# Patient Record
Sex: FEMALE | Race: WHITE | NOT HISPANIC OR LATINO | Employment: OTHER | URBAN - METROPOLITAN AREA
[De-identification: names, ages, dates, MRNs, and addresses within clinical notes are randomized per-mention and may not be internally consistent; named-entity substitution may affect disease eponyms.]

---

## 2018-03-15 ENCOUNTER — HOSPITAL ENCOUNTER (EMERGENCY)
Facility: HOSPITAL | Age: 73
Discharge: HOME | End: 2018-03-15
Attending: EMERGENCY MEDICINE | Admitting: EMERGENCY MEDICINE
Payer: COMMERCIAL

## 2018-03-15 ENCOUNTER — APPOINTMENT (EMERGENCY)
Dept: RADIOLOGY | Facility: HOSPITAL | Age: 73
End: 2018-03-15
Payer: COMMERCIAL

## 2018-03-15 VITALS
WEIGHT: 176 LBS | OXYGEN SATURATION: 97 % | SYSTOLIC BLOOD PRESSURE: 163 MMHG | TEMPERATURE: 98 F | RESPIRATION RATE: 20 BRPM | HEART RATE: 78 BPM | DIASTOLIC BLOOD PRESSURE: 80 MMHG

## 2018-03-15 DIAGNOSIS — S52.611A FRACTURE OF RIGHT ULNAR STYLOID: Primary | ICD-10-CM

## 2018-03-15 DIAGNOSIS — S52.501A DISTAL RADIUS FRACTURE, RIGHT: ICD-10-CM

## 2018-03-15 PROCEDURE — 73090 X-RAY EXAM OF FOREARM: CPT

## 2018-03-15 PROCEDURE — 96375 TX/PRO/DX INJ NEW DRUG ADDON: CPT

## 2018-03-15 PROCEDURE — 73100 X-RAY EXAM OF WRIST: CPT

## 2018-03-15 PROCEDURE — 96374 THER/PROPH/DIAG INJ IV PUSH: CPT

## 2018-03-15 PROCEDURE — 99284 EMERGENCY DEPT VISIT MOD MDM: CPT

## 2018-03-15 RX ORDER — LIDOCAINE HYDROCHLORIDE 20 MG/ML
10 INJECTION, SOLUTION INFILTRATION; PERINEURAL ONCE
Status: COMPLETED | OUTPATIENT
Start: 2018-03-15 | End: 2018-03-15

## 2018-03-15 RX ORDER — IBUPROFEN 400 MG/1
400 TABLET ORAL EVERY 6 HOURS PRN
Qty: 30 TABLET | Refills: 0 | Status: SHIPPED | OUTPATIENT
Start: 2018-03-15 | End: 2018-11-13

## 2018-03-15 RX ORDER — ONDANSETRON 2 MG/ML
INJECTION INTRAMUSCULAR; INTRAVENOUS
Status: COMPLETED
Start: 2018-03-15 | End: 2018-03-15

## 2018-03-15 RX ORDER — OXYCODONE HYDROCHLORIDE 5 MG/1
5 TABLET ORAL EVERY 4 HOURS PRN
Qty: 8 TABLET | Refills: 0 | Status: SHIPPED | OUTPATIENT
Start: 2018-03-15 | End: 2018-03-22 | Stop reason: HOSPADM

## 2018-03-15 RX ORDER — ALPRAZOLAM 0.5 MG/1
TABLET ORAL AS NEEDED
COMMUNITY
Start: 2017-08-14 | End: 2019-07-22 | Stop reason: SDUPTHER

## 2018-03-15 RX ORDER — ONDANSETRON 2 MG/ML
4 INJECTION INTRAMUSCULAR; INTRAVENOUS ONCE
Status: COMPLETED | OUTPATIENT
Start: 2018-03-15 | End: 2018-03-15

## 2018-03-15 RX ORDER — ROPIVACAINE HYDROCHLORIDE 2 MG/ML
10 INJECTION, SOLUTION EPIDURAL; INFILTRATION; PERINEURAL
Status: DISCONTINUED | OUTPATIENT
Start: 2018-03-15 | End: 2018-03-15

## 2018-03-15 RX ORDER — PROPOFOL 10 MG/ML
1 INJECTION, EMULSION INTRAVENOUS ONCE
Status: DISCONTINUED | OUTPATIENT
Start: 2018-03-15 | End: 2018-03-16 | Stop reason: HOSPADM

## 2018-03-15 RX ORDER — ACETAMINOPHEN 500 MG
500 TABLET ORAL EVERY 6 HOURS PRN
Qty: 30 TABLET | Refills: 0 | Status: ON HOLD | OUTPATIENT
Start: 2018-03-15 | End: 2018-03-22 | Stop reason: ALTCHOICE

## 2018-03-15 RX ADMIN — ONDANSETRON 4 MG: 2 INJECTION INTRAMUSCULAR; INTRAVENOUS at 19:50

## 2018-03-15 RX ADMIN — LIDOCAINE HYDROCHLORIDE 10 ML: 20 INJECTION, SOLUTION INFILTRATION; PERINEURAL at 21:00

## 2018-03-15 RX ADMIN — HYDROMORPHONE HYDROCHLORIDE 1 MG: 1 INJECTION, SOLUTION INTRAMUSCULAR; INTRAVENOUS; SUBCUTANEOUS at 19:42

## 2018-03-15 NOTE — ED PROVIDER NOTES
History  Chief Complaint   Patient presents with    Wrist Injury     patient fell on outstreched right wrist trying to catch herself from falling  patient has obvious deformity of right wrist       Patient is a 53-year-old right-handed female who presents with right wrist pain after a mechanical fall immediately prior to arrival   Patient reports that she was trying to get dressed and balancing on 1 leg when her foot got stuck in her pant leg resulting in falling towards her right side and bracing herself with her right hand resulting in a [address]  Complains of immediate pain in the right wrist, 10/10, nonradiating, constant since it started, worse with attempting to move the wrist associated with the swelling  Denies numbness, tingling, pain in the ipsilateral elbow and shoulder  Denies striking her head, loss of consciousness, neck pain, back pain  Assessment and plan:  Obvious deformity with likely fracture/dislocation of the right wrist   Sensation intact, able to wiggle fingers, but unable to flex or extend at the right wrist, cap refill less than 2 seconds, though there is no palpable radial pulse  On ultrasound, there is a visualized, dopplerable radial pulse  Will perform procedural sedation versus hematoma block and nerve block to achieve pain control and then will attempt reduction after x-rays  Prior to Admission Medications   Prescriptions Last Dose Informant Patient Reported? Taking? ALPRAZolam (XANAX) 0 5 mg tablet   Yes Yes      Facility-Administered Medications: None       Past Medical History:   Diagnosis Date    Pericarditis        Past Surgical History:   Procedure Laterality Date    KNEE SURGERY Left     PERICARDIAL WINDOW         No family history on file  I have reviewed and agree with the history as documented      Social History   Substance Use Topics    Smoking status: Never Smoker    Smokeless tobacco: Never Used    Alcohol use No        Review of Systems Constitutional: Negative for chills and fever  Respiratory: Negative for shortness of breath  Cardiovascular: Negative for chest pain and palpitations  Gastrointestinal: Negative for diarrhea, nausea and vomiting  Musculoskeletal: Negative for back pain, neck pain and neck stiffness  Skin: Positive for wound  Neurological: Negative for weakness and numbness  Physical Exam  ED Triage Vitals   Temperature Pulse Respirations Blood Pressure SpO2   03/15/18 2100 03/15/18 2025 03/15/18 2025 03/15/18 2025 03/15/18 2025   98 °F (36 7 °C) 73 18 162/75 96 %      Temp Source Heart Rate Source Patient Position - Orthostatic VS BP Location FiO2 (%)   03/15/18 2100 03/15/18 2025 03/15/18 2025 03/15/18 2100 --   Tympanic Monitor Lying Left arm       Pain Score       03/15/18 2025       9           Orthostatic Vital Signs  Vitals:    03/15/18 2120 03/15/18 2125 03/15/18 2130 03/15/18 2135   BP: 142/72 132/71 161/78 163/80   Pulse: 77 72 80 78   Patient Position - Orthostatic VS: Sitting Sitting Sitting Sitting       Physical Exam   Constitutional: She is oriented to person, place, and time  She appears well-developed and well-nourished  No distress  HENT:   Head: Normocephalic and atraumatic  Right Ear: External ear normal    Left Ear: External ear normal    Nose: Nose normal    Mouth/Throat: Oropharynx is clear and moist    Eyes: Conjunctivae and EOM are normal  Pupils are equal, round, and reactive to light  Neck: Normal range of motion  Neck supple  Cardiovascular: Normal rate, regular rhythm, normal heart sounds and intact distal pulses  Exam reveals no gallop and no friction rub  No murmur heard  Pulmonary/Chest: Effort normal and breath sounds normal  No respiratory distress  She has no wheezes  She has no rales  She exhibits no tenderness  Abdominal: Soft  Bowel sounds are normal  She exhibits no distension  There is no tenderness  Musculoskeletal: She exhibits no edema          Right wrist: She exhibits decreased range of motion, tenderness, bony tenderness, swelling and deformity  She exhibits no crepitus and no laceration  Cervical back: Normal  She exhibits normal range of motion, no tenderness, no bony tenderness, no swelling, no edema, no deformity, no laceration and no pain  Thoracic back: Normal  She exhibits normal range of motion, no tenderness, no bony tenderness, no swelling, no edema, no deformity, no laceration and no pain  Lumbar back: Normal  She exhibits normal range of motion, no tenderness, no bony tenderness, no swelling, no edema, no deformity, no laceration and no pain  Arms:       Right hand: She exhibits normal range of motion, no tenderness, normal two-point discrimination, normal capillary refill, no deformity, no laceration and no swelling  Normal sensation noted  Decreased strength noted  She exhibits wrist extension trouble  She exhibits no finger abduction and no thumb/finger opposition  Neurological: She is alert and oriented to person, place, and time  Skin: Skin is warm and dry  Capillary refill takes less than 2 seconds  No rash noted  She is not diaphoretic  No erythema  No pallor  Psychiatric: She has a normal mood and affect  Her behavior is normal    Nursing note and vitals reviewed  ED Medications  Medications   HYDROmorphone (DILAUDID) injection 1 mg (1 mg Intravenous Given 3/15/18 1942)   ondansetron (ZOFRAN) injection 4 mg (4 mg Intravenous Given 3/15/18 1950)   lidocaine (XYLOCAINE) 2 % injection 10 mL (10 mL Infiltration Given 3/15/18 2100)       Diagnostic Studies  Results Reviewed     None                 XR wrist 2 vw right   ED Interpretation by Bandar Davalos DO (03/15 2207)   Abnormal   No change as compared to previous film as interpreted by me independently       Final Result by Leighton Parra MD (03/16 9138)      Stable alignment of distal radial and ulnar fractures        As per comments in the PACS workstation, findings are concordant with preliminary interpretation provided by the emergency room physician  Workstation performed: [id number]         XR forearm 2 views RIGHT   ED Interpretation by Toney Johnson DO (03/15 2014)   Abnormal   Acute comminuted and severely impacted distal radius as interpreted by me independently and questionable avulsion of ulna      Final Result by Jocelyn Delgadillo MD (03/16 0815)      Mildly displaced comminuted fracture of the distal right radius  Mildly displaced fracture of the ulnar styloid process  Findings concur with ED results as provided in PACS viewer/EPIC at the time of interpretation  Workstation performed: [id number]               Procedures  Orthopedic Injury  Date/Time: 3/15/2018 8:37 PM  Performed by: Jovanna Edwards by: Francisco Blackmon   Consent: Written consent obtained  Risks and benefits: risks, benefits and alternatives were discussed  Consent given by: patient  Patient understanding: patient states understanding of the procedure being performed  Patient identity confirmed: verbally with patient and arm band  Time out: Immediately prior to procedure a "time out" was called to verify the correct patient, procedure, equipment, support staff and site/side marked as required  Injury location: wrist  Location details: right wrist  Injury type: fracture  Fracture type: distal radius  Pre-procedure distal perfusion: normal  Pre-procedure neurological function: normal  Pre-procedure range of motion: reduced  Anesthesia: hematoma block    Anesthesia:  Local anesthesia used: yes  General Anesthesia used: noLocal Anesthetic: lidocaine 2% without epinephrine  Anesthetic total: 5 mL  Manipulation performed: yes  Skin traction used: no  Reduction successful: impaction slightly improved    X-ray confirmed reduction: yes  Immobilization: splint  Splint type: ulnar gutter  Supplies used: Ortho-Glass  Post-procedure neurovascular assessment: post-procedure neurovascularly intact  Post-procedure distal perfusion: normal  Post-procedure neurological function: normal  Post-procedure range of motion: unchanged  Patient tolerance: Patient tolerated the procedure well with no immediate complications        Conscious Sedation Assessment    Flowsheet Row Classification Score   ASA Scale Assessment  1-Healthy patient, no disease outside surgical process filed at 03/15/2018 2100          Phone Consults  ED Phone Contact    ED Course  ED Course as of Mar 17 0044   u Mar 15, 2018   2016 Calling ortho as XR shows a severely comminuted and impacted distal radius fracture which does not appear to benefit from bedside reduction  Will discuss further management with ortho  2021 Discussed case with Ortho resident, Carmel Lang, who recommends ulnar deviation for reduction followed by a sugar-tong splint and outpatient orthopedic follow-up      2202 Hematoma block successful  Did not require procedural sedation  Identification of Seniors at [address] [place] [address] Most Recent Value   (ISAR) Identification of Seniors at Risk   Before the illness or injury that brought you to the Emergency, did you need someone to help you on a regular basis? 0 Filed at: 03/15/2018 1848   In the last 24 hours, have you needed more help than usual?  0 Filed at: 03/15/2018 6640   Have you been hospitalized for one or more nights during the past 6 months? 0 Filed at: 03/15/2018 1848   In general, do you see well?  0 Filed at: 03/15/2018 1848   In general, do you have serious problems with your memory?   0 Filed at: 03/15/2018 1848   Do you take more than three different medications every day?  0 Filed at: 03/15/2018 1848   ISAR Score  0 Filed at: 03/15/2018 1848                          [facility]  CritCare Time    Disposition  Final diagnoses:   Fracture of right ulnar styloid   Distal radius fracture, right     Time reflects when diagnosis was documented in both MDM as applicable and the Disposition within this note     Time User Action Codes Description Comment    3/15/2018  9:53 PM Junious Gary Add [S75 772H] Fracture of right ulnar styloid     3/15/2018  9:53 PM Junious Gary Add [R96 160I] Closed displaced fracture of head of right radius, initial encounter     3/15/2018  9:54 PM Junious Gary Modify [N01 043G] Closed displaced fracture of head of right radius, initial encounter Comminuted, impacted    3/15/2018 10:12 PM Junious Gary Remove [S52 121A] Closed displaced fracture of head of right radius, initial encounter Comminuted, impacted    3/15/2018 10:12 PM Junious Gary Add [S52 501A] Distal radius fracture, right       ED Disposition     ED Disposition Condition Comment    Discharge  Corinne Gonzáles discharge to home/self care      Condition at discharge: Good        Follow-up Information     Follow up With Specialties Details Why Contact Info Additional Information    Juna Garza MD Orthopedic Surgery Schedule an appointment as soon as possible for a visit in 1 week for re-evaluation [address] [place] [address] Emergency Department Emergency Medicine Go to for re-evaluation, As needed, If symptoms worsen [address] [place] [address], [address] [place] [address], [facility]        Discharge Medication List as of 3/15/2018 10:12 PM      START taking these medications    Details   acetaminophen (TYLENOL) 500 mg tablet Take 1 tablet (500 mg total) by mouth every 6 (six) hours as needed for mild pain, Starting Thu 3/15/2018, Print      ibuprofen (MOTRIN) 400 mg tablet Take 1 tablet (400 mg total) by mouth every 6 (six) hours as needed for mild pain, Starting Thu 3/15/2018, Print      oxyCODONE (ROXICODONE) 5 mg immediate release tablet Take 1 tablet (5 mg total) by mouth every 4 (four) hours as needed for moderate pain for up to 7 days Max Daily Amount: 30 mg, Starting Thu 3/15/2018, Until Thu 3/22/2018, Print         CONTINUE these medications which have NOT CHANGED    Details   ALPRAZolam (XANAX) 0 5 mg tablet Starting Mon 8/14/2017, Historical Med           No discharge procedures on file  ED Provider  Attending physically available and evaluated Ivy Joyner I managed the patient along with the ED Attending      Electronically Signed by         Cristo Baumann, DO  03/17/18 Tiffanie Jiménez, DO  03/17/18 4837

## 2018-03-15 NOTE — ED ATTENDING ATTESTATION
Marcello Kessler MD, saw and evaluated the patient  All available labs and X-rays were ordered by me or the resident and have been reviewed by myself  I discussed the patient with the resident / non-physician and agree with the resident's / non-physician practitioner's findings and plan as documented in the resident's / non-physician practicitioner's note, except where noted  At this point, I agree with the current assessment done in the ED  Chief Complaint   Patient presents with    Wrist Injury     patient fell on outstreched right wrist trying to catch herself from falling  patient has obvious deformity of right wrist       The patient states that she was walking, had a [address] mechanism fall, stopping fall with right hand  Obvious deformity so came in  Denies f/ch/n/v/cp/sob  Mechanical fall  A: intact  B: bilateral sounds  C: 2+ pulse on left  On the right, I can't feel a pulse, but with U/S I can see 2 of them present  D: sensation intact  M/U/R/A nerve sensation intact   present   Finger abduction/adduction/opposition intact  Suppination/Pronation refused  Good capillary refill  BICEPS/TRICEPS 5/5  Shoulder abduction/adduction 5/5  Vitals:    03/15/18 2120 03/15/18 2125 03/15/18 2130 03/15/18 2135   BP: 142/72 132/71 161/78 163/80   BP Location: Left arm Left arm Left arm Left arm   Pulse: 77 72 80 78   Resp: 20 20 20 20   Temp:       TempSrc:       SpO2: 96% 98% 98% 97%   Weight:       A/P:  - lidocaine hematoma block  - attempt right brachial plexus block --> discussed risks/benefits with  + patient  - propofol if the above are inadequate   - reduction  - Stat XR  - 13 point ROS was performed and all are normal unless stated in the history above  - Nursing note reviewed  Vitals reviewed  - Orders placed by myself and/or advanced practitioner / resident     - Previous chart was not reviewed  - No language barrier    - History obtained from patient     - There are no limitations to the history obtained  - Critical care time: Not applicable for this patient  Final Diagnosis:  1  Fracture of right ulnar styloid    2  Distal radius fracture, right      ED Course as of Mar 16 0125   Thu Mar 15, 2018   2016 The patient has an obvious impacted radial fracture as well as ulnar styloid fracture  My original plan was for reduction however given the x-ray appearance I think using finger traps and relaxation would be ideal   I do not think that there is an endpoint for reduction  We will discuss the case with orthopedics given decreased pulse and subjective decreased neuro exam     2150 I did the procedure with resident  NVI         Medications   HYDROmorphone (DILAUDID) injection 1 mg (1 mg Intravenous Given 3/15/18 1942)   ondansetron (ZOFRAN) injection 4 mg (4 mg Intravenous Given 3/15/18 1950)   lidocaine (XYLOCAINE) 2 % injection 10 mL (10 mL Infiltration Given 3/15/18 2100)     XR wrist 2 vw right   ED Interpretation   Abnormal   No change as compared to previous film as interpreted by me independently             XR forearm 2 views RIGHT   ED Interpretation   Abnormal   Acute comminuted and severely impacted distal radius as interpreted by me independently and questionable avulsion of ulna        Orders Placed This Encounter   Procedures    Orthopedic injury treatment    XR forearm 2 views RIGHT    XR wrist 2 vw right     Labs Reviewed - No data to display  Time reflects when diagnosis was documented in both MDM as applicable and the Disposition within this note     Time User Action Codes Description Comment    3/15/2018  9:53 PM Black Strong Add [S52 611A] Fracture of right ulnar styloid     3/15/2018  9:53 PM Black Strong Add [S52 121A] Closed displaced fracture of head of right radius, initial encounter     3/15/2018  9:54 PM Black Strong Modify [V78 756W] Closed displaced fracture of head of right radius, initial encounter Comminuted, impacted    3/15/2018 10:12 PM Halie Junior Remove [G83 105F] Closed displaced fracture of head of right radius, initial encounter Comminuted, impacted    3/15/2018 10:12 PM Halie Junior Add [S52 501A] Distal radius fracture, right       ED Disposition     ED Disposition Condition Comment    Discharge  Kecia Fairchild discharge to home/self care  Condition at discharge: Good        Follow-up Information     Follow up With Specialties Details Why Contact Info Additional Information    Lidia Carter MD Orthopedic Surgery Schedule an appointment as soon as possible for a visit in 1 week for re-evaluation [address] Antoine [address]       [address] Emergency Department Emergency Medicine Go to for re-evaluation, As needed, If symptoms worsen [address] [place] [address], [address] [place] [address], [address]        Discharge Medication List as of 3/15/2018 10:12 PM      START taking these medications    Details   acetaminophen (TYLENOL) 500 mg tablet Take 1 tablet (500 mg total) by mouth every 6 (six) hours as needed for mild pain, Starting u 3/15/2018, Print      ibuprofen (MOTRIN) 400 mg tablet Take 1 tablet (400 mg total) by mouth every 6 (six) hours as needed for mild pain, Starting u 3/15/2018, Print      oxyCODONE (ROXICODONE) 5 mg immediate release tablet Take 1 tablet (5 mg total) by mouth every 4 (four) hours as needed for moderate pain for up to 7 days Max Daily Amount: 30 mg, Starting Thu 3/15/2018, Until Thu 3/22/2018, Print         CONTINUE these medications which have NOT CHANGED    Details   ALPRAZolam (XANAX) 0 5 mg tablet Starting Mon 8/14/2017, Historical Med           No discharge procedures on file  Prior to Admission Medications   Prescriptions Last Dose Informant Patient Reported? Taking?    ALPRAZolam (XANAX) 0 5 mg tablet   Yes Yes      Facility-Administered Medications: None       Portions of the record may have been created with voice recognition software  Occasional wrong word or "sound a like" substitutions may have occurred due to the inherent limitations of voice recognition software  Read the chart carefully and recognize, using context, where substitutions have occurred      Electronically signed by:  Godwin Sarmiento

## 2018-03-16 NOTE — SEDATION DOCUMENTATION
Splint successful with nerve block without sedation  Sedation narrator charting stopped at this time

## 2018-03-16 NOTE — DISCHARGE INSTRUCTIONS
Arm Fracture in Adults   WHAT YOU NEED TO KNOW:   An arm fracture is a crack or break in one or more of the bones in your arm  An arm fracture may be caused by a fall onto an outstretched hand  It may also be caused by trauma from a car accident or a sports injury  Osteoporosis (brittle bones) can increase your risk for a fracture  DISCHARGE INSTRUCTIONS:   Return to the emergency department if:   · The pain in your injured arm does not get better or gets worse, even after you rest and take medicine  · Your injured arm, hand, or fingers feel numb  · Your arm is swollen, red, and feels warm  · Your skin over the arm fracture is swollen, cold, or pale  · You cannot move your arm, hand, or fingers  Contact your healthcare provider if:   · You have a fever  · Your brace or splint becomes wet, damaged, or comes off  · You have questions or concerns about your injury, treatment, or care  Medicines:   · NSAIDs , such as ibuprofen, help decrease swelling and pain  This medicine is available with or without a doctor's order  NSAIDs can cause stomach bleeding or kidney problems in certain people  If you take blood thinner medicine, always ask your healthcare provider if NSAIDs are safe for you  Always read the medicine label and follow directions  · Acetaminophen  decreases pain  It is available without a doctor's order  Ask how much to take and how often to take it  Follow directions  Acetaminophen can cause liver damage if not taken correctly  · Prescription pain medicine  may be given  Ask how to take this medicine safely  · Take your medicine as directed  Contact your healthcare provider if you think your medicine is not helping or if you have side effects  Tell him or her if you are allergic to any medicine  Keep a list of the medicines, vitamins, and herbs you take  Include the amounts, and when and why you take them  Bring the list or the pill bottles to follow-up visits   Carry your medicine list with you in case of an emergency  Follow up with your healthcare provider within 1 week: You may need to see a bone specialist within 3 to 4 days if you need surgery or further treatment for your arm fracture  Write down your questions so you remember to ask them during your visits  Rest:  You should rest your arm as much as possible  Ask your healthcare provider when you can put pressure or weight on your arm  Also ask when you can return to sports or vigorous exercises  Ice:  Apply ice on your arm for 15 to 20 minutes every hour or as directed  Use an ice pack, or put crushed ice in a plastic bag  Cover it with a towel  Ice helps prevent tissue damage and decreases swelling and pain  Elevate:  Elevate your arm above the level of your heart as often as you can  This will help decrease swelling and pain  Prop your arm on pillows or blankets to keep it elevated comfortably  Care for your cast or splint:  Ask your healthcare provider when it is okay to bathe  Do not get your cast or splint wet  Before you take a bath or shower, cover your cast or splint with a plastic bag  Tape the bag to your skin to help keep water out  Hold your arm away from the water in case the bag leaks  · Check the skin around your cast or splint each day for any redness or open skin  · Do not use a sharp or pointed object to scratch your skin under the cast or splint  Physical therapy:  A physical therapist teaches you exercises to help improve movement and strength, and to decrease pain  © 2017 [employer] Information is for End User's use only and may not be sold, redistributed or otherwise used for commercial purposes  All illustrations and images included in CareNotes® are the copyrighted property of [employer]  or [address]  The above information is an  only  It is not intended as medical advice for individual conditions or treatments   Talk to your doctor, nurse or pharmacist before following any medical regimen to see if it is safe and effective for you  Wrist Fracture in Adults   WHAT YOU NEED TO KNOW:   A wrist fracture is a break in one or more of the bones in your wrist    DISCHARGE INSTRUCTIONS:   Return to the emergency department if:   · Your pain gets worse or does not get better after you take pain medicine  · Your cast or splint breaks, gets wet, or is damaged  · Your hand or fingers feel numb or cold  · Your hand or fingers turn white or blue  · Your splint or cast feels too tight  · You have more pain or swelling after the cast or splint is put on  Contact your healthcare provider if:   · You have a fever  · There is a foul smell or blood coming from under the cast     · You have questions or concerns about your condition or care  Medicines:   · Prescription pain medicine  may be given  Ask your healthcare provider how to take this medicine safely  Some prescription pain medicines contain acetaminophen  Do not take other medicines that contain acetaminophen without talking to your healthcare provider  Too much acetaminophen may cause liver damage  Prescription pain medicine may cause constipation  Ask your healthcare provider how to prevent or treat constipation  · NSAIDs , such as ibuprofen, help decrease swelling, pain, and fever  NSAIDs can cause stomach bleeding or kidney problems in certain people  If you take blood thinner medicine, always ask your healthcare provider if NSAIDs are safe for you  Always read the medicine label and follow directions  · Acetaminophen  decreases pain and fever  It is available without a doctor's order  Ask how much to take and how often to take it  Follow directions  Read the labels of all other medicines you are using to see if they also contain acetaminophen, or ask your doctor or pharmacist  Acetaminophen can cause liver damage if not taken correctly   Do not use more than 4 grams (4,000 milligrams) total of acetaminophen in one day  · Take your medicine as directed  Contact your healthcare provider if you think your medicine is not helping or if you have side effects  Tell him or her if you are allergic to any medicine  Keep a list of the medicines, vitamins, and herbs you take  Include the amounts, and when and why you take them  Bring the list or the pill bottles to follow-up visits  Carry your medicine list with you in case of an emergency  Self-care:   · Rest  as much as possible  Do not play contact sports until the healthcare provider says it is okay  · Apply ice  on your wrist for 15 to 20 minutes every hour or as directed  Use an ice pack, or put crushed ice in a plastic bag  Cover it with a towel before you place it on your skin  Ice helps prevent tissue damage and decreases swelling and pain  · Elevate  your wrist above the level of your heart as often as possible  This will help decrease swelling and pain  Prop your wrist on pillows or blankets to keep it elevated comfortably  Cast or splint care:   · You may take a bath or shower as directed  Do not let your cast or splint get wet  Before bathing, cover the cast or splint with 2 plastic trash bags  Tape the bags to your skin above the cast or splint to seal out the water  Keep your arm out of the water in case the bag breaks  If a plaster cast gets wet and soft, call your healthcare provider  · Check the skin around the cast or splint every day  You may put lotion on any red or sore areas  · Do not push down or lean on the cast or brace because it may break  · Do not  scratch the skin under the cast by putting a sharp or pointed object inside the cast   Go to physical therapy as directed: You may need physical therapy after your wrist heals and the cast is removed  A physical therapist can teach you exercises to help improve movement and strength and to decrease pain     Follow up with your healthcare provider or bone specialist as directed: You may need to return to have your cast removed  You may also need an x-ray to check how well the bone has healed  Write down your questions so you remember to ask them during your visits  © 2017 [employer] Information is for End User's use only and may not be sold, redistributed or otherwise used for commercial purposes  All illustrations and images included in CareNotes® are the copyrighted property of [employer]  or [employer]  The above information is an  only  It is not intended as medical advice for individual conditions or treatments  Talk to your doctor, nurse or pharmacist before following any medical regimen to see if it is safe and effective for you  Splint Care   WHAT YOU NEED TO KNOW:   Splint care is important to help protect your splint until it comes off  Some splints are made of fiberglass or plaster that will need to dry and harden  Splint care will help the splint dry and harden correctly  Even after your splint hardens, it can be damaged  DISCHARGE INSTRUCTIONS:   Return to the emergency department if:   · You have increased pain  · Your fingers or toes are numb or tingling  · You feel burning or stinging around your injury  · Your nails, fingers, or toes turn pale, blue, or gray, and feel cold  · You have new or increased trouble moving your fingers or toes  · Your swelling gets worse  · The skin under your splint is bleeding or leaking pus  Contact your healthcare provider if:   · Your hard splint gets wet or is damaged  · You have a fever  · Your splint feels tighter  · You have itchy, dry skin under your splint that is getting worse  · The skin under your splint is red, or you have a new sore  · You notice a bad smell coming from your splint  · You have questions or concerns about your condition or care    How to care for your splint:   · Wait for your hard splint to harden completely  You may have to wait up to 3 days before you can walk on a plaster splint  · Check your splint and the skin around it each day  Check your splint for damage, such as cracks and breaks  Check your skin for redness, increased swelling, and sores  Loosen the elastic bandage around your splint if it feels too tight  · Keep your splint clean and dry  Keep dirt out of your splint  Before you bathe, wrap your hard splint with 2 layers of plastic  Then put a plastic bag over it  Keep the plastic bag tightly sealed  You can also ask your healthcare provider about waterproof shields  Do not put your hard splint in the water , even with a plastic bag over it  A wet splint can make your skin itchy, and may lead to infection  · Do not put powders or deodorants inside your splint  These can dry your skin and increase itching  · Do not try to scratch the skin inside your hard splint with sharp objects  Sharp objects can break off inside your splint or hurt your skin  · Do not pull the padding out of your splint  The padding inside your splint protects your skin  You may develop a sore on your skin if you take out the padding  Follow up with your healthcare provider as directed within 1 to 2 weeks:  Write down your questions so you remember to ask them during your visits  © 2017 [address] [employer] Information is for End User's use only and may not be sold, redistributed or otherwise used for commercial purposes  All illustrations and images included in CareNotes® are the copyrighted property of [employer]  or [employer]  The above information is an  only  It is not intended as medical advice for individual conditions or treatments  Talk to your doctor, nurse or pharmacist before following any medical regimen to see if it is safe and effective for you

## 2018-03-20 ENCOUNTER — OFFICE VISIT (OUTPATIENT)
Dept: OBGYN CLINIC | Facility: HOSPITAL | Age: 73
End: 2018-03-20
Payer: COMMERCIAL

## 2018-03-20 ENCOUNTER — TELEPHONE (OUTPATIENT)
Dept: OBGYN CLINIC | Facility: HOSPITAL | Age: 73
End: 2018-03-20

## 2018-03-20 VITALS
WEIGHT: 181 LBS | HEIGHT: 69 IN | DIASTOLIC BLOOD PRESSURE: 92 MMHG | HEART RATE: 79 BPM | SYSTOLIC BLOOD PRESSURE: 163 MMHG | BODY MASS INDEX: 26.81 KG/M2 | RESPIRATION RATE: 20 BRPM

## 2018-03-20 DIAGNOSIS — S52.501A CLOSED FRACTURE DISTAL RADIUS AND ULNA, RIGHT, INITIAL ENCOUNTER: Primary | ICD-10-CM

## 2018-03-20 DIAGNOSIS — S52.601A CLOSED FRACTURE DISTAL RADIUS AND ULNA, RIGHT, INITIAL ENCOUNTER: Primary | ICD-10-CM

## 2018-03-20 PROCEDURE — 99203 OFFICE O/P NEW LOW 30 MIN: CPT | Performed by: ORTHOPAEDIC SURGERY

## 2018-03-20 RX ORDER — OXYCODONE HYDROCHLORIDE AND ACETAMINOPHEN 5; 325 MG/1; MG/1
1 TABLET ORAL EVERY 4 HOURS PRN
Qty: 30 TABLET | Refills: 0 | Status: SHIPPED | OUTPATIENT
Start: 2018-03-20 | End: 2018-03-22 | Stop reason: HOSPADM

## 2018-03-20 NOTE — TELEPHONE ENCOUNTER
Patient is calling stating that she cannot take acetaminophen, patient is wondering if instead of percocet she can take oxycodone      Shyanne mix

## 2018-03-20 NOTE — PROGRESS NOTES
81 y o female right-hand-dominant female who sustained a trip and fall approximately 5 days ago medial her right wrist  She did emergency department sugars a she was reduced and a splint was placed  She presents for follow-up  She complains of persistent but improving wrist pain this is made worse with motion contact to the wrist and relieved by rest   Denies any numbness or tingling in her fingertips or elbow pain  Review of Systems  Review of systems negative unless otherwise specified in HPI    Past Medical History  Past Medical History:   Diagnosis Date    Pericarditis        Past Surgical History  Past Surgical History:   Procedure Laterality Date    KNEE SURGERY Left     PERICARDIAL WINDOW         Current Medications  Current Outpatient Prescriptions on File Prior to Visit   Medication Sig Dispense Refill    ALPRAZolam (XANAX) 0 5 mg tablet       ibuprofen (MOTRIN) 400 mg tablet Take 1 tablet (400 mg total) by mouth every 6 (six) hours as needed for mild pain 30 tablet 0    oxyCODONE (ROXICODONE) 5 mg immediate release tablet Take 1 tablet (5 mg total) by mouth every 4 (four) hours as needed for moderate pain for up to 7 days Max Daily Amount: 30 mg 8 tablet 0    acetaminophen (TYLENOL) 500 mg tablet Take 1 tablet (500 mg total) by mouth every 6 (six) hours as needed for mild pain 30 tablet 0     No current facility-administered medications on file prior to visit          Recent Labs (HCT,HGB,PT,INR,ESR,CRP,GLU,HgA1C)  No results found for: HCT, HGB, WBC, PT, INR, ESR, CRP, GLUCOSE, HGBA1C      Physical exam  · General: Awake, Alert, Oriented  · Eyes: Pupils equal, round and reactive to light  · Heart: regular rate and rhythm  · Lungs: No audible wheezing, CTAB  · Abdomen: soft  Right upper extremity  · Swelling and ecchymosis over the volar aspect of the wrist  · Tenderness palpation over the wrist   Nontender over the elbow  · Painful wrist range of motion  · Sensation intact to median radial ulnar nerve distributions  · Motor intact ain/pin/m/r/u  · Finger tips warm and well perfused      Imaging  X-rays right wrist shows a comminuted and displaced intra-articular distal radius fracture    Procedure  Patient was placed in a sugar-tong splint    Assessment/Plan:   81 y  o female with a displaced intra-articular distal radius fracture after a fall  · Nonweightbearing right upper extremity in splint  · Sling provided  · Treatments were discussed with the patient including continued conservative care vs surgery  A joint decision was made with patient proceed with ORIF right distal radius fracture  Risks and benefits were explained including mal/non union, scarring, tendon injury, stiffnessbleeding, infection, and blood clots  Informed consent was obtained she is booked for surgery accordingly  No guarantees were given   We will see patient back in follow-up on a postoperative basis  ·

## 2018-03-21 ENCOUNTER — TELEPHONE (OUTPATIENT)
Dept: OBGYN CLINIC | Facility: HOSPITAL | Age: 73
End: 2018-03-21

## 2018-03-21 ENCOUNTER — ANESTHESIA EVENT (OUTPATIENT)
Dept: PERIOP | Facility: HOSPITAL | Age: 73
End: 2018-03-21
Payer: COMMERCIAL

## 2018-03-21 RX ORDER — OMEPRAZOLE 20 MG/1
20 CAPSULE, DELAYED RELEASE ORAL DAILY
COMMUNITY
End: 2018-11-13

## 2018-03-21 NOTE — TELEPHONE ENCOUNTER
Patient called that she was not given any surgical soap or wipes to use prior to surgery  Per Dr Pipe Barreto they will take care of this at time of surgery  Patient made aware

## 2018-03-21 NOTE — ANESTHESIA PREPROCEDURE EVALUATION
Review of Systems/Medical History  Patient summary reviewed  Chart reviewed  No history of anesthetic complications     Cardiovascular  Valvular heart disease (per [facility] document) , aortic insufficiency,   Comment: Hx pericarditis-(viral infection)--s/p pericardial window 10 years ago    EKG 3/2018--SR, NS QRS widening ([facility]),  Pulmonary  Negative pulmonary ROS        GI/Hepatic    GERD well controlled,        Negative  ROS        Endo/Other  Negative endo/other ROS      GYN  Negative gynecology ROS          Hematology  Negative hematology ROS      Musculoskeletal  Osteoarthritis,   Comment: S/p fall-3/15/2018-Closed fx distal radius/ulna right      Neurology  Negative neurology ROS      Psychology   Anxiety,              Physical Exam    Airway    Mallampati score: II  TM Distance: >3 FB       Dental   No notable dental hx     Cardiovascular  Rhythm: regular,     Pulmonary  Breath sounds clear to auscultation,     Other Findings  Very anxious preop      Anesthesia Plan  ASA Score- 2     Anesthesia Type- general with ASA Monitors  Additional Monitors:   Airway Plan: LMA  Plan Factors-    Induction- intravenous  Postoperative Plan- Plan for postoperative opioid use  Planned trial extubation    Informed Consent- Anesthetic plan and risks discussed with patient  I personally reviewed this patient with the CRNA  Discussed and agreed on the Anesthesia Plan with the CRNA  Espinoza Harvey

## 2018-03-22 ENCOUNTER — ANESTHESIA (OUTPATIENT)
Dept: PERIOP | Facility: HOSPITAL | Age: 73
End: 2018-03-22
Payer: COMMERCIAL

## 2018-03-22 ENCOUNTER — APPOINTMENT (OUTPATIENT)
Dept: RADIOLOGY | Facility: HOSPITAL | Age: 73
End: 2018-03-22
Payer: COMMERCIAL

## 2018-03-22 ENCOUNTER — HOSPITAL ENCOUNTER (OUTPATIENT)
Facility: HOSPITAL | Age: 73
Setting detail: OUTPATIENT SURGERY
Discharge: HOME | End: 2018-03-22
Attending: ORTHOPAEDIC SURGERY | Admitting: ORTHOPAEDIC SURGERY
Payer: COMMERCIAL

## 2018-03-22 VITALS
HEART RATE: 80 BPM | TEMPERATURE: 100.4 F | OXYGEN SATURATION: 96 % | DIASTOLIC BLOOD PRESSURE: 76 MMHG | HEIGHT: 69 IN | RESPIRATION RATE: 16 BRPM | WEIGHT: 178 LBS | BODY MASS INDEX: 26.36 KG/M2 | SYSTOLIC BLOOD PRESSURE: 137 MMHG

## 2018-03-22 DIAGNOSIS — S52.601A CLOSED FRACTURE DISTAL RADIUS AND ULNA, RIGHT, INITIAL ENCOUNTER: Primary | ICD-10-CM

## 2018-03-22 DIAGNOSIS — S52.501A CLOSED FRACTURE DISTAL RADIUS AND ULNA, RIGHT, INITIAL ENCOUNTER: Primary | ICD-10-CM

## 2018-03-22 PROCEDURE — C1713 ANCHOR/SCREW BN/BN,TIS/BN: HCPCS | Performed by: ORTHOPAEDIC SURGERY

## 2018-03-22 PROCEDURE — 25609 OPTX DST RD XART FX/EP SEP3+: CPT | Performed by: ORTHOPAEDIC SURGERY

## 2018-03-22 PROCEDURE — 73110 X-RAY EXAM OF WRIST: CPT

## 2018-03-22 DEVICE — 2.4MM CORTEX SCREW SLF-TPNG WITH T8 STARDRIVE RECESS 18MM: Type: IMPLANTABLE DEVICE | Site: WRIST | Status: FUNCTIONAL

## 2018-03-22 DEVICE — 2.4MM CORTEX SCREW SLF-TPNG WITH T8 STARDRIVE RECESS 12MM: Type: IMPLANTABLE DEVICE | Site: WRIST | Status: FUNCTIONAL

## 2018-03-22 DEVICE — 2.4MM VA-LCP 2-CLMN VLR DSTL RADIUS PL 6H HD/3H SHAFT/LEFT
Type: IMPLANTABLE DEVICE | Site: WRIST | Status: FUNCTIONAL
Brand: VA-LCP

## 2018-03-22 DEVICE — 1.8MM VA LOCKING BUTTRESS PIN WITH T8 STARDRIVE RECESS/16MM: Type: IMPLANTABLE DEVICE | Site: WRIST | Status: FUNCTIONAL

## 2018-03-22 DEVICE — 1.8MM VA LOCKING BUTTRESS PIN WITH T8 STARDRIVE RECESS/20MM: Type: IMPLANTABLE DEVICE | Site: WRIST | Status: FUNCTIONAL

## 2018-03-22 DEVICE — 2.4MM CORTEX SCREW SLF-TPNG WITH T8 STARDRIVE RECESS 20MM: Type: IMPLANTABLE DEVICE | Site: WRIST | Status: FUNCTIONAL

## 2018-03-22 DEVICE — 2.4MM CORTEX SCREW SLF-TPNG WITH T8 STARDRIVE RECESS 10MM: Type: IMPLANTABLE DEVICE | Site: WRIST | Status: FUNCTIONAL

## 2018-03-22 RX ORDER — OXYCODONE HYDROCHLORIDE 10 MG/1
10 TABLET ORAL EVERY 4 HOURS PRN
Status: DISCONTINUED | OUTPATIENT
Start: 2018-03-22 | End: 2018-03-22 | Stop reason: HOSPADM

## 2018-03-22 RX ORDER — OXYCODONE HYDROCHLORIDE 5 MG/1
5 TABLET ORAL EVERY 4 HOURS PRN
Status: DISCONTINUED | OUTPATIENT
Start: 2018-03-22 | End: 2018-03-22 | Stop reason: HOSPADM

## 2018-03-22 RX ORDER — PROPOFOL 10 MG/ML
INJECTION, EMULSION INTRAVENOUS AS NEEDED
Status: DISCONTINUED | OUTPATIENT
Start: 2018-03-22 | End: 2018-03-22 | Stop reason: SURG

## 2018-03-22 RX ORDER — ONDANSETRON 2 MG/ML
4 INJECTION INTRAMUSCULAR; INTRAVENOUS EVERY 6 HOURS PRN
Status: DISCONTINUED | OUTPATIENT
Start: 2018-03-22 | End: 2018-03-22 | Stop reason: HOSPADM

## 2018-03-22 RX ORDER — SODIUM CHLORIDE, SODIUM LACTATE, POTASSIUM CHLORIDE, CALCIUM CHLORIDE 600; 310; 30; 20 MG/100ML; MG/100ML; MG/100ML; MG/100ML
125 INJECTION, SOLUTION INTRAVENOUS CONTINUOUS
Status: DISCONTINUED | OUTPATIENT
Start: 2018-03-22 | End: 2018-03-22 | Stop reason: HOSPADM

## 2018-03-22 RX ORDER — TRAMADOL HYDROCHLORIDE 50 MG/1
50 TABLET ORAL EVERY 6 HOURS PRN
Status: DISCONTINUED | OUTPATIENT
Start: 2018-03-22 | End: 2018-03-22 | Stop reason: HOSPADM

## 2018-03-22 RX ORDER — OXYCODONE HYDROCHLORIDE 5 MG/1
TABLET ORAL
Qty: 30 TABLET | Refills: 0 | Status: SHIPPED | OUTPATIENT
Start: 2018-03-22 | End: 2018-11-13

## 2018-03-22 RX ORDER — ONDANSETRON 2 MG/ML
4 INJECTION INTRAMUSCULAR; INTRAVENOUS ONCE AS NEEDED
Status: DISCONTINUED | OUTPATIENT
Start: 2018-03-22 | End: 2018-03-22 | Stop reason: HOSPADM

## 2018-03-22 RX ORDER — FENTANYL CITRATE 50 UG/ML
INJECTION, SOLUTION INTRAMUSCULAR; INTRAVENOUS AS NEEDED
Status: DISCONTINUED | OUTPATIENT
Start: 2018-03-22 | End: 2018-03-22 | Stop reason: SURG

## 2018-03-22 RX ORDER — LIDOCAINE HYDROCHLORIDE 10 MG/ML
INJECTION, SOLUTION INFILTRATION; PERINEURAL AS NEEDED
Status: DISCONTINUED | OUTPATIENT
Start: 2018-03-22 | End: 2018-03-22 | Stop reason: SURG

## 2018-03-22 RX ORDER — LUTEIN 6 MG
1 TABLET ORAL DAILY
COMMUNITY
End: 2018-11-13

## 2018-03-22 RX ORDER — ASCORBIC ACID 500 MG
500 TABLET ORAL DAILY
COMMUNITY

## 2018-03-22 RX ORDER — BUPIVACAINE HYDROCHLORIDE 5 MG/ML
INJECTION, SOLUTION PERINEURAL AS NEEDED
Status: DISCONTINUED | OUTPATIENT
Start: 2018-03-22 | End: 2018-03-22 | Stop reason: HOSPADM

## 2018-03-22 RX ORDER — AMOXICILLIN 500 MG
1 CAPSULE ORAL DAILY
COMMUNITY
End: 2019-04-26

## 2018-03-22 RX ORDER — MELATONIN
1000 DAILY
COMMUNITY
End: 2018-11-13

## 2018-03-22 RX ORDER — METOCLOPRAMIDE HYDROCHLORIDE 5 MG/ML
10 INJECTION INTRAMUSCULAR; INTRAVENOUS ONCE AS NEEDED
Status: DISCONTINUED | OUTPATIENT
Start: 2018-03-22 | End: 2018-03-22 | Stop reason: HOSPADM

## 2018-03-22 RX ORDER — MEPERIDINE HYDROCHLORIDE 25 MG/ML
12.5 INJECTION INTRAMUSCULAR; INTRAVENOUS; SUBCUTANEOUS ONCE AS NEEDED
Status: DISCONTINUED | OUTPATIENT
Start: 2018-03-22 | End: 2018-03-22 | Stop reason: HOSPADM

## 2018-03-22 RX ORDER — SODIUM CHLORIDE, SODIUM LACTATE, POTASSIUM CHLORIDE, CALCIUM CHLORIDE 600; 310; 30; 20 MG/100ML; MG/100ML; MG/100ML; MG/100ML
50 INJECTION, SOLUTION INTRAVENOUS CONTINUOUS
Status: DISCONTINUED | OUTPATIENT
Start: 2018-03-22 | End: 2018-03-22 | Stop reason: HOSPADM

## 2018-03-22 RX ORDER — CEFAZOLIN SODIUM 1 G/3ML
INJECTION, POWDER, FOR SOLUTION INTRAMUSCULAR; INTRAVENOUS AS NEEDED
Status: DISCONTINUED | OUTPATIENT
Start: 2018-03-22 | End: 2018-03-22 | Stop reason: SURG

## 2018-03-22 RX ORDER — LORAZEPAM 2 MG/ML
0.5 INJECTION INTRAMUSCULAR ONCE
Status: COMPLETED | OUTPATIENT
Start: 2018-03-22 | End: 2018-03-22

## 2018-03-22 RX ORDER — MAGNESIUM HYDROXIDE 1200 MG/15ML
LIQUID ORAL AS NEEDED
Status: DISCONTINUED | OUTPATIENT
Start: 2018-03-22 | End: 2018-03-22 | Stop reason: HOSPADM

## 2018-03-22 RX ORDER — FENTANYL CITRATE/PF 50 MCG/ML
50 SYRINGE (ML) INJECTION
Status: DISCONTINUED | OUTPATIENT
Start: 2018-03-22 | End: 2018-03-22 | Stop reason: HOSPADM

## 2018-03-22 RX ADMIN — HYDROMORPHONE HYDROCHLORIDE 0.2 MG: 1 INJECTION, SOLUTION INTRAMUSCULAR; INTRAVENOUS; SUBCUTANEOUS at 09:50

## 2018-03-22 RX ADMIN — ONDANSETRON 4 MG: 2 INJECTION INTRAMUSCULAR; INTRAVENOUS at 10:51

## 2018-03-22 RX ADMIN — HYDROMORPHONE HYDROCHLORIDE 0.2 MG: 1 INJECTION, SOLUTION INTRAMUSCULAR; INTRAVENOUS; SUBCUTANEOUS at 09:55

## 2018-03-22 RX ADMIN — LIDOCAINE HYDROCHLORIDE 50 MG: 10 INJECTION, SOLUTION INFILTRATION; PERINEURAL at 07:45

## 2018-03-22 RX ADMIN — HYDROMORPHONE HYDROCHLORIDE 0.2 MG: 1 INJECTION, SOLUTION INTRAMUSCULAR; INTRAVENOUS; SUBCUTANEOUS at 10:00

## 2018-03-22 RX ADMIN — FENTANYL CITRATE 50 MCG: 50 INJECTION, SOLUTION INTRAMUSCULAR; INTRAVENOUS at 09:27

## 2018-03-22 RX ADMIN — TRAMADOL HYDROCHLORIDE 50 MG: 50 TABLET, FILM COATED ORAL at 12:06

## 2018-03-22 RX ADMIN — HYDROMORPHONE HYDROCHLORIDE 0.2 MG: 1 INJECTION, SOLUTION INTRAMUSCULAR; INTRAVENOUS; SUBCUTANEOUS at 10:11

## 2018-03-22 RX ADMIN — HYDROMORPHONE HYDROCHLORIDE 0.2 MG: 1 INJECTION, SOLUTION INTRAMUSCULAR; INTRAVENOUS; SUBCUTANEOUS at 09:45

## 2018-03-22 RX ADMIN — OXYCODONE HYDROCHLORIDE 10 MG: 10 TABLET ORAL at 11:00

## 2018-03-22 RX ADMIN — FENTANYL CITRATE 50 MCG: 50 INJECTION, SOLUTION INTRAMUSCULAR; INTRAVENOUS at 08:12

## 2018-03-22 RX ADMIN — CEFAZOLIN 2000 MG: 1 INJECTION, POWDER, FOR SOLUTION INTRAVENOUS at 07:52

## 2018-03-22 RX ADMIN — PROPOFOL 200 MG: 10 INJECTION, EMULSION INTRAVENOUS at 07:45

## 2018-03-22 RX ADMIN — FENTANYL CITRATE 50 MCG: 50 INJECTION, SOLUTION INTRAMUSCULAR; INTRAVENOUS at 07:45

## 2018-03-22 RX ADMIN — LORAZEPAM 0.5 MG: 2 INJECTION INTRAMUSCULAR; INTRAVENOUS at 06:29

## 2018-03-22 RX ADMIN — FENTANYL CITRATE 50 MCG: 50 INJECTION, SOLUTION INTRAMUSCULAR; INTRAVENOUS at 09:37

## 2018-03-22 RX ADMIN — SODIUM CHLORIDE, SODIUM LACTATE, POTASSIUM CHLORIDE, AND CALCIUM CHLORIDE: .6; .31; .03; .02 INJECTION, SOLUTION INTRAVENOUS at 07:43

## 2018-03-22 NOTE — OP NOTE
OPERATIVE REPORT  PATIENT NAME: Prabhakar De Dios  :  1945  MRN: [id number]  Pt Location: [facility]    SURGERY DATE: 18    Surgeon(s) and Role:     * Jenelle Monique MD - Primary     * Melecio Webb PA-C - Dwaine Dior MD - Assisting    Pre-Op Diagnosis:  Closed fracture distal radius and ulna, right, initial encounter [S52 501A, S52 601A]    Post-Op Diagnosis Codes:     * Closed fracture distal radius and ulna, right, initial encounter [S52 501A, S52 601A]    Procedure(s):  OPEN REDUCTION W/ INTERNAL FIXATION (ORIF) RADIUS, splint application (Right)    Specimen(s):  * No order type specified *    Estimated Blood Loss:   Minimal      Anesthesia Type:   General     Operative Indications: The patient has a history of a right three-part intra-articular distal radius fracture  The decision was made to bring the patient to the operating room for open reduction and internal fixation  Risks of the procedure were explained which include, but are not limited to bleeding; infection; damage to nerves, arteries,veins, tendons; scar; pain; need for reoperation; failure to give desired result; delayed union, malunion, nonunion; and risks of anaesthesia  All questions were answered to satisfaction and they were willing to proceed  Operative Findings: Three-part intra-articular distal radius fracture    Complications:   None    Procedure and Technique:  After the patient, site, and procedure were confirmed and identified in the preoperative holding area the patient was brought to the operating room  She was transferred to the operating room table and general anesthetic was induced  The right upper extremity was then prepped and draped in the normal sterile orthopedic fashion  Implants in the room a time-out was performed addressing DVT prophylaxis and antibiotics  Sharp dissection was carried down to bone via a volar trans FCR approach    The brachioradialis was released from the insertion on the radial styloid  The fracture was debrided of any soft tissue, reduced, and provisionally held in place with a K-wire  A Synthes 2 4mm distal radius plate was applied and provisionally held in place with K-wires  Appropriate reduction and plate placement was confirmed on fluoroscopic imaging  Plate was then secured to bone with a hybrid construct  K-wires were removed and final fluoroscopic images were taken confirming appropriate reduction of the fracture and plate and screw placement  Hemostasis was obtained  Wound was irrigated with bulb syringe and closed with 2-O Vicryl sutures for the subcutaneous layer and 3 O nylon sutures for the skin  Sterile dressing and a volar splint was applied with Acticoat, 4x4s, Webril, plaster, and ace wrap  Patient was awake from anesthesia transferred off the operating table and taken to the PACU in a stable condition      Dr John Almanza was present for the entire procedure    Patient Disposition:  PACU     SIGNATURE: Corrinne Monte, MD  DATE: 03/22/18  TIME: 9:20 AM

## 2018-03-22 NOTE — DISCHARGE INSTRUCTIONS
Discharge Instructions - Orthopedics  Freddy Whitt 81 y o  female MRN: [id number]  Unit/Bed#: PACU 02    Weight Bearing Status:                                           Non-weight bearing right upper extremity    Pain:  Continue analgesics as directed    Dressing Instructions:   Keep dressing clean, dry and intact until follow up appointment  Remain in splint until follow up visit in 2 weeks      Appt Instructions: If you do not have your appointment, please call the clinic at [phone] to f/u with Dr Anastacio Mon in 2 weeks  Otherwise followup as scheduled below:      Contact the office sooner if you experience any increased numbness/tingling in the extremities

## 2018-03-22 NOTE — TELEPHONE ENCOUNTER
Yes this was written and I will send it home with the pt today when she is discharged from the hospital, she had a distal radius fracture ORIF today

## 2018-03-22 NOTE — PROGRESS NOTES
Assumed care of patient at this time  Report received from [facility] in [address]  Patient in supine position with HOB elevated to 60 degrees  In NAD  Noted tolerated light snack PO  Pt's  Zuleika Carney and their daughter Magy Escobar at bedside

## 2018-03-26 ENCOUNTER — TELEPHONE (OUTPATIENT)
Dept: OBGYN CLINIC | Facility: HOSPITAL | Age: 73
End: 2018-03-26

## 2018-03-26 NOTE — TELEPHONE ENCOUNTER
Dr Sebastien Cui    Patient called today stating that she had sx on 3 22 18 for a radius/ulna fx  She is concerned because she is congested, wheezing, has a lot of phlegm, coughing  She states she is eating well and denies any fever  She would like to know if this is normal     She also states she was to have a RX for Colace and she did not get one  Please call patient at [phone] to discuss these issues      Thank you

## 2018-03-27 DIAGNOSIS — Z98.890 STATUS POST SURGERY: Primary | ICD-10-CM

## 2018-03-27 RX ORDER — DOCUSATE SODIUM 100 MG/1
100 CAPSULE, LIQUID FILLED ORAL 2 TIMES DAILY
Qty: 10 CAPSULE | Refills: 0 | Status: SHIPPED | OUTPATIENT
Start: 2018-03-27 | End: 2018-08-16

## 2018-04-05 ENCOUNTER — OFFICE VISIT (OUTPATIENT)
Dept: OBGYN CLINIC | Facility: HOSPITAL | Age: 73
End: 2018-04-05

## 2018-04-05 ENCOUNTER — HOSPITAL ENCOUNTER (OUTPATIENT)
Dept: RADIOLOGY | Facility: HOSPITAL | Age: 73
Discharge: HOME | End: 2018-04-05
Attending: ORTHOPAEDIC SURGERY
Payer: COMMERCIAL

## 2018-04-05 VITALS
DIASTOLIC BLOOD PRESSURE: 87 MMHG | RESPIRATION RATE: 18 BRPM | WEIGHT: 183.2 LBS | SYSTOLIC BLOOD PRESSURE: 144 MMHG | HEART RATE: 71 BPM | BODY MASS INDEX: 27.05 KG/M2

## 2018-04-05 DIAGNOSIS — M25.531 RIGHT WRIST PAIN: ICD-10-CM

## 2018-04-05 DIAGNOSIS — Z98.890 STATUS POST SURGERY: Primary | ICD-10-CM

## 2018-04-05 PROCEDURE — 73110 X-RAY EXAM OF WRIST: CPT

## 2018-04-05 PROCEDURE — 99024 POSTOP FOLLOW-UP VISIT: CPT | Performed by: ORTHOPAEDIC SURGERY

## 2018-04-05 RX ORDER — NAPROXEN 250 MG/1
250 TABLET ORAL 2 TIMES DAILY WITH MEALS
Qty: 60 TABLET | Refills: 0 | Status: SHIPPED | OUTPATIENT
Start: 2018-04-05 | End: 2018-08-16

## 2018-04-05 RX ORDER — HYDROCODONE BITARTRATE AND IBUPROFEN 7.5; 2 MG/1; MG/1
TABLET, FILM COATED ORAL
COMMUNITY
Start: 2018-01-31 | End: 2018-11-13

## 2018-04-05 RX ORDER — AZITHROMYCIN 250 MG/1
TABLET, FILM COATED ORAL
COMMUNITY
Start: 2018-02-03 | End: 2018-05-14 | Stop reason: ALTCHOICE

## 2018-04-05 RX ORDER — OXYCODONE HYDROCHLORIDE 5 MG/1
TABLET ORAL
Qty: 20 TABLET | Refills: 0 | Status: SHIPPED | OUTPATIENT
Start: 2018-04-05 | End: 2018-08-16

## 2018-04-05 NOTE — PROGRESS NOTES
81 y o female status postoperative fixation right distal radius fracture  Review of Systems  Review of systems negative unless otherwise specified in HPI    Past Medical History  Past Medical History:   Diagnosis Date    Pericarditis        Past Surgical History  Past Surgical History:   Procedure Laterality Date    KNEE SURGERY Left     PERICARDIAL WINDOW      CO OPEN RX DISTAL RADIUS FX, EXTRA-ARTICULAR Right 3/22/2018    Procedure: OPEN REDUCTION W/ INTERNAL FIXATION (ORIF) RADIUS, splint application;  Surgeon: Roman Man MD;  Location: [facility];  Service: Orthopedics       Current Medications  Current Outpatient Prescriptions on File Prior to Visit   Medication Sig Dispense Refill    ALPRAZolam (XANAX) 0 5 mg tablet       ascorbic acid (VITAMIN C) 500 mg tablet Take 500 mg by mouth daily      cholecalciferol (VITAMIN D3) 1,000 units tablet Take 1,000 Units by mouth daily      docusate sodium (COLACE) 100 mg capsule Take 1 capsule (100 mg total) by mouth 2 (two) times a day 10 capsule 0    ibuprofen (MOTRIN) 400 mg tablet Take 1 tablet (400 mg total) by mouth every 6 (six) hours as needed for mild pain 30 tablet 0    Lutein 20 MG TABS Take 1 tablet by mouth daily      Multiple Vitamins-Calcium (ONE-A-DAY WOMENS PO) Take 1 tablet by mouth daily      Omega-3 Fatty Acids (FISH OIL) 1200 MG CAPS Take 1 capsule by mouth daily      omeprazole (PriLOSEC) 20 mg delayed release capsule Take 20 mg by mouth daily      oxyCODONE (ROXICODONE) 5 mg immediate release tablet Take 1-2 tablets every 4-6 hrs as needed for pain control 30 tablet 0    b complex vitamins tablet Take 1 tablet by mouth daily       No current facility-administered medications on file prior to visit          Recent Labs [facility])  No results found for: HCT, HGB, WBC, PT, INR, ESR, CRP, GLUCOSE, HGBA1C      Physical exam    Right upper extremity:  Incision well approximated with sutures intact, patient able the flex and extend digits, brisk capillary refill, minimal range of motion at this time    Imaging  Right wrist:  Implants in place reduction maintained    Procedure      Assessment/Plan:   81 y  o female status postoperative fixation of right distal radius fracture  Plan is as follows:    Weightbearing as tolerated    Occupational therapy for range-of-motion exercises    Wrist splint    Pain medication p r n  Follow-up in 4 weeks at this time we will obtain x-rays of the right wrist three views    Discussed treatment plan with patient and she is in agreement treatment plan    Thank you

## 2018-04-10 ENCOUNTER — EVALUATION (OUTPATIENT)
Dept: OCCUPATIONAL THERAPY | Facility: CLINIC | Age: 73
End: 2018-04-10
Payer: COMMERCIAL

## 2018-04-10 DIAGNOSIS — M25.531 RIGHT WRIST PAIN: Primary | ICD-10-CM

## 2018-04-10 DIAGNOSIS — Z98.890 STATUS POST SURGERY: ICD-10-CM

## 2018-04-10 DIAGNOSIS — S52.501D CLOSED FRACTURE OF DISTAL END OF RIGHT RADIUS WITH ROUTINE HEALING, UNSPECIFIED FRACTURE MORPHOLOGY, SUBSEQUENT ENCOUNTER: ICD-10-CM

## 2018-04-10 PROCEDURE — 97165 OT EVAL LOW COMPLEX 30 MIN: CPT

## 2018-04-10 PROCEDURE — G8991 OTHER PT/OT GOAL STATUS: HCPCS

## 2018-04-10 PROCEDURE — G8990 OTHER PT/OT CURRENT STATUS: HCPCS

## 2018-04-10 NOTE — PROGRESS NOTES
OT Evaluation     Today's date: 4/10/2018  Patient name: Shauna Obando  : 1945  MRN: [id number]  Referring provider: Nellie Vitale PA-C  Dx:   Encounter Diagnosis     ICD-10-CM    1  Right wrist pain M25 531 Ambulatory referral to Occupational Therapy   2  Closed fracture of distal end of right radius with routine healing, unspecified fracture morphology, subsequent encounter S52 501D    3  Status post surgery Z98 890 Ambulatory referral to Occupational Therapy                  Assessment  Impairments: abnormal or restricted ROM and pain with function  Other impairment: increased pain, increased edema, scar adhesions, decreased functional use of dominant RUE, decreased strength  Functional limitations: unable to use RUE for self care ADLs, decreased functional grasp  Patient is irritable  Assessment details: Aldo Jenkins is a 81 y o  right hand dominant female referred to OT with diagnosis right wrist pain, s/p ORIF Right distal radius fracture  She sustained her injury on 3/15/18 when she slipped and fell while dressing  She received emergency care the same day and underwent surgery on 3/22/18  She has been wearing a removable wrist brace since 18, when she was last seen by the MD  She presents with increased pain, edema, decreased AROM, and limited functional use of her dominant RUE  She requires assistance with all ADLs/IADLs  Her FOTO score is 31  She will benefit from continued skilled OT to address these deficits and enable improved right hand function  Barriers to therapy: none  Understanding of Dx/Px/POC: excellent  Goals  STGs (to be achieved in 4 weeks):  1  Improve AROM by 25%  2  Decrease pain and edema  3  Achieve good scar mobility  4  Improve FOTO score by 5 points  LTGs (to be achieved in 8-10 weeks):  1  Improve RUE AROM to [facility]  2  Improve right hand strength to [facility]  3  Decrease pain  4  Improve FOTO score by 15 points      Plan  Patient would benefit from: skilled OT  Planned modality interventions: thermotherapy: hydrocollator packs, thermotherapy: paraffin bath and fluidotherapy  Planned therapy interventions: manual therapy, therapeutic exercise, functional ROM exercises, coordination, home exercise program and stretching  Other planned therapy interventions: scar management, strengthening per post op protocol  Frequency: 2x week  Duration in weeks: 8  Treatment plan discussed with: patient        Subjective Evaluation    History of Present Illness  Date of onset: 3/15/2018  Date of surgery: 3/22/2018  Mechanism of injury: surgery  Mechanism of injury: Pt slipped and fell in her home  She received emergency care the same day  The right wrist fracture was reduced and casted, and she was referred to Ortho for follow up  Not a recurrent problem   Quality of life: excellent    Pain  Current pain ratin  At best pain ratin  At worst pain ratin  Location: right wrist  Quality: throbbing and discomfort  Relieving factors: medications, support and change in position  Progression: no change    Social Support  Lives in: condominium  Lives with: spouse    Employment status: not working  Hand dominance: right      Diagnostic Tests  Abnormal x-ray: x-rays 18 show routine heaing  Treatments  Current treatment: occupational therapy  Patient Goals  Patient goals for therapy: increased motion, decreased pain, decreased edema, increased strength and independence with ADLs/IADLs  Patient goal:  grandchildren        Objective     Observations     Right Wrist/Hand   Positive for edema, incision and ulnar deviation  Additional Observation Details  Steri-strips intact; no drainage noted  Tenderness     Right Wrist/Hand   Tenderness in the distal radioulnar joint       Neurological Testing     Sensation     Wrist/Hand     Right   Intact: light touch    Additional Neurological Details  Pt c/o numbness in right RF and SF, however symptoms improved after splint removal     Active Range of Motion     Left Elbow   Forearm supination: 70 degrees   Forearm pronation: 85 degrees     Right Elbow   Flexion: WFL  Extension: WFL  Forearm supination: 8 degrees with pain  Forearm pronation: 75 degrees     Left Wrist   Wrist flexion: 72 degrees   Wrist extension: 80 degrees   Radial deviation: 12 degrees   Ulnar deviation: 30 degrees     Right Wrist   Wrist flexion: 15 degrees   Wrist extension: 20 degrees   Radial deviation: 0 degrees   Ulnar deviation: 10 degrees     Left Thumb   Flexion     MP: 80 degrees    DIP: 60 degrees  Palmar Abduction     CMC: 60 degrees  Radial abduction    CMC: 60 degrees    Right Thumb   Flexion     MP: 40    DIP: 30  Extension     MP: -30    DIP: -10  Palmar Abduction    CMC: 35  Radial Abduction    CMC: 30  Opposition: Right thumb opposition to volar DIP crease of IF    Left Digits    Flexion   Index     MCP: 95    PIP: 105    DIP: 65  Middle     MCP: 92    PIP: 95    DIP: 70  Ring     MCP: 90    PIP: 100    DIP: 65  Little     MCP: 90    PIP: 95    DIP: 80    Right Digits   Flexion   Index     MCP: 80    PIP: 35    DIP: 15  Middle     MCP: 75    PIP: 40    DIP: 10  Ring     MCP: 70    PIP: 45    DIP: 15  Little     MCP: 65    PIP: 45    DIP: 25  Extension   Index     MCP: -35    PIP: 0    DIP: 0  Middle     MCP: -20    PIP: 0    DIP: 0  Ring     MCP: -10    PIP: -25    DIP: -10  Little     MCP: 0    PIP: 0    DIP: 0    Strength/Myotome Testing     Additional Strength Details  Steri-strips intact; no drainage noted      Swelling     Left Wrist/Hand   Circumference MCP: 18 2 cm  Circumference wrist: 15 5 cm    Right Wrist/Hand   Circumference MCP: 19 3 cm  Circumference wrist: 17 4 cm      Flowsheet Rows    Flowsheet Row Most Recent Value   PT/OT G-Codes   Current Score  31   Projected Score  59   FOTO information reviewed  Yes   Assessment Type  Evaluation   G code set  Other PT/OT Primary   Other PT Primary Current Status ()  CL   Other PT Primary Goal Status () CK          Precautions: s/p ORIF    Daily Treatment Diary     Manual  4/10            STM/retrograde massage 5min                                                                    Exercise Diary  4/10            AROM - thumb x            AROM- digits x            AROM - wrist x                                                                                                                                                                                                                            HEP- AROM, TGE x                Modalities

## 2018-04-12 ENCOUNTER — OFFICE VISIT (OUTPATIENT)
Dept: OCCUPATIONAL THERAPY | Facility: CLINIC | Age: 73
End: 2018-04-12
Payer: COMMERCIAL

## 2018-04-12 DIAGNOSIS — S52.501D CLOSED FRACTURE OF DISTAL END OF RIGHT RADIUS WITH ROUTINE HEALING, UNSPECIFIED FRACTURE MORPHOLOGY, SUBSEQUENT ENCOUNTER: ICD-10-CM

## 2018-04-12 DIAGNOSIS — M25.531 RIGHT WRIST PAIN: Primary | ICD-10-CM

## 2018-04-12 PROCEDURE — 97140 MANUAL THERAPY 1/> REGIONS: CPT | Performed by: OCCUPATIONAL THERAPIST

## 2018-04-12 PROCEDURE — 97110 THERAPEUTIC EXERCISES: CPT | Performed by: OCCUPATIONAL THERAPIST

## 2018-04-12 NOTE — PROGRESS NOTES
Daily Note     Today's date: 2018  Patient name: Yanna Quesada  : 1945  MRN: [id number]  Referring provider: Yael Rojas PA-C  Dx:   Encounter Diagnosis     ICD-10-CM    1  Right wrist pain M25 531    2  Closed fracture of distal end of right radius with routine healing, unspecified fracture morphology, subsequent encounter S52 919B                   Subjective: I am stiff        Objective: See treatment diary below      Assessment: Tolerated treatment well  Patient has decreased ROM  and mild edema   Plan: Continue per plan of care        Daily Treatment Diary     Manual  4/10 4/12           STM/retrograde massage 5min 2           graston digits  4           Grade 2 MOB  wrist  4                                         Exercise Diary  4/10 4/12           AROM - thumb x x           AROM- digits x x           AROM - wrist x x           Large pegs hook  fist  x           ROM with cone  x           Gentle PROM  wrist             PROM digits                                                                                                                                                                          HEP- AROM, TGE x                Modalities              MH pre  10           CP post  6

## 2018-04-16 ENCOUNTER — OFFICE VISIT (OUTPATIENT)
Dept: OCCUPATIONAL THERAPY | Facility: CLINIC | Age: 73
End: 2018-04-16
Payer: COMMERCIAL

## 2018-04-16 DIAGNOSIS — Z98.890 STATUS POST SURGERY: ICD-10-CM

## 2018-04-16 DIAGNOSIS — S52.501D CLOSED FRACTURE OF DISTAL END OF RIGHT RADIUS WITH ROUTINE HEALING, UNSPECIFIED FRACTURE MORPHOLOGY, SUBSEQUENT ENCOUNTER: Primary | ICD-10-CM

## 2018-04-16 DIAGNOSIS — M25.531 RIGHT WRIST PAIN: ICD-10-CM

## 2018-04-16 PROCEDURE — 97110 THERAPEUTIC EXERCISES: CPT | Performed by: OCCUPATIONAL THERAPIST

## 2018-04-16 PROCEDURE — 97140 MANUAL THERAPY 1/> REGIONS: CPT | Performed by: OCCUPATIONAL THERAPIST

## 2018-04-16 NOTE — PROGRESS NOTES
Daily Note     Today's date: 2018  Patient name: Ladan Coronado  : 1945  MRN: [id number]  Referring provider: Patricio Quinones PA-C  Dx:   Encounter Diagnosis     ICD-10-CM    1  Closed fracture of distal end of right radius with routine healing, unspecified fracture morphology, subsequent encounter S52 501D    2  Right wrist pain M25 531    3  Status post surgery Z98 890                   Subjective: I am still stiff       Objective: See treatment diary below      Assessment: Tolerated treatment well  Patient has limited tendon glide   She has capsular limitation at the wrist  She was able toppose ring finger  Plan: Continue per plan of care          Daily Treatment Diary     Manual  4/10 4/12 4/16          STM/retrograde massage 5min 2 2          graston digits  4 4          Grade 2 MOB  wrist  4 4                                        Exercise Diary  4/10 4/12 4/16          AROM - thumb x x x          AROM- digits x x x          AROM - wrist x x x          Large pegs hook  fist  x x          ROM with cone  x x          Gentle PROM  wrist   x          PROM digits    x          Large pegs -hook   x          Small pegs    x                                                                                                                                            HEP- AROM, TGE x                Modalities             MH pre  10 10          CP post  6 6

## 2018-04-19 ENCOUNTER — APPOINTMENT (OUTPATIENT)
Dept: OCCUPATIONAL THERAPY | Facility: CLINIC | Age: 73
End: 2018-04-19
Payer: COMMERCIAL

## 2018-04-20 ENCOUNTER — OFFICE VISIT (OUTPATIENT)
Dept: OCCUPATIONAL THERAPY | Facility: CLINIC | Age: 73
End: 2018-04-20
Payer: COMMERCIAL

## 2018-04-20 DIAGNOSIS — S52.501D CLOSED FRACTURE OF DISTAL END OF RIGHT RADIUS WITH ROUTINE HEALING, UNSPECIFIED FRACTURE MORPHOLOGY, SUBSEQUENT ENCOUNTER: Primary | ICD-10-CM

## 2018-04-20 PROCEDURE — 97010 HOT OR COLD PACKS THERAPY: CPT | Performed by: OCCUPATIONAL THERAPIST

## 2018-04-20 PROCEDURE — 97110 THERAPEUTIC EXERCISES: CPT | Performed by: OCCUPATIONAL THERAPIST

## 2018-04-20 PROCEDURE — 97140 MANUAL THERAPY 1/> REGIONS: CPT | Performed by: OCCUPATIONAL THERAPIST

## 2018-04-20 NOTE — PROGRESS NOTES
Daily Note     Today's date: 2018  Patient name: Renata Jhaveri  : 1945  MRN: [id number]  Referring provider: Medina Balbuena PA-C  Dx:   Encounter Diagnosis     ICD-10-CM    1  Closed fracture of distal end of right radius with routine healing, unspecified fracture morphology, subsequent encounter S52 501D                   Subjective: I am trying to use my hand more  Objective: See treatment diary below      Assessment: Tolerated treatment well  steristrips were removed today, began scar massage  Patient has limited tendon glide   She has capsular limitation at the wrist  She was able toppose ring finger  Plan: Continue per plan of care          Daily Treatment Diary     Manual  4/10 4/12 4/16 4/20         STM/retrograde massage 5min 2 2 2 min         graston digits  4 4 4 min         Grade 2 MOB  wrist  4 4 4 min                                       Exercise Diary  4/10 4/12 4/16 4/20         AROM - thumb x x x x         AROM- digits x x x x         AROM - wrist x x x x         Large pegs hook  fist  x x x3         ROM with cone  x x 3x10         Gentle PROM  wrist   x          PROM digits    x          Large pegs -hook   x          Small pegs    x opposition         therabar p/s    yellow x20                                                                                                                              HEP- AROM, TGE x                Modalities            MH pre  10 10 10 min         CP post  6 6

## 2018-04-23 ENCOUNTER — OFFICE VISIT (OUTPATIENT)
Dept: OCCUPATIONAL THERAPY | Facility: CLINIC | Age: 73
End: 2018-04-23
Payer: COMMERCIAL

## 2018-04-23 DIAGNOSIS — Z98.890 STATUS POST SURGERY: ICD-10-CM

## 2018-04-23 DIAGNOSIS — S52.501D CLOSED FRACTURE OF DISTAL END OF RIGHT RADIUS WITH ROUTINE HEALING, UNSPECIFIED FRACTURE MORPHOLOGY, SUBSEQUENT ENCOUNTER: Primary | ICD-10-CM

## 2018-04-23 DIAGNOSIS — M25.531 RIGHT WRIST PAIN: ICD-10-CM

## 2018-04-23 PROCEDURE — 97110 THERAPEUTIC EXERCISES: CPT | Performed by: OCCUPATIONAL THERAPIST

## 2018-04-23 PROCEDURE — 97140 MANUAL THERAPY 1/> REGIONS: CPT | Performed by: OCCUPATIONAL THERAPIST

## 2018-04-23 NOTE — PROGRESS NOTES
Daily Note     Today's date: 2018  Patient name: Geoff Talbot  : 1945  MRN: [id number]  Referring provider: Brit Shaw PA-C  Dx:   Encounter Diagnosis     ICD-10-CM    1  Closed fracture of distal end of right radius with routine healing, unspecified fracture morphology, subsequent encounter S52 501D    2  Right wrist pain M25 531    3  Status post surgery Z98 890                   Subjective: I am moving more       Objective: See treatment diary below      Assessment: Tolerated treatment well  Patient has imporved ROM but is still below functional ROM for her wrist       Plan: Continue per plan of care          Daily Treatment Diary     Manual  4/10 4/12 4/16 4/20 4/23        STM/retrograde massage 5min 2 2 2 min 2        graston digits  4 4 4 min 4        Grade 2 MOB  wrist  4 4 4 min 4                                      Exercise Diary  4/10 4/12 4/16 4/20 4/23        AROM - thumb x x x x x        AROM- digits x x x x x        AROM - wrist x x x x x        Large pegs hook  fist  x x x3 x        ROM with cone  x x 3x10 x        Gentle PROM  wrist   x  X         PROM digits    x  x        Large pegs -hook   x  x        Small pegs    x opposition x        therabar p/s    yellow x20         Intrinsic stretches     x        flexor stretches     x        Pinch pins with sup     Yellow  3x6        powerweb      yellow 3x10                                                                         HEP- AROM, TGE x                Modalities           MH pre  10 10 10 min 10        CP post  6 6

## 2018-04-25 ENCOUNTER — APPOINTMENT (OUTPATIENT)
Dept: OCCUPATIONAL THERAPY | Facility: CLINIC | Age: 73
End: 2018-04-25
Payer: COMMERCIAL

## 2018-04-26 ENCOUNTER — APPOINTMENT (OUTPATIENT)
Dept: OCCUPATIONAL THERAPY | Facility: CLINIC | Age: 73
End: 2018-04-26
Payer: COMMERCIAL

## 2018-04-27 ENCOUNTER — OFFICE VISIT (OUTPATIENT)
Dept: OCCUPATIONAL THERAPY | Facility: CLINIC | Age: 73
End: 2018-04-27
Payer: COMMERCIAL

## 2018-04-27 DIAGNOSIS — S52.501D CLOSED FRACTURE OF DISTAL END OF RIGHT RADIUS WITH ROUTINE HEALING, UNSPECIFIED FRACTURE MORPHOLOGY, SUBSEQUENT ENCOUNTER: Primary | ICD-10-CM

## 2018-04-27 PROCEDURE — 97010 HOT OR COLD PACKS THERAPY: CPT | Performed by: OCCUPATIONAL THERAPIST

## 2018-04-27 PROCEDURE — 97110 THERAPEUTIC EXERCISES: CPT | Performed by: OCCUPATIONAL THERAPIST

## 2018-04-27 NOTE — PROGRESS NOTES
Daily Note     Today's date: 2018  Patient name: Shauna Obando  : 1945  MRN: [id number]  Referring provider: Nellie Vitale PA-C  Dx:   Encounter Diagnosis     ICD-10-CM    1  Closed fracture of distal end of right radius with routine healing, unspecified fracture morphology, subsequent encounter S52 501D                   Subjective: I am moving more       Objective: See treatment diary below      Assessment: Tolerated treatment well  Patient has imporved ROM but is still below functional ROM for her wrist       Plan: Continue per plan of care          Daily Treatment Diary     Manual  4/10 4/12 4/16 4/20 4/23 4/27       STM/retrograde massage 5min 2 2 2 min 2 2 min       graston digits  4 4 4 min 4 4 min       Grade 2 MOB  wrist  4 4 4 min 4 4 min                                     Exercise Diary  4/10 4/12 4/16 4/20 4/23 4/27       AROM - thumb x x x x x x       AROM- digits x x x x x x       AROM - wrist x x x x x x       Large pegs hook  fist  x x x3 x x3       ROM with cone  x x 3x10 x 3x10       Gentle PROM  wrist   x  X         PROM digits    x  x x       Large pegs -hook   x  x x       Small pegs    x opposition x        therabar p/s    yellow x20         Intrinsic stretches     x        flexor stretches     x x       Pinch pins with sup     Yellow  3x6 yellow 3x10       powerweb      yellow 3x10 yellow 3x10                                                                        HEP- AROM, TGE x                Modalities          MH pre  10 10 10 min 10 10 min       CP post  6 6   5 min

## 2018-04-30 ENCOUNTER — OFFICE VISIT (OUTPATIENT)
Dept: OCCUPATIONAL THERAPY | Facility: CLINIC | Age: 73
End: 2018-04-30
Payer: COMMERCIAL

## 2018-04-30 DIAGNOSIS — S52.501D CLOSED FRACTURE OF DISTAL END OF RIGHT RADIUS WITH ROUTINE HEALING, UNSPECIFIED FRACTURE MORPHOLOGY, SUBSEQUENT ENCOUNTER: Primary | ICD-10-CM

## 2018-04-30 PROCEDURE — 97140 MANUAL THERAPY 1/> REGIONS: CPT | Performed by: OCCUPATIONAL THERAPIST

## 2018-04-30 PROCEDURE — 97110 THERAPEUTIC EXERCISES: CPT | Performed by: OCCUPATIONAL THERAPIST

## 2018-04-30 NOTE — PROGRESS NOTES
Daily Note     Today's date: 2018  Patient name: Jules Biggs  : 1945  MRN: [id number]  Referring provider: Sonia Amador PA-C  Dx:   Encounter Diagnosis     ICD-10-CM    1  Closed fracture of distal end of right radius with routine healing, unspecified fracture morphology, subsequent encounter S52 501D                   Subjective: I am doing  More, no lifting      Objective: See treatment diary below      Assessment: Tolerated treatment well  Patient good carryover  Plan: Continue per plan of care          Daily Treatment Diary     Manual  4/10 4/12 4/16 4/20 4/23 4/27 4/30      STM/retrograde massage 5min 2 2 2 min 2 2 min 2min      graston digits  4 4 4 min 4 4 min 4min      Grade 2 MOB  wrist  4 4 4 min 4 4 min 4min                                    Exercise Diary  4/10 4/12 4/16 4/20 4/23 4/27 4/30      AROM - thumb x x x x x x x      AROM- digits x x x x x x x      AROM - wrist x x x x x x x      Large pegs hook  fist  x x x3 x x3 x3      ROM with cone  x x 3x10 x 3x10       Gentle PROM  wrist   x  X   x      PROM digits    x  x x       Large pegs -hook   x  x x       Small pegs    x opposition x  x      therabar p/s    yellow x20         Intrinsic stretches     x        flexor stretches     x x x      Pinch pins with sup     Yellow  3x6 yellow 3x10 Red  3x10      powerweb      yellow 3x10 yellow 3x10 Red  3x10      Wrist maze       x5                                                          HEP- AROM, TGE x                Modalities         MH pre  10 10 10 min 10 10 min 10  min      CP post  6 6   5 min 5 min

## 2018-05-02 ENCOUNTER — OFFICE VISIT (OUTPATIENT)
Dept: OCCUPATIONAL THERAPY | Facility: CLINIC | Age: 73
End: 2018-05-02
Payer: COMMERCIAL

## 2018-05-02 DIAGNOSIS — M25.531 RIGHT WRIST PAIN: Primary | ICD-10-CM

## 2018-05-02 DIAGNOSIS — S52.601A CLOSED FRACTURE DISTAL RADIUS AND ULNA, RIGHT, INITIAL ENCOUNTER: ICD-10-CM

## 2018-05-02 DIAGNOSIS — S52.501A CLOSED FRACTURE DISTAL RADIUS AND ULNA, RIGHT, INITIAL ENCOUNTER: ICD-10-CM

## 2018-05-02 PROCEDURE — 97140 MANUAL THERAPY 1/> REGIONS: CPT | Performed by: OCCUPATIONAL THERAPIST

## 2018-05-02 PROCEDURE — 97110 THERAPEUTIC EXERCISES: CPT | Performed by: OCCUPATIONAL THERAPIST

## 2018-05-02 PROCEDURE — G8991 OTHER PT/OT GOAL STATUS: HCPCS | Performed by: OCCUPATIONAL THERAPIST

## 2018-05-02 PROCEDURE — G8990 OTHER PT/OT CURRENT STATUS: HCPCS | Performed by: OCCUPATIONAL THERAPIST

## 2018-05-02 NOTE — PROGRESS NOTES
OT Re-Evaluation     Today's date: 2018  Patient name: Miguel Islas  : 1945  MRN: [id number]  Referring provider: Lakisha Lopez PA-C  Dx:   Encounter Diagnosis     ICD-10-CM    1  Right wrist pain M25 531                         Precautions: s/p ORIF    Assessment/Plan           Subjective- I am doing more   History of Present Illness  Date of onset: 3/15/2018  Date of surgery: 3/22/2018  Mechanism of injury: surgery  Mechanism of injury: Pt slipped and fell in her home  She received emergency care the same day  The right wrist fracture was reduced and casted, and she was referred to Ortho for follow up  She has been seen for  7 visits    Not a recurrent problem   Quality of life: excellent    Pain  Current pain ratin  At best pain ratin  At worst pain ratin  Location: right wrist  Quality: throbbibg   Relieving factors: medications, support and change in position  Progression: no change    Objective     Active Range of Motion     +intrinsic/ extensor tightness     Right Elbow   Flexion: WFL  Extension: WFL  Forearm supination: 50 degrees    Forearm pronation: 75 degrees     Left Wrist   Wrist flexion: 72 degrees   Wrist extension: 80 degrees   Radial deviation: 12 degrees   Ulnar deviation: 30 degrees     Right Wrist   Wrist flexion: 32degrees   Wrist extension: 30 degrees   Radial deviation: 12degrees   Ulnar deviation: 20 degrees     Left Thumb   Flexion     MP: 80 degrees    DIP: 60 degrees  Palmar Abduction     CMC: 60 degrees  Radial abduction    CMC: 60 degrees    Right Thumb   Flexion     MP: 68    DIP:35  Extension     MP: -20    DIP: -10  Palmar Abduction    CMC: 40  Radial Abduction    CMC: 32  Opposition: Right thumb opposition to volar DIP crease of IF    Left Digits    Flexion   Index     MCP: 95    PIP: 105    DIP: 65  Middle     MCP: 92    PIP: 95    DIP: 70  Ring     MCP: 90    PIP: 100    DIP: 65  Little     MCP: 90    PIP: 95    DIP: 80    Right Digits   Flexion Index     MCP: 85    PIP: 80    DIP: 45  Middle     MCP: 85    PIP: 80    DIP: 30  Ring     MCP: 85    PIP:80    DIP: 40  Little     MCP: 85    PIP:70    DIP: 60  Extension   Index     MCP:-5    PIP: 0    DIP: 0  Middle     MCP: -5    PIP: 0    DIP: 0  Ring     MCP: 0    PIP: -30    DIP: -10  Little     MCP: 0    PIP: 0    DIP: 0    Strength/Myotome Testing     Additional Strength Details  Steri-strips intact; no drainage noted  Swelling     Left Wrist/Hand   Circumference MCP: 18 2 cm  Circumference wrist: 15 5 cm    Right Wrist/Hand   Circumference MCP: 18 5cm  Circumference wrist: 16 8 cm     2 R/L=11/41#  3jaw R/L=2/9    Goals  STGs (to be achieved in 4 weeks):  1  Improve AROM by 25%  - partially met  2  Decrease pain and edema  -partially met  3  Achieve good scar mobility  -partially met  4  Improve FOTO score by 5 points  -met    LTGs (to be achieved in 8-10 weeks):  1  Improve RUE AROM to [facility]  - not met  2  Improve right hand strength to [facility]  - not met  3  Decrease pain  - partially met  4  Improve FOTO score by 15 points  - met  Plan  Patient would benefit from: skilled OT  Planned modality interventions: thermotherapy: hydrocollator packs, thermotherapy: paraffin bath and fluidotherapy  Planned therapy interventions: manual therapy, therapeutic exercise, functional ROM exercises, coordination, home exercise program and stretching  Other planned therapy interventions: scar management, strengthening per post op protocol  Frequency: 2x week  Duration in weeks: 6  Treatment plan discussed with: patient     Daily Treatment Diary     Manual  4/10 4/12 4/16 4/20 4/23 4/27 4/30 5/2     STM/retrograde massage 5min 2 2 2 min 2 2 min 2min 2m     graston digits  4 4 4 min 4 4 min 4min 4m     Grade 2 MOB  wrist  4 4 4 min 4 4 min 4min 4m                                   Exercise Diary  4/10 4/12 4/16 4/20 4/23 4/27 4/30 5/2     AROM - thumb x x x x x x x 10x     AROM- digits x x x x x x x 10x     AROM - wrist x x x x x x x 10x     Large pegs hook  fist  x x x3 x x3 x3 3x     ROM with cone  x x 3x10 x 3x10  2x10     Gentle PROM  wrist   x  X   x 10x     PROM digits    x  x x  10x     Large pegs -hook   x  x x       Small pegs    x opposition x  x x     therabar p/s    yellow x20         Intrinsic stretches     x   10x     flexor stretches     x x x 10x     Pinch pins with sup     Yellow  3x6 yellow 3x10 Red  3x10      powerweb      yellow 3x10 yellow 3x10 Red  3x10 Red   3x10     Wrist maze       x5 x5                                                         HEP- AROM, TGE x                Modalities   4/12 4/16 4/20 4/23 4/27 4/30 5/2     MH pre  10 10 10 min 10 10 min 10  min 10m     CP post  6 6   5 min 5 min 5m

## 2018-05-03 ENCOUNTER — HOSPITAL ENCOUNTER (OUTPATIENT)
Dept: RADIOLOGY | Facility: HOSPITAL | Age: 73
Discharge: HOME | End: 2018-05-03
Attending: ORTHOPAEDIC SURGERY
Payer: COMMERCIAL

## 2018-05-03 ENCOUNTER — OFFICE VISIT (OUTPATIENT)
Dept: OBGYN CLINIC | Facility: HOSPITAL | Age: 73
End: 2018-05-03

## 2018-05-03 VITALS
BODY MASS INDEX: 27.05 KG/M2 | HEART RATE: 73 BPM | RESPIRATION RATE: 18 BRPM | DIASTOLIC BLOOD PRESSURE: 80 MMHG | WEIGHT: 183.2 LBS | SYSTOLIC BLOOD PRESSURE: 139 MMHG

## 2018-05-03 DIAGNOSIS — M25.531 PAIN IN RIGHT WRIST: ICD-10-CM

## 2018-05-03 DIAGNOSIS — M25.531 PAIN IN RIGHT WRIST: Primary | ICD-10-CM

## 2018-05-03 PROCEDURE — 99024 POSTOP FOLLOW-UP VISIT: CPT | Performed by: ORTHOPAEDIC SURGERY

## 2018-05-03 PROCEDURE — 73110 X-RAY EXAM OF WRIST: CPT

## 2018-05-03 NOTE — PROGRESS NOTES
81 y o female presents for 6 weeks postoperative visit status post right Distal radius fracture fixation  Patient states she has been doing well she has had minimal pain  She does state she does have a history of a right ring trigger finger  She denies any numbness and tingling in her right hand    She has been doing very well with therapy increasing range of motion    Review of Systems  Review of systems negative unless otherwise specified in HPI    Past Medical History  Past Medical History:   Diagnosis Date    Pericarditis        Past Surgical History  Past Surgical History:   Procedure Laterality Date    KNEE SURGERY Left     PERICARDIAL WINDOW      CA OPEN RX DISTAL RADIUS FX, EXTRA-ARTICULAR Right 3/22/2018    Procedure: OPEN REDUCTION W/ INTERNAL FIXATION (ORIF) RADIUS, splint application;  Surgeon: Gerhardt Fries, MD;  Location: [facility];  Service: Orthopedics       Current Medications  Current Outpatient Prescriptions on File Prior to Visit   Medication Sig Dispense Refill    ALPRAZolam (XANAX) 0 5 mg tablet       ascorbic acid (VITAMIN C) 500 mg tablet Take 500 mg by mouth daily      azithromycin (ZITHROMAX) 250 mg tablet       b complex vitamins tablet Take 1 tablet by mouth daily      cholecalciferol (VITAMIN D3) 1,000 units tablet Take 1,000 Units by mouth daily      docusate sodium (COLACE) 100 mg capsule Take 1 capsule (100 mg total) by mouth 2 (two) times a day 10 capsule 0    HYDROcodone-ibuprofen (VICOPROFEN) 7 5-200 mg per tablet       ibuprofen (MOTRIN) 400 mg tablet Take 1 tablet (400 mg total) by mouth every 6 (six) hours as needed for mild pain 30 tablet 0    Lutein 20 MG TABS Take 1 tablet by mouth daily      Multiple Vitamins-Calcium (ONE-A-DAY WOMENS PO) Take 1 tablet by mouth daily      naproxen (NAPROSYN) 250 mg tablet Take 1 tablet (250 mg total) by mouth 2 (two) times a day with meals 60 tablet 0    Omega-3 Fatty Acids (FISH OIL) 1200 MG CAPS Take 1 capsule by mouth daily      omeprazole (PriLOSEC) 20 mg delayed release capsule Take 20 mg by mouth daily      oxyCODONE (ROXICODONE) 5 mg immediate release tablet Take 1-2 tablets every 4-6 hrs as needed for pain control 30 tablet 0    oxyCODONE (ROXICODONE) 5 mg immediate release tablet Take 1-2 tablets every 4-6 hrs as needed for pain control 20 tablet 0     No current facility-administered medications on file prior to visit          Recent Labs [facility]  No results found for: HCT, HGB, WBC, PT, INR, ESR, CRP, GLUCOSE, HGBA1C      Physical exam  · General: Awake, Alert, Oriented  · Eyes: Pupils equal, round and reactive to light  · Heart: regular rate and rhythm  · Lungs: No audible wheezing  · Abdomen: soft  right Upper extremity  · Skin/incision/alignment/Palpation:   Incisio well healed no erythema no tenderness to palpation   · Range of motion:  40° volar flexion 45° dorsiflexion full supination full pronation  · Sensation:  Intact  · Motor:  Medial radial nerves intact  · Right ring finger is unable to fully extend there is a palpable mass no clicking or popping appreciated on exam today    Imaging  X-rays of the right distal radius reviewed x-rays reveal well reduced right distal radius no evidence of hardware failure    Assessment:  Status post right distal radius fracture ORIF    Plan:  No lifting greater than 5 lb right upper extremity  Therapy for range of motion strengthening  Patient may wean out of wrist brace over the next 6 weeks time  Case discussed with Dr Deniz Zuluaga in 6 weeks time repeat x-rays right wrist

## 2018-05-04 ENCOUNTER — OFFICE VISIT (OUTPATIENT)
Dept: OCCUPATIONAL THERAPY | Facility: CLINIC | Age: 73
End: 2018-05-04
Payer: COMMERCIAL

## 2018-05-04 DIAGNOSIS — S52.501D CLOSED FRACTURE OF DISTAL END OF RIGHT RADIUS WITH ROUTINE HEALING, UNSPECIFIED FRACTURE MORPHOLOGY, SUBSEQUENT ENCOUNTER: Primary | ICD-10-CM

## 2018-05-04 PROCEDURE — 97140 MANUAL THERAPY 1/> REGIONS: CPT | Performed by: OCCUPATIONAL THERAPIST

## 2018-05-04 PROCEDURE — 97110 THERAPEUTIC EXERCISES: CPT | Performed by: OCCUPATIONAL THERAPIST

## 2018-05-04 NOTE — PROGRESS NOTES
OT Re-Evaluation     Today's date: 2018  Patient name: Brendon Dumont  : 1945  MRN: [id number]  Referring provider: Fritz Huynh PA-C  Dx:   Encounter Diagnosis     ICD-10-CM    1  Closed fracture of distal end of right radius with routine healing, unspecified fracture morphology, subsequent encounter S52 501D                         Precautions: s/p ORIF    Assessment/Plan           Subjective- I am doing more   History of Present Illness  Date of onset: 3/15/2018  Date of surgery: 3/22/2018  Mechanism of injury: surgery  Mechanism of injury: Pt slipped and fell in her home  She received emergency care the same day  The right wrist fracture was reduced and casted, and she was referred to Ortho for follow up  She has been seen for  7 visits    Not a recurrent problem   Quality of life: excellent    Pain  Current pain ratin  At best pain ratin  At worst pain ratin  Location: right wrist  Quality: throbbibg   Relieving factors: medications, support and change in position  Progression: no change    Objective     Active Range of Motion     +intrinsic/ extensor tightness     Right Elbow   Flexion: WFL  Extension: WFL  Forearm supination: 50 degrees    Forearm pronation: 75 degrees     Left Wrist   Wrist flexion: 72 degrees   Wrist extension: 80 degrees   Radial deviation: 12 degrees   Ulnar deviation: 30 degrees     Right Wrist   Wrist flexion: 32degrees   Wrist extension: 30 degrees   Radial deviation: 12degrees   Ulnar deviation: 20 degrees     Left Thumb   Flexion     MP: 80 degrees    DIP: 60 degrees  Palmar Abduction     CMC: 60 degrees  Radial abduction    CMC: 60 degrees    Right Thumb   Flexion     MP: 68    DIP:35  Extension     MP: -20    DIP: -10  Palmar Abduction    CMC: 40  Radial Abduction    CMC: 32  Opposition: Right thumb opposition to volar DIP crease of IF    Left Digits    Flexion   Index     MCP: 95    PIP: 105    DIP: 65  Middle     MCP: 92    PIP: 95    DIP: 70  Ring MCP: 90    PIP: 100    DIP: 65  Little     MCP: 90    PIP: 95    DIP: 80    Right Digits   Flexion   Index     MCP: 85    PIP: 80    DIP: 45  Middle     MCP: 85    PIP: 80    DIP: 30  Ring     MCP: 85    PIP:80    DIP: 40  Little     MCP: 85    PIP:70    DIP: 60  Extension   Index     MCP:-5    PIP: 0    DIP: 0  Middle     MCP: -5    PIP: 0    DIP: 0  Ring     MCP: 0    PIP: -30    DIP: -10  Little     MCP: 0    PIP: 0    DIP: 0    Strength/Myotome Testing     Additional Strength Details  Steri-strips intact; no drainage noted  Swelling     Left Wrist/Hand   Circumference MCP: 18 2 cm  Circumference wrist: 15 5 cm    Right Wrist/Hand   Circumference MCP: 18 5cm  Circumference wrist: 16 8 cm     2 R/L=11/41#  3jaw R/L=2/9    Goals  STGs (to be achieved in 4 weeks):  1  Improve AROM by 25%  - partially met  2  Decrease pain and edema  -partially met  3  Achieve good scar mobility  -partially met  4  Improve FOTO score by 5 points  -met    LTGs (to be achieved in 8-10 weeks):  1  Improve RUE AROM to [facility]  - not met  2  Improve right hand strength to [facility]  - not met  3  Decrease pain  - partially met  4  Improve FOTO score by 15 points  - met  Plan  Patient would benefit from: skilled OT  Planned modality interventions: thermotherapy: hydrocollator packs, thermotherapy: paraffin bath and fluidotherapy  Planned therapy interventions: manual therapy, therapeutic exercise, functional ROM exercises, coordination, home exercise program and stretching  Other planned therapy interventions: scar management, strengthening per post op protocol  Frequency: 2x week  Duration in weeks: 6  Treatment plan discussed with: patient     Daily Treatment Diary     Manual  4/10 4/12 4/16 4/20 4/23 4/27 4/30 5/2     STM/retrograde massage 5min 2 2 2 min 2 2 min 2min 2m     graston digits  4 4 4 min 4 4 min 4min 4m     Grade 2 MOB  wrist  4 4 4 min 4 4 min 4min 4m                                   Exercise Diary  4/10 4/12 4/16 4/20 4/23 4/27 4/30 5/2     AROM - thumb x x x x x x x 10x     AROM- digits x x x x x x x 10x     AROM - wrist x x x x x x x 10x     Large pegs hook  fist  x x x3 x x3 x3 3x     ROM with cone  x x 3x10 x 3x10  2x10     Gentle PROM  wrist   x  X   x 10x     PROM digits    x  x x  10x     Large pegs -hook   x  x x       Small pegs    x opposition x  x x     therabar p/s    yellow x20         Intrinsic stretches     x   10x     flexor stretches     x x x 10x     Pinch pins with sup     Yellow  3x6 yellow 3x10 Red  3x10      powerweb      yellow 3x10 yellow 3x10 Red  3x10 Red   3x10     Wrist maze       x5 x5                                                         HEP- AROM, TGE x                Modalities   4/12 4/16 4/20 4/23 4/27 4/30 5/2     MH pre  10 10 10 min 10 10 min 10  min 10m     CP post  6 6   5 min 5 min 5m

## 2018-05-04 NOTE — PROGRESS NOTES
Daily Note     Today's date: 2018  Patient name: Javon Plummer  : 1945  MRN: [id number]  Referring provider: Lilia Del Toro PA-C  Dx:   Encounter Diagnosis     ICD-10-CM    1  Closed fracture of distal end of right radius with routine healing, unspecified fracture morphology, subsequent encounter S52 501D                   Subjective: My hand is a little sore      Objective: See treatment diary below       Daily Treatment Diary     Manual  4/10 4/12 4/16 4/20 4/23 4/27 4/30 5/2 5/4    STM/retrograde massage 5min 2 2 2 min 2 2 min 2min 2m 2 min    graston digits  4 4 4 min 4 4 min 4min 4m 4 min    Grade 2 MOB  wrist  4 4 4 min 4 4 min 4min 4m 4 min                                  Exercise Diary  4/10 4/12 4/16 4/20 4/23 4/27 4/30 5/2 5/4    AROM - thumb x x x x x x x 10x 10x    AROM- digits x x x x x x x 10x 10x    AROM - wrist x x x x x x x 10x 10x    Large pegs hook  fist  x x x3 x x3 x3 3x     ROM with cone  x x 3x10 x 3x10  2x10 #1 3x10    Gentle PROM  wrist   x  X   x 10x x10    PROM digits    x  x x  10x x10    Large pegs -hook   x  x x       Small pegs    x opposition x  x x     therabar p/s    yellow x20         Intrinsic stretches     x   10x 10x    flexor stretches     x x x 10x 10x    Pinch pins with sup     Yellow  3x6 yellow 3x10 Red  3x10  Red x3    powerweb      yellow 3x10 yellow 3x10 Red  3x10 Red   3x10 Red 3x10    Wrist maze       x5 x5 x5                                                        HEP- AROM, TGE x                Modalities    5 5/4    MH pre  10 10 10 min 10 10 min 10  min 10m 10 m in    CP post  6 6   5 min 5 min 5m 5 min                     Assessment: Tolerated treatment well  Patient demonstrated fatigue post treatment  Pt  Cleared to begin strengthening 5lb lift restriction, per MD notes      Plan: Progress treatment as tolerated

## 2018-05-07 ENCOUNTER — OFFICE VISIT (OUTPATIENT)
Dept: OCCUPATIONAL THERAPY | Facility: CLINIC | Age: 73
End: 2018-05-07
Payer: COMMERCIAL

## 2018-05-07 DIAGNOSIS — S52.501D CLOSED FRACTURE OF DISTAL END OF RIGHT RADIUS WITH ROUTINE HEALING, UNSPECIFIED FRACTURE MORPHOLOGY, SUBSEQUENT ENCOUNTER: Primary | ICD-10-CM

## 2018-05-07 PROCEDURE — 97110 THERAPEUTIC EXERCISES: CPT | Performed by: OCCUPATIONAL THERAPIST

## 2018-05-07 PROCEDURE — 97140 MANUAL THERAPY 1/> REGIONS: CPT | Performed by: OCCUPATIONAL THERAPIST

## 2018-05-07 NOTE — PROGRESS NOTES
Daily Note     Today's date: 2018  Patient name: Javon Plummer  : 1945  MRN: [id number]  Referring provider: Lilia Del Toro PA-C  Dx:   Encounter Diagnosis     ICD-10-CM    1  Closed fracture of distal end of right radius with routine healing, unspecified fracture morphology, subsequent encounter S52 877D                   Subjective: I am doing better  I want to drive   I see the Dr in 3 wks  Objective: See treatment diary below      Assessment: Tolerated treatment well   2 R/L= 16  Digits to DPC= 2/2/3/2cm  Wrist e/f=/30    Plan: Continue per plan of care         Daily Treatment Diary     Manual  4/10 4/12 4/16 4/20 4/23 4/27 4/30 5/2 5/4 5/7   STM/retrograde massage 5min 2 2 2 min 2 2 min 2min 2m 2 min 2m   graston digits  4 4 4 min 4 4 min 4min 4m 4 min 4m   Grade 2 MOB  wrist  4 4 4 min 4 4 min 4min 4m 4 min 4m                                 Exercise Diary  4/10 4/12 4/16 4/20 4/23 4/27 4/30 5/2 5/4 57   AROM - thumb x x x x x x x 10x 10x 10x   AROM- digits x x x x x x x 10x 10x 10x   AROM - wrist x x x x x x x 10x 10x 10x   Large pegs hook  fist  x x x3 x x3 x3 3x  3X   ROM with cone  x x 3x10 x 3x10  2x10 #1 3x10 #1  3x10   Gentle PROM  wrist   x  X   x 10x x10 x10   PROM digits    x  x x  10x x10 x10   Large pegs -hook   x  x x    5x10   Small pegs    x opposition x  x x      therabar p/s    yellow x20         Intrinsic stretches     x   10x 10x 10x   flexor stretches     x x x 10x 10x 10x   Pinch pins with sup     Yellow  3x6 yellow 3x10 Red  3x10  Red x3 Red x3   powerweb      yellow 3x10 yellow 3x10 Red  3x10 Red   3x10 Red 3x10 Red  3x10   Wrist maze       x5 x5 x5 x5   flexbar p/s          Yellow  3x10                                          HEP- AROM, TGE x         putty       Modalities    5/7   MH pre  10 10 10 min 10 10 min 10  min 10m 10 m in 10m   CP post  6 6   5 min 5 min 5m 5 min

## 2018-05-09 ENCOUNTER — OFFICE VISIT (OUTPATIENT)
Dept: OCCUPATIONAL THERAPY | Facility: CLINIC | Age: 73
End: 2018-05-09
Payer: COMMERCIAL

## 2018-05-09 DIAGNOSIS — S52.501D CLOSED FRACTURE OF DISTAL END OF RIGHT RADIUS WITH ROUTINE HEALING, UNSPECIFIED FRACTURE MORPHOLOGY, SUBSEQUENT ENCOUNTER: Primary | ICD-10-CM

## 2018-05-09 PROCEDURE — 97140 MANUAL THERAPY 1/> REGIONS: CPT | Performed by: OCCUPATIONAL THERAPIST

## 2018-05-09 PROCEDURE — 97110 THERAPEUTIC EXERCISES: CPT | Performed by: OCCUPATIONAL THERAPIST

## 2018-05-09 NOTE — PROGRESS NOTES
Daily Note /REEVAL     Today's date: 2018  Patient name: Helen Frye  : 1945  MRN: [id number]  Referring provider: Russell Moreno PA-C  Dx:   Encounter Diagnosis     ICD-10-CM    1  Closed fracture of distal end of right radius with routine healing, unspecified fracture morphology, subsequent encounter S52 937D                   Subjective: I am doing better       Objective: See treatment diary below      Assessment: Tolerated treatment well  Patient has good carryover   She has improved ROM    Plan: Continue per plan of care  Precautions: s/p ORIF    Daily Treatment Diary     Manual              STM/retrograde massage 5min            graston hand 2m                                                       Exercise Diary              AROM - thumb 10x            AROM- digits 10x            AROM - wrist 10x            PROM digits 10x            PROM  wrist 10x            powerweb   blue 3x10            Pinch pins Blue   3x10            Wrist e/f 2#  3x10                                                                                                                                                           HEP- AROM, TGE x                Modalities              MH pre 10m            CP post 5m                               Assessment  Impairments: abnormal or restricted ROM and pain with function  Other impairment: increased pain, increased edema, scar adhesions, decreased functional use of dominant RUE, decreased strength  Functional limitations: unable to use  R for lifting > 5 #   Assessment details: Israel Alvarado is a 81 y o  right hand dominant female referred to OT with diagnosis right wrist pain, s/p ORIF Right distal radius fracture   She has improved ROM , decreased pain , and increased strength   She has limitations with heavier lifting   [place] Evans Barriers to therapy: noneUnderstanding of Dx/Px/POC: excellent  Goals  STGs (to be achieved in 4 weeks):  1  Improve AROM by 25%  - met  2  Decrease pain and edema  - met   3  Achieve good scar mobility  - partially met  4  Improve FOTO score by 5 points  - met     LTGs (to be achieved in 8-10 weeks):  1  Improve RUE AROM to [facility]  - partially ,met  2  Improve right hand strength to [facility]  - partialy met   3  Decrease pain -met  4  Improve FOTO score by 15 points  -met    Plan  Patient would benefit from: skilled OT  Planned modality interventions: thermotherapy: hydrocollator packs, thermotherapy: paraffin bath and fluidotherapy  Planned therapy interventions: manual therapy, therapeutic exercise, functional ROM exercises, coordination, home exercise program and stretching  Other planned therapy interventions: scar management, strengthening per post op protocol  Frequency: 2x week  Duration in weeks: 8  Treatment plan discussed with: patient        Subjective Evaluation    History of Present Illness  Date of onset: 3/15/2018  Date of surgery: 3/22/2018  Mechanism of injury: surgery  Mechanism of injury: Pt slipped and fell in her home  She received emergency care the same day  The right wrist fracture was reduced and casted, and she was referred to Ortho for follow up  She has attended OT for 11 visits   Not a recurrent problem     Quality of life: excellent    Pain  Current pain ratin  At best pain ratin  At worst pain ratin  Location: right wrist  Quality: throbbing and discomfort  Relieving factors: medications, support and change in position  Progression: no change    Social Support  Lives in: condominium  Lives with: spouse    Employment status: not working  Hand dominance: right      Diagnostic Tests  Abnormal x-ray: x-rays 18 show routine heaing    Treatments  Current treatment: occupational therapy  Patient Goals  Patient goals for therapy: increased motion, decreased pain, decreased edema, increased strength and independence with ADLs/IADLs  Patient goal:  grandchildren        Objective     Observations      PIP contracture R ring    Tenderness     Right Wrist/Hand   Tenderness in the distal radioulnar joint       Neurological Testing     Sensation     Wrist/Hand     Right   Intact: light touch         Active Range of Motion     Left Elbow   Forearm supination: 70 degrees   Forearm pronation: 85 degrees     Right Elbow   Flexion: WFL  Extension: WFL  Forearm supination: 58 degrees with pain  Forearm pronation: 75 degrees     Left Wrist   Wrist flexion: 72 degrees   Wrist extension: 80 degrees   Radial deviation: 12 degrees   Ulnar deviation: 30 degrees     Right Wrist   Wrist flexion: 35 degrees   Wrist extension: 45degrees   Radial deviation: 20 degrees   Ulnar deviation: 30 degrees     Left Thumb   Flexion     MP: 80 degrees    DIP: 60 degrees  Palmar Abduction     CMC: 60 degrees  Radial abduction    CMC: 60 degrees    Right Thumb   Flexion     MP: 40    DIP: 30  Extension     MP: -30    DIP: -10  Palmar Abduction    CMC: 35  Radial Abduction    CMC: 30  Opposition: Right thumb opposition to volar DIP crease of IF    Left Digits    Flexion   Index     MCP: 95    PIP: 105    DIP: 65  Middle     MCP: 92    PIP: 95    DIP: 70  Ring     MCP: 90    PIP: 100    DIP: 65  Little     MCP: 90    PIP: 95    DIP: 80    Right Digits   Flexion   Index     MCP:90    PIP: 100    DIP: 60  Middle     MCP: 90    PIP: 100    DIP: 50  Ring     MCP: 70    PIP: 45    DIP: 15  Little     MCP:100    PIP: 90    DIP: 65  Extension   Index     MCP:0    PIP: 0    DIP: 0  Middle     MCP: 0    PIP: 0    DIP: 0  Ring     MCP:0  PIP: -20    DIP: -10  Little     MCP: 0    PIP: 0    DIP: 0    Strength/Myotome Testing      2 R/L=13/38  3 Jaw R/L=3/6

## 2018-05-11 ENCOUNTER — APPOINTMENT (OUTPATIENT)
Dept: OCCUPATIONAL THERAPY | Facility: CLINIC | Age: 73
End: 2018-05-11
Payer: COMMERCIAL

## 2018-05-14 ENCOUNTER — APPOINTMENT (OUTPATIENT)
Dept: RADIOLOGY | Facility: CLINIC | Age: 73
End: 2018-05-14
Payer: COMMERCIAL

## 2018-05-14 ENCOUNTER — OFFICE VISIT (OUTPATIENT)
Dept: OBGYN CLINIC | Facility: CLINIC | Age: 73
End: 2018-05-14
Payer: COMMERCIAL

## 2018-05-14 ENCOUNTER — OFFICE VISIT (OUTPATIENT)
Dept: OCCUPATIONAL THERAPY | Facility: CLINIC | Age: 73
End: 2018-05-14
Payer: COMMERCIAL

## 2018-05-14 VITALS
HEIGHT: 69 IN | WEIGHT: 183.8 LBS | HEART RATE: 71 BPM | BODY MASS INDEX: 27.22 KG/M2 | SYSTOLIC BLOOD PRESSURE: 129 MMHG | DIASTOLIC BLOOD PRESSURE: 83 MMHG

## 2018-05-14 DIAGNOSIS — M79.644 FINGER PAIN, RIGHT: ICD-10-CM

## 2018-05-14 DIAGNOSIS — S52.501A CLOSED FRACTURE DISTAL RADIUS AND ULNA, RIGHT, INITIAL ENCOUNTER: Primary | ICD-10-CM

## 2018-05-14 DIAGNOSIS — M25.531 PAIN IN RIGHT WRIST: ICD-10-CM

## 2018-05-14 DIAGNOSIS — M62.40 INTRINSIC MUSCLE TIGHTNESS: ICD-10-CM

## 2018-05-14 DIAGNOSIS — S52.601A CLOSED FRACTURE DISTAL RADIUS AND ULNA, RIGHT, INITIAL ENCOUNTER: Primary | ICD-10-CM

## 2018-05-14 DIAGNOSIS — M19.041 ARTHRITIS OF FINGER OF RIGHT HAND: Primary | ICD-10-CM

## 2018-05-14 PROCEDURE — 73140 X-RAY EXAM OF FINGER(S): CPT

## 2018-05-14 PROCEDURE — 97022 WHIRLPOOL THERAPY: CPT | Performed by: OCCUPATIONAL THERAPIST

## 2018-05-14 PROCEDURE — 97110 THERAPEUTIC EXERCISES: CPT | Performed by: OCCUPATIONAL THERAPIST

## 2018-05-14 PROCEDURE — 99213 OFFICE O/P EST LOW 20 MIN: CPT | Performed by: ORTHOPAEDIC SURGERY

## 2018-05-14 PROCEDURE — 97140 MANUAL THERAPY 1/> REGIONS: CPT | Performed by: OCCUPATIONAL THERAPIST

## 2018-05-14 NOTE — PROGRESS NOTES
ASSESSMENT/PLAN:    Diagnoses and all orders for this visit:    Arthritis of finger of right hand  Comments:  R ring finger PIP jt  Orders:  -     Ambulatory referral to PT/OT hand therapy; Future    Pain in right wrist  -     Ambulatory referral to Orthopedic Surgery    Finger pain, right  -     XR finger right fourth digit-ring; Future    Intrinsic muscle tightness  -     Ambulatory referral to PT/OT hand therapy; Future        Assessment:   Arthritis of the PIP joint  Right ring finger with intrinsic tightness     Plan:   We gave the patient the option of a steroid injection into the PIP jt today and therapy  The patient would like to try therapy for intrinsic tightness and will follow up in 6 weeks  At that point she will decide if she would like to proceed with an injection  Follow Up:  6  week(s)    To Do Next Visit:       General Discussions:  Osteoarthritis:  The anatomy and physiology of osteoarthritis was discussed with the patient today in the office  Deterioration of the articular cartilage eventually leads to altered mobility at the joint, resulting in joint subluxation, osteophyte formation, cystic changes, as well as subchondral sclerosis  Eventually, pain, limited mobility, and compensatory hypermobility at surrounding joints may develop  While normal activity and usage of the joint may provide a painful experience to the patient, this typically does not result in damage to the limb  Treatment options include splints to decreased joint edema, pain, and inflammation  Therapy exercises to strengthen the surrounding musculature may relieve pain, but do not alter the overall continued development of osteoarthritis  Oral medications, topical medications, corticosteroid injections may decrease pain and increase overall function  Eventually, some patients may require surgical intervention       Operative Discussions:         _____________________________________________________  CHIEF COMPLAINT:  Chief Complaint   Patient presents with    Right Ring Finger - Locking, Pain         SUBJECTIVE:  Crescencio Kitchen is a 81y o  year old female who presents with Pain  Moderate  Intermittant  Sharp and Aching and Stiffness/LROM to the right ring finger  This started  3 month(s) ago as Sudden  Pt has a fall  history on 3/15/18 which required sx of a intra-articular distal radius fx  Pt also describes a hx of triggering of this finger    Radiation: None  Previous Treatments: None without relief  Associated symptoms: No Complaints    PAST MEDICAL HISTORY:  Past Medical History:   Diagnosis Date    Pericarditis        PAST SURGICAL HISTORY:  Past Surgical History:   Procedure Laterality Date    KNEE SURGERY Left     PERICARDIAL WINDOW      WI OPEN RX DISTAL RADIUS FX, EXTRA-ARTICULAR Right 3/22/2018    Procedure: OPEN REDUCTION W/ INTERNAL FIXATION (ORIF) RADIUS, splint application;  Surgeon: Duncan Encinas MD;  Location: [facility];  Service: Orthopedics       FAMILY HISTORY:  Family History   Problem Relation Age of Onset    No Known Problems Mother     No Known Problems Father        SOCIAL HISTORY:  Social History   Substance Use Topics    Smoking status: Never Smoker    Smokeless tobacco: Never Used    Alcohol use No       MEDICATIONS:    Current Outpatient Prescriptions:     ALPRAZolam (XANAX) 0 5 mg tablet, , Disp: , Rfl:     ascorbic acid (VITAMIN C) 500 mg tablet, Take 500 mg by mouth daily, Disp: , Rfl:     b complex vitamins tablet, Take 1 tablet by mouth daily, Disp: , Rfl:     cholecalciferol (VITAMIN D3) 1,000 units tablet, Take 1,000 Units by mouth daily, Disp: , Rfl:     docusate sodium (COLACE) 100 mg capsule, Take 1 capsule (100 mg total) by mouth 2 (two) times a day, Disp: 10 capsule, Rfl: 0    HYDROcodone-ibuprofen (VICOPROFEN) 7 5-200 mg per tablet, , Disp: , Rfl:     ibuprofen (MOTRIN) 400 mg tablet, Take 1 tablet (400 mg total) by mouth every 6 (six) hours as needed for mild pain, Disp: 30 tablet, Rfl: 0    Lutein 20 MG TABS, Take 1 tablet by mouth daily, Disp: , Rfl:     Multiple Vitamins-Calcium (ONE-A-DAY WOMENS PO), Take 1 tablet by mouth daily, Disp: , Rfl:     Omega-3 Fatty Acids (FISH OIL) 1200 MG CAPS, Take 1 capsule by mouth daily, Disp: , Rfl:     omeprazole (PriLOSEC) 20 mg delayed release capsule, Take 20 mg by mouth daily, Disp: , Rfl:     oxyCODONE (ROXICODONE) 5 mg immediate release tablet, Take 1-2 tablets every 4-6 hrs as needed for pain control, Disp: 30 tablet, Rfl: 0    oxyCODONE (ROXICODONE) 5 mg immediate release tablet, Take 1-2 tablets every 4-6 hrs as needed for pain control, Disp: 20 tablet, Rfl: 0    naproxen (NAPROSYN) 250 mg tablet, Take 1 tablet (250 mg total) by mouth 2 (two) times a day with meals, Disp: 60 tablet, Rfl: 0    ALLERGIES:  No Known Allergies    REVIEW OF SYSTEMS:  Pertinent items are noted in HPI  LABS:  HgA1c: No results found for: HGBA1C  BMP: No results found for: GLUCOSE, CALCIUM, NA, K, CO2, CL, BUN, CREATININE      _____________________________________________________  PHYSICAL EXAMINATION:  General: well developed and well nourished, alert, oriented times 3 and appears comfortable  Psychiatric: Normal  HEENT: Trachea Midline, No torticollis  Cardiovascular: No discernable arrhythmia  Pulmonary: No wheezing or stridor  Skin: No masses, erthema, lacerations, fluctation, ulcerations  Neurovascular: Sensation Intact to the Median, Ulnar, Radial Nerve, Motor Intact to the Median, Ulnar, Radial Nerve and Pulses Intact    MUSCULOSKELETAL EXAMINATION:  RIGHT SIDE:  Finger:  Swelling to pip jt R hand ring finger  no nodule, no crepitatipn  ROM is 10-90 degrees at pip jt  No tenderness to dip jt and no instability noted  Radial stress testing and ulnar stress testing causes pain however there is no instability  There is tenderness to her pip jt   Intrinsic tightness noted on exam  Positive jeremiah test  DIP jt  flexes to 30 degrees however she Flexes to 90 with MP jt flexed  There is an osteophyte on the radial side of pip jt      _____________________________________________________  STUDIES REVIEWED:  I have personally reviewed pertinent films in PACS and my interpretation is arthritic changes to pip jt of R ring finger        PROCEDURES PERFORMED:  Procedures  No Procedures performed today   Scribe Attestation    I,:   Aly Cline am acting as a scribe while in the presence of the attending physician :        I,:   Edie Bhat MD personally performed the services described in this documentation    as scribed in my presence :

## 2018-05-14 NOTE — PROGRESS NOTES
Daily Note     Today's date: 2018  Patient name: Jules Biggs  : 1945  MRN: [id number]  Referring provider: Sonia Amador PA-C  Dx:   Encounter Diagnosis     ICD-10-CM    1  Closed fracture distal radius and ulna, right, initial encounter S52 501A     S52 601A                   Subjective: Dr Dottie Brumfield said my finger had arthritis      Objective: See treatment diary below      Assessment: Tolerated treatment well  Patient has improve ROM wrist and digits  Ring finger remains most limited  Plan: Continue per plan of care          Daily Treatment Diary     Manual              STM/retrograde massage 4m            graston hand 4m            MOB wrist / PIP  grade2 2m                                          Exercise Diary  4/10            AROM - thumb x            AROM- digits x10            AROM - wrist x10             blocking PIP/DIP x10            flexbar p/s Red  3x10            Wrist e/f 2#  3x10            Pinch pins w/sup Blue   3x10            Biceps  4#  3x10            [place]                                                                                                                                                HEP- AROM, TGE x                Modalities              fluido 10

## 2018-05-15 ENCOUNTER — APPOINTMENT (OUTPATIENT)
Dept: OCCUPATIONAL THERAPY | Facility: CLINIC | Age: 73
End: 2018-05-15
Payer: COMMERCIAL

## 2018-05-16 ENCOUNTER — APPOINTMENT (OUTPATIENT)
Dept: OCCUPATIONAL THERAPY | Facility: CLINIC | Age: 73
End: 2018-05-16
Payer: COMMERCIAL

## 2018-05-18 ENCOUNTER — APPOINTMENT (OUTPATIENT)
Dept: OCCUPATIONAL THERAPY | Facility: CLINIC | Age: 73
End: 2018-05-18
Payer: COMMERCIAL

## 2018-05-21 ENCOUNTER — APPOINTMENT (OUTPATIENT)
Dept: OCCUPATIONAL THERAPY | Facility: CLINIC | Age: 73
End: 2018-05-21
Payer: COMMERCIAL

## 2018-05-22 ENCOUNTER — OFFICE VISIT (OUTPATIENT)
Dept: OCCUPATIONAL THERAPY | Facility: CLINIC | Age: 73
End: 2018-05-22
Payer: COMMERCIAL

## 2018-05-22 DIAGNOSIS — S52.501A CLOSED FRACTURE DISTAL RADIUS AND ULNA, RIGHT, INITIAL ENCOUNTER: Primary | ICD-10-CM

## 2018-05-22 DIAGNOSIS — S52.601A CLOSED FRACTURE DISTAL RADIUS AND ULNA, RIGHT, INITIAL ENCOUNTER: Primary | ICD-10-CM

## 2018-05-22 PROCEDURE — 97110 THERAPEUTIC EXERCISES: CPT | Performed by: OCCUPATIONAL THERAPIST

## 2018-05-22 PROCEDURE — 97140 MANUAL THERAPY 1/> REGIONS: CPT | Performed by: OCCUPATIONAL THERAPIST

## 2018-05-22 NOTE — PROGRESS NOTES
Daily Note     Today's date: 2018  Patient name: Star Meeks  : 1945  MRN: [id number]  Referring provider: Patel George PA-C  Dx:   Encounter Diagnosis     ICD-10-CM    1  Closed fracture distal radius and ulna, right, initial encounter S52 230A     S57 542I                   Subjective: I am doing better      Objective: See treatment diary below      Assessment: Tolerated treatment well  Patient is making steady progress   Plan: Continue per plan of care          Daily Treatment Diary     Manual             STM/retrograde massage 4m 4m           graston hand 4m 4m           MOB wrist / PIP  grade2 2m 2m                                         Exercise Diary             AROM - thumb x x           AROM- digits x10 x10           AROM - wrist x10 x10            blocking PIP/DIP x10 x10           flexbar p/s Red  3x10 Red   3x10           Wrist e/f 2#  3x10 3#  3x10           Pinch pins w/sup Blue   3x10 Blue   3x10           Biceps  4#  3x10 5#  3x10           [place]                                                                                                                                                HEP- AROM, TGE x                Modalities             fluido 10 10

## 2018-05-23 ENCOUNTER — APPOINTMENT (OUTPATIENT)
Dept: OCCUPATIONAL THERAPY | Facility: CLINIC | Age: 73
End: 2018-05-23
Payer: COMMERCIAL

## 2018-05-24 ENCOUNTER — OFFICE VISIT (OUTPATIENT)
Dept: OCCUPATIONAL THERAPY | Facility: CLINIC | Age: 73
End: 2018-05-24
Payer: COMMERCIAL

## 2018-05-24 DIAGNOSIS — S52.501D CLOSED FRACTURE OF DISTAL END OF RIGHT RADIUS WITH ROUTINE HEALING, UNSPECIFIED FRACTURE MORPHOLOGY, SUBSEQUENT ENCOUNTER: Primary | ICD-10-CM

## 2018-05-24 PROCEDURE — 97140 MANUAL THERAPY 1/> REGIONS: CPT | Performed by: OCCUPATIONAL THERAPIST

## 2018-05-24 PROCEDURE — 97110 THERAPEUTIC EXERCISES: CPT | Performed by: OCCUPATIONAL THERAPIST

## 2018-05-24 NOTE — PROGRESS NOTES
Daily Note     Today's date: 2018  Patient name: Obinna Menjivar  : 1945  MRN: [id number]  Referring provider: Jazmyne Page PA-C  Dx:   Encounter Diagnosis     ICD-10-CM    1  Closed fracture of distal end of right radius with routine healing, unspecified fracture morphology, subsequent encounter S52 155D                   Subjective: My fingers are stiff but doing better      Objective: See treatment diary below      Assessment: Tolerated treatment well  Patient has improved ROM with good carryover  Plan: Continue per plan of care              Daily Treatment Diary     Manual            STM/retrograde massage 4m 4m 4m          graston hand 4m 4m 4m          MOB wrist / PIP  grade2 2m 2m 2m                                        Exercise Diary  x          AROM - thumb x x           AROM- digits x10 x10 x10          AROM - wrist x10 x10 x10           blocking PIP/DIP x10 x10 x10          flexbar p/s Red  3x10 Red   3x10 Red   3x10          Wrist e/f 2#  3x10 3#  3x10 3#  3x10          Pinch pins w/sup Blue   3x10 Blue   3x10 Blue  3x10          Biceps  4#  3x10 5#  3x10 5#  310          Pick    30          Flex  ext   yellow 3x10                                                                                                                               HEP- AROM, TGE x                Modalities            fluido 10 10           MH with flex   10

## 2018-05-25 ENCOUNTER — APPOINTMENT (OUTPATIENT)
Dept: OCCUPATIONAL THERAPY | Facility: CLINIC | Age: 73
End: 2018-05-25
Payer: COMMERCIAL

## 2018-05-29 ENCOUNTER — APPOINTMENT (OUTPATIENT)
Dept: OCCUPATIONAL THERAPY | Facility: CLINIC | Age: 73
End: 2018-05-29
Payer: COMMERCIAL

## 2018-05-30 ENCOUNTER — OFFICE VISIT (OUTPATIENT)
Dept: OCCUPATIONAL THERAPY | Facility: CLINIC | Age: 73
End: 2018-05-30
Payer: COMMERCIAL

## 2018-05-30 ENCOUNTER — APPOINTMENT (OUTPATIENT)
Dept: OCCUPATIONAL THERAPY | Facility: CLINIC | Age: 73
End: 2018-05-30
Payer: COMMERCIAL

## 2018-05-30 DIAGNOSIS — S52.601A CLOSED FRACTURE DISTAL RADIUS AND ULNA, RIGHT, INITIAL ENCOUNTER: Primary | ICD-10-CM

## 2018-05-30 DIAGNOSIS — S52.501A CLOSED FRACTURE DISTAL RADIUS AND ULNA, RIGHT, INITIAL ENCOUNTER: Primary | ICD-10-CM

## 2018-05-30 PROCEDURE — 97140 MANUAL THERAPY 1/> REGIONS: CPT | Performed by: OCCUPATIONAL THERAPIST

## 2018-05-30 PROCEDURE — 97110 THERAPEUTIC EXERCISES: CPT | Performed by: OCCUPATIONAL THERAPIST

## 2018-05-30 NOTE — PROGRESS NOTES
Daily Note     Today's date: 2018  Patient name: Jules Biggs  : 1945  MRN: [id number]  Referring provider: Sonia Amador PA-C  Dx:   Encounter Diagnosis     ICD-10-CM    1  Closed fracture distal radius and ulna, right, initial encounter S52 449A     S56 392F                   Subjective: I am doing better      Objective: See treatment diary below      Assessment: Tolerated treatment well  Patient has improved use of R       Plan: Continue per plan of care               Daily Treatment Diary     Manual           STM/retrograde massage 4m 4m 4m 4m         graston hand 4m 4m 4m 4m         MOB wrist / PIP  grade2 2m 2m 2m 2m                                       Exercise Diary  5/14 5/22 5/24x 5/30         AROM - thumb x x           AROM- digits x10 x10 x10 x10         AROM - wrist x10 x10 x10 x10          blocking PIP/DIP x10 x10 x10 x10         flexbar p/s Red  3x10 Red   3x10 Red   3x10 Red   3x10         Wrist e/f 2#  3x10 3#  3x10 3#  3x10 3#  3x10         Pinch pins w/sup Blue   3x10 Blue   3x10 Blue  3x10 Blue  3x10         Biceps  4#  3x10 5#  3x10 5#  310 5#  3x10         Pick    30          Flex  ext   yellow 3x10 Yellow  3x10         flexbar twist    Red 3x10                                                                                                                 HEP- AROM, TGE x                Modalities           fluido 10 10           MH with flex   10 10

## 2018-06-01 ENCOUNTER — APPOINTMENT (OUTPATIENT)
Dept: OCCUPATIONAL THERAPY | Facility: CLINIC | Age: 73
End: 2018-06-01
Payer: COMMERCIAL

## 2018-06-04 ENCOUNTER — APPOINTMENT (OUTPATIENT)
Dept: OCCUPATIONAL THERAPY | Facility: CLINIC | Age: 73
End: 2018-06-04
Payer: COMMERCIAL

## 2018-06-05 ENCOUNTER — OFFICE VISIT (OUTPATIENT)
Dept: OCCUPATIONAL THERAPY | Facility: CLINIC | Age: 73
End: 2018-06-05
Payer: COMMERCIAL

## 2018-06-05 DIAGNOSIS — S52.501D CLOSED FRACTURE OF DISTAL END OF RIGHT RADIUS WITH ROUTINE HEALING, UNSPECIFIED FRACTURE MORPHOLOGY, SUBSEQUENT ENCOUNTER: Primary | ICD-10-CM

## 2018-06-05 PROCEDURE — 97140 MANUAL THERAPY 1/> REGIONS: CPT | Performed by: OCCUPATIONAL THERAPIST

## 2018-06-05 PROCEDURE — G8990 OTHER PT/OT CURRENT STATUS: HCPCS | Performed by: OCCUPATIONAL THERAPIST

## 2018-06-05 PROCEDURE — 97022 WHIRLPOOL THERAPY: CPT | Performed by: OCCUPATIONAL THERAPIST

## 2018-06-05 PROCEDURE — 97110 THERAPEUTIC EXERCISES: CPT | Performed by: OCCUPATIONAL THERAPIST

## 2018-06-05 PROCEDURE — G8991 OTHER PT/OT GOAL STATUS: HCPCS | Performed by: OCCUPATIONAL THERAPIST

## 2018-06-05 NOTE — PROGRESS NOTES
Daily Note     Today's date: 2018  Patient name: Jovani Vanegas  : 1945  MRN: [id number]  Referring provider: Charlie Moran PA-C  Dx:   Encounter Diagnosis     ICD-10-CM    1  Closed fracture of distal end of right radius with routine healing, unspecified fracture morphology, subsequent encounter S52 062D                   Subjective: I get some soreness when I am without the brace       Objective: See treatment diary below      Assessment: Tolerated treatment well  Patient is making steady gains   She should continue to ween out the splint   Plan: Continue per plan of care            Daily Treatment Diary     Manual           STM/retrograde massage 4m 4m 4m 4m         graston hand 4m 4m 4m 4m         MOB wrist / PIP  grade2 2m 2m 2m 2m                                       Exercise Diary           AROM - thumb x x           AROM- digits x10 x10 x10 x10         AROM - wrist x10 x10 x10 x10          blocking PIP/DIP x10 x10 x10 x10         flexbar p/s Red  3x10 Red   3x10 Red   3x10 Red  3x10         Wrist e/f 2#  3x10 3#  3x10 3#  3x10 3#  3x10         Pinch pins w/sup Blue   3x10 Blue   3x10 Blue  3x10 Blue   3x10         Biceps  4#  3x10 5#  3x10 5#  310 5#  3x10         Pick    30          Flex  ext   yellow 3x10 Red  3x10                                                                                                                              HEP- AROM, TGE x                Modalities            fluido 10 10           MH with flex   10

## 2018-06-07 ENCOUNTER — OFFICE VISIT (OUTPATIENT)
Dept: OCCUPATIONAL THERAPY | Facility: CLINIC | Age: 73
End: 2018-06-07
Payer: COMMERCIAL

## 2018-06-07 DIAGNOSIS — S52.501D CLOSED FRACTURE OF DISTAL END OF RIGHT RADIUS WITH ROUTINE HEALING, UNSPECIFIED FRACTURE MORPHOLOGY, SUBSEQUENT ENCOUNTER: Primary | ICD-10-CM

## 2018-06-07 PROCEDURE — 97022 WHIRLPOOL THERAPY: CPT | Performed by: OCCUPATIONAL THERAPIST

## 2018-06-07 PROCEDURE — 97110 THERAPEUTIC EXERCISES: CPT | Performed by: OCCUPATIONAL THERAPIST

## 2018-06-07 NOTE — PROGRESS NOTES
Daily Note     Today's date: 2018  Patient name: Jose Wilder  : 1945  MRN: [id number]  Referring provider: Leopold Place, PA-C  Dx:   Encounter Diagnosis     ICD-10-CM    1  Closed fracture of distal end of right radius with routine healing, unspecified fracture morphology, subsequent encounter S51 014D                   Subjective: I am doing better      Objective: See treatment diary below      Assessment: Tolerated treatment well  Patient making steady gains with good carryover for ADL  Plan: Continue per plan of care              Daily Treatment Diary     Manual          STM/retrograde massage 4m 4m 4m 4m 4m        graston hand 4m 4m 4m 4m 4m        MOB wrist / PIP  grade2 2m 2m 2m 2m 2m                                      Exercise Diary          AROM - thumb x x           AROM- digits x10 x10 x10 x10 x10        AROM - wrist x10 x10 x10 x10 x10         blocking PIP/DIP x10 x10 x10 x10 x10        flexbar p/s Red  3x10 Red   3x10 Red   3x10 Red  3x10 Red   3x10        Wrist e/f 2#  3x10 3#  3x10 3#  3x10 3#  3x10 3#  3x10        Pinch pins w/sup Blue   3x10 Blue   3x10 Blue  3x10 Blue   3x10 Blue   3x10        Biceps  4#  3x10 5#  3x10 5#  310 5#  3x10 5#  3x10        Pick    30          Flex  ext   yellow 3x10 Red  3x10 Red   3x10                                                                                                                             HEP- AROM, TGE x                Modalities          fluido 10 10           MH with flex   10 10 10

## 2018-06-11 ENCOUNTER — APPOINTMENT (OUTPATIENT)
Dept: OCCUPATIONAL THERAPY | Facility: CLINIC | Age: 73
End: 2018-06-11
Payer: COMMERCIAL

## 2018-06-12 ENCOUNTER — OFFICE VISIT (OUTPATIENT)
Dept: OBGYN CLINIC | Facility: HOSPITAL | Age: 73
End: 2018-06-12

## 2018-06-12 ENCOUNTER — HOSPITAL ENCOUNTER (OUTPATIENT)
Dept: RADIOLOGY | Facility: HOSPITAL | Age: 73
Discharge: HOME | End: 2018-06-12
Attending: ORTHOPAEDIC SURGERY
Payer: COMMERCIAL

## 2018-06-12 VITALS
SYSTOLIC BLOOD PRESSURE: 130 MMHG | DIASTOLIC BLOOD PRESSURE: 80 MMHG | HEART RATE: 81 BPM | BODY MASS INDEX: 26.76 KG/M2 | WEIGHT: 181.2 LBS

## 2018-06-12 DIAGNOSIS — M25.531 PAIN IN RIGHT WRIST: ICD-10-CM

## 2018-06-12 DIAGNOSIS — S52.501D CLOSED FRACTURE OF DISTAL END OF RIGHT RADIUS WITH ROUTINE HEALING, UNSPECIFIED FRACTURE MORPHOLOGY, SUBSEQUENT ENCOUNTER: ICD-10-CM

## 2018-06-12 DIAGNOSIS — M25.531 PAIN IN RIGHT WRIST: Primary | ICD-10-CM

## 2018-06-12 PROCEDURE — 73110 X-RAY EXAM OF WRIST: CPT

## 2018-06-12 PROCEDURE — 99024 POSTOP FOLLOW-UP VISIT: CPT | Performed by: ORTHOPAEDIC SURGERY

## 2018-06-12 NOTE — PROGRESS NOTES
Assessment/Plan:     81y o  year old female status post ORIF right wrist on 3/22/18 following distal radius fracture  Plan is as follows:    -   Weightbearing as tolerated in right wrist and hand    -  Continue with outpatient hand therapy as long as she feels it is beneficial to her, if not she may also perform exercises at the home    -   Since she does still have some tenderness over the volar right wrist, she will follow up in 2 months here to recheck her make sure she has improved, if she does feel really well at that time she was told that she may reschedule that follow-up visit for 1 year      The assessment and plan was formulated by the attending physician and I assisted in carrying it out  Follow Up:  2  month(s)    To Do Next Visit:  X-rays of the  right  wrist    Subjective:     CHIEF COMPLAINT:  Chief Complaint   Patient presents with    Right Wrist - Follow-up       HPI:  Candi Szymanski is a 81y o  year old female   Status post ORIF right  Distal radius on 03/22/2018 following distal radius fracture  She reports that overall she is doing well, has been tending hand therapy which is improving her strength and range of motion  She does report that she has been feeling a little tenderness and swelling medial and lateral to the incision line, however this is very mild and she denies any known injury preceding this tenderness  She denies any numbness or tingling in the right hand, denies any fevers or chills      PAST MEDICAL HISTORY:  Past Medical History:   Diagnosis Date    Pericarditis        PAST SURGICAL HISTORY:  Past Surgical History:   Procedure Laterality Date    KNEE SURGERY Left     PERICARDIAL WINDOW      MO OPEN RX DISTAL RADIUS FX, EXTRA-ARTICULAR Right 3/22/2018    Procedure: OPEN REDUCTION W/ INTERNAL FIXATION (ORIF) RADIUS, splint application;  Surgeon: Edgar Keating MD;  Location: [facility];  Service: Orthopedics       FAMILY HISTORY:  Family History   Problem Relation Age of Onset    No Known Problems Mother     No Known Problems Father        SOCIAL HISTORY:  Social History   Substance Use Topics    Smoking status: Never Smoker    Smokeless tobacco: Never Used    Alcohol use No       MEDICATIONS:    Current Outpatient Prescriptions:     ALPRAZolam (XANAX) 0 5 mg tablet, , Disp: , Rfl:     ascorbic acid (VITAMIN C) 500 mg tablet, Take 500 mg by mouth daily, Disp: , Rfl:     b complex vitamins tablet, Take 1 tablet by mouth daily, Disp: , Rfl:     cholecalciferol (VITAMIN D3) 1,000 units tablet, Take 1,000 Units by mouth daily, Disp: , Rfl:     docusate sodium (COLACE) 100 mg capsule, Take 1 capsule (100 mg total) by mouth 2 (two) times a day, Disp: 10 capsule, Rfl: 0    HYDROcodone-ibuprofen (VICOPROFEN) 7 5-200 mg per tablet, , Disp: , Rfl:     ibuprofen (MOTRIN) 400 mg tablet, Take 1 tablet (400 mg total) by mouth every 6 (six) hours as needed for mild pain, Disp: 30 tablet, Rfl: 0    Lutein 20 MG TABS, Take 1 tablet by mouth daily, Disp: , Rfl:     Multiple Vitamins-Calcium (ONE-A-DAY WOMENS PO), Take 1 tablet by mouth daily, Disp: , Rfl:     naproxen (NAPROSYN) 250 mg tablet, Take 1 tablet (250 mg total) by mouth 2 (two) times a day with meals, Disp: 60 tablet, Rfl: 0    Omega-3 Fatty Acids (FISH OIL) 1200 MG CAPS, Take 1 capsule by mouth daily, Disp: , Rfl:     omeprazole (PriLOSEC) 20 mg delayed release capsule, Take 20 mg by mouth daily, Disp: , Rfl:     oxyCODONE (ROXICODONE) 5 mg immediate release tablet, Take 1-2 tablets every 4-6 hrs as needed for pain control, Disp: 30 tablet, Rfl: 0    oxyCODONE (ROXICODONE) 5 mg immediate release tablet, Take 1-2 tablets every 4-6 hrs as needed for pain control, Disp: 20 tablet, Rfl: 0    ALLERGIES:  No Known Allergies    REVIEW OF SYSTEMS:  Pertinent items are noted in HPI  A comprehensive review of systems was negative      LABS:  HgA1c: No results found for: HGBA1C  BMP: No results found for: GLUCOSE, CALCIUM, NA, K, CO2, CL, BUN, CREATININE      Objective:     _____________________________________________________  PHYSICAL EXAMINATION:  General: well developed and well nourished, alert, oriented times 3 and appears comfortable  Psychiatric: Normal  HEENT: Trachea Midline, No torticollis  Cardiovascular: No discernable arrhythmia  Pulmonary: No wheezing or stridor  Skin: No masses, erthema, lacerations, fluctation, ulcerations  Neurovascular: Sensation Intact to the Median, Ulnar, Radial Nerve, Motor Intact to the Median, Ulnar, Radial Nerve and Pulses Intact    MUSCULOSKELETAL EXAMINATION:   right upper extremity:  Incision over the volar right forearm well healed, no erythema, ecchymosis, or fluctuance  Still unable to fully extend right ring finger, no triggering locking or clicking today  Full active pronation and supination, near full wrist flexion, wrist extension to about 60 degrees, full ulnar deviation, radial deviation to 20 degrees      _____________________________________________________  STUDIES REVIEWED:  Images were reviewd in PACS:   X-ray of right wrist today demonstrates intact distal radial plate and screws, well-aligned no evidence of hardware failure  Or loss of reduction        PROCEDURES PERFORMED:  Procedures  No Procedures performed today

## 2018-06-14 ENCOUNTER — OFFICE VISIT (OUTPATIENT)
Dept: OCCUPATIONAL THERAPY | Facility: CLINIC | Age: 73
End: 2018-06-14
Payer: COMMERCIAL

## 2018-06-14 DIAGNOSIS — S52.601A CLOSED FRACTURE DISTAL RADIUS AND ULNA, RIGHT, INITIAL ENCOUNTER: Primary | ICD-10-CM

## 2018-06-14 DIAGNOSIS — S52.501A CLOSED FRACTURE DISTAL RADIUS AND ULNA, RIGHT, INITIAL ENCOUNTER: Primary | ICD-10-CM

## 2018-06-14 PROCEDURE — 97140 MANUAL THERAPY 1/> REGIONS: CPT | Performed by: OCCUPATIONAL THERAPIST

## 2018-06-14 PROCEDURE — 97110 THERAPEUTIC EXERCISES: CPT | Performed by: OCCUPATIONAL THERAPIST

## 2018-06-14 PROCEDURE — 97022 WHIRLPOOL THERAPY: CPT | Performed by: OCCUPATIONAL THERAPIST

## 2018-06-14 NOTE — PROGRESS NOTES
Daily Note     Today's date: 2018  Patient name: Tisha Frankel  : 1945  MRN: [id number]  Referring provider: Oumou Chicas PA-C  Dx:   Encounter Diagnosis     ICD-10-CM    1  Closed fracture distal radius and ulna, right, initial encounter S52 318A     S51 783K                   Subjective: I was sore (flexor tendons)      Objective: See treatment diary below      Assessment: Tolerated treatment well  Patient has improve digit flexion   She has improved tolerance to ther ex  Plan: Continue per plan of care              Daily Treatment Diary     Manual         STM/retrograde massage 4m 4m 4m 4m 4m 4m       graston hand 4m 4m 4m 4m 4m 4m       MOB wrist / PIP  grade2 2m 2m 2m 2m 2m 2m                                     Exercise Diary         AROM - thumb x x           AROM- digits x10 x10 x10 x10 x10 x10       AROM - wrist x10 x10 x10 x10 x10 x10        blocking PIP/DIP x10 x10 x10 x10 x10 x10       flexbar p/s Red  3x10 Red   3x10 Red   3x10 Red  3x10 Red   3x10 Red  3x10       Wrist e/f 2#  3x10 3#  3x10 3#  3x10 3#  3x10 3#  3x10 3#  3x10       Pinch pins w/sup Blue   3x10 Blue   3x10 Blue  3x10 Blue   3x10 Blue   3x10 Blue  3x10       Biceps  4#  3x10 5#  3x10 5#  310 5#  3x10 5#  3x10 5#  3x10       Pick    30          Flex  ext   yellow 3x10 Red  3x10 Red   3x10 Red   3x10                                                                                                                            HEP- AROM, TGE x                Modalities         fluido 10 10           MH with flex   10 10 10 10

## 2018-06-18 ENCOUNTER — OFFICE VISIT (OUTPATIENT)
Dept: OCCUPATIONAL THERAPY | Facility: CLINIC | Age: 73
End: 2018-06-18
Payer: COMMERCIAL

## 2018-06-18 DIAGNOSIS — S52.501A CLOSED FRACTURE DISTAL RADIUS AND ULNA, RIGHT, INITIAL ENCOUNTER: Primary | ICD-10-CM

## 2018-06-18 DIAGNOSIS — S52.601A CLOSED FRACTURE DISTAL RADIUS AND ULNA, RIGHT, INITIAL ENCOUNTER: Primary | ICD-10-CM

## 2018-06-18 PROCEDURE — 97140 MANUAL THERAPY 1/> REGIONS: CPT | Performed by: OCCUPATIONAL THERAPIST

## 2018-06-18 PROCEDURE — 97110 THERAPEUTIC EXERCISES: CPT | Performed by: OCCUPATIONAL THERAPIST

## 2018-06-18 NOTE — PROGRESS NOTES
Daily Note     Today's date: 2018  Patient name: Ladan Coronado  : 1945  MRN: [id number]  Referring provider: Patricio Quinones PA-C  Dx:   Encounter Diagnosis     ICD-10-CM    1  Closed fracture distal radius and ulna, right, initial encounter S52 973A     S56 143Z                   Subjective: I am doing  better      Objective: See treatment diary below      Assessment: Tolerated treatment well  Patient has improved  ROM with good carryover for ADL  Plan: Continue per plan of care          Daily Treatment Diary     Manual        STM/retrograde massage 4m 4m 4m 4m 4m 4m 4m      graston hand 4m 4m 4m 4m 4m 4m 4m      MOB wrist / PIP  grade2 2m 2m 2m 2m 2m 2m 2m                                    Exercise Diary        AROM - thumb x x           AROM- digits x10 x10 x10 x10 x10 x10 x10      AROM - wrist x10 x10 x10 x10 x10 x10 x10       blocking PIP/DIP x10 x10 x10 x10 x10 x10 x10      flexbar p/s Red  3x10 Red   3x10 Red   3x10 Red  3x10 Red   3x10 Red  3x10 Red  3x10      Wrist e/f 2#  3x10 3#  3x10 3#  3x10 3#  3x10 3#  3x10 3#  3x10 3#  3x10      Pinch pins w/sup Blue   3x10 Blue   3x10 Blue  3x10 Blue   3x10 Blue   3x10 Blue  3x10 Blue  3x10      Biceps  4#  3x10 5#  3x10 5#  310 5#  3x10 5#  3x10 5#  3x10 5#  3x10      Pick    30          Flex  ext   yellow 3x10 Red  3x10 Red   3x10 Red   3x10 Red  3x10                                                                                                                           HEP- AROM, TGE x                Modalities        fluido 10 10           MH with flex   10 10 10 10 10

## 2018-06-19 ENCOUNTER — OFFICE VISIT (OUTPATIENT)
Dept: OCCUPATIONAL THERAPY | Facility: CLINIC | Age: 73
End: 2018-06-19
Payer: COMMERCIAL

## 2018-06-19 DIAGNOSIS — S52.501A CLOSED FRACTURE DISTAL RADIUS AND ULNA, RIGHT, INITIAL ENCOUNTER: Primary | ICD-10-CM

## 2018-06-19 DIAGNOSIS — S52.601A CLOSED FRACTURE DISTAL RADIUS AND ULNA, RIGHT, INITIAL ENCOUNTER: Primary | ICD-10-CM

## 2018-06-19 PROCEDURE — 97110 THERAPEUTIC EXERCISES: CPT | Performed by: OCCUPATIONAL THERAPIST

## 2018-06-19 PROCEDURE — 97140 MANUAL THERAPY 1/> REGIONS: CPT | Performed by: OCCUPATIONAL THERAPIST

## 2018-06-19 NOTE — PROGRESS NOTES
Daily Note     Today's date: 2018  Patient name: Charlotte More  : 1945  MRN: [id number]  Referring provider: Lis Reynolds PA-C  Dx:   Encounter Diagnosis     ICD-10-CM    1  Closed fracture distal radius and ulna, right, initial encounter S52 530A     S56 086L                   Subjective: I am doing better, I stopped wearing the splint       Objective: See treatment diary below      Assessment: Tolerated treatment well  Patient has good carryover  R ring digit flex improved to [facility]  Plan: Continue per plan of care          Daily Treatment Diary     Manual       STM/retrograde massage 4m 4m 4m 4m 4m 4m 4m 4m     graston hand 4m 4m 4m 4m 4m 4m 4m 4m     MOB wrist / PIP  grade2 2m 2m 2m 2m 2m 2m 2m 2m                                   Exercise Diary       AROM - thumb x x           AROM- digits x10 x10 x10 x10 x10 x10 x10 x10     AROM - wrist x10 x10 x10 x10 x10 x10 x10 x10      blocking PIP/DIP x10 x10 x10 x10 x10 x10 x10 x10     flexbar p/s Red  3x10 Red   3x10 Red   3x10 Red  3x10 Red   3x10 Red  3x10 Red  3x10 Red   3x10     Wrist e/f 2#  3x10 3#  3x10 3#  3x10 3#  3x10 3#  3x10 3#  3x10 3#  3x10 3#  3x10     Pinch pins w/sup Blue   3x10 Blue   3x10 Blue  3x10 Blue   3x10 Blue   3x10 Blue  3x10 Blue  3x10 Blue  3x10     Biceps  4#  3x10 5#  3x10 5#  310 5#  3x10 5#  3x10 5#  3x10 5#  3x10 5#  3x10     Pick    30          Flex  ext   yellow 3x10 Red  3x10 Red   3x10 Red   3x10 Red  3x10 Red   3x10                                                                                                                          HEP- AROM, TGE x                Modalities        fluido 10 10           MH with flex   10 10 10 10 10

## 2018-06-21 ENCOUNTER — APPOINTMENT (OUTPATIENT)
Dept: OCCUPATIONAL THERAPY | Facility: CLINIC | Age: 73
End: 2018-06-21
Payer: COMMERCIAL

## 2018-06-25 ENCOUNTER — APPOINTMENT (OUTPATIENT)
Dept: OCCUPATIONAL THERAPY | Facility: CLINIC | Age: 73
End: 2018-06-25
Payer: COMMERCIAL

## 2018-06-26 ENCOUNTER — OFFICE VISIT (OUTPATIENT)
Dept: OCCUPATIONAL THERAPY | Facility: CLINIC | Age: 73
End: 2018-06-26
Payer: COMMERCIAL

## 2018-06-26 DIAGNOSIS — S52.501A CLOSED FRACTURE DISTAL RADIUS AND ULNA, RIGHT, INITIAL ENCOUNTER: Primary | ICD-10-CM

## 2018-06-26 DIAGNOSIS — S52.601A CLOSED FRACTURE DISTAL RADIUS AND ULNA, RIGHT, INITIAL ENCOUNTER: Primary | ICD-10-CM

## 2018-06-26 PROCEDURE — 97110 THERAPEUTIC EXERCISES: CPT | Performed by: OCCUPATIONAL THERAPIST

## 2018-06-26 PROCEDURE — 97140 MANUAL THERAPY 1/> REGIONS: CPT | Performed by: OCCUPATIONAL THERAPIST

## 2018-06-26 NOTE — PROGRESS NOTES
Daily Note     Today's date: 2018  Patient name: Jonas Vasquez  : 1945  MRN: [id number]  Referring provider: Yumiko Bo PA-C  Dx:   Encounter Diagnosis     ICD-10-CM    1  Closed fracture distal radius and ulna, right, initial encounter S52 501A     S52 869D                   Subjective: I am doing better      Objective: See treatment diary below      Assessment: Tolerated treatment well  Patient has improved use R for ADL   Wrist e/f =60/55   2 R/L=18      Plan: Continue per plan of care            Daily Treatment Diary     Manual   6    STM/retrograde massage 4m 4m 4m 4m 4m 4m 4m 4m 4m    graston hand 4m 4m 4m 4m 4m 4m 4m 4m 4m    MOB wrist / PIP  grade2 2m 2m 2m 2m 2m 2m 2m 2m 2m                                  Exercise Diary      AROM - thumb x x           AROM- digits x10 x10 x10 x10 x10 x10 x10 x10 x10    AROM - wrist x10 x10 x10 x10 x10 x10 x10 x10 x10     blocking PIP/DIP x10 x10 x10 x10 x10 x10 x10 x10 x10    flexbar p/s Red  3x10 Red   3x10 Red   3x10 Red  3x10 Red   3x10 Red  3x10 Red  3x10 Red   3x10 Red  3x10    Wrist e/f 2#  3x10 3#  3x10 3#  3x10 3#  3x10 3#  3x10 3#  3x10 3#  3x10 3#  3x10 3#  3x10    Pinch pins w/sup Blue   3x10 Blue   3x10 Blue  3x10 Blue   3x10 Blue   3x10 Blue  3x10 Blue  3x10 Blue  3x10 Blue  3x10    Biceps  4#  3x10 5#  3x10 5#  310 5#  3x10 5#  3x10 5#  3x10 5#  3x10 5#  3x10 5#  3x10    Pick    30          Flex  ext   yellow 3x10 Red  3x10 Red   3x10 Red   3x10 Red  3x10 Red   3x10 Red  3x10    powerweb         Black  3x10    Jar sim w/flexbar         yellow 3x10                                                                                               HEP- AROM, TGE x                Modalities      fluido 10 10       10m    MH with flex   10 10 10 10 10

## 2018-06-28 ENCOUNTER — APPOINTMENT (OUTPATIENT)
Dept: OCCUPATIONAL THERAPY | Facility: CLINIC | Age: 73
End: 2018-06-28
Payer: COMMERCIAL

## 2018-07-02 ENCOUNTER — OFFICE VISIT (OUTPATIENT)
Dept: OCCUPATIONAL THERAPY | Facility: CLINIC | Age: 73
End: 2018-07-02
Payer: COMMERCIAL

## 2018-07-02 DIAGNOSIS — S52.501A CLOSED FRACTURE DISTAL RADIUS AND ULNA, RIGHT, INITIAL ENCOUNTER: Primary | ICD-10-CM

## 2018-07-02 DIAGNOSIS — S52.601A CLOSED FRACTURE DISTAL RADIUS AND ULNA, RIGHT, INITIAL ENCOUNTER: Primary | ICD-10-CM

## 2018-07-02 DIAGNOSIS — S52.501D CLOSED FRACTURE OF DISTAL END OF RIGHT RADIUS WITH ROUTINE HEALING, UNSPECIFIED FRACTURE MORPHOLOGY, SUBSEQUENT ENCOUNTER: ICD-10-CM

## 2018-07-02 PROCEDURE — G8991 OTHER PT/OT GOAL STATUS: HCPCS | Performed by: OCCUPATIONAL THERAPIST

## 2018-07-02 PROCEDURE — G8990 OTHER PT/OT CURRENT STATUS: HCPCS | Performed by: OCCUPATIONAL THERAPIST

## 2018-07-02 PROCEDURE — 97110 THERAPEUTIC EXERCISES: CPT | Performed by: OCCUPATIONAL THERAPIST

## 2018-07-02 PROCEDURE — 97140 MANUAL THERAPY 1/> REGIONS: CPT | Performed by: OCCUPATIONAL THERAPIST

## 2018-07-02 NOTE — PROGRESS NOTES
Daily Note     Today's date: 2018  Patient name: Brendon Dumont  : 1945  MRN: [id number]  Referring provider: Fritz Huynh PA-C  Dx:   Encounter Diagnosis     ICD-10-CM    1  Closed fracture distal radius and ulna, right, initial encounter S52 501A     S52 601A    2  Closed fracture of distal end of right radius with routine healing, unspecified fracture morphology, subsequent encounter S52 501D                   Subjective: I am doing better       Objective:       History of Present Illness  Date of onset: 3/15/2018  Date of surgery: 3/22/2018  Mechanism of injury: surgery  Mechanism of injury: Pt slipped and fell in her home  She received emergency care the same day  The right wrist fracture was reduced and casted, and she was referred to Ortho for follow up  She has attended OT for  22  visits   Not a recurrent problem     Quality of life: excellent    Pain  Current pain ratin  At best pain ratin  At worst pain rating: 3  Location: right wrist  Quality: throbbing and discomfort  Relieving factors: medications, support and change in position  Progression: no change    Social Support  Lives in: condominium  Lives with: spouse    Employment status: not working  Hand dominance: right      Diagnostic Tests  Abnormal x-ray: x-rays 18 show routine heaing  Treatments  Current treatment: occupational therapy  Patient Goals  Patient goals for therapy: increased motion, decreased pain, decreased edema, increased strength and independence with ADLs/IADLs  Patient goal:  grandchildren- fear limits ability        Objective     Observations      PIP contracture R ring    Tenderness     Right Wrist/Hand   Tenderness in the distal radioulnar joint       Neurological Testing     Sensation     Wrist/Hand     Right   Intact: light touch         Active Range of Motion     Left Elbow   Forearm supination: 70 degrees   Forearm pronation: 85 degrees     Right Elbow   Flexion: WFL  Extension: WFL  Forearm supination: 70 degrees  Forearm pronation: 75 degrees     Left Wrist   Wrist flexion: 72 degrees   Wrist extension: 80 degrees   Radial deviation: 12 degrees   Ulnar deviation: 30 degrees     Right Wrist   Wrist flexion: 50 degrees   Wrist extension: 50 degrees   Radial deviation: 20 degrees   Ulnar deviation: 30 degrees     Left Thumb   Flexion     MP: 80 degrees    DIP: 60 degrees  Palmar Abduction     CMC: 60 degrees  Radial abduction    CMC: 60 degrees    Right Thumb   Flexion     MP: 75    DIP: 50  Extension     MP: -30    DIP: -10  Palmar Abduction    CMC: 40  Radial Abduction    CMC: 45  Opposition: Right thumb opposition to volar DIP crease of IF    Left Digits    Flexion   Index     MCP: 100    PIP: 105    DIP: 65  Middle     MCP: 98    PIP: 102    DIP: 60  Ring     MCP: 98    PIP: 100    DIP: 65  Little     MCP: 10    PIP: 95    DIP: 80    Right Digits   Flexion   Index     MCP:90    PIP: 100    DIP: 60  Middle     MCP: 90    PIP: 100    DIP: 50  Ring     MCP: 70    PIP: 45    DIP: 15  Little     MCP:100    PIP: 90    DIP: 65  Extension   Index     MCP:0    PIP: 0    DIP: 0  Middle     MCP: 0    PIP: 0    DIP: 0  Ring     MCP:0    PIP: -20    DIP: -10  Little     MCP: 0    PIP: 0    DIP: 0    Strength/Myotome Testing      2 R/L=26/38  3 Jaw R/L=5/6           Assessment  Impairments: abnormal or restricted ROM and pain with function  Other impairment: increased pain, increased edema, scar adhesions, decreased functional use of dominant RUE, decreased strength  Functional limitations: unable to use  R for lifting > 5 #   Assessment details: Fernanda Tobar is a 81 y o  right hand dominant female referred to OT with diagnosis right wrist pain, s/p ORIF Right distal radius fracture   She has improved ROM , decreased pain , and increased strength   She has limitations with heavier lifting   Dorathy Infield Barriers to therapy: noneUnderstanding of Dx/Px/POC: excellent  Goals  STGs (to be achieved in 4 weeks):  1  Improve AROM by 25%  - met  2  Decrease pain and edema  - met   3  Achieve good scar mobility  - partially met  4  Improve FOTO score by 5 points  - met     LTGs (to be achieved in 8-10 weeks):  1  Improve RUE AROM to [facility]  - partially ,met  2  Improve right hand strength to [facility]  - partialy met   3  Decrease pain -met  4  Improve FOTO score by 15 points  -met    Plan  Pt will ween HEP  Planned modality interventions: thermotherapy: hydrocollator packs, thermotherapy: paraffin bath and fluidotherapy  Planned therapy interventions: manual therapy, therapeutic exercise, functional ROM exercises, coordination, home exercise program and stretching  Other planned therapy interventions: scar management, strengthening per post op protocol  Frequency: 1x week  Duration in weeks: 2    Assessment: Tolerated treatment well  Patient is making steady gains         Plan: 2 more visits      Daily Treatment Diary     Manual  5/14 5/22 5/24 6/5 6/7 6/14 6/18 6/19 6/26    STM/retrograde massage 4m 4m 4m 4m 4m 4m 4m 4m 4m    graston hand 4m 4m 4m 4m 4m 4m 4m 4m 4m    MOB wrist / PIP  grade2 2m 2m 2m 2m 2m 2m 2m 2m 2m                                  Exercise Diary  5/14 5/22 5/24 6/5 6/7 6/14 6/18 6/18 6/26 7/2   AROM - thumb x x           AROM- digits x10 x10 x10 x10 x10 x10 x10 x10 x10 x10   AROM - wrist x10 x10 x10 x10 x10 x10 x10 x10 x10 x10    blocking PIP/DIP x10 x10 x10 x10 x10 x10 x10 x10 x10 x10   flexbar p/s Red  3x10 Red   3x10 Red   3x10 Red  3x10 Red   3x10 Red  3x10 Red  3x10 Red   3x10 Red  3x10 Red  3x10   Wrist e/f 2#  3x10 3#  3x10 3#  3x10 3#  3x10 3#  3x10 3#  3x10 3#  3x10 3#  3x10 3#  3x10 3#  3x10   Pinch pins w/sup Blue   3x10 Blue   3x10 Blue  3x10 Blue   3x10 Blue   3x10 Blue  3x10 Blue  3x10 Blue  3x10 Blue  3x10 Blue   3x10   Biceps  4#  3x10 5#  3x10 5#  310 5#  3x10 5#  3x10 5#  3x10 5#  3x10 5#  3x10 5#  3x10 5#  3x10   Pick    30          Flex  ext   yellow 3x10 Red  3x10 Red   3x10 Red   3x10 Red  3x10 Red   3x10 Red  3x10 Red  3x10   powerweb         Black  3x10 Black   3x10   Jar sim w/flexbar         yellow 3x10 Yellow  3x10                                                                                              HEP- AROM, TGE x                Modalities  5/14 5/22 5/24 6/5 6/7 6/14 6/18 6/26 7/2   fluido 10 10       10m    MH with flex   10 10 10 10 10   10m

## 2018-07-05 ENCOUNTER — APPOINTMENT (OUTPATIENT)
Dept: OCCUPATIONAL THERAPY | Facility: CLINIC | Age: 73
End: 2018-07-05
Payer: COMMERCIAL

## 2018-07-09 ENCOUNTER — OFFICE VISIT (OUTPATIENT)
Dept: OCCUPATIONAL THERAPY | Facility: CLINIC | Age: 73
End: 2018-07-09
Payer: COMMERCIAL

## 2018-07-09 DIAGNOSIS — S52.601A CLOSED FRACTURE DISTAL RADIUS AND ULNA, RIGHT, INITIAL ENCOUNTER: Primary | ICD-10-CM

## 2018-07-09 DIAGNOSIS — S52.501D CLOSED FRACTURE OF DISTAL END OF RIGHT RADIUS WITH ROUTINE HEALING, UNSPECIFIED FRACTURE MORPHOLOGY, SUBSEQUENT ENCOUNTER: ICD-10-CM

## 2018-07-09 DIAGNOSIS — S52.501A CLOSED FRACTURE DISTAL RADIUS AND ULNA, RIGHT, INITIAL ENCOUNTER: Primary | ICD-10-CM

## 2018-07-09 PROCEDURE — 97010 HOT OR COLD PACKS THERAPY: CPT | Performed by: OCCUPATIONAL THERAPIST

## 2018-07-09 PROCEDURE — 97140 MANUAL THERAPY 1/> REGIONS: CPT | Performed by: OCCUPATIONAL THERAPIST

## 2018-07-09 PROCEDURE — 97110 THERAPEUTIC EXERCISES: CPT | Performed by: OCCUPATIONAL THERAPIST

## 2018-07-09 NOTE — PROGRESS NOTES
Daily Note     Today's date: 2018  Patient name: Crescencio Kitchen  : 1945  MRN: [id number]  Referring provider: Axel Bailon PA-C  Dx:   Encounter Diagnosis     ICD-10-CM    1  Closed fracture distal radius and ulna, right, initial encounter S52 501A     S52 601A    2  Closed fracture of distal end of right radius with routine healing, unspecified fracture morphology, subsequent encounter S52 501D                   Subjective: I am doing well      Objective: See treatment diary below      Daily Treatment Diary     Manual              STM/retrograde massage 3min            graston hand 4m            MOB MCP's [place] 3m                                          Exercise Diary              AROM - thumb 10x            AROM- digits 10x            AROM - wrist 10x            PROM digits 10x            PROM  wrist 10x            powerweb   black 3x10            Pinch pins black   3x10            Wrist e/f 3#  3x10            Biceps     5#  3x10                                                                                                                                              HEP- AROM, TGE x                Modalities              MH pre 10m            CP post 5m                                 Assessment: Tolerated treatment well  Patient has improved use R for ADL         Plan: Marybeth Conrad

## 2018-07-12 ENCOUNTER — OFFICE VISIT (OUTPATIENT)
Dept: OCCUPATIONAL THERAPY | Facility: CLINIC | Age: 73
End: 2018-07-12
Payer: COMMERCIAL

## 2018-07-12 DIAGNOSIS — S52.501A CLOSED FRACTURE DISTAL RADIUS AND ULNA, RIGHT, INITIAL ENCOUNTER: Primary | ICD-10-CM

## 2018-07-12 DIAGNOSIS — S52.601A CLOSED FRACTURE DISTAL RADIUS AND ULNA, RIGHT, INITIAL ENCOUNTER: Primary | ICD-10-CM

## 2018-07-12 DIAGNOSIS — S52.501D CLOSED FRACTURE OF DISTAL END OF RIGHT RADIUS WITH ROUTINE HEALING, UNSPECIFIED FRACTURE MORPHOLOGY, SUBSEQUENT ENCOUNTER: ICD-10-CM

## 2018-07-12 PROCEDURE — G8991 OTHER PT/OT GOAL STATUS: HCPCS | Performed by: OCCUPATIONAL THERAPIST

## 2018-07-12 PROCEDURE — 97110 THERAPEUTIC EXERCISES: CPT | Performed by: OCCUPATIONAL THERAPIST

## 2018-07-12 PROCEDURE — G8990 OTHER PT/OT CURRENT STATUS: HCPCS | Performed by: OCCUPATIONAL THERAPIST

## 2018-07-12 PROCEDURE — 97140 MANUAL THERAPY 1/> REGIONS: CPT | Performed by: OCCUPATIONAL THERAPIST

## 2018-07-12 NOTE — PROGRESS NOTES
Daily Note /discharge    Today's date: 2018  Patient name: Charlotte More  : 1945  MRN: [id number]  Referring provider: Lis Reynolds PA-C  Dx:   Encounter Diagnosis     ICD-10-CM    1  Closed fracture distal radius and ulna, right, initial encounter S52 501A     S52 601A    2  Closed fracture of distal end of right radius with routine healing, unspecified fracture morphology, subsequent encounter S52 501D                   Subjective: I am good       Objective: See treatment diary below        Assessment: Tolerated treatment well  Patient has improved function of R UE     2 R/L=25/46  Wrist e/f = 60/50  Digit WNL except ring finger 90%      Assessment  Impairments: abnormal or restricted ROM and pain with function  Other impairment: increased pain, increased edema, scar adhesions, decreased functional use of dominant RUE, decreased strength  Functional limitations: unable to use  R for lifting > 5 #   Assessment details: Immanuel Howard is a 81 y o  right hand dominant female treated by OT for  right wrist pain, s/p ORIF Right distal radius fracture   She has improved ROM , decreased pain , and increased strength   She has limitations with heavier lifting   Severino Spruce She denies pain   Barriers to therapy: noneUnderstanding of Dx/Px/POC: excellent  Goals  STGs (to be achieved in 4 weeks):  1  Improve AROM by 25%  - met  2  Decrease pain and edema  - met   3  Achieve good scar mobility  -   met  4  Improve FOTO score by 5 points  - met     LTGs (to be achieved in 8-10 weeks):  1  [facility]  - met  2  Improve right hand strength to [facility]  - partialy met   3  Decrease pain -met  4  Improve FOTO score by 15 points  -met      Plan: d/c        Daily Treatment Diary     Manual             STM/retrograde massage 3min 3m           graston hand 4m 4m           MOB MCP's Bhargavi Rotunda 3m 4m                                         Exercise Diary             AROM - thumb 10x 10x           AROM- digits 10x 10x AROM - wrist 10x 10x           PROM digits 10x 10x           PROM  wrist 10x 10x           powerweb   black 3x10 Black   3x10           Pinch pins black   3x10 Black   3x10           Wrist e/f 3#  3x10 3#  3x10           Biceps     5#  3x10 5#  3x10                                                                                                                                             HEP- AROM, TGE x                Modalities  7/9 7/12           MH pre 10m 10m           CP post 5m

## 2018-08-16 ENCOUNTER — HOSPITAL ENCOUNTER (OUTPATIENT)
Dept: RADIOLOGY | Facility: HOSPITAL | Age: 73
Discharge: HOME | End: 2018-08-16
Attending: ORTHOPAEDIC SURGERY
Payer: COMMERCIAL

## 2018-08-16 ENCOUNTER — OFFICE VISIT (OUTPATIENT)
Dept: OBGYN CLINIC | Facility: HOSPITAL | Age: 73
End: 2018-08-16
Payer: COMMERCIAL

## 2018-08-16 VITALS — SYSTOLIC BLOOD PRESSURE: 130 MMHG | DIASTOLIC BLOOD PRESSURE: 81 MMHG | HEART RATE: 79 BPM

## 2018-08-16 DIAGNOSIS — M25.531 PAIN IN RIGHT WRIST: Primary | ICD-10-CM

## 2018-08-16 DIAGNOSIS — M25.531 PAIN IN RIGHT WRIST: ICD-10-CM

## 2018-08-16 PROCEDURE — 99213 OFFICE O/P EST LOW 20 MIN: CPT | Performed by: ORTHOPAEDIC SURGERY

## 2018-08-16 PROCEDURE — 73110 X-RAY EXAM OF WRIST: CPT

## 2018-08-16 RX ORDER — TRIAMCINOLONE ACETONIDE 1 MG/G
CREAM TOPICAL
COMMUNITY
End: 2018-08-16

## 2018-08-16 RX ORDER — CHLORDIAZEPOXIDE HYDROCHLORIDE AND CLIDINIUM BROMIDE 5; 2.5 MG/1; MG/1
CAPSULE ORAL
COMMUNITY
End: 2018-11-13

## 2018-08-16 RX ORDER — RANITIDINE HYDROCHLORIDE 25 MG/ML
INJECTION, SOLUTION INTRAMUSCULAR; INTRAVENOUS
COMMUNITY
End: 2019-07-22 | Stop reason: ALTCHOICE

## 2018-08-16 RX ORDER — CIPROFLOXACIN 250 MG/1
TABLET, FILM COATED ORAL
COMMUNITY
End: 2018-08-16

## 2018-08-16 RX ORDER — AMOXICILLIN 500 MG/1
CAPSULE ORAL
COMMUNITY
End: 2018-08-16

## 2018-08-16 RX ORDER — CYCLOBENZAPRINE HCL 5 MG
TABLET ORAL
COMMUNITY
End: 2018-08-16

## 2018-08-16 RX ORDER — RANITIDINE 150 MG/1
TABLET ORAL
COMMUNITY
End: 2018-08-16

## 2018-08-16 RX ORDER — NORTRIPTYLINE HYDROCHLORIDE 25 MG/1
CAPSULE ORAL
COMMUNITY
End: 2018-08-16

## 2018-08-16 RX ORDER — BIOTIN 1 MG
TABLET ORAL DAILY
COMMUNITY
End: 2019-04-26

## 2018-08-16 NOTE — PROGRESS NOTES
73 y o female status post 6 months right distal radius fracture ORIF  Patient states she is doing very well  She states she has completed occupational therapy  She denies any pain numbness tingling right wrist  She states he has full motion of her right wrist   She has no complaints regarding her right upper extremity  She continues to do home range of motion strengthening exercise without any issues      Review of Systems  Review of systems negative unless otherwise specified in HPI    Past Medical History  Past Medical History:   Diagnosis Date    Pericarditis        Past Surgical History  Past Surgical History:   Procedure Laterality Date    KNEE SURGERY Left     PERICARDIAL WINDOW      OH OPEN RX DISTAL RADIUS FX, EXTRA-ARTICULAR Right 3/22/2018    Procedure: OPEN REDUCTION W/ INTERNAL FIXATION (ORIF) RADIUS, splint application;  Surgeon: Natalie Little MD;  Location: [facility];  Service: Orthopedics       Current Medications  Current Outpatient Prescriptions on File Prior to Visit   Medication Sig Dispense Refill    ALPRAZolam (XANAX) 0 5 mg tablet       ascorbic acid (VITAMIN C) 500 mg tablet Take 500 mg by mouth daily      b complex vitamins tablet Take 1 tablet by mouth daily      cholecalciferol (VITAMIN D3) 1,000 units tablet Take 1,000 Units by mouth daily      HYDROcodone-ibuprofen (VICOPROFEN) 7 5-200 mg per tablet       ibuprofen (MOTRIN) 400 mg tablet Take 1 tablet (400 mg total) by mouth every 6 (six) hours as needed for mild pain 30 tablet 0    Lutein 20 MG TABS Take 1 tablet by mouth daily      Multiple Vitamins-Calcium (ONE-A-DAY WOMENS PO) Take 1 tablet by mouth daily      Omega-3 Fatty Acids (FISH OIL) 1200 MG CAPS Take 1 capsule by mouth daily      omeprazole (PriLOSEC) 20 mg delayed release capsule Take 20 mg by mouth daily      oxyCODONE (ROXICODONE) 5 mg immediate release tablet Take 1-2 tablets every 4-6 hrs as needed for pain control 30 tablet 0    [DISCONTINUED] docusate sodium (COLACE) 100 mg capsule Take 1 capsule (100 mg total) by mouth 2 (two) times a day 10 capsule 0    [DISCONTINUED] naproxen (NAPROSYN) 250 mg tablet Take 1 tablet (250 mg total) by mouth 2 (two) times a day with meals 60 tablet 0    [DISCONTINUED] oxyCODONE (ROXICODONE) 5 mg immediate release tablet Take 1-2 tablets every 4-6 hrs as needed for pain control 20 tablet 0     No current facility-administered medications on file prior to visit  Recent Labs (HCT,HGB,PT,INR,ESR,CRP,GLU,HgA1C)  No results found for: HCT, HGB, WBC, PT, INR, ESR, CRP, GLUCOSE, HGBA1C      Physical exam  · General: Awake, Alert, Oriented  · Eyes: Pupils equal, round and reactive to light  · Heart: regular rate and rhythm  · Lungs: No audible wheezing  · Examination of patient's right wrist well-healed volar incision dorsi and volar flexion 80° full supination pronation apb strength intact medial radial nerves intact active thumb retropulsion intrinsic strength 5/5 sensation intact distal pulses present no pain palpation of the distal radius    Imaging  Right wrist x-rays reviewed x-rays reveal well-healed distal radius fracture no evidence of hardware failure        Assessment/Plan:   72 y  o female status post 6 months right distal radius fracture ORIF doing well  Continue home range of motion strengthening exercise  Weightbearing as tolerated right upper extremity  Case discussed with Dr Vazquez Lobato an as-needed basis

## 2018-10-09 ENCOUNTER — TRANSCRIBE ORDERS (OUTPATIENT)
Dept: ADMINISTRATIVE | Facility: HOSPITAL | Age: 73
End: 2018-10-09

## 2018-10-09 DIAGNOSIS — K31.9 DISEASE OF STOMACH: Primary | ICD-10-CM

## 2018-10-09 DIAGNOSIS — R10.9 ABDOMINAL PAIN, UNSPECIFIED ABDOMINAL LOCATION: ICD-10-CM

## 2018-10-18 ENCOUNTER — HOSPITAL ENCOUNTER (OUTPATIENT)
Dept: RADIOLOGY | Facility: HOSPITAL | Age: 73
Discharge: HOME | End: 2018-10-18
Payer: COMMERCIAL

## 2018-10-18 DIAGNOSIS — K31.9 DISEASE OF STOMACH: ICD-10-CM

## 2018-10-18 PROCEDURE — 76700 US EXAM ABDOM COMPLETE: CPT

## 2018-10-31 ENCOUNTER — TELEPHONE (OUTPATIENT)
Dept: NEUROLOGY | Facility: CLINIC | Age: 73
End: 2018-10-31

## 2018-10-31 ENCOUNTER — TELEPHONE (OUTPATIENT)
Dept: OBGYN CLINIC | Facility: HOSPITAL | Age: 73
End: 2018-10-31

## 2018-10-31 DIAGNOSIS — Z98.890 STATUS POST SURGERY: Primary | ICD-10-CM

## 2018-10-31 NOTE — TELEPHONE ENCOUNTER
I agree, it is unlikely related to her wrist, if pt has issues with her wrist, we would happily have an appt scheduled to see Dr Manjit Dickerson for her wrist pain

## 2018-10-31 NOTE — TELEPHONE ENCOUNTER
Patient had a fall in March  Patient had surgery on her wrist by Dr David Hathaway  Patient doesn't have a PCP    And she has been experiencing headaches, which she believes is from the fall  She is calling because the neurologist is asking Dr David Hathaway to put in the referral in the system  Patient is scheduled to see Dr Lia Lima (@ [facility]) as a new patient on 11/13/18  Patient call back [phone]    Thank you 
Referral written
complains of pain/discomfort

## 2018-11-13 ENCOUNTER — OFFICE VISIT (OUTPATIENT)
Dept: NEUROLOGY | Facility: CLINIC | Age: 73
End: 2018-11-13
Payer: COMMERCIAL

## 2018-11-13 VITALS
DIASTOLIC BLOOD PRESSURE: 88 MMHG | WEIGHT: 180 LBS | SYSTOLIC BLOOD PRESSURE: 128 MMHG | BODY MASS INDEX: 26.58 KG/M2 | HEART RATE: 82 BPM

## 2018-11-13 DIAGNOSIS — G43.709 CHRONIC MIGRAINE WITHOUT AURA WITHOUT STATUS MIGRAINOSUS, NOT INTRACTABLE: Primary | ICD-10-CM

## 2018-11-13 DIAGNOSIS — M54.2 CERVICALGIA: ICD-10-CM

## 2018-11-13 DIAGNOSIS — R26.89 BALANCE PROBLEM: ICD-10-CM

## 2018-11-13 PROBLEM — E04.1 THYROID NODULE: Status: ACTIVE | Noted: 2018-11-13

## 2018-11-13 PROBLEM — E21.0 PRIMARY HYPERPARATHYROIDISM (HCC): Status: ACTIVE | Noted: 2018-11-13

## 2018-11-13 PROBLEM — K21.00 GASTROESOPHAGEAL REFLUX DISEASE WITH ESOPHAGITIS: Status: ACTIVE | Noted: 2017-10-18

## 2018-11-13 PROBLEM — E55.9 VITAMIN D DEFICIENCY: Status: ACTIVE | Noted: 2018-11-13

## 2018-11-13 PROBLEM — Z86.79 HISTORY OF PERICARDITIS: Status: ACTIVE | Noted: 2018-03-12

## 2018-11-13 PROBLEM — F41.9 ANXIETY: Status: ACTIVE | Noted: 2018-11-13

## 2018-11-13 PROCEDURE — 99204 OFFICE O/P NEW MOD 45 MIN: CPT | Performed by: PSYCHIATRY & NEUROLOGY

## 2018-11-13 PROCEDURE — 4040F PNEUMOC VAC/ADMIN/RCVD: CPT | Performed by: PSYCHIATRY & NEUROLOGY

## 2018-11-13 RX ORDER — RIZATRIPTAN BENZOATE 10 MG/1
10 TABLET, ORALLY DISINTEGRATING ORAL ONCE AS NEEDED
Qty: 9 TABLET | Refills: 0 | Status: SHIPPED | OUTPATIENT
Start: 2018-11-13 | End: 2019-01-07 | Stop reason: HOSPADM

## 2018-11-13 NOTE — PROGRESS NOTES
[address] [facility] Consult  PATIENT:  Randi Hampton  MRN:  [id number]  :  1945  DATE OF SERVICE:  2018  REFERRED BY: Cheryle Ek, MD  PMD: Mar Lund    Assessment/Plan:     Randi Hampton is a 72 y o  female referred here for evaluation of headache  She has had migraines since childhood, she has followed with neurologist in the past back in [place]  She does moved here to be closer to her daughter and granddaughter in the area  She has multiple types of headaches, bilateral occipital pain 1 to 2 times a week and mild throbbing pain right scalp 3 to 4 times a week that lasts hours however often goes away with ibuprofen  The migraine is without aura and has associated nausea sometimes vomiting, stiff and sore neck, problems with concentration, , photophobia, phonophobia, osmophobia, sometimes nasal congestion, lightheadedness  Migraines worse with movement  She has never been on headache preventive medicine  Abortive is 200 mg ibuprofen 3 to 4 times a week which typically helps, however there are more severe headaches for which she has had to come in and get an injection at clinic  Neurologic assessment reveals normal neurological exam   She reports normal head imaging in the past     We discussed headache hygiene and lifestyle interventions that may improve her headaches including increased hydration, higher sleep quantity, continuing to increase her physical activity  For the occipital headache which has more of a neck tension component, she has never had physical therapy for this therefore this was ordered  We discussed trial of rizatriptan to abort her most severe headaches rather than having to coming to clinic or ED    We discussed other alternatives that we could try as well including other triptans, steroids, indomethacin, Toradol etc   Discussed supplements that may improve her headaches including melatonin which could help with her sleep and headaches  Chronic migraine without aura without status migrainosus, not intractable  Cervicalgia  -     Ambulatory referral to Physical Therapy; Future     Headache/migraine treatment:   Abortive medications (for immediate treatment of a headache): It is ok to take ibuprofen, acetaminophen or naproxen (Advil, Tylenol,  Aleve, Excedrin) if they help your headaches you should limit these to No more than 3 times a week to avoid medication overuse/rebound headaches  For more severe headaches take 600 mg ibuprofen    -     rizatriptan (MAXALT-MLT) 10 MG disintegrating tablet; Take 1 tablet (10 mg total) by mouth once as needed for migraine for up to 1 dose May repeat in 2 hours if needed    Over the counter preventive supplements for headaches/migraines   (to take every day to help prevent headaches - not to take at the time of headache):  [x] Magnesium 500mg daily (If any diarrhea or upset stomach, decrease dose  as tolerated)  [x] Riboflavin (Vitamin B2) 200 mg (kids) to 400mg daily (adults)   (FYI B2 may make your urine bright/neon yellow)     Sleep/headache prevention:   [x] Melatonin -  take 3 mg every night for sleep  You should take this 1 hour prior to bedtime consistently every night for it to work  It works by gradually helping to adjust your sleep time over days to weeks, rather than immediately making you feel sleepy  Lifestyle Recommendations:  [x] SLEEP - Maintain a regular sleep schedule: Adults need at least 7-8 hours of uninterrupted a night  Maintain good sleep hygiene:  Going to bed and waking up at consistent times, avoiding excessive daytime naps, avoiding caffeinated beverages in the evening, avoid excessive stimulation in the evening and generally using bed primarily for sleeping  One hour before bedtime would recommend turning lights down lower, decreasing your activity (may read quietly, listen to music at a low volume)   When you get into bed, should eliminate all technology (no texting, emailing, playing with your phone, iPad or tablet in bed)  [x] HYDRATION - Maintain good hydration  Drink  2L of fluid a day (4 typical small water bottles)  [x] DIET - Maintain good nutrition  In particular don't skip meals and try and eat healthy balanced meals regularly  [x] TRIGGERS - Look for other triggers and avoid them: Limit caffeine to 1-2 cups a day or less  Avoid dietary triggers that you have noticed bring on your headaches (this could include aged cheese, peanuts, MSG, aspartame and nitrates)  Education and Follow-up  [x] Please call with any questions or concerns  [x] Return in about 2 months (around 1/13/2019)  CC:   We had the pleasure of evaluating Chas Pappas in neurological consultation today  she is a 72 y o    right handed female who presents today for evaluation of headaches  History of Present Illness:       What is your current pain level - 4    Mild throbbing pain right scalp 3-4 times a week  - lasts hours, with advil goes away most of the time  Occasionally for sharp pain throughout head for a second or two - once a week  Also bilateral occipital pain 1-2 times a week     Headaches started at what age? Migraines Since childhood, didn't have them when pregnant x 2  How often do the headaches occur? 3-4 times a week   What time of the day do the headaches start? no particular time of day  How long do the headaches last? 2-3 days in teenage years - now 1 full day and into the morning  Are you ever headache free? Yes     Prodrome of feeling like she is getting a headache  Aura? without aura    Describe your usual headache pain quality? throbbing  Where is your headache located? bilateral frontal area and bilateral occipital area, also right temporal   Does the pain Radiate? no radiation of pain  What is the intensity of pain?  Up to a 10/10, at its best a 4  Associated symptoms:   [x] Nausea       [x] Vomiting - once in a while        [] Diarrhea         [] Insomnia           [x] Stiff or sore neck         [x] Problems with concentration  [x] Photophobia     [x]Phonophobia      [x] Osmophobia  [] Blurred vision   [x] Prefer quiet, dark room        [x] Light-headed or dizzy - sometimes   [] Tinnitus     [] Red ear      [] Ptosis      [] Facial droop  [] Lacrimation  [x] Nasal congestion/rhinorrhea - a couple times a month   [] Flushing of face         [] Change in pupil size  [] Hands or feet tingle or feel numb/paresthesias      Things that make the headache worse? +movement    Headache triggers:  Stress, mint,strong perfume, tobacco or fireplaces, If she goes to bed with a headache will wake up with worse one     What time of the year do headaches occur more frequently?   do not seem to be related to any time of the year    Have you seen someone else for headaches or pain? Yes, neurologist back in [place]  Have you had trigger point injection performed and how often? No  Have you had Botox injection performed and how often? No   Have you had epidural injections or transforaminal injections performed? No  Have you used CBD or THC for your headaches and how often? No  Are you current pregnant or planning on getting pregnant? No  Have you ever had any Brain imaging? yes years ago and normal    What medications do you take or have you taken for your headaches? ABORTIVE:    - advil 200 mg 3-4 times a week  - my pillow seems to be helping the bioccipital     PREVENTIVE: no    Alternative therapies used in the past for headaches? Physical therapy -  For wrist, but not headache     Coffee, has one in the morning and one at night - both regular, gets a severe headache with decaf    LIFESTYLE  Sleep - 5 hours    Is your sleep restful? No, tired  What time do you go to bed at night? 10   What time do you wake up in am? Has coffee at 4:30 am with  and goes back to sleep, otherwise wakes up at 7 am  How often do you get up at night? once  Do you wake up with headaches? Sometimes   Do you snore while asleep? No  Have you been told that you stop breathing during sleeping? No - but makes noises  Do you wake up tired in the morning? Yes  Do you take frequent naps during the day? sometimes  Do you have jaw pain? No, but has TMJ  Do you grind/clench your teeth at night? No  Do you have restless leg syndrome? No    Physical activity:   Joined the local [employer]  Goes once a week  Walks with her daughter  - considering line dancing or something slower at the [facility] [employer]: not enough - 2-3 glasses     Diet:  Do you ever skip meals? Eats well    Mood: not really, good mood   History of anxiety, sometimes takes xanax at night 0 25 mg     The following portions of the patient's history were reviewed and updated as appropriate: allergies, current medications, past family history, past medical history, past social history, past surgical history and problem list     Past Medical History:     Past Medical History:   Diagnosis Date    Pericarditis        Patient Active Problem List   Diagnosis    Closed fracture distal radius and ulna, right, initial encounter    Anxiety    Gastroesophageal reflux disease with esophagitis    History of pericarditis    Heart murmur    Primary hyperparathyroidism ([address] )    Thyroid nodule    Vitamin D deficiency       Medications:      Current Outpatient Prescriptions   Medication Sig Dispense Refill    ALPRAZolam (XANAX) 0 5 mg tablet       ascorbic acid (VITAMIN C) 500 mg tablet Take 500 mg by mouth daily      b complex vitamins tablet Take 1 tablet by mouth daily      Cholecalciferol (VITAMIN D3) 1000 units CAPS Daily      Multiple Vitamins-Calcium (ONE-A-DAY WOMENS PO) Take 1 tablet by mouth daily      Omega-3 Fatty Acids (FISH OIL) 1200 MG CAPS Take 1 capsule by mouth daily      ranitidine (ZANTAC) 1000 MG/40ML SOLN 75 mg daily       No current facility-administered medications for this visit           Allergies:    No Known Allergies    Family History: Family history of headaches:  migraine headaches in mother, aunt and cousins  Any family history of aneurysms - No    Social History:   Work: retired, 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   Daughter, granddaughter live near by  Other two grandkids in [place]     Illicit Drugs: denies  Alcohol/tobacco: Denies tobacco use, alcohol intake: social drinker      Objective:   Physical Exam:                                                                 Vitals:            Constitutional:    /88 (BP Location: Right arm, Patient Position: Sitting, Cuff Size: Standard)   Pulse 82   Wt 81 6 kg (180 lb)   BMI 26 58 kg/m²   BP Readings from Last 3 Encounters:   11/13/18 128/88   08/16/18 130/81   06/12/18 130/80     Pulse Readings from Last 3 Encounters:   11/13/18 82   08/16/18 79   06/12/18 81         Well developed, well nourished, well groomed  No dysmorphic features  HEENT:  Normocephalic atraumatic  No meningismus  Oropharynx is clear and moist  No oral mucosal lesions  Chest:  Respirations regular and unlabored  Cardiovascular:  Regular rate, intact distal pulses  Distal extremities warm without palpable edema or tenderness, no observed significant swelling  Musculoskeletal:  Full range of motion  (see below under neurologic exam for evaluation of motor function and gait)   Skin:  warm and dry, not diaphoretic  No apparent birthmarks or stigmata of neurocutaneous disease  Psychiatric:  Normal behavior and appropriate affect        Neurological Examination:     Mental status/cognitive function:   Orientated to time, place and person  Recent and remote memory intact  Attention span and concentration as well as fund of knowledge are appropriate for age  Normal language and spontaneous speech  Cranial Nerves:  II-visual fields full  Fundi appear normal bilaterally  III, IV, VI-Pupils were equal, round, and reactive to light and accomodation   Extraocular movements were full and conjugate without nystagmus  conjugate gaze, normal smooth pursuits, normal saccades   V-facial sensation symmetric  VII-facial expression symmetric, intact forehead wrinkle, strong eye closure, symmetric smile    VIII-hearing grossly intact bilaterally   IX, X-palate elevation symmetric, no dysarthria  XI-shoulder shrug strength intact    XII-tongue protrusion midline  Motor Exam: symmetric bulk and tone throughout, no pronator drift  Power/strength 5/5 bilateral upper and lower extremities, no atrophy, fasciculations or abnormal movements noted  Sensory: grossly intact light touch in all extremities  Reflexes: brachioradialis 2+, biceps 2+, knee 2+, ankle 2+ bilaterally  No ankle clonus  Coordination: Finger nose finger intact bilaterally, no apparent dysmetria, ataxia or tremor noted  Gait: steady casual and tandem gait  Romberg Negative  Pertinent lab results: none available     Imaging: none pertinent         Review of Systems:   JO Personally reviewed  Review of Systems   Constitutional: Negative  HENT: Negative  Eyes: Negative  Respiratory: Negative  Cardiovascular: Negative  Gastrointestinal: Negative  Endocrine: Negative  Genitourinary: Negative  Musculoskeletal: Positive for neck pain and neck stiffness  Skin: Negative  Allergic/Immunologic: Negative  Neurological: Positive for dizziness and headaches (wakes up with headaches due to neck pain)  Hematological: Negative  Psychiatric/Behavioral: Positive for sleep disturbance (sleeping not well do to neck pain)  Negative for self-injury  I have spent 60 minutes with Patient  today in which greater than 50% of this time was spent in counseling/coordination of care regarding Prognosis, Risks and benefits of tx options, Intructions for management, Importance of tx compliance, Risk factor reductions and Impressions        Author:  Leeanna Malave MD 11/13/2018 1:50 PM

## 2018-11-13 NOTE — PATIENT INSTRUCTIONS
Chronic migraine without aura without status migrainosus, not intractable  Cervicalgia  -     Ambulatory referral to Physical Therapy; Future     Headache/migraine treatment:   Abortive medications (for immediate treatment of a headache): It is ok to take ibuprofen, acetaminophen or naproxen (Advil, Tylenol,  Aleve, Excedrin) if they help your headaches you should limit these to No more than 3 times a week to avoid medication overuse/rebound headaches  For more severe headaches take 600 mg ibuprofen    -     rizatriptan (MAXALT-MLT) 10 MG disintegrating tablet; Take 1 tablet (10 mg total) by mouth once as needed for migraine for up to 1 dose May repeat in 2 hours if needed    Over the counter preventive supplements for headaches/migraines   (to take every day to help prevent headaches - not to take at the time of headache):  [x] Magnesium 500mg daily (If any diarrhea or upset stomach, decrease dose  as tolerated)  [x] Riboflavin (Vitamin B2) 400mg daily (adults)   (FYI B2 may make your urine bright/neon yellow)     Sleep/headache prevention:   [x] Melatonin -  take 3 mg every night for sleep  You should take this 1 hour prior to bedtime consistently every night for it to work  It works by gradually helping to adjust your sleep time over days to weeks, rather than immediately making you feel sleepy  Lifestyle Recommendations:  [x] SLEEP - Maintain a regular sleep schedule: Adults need at least 7-8 hours of uninterrupted a night  Maintain good sleep hygiene:  Going to bed and waking up at consistent times, avoiding excessive daytime naps, avoiding caffeinated beverages in the evening, avoid excessive stimulation in the evening and generally using bed primarily for sleeping  One hour before bedtime would recommend turning lights down lower, decreasing your activity (may read quietly, listen to music at a low volume)   When you get into bed, should eliminate all technology (no texting, emailing, playing with your phone, iPad or tablet in bed)  [x] HYDRATION - Maintain good hydration  Drink  2L of fluid a day (4 typical small water bottles)  [x] DIET - Maintain good nutrition  In particular don't skip meals and try and eat healthy balanced meals regularly  [x] TRIGGERS - Look for other triggers and avoid them: Limit caffeine to 1-2 cups a day or less  Avoid dietary triggers that you have noticed bring on your headaches (this could include aged cheese, peanuts, MSG, aspartame and nitrates)  Education and Follow-up  [x] Please call with any questions or concerns  [x] Return in about 2 months (around 1/13/2019)

## 2018-11-13 NOTE — PROGRESS NOTES
Patient ID: Kenisha Salcido is a 72 y o  female  Assessment/Plan:    No problem-specific Assessment & Plan notes found for this encounter  {Assess/PlanSmartLinks:61878}       Subjective:    HPI    {[facility] HPI texts:74876}    {Common ambulatory SmartLinks:16716}         Objective:    Blood pressure 128/88, pulse 82, weight 81 6 kg (180 lb)  Physical Exam    Neurological Exam      ROS:    Review of Systems   Constitutional: Negative  HENT: Negative  Eyes: Negative  Respiratory: Negative  Cardiovascular: Negative  Gastrointestinal: Negative  Endocrine: Negative  Genitourinary: Negative  Musculoskeletal: Positive for neck pain and neck stiffness  Skin: Negative  Allergic/Immunologic: Negative  Neurological: Positive for dizziness and headaches (wakes up with headaches due to neck pain)  Hematological: Negative  Psychiatric/Behavioral: Positive for sleep disturbance (sleeping not well do to neck pain)  Negative for self-injury

## 2018-12-12 ENCOUNTER — HOSPITAL ENCOUNTER (OUTPATIENT)
Dept: RADIOLOGY | Facility: HOSPITAL | Age: 73
Discharge: HOME | End: 2018-12-12
Payer: COMMERCIAL

## 2018-12-12 DIAGNOSIS — R10.9 ABDOMINAL PAIN, UNSPECIFIED ABDOMINAL LOCATION: ICD-10-CM

## 2018-12-12 PROCEDURE — 74177 CT ABD & PELVIS W/CONTRAST: CPT

## 2018-12-12 RX ADMIN — IOHEXOL 100 ML: 350 INJECTION, SOLUTION INTRAVENOUS at 14:23

## 2019-01-06 ENCOUNTER — HOSPITAL ENCOUNTER (OUTPATIENT)
Facility: HOSPITAL | Age: 74
Setting detail: OBSERVATION
Discharge: HOME | End: 2019-01-07
Attending: EMERGENCY MEDICINE | Admitting: INTERNAL MEDICINE
Payer: COMMERCIAL

## 2019-01-06 ENCOUNTER — APPOINTMENT (EMERGENCY)
Dept: RADIOLOGY | Facility: HOSPITAL | Age: 74
End: 2019-01-06
Payer: COMMERCIAL

## 2019-01-06 DIAGNOSIS — R00.2 PALPITATIONS: ICD-10-CM

## 2019-01-06 DIAGNOSIS — E78.5 HYPERLIPIDEMIA: ICD-10-CM

## 2019-01-06 DIAGNOSIS — R07.9 CHEST PAIN: Primary | ICD-10-CM

## 2019-01-06 LAB
ALBUMIN SERPL BCP-MCNC: 3.6 G/DL (ref 3.5–5)
ALP SERPL-CCNC: 112 U/L (ref 46–116)
ALT SERPL W P-5'-P-CCNC: 37 U/L (ref 12–78)
ANION GAP SERPL CALCULATED.3IONS-SCNC: 6 MMOL/L (ref 4–13)
AST SERPL W P-5'-P-CCNC: 21 U/L (ref 5–45)
BASOPHILS # BLD AUTO: 0.02 THOUSANDS/ΜL (ref 0–0.1)
BASOPHILS NFR BLD AUTO: 0 % (ref 0–1)
BILIRUB SERPL-MCNC: 0.54 MG/DL (ref 0.2–1)
BUN SERPL-MCNC: 37 MG/DL (ref 5–25)
CALCIUM SERPL-MCNC: 9.8 MG/DL (ref 8.3–10.1)
CHLORIDE SERPL-SCNC: 106 MMOL/L (ref 100–108)
CO2 SERPL-SCNC: 27 MMOL/L (ref 21–32)
CREAT SERPL-MCNC: 1.04 MG/DL (ref 0.6–1.3)
EOSINOPHIL # BLD AUTO: 0.17 THOUSAND/ΜL (ref 0–0.61)
EOSINOPHIL NFR BLD AUTO: 2 % (ref 0–6)
ERYTHROCYTE [DISTWIDTH] IN BLOOD BY AUTOMATED COUNT: 12.8 % (ref 11.6–15.1)
GFR SERPL CREATININE-BSD FRML MDRD: 54 ML/MIN/1.73SQ M
GLUCOSE SERPL-MCNC: 116 MG/DL (ref 65–140)
HCT VFR BLD AUTO: 43.4 % (ref 34.8–46.1)
HGB BLD-MCNC: 14.7 G/DL (ref 11.5–15.4)
IMM GRANULOCYTES # BLD AUTO: 0.02 THOUSAND/UL (ref 0–0.2)
IMM GRANULOCYTES NFR BLD AUTO: 0 % (ref 0–2)
LYMPHOCYTES # BLD AUTO: 1.84 THOUSANDS/ΜL (ref 0.6–4.47)
LYMPHOCYTES NFR BLD AUTO: 24 % (ref 14–44)
MCH RBC QN AUTO: 30.9 PG (ref 26.8–34.3)
MCHC RBC AUTO-ENTMCNC: 33.9 G/DL (ref 31.4–37.4)
MCV RBC AUTO: 91 FL (ref 82–98)
MONOCYTES # BLD AUTO: 0.68 THOUSAND/ΜL (ref 0.17–1.22)
MONOCYTES NFR BLD AUTO: 9 % (ref 4–12)
NEUTROPHILS # BLD AUTO: 4.81 THOUSANDS/ΜL (ref 1.85–7.62)
NEUTS SEG NFR BLD AUTO: 65 % (ref 43–75)
NRBC BLD AUTO-RTO: 0 /100 WBCS
PLATELET # BLD AUTO: 209 THOUSANDS/UL (ref 149–390)
PMV BLD AUTO: 10.2 FL (ref 8.9–12.7)
POTASSIUM SERPL-SCNC: 4 MMOL/L (ref 3.5–5.3)
PROT SERPL-MCNC: 7.4 G/DL (ref 6.4–8.2)
RBC # BLD AUTO: 4.76 MILLION/UL (ref 3.81–5.12)
SODIUM SERPL-SCNC: 139 MMOL/L (ref 136–145)
TROPONIN I SERPL-MCNC: <0.02 NG/ML
WBC # BLD AUTO: 7.54 THOUSAND/UL (ref 4.31–10.16)

## 2019-01-06 PROCEDURE — 93005 ELECTROCARDIOGRAM TRACING: CPT

## 2019-01-06 PROCEDURE — 99285 EMERGENCY DEPT VISIT HI MDM: CPT

## 2019-01-06 PROCEDURE — 71046 X-RAY EXAM CHEST 2 VIEWS: CPT

## 2019-01-06 PROCEDURE — 84484 ASSAY OF TROPONIN QUANT: CPT | Performed by: INTERNAL MEDICINE

## 2019-01-06 PROCEDURE — 84484 ASSAY OF TROPONIN QUANT: CPT | Performed by: EMERGENCY MEDICINE

## 2019-01-06 PROCEDURE — 36415 COLL VENOUS BLD VENIPUNCTURE: CPT | Performed by: INTERNAL MEDICINE

## 2019-01-06 PROCEDURE — 85025 COMPLETE CBC W/AUTO DIFF WBC: CPT | Performed by: EMERGENCY MEDICINE

## 2019-01-06 PROCEDURE — 36415 COLL VENOUS BLD VENIPUNCTURE: CPT

## 2019-01-06 PROCEDURE — 80053 COMPREHEN METABOLIC PANEL: CPT | Performed by: EMERGENCY MEDICINE

## 2019-01-06 RX ORDER — ACETAMINOPHEN 325 MG/1
650 TABLET ORAL EVERY 4 HOURS PRN
Status: DISCONTINUED | OUTPATIENT
Start: 2019-01-06 | End: 2019-01-07 | Stop reason: HOSPADM

## 2019-01-06 RX ORDER — ONDANSETRON 2 MG/ML
4 INJECTION INTRAMUSCULAR; INTRAVENOUS EVERY 6 HOURS PRN
Status: DISCONTINUED | OUTPATIENT
Start: 2019-01-06 | End: 2019-01-07 | Stop reason: HOSPADM

## 2019-01-06 RX ORDER — LORAZEPAM 0.5 MG/1
0.5 TABLET ORAL ONCE
Status: COMPLETED | OUTPATIENT
Start: 2019-01-06 | End: 2019-01-06

## 2019-01-06 RX ORDER — FAMOTIDINE 20 MG/1
20 TABLET, FILM COATED ORAL DAILY
Status: DISCONTINUED | OUTPATIENT
Start: 2019-01-06 | End: 2019-01-07 | Stop reason: HOSPADM

## 2019-01-06 RX ORDER — ASCORBIC ACID 500 MG
500 TABLET ORAL DAILY
Status: DISCONTINUED | OUTPATIENT
Start: 2019-01-07 | End: 2019-01-07 | Stop reason: HOSPADM

## 2019-01-06 RX ORDER — ASPIRIN 81 MG/1
324 TABLET, CHEWABLE ORAL ONCE
Status: COMPLETED | OUTPATIENT
Start: 2019-01-06 | End: 2019-01-06

## 2019-01-06 RX ORDER — NITROGLYCERIN 0.4 MG/1
0.4 TABLET SUBLINGUAL ONCE
Status: DISCONTINUED | OUTPATIENT
Start: 2019-01-06 | End: 2019-01-06

## 2019-01-06 RX ORDER — NITROGLYCERIN 0.4 MG/1
0.4 TABLET SUBLINGUAL
Status: DISCONTINUED | OUTPATIENT
Start: 2019-01-06 | End: 2019-01-07 | Stop reason: HOSPADM

## 2019-01-06 RX ORDER — OMEGA-3S/DHA/EPA/FISH OIL/D3 300MG-1000
400 CAPSULE ORAL DAILY
Status: DISCONTINUED | OUTPATIENT
Start: 2019-01-07 | End: 2019-01-07 | Stop reason: HOSPADM

## 2019-01-06 RX ORDER — ALPRAZOLAM 0.25 MG/1
0.25 TABLET ORAL
Status: DISCONTINUED | OUTPATIENT
Start: 2019-01-06 | End: 2019-01-07 | Stop reason: HOSPADM

## 2019-01-06 RX ADMIN — ASPIRIN 81 MG 324 MG: 81 TABLET ORAL at 17:42

## 2019-01-06 RX ADMIN — LORAZEPAM 0.5 MG: 0.5 TABLET ORAL at 18:37

## 2019-01-06 NOTE — ED ATTENDING ATTESTATION
Meron Boles MD, saw and evaluated the patient  I have discussed the patient with the resident/non-physician practitioner and agree with the resident's/non-physician practitioner's findings, Plan of Care, and MDM as documented in the resident's/non-physician practitioner's note, except where noted  All available labs and Radiology studies were reviewed  At this point I agree with the current assessment done in the Emergency Department  I have conducted an independent evaluation of this patient a history and physical is as follows: The patient presents for evaluation of chest discomfort for approximately week she has had intermittent chest tightness nonradiating today's chest discomfort has been relatively steady since approximately 11:00 a m  She has had no nausea or vomiting    She has had no fever or chills, no cough no leg pain or leg swelling no history DVT or pulmonary embolism no history of coronary artery disease, past medical history includes pericarditis 10 years ago that necessitated a pericardial window according the patient she thinks she was told was a viral infection  EXAM:   Const:   well appearing   NAD     HEENT:  NCAT    sclera anicteric conjunctiva pink   throat clear, MMM    Neck:   supple  no meningismus  no jvd   no bruits  no  midline tenderness   Lungs:   clear  CW non-tender   No creiptation  Heart:   RRR no m/g/r  Normal pulses  Abd:   soft nt nd pos bs   Ext:    normal nontender  No edema  Neruo:   CN 2 -12 intact  motor intact 5/5 sensory intact cerebellar intact       Gait normal    IMPRESSION:  Chest pain  PLAN:  EKG shows normal sinus rhythm with ST depression V5 V6 and T-wave inversions in 3 and AVF no old for comparison        Critical Care Time  CritCare Time    Procedures

## 2019-01-06 NOTE — H&P
INTERNAL MEDICINE HISTORY AND PHYSICAL  ED 10 SOD Team B     NAME: Lakshmi Severino  AGE: 72 y o  SEX: female  : 1945   MRN: [id number]  ENCOUNTER: [id number]    DATE: 2019  TIME: 6:53 PM    Primary Care Physician: Abdullahi Jara  Admitting Provider: Lizy Adler MD    Chief complaint:  Chest discomfort    History of Present Illness     Lakshmi Severino is a 72 y o  female with past medical history significant for GERD, vitamin-D deficiency, pericarditis 10 years ago s/p window, and anxiety, presenting with chest pain  The patient developed mid-sternal chest pain for the past week  They are not assocaited with eating or exercise, although she has stopped any significant exercising this week since the pain started  Prior to this, the patient was able to walk through the mall and on a track at the [facility] without any chest discomfort  She describes it as a heaviness in the chest, associated with shortness of breath  She has had associated palpitations  She had multiple episodes of this discomfort over the past week, which would resolve in 1-2 hours after taking four baby aspirin  She had another episode of this chest heaviness while taking down Claudia bulbs this morning after eating breakfast  Today, she also had associated nausea and vomiting  She took aspirin again today which did not help, and the patient decided to come to the emergency department  The patient recently saw a cardiology, Dr Salvador Sicard, in [place] several months ago who she follows after her pericarditis with no known additional diagnoses at the time  She has not had a heart attack in the past   The patient previously followed with a primary care doctor, Dr Albert Hendrickson, in [place], but moved to the [facility] last year  She does not have a follow-up appointment with them and plans on following up with a local doctor in the future  Patient does not drink alcohol, smoke cigarettes, or use any recreational drugs  The patient states that she is under significant stress as her daughter was recently diagnosed with Yosef-Parkinson-White, and the patient gets anxiety episodes for which she intermittently takes Xanax but no panic attacks  Emergency department EKG was low quality but showed normal sinus rhythm  Has not been read by Cardiology but possible ST depression in V5 and V6 with T-wave inversions in leads III and AVF  She came into the ED with continued pain  It resolved after aspirin in the ED  She did not want nitroglycerin as she was concerned about headaches  Initial troponin was negative  Review of Systems   Review of Systems   Constitutional: Negative  HENT: Negative  Eyes: Negative  Respiratory: Positive for chest tightness and shortness of breath  Cardiovascular: Positive for chest pain (heaviness more than pain)  Gastrointestinal: Negative  Endocrine: Negative  Genitourinary: Negative  Musculoskeletal: Negative  Skin: Negative  Allergic/Immunologic: Negative  Neurological: Negative  Hematological: Negative  Psychiatric/Behavioral: Negative  Past Medical History     Past Medical History:   Diagnosis Date    Pericarditis        Past Surgical History     Past Surgical History:   Procedure Laterality Date    KNEE SURGERY Left     PERICARDIAL WINDOW      CO OPEN RX DISTAL RADIUS FX, EXTRA-ARTICULAR Right 3/22/2018    Procedure: OPEN REDUCTION W/ INTERNAL FIXATION (ORIF) RADIUS, splint application;  Surgeon: Donna Cross MD;  Location: [facility];  Service: Orthopedics       Social History     History   Alcohol Use No     History   Drug Use No     History   Smoking Status    Never Smoker   Smokeless Tobacco    Never Used       Family History     Family History   Problem Relation Age of Onset    No Known Problems Mother     No Known Problems Father        Medications Prior to Admission     Prior to Admission medications    Medication Sig Start Date End Date Taking? Authorizing Provider   Jose Armando Vargas) 0 5 mg tablet  8/14/17  Yes Historical Provider, MD   ascorbic acid (VITAMIN C) 500 mg tablet Take 500 mg by mouth daily   Yes Historical Provider, MD   b complex vitamins tablet Take 1 tablet by mouth daily   Yes Historical Provider, MD   Cholecalciferol (VITAMIN D3) 1000 units CAPS Daily   Yes Historical Provider, MD   Multiple Vitamins-Calcium (ONE-A-DAY WOMENS PO) Take 1 tablet by mouth daily   Yes Historical Provider, MD   Omega-3 Fatty Acids (FISH OIL) 1200 MG CAPS Take 1 capsule by mouth daily   Yes Historical Provider, MD   ranitidine (ZANTAC) 1000 MG/40ML SOLN 75 mg daily   Yes Historical Provider, MD   rizatriptan (MAXALT-MLT) 10 MG disintegrating tablet Take 1 tablet (10 mg total) by mouth once as needed for migraine for up to 1 dose May repeat in 2 hours if needed  Patient not taking: Reported on 1/6/2019 11/13/18   Misa Selby MD       Allergies   No Known Allergies    Objective     Vitals:    01/06/19 1652 01/06/19 1730 01/06/19 1800 01/06/19 1830   BP: 158/74 155/80 165/84 161/79   BP Location: Left arm Right arm Right arm Right arm   Pulse: 87 80 68 66   Resp: 18 20 18 17   Temp: 97 8 °F (36 6 °C)      TempSrc: Tympanic      SpO2: 97% 95% 97% 95%   Weight: 81 6 kg (179 lb 14 3 oz)      Height: 5' 9" (1 753 m)        Body mass index is 26 57 kg/m²  No intake or output data in the 24 hours ending 01/06/19 1853  Invasive Devices     Peripheral Intravenous Line            Peripheral IV 01/06/19 Right Antecubital less than 1 day                Physical Exam  Physical Exam   Constitutional: She is oriented to person, place, and time  She appears well-developed  HENT:   Head: Normocephalic  Eyes: Conjunctivae are normal    Neck: Neck supple  Cardiovascular: Normal rate and regular rhythm  Systolic murmur   Pulmonary/Chest: Effort normal and breath sounds normal    Abdominal: Soft  Bowel sounds are normal    Musculoskeletal: She exhibits no edema  Mid sternal tenderness   Neurological: She is alert and oriented to person, place, and time  Skin: Skin is warm and dry  Psychiatric: She has a normal mood and affect  Lab Results: I have personally reviewed pertinent reports  CBC:   Results from last 7 days  Lab Units 01/06/19  1700   WBC Thousand/uL 7 54   RBC Million/uL 4 76   HEMOGLOBIN g/dL 14 7   HEMATOCRIT % 43 4   MCV fL 91   MCH pg 30 9   MCHC g/dL 33 9   RDW % 12 8   MPV fL 10 2   PLATELETS Thousands/uL 209   NRBC AUTO /100 WBCs 0   NEUTROS PCT % 65   LYMPHS PCT % 24   MONOS PCT % 9   EOS PCT % 2   BASOS PCT % 0   NEUTROS ABS Thousands/µL 4 81   LYMPHS ABS Thousands/µL 1 84   MONOS ABS Thousand/µL 0 68   EOS ABS Thousand/µL 0 17       Imaging: I have personally reviewed pertinent films in PACS  Ct Abdomen Pelvis W Contrast    Result Date: 12/18/2018  Narrative: CT ABDOMEN AND PELVIS WITH IV CONTRAST INDICATION:   R10 9: Unspecified abdominal pain  53-year-old woman presenting with right upper quadrant abdominal pain  COMPARISON:  Abdominal ultrasound 10/18/2018  No prior abdominal CT is available TECHNIQUE:  CT examination of the abdomen arterial phase followed by abdomen and pelvis in portal venous phase was performed  Axial, sagittal, and coronal 2D reformatted images were created from the source data and submitted for interpretation  Radiation dose length product (DLP) for this visit:  757 98 mGy-cm   This examination, like all CT scans performed in the [facility], was performed utilizing techniques to minimize radiation dose exposure, including the use of iterative  reconstruction and automated exposure control  IV Contrast:  100 mL of iohexol (OMNIPAQUE) Enteric Contrast:  Enteric contrast was administered  FINDINGS: ABDOMEN LOWER CHEST:  Atelectatic changes are noted at the lung bases  LIVER/BILIARY TREE:  No focal liver lesion    Mild enlargement of the extra hepatic common bile duct and intrahepatic common bile ducts are as within the range of normal status post cholecystectomy  Mild hepatomegaly measuring 19 4 cm craniocaudal  GALLBLADDER:  No calcified gallstones  No pericholecystic inflammatory change  SPLEEN:  Unremarkable  PANCREAS:  Unremarkable  ADRENAL GLANDS:  Unremarkable  KIDNEYS/URETERS:  One or more sharply circumscribed subcentimeter renal hypodensities are noted  These lesions are too small to accurately characterize, but are statistically most likely to represent benign cortical renal cyst(s)  According to the guidelines published in the [facility] Paper of the ACR Incidental Findings Committee (Radiology 2010), no further workup of these lesions is recommended  STOMACH AND BOWEL:  There is colonic diverticulosis without evidence of acute diverticulitis  APPENDIX:  No findings to suggest appendicitis  ABDOMINOPELVIC CAVITY:  No ascites or free intraperitoneal air  No lymphadenopathy  VESSELS:  Unremarkable for patient's age  PELVIS REPRODUCTIVE ORGANS:  Unremarkable for patient's age  URINARY BLADDER:  Unremarkable  ABDOMINAL WALL/INGUINAL REGIONS:  Unremarkable  OSSEOUS STRUCTURES:  There is a sclerotic lesion within the right aspect of the L2 vertebral body and right pedicle of uncertain etiology and significance  However, it is noted that prior lumbar spine MRI 5/26/2015 performed at [address] [place] [address] described  a sclerotic lesion in L2 and patient had also undergone previous nuclear medicine bone scan 8/27/2014 for the same finding without radiotracer uptake  Impression: 1  No acute findings in the abdomen or pelvis  No findings to explain abdominal pain  2   Sclerotic lesion within the right aspect of the L2 vertebral body as described above is of uncertain etiology and significance  However given that this lesion has been worked up previously with MRI and bone scan, it is likely benign  Because outside images are not available for review, the stability of this finding cannot be determined  Workstation performed: [id number]       Microbiology: cultures obtained in emergency department include none    Urinalysis:       Invalid input(s): URIBILINOGEN     Urine Micro:        EKG, Pathology, and Other Studies: I have personally reviewed pertinent reports  Medications given in Emergency Department     Medication Administration - last 24 hours from 01/05/2019 1853 to 01/06/2019 1853       Date/Time Order Dose Route Action Action by     01/06/2019 1742 aspirin chewable tablet 324 mg 324 mg Oral Given Brayden Ireland RN     01/06/2019 1807 nitroglycerin (NITROSTAT) SL tablet 0 4 mg 0 4 mg Sublingual Not Given Brayden Ireland RN     01/06/2019 1837 LORazepam (ATIVAN) tablet 0 5 mg 0 5 mg Oral Given Brayden Ireland RN          Assessment and Plan     Problem List     Closed fracture distal radius and ulna, right, initial encounter    Overview Signed 3/20/2018 11:48 AM by Juana Clancy MD     Added automatically from request for surgery [id number]         Anxiety    Gastroesophageal reflux disease with esophagitis    History of pericarditis    Heart murmur    Primary hyperparathyroidism ([facility] [address] )    Thyroid nodule    Vitamin D deficiency        1  Chest discomfort  -intermittent chest pressure over the past week, relieved by aspirin but not worse with exertion  Differential includes ACS, GERD, anxiety, musculoskeletal    Unlikely related to her previous pericarditis  Will perform ACS rule out  -EKG not yet officially read, with artifact, showing possible ST depression V5 and V6 and T-wave inversions in inferior leads   -initial troponin negative  Trend 2 more troponins with EKGs after each  -continue telemetry at least overnight  -patient is concerned about taking nitroglycerin as she fears that will give her headaches but is ordered p r n  If she wants it  -follow-up lipid panel, A1c    2   Thyroid nodule  -Last US less than 1 year ago  -[facility]s [facility]  -follow-up TSH    3  Vitamin D deficiency   -Continue home vitamin D    4  GERD  -patient takes ranitidine at home  -replaced with famotidine while inpatient    5  Anxiety  -p r n  Xanax 0 25 mg at bedtime      6  High blood pressure  -patient presents with systolic blood pressure in the 160s  She has no known history of hypertension and states that she has been told by her primary care doctor in the past that she has white coat hypertension  -continue monitor, consider treatment    7  Systolic murmur  -patient has a mild systolic ejection murmur on exam  -echo in 2016 showing an EF of 60-65% with some aortic leaflet thickening but no hemodynamically significant valvular abnormalities  -unlikely related to her current presentation  -follow-up outpatient    Code Status: Level 1 - Full Code  VTE Pharmacologic Prophylaxis: Enoxaparin (Lovenox)   VTE Mechanical Prophylaxis: sequential compression device  Admission Status: OBSERVATION    Admission Time  I spent 30 minutes admitting the patient  This involved direct patient contact where I performed a full history and physical, reviewing previous records, and reviewing laboratory and other diagnostic studies      Nova Helton MD  Internal Medicine  PGY-1

## 2019-01-07 VITALS
DIASTOLIC BLOOD PRESSURE: 81 MMHG | BODY MASS INDEX: 26.22 KG/M2 | SYSTOLIC BLOOD PRESSURE: 145 MMHG | WEIGHT: 177.03 LBS | TEMPERATURE: 98 F | HEART RATE: 76 BPM | OXYGEN SATURATION: 98 % | HEIGHT: 69 IN | RESPIRATION RATE: 18 BRPM

## 2019-01-07 LAB
ALBUMIN SERPL BCP-MCNC: 3.2 G/DL (ref 3.5–5)
ALP SERPL-CCNC: 97 U/L (ref 46–116)
ALT SERPL W P-5'-P-CCNC: 30 U/L (ref 12–78)
ANION GAP SERPL CALCULATED.3IONS-SCNC: 8 MMOL/L (ref 4–13)
AST SERPL W P-5'-P-CCNC: 18 U/L (ref 5–45)
ATRIAL RATE: 64 BPM
ATRIAL RATE: 80 BPM
BASOPHILS # BLD AUTO: 0.02 THOUSANDS/ΜL (ref 0–0.1)
BASOPHILS NFR BLD AUTO: 0 % (ref 0–1)
BILIRUB SERPL-MCNC: 0.93 MG/DL (ref 0.2–1)
BUN SERPL-MCNC: 30 MG/DL (ref 5–25)
CALCIUM SERPL-MCNC: 9.7 MG/DL (ref 8.3–10.1)
CHLORIDE SERPL-SCNC: 108 MMOL/L (ref 100–108)
CHOLEST SERPL-MCNC: 204 MG/DL (ref 50–200)
CO2 SERPL-SCNC: 25 MMOL/L (ref 21–32)
CREAT SERPL-MCNC: 0.76 MG/DL (ref 0.6–1.3)
EOSINOPHIL # BLD AUTO: 0.18 THOUSAND/ΜL (ref 0–0.61)
EOSINOPHIL NFR BLD AUTO: 4 % (ref 0–6)
ERYTHROCYTE [DISTWIDTH] IN BLOOD BY AUTOMATED COUNT: 12.8 % (ref 11.6–15.1)
EST. AVERAGE GLUCOSE BLD GHB EST-MCNC: 114 MG/DL
GFR SERPL CREATININE-BSD FRML MDRD: 79 ML/MIN/1.73SQ M
GLUCOSE P FAST SERPL-MCNC: 87 MG/DL (ref 65–99)
GLUCOSE SERPL-MCNC: 87 MG/DL (ref 65–140)
HBA1C MFR BLD: 5.6 % (ref 4.2–6.3)
HCT VFR BLD AUTO: 42.8 % (ref 34.8–46.1)
HDLC SERPL-MCNC: 62 MG/DL (ref 40–60)
HGB BLD-MCNC: 14.1 G/DL (ref 11.5–15.4)
IMM GRANULOCYTES # BLD AUTO: 0.01 THOUSAND/UL (ref 0–0.2)
IMM GRANULOCYTES NFR BLD AUTO: 0 % (ref 0–2)
LDLC SERPL CALC-MCNC: 124 MG/DL (ref 0–100)
LYMPHOCYTES # BLD AUTO: 1.64 THOUSANDS/ΜL (ref 0.6–4.47)
LYMPHOCYTES NFR BLD AUTO: 33 % (ref 14–44)
MCH RBC QN AUTO: 30.1 PG (ref 26.8–34.3)
MCHC RBC AUTO-ENTMCNC: 32.9 G/DL (ref 31.4–37.4)
MCV RBC AUTO: 92 FL (ref 82–98)
MONOCYTES # BLD AUTO: 0.7 THOUSAND/ΜL (ref 0.17–1.22)
MONOCYTES NFR BLD AUTO: 14 % (ref 4–12)
NEUTROPHILS # BLD AUTO: 2.43 THOUSANDS/ΜL (ref 1.85–7.62)
NEUTS SEG NFR BLD AUTO: 49 % (ref 43–75)
NONHDLC SERPL-MCNC: 142 MG/DL
NRBC BLD AUTO-RTO: 0 /100 WBCS
P AXIS: 53 DEGREES
P AXIS: 66 DEGREES
PLATELET # BLD AUTO: 183 THOUSANDS/UL (ref 149–390)
PMV BLD AUTO: 10.1 FL (ref 8.9–12.7)
POTASSIUM SERPL-SCNC: 3.8 MMOL/L (ref 3.5–5.3)
PR INTERVAL: 170 MS
PR INTERVAL: 176 MS
PROT SERPL-MCNC: 6.9 G/DL (ref 6.4–8.2)
QRS AXIS: -30 DEGREES
QRS AXIS: 85 DEGREES
QRSD INTERVAL: 104 MS
QRSD INTERVAL: 94 MS
QT INTERVAL: 394 MS
QT INTERVAL: 398 MS
QTC INTERVAL: 406 MS
QTC INTERVAL: 459 MS
RBC # BLD AUTO: 4.68 MILLION/UL (ref 3.81–5.12)
SODIUM SERPL-SCNC: 141 MMOL/L (ref 136–145)
T WAVE AXIS: 16 DEGREES
T WAVE AXIS: 34 DEGREES
TRIGL SERPL-MCNC: 90 MG/DL
TSH SERPL DL<=0.05 MIU/L-ACNC: 0.78 UIU/ML (ref 0.36–3.74)
VENTRICULAR RATE: 64 BPM
VENTRICULAR RATE: 80 BPM
WBC # BLD AUTO: 4.98 THOUSAND/UL (ref 4.31–10.16)

## 2019-01-07 PROCEDURE — 80053 COMPREHEN METABOLIC PANEL: CPT | Performed by: INTERNAL MEDICINE

## 2019-01-07 PROCEDURE — 83036 HEMOGLOBIN GLYCOSYLATED A1C: CPT | Performed by: INTERNAL MEDICINE

## 2019-01-07 PROCEDURE — 85025 COMPLETE CBC W/AUTO DIFF WBC: CPT | Performed by: INTERNAL MEDICINE

## 2019-01-07 PROCEDURE — 80061 LIPID PANEL: CPT | Performed by: INTERNAL MEDICINE

## 2019-01-07 PROCEDURE — 93010 ELECTROCARDIOGRAM REPORT: CPT | Performed by: INTERNAL MEDICINE

## 2019-01-07 PROCEDURE — 84443 ASSAY THYROID STIM HORMONE: CPT | Performed by: INTERNAL MEDICINE

## 2019-01-07 RX ORDER — ATORVASTATIN CALCIUM 40 MG/1
40 TABLET, FILM COATED ORAL DAILY
Qty: 30 TABLET | Refills: 0 | Status: SHIPPED | OUTPATIENT
Start: 2019-01-07 | End: 2019-07-22 | Stop reason: ALTCHOICE

## 2019-01-07 RX ORDER — ASPIRIN 81 MG/1
81 TABLET, CHEWABLE ORAL DAILY
Qty: 30 TABLET | Refills: 0 | Status: SHIPPED | OUTPATIENT
Start: 2019-01-07 | End: 2022-07-26

## 2019-01-07 RX ADMIN — FAMOTIDINE 20 MG: 20 TABLET ORAL at 08:40

## 2019-01-07 RX ADMIN — ENOXAPARIN SODIUM 40 MG: 40 INJECTION SUBCUTANEOUS at 08:40

## 2019-01-07 RX ADMIN — B-COMPLEX W/ C & FOLIC ACID TAB 1 TABLET: TAB at 08:40

## 2019-01-07 RX ADMIN — OXYCODONE HYDROCHLORIDE AND ACETAMINOPHEN 500 MG: 500 TABLET ORAL at 08:39

## 2019-01-07 RX ADMIN — CHOLECALCIFEROL TAB 10 MCG (400 UNIT) 400 UNITS: 10 TAB at 08:39

## 2019-01-07 NOTE — ED NOTES
Repeat ECG completed at 2002  Viewed and signed by Dr Loreta Ramon, copy on chart       Chris Root  01/06/19 2004

## 2019-01-07 NOTE — NURSING NOTE
Pt is saying she get a headache almost every morning that she takes oxy or percocet from a prior surgery and it is the only thing that takes away the pain  If it does not go away it gets to the point where she gets nauseated and vomits  I offered her tylenol and she is refusing it at this time because she states it will make her more nauseated  Lillian Lee with SOD and made him aware of patients request of oxy or percocet  At this time he will not be ordering anything further and said day team will be rounding shortly to see her

## 2019-01-07 NOTE — PLAN OF CARE
CARDIOVASCULAR - ADULT     Maintains optimal cardiac output and hemodynamic stability Progressing     Absence of cardiac dysrhythmias or at baseline rhythm Progressing        DISCHARGE PLANNING     Discharge to home or other facility with appropriate resources Progressing        Knowledge Deficit     Patient/family/caregiver demonstrates understanding of disease process, treatment plan, medications, and discharge instructions Progressing        Potential for Falls     Patient will remain free of falls Progressing        SAFETY ADULT     Maintain or return to baseline ADL function Progressing     Maintain or return mobility status to optimal level Progressing

## 2019-01-07 NOTE — DISCHARGE INSTRUCTIONS
9 mm nodular density overlies the left lower lung zone identified on chest xray  Please follow up with primary care for monitoring and for general follow up  Please also see your cardiologist as soon as possible for post-hospital follow-up  Please discuss getting a repeat echocardiogram as well as exercise stress test with your doctors  Chest Pain   WHAT YOU NEED TO KNOW:   Chest pain can be caused by a range of conditions, from not serious to life-threatening  Chest pain can be a symptom of a digestive problem, such as acid reflux or a stomach ulcer  An anxiety attack or a strong emotion, such as anger, can also cause chest pain  Infection, inflammation, or a fracture in the bones or cartilage in your chest can cause pain or discomfort  Sometimes chest pain or pressure is caused by poor blood flow to your heart (angina)  Chest pain may also be caused by life-threatening conditions such as a heart attack or blood clot in your lungs  DISCHARGE INSTRUCTIONS:   Call 911 if:   · You have any of the following signs of a heart attack:      ¨ Squeezing, pressure, or pain in your chest that lasts longer than 5 minutes or returns    ¨ Discomfort or pain in your back, neck, jaw, stomach, or arm     ¨ Trouble breathing    ¨ Nausea or vomiting    ¨ Lightheadedness or a sudden cold sweat, especially with chest pain or trouble breathing    Seek care immediately if:   · You have chest discomfort that gets worse, even with medicine  · You cough or vomit blood  · Your bowel movements are black or bloody  · You cannot stop vomiting, or it hurts to swallow  Contact your healthcare provider if:   · You have questions or concerns about your condition or care  Medicines:   · Medicines  may be given to treat the cause of your chest pain  Examples include pain medicine, anxiety medicine, or medicines to increase blood flow to your heart       · Do not take certain medicines without asking your healthcare provider first   These include NSAIDs, herbal or vitamin supplements, or hormones (estrogen or progestin)  · Take your medicine as directed  Contact your healthcare provider if you think your medicine is not helping or if you have side effects  Tell him or her if you are allergic to any medicine  Keep a list of the medicines, vitamins, and herbs you take  Include the amounts, and when and why you take them  Bring the list or the pill bottles to follow-up visits  Carry your medicine list with you in case of an emergency  Follow up with your healthcare provider within 72 hours, or as directed: You may need to return for more tests to find the cause of your chest pain  You may be referred to a specialist, such as a cardiologist or gastroenterologist  Write down your questions so you remember to ask them during your visits  Healthy living tips: The following are general healthy guidelines  If your chest pain is caused by a heart problem, your healthcare provider will give you specific guidelines to follow  · Do not smoke  Nicotine and other chemicals in cigarettes and cigars can cause lung and heart damage  Ask your healthcare provider for information if you currently smoke and need help to quit  E-cigarettes or smokeless tobacco still contain nicotine  Talk to your healthcare provider before you use these products  · Eat a variety of healthy, low-fat foods  Healthy foods include fruits, vegetables, whole-grain breads, low-fat dairy products, beans, lean meats, and fish  Ask for more information about a heart healthy diet  · Ask about activity  Your healthcare provider will tell you which activities to limit or avoid  Ask when you can drive, return to work, and have sex  Ask about the best exercise plan for you  · Maintain a healthy weight  Ask your healthcare provider how much you should weigh  Ask him or her to help you create a weight loss plan if you are overweight    © 2017 [employer] Information is for End User's use only and may not be sold, redistributed or otherwise used for commercial purposes  All illustrations and images included in CareNotes® are the copyrighted property of A D A M [employer] Inc  or [employer]  The above information is an  only  It is not intended as medical advice for individual conditions or treatments  Talk to your doctor, nurse or pharmacist before following any medical regimen to see if it is safe and effective for you

## 2019-01-07 NOTE — UTILIZATION REVIEW
Initial Clinical Review    Admission: Date/Time/Statement:  01/06/19 1912    Orders Placed This Encounter   Procedures    Place in Observation     Standing Status:   Standing     Number of Occurrences:   1     Order Specific Question:   Admitting Physician     Answer:   Teodora Waterman     Order Specific Question:   Level of Care     Answer:   Med Surg [16]         ED: Date/Time/Mode of Arrival:   ED Arrival Information     Expected Arrival Acuity Means of Arrival Escorted By Service Admission Type    - 1/6/2019 16:47 Emergent Walk-In Spouse General Medicine Emergency    Arrival Complaint    Dizziness; Chest Pain            Chief Complaint   Patient presents with    Chest Pain     Pt with chest pain and sob this am, took 324mg ASA prior to arrival, pain is sternal and radiates to right chest         History of Illness    72year old female presents to ed for evaluation of persistent chest heaviness with activity for  1 week  Shortness of breath and palpitations are associated symptoms  Symptoms resolve in 1-2 hours usually after taking  4 baby aspirin  Today she experienced the same symptoms when taking down niranjan decorations with new nausea and vomiting     She took aspirin prior to arrival                                  ED Triage Vitals [01/06/19 1652]   97 8 °F (36 6 °C) 87 18 158/74 97 %         Pain Score       4        Wt Readings from Last 1 Encounters:   01/07/19 80 3 kg (177 lb 0 5 oz)         Abnormal Labs/Diagnostic Test Results:              ED Treatment:   Medication Administration from 01/06/2019 1647 to 01/06/2019 2121       Date/Time Order Dose Route     01/06/2019 1742 aspirin chewable tablet 324 mg 324 mg Oral     01/06/2019 1837 LORazepam (ATIVAN) tablet 0 5 mg 0 5 mg Oral       Past Medical/Surgical History:     Diagnosis    Closed fracture distal radius and ulna, right, initial encounter    Anxiety    Gastroesophageal reflux disease with esophagitis    History of pericarditis    Heart murmur    Primary hyperparathyroidism (HCC)    Thyroid nodule    Vitamin D deficiency      Pericarditis       Admitting Diagnosis:     Palpitations [R00 2]  Dizziness [R42]  Chest pain [R07 9]    Age/Sex: 72 y o  female    Assessment/Plan:  Emergency department EKG was low quality but showed normal sinus rhythm  Has not been read by Cardiology but possible ST depression in V5 and V6 with T-wave inversions in leads III and AVF  She came into the ED with continued pain  It resolved after aspirin in the ED  She did not want nitroglycerin as she was concerned about headaches  Unlikely related to her previous pericarditis  Will perform ACS rule out     Trend troponin x2 and EKG with each   Nitroglycerine prn ordered ( patient has been refusing for fear of headache)    Cardiology evaluation pending   Monitor on telemetry      Admission Orders:    Telemetry  Sequential compression device  Ekg with troponin #2 and #3      acetaminophen 650 mg Oral Q4H PRN   ALPRAZolam 0 25 mg Oral HS PRN   ascorbic acid 500 mg Oral Daily   cholecalciferol 400 Units Oral Daily   enoxaparin 40 mg Subcutaneous Daily   famotidine 20 mg Oral Daily   multivitamin stress formula 1 tablet Oral Daily   nitroglycerin 0 4 mg Sublingual Q5 Min PRN   ondansetron 4 mg Intravenous Q6H PRN

## 2019-01-07 NOTE — PLAN OF CARE
Problem: DISCHARGE PLANNING - CARE MANAGEMENT  Goal: Discharge to post-acute care or home with appropriate resources  INTERVENTIONS:  - Conduct assessment to determine patient/family and health care team treatment goals, and need for post-acute services based on payer coverage, community resources, and patient preferences, and barriers to discharge  - Address psychosocial, clinical, and financial barriers to discharge as identified in assessment in conjunction with the patient/family and health care team  - Arrange appropriate level of post-acute services according to patient's   needs and preference and payer coverage in collaboration with the physician and health care team  - Communicate with and update the patient/family, physician, and health care team regarding progress on the discharge plan  - Arrange appropriate transportation to post-acute venues  - Plan for discharge to home    Outcome: Adequate for Discharge

## 2019-01-07 NOTE — PROGRESS NOTES
[facility] Senior Admission Note   Unit/Bed # @DBLINK (JACOBO,08576)@ Encounter: [id number]  SOD Team B           Murtazadimitri [place] 72 y o  female [id number]       Patient seen and examined   Reviewed H&P per Dr Ravi Peers with the assessment and plan    Assessment/Plan: Principal Problem:    Chest pain  Active Problems:    Anxiety    Gastroesophageal reflux disease with esophagitis    History of pericarditis    Vitamin D deficiency         Disposition:  OBSERVATION    Expected LOS: <2 Eliane Vegas MD

## 2019-01-07 NOTE — RESTORATIVE TECHNICIAN NOTE
Restorative Specialist Mobility Note       Activity: Ambulate in caceres, Ambulate in room, Bathroom privileges, Chair, Dangle, Stand at bedside (Educated/encouraged pt to ambulate with assistance 3-4 x's/day   Pt callbell, phone/tray within reach )     Assistive Device: None       Joni MELISSA, Restorative Technician, [employer] [facility]

## 2019-01-07 NOTE — DISCHARGE SUMMARY
[facility] Discharge Summary - Medical Marii Situ 72 y o  female MRN: [id number]    [address] [facility] 7 Room / Bed: [facility] 726/[facility] 726-01 Encounter: [id number]    BRIEF OVERVIEW    Admitting Provider: Ethan Mallory MD  Discharge Provider: Ethan Mallory MD  Primary Care Physician at Discharge: Previously saw Dr Jacinta Espinoza in [place], although patient is interested in following up with primary care in the [facility]  Information for [address] [place] [address]  Admission Date: 1/6/2019     Discharge Date: 01/07/19    Hospital Course  This is a 74-year-old female with a past medical history significant for GERD, vitamin-D deficiency, pericarditis 10 years ago status post window, and anxiety, who presented with chest discomfort  The patient had midsternal chest discomfort for the past week on and off, lasting about 1-2 hours and resolving with aspirin  It was not associated with exercise or diet  The patient was admitted for an ACS rule out  EKG showed less than 1 mm ST-depression in V5 and V6, and troponins were negative  The patient had no pain after coming to the emergency department  Acute coronary syndrome was considered unlikely, and the patient was discharged the morning after presenting plans to follow up with primary care in the area as well as her cardiologist   We are recommending outpatient stress test and echocardiogram as her last echocardiogram on file was in 2016  The patient's 10 year cardiovascular risk was calculated to be 13% and she was started on atorvastatin 40 mg on discharge  Chest x-ray showed a 9 mm nodular density in the left lower lung which will require outpatient monitoring  Date of discharge status:  On day of discharge, the patient was seen was in no acute distress  She had no chest pain or shortness of breath  No nausea vomiting or headache      Physical Exam   Constitutional: She is oriented to person, place, and time  She appears well-developed  HENT:   Head: Normocephalic  Eyes: No scleral icterus  Neck: Neck supple  Cardiovascular: Normal rate and regular rhythm  Pulmonary/Chest: Effort normal and breath sounds normal    Abdominal: Soft  Bowel sounds are normal    Musculoskeletal: She exhibits no edema  Neurological: She is alert and oriented to person, place, and time  Skin: Skin is warm and dry  Psychiatric: She has a normal mood and affect  Presenting Problem/History of Present Illness  Principal Problem:    Chest pain  Active Problems:    Anxiety    Gastroesophageal reflux disease with esophagitis    History of pericarditis    Vitamin D deficiency  Resolved Problems:    * No resolved hospital problems  *       1  Chest discomfort  -intermittent chest pressure over the past week, relieved by aspirin but not worse with exertion  Differential includes ACS, GERD, anxiety, musculoskeletal    Unlikely related to her previous pericarditis    -troponins x3 were negative  -EKG showed sub 1 mm ST depression in V5 and V6  -telemetry with no significant events  -ACS considered unlikely  More likely musculoskeletal given it was reproducible on palpation  -recommend repeat echocardiogram and stress test outpatient  Patient can likely tolerate an exercise stress test   -patient can continue take aspirin outpatient for pain    2  Thyroid nodule  -Last US less than 1 year ago  -Follows up outpatient with endocrinologist, Dr Kit Martinez  -TSH within normal limits while in the hospital     3  Vitamin D deficiency   -Continue home vitamin D     4  GERD  -patient takes ranitidine at home  -continue     5  Anxiety  -resume home p r n  Xanax  -follow-up outpatient       6  High blood pressure  -patient presents with systolic blood pressure in the 160s    She has no known history of hypertension and states that she has been told by her primary care doctor in the past that she has white coat hypertension  -follow-up outpatient     7  Systolic murmur  -patient has a mild systolic ejection murmur on exam  -echo in 2016 showing an EF of 60-65% with some aortic leaflet thickening but no hemodynamically significant valvular abnormalities  -unlikely related to her current presentation  -follow-up outpatient with recommended repeat echocardiogram    8  Pulmonary nodule  -CXR showed 9mm nodular lesion in left lower lung  -Outpatient follow-up for surveillance     Diagnostic Procedures Performed  Imaging Studies:  Chest x-ray  X-ray Chest 2 Views    Result Date: 1/7/2019  Impression: 1  Overall mild interstitial prominence  This may be related to interstitial edema versus interstitial lung disease  2   Approximately 9 mm nodular density overlies the left lower lung zone  Follow-up nonemergent chest CT is advised  The study was marked in EPIC for significant notification   Workstation performed: [id number]       Pertinent Labs:    Lipid panel [[id number]] (Abnormal) Collected: 01/07/19 0443   Lab Status: Final result Specimen: Blood from Arm, Left Updated: 01/07/19 0626    Cholesterol 204 (H) mg/dL     Triglycerides 90 mg/dL     HDL, Direct 62 (H) mg/dL     LDL Calculated 124 (H) mg/dL     Non-HDL-Chol (CHOL-HDL) 142 mg/dl            Hemoglobin A1C [[id number]] Collected: 01/07/19 0443   Lab Status: Final result Specimen: Blood from Arm, Left Updated: 01/07/19 0544    Hemoglobin A1C 5 6 %        TSH, 3rd generation with Free T4 reflex [[id number]] (Normal) Collected: 01/07/19 0443   Lab Status: Final result Specimen: Blood from Arm, Left Updated: 01/07/19 0626    TSH 3RD GENERATON 0 781 uIU/mL    Narrative:         Troponin I [[id number]] (Normal) Collected: 01/06/19 2233   Lab Status: Final result Specimen: Blood from Arm, Left Updated: 01/06/19 2306    Troponin I <0 02 ng/mL    Troponin I [[id number]] (Normal) Collected: 01/06/19 2010   Lab Status: Final result Specimen: Blood from Arm, Right Updated: 01/06/19 2035    Troponin I <0 02 ng/mL      Troponin I [[id number]] (Normal) Collected: 01/06/19 1700   Lab Status: Final result Specimen: Blood from Arm, Left Updated: 01/06/19 1727    Troponin I <0 02 ng/mL            Therapeutic Procedures Performed  None    Test Results Pending at Discharge:  None    Medications     Medication List to be Continued at Discharge  Current Discharge Medication List      CONTINUE these medications which have NOT CHANGED    Details   ALPRAZolam (XANAX) 0 5 mg tablet       ascorbic acid (VITAMIN C) 500 mg tablet Take 500 mg by mouth daily      b complex vitamins tablet Take 1 tablet by mouth daily      Cholecalciferol (VITAMIN D3) 1000 units CAPS Daily      Multiple Vitamins-Calcium (ONE-A-DAY WOMENS PO) Take 1 tablet by mouth daily      Omega-3 Fatty Acids (FISH OIL) 1200 MG CAPS Take 1 capsule by mouth daily      ranitidine (ZANTAC) 1000 MG/40ML SOLN 75 mg daily      rizatriptan (MAXALT-MLT) 10 MG disintegrating tablet Take 1 tablet (10 mg total) by mouth once as needed for migraine for up to 1 dose May repeat in 2 hours if needed  Qty: 9 tablet, Refills: 0    Associated Diagnoses: Chronic migraine without aura without status migrainosus, not intractable           Current Discharge Medication List        Current Discharge Medication List          Allergies  No Known Allergies  Discharge Diet: regular diet  Activity restrictions: none  Discharge Condition: stable  Discharged With Lines: no    Discharge Disposition: [address] / Family Member Name:  [address] [place] [address]  Internal Medicine [phone] [address] [place] [address]  16  [phone]     Next Steps: Call in 1 day(s)      Instructions: Please call to make follow up with primary care      Riky Mccallum MD    [phone]     Next Steps: Call in 1 day(s)      Instructions: Please call to make post hospital follow up            Code Status: Level 1 - Full Code    Discharge  Statement   I spent 30 minutes minutes discharging the patient  This time was spent on the day of discharge  I had direct contact with the patient on the day of discharge  Additional documentation is required if more than 30 minutes were spent on discharge

## 2019-01-07 NOTE — SOCIAL WORK
Met with pt to discuss the role of CM and to discuss any help pt may need prior to dc  Pt lives with her  Yajaira Prince in a 3 story home with bed/bathroom on the 3rd floor; no CIRA  Pt performed ADL's indptly pta, no use of DME  No hx of VNA  Pt's pharmacy preference is [facility]  No hx of mental health or D&A treatment  Pt's PCP is Dr Sharri Hutchins but she is looking for a new physician  CM provided [employer] card  Pt drives  Yajaira Prince () [phone]  Yajaira Prince will transport home at dc  CM reviewed d/c planning process including the following: identifying help at home, patient preference for d/c planning needs, Discharge Lounge, [facility] Meds to Bed program, availability of treatment team to discuss questions or concerns patient and/or family may have regarding understanding medications and recognizing signs and symptoms once discharged  CM also encouraged patient to follow up with all recommended appointments after discharge  Patient advised of importance for patient and family to participate in managing patients medical well being  Patient/caregiver received discharge checklist  Content reviewed  Patient/caregiver encouraged to participate in discharge plan of care prior to discharge home

## 2019-01-07 NOTE — ED PROVIDER NOTES
History  Chief Complaint   Patient presents with    Chest Pain     Pt with chest pain and sob this am, took 324mg ASA prior to arrival, pain is sternal and radiates to right chest       HPI  30-year-old woman presents for evaluation of chest pain  Patient states she has had is central chest pain started today after eating breakfast, was associated with palpitations, and no nausea vomiting  Patient states she has continued to have chest pain and pressure but has improved  Patient states that she has had intermittent symptoms for the last week  Denies any history MI, ACS  On the cardiac problem is that patient has had pericarditis 10 years ago  Patient takes ranitidine for GERD daily no other daily medicines  Patient has a primary care doctor back in [place], moved to [facility] 1 year ago and needs to get established primary care here  Patient otherwise denies fevers chills cough abdominal pain  Prior to Admission Medications   Prescriptions Last Dose Informant Patient Reported? Taking?    ALPRAZolam (XANAX) 0 5 mg tablet  Self Yes Yes   Cholecalciferol (VITAMIN D3) 1000 units CAPS   Yes Yes   Sig: Daily   Multiple Vitamins-Calcium (ONE-A-DAY WOMENS PO)  Self Yes Yes   Sig: Take 1 tablet by mouth daily   Omega-3 Fatty Acids (FISH OIL) 1200 MG CAPS  Self Yes Yes   Sig: Take 1 capsule by mouth daily   ascorbic acid (VITAMIN C) 500 mg tablet  Self Yes Yes   Sig: Take 500 mg by mouth daily   b complex vitamins tablet  Self Yes Yes   Sig: Take 1 tablet by mouth daily   ranitidine (ZANTAC) 1000 MG/40ML SOLN   Yes Yes   Si mg daily   rizatriptan (MAXALT-MLT) 10 MG disintegrating tablet Not Taking at Unknown time  No No   Sig: Take 1 tablet (10 mg total) by mouth once as needed for migraine for up to 1 dose May repeat in 2 hours if needed   Patient not taking: Reported on 2019       Facility-Administered Medications: None       Past Medical History:   Diagnosis Date    Pericarditis        Past Surgical History:   Procedure Laterality Date    KNEE SURGERY Left     PERICARDIAL WINDOW      AK OPEN RX DISTAL RADIUS FX, EXTRA-ARTICULAR Right 3/22/2018    Procedure: OPEN REDUCTION W/ INTERNAL FIXATION (ORIF) RADIUS, splint application;  Surgeon: Natalia Romo MD;  Location: [facility];  Service: Orthopedics       Family History   Problem Relation Age of Onset    No Known Problems Mother     No Known Problems Father    Yvonna Sol Parkinson White syndrome Daughter      I have reviewed and agree with the history as documented  Social History   Substance Use Topics    Smoking status: Never Smoker    Smokeless tobacco: Never Used    Alcohol use No        Review of Systems   Constitutional: Negative  HENT: Negative  Eyes: Negative  Respiratory: Positive for chest tightness  Cardiovascular: Positive for chest pain  Gastrointestinal: Negative  Endocrine: Negative  Genitourinary: Negative  Musculoskeletal: Negative  Skin: Negative  Allergic/Immunologic: Negative  Neurological: Negative  Hematological: Negative  Psychiatric/Behavioral: Negative  Physical Exam  ED Triage Vitals [01/06/19 1652]   Temperature Pulse Respirations Blood Pressure SpO2   97 8 °F (36 6 °C) 87 18 158/74 97 %      Temp Source Heart Rate Source Patient Position - Orthostatic VS BP Location FiO2 (%)   Tympanic Monitor Lying Left arm --      Pain Score       4           Orthostatic Vital Signs  Vitals:    01/06/19 1652 01/06/19 1730 01/06/19 1800 01/06/19 1830   BP: 158/74 155/80 165/84 161/79   Pulse: 87 80 68 66   Patient Position - Orthostatic VS: Lying Lying Lying Lying       Physical Exam   Constitutional: She is oriented to person, place, and time  She appears well-developed and well-nourished  No distress  HENT:   Head: Normocephalic and atraumatic     Right Ear: External ear normal    Left Ear: External ear normal    Mouth/Throat: Oropharynx is clear and moist    Eyes: Pupils are equal, round, and reactive to light  Conjunctivae and EOM are normal  Right eye exhibits no discharge  Left eye exhibits no discharge  No scleral icterus  Neck: Normal range of motion  Neck supple  No tracheal deviation present  No thyromegaly present  Cardiovascular: Normal rate, regular rhythm and intact distal pulses  Exam reveals no gallop and no friction rub  No murmur heard  Pulmonary/Chest: Effort normal and breath sounds normal  No stridor  No respiratory distress  She has no wheezes  She has no rales  Abdominal: Soft  Bowel sounds are normal  She exhibits no distension  There is no tenderness  There is no rebound and no guarding  Musculoskeletal: Normal range of motion  She exhibits no edema or deformity  Neurological: She is alert and oriented to person, place, and time  No cranial nerve deficit  Skin: Skin is warm and dry  Capillary refill takes less than 2 seconds  No rash noted  She is not diaphoretic  No erythema  Psychiatric: She has a normal mood and affect  Her behavior is normal  Thought content normal    Nursing note and vitals reviewed        ED Medications  Medications   ALPRAZolam (XANAX) tablet 0 25 mg (not administered)   ascorbic acid (VITAMIN C) tablet 500 mg (not administered)   b complex vitamins tablet 1 tablet (not administered)   cholecalciferol (VITAMIN D3) tablet 400 Units (not administered)   ondansetron (ZOFRAN) injection 4 mg (not administered)   enoxaparin (LOVENOX) subcutaneous injection 40 mg (not administered)   acetaminophen (TYLENOL) tablet 650 mg (not administered)   nitroglycerin (NITROSTAT) SL tablet 0 4 mg (not administered)   aspirin chewable tablet 324 mg (324 mg Oral Given 1/6/19 1742)   LORazepam (ATIVAN) tablet 0 5 mg (0 5 mg Oral Given 1/6/19 1837)       Diagnostic Studies  Results Reviewed     Procedure Component Value Units Date/Time    Troponin I [[id number]]     Lab Status:  No result Specimen:  Blood     Troponin I [[id number]]  (Normal) Collected:  01/06/19 1700    Lab Status:  Final result Specimen:  Blood from Arm, Left Updated:  01/06/19 1727     Troponin I <0 02 ng/mL     Comprehensive metabolic panel [[id number]]  (Abnormal) Collected:  01/06/19 1700    Lab Status:  Final result Specimen:  Blood from Arm, Left Updated:  01/06/19 1726     Sodium 139 mmol/L      Potassium 4 0 mmol/L      Chloride 106 mmol/L      CO2 27 mmol/L      ANION GAP 6 mmol/L      BUN 37 (H) mg/dL      Creatinine 1 04 mg/dL      Glucose 116 mg/dL      Calcium 9 8 mg/dL      AST 21 U/L      ALT 37 U/L      Alkaline Phosphatase 112 U/L      Total Protein 7 4 g/dL      Albumin 3 6 g/dL      Total Bilirubin 0 54 mg/dL      eGFR 54 ml/min/1 73sq m     Narrative:         National Kidney Disease Education Program recommendations are as follows:  GFR calculation is accurate only with a steady state creatinine  Chronic Kidney disease less than 60 ml/min/1 73 sq  meters  Kidney failure less than 15 ml/min/1 73 sq  meters      CBC and differential [[id number]] Collected:  01/06/19 1700    Lab Status:  Final result Specimen:  Blood from Arm, Left Updated:  01/06/19 1711     WBC 7 54 Thousand/uL      RBC 4 76 Million/uL      Hemoglobin 14 7 g/dL      Hematocrit 43 4 %      MCV 91 fL      MCH 30 9 pg      MCHC 33 9 g/dL      RDW 12 8 %      MPV 10 2 fL      Platelets 818 Thousands/uL      nRBC 0 /100 WBCs      Neutrophils Relative 65 %      Immat GRANS % 0 %      Lymphocytes Relative 24 %      Monocytes Relative 9 %      Eosinophils Relative 2 %      Basophils Relative 0 %      Neutrophils Absolute 4 81 Thousands/µL      Immature Grans Absolute 0 02 Thousand/uL      Lymphocytes Absolute 1 84 Thousands/µL      Monocytes Absolute 0 68 Thousand/µL      Eosinophils Absolute 0 17 Thousand/µL      Basophils Absolute 0 02 Thousands/µL                  X-ray chest 2 views   ED Interpretation by Stas Mills MD (01/06 1801)   No cardiopulmonary disease appreciated            Procedures  ECG 12 Lead Documentation  Date/Time: 1/6/2019 4:58 PM  Performed by: Bianka Penaloza  Authorized by: Bianka Penaloza     Indications / Diagnosis:  Cp  ECG reviewed by me, the ED Provider: yes    Patient location:  ED  Previous ECG:     Previous ECG:  Unavailable  Interpretation:     Interpretation: non-specific    Rate:     ECG rate:  80    ECG rate assessment: normal    Rhythm:     Rhythm: sinus rhythm    Ectopy:     Ectopy: none    QRS:     QRS axis:  Normal  Conduction:     Conduction: normal    ST segments:     ST segments:  Abnormal    Depression:  II, III, aVF, V4, V5 and V6  T waves:     T waves: normal            Phone Consults  ED Phone Contact    ED Course                               MDM  Number of Diagnoses or Management Options  Chest pain:   Palpitations:   Diagnosis management comments: 79-year-old woman presents with chest pain palpitations  1st nurse EKG shows some ST depressions  Will do CBC CMP troponin  Without a previous EKG, and a story of chest pain, will admit the patient to the hospital     [facility] Time    Disposition  Final diagnoses:   Chest pain   Palpitations     Time reflects when diagnosis was documented in both MDM as applicable and the Disposition within this note     Time User Action Codes Description Comment    1/6/2019  6:57 PM Myra Colby Add [R07 9] Chest pain     1/6/2019  6:58 PM Myra Colby Add [R00 2] Palpitations       ED Disposition     ED Disposition Condition Comment    Admit  Case was discussed with HUNTER NOVAK and the patient's admission status was agreed to be Admission Status: observation status to the service of Dr Tereso Mccarthy     Follow-up Information    None         Patient's Medications   Discharge Prescriptions    No medications on file     No discharge procedures on file  ED Provider  Attending physically available and evaluated Yanna Burden I managed the patient along with the ED Attending      Electronically Signed by         Zuri Lamar MD  01/06/19 1943

## 2019-01-28 ENCOUNTER — TRANSCRIBE ORDERS (OUTPATIENT)
Dept: ADMINISTRATIVE | Facility: HOSPITAL | Age: 74
End: 2019-01-28

## 2019-01-28 DIAGNOSIS — R91.1 LUNG NODULE: Primary | ICD-10-CM

## 2019-01-31 RX ORDER — PNEUMOCOCCAL VACCINE POLYVALENT 25; 25; 25; 25; 25; 25; 25; 25; 25; 25; 25; 25; 25; 25; 25; 25; 25; 25; 25; 25; 25; 25; 25 UG/.5ML; UG/.5ML; UG/.5ML; UG/.5ML; UG/.5ML; UG/.5ML; UG/.5ML; UG/.5ML; UG/.5ML; UG/.5ML; UG/.5ML; UG/.5ML; UG/.5ML; UG/.5ML; UG/.5ML; UG/.5ML; UG/.5ML; UG/.5ML; UG/.5ML; UG/.5ML; UG/.5ML; UG/.5ML; UG/.5ML
INJECTION, SOLUTION INTRAMUSCULAR; SUBCUTANEOUS
Refills: 0 | COMMUNITY
Start: 2018-12-23 | End: 2019-07-03 | Stop reason: ALTCHOICE

## 2019-01-31 RX ORDER — IBUPROFEN 200 MG
200 TABLET ORAL EVERY 6 HOURS PRN
COMMUNITY
End: 2019-07-03 | Stop reason: ALTCHOICE

## 2019-01-31 RX ORDER — RANITIDINE HCL 75 MG
75 TABLET ORAL 2 TIMES DAILY
COMMUNITY
End: 2019-04-26

## 2019-01-31 RX ORDER — OMEPRAZOLE 20 MG/1
20 CAPSULE, DELAYED RELEASE ORAL
COMMUNITY
Start: 2017-10-18 | End: 2019-07-03 | Stop reason: ALTCHOICE

## 2019-01-31 RX ORDER — AMOXICILLIN 500 MG/1
CAPSULE ORAL
Refills: 2 | COMMUNITY
Start: 2018-12-06 | End: 2019-07-03 | Stop reason: ALTCHOICE

## 2019-01-31 RX ORDER — A/SINGAPORE/GP1908/2015 IVR-180 (H1N1) (AN A/MICHIGAN/45/2015 (H1N1)PDM09-LIKE VIRUS), A/HONG KONG/4801/2014, NYMC X-263B (H3N2) (AN A/HONG KONG/4801/2014-LIKE VIRUS), AND B/BRISBANE/60/2008, WILD TYPE (A B/BRISBANE/60/2008-LIKE VIRUS) 15; 15; 15 UG/.5ML; UG/.5ML; UG/.5ML
INJECTION, SUSPENSION INTRAMUSCULAR
Refills: 0 | COMMUNITY
Start: 2018-10-31 | End: 2019-07-03 | Stop reason: ALTCHOICE

## 2019-02-25 ENCOUNTER — HOSPITAL ENCOUNTER (OUTPATIENT)
Dept: RADIOLOGY | Facility: HOSPITAL | Age: 74
Discharge: HOME | End: 2019-02-25
Payer: COMMERCIAL

## 2019-02-25 DIAGNOSIS — R91.1 LUNG NODULE: ICD-10-CM

## 2019-02-25 PROCEDURE — 71250 CT THORAX DX C-: CPT

## 2019-03-15 RX ORDER — CHOLECALCIFEROL (VITAMIN D3) 25 MCG
CAPSULE ORAL DAILY
COMMUNITY
End: 2021-08-10

## 2019-03-18 ENCOUNTER — OFFICE VISIT (OUTPATIENT)
Dept: NEUROLOGY | Facility: CLINIC | Age: 74
End: 2019-03-18
Payer: COMMERCIAL

## 2019-03-18 VITALS
WEIGHT: 179 LBS | BODY MASS INDEX: 26.51 KG/M2 | DIASTOLIC BLOOD PRESSURE: 82 MMHG | HEIGHT: 69 IN | HEART RATE: 78 BPM | SYSTOLIC BLOOD PRESSURE: 142 MMHG

## 2019-03-18 DIAGNOSIS — R42 DIZZINESS AND GIDDINESS: ICD-10-CM

## 2019-03-18 DIAGNOSIS — G47.00 INSOMNIA, UNSPECIFIED TYPE: ICD-10-CM

## 2019-03-18 DIAGNOSIS — M54.2 CERVICALGIA: ICD-10-CM

## 2019-03-18 DIAGNOSIS — G43.009 MIGRAINE WITHOUT AURA AND WITHOUT STATUS MIGRAINOSUS, NOT INTRACTABLE: Primary | ICD-10-CM

## 2019-03-18 PROCEDURE — 99214 OFFICE O/P EST MOD 30 MIN: CPT | Performed by: PSYCHIATRY & NEUROLOGY

## 2019-03-18 NOTE — PROGRESS NOTES
[facility] Consult - Follow up   PATIENT:  Luis Reagan  MRN:  [id number]  :  1945  DATE OF SERVICE:  3/18/2019  REFERRED BY: No ref  provider found  PMD: Ambrocio Chaudhary    Assessment/Plan:     Luis Reagan is a 72 y o  female referred here for evaluation of headache  Initial evaluation by me 2018  Follow-up 2019    Chronic migraine without aura without status migrainosus, not intractable  Cervicalgia   She has had migraines since childhood, she has followed with neurologist in the past back in [place]  She moved here to be closer to her daughter and granddaughter in the area  She has multiple types of headaches,  bioccipital pain, mid throbbing pain  Migraine is without aura and has associated nausea sometimes vomiting, stiff and sore neck, problems with concentration, photophobia, phonophobia, osmophobia, sometimes nasal congestion, lightheadedness  Migraines worse with any movement  * as of 2019 she reports headaches are significantly improved with lifestyle interventions     Workup:  - She reports normal head imaging in the past  (MRI Brain about 5 years ago normal per patient, asked to get records if she can)     Preventive:  -  She has never been on a prescription  headache preventive medicine   - discussed headache preventative supplements including magnesium, riboflavin   - We discussed headache hygiene and lifestyle interventions that may improve her headaches including increased hydration, higher sleep quantity, continuing to increase her physical activity     - again recommended she follow up with physical therapy    Abortive:  - 2018 prescribed rizatriptan for trial, however patient never filled as she has not had any severe migraine  - Toradol IM has worked for in the past  - future options:  other triptans, steroids, indomethacin, Toradol etc     Insomnia  She reports chronic insomnia and that she currently gets less than 4 hr a night of sleep  Had initial visit 11/2018 we discussed sleep hygiene and she has stopped drinking coffee late at night which has helped some  She is wondering if a referral to sleep medicine would be indicated and I am happy to refer her  Likely multifactorial including possible psychogenic component versus other sleep disorder as well  Dizziness/Vertigo  She reports a history of BPPV 2 years ago that got better with maneuvers including Epley per her description  She reports she is not dizzy with walking at the gym, but sometimes dizzy with positional maneuvers  Could be peripheral vertigo vs PPPD  Workup  - At this time, dizziness sounds peripheral and positional without concerning features, but if not improving with physical therapy would consider further evaluation with imaging such as MRI brain or CTA head and neck (she reports MRI brain was normal approximately 5 years ago and recent calcium screen for heart was 0%)     Plan:  - referral to physical therapy already in place for cervicalgia, recommended she also see them for dizziness      Patient instructions       -     Ambulatory referral to Physical Therapy and sleep medicine      [facility]     Headache/migraine treatment:   Abortive medications (for immediate treatment of a headache): It is ok to take ibuprofen, acetaminophen or naproxen (Advil, Tylenol,  Aleve, Excedrin) if they help your headaches you should limit these to No more than 3 times a week to avoid medication overuse/rebound headaches  For more severe headaches ok to take 600 mg ibuprofen     Over the counter preventive supplements for headaches/migraines   (to take every day to help prevent headaches - not to take at the time of headache):    Magnesium 400mg daily (If any diarrhea or upset stomach, decrease dose  as tolerated)  [x] Riboflavin (Vitamin B2)  400mg daily   (FYI B2 may make your urine bright/neon yellow)      Lifestyle Recommendations:  [x] SLEEP - Maintain a regular sleep schedule: Adults need at least 7-8 hours of uninterrupted a night  Maintain good sleep hygiene:  Going to bed and waking up at consistent times, avoiding excessive daytime naps, avoiding caffeinated beverages in the evening, avoid excessive stimulation in the evening and generally using bed primarily for sleeping  One hour before bedtime would recommend turning lights down lower, decreasing your activity (may read quietly, listen to music at a low volume)  When you get into bed, should eliminate all technology (no texting, emailing, playing with your phone, iPad or tablet in bed)  [x] HYDRATION - Maintain good hydration  Drink  2L of fluid a day (4 typical small water bottles)  [x] DIET - Maintain good nutrition  In particular don't skip meals and try and eat healthy balanced meals regularly  [x] TRIGGERS - Look for other triggers and avoid them: Limit caffeine to 1-2 cups a day or less  Avoid dietary triggers that you have noticed bring on your headaches (this could include aged cheese, peanuts, MSG, aspartame and nitrates)      Education and Follow-up  [x] Please call with any questions or concerns  [x] Follow up 3 months         CC: We had the pleasure of evaluating Katie Orellana in neurological consultation today  she is a 72 y o    right handed female who presents today for evaluation of headaches       History of Present Illness:    Interval history as of  03/18/2019  - presented to the ED 01/06/2019 with chest pain - has a stress test next week     - her biggest complaint today is trouble sleeping, chronic dizziness   - Dizziness, history of BPPV 2 years ago, got better, scares her  Not dizzy with walking, more positional     Headaches  - never filled rizatriptan  - headaches are not really a complaint today: in a month 8 times but go away with ibuprofen   Stopped drinking caffeine at night  - was referred to physical therapy has not made an appointment  - recommended supplements including - taking magnesium, riboflavin may be in her B complex   - has joined the gym, loves walking     Plans to see eye doctor, they are tearing, no blurred or double vision       What is your current pain level - 0  Headaches started at what age? Migraines Since childhood, didn't have them when pregnant x 2  How often do the headaches occur? As of 11/2018:   - migraines 3-4 times a week,   - Mild throbbing pain right scalp 3-4 times a week  - lasts hours, with advil goes away most of the time  - Occasionally for sharp pain throughout head for a second or two - once a week  - Also bilateral occipital pain 1-2 times a week     As of 3/18/19: - headaches are not really a complaint today: in a month 8 times but go away with ibuprofen    What time of the day do the headaches start? no particular time of day  How long do the headaches last? 2-3 days in teenage years - now 1 full day and into the morning  Are you ever headache free? Yes  Prodrome of feeling like she is getting a headache  Aura? without aura  Describe your usual headache pain quality? throbbing  Where is your headache located? bilateral frontal area and bilateral occipital area, also right temporal   Does the pain Radiate? no radiation of pain  What is the intensity of pain? Up to a 10/10, at its best a 4  Associated symptoms:   [x] Nausea       [x] Vomiting - once in a while        [] Diarrhea         [] Insomnia           [x] Stiff or sore neck         [x] Problems with concentration  [x] Photophobia     [x]Phonophobia      [x] Osmophobia  [] Blurred vision   [x] Prefer quiet, dark room        [x] Light-headed or dizzy - sometimes   [] Tinnitus      Things that make the headache worse?  +movement  Headache triggers:  Stress, mint,strong perfume, tobacco or fireplaces, If she goes to bed with a headache will wake up with worse one   What time of the year do headaches occur more frequently?   do not seem to be related to any time of the year  Have you seen someone else for headaches or pain? Yes, neurologist back in [place]  Have you had trigger point injection performed and how often? No  Have you had Botox injection performed and how often? No   Have you had epidural injections or transforaminal injections performed? No  Have you used CBD or THC for your headaches and how often? No  Are you current pregnant or planning on getting pregnant? No  Have you ever had any Brain imaging? yes years ago and normal     What medications do you take or have you taken for your headaches? ABORTIVE:      - was prescribed rizatriptan but did not need it   - advil 200 mg  - my pillow seems to be helping the bioccipital      PREVENTIVE: no     Alternative therapies used in the past for headaches? Physical therapy -  For wrist, but not headache   Coffee, has one in the morning  thank out the p m  Coffee     LIFESTYLE  Sleep - "if I get 4 hours I am johnnie"      Is your sleep restful? No, tired  What time do you go to bed at night? 10   What time do you wake up in am? Has coffee at 4:30 am with  and goes back to sleep, otherwise wakes up at 7 am  How often do you get up at night? once  Do you wake up with headaches? Sometimes   Do you snore while asleep? No  Have you been told that you stop breathing during sleeping? No - but makes noises  Do you wake up tired in the morning? Yes  Do you take frequent naps during the day? sometimes  Do you have jaw pain? No, but has TMJ  Do you grind/clench your teeth at night? No  Do you have restless leg syndrome? No     Physical activity:   Joined the local [employer]  Goes once a week  Walks with her daughter  - considering line dancing or something slower at the [employer]   - has joined the gym, loves walking      Water: not enough - 4 glasses      Diet:  Do you ever skip meals?   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   Daughter, granddaughter live near by  Other two grandkids in [place]      Illicit Drugs: denies  Alcohol/tobacco: Denies tobacco use, alcohol intake: social drinker      Past Medical History:     Past Medical History:   Diagnosis Date    Pericarditis        Patient Active Problem List   Diagnosis    Closed fracture distal radius and ulna, right, initial encounter    Anxiety    Gastroesophageal reflux disease with esophagitis    History of pericarditis    Heart murmur    Primary hyperparathyroidism ([facility] [address] )    Thyroid nodule    Vitamin D deficiency    Chest pain       Medications:      Current Outpatient Medications   Medication Sig Dispense Refill    ALPRAZolam (XANAX) 0 5 mg tablet       amoxicillin (AMOXIL) 500 mg capsule TAKE 4 CAPSULES 1 HOUR PRIOR TO DETNAL APPOINTMENT  2    ascorbic acid (VITAMIN C) 500 mg tablet Take 500 mg by mouth daily      aspirin 81 mg chewable tablet Chew 1 tablet (81 mg total) daily 30 tablet 0    atorvastatin (LIPITOR) 40 mg tablet Take 1 tablet (40 mg total) by mouth daily 30 tablet 0    b complex vitamins tablet Take 1 tablet by mouth daily      Cholecalciferol (VITAMIN D-3) 1000 units CAPS Daily      Cholecalciferol (VITAMIN D3) 1000 units CAPS Daily      FLUAD 0 5 ML IZZY inject 0 5 milliliter intramuscularly  0    ibuprofen (MOTRIN) 200 mg tablet Take 200 mg by mouth every 6 (six) hours as needed      Multiple Vitamins-Calcium (ONE-A-DAY WOMENS PO) Take 1 tablet by mouth daily      Omega-3 Fatty Acids (FISH OIL PO) Take 2 g by mouth      Omega-3 Fatty Acids (FISH OIL) 1200 MG CAPS Take 1 capsule by mouth daily      omeprazole (PRILOSEC) 20 mg delayed release capsule Take 20 mg by mouth      PNEUMOVAX 23 25 MCG/0 5ML inject 0 5 milliliter intramuscularly  0    ranitidine (ZANTAC) 1000 MG/40ML SOLN 75 mg daily      ranitidine (ZANTAC) 75 MG tablet Take 75 mg by mouth 2 (two) times a day      RaNITidine HCl (ZANTAC PO) Zantac   75 mg daily       No current facility-administered medications for this visit  Allergies:       Allergies   Allergen Reactions    No Known Allergies        Family History:     Family History   Problem Relation Age of Onset    No Known Problems Mother     No Known Problems Father    Eugenio Matthews Parkinson White syndrome Daughter        Social History:     Social History     Socioeconomic History    Marital status: /Civil Union     Spouse name: Not on file    Number of children: Not on file    Years of education: Not on file    Highest education level: Not on file   Occupational History    Not on file   Social Needs    Financial resource strain: Not on file    Food insecurity:     Worry: Not on file     Inability: Not on file    Transportation needs:     Medical: Not on file     Non-medical: Not on file   Tobacco Use    Smoking status: Never Smoker    Smokeless tobacco: Never Used   Substance and Sexual Activity    Alcohol use: No    Drug use: No    Sexual activity: Not on file   Lifestyle    Physical activity:     Days per week: Not on file     Minutes per session: Not on file    Stress: Not on file   Relationships    Social connections:     Talks on phone: Not on file     Gets together: Not on file     Attends [employer] service: Not on file     Active member of club or organization: Not on file     Attends meetings of clubs or organizations: Not on file     Relationship status: Not on file    Intimate partner violence:     Fear of current or ex partner: Not on file     Emotionally abused: Not on file     Physically abused: Not on file     Forced sexual activity: Not on file Other Topics Concern    Not on file   Social History Narrative    Not on file         Objective:     Physical Exam:                                                                 Vitals:            Constitutional:    /82   Pulse 78   Ht 5' 9" (1 753 m)   Wt 81 2 kg (179 lb)   BMI 26 43 kg/m²   BP Readings from Last 3 Encounters:   03/18/19 142/82   01/07/19 145/81   11/13/18 128/88     Pulse Readings from Last 3 Encounters:   03/18/19 78   01/07/19 76   11/13/18 82         Well developed, well nourished, well groomed  No dysmorphic features  HEENT:  Normocephalic atraumatic  No meningismus  Oropharynx is clear and moist  No oral mucosal lesions  Chest:  Respirations regular and unlabored  Cardiovascular:  Regular rate, intact distal pulses  Distal extremities warm without palpable edema or tenderness, no observed significant swelling  Musculoskeletal:  Full range of motion  (see below under neurologic exam for evaluation of motor function and gait)   Skin:  warm and dry, not diaphoretic  No apparent birthmarks or stigmata of neurocutaneous disease  Psychiatric:  Normal behavior and appropriate affect, mildly anxious       Neurological Examination:     Mental status/cognitive function:   Orientated to time, place and person  Recent and remote memory intact  Attention span and concentration as well as fund of knowledge are appropriate for age  Normal language and spontaneous speech  Cranial Nerves:  III, IV, VI-Pupils were equal, round, and reactive to light  Extraocular movements were full and conjugate without nystagmus  conjugate gaze, normal smooth pursuits, normal saccades   VII-facial expression symmetric, intact forehead wrinkle, strong eye closure, symmetric smile    VIII-hearing grossly intact bilaterally   Motor Exam: symmetric bulk throughout  no atrophy, fasciculations or abnormal movements noted     Coordination:  no apparent dysmetria, ataxia or tremor noted  Gait: steady casual gait        Pertinent lab results:   01/07/2019:  CMP and CBC unremarkable  TSH 0 78     Imaging: none pertinent      Review of Systems:   ROS Personally reviewed  Review of Systems   Constitutional: Positive for activity change (join gym) and fatigue  Neurological: Positive for dizziness and light-headedness  Psychiatric/Behavioral: Positive for sleep disturbance (not sleeping at all)  Review of Systems   HENT: Negative  Eyes: Negative  Respiratory: Negative  Cardiovascular: Negative  Gastrointestinal: Negative  Endocrine: Negative  Genitourinary: Negative  Musculoskeletal: Negative  Skin: Negative  Allergic/Immunologic: Negative  Hematological: Negative  I have spent 25 minutes with Patient  today in which greater than 50% of this time was spent in counseling/coordination of care regarding Prognosis, Risks and benefits of tx options, Intructions for management, Importance of tx compliance, Risk factor reductions and Impressions        Author:  Snehal Osborn MD 3/18/2019 9:40 AM

## 2019-03-18 NOTE — PATIENT INSTRUCTIONS
-     Ambulatory referral to Physical Therapy and sleep medicine      [facility]     Headache/migraine treatment:   Abortive medications (for immediate treatment of a headache): It is ok to take ibuprofen, acetaminophen or naproxen (Advil, Tylenol,  Aleve, Excedrin) if they help your headaches you should limit these to No more than 3 times a week to avoid medication overuse/rebound headaches  For more severe headaches ok to take 600 mg ibuprofen     Over the counter preventive supplements for headaches/migraines   (to take every day to help prevent headaches - not to take at the time of headache): Magnesium 400mg daily (If any diarrhea or upset stomach, decrease dose  as tolerated)  [x] Riboflavin (Vitamin B2)  400mg daily   (FYI B2 may make your urine bright/neon yellow)      Lifestyle Recommendations:  [x] SLEEP - Maintain a regular sleep schedule: Adults need at least 7-8 hours of uninterrupted a night  Maintain good sleep hygiene:  Going to bed and waking up at consistent times, avoiding excessive daytime naps, avoiding caffeinated beverages in the evening, avoid excessive stimulation in the evening and generally using bed primarily for sleeping  One hour before bedtime would recommend turning lights down lower, decreasing your activity (may read quietly, listen to music at a low volume)  When you get into bed, should eliminate all technology (no texting, emailing, playing with your phone, iPad or tablet in bed)  [x] HYDRATION - Maintain good hydration  Drink  2L of fluid a day (4 typical small water bottles)  [x] DIET - Maintain good nutrition  In particular don't skip meals and try and eat healthy balanced meals regularly  [x] TRIGGERS - Look for other triggers and avoid them: Limit caffeine to 1-2 cups a day or less   Avoid dietary triggers that you have noticed bring on your headaches (this could include aged cheese, peanuts, MSG, aspartame and nitrates)      Education and Follow-up  [x] Please call with any questions or concerns     [x] Follow up 3-4 months

## 2019-04-01 ENCOUNTER — EVALUATION (OUTPATIENT)
Dept: PHYSICAL THERAPY | Facility: CLINIC | Age: 74
End: 2019-04-01
Payer: COMMERCIAL

## 2019-04-01 DIAGNOSIS — R42 DIZZINESS AND GIDDINESS: Primary | ICD-10-CM

## 2019-04-01 DIAGNOSIS — H81.13 BPPV (BENIGN PAROXYSMAL POSITIONAL VERTIGO), BILATERAL: ICD-10-CM

## 2019-04-01 DIAGNOSIS — M54.2 CERVICALGIA: ICD-10-CM

## 2019-04-01 PROCEDURE — 95992 CANALITH REPOSITIONING PROC: CPT | Performed by: PHYSICAL THERAPIST

## 2019-04-01 PROCEDURE — 97162 PT EVAL MOD COMPLEX 30 MIN: CPT | Performed by: PHYSICAL THERAPIST

## 2019-04-01 PROCEDURE — 97112 NEUROMUSCULAR REEDUCATION: CPT | Performed by: PHYSICAL THERAPIST

## 2019-04-08 ENCOUNTER — OFFICE VISIT (OUTPATIENT)
Dept: PHYSICAL THERAPY | Facility: CLINIC | Age: 74
End: 2019-04-08
Payer: COMMERCIAL

## 2019-04-08 DIAGNOSIS — M54.2 CERVICALGIA: Primary | ICD-10-CM

## 2019-04-08 PROCEDURE — 97112 NEUROMUSCULAR REEDUCATION: CPT | Performed by: PHYSICAL THERAPIST

## 2019-04-08 PROCEDURE — 97140 MANUAL THERAPY 1/> REGIONS: CPT | Performed by: PHYSICAL THERAPIST

## 2019-04-08 PROCEDURE — 97110 THERAPEUTIC EXERCISES: CPT | Performed by: PHYSICAL THERAPIST

## 2019-04-11 ENCOUNTER — OFFICE VISIT (OUTPATIENT)
Dept: PHYSICAL THERAPY | Facility: CLINIC | Age: 74
End: 2019-04-11
Payer: COMMERCIAL

## 2019-04-11 DIAGNOSIS — M54.2 CERVICALGIA: Primary | ICD-10-CM

## 2019-04-11 PROCEDURE — 97140 MANUAL THERAPY 1/> REGIONS: CPT | Performed by: PHYSICAL THERAPIST

## 2019-04-11 PROCEDURE — 97110 THERAPEUTIC EXERCISES: CPT | Performed by: PHYSICAL THERAPIST

## 2019-04-11 PROCEDURE — 97112 NEUROMUSCULAR REEDUCATION: CPT | Performed by: PHYSICAL THERAPIST

## 2019-04-15 ENCOUNTER — APPOINTMENT (OUTPATIENT)
Dept: PHYSICAL THERAPY | Facility: CLINIC | Age: 74
End: 2019-04-15
Payer: COMMERCIAL

## 2019-04-18 ENCOUNTER — OFFICE VISIT (OUTPATIENT)
Dept: PHYSICAL THERAPY | Facility: CLINIC | Age: 74
End: 2019-04-18
Payer: COMMERCIAL

## 2019-04-18 DIAGNOSIS — M54.2 CERVICALGIA: Primary | ICD-10-CM

## 2019-04-18 PROCEDURE — 97140 MANUAL THERAPY 1/> REGIONS: CPT | Performed by: PHYSICAL THERAPIST

## 2019-04-18 PROCEDURE — 97112 NEUROMUSCULAR REEDUCATION: CPT | Performed by: PHYSICAL THERAPIST

## 2019-04-18 PROCEDURE — 97110 THERAPEUTIC EXERCISES: CPT | Performed by: PHYSICAL THERAPIST

## 2019-04-22 ENCOUNTER — OFFICE VISIT (OUTPATIENT)
Dept: PHYSICAL THERAPY | Facility: CLINIC | Age: 74
End: 2019-04-22
Payer: COMMERCIAL

## 2019-04-22 DIAGNOSIS — M54.2 CERVICALGIA: Primary | ICD-10-CM

## 2019-04-22 PROCEDURE — 97140 MANUAL THERAPY 1/> REGIONS: CPT | Performed by: PHYSICAL THERAPIST

## 2019-04-22 PROCEDURE — 97112 NEUROMUSCULAR REEDUCATION: CPT | Performed by: PHYSICAL THERAPIST

## 2019-04-22 PROCEDURE — 97110 THERAPEUTIC EXERCISES: CPT | Performed by: PHYSICAL THERAPIST

## 2019-04-25 ENCOUNTER — OFFICE VISIT (OUTPATIENT)
Dept: PHYSICAL THERAPY | Facility: CLINIC | Age: 74
End: 2019-04-25
Payer: COMMERCIAL

## 2019-04-25 DIAGNOSIS — M54.2 CERVICALGIA: Primary | ICD-10-CM

## 2019-04-25 PROCEDURE — 97110 THERAPEUTIC EXERCISES: CPT | Performed by: PHYSICAL THERAPIST

## 2019-04-25 PROCEDURE — 97140 MANUAL THERAPY 1/> REGIONS: CPT | Performed by: PHYSICAL THERAPIST

## 2019-04-25 PROCEDURE — 97112 NEUROMUSCULAR REEDUCATION: CPT | Performed by: PHYSICAL THERAPIST

## 2019-04-26 ENCOUNTER — OFFICE VISIT (OUTPATIENT)
Dept: URGENT CARE | Facility: CLINIC | Age: 74
End: 2019-04-26
Payer: COMMERCIAL

## 2019-04-26 VITALS
HEART RATE: 70 BPM | SYSTOLIC BLOOD PRESSURE: 165 MMHG | BODY MASS INDEX: 26.13 KG/M2 | RESPIRATION RATE: 20 BRPM | WEIGHT: 176.4 LBS | HEIGHT: 69 IN | DIASTOLIC BLOOD PRESSURE: 87 MMHG | OXYGEN SATURATION: 95 % | TEMPERATURE: 99 F

## 2019-04-26 DIAGNOSIS — H66.91 RIGHT OTITIS MEDIA, UNSPECIFIED OTITIS MEDIA TYPE: ICD-10-CM

## 2019-04-26 DIAGNOSIS — R35.0 URINARY FREQUENCY: Primary | ICD-10-CM

## 2019-04-26 LAB
SL AMB  POCT GLUCOSE, UA: ABNORMAL
SL AMB LEUKOCYTE ESTERASE,UA: ABNORMAL
SL AMB POCT BILIRUBIN,UA: ABNORMAL
SL AMB POCT BLOOD,UA: ABNORMAL
SL AMB POCT CLARITY,UA: CLEAR
SL AMB POCT COLOR,UA: YELLOW
SL AMB POCT GLUCOSE BLD: 100
SL AMB POCT KETONES,UA: ABNORMAL
SL AMB POCT NITRITE,UA: ABNORMAL
SL AMB POCT PH,UA: 6
SL AMB POCT SPECIFIC GRAVITY,UA: 1.02
SL AMB POCT URINE PROTEIN: ABNORMAL
SL AMB POCT UROBILINOGEN: 0.2

## 2019-04-26 PROCEDURE — 87086 URINE CULTURE/COLONY COUNT: CPT | Performed by: NURSE PRACTITIONER

## 2019-04-26 PROCEDURE — S9083 URGENT CARE CENTER GLOBAL: HCPCS | Performed by: NURSE PRACTITIONER

## 2019-04-26 PROCEDURE — 99203 OFFICE O/P NEW LOW 30 MIN: CPT | Performed by: NURSE PRACTITIONER

## 2019-04-26 PROCEDURE — 82948 REAGENT STRIP/BLOOD GLUCOSE: CPT | Performed by: NURSE PRACTITIONER

## 2019-04-26 PROCEDURE — 81002 URINALYSIS NONAUTO W/O SCOPE: CPT | Performed by: NURSE PRACTITIONER

## 2019-04-26 RX ORDER — AMOXICILLIN AND CLAVULANATE POTASSIUM 875; 125 MG/1; MG/1
1 TABLET, FILM COATED ORAL EVERY 12 HOURS SCHEDULED
Qty: 14 TABLET | Refills: 0 | Status: SHIPPED | OUTPATIENT
Start: 2019-04-26 | End: 2019-05-03

## 2019-04-27 LAB — BACTERIA UR CULT: NORMAL

## 2019-04-28 ENCOUNTER — TELEPHONE (OUTPATIENT)
Dept: URGENT CARE | Facility: CLINIC | Age: 74
End: 2019-04-28

## 2019-04-29 ENCOUNTER — OFFICE VISIT (OUTPATIENT)
Dept: PHYSICAL THERAPY | Facility: CLINIC | Age: 74
End: 2019-04-29
Payer: COMMERCIAL

## 2019-04-29 DIAGNOSIS — M54.2 CERVICALGIA: Primary | ICD-10-CM

## 2019-04-29 PROCEDURE — 97110 THERAPEUTIC EXERCISES: CPT | Performed by: PHYSICAL THERAPIST

## 2019-04-29 PROCEDURE — 97112 NEUROMUSCULAR REEDUCATION: CPT | Performed by: PHYSICAL THERAPIST

## 2019-05-02 ENCOUNTER — TELEPHONE (OUTPATIENT)
Dept: URGENT CARE | Facility: CLINIC | Age: 74
End: 2019-05-02

## 2019-05-02 LAB — GLUCOSE SERPL-MCNC: 100 MG/DL (ref 65–140)

## 2019-06-03 ENCOUNTER — OFFICE VISIT (OUTPATIENT)
Dept: NEUROLOGY | Facility: CLINIC | Age: 74
End: 2019-06-03
Payer: COMMERCIAL

## 2019-06-03 VITALS
HEIGHT: 69 IN | BODY MASS INDEX: 26.11 KG/M2 | SYSTOLIC BLOOD PRESSURE: 134 MMHG | WEIGHT: 176.3 LBS | DIASTOLIC BLOOD PRESSURE: 84 MMHG | RESPIRATION RATE: 14 BRPM | HEART RATE: 84 BPM

## 2019-06-03 DIAGNOSIS — R26.81 GAIT INSTABILITY: ICD-10-CM

## 2019-06-03 DIAGNOSIS — R20.0 LOWER EXTREMITY NUMBNESS: ICD-10-CM

## 2019-06-03 DIAGNOSIS — R42 VERTIGO: ICD-10-CM

## 2019-06-03 DIAGNOSIS — G43.009 MIGRAINE WITHOUT AURA AND WITHOUT STATUS MIGRAINOSUS, NOT INTRACTABLE: Primary | ICD-10-CM

## 2019-06-03 PROCEDURE — 99215 OFFICE O/P EST HI 40 MIN: CPT | Performed by: PSYCHIATRY & NEUROLOGY

## 2019-06-03 RX ORDER — CHLORDIAZEPOXIDE HYDROCHLORIDE AND CLIDINIUM BROMIDE 5; 2.5 MG/1; MG/1
CAPSULE ORAL
COMMUNITY
End: 2019-07-22 | Stop reason: ALTCHOICE

## 2019-06-03 RX ORDER — NAPROXEN 375 MG/1
TABLET ORAL
COMMUNITY
End: 2019-07-03 | Stop reason: ALTCHOICE

## 2019-06-03 RX ORDER — METOPROLOL SUCCINATE 25 MG/1
25 TABLET, EXTENDED RELEASE ORAL DAILY
COMMUNITY
Start: 2019-05-06 | End: 2019-07-22 | Stop reason: ALTCHOICE

## 2019-06-03 RX ORDER — TRIAMCINOLONE ACETONIDE 1 MG/G
CREAM TOPICAL
COMMUNITY
End: 2019-07-03 | Stop reason: ALTCHOICE

## 2019-06-03 RX ORDER — CYCLOBENZAPRINE HCL 5 MG
TABLET ORAL
COMMUNITY
End: 2019-07-03 | Stop reason: ALTCHOICE

## 2019-06-03 RX ORDER — HYDROCODONE BITARTRATE AND IBUPROFEN 7.5; 2 MG/1; MG/1
TABLET, FILM COATED ORAL
COMMUNITY
End: 2019-07-03 | Stop reason: ALTCHOICE

## 2019-06-03 RX ORDER — NORTRIPTYLINE HYDROCHLORIDE 25 MG/1
CAPSULE ORAL
COMMUNITY
End: 2019-07-22 | Stop reason: ALTCHOICE

## 2019-06-10 ENCOUNTER — APPOINTMENT (OUTPATIENT)
Dept: LAB | Facility: CLINIC | Age: 74
End: 2019-06-10
Payer: COMMERCIAL

## 2019-06-10 ENCOUNTER — TRANSCRIBE ORDERS (OUTPATIENT)
Dept: ADMINISTRATIVE | Facility: HOSPITAL | Age: 74
End: 2019-06-10

## 2019-06-10 DIAGNOSIS — E04.1 NONTOXIC UNINODULAR GOITER: ICD-10-CM

## 2019-06-10 DIAGNOSIS — I10 ESSENTIAL HYPERTENSION, MALIGNANT: ICD-10-CM

## 2019-06-10 DIAGNOSIS — I10 ESSENTIAL HYPERTENSION, MALIGNANT: Primary | ICD-10-CM

## 2019-06-10 LAB
25(OH)D3 SERPL-MCNC: 28.1 NG/ML (ref 30–100)
ALBUMIN SERPL BCP-MCNC: 3.7 G/DL (ref 3.5–5)
ALP SERPL-CCNC: 98 U/L (ref 46–116)
ALT SERPL W P-5'-P-CCNC: 35 U/L (ref 12–78)
ANION GAP SERPL CALCULATED.3IONS-SCNC: 6 MMOL/L (ref 4–13)
AST SERPL W P-5'-P-CCNC: 20 U/L (ref 5–45)
BILIRUB SERPL-MCNC: 0.62 MG/DL (ref 0.2–1)
BUN SERPL-MCNC: 22 MG/DL (ref 5–25)
CALCIUM SERPL-MCNC: 10 MG/DL (ref 8.3–10.1)
CHLORIDE SERPL-SCNC: 108 MMOL/L (ref 100–108)
CO2 SERPL-SCNC: 29 MMOL/L (ref 21–32)
CREAT SERPL-MCNC: 0.78 MG/DL (ref 0.6–1.3)
GFR SERPL CREATININE-BSD FRML MDRD: 77 ML/MIN/1.73SQ M
GLUCOSE SERPL-MCNC: 71 MG/DL (ref 65–140)
POTASSIUM SERPL-SCNC: 4.4 MMOL/L (ref 3.5–5.3)
PROT SERPL-MCNC: 7.1 G/DL (ref 6.4–8.2)
SODIUM SERPL-SCNC: 143 MMOL/L (ref 136–145)
T4 FREE SERPL-MCNC: 1 NG/DL (ref 0.76–1.46)
T4 SERPL-MCNC: 10 UG/DL (ref 4.7–13.3)
TSH SERPL DL<=0.05 MIU/L-ACNC: 1.06 UIU/ML (ref 0.36–3.74)

## 2019-06-10 PROCEDURE — 84439 ASSAY OF FREE THYROXINE: CPT

## 2019-06-10 PROCEDURE — 80053 COMPREHEN METABOLIC PANEL: CPT

## 2019-06-10 PROCEDURE — 82306 VITAMIN D 25 HYDROXY: CPT

## 2019-06-10 PROCEDURE — 84443 ASSAY THYROID STIM HORMONE: CPT

## 2019-06-10 PROCEDURE — 36415 COLL VENOUS BLD VENIPUNCTURE: CPT

## 2019-07-03 ENCOUNTER — OFFICE VISIT (OUTPATIENT)
Dept: PULMONOLOGY | Facility: CLINIC | Age: 74
End: 2019-07-03
Payer: COMMERCIAL

## 2019-07-03 VITALS
BODY MASS INDEX: 25.86 KG/M2 | SYSTOLIC BLOOD PRESSURE: 130 MMHG | OXYGEN SATURATION: 98 % | HEIGHT: 69 IN | TEMPERATURE: 98.1 F | HEART RATE: 74 BPM | WEIGHT: 174.6 LBS | RESPIRATION RATE: 14 BRPM | DIASTOLIC BLOOD PRESSURE: 70 MMHG

## 2019-07-03 DIAGNOSIS — R91.8 ABNORMAL CT SCAN OF LUNG: Primary | ICD-10-CM

## 2019-07-03 PROCEDURE — 99204 OFFICE O/P NEW MOD 45 MIN: CPT | Performed by: INTERNAL MEDICINE

## 2019-07-03 RX ORDER — LUTEIN 10 MG
TABLET ORAL
COMMUNITY
End: 2020-05-13 | Stop reason: ALTCHOICE

## 2019-07-03 NOTE — PROGRESS NOTES
Pulmonary Initial Visit  Rachel Garner 72 y o  female MRN: [id number]  @ Encounter: [id number]      Impressions/Recommendations:   Patient is a 44-year-old female with past medical history significant for pericarditis, mold exposure, secondhand smoke exposure who presents for evaluation of an abnormal chest CT scan  The patient Madi Iraheta has mild subpleural interstitial changes along with chronic scarring which is likely related to her previous pneumonias  The patient has no functional limitations associated with these changes  It is unclear how long these changes have been present as there is no significant previous CT scans available  The patient has a coronary calcium CT scan from DR CHHAYA SHUKLA [facility] for which we will request the records  Although the patient is not symptomatic at this time, recommend checking complete pulmonary function testing to establish a baseline into monitor for possible progression  The patient would benefit from a high-resolution CT scan which may be completed on February as this will be 1 year from the previous CT scan  Patient may follow up in February 2020 or sooner as necessary  History of Present Illness   HPI:  Rachel Garner is a 72 y o  female with pmhx sig for anxiety, pericarditis who presents for evaluation of abnormal CT scan  The patient denies respiratory difficulties as a child, teens, 25s  She did have significant second hand smoke exposure from father who passed away from lung cancer  She denies any breathing difficulties  She is able to walk without difficulty  She is not avoiding any activity because of breathing  The patient had an episode of likely viral pericarditis requiring a pericardial window  She had no subsequent difficulties  The patient worked as a  where she had dust/mold exposures and had 2 bouts of pneumonia but has no episodes since that work exposure  She has no use of inhalers or wheezing   The patient denies frequent antitbiotic usage or cancer  She has had no cardiac  She denies a personal or family history of autoimmune disease  The patient denies rash, lesions or unusual aches/pains  The patient had an episode of chest pain in January 2019 for which she received an xray  On the CXR, there was concern for a nodule  A follow up CT had no evidence of nodule but concern for mild ILD  The chest pain was consistent with acid reflux  Review of systems:  12 point review of systems was completed and was otherwise negative except as listed in HPI      Historical Information   Past Medical History:   Diagnosis Date    Anxiety     GERD (gastroesophageal reflux disease)     Pericarditis      Past Surgical History:   Procedure Laterality Date    KNEE SURGERY Left     PERICARDIAL WINDOW      DC OPEN RX DISTAL RADIUS FX, EXTRA-ARTICULAR Right 3/22/2018    Procedure: OPEN REDUCTION W/ INTERNAL FIXATION (ORIF) RADIUS, splint application;  Surgeon: Glenna Dickson MD;  Location: [facility];  Service: Orthopedics     Family History   Problem Relation Age of Onset    Lung cancer Father    Saad Livingston Parkinson White syndrome Daughter     No Known Problems Sister        Social History     Socioeconomic History    Marital status: /Civil Union     Spouse name: None    Number of children: None    Years of education: None    Highest education level: None   Occupational History    None   Social Needs    Financial resource strain: None    Food insecurity:     Worry: None     Inability: None    Transportation needs:     Medical: None     Non-medical: None   Tobacco Use    Smoking status: Never Smoker    Smokeless tobacco: Never Used   Substance and Sexual Activity    Alcohol use: No    Drug use: No    Sexual activity: None   Lifestyle    Physical activity:     Days per week: None     Minutes per session: None    Stress: None   Relationships    Social connections:     Talks on phone: None     Gets together: None     Attends [employer]ism service: None     Active member of club or organization: None     Attends meetings of clubs or organizations: None     Relationship status: None    Intimate partner violence:     Fear of current or ex partner: None     Emotionally abused: None     Physically abused: None     Forced sexual activity: None   Other Topics Concern    None   Social History Narrative    WORK:    1   (Kishor [employer]; [employer]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    2  [employer]        HOBBIES:    1  Singing    2  Dancing    3  Hx of ceramics (+ dust)        PETS:    1  Dogs    -  Denies birds        TRAVEL:    -  [place]         EXPOSURES:    Denies mold; down pillows/comforters; hot tubs       Meds/Allergies   No current facility-administered medications for this visit  (Not in a hospital admission)  Allergies   Allergen Reactions    No Known Allergies        Vitals: Blood pressure 130/70, pulse 74, temperature 98 1 °F (36 7 °C), resp  rate 14, height 5' 9" (1 753 m), weight 79 2 kg (174 lb 9 6 oz), SpO2 98 % , RA, Body mass index is 25 78 kg/m²  Physical Exam  General: Pleasant, Awake alert and oriented x 3, conversant without conversational dyspnea, NAD, normal affect  HEENT:  PERRL, Sclera noninjected, nonicteric OU, Nares patent, no nasal flaring, no nasal drainage, Mucous membranes, moist, no oral lesions, normal dentition  NECK: Trachea midline, no accessory muscle use, no stridor, no cervical or supraclavicular adenopathy, JVP not elevated  CARDIAC: Reg, single s1/S2, no m/r/g  PULM: CTA B/L  CHEST: No gross deformities, equal chest expansion on inspiration bilaterally  ABD: Normoactive bowel sounds, soft nontender, nondistended, no rebound, no rigidity, no guarding  EXT: No cyanosis, no clubbing, no edema, normal capillary refill  SKIN:  No rashes, no lesions  NEURO: no focal neurologic deficits, AAOx3, moving all extremities appropriately    Labs:  I have personally reviewed pertinent lab results  Lab Results   Component Value Date    WBC 4 98 01/07/2019    HGB 14 1 01/07/2019    HCT 42 8 01/07/2019    MCV 92 01/07/2019     01/07/2019     Lab Results   Component Value Date    CALCIUM 10 0 06/10/2019    SODIUM 143 06/10/2019    K 4 4 06/10/2019    CO2 29 06/10/2019     06/10/2019    BUN 22 06/10/2019    CREATININE 0 78 06/10/2019     Imaging and other studies: I have personally reviewed pertinent reports  and I have personally reviewed pertinent films in PACS with a Radiologist   CT Chest 2/2019:  Mild subpleural disease; scarring at the bases  Pulmonary function testing:    No pulmonary function testing available for review  Echocardiogram/Stress Test: I have personally reviewed pertinent reports  4/4/2019:  Conclusions  Summary  Echo:  Normal study  EF 65%  Normal valvular structures  Stress Echo:  Negative ECG stress test   Negative stress echo for ishcemia    Ronnie Garcia

## 2019-07-09 ENCOUNTER — HOSPITAL ENCOUNTER (OUTPATIENT)
Dept: RADIOLOGY | Facility: HOSPITAL | Age: 74
Discharge: HOME | End: 2019-07-09
Attending: PSYCHIATRY & NEUROLOGY
Payer: COMMERCIAL

## 2019-07-09 DIAGNOSIS — R20.0 LOWER EXTREMITY NUMBNESS: ICD-10-CM

## 2019-07-09 DIAGNOSIS — R42 VERTIGO: ICD-10-CM

## 2019-07-09 DIAGNOSIS — R26.81 GAIT INSTABILITY: ICD-10-CM

## 2019-07-09 PROCEDURE — 70551 MRI BRAIN STEM W/O DYE: CPT

## 2019-07-22 ENCOUNTER — OFFICE VISIT (OUTPATIENT)
Dept: FAMILY MEDICINE CLINIC | Facility: CLINIC | Age: 74
End: 2019-07-22
Payer: COMMERCIAL

## 2019-07-22 VITALS
BODY MASS INDEX: 26.97 KG/M2 | RESPIRATION RATE: 16 BRPM | HEIGHT: 67 IN | WEIGHT: 171.8 LBS | DIASTOLIC BLOOD PRESSURE: 80 MMHG | SYSTOLIC BLOOD PRESSURE: 130 MMHG | HEART RATE: 80 BPM

## 2019-07-22 DIAGNOSIS — M79.671 PAIN OF RIGHT HEEL: ICD-10-CM

## 2019-07-22 DIAGNOSIS — Z13.820 SCREENING FOR OSTEOPOROSIS: ICD-10-CM

## 2019-07-22 DIAGNOSIS — M72.2 PLANTAR FASCIITIS: ICD-10-CM

## 2019-07-22 DIAGNOSIS — Z78.0 POSTMENOPAUSAL: ICD-10-CM

## 2019-07-22 DIAGNOSIS — M25.512 ACUTE PAIN OF LEFT SHOULDER: Primary | ICD-10-CM

## 2019-07-22 DIAGNOSIS — R45.0 NERVOUSNESS: ICD-10-CM

## 2019-07-22 DIAGNOSIS — K21.00 GASTROESOPHAGEAL REFLUX DISEASE WITH ESOPHAGITIS: ICD-10-CM

## 2019-07-22 PROCEDURE — 99203 OFFICE O/P NEW LOW 30 MIN: CPT | Performed by: FAMILY MEDICINE

## 2019-07-22 RX ORDER — RANITIDINE 150 MG/1
150 CAPSULE ORAL 2 TIMES DAILY
Qty: 30 CAPSULE | Refills: 0
Start: 2019-07-22 | End: 2019-10-14 | Stop reason: ALTCHOICE

## 2019-07-22 RX ORDER — ALPRAZOLAM 0.5 MG/1
0.5 TABLET ORAL
Qty: 20 TABLET | Refills: 0
Start: 2019-07-22 | End: 2019-10-25 | Stop reason: SDUPTHER

## 2019-07-22 NOTE — PROGRESS NOTES
Assessment/Plan:  1  Acute pain of left shoulder  - we will obtain XR left shoulder and call with results, take Tylenol as needed and start Physical therapy, discussed Ortho referral if not better  - XR shoulder 2+ vw left; Future  - Ambulatory referral to Physical Therapy; Future    2  Pain of right heel/ Plantar fasciitis  - discussed stretching exercise, advised to use  heel cups in shoes and use ice after activity, start Physical therapy  - Ambulatory referral to Physical Therapy; Future    4  Gastroesophageal reflux disease with esophagitis  - doing well on Zantac, follow up with GI  - ranitidine (ZANTAC) 150 MG capsule; Take 1 capsule (150 mg total) by mouth 2 (two) times a day  Dispense: 30 capsule; Refill: 0    5  Nervousness  - continue Xanax as needed, doing well  - ALPRAZolam (XANAX) 0 5 mg tablet; Take 1 tablet (0 5 mg total) by mouth daily at bedtime as needed for anxiety or sleep  Dispense: 20 tablet; Refill: 0    6  Screening for osteoporosis  - DXA bone density spine hip and pelvis; Future       Follow up in 3 months or sooner as needed  The treatment plan was reviewed with the patient  The patient understands and agrees with the treatment plan      Subjective:   Chief Complaint   Patient presents with    Establish Care    Fatigue    Shoulder Pain     L    Foot Pain     heel       Patient ID: Ladan Coronado is a 72 y o  female with history of GERD, anxiety, pericarditis who presents today for her initial visit as a new patient to be established  She is complaining of right heel pain for the past few weeks, worse when she first gets up from bed in am after when gets up after prolonged sitting  Patient denies right foot or ankle injury, no swelling or redness  Patient is also complaining of left shoulder pain with overhead activities, no radiation of pain, no history of shoulder injury, no neck pain, no left arm weakness or paresthesias     Patient has no other complaints, she is following with GI in [place] for GERD and doing well on Zantac         The following portions of the patient's history were reviewed and updated as appropriate: allergies, current medications, past family history, past medical history, past social history, past surgical history and problem list     Past Medical History:   Diagnosis Date    Anxiety     GERD (gastroesophageal reflux disease)     Pericarditis      Past Surgical History:   Procedure Laterality Date    APPENDECTOMY      CHOLECYSTECTOMY      laparoscopic    KNEE SURGERY Left     PERICARDIAL WINDOW      [place] OPEN RX DISTAL RADIUS FX, EXTRA-ARTICULAR Right 3/22/2018    Procedure: OPEN REDUCTION W/ INTERNAL FIXATION (ORIF) RADIUS, splint application;  Surgeon: Maribell Beckett MD;  Location:  MAIN OR;  Service: Orthopedics     Family History   Problem Relation Age of Onset    Kidney failure Mother     Lung cancer Father    Patricia Palacios Parkinson White syndrome Daughter     No Known Problems Sister     Substance Abuse Neg Hx     Alcohol abuse Neg Hx     Mental illness Neg Hx      Social History     Socioeconomic History    Marital status: /Civil Union     Spouse name: Not on file    Number of children: Not on file    Years of education: Not on file    Highest education level: Not on file   Occupational History    Not on file   Social Needs    Financial resource strain: Not on file    Food insecurity:     Worry: Not on file     Inability: Not on file    Transportation needs:     Medical: Not on file     Non-medical: Not on file   Tobacco Use    Smoking status: Never Smoker    Smokeless tobacco: Never Used   Substance and Sexual Activity    Alcohol use: No    Drug use: No    Sexual activity: Not on file   Lifestyle    Physical activity:     Days per week: Not on file     Minutes per session: Not on file    Stress: Not on file   Relationships    Social connections:     Talks on phone: Not on file     Gets together: Not on file     Attends [employer]ze service: Not on file     Active member of club or organization: Not on file     Attends meetings of clubs or organizations: Not on file     Relationship status: Not on file    Intimate partner violence:     Fear of current or ex partner: Not on file     Emotionally abused: Not on file     Physically abused: Not on file     Forced sexual activity: Not on file   Other Topics Concern    Not on file   Social History Narrative    WORK:    1   ([employer]; [employer]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    2  [employer]        HOBBIES:    1  Singing    2  Dancing    3  Hx of ceramics (+ dust)        PETS:    1  Dogs    -  Denies birds        TRAVEL:    -  [place]         EXPOSURES:    Denies mold; down pillows/comforters; hot tubs       Current Outpatient Medications:     ALPRAZolam (XANAX) 0 5 mg tablet, Take 1 tablet (0 5 mg total) by mouth daily at bedtime as needed for anxiety or sleep, Disp: 20 tablet, Rfl: 0    ascorbic acid (VITAMIN C) 500 mg tablet, Take 500 mg by mouth daily, Disp: , Rfl:     aspirin 81 mg chewable tablet, Chew 1 tablet (81 mg total) daily (Patient taking differently: Chew 81 mg daily as needed ), Disp: 30 tablet, Rfl: 0    b complex vitamins tablet, Take 1 tablet by mouth daily, Disp: , Rfl:     Cholecalciferol (VITAMIN D-3) 1000 units CAPS, Daily, Disp: , Rfl:     Lutein 10 MG TABS, lutein  20 mg qd, Disp: , Rfl:     Magnesium 100 MG CAPS, magnesium  300 mg qd, Disp: , Rfl:     Multiple Vitamins-Calcium (ONE-A-DAY WOMENS PO), Take 1 tablet by mouth daily, Disp: , Rfl:     Omega-3 Fatty Acids (FISH OIL PO), Take 2 g by mouth, Disp: , Rfl:     ranitidine (ZANTAC) 150 MG capsule, Take 1 capsule (150 mg total) by mouth 2 (two) times a day, Disp: 30 capsule, Rfl: 0    Review of Systems   Constitutional: Negative for appetite change, chills, fatigue, fever and unexpected weight change  Respiratory: Negative for cough, shortness of breath and wheezing      Cardiovascular: Negative for chest pain, palpitations and leg swelling  Gastrointestinal: Negative for abdominal pain, blood in stool, constipation, diarrhea, nausea and vomiting  Genitourinary: Negative for difficulty urinating, dysuria, flank pain, frequency, hematuria, pelvic pain and urgency  Musculoskeletal:        Right heel pain and left shoulder pain   Neurological: Negative for dizziness, syncope, weakness, numbness and headaches  Hematological: Negative for adenopathy  Psychiatric/Behavioral: Negative for dysphoric mood and sleep disturbance  The patient is not nervous/anxious  Objective:    Vitals:    07/22/19 1548   BP: 130/80   BP Location: Left arm   Patient Position: Sitting   Cuff Size: Standard   Pulse: 80   Resp: 16   Weight: 77 9 kg (171 lb 12 8 oz)   Height: 5' 7" (1 702 m)        Physical Exam   Constitutional: She is oriented to person, place, and time  She appears well-developed and well-nourished  No distress  Neck: Neck supple  No thyromegaly present  Cardiovascular: Normal rate, regular rhythm and normal heart sounds  No murmur heard  Pulmonary/Chest: Effort normal  No respiratory distress  She has no wheezes  She has no rhonchi  She has no rales  Abdominal: Soft  She exhibits no distension and no mass  There is no tenderness  Musculoskeletal: She exhibits no edema  Right ankle/ foot with good ROM, no tenderness or edema, + plantar heel tenderness  Left shoulder ROM with pain on AB/ IR, no AC joint tenderness, muscle strength 5/5 in upper extremities   Lymphadenopathy:     She has no cervical adenopathy  Neurological: She is alert and oriented to person, place, and time  Psychiatric: She has a normal mood and affect   Her behavior is normal  Thought content normal        Lab Results   Component Value Date    [id number] 1 060 06/10/2019     Lab Results   Component Value Date    WBC 4 98 01/07/2019    HGB 14 1 01/07/2019    HCT 42 8 01/07/2019    MCV 92 01/07/2019    PLT 183 01/07/2019     Lab Results   Component Value Date    SODIUM 143 06/10/2019    K 4 4 06/10/2019     06/10/2019    CO2 29 06/10/2019    AGAP 6 06/10/2019    BUN 22 06/10/2019    CREATININE 0 78 06/10/2019    GLUC 71 06/10/2019    GLUF 87 01/07/2019    CALCIUM 10 0 06/10/2019    AST 20 06/10/2019    ALT 35 06/10/2019    ALKPHOS 98 06/10/2019    TP 7 1 06/10/2019    TBILI 0 62 06/10/2019    EGFR 77 06/10/2019       Stress Echocardiography 04/04/2019  Conclusions  Summary  Echo:  Normal study  EF 65%  Normal valvular structures      Stress Echo:  Negative ECG stress test   Negative stress echo for ischemia

## 2019-07-22 NOTE — PATIENT INSTRUCTIONS
Plantar Fasciitis Exercises   AMBULATORY CARE:   What you need to know about plantar fasciitis exercises:  Plantar fasciitis exercises help stretch your plantar fascia, calf muscles, and Achilles tendon  They also help strengthen the muscles that support your heel and foot  Exercises and stretching can help prevent plantar fasciitis from getting worse or coming back  Contact your healthcare provider if:   · Your pain and swelling increase  · You develop new knee, hip, or back pain  · You have questions or concerns about your condition or care  How to do plantar fasciitis exercises:  Ask your healthcare provider when to start these exercises and how often to do them  · Heel stretch:  Stand up straight with your hands on a wall  Place your injured leg slightly behind your other leg  Keep your heels flat on the floor, lean forward, and bend both knees  Hold for 30 seconds  · Calf stretch:  Stand up straight with your hands on a wall  Step forward so that your uninjured foot is in front of your injured foot  Keep your front leg bent and your back leg straight  Gently lean forward until you feel your calf stretch  Hold for 30 seconds and then relax  · Seated plantar fascia stretch:  Sit on a firm surface, such as the floor or a mat  Extend your legs out in front of you  Raise your injured foot a few inches off the ground  Keep your leg straight  Grab the toes of your injured foot and pull them toward you  With your other hand, feel your plantar fascia  You should feel it press outward  Hold for 30 seconds  If you cannot reach your toes, loop a towel or tie around your foot  Gently pull on the towel or tie and flex your toes toward you  · Heel raises:  Stand on the injured leg  Raise your other leg off the ground  Hold onto a railing or wall for balance  Slowly rise up on the toes of your injured leg  Hold for 5 seconds  Slowly lower your heel to the ground             · Toe curls: Place a towel on the floor  Put your foot flat on the towel  Grab the towel with your toes by curling them around the towel  Lift the towel up with your toes  Follow up with your healthcare provider as directed:  Write down your questions so you remember to ask them during your visits

## 2019-07-26 ENCOUNTER — TELEPHONE (OUTPATIENT)
Dept: FAMILY MEDICINE CLINIC | Facility: CLINIC | Age: 74
End: 2019-07-26

## 2019-07-26 DIAGNOSIS — M79.671 PAIN OF RIGHT HEEL: Primary | ICD-10-CM

## 2019-07-26 NOTE — TELEPHONE ENCOUNTER
Patient was in to see you recently and complained of foot pain  She said the pain is getting worse  She would like to see a foot doctor before going for PT  Can you recommend a foot doctor for her to see?     Best number for Cristela Nails:  [phone]

## 2019-07-27 NOTE — PROGRESS NOTES
[facility] Consult - Follow up   PATIENT:  Jane Fernandez  MRN:  [id number]  :  1945  DATE OF SERVICE:  2019  REFERRED BY: No ref  provider found  PMD: Anastacia Carlin MD    Assessment/Plan:     Geno Caicedo a 72 y  o  female with a past medical history of hyperparathyroidism, GERD, pericarditis, heart murmur, vitamin-D deficiency, insomnia, depression, anxiety referred here for evaluation of headache    Initial evaluation by me 2018  Follow-up 2019, 2019, 19     Dizziness/Vertigo  Gait instability  She reports a history of BPPV 2 years ago that got better with maneuvers including Epley per her description     - At visit 2019 she reported positional vertigo that improved with physical therapy maneuver 19     - She returns 2019 reporting positional vertigo again for the past week  Provoked by bending over, positions in bed  She also reports she feels like she cannot walk right and has to hold onto her  and daughter - this is new compared to the last episode  - as of 19: vertigo improved after denice hallpike at our last visit 6/3/19, lightheadedness continues occasionally  No recent falls, still occasional off balance, plans to see PT soon      Workup  - MRI Brain without contrast  19:   1  No acute intracranial abnormality  2   Mild nonspecific cerebral white matter signal abnormality, which can be seen in patients with migraines as well as microangiopathic disease  3   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  This may represent an anatomic variation    If concerned of vertebrobasilar insufficiency, consider follow-up CTA or contrast enhanced MRA imaging    - Due to MRI Brain showing signs of possible vertebrobasilar insufficiency, will order MRI head and neck with and without contrast to further evaluate in the setting of her current symptoms to rule out  - lab prior to contrast    Plan:  - return to physical therapy for gait instability     Chronic migraine without aura without status migrainosus, not intractable  Cervicalgia   She has had migraines since childhood, she has followed with neurologist in the past back in [place]   She moved here to be closer to her daughter and granddaughter in the area  She has multiple types of headaches,  bioccipital pain, mid throbbing pain  Migraine is without aura and has associated nausea sometimes vomiting, stiff and sore neck, problems with concentration, photophobia, phonophobia, osmophobia, sometimes nasal congestion, lightheadedness   Migraines worse with any movement    - as of 03/18/2019 she reports headaches are significantly improved with lifestyle interventions  - as of 06/03/2019:  headaches 6 times a month and go away with ibuprofen  She has not followed up with eye doctor but has an appointment in July  - as of 07/31/2019: as of 7/31/19: headaches 6-8 a month - often in the am or with anxiety/stress - these she can breathe through it  Morning headaches the last 4-5 days, goes away with coffee usually, if not goes away with ibuprofen  Last big migraine 9 months or so ago     Workup  - MRI Brain without contrast  7/9/19:   1  No acute intracranial abnormality    2   Mild nonspecific cerebral white matter signal abnormality, which can be seen in patients with migraines as well as microangiopathic disease vs migraine - discussed ensuring risk factor control     Preventive:  -  She has never been on a prescription  headache preventive medicine   - discussed headache preventative supplements including magnesium, and adding riboflavin   - We discussed headache hygiene and lifestyle interventions that may improve her headaches including increased hydration, higher sleep quantity, continuing to increase her physical activity    - physical therapy in the past on her neck made it worse     Abortive:  - 11/2018 prescribed rizatriptan for trial, however patient never filled as she has not had any severe migraine - never took   - Toradol IM has worked for in the past  - future options:  other triptans, steroids, indomethacin, Toradol etc      Insomnia  She reports chronic insomnia and that she currently gets less than 4 hr a night of sleep   Had initial visit 11/2018 we discussed sleep hygiene and she has stopped drinking coffee late at night which has helped some  [place] is wondering if a referral to sleep medicine would be indicated and I am happy to refer her   Likely multifactorial including possible psychogenic component versus other sleep disorder as well  - as of 06/03/2019:  Patient reports she did not go to sleep medicine appointment and tried cutting coffee and tea at dinner which helps somewhat  - as of 7/31/19: sleep sometimes good - was good for a while after starting mg, sometimes wakes up at night again now, possible symptoms of RLS and still only averaging 4-5 - will have her see  Sleep med        Patient instructions      Get the number for sleep medicine    Follow up with primary care and cardiology regarding lower extremity peripheral circulation     Lightheadedness  Rule out Vertebral basilar insufficiency  -     MRI angiogram neck with and without contrast; Future  -     MRI angiogram head with and without contrast; Future  -     Comprehensive metabolic panel; Future       Headache/migraine treatment:   Abortive medications (for immediate treatment of a headache):    It is ok to take ibuprofen, acetaminophen or naproxen (Advil, Tylenol,  Aleve, Excedrin) if they help your headaches you should limit these to No more than 3 times a week to avoid medication overuse/rebound headaches    For more severe headaches ok to take 600 mg ibuprofen     Over the counter preventive supplements for headaches/migraines   (to take every day to help prevent headaches - not to take at the time of headache):  [x] Magnesium 400mg daily (If any diarrhea or upset stomach, decrease dose  as tolerated)  [x] Riboflavin (Vitamin B2)  400mg daily   (FYI B2 may make your urine bright/neon yellow)      Lifestyle Recommendations:  [x] SLEEP - Maintain a regular sleep schedule: Adults need at least 7-8 hours of uninterrupted a night  Maintain good sleep hygiene:  Going to bed and waking up at consistent times, avoiding excessive daytime naps, avoiding caffeinated beverages in the evening, avoid excessive stimulation in the evening and generally using bed primarily for sleeping   One hour before bedtime would recommend turning lights down lower, decreasing your activity (may read quietly, listen to music at a low volume)  When you get into bed, should eliminate all technology (no texting, emailing, playing with your phone, iPad or tablet in bed)  [x] HYDRATION - Maintain good hydration   Drink  2L of fluid a day (4 typical small water bottles)  [x] DIET - Maintain good nutrition  In particular don't skip meals and try and eat healthy balanced meals regularly  [x] TRIGGERS - Look for other triggers and avoid them: Limit caffeine to 1-2 cups a day or less  Avoid dietary triggers that you have noticed bring on your headaches (this could include aged cheese, peanuts, MSG, aspartame and nitrates)      Education and Follow-up  [x] Please call with any questions or concerns  Go to the ED with any new or concerning symptoms  [x] Follow up 2 months           CC: We had the pleasure of evaluating Nella Yancey in neurological consultation today  she is L 19 y  o    right handed female who presents today for evaluation of headaches       History of Present Illness:   Interval history as of 7/31/19  - MRI Brain without contrast  7/9/19:   1  No acute intracranial abnormality  2   Mild nonspecific cerebral white matter signal abnormality, which can be seen in patients with migraines as well as microangiopathic disease  3   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    This may represent an anatomic variation    If concerned of vertebrobasilar insufficiency, consider follow-up CTA or contrast enhanced MRA imaging      - she saw pulmonary 7/3/19  - followed up with cardiology and normal stress test per patient since last visit - they wanted to start metoprolol   -  has been in and out of the hospital for diverticulitis and then surgery for removing infected intestine so that has been taking some of her time   - daughter was pregnant and last the baby after 3 months    Lightheadedness/dizziness, h/o vertigo  Mild gait instability  Denies recent falls  *2-3 weeks of pain in the bottom of her right foot that she plans to see a foot doctor   - also has decreased sensation bilateral top of the toes and into the top of the shins for about 6 months   - she has not followed up with PT as recommended for gait and lightheadedness  - lightheadedness occurs the most with getting up   - the denice hallpike last visit improved her vertigo 6/3/19    Migraines/headaches  Morning headaches the last 4-5 days, goes away with coffee usually, if not goes away with ibuprofen  Taking deep breaths and has not been having migraines   - taking magnesium and not riboflavin     Sleep   Sleep medicine  - sometimes feels sensations in bilateral shins when in bed  - falls asleep easily with TV on, stress though has affected her some  - usually have been sleeping straight through, past week wakes up to check on  once a night    --------------------    Interval history as of 06/03/2019:  -  has been following with physical therapy and has been noticing improvement in neck pain, - - first PT visit 04/01/2019 also significantly improved dizziness following 1st treatment - the found signs of BPPV - was vertigo free up for almost 2 months   - around 5/27/19: woke up and was lying in bed when suddenly had room spinning - sat up and it went away in a 3 mins - since then every day has happened when triggered - from bending over sometimes soon   - feels like she can not walk right, hold on to  and daughter   - reports LE shin numbness      - 05/02/2019 - phone conversation with Cardiology - metoprolol 25 mg long-acting  - referred to eye doctor - jonnathan in July  - referred to Sleep Medicine - did not go, tried cutting coffee and tea at dinner time and sleeps fine now   - magnesium, riboflavin - reports she is taking        Interval history as of  03/18/2019  - presented to the ED 01/06/2019 with chest pain - has a stress test next week      - her biggest complaint today is trouble sleeping, chronic dizziness   - Dizziness, history of BPPV 2 years ago, got better, scares her  Not dizzy with walking, more positional      Headaches  - never filled rizatriptan  - headaches are not really a complaint today: in a month 8 times but go away with ibuprofen   Stopped drinking caffeine at night  - was referred to physical therapy has not made an appointment  - recommended supplements including - taking magnesium, riboflavin may be in her B complex   - has joined the gym, loves walking      Plans to see eye doctor, they are tearing, no blurred or double vision         What is your current pain level - 0  Headaches started at what age? Migraines Since childhood, didn't have them when pregnant x 2  How often do the headaches occur?   *As of 11/2018:   - migraines 3-4 times a week,   - Mild throbbing pain right scalp 3-4 times a week  - lasts hours, with advil goes away most of the time  - Occasionally for sharp pain throughout head for a second or two - once a week  - Also bilateral occipital pain 1-2 times a week    *As of 3/18/19: - headaches are not really a complaint today: in a month 8 times but go away with ibuprofen   *As of 06/03/2019: headaches 6 times a month and go away with ibuprofen  *as of 7/31/19: headaches 6-8 a month - often in the am or with anxiety/stress - these she can breathe through it   Morning headaches the last 4-5 days, goes away with coffee usually, if not goes away with ibuprofen     What time of the day do the headaches start? no particular time of day  How long do the headaches last? 2-3 days in teenage years - now 1 full day and into the morning  Are you ever headache free? Yes  Prodrome of feeling like she is getting a headache  Aura? without aura  Describe your usual headache pain quality? throbbing  Where is your headache located? bilateral frontal area and bilateral occipital area, also right temporal   Does the pain Radiate? no radiation of pain  What is the intensity of pain? Up to a 10/10, at its best a 4  Associated symptoms:   [x] Nausea       [x] Vomiting - once in a while        [] Diarrhea         [] Insomnia           [x] Stiff or sore neck         [x] Problems with concentration  [x] Photophobia     [x]Phonophobia      [x] Osmophobia  [] Blurred vision   [x] Prefer quiet, dark room        [x] Light-headed or dizzy - sometimes   [] Tinnitus      Things that make the headache worse? +movement  Headache triggers:  Stress, mint,strong perfume, tobacco or fireplaces, If she goes to bed with a headache will wake up with worse one   What time of the year do headaches occur more frequently?   do not seem to be related to any time of the year  Have you seen someone else for headaches or pain? Yes, neurologist back in [place]  Have you had trigger point injection performed and how often? No  Have you had Botox injection performed and how often? No   Have you had epidural injections or transforaminal injections performed? No  Have you used CBD or THC for your headaches and how often? No  Are you current pregnant or planning on getting pregnant? No  Have you ever had any Brain imaging? yes years ago and normal     What medications do you take or have you taken for your headaches?    ABORTIVE:       - was prescribed rizatriptan but did not need it   - advil 200 mg  - my pillow seems to be helping the bioccipital      PREVENTIVE: no     Alternative therapies used in the past for headaches? Physical therapy -  For wrist, but not headache   Coffee, has one in the morning  thank out the p m  Coffee     LIFESTYLE  Sleep - "if I get 4 hours I am johnnie"   - as of 06/03/2019:  Reports improved  As of 7/31/19 - avg 4-5   - sometimes feels sensations in bilateral shins when in bed  - falls asleep easily with TV on, stress though has affected her some  - usually have been sleeping straight through, past week wakes up to check on  once a night     as of 11/2018: Is your sleep restful? No, tired  What time do you go to bed at night? 10   What time do you wake up in am? Has coffee at 4:30 am with  and goes back to sleep, otherwise wakes up at 7 am  How often do you get up at night? once  Do you wake up with headaches? Sometimes   Do you snore while asleep? No  Have you been told that you stop breathing during sleeping? No - but makes noises  Do you wake up tired in the morning? Yes  Do you take frequent naps during the day? sometimes  Do you have jaw pain? No, but has TMJ  Do you grind/clench your teeth at night? No  Do you have restless leg syndrome? No     Physical activity:   Joined [employer]  Goes once a week  Walks with her daughter  - considering line dancing or something slower at the [facility]  - has joined the gym, loves walking       Water: not enough - 4 glasses      Diet:  Do you ever skip meals?  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   Daughter, granddaughter live near by  Other [place]     Illicit Drugs: denies  Alcohol/tobacco: Denies tobacco use, alcohol intake: social drinker      Past Medical History:     Past Medical History:   Diagnosis Date    Anxiety     GERD (gastroesophageal reflux disease)     Pericarditis        Patient Active Problem List   Diagnosis    Closed fracture distal radius and ulna, right, initial encounter    Anxiety    Gastroesophageal reflux disease with esophagitis    History of pericarditis    Heart murmur    Primary hyperparathyroidism ([address] )    Thyroid nodule    Vitamin D deficiency    Chest pain    Migraine without aura and without status migrainosus, not intractable    Dizziness and giddiness    Insomnia    Cervicalgia    Abnormal CT scan of lung       Medications:      Current Outpatient Medications   Medication Sig Dispense Refill    ALPRAZolam (XANAX) 0 5 mg tablet Take 1 tablet (0 5 mg total) by mouth daily at bedtime as needed for anxiety or sleep 20 tablet 0    ascorbic acid (VITAMIN C) 500 mg tablet Take 500 mg by mouth daily      aspirin 81 mg chewable tablet Chew 1 tablet (81 mg total) daily (Patient taking differently: Chew 81 mg daily as needed ) 30 tablet 0    b complex vitamins tablet Take 1 tablet by mouth daily      Cholecalciferol (VITAMIN D-3) 1000 units CAPS Daily      Lutein 10 MG TABS lutein   20 mg qd      Magnesium 100 MG CAPS magnesium   300 mg qd      Multiple Vitamins-Calcium (ONE-A-DAY WOMENS PO) Take 1 tablet by mouth daily      Omega-3 Fatty Acids (FISH OIL PO) Take 2 g by mouth      ranitidine (ZANTAC) 150 MG capsule Take 1 capsule (150 mg total) by mouth 2 (two) times a day 30 capsule 0     No current facility-administered medications for this visit           Allergies:    No Known Allergies    Family History:     Family History   Problem Relation Age of Onset    Kidney failure Mother     Lung cancer Father    Yuliet Medrano Parkinson White syndrome Daughter     No Known Problems Sister     Substance Abuse Neg Hx     Alcohol abuse Neg Hx     Mental illness Neg Hx        Social History:     Social History     Socioeconomic History    Marital status: /Civil Union     Spouse name: Not on file    Number of children: Not on file    Years of education: Not on file    Highest education level: Not on file   Occupational History    Not on file   Social Needs    Financial resource strain: Not on file    Food insecurity:     Worry: Not on file     Inability: Not on file    Transportation needs:     Medical: Not on file     Non-medical: Not on file   Tobacco Use    Smoking status: Never Smoker    Smokeless tobacco: Never Used   Substance and Sexual Activity    Alcohol use: No    Drug use: No    Sexual activity: Not on file   Lifestyle    Physical activity:     Days per week: Not on file     Minutes per session: Not on file    Stress: Not on file   Relationships    Social connections:     Talks on phone: Not on file     Gets together: Not on file     Attends [employer] service: Not on file     Active member of club or organization: Not on file     Attends meetings of clubs or organizations: Not on file     Relationship status: Not on file    Intimate partner violence:     Fear of current or ex partner: Not on file     Emotionally abused: Not on file     Physically abused: Not on file     Forced sexual activity: Not on file   Other Topics Concern    Not on file   Social History Narrative    WORK:    1   ([employer]; [employer]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    2  [employer]        HOBBIES:    1  Singing    2  Dancing    3  Hx of ceramics (+ dust)        PETS:    1    Dogs    -  Denies birds        TRAVEL:    -  [place]         EXPOSURES:    Denies mold; down pillows/comforters; hot tubs         Objective:     Physical Exam: Vitals:            Constitutional:    /82 (BP Location: Right arm, Patient Position: Sitting, Cuff Size: Adult)   Pulse 82   Resp 14   Ht 5' 7" (1 702 m)   Wt 78 8 kg (173 lb 12 8 oz)   BMI 27 22 kg/m²   BP Readings from Last 3 Encounters:   07/31/19 120/82   07/22/19 130/80   07/03/19 130/70     Pulse Readings from Last 3 Encounters:   07/31/19 82   07/22/19 80   07/03/19 74         Well developed, well nourished, well groomed  No dysmorphic features  HEENT:  Normocephalic atraumatic  No meningismus  Oropharynx is clear and moist  No oral mucosal lesions  Chest:  Respirations regular and unlabored  Cardiovascular:  Regular rate, intact distal pulses  Distal extremities warm without palpable edema or tenderness, no observed significant swelling  Musculoskeletal:  Full range of motion  (see below under neurologic exam for evaluation of motor function and gait)   Skin:  warm and dry, not diaphoretic  No apparent birthmarks or stigmata of neurocutaneous disease  Psychiatric:  Normal behavior and appropriate affect        Neurological Examination:     Mental status/cognitive function:   Orientated to time, place and person  Recent and remote memory intact  Attention span and concentration as well as fund of knowledge are appropriate for age  Normal language and spontaneous speech  Cranial Nerves:  III, IV, VI-Pupils were equal, round, and reactive to light  Extraocular movements were full and conjugate without nystagmus  VII-facial expression symmetric, intact forehead wrinkle, strong eye closure, symmetric smile    VIII-hearing grossly intact bilaterally   Motor Exam: symmetric bulk throughout  no atrophy, fasciculations or abnormal movements noted     Coordination:  no apparent dysmetria, ataxia or tremor noted  Gait: steady casual gait         Pertinent lab results:   6/10/19 CMP unremarkable  Thyroid studies normal Vit D 28    1/07/2019:  CMP and CBC unremarkable  TSH 0 78     Imaging: I have personally reviewed imaging and radiology read   MRI Brain without contrast  7/9/19:   1  No acute intracranial abnormality  2   Mild nonspecific cerebral white matter signal abnormality, which can be seen in patients with migraines as well as microangiopathic disease  3   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  This may represent an anatomic variation  If concerned of vertebrobasilar insufficiency, consider follow-up CTA or contrast enhanced MRA imaging         Review of Systems:   ROS Obtained by MA and Personally reviewed and corrected if needed  Review of Systems   Constitutional: Positive for fatigue  Negative for appetite change and fever  HENT: Negative  Negative for hearing loss, tinnitus, trouble swallowing and voice change  Eyes: Negative  Negative for photophobia and pain  Respiratory: Negative  Negative for shortness of breath  Cardiovascular: Negative  Negative for palpitations  Gastrointestinal: Negative  Negative for nausea and vomiting  Endocrine: Negative  Negative for cold intolerance and heat intolerance  Genitourinary: Negative  Negative for dysuria, frequency and urgency  Musculoskeletal: Positive for gait problem (Balance off)  Negative for myalgias and neck pain  Skin: Negative  Negative for rash  Allergic/Immunologic: Negative  Neurological: Positive for numbness (From ankle to toes) and headaches  Negative for dizziness, tremors, seizures, syncope, facial asymmetry, speech difficulty, weakness and light-headedness  Hematological: Negative  Does not bruise/bleed easily  Psychiatric/Behavioral: Positive for sleep disturbance  Negative for confusion and hallucinations           I have spent 40 minutes with Patient  today in which greater than 50% of this time was spent in counseling/coordination of care regarding Diagnostic results, Prognosis, Risks and benefits of tx options, Intructions for management, Importance of tx compliance, Risk factor reductions and Impressions        Author:  Mesfin Sanchez MD 7/27/2019 9:57 AM

## 2019-07-31 ENCOUNTER — OFFICE VISIT (OUTPATIENT)
Dept: NEUROLOGY | Facility: CLINIC | Age: 74
End: 2019-07-31
Payer: COMMERCIAL

## 2019-07-31 VITALS
RESPIRATION RATE: 14 BRPM | DIASTOLIC BLOOD PRESSURE: 82 MMHG | HEART RATE: 82 BPM | BODY MASS INDEX: 27.28 KG/M2 | WEIGHT: 173.8 LBS | SYSTOLIC BLOOD PRESSURE: 120 MMHG | HEIGHT: 67 IN

## 2019-07-31 DIAGNOSIS — G45.0 VERTEBRAL BASILAR INSUFFICIENCY: ICD-10-CM

## 2019-07-31 DIAGNOSIS — R42 LIGHTHEADEDNESS: Primary | ICD-10-CM

## 2019-07-31 PROCEDURE — 99215 OFFICE O/P EST HI 40 MIN: CPT | Performed by: PSYCHIATRY & NEUROLOGY

## 2019-07-31 NOTE — PROGRESS NOTES
Review of Systems   Constitutional: Positive for fatigue  Negative for appetite change and fever  HENT: Negative  Negative for hearing loss, tinnitus, trouble swallowing and voice change  Eyes: Negative  Negative for photophobia and pain  Respiratory: Negative  Negative for shortness of breath  Cardiovascular: Negative  Negative for palpitations  Gastrointestinal: Negative  Negative for nausea and vomiting  Endocrine: Negative  Negative for cold intolerance and heat intolerance  Genitourinary: Negative  Negative for dysuria, frequency and urgency  Musculoskeletal: Positive for gait problem (Balance off)  Negative for myalgias and neck pain  Skin: Negative  Negative for rash  Allergic/Immunologic: Negative  Neurological: Positive for numbness (From ankle to toes) and headaches  Negative for dizziness, tremors, seizures, syncope, facial asymmetry, speech difficulty, weakness and light-headedness  Hematological: Negative  Does not bruise/bleed easily  Psychiatric/Behavioral: Positive for sleep disturbance  Negative for confusion and hallucinations

## 2019-08-05 ENCOUNTER — EVALUATION (OUTPATIENT)
Dept: PHYSICAL THERAPY | Facility: CLINIC | Age: 74
End: 2019-08-05
Payer: COMMERCIAL

## 2019-08-05 DIAGNOSIS — M72.2 PLANTAR FASCIITIS: Primary | ICD-10-CM

## 2019-08-05 DIAGNOSIS — M25.512 ACUTE PAIN OF LEFT SHOULDER: ICD-10-CM

## 2019-08-05 PROCEDURE — 97161 PT EVAL LOW COMPLEX 20 MIN: CPT | Performed by: PHYSICAL THERAPIST

## 2019-08-05 PROCEDURE — 97110 THERAPEUTIC EXERCISES: CPT | Performed by: PHYSICAL THERAPIST

## 2019-08-05 NOTE — PROGRESS NOTES
PT Evaluation     Today's date: 2019  Patient name: Alma Falcon  : 1945  MRN: [id number]  Referring provider: Crystal Gavin MD  Dx:   Encounter Diagnosis     ICD-10-CM    1  Acute pain of left shoulder M25 512 Ambulatory referral to Physical Therapy   2  Plantar fasciitis M72 2 Ambulatory referral to Physical Therapy                  Assessment  Assessment details: Alma Falcon is a 69 y o  Female who presents with L shoulder pain and R foot pain  Pt has decreased L UE strength and ROM as well as decreased R ankle ROM  Pt has pain during L UE movements and weightbearing activities  Pt currently has difficulty walking/standing >10 minutes, difficulty negotiating stairs, pain with reaching above head and behind back, and difficulty with ADL's  Pt would benefit from skilled PT to address the above impairments and to return to pain free function  Impairments: abnormal or restricted ROM, impaired physical strength, lacks appropriate home exercise program and pain with function  Functional limitations: difficulty walking/standing >10 minutes, difficulty negotiating stairs, pain with reaching above head and behind back, difficulty with ADL's  Symptom irritability: moderateUnderstanding of Dx/Px/POC: good   Prognosis: good    Goals  1  Pt will be independent with HEP upon DC  2  Pt's L UE ROM will increase bya t least 25% to allow for completing ADL's   3  Pt's R ankle flexibility will increase by 5-10 degrees to allow for stair negotiation without difficulty  4  Pt's pain will be no more than 3/10 to allow for return to walking      Plan  Patient would benefit from: skilled physical therapy  Planned modality interventions: low level laser therapy  Planned therapy interventions: joint mobilization, manual therapy, neuromuscular re-education, therapeutic activities and therapeutic exercise  Frequency: 2x week  Duration in visits: 12  Duration in weeks: 6  Treatment plan discussed with: patient        Subjective Evaluation    History of Present Illness  Date of onset: 7/15/2019  Mechanism of injury: Pt reports an insidious onset of L shoulder and R foot pain  Pt reports that pain begins in her midfoot and goes to her heel  Pt reports that shoulder pain comes and goes  Pt denies imaging  Not a recurrent problem   Quality of life: good    Pain  Current pain ratin  At best pain ratin  At worst pain rating: 10  Location: R foot  Quality: throbbing and sharp  Relieving factors: rest  Aggravating factors: standing, walking and overhead activity    Hand dominance: right      Diagnostic Tests  No diagnostic tests performed  Treatments  No previous or current treatments  Patient Goals  Patient goals for therapy: decreased pain          Objective     Static Posture     Ankle/Foot   Ankle/Foot (Left): Pes planus and pronated  Ankle/Foot (Right): Pes planus and pronated  Palpation   Left   Tenderness of the upper trapezius  Tenderness     Left Shoulder   Tenderness in the infraspinatus tendon, subscapularis tendon and supraspinatus tendon       Active Range of Motion   Left Shoulder   Flexion: 130 degrees   Abduction: 95 degrees   External rotation 90°: 80 degrees   Internal rotation 90°: 60 degrees     Right Ankle/Foot   Dorsiflexion (ke): 5 degrees     Passive Range of Motion   Left Shoulder   Flexion: 130 degrees   Abduction: 95 degrees   External rotation 90°: 80 degrees   Internal rotation 90°: 60 degrees     Right Ankle/Foot    Dorsiflexion (ke): 10 degrees     Strength/Myotome Testing     Left Shoulder     Planes of Motion   Flexion: 3+   Abduction: 4   External rotation at 90°: 4-   Internal rotation at 90°: 4     Right Ankle/Foot   Dorsiflexion: 4  Plantar flexion: 4  Inversion: 4  Eversion: 4             Precautions: none      Manual              Laser to R PF             Graston to R PF                                                        Exercise Diary   UBE             Wall stretches (gastroc/soleus) 3x30" ea            PF stretch with strap 3x30"            Pulleys (flex/ab)             Cane AAROM (IR/ext)             Cane supine AAROM (flex/ab/ER)             Supine serratus punch

## 2019-08-08 ENCOUNTER — OFFICE VISIT (OUTPATIENT)
Dept: PHYSICAL THERAPY | Facility: CLINIC | Age: 74
End: 2019-08-08
Payer: COMMERCIAL

## 2019-08-08 DIAGNOSIS — M25.512 ACUTE PAIN OF LEFT SHOULDER: Primary | ICD-10-CM

## 2019-08-08 DIAGNOSIS — M72.2 PLANTAR FASCIITIS: ICD-10-CM

## 2019-08-08 PROCEDURE — 97140 MANUAL THERAPY 1/> REGIONS: CPT

## 2019-08-08 PROCEDURE — 97110 THERAPEUTIC EXERCISES: CPT

## 2019-08-08 NOTE — PROGRESS NOTES
Daily Note     Today's date: 2019  Patient name: Alma Falcon  : 1945  MRN: [id number]  Referring provider: Crystal Gavin MD  Dx:   Encounter Diagnosis     ICD-10-CM    1  Acute pain of left shoulder M25 512    2  Plantar fasciitis M72 2                   Subjective:  Pt reports pain R PF/achilles 8/10 upon arrival to therapy  No new complaints regarding her L shoulder  Objective: See treatment diary below      Assessment: Reviewed/performed HEP  Performed new exercises w/o increasing symptoms  Added laser to R PF, f/b GT to same  Restrictions/discomfort noted during GT  Relief after tx, decreased R PF pain, 6/10  Will monitor  Patient would benefit from continued PT      Plan: Continue per plan of care        Precautions: none      Manual              Laser to R PF  4'           Graston to R PF  10'                                                      Exercise Diary             UBE  2'/2'           Wall stretches (gastroc/soleus) 3x30" ea 3x30"  ea           PF stretch with strap 3x30" 3x30"           Pulleys (flex/ab)  Flex  10x10"           Cane AAROM (IR/ext)             Cane supine AAROM (flex/ab/ER)             Supine serratus punch

## 2019-08-12 ENCOUNTER — OFFICE VISIT (OUTPATIENT)
Dept: PHYSICAL THERAPY | Facility: CLINIC | Age: 74
End: 2019-08-12
Payer: COMMERCIAL

## 2019-08-12 DIAGNOSIS — M25.512 ACUTE PAIN OF LEFT SHOULDER: Primary | ICD-10-CM

## 2019-08-12 DIAGNOSIS — M72.2 PLANTAR FASCIITIS: ICD-10-CM

## 2019-08-12 PROCEDURE — 97140 MANUAL THERAPY 1/> REGIONS: CPT

## 2019-08-12 PROCEDURE — 97110 THERAPEUTIC EXERCISES: CPT

## 2019-08-12 NOTE — PROGRESS NOTES
Daily Note     Today's date: 2019  Patient name: Shauna Obando  : 1945  MRN: [id number]  Referring provider: Mikie Canavan, MD  Dx:   Encounter Diagnosis     ICD-10-CM    1  Acute pain of left shoulder M25 512    2  Plantar fasciitis M72 2                   Subjective:  Pt reports her R heel is quite painful 8/10, which began yesterday and cont today  Notes her L shoulder is feeling better         Objective: See treatment diary below      Assessment: Performed new exercises w/o increasing symptoms  Rec laser to R PF, f/b GT to same  Restrictions/discomfort cont during GT  Will monitor  Patient would benefit from continued PT      Plan: Continue per plan of care        Precautions: none      Manual             Laser to R PF  4' 4'          Graston to R PF  10' 10'                                                     Exercise Diary            UBE  2'/2' 3'/3'          Wall stretches (gastroc/soleus) 3x30" ea 3x30"  ea 3x30"          PF stretch with strap 3x30" 3x30" 3x30"          Pulleys (flex/ab)  Flex  10x10" 10x10"  ea          Cane AAROM (IR/ext)   10x10"  ea          Cane supine AAROM (flex/ab/ER)             Supine serratus punch

## 2019-08-14 ENCOUNTER — APPOINTMENT (OUTPATIENT)
Dept: PHYSICAL THERAPY | Facility: CLINIC | Age: 74
End: 2019-08-14
Payer: COMMERCIAL

## 2019-08-16 ENCOUNTER — OFFICE VISIT (OUTPATIENT)
Dept: PODIATRY | Facility: CLINIC | Age: 74
End: 2019-08-16
Payer: COMMERCIAL

## 2019-08-16 ENCOUNTER — APPOINTMENT (OUTPATIENT)
Dept: PHYSICAL THERAPY | Facility: CLINIC | Age: 74
End: 2019-08-16
Payer: COMMERCIAL

## 2019-08-16 VITALS
DIASTOLIC BLOOD PRESSURE: 78 MMHG | SYSTOLIC BLOOD PRESSURE: 153 MMHG | WEIGHT: 173 LBS | HEART RATE: 69 BPM | HEIGHT: 67 IN | BODY MASS INDEX: 27.15 KG/M2

## 2019-08-16 DIAGNOSIS — M79.671 PAIN OF RIGHT HEEL: ICD-10-CM

## 2019-08-16 DIAGNOSIS — M72.2 PLANTAR FASCIAL FIBROMATOSIS: Primary | ICD-10-CM

## 2019-08-16 PROCEDURE — 99203 OFFICE O/P NEW LOW 30 MIN: CPT | Performed by: PODIATRIST

## 2019-08-16 NOTE — PATIENT INSTRUCTIONS
Plantar Fasciitis Exercises   WHAT YOU NEED TO KNOW:   Plantar fasciitis exercises help stretch your plantar fascia, calf muscles, and Achilles tendon  They also help strengthen the muscles that support your heel and foot  Exercises and stretching can help prevent plantar fasciitis from getting worse or coming back  DISCHARGE INSTRUCTIONS:   Contact your healthcare provider if:   · Your pain and swelling increase  · You develop new knee, hip, or back pain  · You have questions or concerns about your condition or care  How to do plantar fasciitis exercises:  Ask your healthcare provider when to start these exercises and how often to do them  · Heel stretch:  Stand up straight with your hands on a wall  Place your injured leg slightly behind your other leg  Keep your heels flat on the floor, lean forward, and bend both knees  Hold for 30 seconds  · Calf stretch:  Stand up straight with your hands on a wall  Step forward so that your uninjured foot is in front of your injured foot  Keep your front leg bent and your back leg straight  Gently lean forward until you feel your calf stretch  Hold for 30 seconds and then relax  · Seated plantar fascia stretch:  Sit on a firm surface, such as the floor or a mat  Extend your legs out in front of you  Raise your injured foot a few inches off the ground  Keep your leg straight  Grab the toes of your injured foot and pull them toward you  With your other hand, feel your plantar fascia  You should feel it press outward  Hold for 30 seconds  If you cannot reach your toes, loop a towel or tie around your foot  Gently pull on the towel or tie and flex your toes toward you  · Heel raises:  Stand on the injured leg  Raise your other leg off the ground  Hold onto a railing or wall for balance  Slowly rise up on the toes of your injured leg  Hold for 5 seconds  Slowly lower your heel to the ground             · Toe curls:  Place a towel on the floor  Put your foot flat on the towel  Grab the towel with your toes by curling them around the towel  Lift the towel up with your toes  Follow up with your healthcare provider as directed:  Write down your questions so you remember to ask them during your visits  © 2017 [employer] Information is for End User's use only and may not be sold, redistributed or otherwise used for commercial purposes  All illustrations and images included in CareNotes® are the copyrighted property of [employer] , [employer]  or [employer]  The above information is an  only  It is not intended as medical advice for individual conditions or treatments  Talk to your doctor, nurse or pharmacist before following any medical regimen to see if it is safe and effective for you

## 2019-08-16 NOTE — PROGRESS NOTES
Assessment/Plan:     Diagnoses and all orders for this visit:    Plantar fascial fibromatosis  Comments:  Right heel home and formal therapy discussed  Educated on shoe gear and support devices  Orders:  -     X-ray calcaneus right 2+ views; Future    Pain of right heel  -     Ambulatory referral to Podiatry  -     X-ray calcaneus right 2+ views; Future        Patient is only done physical therapy for 3 visits  I stressed that this takes at least a month of formal therapy a few times a week as well as compliance with home therapy  Spent 25 minutes educating the patient on formal therapy program provided exercises and demonstrated proper technique  X-ray prescription given if pain is not getting any better within 3-4 weeks she should get the x-ray before the next visit  Subjective:      Patient ID: Mary Salazar is a 69 y o  female  PAtient recently moved the the area  She has been getting heel pain on the right for a few months  Her PCP sent her for PT  Pain is at the base of the heel and posterior heel  Worse when walking  Pain is severe after rest  At PT, she is doing stretching, graston deep massage, and laser therapy  She does home exercises "most days " There was no initial trauma  The following portions of the patient's history were reviewed and updated as appropriate: allergies, current medications, past family history, past medical history, past social history, past surgical history and problem list     Review of Systems   Constitutional: Negative  Negative for fatigue and fever  Respiratory: Negative for shortness of breath  Cardiovascular: Negative for leg swelling  Musculoskeletal: Positive for arthralgias and gait problem  Neurological: Negative for numbness  Objective:      /78   Pulse 69   Ht 5' 7" (1 702 m)   Wt 78 5 kg (173 lb)   BMI 27 10 kg/m²          Physical Exam    Vitals reviewed    Constitutional: Patient is not distressed   Patient is well developed    Vascular: Dorsalis pedis and posterior tibial pulses 2/4  Capillary refill time within normal limits to all digits  No erythema  No edema  Dermatology: No rash, no open lesions  Present pedal hair  Musculoskeletal: Right heel pain and posterior heel pain  Neurological exam: Monofilament sensation intact  Vibratory sensation intact   Achilles reflex is normal

## 2019-08-19 ENCOUNTER — OFFICE VISIT (OUTPATIENT)
Dept: PHYSICAL THERAPY | Facility: CLINIC | Age: 74
End: 2019-08-19
Payer: COMMERCIAL

## 2019-08-19 DIAGNOSIS — M25.512 ACUTE PAIN OF LEFT SHOULDER: Primary | ICD-10-CM

## 2019-08-19 DIAGNOSIS — M72.2 PLANTAR FASCIITIS: ICD-10-CM

## 2019-08-19 PROCEDURE — 97110 THERAPEUTIC EXERCISES: CPT

## 2019-08-19 PROCEDURE — 97140 MANUAL THERAPY 1/> REGIONS: CPT

## 2019-08-19 NOTE — PROGRESS NOTES
Daily Note     Today's date: 2019  Patient name: Vanessa Abraham  : 1945  MRN: [id number]  Referring provider: Angie Damon MD  Dx:   Encounter Diagnosis     ICD-10-CM    1  Acute pain of left shoulder M25 512    2  Plantar fasciitis M72 2                 Subjective: Patient reports the left shoulder has been feeling better overall - recalls experiencing a sharp pain at rest approximately 2 to 3 hours ago however this quickly resolved  Patient reports the right foot was painful the other day possibly due to wearing uncomfortable shoes  Objective: See treatment diary below  Precautions: none    Manual            Laser to R PF  4' 4' 4'         Graston to R PF  10' 10' 8'                                                    Exercise Diary           UBE  2'/2' 3'/3' 3'/3'         Wall stretches (gastroc/  soleus) 3x30" ea 3x30"  ea 3x30" 30"x3         PF stretch with strap 3x30" 3x30" 3x30" 30"x3         Pulleys (flex/ab)  Flex  10x10" 10x10"  ea 10"x10         Cane AAROM (IR/ext)   10x10"  ea 10"x10         Cane supine AAROM (flex/ab/ER)             Supine serratus punch                                                                                                                                                                                        Assessment: Therapeutic exercise program is tolerated well  Pain relief reported with IASTM to the right heel and plantar fascia  Plan: Continue treatment as per PT plan of care

## 2019-08-23 ENCOUNTER — OFFICE VISIT (OUTPATIENT)
Dept: PHYSICAL THERAPY | Facility: CLINIC | Age: 74
End: 2019-08-23
Payer: COMMERCIAL

## 2019-08-23 DIAGNOSIS — M72.2 PLANTAR FASCIITIS: ICD-10-CM

## 2019-08-23 DIAGNOSIS — M25.512 ACUTE PAIN OF LEFT SHOULDER: Primary | ICD-10-CM

## 2019-08-23 PROCEDURE — 97110 THERAPEUTIC EXERCISES: CPT

## 2019-08-23 PROCEDURE — 97140 MANUAL THERAPY 1/> REGIONS: CPT

## 2019-08-23 NOTE — PROGRESS NOTES
Daily Note     Today's date: 2019  Patient name: Javon Plummer  : 1945  MRN: [id number]  Referring provider: Ceci Gautam MD  Dx:   Encounter Diagnosis     ICD-10-CM    1  Acute pain of left shoulder M25 512    2  Plantar fasciitis M72 2                 Subjective: Patient reports the right foot is "getting better "  Patient reports she has been able to walk in the store without limping  Patient reports the left shoulder is feeling "pretty good "  Patient reports compliance with the HEP  Objective: See treatment diary below  Precautions: none    Manual           Laser to R PF  4' 4' 4' 4'        Graston to R PF  10' 10' 8' 8'                                                   Exercise Diary          UBE  2'/2' 3'/3' 3'/3' 3'/3'        Wall stretches (gastroc/  soleus) 3x30" ea 3x30"  ea 3x30" 30"x3 30"x3        PF stretch with strap 3x30" 3x30" 3x30" 30"x3 30"x3        Pulleys (flex/ab)  Flex  10x10" 10x10"  ea 10"x10 10"x10        Cane AAROM (IR/ext)   10x10"  ea 10"x10 10"x10        Cane supine AAROM (flex/ab/ER)             Supine serratus punch                                                                                                                                                                                        Assessment: Therapeutic exercise program is tolerated well  Pain relief reported with IASTM to the right heel and plantar fascia  Plan: Continue treatment as per PT plan of care

## 2019-08-26 ENCOUNTER — OFFICE VISIT (OUTPATIENT)
Dept: PHYSICAL THERAPY | Facility: CLINIC | Age: 74
End: 2019-08-26
Payer: COMMERCIAL

## 2019-08-26 DIAGNOSIS — M25.512 ACUTE PAIN OF LEFT SHOULDER: Primary | ICD-10-CM

## 2019-08-26 DIAGNOSIS — M72.2 PLANTAR FASCIITIS: ICD-10-CM

## 2019-08-26 PROCEDURE — 97140 MANUAL THERAPY 1/> REGIONS: CPT | Performed by: PHYSICAL THERAPIST

## 2019-08-26 PROCEDURE — 97110 THERAPEUTIC EXERCISES: CPT | Performed by: PHYSICAL THERAPIST

## 2019-08-26 NOTE — PROGRESS NOTES
Daily Note     Today's date: 2019  Patient name: Mary Salazar  : 1945  MRN: [id number]  Referring provider: Facundo Lanier MD  Dx:   Encounter Diagnosis     ICD-10-CM    1  Acute pain of left shoulder M25 512    2  Plantar fasciitis M72 2                 Subjective: States that her foot is getting better, was able to put weight through it while walking over the weekend  Objective: See treatment diary below  Precautions: none    Manual          Laser to R PF  4' 4' 4' 4' 4'       Graston to R PF  10' 10' 8' 8' 8'                                                  Exercise Diary         UBE  2'/2' 3'/3' 3'/3' 3'/3' 3'/3'       Wall stretches (gastroc/  soleus) 3x30" ea 3x30"  ea 3x30" 30"x3 30"x3 30"x3       PF stretch with strap 3x30" 3x30" 3x30" 30"x3 30"x3 30"x3       Pulleys (flex/ab)  Flex  10x10" 10x10"  ea 10"x10 10"x10 -       Cane AAROM (IR/ext)   10x10"  ea 10"x10 10"x10 10"x10       Cane supine AAROM (flex/ab/ER)      10"x10       Supine serratus punch             I, Y, T      x12 each       90/90 horiz abd @ wall      3x8x5"                                                                                                                                                        Assessment: Progressed to RTC strengthening exercises this session w/o reports of pain  Demonstrated moderate fatigue after exercise  Pt would benefit from continued PT services to reduce pain and increase level of function  Plan: Continue treatment as per PT plan of care

## 2019-08-30 ENCOUNTER — OFFICE VISIT (OUTPATIENT)
Dept: PHYSICAL THERAPY | Facility: CLINIC | Age: 74
End: 2019-08-30
Payer: COMMERCIAL

## 2019-08-30 DIAGNOSIS — M72.2 PLANTAR FASCIITIS: ICD-10-CM

## 2019-08-30 DIAGNOSIS — M25.512 ACUTE PAIN OF LEFT SHOULDER: Primary | ICD-10-CM

## 2019-08-30 PROCEDURE — 97140 MANUAL THERAPY 1/> REGIONS: CPT

## 2019-08-30 PROCEDURE — 97110 THERAPEUTIC EXERCISES: CPT

## 2019-08-30 NOTE — PROGRESS NOTES
Daily Note     Today's date: 2019  Patient name: Stacy Darling  : 1945  MRN: [id number]  Referring provider: David Bassett MD  Dx:   Encounter Diagnosis     ICD-10-CM    1  Acute pain of left shoulder M25 512    2  Plantar fasciitis M72 2                 Subjective: Pt reports discomfort R achilles  Notes her L shoulder has been feeling "pretty good "  Objective: See treatment diary below  Precautions: none    Manual         Laser to R PF  4' 4' 4' 4' 4' 4'      Graston to R PF  10' 10' 8' 8' 8' 8'                                                 Exercise Diary        UBE  2'/2' 3'/3' 3'/3' 3'/3' 3'/3' 3'/3'      Wall stretches (gastroc/  soleus) 3x30" ea 3x30"  ea 3x30" 30"x3 30"x3 30"x3 30"x3      PF stretch with strap 3x30" 3x30" 3x30" 30"x3 30"x3 30"x3 30"x3      Pulleys (flex/ab)  Flex  10x10" 10x10"  ea 10"x10 10"x10 -       Cane AAROM (IR/ext)   10x10"  ea 10"x10 10"x10 10"x10 10"x10      Cane supine AAROM (flex/ab/ER)      10"x10 10x10"      Supine serratus punch             I, Y, T      x12 each x12  each      90/90 horiz abd @ wall      3x8x5" 3x8x5"                                                                                                                                                       Assessment: Supine wand flex and ABD exercises limited by pain  Performed remaining exercises w/o complaint  Fatigue after exercise cont  Will monitor  Pt would benefit from continued PT services to reduce pain and increase level of function  Plan: Continue treatment as per PT plan of care

## 2019-09-06 ENCOUNTER — OFFICE VISIT (OUTPATIENT)
Dept: PHYSICAL THERAPY | Facility: CLINIC | Age: 74
End: 2019-09-06
Payer: COMMERCIAL

## 2019-09-06 ENCOUNTER — TELEPHONE (OUTPATIENT)
Dept: CARDIOLOGY CLINIC | Facility: CLINIC | Age: 74
End: 2019-09-06

## 2019-09-06 DIAGNOSIS — M72.2 PLANTAR FASCIITIS: ICD-10-CM

## 2019-09-06 DIAGNOSIS — M25.512 ACUTE PAIN OF LEFT SHOULDER: Primary | ICD-10-CM

## 2019-09-06 PROCEDURE — 97110 THERAPEUTIC EXERCISES: CPT

## 2019-09-06 PROCEDURE — 97140 MANUAL THERAPY 1/> REGIONS: CPT

## 2019-09-06 NOTE — TELEPHONE ENCOUNTER
Attempted to contact patient to see if she has ever been to a cardiologist  Provided name and call back number for patient to return call

## 2019-09-06 NOTE — PROGRESS NOTES
Daily Note     Today's date: 2019  Patient name: Stacy Darling  : 1945  MRN: [id number]  Referring provider: David Bassett MD  Dx:   Encounter Diagnosis     ICD-10-CM    1  Acute pain of left shoulder M25 512    2  Plantar fasciitis M72 2                 Subjective: Pt reports discomfort around the border of her R heel and the arch of her foot  Cont to note her L shoulder is feeling good  Objective: See treatment diary below  Precautions: none    Manual        Laser to R PF  4' 4' 4' 4' 4' 4' 4'     Graston to R PF  10' 10' 8' 8' 8' 8' 8'                                                Exercise Diary  6     UBE  2'/2' 3'/3' 3'/3' 3'/3' 3'/3' 3'/3' 3'/3'     Wall stretches (gastroc/  soleus) 3x30" ea 3x30"  ea 3x30" 30"x3 30"x3 30"x3 30"x3 30"x3     PF stretch with strap 3x30" 3x30" 3x30" 30"x3 30"x3 30"x3 30"x3 30"x3     Pulleys (flex/ab)  Flex  10x10" 10x10"  ea 10"x10 10"x10 -       Cane AAROM (IR/ext)   10x10"  ea 10"x10 10"x10 10"x10 10"x10 10x10"     Cane supine AAROM (flex/ab/ER)      10"x10 10x10" 10x10"     Supine serratus punch             I, Y, T      x12 each x12  each x12  each     90/90 horiz abd @ wall      3x8x5" 3x8x5" 10x5"                                                                                                                                                      Assessment: Supine wand flex and ABD exercises remain limited by pain  Performed remaining exercises w/o difficulty or discomfort  Appeared less fatigued after exercise  Will monitor  Pt would benefit from continued PT services to reduce pain and increase level of function  Plan: Continue treatment as per PT plan of care

## 2019-09-09 ENCOUNTER — APPOINTMENT (OUTPATIENT)
Dept: PHYSICAL THERAPY | Facility: CLINIC | Age: 74
End: 2019-09-09
Payer: COMMERCIAL

## 2019-09-12 ENCOUNTER — OFFICE VISIT (OUTPATIENT)
Dept: PODIATRY | Facility: CLINIC | Age: 74
End: 2019-09-12
Payer: COMMERCIAL

## 2019-09-12 VITALS — BODY MASS INDEX: 27.15 KG/M2 | WEIGHT: 173 LBS | HEIGHT: 67 IN

## 2019-09-12 DIAGNOSIS — M72.2 PLANTAR FASCIITIS: Primary | ICD-10-CM

## 2019-09-12 DIAGNOSIS — M79.671 RIGHT FOOT PAIN: ICD-10-CM

## 2019-09-12 PROCEDURE — 99213 OFFICE O/P EST LOW 20 MIN: CPT | Performed by: PODIATRIST

## 2019-09-12 NOTE — PROGRESS NOTES
Assessment/Plan:      Diagnoses and all orders for this visit:    Plantar fasciitis    Right foot pain      Patients heel pain is resolving with conservative care, formal PT, and shoe gear modification  She is pleased with her results so far  Conitinue with PT another month  If she regresses or stalls in improvement she may call  Stressed compliance and reinforced good PT exercises  Subjective:     Patient ID: Nela Cee is a 69 y o  female  PAtient returns for care of right heel pain  She has been going to formal PT  She feels 60% better after a month  She is very compliant with home exercises  She doesn't ice it  She really feels the deep laser therapy helps with the graston technique  Review of Systems   Constitutional: Negative  Cardiovascular: Negative for leg swelling  Musculoskeletal: Positive for arthralgias  Neurological: Negative for numbness  Objective:     Physical Exam    Vitals reviewed    Constitutional: Patient is not distressed  Patient is well developed    Vascular: Dorsalis pedis and posterior tibial pulses 2/4  Capillary refill time within normal limits to all digits  No erythema  No edema  Dermatology: No rash, no open lesions  Present pedal hair  Musculoskeletal: Normal range of motion to ankle, subtalar joint, and midtarsal joint  Normal range of motion first MTPJ  Manual muscle testing 5 out of 5 for inversion/eversion/dorsiflexion/plantarflexion  Tenderness to plantar right heel  Fascia is palpable and not painful in the arch, only at insertion  Neurological exam: Monofilament sensation intact  Vibratory sensation intact   Achilles reflex is normal

## 2019-09-13 ENCOUNTER — EVALUATION (OUTPATIENT)
Dept: PHYSICAL THERAPY | Facility: CLINIC | Age: 74
End: 2019-09-13
Payer: COMMERCIAL

## 2019-09-13 ENCOUNTER — OFFICE VISIT (OUTPATIENT)
Dept: CARDIOLOGY CLINIC | Facility: CLINIC | Age: 74
End: 2019-09-13
Payer: COMMERCIAL

## 2019-09-13 VITALS
SYSTOLIC BLOOD PRESSURE: 126 MMHG | WEIGHT: 176 LBS | BODY MASS INDEX: 27.62 KG/M2 | HEART RATE: 65 BPM | DIASTOLIC BLOOD PRESSURE: 78 MMHG | HEIGHT: 67 IN

## 2019-09-13 DIAGNOSIS — M72.2 PLANTAR FASCIITIS: ICD-10-CM

## 2019-09-13 DIAGNOSIS — Z76.89 ESTABLISHING CARE WITH NEW DOCTOR, ENCOUNTER FOR: Primary | ICD-10-CM

## 2019-09-13 DIAGNOSIS — I83.813 VARICOSE VEINS OF BOTH LOWER EXTREMITIES WITH PAIN: ICD-10-CM

## 2019-09-13 DIAGNOSIS — M25.512 ACUTE PAIN OF LEFT SHOULDER: Primary | ICD-10-CM

## 2019-09-13 DIAGNOSIS — R07.9 CHEST PAIN, UNSPECIFIED TYPE: ICD-10-CM

## 2019-09-13 DIAGNOSIS — Z86.79 HISTORY OF PERICARDITIS: ICD-10-CM

## 2019-09-13 PROCEDURE — 93000 ELECTROCARDIOGRAM COMPLETE: CPT | Performed by: INTERNAL MEDICINE

## 2019-09-13 PROCEDURE — 97110 THERAPEUTIC EXERCISES: CPT | Performed by: PHYSICAL THERAPIST

## 2019-09-13 PROCEDURE — 99204 OFFICE O/P NEW MOD 45 MIN: CPT | Performed by: INTERNAL MEDICINE

## 2019-09-13 PROCEDURE — 97140 MANUAL THERAPY 1/> REGIONS: CPT | Performed by: PHYSICAL THERAPIST

## 2019-09-13 RX ORDER — THIAMINE HCL 50 MG
50 TABLET ORAL DAILY
COMMUNITY
End: 2020-06-05 | Stop reason: ALTCHOICE

## 2019-09-13 RX ORDER — IBUPROFEN 200 MG
TABLET ORAL EVERY 6 HOURS PRN
COMMUNITY
End: 2020-04-03 | Stop reason: ALTCHOICE

## 2019-09-13 NOTE — PROGRESS NOTES
Daily Note/Re-evaluation    Today's date: 2019  Patient name: Nela Cee  : 1945  MRN: [id number]  Referring provider: Angelia Morillo MD  Dx:   Encounter Diagnosis     ICD-10-CM    1  Acute pain of left shoulder M25 512    2  Plantar fasciitis M72 2                              Assessment  Pt has made gains in L UE strength and ROM as well as R ankle flexibility  Pt reports a decrease in overall pain levels  Pt reports improvements in stair negotiation and can now ambulate up to 20 minutes at a time  Pt no longer has difficulty with reaching above head but continues to have pain with reaching behind back  Pt would benefit from continued skilled PT to further address impairments and to return to PLOF  Goals  1  Pt will be independent with HEP upon DC -ongoing  2  Pt's L UE ROM will increase by at least 25% to allow for completing ADL's - ongoing  3  Pt's R ankle flexibility will increase by 5-10 degrees to allow for stair negotiation without difficulty  - met  4  Pt's pain will be no more than 3/10 to allow for return to walking - met    Plan  Patient would benefit from: skilled physical therapy  Planned modality interventions: low level laser therapy  Planned therapy interventions: joint mobilization, manual therapy, neuromuscular re-education, therapeutic activities and therapeutic exercise  Frequency: 2x week  Duration in visits: 8  Duration in weeks: 4  Treatment plan discussed with: patient        Subjective Evaluation        Pain  Current pain ratin  At best pain ratin  At worst pain ratin  Location: R foot    Patient Goals  Patient goals for therapy: decreased pain          Objective     Static Posture     Ankle/Foot   Ankle/Foot (Left): Pes planus and pronated  Ankle/Foot (Right): Pes planus and pronated           Active Range of Motion   Left Shoulder   Flexion: 140 degrees   Abduction: 120 degrees   External rotation 90°: 80 degrees   Internal rotation 90°: 60 degrees     Right Ankle/Foot   Dorsiflexion (ke): 10 degrees     Passive Range of Motion   Left Shoulder   Flexion: 145 degrees   Abduction: 125 degrees   External rotation 90°: 85 degrees   Internal rotation 90°: 60 degrees     Right Ankle/Foot    Dorsiflexion (ke): 15 degrees     Strength/Myotome Testing     Left Shoulder     Planes of Motion   Flexion: 4-  Abduction: 4-  External rotation at 90°: 4  Internal rotation at 90°: 4     Right Ankle/Foot   Dorsiflexion: 4  Plantar flexion: 4  Inversion: 4  Eversion: 4        Objective: See treatment diary below       Precautions: none    Manual   8/8 8/12 8/19 8/23 8/26 8/30 9/6 9/13    Laser to R PF  4' 4' 4' 4' 4' 4' 4' 4'    Graston to R PF  10' 10' 8' 8' 8' 8' 8' 8'                                               Exercise Diary  8/5 8/8 8/12 8/19 8/23 8/26 8/30 9/6 9/13    UBE  2'/2' 3'/3' 3'/3' 3'/3' 3'/3' 3'/3' 3'/3' 3'/3'    Wall stretches (gastroc/  soleus) 3x30" ea 3x30"  ea 3x30" 30"x3 30"x3 30"x3 30"x3 30"x3 HEP    PF stretch with strap 3x30" 3x30" 3x30" 30"x3 30"x3 30"x3 30"x3 30"x3 HEP    Pulleys (flex/ab)  Flex  10x10" 10x10"  ea 10"x10 10"x10 -   10x10" DC   Cane AAROM (IR/ext)   10x10"  ea 10"x10 10"x10 10"x10 10"x10 10x10" 10x10"     Cane supine AAROM (flex/ab/ER)      10"x10 10x10" 10x10" DC    IR stretch with strap         3x30"    Posture tband             Supine flexion             SL abduction             Supine serratus punch             I, Y, T      x12 each x12  each x12  each     Standing 3 way arm lift         [id number]

## 2019-09-13 NOTE — PROGRESS NOTES
Cardiology   Braydon Edward 69 y o  female MRN: [id number]        Impression:  1  Chest pain - due to GERD  2  Pericarditis - asymptomatic since pericardial window 2009  3  Varicose veins    Recommendations:  1  Continue current medications  2  Refer to vascular surgery for evaluation of varicose veins  3  Follow up in one year  HPI: Braydon Edward is a 69y o  year old female with a history of pericarditis, chest pain (thought to be due to gastritis) who presents for establishment of a cardiologist   Had stress echo 4/19 which demonstrated no ischemia, and echo demonstrated a structurally normal heart  CT calcium score 0  Currently on ranitidine which has resolved the symptoms  Of note in 2009, had viral pericarditis and underwent pericardial window  No chest pain, shortness of breath, or palpitations  Does not exercise on a regular basis  Patient does have painful varicose veins  Review of Systems   Constitutional: Negative  HENT: Negative  Eyes: Negative  Respiratory: Negative for chest tightness and shortness of breath  Cardiovascular: Negative for chest pain, palpitations and leg swelling  Gastrointestinal: Negative  Endocrine: Negative  Genitourinary: Negative  Musculoskeletal: Negative  Skin: Negative  Allergic/Immunologic: Negative  Neurological: Negative  Hematological: Negative  Psychiatric/Behavioral: Negative  All other systems reviewed and are negative          Past Medical History:   Diagnosis Date    Anxiety     GERD (gastroesophageal reflux disease)     Pericarditis      Past Surgical History:   Procedure Laterality Date    APPENDECTOMY      CHOLECYSTECTOMY      laparoscopic    KNEE SURGERY Left     PERICARDIAL WINDOW      OK OPEN RX DISTAL RADIUS FX, EXTRA-ARTICULAR Right 3/22/2018    Procedure: OPEN REDUCTION W/ INTERNAL FIXATION (ORIF) RADIUS, splint application;  Surgeon: Lin Avendano MD;  Location: [facility];  Service: Orthopedics     Social History     Substance and Sexual Activity   Alcohol Use No     Social History     Substance and Sexual Activity   Drug Use No     Social History     Tobacco Use   Smoking Status Never Smoker   Smokeless Tobacco Never Used     Family History   Problem Relation Age of Onset    Kidney failure Mother     Lung cancer Father    Pratibha Scherer Parkinson White syndrome Daughter     No Known Problems Sister     Substance Abuse Neg Hx     Alcohol abuse Neg Hx     Mental illness Neg Hx        Allergies:  No Known Allergies    Medications:     Current Outpatient Medications:     ALPRAZolam (XANAX) 0 5 mg tablet, Take 1 tablet (0 5 mg total) by mouth daily at bedtime as needed for anxiety or sleep, Disp: 20 tablet, Rfl: 0    ascorbic acid (VITAMIN C) 500 mg tablet, Take 500 mg by mouth daily, Disp: , Rfl:     aspirin 81 mg chewable tablet, Chew 1 tablet (81 mg total) daily (Patient taking differently: Chew 81 mg daily as needed ), Disp: 30 tablet, Rfl: 0    b complex vitamins tablet, Take 1 tablet by mouth daily, Disp: , Rfl:     Cholecalciferol (VITAMIN D-3) 1000 units CAPS, Daily, Disp: , Rfl:     ibuprofen (MOTRIN) 200 mg tablet, Take by mouth every 6 (six) hours as needed for mild pain, Disp: , Rfl:     Lutein 10 MG TABS, lutein  20 mg qd, Disp: , Rfl:     Magnesium 100 MG CAPS, magnesium  300 mg qd, Disp: , Rfl:     Multiple Vitamins-Calcium (ONE-A-DAY WOMENS PO), Take 1 tablet by mouth daily, Disp: , Rfl:     ranitidine (ZANTAC) 150 MG capsule, Take 1 capsule (150 mg total) by mouth 2 (two) times a day, Disp: 30 capsule, Rfl: 0    thiamine (VITAMIN B1) 50 mg tablet, Take 50 mg by mouth daily, Disp: , Rfl:     Omega-3 Fatty Acids (FISH OIL PO), Take 2 g by mouth, Disp: , Rfl:       Wt Readings from Last 3 Encounters:   09/13/19 79 8 kg (176 lb)   09/12/19 78 5 kg (173 lb)   08/16/19 78 5 kg (173 lb)     Temp Readings from Last 3 Encounters:   07/03/19 98 1 °F (36 7 °C)   04/26/19 99 °F (37 2 °C)   01/07/19 98 °F (36 7 °C) (Oral)     BP Readings from Last 3 Encounters:   09/13/19 126/78   08/16/19 153/78   07/31/19 120/82     Pulse Readings from Last 3 Encounters:   09/13/19 65   08/16/19 69   07/31/19 82         Physical Exam   Constitutional: She is oriented to person, place, and time  She appears well-developed  HENT:   Head: Atraumatic  Eyes: EOM are normal    Neck: Normal range of motion  Cardiovascular: Normal rate, regular rhythm and normal heart sounds  Exam reveals no gallop and no friction rub  No murmur heard  Pulmonary/Chest: Effort normal and breath sounds normal  No respiratory distress  Abdominal: Soft  Musculoskeletal: Normal range of motion  Neurological: She is alert and oriented to person, place, and time  Skin: Skin is warm and dry  Psychiatric: She has a normal mood and affect           Laboratory Studies:  CMP:  Lab Results   Component Value Date    K 4 4 06/10/2019     06/10/2019    CO2 29 06/10/2019    BUN 22 06/10/2019    CREATININE 0 78 06/10/2019    AST 20 06/10/2019    ALT 35 06/10/2019    EGFR 77 06/10/2019       Lipid Profile:   No results found for: CHOL  Lab Results   Component Value Date    HDL 62 (H) 01/07/2019     Lab Results   Component Value Date    LDLCALC 124 (H) 01/07/2019     Lab Results   Component Value Date    TRIG 90 01/07/2019       Cardiac testing:   EKG reviewed personally: SUREKHA Bello

## 2019-09-13 NOTE — PATIENT INSTRUCTIONS
Recommendations:  1  Continue current medications  2  Refer to vascular surgery for evaluation of varicose veins  3  Follow up in one year

## 2019-09-16 ENCOUNTER — TELEPHONE (OUTPATIENT)
Dept: FAMILY MEDICINE CLINIC | Facility: CLINIC | Age: 74
End: 2019-09-16

## 2019-09-16 ENCOUNTER — APPOINTMENT (OUTPATIENT)
Dept: PHYSICAL THERAPY | Facility: CLINIC | Age: 74
End: 2019-09-16
Payer: COMMERCIAL

## 2019-09-16 NOTE — TELEPHONE ENCOUNTER
Patient saw on the news that Zantac causes cancer  She no longer wants to take it  Can you prescribe something else that will address her Acid Reflex?     She uses the [facility] in [place] number for [place]:  [phone]

## 2019-09-16 NOTE — TELEPHONE ENCOUNTER
I believe she is seeing GI in [place] and should check with them what medication they would recommend  If she is no longer following with GI, I recommend to try Pepcid

## 2019-09-20 ENCOUNTER — OFFICE VISIT (OUTPATIENT)
Dept: PHYSICAL THERAPY | Facility: CLINIC | Age: 74
End: 2019-09-20
Payer: COMMERCIAL

## 2019-09-20 DIAGNOSIS — M72.2 PLANTAR FASCIITIS: ICD-10-CM

## 2019-09-20 DIAGNOSIS — M25.512 ACUTE PAIN OF LEFT SHOULDER: Primary | ICD-10-CM

## 2019-09-20 PROCEDURE — 97140 MANUAL THERAPY 1/> REGIONS: CPT

## 2019-09-20 PROCEDURE — 97110 THERAPEUTIC EXERCISES: CPT

## 2019-09-20 NOTE — PROGRESS NOTES
Daily Note     Today's date: 2019  Patient name: Jovani Vanegas  : 1945  MRN: [id number]  Referring provider: Tomas Castro MD  Dx:   Encounter Diagnosis     ICD-10-CM    1  Acute pain of left shoulder M25 512    2  Plantar fasciitis M72 2                   Subjective: Reports foot is getting better  Notes muscular pain along UT  Objective: See treatment diary below      Assessment: Initiated TB exercises for postural strengthening  Patient needing some VC to avoid UT substitution  Initially noted some pain in shoulder with serratus punches, but improved after performing some self stretches  Plan: Progress treatment as tolerated         Precautions: none    Manual      Laser to R PF  4' 4' 4' 4' 4' 4' 4' 4' 4'   Graston to R PF  10' 10' 8' 8' 8' 8' 8' 8' 8'                                                Exercise Diary     UBE  2'/2' 3'/3' 3'/3' 3'/3' 3'/3' 3'/3' 3'/3' 3'/3' 3/3'   Wall stretches (gastroc/  soleus) 3x30" ea 3x30"  ea 3x30" 30"x3 30"x3 30"x3 30"x3 30"x3 HEP    PF stretch with strap 3x30" 3x30" 3x30" 30"x3 30"x3 30"x3 30"x3 30"x3 HEP    Pulleys (flex/ab)  Flex  10x10" 10x10"  ea 10"x10 10"x10 -   10x10" DC   Cane AAROM (IR/ext)   10x10"  ea 10"x10 10"x10 10"x10 10"x10 10x10" 10x10"     Cane supine AAROM (flex/ab/ER)      10"x10 10x10" 10x10" DC Flex/ER 10''x10   IR stretch with strap         3x30" 3x30''   Posture tband          Rows & Ext, Red TB, 2x10   Supine flexion             SL abduction             Supine serratus punch          5'', 10x   I, Y, T      x12 each x12  each x12  each  deferred   Standing 3 way arm lift         x15ea 10x   scap retractions w/ TB          Red TB, 2x10

## 2019-09-23 ENCOUNTER — OFFICE VISIT (OUTPATIENT)
Dept: PHYSICAL THERAPY | Facility: CLINIC | Age: 74
End: 2019-09-23
Payer: COMMERCIAL

## 2019-09-23 DIAGNOSIS — M25.512 ACUTE PAIN OF LEFT SHOULDER: Primary | ICD-10-CM

## 2019-09-23 DIAGNOSIS — M72.2 PLANTAR FASCIITIS: ICD-10-CM

## 2019-09-23 PROCEDURE — 97140 MANUAL THERAPY 1/> REGIONS: CPT | Performed by: PHYSICAL THERAPIST

## 2019-09-23 PROCEDURE — 97110 THERAPEUTIC EXERCISES: CPT | Performed by: PHYSICAL THERAPIST

## 2019-09-23 NOTE — PROGRESS NOTES
Daily Note     Today's date: 2019  Patient name: Jules Biggs  : 1945  MRN: [id number]  Referring provider: Carmen Mcintyre MD  Dx:   Encounter Diagnosis     ICD-10-CM    1  Acute pain of left shoulder M25 512    2  Plantar fasciitis M72 2                   Subjective: "My foot is pretty much good  My shoulder gives me pain here and there "      Objective: See treatment diary below      Assessment: Pt tolerates all tx without difficulty  Reports that she does PF stretches daily and it is helping her  Plan: Continue per plan of care        Precautions: none    Manual     Laser to R PF 4'        4' 4'   Graston to R PF 8'        8' 8'                                                Exercise Diary     UBE 3'/3'        3'/3' 3/3'   Wall stretches (gastroc/  soleus) HEP        HEP    PF stretch with strap HEP        HEP    Pulleys (flex/ab) DC        10x10" DC   Cane AAROM (IR/ext) 10x10"        10x10"     Cane supine AAROM (flex/ab/ER) Flex/ER 10"x10        DC Flex/ER 10''x10   IR stretch with strap 3x30"        3x30" 3x30''   Posture tband RTB x15ea         Rows & Ext, Red TB, 2x10   Supine flexion x15            SL abduction x15            Supine serratus punch x15         5'', 10x   Standing 3 way arm lift x10        x15ea 10x

## 2019-09-25 ENCOUNTER — APPOINTMENT (OUTPATIENT)
Dept: LAB | Facility: HOSPITAL | Age: 74
End: 2019-09-25
Attending: PSYCHIATRY & NEUROLOGY
Payer: COMMERCIAL

## 2019-09-25 DIAGNOSIS — G45.0 VERTEBRAL BASILAR INSUFFICIENCY: ICD-10-CM

## 2019-09-25 DIAGNOSIS — R42 LIGHTHEADEDNESS: ICD-10-CM

## 2019-09-25 LAB
ALBUMIN SERPL BCP-MCNC: 3.5 G/DL (ref 3.5–5)
ALP SERPL-CCNC: 103 U/L (ref 46–116)
ALT SERPL W P-5'-P-CCNC: 30 U/L (ref 12–78)
ANION GAP SERPL CALCULATED.3IONS-SCNC: 2 MMOL/L (ref 4–13)
AST SERPL W P-5'-P-CCNC: 19 U/L (ref 5–45)
BILIRUB SERPL-MCNC: 0.84 MG/DL (ref 0.2–1)
BUN SERPL-MCNC: 20 MG/DL (ref 5–25)
CALCIUM SERPL-MCNC: 9.6 MG/DL (ref 8.3–10.1)
CHLORIDE SERPL-SCNC: 109 MMOL/L (ref 100–108)
CO2 SERPL-SCNC: 29 MMOL/L (ref 21–32)
CREAT SERPL-MCNC: 0.85 MG/DL (ref 0.6–1.3)
GFR SERPL CREATININE-BSD FRML MDRD: 69 ML/MIN/1.73SQ M
GLUCOSE P FAST SERPL-MCNC: 86 MG/DL (ref 65–99)
POTASSIUM SERPL-SCNC: 4.1 MMOL/L (ref 3.5–5.3)
PROT SERPL-MCNC: 7.5 G/DL (ref 6.4–8.2)
SODIUM SERPL-SCNC: 140 MMOL/L (ref 136–145)

## 2019-09-25 PROCEDURE — 36415 COLL VENOUS BLD VENIPUNCTURE: CPT

## 2019-09-25 PROCEDURE — 80053 COMPREHEN METABOLIC PANEL: CPT

## 2019-09-27 ENCOUNTER — APPOINTMENT (OUTPATIENT)
Dept: PHYSICAL THERAPY | Facility: CLINIC | Age: 74
End: 2019-09-27
Payer: COMMERCIAL

## 2019-09-30 ENCOUNTER — HOSPITAL ENCOUNTER (OUTPATIENT)
Dept: RADIOLOGY | Facility: HOSPITAL | Age: 74
Discharge: HOME | End: 2019-09-30
Attending: PSYCHIATRY & NEUROLOGY
Payer: COMMERCIAL

## 2019-09-30 ENCOUNTER — APPOINTMENT (OUTPATIENT)
Dept: PHYSICAL THERAPY | Facility: CLINIC | Age: 74
End: 2019-09-30
Payer: COMMERCIAL

## 2019-09-30 DIAGNOSIS — R42 LIGHTHEADEDNESS: ICD-10-CM

## 2019-09-30 DIAGNOSIS — G45.0 VERTEBRAL BASILAR INSUFFICIENCY: ICD-10-CM

## 2019-09-30 PROCEDURE — 70544 MR ANGIOGRAPHY HEAD W/O DYE: CPT

## 2019-09-30 PROCEDURE — A9585 GADOBUTROL INJECTION: HCPCS | Performed by: PSYCHIATRY & NEUROLOGY

## 2019-09-30 PROCEDURE — 70549 MR ANGIOGRAPH NECK W/O&W/DYE: CPT

## 2019-09-30 RX ADMIN — GADOBUTROL 8 ML: 604.72 INJECTION INTRAVENOUS at 09:57

## 2019-10-04 ENCOUNTER — OFFICE VISIT (OUTPATIENT)
Dept: PHYSICAL THERAPY | Facility: CLINIC | Age: 74
End: 2019-10-04
Payer: COMMERCIAL

## 2019-10-04 DIAGNOSIS — M25.512 ACUTE PAIN OF LEFT SHOULDER: Primary | ICD-10-CM

## 2019-10-04 DIAGNOSIS — M72.2 PLANTAR FASCIITIS: ICD-10-CM

## 2019-10-04 PROCEDURE — 97140 MANUAL THERAPY 1/> REGIONS: CPT

## 2019-10-04 PROCEDURE — 97110 THERAPEUTIC EXERCISES: CPT

## 2019-10-04 NOTE — PROGRESS NOTES
Daily Note     Today's date: 10/4/2019  Patient name: Kamille Torres  : 1945  MRN: [id number]  Referring provider: Scott Morris MD  Dx:   Encounter Diagnosis     ICD-10-CM    1  Acute pain of left shoulder M25 512    2  Plantar fasciitis M72 2                   Subjective: Patient arrives to today's treatment complaining of increased pain in the left shoulder - reports she has been doing more at home since her  had surgery  Patient reports the right foot seems to be getting better - "it only hurts sometimes when I walk "  Patient reports she had an MRI of her neck on Monday and is awaiting the results of this  Objective: See treatment diary below  Assessment: T rows not performed due to increased left shoulder pain  Right foot pain is relieved with manual therapy  Plan: Continue per plan of care        Precautions: none    Manual  9/23 10/4       9/13 9/20   Laser to R PF 4' 4'       4' 4'   Graston to R PF 8' 6'       8' 8'                                                Exercise Diary  9/23 10/4       9/13 9/20   UBE 3'/3' 3'/3'       3'/3' 3/3'   Wall stretches (gastroc/  soleus) HEP        HEP    PF stretch with strap HEP        HEP    Pulleys (flex/ab) DC        10x10" DC   Cane AAROM (IR/ext) 10x10" 10"x10       10x10"     Cane supine AAROM (flex/ab/ER) Flex/  ER 10"x10 flex, ER  10"x10       DC Flex/ER 10''x10   IR stretch with strap 3x30" 30"x3       3x30" 3x30''   Posture tband RTB x15ea row and ext only  red 20 ea  no T row due to pain        Rows & Ext, Red TB, 2x10   Supine flexion x15 1x15           SL abduction x15 1x15           Supine serratus punch x15 1x15        5'', 10x   Standing 3 way arm lift x10 10 ea       x15ea 10x

## 2019-10-07 ENCOUNTER — APPOINTMENT (OUTPATIENT)
Dept: PHYSICAL THERAPY | Facility: CLINIC | Age: 74
End: 2019-10-07
Payer: COMMERCIAL

## 2019-10-07 ENCOUNTER — HOSPITAL ENCOUNTER (OUTPATIENT)
Dept: PULMONOLOGY | Facility: HOSPITAL | Age: 74
Discharge: HOME | End: 2019-10-07
Attending: INTERNAL MEDICINE
Payer: COMMERCIAL

## 2019-10-07 DIAGNOSIS — R91.8 ABNORMAL CT SCAN OF LUNG: ICD-10-CM

## 2019-10-07 PROCEDURE — 94060 EVALUATION OF WHEEZING: CPT | Performed by: INTERNAL MEDICINE

## 2019-10-07 PROCEDURE — 94729 DIFFUSING CAPACITY: CPT

## 2019-10-07 PROCEDURE — 94060 EVALUATION OF WHEEZING: CPT

## 2019-10-07 PROCEDURE — 94760 N-INVAS EAR/PLS OXIMETRY 1: CPT

## 2019-10-07 PROCEDURE — 94729 DIFFUSING CAPACITY: CPT | Performed by: INTERNAL MEDICINE

## 2019-10-07 PROCEDURE — 94726 PLETHYSMOGRAPHY LUNG VOLUMES: CPT

## 2019-10-07 PROCEDURE — 94726 PLETHYSMOGRAPHY LUNG VOLUMES: CPT | Performed by: INTERNAL MEDICINE

## 2019-10-07 RX ORDER — ALBUTEROL SULFATE 2.5 MG/3ML
2.5 SOLUTION RESPIRATORY (INHALATION) ONCE
Status: DISCONTINUED | OUTPATIENT
Start: 2019-10-07 | End: 2019-10-11 | Stop reason: HOSPADM

## 2019-10-08 ENCOUNTER — TELEPHONE (OUTPATIENT)
Dept: NEUROLOGY | Facility: CLINIC | Age: 74
End: 2019-10-08

## 2019-10-08 NOTE — TELEPHONE ENCOUNTER
Pt called and advised pt of the below  She verbalized clear understanding  Pt is requesting an appt w/ Dr David Puente tmrw to discuss results in person  [facility] don't see open slot tmrw  Only NP appt is avail tmrw  Can you accommodate this pt to Dr Alexandru Beck schedule? States that she still wants to talk to [facility] if possible today           [phone] ok to leave detailed message

## 2019-10-08 NOTE — TELEPHONE ENCOUNTER
Yeah I have reviewed findings back when they were completed, was hoping to discuss with her in person due to the nature of the results  I will call her if I have time today, otherwise will plan to do so tomorrow

## 2019-10-08 NOTE — TELEPHONE ENCOUNTER
Pt calling regarding MRA carotids and head  She is asking if you can give her a call to review the results  Please review  MRA head marked as significant       [phone]

## 2019-10-09 ENCOUNTER — TELEPHONE (OUTPATIENT)
Dept: NEUROLOGY | Facility: CLINIC | Age: 74
End: 2019-10-09

## 2019-10-09 ENCOUNTER — OFFICE VISIT (OUTPATIENT)
Dept: NEUROLOGY | Facility: CLINIC | Age: 74
End: 2019-10-09
Payer: COMMERCIAL

## 2019-10-09 VITALS
WEIGHT: 174.7 LBS | HEART RATE: 74 BPM | BODY MASS INDEX: 27.42 KG/M2 | HEIGHT: 67 IN | SYSTOLIC BLOOD PRESSURE: 126 MMHG | RESPIRATION RATE: 14 BRPM | DIASTOLIC BLOOD PRESSURE: 74 MMHG

## 2019-10-09 DIAGNOSIS — I72.9 ANEURYSM (HCC): Primary | ICD-10-CM

## 2019-10-09 PROCEDURE — 99214 OFFICE O/P EST MOD 30 MIN: CPT | Performed by: PSYCHIATRY & NEUROLOGY

## 2019-10-09 RX ORDER — RANITIDINE 300 MG/1
TABLET ORAL
COMMUNITY
Start: 2019-09-16 | End: 2019-10-14 | Stop reason: ALTCHOICE

## 2019-10-09 RX ORDER — OMEPRAZOLE 20 MG/1
20 CAPSULE, DELAYED RELEASE ORAL DAILY
Refills: 6 | COMMUNITY
Start: 2019-09-23 | End: 2019-11-22

## 2019-10-09 NOTE — PROGRESS NOTES
[address] [facility] Consult - Follow up   PATIENT:  Rahul Gold  MRN:  [id number]  :  1945  DATE OF SERVICE:  10/9/2019  REFERRED BY: No ref  provider found  PMD: Sandra Luciano MD    Assessment/Plan:     Luh Meredith a 69 y  o  female with a past medical history of hyperparathyroidism, GERD, pericarditis, heart murmur, vitamin-D deficiency, insomnia, depression, anxiety referred here for evaluation of headache    Initial evaluation by me 2018  Follow-up 2019, 2019, 19, 10/09/2019    Possible aneurysm/left posterior supraclinoid ICA  as well as other findings on vessel imaging   MRA head and neck 2019: No large vessel flow restrictive disease      - 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  This could be reevaluated with CTA   - Minor short segment stenosis of the dominant right V1 origin  Mild long segment narrowing of the origin and nondominant left v1  Anterior circulation normal   *Had Neurosurgery Dr Walsh Come review image and there may be nothing really there, even if there is would just be surveillance indicated  Will order CTA and have he is ok with having her follow up with him to review the images/discuss results just in case      Dizziness/Vertigo  Gait instability  She reports a history of BPPV 2 years ago that got better with maneuvers including Epley per her description     - At visit 2019 she reported positional vertigo that improved with physical therapy maneuver 19     - She returns 2019 reporting positional vertigo again for the past week   Provoked by bending over, positions in bed   She also reports she feels like she cannot walk right and has to hold onto her  and daughter -Amos Pena is new compared to the last episode  - as of 19: vertigo improved after denice hallpike at our last visit 6/3/19, lightheadedness continues occasionally   No recent falls, still occasional off balance, plans to see PT soon  - as of 10/09/2019: no falls, working with PT, getting orthotics, just one episode of vertigo for 5 mins since last visit      Workup  - MRI Brain without contrast  7/9/19:   1   No acute intracranial abnormality  2   Mild nonspecific cerebral white matter signal abnormality, which can be seen in patients with migraines as well as microangiopathic disease  3   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   This may represent an anatomic variation   If concerned of vertebrobasilar insufficiency, consider follow-up CTA or contrast enhanced MRA imaging    - MRA head and neck 09/30/2019:  No large vessel flow restrictive disease      - 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  This could be reevaluated with CTA   - Minor short segment stenosis of the dominant right V1 origin  Mild long segment narrowing of the origin and nondominant left v1  Anterior circulation normal      Plan:  - has been following with physical therapy for gait instability  - add B12 to next lab draw      Chronic migraine without aura without status migrainosus, not intractable  Cervicalgia   She has had migraines since childhood, she has followed with neurologist in the past back in [place]   She moved here to be closer to her daughter and granddaughter in the area  She has multiple types of headaches,  bioccipital pain, mid throbbing pain  Migraine is without aura and has associated nausea sometimes vomiting, stiff and sore neck, problems with concentration, photophobia, phonophobia, osmophobia, sometimes nasal congestion, lightheadedness   Migraines worse with any movement    - as of 03/18/2019 she reports headaches are significantly improved with lifestyle interventions  - as of 06/03/2019:  headaches 6 times a month and go away with ibuprofen   She has not followed up with eye doctor but has an appointment in July    - as of 07/31/2019: as of 7/31/19: headaches 6-8 a month - often in the am or with anxiety/stress - these she can breathe through it  Morning headaches the last 4-5 days, goes away with coffee usually, if not goes away with ibuprofen  Last big migraine 9 months or so ago   - As of 10/9/2019:  headaches - has not had in quite some time, around 8 a month and go away with ibuprofen, occasional sharp pains randomly for 5 minutes on various locations on head 1-2 times a week  No migraines in a year  - she is taking magnesium and riboflavin - she thinks this may be helping    Workup  - MRI Brain without contrast  7/9/19:   1   No acute intracranial abnormality  2   Mild nonspecific cerebral white matter signal abnormality, which can be seen in patients with migraines as well as microangiopathic disease vs migraine - discussed ensuring risk factor control     Preventive:  -  She has never been on a prescription  headache preventive medicine   - discussed headache preventative supplements including magnesium, and adding riboflavin   - We discussed headache hygiene and lifestyle interventions that may improve her headaches including increased hydration, higher sleep quantity, continuing to increase her physical activity    - physical therapy in the past on her neck made it worse     Abortive:  - 11/2018 prescribed rizatriptan for trial, however patient never filled as she has not had any severe migraine - never took   - Toradol IM has worked for in the past  - future options:  other triptans, steroids, indomethacin, Toradol etc      Insomnia  She reports chronic insomnia and that she currently gets less than 4 hr a night of sleep   Had initial visit 11/2018 we discussed sleep hygiene and she has stopped drinking coffee late at night which has helped some  Jesús Valdes is wondering if a referral to sleep medicine would be indicated and I am happy to refer her   Likely multifactorial including possible psychogenic component versus other sleep disorder as well    - as of 06/03/2019:  Patient reports she did not go to sleep medicine appointment and tried cutting coffee and tea at dinner which helps somewhat  - as of 7/31/19: sleep sometimes good - was good for a while after starting mg, sometimes wakes up at night again now, possible symptoms of RLS and still only averaging 4-5 - will have her see  Sleep med   - as of 10/09/2019:  Did not follow up with sleep medicine as recommended     Patient instructions      CTA head to further evaluate possible tiny aneurysm of likely no clinical significance  Out of an abundance of caution will have you follow-up with Neurosurgery after the CTA results    Headache/migraine treatment:   Abortive medications (for immediate treatment of a headache): It is ok to take ibuprofen, acetaminophen or naproxen (Advil, Tylenol,  Aleve, Excedrin) if they help your headaches you should limit these to No more than 3 times a week to avoid medication overuse/rebound headaches    For more severe headaches ok to take 600 mg ibuprofen     Over the counter preventive supplements for headaches/migraines   (to take every day to help prevent headaches - not to take at the time of headache):  [x] Magnesium 400mg daily (If any diarrhea or upset stomach, decrease dose  as tolerated)  [x] Riboflavin (Vitamin B2)  400mg daily   (FYI B2 may make your urine bright/neon yellow)      Lifestyle Recommendations:  [x] SLEEP - Maintain a regular sleep schedule: Adults need at least 7-8 hours of uninterrupted a night  Maintain good sleep hygiene:  Going to bed and waking up at consistent times, avoiding excessive daytime naps, avoiding caffeinated beverages in the evening, avoid excessive stimulation in the evening and generally using bed primarily for sleeping   One hour before bedtime would recommend turning lights down lower, decreasing your activity (may read quietly, listen to music at a low volume)   When you get into bed, should eliminate all technology (no texting, emailing, playing with your phone, iPad or tablet in bed)  [x] HYDRATION - Maintain good hydration   Drink  2L of fluid a day (4 typical small water bottles)  [x] DIET - Maintain good nutrition  In particular don't skip meals and try and eat healthy balanced meals regularly  [x] TRIGGERS - Look for other triggers and avoid them: Limit caffeine to 1-2 cups a day or less  Avoid dietary triggers that you have noticed bring on your headaches (this could include aged cheese, peanuts, MSG, aspartame and nitrates)      Education and Follow-up  [x] Please call with any questions or concerns  Go to the ED with any new or concerning symptoms  [x] Follow up 3-4 months         Add B12 to your next lab draw with your primary doctor      CC: We had the pleasure of evaluating Nella Yancey in neurological consultation today  she is C 41 y  o    right handed female who presents today for evaluation of headaches       History of Present Illness:   Interval history as of 10/09/2019  -  Did not follow up with sleep medicine as recommended  - she has been following with physical therapy for gait instability/history of BPPV - also seeing them for shoulder  - getting orthotics for feet  - denies any falls    MRA head and neck 09/30/2019:  No large vessel flow restrictive disease      - 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  This could be reevaluated with CTA   - Minor short segment stenosis of the dominant right V1 origin  Mild long segment narrowing of the origin and nondominant left v1  Anterior circulation normal   - had Neurosurgery Dr Leidy Toscano review image and there may be nothing really there, even if there is would just be surveillance indicated   Will order CTA and have he is ok with having her follow up with him to review the images/discuss results just in case       Otherwise she is doing good  - chronic lightheadedness and vertigo history - 2 weeks ago with some vertigo and went away on its own in about 5 minutes  - headaches - has not had in quite some time, occasional sharp pains randomly for 5 minutes on various locations on head 1-2 times a week   - no big migraines in a year  - she is taking magnesium and riboflavin - she thinks this may be helping        Interval history as of 7/31/19  - MRI Brain without contrast  7/9/19:   1   No acute intracranial abnormality  2   Mild nonspecific cerebral white matter signal abnormality, which can be seen in patients with migraines as well as microangiopathic disease    3   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   This may represent an anatomic variation   If concerned of vertebrobasilar insufficiency, consider follow-up CTA or contrast enhanced MRA imaging       - she saw pulmonary 7/3/19  - followed up with cardiology and normal stress test per patient since last visit - they wanted to start metoprolol   -  has been in and out of the hospital for diverticulitis and then surgery for removing infected intestine so that has been taking some of her time   - daughter was pregnant and last the baby after 3 months     Lightheadedness/dizziness, h/o vertigo  Mild gait instability  Denies recent falls  *2-3 weeks of pain in the bottom of her right foot that she plans to see a foot doctor   - also has decreased sensation bilateral top of the toes and into the top of the shins for about 6 months   - she has not followed up with PT as recommended for gait and lightheadedness  - lightheadedness occurs the most with getting up   - the denice hallpike last visit improved her vertigo 6/3/19     Migraines/headaches  Morning headaches the last 4-5 days, goes away with coffee usually, if not goes away with ibuprofen  Taking deep breaths and has not been having migraines   - taking magnesium and not riboflavin      Sleep   Sleep medicine  - sometimes feels sensations in bilateral shins when in bed  - falls asleep easily with TV on, stress though has affected her some  - usually have been sleeping straight through, past week wakes up to check on  once a night     --------------------     Interval history as of 06/03/2019:  Karin Cardozo been following with physical therapy and has been noticing improvement in neck pain, - - first PT visit 04/01/2019 also significantly improved dizziness following 1st treatment - the found signs of BPPV - was vertigo free up for almost 2 months   - around 5/27/19: woke up and was lying in bed when suddenly had room spinning - sat up and it went away in a 3 mins - since then every day has happened when triggered - from bending over sometimes soon   - feels like she can not walk right, hold on to  and daughter   - reports LE shin numbness      - 05/02/2019 - phone conversation with Cardiology - metoprolol 25 mg long-acting  - referred to eye doctor - apmt in July  - referred to Sleep Medicine - did not go, tried cutting coffee and tea at dinner time and sleeps fine now   - magnesium, riboflavin - reports she is taking        Interval history as of  03/18/2019  - presented to the ED 01/06/2019 with chest pain - has a stress test next week      - her biggest complaint today is trouble sleeping, chronic dizziness   - Dizziness, history of BPPV 2 years ago, got better, scares her  Not dizzy with walking, more positional      Headaches  - never filled rizatriptan  - headaches are not really a complaint today: in a month 8 times but go away with ibuprofen   Stopped drinking caffeine at night  - was referred to physical therapy has not made an appointment  - recommended supplements including - taking magnesium, riboflavin may be in her B complex   - has joined the gym, loves walking      Plans to see eye doctor, they are tearing, no blurred or double vision           Headaches started at what age? Migraines Since childhood, didn't have them when pregnant x 2  How often do the headaches occur?   *As of 11/2018:   - migraines 3-4 times a week,   - Mild throbbing pain right scalp 3-4 times a week  - lasts hours, with advil goes away most of the time  - Occasionally for sharp pain throughout head for a second or two - once a week  - Also bilateral occipital pain 1-2 times a week    *As of 3/18/19: - headaches are not really a complaint today: in a month 8 times but go away with ibuprofen   *As of 06/03/2019: headaches 6 times a month and go away with ibuprofen  *as of 7/31/19: headaches 6-8 a month - often in the am or with anxiety/stress - these she can breathe through it   Morning headaches the last 4-5 days, goes away with coffee usually, if not goes away with ibuprofen  - As of 10/9/2019 headaches - has not had in quite some time, around 8 a month and go away with ibuprofen occasional sharp pains randomly for 5 minutes on various locations on head 1-2 times a week   No migraines in a year      What time of the day do the headaches start? no particular time of day  How long do the headaches last? 2-3 days in teenage years - now 1 full day and into the morning  Are you ever headache free? Yes  Prodrome of feeling like she is getting a headache  Aura? without aura  Describe your usual headache pain quality? throbbing  Where is your headache located? bilateral frontal area and bilateral occipital area, also right temporal   Does the pain Radiate? no radiation of pain  What is the intensity of pain? Up to a 10/10, at its best a 4  Associated symptoms:   [x] Nausea       [x] Vomiting - once in a while        [] Diarrhea         [] Insomnia           [x] Stiff or sore neck         [x] Problems with concentration  [x] Photophobia     [x]Phonophobia      [x] Osmophobia  [] Blurred vision   [x] Prefer quiet, dark room        [x] Light-headed or dizzy - sometimes   [] Tinnitus      Things that make the headache worse? +movement  Headache triggers:  Stress, mint,strong perfume, tobacco or fireplaces, If she goes to bed with a headache will wake up with worse one   What time of the year do headaches occur more frequently?   do not seem to be related to any time of the year  Have you seen someone else for headaches or pain? Yes, neurologist back in [place]  Have you had trigger point injection performed and how often? No  Have you had Botox injection performed and how often? No   Have you had epidural injections or transforaminal injections performed? No  Have you used CBD or THC for your headaches and how often? No  Are you current pregnant or planning on getting pregnant? No  Have you ever had any Brain imaging? yes years ago and normal     What medications do you take or have you taken for your headaches? ABORTIVE:       - was prescribed rizatriptan but did not need it   - advil 200 mg  - my pillow seems to be helping the bioccipital      PREVENTIVE: no     Alternative therapies used in the past for headaches? Physical therapy -  For wrist, but not headache   Coffee, has one in the morning  thank out the p m  Coffee     LIFESTYLE  Sleep - "if I get 4 hours I am johnnie"   - as of 06/03/2019:  Reports improved  As of 7/31/19 - avg 4-5   - sometimes feels sensations in bilateral shins when in bed  - falls asleep easily with TV on, stress though has affected her some  - usually have been sleeping straight through, past week wakes up to check on  once a night      as of 11/2018: Is your sleep restful? No, tired  What time do you go to bed at night? 10   What time do you wake up in am? Has coffee at 4:30 am with  and goes back to sleep, otherwise wakes up at 7 am  How often do you get up at night? once  Do you wake up with headaches? Sometimes   Do you snore while asleep? No  Have you been told that you stop breathing during sleeping? No - but makes noises  Do you wake up tired in the morning? Yes  Do you take frequent naps during the day? sometimes  Do you have jaw pain? No, but has TMJ  Do you grind/clench your teeth at night? No  Do you have restless leg syndrome? No     Physical activity:   Joined the [employer]  Goes once a week  Walks with her daughter  - considering line dancing or something slower at the [facility]  - has joined the gym, loves walking       Water: not enough - 4 glasses      Diet:  Do you ever skip meals?  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 Drugs: denies  Alcohol/tobacco: Denies tobacco use, alcohol intake: social drinker      Past Medical History:     Past Medical History:   Diagnosis Date    Anxiety     Arthritis     Back pain     Balance problems     Chest pain     heaviness    Dizziness     Gait disorder     GERD (gastroesophageal reflux disease)     Hypertension     Increased frequency of headaches     Numbness and tingling     Palpitations     Pericarditis     Thyroid disease     Trouble in sleeping        Patient Active Problem List   Diagnosis    Closed fracture distal radius and ulna, right, initial encounter    Anxiety    Gastroesophageal reflux disease with esophagitis    History of pericarditis    Heart murmur    Primary hyperparathyroidism ([address] )    Thyroid nodule    Vitamin D deficiency    Chest pain    Migraine without aura and without status migrainosus, not intractable    Dizziness and giddiness    Insomnia    Cervicalgia    Abnormal CT scan of lung       Medications:      Current Outpatient Medications   Medication Sig Dispense Refill    ALPRAZolam (XANAX) 0 5 mg tablet Take 1 tablet (0 5 mg total) by mouth daily at bedtime as needed for anxiety or sleep 20 tablet 0    ascorbic acid (VITAMIN C) 500 mg tablet Take 500 mg by mouth daily      aspirin 81 mg chewable tablet Chew 1 tablet (81 mg total) daily (Patient taking differently: Chew 81 mg daily as needed ) 30 tablet 0    b complex vitamins tablet Take 1 tablet by mouth daily      Cholecalciferol (VITAMIN D-3) 1000 units CAPS Daily      ibuprofen (MOTRIN) 200 mg tablet Take by mouth every 6 (six) hours as needed for mild pain      Lutein 10 MG TABS lutein   20 mg qd      Magnesium 100 MG CAPS magnesium   300 mg qd      Multiple Vitamins-Calcium (ONE-A-DAY WOMENS PO) Take 1 tablet by mouth daily      Omega-3 Fatty Acids (FISH OIL PO) Take 2 g by mouth      omeprazole (PriLOSEC) 20 mg delayed release capsule Take 20 mg by mouth daily  6    thiamine (VITAMIN B1) 50 mg tablet Take 50 mg by mouth daily      ranitidine (ZANTAC) 150 MG capsule Take 1 capsule (150 mg total) by mouth 2 (two) times a day (Patient not taking: Reported on 10/9/2019) 30 capsule 0    ranitidine (ZANTAC) 300 MG tablet        No current facility-administered medications for this visit        Facility-Administered Medications Ordered in Other Visits   Medication Dose Route Frequency Provider Last Rate Last Dose    albuterol inhalation solution 2 5 mg  2 5 mg Nebulization Once Maren Bonilla MD            Allergies:    No Known Allergies    Family History:     Family History   Problem Relation Age of Onset    Kidney failure Mother     Hypertension Mother     Lung cancer Father    Meera Brandon Parkinson White syndrome Daughter     No Known Problems Sister     Substance Abuse Neg Hx     Alcohol abuse Neg Hx     Mental illness Neg Hx        Social History:     Social History     Socioeconomic History    Marital status: /Civil Union     Spouse name: Not on file    Number of children: Not on file    Years of education: Not on file    Highest education level: Not on file   Occupational History    Not on file   Social Needs    Financial resource strain: Not on file    Food insecurity:     Worry: Not on file     Inability: Not on file    Transportation needs:     Medical: Not on file     Non-medical: Not on file   Tobacco Use    Smoking status: Never Smoker    Smokeless tobacco: Never Used   Substance and Sexual Activity    Alcohol use: No    Drug use: No    Sexual activity: Not on file   Lifestyle    Physical activity:     Days per week: Not on file     Minutes per session: Not on file    Stress: Not on file   Relationships    Social connections:     Talks on phone: Not on file     Gets together: Not on file     Attends Jew service: Not on file     Active member of club or organization: Not on file     Attends meetings of clubs or organizations: Not on file     Relationship status: Not on file    Intimate partner violence:     Fear of current or ex partner: Not on file     Emotionally abused: Not on file     Physically abused: Not on file     Forced sexual activity: Not on file   Other Topics Concern    Not on file   Social History Narrative    WORK:    1   ([employer]; [employer]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    2  [employer]        HOBBIES:    1  Singing    2  Dancing    3  Hx of ceramics (+ dust)        PETS:    1    Dogs    -  Denies birds        TRAVEL:    -  [place]         EXPOSURES:    Denies mold; down pillows/comforters; hot tubs         Objective:     Physical Exam:                                                                 Vitals:            Constitutional:    /74 (BP Location: Right arm, Patient Position: Sitting, Cuff Size: Adult)   Pulse 74   Resp 14   Ht 5' 7" (1 702 m)   Wt 79 2 kg (174 lb 11 2 oz)   BMI 27 36 kg/m²   BP Readings from Last 3 Encounters:   10/09/19 126/74   09/13/19 126/78   08/16/19 153/78     Pulse Readings from Last 3 Encounters:   10/09/19 74   09/13/19 65   08/16/19 69         Well developed, well nourished, well groomed  No dysmorphic features  HEENT:  Normocephalic atraumatic  No meningismus  Oropharynx is clear and moist  No oral mucosal lesions  Chest:  Respirations regular and unlabored  Cardiovascular:  Regular rate, intact distal pulses  Distal extremities warm without palpable edema or tenderness, no observed significant swelling  Musculoskeletal:  Full range of motion  (see below under neurologic exam for evaluation of motor function and gait)   Skin:  warm and dry, not diaphoretic  No apparent birthmarks or stigmata of neurocutaneous disease  Psychiatric:  Normal behavior and appropriate affect        Neurological Examination:     Mental status/cognitive function:   Orientated to time, place and person  Recent and remote memory intact  Attention span and concentration as well as fund of knowledge are appropriate for age  Normal language and spontaneous speech  Cranial Nerves:  III, IV, VI-Pupils were equal, round, and reactive to light  Extraocular movements were full and conjugate without nystagmus  VII-facial expression symmetric, intact forehead wrinkle, strong eye closure, symmetric smile    VIII-hearing grossly intact bilaterally   Motor Exam: symmetric bulk throughout  no atrophy, fasciculations or abnormal movements noted  Coordination:  no apparent dysmetria, ataxia or tremor noted  Gait: steady casual gait            Pertinent lab results:   9/25/19 - CMP unremarkable    6/10/19 CMP unremarkable  Thyroid studies normal Vit D 28     1/07/2019:  CMP and CBC unremarkable  TSH 0 78     Imaging: I have personally reviewed imaging and radiology read     MRA head and neck 09/30/2019:  No large vessel flow restrictive disease      - 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  This could be reevaluated with CTA   - Minor short segment stenosis of the dominant right V1 origin  Mild long segment narrowing of the origin and nondominant left v1  Anterior circulation normal       MRI Brain without contrast  7/9/19:   1   No acute intracranial abnormality  2   Mild nonspecific cerebral white matter signal abnormality, which can be seen in patients with migraines as well as microangiopathic disease  3   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   This may represent an anatomic variation   If concerned of vertebrobasilar insufficiency, consider follow-up CTA or contrast enhanced MRA imaging            Review of Systems:   ROS Obtained by MA and Personally reviewed and corrected if needed  Review of Systems   Constitutional: Negative  Negative for appetite change and fever  HENT: Negative  Negative for hearing loss, tinnitus, trouble swallowing and voice change  Eyes: Negative  Negative for photophobia and pain  Respiratory: Negative  Negative for shortness of breath  Cardiovascular: Negative  Negative for palpitations  Gastrointestinal: Negative  Negative for nausea and vomiting  Endocrine: Negative  Negative for cold intolerance and heat intolerance  Genitourinary: Negative  Negative for dysuria, frequency and urgency  Musculoskeletal: Positive for neck pain and neck stiffness  Negative for myalgias  Skin: Negative  Negative for rash  Allergic/Immunologic: Negative  Neurological: Positive for dizziness and headaches  Negative for tremors, seizures, syncope, facial asymmetry, speech difficulty, weakness, light-headedness and numbness  Hematological: Negative  Does not bruise/bleed easily  Psychiatric/Behavioral: Negative  Negative for confusion, hallucinations and sleep disturbance       I have spent 26 minutes with Patient and family today in which greater than 50% of this time was spent in counseling/coordination of care regarding Diagnostic results, Prognosis, Risks and benefits of tx options, Intructions for management, Patient and family education, Importance of tx compliance, Risk factor reductions and Impressions        Author:  Bernadine Cruz MD 10/9/2019 11:26 AM

## 2019-10-09 NOTE — PATIENT INSTRUCTIONS
CTA head to further evaluate possible tiny aneurysm of likely no clinical significance  Out of an abundance of caution will have you follow-up with Neurosurgery after the CTA results    Headache/migraine treatment:   Abortive medications (for immediate treatment of a headache): It is ok to take ibuprofen, acetaminophen or naproxen (Advil, Tylenol,  Aleve, Excedrin) if they help your headaches you should limit these to No more than 3 times a week to avoid medication overuse/rebound headaches    For more severe headaches ok to take 600 mg ibuprofen     Over the counter preventive supplements for headaches/migraines   (to take every day to help prevent headaches - not to take at the time of headache):  [x] Magnesium 400mg daily (If any diarrhea or upset stomach, decrease dose  as tolerated)  [x] Riboflavin (Vitamin B2)  400mg daily   (FYI B2 may make your urine bright/neon yellow)      Lifestyle Recommendations:  [x] SLEEP - Maintain a regular sleep schedule: Adults need at least 7-8 hours of uninterrupted a night  Maintain good sleep hygiene:  Going to bed and waking up at consistent times, avoiding excessive daytime naps, avoiding caffeinated beverages in the evening, avoid excessive stimulation in the evening and generally using bed primarily for sleeping   One hour before bedtime would recommend turning lights down lower, decreasing your activity (may read quietly, listen to music at a low volume)  When you get into bed, should eliminate all technology (no texting, emailing, playing with your phone, iPad or tablet in bed)  [x] HYDRATION - Maintain good hydration   Drink  2L of fluid a day (4 typical small water bottles)  [x] DIET - Maintain good nutrition  In particular don't skip meals and try and eat healthy balanced meals regularly  [x] TRIGGERS - Look for other triggers and avoid them: Limit caffeine to 1-2 cups a day or less   Avoid dietary triggers that you have noticed bring on your headaches (this could include aged cheese, peanuts, MSG, aspartame and nitrates)      Education and Follow-up  [x] Please call with any questions or concerns   Go to the ED with any new or concerning symptoms  [x] Follow up 3-4 months         Add B12 to your next lab draw with your primary doctor

## 2019-10-09 NOTE — PROGRESS NOTES
Review of Systems   Constitutional: Negative  Negative for appetite change and fever  HENT: Negative  Negative for hearing loss, tinnitus, trouble swallowing and voice change  Eyes: Negative  Negative for photophobia and pain  Respiratory: Negative  Negative for shortness of breath  Cardiovascular: Negative  Negative for palpitations  Gastrointestinal: Negative  Negative for nausea and vomiting  Endocrine: Negative  Negative for cold intolerance and heat intolerance  Genitourinary: Negative  Negative for dysuria, frequency and urgency  Musculoskeletal: Positive for neck pain and neck stiffness  Negative for myalgias  Skin: Negative  Negative for rash  Allergic/Immunologic: Negative  Neurological: Positive for dizziness and headaches  Negative for tremors, seizures, syncope, facial asymmetry, speech difficulty, weakness, light-headedness and numbness  Hematological: Negative  Does not bruise/bleed easily  Psychiatric/Behavioral: Negative  Negative for confusion, hallucinations and sleep disturbance

## 2019-10-11 ENCOUNTER — APPOINTMENT (OUTPATIENT)
Dept: PHYSICAL THERAPY | Facility: CLINIC | Age: 74
End: 2019-10-11
Payer: COMMERCIAL

## 2019-10-14 ENCOUNTER — OFFICE VISIT (OUTPATIENT)
Dept: FAMILY MEDICINE CLINIC | Facility: CLINIC | Age: 74
End: 2019-10-14
Payer: COMMERCIAL

## 2019-10-14 VITALS
HEART RATE: 74 BPM | BODY MASS INDEX: 27.4 KG/M2 | DIASTOLIC BLOOD PRESSURE: 80 MMHG | SYSTOLIC BLOOD PRESSURE: 120 MMHG | TEMPERATURE: 97.2 F | HEIGHT: 67 IN | WEIGHT: 174.6 LBS | RESPIRATION RATE: 16 BRPM

## 2019-10-14 DIAGNOSIS — K21.9 GASTROESOPHAGEAL REFLUX DISEASE, ESOPHAGITIS PRESENCE NOT SPECIFIED: ICD-10-CM

## 2019-10-14 DIAGNOSIS — R05.9 COUGH: ICD-10-CM

## 2019-10-14 DIAGNOSIS — M25.512 LEFT SHOULDER PAIN, UNSPECIFIED CHRONICITY: ICD-10-CM

## 2019-10-14 DIAGNOSIS — R09.82 POSTNASAL DRIP: ICD-10-CM

## 2019-10-14 DIAGNOSIS — Z23 NEED FOR PROPHYLACTIC VACCINATION AND INOCULATION AGAINST INFLUENZA: ICD-10-CM

## 2019-10-14 DIAGNOSIS — E78.5 HYPERLIPIDEMIA, UNSPECIFIED HYPERLIPIDEMIA TYPE: ICD-10-CM

## 2019-10-14 DIAGNOSIS — Z00.00 MEDICARE ANNUAL WELLNESS VISIT, INITIAL: Primary | ICD-10-CM

## 2019-10-14 DIAGNOSIS — R45.0 NERVOUSNESS: ICD-10-CM

## 2019-10-14 DIAGNOSIS — R53.83 FATIGUE, UNSPECIFIED TYPE: ICD-10-CM

## 2019-10-14 PROCEDURE — 1170F FXNL STATUS ASSESSED: CPT

## 2019-10-14 PROCEDURE — G0438 PPPS, INITIAL VISIT: HCPCS | Performed by: FAMILY MEDICINE

## 2019-10-14 PROCEDURE — G0008 ADMIN INFLUENZA VIRUS VAC: HCPCS

## 2019-10-14 PROCEDURE — 1125F AMNT PAIN NOTED PAIN PRSNT: CPT

## 2019-10-14 PROCEDURE — 90662 IIV NO PRSV INCREASED AG IM: CPT

## 2019-10-14 PROCEDURE — 99214 OFFICE O/P EST MOD 30 MIN: CPT | Performed by: FAMILY MEDICINE

## 2019-10-14 NOTE — PROGRESS NOTES
Assessment and Plan:     1  Medicare annual wellness visit, initial  - discussed Shingrix    2  Need for prophylactic vaccination and inoculation against influenza  - influenza vaccine, [id number], high-dose, PF 0 5 mL (FLUZONE HIGH-DOSE)     Preventive health issues were discussed with patient, and age appropriate screening tests were ordered as noted in patient's After Visit Summary  Personalized health advice and appropriate referrals for health education or preventive services given if needed, as noted in patient's After Visit Summary       History of Present Illness:     Patient presents for Medicare Annual Wellness visit    Patient Care Team:  Corey Aguilar MD as PCP - General (Family Medicine)     Problem List:     Patient Active Problem List   Diagnosis    Closed fracture distal radius and ulna, right, initial encounter    Anxiety    Gastroesophageal reflux disease with esophagitis    History of pericarditis    Heart murmur    Primary hyperparathyroidism ([facility] [address] )    Thyroid nodule    Vitamin D deficiency    Chest pain    Migraine without aura and without status migrainosus, not intractable    Dizziness and giddiness    Insomnia    Cervicalgia    Abnormal CT scan of lung      Past Medical and Surgical History:     Past Medical History:   Diagnosis Date    Anxiety     Arthritis     Back pain     Balance problems     Chest pain     heaviness    Dizziness     Gait disorder     GERD (gastroesophageal reflux disease)     Hypertension     Increased frequency of headaches     Numbness and tingling     Palpitations     Pericarditis     Thyroid disease     Trouble in sleeping      Past Surgical History:   Procedure Laterality Date    APPENDECTOMY      CHOLECYSTECTOMY      laparoscopic    KNEE SURGERY Left     PERICARDIAL WINDOW      TX OPEN RX DISTAL RADIUS FX, EXTRA-ARTICULAR Right 3/22/2018    Procedure: OPEN REDUCTION W/ INTERNAL FIXATION (ORIF) RADIUS, splint application; Surgeon: Radha Monahan MD;  Location: [facility];  Service: Orthopedics      Family History:     Family History   Problem Relation Age of Onset    Kidney failure Mother     Hypertension Mother     Lung cancer Father    Mehreen Rick Parkinson White syndrome Daughter     No Known Problems Sister     Substance Abuse Neg Hx     Alcohol abuse Neg Hx     Mental illness Neg Hx       Social History:     Social History     Socioeconomic History    Marital status: /Civil Union     Spouse name: None    Number of children: None    Years of education: None    Highest education level: None   Occupational History    None   Social Needs    Financial resource strain: None    Food insecurity:     Worry: None     Inability: None    Transportation needs:     Medical: None     Non-medical: None   Tobacco Use    Smoking status: Never Smoker    Smokeless tobacco: Never Used   Substance and Sexual Activity    Alcohol use: No    Drug use: No    Sexual activity: None   Lifestyle    Physical activity:     Days per week: None     Minutes per session: None    Stress: None   Relationships    Social connections:     Talks on phone: None     Gets together: None     Attends [employer] service: None     Active member of club or organization: None     Attends meetings of clubs or organizations: None     Relationship status: None    Intimate partner violence:     Fear of current or ex partner: None     Emotionally abused: None     Physically abused: None     Forced sexual activity: None   Other Topics Concern    None   Social History Narrative    WORK:    1  [employer] ([employer]; Emeli Silva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    2  [email or web address]        HOBBIES:    1  Singing    2  Dancing    3  Hx of ceramics (+ dust)        PETS:    1    Dogs    -  Denies birds        TRAVEL:    -  [place]         EXPOSURES:    Denies mold; down pillows/comforters; hot tubs       Medications and Allergies:     Current Outpatient Medications   Medication Sig Dispense Refill    ALPRAZolam (XANAX) 0 5 mg tablet Take 1 tablet (0 5 mg total) by mouth daily at bedtime as needed for anxiety or sleep 20 tablet 0    ascorbic acid (VITAMIN C) 500 mg tablet Take 500 mg by mouth daily      aspirin 81 mg chewable tablet Chew 1 tablet (81 mg total) daily (Patient taking differently: Chew 81 mg daily as needed ) 30 tablet 0    b complex vitamins tablet Take 1 tablet by mouth daily      Cholecalciferol (VITAMIN D-3) 1000 units CAPS Daily      ibuprofen (MOTRIN) 200 mg tablet Take by mouth every 6 (six) hours as needed for mild pain      Lutein 10 MG TABS lutein   20 mg qd      Magnesium 100 MG CAPS magnesium   300 mg qd      Multiple Vitamins-Calcium (ONE-A-DAY WOMENS PO) Take 1 tablet by mouth daily      Omega-3 Fatty Acids (FISH OIL PO) Take 2 g by mouth      omeprazole (PriLOSEC) 20 mg delayed release capsule Take 20 mg by mouth daily  6    thiamine (VITAMIN B1) 50 mg tablet Take 50 mg by mouth daily       No current facility-administered medications for this visit  No Known Allergies   Immunizations:     Immunization History   Administered Date(s) Administered    INFLUENZA 10/31/2018    Pneumococcal 01/01/2017    Pneumococcal Conjugate 13-Valent 11/19/2017    Pneumococcal Polysaccharide PPV23 12/23/2018    influenza, injectable, quadrivalent 10/01/2018      Health Maintenance:         Topic Date Due    Hepatitis C Screening  1945    MAMMOGRAM  1945    DXA SCAN  1945    CRC Screening: Colonoscopy  1945         Topic Date Due    DTaP,Tdap,and Td Vaccines (1 - Tdap) 07/24/1968    INFLUENZA VACCINE  07/01/2019      Medicare Health Risk Assessment:     /80 (BP Location: Left arm, Patient Position: Sitting, Cuff Size: Adult)   Pulse 74   Resp 16   Ht 5' 7" (1 702 m)   Wt 79 2 kg (174 lb 9 6 oz)   BMI 27 35 kg/m²      Marlene Kim is here for her Initial Wellness visit       Health Risk Assessment: Patient rates overall health as good  Patient feels that their physical health rating is same  Eyesight was rated as same  Hearing was rated as same  Patient feels that their emotional and mental health rating is same  Pain experienced in the last 7 days has been some  Patient's pain rating has been 3/10  Patient states that she has experienced no weight loss or gain in last 6 months  Depression Screening:   PHQ-2 Score: 0      Fall Risk Screening: In the past year, patient has experienced: no history of falling in past year      Urinary Incontinence Screening:   Patient has leaked urine accidently in the last six months  Home Safety:  Patient does not have trouble with stairs inside or outside of their home  Patient has working smoke alarms and has working carbon monoxide detector  Home safety hazards include: none  Nutrition:   Current diet is Regular and Limited junk food  Medications:   Patient is currently taking over-the-counter supplements  OTC medications include: see medication list  Patient is able to manage medications  Activities of Daily Living (ADLs)/Instrumental Activities of Daily Living (IADLs):   Walk and transfer into and out of bed and chair?: Yes  Dress and groom yourself?: Yes    Bathe or shower yourself?: Yes    Feed yourself?  Yes  Do your laundry/housekeeping?: Yes  Manage your money, pay your bills and track your expenses?: Yes  Make your own meals?: Yes    Do your own shopping?: Yes    Previous Hospitalizations:   Any hospitalizations or ED visits within the last 12 months?: No      Advance Care Planning:   Living will: No    Advanced directive: Yes      Cognitive Screening:   Provider or family/friend/caregiver concerned regarding cognition?: No    PREVENTIVE SCREENINGS      Cardiovascular Screening:    General: Screening Current      Diabetes Screening:     General: Screening Current      Colorectal Cancer Screening:     General: Risks and Benefits Discussed    Due for: Colonoscopy - Low Risk      Breast Cancer Screening:     General: Risks and Benefits Discussed    Due for: Mammogram        Cervical Cancer Screening:    General: Screening Not Indicated      Osteoporosis Screening:    General: Risks and Benefits Discussed    Due for: DXA Axial      Abdominal Aortic Aneurysm (AAA) Screening:        General: Screening Not Indicated      Lung Cancer Screening:     General: Screening Current      Hepatitis C Screening:    General: Risks and Benefits Discussed    Hep C Screening Accepted: No       Cecily Bello MD

## 2019-10-14 NOTE — PATIENT INSTRUCTIONS
Medicare Preventive Visit Patient Instructions  Thank you for completing your Welcome to Medicare Visit or Medicare Annual Wellness Visit today  Your next wellness visit will be due in one year (10/14/2020)  The screening/preventive services that you may require over the next 5-10 years are detailed below  Some tests may not apply to you based off risk factors and/or age  Screening tests ordered at today's visit but not completed yet may show as past due  Also, please note that scanned in results may not display below  Preventive Screenings:  Service Recommendations Previous Testing/Comments   Colorectal Cancer Screening  * Colonoscopy    * Fecal Occult Blood Test (FOBT)/Fecal Immunochemical Test (FIT)  * Fecal DNA/Cologuard Test  * Flexible Sigmoidoscopy Age: 54-65 years old   Colonoscopy: every 10 years (may be performed more frequently if at higher risk)  OR  FOBT/FIT: every 1 year  OR  Cologuard: every 3 years  OR  Sigmoidoscopy: every 5 years  Screening may be recommended earlier than age 48 if at higher risk for colorectal cancer  Also, an individualized decision between you and your healthcare provider will decide whether screening between the ages of 74-80 would be appropriate  Colonoscopy: Not on file  FOBT/FIT: Not on file  Cologuard: Not on file  Sigmoidoscopy: Not on file         Breast Cancer Screening Age: 36 years old  Frequency: every 1-2 years  Not required if history of left and right mastectomy Mammogram: Not on file       Cervical Cancer Screening Between the ages of 21-29, pap smear recommended once every 3 years  Between the ages of 33-67, can perform pap smear with HPV co-testing every 5 years     Recommendations may differ for women with a history of total hysterectomy, cervical cancer, or abnormal pap smears in past  Pap Smear: Not on file    Screening Not Indicated   Hepatitis C Screening Once for adults born between [facility]  More frequently in patients at high risk for Hepatitis C Hep C Antibody: Not on file       Diabetes Screening 1-2 times per year if you're at risk for diabetes or have pre-diabetes Fasting glucose: 86 mg/dL   A1C: 5 6 %    Screening Current   Cholesterol Screening Once every 5 years if you don't have a lipid disorder  May order more often based on risk factors  Lipid panel: 01/07/2019    Screening Not Indicated  History Lipid Disorder     Other Preventive Screenings Covered by Medicare:  1  Abdominal Aortic Aneurysm (AAA) Screening: covered once if your at risk  You're considered to be at risk if you have a family history of AAA  2  Lung Cancer Screening: covers low dose CT scan once per year if you meet all of the following conditions: (1) Age 50-69; (2) No signs or symptoms of lung cancer; (3) Current smoker or have quit smoking within the last 15 years; (4) You have a tobacco smoking history of at least 30 pack years (packs per day multiplied by number of years you smoked); (5) You get a written order from a healthcare provider  3  Glaucoma Screening: covered annually if you're considered high risk: (1) You have diabetes OR (2) Family history of glaucoma OR (3)  aged 48 and older OR (3)  American aged 72 and older  3  Osteoporosis Screening: covered every 2 years if you meet one of the following conditions: (1) You're estrogen deficient and at risk for osteoporosis based off medical history and other findings; (2) Have a vertebral abnormality; (3) On glucocorticoid therapy for more than 3 months; (4) Have primary hyperparathyroidism; (5) On osteoporosis medications and need to assess response to drug therapy  · Last bone density test (DXA Scan): Not on file  5  HIV Screening: covered annually if you're between the age of 12-76  Also covered annually if you are younger than 13 and older than 72 with risk factors for HIV infection  For pregnant patients, it is covered up to 3 times per pregnancy      Immunizations:  Immunization Recommendations   Influenza Vaccine Annual influenza vaccination during flu season is recommended for all persons aged >= 6 months who do not have contraindications   Pneumococcal Vaccine (Prevnar and Pneumovax)  * Prevnar = PCV13  * Pneumovax = PPSV23   Adults 25-60 years old: 1-3 doses may be recommended based on certain risk factors  Adults 72 years old: Prevnar (PCV13) vaccine recommended followed by Pneumovax (PPSV23) vaccine  If already received PPSV23 since turning 65, then PCV13 recommended at least one year after PPSV23 dose  Hepatitis B Vaccine 3 dose series if at intermediate or high risk (ex: diabetes, end stage renal disease, liver disease)   Tetanus (Td) Vaccine - COST NOT COVERED BY MEDICARE PART B Following completion of primary series, a booster dose should be given every 10 years to maintain immunity against tetanus  Td may also be given as tetanus wound prophylaxis  Tdap Vaccine - COST NOT COVERED BY MEDICARE PART B Recommended at least once for all adults  For pregnant patients, recommended with each pregnancy  Shingles Vaccine (Shingrix) - COST NOT COVERED BY MEDICARE PART B  2 shot series recommended in those aged 48 and above     Health Maintenance Due:      Topic Date Due    Hepatitis C Screening  1945    MAMMOGRAM  1945    DXA SCAN  1945    CRC Screening: Colonoscopy  1945     Immunizations Due:      Topic Date Due    DTaP,Tdap,and Td Vaccines (1 - Tdap) 07/24/1968    INFLUENZA VACCINE  07/01/2019     Advance Directives   What are advance directives? Advance directives are legal documents that state your wishes and plans for medical care  These plans are made ahead of time in case you lose your ability to make decisions for yourself  Advance directives can apply to any medical decision, such as the treatments you want, and if you want to donate organs  What are the types of advance directives?   There are many types of advance directives, and each state has rules about how to use them  You may choose a combination of any of the following:  · Living will: This is a written record of the treatment you want  You can also choose which treatments you do not want, which to limit, and which to stop at a certain time  This includes surgery, medicine, IV fluid, and tube feedings  · Durable power of  for healthcare [facility]): This is a written record that states who you want to make healthcare choices for you when you are unable to make them for yourself  This person, called a proxy, is usually a family member or a friend  You may choose more than 1 proxy  · Do not resuscitate (DNR) order:  A DNR order is used in case your heart stops beating or you stop breathing  It is a request not to have certain forms of treatment, such as CPR  A DNR order may be included in other types of advance directives  · Medical directive: This covers the care that you want if you are in a coma, near death, or unable to make decisions for yourself  You can list the treatments you want for each condition  Treatment may include pain medicine, surgery, blood transfusions, dialysis, IV or tube feedings, and a ventilator (breathing machine)  · Values history: This document has questions about your views, beliefs, and how you feel and think about life  This information can help others choose the care that you would choose  Why are advance directives important? An advance directive helps you control your care  Although spoken wishes may be used, it is better to have your wishes written down  Spoken wishes can be misunderstood, or not followed  Treatments may be given even if you do not want them  An advance directive may make it easier for your family to make difficult choices about your care  Urinary Incontinence   Urinary incontinence (UI)  is when you lose control of your bladder  UI develops because your bladder cannot store or empty urine properly   The 3 most common types of UI are stress incontinence, urge incontinence, or both  Medicines:   · May be given to help strengthen your bladder control  Report any side effects of medication to your healthcare provider  Do pelvic muscle exercises often:  Your pelvic muscles help you stop urinating  Squeeze these muscles tight for 5 seconds, then relax for 5 seconds  Gradually work up to squeezing for 10 seconds  Do 3 sets of 15 repetitions a day, or as directed  This will help strengthen your pelvic muscles and improve bladder control  Train your bladder:  Go to the bathroom at set times, such as every 2 hours, even if you do not feel the urge to go  You can also try to hold your urine when you feel the urge to go  For example, hold your urine for 5 minutes when you feel the urge to go  As that becomes easier, hold your urine for 10 minutes  Self-care:   · Keep a UI record  Write down how often you leak urine and how much you leak  Make a note of what you were doing when you leaked urine  · Drink liquids as directed  You may need to limit the amount of liquid you drink to help control your urine leakage  Do not drink any liquid right before you go to bed  Limit or do not have drinks that contain caffeine or alcohol  · Prevent constipation  Eat a variety of high-fiber foods  Good examples are high-fiber cereals, beans, vegetables, and whole-grain breads  Walking is the best way to trigger your intestines to have a bowel movement  · Exercise regularly and maintain a healthy weight  Weight loss and exercise will decrease pressure on your bladder and help you control your leakage  · Use a catheter as directed  to help empty your bladder  A catheter is a tiny, plastic tube that is put into your bladder to drain your urine  · Go to behavior therapy as directed  Behavior therapy may be used to help you learn to control your urge to urinate      Weight Management   Why it is important to manage your weight:  Being overweight increases your risk of health conditions such as heart disease, high blood pressure, type 2 diabetes, and certain types of cancer  It can also increase your risk for osteoarthritis, sleep apnea, and other respiratory problems  Aim for a slow, steady weight loss  Even a small amount of weight loss can lower your risk of health problems  How to lose weight safely:  A safe and healthy way to lose weight is to eat fewer calories and get regular exercise  You can lose up about 1 pound a week by decreasing the number of calories you eat by 500 calories each day  Healthy meal plan for weight management:  A healthy meal plan includes a variety of foods, contains fewer calories, and helps you stay healthy  A healthy meal plan includes the following:  · Eat whole-grain foods more often  A healthy meal plan should contain fiber  Fiber is the part of grains, fruits, and vegetables that is not broken down by your body  Whole-grain foods are healthy and provide extra fiber in your diet  Some examples of whole-grain foods are whole-wheat breads and pastas, oatmeal, brown rice, and bulgur  · Eat a variety of vegetables every day  Include dark, leafy greens such as spinach, kale, kacey greens, and mustard greens  Eat yellow and orange vegetables such as carrots, sweet potatoes, and winter squash  · Eat a variety of fruits every day  Choose fresh or canned fruit (canned in its own juice or light syrup) instead of juice  Fruit juice has very little or no fiber  · Eat low-fat dairy foods  Drink fat-free (skim) milk or 1% milk  Eat fat-free yogurt and low-fat cottage cheese  Try low-fat cheeses such as mozzarella and other reduced-fat cheeses  · Choose meat and other protein foods that are low in fat  Choose beans or other legumes such as split peas or lentils  Choose fish, skinless poultry (chicken or turkey), or lean cuts of red meat (beef or pork)  Before you cook meat or poultry, cut off any visible fat     · Use less fat and oil   Try baking foods instead of frying them  Add less fat, such as margarine, sour cream, regular salad dressing and mayonnaise to foods  Eat fewer high-fat foods  Some examples of high-fat foods include french fries, doughnuts, ice cream, and cakes  · Eat fewer sweets  Limit foods and drinks that are high in sugar  This includes candy, cookies, regular soda, and sweetened drinks  Exercise:  Exercise at least 30 minutes per day on most days of the week  Some examples of exercise include walking, biking, dancing, and swimming  You can also fit in more physical activity by taking the stairs instead of the elevator or parking farther away from stores  Ask your healthcare provider about the best exercise plan for you  © Copyright [employer] 2018 Information is for End User's use only and may not be sold, redistributed or otherwise used for commercial purposes   All illustrations and images included in CareNotes® are the copyrighted property of A D A M , Inc  or [address] [place] [address]

## 2019-10-15 ENCOUNTER — APPOINTMENT (OUTPATIENT)
Dept: PHYSICAL THERAPY | Facility: CLINIC | Age: 74
End: 2019-10-15
Payer: COMMERCIAL

## 2019-10-17 NOTE — PROGRESS NOTES
Assessment/Plan:  1  Medicare annual wellness visit, initial    2  Gastroesophageal reflux disease  - continue Omeprazole 20 mg daily, doing well  - follow up with GI    3  Hyperlipidemia  - Lipid Panel with Direct LDL reflex; Future  - Comprehensive metabolic panel; Future  - CBC and differential; Future  - Urinalysis with reflex to microscopic    4  Postnasal drip/ cough  - start saline nasal spray twice a day and Flonase  - follow up if not better  - sodium chloride (OCEAN) 0 65 % nasal spray; 2 sprays into each nostril 3 (three) times a day  Dispense: 15 mL    5  Nervousness  - continue Xanax as needed, doing well    6  Left shoulder pain, unspecified chronicity  - continue Physical therapy  - advised to get x-ray done and see Ortho if not better    7  BMI 27 0-27 9,adult  - discussed life style changes    8  Need for prophylactic vaccination and inoculation against influenza  - influenza vaccine, 6538-2043, high-dose, PF 0 5 mL (FLUZONE HIGH-DOSE)    Follow up in 4 months with labs done prior  The treatment plan was reviewed with the patient  The patient understands and agrees with the treatment plan        Subjective:   Chief Complaint   Patient presents with    GERD    Anxiety    Cough      Patient ID: Lane Second is a 69 y o  female here today for follow up visit for GERD, anxiety, left shoulder pain  Patient is c/o postnasal drip and frequent throat clearing on and off for the past few weeks, no purulent mucus production, no fever or chills, no chest pain or SOB  Patient has been undergoing Physical therapy for her left shoulder pain and now doing much better  Patient has no other complaints         The following portions of the patient's history were reviewed and updated as appropriate: allergies, current medications, past family history, past medical history, past social history, past surgical history and problem list     Past Medical History:   Diagnosis Date    Anxiety     Arthritis     Back pain     Balance problems     Chest pain     heaviness    Dizziness     Gait disorder     GERD (gastroesophageal reflux disease)     Hypertension     Increased frequency of headaches     Numbness and tingling     Palpitations     Pericarditis     Thyroid disease     Trouble in sleeping      Past Surgical History:   Procedure Laterality Date    APPENDECTOMY      CHOLECYSTECTOMY      laparoscopic    KNEE SURGERY Left     PERICARDIAL WINDOW      AL OPEN RX DISTAL RADIUS FX, EXTRA-ARTICULAR Right 3/22/2018    Procedure: OPEN REDUCTION W/ INTERNAL FIXATION (ORIF) RADIUS, splint application;  Surgeon: Mitzi Morin MD;  Location: [facility];  Service: Orthopedics     Family History   Problem Relation Age of Onset    Kidney failure Mother     Hypertension Mother     Lung cancer Father    Sally Orchard Parkinson White syndrome Daughter     No Known Problems Sister     Substance Abuse Neg Hx     Alcohol abuse Neg Hx     Mental illness Neg Hx      Social History     Socioeconomic History    Marital status: /Civil Union     Spouse name: Not on file    Number of children: Not on file    Years of education: Not on file    Highest education level: Not on file   Occupational History    Not on file   Social Needs    Financial resource strain: Not on file    Food insecurity:     Worry: Not on file     Inability: Not on file    Transportation needs:     Medical: Not on file     Non-medical: Not on file   Tobacco Use    Smoking status: Never Smoker    Smokeless tobacco: Never Used   Substance and Sexual Activity    Alcohol use: No    Drug use: No    Sexual activity: Not on file   Lifestyle    Physical activity:     Days per week: Not on file     Minutes per session: Not on file    Stress: Not on file   Relationships    Social connections:     Talks on phone: Not on file     Gets together: Not on file     Attends Hinduism service: Not on file     Active member of club or organization: Not on file     Attends meetings of clubs or organizations: Not on file     Relationship status: Not on file    Intimate partner violence:     Fear of current or ex partner: Not on file     Emotionally abused: Not on file     Physically abused: Not on file     Forced sexual activity: Not on file   Other Topics Concern    Not on file   Social History Narrative    WORK:    1  [employer] (Ha[employer]buena; Jamar [employer]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    2  [employer]        HOBBIES:    1  Singing    2  Dancing    3  Hx of ceramics (+ dust)        PETS:    1    Dogs    -  Denies birds        TRAVEL:    -  [place]         EXPOSURES:    Denies mold; down pillows/comforters; hot tubs       Current Outpatient Medications:     ALPRAZolam (XANAX) 0 5 mg tablet, Take 1 tablet (0 5 mg total) by mouth daily at bedtime as needed for anxiety or sleep, Disp: 20 tablet, Rfl: 0    ascorbic acid (VITAMIN C) 500 mg tablet, Take 500 mg by mouth daily, Disp: , Rfl:     aspirin 81 mg chewable tablet, Chew 1 tablet (81 mg total) daily (Patient taking differently: Chew 81 mg daily as needed ), Disp: 30 tablet, Rfl: 0    b complex vitamins tablet, Take 1 tablet by mouth daily, Disp: , Rfl:     Cholecalciferol (VITAMIN D-3) 1000 units CAPS, Daily, Disp: , Rfl:     ibuprofen (MOTRIN) 200 mg tablet, Take by mouth every 6 (six) hours as needed for mild pain, Disp: , Rfl:     Lutein 10 MG TABS, lutein  20 mg qd, Disp: , Rfl:     Magnesium 100 MG CAPS, magnesium  300 mg qd, Disp: , Rfl:     Multiple Vitamins-Calcium (ONE-A-DAY WOMENS PO), Take 1 tablet by mouth daily, Disp: , Rfl:     Omega-3 Fatty Acids (FISH OIL PO), Take 2 g by mouth, Disp: , Rfl:     omeprazole (PriLOSEC) 20 mg delayed release capsule, Take 20 mg by mouth daily, Disp: , Rfl: 6    thiamine (VITAMIN B1) 50 mg tablet, Take 50 mg by mouth daily, Disp: , Rfl:     sodium chloride (OCEAN) 0 65 % nasal spray, 2 sprays into each nostril 3 (three) times a day, Disp: 15 mL, Rfl:     Review of Systems   Constitutional: Negative for appetite change, chills, fatigue, fever and unexpected weight change  HENT: Positive for congestion and postnasal drip  Negative for ear pain, sore throat and trouble swallowing  Respiratory: Positive for cough  Negative for shortness of breath and wheezing  Cardiovascular: Negative for chest pain, palpitations and leg swelling  Gastrointestinal: Negative for abdominal pain, blood in stool, constipation, diarrhea, nausea and vomiting  Genitourinary: Negative for difficulty urinating, dysuria, flank pain, frequency, hematuria, pelvic pain and urgency  Musculoskeletal:        Left shoulder pain improved   Neurological: Negative for dizziness, syncope, weakness, numbness and headaches  Hematological: Negative for adenopathy  Psychiatric/Behavioral: Negative for dysphoric mood and sleep disturbance  The patient is not nervous/anxious  Objective:    Vitals:    10/14/19 1525   BP: 120/80   BP Location: Left arm   Patient Position: Sitting   Cuff Size: Adult   Pulse: 74   Resp: 16   Temp: (!) 97 2 °F (36 2 °C)   Weight: 79 2 kg (174 lb 9 6 oz)   Height: 5' 7" (1 702 m)     BP Readings from Last 3 Encounters:   10/14/19 120/80   10/09/19 126/74   09/13/19 126/78     Wt Readings from Last 3 Encounters:   10/14/19 79 2 kg (174 lb 9 6 oz)   10/09/19 79 2 kg (174 lb 11 2 oz)   09/13/19 79 8 kg (176 lb)      Physical Exam   Constitutional: She is oriented to person, place, and time  She appears well-developed and well-nourished  No distress  HENT:   Head: Normocephalic and atraumatic  Right Ear: Tympanic membrane and ear canal normal    Left Ear: Tympanic membrane and ear canal normal    Nose: Mucosal edema present  Mouth/Throat: No oropharyngeal exudate or posterior oropharyngeal erythema  Neck: Neck supple  No thyromegaly present  Cardiovascular: Normal rate, regular rhythm and normal heart sounds     No murmur heard   Pulmonary/Chest: Effort normal and breath sounds normal  No respiratory distress  She has no wheezes  She has no rhonchi  She has no rales  Abdominal: Soft  She exhibits no distension and no mass  There is no tenderness  Lymphadenopathy:     She has no cervical adenopathy  Neurological: She is alert and oriented to person, place, and time  Psychiatric: She has a normal mood and affect  Lab Results   Component Value Date    SODIUM 140 09/25/2019    K 4 1 09/25/2019     (H) 09/25/2019    CO2 29 09/25/2019    AGAP 2 (L) 09/25/2019    BUN 20 09/25/2019    CREATININE 0 85 09/25/2019    GLUC 71 06/10/2019    GLUF 86 09/25/2019    CALCIUM 9 6 09/25/2019    AST 19 09/25/2019    ALT 30 09/25/2019    ALKPHOS 103 09/25/2019    TP 7 5 09/25/2019    TBILI 0 84 09/25/2019    EGFR 69 09/25/2019     Lab Results   Component Value Date    WBC 4 98 01/07/2019    HGB 14 1 01/07/2019    HCT 42 8 01/07/2019    MCV 92 01/07/2019     01/07/2019     Lab Results   Component Value Date    MHK6HJLVZENK 1 060 06/10/2019       BMI Counseling: Body mass index is 27 35 kg/m²  The BMI is above normal  Nutrition recommendations include consuming healthier snacks, moderation in carbohydrate intake and reducing intake of saturated fat and trans fat  Exercise recommendations include exercising 3-5 times per week

## 2019-10-18 ENCOUNTER — APPOINTMENT (OUTPATIENT)
Dept: PHYSICAL THERAPY | Facility: CLINIC | Age: 74
End: 2019-10-18
Payer: COMMERCIAL

## 2019-10-21 ENCOUNTER — APPOINTMENT (OUTPATIENT)
Dept: LAB | Facility: CLINIC | Age: 74
End: 2019-10-21
Payer: COMMERCIAL

## 2019-10-21 DIAGNOSIS — E78.5 HYPERLIPIDEMIA, UNSPECIFIED HYPERLIPIDEMIA TYPE: ICD-10-CM

## 2019-10-21 DIAGNOSIS — Z12.39 SCREENING FOR MALIGNANT NEOPLASM OF BREAST: Primary | ICD-10-CM

## 2019-10-21 DIAGNOSIS — R53.83 FATIGUE, UNSPECIFIED TYPE: ICD-10-CM

## 2019-10-21 LAB
ALBUMIN SERPL BCP-MCNC: 3.8 G/DL (ref 3.5–5)
ALP SERPL-CCNC: 98 U/L (ref 46–116)
ALT SERPL W P-5'-P-CCNC: 26 U/L (ref 12–78)
ANION GAP SERPL CALCULATED.3IONS-SCNC: 6 MMOL/L (ref 4–13)
AST SERPL W P-5'-P-CCNC: 15 U/L (ref 5–45)
BASOPHILS # BLD AUTO: 0.03 THOUSANDS/ΜL (ref 0–0.1)
BASOPHILS NFR BLD AUTO: 1 % (ref 0–1)
BILIRUB SERPL-MCNC: 0.67 MG/DL (ref 0.2–1)
BILIRUB UR QL STRIP: NEGATIVE
BUN SERPL-MCNC: 22 MG/DL (ref 5–25)
CALCIUM ALBUM COR SERPL-MCNC: 10.5 MG/DL (ref 8.3–10.1)
CALCIUM SERPL-MCNC: 10.3 MG/DL (ref 8.3–10.1)
CHLORIDE SERPL-SCNC: 107 MMOL/L (ref 100–108)
CHOLEST SERPL-MCNC: 216 MG/DL (ref 50–200)
CLARITY UR: CLEAR
CO2 SERPL-SCNC: 28 MMOL/L (ref 21–32)
COLOR UR: YELLOW
CREAT SERPL-MCNC: 0.84 MG/DL (ref 0.6–1.3)
EOSINOPHIL # BLD AUTO: 0.16 THOUSAND/ΜL (ref 0–0.61)
EOSINOPHIL NFR BLD AUTO: 3 % (ref 0–6)
ERYTHROCYTE [DISTWIDTH] IN BLOOD BY AUTOMATED COUNT: 12.9 % (ref 11.6–15.1)
GFR SERPL CREATININE-BSD FRML MDRD: 70 ML/MIN/1.73SQ M
GLUCOSE P FAST SERPL-MCNC: 85 MG/DL (ref 65–99)
GLUCOSE UR STRIP-MCNC: NEGATIVE MG/DL
HCT VFR BLD AUTO: 45.9 % (ref 34.8–46.1)
HDLC SERPL-MCNC: 55 MG/DL (ref 40–60)
HGB BLD-MCNC: 14.7 G/DL (ref 11.5–15.4)
HGB UR QL STRIP.AUTO: NEGATIVE
IMM GRANULOCYTES # BLD AUTO: 0.02 THOUSAND/UL (ref 0–0.2)
IMM GRANULOCYTES NFR BLD AUTO: 0 % (ref 0–2)
KETONES UR STRIP-MCNC: NEGATIVE MG/DL
LDLC SERPL CALC-MCNC: 141 MG/DL (ref 0–100)
LEUKOCYTE ESTERASE UR QL STRIP: NEGATIVE
LYMPHOCYTES # BLD AUTO: 1.84 THOUSANDS/ΜL (ref 0.6–4.47)
LYMPHOCYTES NFR BLD AUTO: 34 % (ref 14–44)
MCH RBC QN AUTO: 29.8 PG (ref 26.8–34.3)
MCHC RBC AUTO-ENTMCNC: 32 G/DL (ref 31.4–37.4)
MCV RBC AUTO: 93 FL (ref 82–98)
MONOCYTES # BLD AUTO: 0.59 THOUSAND/ΜL (ref 0.17–1.22)
MONOCYTES NFR BLD AUTO: 11 % (ref 4–12)
NEUTROPHILS # BLD AUTO: 2.85 THOUSANDS/ΜL (ref 1.85–7.62)
NEUTS SEG NFR BLD AUTO: 51 % (ref 43–75)
NITRITE UR QL STRIP: NEGATIVE
NRBC BLD AUTO-RTO: 0 /100 WBCS
PH UR STRIP.AUTO: 7.5 [PH]
PLATELET # BLD AUTO: 217 THOUSANDS/UL (ref 149–390)
PMV BLD AUTO: 10.5 FL (ref 8.9–12.7)
POTASSIUM SERPL-SCNC: 4.4 MMOL/L (ref 3.5–5.3)
PROT SERPL-MCNC: 7.3 G/DL (ref 6.4–8.2)
PROT UR STRIP-MCNC: NEGATIVE MG/DL
RBC # BLD AUTO: 4.94 MILLION/UL (ref 3.81–5.12)
SODIUM SERPL-SCNC: 141 MMOL/L (ref 136–145)
SP GR UR STRIP.AUTO: 1.01 (ref 1–1.03)
TRIGL SERPL-MCNC: 100 MG/DL
UROBILINOGEN UR QL STRIP.AUTO: 0.2 E.U./DL
WBC # BLD AUTO: 5.49 THOUSAND/UL (ref 4.31–10.16)

## 2019-10-21 PROCEDURE — 81003 URINALYSIS AUTO W/O SCOPE: CPT | Performed by: FAMILY MEDICINE

## 2019-10-21 PROCEDURE — 80053 COMPREHEN METABOLIC PANEL: CPT

## 2019-10-21 PROCEDURE — 36415 COLL VENOUS BLD VENIPUNCTURE: CPT

## 2019-10-21 PROCEDURE — 80061 LIPID PANEL: CPT

## 2019-10-21 PROCEDURE — 85025 COMPLETE CBC W/AUTO DIFF WBC: CPT

## 2019-10-22 ENCOUNTER — OFFICE VISIT (OUTPATIENT)
Dept: PHYSICAL THERAPY | Facility: CLINIC | Age: 74
End: 2019-10-22
Payer: COMMERCIAL

## 2019-10-22 DIAGNOSIS — M25.512 ACUTE PAIN OF LEFT SHOULDER: Primary | ICD-10-CM

## 2019-10-22 DIAGNOSIS — M72.2 PLANTAR FASCIITIS: ICD-10-CM

## 2019-10-22 PROCEDURE — 97110 THERAPEUTIC EXERCISES: CPT | Performed by: PHYSICAL THERAPIST

## 2019-10-22 PROCEDURE — 97140 MANUAL THERAPY 1/> REGIONS: CPT | Performed by: PHYSICAL THERAPIST

## 2019-10-25 ENCOUNTER — TELEPHONE (OUTPATIENT)
Dept: FAMILY MEDICINE CLINIC | Facility: CLINIC | Age: 74
End: 2019-10-25

## 2019-10-25 ENCOUNTER — OFFICE VISIT (OUTPATIENT)
Dept: PHYSICAL THERAPY | Facility: CLINIC | Age: 74
End: 2019-10-25
Payer: COMMERCIAL

## 2019-10-25 DIAGNOSIS — M25.512 ACUTE PAIN OF LEFT SHOULDER: ICD-10-CM

## 2019-10-25 DIAGNOSIS — M72.2 PLANTAR FASCIITIS: Primary | ICD-10-CM

## 2019-10-25 DIAGNOSIS — R45.0 NERVOUSNESS: ICD-10-CM

## 2019-10-25 PROCEDURE — 97110 THERAPEUTIC EXERCISES: CPT | Performed by: PHYSICAL THERAPIST

## 2019-10-25 PROCEDURE — 97140 MANUAL THERAPY 1/> REGIONS: CPT | Performed by: PHYSICAL THERAPIST

## 2019-10-25 RX ORDER — ALPRAZOLAM 0.5 MG/1
0.5 TABLET ORAL
Qty: 20 TABLET | Refills: 1 | Status: SHIPPED | OUTPATIENT
Start: 2019-10-25 | End: 2020-06-03 | Stop reason: SDUPTHER

## 2019-10-25 NOTE — PROGRESS NOTES
Daily Note     Today's date: 10/25/2019  Patient name: Misty Wong  : 1945  MRN: [id number]  Referring provider: Anne Michelle MD  Dx:   Encounter Diagnosis     ICD-10-CM    1  Plantar fasciitis M72 2    2  Acute pain of left shoulder M25 512                   Subjective: Patient reports mod mm  Soreness after her LV but denies any increase in her symptoms  Objective: See treatment diary below  Assessment: Increased reps and resistance as listed  Pt reports fatigue but denies pain with the progression  Monitor response and progress as charlene Nv  Plan: Continue per plan of care        Precautions: none    Manual  9/23 10/4 10/22      9/13 9/20   Laser to R PF 4' 4' np      4' 4'   Graston to R PF 8' 6' np      8' 8'                                                Exercise Diary  9/23 10/4 10/22 10/25     9/13 9/20   UBE 3'/3' 3'/3' 5'/5' 5'/5'     3'/3' 3/3'   Wall stretches (gastroc/  soleus) HEP        HEP    PF stretch with strap HEP        HEP    Pulleys (flex/ab) DC        10x10" DC   Cane AAROM (IR/ext) 10x10" 10"x10 10"x10 10"x10     10x10"     Cane supine AAROM (flex/ab/ER) Flex/  ER 10"x10 flex, ER  10"x10 10"x10 10"x10     DC Flex/ER 10''x10   IR stretch with strap 3x30" 30"x3 30"x3 30"x4     3x30" 3x30''   Posture tband RTB x15ea row and ext only  red 20 ea  no T row due to pain green tb x20 green tb x30      Rows & Ext, Red TB, 2x10   Supine flexion x15 1x15 20 20         SL abduction x15 1x15 20 20         Supine serratus punch x15 1x15 5"x20 5"x20      5'', 10x   Standing 3 way arm lift x10 10 ea 10ea 10     x15ea 10x

## 2019-10-28 ENCOUNTER — HOSPITAL ENCOUNTER (OUTPATIENT)
Dept: RADIOLOGY | Facility: HOSPITAL | Age: 74
Discharge: HOME | End: 2019-10-28
Attending: PSYCHIATRY & NEUROLOGY
Payer: COMMERCIAL

## 2019-10-28 DIAGNOSIS — I72.9 ANEURYSM (HCC): ICD-10-CM

## 2019-10-28 PROCEDURE — 70496 CT ANGIOGRAPHY HEAD: CPT

## 2019-10-28 RX ADMIN — IOHEXOL 100 ML: 350 INJECTION, SOLUTION INTRAVENOUS at 13:38

## 2019-10-29 ENCOUNTER — TELEPHONE (OUTPATIENT)
Dept: NEUROLOGY | Facility: CLINIC | Age: 74
End: 2019-10-29

## 2019-10-29 ENCOUNTER — OFFICE VISIT (OUTPATIENT)
Dept: PHYSICAL THERAPY | Facility: CLINIC | Age: 74
End: 2019-10-29
Payer: COMMERCIAL

## 2019-10-29 DIAGNOSIS — M72.2 PLANTAR FASCIITIS: Primary | ICD-10-CM

## 2019-10-29 DIAGNOSIS — M25.512 ACUTE PAIN OF LEFT SHOULDER: ICD-10-CM

## 2019-10-29 PROCEDURE — 97110 THERAPEUTIC EXERCISES: CPT

## 2019-10-29 NOTE — TELEPHONE ENCOUNTER
Pt made aware of the below  She verbalized understanding and very happy        ----- Message from Charly Aguilar MD sent at 10/29/2019  4:38 PM EDT -----  Please let patient know no aneurysm found, which is great news! She was very anxious about this so will be happy to hear sooner than the follow up visit

## 2019-10-29 NOTE — PROGRESS NOTES
Daily Note     Today's date: 10/29/2019  Patient name: Ilsa Grmim  : 1945  MRN: [id number]  Referring provider: Tim Johnson MD  Dx:   Encounter Diagnosis     ICD-10-CM    1  Plantar fasciitis M72 2    2  Acute pain of left shoulder M25 512                 Subjective: Patient states, "I can't do a lot laying down because I'm dizzy today "  Patient notes dizziness upon waking up this morning when turning her head to the right side to look out the window  Patient reports she has a history of dizziness  Patient reports she had a head CT scan yesterday  Patient reports foot pain has resolved  Patient reports the left shoulder has also been feeling better with the exception of yesterday - "I'm trying to be more active at home "    Objective: See treatment diary below  Assessment: Addition of 1 pound dumbbell when performing shoulder flexion, scaption, and abduction exercises is tolerated well  Plan: Continue treatment as per PT plan of care       Precautions: none    Manual  9/23 10/4 10/22 10/25 10/29    9/13 9/20   Laser to R PF 4' 4' np  NP    4' 4'   Graston to R PF 8' 6' np  NP    8' 8'                                                Exercise Diary  9/23 10/4 10/22 10/25 10/29    9/13 9/20   UBE 3'/3' 3'/3' 5'/5' 5'/5' 4'/4'    3'/3' 3/3'   Wall stretches (gastroc/  soleus) HEP        HEP    PF stretch with strap HEP        HEP    Pulleys (flex/ab) DC        10x10" DC   Cane AAROM (IR/ext) 10x10" 10"x10 10"x10 10"x10 10"x10    10x10"     Cane supine AAROM (flex/ab/ER) Flex/  ER 10"x10 flex, ER  10"x10 10"x10 10"x10 NV    DC Flex/ER 10''x10   IR stretch with strap 3x30" 30"x3 30"x3 30"x4 30"x4    3x30" 3x30''   Posture tband RTB x15ea row and ext only  red 20 ea  no T row due to pain green tb x20 green tb x30 green  3x10     Rows & Ext, Red TB, 2x10   Supine flexion x15 1x15 20 20 NV        SL abduction x15 1x15 20 20 NV        Supine serratus punch x15 1x15 5"x20 5"x20 NV     5'', 10x Standing 3 way arm lift x10 10 ea 10ea 10 1#  1x15    x15ea 10x

## 2019-11-01 ENCOUNTER — OFFICE VISIT (OUTPATIENT)
Dept: PHYSICAL THERAPY | Facility: CLINIC | Age: 74
End: 2019-11-01
Payer: COMMERCIAL

## 2019-11-01 DIAGNOSIS — M25.512 ACUTE PAIN OF LEFT SHOULDER: ICD-10-CM

## 2019-11-01 DIAGNOSIS — M72.2 PLANTAR FASCIITIS: Primary | ICD-10-CM

## 2019-11-01 PROCEDURE — 97110 THERAPEUTIC EXERCISES: CPT | Performed by: PHYSICAL THERAPIST

## 2019-11-01 PROCEDURE — 97530 THERAPEUTIC ACTIVITIES: CPT | Performed by: PHYSICAL THERAPIST

## 2019-11-01 NOTE — PROGRESS NOTES
Daily Note     Today's date: 2019  Patient name: Julia Cho  : 1945  MRN: [id number]  Referring provider: Sree Beck MD  Dx:   No diagnosis found  Subjective: Pt reports that her CT was neg for anyurism and that she has a f/u with neurology next week  Reports that she continues to have dizziness, Denies foot pain and that her shoulder is improving  Objective: See treatment diary below  Assessment:  Continued to avoid therex on plinth secondary to dizziness  Progress therex as listed with no complaints  Pt requires verbal cues for appropriate SA activation during wall ball rolling  Plan: Continue treatment as per PT plan of care       Precautions: none    Manual  9/23 10/4 10/22 10/25 10/29 11/1   9/13 9/20   Laser to R PF 4' 4' np  NP    4' 4'   Graston to R PF 8' 6' np  NP    8' 8'                                                Exercise Diary  9/23 10/4 10/22 10/25 10/29 11/1   9/13 9/20   UBE 3'/3' 3'/3' 5'/5' 5'/5' 4'/4' 4'4'   3'/3' 3/3'   Wall stretches (gastroc/  soleus) HEP        HEP    PF stretch with strap HEP        HEP    Pulleys (flex/ab) DC        10x10" DC   Cane AAROM (IR/ext) 10x10" 10"x10 10"x10 10"x10 10"x10 10'x10   10x10"     Cane supine AAROM (flex/ab/ER) Flex/  ER 10"x10 flex, ER  10"x10 10"x10 10"x10 NV nv   DC Flex/ER 10''x10   IR stretch with strap 3x30" 30"x3 30"x3 30"x4 30"x4 30"x4   3x30" 3x30''   Posture tband RTB x15ea row and ext only  red 20 ea  no T row due to pain green tb x20 green tb x30 green  3x10 Blue tb x30    Rows & Ext, Red TB, 2x10   Supine flexion x15 1x15 20 20 NV        SL abduction x15 1x15 20 20 NV        Supine serratus punch x15 1x15 5"x20 5"x20 NV     5'', 10x   Standing 3 way arm lift x10 10 ea 10ea 10 1#  1x15 1#x20   x15ea 10x   Standing cane scaption      5"x20       Wall ball rolling cw/ccw      2#x20ea

## 2019-11-04 ENCOUNTER — OFFICE VISIT (OUTPATIENT)
Dept: PHYSICAL THERAPY | Facility: CLINIC | Age: 74
End: 2019-11-04
Payer: COMMERCIAL

## 2019-11-04 DIAGNOSIS — M72.2 PLANTAR FASCIITIS: Primary | ICD-10-CM

## 2019-11-04 PROCEDURE — 97164 PT RE-EVAL EST PLAN CARE: CPT | Performed by: PHYSICAL THERAPIST

## 2019-11-04 NOTE — PROGRESS NOTES
Orthotic evaluation     Today's date: 2019  Patient name: Imelda Sen  : 1945  MRN: [id number]  Referring provider: Miley Cruz MD  Dx:   Encounter Diagnosis     ICD-10-CM    1  Plantar fasciitis M72 2                   Subjective: pt reports signif improvement in foot pain  Only occasional pain in R heel and arch  Pain is 5/10 at worst   No pain presently      Objective: See treatment diary below  bilat bunions  Severe pes planus  Sever Genu valgum  Tibial valgum 9 deg R, 5 deg L  LMI R 10 deg ev, L 10 deg ev  Gait: excessive STJ pronation with genu valgum, somewhat ataxic  PROM wfl    Casted for custom orthotics        Assessment: Pt should benefit from custom orthotics to improve LE alignement  Plan: Continue per plan of care        Precautions: none    Manual  9/23 10/4 10/22 10/25 10/29 11/1 11/4  9/13 9/20   Laser to R PF 4' 4' np  NP    4' 4'   Graston to R PF 8' 6' np  NP    8' 8'     Orthotic casting       mb                                    Exercise Diary  9/23 10/4 10/22 10/25 10/29 11/1   9/13 9/20   UBE 3'/3' 3'/3' 5'/5' 5'/5' 4'/4' 4'4'   3'/3' 3/3'   Wall stretches (gastroc/  soleus) HEP        HEP    PF stretch with strap HEP        HEP    Pulleys (flex/ab) DC        10x10" DC   Cane AAROM (IR/ext) 10x10" 10"x10 10"x10 10"x10 10"x10 10'x10   10x10"     Cane supine AAROM (flex/ab/ER) Flex/  ER 10"x10 flex, ER  10"x10 10"x10 10"x10 NV nv   DC Flex/ER 10''x10   IR stretch with strap 3x30" 30"x3 30"x3 30"x4 30"x4 30"x4   3x30" 3x30''   Posture tband RTB x15ea row and ext only  red 20 ea  no T row due to pain green tb x20 green tb x30 green  3x10 Blue tb x30    Rows & Ext, Red TB, 2x10   Supine flexion x15 1x15 20 20 NV        SL abduction x15 1x15 20 20 NV        Supine serratus punch x15 1x15 5"x20 5"x20 NV     5'', 10x   Standing 3 way arm lift x10 10 ea 10ea 10 1#  1x15 1#x20   x15ea 10x   Standing cane scaption      5"x20       Wall ball rolling cw/ccw      2#x20ea

## 2019-11-05 ENCOUNTER — APPOINTMENT (OUTPATIENT)
Dept: PHYSICAL THERAPY | Facility: CLINIC | Age: 74
End: 2019-11-05
Payer: COMMERCIAL

## 2019-11-08 ENCOUNTER — OFFICE VISIT (OUTPATIENT)
Dept: PHYSICAL THERAPY | Facility: CLINIC | Age: 74
End: 2019-11-08
Payer: COMMERCIAL

## 2019-11-08 DIAGNOSIS — E34.9 ELEVATED CALCITONIN LEVEL: Primary | ICD-10-CM

## 2019-11-08 DIAGNOSIS — M25.512 ACUTE PAIN OF LEFT SHOULDER: ICD-10-CM

## 2019-11-08 DIAGNOSIS — M72.2 PLANTAR FASCIITIS: Primary | ICD-10-CM

## 2019-11-08 DIAGNOSIS — R10.9 ABDOMINAL PAIN, UNSPECIFIED ABDOMINAL LOCATION: ICD-10-CM

## 2019-11-08 PROCEDURE — 97110 THERAPEUTIC EXERCISES: CPT

## 2019-11-08 NOTE — TELEPHONE ENCOUNTER
Lab results reviewed with patient, calcium was elevated 10 3, patient states she has been taking Tums daily at time for heartburn prevention  Advised to take Omeprazole before dinner and avoid taking Tumx daily, will recheck calcium in 2 weeks

## 2019-11-11 ENCOUNTER — APPOINTMENT (OUTPATIENT)
Dept: PHYSICAL THERAPY | Facility: CLINIC | Age: 74
End: 2019-11-11
Payer: COMMERCIAL

## 2019-11-15 ENCOUNTER — OFFICE VISIT (OUTPATIENT)
Dept: PHYSICAL THERAPY | Facility: CLINIC | Age: 74
End: 2019-11-15
Payer: COMMERCIAL

## 2019-11-15 DIAGNOSIS — M72.2 PLANTAR FASCIITIS: ICD-10-CM

## 2019-11-15 DIAGNOSIS — M25.512 ACUTE PAIN OF LEFT SHOULDER: Primary | ICD-10-CM

## 2019-11-15 PROCEDURE — 97110 THERAPEUTIC EXERCISES: CPT

## 2019-11-15 PROCEDURE — 97112 NEUROMUSCULAR REEDUCATION: CPT

## 2019-11-15 NOTE — PROGRESS NOTES
Daily Note     Today's date: 11/15/2019  Patient name: Calvin Bush  : 1945  MRN: [id number]  Referring provider: Kimberly Prasad MD  Dx:   Encounter Diagnosis     ICD-10-CM    1  Acute pain of left shoulder M25 512    2  Plantar fasciitis M72 2                 Subjective: Pt reports her shoulder felt good after last session  She states " I did just the right amount of exercises"  Objective: See treatment diary below  Assessment: Pt tolerated treatment session well  Verbal cueing required with overhead exercise to avoid shoulder shrugging compensation  Pt with fatigue during and post session requiring rest breaks between sets  Pt would benefit from continued PT, progress as able  Plan: Continue treatment as per PT plan of care    Pt 1:1 with PTA JW 1:45-2:30     Precautions: none    Manual  9/23 10/4 10/22 10/25 10/29 11/1 11/4 11/8 11/15    Laser to R PF 4' 4' np  NP        Graston to R PF 8' 6' np  NP        Orthotic casting       mb                                    Exercise Diary  9/23 10/4 10/22 10/25 10/29 11/1 11/4 11/8 11/15    UBE 3'/3' 3'/3' 5'/5' 5'/5' 4'/4' 4'/4'  4'/4' 4'/4'    Wall stretches (gastroc/  soleus) HEP            PF stretch with strap HEP            Pulleys (flex/ab) DC            Cane AAROM (IR/ext) 10x10" 10"x10 10"x10 10"x10 10"x10 10'x10  10"x10 10"x10    Cane supine AAROM (flex/ab/ER) Flex/  ER 10"x10 flex, ER  10"x10 10"x10 10"x10 NV nv       IR stretch with strap 3x30" 30"x3 30"x3 30"x4 30"x4 30"x4  30"x4 30"x4    Posture tband RTB x15ea row and ext only  red 20 ea  no T row due to pain green tb x20 green tb x30 green  3x10 Blue tb x30  blue  3x10 Blue  3x10    Supine flexion x15 1x15 20 20 NV        SL abduction x15 1x15 20 20 NV        Supine serratus punch x15 1x15 5"x20 5"x20 NV        Standing 3 way arm lift x10 10 ea 10ea 10 1#  1x15 1#x20  1#  2x10 1# 2x10     Standing cane scaption      5"x20  5"x20 5"x20    Wall ball rolling cw/ccw      2#x20ea bicep curl        4#  2x10 4#  2x10    tricep ext        blue  2x10 Blue  2x10 d/w pt, daughter, c/w monitor, d/c planning to [place]

## 2019-11-18 ENCOUNTER — APPOINTMENT (OUTPATIENT)
Dept: PHYSICAL THERAPY | Facility: CLINIC | Age: 74
End: 2019-11-18
Payer: COMMERCIAL

## 2019-11-18 ENCOUNTER — TELEPHONE (OUTPATIENT)
Dept: NEUROSURGERY | Facility: CLINIC | Age: 74
End: 2019-11-18

## 2019-11-18 NOTE — TELEPHONE ENCOUNTER
Per Dr Mitzi Crane he advised patient we would reschedule her appointment a week and half from now  We had a cancellation today at 12:30 PM  [facility] offering appt at 12:30PM or RS

## 2019-11-22 ENCOUNTER — CONSULT (OUTPATIENT)
Dept: NEUROSURGERY | Facility: CLINIC | Age: 74
End: 2019-11-22
Payer: COMMERCIAL

## 2019-11-22 ENCOUNTER — APPOINTMENT (OUTPATIENT)
Dept: PHYSICAL THERAPY | Facility: CLINIC | Age: 74
End: 2019-11-22
Payer: COMMERCIAL

## 2019-11-22 VITALS
TEMPERATURE: 97.5 F | WEIGHT: 174 LBS | HEART RATE: 74 BPM | HEIGHT: 67 IN | RESPIRATION RATE: 18 BRPM | DIASTOLIC BLOOD PRESSURE: 80 MMHG | BODY MASS INDEX: 27.31 KG/M2 | SYSTOLIC BLOOD PRESSURE: 132 MMHG

## 2019-11-22 DIAGNOSIS — I72.9 ANEURYSM (HCC): ICD-10-CM

## 2019-11-22 PROCEDURE — 99203 OFFICE O/P NEW LOW 30 MIN: CPT | Performed by: RADIOLOGY

## 2019-11-22 NOTE — PROGRESS NOTES
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  This requires no treatment and no further imaging is required either  I have explained to her that there are no imaging findings that explain her headaches as well and overall tried to reassure her that the results of the CTA demonstrates normal cerebral vasculature  Chief Complaint: Consult (Aneurysm)      Patient ID: Nick Butler is a 69 y o  female    HPI  Ms Vaishnavi Escobar presents for evaluation of a suspected cerebral aneurysm  She has a history of headaches and underwent cerebral imaging  Initially this was performed with and MRA which described a possible infundibulum or small aneurysm of the left ICA  CTA was subsequently performed  There is no family history of cerebral aneurysms  She is nonsmoker  Review of Systems   Constitutional: Negative  HENT: Positive for ear pain (right ear bothers her once in a while)  Negative for nosebleeds, sore throat and tinnitus  Eyes: Negative  Respiratory: Negative  Cardiovascular: Negative  Gastrointestinal: Negative  Endocrine: Negative  Genitourinary: Negative  Musculoskeletal: Positive for gait problem (unsteady)  Negative for back pain, myalgias and neck pain  Neurological: Positive for dizziness (postional ), numbness (in her legs sometimes) and headaches (frequently, seems to have gotten better since mag/vit b)  Negative for tremors, seizures, speech difficulty, weakness and light-headedness  Hematological: Negative  Psychiatric/Behavioral: Negative  I have personally reviewed the MA's review of systems and made changes as necessary      The following portions of the patient's history were reviewed and updated as appropriate: allergies, current medications, past family history, past medical history, past social history, past surgical history and problem list       Active Ambulatory Problems     Diagnosis Date Noted    Closed fracture distal radius and ulna, right, initial encounter 03/20/2018    Anxiety 11/13/2018    Gastroesophageal reflux disease with esophagitis 10/18/2017    History of pericarditis 03/12/2018    Heart murmur 04/17/2015    Primary hyperparathyroidism ([address] ) 11/13/2018    Thyroid nodule 11/13/2018    Vitamin D deficiency 11/13/2018    Chest pain 01/06/2019    Migraine without aura and without status migrainosus, not intractable 03/18/2019    Dizziness and giddiness 03/18/2019    Insomnia 03/18/2019    Cervicalgia 03/18/2019    Abnormal CT scan of lung 07/03/2019     Resolved Ambulatory Problems     Diagnosis Date Noted    No Resolved Ambulatory Problems     Past Medical History:   Diagnosis Date    Arthritis     Back pain     Balance problems     Dizziness     Gait disorder     GERD (gastroesophageal reflux disease)     Hypertension     Increased frequency of headaches     Numbness and tingling     Palpitations     Pericarditis     Thyroid disease     Trouble in sleeping        Past Surgical History:   Procedure Laterality Date    APPENDECTOMY      CHOLECYSTECTOMY      laparoscopic    KNEE SURGERY Left     PERICARDIAL WINDOW      FL OPEN RX DISTAL RADIUS FX, EXTRA-ARTICULAR Right 3/22/2018    Procedure: OPEN REDUCTION W/ INTERNAL FIXATION (ORIF) RADIUS, splint application;  Surgeon: Destin Abrams MD;  [facility];  Service: Orthopedics         Vitals:    11/22/19 1532   BP: 132/80   Pulse: 74   Resp: 18   Temp: 97 5 °F (36 4 °C)         Objective:    Physical Exam  Neurologic Exam    Results/Data:  I have reviewed the results and images from the CTA in detail with the patient

## 2019-11-25 ENCOUNTER — OFFICE VISIT (OUTPATIENT)
Dept: URGENT CARE | Facility: CLINIC | Age: 74
End: 2019-11-25
Payer: COMMERCIAL

## 2019-11-25 ENCOUNTER — APPOINTMENT (OUTPATIENT)
Dept: PHYSICAL THERAPY | Facility: CLINIC | Age: 74
End: 2019-11-25
Payer: COMMERCIAL

## 2019-11-25 VITALS
RESPIRATION RATE: 18 BRPM | WEIGHT: 173 LBS | TEMPERATURE: 100.1 F | SYSTOLIC BLOOD PRESSURE: 128 MMHG | HEART RATE: 90 BPM | BODY MASS INDEX: 26.22 KG/M2 | DIASTOLIC BLOOD PRESSURE: 88 MMHG | OXYGEN SATURATION: 97 % | HEIGHT: 68 IN

## 2019-11-25 DIAGNOSIS — J06.9 VIRAL URI: Primary | ICD-10-CM

## 2019-11-25 LAB — S PYO AG THROAT QL: NEGATIVE

## 2019-11-25 PROCEDURE — S9083 URGENT CARE CENTER GLOBAL: HCPCS | Performed by: PHYSICIAN ASSISTANT

## 2019-11-25 PROCEDURE — 99213 OFFICE O/P EST LOW 20 MIN: CPT | Performed by: PHYSICIAN ASSISTANT

## 2019-11-25 PROCEDURE — 87880 STREP A ASSAY W/OPTIC: CPT | Performed by: PHYSICIAN ASSISTANT

## 2019-11-25 NOTE — PROGRESS NOTES
[facility]        NAME: Facundo Medel is a 69 y o  female  : 1945    MRN: [id number]  DATE: 2019  TIME: 2:49 PM    Assessment and Plan   Viral URI [J06 9]  1  Viral URI  POCT rapid strepA         Patient Instructions     Continue w/ supportive treatment  Monitor for worsening/new symptoms  Follow up with PCP in 3-5 days  Proceed to  ER if symptoms worsen  Chief Complaint     Chief Complaint   Patient presents with    Cold Like Symptoms     Sx began yesterday; some right-sided facial neuralgia (6 out 10)    Sore Throat     began last pm  Gargled with Na++H20 and took 1 advil last PM          History of Present Illness       Patient presents with complaint of cold symptoms x 1 day  She reports cough, congestion, and sore throat  She describes her cough as intermittent and dry, non-productive  She denies fever, chills, night sweats, chest pain, dyspnea, SOB, abdominal pain, nausea, and vomiting  She reports taking one advil yesterday but denies other palliative measures  Pt reports that her  has a "touch of pneumonia"  Review of Systems   Review of Systems   Constitutional: Negative for chills, fatigue and fever  HENT: Positive for congestion and sore throat  Respiratory: Positive for cough  Negative for chest tightness, shortness of breath and wheezing  Cardiovascular: Negative for chest pain and palpitations  Musculoskeletal: Negative for myalgias  Skin: Negative for color change, rash and wound  Neurological: Negative for headaches  All other systems reviewed and are negative          Current Medications       Current Outpatient Medications:     ALPRAZolam (XANAX) 0 5 mg tablet, Take 1 tablet (0 5 mg total) by mouth daily at bedtime as needed for anxiety or sleep, Disp: 20 tablet, Rfl: 1    ascorbic acid (VITAMIN C) 500 mg tablet, Take 500 mg by mouth daily, Disp: , Rfl:     aspirin 81 mg chewable tablet, Chew 1 tablet (81 mg total) daily (Patient taking differently: Chew 81 mg daily as needed ), Disp: 30 tablet, Rfl: 0    b complex vitamins tablet, Take 1 tablet by mouth daily, Disp: , Rfl:     Cholecalciferol (VITAMIN D-3) 1000 units CAPS, Daily, Disp: , Rfl:     ibuprofen (MOTRIN) 200 mg tablet, Take by mouth every 6 (six) hours as needed for mild pain, Disp: , Rfl:     Lutein 10 MG TABS, lutein  20 mg qd, Disp: , Rfl:     Magnesium 100 MG CAPS, magnesium  300 mg qd, Disp: , Rfl:     Multiple Vitamins-Calcium (ONE-A-DAY WOMENS PO), Take 1 tablet by mouth daily, Disp: , Rfl:     Omega-3 Fatty Acids (FISH OIL PO), Take 2 g by mouth, Disp: , Rfl:     thiamine (VITAMIN B1) 50 mg tablet, Take 50 mg by mouth daily States she is taking vitamin B2, Disp: , Rfl:     Current Allergies     Allergies as of 11/25/2019    (No Known Allergies)            The following portions of the patient's history were reviewed and updated as appropriate: allergies, current medications, past family history, past medical history, past social history, past surgical history and problem list      Past Medical History:   Diagnosis Date    Anxiety     Arthritis     Back pain     Balance problems     Chest pain     heaviness    Dizziness     Gait disorder     GERD (gastroesophageal reflux disease)     Hypertension     Increased frequency of headaches     Numbness and tingling     Palpitations     Pericarditis     Thyroid disease     Trouble in sleeping        Past Surgical History:   Procedure Laterality Date    APPENDECTOMY      CHOLECYSTECTOMY      laparoscopic    KNEE SURGERY Left     PERICARDIAL WINDOW      AR OPEN RX DISTAL RADIUS FX, EXTRA-ARTICULAR Right 3/22/2018    Procedure: OPEN REDUCTION W/ INTERNAL FIXATION (ORIF) RADIUS, splint application;  Surgeon: Vito Garcia MD;  [facility];  Service: Orthopedics       Family History   Problem Relation Age of Onset    Kidney failure Mother     Hypertension Mother     Lung cancer Father     Esmer De La Cruz Parkinson White syndrome Daughter     No Known Problems Sister     Substance Abuse Neg Hx     Alcohol abuse Neg Hx     Mental illness Neg Hx          Medications have been verified  Objective   /88   Pulse 90   Temp 100 1 °F (37 8 °C)   Resp 18   Ht 5' 8" (1 727 m)   Wt 78 5 kg (173 lb)   SpO2 97%   BMI 26 30 kg/m²        Physical Exam     Physical Exam   Constitutional: She is oriented to person, place, and time  She appears well-developed and well-nourished  Non-toxic appearance  She does not appear ill  No distress  HENT:   Head: Normocephalic and atraumatic  Right Ear: Tympanic membrane and ear canal normal  No drainage  Left Ear: Tympanic membrane and ear canal normal  No drainage  Mouth/Throat: Mucous membranes are normal  No oropharyngeal exudate, posterior oropharyngeal edema or posterior oropharyngeal erythema  No tonsillar exudate  Eyes: Pupils are equal, round, and reactive to light  Conjunctivae and EOM are normal    Neck: Normal range of motion  Neck supple  Cardiovascular: Normal rate, regular rhythm and normal heart sounds  Pulmonary/Chest: Effort normal and breath sounds normal  No stridor  No respiratory distress  She has no wheezes  She has no rhonchi  She has no rales  Lymphadenopathy:     She has no cervical adenopathy  Neurological: She is alert and oriented to person, place, and time  No cranial nerve deficit or sensory deficit  Skin: Skin is warm and dry  Capillary refill takes less than 2 seconds  No rash noted  She is not diaphoretic  Psychiatric: She has a normal mood and affect  Her behavior is normal  Thought content normal    Nursing note and vitals reviewed

## 2019-12-03 ENCOUNTER — OFFICE VISIT (OUTPATIENT)
Dept: FAMILY MEDICINE CLINIC | Facility: CLINIC | Age: 74
End: 2019-12-03
Payer: COMMERCIAL

## 2019-12-03 VITALS
DIASTOLIC BLOOD PRESSURE: 80 MMHG | SYSTOLIC BLOOD PRESSURE: 138 MMHG | WEIGHT: 176 LBS | BODY MASS INDEX: 26.67 KG/M2 | RESPIRATION RATE: 14 BRPM | HEIGHT: 68 IN | HEART RATE: 71 BPM | TEMPERATURE: 98.2 F

## 2019-12-03 DIAGNOSIS — J01.90 ACUTE NON-RECURRENT SINUSITIS, UNSPECIFIED LOCATION: Primary | ICD-10-CM

## 2019-12-03 DIAGNOSIS — R05.9 COUGH: ICD-10-CM

## 2019-12-03 PROCEDURE — 1160F RVW MEDS BY RX/DR IN RCRD: CPT | Performed by: NURSE PRACTITIONER

## 2019-12-03 PROCEDURE — 99213 OFFICE O/P EST LOW 20 MIN: CPT | Performed by: NURSE PRACTITIONER

## 2019-12-03 PROCEDURE — 1036F TOBACCO NON-USER: CPT | Performed by: NURSE PRACTITIONER

## 2019-12-03 PROCEDURE — 3725F SCREEN DEPRESSION PERFORMED: CPT | Performed by: NURSE PRACTITIONER

## 2019-12-03 PROCEDURE — 3008F BODY MASS INDEX DOCD: CPT | Performed by: NURSE PRACTITIONER

## 2019-12-03 RX ORDER — CEFUROXIME AXETIL 500 MG/1
500 TABLET ORAL EVERY 12 HOURS SCHEDULED
Qty: 20 TABLET | Refills: 0 | Status: SHIPPED | OUTPATIENT
Start: 2019-12-03 | End: 2019-12-05

## 2019-12-03 NOTE — PROGRESS NOTES
Assessment/Plan:        1  Acute non-recurrent sinusitis, unspecified location  Please take plain mucinex along with the antibiotic  Please drink a lot of fluids  Call if you are not getting better  - cefuroxime (CEFTIN) 500 mg tablet; Take 1 tablet (500 mg total) by mouth every 12 (twelve) hours for 10 days  Dispense: 20 tablet; Refill: 0     rto prn     Subjective:      Patient ID: Lane Velasquez is a 69 y o  female  Here today with complaint of a cough and congestion  Is wheezing and fatigued  Started about one week ago  Went to [employer] last week   Was  Not given any meds at that ov  Thinks that she is getting better but then worse  No fevers  Has not been taking anything for this         OBJECTIVE  Past Medical History:   Diagnosis Date    Anxiety     Arthritis     Back pain     Balance problems     Chest pain     heaviness    Dizziness     Gait disorder     GERD (gastroesophageal reflux disease)     Hypertension     Increased frequency of headaches     Numbness and tingling     Palpitations     Pericarditis     Thyroid disease     Trouble in sleeping      temporarily  Wt Readings from Last 3 Encounters:   12/03/19 79 8 kg (176 lb)   11/25/19 78 5 kg (173 lb)   11/22/19 78 9 kg (174 lb)     BP Readings from Last 3 Encounters:   12/03/19 140/82   11/25/19 128/88   11/22/19 132/80     Pulse Readings from Last 3 Encounters:   12/03/19 71   11/25/19 90   11/22/19 74     BMI Readings from Last 3 Encounters:   12/03/19 26 76 kg/m²   11/25/19 26 30 kg/m²   11/22/19 27 25 kg/m²        Physical Exam   Constitutional: She appears well-developed and well-nourished  HENT:   Right Ear: Tympanic membrane normal  Tympanic membrane is not injected and not erythematous  Left Ear: Tympanic membrane normal  Tympanic membrane is not injected and not erythematous  Nose: Mucosal edema and rhinorrhea present  Right sinus exhibits frontal sinus tenderness  Left sinus exhibits frontal sinus tenderness  Mouth/Throat: Oropharynx is clear and moist and mucous membranes are normal    Neck: Normal range of motion  Neck supple  Cardiovascular: Normal rate and regular rhythm     Pulmonary/Chest: Effort normal and breath sounds normal

## 2019-12-05 ENCOUNTER — TELEPHONE (OUTPATIENT)
Dept: FAMILY MEDICINE CLINIC | Facility: CLINIC | Age: 74
End: 2019-12-05

## 2019-12-05 DIAGNOSIS — J01.90 ACUTE NON-RECURRENT SINUSITIS, UNSPECIFIED LOCATION: Primary | ICD-10-CM

## 2019-12-05 RX ORDER — CEFUROXIME AXETIL 250 MG/1
250 TABLET ORAL EVERY 12 HOURS SCHEDULED
Qty: 20 TABLET | Refills: 0 | Status: SHIPPED | OUTPATIENT
Start: 2019-12-05 | End: 2019-12-15

## 2019-12-05 NOTE — TELEPHONE ENCOUNTER
Patient is on a regimen of Ceftin 500 mg tablet every 12 hours  She said she discussed the fact that this would upset her stomach  You advised that she take one full dose, then if her stomach bothered her to cut the pill in half  She googled whether or not it was advisable to cut this pill in half since there is no score line on the pill  She read that it sometimes lessens the effectiveness of the dose  She doubled checked with the pharmacist   He said that it was okay to cut it in half  But she is still concerned about the effectiveness of the medication once it is cut in half  Can you send a script for the 250 mg dosage so she doesn't have to worry about whether the medication is working      Best number for Danis Ashton:  [phone]

## 2019-12-30 ENCOUNTER — APPOINTMENT (OUTPATIENT)
Dept: LAB | Facility: CLINIC | Age: 74
End: 2019-12-30
Payer: COMMERCIAL

## 2019-12-30 DIAGNOSIS — E34.9 ELEVATED CALCITONIN LEVEL: ICD-10-CM

## 2019-12-30 DIAGNOSIS — R10.9 ABDOMINAL PAIN, UNSPECIFIED ABDOMINAL LOCATION: ICD-10-CM

## 2019-12-30 LAB
AMYLASE SERPL-CCNC: 136 IU/L (ref 25–115)
ANION GAP SERPL CALCULATED.3IONS-SCNC: 5 MMOL/L (ref 4–13)
BUN SERPL-MCNC: 25 MG/DL (ref 5–25)
CALCIUM SERPL-MCNC: 10 MG/DL (ref 8.3–10.1)
CHLORIDE SERPL-SCNC: 109 MMOL/L (ref 100–108)
CO2 SERPL-SCNC: 28 MMOL/L (ref 21–32)
CREAT SERPL-MCNC: 0.9 MG/DL (ref 0.6–1.3)
GFR SERPL CREATININE-BSD FRML MDRD: 64 ML/MIN/1.73SQ M
GLUCOSE P FAST SERPL-MCNC: 95 MG/DL (ref 65–99)
LIPASE SERPL-CCNC: 648 U/L (ref 73–393)
POTASSIUM SERPL-SCNC: 4.4 MMOL/L (ref 3.5–5.3)
SODIUM SERPL-SCNC: 142 MMOL/L (ref 136–145)

## 2019-12-30 PROCEDURE — 36415 COLL VENOUS BLD VENIPUNCTURE: CPT

## 2019-12-30 PROCEDURE — 83690 ASSAY OF LIPASE: CPT

## 2019-12-30 PROCEDURE — 80048 BASIC METABOLIC PNL TOTAL CA: CPT

## 2019-12-30 PROCEDURE — 82150 ASSAY OF AMYLASE: CPT

## 2020-01-03 ENCOUNTER — OFFICE VISIT (OUTPATIENT)
Dept: URGENT CARE | Facility: CLINIC | Age: 75
End: 2020-01-03
Payer: COMMERCIAL

## 2020-01-03 ENCOUNTER — OFFICE VISIT (OUTPATIENT)
Dept: PHYSICAL THERAPY | Facility: CLINIC | Age: 75
End: 2020-01-03
Payer: COMMERCIAL

## 2020-01-03 VITALS
TEMPERATURE: 97.7 F | BODY MASS INDEX: 26.67 KG/M2 | OXYGEN SATURATION: 97 % | RESPIRATION RATE: 16 BRPM | HEART RATE: 70 BPM | DIASTOLIC BLOOD PRESSURE: 80 MMHG | WEIGHT: 176 LBS | SYSTOLIC BLOOD PRESSURE: 155 MMHG | HEIGHT: 68 IN

## 2020-01-03 DIAGNOSIS — H66.92 LEFT OTITIS MEDIA, UNSPECIFIED OTITIS MEDIA TYPE: Primary | ICD-10-CM

## 2020-01-03 DIAGNOSIS — Z12.39 SCREENING FOR MALIGNANT NEOPLASM OF BREAST: ICD-10-CM

## 2020-01-03 DIAGNOSIS — M72.2 PLANTAR FASCIITIS, BILATERAL: Primary | ICD-10-CM

## 2020-01-03 PROCEDURE — 99213 OFFICE O/P EST LOW 20 MIN: CPT | Performed by: NURSE PRACTITIONER

## 2020-01-03 PROCEDURE — L3010 FOOT LONGITUDINAL ARCH SUPPO: HCPCS | Performed by: PHYSICAL THERAPIST

## 2020-01-03 PROCEDURE — S9083 URGENT CARE CENTER GLOBAL: HCPCS | Performed by: NURSE PRACTITIONER

## 2020-01-03 RX ORDER — AZITHROMYCIN 250 MG/1
TABLET, FILM COATED ORAL
Qty: 6 TABLET | Refills: 0 | Status: SHIPPED | OUTPATIENT
Start: 2020-01-03 | End: 2020-01-07

## 2020-01-03 NOTE — PROGRESS NOTES
[facility]        NAME: Rahul Gold is a 69 y o  female  : 1945    MRN: [id number]  DATE: January 3, 2020  TIME: 2:08 PM    Assessment and Plan   Left otitis media, unspecified otitis media type [H66 92]  1  Left otitis media, unspecified otitis media type  azithromycin (ZITHROMAX) 250 mg tablet         Patient Instructions     Take medications as directed  Tylenol OTC p r n  Fluids and rest  Also TheraFlu OTC if viral component  Mucinex DM b i d  OTC p r n  Follow up with PCP in 3-5 days  Proceed to  ER if symptoms worsen  Chief Complaint     Chief Complaint   Patient presents with    Cold Like Symptoms     Pt reports for 1 1/2 weeks to 2 weeks now she has c/o a dry cough, chest congestion, and increase in mucous  Pt reports she recently completed tx for a sinus infection  Pt has not been taking any OTC medicine for her sxs  History of Present Illness       HPI   Patient reports cold symptoms for about 2 weeks she was treated 1 month ago with Ceftin at another care now location  She took it for 2 days and stopped because it irritated her stomach but no true allergy  Was supposed to take Mucinex OTC but did not take it because she did not know which type of Mucinex to take  Review of Systems   Review of Systems   Constitutional: Positive for chills  Negative for fever  HENT: Positive for congestion and rhinorrhea  Negative for sore throat and trouble swallowing  Respiratory: Positive for cough  Negative for chest tightness and wheezing  Cardiovascular: Negative for chest pain  Gastrointestinal: Negative for diarrhea, nausea and vomiting  Neurological: Positive for headaches (When coughing a lot)  Negative for dizziness           Current Medications       Current Outpatient Medications:     ALPRAZolam (XANAX) 0 5 mg tablet, Take 1 tablet (0 5 mg total) by mouth daily at bedtime as needed for anxiety or sleep, Disp: 20 tablet, Rfl: 1    ascorbic acid (VITAMIN C) 500 mg tablet, Take 500 mg by mouth daily, Disp: , Rfl:     b complex vitamins tablet, Take 1 tablet by mouth daily, Disp: , Rfl:     Cholecalciferol (VITAMIN D-3) 1000 units CAPS, Daily, Disp: , Rfl:     ibuprofen (MOTRIN) 200 mg tablet, Take by mouth every 6 (six) hours as needed for mild pain, Disp: , Rfl:     Lutein 10 MG TABS, lutein  20 mg qd, Disp: , Rfl:     Magnesium 100 MG CAPS, magnesium  300 mg qd, Disp: , Rfl:     Multiple Vitamins-Calcium (ONE-A-DAY WOMENS PO), Take 1 tablet by mouth daily, Disp: , Rfl:     Omega-3 Fatty Acids (FISH OIL PO), Take 2 g by mouth, Disp: , Rfl:     thiamine (VITAMIN B1) 50 mg tablet, Take 50 mg by mouth daily States she is taking vitamin B2, Disp: , Rfl:     aspirin 81 mg chewable tablet, Chew 1 tablet (81 mg total) daily (Patient not taking: Reported on 1/3/2020), Disp: 30 tablet, Rfl: 0    azithromycin (ZITHROMAX) 250 mg tablet, Take 2 tablets today then 1 tablet daily x 4 days, Disp: 6 tablet, Rfl: 0    Current Allergies     Allergies as of 01/03/2020    (No Known Allergies)            The following portions of the patient's history were reviewed and updated as appropriate: allergies, current medications, past family history, past medical history, past social history, past surgical history and problem list      Past Medical History:   Diagnosis Date    Anxiety     Arthritis     Back pain     Balance problems     Chest pain     heaviness    Dizziness     Gait disorder     GERD (gastroesophageal reflux disease)     Hypertension     Increased frequency of headaches     Numbness and tingling     Palpitations     Pericarditis     Thyroid disease     Trouble in sleeping        Past Surgical History:   Procedure Laterality Date    APPENDECTOMY      CHOLECYSTECTOMY      laparoscopic    KNEE SURGERY Left     PERICARDIAL WINDOW      [place] OPEN RX DISTAL RADIUS FX, EXTRA-ARTICULAR Right 3/22/2018    Procedure: OPEN REDUCTION W/ INTERNAL FIXATION (ORIF) RADIUS, splint application;  Surgeon: Autumn Stark MD;  [facility];  Service: Orthopedics       Family History   Problem Relation Age of Onset    Kidney failure Mother     Hypertension Mother     Lung cancer Father    Brooksie Alevism Parkinson White syndrome Daughter     No Known Problems Sister     Substance Abuse Neg Hx     Alcohol abuse Neg Hx     Mental illness Neg Hx          Medications have been verified  Objective   /80 (BP Location: Right arm, Patient Position: Sitting)   Pulse 70   Temp 97 7 °F (36 5 °C) (Tympanic)   Resp 16   Ht 5' 8" (1 727 m)   Wt 79 8 kg (176 lb)   SpO2 97%   BMI 26 76 kg/m²        Physical Exam     Physical Exam   Constitutional: She appears well-developed  HENT:   Right Ear: External ear normal    Left Ear: External ear normal    Cardiovascular: Normal rate and regular rhythm  Pulmonary/Chest: Effort normal  She has no wheezes  She exhibits no tenderness  Mild congestion to bilateral lung fields  Abdominal: Soft  Bowel sounds are normal  There is no tenderness  There is no rebound  Lymphadenopathy:     She has no cervical adenopathy

## 2020-01-03 NOTE — PROGRESS NOTES
Dispensed orthotics, ground R med flange due to report of discomfort  Pt reported improvement with this    F/u prn

## 2020-01-03 NOTE — PATIENT INSTRUCTIONS
Otitis Media   WHAT YOU NEED TO KNOW:   Otitis media is an ear infection  DISCHARGE INSTRUCTIONS:   Medicines:  · Ibuprofen or acetaminophen  helps decrease your pain and fever  They are available without a doctor's order  Ask your healthcare provider which medicine is right for you  Ask how much to take and how often to take it  These medicines can cause stomach bleeding if not taken correctly  Ibuprofen can cause kidney damage  Do not take ibuprofen if you have kidney disease, an ulcer, or allergies to aspirin  Acetaminophen can cause liver damage  Do not drink alcohol if you take acetaminophen  · Ear drops  help treat your ear pain  · Antibiotics  help treat a bacterial infection that caused your ear infection  · Take your medicine as directed  Contact your healthcare provider if you think your medicine is not helping or if you have side effects  Tell him or her if you are allergic to any medicine  Keep a list of the medicines, vitamins, and herbs you take  Include the amounts, and when and why you take them  Bring the list or the pill bottles to follow-up visits  Carry your medicine list with you in case of an emergency  Heat or ice:   · Heat  may be used to decrease your pain  Place a warm, moist washcloth on your ear  Apply for 15 to 20 minutes, 3 to 4 times a day    · Ice  helps decrease swelling and pain  Use an ice pack or put crushed ice in a plastic bag  Cover the ice pack with a towel and place it on your ear for 15 to 20 minutes, 3 to 4 times a day for 2 days  Prevent otitis media:   · Wash your hands often  Use soap and water  Wash your hands after you use the bathroom, change a child's diapers, or sneeze  Wash your hands before you prepare or eat food  · Stay away from people who are ill  Some germs are easily and quickly spread through contact  Return to work or school: You may return to work or school when your fever is gone     Follow up with your healthcare provider as directed:  Write down your questions so you remember to ask them during your visits  Contact your healthcare provider if:   · Your ear pain gets worse or does not go away, even after treatment  · The outside of your ear is red or swollen  · You have vomiting or diarrhea  · You have fluid coming from your ear  · You have questions or concerns about your condition or care  Return to the emergency department if:   · You have a seizure  · You have a fever and a stiff neck  © 2017 [facility] Information is for End User's use only and may not be sold, redistributed or otherwise used for commercial purposes  All illustrations and images included in CareNotes® are the copyrighted property of A D A M , Inc  or [employer]  The above information is an  only  It is not intended as medical advice for individual conditions or treatments  Talk to your doctor, nurse or pharmacist before following any medical regimen to see if it is safe and effective for you

## 2020-01-06 ENCOUNTER — HOSPITAL ENCOUNTER (EMERGENCY)
Facility: HOSPITAL | Age: 75
Discharge: HOME | End: 2020-01-06
Attending: EMERGENCY MEDICINE | Admitting: EMERGENCY MEDICINE
Payer: COMMERCIAL

## 2020-01-06 ENCOUNTER — OFFICE VISIT (OUTPATIENT)
Dept: FAMILY MEDICINE CLINIC | Facility: CLINIC | Age: 75
End: 2020-01-06
Payer: COMMERCIAL

## 2020-01-06 ENCOUNTER — APPOINTMENT (EMERGENCY)
Dept: RADIOLOGY | Facility: HOSPITAL | Age: 75
End: 2020-01-06
Payer: COMMERCIAL

## 2020-01-06 VITALS
BODY MASS INDEX: 26.52 KG/M2 | DIASTOLIC BLOOD PRESSURE: 79 MMHG | RESPIRATION RATE: 18 BRPM | WEIGHT: 175 LBS | HEIGHT: 68 IN | TEMPERATURE: 98 F | HEART RATE: 68 BPM | OXYGEN SATURATION: 99 % | SYSTOLIC BLOOD PRESSURE: 184 MMHG

## 2020-01-06 VITALS
RESPIRATION RATE: 16 BRPM | DIASTOLIC BLOOD PRESSURE: 70 MMHG | BODY MASS INDEX: 26.61 KG/M2 | HEART RATE: 80 BPM | HEIGHT: 68 IN | SYSTOLIC BLOOD PRESSURE: 130 MMHG | TEMPERATURE: 98.5 F | WEIGHT: 175.6 LBS

## 2020-01-06 DIAGNOSIS — R74.8 ELEVATED PANCREATIC ENZYME: ICD-10-CM

## 2020-01-06 DIAGNOSIS — R10.11 RUQ ABDOMINAL PAIN: ICD-10-CM

## 2020-01-06 DIAGNOSIS — R19.5 ACHOLIC STOOL: ICD-10-CM

## 2020-01-06 DIAGNOSIS — R10.13 EPIGASTRIC ABDOMINAL PAIN: Primary | ICD-10-CM

## 2020-01-06 DIAGNOSIS — K21.9 GASTROESOPHAGEAL REFLUX DISEASE, ESOPHAGITIS PRESENCE NOT SPECIFIED: ICD-10-CM

## 2020-01-06 DIAGNOSIS — R10.9 ABDOMINAL PAIN: Primary | ICD-10-CM

## 2020-01-06 LAB
ALBUMIN SERPL BCP-MCNC: 3.8 G/DL (ref 3.5–5)
ALP SERPL-CCNC: 113 U/L (ref 46–116)
ALT SERPL W P-5'-P-CCNC: 36 U/L (ref 12–78)
ANION GAP SERPL CALCULATED.3IONS-SCNC: 4 MMOL/L (ref 4–13)
AST SERPL W P-5'-P-CCNC: 20 U/L (ref 5–45)
BACTERIA UR QL AUTO: NORMAL /HPF
BASOPHILS # BLD AUTO: 0.03 THOUSANDS/ΜL (ref 0–0.1)
BASOPHILS NFR BLD AUTO: 1 % (ref 0–1)
BILIRUB SERPL-MCNC: 0.69 MG/DL (ref 0.2–1)
BILIRUB UR QL STRIP: NEGATIVE
BUN SERPL-MCNC: 19 MG/DL (ref 5–25)
CALCIUM SERPL-MCNC: 10.6 MG/DL (ref 8.3–10.1)
CHLORIDE SERPL-SCNC: 108 MMOL/L (ref 100–108)
CLARITY UR: CLEAR
CO2 SERPL-SCNC: 29 MMOL/L (ref 21–32)
COLOR UR: YELLOW
COLOR, POC: YELLOW
CREAT SERPL-MCNC: 0.85 MG/DL (ref 0.6–1.3)
EOSINOPHIL # BLD AUTO: 0.13 THOUSAND/ΜL (ref 0–0.61)
EOSINOPHIL NFR BLD AUTO: 2 % (ref 0–6)
ERYTHROCYTE [DISTWIDTH] IN BLOOD BY AUTOMATED COUNT: 13.2 % (ref 11.6–15.1)
GFR SERPL CREATININE-BSD FRML MDRD: 69 ML/MIN/1.73SQ M
GLUCOSE SERPL-MCNC: 90 MG/DL (ref 65–140)
GLUCOSE UR STRIP-MCNC: NEGATIVE MG/DL
HCT VFR BLD AUTO: 44.9 % (ref 34.8–46.1)
HGB BLD-MCNC: 14.8 G/DL (ref 11.5–15.4)
HGB UR QL STRIP.AUTO: NEGATIVE
HYALINE CASTS #/AREA URNS LPF: NORMAL /LPF
IMM GRANULOCYTES # BLD AUTO: 0.02 THOUSAND/UL (ref 0–0.2)
IMM GRANULOCYTES NFR BLD AUTO: 0 % (ref 0–2)
KETONES UR STRIP-MCNC: NEGATIVE MG/DL
LEUKOCYTE ESTERASE UR QL STRIP: ABNORMAL
LIPASE SERPL-CCNC: 256 U/L (ref 73–393)
LYMPHOCYTES # BLD AUTO: 1.49 THOUSANDS/ΜL (ref 0.6–4.47)
LYMPHOCYTES NFR BLD AUTO: 23 % (ref 14–44)
MCH RBC QN AUTO: 30.3 PG (ref 26.8–34.3)
MCHC RBC AUTO-ENTMCNC: 33 G/DL (ref 31.4–37.4)
MCV RBC AUTO: 92 FL (ref 82–98)
MONOCYTES # BLD AUTO: 0.66 THOUSAND/ΜL (ref 0.17–1.22)
MONOCYTES NFR BLD AUTO: 10 % (ref 4–12)
NEUTROPHILS # BLD AUTO: 4.09 THOUSANDS/ΜL (ref 1.85–7.62)
NEUTS SEG NFR BLD AUTO: 64 % (ref 43–75)
NITRITE UR QL STRIP: NEGATIVE
NON-SQ EPI CELLS URNS QL MICRO: NORMAL /HPF
NRBC BLD AUTO-RTO: 0 /100 WBCS
PH UR STRIP.AUTO: 7 [PH] (ref 4.5–8)
PLATELET # BLD AUTO: 214 THOUSANDS/UL (ref 149–390)
PMV BLD AUTO: 10.3 FL (ref 8.9–12.7)
POTASSIUM SERPL-SCNC: 3.8 MMOL/L (ref 3.5–5.3)
PROT SERPL-MCNC: 7.7 G/DL (ref 6.4–8.2)
PROT UR STRIP-MCNC: NEGATIVE MG/DL
RBC # BLD AUTO: 4.89 MILLION/UL (ref 3.81–5.12)
RBC #/AREA URNS AUTO: NORMAL /HPF
SL AMB  POCT GLUCOSE, UA: ABNORMAL
SL AMB LEUKOCYTE ESTERASE,UA: ABNORMAL
SL AMB POCT BILIRUBIN,UA: ABNORMAL
SL AMB POCT BLOOD,UA: ABNORMAL
SL AMB POCT CLARITY,UA: CLEAR
SL AMB POCT COLOR,UA: YELLOW
SL AMB POCT KETONES,UA: ABNORMAL
SL AMB POCT NITRITE,UA: ABNORMAL
SL AMB POCT PH,UA: 5
SL AMB POCT SPECIFIC GRAVITY,UA: 1
SL AMB POCT URINE PROTEIN: ABNORMAL
SL AMB POCT UROBILINOGEN: 0.2
SODIUM SERPL-SCNC: 141 MMOL/L (ref 136–145)
SP GR UR STRIP.AUTO: 1.01 (ref 1–1.03)
UROBILINOGEN UR QL STRIP.AUTO: 0.2 E.U./DL
WBC # BLD AUTO: 6.42 THOUSAND/UL (ref 4.31–10.16)
WBC #/AREA URNS AUTO: NORMAL /HPF

## 2020-01-06 PROCEDURE — 81002 URINALYSIS NONAUTO W/O SCOPE: CPT | Performed by: EMERGENCY MEDICINE

## 2020-01-06 PROCEDURE — 80053 COMPREHEN METABOLIC PANEL: CPT | Performed by: EMERGENCY MEDICINE

## 2020-01-06 PROCEDURE — 83690 ASSAY OF LIPASE: CPT | Performed by: EMERGENCY MEDICINE

## 2020-01-06 PROCEDURE — 81001 URINALYSIS AUTO W/SCOPE: CPT

## 2020-01-06 PROCEDURE — 85025 COMPLETE CBC W/AUTO DIFF WBC: CPT | Performed by: EMERGENCY MEDICINE

## 2020-01-06 PROCEDURE — 36415 COLL VENOUS BLD VENIPUNCTURE: CPT | Performed by: EMERGENCY MEDICINE

## 2020-01-06 PROCEDURE — 99285 EMERGENCY DEPT VISIT HI MDM: CPT | Performed by: EMERGENCY MEDICINE

## 2020-01-06 PROCEDURE — 81002 URINALYSIS NONAUTO W/O SCOPE: CPT | Performed by: FAMILY MEDICINE

## 2020-01-06 PROCEDURE — 99213 OFFICE O/P EST LOW 20 MIN: CPT | Performed by: FAMILY MEDICINE

## 2020-01-06 PROCEDURE — 74177 CT ABD & PELVIS W/CONTRAST: CPT

## 2020-01-06 PROCEDURE — 93005 ELECTROCARDIOGRAM TRACING: CPT

## 2020-01-06 PROCEDURE — 99284 EMERGENCY DEPT VISIT MOD MDM: CPT

## 2020-01-06 RX ORDER — OMEPRAZOLE 20 MG/1
20 CAPSULE, DELAYED RELEASE ORAL DAILY
Qty: 30 CAPSULE
Start: 2020-01-06 | End: 2020-01-28 | Stop reason: ALTCHOICE

## 2020-01-06 RX ORDER — FAMOTIDINE 20 MG/1
20 TABLET, FILM COATED ORAL 2 TIMES DAILY
COMMUNITY
End: 2020-04-03 | Stop reason: ALTCHOICE

## 2020-01-06 RX ADMIN — IOHEXOL 100 ML: 350 INJECTION, SOLUTION INTRAVENOUS at 16:43

## 2020-01-06 NOTE — ED PROVIDER NOTES
History  Chief Complaint   Patient presents with    Abdominal Pain     coming in with c/o upper abd pain for the last 1-2 months (pain wraps around to her back (mostly R sided))  also notes increased urinary frequency and burning with urination      37-year-old female referred to the emergency department by her primary care physician for evaluation of an elevated lipase and amylase on outpatient testing  Patient reports she has been getting evaluated for intermittent episodes of abdominal pain that she describes as a cramping periumbilical pain radiating towards her back present for the last several months to years  Says that she has followed with Gastroenterology and has had her gallbladder taken out related to this pain in the past but has had persistent episodes of the pain and her lipase was elevated most recently  Denies any nausea vomiting diarrhea constipation  No fevers or chills  No urinary complaints  No chest pain shortness of breath or lightheadedness  Notes a decrease in her appetite but denies any weight loss  No melena or hematochezia  No other complaints on review of systems  Prior to Admission Medications   Prescriptions Last Dose Informant Patient Reported? Taking?    ALPRAZolam (XANAX) 0 5 mg tablet   No Yes   Sig: Take 1 tablet (0 5 mg total) by mouth daily at bedtime as needed for anxiety or sleep   Cholecalciferol (VITAMIN D-3) 1000 units CAPS 1/6/2020 at Unknown time Self Yes Yes   Sig: Daily   Lutein 10 MG TABS 1/6/2020 at Unknown time Self Yes Yes   Sig: lutein   20 mg qd   Magnesium 100 MG CAPS 1/6/2020 at Unknown time Self Yes Yes   Sig: magnesium   300 mg qd   Multiple Vitamins-Calcium (ONE-A-DAY WOMENS PO) 1/6/2020 at Unknown time Self Yes Yes   Sig: Take 1 tablet by mouth daily   Omega-3 Fatty Acids (FISH OIL PO) More than a month at Unknown time Self Yes No   Sig: Take 2 g by mouth   ascorbic acid (VITAMIN C) 500 mg tablet 1/6/2020 at Unknown time Self Yes Yes   Sig: Take 500 mg by mouth daily   aspirin 81 mg chewable tablet  Self No Yes   Sig: Chew 1 tablet (81 mg total) daily   azithromycin (ZITHROMAX) 250 mg tablet   No No   Sig: Take 2 tablets today then 1 tablet daily x 4 days   Patient not taking: Reported on 1/6/2020   b complex vitamins tablet 1/6/2020 at Unknown time Self Yes Yes   Sig: Take 1 tablet by mouth daily   famotidine (PEPCID) 20 mg tablet 1/6/2020 at Unknown time  Yes Yes   Sig: Take 20 mg by mouth 2 (two) times a day   ibuprofen (MOTRIN) 200 mg tablet  Self Yes Yes   Sig: Take by mouth every 6 (six) hours as needed for mild pain   omeprazole (PriLOSEC) 20 mg delayed release capsule Not Taking at Unknown time  No No   Sig: Take 1 capsule (20 mg total) by mouth daily   Patient not taking: Reported on 1/6/2020   thiamine (VITAMIN B1) 50 mg tablet 1/6/2020 at Unknown time Self Yes Yes   Sig: Take 50 mg by mouth daily States she is taking vitamin B2      Facility-Administered Medications: None       Past Medical History:   Diagnosis Date    Anxiety     Arthritis     Back pain     Balance problems     Chest pain     heaviness    Dizziness     Gait disorder     GERD (gastroesophageal reflux disease)     Hypertension     Increased frequency of headaches     Numbness and tingling     Palpitations     Pericarditis     Thyroid disease     Trouble in sleeping        Past Surgical History:   Procedure Laterality Date    APPENDECTOMY      CHOLECYSTECTOMY      laparoscopic    KNEE SURGERY Left     PERICARDIAL WINDOW      [place] OPEN RX DISTAL RADIUS FX, EXTRA-ARTICULAR Right 3/22/2018    Procedure: OPEN REDUCTION W/ INTERNAL FIXATION (ORIF) RADIUS, splint application;  Surgeon: Esha De La Paz MD;  [facility];  Service: Orthopedics       Family History   Problem Relation Age of Onset    Kidney failure Mother     Hypertension Mother     Lung cancer Father    Bobby Cardona Parkinson White syndrome Daughter     No Known Problems Sister    Linnea Sang Substance Abuse Neg Hx     Alcohol abuse Neg Hx     Mental illness Neg Hx      I have reviewed and agree with the history as documented  Social History     Tobacco Use    Smoking status: Never Smoker    Smokeless tobacco: Never Used   Substance Use Topics    Alcohol use: No    Drug use: No        Review of Systems   Constitutional: Negative for chills and fever  HENT: Negative for congestion and sore throat  Eyes: Negative for visual disturbance  Respiratory: Negative for cough and shortness of breath  Cardiovascular: Negative for chest pain and palpitations  Gastrointestinal: Positive for abdominal pain  Negative for blood in stool, diarrhea, nausea and vomiting  Genitourinary: Negative for difficulty urinating and dysuria  Musculoskeletal: Positive for back pain  Negative for myalgias  Skin: Negative for rash  Neurological: Negative for weakness, light-headedness, numbness and headaches  Physical Exam  ED Triage Vitals   Temperature Pulse Respirations Blood Pressure SpO2   01/06/20 1415 01/06/20 1415 01/06/20 1415 01/06/20 1415 01/06/20 1415   98 °F (36 7 °C) 85 16 (!) 180/85 95 %      Temp Source Heart Rate Source Patient Position - Orthostatic VS BP Location FiO2 (%)   01/06/20 1415 01/06/20 1415 01/06/20 1707 01/06/20 1707 --   Oral Monitor Lying Right arm       Pain Score       01/06/20 1415       5             Orthostatic Vital Signs  Vitals:    01/06/20 1415 01/06/20 1707 01/06/20 1743 01/06/20 1815   BP: (!) 180/85 166/84 (!) 178/90 (!) 184/79   Pulse: 85 66 71 68   Patient Position - Orthostatic VS:  Lying Lying Lying       Physical Exam   Constitutional: She is oriented to person, place, and time  She appears well-developed and well-nourished  No distress  HENT:   Head: Normocephalic and atraumatic  Eyes: Pupils are equal, round, and reactive to light  EOM are normal    Neck: Normal range of motion  Neck supple     Cardiovascular: Normal rate, regular rhythm and normal heart sounds  Pulmonary/Chest: Effort normal and breath sounds normal  No respiratory distress  Abdominal: Soft  Bowel sounds are normal  There is no tenderness  Musculoskeletal: Normal range of motion  Neurological: She is alert and oriented to person, place, and time  Skin: Skin is warm and dry  Psychiatric: She has a normal mood and affect  Nursing note and vitals reviewed        ED Medications  Medications   iohexol (OMNIPAQUE) 350 MG/ML injection (MULTI-DOSE) 100 mL (100 mL Intravenous Given 1/6/20 1643)       Diagnostic Studies  Results Reviewed     Procedure Component Value Units Date/Time    Urine Microscopic [[id number]]  (Normal) Collected:  01/06/20 1454    Lab Status:  Final result Specimen:  Urine, Clean Catch Updated:  01/06/20 1523     RBC, UA None Seen /hpf      WBC, UA None Seen /hpf      Epithelial Cells None Seen /hpf      Bacteria, UA None Seen /hpf      Hyaline Casts, UA None Seen /lpf     Comprehensive metabolic panel [[id number]]  (Abnormal) Collected:  01/06/20 1449    Lab Status:  Final result Specimen:  Blood from Arm, Left Updated:  01/06/20 1520     Sodium 141 mmol/L      Potassium 3 8 mmol/L      Chloride 108 mmol/L      CO2 29 mmol/L      ANION GAP 4 mmol/L      BUN 19 mg/dL      Creatinine 0 85 mg/dL      Glucose 90 mg/dL      Calcium 10 6 mg/dL      AST 20 U/L      ALT 36 U/L      Alkaline Phosphatase 113 U/L      Total Protein 7 7 g/dL      Albumin 3 8 g/dL      Total Bilirubin 0 69 mg/dL      eGFR 69 ml/min/1 73sq m     Narrative:       [place] guidelines for Chronic Kidney Disease (CKD):     Stage 1 with normal or high GFR (GFR > 90 mL/min/1 73 square meters)    Stage 2 Mild CKD (GFR = 60-89 mL/min/1 73 square meters)    Stage 3A Moderate CKD (GFR = 45-59 mL/min/1 73 square meters)    Stage 3B Moderate CKD (GFR = 30-44 mL/min/1 73 square meters)    Stage 4 Severe CKD (GFR = 15-29 mL/min/1 73 square meters)    Stage 5 End Stage CKD (GFR <15 mL/min/1 73 square meters)  Note: GFR calculation is accurate only with a steady state creatinine    Lipase [[id number]]  (Normal) Collected:  01/06/20 1449    Lab Status:  Final result Specimen:  Blood from Arm, Left Updated:  01/06/20 1517     Lipase 256 u/L     CBC and differential [[id number]] Collected:  01/06/20 1449    Lab Status:  Final result Specimen:  Blood from Arm, Left Updated:  01/06/20 1500     WBC 6 42 Thousand/uL      RBC 4 89 Million/uL      Hemoglobin 14 8 g/dL      Hematocrit 44 9 %      MCV 92 fL      MCH 30 3 pg      MCHC 33 0 g/dL      RDW 13 2 %      MPV 10 3 fL      Platelets 434 Thousands/uL      nRBC 0 /100 WBCs      Neutrophils Relative 64 %      Immat GRANS % 0 %      Lymphocytes Relative 23 %      Monocytes Relative 10 %      Eosinophils Relative 2 %      Basophils Relative 1 %      Neutrophils Absolute 4 09 Thousands/µL      Immature Grans Absolute 0 02 Thousand/uL      Lymphocytes Absolute 1 49 Thousands/µL      Monocytes Absolute 0 66 Thousand/µL      Eosinophils Absolute 0 13 Thousand/µL      Basophils Absolute 0 03 Thousands/µL     POCT urinalysis dipstick [[id number]]  (Normal) Resulted:  01/06/20 1453    Lab Status:  Final result Specimen:  Urine Updated:  01/06/20 1453     Color, UA yellow    Urine Macroscopic, POC [[id number]]  (Abnormal) Collected:  01/06/20 1454    Lab Status:  Final result Specimen:  Urine Updated:  01/06/20 1452     Color, UA Yellow     Clarity, UA Clear     pH, UA 7 0     Leukocytes, UA Trace     Nitrite, UA Negative     Protein, UA Negative mg/dl      Glucose, UA Negative mg/dl      Ketones, UA Negative mg/dl      Urobilinogen, UA 0 2 E U /dl      Bilirubin, UA Negative     Blood, UA Negative     Specific Gravity, UA 1 010    Narrative:       CLIN[facility]K RESULT                 CT abdomen pelvis with contrast   Final Result by Merline Breslow, MD (01/06 1720)      No acute inflammatory changes in the abdomen or the pelvis              Workstation performed: [id number]               Procedures  Procedures      ED Course           Identification of Seniors at Risk      Most Recent Value   (ISAR) Identification of Seniors at Risk   Before the illness or injury that brought you to the Emergency, did you need someone to help you on a regular basis? 0 Filed at: 01/06/2020 1415   In the last 24 hours, have you needed more help than usual?  0 Filed at: 01/06/2020 1415   Have you been hospitalized for one or more nights during the past 6 months? 0 Filed at: 01/06/2020 1415   In general, do you see well?  0 Filed at: 01/06/2020 1415   In general, do you have serious problems with your memory? 0 Filed at: 01/06/2020 1415   Do you take more than three different medications every day? 1 Filed at: 01/06/2020 1415   ISAR Score  1 Filed at: 01/06/2020 1415                          [facility]  Number of Diagnoses or Management Options  Diagnosis management comments: Well-appearing 17-year-old female presenting for evaluation of an elevated lipase on outpatient testing as well as intermittent episodes of abdominal pain  Benign exam in the emergency department out any appreciable tenderness on exam   Appears well  Will do abdominal labs with a CT scan of the abdomen pelvis  Disposition  Final diagnoses:   Abdominal pain   Acholic stool     Time reflects when diagnosis was documented in both MDM as applicable and the Disposition within this note     Time User Action Codes Description Comment    1/6/2020  5:36 PM Lazara Bo Add [R10 9] Abdominal pain     1/6/2020  5:36 PM Lazara Bo Add [E65 6] Acholic stool       ED Disposition     ED Disposition Condition Date/Time Comment    Discharge Stable Mon Jan 6, 2020  5:36 PM Rita Mccarthy discharge to home/self care              Follow-up Information     Follow up With Specialties Details Why Contact Info Additional Information    Dianna Garcia MD Family Medicine Schedule an appointment as soon as possible for a visit in 2 days  Terence Lubin [address] [place] [address] [facility] Schedule an appointment as soon as possible for a visit   [address] [facility] [address] [facility] [id number]  Quita Saeed Meeks [address] [facility], [address] [place] [address], [address], [facility], [place], [address]          Discharge Medication List as of 1/6/2020  6:06 PM      CONTINUE these medications which have NOT CHANGED    Details   ALPRAZolam (XANAX) 0 5 mg tablet Take 1 tablet (0 5 mg total) by mouth daily at bedtime as needed for anxiety or sleep, Starting Fri 10/25/2019, Normal      ascorbic acid (VITAMIN C) 500 mg tablet Take 500 mg by mouth daily, Historical Med      aspirin 81 mg chewable tablet Chew 1 tablet (81 mg total) daily, Starting Mon 1/7/2019, Print      azithromycin (ZITHROMAX) 250 mg tablet Take 2 tablets today then 1 tablet daily x 4 days, Normal      b complex vitamins tablet Take 1 tablet by mouth daily, Historical Med      Cholecalciferol (VITAMIN D-3) 1000 units CAPS Daily, Historical Med      famotidine (PEPCID) 20 mg tablet Take 20 mg by mouth 2 (two) times a day, Historical Med      ibuprofen (MOTRIN) 200 mg tablet Take by mouth every 6 (six) hours as needed for mild pain, Historical Med      Lutein 10 MG TABS lutein   20 mg qd, Historical Med      Magnesium 100 MG CAPS magnesium   300 mg qd, Historical Med      Multiple Vitamins-Calcium (ONE-A-DAY WOMENS PO) Take 1 tablet by mouth daily, Historical Med      Omega-3 Fatty Acids (FISH OIL PO) Take 2 g by mouth, Historical Med      omeprazole (PriLOSEC) 20 mg delayed release capsule Take 1 capsule (20 mg total) by mouth daily, Starting Mon 1/6/2020, No Print      thiamine (VITAMIN B1) 50 mg tablet Take 50 mg by mouth daily States she is taking vitamin B2, Historical Med               ED Provider  Attending physically available and evaluated Eliseo Simpson I managed the patient along with the ED Attending      Electronically Signed by         Tarun Pope MD  01/06/20 1217

## 2020-01-06 NOTE — ED ATTENDING ATTESTATION
1/6/2020  IMakenzie MD, saw and evaluated the patient  I have discussed the patient with the resident/non-physician practitioner and agree with the resident's/non-physician practitioner's findings, Plan of Care, and MDM as documented in the resident's/non-physician practitioner's note, except where noted  All available labs and Radiology studies were reviewed  I was present for key portions of any procedure(s) performed by the resident/non-physician practitioner and I was immediately available to provide assistance  At this point I agree with the current assessment done in the Emergency Department  I have conducted an independent evaluation of this patient a history and physical is as follows: This is a 82-year-old woman who presents to the emergency department with upper abdominal pain that radiates into her back  Patient states that the pain has been present for a few weeks  She had seen her doctor who sent her in for further evaluation  Patient has had chronically mildly elevated lipases in the past   She states her symptoms have been present for a few weeks, and she has decreased appetite, though no weight loss  She states that her stools have intermittently been pale colored, and that her urine has been dark for about a week and a half  She has not had vomiting  She has not had fevers or chills  Her review of systems otherwise negative in 12 systems reviewed  On exam Vital signs were reviewed  Patient is awake, alert, interactive  The patient's pupils are equally round reactive to light  Oropharynx is clear with moist mucous membranes  Neck is supple and nontender with no adenopathy or JVD  Heart is regular with no murmurs, rubs, or gallops  Lungs are clear and equal with no wheezes, rales, or rhonchi  Abdomen is soft and nontender with no masses, rebound, or guarding  There is no CVA tenderness  The patient was completely exposed  There is no skin breakdown    There are no rashes or skin changes  Extremities are warm and well perfused with good pulses  The patient has normal strength, sensation, and cranial nerves  Impression:  Abdominal and back pain, concern for chronic pancreatitis, malignancy, stones    Will plan to CT abdomen, labs    ED Course         Critical Care Time  Procedures

## 2020-01-06 NOTE — PROGRESS NOTES
Assessment/Plan:   Diagnosis ICD-10-CM Associated Orders   1  Epigastric abdominal pain R10 13 Transfer to other facility   2  RUQ abdominal pain R10 11 POCT urine dip     Transfer to other facility   3  Elevated pancreatic enzyme R74 8 Transfer to other facility   4  Gastroesophageal reflux disease, esophagitis presence not specified K21 9 omeprazole (PriLOSEC) 20 mg delayed release capsule     Plan:   - discussed ER evaluation, patient understands and in agreement and will be going to [facility] ER  Advised patient to follow up after ER visit  The treatment plan was reviewed with the patient  The patient understands and agrees with the treatment plan        Subjective:   Chief Complaint   Patient presents with    Abdominal Pain     R side     Hypertension      Patient ID: Jayjay Schaefer is a 69 y o  female here today with c/o intermittent episodes of epigastric/ RUQ abdominal pain for several weeks,  now for the past few days the pain has been more constant and radiating to her back  She also reports decreased appetite and has been feeling nauseous after eating  Patient denies vomiting, diarrhea, trouble swallowing, melena or hematochezia, fever or chills, urinary frequency, urgency, pain with urination or blood in her urine           The following portions of the patient's history were reviewed and updated as appropriate: allergies, current medications, past family history, past medical history, past social history, past surgical history and problem list     Past Medical History:   Diagnosis Date    Anxiety     Arthritis     Back pain     Balance problems     Chest pain     heaviness    Dizziness     Gait disorder     GERD (gastroesophageal reflux disease)     Hypertension     Increased frequency of headaches     Numbness and tingling     Palpitations     Pericarditis     Thyroid disease     Trouble in sleeping      Past Surgical History:   Procedure Laterality Date    APPENDECTOMY      CHOLECYSTECTOMY      laparoscopic    KNEE SURGERY Left     PERICARDIAL WINDOW      RI OPEN RX DISTAL RADIUS FX, EXTRA-ARTICULAR Right 3/22/2018    Procedure: OPEN REDUCTION W/ INTERNAL FIXATION (ORIF) RADIUS, splint application;  Surgeon: Kayleigh Mendez MD;  [facility];  Service: Orthopedics     Family History   Problem Relation Age of Onset    Kidney failure Mother     Hypertension Mother     Lung cancer Father    Nay Correa Parkinson White syndrome Daughter     No Known Problems Sister     Substance Abuse Neg Hx     Alcohol abuse Neg Hx     Mental illness Neg Hx      Social History     Socioeconomic History    Marital status: /Civil Union     Spouse name: Not on file    Number of children: Not on file    Years of education: Not on file    Highest education level: Not on file   Occupational History    Not on file   Social Needs    Financial resource strain: Not on file    Food insecurity:     Worry: Not on file     Inability: Not on file    Transportation needs:     Medical: Not on file     Non-medical: Not on file   Tobacco Use    Smoking status: Never Smoker    Smokeless tobacco: Never Used   Substance and Sexual Activity    Alcohol use: No    Drug use: No    Sexual activity: Not on file   Lifestyle    Physical activity:     Days per week: Not on file     Minutes per session: Not on file    Stress: Not on file   Relationships    Social connections:     Talks on phone: Not on file     Gets together: Not on file     Attends Rastafarian service: Not on file     Active member of club or organization: Not on file     Attends meetings of clubs or organizations: Not on file     Relationship status: Not on file    Intimate partner violence:     Fear of current or ex partner: Not on file     Emotionally abused: Not on file     Physically abused: Not on file     Forced sexual activity: Not on file   Other Topics Concern    Not on file   Social History Narrative    WORK:    1   ([employer]; [employer]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    2  [employer]        HOBBIES:    1  Singing    2  Dancing    3  Hx of ceramics (+ dust)        PETS:    1  Dogs    -  Denies birds        TRAVEL:    -  [place]         EXPOSURES:    Denies mold; down pillows/comforters; hot tubs       Current Outpatient Medications:     ALPRAZolam (XANAX) 0 5 mg tablet, Take 1 tablet (0 5 mg total) by mouth daily at bedtime as needed for anxiety or sleep, Disp: 20 tablet, Rfl: 1    ascorbic acid (VITAMIN C) 500 mg tablet, Take 500 mg by mouth daily, Disp: , Rfl:     aspirin 81 mg chewable tablet, Chew 1 tablet (81 mg total) daily (Patient taking differently: Chew 81 mg as needed ), Disp: 30 tablet, Rfl: 0    b complex vitamins tablet, Take 1 tablet by mouth daily, Disp: , Rfl:     Cholecalciferol (VITAMIN D-3) 1000 units CAPS, Daily, Disp: , Rfl:     ibuprofen (MOTRIN) 200 mg tablet, Take by mouth every 6 (six) hours as needed for mild pain, Disp: , Rfl:     Lutein 10 MG TABS, lutein  20 mg qd, Disp: , Rfl:     Magnesium 100 MG CAPS, magnesium  300 mg qd, Disp: , Rfl:     Multiple Vitamins-Calcium (ONE-A-DAY WOMENS PO), Take 1 tablet by mouth daily, Disp: , Rfl:     thiamine (VITAMIN B1) 50 mg tablet, Take 50 mg by mouth daily States she is taking vitamin B2, Disp: , Rfl:     azithromycin (ZITHROMAX) 250 mg tablet, Take 2 tablets today then 1 tablet daily x 4 days (Patient not taking: Reported on 1/6/2020), Disp: 6 tablet, Rfl: 0    Omega-3 Fatty Acids (FISH OIL PO), Take 2 g by mouth, Disp: , Rfl:     omeprazole (PriLOSEC) 20 mg delayed release capsule, Take 1 capsule (20 mg total) by mouth daily, Disp: 30 capsule, Rfl:     Review of Systems   Constitutional: Positive for appetite change  Negative for chills and fever  Respiratory: Positive for cough  Negative for shortness of breath and wheezing  Cardiovascular: Negative for chest pain, palpitations and leg swelling     Gastrointestinal: Positive for abdominal pain and nausea  Negative for abdominal distention, blood in stool, constipation, diarrhea and vomiting  Genitourinary: Negative for dysuria, frequency and urgency  Neurological: Negative for dizziness and headaches  Objective:    Vitals:    01/06/20 1203   BP: 130/70   BP Location: Left arm   Patient Position: Sitting   Cuff Size: Adult   Pulse: 80   Resp: 16   Temp: 98 5 °F (36 9 °C)   Weight: 79 7 kg (175 lb 9 6 oz)   Height: 5' 8" (1 727 m)     Wt Readings from Last 3 Encounters:   01/06/20 79 7 kg (175 lb 9 6 oz)   01/03/20 79 8 kg (176 lb)   12/03/19 79 8 kg (176 lb)      Physical Exam   Constitutional: She is oriented to person, place, and time  She appears well-developed and well-nourished  No distress  Neck: Neck supple  No thyromegaly present  Cardiovascular: Normal rate, regular rhythm and normal heart sounds  No murmur heard  Pulmonary/Chest: Effort normal and breath sounds normal  No respiratory distress  She has no rhonchi  Abdominal: Soft  She exhibits no distension and no mass  There is tenderness in the epigastric area  There is no rigidity, no rebound, no guarding and no CVA tenderness  Musculoskeletal: She exhibits no edema  Lymphadenopathy:     She has no cervical adenopathy  Neurological: She is alert and oriented to person, place, and time  Psychiatric: She has a normal mood and affect   Her behavior is normal  Thought content normal        Office Visit on 01/06/2020   Component Date Value Ref Range Status    LEUKOCYTE ESTERASE,UA 01/06/2020 neg   Final    NITRITE,UA 01/06/2020 neg   Final    SL AMB POCT UROBILINOGEN 01/06/2020 0 2   Final    POCT URINE PROTEIN 01/06/2020 neg   Final     PH,UA 01/06/2020 5 0   Final    BLOOD,UA 01/06/2020 trace   Final    SPECIFIC GRAVITY,UA 01/06/2020 1 005   Final    KETONES,UA 01/06/2020 neg   Final    BILIRUBIN,UA 01/06/2020 neg   Final    GLUCOSE, UA 01/06/2020 neg   Final     Carlynn Lime 01/06/2020 Yellow   Final    CLARITY,UA 01/06/2020 Clear   Final   Appointment on 12/30/2019   Component Date Value Ref Range Status    Sodium 12/30/2019 142  136 - 145 mmol/L Final    Potassium 12/30/2019 4 4  3 5 - 5 3 mmol/L Final    Chloride 12/30/2019 109* 100 - 108 mmol/L Final    CO2 12/30/2019 28  21 - 32 mmol/L Final    ANION GAP 12/30/2019 5  4 - 13 mmol/L Final    BUN 12/30/2019 25  5 - 25 mg/dL Final    Creatinine 12/30/2019 0 90  0 60 - 1 30 mg/dL Final    Standardized to IDMS reference method    Glucose, Fasting 12/30/2019 95  65 - 99 mg/dL Final      Specimen collection should occur prior to Sulfasalazine administration due to the potential for falsely depressed results  Specimen collection should occur prior to Sulfapyridine administration due to the potential for falsely elevated results      Calcium 12/30/2019 10 0  8 3 - 10 1 mg/dL Final    eGFR 12/30/2019 64  ml/min/1 73sq m Final    Amylase 12/30/2019 136* 25 - 115 IU/L Final    Lipase 12/30/2019 648* 73 - 393 u/L Final       Office Visit on 01/06/2020   Component Date Value Ref Range Status    LEUKOCYTE ESTERASE,UA 01/06/2020 neg   Final    NITRITE,UA 01/06/2020 neg   Final    SL AMB POCT UROBILINOGEN 01/06/2020 0 2   Final    POCT URINE PROTEIN 01/06/2020 neg   Final     PH,UA 01/06/2020 5 0   Final    BLOOD,UA 01/06/2020 trace   Final    SPECIFIC GRAVITY,UA 01/06/2020 1 005   Final    KETONES,UA 01/06/2020 neg   Final    BILIRUBIN,UA 01/06/2020 neg   Final    GLUCOSE, UA 01/06/2020 neg   Final     COLOR,UA 01/06/2020 Yellow   Final    CLARITY,UA 01/06/2020 Clear   Final

## 2020-01-07 ENCOUNTER — TELEPHONE (OUTPATIENT)
Dept: GASTROENTEROLOGY | Facility: CLINIC | Age: 75
End: 2020-01-07

## 2020-01-07 LAB
ATRIAL RATE: 68 BPM
P AXIS: 48 DEGREES
PR INTERVAL: 194 MS
QRS AXIS: 59 DEGREES
QRSD INTERVAL: 98 MS
QT INTERVAL: 388 MS
QTC INTERVAL: 412 MS
T WAVE AXIS: 43 DEGREES
VENTRICULAR RATE: 68 BPM

## 2020-01-07 PROCEDURE — 93010 ELECTROCARDIOGRAM REPORT: CPT | Performed by: INTERNAL MEDICINE

## 2020-01-08 ENCOUNTER — OFFICE VISIT (OUTPATIENT)
Dept: GASTROENTEROLOGY | Facility: CLINIC | Age: 75
End: 2020-01-08
Payer: COMMERCIAL

## 2020-01-08 VITALS
HEIGHT: 68 IN | HEART RATE: 77 BPM | BODY MASS INDEX: 26.37 KG/M2 | DIASTOLIC BLOOD PRESSURE: 78 MMHG | SYSTOLIC BLOOD PRESSURE: 121 MMHG | WEIGHT: 174 LBS | TEMPERATURE: 99.1 F

## 2020-01-08 DIAGNOSIS — K58.0 IRRITABLE BOWEL SYNDROME WITH DIARRHEA: Primary | ICD-10-CM

## 2020-01-08 DIAGNOSIS — K21.00 GASTROESOPHAGEAL REFLUX DISEASE WITH ESOPHAGITIS: ICD-10-CM

## 2020-01-08 DIAGNOSIS — R19.5 ACHOLIC STOOL: ICD-10-CM

## 2020-01-08 DIAGNOSIS — R10.9 ABDOMINAL PAIN: ICD-10-CM

## 2020-01-08 PROBLEM — R74.8 ELEVATED AMYLASE AND LIPASE: Status: ACTIVE | Noted: 2020-01-08

## 2020-01-08 LAB
BACTERIA UR CULT: NORMAL
Lab: NO GROWTH

## 2020-01-08 PROCEDURE — 99204 OFFICE O/P NEW MOD 45 MIN: CPT | Performed by: INTERNAL MEDICINE

## 2020-01-08 RX ORDER — DICYCLOMINE HCL 20 MG
20 TABLET ORAL EVERY 6 HOURS
Qty: 30 TABLET | Refills: 1 | Status: SHIPPED | OUTPATIENT
Start: 2020-01-08 | End: 2020-04-03 | Stop reason: ALTCHOICE

## 2020-01-08 NOTE — PATIENT INSTRUCTIONS
Continue low fat diet  Try Gas X for excess gas  Take one dose metamucil per day to help form bulk stool  Try Align probiotic, 1 capsule per day, for 3-6 months

## 2020-01-08 NOTE — PROGRESS NOTES
[facility] - Outpatient Consultation  Eliseo Simpson 69 y o  female MRN: [id number]  Encounter: [id number]          ASSESSMENT AND PLAN:  Ms Victor M Li was seen today for abdominal pain  1  Abdominal pain: She complains of upper abdominal cramping pain that radiates to her back  There was no clinical evidence of pancreatitis or evidence of pancreatitis on CT scan  1/6/20  Her pain may be due to post-cholecystectomy scar tissue, excess gas or irritable bowel syndrome  I will prescribe dicyclomine 20 mg q 8 hours prn for crampy, abdominal pain  Take metamucil    2  Irritable bowel syndrome, diarrhea predominant: She reports formed stool with occasional loose, acholic stool  I recommended one dose metamucil per day to help form bulk stool  3  Gastroesophageal reflux disease: EGD 7/26/18 showed esophagitis and was otherwise normal  She had been treating her epigastric burning pain with omeprazole 20 mg but was switched to Pepcid OTC 1/6/20  She will continue Pepcid OTC      4: Mildly elevated amylase and lipase: Her lipase and amylase were mildly elevated 12/30/19 at 136 and 648, respectively  Lipase normalized to 256 upon presentation at the ED 1/6/20  There was no clinical evidence of pancreatitis or evidence of pancreatitis on CT scan 1/6/20       5  Hyperparathyroidism: This is managed by Dr Reuben Cook  Follow-up as needed  ______________________________________________________________________    HPI:  Eliseo Simpson is a 69 y o  female with primary hyperparathyroidism, thyroid nodule, migraine, heart murmur and a hx of pericarditis and abnormal CT lung referred by Dr Reuben Cook for evaluation and management of abdominal pain  She complains of upper abdominal cramping pain that radiates to her back  She presented at the ED 1/6/20  Her lipase and amylase were mildly elevated  There was no clinical evidence of pancreatitis or evidence of pancreatitis on CT scan       She had colonoscopy and EGD at [facility] in 7/26/18  One polyp was removed from transverse colon; biopsy showed benign mucosa  EGD showed esophagitis, otherwise normal  She had been treating her epigastric burning pain with omeprazole 20 mg but was switched to Pepcid OTC 1/6/20  She reports formed stool but occasional loose, yellowish stool  Her surgical history includes cholecystectomy approximately 8 years ago  REVIEW OF SYSTEMS:    CONSTITUTIONAL: Denies any fever, chills, rigors, and weight loss  HEENT: No earache or tinnitus  Denies hearing loss or visual disturbances  CARDIOVASCULAR: No chest pain or palpitations  RESPIRATORY: Denies any cough, hemoptysis, shortness of breath or dyspnea on exertion  GASTROINTESTINAL: As noted in the History of Present Illness  GENITOURINARY: No problems with urination  Denies any hematuria or dysuria  NEUROLOGIC: No dizziness or vertigo, denies headaches  MUSCULOSKELETAL: Denies any muscle or joint pain  SKIN: Denies skin rashes or itching  ENDOCRINE: Denies excessive thirst  Denies intolerance to heat or cold  PSYCHOSOCIAL: Denies depression or anxiety  Denies any recent memory loss         Historical Information   Past Medical History:   Diagnosis Date    Anxiety     Arthritis     Back pain     Balance problems     Chest pain     heaviness    Dizziness     Gait disorder     GERD (gastroesophageal reflux disease)     Hypertension     Increased frequency of headaches     Numbness and tingling     Palpitations     Pericarditis     Thyroid disease     Trouble in sleeping      Past Surgical History:   Procedure Laterality Date    APPENDECTOMY      CHOLECYSTECTOMY      laparoscopic    KNEE SURGERY Left     PERICARDIAL WINDOW      OR OPEN RX DISTAL RADIUS FX, EXTRA-ARTICULAR Right 3/22/2018    Procedure: OPEN REDUCTION W/ INTERNAL FIXATION (ORIF) RADIUS, splint application;  Surgeon: Daphne Oreilly MD;  [facility];  Service: Orthopedics     Social History   Social History     Substance and Sexual Activity   Alcohol Use No     Social History     Substance and Sexual Activity   Drug Use No     Social History     Tobacco Use   Smoking Status Never Smoker   Smokeless Tobacco Never Used     Family History   Problem Relation Age of Onset    Kidney failure Mother     Hypertension Mother     Lung cancer Father    Celestina Varma Parkinson White syndrome Daughter     No Known Problems Sister     Substance Abuse Neg Hx     Alcohol abuse Neg Hx     Mental illness Neg Hx        Meds/Allergies       Current Outpatient Medications:     ALPRAZolam (XANAX) 0 5 mg tablet    ascorbic acid (VITAMIN C) 500 mg tablet    aspirin 81 mg chewable tablet    b complex vitamins tablet    Cholecalciferol (VITAMIN D-3) 1000 units CAPS    famotidine (PEPCID) 20 mg tablet    Lutein 10 MG TABS    Magnesium 100 MG CAPS    Multiple Vitamins-Calcium (ONE-A-DAY WOMENS PO)    thiamine (VITAMIN B1) 50 mg tablet    ibuprofen (MOTRIN) 200 mg tablet    Omega-3 Fatty Acids (FISH OIL PO)    omeprazole (PriLOSEC) 20 mg delayed release capsule    No Known Allergies        Objective     Blood pressure 121/78, pulse 77, temperature 99 1 °F (37 3 °C), temperature source Tympanic, height 5' 8" (1 727 m), weight 78 9 kg (174 lb)  Body mass index is 26 46 kg/m²  PHYSICAL EXAM:      General Appearance:   Alert, cooperative, no distress   HEENT:   Normocephalic, atraumatic, anicteric      Neck:  Supple, symmetrical, trachea midline   Lungs:   Clear to auscultation bilaterally; no rales, rhonchi or wheezing; respirations unlabored    Heart[de-identified]   Regular rate and rhythm; no murmur, rub, or gallop     Abdomen:   Soft, non-tender, non-distended; normal bowel sounds; no masses, no organomegaly    Genitalia:   Deferred    Rectal:   Deferred    Extremities:  No cyanosis, clubbing or edema    Pulses:  2+ and symmetric    Skin:  No jaundice, rashes, or lesions  Lymph nodes:  No palpable cervical lymphadenopathy        Lab Results:     Her lipase and amylase were mildly elevated 12/30/19 at 136 and 648, respectively  Lipase normalized to 256 as of 1/6/20  No visits with results within 1 Day(s) from this visit     Latest known visit with results is:   Admission on 01/06/2020, Discharged on 01/06/2020   Component Date Value    WBC 01/06/2020 6 42     RBC 01/06/2020 4 89     Hemoglobin 01/06/2020 14 8     Hematocrit 01/06/2020 44 9     MCV 01/06/2020 92     MCH 01/06/2020 30 3     MCHC 01/06/2020 33 0     RDW 01/06/2020 13 2     MPV 01/06/2020 10 3     Platelets 31/95/8247 214     nRBC 01/06/2020 0     Neutrophils Relative 01/06/2020 64     Immat GRANS % 01/06/2020 0     Lymphocytes Relative 01/06/2020 23     Monocytes Relative 01/06/2020 10     Eosinophils Relative 01/06/2020 2     Basophils Relative 01/06/2020 1     Neutrophils Absolute 01/06/2020 4 09     Immature Grans Absolute 01/06/2020 0 02     Lymphocytes Absolute 01/06/2020 1 49     Monocytes Absolute 01/06/2020 0 66     Eosinophils Absolute 01/06/2020 0 13     Basophils Absolute 01/06/2020 0 03     Sodium 01/06/2020 141     Potassium 01/06/2020 3 8     Chloride 01/06/2020 108     CO2 01/06/2020 29     ANION GAP 01/06/2020 4     BUN 01/06/2020 19     Creatinine 01/06/2020 0 85     Glucose 01/06/2020 90     Calcium 01/06/2020 10 6*    AST 01/06/2020 20     ALT 01/06/2020 36     Alkaline Phosphatase 01/06/2020 113     Total Protein 01/06/2020 7 7     Albumin 01/06/2020 3 8     Total Bilirubin 01/06/2020 0 69     eGFR 01/06/2020 69     Lipase 01/06/2020 256     Color, UA 01/06/2020 yellow     Color, UA 01/06/2020 Yellow     Clarity, UA 01/06/2020 Clear     pH, UA 01/06/2020 7 0     Leukocytes, UA 01/06/2020 Trace*    Nitrite, UA 01/06/2020 Negative     Protein, UA 01/06/2020 Negative     Glucose, UA 01/06/2020 Negative     Ketones, UA 01/06/2020 Negative     Urobilinogen, UA 01/06/2020 0 2     Bilirubin, UA 01/06/2020 Negative     Blood, UA 01/06/2020 Negative     Specific Gravity, UA 01/06/2020 1 010     RBC, UA 01/06/2020 None Seen     WBC, UA 01/06/2020 None Seen     Epithelial Cells 01/06/2020 None Seen     Bacteria, UA 01/06/2020 None Seen     Hyaline Casts, UA 01/06/2020 None Seen     Ventricular Rate 01/06/2020 68     Atrial Rate 01/06/2020 68     TX Interval 01/06/2020 194     QRSD Interval 01/06/2020 98     QT Interval 01/06/2020 388     QTC Interval 01/06/2020 412     P Axis 01/06/2020 48     QRS Axis 01/06/2020 59     T Wave Axis 01/06/2020 43          Radiology Results:   Ct Abdomen Pelvis With Contrast    Result Date: 1/6/2020  Narrative: CT ABDOMEN AND PELVIS WITH IV CONTRAST INDICATION:   periumbilical pain, elevated lipase  COMPARISON:  CT abdomen pelvis 12/12/2018 TECHNIQUE:  CT examination of the abdomen and pelvis was performed  Axial, sagittal, and coronal 2D reformatted images were created from the source data and submitted for interpretation  Radiation dose length product (DLP) for this visit:  346 97 mGy-cm   This examination, like all CT scans performed in the [facility], was performed utilizing techniques to minimize radiation dose exposure, including the use of iterative  reconstruction and automated exposure control  IV Contrast:  100 mL of iohexol (OMNIPAQUE) Enteric Contrast:  Enteric contrast was not administered  FINDINGS: ABDOMEN LOWER CHEST:  Partially imaged cardiomegaly  LIVER/BILIARY TREE:  Unremarkable  GALLBLADDER:  No calcified gallstones  No pericholecystic inflammatory change  SPLEEN:  No prominent hilar PANCREAS:  Unremarkable  ADRENAL GLANDS:  Unremarkable  KIDNEYS/URETERS:  Unremarkable  No hydronephrosis  STOMACH AND BOWEL:  Unremarkable  APPENDIX:  No findings to suggest appendicitis  ABDOMINOPELVIC CAVITY:  No ascites or free intraperitoneal air  No lymphadenopathy   VESSELS:  Unremarkable for patient's age  PELVIS REPRODUCTIVE ORGANS:  Unremarkable for patient's age  URINARY BLADDER:  Unremarkable  ABDOMINAL WALL/INGUINAL REGIONS:  Unremarkable  OSSEOUS STRUCTURES:  No acute fracture or destructive osseous lesion  Stable Known sclerotic lesion in the L2 vertebral body  Impression: No acute inflammatory changes in the abdomen or the pelvis  Workstation performed: [id number]     Attestation:   By signing my name below, Lauren Daisy, attest that this documentation has been prepared under the direction and in the presence of JASMINE Reyna  Electronically Signed: Santiago Holt  1/8/2020         I, Mercedes Burk, personally performed the services described in this documentation  All medical record entries made by the delilahibliang were at my direction and in my presence  I have reviewed the chart and discharge instructions and agree that the record reflects my personal performance and is accurate and complete  JASMINE Reyna  1/8/2020

## 2020-01-10 ENCOUNTER — TELEPHONE (OUTPATIENT)
Dept: FAMILY MEDICINE CLINIC | Facility: CLINIC | Age: 75
End: 2020-01-10

## 2020-01-10 NOTE — TELEPHONE ENCOUNTER
Patient does not need ER follow up here since she is now following with GI  She should keep her regular scheduled appt in February

## 2020-01-10 NOTE — TELEPHONE ENCOUNTER
Patient cancelled her er follow up today because she had to watch her grandchildren  She had a follow up with Dr Ben Maldonado the GI doctor on Wednesday  She is asking if she even needs to see Dr Jadyn Kaur at all

## 2020-01-23 ENCOUNTER — TELEPHONE (OUTPATIENT)
Dept: NEUROLOGY | Facility: CLINIC | Age: 75
End: 2020-01-23

## 2020-01-23 DIAGNOSIS — G47.00 INSOMNIA, UNSPECIFIED TYPE: Primary | ICD-10-CM

## 2020-01-23 NOTE — TELEPHONE ENCOUNTER
Pt made aware  States that she will call to schedule appt w/ sleep medicine either tmrw or next week

## 2020-01-23 NOTE — TELEPHONE ENCOUNTER
Pt called c/o ongoing/worsening insomnia-unable to sleep at night  States that she normally goes to bed around 9:30 pm and usually wake up around 0130 and then it is hard for her to go back to sleep  Denies napping during the day or evening  Pt tried otc melatonin "3-5mg 1 tablet at bedtime"-not effective  Reviewed chart: It looks like pt was referred to sleep medicine, but pt did not go to her appt  Pt states at that time her insomnia was not that bad  Requesting sleep medicine referral  Preferred [place] location                     [phone] ok to leave detailed message

## 2020-01-28 ENCOUNTER — OFFICE VISIT (OUTPATIENT)
Dept: FAMILY MEDICINE CLINIC | Facility: CLINIC | Age: 75
End: 2020-01-28
Payer: COMMERCIAL

## 2020-01-28 ENCOUNTER — TELEPHONE (OUTPATIENT)
Dept: PULMONOLOGY | Facility: CLINIC | Age: 75
End: 2020-01-28

## 2020-01-28 VITALS
DIASTOLIC BLOOD PRESSURE: 80 MMHG | SYSTOLIC BLOOD PRESSURE: 122 MMHG | RESPIRATION RATE: 16 BRPM | WEIGHT: 175.2 LBS | BODY MASS INDEX: 26.55 KG/M2 | HEIGHT: 68 IN | HEART RATE: 80 BPM

## 2020-01-28 DIAGNOSIS — R26.89 BALANCE PROBLEM: ICD-10-CM

## 2020-01-28 DIAGNOSIS — G47.00 INSOMNIA, UNSPECIFIED TYPE: ICD-10-CM

## 2020-01-28 DIAGNOSIS — E83.52 SERUM CALCIUM ELEVATED: ICD-10-CM

## 2020-01-28 DIAGNOSIS — M25.562 LEFT KNEE PAIN, UNSPECIFIED CHRONICITY: ICD-10-CM

## 2020-01-28 DIAGNOSIS — M25.50 ARTHRALGIA, UNSPECIFIED JOINT: ICD-10-CM

## 2020-01-28 DIAGNOSIS — M25.561 RIGHT KNEE PAIN, UNSPECIFIED CHRONICITY: Primary | ICD-10-CM

## 2020-01-28 PROCEDURE — 3008F BODY MASS INDEX DOCD: CPT | Performed by: FAMILY MEDICINE

## 2020-01-28 PROCEDURE — 1160F RVW MEDS BY RX/DR IN RCRD: CPT | Performed by: FAMILY MEDICINE

## 2020-01-28 PROCEDURE — 99214 OFFICE O/P EST MOD 30 MIN: CPT | Performed by: FAMILY MEDICINE

## 2020-01-28 PROCEDURE — 1036F TOBACCO NON-USER: CPT | Performed by: FAMILY MEDICINE

## 2020-01-28 RX ORDER — TRAZODONE HYDROCHLORIDE 50 MG/1
50 TABLET ORAL
Qty: 30 TABLET | Refills: 3 | Status: SHIPPED | OUTPATIENT
Start: 2020-01-28 | End: 2020-04-03 | Stop reason: ALTCHOICE

## 2020-01-28 NOTE — TELEPHONE ENCOUNTER
Pt called stating that she has a CT Chest High Resolution scheduled, but she would like to know if she could hold off on it due to her having to do so many other imaging test for other reasons  She is concerned about having too much scanning done on her body  If ok, she would like to know how long she should hold off for  Pt is currently not symptomatic

## 2020-01-29 NOTE — TELEPHONE ENCOUNTER
Please let the patient know the scans are note emergent and can be delayed 2-3 months if she wishes    Thanks

## 2020-01-29 NOTE — TELEPHONE ENCOUNTER
Spoke with pt, informed her of holding off the CT Chest High Resolution  Pt was happy with  Pt will call the office when she reschedules imaging

## 2020-01-30 NOTE — PROGRESS NOTES
Assessment/Plan:    1  Bilateral knee pain  - will obtain XR and call with results  - advised to take Tylenol as needed and see Ortho  - XR knee 3 vw right non injury; Future  - XR knee 3 vw left non injury; Future  - Ambulatory referral to Orthopedic Surgery; Future  - Lyme Antibody Profile with reflex to WB; Future    2  Insomnia/ anxiety  - start Trazodone 50 mg at bedtime and take Xanax 0 5 mg at bedtime as needed for anxiety  - traZODone (DESYREL) 50 mg tablet; Take 1 tablet (50 mg total) by mouth daily at bedtime  Dispense: 30 tablet; Refill: 3    3  Serum calcium elevated  - will recheck BMP and check intact PTH  - Basic metabolic panel; Future  - PTH, intact; Future    4  Balance problem  - discussed Physical therapy, but patient declined    Follow up as previously scheduled or sooner as needed  The treatment plan was reviewed with the patient  The patient understands and agrees with the treatment plan        Subjective:   Chief Complaint   Patient presents with    Joint pain    Insomnia    Balance Problem      Patient ID: Jennifer Andrew is a 69 y o  female here today with c/o b/l knee pain that is getting progressively worse in the past several weeks, she reports increased pain when getting up, climbing stairs or with prolonged walking that causing difficulty with her balance    She denies knee injury, no swelling or redness, no tick bites or unexplained rash  Patient would like to get tested for Lyme disease  Patient also reports trouble sleeping in the past few weeks, some nights she is having diffuculty falling asleep and other nights staying asleep  Patient admits having increased anxiety and worry at night, but denies depressed mood  She has been taking Xanax at bedtime which only helps to stay asleep for 2-3 hours           The following portions of the patient's history were reviewed and updated as appropriate: allergies, current medications, past family history, past medical history, past social history, past surgical history and problem list     Past Medical History:   Diagnosis Date    Anxiety     Arthritis     Back pain     Balance problems     Chest pain     heaviness    Dizziness     Gait disorder     GERD (gastroesophageal reflux disease)     Hypertension     Increased frequency of headaches     Numbness and tingling     Palpitations     Pericarditis     Thyroid disease     Trouble in sleeping      Past Surgical History:   Procedure Laterality Date    APPENDECTOMY      CHOLECYSTECTOMY      laparoscopic    KNEE SURGERY Left     PERICARDIAL WINDOW      [place] OPEN RX DISTAL RADIUS FX, EXTRA-ARTICULAR Right 3/22/2018    Procedure: OPEN REDUCTION W/ INTERNAL FIXATION (ORIF) RADIUS, splint application;  Surgeon: Lakisha Faye MD;  Location: [facility];  Service: Orthopedics     Family History   Problem Relation Age of Onset    Kidney failure Mother     Hypertension Mother     Lung cancer Father    Lethaniel Hazard Parkinson White syndrome Daughter     No Known Problems Sister     Substance Abuse Neg Hx     Alcohol abuse Neg Hx     Mental illness Neg Hx      Social History     Socioeconomic History    Marital status: /Civil Union     Spouse name: Not on file    Number of children: Not on file    Years of education: Not on file    Highest education level: Not on file   Occupational History    Not on file   Social Needs    Financial resource strain: Not on file    Food insecurity:     Worry: Not on file     Inability: Not on file    Transportation needs:     Medical: Not on file     Non-medical: Not on file   Tobacco Use    Smoking status: Never Smoker    Smokeless tobacco: Never Used   Substance and Sexual Activity    Alcohol use: No    Drug use: No    Sexual activity: Not on file   Lifestyle    Physical activity:     Days per week: Not on file     Minutes per session: Not on file    Stress: Not on file   Relationships    Social connections:     Talks on phone: Not on file     Gets together: Not on file     Attends Quaker service: Not on file     Active member of club or organization: Not on file     Attends meetings of clubs or organizations: Not on file     Relationship status: Not on file    Intimate partner violence:     Fear of current or ex partner: Not on file     Emotionally abused: Not on file     Physically abused: Not on file     Forced sexual activity: Not on file   Other Topics Concern    Not on file   Social History Narrative    WORK:    1   (Thania Yanez; [employer]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    2  [employer]        HOBBIES:    1  Singing    2  Dancing    3  Hx of ceramics (+ dust)        PETS:    1    Dogs    -  Denies birds        TRAVEL:    -  [place]         EXPOSURES:    Denies mold; down pillows/comforters; hot tubs       Current Outpatient Medications:     ALPRAZolam (XANAX) 0 5 mg tablet, Take 1 tablet (0 5 mg total) by mouth daily at bedtime as needed for anxiety or sleep, Disp: 20 tablet, Rfl: 1    ascorbic acid (VITAMIN C) 500 mg tablet, Take 500 mg by mouth daily, Disp: , Rfl:     aspirin 81 mg chewable tablet, Chew 1 tablet (81 mg total) daily, Disp: 30 tablet, Rfl: 0    b complex vitamins tablet, Take 1 tablet by mouth daily, Disp: , Rfl:     Cholecalciferol (VITAMIN D-3) 1000 units CAPS, Daily, Disp: , Rfl:     dicyclomine (BENTYL) 20 mg tablet, Take 1 tablet (20 mg total) by mouth every 6 (six) hours, Disp: 30 tablet, Rfl: 1    famotidine (PEPCID) 20 mg tablet, Take 20 mg by mouth 2 (two) times a day, Disp: , Rfl:     ibuprofen (MOTRIN) 200 mg tablet, Take by mouth every 6 (six) hours as needed for mild pain, Disp: , Rfl:     Lutein 10 MG TABS, lutein  20 mg qd, Disp: , Rfl:     Magnesium 100 MG CAPS, magnesium  300 mg qd, Disp: , Rfl:     Multiple Vitamins-Calcium (ONE-A-DAY WOMENS PO), Take 1 tablet by mouth daily, Disp: , Rfl:     Omega-3 Fatty Acids (FISH OIL PO), Take 2 g by mouth, Disp: , Rfl:   thiamine (VITAMIN B1) 50 mg tablet, Take 50 mg by mouth daily States she is taking vitamin B2, Disp: , Rfl:     traZODone (DESYREL) 50 mg tablet, Take 1 tablet (50 mg total) by mouth daily at bedtime, Disp: 30 tablet, Rfl: 3    Review of Systems   Constitutional: Negative for appetite change, chills, fatigue, fever and unexpected weight change  Respiratory: Negative for cough, shortness of breath and wheezing  Cardiovascular: Negative for chest pain, palpitations and leg swelling  Gastrointestinal: Negative for abdominal pain, blood in stool, constipation, diarrhea, nausea and vomiting  Genitourinary: Negative for difficulty urinating, dysuria, flank pain, frequency, hematuria, pelvic pain and urgency  Musculoskeletal:        B/l knee pain   Neurological: Negative for dizziness, syncope, weakness, numbness and headaches  Hematological: Negative for adenopathy  Psychiatric/Behavioral: Positive for sleep disturbance  Negative for dysphoric mood and suicidal ideas  The patient is nervous/anxious  Objective:    Vitals:    01/28/20 1050   BP: 122/80   BP Location: Left arm   Patient Position: Sitting   Cuff Size: Adult   Pulse: 80   Resp: 16   Weight: 79 5 kg (175 lb 3 2 oz)   Height: 5' 8" (1 727 m)        Physical Exam   Constitutional: She is oriented to person, place, and time  She appears well-developed and well-nourished  No distress  Neck: Neck supple  No thyromegaly present  Cardiovascular: Normal rate, regular rhythm and normal heart sounds  No murmur heard  Pulmonary/Chest: Effort normal  No respiratory distress  She has no wheezes  She has no rhonchi  She has no rales  Abdominal: Soft  She exhibits no distension and no mass  There is no tenderness  Musculoskeletal: She exhibits no edema  Knees without erythema or edema, no joint line tenderness or ligamentuos laxity   Lymphadenopathy:     She has no cervical adenopathy     Neurological: She is alert and oriented to person, place, and time  Psychiatric: She has a normal mood and affect   Her behavior is normal  Thought content normal        Lab Results   Component Value Date    SODIUM 141 01/06/2020    K 3 8 01/06/2020     01/06/2020    CO2 29 01/06/2020    BUN 19 01/06/2020    CREATININE 0 85 01/06/2020    GLUC 90 01/06/2020    CALCIUM 10 6 (H) 01/06/2020

## 2020-02-19 ENCOUNTER — OFFICE VISIT (OUTPATIENT)
Dept: FAMILY MEDICINE CLINIC | Facility: CLINIC | Age: 75
End: 2020-02-19
Payer: COMMERCIAL

## 2020-02-19 VITALS
DIASTOLIC BLOOD PRESSURE: 84 MMHG | BODY MASS INDEX: 26.66 KG/M2 | SYSTOLIC BLOOD PRESSURE: 132 MMHG | HEART RATE: 80 BPM | RESPIRATION RATE: 16 BRPM | HEIGHT: 68 IN | WEIGHT: 175.9 LBS

## 2020-02-19 DIAGNOSIS — M25.561 PAIN IN BOTH KNEES, UNSPECIFIED CHRONICITY: ICD-10-CM

## 2020-02-19 DIAGNOSIS — R45.0 NERVOUSNESS: ICD-10-CM

## 2020-02-19 DIAGNOSIS — H66.91 RIGHT OTITIS MEDIA, UNSPECIFIED OTITIS MEDIA TYPE: Primary | ICD-10-CM

## 2020-02-19 DIAGNOSIS — E83.52 SERUM CALCIUM ELEVATED: ICD-10-CM

## 2020-02-19 DIAGNOSIS — K21.9 GASTROESOPHAGEAL REFLUX DISEASE, ESOPHAGITIS PRESENCE NOT SPECIFIED: ICD-10-CM

## 2020-02-19 DIAGNOSIS — J02.9 SORE THROAT: ICD-10-CM

## 2020-02-19 DIAGNOSIS — M25.562 PAIN IN BOTH KNEES, UNSPECIFIED CHRONICITY: ICD-10-CM

## 2020-02-19 LAB — S PYO AG THROAT QL: NEGATIVE

## 2020-02-19 PROCEDURE — 87070 CULTURE OTHR SPECIMN AEROBIC: CPT | Performed by: FAMILY MEDICINE

## 2020-02-19 PROCEDURE — 3075F SYST BP GE 130 - 139MM HG: CPT | Performed by: FAMILY MEDICINE

## 2020-02-19 PROCEDURE — 1036F TOBACCO NON-USER: CPT | Performed by: FAMILY MEDICINE

## 2020-02-19 PROCEDURE — 99214 OFFICE O/P EST MOD 30 MIN: CPT | Performed by: FAMILY MEDICINE

## 2020-02-19 PROCEDURE — 1160F RVW MEDS BY RX/DR IN RCRD: CPT | Performed by: FAMILY MEDICINE

## 2020-02-19 PROCEDURE — 4040F PNEUMOC VAC/ADMIN/RCVD: CPT | Performed by: FAMILY MEDICINE

## 2020-02-19 PROCEDURE — 87880 STREP A ASSAY W/OPTIC: CPT | Performed by: FAMILY MEDICINE

## 2020-02-19 PROCEDURE — 3079F DIAST BP 80-89 MM HG: CPT | Performed by: FAMILY MEDICINE

## 2020-02-19 NOTE — PROGRESS NOTES
Assessment/Plan:    1  Right otitis media, unspecified otitis media type  - advised to start Zithromax Z-walter and use Flonase 2 spray each nostril daily, follow up if not better    2  Sore throat  - rapid strep A is negative  - Throat culture obtained and we will call with results    3  Gastroesophageal reflux disease  - continue Pepcid 20 mg daily    4  Nervousness/ insomnia  - continue Trazodone 50 mg at bedtime and take Xanax 0 5 mg at bedtime as needed for anxiety    5  Serum calcium elevated  - will recheck BMP and check intact PTH  - Basic metabolic panel; Future  - PTH, intact; Future    6  Pain in both knees  - continue Tylenol as needed, advised to go for x-ray and see Ortho        Follow up in 4 months or sooner as needed  The treatment plan was reviewed with the patient today  The patient understands and agrees with the treatment plan  Subjective:   Chief Complaint   Patient presents with    GERD    Hyperlipidemia    Nervousness    Sore Throat    Earache     B/L      Patient ID: Ilsa Grimm is a 69 y o  female here for follow up visit for her chronic medical conditions  Patient had a cold 2 weeks ago and now her sinus congestion and postnasal trip getting better, but she is still having sore throat, b/l ear fullness and also developed right ear pain a few days ago  No fever or chills, no era drainage, no dizziness, no tinnitus or decreased hearing  Patient was seen on 01/28/20 for b/l knee pain and advised to get x-rays done and see Ortho  Patient states she has been taking Tylenol as needed and overall doing better, she will go for x-ray if her symptoms worsen  Since her last visit she has been taking Trazodone 50 mg at bedtime as needed with good results  Patient has no other complaints         The following portions of the patient's history were reviewed and updated as appropriate: allergies, current medications, past family history, past medical history, past social history, past surgical history and problem list     Past Medical History:   Diagnosis Date    Anxiety     Arthritis     Back pain     Balance problems     Chest pain     heaviness    Dizziness     Gait disorder     GERD (gastroesophageal reflux disease)     Hypertension     Increased frequency of headaches     Numbness and tingling     Palpitations     Pericarditis     Thyroid disease     Trouble in sleeping      Past Surgical History:   Procedure Laterality Date    APPENDECTOMY      CHOLECYSTECTOMY      laparoscopic    KNEE SURGERY Left     PERICARDIAL WINDOW      [place] OPEN RX DISTAL RADIUS FX, EXTRA-ARTICULAR Right 3/22/2018    Procedure: OPEN REDUCTION W/ INTERNAL FIXATION (ORIF) RADIUS, splint application;  Surgeon: Jaimee Whipple MD;  Location: [facility];  Service: Orthopedics     Family History   Problem Relation Age of Onset    Kidney failure Mother     Hypertension Mother     Lung cancer Father    Oscar Gaucher Parkinson White syndrome Daughter     No Known Problems Sister     Substance Abuse Neg Hx     Alcohol abuse Neg Hx     Mental illness Neg Hx      Social History     Socioeconomic History    Marital status: /Civil Union     Spouse name: Not on file    Number of children: Not on file    Years of education: Not on file    Highest education level: Not on file   Occupational History    Not on file   Social Needs    Financial resource strain: Not on file    Food insecurity:     Worry: Not on file     Inability: Not on file    Transportation needs:     Medical: Not on file     Non-medical: Not on file   Tobacco Use    Smoking status: Never Smoker    Smokeless tobacco: Never Used   Substance and Sexual Activity    Alcohol use: No    Drug use: No    Sexual activity: Not on file   Lifestyle    Physical activity:     Days per week: Not on file     Minutes per session: Not on file    Stress: Not on file   Relationships    Social connections:     Talks on phone: Not on file Gets together: Not on file     Attends Amish service: Not on file     Active member of club or organization: Not on file     Attends meetings of clubs or organizations: Not on file     Relationship status: Not on file    Intimate partner violence:     Fear of current or ex partner: Not on file     Emotionally abused: Not on file     Physically abused: Not on file     Forced sexual activity: Not on file   Other Topics Concern    Not on file   Social History Narrative    WORK:    1   (Alycia Anahi; [employer]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    2  [employer]        HOBBIES:    1  Singing    2  Dancing    3  Hx of ceramics (+ dust)        PETS:    1    Dogs    -  Denies birds        TRAVEL:    -  [place]         EXPOSURES:    Denies mold; down pillows/comforters; hot tubs       Current Outpatient Medications:     ALPRAZolam (XANAX) 0 5 mg tablet, Take 1 tablet (0 5 mg total) by mouth daily at bedtime as needed for anxiety or sleep, Disp: 20 tablet, Rfl: 1    ascorbic acid (VITAMIN C) 500 mg tablet, Take 500 mg by mouth daily, Disp: , Rfl:     aspirin 81 mg chewable tablet, Chew 1 tablet (81 mg total) daily (Patient taking differently: Chew 81 mg as needed ), Disp: 30 tablet, Rfl: 0    b complex vitamins tablet, Take 1 tablet by mouth daily, Disp: , Rfl:     Cholecalciferol (VITAMIN D-3) 1000 units CAPS, Daily, Disp: , Rfl:     famotidine (PEPCID) 20 mg tablet, Take 20 mg by mouth 2 (two) times a day, Disp: , Rfl:     ibuprofen (MOTRIN) 200 mg tablet, Take by mouth every 6 (six) hours as needed for mild pain, Disp: , Rfl:     Lutein 10 MG TABS, lutein  20 mg qd, Disp: , Rfl:     Magnesium 100 MG CAPS, magnesium  300 mg qd, Disp: , Rfl:     Multiple Vitamins-Calcium (ONE-A-DAY WOMENS PO), Take 1 tablet by mouth daily, Disp: , Rfl:     Omega-3 Fatty Acids (FISH OIL PO), Take 2 g by mouth, Disp: , Rfl:     thiamine (VITAMIN B1) 50 mg tablet, Take 50 mg by mouth daily States she is taking vitamin B2, Disp: , Rfl:     traZODone (DESYREL) 50 mg tablet, Take 1 tablet (50 mg total) by mouth daily at bedtime (Patient taking differently: Take 50 mg by mouth daily at bedtime as needed ), Disp: 30 tablet, Rfl: 3    dicyclomine (BENTYL) 20 mg tablet, Take 1 tablet (20 mg total) by mouth every 6 (six) hours (Patient not taking: Reported on 2/19/2020), Disp: 30 tablet, Rfl: 1    Review of Systems   Constitutional: Negative for appetite change, chills, fatigue, fever and unexpected weight change  HENT: Positive for congestion, ear pain, postnasal drip and sore throat  Negative for ear discharge and trouble swallowing  Respiratory: Negative for cough, shortness of breath and wheezing  Cardiovascular: Negative for chest pain, palpitations and leg swelling  Gastrointestinal: Negative for abdominal pain, blood in stool, constipation, diarrhea, nausea and vomiting  Genitourinary: Negative for difficulty urinating, dysuria, flank pain, frequency, hematuria, pelvic pain and urgency  Neurological: Negative for dizziness, syncope, weakness, numbness and headaches  Hematological: Negative for adenopathy  Psychiatric/Behavioral: Negative for dysphoric mood and sleep disturbance  The patient is not nervous/anxious  Objective:    Vitals:    02/19/20 1050 02/19/20 1115   BP: 140/96 132/84   BP Location: Left arm Left arm   Patient Position: Sitting Sitting   Cuff Size: Standard Standard   Pulse: 80    Resp: 16    Weight: 79 8 kg (175 lb 14 4 oz)    Height: 5' 7 5" (1 715 m)      BP Readings from Last 3 Encounters:   02/19/20 132/84   01/28/20 122/80   01/08/20 121/78     Wt Readings from Last 3 Encounters:   02/19/20 79 8 kg (175 lb 14 4 oz)   01/28/20 79 5 kg (175 lb 3 2 oz)   01/08/20 78 9 kg (174 lb)        Physical Exam   Constitutional: She is oriented to person, place, and time  She appears well-developed and well-nourished  No distress     HENT:   Right Ear: Ear canal normal  Tympanic membrane is injected  A middle ear effusion is present  Left Ear: Tympanic membrane and ear canal normal    Nose: Mucosal edema present  Mouth/Throat: No oropharyngeal exudate or posterior oropharyngeal erythema  Neck: Neck supple  No thyromegaly present  Cardiovascular: Normal rate, regular rhythm and normal heart sounds  No murmur heard  Pulmonary/Chest: Effort normal and breath sounds normal  No respiratory distress  She has no wheezes  She has no rhonchi  She has no rales  Abdominal: Soft  She exhibits no distension and no mass  There is no tenderness  Musculoskeletal: She exhibits no edema  Lymphadenopathy:     She has no cervical adenopathy  Neurological: She is alert and oriented to person, place, and time  Psychiatric: She has a normal mood and affect   Her behavior is normal        Lab Results   Component Value Date    SODIUM 141 01/06/2020    K 3 8 01/06/2020     01/06/2020    CO2 29 01/06/2020    AGAP 4 01/06/2020    BUN 19 01/06/2020    CREATININE 0 85 01/06/2020    GLUC 90 01/06/2020    GLUF 95 12/30/2019    CALCIUM 10 6 (H) 01/06/2020    AST 20 01/06/2020    ALT 36 01/06/2020    ALKPHOS 113 01/06/2020    TP 7 7 01/06/2020    TBILI 0 69 01/06/2020    EGFR 69 01/06/2020     Lab Results   Component Value Date    CDA5UTRJQFPD 1 060 06/10/2019     Lab Results   Component Value Date    WBC 6 42 01/06/2020    HGB 14 8 01/06/2020    HCT 44 9 01/06/2020    MCV 92 01/06/2020     01/06/2020      Ref Range & Units 2/19/20 11:01 AM     RAPID STREP A Negative Negative

## 2020-02-21 LAB — BACTERIA THROAT CULT: NORMAL

## 2020-02-26 ENCOUNTER — TELEPHONE (OUTPATIENT)
Dept: NEUROLOGY | Facility: CLINIC | Age: 75
End: 2020-02-26

## 2020-02-26 NOTE — TELEPHONE ENCOUNTER
Pt called c/o occasional off balance and lightheadedness  Symptoms not worsening but not getting any better  Last office visit 10/9/19  Requesting to schedule f/u appt w/ Dr Anahi Leyva     Appt scheduled 3/2/20 at 3pm

## 2020-03-02 ENCOUNTER — OFFICE VISIT (OUTPATIENT)
Dept: NEUROLOGY | Facility: CLINIC | Age: 75
End: 2020-03-02
Payer: COMMERCIAL

## 2020-03-02 VITALS
RESPIRATION RATE: 16 BRPM | BODY MASS INDEX: 26.8 KG/M2 | SYSTOLIC BLOOD PRESSURE: 128 MMHG | HEART RATE: 76 BPM | WEIGHT: 176.8 LBS | DIASTOLIC BLOOD PRESSURE: 76 MMHG | HEIGHT: 68 IN

## 2020-03-02 DIAGNOSIS — R20.2 PARESTHESIAS: ICD-10-CM

## 2020-03-02 DIAGNOSIS — M21.069: Primary | ICD-10-CM

## 2020-03-02 DIAGNOSIS — G47.00 INSOMNIA, UNSPECIFIED TYPE: ICD-10-CM

## 2020-03-02 DIAGNOSIS — R26.81 GAIT INSTABILITY: ICD-10-CM

## 2020-03-02 DIAGNOSIS — M26.629 ARTHRALGIA OF TEMPOROMANDIBULAR JOINT, UNSPECIFIED LATERALITY: ICD-10-CM

## 2020-03-02 DIAGNOSIS — R42 DIZZINESS AND GIDDINESS: ICD-10-CM

## 2020-03-02 DIAGNOSIS — G43.009 MIGRAINE WITHOUT AURA AND WITHOUT STATUS MIGRAINOSUS, NOT INTRACTABLE: ICD-10-CM

## 2020-03-02 PROCEDURE — 4040F PNEUMOC VAC/ADMIN/RCVD: CPT | Performed by: PSYCHIATRY & NEUROLOGY

## 2020-03-02 PROCEDURE — 3008F BODY MASS INDEX DOCD: CPT | Performed by: PSYCHIATRY & NEUROLOGY

## 2020-03-02 PROCEDURE — 3078F DIAST BP <80 MM HG: CPT | Performed by: PSYCHIATRY & NEUROLOGY

## 2020-03-02 PROCEDURE — 1160F RVW MEDS BY RX/DR IN RCRD: CPT | Performed by: PSYCHIATRY & NEUROLOGY

## 2020-03-02 PROCEDURE — 3074F SYST BP LT 130 MM HG: CPT | Performed by: PSYCHIATRY & NEUROLOGY

## 2020-03-02 PROCEDURE — 1036F TOBACCO NON-USER: CPT | Performed by: PSYCHIATRY & NEUROLOGY

## 2020-03-02 PROCEDURE — 99214 OFFICE O/P EST MOD 30 MIN: CPT | Performed by: PSYCHIATRY & NEUROLOGY

## 2020-03-02 RX ORDER — TRAZODONE HYDROCHLORIDE 50 MG/1
TABLET ORAL
COMMUNITY
Start: 2020-02-27 | End: 2020-03-02 | Stop reason: SDUPTHER

## 2020-03-02 NOTE — PROGRESS NOTES
[address] [facility] Consult - Follow up   PATIENT:  Nick Butler  MRN:  [id number]  :  1945  DATE OF SERVICE:  3/2/2020  REFERRED BY: Bridgette Carrera MD  PMD: Nayeli Ball MD    Assessment/Plan:     Alin Griffith a 69 y  o  female with a past medical history of hyperparathyroidism, GERD, pericarditis, heart murmur, vitamin-D deficiency, insomnia, depression, anxiety referred here for evaluation of headache    Initial evaluation by me 2018  Follow-up 2019, 2019, 19, 10/09/2019, 2020      Dizziness/Vertigo  Gait instability  Valgus deformity   Arthralgia of TMJ  She reports a history of BPPV 2 years ago that got better with maneuvers including Epley per her description     - At visit 2019 she reported positional vertigo that improved with physical therapy maneuver 19     - She returns 2019 reporting positional vertigo again for the past week   Provoked by bending over, positions in bed   She also reports she feels like she cannot walk right and has to hold onto her  and daughter -Cosmo Nava is new compared to the last episode  - as of 19: vertigo improved after denice hallpike at our last visit 6/3/19, lightheadedness continues occasionally  No recent falls, still occasional off balance, plans to see PT soon  - as of 10/09/2019: no falls, working with PT, getting orthotics, just one episode of vertigo for 5 mins since last visit   - as of 2020: no falls, no BPPV recently  Had 1 week of TMJ pain bilaterally, got better with antibiotics  Discussed following up with physiatry as well as orthopedic surgery which is already scheduled  Ordering EMG/NCS to further assess for neuropathy        Workup  - MRI Brain without contrast  19:   1   No acute intracranial abnormality    2   Mild nonspecific cerebral white matter signal abnormality, which can be seen in patients with migraines as well as microangiopathic disease  3   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   This may represent an anatomic variation   If concerned of vertebrobasilar insufficiency, consider follow-up CTA or contrast enhanced MRA imaging    - MRA head and neck 09/30/2019:  No large vessel flow restrictive disease      - 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   This could be reevaluated with CTA   - Minor short segment stenosis of the dominant right V1 origin   Mild long segment narrowing of the origin and nondominant left v1   Anterior circulation normal      Plan:  - has recently seen physical therapy for gait instability  - add B12 to next lab draw - ordered    - future medication options:  could consider verapamil      Migraine without aura without status migrainosus, not intractable    Cervicalgia   She has had migraines since childhood, she has followed with neurologist in the past back in [place]   She moved here to be closer to her daughter and granddaughter in the area  She has multiple types of headaches,  bioccipital pain, mid throbbing pain  Migraine is without aura and has associated nausea sometimes vomiting, stiff and sore neck, problems with concentration, photophobia, phonophobia, osmophobia, sometimes nasal congestion, lightheadedness   Migraines worse with any movement    - as of 03/18/2019 she reports headaches are significantly improved with lifestyle interventions  - as of 06/03/2019:  headaches 6 times a month and go away with ibuprofen   She has not followed up with eye doctor but has an appointment in July  - as of 07/31/2019: as of 7/31/19: headaches 6-8 a month - often in the am or with anxiety/stress - these she can breathe through it  Morning headaches the last 4-5 days, goes away with coffee usually, if not goes away with ibuprofen   Last big migraine 9 months or so ago   - As of 10/9/2019:  headaches - has not had in quite some time, around 8 a month and go away with ibuprofen, occasional sharp pains randomly for 5 minutes on various locations on head 1-2 times a week  No migraines in a year  - she is taking magnesium and riboflavin - she thinks this may be helping  - as of 03/2/2020: no longer having significant headaches, occasional am headaches that improve with coffee, taking mg and B2 for prevention*  Workup  - MRI Brain without contrast  7/9/19:   1   No acute intracranial abnormality  2   Mild nonspecific cerebral white matter signal abnormality, which can be seen in patients with migraines as well as microangiopathic disease vs migraine - discussed risk factor control  - CTA head with without contrast 10/28/2019:  No aneurysm identified at the site of the previously noted focal dilatation in the left supraclinoid ICA at the expected origin of the left posterior communicating artery  A left posterior communicating artery is not identified  The focal outpouching   may therefore represent anatomic variation versus residual junctional dilatation at the site of an atretic posterior communicating artery      Preventive:  -  She has never been on a prescription  headache preventive medicine   - discussed headache preventative supplements including magnesium,  riboflavin   - We discussed headache hygiene and lifestyle interventions that may improve her headaches including increased hydration, higher sleep quantity, continuing to increase her physical activity     - physical therapy in the past on her neck made it worse     Abortive:  - 11/2018 prescribed rizatriptan for trial, however patient never filled as she has not had any severe migraine - never took   - Toradol IM has worked for in the past  - future options:  other triptans, steroids, indomethacin, Toradol etc      Insomnia  Paresthesias of legs   She reports chronic insomnia and that she currently gets less than 4 hr a night of sleep   Had initial visit 11/2018 we discussed sleep hygiene and she has stopped drinking coffee late at night which has helped some  Noel Argueta is wondering if a referral to sleep medicine would be indicated and I am happy to refer her   Likely multifactorial including possible psychogenic component versus other sleep disorder as well  - as of 06/03/2019: Katrin Erazo reports she did not go to sleep medicine appointment and tried cutting coffee and tea at dinner which helps somewhat  - as of 7/31/19: sleep sometimes good - was good for a while after starting mg, sometimes wakes up at night again now, possible symptoms of RLS and still only averaging 4-5 - will have her see  Sleep med   - as of 10/09/2019:  Did not follow up with sleep medicine as recommended  - as of 3/2/2020 reports she plans to follow up with sleep med soon, may have RLS? 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   This could be reevaluated with CTA  - Minor short segment stenosis of the dominant right V1 origin   Mild long segment narrowing of the origin and nondominant left v1   Anterior circulation normal   - CTA head with without contrast 10/28/2019:  No aneurysm identified at the site of the previously noted focal dilatation in the left supraclinoid ICA at the expected origin of the left posterior communicating artery  A left posterior communicating artery is not identified  The focal outpouching   may therefore represent anatomic variation versus residual junctional dilatation at the site of an atretic posterior communicating artery   - NSGY note 11/22/19 left PCOM origin infundibulum    This requires no treatment and no further imaging is required either      Patient instructions     EMG/nerve study ordered   Consider mouth guard   Referral to physiatry/physical medicine and rehab   Follow up as scheduled with orthopedic surgery     Follow up with sleep Medicine - I recommend Dr Perico June      Headache/migraine treatment:   Abortive medications (for immediate treatment of a headache): It is ok to take ibuprofen, acetaminophen or naproxen (Advil, Tylenol,  Aleve, Excedrin) if they help your headaches you should limit these to No more than 3 times a week to avoid medication overuse/rebound headaches    For more severe headaches ok to take 600 mg ibuprofen     Over the counter preventive supplements for headaches/migraines   (to take every day to help prevent headaches - not to take at the time of headache):  [x] Magnesium 400mg daily (If any diarrhea or upset stomach, decrease dose  as tolerated)  [x] Riboflavin (Vitamin B2)  400mg daily   (FYI B2 may make your urine bright/neon yellow)      Lifestyle Recommendations:  [x] SLEEP - Maintain a regular sleep schedule: Adults need at least 7-8 hours of uninterrupted a night  Maintain good sleep hygiene:  Going to bed and waking up at consistent times, avoiding excessive daytime naps, avoiding caffeinated beverages in the evening, avoid excessive stimulation in the evening and generally using bed primarily for sleeping   One hour before bedtime would recommend turning lights down lower, decreasing your activity (may read quietly, listen to music at a low volume)  When you get into bed, should eliminate all technology (no texting, emailing, playing with your phone, iPad or tablet in bed)  [x] HYDRATION - Maintain good hydration   Drink  2L of fluid a day (4 typical small water bottles)  [x] DIET - Maintain good nutrition  In particular don't skip meals and try and eat healthy balanced meals regularly  [x] TRIGGERS - Look for other triggers and avoid them: Limit caffeine to 1-2 cups a day or less  Avoid dietary triggers that you have noticed bring on your headaches (this could include aged cheese, peanuts, MSG, aspartame and nitrates)      Education and Follow-up  [x] Please call with any questions or concerns   Go to the ED with any new or concerning symptoms  [x] Follow up with me as needed       Add B12 to your next lab draw with your primary doctor      CC: We had the pleasure of evaluating Nella Yancey in neurological consultation today  she is O 13 y  o    right handed female who presents today for evaluation of headaches       History of Present Illness:   Interval history as of 03/2/2020:  - has an apmt upcoming with sleep medicine   - for about a week she was having TMJ pain and had old azithromycin and took it and it feels a little better   - then at night while laying in bed was having pain, strange feeling in her legs all the way up the legs   - wants to walk but afraid of falling   - has not had any BPPV since   - has appointment with sleep medicine   - taking mg and B2    Interval history as of 10/09/2019  -  Did not follow up with sleep medicine as recommended  - she has been following with physical therapy for gait instability/history of BPPV - also seeing them for shoulder  - getting orthotics for feet  - denies any falls     MRA head and neck 09/30/2019:  No large vessel flow restrictive disease      - 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   This could be reevaluated with CTA   - Minor short segment stenosis of the dominant right V1 origin   Mild long segment narrowing of the origin and nondominant left v1   Anterior circulation normal   - had Neurosurgery Dr Vahe Riddle review image and there may be nothing really there, even if there is would just be surveillance indicated   Will order CTA and have he is ok with having her follow up with him to review the images/discuss results just in case         Otherwise she is doing good  - chronic lightheadedness and vertigo history - 2 weeks ago with some vertigo and went away on its own in about 5 minutes  - headaches - has not had in quite some time, occasional sharp pains randomly for 5 minutes on various locations on head 1-2 times a week   - no big migraines in a year  - she is taking magnesium and riboflavin - she thinks this may be helping           Interval history as of 7/31/19  - MRI Brain without contrast  7/9/19:   1   No acute intracranial abnormality  2   Mild nonspecific cerebral white matter signal abnormality, which can be seen in patients with migraines as well as microangiopathic disease    3   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   This may represent an anatomic variation   If concerned of vertebrobasilar insufficiency, consider follow-up CTA or contrast enhanced MRA imaging       - she saw pulmonary 7/3/19  - followed up with cardiology and normal stress test per patient since last visit - they wanted to start metoprolol   -  has been in and out of the hospital for diverticulitis and then surgery for removing infected intestine so that has been taking some of her time   - daughter was pregnant and last the baby after 3 months     Lightheadedness/dizziness, h/o vertigo  Mild gait instability  Denies recent falls  *2-3 weeks of pain in the bottom of her right foot that she plans to see a foot doctor   - also has decreased sensation bilateral top of the toes and into the top of the shins for about 6 months   - she has not followed up with PT as recommended for gait and lightheadedness  - lightheadedness occurs the most with getting up   - the denice hallpike last visit improved her vertigo 6/3/19     Migraines/headaches  Morning headaches the last 4-5 days, goes away with coffee usually, if not goes away with ibuprofen  Taking deep breaths and has not been having migraines   - taking magnesium and not riboflavin      Sleep   Sleep medicine  - sometimes feels sensations in bilateral shins when in bed  - falls asleep easily with TV on, stress though has affected her some  - usually have been sleeping straight through, past week wakes up to check on  once a night     --------------------     Interval history as of 06/03/2019:  Larissa Lopez been following with physical therapy and has been noticing improvement in neck pain, - - first PT visit 04/01/2019 also significantly improved dizziness following 1st treatment - the found signs of BPPV - was vertigo free up for almost 2 months   - around 5/27/19: woke up and was lying in bed when suddenly had room spinning - sat up and it went away in a 3 mins - since then every day has happened when triggered - from bending over sometimes soon   - feels like she can not walk right, hold on to  and daughter   - reports LE shin numbness      - 05/02/2019 - phone conversation with Cardiology - metoprolol 25 mg long-acting  - referred to eye doctor - apmt in July  - referred to Sleep Medicine - did not go, tried cutting coffee and tea at dinner time and sleeps fine now   - magnesium, riboflavin - reports she is taking        Interval history as of  03/18/2019  - presented to the ED 01/06/2019 with chest pain - has a stress test next week      - her biggest complaint today is trouble sleeping, chronic dizziness   - Dizziness, history of BPPV 2 years ago, got better, scares her  Not dizzy with walking, more positional      Headaches  - never filled rizatriptan  - headaches are not really a complaint today: in a month 8 times but go away with ibuprofen   Stopped drinking caffeine at night  - was referred to physical therapy has not made an appointment  - recommended supplements including - taking magnesium, riboflavin may be in her B complex   - has joined the gym, loves walking      Plans to see eye doctor, they are tearing, no blurred or double vision            Headaches started at what age? Migraines Since childhood, didn't have them when pregnant x 2  How often do the headaches occur?   *As of 11/2018:   - migraines 3-4 times a week,   - Mild throbbing pain right scalp 3-4 times a week  - lasts hours, with advil goes away most of the time  - Occasionally for sharp pain throughout head for a second or two - once a week  - Also bilateral occipital pain 1-2 times a week    *As of 3/18/19: - headaches are not really a complaint today: in a month 8 times but go away with ibuprofen   *As of 06/03/2019: headaches 6 times a month and go away with ibuprofen  *as of 7/31/19: headaches 6-8 a month - often in the am or with anxiety/stress - these she can breathe through it   Morning headaches the last 4-5 days, goes away with coffee usually, if not goes away with ibuprofen  - As of 10/9/2019 headaches - has not had in quite some time, around 8 a month and go away with ibuprofen occasional sharp pains randomly for 5 minutes on various locations on head 1-2 times a week   No migraines in a year      What time of the day do the headaches start? no particular time of day  How long do the headaches last? 2-3 days in teenage years - now 1 full day and into the morning  Are you ever headache free? Yes  Prodrome of feeling like she is getting a headache  Aura? without aura  Describe your usual headache pain quality? throbbing  Where is your headache located? bilateral frontal area and bilateral occipital area, also right temporal   Does the pain Radiate? no radiation of pain  What is the intensity of pain? Up to a 10/10, at its best a 4  Associated symptoms:   [x] Nausea       [x] Vomiting - once in a while        [] Diarrhea         [] Insomnia           [x] Stiff or sore neck         [x] Problems with concentration  [x] Photophobia     [x]Phonophobia      [x] Osmophobia  [] Blurred vision   [x] Prefer quiet, dark room        [x] Light-headed or dizzy - sometimes   [] Tinnitus      Things that make the headache worse? +movement  Headache triggers:  Stress, mint,strong perfume, tobacco or fireplaces, If she goes to bed with a headache will wake up with worse one   What time of the year do headaches occur more frequently?   do not seem to be related to any time of the year  Have you seen someone else for headaches or pain? Yes, neurologist back in [place]  Have you had trigger point injection performed and how often? No  Have you had Botox injection performed and how often? No   Have you had epidural injections or transforaminal injections performed? No  Have you used CBD or THC for your headaches and how often? No  Are you current pregnant or planning on getting pregnant? No  Have you ever had any Brain imaging? yes years ago and normal     What medications do you take or have you taken for your headaches? ABORTIVE:       - was prescribed rizatriptan but did not need it   - advil 200 mg  - my pillow seems to be helping the bioccipital      PREVENTIVE: no     Alternative therapies used in the past for headaches? Physical therapy -  For wrist, but not headache   Coffee, has one in the morning  thank out the p m  Coffee     LIFESTYLE  Sleep - "if I get 4 hours I am johnnie"   - as of 06/03/2019:  Reports improved  As of 7/31/19 - avg 4-5   - sometimes feels sensations in bilateral shins when in bed  - falls asleep easily with TV on, stress though has affected her some  - usually have been sleeping straight through, past week wakes up to check on  once a night      as of 11/2018: Is your sleep restful? No, tired  What time do you go to bed at night? 10   What time do you wake up in am? Has coffee at 4:30 am with  and goes back to sleep, otherwise wakes up at 7 am  How often do you get up at night? once  Do you wake up with headaches? Sometimes   Do you snore while asleep? No  Have you been told that you stop breathing during sleeping? No - but makes noises  Do you wake up tired in the morning? Yes  Do you take frequent naps during the day? sometimes  Do you have jaw pain? No, but has TMJ  Do you grind/clench your teeth at night? No  Do you have restless leg syndrome? No     Physical activity:   Joined the local [employer]  Goes once a week  Walks with her daughter  - considering line dancing or something slower at the [facility]  - has joined the gym, loves walking       Water: not enough - 4 glasses      Diet:  Do you ever skip meals?  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   Daughter, granddaughter live near by  Other two grandkids in [facility] [place]     Illicit Drugs: denies  Alcohol/tobacco: Denies tobacco use, alcohol intake: social drinker      Past Medical History:     Past Medical History:   Diagnosis Date    Anxiety     Arthritis     Back pain     Balance problems     Chest pain     heaviness    Dizziness     Gait disorder     GERD (gastroesophageal reflux disease)     Hypertension     Increased frequency of headaches     Numbness and tingling     Palpitations     Pericarditis     Thyroid disease     Trouble in sleeping        Patient Active Problem List   Diagnosis    Closed fracture distal radius and ulna, right, initial encounter    Anxiety    Gastroesophageal reflux disease with esophagitis    History of pericarditis    Heart murmur    Primary hyperparathyroidism ([facility] [address] )    Thyroid nodule    Vitamin D deficiency    Chest pain    Migraine without aura and without status migrainosus, not intractable    Dizziness and giddiness    Insomnia    Cervicalgia    Abnormal CT scan of lung    Irritable bowel syndrome with diarrhea    Elevated amylase and lipase       Medications:      Current Outpatient Medications   Medication Sig Dispense Refill    ALPRAZolam (XANAX) 0 5 mg tablet Take 1 tablet (0 5 mg total) by mouth daily at bedtime as needed for anxiety or sleep 20 tablet 1    ascorbic acid (VITAMIN C) 500 mg tablet Take 500 mg by mouth daily      aspirin 81 mg chewable tablet Chew 1 tablet (81 mg total) daily (Patient taking differently: Chew 81 mg as needed ) 30 tablet 0    b complex vitamins tablet Take 1 tablet by mouth daily      Cholecalciferol (VITAMIN D-3) 1000 units CAPS Daily      famotidine (PEPCID) 20 mg tablet Take 20 mg by mouth 2 (two) times a day      ibuprofen (MOTRIN) 200 mg tablet Take by mouth every 6 (six) hours as needed for mild pain      Lutein 10 MG TABS lutein   20 mg qd      Magnesium 100 MG CAPS magnesium   300 mg qd      Multiple Vitamins-Calcium (ONE-A-DAY WOMENS PO) Take 1 tablet by mouth daily      Omega-3 Fatty Acids (FISH OIL PO) Take 2 g by mouth      thiamine (VITAMIN B1) 50 mg tablet Take 50 mg by mouth daily States she is taking vitamin B2      traZODone (DESYREL) 50 mg tablet Take 1 tablet (50 mg total) by mouth daily at bedtime (Patient taking differently: Take 50 mg by mouth daily at bedtime as needed ) 30 tablet 3    dicyclomine (BENTYL) 20 mg tablet Take 1 tablet (20 mg total) by mouth every 6 (six) hours (Patient not taking: Reported on 2/19/2020) 30 tablet 1     No current facility-administered medications for this visit           Allergies:    No Known Allergies    Family History:     Family History   Problem Relation Age of Onset    Kidney failure Mother     Hypertension Mother     Lung cancer Father    Charlean [place] Parkinson White syndrome Daughter     No Known Problems Sister     Substance Abuse Neg Hx     Alcohol abuse Neg Hx     Mental illness Neg Hx        Social History:     Social History     Socioeconomic History    Marital status: /Civil Union     Spouse name: Not on file    Number of children: Not on file    Years of education: Not on file    Highest education level: Not on file   Occupational History    Not on file   Social Needs    Financial resource strain: Not on file    Food insecurity:     Worry: Not on file     Inability: Not on file    Transportation needs:     Medical: Not on file     Non-medical: Not on file   Tobacco Use    Smoking status: Never Smoker    Smokeless tobacco: Never Used   Substance and Sexual Activity    Alcohol use: No    Drug use: No    Sexual activity: Not on file   Lifestyle    Physical activity:     Days per week: Not on file     Minutes per session: Not on file    Stress: Not on file   Relationships    Social connections:     Talks on phone: Not on file     Gets together: Not on file     Attends Jain service: Not on file     Active member of club or organization: Not on file     Attends meetings of clubs or organizations: Not on file     Relationship status: Not on file    Intimate partner violence:     Fear of current or ex partner: Not on file     Emotionally abused: Not on file     Physically abused: Not on file     Forced sexual activity: Not on file   Other Topics Concern    Not on file   Social History Narrative    WORK:    1   ([employer]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[employer]; [employer]ul Lama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    2  [employer]        HOBBIES:    1  Singing    2  Dancing    3  Hx of ceramics (+ dust)        PETS:    1  Dogs    -  Denies birds        TRAVEL:    -  [place]         EXPOSURES:    Denies mold; down pillows/comforters; hot tubs         Objective:     Physical Exam:                                                                 Vitals:            Constitutional:    /76 (BP Location: Left arm, Patient Position: Sitting, Cuff Size: Adult)   Pulse 76   Resp 16   Ht 5' 7 5" (1 715 m)   Wt 80 2 kg (176 lb 12 8 oz)   BMI 27 28 kg/m²   BP Readings from Last 3 Encounters:   03/02/20 128/76   02/19/20 132/84   01/28/20 122/80     Pulse Readings from Last 3 Encounters:   03/02/20 76   02/19/20 80   01/28/20 80         Well developed, well nourished, well groomed  No dysmorphic features         HEENT:  Normocephalic atraumatic  No meningismus  Oropharynx is clear and moist  No oral mucosal lesions  Chest:  Respirations regular and unlabored  Cardiovascular:  Regular rate, intact distal pulses  Distal extremities warm without palpable edema or tenderness, no observed significant swelling  Musculoskeletal:  Full range of motion  (see below under neurologic exam for evaluation of motor function and gait)   Skin:  warm and dry, not diaphoretic  No apparent birthmarks or stigmata of neurocutaneous disease  Psychiatric:  Normal behavior and appropriate affect        Neurological Examination:     Mental status/cognitive function:   Orientated to time, place and person  Recent and remote memory intact  Attention span and concentration as well as fund of knowledge are appropriate for age  Normal language and spontaneous speech  Cranial Nerves:  III, IV, VI-Pupils were equal, round, and reactive to light  Extraocular movements were full and conjugate without nystagmus  VII-facial expression symmetric, intact forehead wrinkle, strong eye closure, symmetric smile    VIII-hearing grossly intact bilaterally   Motor Exam: symmetric bulk throughout  no atrophy, fasciculations or abnormal movements noted  Coordination:  no apparent dysmetria, ataxia or tremor noted  Gait: steady casual gait - a little unstable with valgus deformity bilaterally        Pertinent lab results:   9/25/19 - CMP unremarkable     6/10/19 CMP unremarkable  Thyroid studies normal Vit D 28     1/07/2019:  CMP and CBC unremarkable  TSH 0 78     Imaging: I have personally reviewed imaging and radiology read      MRA head and neck 09/30/2019:  No large vessel flow restrictive disease      - 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   This could be reevaluated with CTA     - Minor short segment stenosis of the dominant right V1 origin   Mild long segment narrowing of the origin and nondominant left v1   Anterior circulation normal         MRI Brain without contrast  7/9/19:   1   No acute intracranial abnormality  2   Mild nonspecific cerebral white matter signal abnormality, which can be seen in patients with migraines as well as microangiopathic disease  3   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   This may represent an anatomic variation   If concerned of vertebrobasilar insufficiency, consider follow-up CTA or contrast enhanced MRA imaging       Review of Systems:   ROS obtained by medical assistant Personally reviewed and updated if indicated  Review of Systems   Constitutional: Negative  Negative for appetite change and fever  HENT: Negative  Negative for hearing loss, tinnitus, trouble swallowing and voice change  Eyes: Negative  Negative for photophobia and pain  Respiratory: Negative  Negative for shortness of breath  Cardiovascular: Negative  Negative for palpitations  Gastrointestinal: Negative  Negative for nausea and vomiting  Endocrine: Negative  Negative for cold intolerance and heat intolerance  Genitourinary: Negative  Negative for dysuria, frequency and urgency  Musculoskeletal: Negative  Negative for myalgias and neck pain  Skin: Negative  Negative for rash  Allergic/Immunologic: Negative  Neurological: Negative  Negative for dizziness, tremors, seizures, syncope, facial asymmetry, speech difficulty, weakness, light-headedness, numbness and headaches  Hematological: Negative  Does not bruise/bleed easily  Psychiatric/Behavioral: Negative  Negative for confusion, hallucinations and sleep disturbance             Author:  Wanda Meyer MD 3/2/2020 3:25 PM

## 2020-03-02 NOTE — PATIENT INSTRUCTIONS
EMG/nerve study ordered   Consider mouth guard   Referral to physiatry/physical medicine and rehab   Follow up as scheduled with orthopedic surgery     Follow up with sleep Medicine - I recommend Dr Ivonne Moore      Headache/migraine treatment:   Abortive medications (for immediate treatment of a headache): It is ok to take ibuprofen, acetaminophen or naproxen (Advil, Tylenol,  Aleve, Excedrin) if they help your headaches you should limit these to No more than 3 times a week to avoid medication overuse/rebound headaches    For more severe headaches ok to take 600 mg ibuprofen     Over the counter preventive supplements for headaches/migraines   (to take every day to help prevent headaches - not to take at the time of headache):  [x] Magnesium 400mg daily (If any diarrhea or upset stomach, decrease dose  as tolerated)  [x] Riboflavin (Vitamin B2)  400mg daily   (FYI B2 may make your urine bright/neon yellow)      Lifestyle Recommendations:  [x] SLEEP - Maintain a regular sleep schedule: Adults need at least 7-8 hours of uninterrupted a night  Maintain good sleep hygiene:  Going to bed and waking up at consistent times, avoiding excessive daytime naps, avoiding caffeinated beverages in the evening, avoid excessive stimulation in the evening and generally using bed primarily for sleeping   One hour before bedtime would recommend turning lights down lower, decreasing your activity (may read quietly, listen to music at a low volume)  When you get into bed, should eliminate all technology (no texting, emailing, playing with your phone, iPad or tablet in bed)  [x] HYDRATION - Maintain good hydration   Drink  2L of fluid a day (4 typical small water bottles)  [x] DIET - Maintain good nutrition  In particular don't skip meals and try and eat healthy balanced meals regularly  [x] TRIGGERS - Look for other triggers and avoid them: Limit caffeine to 1-2 cups a day or less   Avoid dietary triggers that you have noticed bring on your headaches (this could include aged cheese, peanuts, MSG, aspartame and nitrates)      Education and Follow-up  [x] Please call with any questions or concerns   Go to the ED with any new or concerning symptoms  [x] Follow up with me as needed       Add B12 to your next lab draw with your primary doctor

## 2020-03-10 ENCOUNTER — APPOINTMENT (OUTPATIENT)
Dept: LAB | Facility: CLINIC | Age: 75
End: 2020-03-10
Payer: COMMERCIAL

## 2020-03-10 DIAGNOSIS — M25.50 ARTHRALGIA, UNSPECIFIED JOINT: ICD-10-CM

## 2020-03-10 DIAGNOSIS — R20.2 PARESTHESIAS: ICD-10-CM

## 2020-03-10 DIAGNOSIS — E83.52 SERUM CALCIUM ELEVATED: ICD-10-CM

## 2020-03-10 DIAGNOSIS — R26.81 GAIT INSTABILITY: ICD-10-CM

## 2020-03-10 LAB
ANION GAP SERPL CALCULATED.3IONS-SCNC: 2 MMOL/L (ref 4–13)
BUN SERPL-MCNC: 36 MG/DL (ref 5–25)
CALCIUM SERPL-MCNC: 9.9 MG/DL (ref 8.3–10.1)
CHLORIDE SERPL-SCNC: 110 MMOL/L (ref 100–108)
CO2 SERPL-SCNC: 28 MMOL/L (ref 21–32)
CREAT SERPL-MCNC: 0.86 MG/DL (ref 0.6–1.3)
GFR SERPL CREATININE-BSD FRML MDRD: 68 ML/MIN/1.73SQ M
GLUCOSE SERPL-MCNC: 94 MG/DL (ref 65–140)
POTASSIUM SERPL-SCNC: 4.5 MMOL/L (ref 3.5–5.3)
PTH-INTACT SERPL-MCNC: 111.7 PG/ML (ref 18.4–80.1)
SODIUM SERPL-SCNC: 140 MMOL/L (ref 136–145)
VIT B12 SERPL-MCNC: 721 PG/ML (ref 100–900)

## 2020-03-10 PROCEDURE — 36415 COLL VENOUS BLD VENIPUNCTURE: CPT

## 2020-03-10 PROCEDURE — 83970 ASSAY OF PARATHORMONE: CPT

## 2020-03-10 PROCEDURE — 82607 VITAMIN B-12: CPT

## 2020-03-10 PROCEDURE — 80048 BASIC METABOLIC PNL TOTAL CA: CPT

## 2020-03-10 PROCEDURE — 86618 LYME DISEASE ANTIBODY: CPT

## 2020-03-11 LAB — B BURGDOR IGG+IGM SER-ACNC: <0.91 ISR (ref 0–0.9)

## 2020-03-12 ENCOUNTER — TELEPHONE (OUTPATIENT)
Dept: NEUROLOGY | Facility: CLINIC | Age: 75
End: 2020-03-12

## 2020-03-12 NOTE — TELEPHONE ENCOUNTER
----- Message from General Lyudmila RN sent at 3/12/2020  7:34 AM EDT -----      ----- Message -----  From: Dee Dee Fowler MD  Sent: 3/11/2020   4:42 PM EDT  To: [facility]    Please let patient know that the lab I ordered B12 was normal   I had ordered that labs since if it is low it can affect balance       The other labs were ordered by another provider, I am unsure who, but recommend follow-up regarding those with whomever order them

## 2020-03-13 ENCOUNTER — OFFICE VISIT (OUTPATIENT)
Dept: URGENT CARE | Facility: CLINIC | Age: 75
End: 2020-03-13
Payer: COMMERCIAL

## 2020-03-13 VITALS
BODY MASS INDEX: 26.83 KG/M2 | OXYGEN SATURATION: 98 % | WEIGHT: 177 LBS | HEIGHT: 68 IN | TEMPERATURE: 98.8 F | SYSTOLIC BLOOD PRESSURE: 146 MMHG | DIASTOLIC BLOOD PRESSURE: 81 MMHG | HEART RATE: 76 BPM | RESPIRATION RATE: 18 BRPM

## 2020-03-13 DIAGNOSIS — J06.9 ACUTE URI: Primary | ICD-10-CM

## 2020-03-13 DIAGNOSIS — R09.82 POST-NASAL DRIP: ICD-10-CM

## 2020-03-13 PROCEDURE — S9083 URGENT CARE CENTER GLOBAL: HCPCS | Performed by: PHYSICIAN ASSISTANT

## 2020-03-13 PROCEDURE — 99213 OFFICE O/P EST LOW 20 MIN: CPT | Performed by: PHYSICIAN ASSISTANT

## 2020-03-13 NOTE — PROGRESS NOTES
Assessment/Plan    Acute URI [J06 9]  1  Acute URI     2  Post-nasal drip           Subjective:     Patient ID: Karrie Carlton is a 69 y o  female  Reason For Visit / Chief Complaint  Chief Complaint   Patient presents with    Cold Like Symptoms     Pt reports for one week she has c/o fatigue, chills, dry cough and back pain  Pt denies fever  Pt has not been taking any OTC medicine  45-year-old female presents the clinic with chills, fatigue, dry cough, back pain nasal congestion, postnasal drip, tickle in throat, cough, chest tightness, wheezing, upper back pain due to cough, headaches that started approximately a week ago  Patient denies fevers  Patient states that she has mucinex at home but has not taken anything for symptom relief at this time  Past Medical History:   Diagnosis Date    Anxiety     Arthritis     Back pain     Balance problems     Chest pain     heaviness    Dizziness     Gait disorder     GERD (gastroesophageal reflux disease)     Hypertension     Increased frequency of headaches     Numbness and tingling     Palpitations     Pericarditis     Thyroid disease     Trouble in sleeping        Past Surgical History:   Procedure Laterality Date    APPENDECTOMY      CHOLECYSTECTOMY      laparoscopic    KNEE SURGERY Left     PERICARDIAL WINDOW      CA OPEN RX DISTAL RADIUS FX, EXTRA-ARTICULAR Right 3/22/2018    Procedure: OPEN REDUCTION W/ INTERNAL FIXATION (ORIF) RADIUS, splint application;  Surgeon: Pili Daly MD;  Location: [facility];  Service: Orthopedics       Family History   Problem Relation Age of Onset    Kidney failure Mother     Hypertension Mother     Lung cancer Father    Cristal Brennen Parkinson White syndrome Daughter     No Known Problems Sister     Substance Abuse Neg Hx     Alcohol abuse Neg Hx     Mental illness Neg Hx        Review of Systems   Constitutional: Positive for chills and fatigue  Negative for fever     HENT: Positive for congestion, postnasal drip (improving) and sore throat (tickle in throat)  Negative for ear pain and rhinorrhea  Respiratory: Positive for cough, chest tightness (with exertion up stairs, not with walking) and wheezing  Musculoskeletal: Positive for back pain (upper back pain, worse with cough)  Neurological: Positive for headaches  Negative for dizziness  Objective:    /81 (BP Location: Right arm, Patient Position: Sitting)   Pulse 76   Temp 98 8 °F (37 1 °C) (Tympanic)   Resp 18   Ht 5' 8" (1 727 m)   Wt 80 3 kg (177 lb)   SpO2 98%   BMI 26 91 kg/m²     Physical Exam   Constitutional: She is oriented to person, place, and time  She appears well-developed and well-nourished  She is active  No distress  HENT:   Head: Normocephalic and atraumatic  Mouth/Throat: Uvula is midline and mucous membranes are normal  Posterior oropharyngeal erythema (PND) present  Cardiovascular: Normal rate, regular rhythm and normal heart sounds  Pulmonary/Chest: Effort normal and breath sounds normal    Musculoskeletal: Normal range of motion  Neurological: She is alert and oriented to person, place, and time  Skin: She is not diaphoretic  Nursing note and vitals reviewed

## 2020-03-13 NOTE — PATIENT INSTRUCTIONS
Postnasal Drip   AMBULATORY CARE:   Postnasal drip  is a condition that causes a large amount of mucus to collect in your throat or nose  It may also be called upper airway cough syndrome because the mucus causes repeated coughing  You may have a sore throat, or throat tissues may swell  This may feel like a lump in your throat  You may also feel like you need to clear your throat often  Contact your healthcare provider if:   · You have trouble breathing because of the mucus  · You have new or worsening symptoms, even with treatment  · You have signs of an infection, such as yellow or green mucus, or a fever  · You have questions or concerns about your condition or care  Treatment  may include any of the following:  · Medicines  may be given to thin the mucus  You may need to swallow the medicine or use a device to flush your sinuses with liquid squirted into your nose  Nasal sprays may also be needed to keep the tissues in your nose moist  Medicines can also relieve congestion  Allergy medicine may help if your symptoms are caused by seasonal allergies, such as hay fever  You may need medicine to help control GERD  · Antibiotics  may be needed to treat a bacterial infection  Manage postnasal drip:   · Use a humidifier or vaporizer  Use a cool mist humidifier or a vaporizer to increase air moisture in your home  This may make it easier for you to breathe  · Drink more liquids as directed  Liquids help keep your air passages moist and help you cough up mucus  Ask how much liquid to drink each day and which liquids are best for you  · Avoid cold air and dry, heated air  Cold or dry air can trigger postnasal drip  Try to stay inside on cold days, or keep your mouth covered  Do not stay long in areas that have dry, heated air  · Do not smoke, and avoid secondhand smoke  Nicotine and other chemicals in cigarettes and cigars can irritate your throat and make coughing worse   Ask your healthcare provider for information if you currently smoke and need help to quit  E-cigarettes or smokeless tobacco still contain nicotine  Talk to your healthcare provider before you use these products  Follow up with your healthcare provider as directed:  Write down your questions so you remember to ask them during your visits  © 2017 [employer] Information is for End User's use only and may not be sold, redistributed or otherwise used for commercial purposes  All illustrations and images included in CareNotes® are the copyrighted property of [employer] , [employer]  or [employer]  The above information is an  only  It is not intended as medical advice for individual conditions or treatments  Talk to your doctor, nurse or pharmacist before following any medical regimen to see if it is safe and effective for you

## 2020-04-02 ENCOUNTER — TELEPHONE (OUTPATIENT)
Dept: NEUROLOGY | Facility: CLINIC | Age: 75
End: 2020-04-02

## 2020-04-03 ENCOUNTER — TELEMEDICINE (OUTPATIENT)
Dept: FAMILY MEDICINE CLINIC | Facility: CLINIC | Age: 75
End: 2020-04-03
Payer: COMMERCIAL

## 2020-04-03 VITALS
TEMPERATURE: 98.2 F | HEART RATE: 72 BPM | BODY MASS INDEX: 26.94 KG/M2 | WEIGHT: 177.2 LBS | DIASTOLIC BLOOD PRESSURE: 80 MMHG | SYSTOLIC BLOOD PRESSURE: 141 MMHG

## 2020-04-03 DIAGNOSIS — B34.9 VIRAL ILLNESS: Primary | ICD-10-CM

## 2020-04-03 DIAGNOSIS — Z20.828 EXPOSURE TO SARS-ASSOCIATED CORONAVIRUS: ICD-10-CM

## 2020-04-03 PROCEDURE — 99214 OFFICE O/P EST MOD 30 MIN: CPT | Performed by: FAMILY MEDICINE

## 2020-04-03 PROCEDURE — 87635 SARS-COV-2 COVID-19 AMP PRB: CPT

## 2020-04-03 PROCEDURE — 1160F RVW MEDS BY RX/DR IN RCRD: CPT | Performed by: FAMILY MEDICINE

## 2020-04-03 RX ORDER — OMEPRAZOLE 20 MG/1
20 CAPSULE, DELAYED RELEASE ORAL DAILY
COMMUNITY
End: 2020-05-19 | Stop reason: SDUPTHER

## 2020-04-05 LAB — SARS-COV-2 RNA SPEC QL NAA+PROBE: NOT DETECTED

## 2020-04-06 ENCOUNTER — TELEPHONE (OUTPATIENT)
Dept: FAMILY MEDICINE CLINIC | Facility: CLINIC | Age: 75
End: 2020-04-06

## 2020-04-29 ENCOUNTER — TELEPHONE (OUTPATIENT)
Dept: FAMILY MEDICINE CLINIC | Facility: CLINIC | Age: 75
End: 2020-04-29

## 2020-05-11 ENCOUNTER — TELEPHONE (OUTPATIENT)
Dept: FAMILY MEDICINE CLINIC | Facility: CLINIC | Age: 75
End: 2020-05-11

## 2020-05-13 ENCOUNTER — OFFICE VISIT (OUTPATIENT)
Dept: FAMILY MEDICINE CLINIC | Facility: CLINIC | Age: 75
End: 2020-05-13
Payer: COMMERCIAL

## 2020-05-13 VITALS — HEART RATE: 72 BPM | TEMPERATURE: 98.2 F

## 2020-05-13 DIAGNOSIS — E04.1 THYROID NODULE: ICD-10-CM

## 2020-05-13 DIAGNOSIS — J02.9 SORE THROAT: ICD-10-CM

## 2020-05-13 DIAGNOSIS — K21.9 GASTROESOPHAGEAL REFLUX DISEASE, ESOPHAGITIS PRESENCE NOT SPECIFIED: ICD-10-CM

## 2020-05-13 DIAGNOSIS — E21.3 HYPERPARATHYROIDISM (HCC): ICD-10-CM

## 2020-05-13 DIAGNOSIS — Z20.828 EXPOSURE TO SARS-ASSOCIATED CORONAVIRUS: ICD-10-CM

## 2020-05-13 DIAGNOSIS — R13.12 OROPHARYNGEAL DYSPHAGIA: Primary | ICD-10-CM

## 2020-05-13 PROCEDURE — 4040F PNEUMOC VAC/ADMIN/RCVD: CPT | Performed by: FAMILY MEDICINE

## 2020-05-13 PROCEDURE — 99214 OFFICE O/P EST MOD 30 MIN: CPT | Performed by: FAMILY MEDICINE

## 2020-05-13 PROCEDURE — 3077F SYST BP >= 140 MM HG: CPT | Performed by: FAMILY MEDICINE

## 2020-05-13 PROCEDURE — 1036F TOBACCO NON-USER: CPT | Performed by: FAMILY MEDICINE

## 2020-05-13 PROCEDURE — 1160F RVW MEDS BY RX/DR IN RCRD: CPT | Performed by: FAMILY MEDICINE

## 2020-05-13 PROCEDURE — 3079F DIAST BP 80-89 MM HG: CPT | Performed by: FAMILY MEDICINE

## 2020-05-14 DIAGNOSIS — Z20.828 EXPOSURE TO SARS-ASSOCIATED CORONAVIRUS: ICD-10-CM

## 2020-05-14 PROCEDURE — U0003 INFECTIOUS AGENT DETECTION BY NUCLEIC ACID (DNA OR RNA); SEVERE ACUTE RESPIRATORY SYNDROME CORONAVIRUS 2 (SARS-COV-2) (CORONAVIRUS DISEASE [COVID-19]), AMPLIFIED PROBE TECHNIQUE, MAKING USE OF HIGH THROUGHPUT TECHNOLOGIES AS DESCRIBED BY CMS-2020-01-R: HCPCS

## 2020-05-15 ENCOUNTER — TELEPHONE (OUTPATIENT)
Dept: FAMILY MEDICINE CLINIC | Facility: CLINIC | Age: 75
End: 2020-05-15

## 2020-05-15 ENCOUNTER — HOSPITAL ENCOUNTER (OUTPATIENT)
Dept: RADIOLOGY | Facility: HOSPITAL | Age: 75
Discharge: HOME | End: 2020-05-15
Payer: COMMERCIAL

## 2020-05-15 DIAGNOSIS — R13.12 OROPHARYNGEAL DYSPHAGIA: ICD-10-CM

## 2020-05-15 DIAGNOSIS — E04.1 THYROID NODULE: ICD-10-CM

## 2020-05-15 LAB
BACTERIA SPEC RESP CULT: NORMAL
Lab: NORMAL
SARS-COV-2 RNA SPEC QL NAA+PROBE: NOT DETECTED

## 2020-05-15 PROCEDURE — 76536 US EXAM OF HEAD AND NECK: CPT

## 2020-05-18 ENCOUNTER — APPOINTMENT (OUTPATIENT)
Dept: LAB | Facility: HOSPITAL | Age: 75
End: 2020-05-18
Payer: COMMERCIAL

## 2020-05-18 ENCOUNTER — TRANSCRIBE ORDERS (OUTPATIENT)
Dept: RADIOLOGY | Facility: HOSPITAL | Age: 75
End: 2020-05-18

## 2020-05-18 ENCOUNTER — HOSPITAL ENCOUNTER (OUTPATIENT)
Dept: RADIOLOGY | Facility: HOSPITAL | Age: 75
Discharge: HOME | End: 2020-05-18
Attending: INTERNAL MEDICINE
Payer: COMMERCIAL

## 2020-05-18 DIAGNOSIS — E55.9 VITAMIN D DEFICIENCY, UNSPECIFIED: Primary | ICD-10-CM

## 2020-05-18 DIAGNOSIS — E04.1 NONTOXIC SINGLE THYROID NODULE: ICD-10-CM

## 2020-05-18 DIAGNOSIS — E55.9 VITAMIN D DEFICIENCY, UNSPECIFIED: ICD-10-CM

## 2020-05-18 DIAGNOSIS — R91.8 ABNORMAL CT SCAN OF LUNG: ICD-10-CM

## 2020-05-18 LAB
25(OH)D3 SERPL-MCNC: 42.8 NG/ML (ref 30–100)
ALBUMIN SERPL BCP-MCNC: 3.6 G/DL (ref 3.5–5)
ALP SERPL-CCNC: 111 U/L (ref 46–116)
ALT SERPL W P-5'-P-CCNC: 31 U/L (ref 12–78)
ANION GAP SERPL CALCULATED.3IONS-SCNC: 4 MMOL/L (ref 4–13)
AST SERPL W P-5'-P-CCNC: 21 U/L (ref 5–45)
BILIRUB SERPL-MCNC: 0.54 MG/DL (ref 0.2–1)
BUN SERPL-MCNC: 24 MG/DL (ref 5–25)
CALCIUM SERPL-MCNC: 9.7 MG/DL (ref 8.3–10.1)
CHLORIDE SERPL-SCNC: 109 MMOL/L (ref 100–108)
CO2 SERPL-SCNC: 28 MMOL/L (ref 21–32)
CREAT SERPL-MCNC: 0.72 MG/DL (ref 0.6–1.3)
GFR SERPL CREATININE-BSD FRML MDRD: 84 ML/MIN/1.73SQ M
GLUCOSE P FAST SERPL-MCNC: 76 MG/DL (ref 65–99)
POTASSIUM SERPL-SCNC: 4.3 MMOL/L (ref 3.5–5.3)
PROT SERPL-MCNC: 7.5 G/DL (ref 6.4–8.2)
SODIUM SERPL-SCNC: 141 MMOL/L (ref 136–145)
T4 FREE SERPL-MCNC: 0.96 NG/DL (ref 0.76–1.46)
TSH SERPL DL<=0.05 MIU/L-ACNC: 1.29 UIU/ML (ref 0.36–3.74)

## 2020-05-18 PROCEDURE — 80053 COMPREHEN METABOLIC PANEL: CPT

## 2020-05-18 PROCEDURE — 84439 ASSAY OF FREE THYROXINE: CPT

## 2020-05-18 PROCEDURE — 71250 CT THORAX DX C-: CPT

## 2020-05-18 PROCEDURE — 36415 COLL VENOUS BLD VENIPUNCTURE: CPT

## 2020-05-18 PROCEDURE — 82306 VITAMIN D 25 HYDROXY: CPT

## 2020-05-18 PROCEDURE — 84443 ASSAY THYROID STIM HORMONE: CPT

## 2020-05-19 ENCOUNTER — TELEPHONE (OUTPATIENT)
Dept: FAMILY MEDICINE CLINIC | Facility: CLINIC | Age: 75
End: 2020-05-19

## 2020-05-19 DIAGNOSIS — K21.9 GASTROESOPHAGEAL REFLUX DISEASE, ESOPHAGITIS PRESENCE NOT SPECIFIED: Primary | ICD-10-CM

## 2020-05-19 RX ORDER — OMEPRAZOLE 20 MG/1
20 CAPSULE, DELAYED RELEASE ORAL
Qty: 90 CAPSULE | Refills: 2 | Status: SHIPPED | OUTPATIENT
Start: 2020-05-19 | End: 2021-02-10 | Stop reason: SDUPTHER

## 2020-05-21 ENCOUNTER — TELEPHONE (OUTPATIENT)
Dept: OTHER | Facility: HOSPITAL | Age: 75
End: 2020-05-21

## 2020-05-22 ENCOUNTER — APPOINTMENT (OUTPATIENT)
Dept: RADIOLOGY | Facility: CLINIC | Age: 75
End: 2020-05-22
Payer: COMMERCIAL

## 2020-05-22 ENCOUNTER — OFFICE VISIT (OUTPATIENT)
Dept: OBGYN CLINIC | Facility: CLINIC | Age: 75
End: 2020-05-22
Payer: COMMERCIAL

## 2020-05-22 VITALS
BODY MASS INDEX: 26.83 KG/M2 | WEIGHT: 177 LBS | TEMPERATURE: 97.3 F | SYSTOLIC BLOOD PRESSURE: 124 MMHG | HEART RATE: 74 BPM | DIASTOLIC BLOOD PRESSURE: 78 MMHG | HEIGHT: 68 IN

## 2020-05-22 DIAGNOSIS — M25.561 PAIN IN BOTH KNEES, UNSPECIFIED CHRONICITY: ICD-10-CM

## 2020-05-22 DIAGNOSIS — M25.562 PAIN IN BOTH KNEES, UNSPECIFIED CHRONICITY: ICD-10-CM

## 2020-05-22 DIAGNOSIS — M25.562 LEFT KNEE PAIN, UNSPECIFIED CHRONICITY: ICD-10-CM

## 2020-05-22 DIAGNOSIS — M25.561 RIGHT KNEE PAIN, UNSPECIFIED CHRONICITY: ICD-10-CM

## 2020-05-22 DIAGNOSIS — M17.0 PRIMARY OSTEOARTHRITIS OF BOTH KNEES: Primary | ICD-10-CM

## 2020-05-22 PROCEDURE — 1036F TOBACCO NON-USER: CPT | Performed by: ORTHOPAEDIC SURGERY

## 2020-05-22 PROCEDURE — 4040F PNEUMOC VAC/ADMIN/RCVD: CPT | Performed by: ORTHOPAEDIC SURGERY

## 2020-05-22 PROCEDURE — 1160F RVW MEDS BY RX/DR IN RCRD: CPT | Performed by: ORTHOPAEDIC SURGERY

## 2020-05-22 PROCEDURE — 3008F BODY MASS INDEX DOCD: CPT | Performed by: ORTHOPAEDIC SURGERY

## 2020-05-22 PROCEDURE — 20610 DRAIN/INJ JOINT/BURSA W/O US: CPT | Performed by: ORTHOPAEDIC SURGERY

## 2020-05-22 PROCEDURE — 73564 X-RAY EXAM KNEE 4 OR MORE: CPT

## 2020-05-22 PROCEDURE — 3074F SYST BP LT 130 MM HG: CPT | Performed by: ORTHOPAEDIC SURGERY

## 2020-05-22 PROCEDURE — 3078F DIAST BP <80 MM HG: CPT | Performed by: ORTHOPAEDIC SURGERY

## 2020-05-22 PROCEDURE — 99214 OFFICE O/P EST MOD 30 MIN: CPT | Performed by: ORTHOPAEDIC SURGERY

## 2020-05-22 RX ORDER — METHYLPREDNISOLONE ACETATE 40 MG/ML
1 INJECTION, SUSPENSION INTRA-ARTICULAR; INTRALESIONAL; INTRAMUSCULAR; SOFT TISSUE
Status: COMPLETED | OUTPATIENT
Start: 2020-05-22 | End: 2020-05-22

## 2020-05-22 RX ORDER — LIDOCAINE HYDROCHLORIDE 10 MG/ML
3 INJECTION, SOLUTION INFILTRATION; PERINEURAL
Status: COMPLETED | OUTPATIENT
Start: 2020-05-22 | End: 2020-05-22

## 2020-05-22 RX ADMIN — LIDOCAINE HYDROCHLORIDE 3 ML: 10 INJECTION, SOLUTION INFILTRATION; PERINEURAL at 13:51

## 2020-05-22 RX ADMIN — METHYLPREDNISOLONE ACETATE 1 ML: 40 INJECTION, SUSPENSION INTRA-ARTICULAR; INTRALESIONAL; INTRAMUSCULAR; SOFT TISSUE at 13:51

## 2020-05-29 ENCOUNTER — TELEPHONE (OUTPATIENT)
Dept: FAMILY MEDICINE CLINIC | Facility: CLINIC | Age: 75
End: 2020-05-29

## 2020-06-03 ENCOUNTER — OFFICE VISIT (OUTPATIENT)
Dept: FAMILY MEDICINE CLINIC | Facility: CLINIC | Age: 75
End: 2020-06-03
Payer: COMMERCIAL

## 2020-06-03 VITALS
BODY MASS INDEX: 26.54 KG/M2 | HEART RATE: 78 BPM | DIASTOLIC BLOOD PRESSURE: 80 MMHG | WEIGHT: 175.1 LBS | TEMPERATURE: 98.6 F | RESPIRATION RATE: 16 BRPM | HEIGHT: 68 IN | SYSTOLIC BLOOD PRESSURE: 132 MMHG

## 2020-06-03 DIAGNOSIS — R39.9 URINARY SYMPTOM OR SIGN: Primary | ICD-10-CM

## 2020-06-03 DIAGNOSIS — R10.32 BILATERAL LOWER ABDOMINAL DISCOMFORT: ICD-10-CM

## 2020-06-03 DIAGNOSIS — R10.31 BILATERAL LOWER ABDOMINAL DISCOMFORT: ICD-10-CM

## 2020-06-03 DIAGNOSIS — R45.0 NERVOUSNESS: ICD-10-CM

## 2020-06-03 LAB
SL AMB  POCT GLUCOSE, UA: ABNORMAL
SL AMB LEUKOCYTE ESTERASE,UA: ABNORMAL
SL AMB POCT BILIRUBIN,UA: ABNORMAL
SL AMB POCT BLOOD,UA: ABNORMAL
SL AMB POCT CLARITY,UA: CLEAR
SL AMB POCT COLOR,UA: YELLOW
SL AMB POCT KETONES,UA: ABNORMAL
SL AMB POCT NITRITE,UA: ABNORMAL
SL AMB POCT PH,UA: 5
SL AMB POCT SPECIFIC GRAVITY,UA: 1.02
SL AMB POCT URINE PROTEIN: ABNORMAL
SL AMB POCT UROBILINOGEN: 0.2

## 2020-06-03 PROCEDURE — 81002 URINALYSIS NONAUTO W/O SCOPE: CPT | Performed by: FAMILY MEDICINE

## 2020-06-03 PROCEDURE — 1160F RVW MEDS BY RX/DR IN RCRD: CPT | Performed by: FAMILY MEDICINE

## 2020-06-03 PROCEDURE — 1036F TOBACCO NON-USER: CPT | Performed by: FAMILY MEDICINE

## 2020-06-03 PROCEDURE — 99213 OFFICE O/P EST LOW 20 MIN: CPT | Performed by: FAMILY MEDICINE

## 2020-06-03 PROCEDURE — 87086 URINE CULTURE/COLONY COUNT: CPT | Performed by: FAMILY MEDICINE

## 2020-06-03 PROCEDURE — 3008F BODY MASS INDEX DOCD: CPT | Performed by: FAMILY MEDICINE

## 2020-06-03 PROCEDURE — 3075F SYST BP GE 130 - 139MM HG: CPT | Performed by: FAMILY MEDICINE

## 2020-06-03 PROCEDURE — 4040F PNEUMOC VAC/ADMIN/RCVD: CPT | Performed by: FAMILY MEDICINE

## 2020-06-03 PROCEDURE — 3079F DIAST BP 80-89 MM HG: CPT | Performed by: FAMILY MEDICINE

## 2020-06-03 RX ORDER — ALPRAZOLAM 0.5 MG/1
0.5 TABLET ORAL
Qty: 20 TABLET | Refills: 1 | Status: SHIPPED | OUTPATIENT
Start: 2020-06-03 | End: 2021-05-21 | Stop reason: SDUPTHER

## 2020-06-03 RX ORDER — CIPROFLOXACIN 250 MG/1
250 TABLET, FILM COATED ORAL 2 TIMES DAILY WITH MEALS
Qty: 14 TABLET | Refills: 0 | Status: SHIPPED | OUTPATIENT
Start: 2020-06-03 | End: 2020-06-05 | Stop reason: ALTCHOICE

## 2020-06-04 ENCOUNTER — TELEPHONE (OUTPATIENT)
Dept: FAMILY MEDICINE CLINIC | Facility: CLINIC | Age: 75
End: 2020-06-04

## 2020-06-04 DIAGNOSIS — R39.9 URINARY SYMPTOM OR SIGN: Primary | ICD-10-CM

## 2020-06-04 LAB — BACTERIA UR CULT: NORMAL

## 2020-06-04 RX ORDER — CEPHALEXIN 250 MG/1
250 CAPSULE ORAL
Qty: 30 CAPSULE | Refills: 0 | Status: SHIPPED | OUTPATIENT
Start: 2020-06-04 | End: 2020-06-05 | Stop reason: ALTCHOICE

## 2020-06-05 ENCOUNTER — OFFICE VISIT (OUTPATIENT)
Dept: FAMILY MEDICINE CLINIC | Facility: CLINIC | Age: 75
End: 2020-06-05
Payer: COMMERCIAL

## 2020-06-05 VITALS
BODY MASS INDEX: 26.75 KG/M2 | HEIGHT: 68 IN | TEMPERATURE: 98 F | DIASTOLIC BLOOD PRESSURE: 70 MMHG | SYSTOLIC BLOOD PRESSURE: 138 MMHG | HEART RATE: 76 BPM | RESPIRATION RATE: 16 BRPM | WEIGHT: 176.5 LBS

## 2020-06-05 DIAGNOSIS — R31.29 HEMATURIA, MICROSCOPIC: ICD-10-CM

## 2020-06-05 DIAGNOSIS — R10.2 PELVIC PAIN IN FEMALE: Primary | ICD-10-CM

## 2020-06-05 DIAGNOSIS — R21 RASH: ICD-10-CM

## 2020-06-05 LAB
BILIRUB UR QL STRIP: NEGATIVE
CLARITY UR: CLEAR
COLOR UR: YELLOW
GLUCOSE UR STRIP-MCNC: NEGATIVE MG/DL
HGB UR QL STRIP.AUTO: NEGATIVE
KETONES UR STRIP-MCNC: NEGATIVE MG/DL
LEUKOCYTE ESTERASE UR QL STRIP: NEGATIVE
NITRITE UR QL STRIP: NEGATIVE
PH UR STRIP.AUTO: 6 [PH]
PROT UR STRIP-MCNC: NEGATIVE MG/DL
SP GR UR STRIP.AUTO: 1.01 (ref 1–1.03)
UROBILINOGEN UR QL STRIP.AUTO: 0.2 E.U./DL

## 2020-06-05 PROCEDURE — 3075F SYST BP GE 130 - 139MM HG: CPT | Performed by: FAMILY MEDICINE

## 2020-06-05 PROCEDURE — 3078F DIAST BP <80 MM HG: CPT | Performed by: FAMILY MEDICINE

## 2020-06-05 PROCEDURE — 1160F RVW MEDS BY RX/DR IN RCRD: CPT | Performed by: FAMILY MEDICINE

## 2020-06-05 PROCEDURE — 4040F PNEUMOC VAC/ADMIN/RCVD: CPT | Performed by: FAMILY MEDICINE

## 2020-06-05 PROCEDURE — 1036F TOBACCO NON-USER: CPT | Performed by: FAMILY MEDICINE

## 2020-06-05 PROCEDURE — 99213 OFFICE O/P EST LOW 20 MIN: CPT | Performed by: FAMILY MEDICINE

## 2020-06-05 PROCEDURE — 81003 URINALYSIS AUTO W/O SCOPE: CPT | Performed by: FAMILY MEDICINE

## 2020-06-05 RX ORDER — MOMETASONE FUROATE 1 MG/G
CREAM TOPICAL
Qty: 15 G | Refills: 0 | Status: SHIPPED | OUTPATIENT
Start: 2020-06-05 | End: 2020-06-24 | Stop reason: ALTCHOICE

## 2020-06-08 ENCOUNTER — TELEPHONE (OUTPATIENT)
Dept: FAMILY MEDICINE CLINIC | Facility: CLINIC | Age: 75
End: 2020-06-08

## 2020-06-24 ENCOUNTER — OFFICE VISIT (OUTPATIENT)
Dept: FAMILY MEDICINE CLINIC | Facility: CLINIC | Age: 75
End: 2020-06-24
Payer: COMMERCIAL

## 2020-06-24 ENCOUNTER — HOSPITAL ENCOUNTER (OUTPATIENT)
Dept: NEUROLOGY | Facility: CLINIC | Age: 75
Discharge: HOME | End: 2020-06-24
Payer: COMMERCIAL

## 2020-06-24 VITALS
HEART RATE: 76 BPM | WEIGHT: 178.5 LBS | RESPIRATION RATE: 16 BRPM | TEMPERATURE: 97.7 F | BODY MASS INDEX: 27.05 KG/M2 | SYSTOLIC BLOOD PRESSURE: 112 MMHG | HEIGHT: 68 IN | DIASTOLIC BLOOD PRESSURE: 70 MMHG

## 2020-06-24 DIAGNOSIS — K21.9 GASTROESOPHAGEAL REFLUX DISEASE, ESOPHAGITIS PRESENCE NOT SPECIFIED: Primary | ICD-10-CM

## 2020-06-24 DIAGNOSIS — R13.12 OROPHARYNGEAL DYSPHAGIA: ICD-10-CM

## 2020-06-24 DIAGNOSIS — E04.1 THYROID NODULE: ICD-10-CM

## 2020-06-24 DIAGNOSIS — R20.2 PARESTHESIAS: ICD-10-CM

## 2020-06-24 DIAGNOSIS — M25.561 PAIN IN BOTH KNEES, UNSPECIFIED CHRONICITY: ICD-10-CM

## 2020-06-24 DIAGNOSIS — E21.0 PRIMARY HYPERPARATHYROIDISM (HCC): ICD-10-CM

## 2020-06-24 DIAGNOSIS — E55.9 VITAMIN D DEFICIENCY: ICD-10-CM

## 2020-06-24 DIAGNOSIS — M25.562 PAIN IN BOTH KNEES, UNSPECIFIED CHRONICITY: ICD-10-CM

## 2020-06-24 DIAGNOSIS — R45.0 NERVOUSNESS: ICD-10-CM

## 2020-06-24 PROCEDURE — 99214 OFFICE O/P EST MOD 30 MIN: CPT | Performed by: FAMILY MEDICINE

## 2020-06-24 PROCEDURE — 3078F DIAST BP <80 MM HG: CPT | Performed by: FAMILY MEDICINE

## 2020-06-24 PROCEDURE — 4040F PNEUMOC VAC/ADMIN/RCVD: CPT | Performed by: FAMILY MEDICINE

## 2020-06-24 PROCEDURE — 1036F TOBACCO NON-USER: CPT | Performed by: FAMILY MEDICINE

## 2020-06-24 PROCEDURE — 95886 MUSC TEST DONE W/N TEST COMP: CPT | Performed by: PSYCHIATRY & NEUROLOGY

## 2020-06-24 PROCEDURE — 95913 NRV CNDJ TEST 13/> STUDIES: CPT | Performed by: PSYCHIATRY & NEUROLOGY

## 2020-06-24 PROCEDURE — 1160F RVW MEDS BY RX/DR IN RCRD: CPT | Performed by: FAMILY MEDICINE

## 2020-06-24 PROCEDURE — 3074F SYST BP LT 130 MM HG: CPT | Performed by: FAMILY MEDICINE

## 2020-06-29 ENCOUNTER — TELEPHONE (OUTPATIENT)
Dept: NEUROLOGY | Facility: CLINIC | Age: 75
End: 2020-06-29

## 2020-07-13 ENCOUNTER — OFFICE VISIT (OUTPATIENT)
Dept: FAMILY MEDICINE CLINIC | Facility: CLINIC | Age: 75
End: 2020-07-13
Payer: COMMERCIAL

## 2020-07-13 VITALS
TEMPERATURE: 98.1 F | DIASTOLIC BLOOD PRESSURE: 82 MMHG | BODY MASS INDEX: 26.96 KG/M2 | SYSTOLIC BLOOD PRESSURE: 140 MMHG | WEIGHT: 177.9 LBS | RESPIRATION RATE: 16 BRPM | HEART RATE: 76 BPM | HEIGHT: 68 IN

## 2020-07-13 DIAGNOSIS — R10.11 RUQ ABDOMINAL PAIN: Primary | ICD-10-CM

## 2020-07-13 DIAGNOSIS — R10.9 RIGHT FLANK PAIN: ICD-10-CM

## 2020-07-13 LAB
SL AMB  POCT GLUCOSE, UA: ABNORMAL
SL AMB LEUKOCYTE ESTERASE,UA: ABNORMAL
SL AMB POCT BILIRUBIN,UA: ABNORMAL
SL AMB POCT BLOOD,UA: ABNORMAL
SL AMB POCT CLARITY,UA: CLEAR
SL AMB POCT COLOR,UA: YELLOW
SL AMB POCT KETONES,UA: ABNORMAL
SL AMB POCT NITRITE,UA: ABNORMAL
SL AMB POCT PH,UA: 5
SL AMB POCT SPECIFIC GRAVITY,UA: 1
SL AMB POCT URINE PROTEIN: ABNORMAL
SL AMB POCT UROBILINOGEN: 0.2

## 2020-07-13 PROCEDURE — 3079F DIAST BP 80-89 MM HG: CPT | Performed by: FAMILY MEDICINE

## 2020-07-13 PROCEDURE — 81002 URINALYSIS NONAUTO W/O SCOPE: CPT | Performed by: FAMILY MEDICINE

## 2020-07-13 PROCEDURE — 87086 URINE CULTURE/COLONY COUNT: CPT | Performed by: FAMILY MEDICINE

## 2020-07-13 PROCEDURE — 3008F BODY MASS INDEX DOCD: CPT | Performed by: FAMILY MEDICINE

## 2020-07-13 PROCEDURE — 99213 OFFICE O/P EST LOW 20 MIN: CPT | Performed by: FAMILY MEDICINE

## 2020-07-13 PROCEDURE — 3077F SYST BP >= 140 MM HG: CPT | Performed by: FAMILY MEDICINE

## 2020-07-13 PROCEDURE — 1160F RVW MEDS BY RX/DR IN RCRD: CPT | Performed by: FAMILY MEDICINE

## 2020-07-13 PROCEDURE — 1036F TOBACCO NON-USER: CPT | Performed by: FAMILY MEDICINE

## 2020-07-13 PROCEDURE — 4040F PNEUMOC VAC/ADMIN/RCVD: CPT | Performed by: FAMILY MEDICINE

## 2020-07-14 LAB — BACTERIA UR CULT: NORMAL

## 2020-07-14 NOTE — PROGRESS NOTES
Assessment/Plan:     Diagnosis ICD-10-CM Associated Orders   1  RUQ abdominal pain R10 11 US abdomen limited   2  Right flank pain R10 9 US abdomen limited     POCT urine dip     Urine culture     Plan:   - will obtain US abdomen and urine culture and call patient with results, advised to follow up in the office if her symptoms persist or worsen or go to ER        The patient understands and agrees with the treatment plan  Subjective:   Chief Complaint   Patient presents with   Janifer Felty pain     RUQ      Patient ID: Kiersten Briscoe is a 69 y o  female who presents today with complaining of intermittent right upper quadrant abdominal pain radiating to her right flank area for the past 2-3 weeks, does not seem to be related to meals or activity  No associated nausea, vomiting, diarrhea, constipation, melena/ hematochezia, fever/chills, urinary frequency, urgency, pain with urination or blood in her urine         The following portions of the patient's history were reviewed and updated as appropriate: allergies, current medications, past family history, past medical history, past social history, past surgical history and problem list     Past Medical History:   Diagnosis Date    Anxiety     Arthritis     Back pain     Balance problems     Chest pain     heaviness    Dizziness     Gait disorder     GERD (gastroesophageal reflux disease)     Hypertension     Increased frequency of headaches     Numbness and tingling     Palpitations     Pericarditis     Thyroid disease     Trouble in sleeping      Past Surgical History:   Procedure Laterality Date    APPENDECTOMY      CHOLECYSTECTOMY      laparoscopic    KNEE SURGERY Left     PERICARDIAL WINDOW      PA OPEN RX DISTAL RADIUS FX, EXTRA-ARTICULAR Right 3/22/2018    Procedure: OPEN REDUCTION W/ INTERNAL FIXATION (ORIF) RADIUS, splint application;  Surgeon: Ev Bhatia MD;  Location: [facility];  Service: Orthopedics     Family History   Problem Relation Age of Onset    Kidney failure Mother     Hypertension Mother     Lung cancer Father    Anita Kruse Parkinson White syndrome Daughter     No Known Problems Sister     Substance Abuse Neg Hx     Alcohol abuse Neg Hx     Mental illness Neg Hx      Social History     Socioeconomic History    Marital status: /Civil Union     Spouse name: Not on file    Number of children: Not on file    Years of education: Not on file    Highest education level: Not on file   Occupational History    Not on file   Social Needs    Financial resource strain: Not on file    Food insecurity:     Worry: Not on file     Inability: Not on file    Transportation needs:     Medical: Not on file     Non-medical: Not on file   Tobacco Use    Smoking status: Never Smoker    Smokeless tobacco: Never Used   Substance and Sexual Activity    Alcohol use: No    Drug use: No    Sexual activity: Not on file   Lifestyle    Physical activity:     Days per week: Not on file     Minutes per session: Not on file    Stress: Not on file   Relationships    Social connections:     Talks on phone: Not on file     Gets together: Not on file     Attends Buddhist service: Not on file     Active member of club or organization: Not on file     Attends meetings of clubs or organizations: Not on file     Relationship status: Not on file    Intimate partner violence:     Fear of current or ex partner: Not on file     Emotionally abused: Not on file     Physically abused: Not on file     Forced sexual activity: Not on file   Other Topics Concern    Not on file   Social History Narrative    WORK:    1  [employer] (Marianela Hanson; Raul Guidry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    2  [employer]        HOBBIES:    1  Singing    2  Dancing    3  Hx of ceramics (+ dust)        PETS:    1    Dogs    -  Denies birds        TRAVEL:    -  [place]         EXPOSURES:    Denies mold; down pillows/comforters; hot tubs       Current Outpatient Medications:   ALPRAZolam (XANAX) 0 5 mg tablet, Take 1 tablet (0 5 mg total) by mouth daily at bedtime as needed for anxiety or sleep, Disp: 20 tablet, Rfl: 1    ascorbic acid (VITAMIN C) 500 mg tablet, Take 500 mg by mouth daily, Disp: , Rfl:     aspirin 81 mg chewable tablet, Chew 1 tablet (81 mg total) daily (Patient taking differently: Chew 81 mg as needed ), Disp: 30 tablet, Rfl: 0    b complex vitamins tablet, Take 1 tablet by mouth daily, Disp: , Rfl:     Cholecalciferol (VITAMIN D-3) 1000 units CAPS, Daily, Disp: , Rfl:     Magnesium 100 MG CAPS, magnesium  300 mg qd, Disp: , Rfl:     Multiple Vitamins-Calcium (ONE-A-DAY WOMENS PO), Take 1 tablet by mouth daily, Disp: , Rfl:     Omega-3 Fatty Acids (FISH OIL PO), Take 2 g by mouth, Disp: , Rfl:     omeprazole (PriLOSEC) 20 mg delayed release capsule, Take 1 capsule (20 mg total) by mouth daily before breakfast, Disp: 90 capsule, Rfl: 2    Riboflavin (VITAMIN B-2 PO), Take by mouth, Disp: , Rfl:     Review of Systems   Constitutional: Negative for appetite change, chills, fatigue and fever  Respiratory: Negative for cough, shortness of breath and wheezing  Cardiovascular: Negative for chest pain, palpitations and leg swelling  Gastrointestinal: Negative for abdominal distention, blood in stool, constipation, diarrhea, nausea and vomiting  As noted in HPI   Genitourinary: Negative for difficulty urinating, dysuria, flank pain, frequency, hematuria, urgency, vaginal bleeding and vaginal discharge  Skin: Negative for rash  Neurological: Negative for dizziness and headaches  Hematological: Negative for adenopathy  Objective:    Vitals:    07/13/20 0959   BP: 140/82   BP Location: Left arm   Patient Position: Sitting   Cuff Size: Standard   Pulse: 76   Resp: 16   Temp: 98 1 °F (36 7 °C)   Weight: 80 7 kg (177 lb 14 4 oz)   Height: 5' 7 5" (1 715 m)        Physical Exam   Constitutional: She is oriented to person, place, and time   She appears well-developed and well-nourished  No distress  HENT:   Mouth/Throat: Oropharynx is clear and moist    Cardiovascular: Normal rate, regular rhythm and normal heart sounds  No murmur heard  Pulmonary/Chest: Effort normal and breath sounds normal  No respiratory distress  She has no rhonchi  Abdominal: Soft  She exhibits no distension and no mass  There is no tenderness  There is no rigidity, no rebound, no guarding and no CVA tenderness  Musculoskeletal: She exhibits no edema  Neurological: She is alert and oriented to person, place, and time  Psychiatric: She has a normal mood and affect        Office Visit on 07/13/2020   Component Date Value Ref Range Status    LEUKOCYTE ESTERASE,UA 07/13/2020 neg   Final    NITRITE,UA 07/13/2020 neg   Final    SL AMB POCT UROBILINOGEN 07/13/2020 0 2   Final    POCT URINE PROTEIN 07/13/2020 neg   Final     PH,UA 07/13/2020 5 0   Final    BLOOD,UA 07/13/2020 trace   Final    SPECIFIC GRAVITY,UA 07/13/2020 1 005   Final    KETONES,UA 07/13/2020 neg   Final    BILIRUBIN,UA 07/13/2020 neg   Final    GLUCOSE, UA 07/13/2020 neg   Final     COLOR,UA 07/13/2020 Yellow   Final    CLARITY,UA 07/13/2020 Clear   Final

## 2020-07-17 ENCOUNTER — TRANSCRIBE ORDERS (OUTPATIENT)
Dept: ADMINISTRATIVE | Facility: HOSPITAL | Age: 75
End: 2020-07-17

## 2020-07-17 ENCOUNTER — APPOINTMENT (OUTPATIENT)
Dept: LAB | Facility: HOSPITAL | Age: 75
End: 2020-07-17
Attending: OTOLARYNGOLOGY
Payer: COMMERCIAL

## 2020-07-17 DIAGNOSIS — Z00.00 ROUTINE GENERAL MEDICAL EXAMINATION AT A HEALTH CARE FACILITY: Primary | ICD-10-CM

## 2020-07-17 DIAGNOSIS — Z00.00 ROUTINE GENERAL MEDICAL EXAMINATION AT A HEALTH CARE FACILITY: ICD-10-CM

## 2020-07-17 PROCEDURE — 86003 ALLG SPEC IGE CRUDE XTRC EA: CPT

## 2020-07-17 PROCEDURE — 83519 RIA NONANTIBODY: CPT

## 2020-07-17 PROCEDURE — 83516 IMMUNOASSAY NONANTIBODY: CPT

## 2020-07-17 PROCEDURE — 86235 NUCLEAR ANTIGEN ANTIBODY: CPT

## 2020-07-17 PROCEDURE — 86038 ANTINUCLEAR ANTIBODIES: CPT

## 2020-07-17 PROCEDURE — 86618 LYME DISEASE ANTIBODY: CPT

## 2020-07-17 PROCEDURE — 36415 COLL VENOUS BLD VENIPUNCTURE: CPT

## 2020-07-17 PROCEDURE — 86430 RHEUMATOID FACTOR TEST QUAL: CPT

## 2020-07-18 LAB
B BURGDOR IGG+IGM SER-ACNC: <0.91 ISR (ref 0–0.9)
ENA SS-A AB SER-ACNC: <0.2 AI (ref 0–0.9)
ENA SS-B AB SER-ACNC: <0.2 AI (ref 0–0.9)

## 2020-07-20 PROBLEM — M26.609 TMJ DYSFUNCTION: Status: ACTIVE | Noted: 2020-07-20

## 2020-07-20 PROBLEM — K21.9 REFLUX LARYNGITIS: Status: ACTIVE | Noted: 2020-07-20

## 2020-07-20 PROBLEM — R13.14 PHARYNGOESOPHAGEAL DYSPHAGIA: Status: ACTIVE | Noted: 2020-07-20

## 2020-07-20 PROBLEM — J38.01 PARESIS OF LEFT VOCAL FOLD: Status: ACTIVE | Noted: 2020-07-20

## 2020-07-20 PROBLEM — R49.0 MUSCLE TENSION DYSPHONIA: Status: ACTIVE | Noted: 2020-07-20

## 2020-07-20 PROBLEM — R49.0 DYSPHONIA: Status: ACTIVE | Noted: 2020-07-20

## 2020-07-20 PROBLEM — J04.0 REFLUX LARYNGITIS: Status: ACTIVE | Noted: 2020-07-20

## 2020-07-20 PROBLEM — J38.3 GLOTTIC INSUFFICIENCY: Status: ACTIVE | Noted: 2020-07-20

## 2020-07-20 LAB
ALLERGEN COMMENT: NORMAL
GLUTEN IGE QN: <0.1 KUA/I
RHEUMATOID FACT SER QL LA: NEGATIVE
RYE IGE QN: NEGATIVE

## 2020-07-24 LAB — ACHR MOD AB/ACHR TOTAL SFR SER: <12 % (ref 0–20)

## 2020-08-03 ENCOUNTER — HOSPITAL ENCOUNTER (OUTPATIENT)
Dept: ULTRASOUND IMAGING | Facility: HOSPITAL | Age: 75
Discharge: HOME | End: 2020-08-03
Payer: COMMERCIAL

## 2020-08-03 DIAGNOSIS — R10.9 RIGHT FLANK PAIN: ICD-10-CM

## 2020-08-03 DIAGNOSIS — R10.11 RUQ ABDOMINAL PAIN: ICD-10-CM

## 2020-08-03 LAB — MISCELLANEOUS LAB TEST RESULT: NORMAL

## 2020-08-03 PROCEDURE — 76705 ECHO EXAM OF ABDOMEN: CPT

## 2020-08-07 ENCOUNTER — TELEPHONE (OUTPATIENT)
Dept: FAMILY MEDICINE CLINIC | Facility: CLINIC | Age: 75
End: 2020-08-07

## 2020-08-07 NOTE — TELEPHONE ENCOUNTER
----- Message from Tory Orozco MD sent at 8/7/2020 11:19 AM EDT -----  Please let patient know that her US abdomen was normal

## 2020-08-28 ENCOUNTER — OFFICE VISIT (OUTPATIENT)
Dept: OBGYN CLINIC | Facility: CLINIC | Age: 75
End: 2020-08-28
Payer: COMMERCIAL

## 2020-08-28 VITALS
WEIGHT: 177 LBS | DIASTOLIC BLOOD PRESSURE: 74 MMHG | TEMPERATURE: 98.3 F | HEART RATE: 71 BPM | HEIGHT: 68 IN | BODY MASS INDEX: 26.83 KG/M2 | SYSTOLIC BLOOD PRESSURE: 146 MMHG

## 2020-08-28 DIAGNOSIS — M17.0 PRIMARY OSTEOARTHRITIS OF BOTH KNEES: Primary | ICD-10-CM

## 2020-08-28 PROCEDURE — 3078F DIAST BP <80 MM HG: CPT | Performed by: ORTHOPAEDIC SURGERY

## 2020-08-28 PROCEDURE — 4040F PNEUMOC VAC/ADMIN/RCVD: CPT | Performed by: ORTHOPAEDIC SURGERY

## 2020-08-28 PROCEDURE — 1160F RVW MEDS BY RX/DR IN RCRD: CPT | Performed by: ORTHOPAEDIC SURGERY

## 2020-08-28 PROCEDURE — 3008F BODY MASS INDEX DOCD: CPT | Performed by: ORTHOPAEDIC SURGERY

## 2020-08-28 PROCEDURE — 3077F SYST BP >= 140 MM HG: CPT | Performed by: ORTHOPAEDIC SURGERY

## 2020-08-28 PROCEDURE — 1036F TOBACCO NON-USER: CPT | Performed by: ORTHOPAEDIC SURGERY

## 2020-08-28 PROCEDURE — 99213 OFFICE O/P EST LOW 20 MIN: CPT | Performed by: ORTHOPAEDIC SURGERY

## 2020-08-28 NOTE — PROGRESS NOTES
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, with some pain at this time    Plan: For    Conservative treatment:    Ice to knee for 20 minutes at least 1-2 times daily  Imaging:    No imaging was available for review today  Injection:    A viscosupplementation injection was ordered and will be given at a future visit for bilateral for knees    Surgery:     No surgery is recommended for the patient at this time however was discussed that if she ever decides she would like to have a conversation about joint replacement she could knee with Dr Brodie Moore    Follow up:    No follow-ups on file  Chief Complaint   Patient presents with    Left Knee - Follow-up    Right Knee - Follow-up     History of Present Illness: The patient is returns for follow up of bilateral knee severe lateral joint osteoarthritis with mild patellofemoral osteoarthritis  Since the prior visit, She reports minimal improvement  She states that the cortisone injections she had at her last visit gave her relief for about 1 month  Pain is located anterior, lateral, medial      Pain is improved by rest, ice, NSAIDS and injection  Pain is aggravated by weight bearing and walking  Symptoms include clicking, catch heart ing and swelling  The patient has tried rest, ice, NSAIDS and injection          Knee Surgical History:  None    Past Medical, Social and Family History:  Past Medical History:   Diagnosis Date    Anxiety     Arthritis     Back pain     Balance problems     Chest pain     heaviness    Dizziness     Gait disorder     GERD (gastroesophageal reflux disease)     Hypertension     Increased frequency of headaches     Numbness and tingling     Palpitations     Pericarditis     Thyroid disease     Trouble in sleeping      Past Surgical History:   Procedure Laterality Date    APPENDECTOMY      CHOLECYSTECTOMY      laparoscopic    KNEE SURGERY Left     PERICARDIAL WINDOW      [place] OPEN RX DISTAL RADIUS FX, EXTRA-ARTICULAR Right 3/22/2018    Procedure: OPEN REDUCTION W/ INTERNAL FIXATION (ORIF) RADIUS, splint application;  Surgeon: Ev Bhatia MD;  Location: [facility];  Service: Orthopedics     Allergies   Allergen Reactions    Ciprofloxacin Myalgia     Current Outpatient Medications on File Prior to Visit   Medication Sig Dispense Refill    ALPRAZolam (XANAX) 0 5 mg tablet Take 1 tablet (0 5 mg total) by mouth daily at bedtime as needed for anxiety or sleep 20 tablet 1    ascorbic acid (VITAMIN C) 500 mg tablet Take 500 mg by mouth daily      aspirin 81 mg chewable tablet Chew 1 tablet (81 mg total) daily (Patient taking differently: Chew 81 mg as needed ) 30 tablet 0    b complex vitamins tablet Take 1 tablet by mouth daily      Cholecalciferol (VITAMIN D-3) 1000 units CAPS Daily      Magnesium 100 MG CAPS magnesium   300 mg qd      Multiple Vitamins-Calcium (ONE-A-DAY WOMENS PO) Take 1 tablet by mouth daily      Omega-3 Fatty Acids (FISH OIL PO) Take 2 g by mouth      omeprazole (PriLOSEC) 20 mg delayed release capsule Take 1 capsule (20 mg total) by mouth daily before breakfast 90 capsule 2    Riboflavin (VITAMIN B-2 PO) Take by mouth       No current facility-administered medications on file prior to visit        Social History     Socioeconomic History    Marital status: /Civil Union     Spouse name: Not on file    Number of children: Not on file    Years of education: Not on file    Highest education level: Not on file   Occupational History    Not on file   Social Needs    Financial resource strain: Not on file    Food insecurity     Worry: Not on file     Inability: Not on file   [employer] needs     Medical: Not on file     Non-medical: Not on file   Tobacco Use    Smoking status: Never Smoker    Smokeless tobacco: Never Used   Substance and Sexual Activity  Alcohol use: No    Drug use: No    Sexual activity: Not on file   Lifestyle    Physical activity     Days per week: Not on file     Minutes per session: Not on file    Stress: Not on file   Relationships    Social connections     Talks on phone: Not on file     Gets together: Not on file     Attends Sabianist service: Not on file     Active member of club or organization: Not on file     Attends meetings of clubs or organizations: Not on file     Relationship status: Not on file    Intimate partner violence     Fear of current or ex partner: Not on file     Emotionally abused: Not on file     Physically abused: Not on file     Forced sexual activity: Not on file   Other Topics Concern    Not on file   Social History Narrative    WORK:    1   ([employer]; [employer]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    2  [employer]        HOBBIES:    1  Singing    2  Dancing    3  Hx of ceramics (+ dust)        PETS:    1  Dogs    -  Denies birds        TRAVEL:    -  [place]         EXPOSURES:    Denies mold; down pillows/comforters; hot tubs     I have reviewed the past medical, surgical, social and family history, medications and allergies as documented in the EMR  Review of systems: ROS is negative other than that noted in the HPI  Constitutional: Negative for fatigue and fever  Physical Exam:    Blood pressure 146/74, pulse 71, temperature 98 3 °F (36 8 °C), height 5' 8" (1 727 m), weight 80 3 kg (177 lb)  General/Constitutional: NAD, well developed, well nourished  HENT: Normocephalic, atraumatic  CV: Intact distal pulses, regular rate  Resp: No respiratory distress or labored breathing  Lymphatic: No lymphadenopathy palpated  Neuro: Alert and Oriented x 3, no focal deficits  Psych: Normal mood, normal affect, normal judgement, normal behavior  Skin: Warm, dry, no rashes, no erythema    Knee Exam (focused):              RIGHT LEFT   ROM:   0-130 0-130   Palpation: Effusion negative negative MJL tenderness Positive Positive     LJL tenderness Positive Positive   Meniscus:  Kong Negative Negative    Apley's Compression Negative Negative   Instability: Varus stable stable     Valgus stable stable   Special Tests: Lachman Negative Negative     Posterior drawer Negative Negative     Anterior drawer Negative Negative     Pivot shift not tested not tested     Dial not tested not tested   Patella: Palpation no tenderness no tenderness     Mobility 1/4 1/4     Apprehension Negative Negative   Other: Single leg 1/4 squat not tested not tested      LE NV Exam: +2 DP/PT pulses bilaterally  Sensation intact to light touch L2-S1 bilaterally    No calf tenderness to palpation bilaterally    Knee Imaging    No imaging was performed today    Scribe Attestation    I,:   Dimitri Perez am acting as a scribe while in the presence of the attending physician :        I,:   Noah Wang DO personally performed the services described in this documentation    as scribed in my presence :

## 2020-09-02 NOTE — PROGRESS NOTES
Daily Note     Today's date: 10/22/2019  Patient name: Lane Velasquez  : 1945  MRN: [id number]  Referring provider: Leigh Ann Wooten MD  Dx:   No diagnosis found  Subjective: Patient denies foot pain and reports that her shoulder continues to have pain especially when carrying her granddaughter  Objective: See treatment diary below  Assessment: Increased reps and resistance as listed  Pt reports fatigue but denies pain with the progression  Monitor response and progress as charlene Nv  Plan: Continue per plan of care        Precautions: none    Manual  9/23 10/4 10/22      9/13 9/20   Laser to R PF 4' 4' np      4' 4'   Graston to R PF 8' 6' np      8' 8'                                                Exercise Diary  9/23 10/4 10/22      9/13 9/20   UBE 3'/3' 3'/3' 5'/5'      3'/3' 3/3'   Wall stretches (gastroc/  soleus) HEP        HEP    PF stretch with strap HEP        HEP    Pulleys (flex/ab) DC        10x10" DC   Cane AAROM (IR/ext) 10x10" 10"x10 10"x10      10x10"     Cane supine AAROM (flex/ab/ER) Flex/  ER 10"x10 flex, ER  10"x10 10"x10      DC Flex/ER 10''x10   IR stretch with strap 3x30" 30"x3 30"x3      3x30" 3x30''   Posture tband RTB x15ea row and ext only  red 20 ea  no T row due to pain green tb x20       Rows & Ext, Red TB, 2x10   Supine flexion x15 1x15 20          SL abduction x15 1x15 20          Supine serratus punch x15 1x15 5"x20       5'', 10x   Standing 3 way arm lift x10 10 ea 10ea      x15ea 10x
H/O hand surgery    S/P lobectomy of lung

## 2020-09-16 ENCOUNTER — OFFICE VISIT (OUTPATIENT)
Dept: FAMILY MEDICINE CLINIC | Facility: CLINIC | Age: 75
End: 2020-09-16
Payer: COMMERCIAL

## 2020-09-16 VITALS
BODY MASS INDEX: 26.92 KG/M2 | WEIGHT: 177.6 LBS | HEART RATE: 75 BPM | RESPIRATION RATE: 16 BRPM | HEIGHT: 68 IN | SYSTOLIC BLOOD PRESSURE: 142 MMHG | TEMPERATURE: 97.2 F | DIASTOLIC BLOOD PRESSURE: 92 MMHG

## 2020-09-16 DIAGNOSIS — G47.00 INSOMNIA, UNSPECIFIED TYPE: Primary | ICD-10-CM

## 2020-09-16 DIAGNOSIS — M17.0 PRIMARY OSTEOARTHRITIS OF BOTH KNEES: ICD-10-CM

## 2020-09-16 DIAGNOSIS — M79.10 MYALGIA: ICD-10-CM

## 2020-09-16 DIAGNOSIS — Z13.820 SCREENING FOR OSTEOPOROSIS: ICD-10-CM

## 2020-09-16 DIAGNOSIS — Z11.59 NEED FOR HEPATITIS C SCREENING TEST: ICD-10-CM

## 2020-09-16 DIAGNOSIS — Z78.0 POSTMENOPAUSAL: ICD-10-CM

## 2020-09-16 DIAGNOSIS — E78.5 HYPERLIPIDEMIA, UNSPECIFIED HYPERLIPIDEMIA TYPE: ICD-10-CM

## 2020-09-16 DIAGNOSIS — R53.83 FATIGUE, UNSPECIFIED TYPE: ICD-10-CM

## 2020-09-16 PROCEDURE — 99214 OFFICE O/P EST MOD 30 MIN: CPT | Performed by: FAMILY MEDICINE

## 2020-09-16 RX ORDER — TRAZODONE HYDROCHLORIDE 50 MG/1
50 TABLET ORAL
Qty: 30 TABLET | Refills: 1 | Status: SHIPPED | OUTPATIENT
Start: 2020-09-16 | End: 2020-10-19 | Stop reason: ALTCHOICE

## 2020-09-17 NOTE — PROGRESS NOTES
Assessment/Plan:    1  Insomnia, unspecified type  - will start patient on Trazodone 50 mg at bedtime and continue Xanax as needed for anxiety, discussed sleep hygiene and advised to follow up if not better  - traZODone (DESYREL) 50 mg tablet; Take 1 tablet (50 mg total) by mouth daily at bedtime  Dispense: 30 tablet; Refill: 1    2  Myalgia  - recent labs work for QUINTIN, RF, SSA/B, Lyme titer was negative, will refer to Rheumatology for further evaluation  - Ambulatory referral to Rheumatology; Future    3  Fatigue, unspecified type  - will obtain lab work and call patient with the results  - Comprehensive metabolic panel; Future  - CBC and differential; Future  - TSH, 3rd generation with Free T4 reflex; Future  - UA (URINE) with reflex to Scope    4  Hyperlipidemia  - Lipid Panel with Direct LDL reflex; Future    5  Primary osteoarthritis of both knees  - take Tylenol as needed and follow up with Ortho    6  Screening for osteoporosis  - DXA bone density spine hip and pelvis; Future    Follow up as scheduled on 10/20/20 or sooner if needed  The treatment plan and possible side effects of new medication were reviewed with the patient today  The patient understands and agrees with the treatment plan  Subjective:   Chief Complaint   Patient presents with    Muscle Pain     in legs    Generalized Body Aches    Fatigue    Insomnia      Patient ID: Masoud López is a 70 y o  female who presents today with complaining of fatigue, muscle aches, trouble staying asleep and only getting 3-4 hours of sleep per night for the past few weeks  She takes Xanax before bedtime 1-2 times a week and able to sleep for 5-6 hours, but does not want to take Xanax every night  Patient reports having excessive worry, but denies depressed mood     She is following with Ortho for bilateral knee pain and recently had steroid injections done on 07/06/20 which provided her a good pain relief for about a month, Ortho planning for viscosupplementation injections in near future  Patient denies fever/chills, cough, runny nose, chest pain, shortness of breath, abdominal pain, n/v/d, appetite change, melena/ hematochezia or unexplained weight changes           The following portions of the patient's history were reviewed and updated as appropriate: allergies, current medications, past family history, past medical history, past social history, past surgical history and problem list     Past Medical History:   Diagnosis Date    Anxiety     Arthritis     Back pain     Balance problems     Chest pain     heaviness    Dizziness     Gait disorder     GERD (gastroesophageal reflux disease)     Hypertension     Increased frequency of headaches     Numbness and tingling     Palpitations     Pericarditis     Thyroid disease     Trouble in sleeping      Past Surgical History:   Procedure Laterality Date    APPENDECTOMY      CHOLECYSTECTOMY      laparoscopic    KNEE SURGERY Left     PERICARDIAL WINDOW      [place] OPEN RX DISTAL RADIUS FX, EXTRA-ARTICULAR Right 3/22/2018    Procedure: OPEN REDUCTION W/ INTERNAL FIXATION (ORIF) RADIUS, splint application;  Surgeon: Henrique Ambriz MD;  Location: [facility];  Service: Orthopedics     Family History   Problem Relation Age of Onset    Kidney failure Mother     Hypertension Mother     Lung cancer Father    Chung Helling Parkinson White syndrome Daughter     No Known Problems Sister     Substance Abuse Neg Hx     Alcohol abuse Neg Hx     Mental illness Neg Hx      Social History     Socioeconomic History    Marital status: /Civil Union     Spouse name: Not on file    Number of children: Not on file    Years of education: Not on file    Highest education level: Not on file   Occupational History    Not on file   Social Needs    Financial resource strain: Not on file    Food insecurity     Worry: Not on file     Inability: Not on file    Transportation needs     Medical: Not on file Non-medical: Not on file   Tobacco Use    Smoking status: Never Smoker    Smokeless tobacco: Never Used   Substance and Sexual Activity    Alcohol use: No    Drug use: No    Sexual activity: Not on file   Lifestyle    Physical activity     Days per week: Not on file     Minutes per session: Not on file    Stress: Not on file   Relationships    Social connections     Talks on phone: Not on file     Gets together: Not on file     Attends Mandaen service: Not on file     Active member of club or organization: Not on file     Attends meetings of clubs or organizations: Not on file     Relationship status: Not on file    Intimate partner violence     Fear of current or ex partner: Not on file     Emotionally abused: Not on file     Physically abused: Not on file     Forced sexual activity: Not on file   Other Topics Concern    Not on file   Social History Narrative    WORK:    1   ([employer]; [employer]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    2  [employer]        HOBBIES:    1  Singing    2  Dancing    3  Hx of ceramics (+ dust)        PETS:    1    Dogs    -  Denies birds        TRAVEL:    -  [place]         EXPOSURES:    Denies mold; down pillows/comforters; hot tubs       Current Outpatient Medications:     ALPRAZolam (XANAX) 0 5 mg tablet, Take 1 tablet (0 5 mg total) by mouth daily at bedtime as needed for anxiety or sleep, Disp: 20 tablet, Rfl: 1    ascorbic acid (VITAMIN C) 500 mg tablet, Take 500 mg by mouth daily, Disp: , Rfl:     aspirin 81 mg chewable tablet, Chew 1 tablet (81 mg total) daily (Patient taking differently: Chew 81 mg as needed ), Disp: 30 tablet, Rfl: 0    b complex vitamins tablet, Take 1 tablet by mouth daily, Disp: , Rfl:     Cholecalciferol (VITAMIN D-3) 1000 units CAPS, Daily, Disp: , Rfl:     Magnesium 100 MG CAPS, magnesium  300 mg qd, Disp: , Rfl:     Multiple Vitamins-Calcium (ONE-A-DAY WOMENS PO), Take 1 tablet by mouth daily, Disp: , Rfl:     Omega-3 Fatty Acids (FISH OIL PO), Take 2 g by mouth, Disp: , Rfl:     omeprazole (PriLOSEC) 20 mg delayed release capsule, Take 1 capsule (20 mg total) by mouth daily before breakfast, Disp: 90 capsule, Rfl: 2    Riboflavin (VITAMIN B-2 PO), Take by mouth, Disp: , Rfl:     traZODone (DESYREL) 50 mg tablet, Take 1 tablet (50 mg total) by mouth daily at bedtime, Disp: 30 tablet, Rfl: 1    Review of Systems   Constitutional: Positive for fatigue  Negative for appetite change, chills, fever and unexpected weight change  HENT: Negative for congestion, ear pain, sore throat and trouble swallowing  Respiratory: Negative for cough, shortness of breath and wheezing  Cardiovascular: Negative for chest pain, palpitations and leg swelling  Gastrointestinal: Negative for abdominal pain, blood in stool, constipation, diarrhea, nausea and vomiting  Genitourinary: Negative for difficulty urinating, dysuria, flank pain, frequency, hematuria, pelvic pain and urgency  Musculoskeletal: Positive for myalgias  B/l knee pain   Neurological: Negative for dizziness, syncope, weakness, numbness and headaches  Hematological: Negative for adenopathy  Psychiatric/Behavioral: Positive for sleep disturbance  Negative for dysphoric mood  The patient is nervous/anxious  Objective:    Vitals:    09/16/20 0842   BP: 142/92   BP Location: Left arm   Patient Position: Sitting   Cuff Size: Adult   Pulse: 75   Resp: 16   Temp: (!) 97 2 °F (36 2 °C)   Weight: 80 6 kg (177 lb 9 6 oz)   Height: 5' 7 5" (1 715 m)        Physical Exam  Constitutional:       General: She is not in acute distress  Appearance: She is well-developed  HENT:      Right Ear: Tympanic membrane, ear canal and external ear normal       Left Ear: Tympanic membrane, ear canal and external ear normal       Mouth/Throat:      Mouth: Mucous membranes are moist       Pharynx: Oropharynx is clear     Neck:      Thyroid: No thyroid mass, thyromegaly or thyroid tenderness  Cardiovascular:      Rate and Rhythm: Normal rate and regular rhythm  Heart sounds: Normal heart sounds  No murmur  Pulmonary:      Effort: Pulmonary effort is normal  No respiratory distress  Breath sounds: Normal breath sounds  Abdominal:      General: There is no distension  Palpations: Abdomen is soft  Abdomen is not rigid  There is no mass  Tenderness: There is no abdominal tenderness  There is no guarding or rebound  Musculoskeletal:      Right lower leg: No edema  Left lower leg: No edema  Lymphadenopathy:      Cervical: No cervical adenopathy  Neurological:      Mental Status: She is alert and oriented to person, place, and time  Psychiatric:         Mood and Affect: Mood normal          Behavior: Behavior normal          Thought Content: Thought content normal             Appointment on 07/17/2020   Component Date Value Ref Range Status    QUINTIN 07/17/2020 Negative  Negative Final    Rheumatoid Factor 07/17/2020 Negative  Negative Final    SS-A (RO) Ab 07/17/2020 <0 2  0 0 - 0 9 AI Final    SS-B (LA) Ab 07/17/2020 <0 2  0 0 - 0 9 AI Final    Gluten 07/17/2020 <0 10  0 00 - 0 09 kUA/I Final    Allergen Comment 07/17/2020 See Below   Final    Lyme IgG/IgM Ab 07/17/2020 <0 91  0 00 - 0 90 ISR Final                                    Negative         <0 91                                  Equivocal  0 91 - 1 09                                  Positive         >1 09    AChR Modulating Abs 07/17/2020 <12  0 - 20 % Final                                   Negative:         <21                                 Equivocal:    21 - 25                                 Positive:         >25   The assay is linear between values of 12 and 64  Those <12 and >64 are reported as such  No single   value for ACR-modulating antibody should be used as   a sole basis for diagnosis or response to therapy      Miscellaneous Lab Test Result 07/17/2020 SEE WRITTEN REPORT   Final     Lab Results   Component Value Date    WBC 6 42 01/06/2020    HGB 14 8 01/06/2020    HCT 44 9 01/06/2020    MCV 92 01/06/2020     01/06/2020     Lab Results   Component Value Date    SVO5EBEEUSJD 1 290 05/18/2020

## 2020-09-25 ENCOUNTER — APPOINTMENT (OUTPATIENT)
Dept: LAB | Facility: HOSPITAL | Age: 75
End: 2020-09-25
Payer: COMMERCIAL

## 2020-09-25 DIAGNOSIS — E78.5 HYPERLIPIDEMIA, UNSPECIFIED HYPERLIPIDEMIA TYPE: ICD-10-CM

## 2020-09-25 DIAGNOSIS — R53.83 FATIGUE, UNSPECIFIED TYPE: ICD-10-CM

## 2020-09-25 DIAGNOSIS — Z11.59 NEED FOR HEPATITIS C SCREENING TEST: ICD-10-CM

## 2020-09-25 LAB
ALBUMIN SERPL BCP-MCNC: 3.9 G/DL (ref 3.5–5)
ALP SERPL-CCNC: 104 U/L (ref 46–116)
ALT SERPL W P-5'-P-CCNC: 32 U/L (ref 12–78)
ANION GAP SERPL CALCULATED.3IONS-SCNC: 6 MMOL/L (ref 4–13)
AST SERPL W P-5'-P-CCNC: 20 U/L (ref 5–45)
BASOPHILS # BLD AUTO: 0.03 THOUSANDS/ΜL (ref 0–0.1)
BASOPHILS NFR BLD AUTO: 1 % (ref 0–1)
BILIRUB SERPL-MCNC: 0.74 MG/DL (ref 0.2–1)
BILIRUB UR QL STRIP: NEGATIVE
BUN SERPL-MCNC: 24 MG/DL (ref 5–25)
CALCIUM SERPL-MCNC: 9.6 MG/DL (ref 8.3–10.1)
CHLORIDE SERPL-SCNC: 109 MMOL/L (ref 100–108)
CHOLEST SERPL-MCNC: 212 MG/DL (ref 50–200)
CLARITY UR: CLEAR
CO2 SERPL-SCNC: 28 MMOL/L (ref 21–32)
COLOR UR: YELLOW
CREAT SERPL-MCNC: 0.83 MG/DL (ref 0.6–1.3)
EOSINOPHIL # BLD AUTO: 0.15 THOUSAND/ΜL (ref 0–0.61)
EOSINOPHIL NFR BLD AUTO: 2 % (ref 0–6)
ERYTHROCYTE [DISTWIDTH] IN BLOOD BY AUTOMATED COUNT: 12.7 % (ref 11.6–15.1)
GFR SERPL CREATININE-BSD FRML MDRD: 70 ML/MIN/1.73SQ M
GLUCOSE P FAST SERPL-MCNC: 77 MG/DL (ref 65–99)
GLUCOSE UR STRIP-MCNC: NEGATIVE MG/DL
HCT VFR BLD AUTO: 43.5 % (ref 34.8–46.1)
HCV AB SER QL: NORMAL
HDLC SERPL-MCNC: 61 MG/DL
HGB BLD-MCNC: 14.5 G/DL (ref 11.5–15.4)
HGB UR QL STRIP.AUTO: NEGATIVE
IMM GRANULOCYTES # BLD AUTO: 0.01 THOUSAND/UL (ref 0–0.2)
IMM GRANULOCYTES NFR BLD AUTO: 0 % (ref 0–2)
KETONES UR STRIP-MCNC: NEGATIVE MG/DL
LDLC SERPL CALC-MCNC: 134 MG/DL (ref 0–100)
LEUKOCYTE ESTERASE UR QL STRIP: NEGATIVE
LYMPHOCYTES # BLD AUTO: 1.34 THOUSANDS/ΜL (ref 0.6–4.47)
LYMPHOCYTES NFR BLD AUTO: 22 % (ref 14–44)
MCH RBC QN AUTO: 31 PG (ref 26.8–34.3)
MCHC RBC AUTO-ENTMCNC: 33.3 G/DL (ref 31.4–37.4)
MCV RBC AUTO: 93 FL (ref 82–98)
MONOCYTES # BLD AUTO: 0.66 THOUSAND/ΜL (ref 0.17–1.22)
MONOCYTES NFR BLD AUTO: 11 % (ref 4–12)
NEUTROPHILS # BLD AUTO: 4.02 THOUSANDS/ΜL (ref 1.85–7.62)
NEUTS SEG NFR BLD AUTO: 64 % (ref 43–75)
NITRITE UR QL STRIP: NEGATIVE
NRBC BLD AUTO-RTO: 0 /100 WBCS
PH UR STRIP.AUTO: 6.5 [PH]
PLATELET # BLD AUTO: 194 THOUSANDS/UL (ref 149–390)
PMV BLD AUTO: 10.5 FL (ref 8.9–12.7)
POTASSIUM SERPL-SCNC: 3.9 MMOL/L (ref 3.5–5.3)
PROT SERPL-MCNC: 7.3 G/DL (ref 6.4–8.2)
PROT UR STRIP-MCNC: NEGATIVE MG/DL
RBC # BLD AUTO: 4.67 MILLION/UL (ref 3.81–5.12)
SODIUM SERPL-SCNC: 143 MMOL/L (ref 136–145)
SP GR UR STRIP.AUTO: 1.02 (ref 1–1.03)
TRIGL SERPL-MCNC: 83 MG/DL
TSH SERPL DL<=0.05 MIU/L-ACNC: 0.75 UIU/ML (ref 0.36–3.74)
UROBILINOGEN UR QL STRIP.AUTO: 0.2 E.U./DL
WBC # BLD AUTO: 6.21 THOUSAND/UL (ref 4.31–10.16)

## 2020-09-25 PROCEDURE — 81003 URINALYSIS AUTO W/O SCOPE: CPT | Performed by: FAMILY MEDICINE

## 2020-09-25 PROCEDURE — 86803 HEPATITIS C AB TEST: CPT

## 2020-09-25 PROCEDURE — 84443 ASSAY THYROID STIM HORMONE: CPT

## 2020-09-25 PROCEDURE — 80053 COMPREHEN METABOLIC PANEL: CPT

## 2020-09-25 PROCEDURE — 85025 COMPLETE CBC W/AUTO DIFF WBC: CPT

## 2020-09-25 PROCEDURE — 36415 COLL VENOUS BLD VENIPUNCTURE: CPT

## 2020-09-25 PROCEDURE — 80061 LIPID PANEL: CPT

## 2020-09-30 ENCOUNTER — TELEPHONE (OUTPATIENT)
Dept: FAMILY MEDICINE CLINIC | Facility: CLINIC | Age: 75
End: 2020-09-30

## 2020-10-02 ENCOUNTER — TELEPHONE (OUTPATIENT)
Dept: OBGYN CLINIC | Facility: HOSPITAL | Age: 75
End: 2020-10-02

## 2020-10-19 ENCOUNTER — OFFICE VISIT (OUTPATIENT)
Dept: FAMILY MEDICINE CLINIC | Facility: CLINIC | Age: 75
End: 2020-10-19
Payer: COMMERCIAL

## 2020-10-19 VITALS
SYSTOLIC BLOOD PRESSURE: 140 MMHG | RESPIRATION RATE: 16 BRPM | TEMPERATURE: 97.2 F | BODY MASS INDEX: 27.1 KG/M2 | HEART RATE: 82 BPM | WEIGHT: 178.8 LBS | HEIGHT: 68 IN | DIASTOLIC BLOOD PRESSURE: 82 MMHG

## 2020-10-19 DIAGNOSIS — Z00.00 MEDICARE ANNUAL WELLNESS VISIT, SUBSEQUENT: Primary | ICD-10-CM

## 2020-10-19 DIAGNOSIS — K21.9 GASTROESOPHAGEAL REFLUX DISEASE, UNSPECIFIED WHETHER ESOPHAGITIS PRESENT: ICD-10-CM

## 2020-10-19 DIAGNOSIS — M79.10 MYALGIA: ICD-10-CM

## 2020-10-19 DIAGNOSIS — E78.5 HYPERLIPIDEMIA, UNSPECIFIED HYPERLIPIDEMIA TYPE: ICD-10-CM

## 2020-10-19 DIAGNOSIS — R45.0 NERVOUSNESS: ICD-10-CM

## 2020-10-19 DIAGNOSIS — M17.0 PRIMARY OSTEOARTHRITIS OF BOTH KNEES: ICD-10-CM

## 2020-10-19 PROCEDURE — 3079F DIAST BP 80-89 MM HG: CPT | Performed by: FAMILY MEDICINE

## 2020-10-19 PROCEDURE — 99214 OFFICE O/P EST MOD 30 MIN: CPT | Performed by: FAMILY MEDICINE

## 2020-10-19 PROCEDURE — 1125F AMNT PAIN NOTED PAIN PRSNT: CPT | Performed by: FAMILY MEDICINE

## 2020-10-19 PROCEDURE — 1170F FXNL STATUS ASSESSED: CPT | Performed by: FAMILY MEDICINE

## 2020-10-19 PROCEDURE — 1160F RVW MEDS BY RX/DR IN RCRD: CPT | Performed by: FAMILY MEDICINE

## 2020-10-19 PROCEDURE — 1036F TOBACCO NON-USER: CPT | Performed by: FAMILY MEDICINE

## 2020-10-19 PROCEDURE — 3725F SCREEN DEPRESSION PERFORMED: CPT | Performed by: FAMILY MEDICINE

## 2020-10-19 PROCEDURE — G0439 PPPS, SUBSEQ VISIT: HCPCS | Performed by: FAMILY MEDICINE

## 2020-10-21 ENCOUNTER — LAB (OUTPATIENT)
Dept: LAB | Facility: HOSPITAL | Age: 75
End: 2020-10-21
Payer: COMMERCIAL

## 2020-10-21 ENCOUNTER — TRANSCRIBE ORDERS (OUTPATIENT)
Dept: ADMINISTRATIVE | Facility: HOSPITAL | Age: 75
End: 2020-10-21

## 2020-10-21 DIAGNOSIS — M79.10 MYALGIA: Primary | ICD-10-CM

## 2020-10-21 DIAGNOSIS — M79.10 MYALGIA: ICD-10-CM

## 2020-10-21 LAB
CK SERPL-CCNC: 74 U/L (ref 26–192)
ERYTHROCYTE [SEDIMENTATION RATE] IN BLOOD: 12 MM/HOUR (ref 0–29)

## 2020-10-21 PROCEDURE — 85652 RBC SED RATE AUTOMATED: CPT

## 2020-10-21 PROCEDURE — 82550 ASSAY OF CK (CPK): CPT

## 2020-10-21 PROCEDURE — 36415 COLL VENOUS BLD VENIPUNCTURE: CPT

## 2020-10-22 ENCOUNTER — TELEPHONE (OUTPATIENT)
Dept: FAMILY MEDICINE CLINIC | Facility: CLINIC | Age: 75
End: 2020-10-22

## 2020-10-23 ENCOUNTER — OFFICE VISIT (OUTPATIENT)
Dept: GASTROENTEROLOGY | Facility: CLINIC | Age: 75
End: 2020-10-23
Payer: COMMERCIAL

## 2020-10-23 VITALS
DIASTOLIC BLOOD PRESSURE: 80 MMHG | BODY MASS INDEX: 27.31 KG/M2 | HEIGHT: 68 IN | WEIGHT: 180.2 LBS | SYSTOLIC BLOOD PRESSURE: 140 MMHG | HEART RATE: 74 BPM | TEMPERATURE: 97.6 F

## 2020-10-23 DIAGNOSIS — K58.0 IRRITABLE BOWEL SYNDROME WITH DIARRHEA: Primary | ICD-10-CM

## 2020-10-23 DIAGNOSIS — R13.14 PHARYNGOESOPHAGEAL DYSPHAGIA: ICD-10-CM

## 2020-10-23 DIAGNOSIS — K21.00 GASTROESOPHAGEAL REFLUX DISEASE WITH ESOPHAGITIS WITHOUT HEMORRHAGE: ICD-10-CM

## 2020-10-23 PROCEDURE — 99214 OFFICE O/P EST MOD 30 MIN: CPT | Performed by: INTERNAL MEDICINE

## 2020-10-23 RX ORDER — DICYCLOMINE HYDROCHLORIDE 10 MG/1
10 CAPSULE ORAL
Qty: 30 CAPSULE | Refills: 1 | Status: SHIPPED | OUTPATIENT
Start: 2020-10-23

## 2020-11-06 ENCOUNTER — TELEMEDICINE (OUTPATIENT)
Dept: FAMILY MEDICINE CLINIC | Facility: CLINIC | Age: 75
End: 2020-11-06
Payer: COMMERCIAL

## 2020-11-06 VITALS — TEMPERATURE: 99.1 F | OXYGEN SATURATION: 95 %

## 2020-11-06 DIAGNOSIS — B34.9 VIRAL ILLNESS: Primary | ICD-10-CM

## 2020-11-06 DIAGNOSIS — Z20.822 SUSPECTED COVID-19 VIRUS INFECTION: ICD-10-CM

## 2020-11-06 DIAGNOSIS — B34.9 VIRAL ILLNESS: ICD-10-CM

## 2020-11-06 PROCEDURE — 99213 OFFICE O/P EST LOW 20 MIN: CPT | Performed by: FAMILY MEDICINE

## 2020-11-06 PROCEDURE — U0003 INFECTIOUS AGENT DETECTION BY NUCLEIC ACID (DNA OR RNA); SEVERE ACUTE RESPIRATORY SYNDROME CORONAVIRUS 2 (SARS-COV-2) (CORONAVIRUS DISEASE [COVID-19]), AMPLIFIED PROBE TECHNIQUE, MAKING USE OF HIGH THROUGHPUT TECHNOLOGIES AS DESCRIBED BY CMS-2020-01-R: HCPCS | Performed by: FAMILY MEDICINE

## 2020-11-07 LAB — SARS-COV-2 RNA SPEC QL NAA+PROBE: NOT DETECTED

## 2020-11-09 ENCOUNTER — TELEPHONE (OUTPATIENT)
Dept: FAMILY MEDICINE CLINIC | Facility: CLINIC | Age: 75
End: 2020-11-09

## 2020-11-13 ENCOUNTER — APPOINTMENT (OUTPATIENT)
Dept: RADIOLOGY | Facility: CLINIC | Age: 75
End: 2020-11-13
Payer: COMMERCIAL

## 2020-11-13 ENCOUNTER — TELEPHONE (OUTPATIENT)
Dept: OBGYN CLINIC | Facility: MEDICAL CENTER | Age: 75
End: 2020-11-13

## 2020-11-13 ENCOUNTER — OFFICE VISIT (OUTPATIENT)
Dept: FAMILY MEDICINE CLINIC | Facility: CLINIC | Age: 75
End: 2020-11-13
Payer: COMMERCIAL

## 2020-11-13 VITALS
SYSTOLIC BLOOD PRESSURE: 132 MMHG | HEART RATE: 80 BPM | BODY MASS INDEX: 26.92 KG/M2 | HEIGHT: 68 IN | TEMPERATURE: 96.9 F | OXYGEN SATURATION: 95 % | WEIGHT: 177.6 LBS | RESPIRATION RATE: 16 BRPM | DIASTOLIC BLOOD PRESSURE: 80 MMHG

## 2020-11-13 DIAGNOSIS — R93.89 ABNORMAL CHEST CT: ICD-10-CM

## 2020-11-13 DIAGNOSIS — R05.9 COUGH: Primary | ICD-10-CM

## 2020-11-13 DIAGNOSIS — Z86.79 HISTORY OF PERICARDITIS: ICD-10-CM

## 2020-11-13 DIAGNOSIS — R06.00 DYSPNEA ON EXERTION: ICD-10-CM

## 2020-11-13 DIAGNOSIS — R05.9 COUGH: ICD-10-CM

## 2020-11-13 PROCEDURE — 1160F RVW MEDS BY RX/DR IN RCRD: CPT | Performed by: FAMILY MEDICINE

## 2020-11-13 PROCEDURE — 71046 X-RAY EXAM CHEST 2 VIEWS: CPT

## 2020-11-13 PROCEDURE — 99213 OFFICE O/P EST LOW 20 MIN: CPT | Performed by: FAMILY MEDICINE

## 2020-11-13 PROCEDURE — 1036F TOBACCO NON-USER: CPT | Performed by: FAMILY MEDICINE

## 2020-11-16 ENCOUNTER — TELEPHONE (OUTPATIENT)
Dept: FAMILY MEDICINE CLINIC | Facility: CLINIC | Age: 75
End: 2020-11-16

## 2020-11-20 ENCOUNTER — OFFICE VISIT (OUTPATIENT)
Dept: OBGYN CLINIC | Facility: CLINIC | Age: 75
End: 2020-11-20
Payer: COMMERCIAL

## 2020-11-20 VITALS
TEMPERATURE: 98.2 F | SYSTOLIC BLOOD PRESSURE: 144 MMHG | DIASTOLIC BLOOD PRESSURE: 82 MMHG | WEIGHT: 177 LBS | HEART RATE: 80 BPM | HEIGHT: 68 IN | BODY MASS INDEX: 26.83 KG/M2

## 2020-11-20 DIAGNOSIS — M17.0 PRIMARY OSTEOARTHRITIS OF BOTH KNEES: Primary | ICD-10-CM

## 2020-11-20 PROCEDURE — 20610 DRAIN/INJ JOINT/BURSA W/O US: CPT | Performed by: ORTHOPAEDIC SURGERY

## 2020-11-20 PROCEDURE — 3008F BODY MASS INDEX DOCD: CPT | Performed by: FAMILY MEDICINE

## 2020-11-23 ENCOUNTER — APPOINTMENT (OUTPATIENT)
Dept: RADIOLOGY | Facility: CLINIC | Age: 75
End: 2020-11-23
Payer: COMMERCIAL

## 2020-11-23 ENCOUNTER — OFFICE VISIT (OUTPATIENT)
Dept: URGENT CARE | Facility: CLINIC | Age: 75
End: 2020-11-23
Payer: COMMERCIAL

## 2020-11-23 VITALS
OXYGEN SATURATION: 97 % | DIASTOLIC BLOOD PRESSURE: 78 MMHG | BODY MASS INDEX: 26.52 KG/M2 | HEIGHT: 68 IN | RESPIRATION RATE: 20 BRPM | HEART RATE: 77 BPM | SYSTOLIC BLOOD PRESSURE: 156 MMHG | TEMPERATURE: 98.3 F | WEIGHT: 175 LBS

## 2020-11-23 DIAGNOSIS — S69.91XA INJURY OF RIGHT WRIST, INITIAL ENCOUNTER: ICD-10-CM

## 2020-11-23 DIAGNOSIS — S60.211A CONTUSION OF RIGHT WRIST, INITIAL ENCOUNTER: Primary | ICD-10-CM

## 2020-11-23 PROCEDURE — 73110 X-RAY EXAM OF WRIST: CPT

## 2020-11-23 PROCEDURE — 99213 OFFICE O/P EST LOW 20 MIN: CPT | Performed by: PHYSICIAN ASSISTANT

## 2020-11-23 PROCEDURE — S9083 URGENT CARE CENTER GLOBAL: HCPCS | Performed by: PHYSICIAN ASSISTANT

## 2020-11-24 ENCOUNTER — TELEPHONE (OUTPATIENT)
Dept: GASTROENTEROLOGY | Facility: AMBULARY SURGERY CENTER | Age: 75
End: 2020-11-24

## 2020-12-09 ENCOUNTER — HOSPITAL ENCOUNTER (OUTPATIENT)
Dept: NON INVASIVE DIAGNOSTICS | Age: 75
Discharge: HOME | End: 2020-12-09
Payer: COMMERCIAL

## 2020-12-09 DIAGNOSIS — Z86.79 HISTORY OF PERICARDITIS: ICD-10-CM

## 2020-12-09 DIAGNOSIS — R06.00 DYSPNEA ON EXERTION: ICD-10-CM

## 2020-12-09 PROCEDURE — 93306 TTE W/DOPPLER COMPLETE: CPT

## 2020-12-09 PROCEDURE — 93306 TTE W/DOPPLER COMPLETE: CPT | Performed by: INTERNAL MEDICINE

## 2020-12-14 ENCOUNTER — TELEPHONE (OUTPATIENT)
Dept: FAMILY MEDICINE CLINIC | Facility: CLINIC | Age: 75
End: 2020-12-14

## 2021-01-08 ENCOUNTER — APPOINTMENT (EMERGENCY)
Dept: RADIOLOGY | Facility: HOSPITAL | Age: 76
End: 2021-01-08
Payer: COMMERCIAL

## 2021-01-08 ENCOUNTER — HOSPITAL ENCOUNTER (EMERGENCY)
Facility: HOSPITAL | Age: 76
Discharge: HOME | End: 2021-01-08
Attending: EMERGENCY MEDICINE
Payer: COMMERCIAL

## 2021-01-08 VITALS
RESPIRATION RATE: 20 BRPM | WEIGHT: 175 LBS | DIASTOLIC BLOOD PRESSURE: 86 MMHG | HEART RATE: 85 BPM | TEMPERATURE: 97.1 F | BODY MASS INDEX: 26.52 KG/M2 | HEIGHT: 68 IN | SYSTOLIC BLOOD PRESSURE: 178 MMHG | OXYGEN SATURATION: 98 %

## 2021-01-08 DIAGNOSIS — R06.02 SOB (SHORTNESS OF BREATH): Primary | ICD-10-CM

## 2021-01-08 LAB
ANION GAP SERPL CALCULATED.3IONS-SCNC: 5 MMOL/L (ref 4–13)
ATRIAL RATE: 69 BPM
BASOPHILS # BLD AUTO: 0.02 THOUSANDS/ΜL (ref 0–0.1)
BASOPHILS NFR BLD AUTO: 0 % (ref 0–1)
BUN SERPL-MCNC: 20 MG/DL (ref 5–25)
CALCIUM SERPL-MCNC: 9.5 MG/DL (ref 8.3–10.1)
CHLORIDE SERPL-SCNC: 107 MMOL/L (ref 100–108)
CO2 SERPL-SCNC: 29 MMOL/L (ref 21–32)
CREAT SERPL-MCNC: 0.83 MG/DL (ref 0.6–1.3)
EOSINOPHIL # BLD AUTO: 0.19 THOUSAND/ΜL (ref 0–0.61)
EOSINOPHIL NFR BLD AUTO: 3 % (ref 0–6)
ERYTHROCYTE [DISTWIDTH] IN BLOOD BY AUTOMATED COUNT: 13 % (ref 11.6–15.1)
FLUAV RNA RESP QL NAA+PROBE: NEGATIVE
FLUBV RNA RESP QL NAA+PROBE: NEGATIVE
GFR SERPL CREATININE-BSD FRML MDRD: 70 ML/MIN/1.73SQ M
GLUCOSE SERPL-MCNC: 87 MG/DL (ref 65–140)
HCT VFR BLD AUTO: 45.3 % (ref 34.8–46.1)
HGB BLD-MCNC: 14.8 G/DL (ref 11.5–15.4)
IMM GRANULOCYTES # BLD AUTO: 0.03 THOUSAND/UL (ref 0–0.2)
IMM GRANULOCYTES NFR BLD AUTO: 0 % (ref 0–2)
LYMPHOCYTES # BLD AUTO: 1.64 THOUSANDS/ΜL (ref 0.6–4.47)
LYMPHOCYTES NFR BLD AUTO: 23 % (ref 14–44)
MCH RBC QN AUTO: 29.9 PG (ref 26.8–34.3)
MCHC RBC AUTO-ENTMCNC: 32.7 G/DL (ref 31.4–37.4)
MCV RBC AUTO: 92 FL (ref 82–98)
MONOCYTES # BLD AUTO: 0.76 THOUSAND/ΜL (ref 0.17–1.22)
MONOCYTES NFR BLD AUTO: 11 % (ref 4–12)
NEUTROPHILS # BLD AUTO: 4.47 THOUSANDS/ΜL (ref 1.85–7.62)
NEUTS SEG NFR BLD AUTO: 63 % (ref 43–75)
NRBC BLD AUTO-RTO: 0 /100 WBCS
P AXIS: 27 DEGREES
PLATELET # BLD AUTO: 201 THOUSANDS/UL (ref 149–390)
PMV BLD AUTO: 10.3 FL (ref 8.9–12.7)
POTASSIUM SERPL-SCNC: 3.9 MMOL/L (ref 3.5–5.3)
PR INTERVAL: 176 MS
QRS AXIS: 98 DEGREES
QRSD INTERVAL: 100 MS
QT INTERVAL: 406 MS
QTC INTERVAL: 435 MS
RBC # BLD AUTO: 4.95 MILLION/UL (ref 3.81–5.12)
RSV RNA RESP QL NAA+PROBE: NEGATIVE
SARS-COV-2 RNA RESP QL NAA+PROBE: NEGATIVE
SODIUM SERPL-SCNC: 141 MMOL/L (ref 136–145)
T WAVE AXIS: 44 DEGREES
TROPONIN I SERPL-MCNC: <0.02 NG/ML
VENTRICULAR RATE: 69 BPM
WBC # BLD AUTO: 7.11 THOUSAND/UL (ref 4.31–10.16)

## 2021-01-08 PROCEDURE — 71045 X-RAY EXAM CHEST 1 VIEW: CPT

## 2021-01-08 PROCEDURE — 36415 COLL VENOUS BLD VENIPUNCTURE: CPT | Performed by: EMERGENCY MEDICINE

## 2021-01-08 PROCEDURE — 85025 COMPLETE CBC W/AUTO DIFF WBC: CPT | Performed by: EMERGENCY MEDICINE

## 2021-01-08 PROCEDURE — 99285 EMERGENCY DEPT VISIT HI MDM: CPT

## 2021-01-08 PROCEDURE — 80048 BASIC METABOLIC PNL TOTAL CA: CPT | Performed by: EMERGENCY MEDICINE

## 2021-01-08 PROCEDURE — 84484 ASSAY OF TROPONIN QUANT: CPT | Performed by: EMERGENCY MEDICINE

## 2021-01-08 PROCEDURE — 99284 EMERGENCY DEPT VISIT MOD MDM: CPT | Performed by: EMERGENCY MEDICINE

## 2021-01-08 PROCEDURE — 93010 ELECTROCARDIOGRAM REPORT: CPT | Performed by: INTERNAL MEDICINE

## 2021-01-08 PROCEDURE — 93005 ELECTROCARDIOGRAM TRACING: CPT

## 2021-01-08 PROCEDURE — 0241U HB NFCT DS VIR RESP RNA 4 TRGT: CPT | Performed by: EMERGENCY MEDICINE

## 2021-01-08 RX ORDER — PREDNISONE 50 MG/1
50 TABLET ORAL DAILY
Qty: 5 TABLET | Refills: 0 | Status: SHIPPED | OUTPATIENT
Start: 2021-01-08 | End: 2021-01-13 | Stop reason: ALTCHOICE

## 2021-01-08 RX ORDER — ALBUTEROL SULFATE 90 UG/1
2 AEROSOL, METERED RESPIRATORY (INHALATION) ONCE
Status: COMPLETED | OUTPATIENT
Start: 2021-01-08 | End: 2021-01-08

## 2021-01-08 RX ADMIN — ALBUTEROL SULFATE 2 PUFF: 90 AEROSOL, METERED RESPIRATORY (INHALATION) at 12:22

## 2021-01-08 NOTE — ED PROVIDER NOTES
History  Chief Complaint   Patient presents with    Shortness of Breath     patient presents to the ED with SOB for a few days intermittently, states trouble sleeping at night with dry cough      31-year-old female presents for evaluation of intermittent shortness of breath that is been ongoing for the last 4-5 days  Patient reports mild associated dry cough  No chest pain or pressure  Patient states she recently got a new fan at and symptoms started shortly after  Patient denies prior smoking history denies fevers  Currently feels well  Prior to Admission Medications   Prescriptions Last Dose Informant Patient Reported? Taking?    ALPRAZolam (XANAX) 0 5 mg tablet  Self No No   Sig: Take 1 tablet (0 5 mg total) by mouth daily at bedtime as needed for anxiety or sleep   Cholecalciferol (VITAMIN D-3) 1000 units CAPS  Self Yes No   Sig: Daily   Magnesium 100 MG CAPS  Self Yes No   Sig: magnesium   300 mg qd   Multiple Vitamins-Calcium (ONE-A-DAY WOMENS PO)  Self Yes No   Sig: Take 1 tablet by mouth daily   Omega-3 Fatty Acids (Fish Oil) 645 MG CAPS  Self Yes No   Sig: Take by mouth   Riboflavin (VITAMIN B-2 PO)  Self Yes No   Sig: Take by mouth   ascorbic acid (VITAMIN C) 500 mg tablet  Self Yes No   Sig: Take 500 mg by mouth daily   aspirin 81 mg chewable tablet  Self No No   Sig: Chew 1 tablet (81 mg total) daily   Patient taking differently: Chew 81 mg as needed    b complex vitamins tablet  Self Yes No   Sig: Take 1 tablet by mouth daily   dicyclomine (BENTYL) 10 mg capsule   No No   Sig: Take 1 capsule (10 mg total) by mouth 4 (four) times a day (before meals and at bedtime)   omeprazole (PriLOSEC) 20 mg delayed release capsule  Self No No   Sig: Take 1 capsule (20 mg total) by mouth daily before breakfast      Facility-Administered Medications: None       Past Medical History:   Diagnosis Date    Anxiety     Arthritis     Back pain     Balance problems     Chest pain     heaviness    Dizziness  Gait disorder     GERD (gastroesophageal reflux disease)     Hypertension     Increased frequency of headaches     Numbness and tingling     Palpitations     Pericarditis     Thyroid disease     Trouble in sleeping        Past Surgical History:   Procedure Laterality Date    APPENDECTOMY      CHOLECYSTECTOMY      laparoscopic    COLONOSCOPY      KNEE SURGERY Left     PERICARDIAL WINDOW      [place] OPEN RX DISTAL RADIUS FX, EXTRA-ARTICULAR Right 3/22/2018    Procedure: OPEN REDUCTION W/ INTERNAL FIXATION (ORIF) RADIUS, splint application;  Surgeon: Tara Norman MD;  [facility];  Service: Orthopedics    UPPER GASTROINTESTINAL ENDOSCOPY         Family History   Problem Relation Age of Onset    Kidney failure Mother     Hypertension Mother     Lung cancer Father    Melany Bobby Parkinson White syndrome Daughter     No Known Problems Sister     Substance Abuse Neg Hx     Alcohol abuse Neg Hx     Mental illness Neg Hx      I have reviewed and agree with the history as documented  E-Cigarette/Vaping    E-Cigarette Use Never User      E-Cigarette/Vaping Substances    Nicotine No     THC No     CBD No     Flavoring No     Other No     Unknown No      Social History     Tobacco Use    Smoking status: Never Smoker    Smokeless tobacco: Never Used   Substance Use Topics    Alcohol use: No    Drug use: No       Review of Systems   Constitutional: Negative for chills, diaphoresis and fever  HENT: Negative for congestion and rhinorrhea  Eyes: Negative for pain and visual disturbance  Respiratory: Positive for cough and shortness of breath  Negative for wheezing  Cardiovascular: Negative for chest pain and leg swelling  Gastrointestinal: Negative for abdominal pain, diarrhea, nausea and vomiting  Genitourinary: Negative for difficulty urinating, dysuria, frequency and urgency  Musculoskeletal: Negative for back pain and neck pain     Skin: Negative for color change and rash    Neurological: Negative for syncope, numbness and headaches  All other systems reviewed and are negative  Physical Exam  Physical Exam  Vitals signs and nursing note reviewed  Constitutional:       Appearance: She is well-developed  HENT:      Head: Normocephalic and atraumatic  Eyes:      Conjunctiva/sclera: Conjunctivae normal    Neck:      Musculoskeletal: Normal range of motion and neck supple  Cardiovascular:      Rate and Rhythm: Normal rate and regular rhythm  Pulmonary:      Effort: Pulmonary effort is normal  No respiratory distress  Abdominal:      Palpations: Abdomen is soft  Tenderness: There is no abdominal tenderness  Musculoskeletal: Normal range of motion  General: No tenderness  Skin:     General: Skin is warm  Findings: No erythema  Neurological:      Mental Status: She is alert and oriented to person, place, and time     Psychiatric:         Behavior: Behavior normal          Vital Signs  ED Triage Vitals   Temperature Pulse Respirations Blood Pressure SpO2   01/08/21 1134 01/08/21 1132 01/08/21 1132 01/08/21 1132 01/08/21 1132   (!) 97 1 °F (36 2 °C) 85 20 (!) 178/86 98 %      Temp Source Heart Rate Source Patient Position - Orthostatic VS BP Location FiO2 (%)   01/08/21 1134 01/08/21 1132 01/08/21 1132 01/08/21 1132 --   Temporal Monitor Sitting Right arm       Pain Score       --                  Vitals:    01/08/21 1132   BP: (!) 178/86   Pulse: 85   Patient Position - Orthostatic VS: Sitting         Visual Acuity      ED Medications  Medications   albuterol (PROVENTIL HFA,VENTOLIN HFA) inhaler 2 puff (2 puffs Inhalation Given 1/8/21 1222)       Diagnostic Studies  Results Reviewed     Procedure Component Value Units Date/Time    C[facility], [facility]/B[id number]  (Normal) Collected: 01/08/21 1140    Lab Status: Final result Specimen: Nares from Nasopharyngeal Swab Updated: 01/08/21 1257     SARS-CoV-2 Negative     INFLUENZA A PCR Negative     INFLUENZA B PCR Negative     RSV PCR Negative    Narrative: This test has been authorized by FDA under an EUA (Emergency Use Assay) for use by authorized laboratories  Clinical caution and judgement should be used with the interpretation of these results with consideration of the clinical impression and other laboratory testing  Testing reported as "Positive" or "Negative" has been proven to be accurate according to standard laboratory validation requirements  All testing is performed with control materials showing appropriate reactivity at standard intervals      Troponin I [[id number]]  (Normal) Collected: 01/08/21 1140    Lab Status: Final result Specimen: Blood from Arm, Left Updated: 01/08/21 1211     Troponin I <0 02 ng/mL     Basic metabolic panel [[id number]] Collected: 01/08/21 1140    Lab Status: Final result Specimen: Blood from Arm, Left Updated: 01/08/21 1203     Sodium 141 mmol/L      Potassium 3 9 mmol/L      Chloride 107 mmol/L      CO2 29 mmol/L      ANION GAP 5 mmol/L      BUN 20 mg/dL      Creatinine 0 83 mg/dL      Glucose 87 mg/dL      Calcium 9 5 mg/dL      eGFR 70 ml/min/1 73sq m     Narrative:      [facility] guidelines for Chronic Kidney Disease (CKD):     Stage 1 with normal or high GFR (GFR > 90 mL/min/1 73 square meters)    Stage 2 Mild CKD (GFR = 60-89 mL/min/1 73 square meters)    Stage 3A Moderate CKD (GFR = 45-59 mL/min/1 73 square meters)    Stage 3B Moderate CKD (GFR = 30-44 mL/min/1 73 square meters)    Stage 4 Severe CKD (GFR = 15-29 mL/min/1 73 square meters)    Stage 5 End Stage CKD (GFR <15 mL/min/1 73 square meters)  Note: GFR calculation is accurate only with a steady state creatinine    CBC and differential [[id number]] Collected: 01/08/21 1140    Lab Status: Final result Specimen: Blood from Arm, Left Updated: 01/08/21 1148     WBC 7 11 Thousand/uL      RBC 4 95 Million/uL      Hemoglobin 14 8 g/dL      Hematocrit 45 3 % MCV 92 fL      MCH 29 9 pg      MCHC 32 7 g/dL      RDW 13 0 %      MPV 10 3 fL      Platelets 914 Thousands/uL      nRBC 0 /100 WBCs      Neutrophils Relative 63 %      Immat GRANS % 0 %      Lymphocytes Relative 23 %      Monocytes Relative 11 %      Eosinophils Relative 3 %      Basophils Relative 0 %      Neutrophils Absolute 4 47 Thousands/µL      Immature Grans Absolute 0 03 Thousand/uL      Lymphocytes Absolute 1 64 Thousands/µL      Monocytes Absolute 0 76 Thousand/µL      Eosinophils Absolute 0 19 Thousand/µL      Basophils Absolute 0 02 Thousands/µL                  X-ray chest 1 view portable   Final Result by Celsa Cristina MD (01/08 1212)   Bilateral air trapping consistent with COPD      No acute cardiopulmonary disease  Similar to prior study            Workstation performed: [id number]                    Procedures  Procedures         ED Course                             SBIRT 20yo+      Most Recent Value   SBIRT (24 yo +)   In order to provide better care to our patients, we are screening all of our patients for alcohol and drug use  Would it be okay to ask you these screening questions? Yes Filed at: 01/08/2021 1359   Initial Alcohol Screen: US AUDIT-C    1  How often do you have a drink containing alcohol?  0 Filed at: 01/08/2021 1359   2  How many drinks containing alcohol do you have on a typical day you are drinking? 0 Filed at: 01/08/2021 1359   3a  Male UNDER 65: How often do you have five or more drinks on one occasion? 0 Filed at: 01/08/2021 1359   3b  FEMALE Any Age, or MALE 65+: How often do you have 4 or more drinks on one occassion? 0 Filed at: 01/08/2021 1359   Audit-C Score  0 Filed at: 01/08/2021 1359   ЕКАТЕРИНА: How many times in the past year have you    Used an illegal drug or used a prescription medication for non-medical reasons?   Never Filed at: 01/08/2021 1359                    MDM  Number of Diagnoses or Management Options  SOB (shortness of breath): Diagnosis management comments: 72-year-old female with intermittent shortness of breath  Obtain cardiac evaluation with EKG, chest x-ray labs and COVID testing  Will administer albuterol for symptom relief  No evidence of wheezing on physical exam   Chest x-ray read as air trapping consistent with COPD  Provided prescription for steroids use if symptoms do not improve a few days  Patient will follow-up with her pulmonologist for further evaluation  Return precautions discussed  Disposition  Final diagnoses:   SOB (shortness of breath)     Time reflects when diagnosis was documented in both MDM as applicable and the Disposition within this note     Time User Action Codes Description Comment    1/8/2021  1:49 PM Sergio Block Add [R06 02] SOB (shortness of breath)       ED Disposition     ED Disposition Condition Date/Time Comment    Discharge Stable Fri Jan 8, 2021  1:49 PM [place] discharge to home/self care              Follow-up Information     Follow up With Specialties Details Why Contact Info Additional [address], [place] Family Medicine   [id number] [place] [address] [id number] [place] [address] 70 Emergency Department Emergency Medicine  If symptoms worsen [address] [address]  [phone]  ED, 600 [facility], Luige Umer 10          Discharge Medication List as of 1/8/2021  1:50 PM      START taking these medications    Details   predniSONE 50 mg tablet Take 1 tablet (50 mg total) by mouth daily for 5 days, Starting Fri 1/8/2021, Until Wed 1/13/2021, Print         CONTINUE these medications which have NOT CHANGED    Details   ALPRAZolam (XANAX) 0 5 mg tablet Take 1 tablet (0 5 mg total) by mouth daily at bedtime as needed for anxiety or sleep, Starting Wed 6/3/2020, Normal      ascorbic acid (VITAMIN C) 500 mg tablet Take 500 mg by mouth daily, Historical Med      aspirin 81 mg chewable tablet Chew 1 tablet (81 mg total) daily, Starting Mon 1/7/2019, Print      b complex vitamins tablet Take 1 tablet by mouth daily, Historical Med      Cholecalciferol (VITAMIN D-3) 1000 units CAPS Daily, Historical Med      dicyclomine (BENTYL) 10 mg capsule Take 1 capsule (10 mg total) by mouth 4 (four) times a day (before meals and at bedtime), Starting Fri 10/23/2020, Normal      Magnesium 100 MG CAPS magnesium   300 mg qd, Historical Med      Multiple Vitamins-Calcium (ONE-A-DAY WOMENS PO) Take 1 tablet by mouth daily, Historical Med      Omega-3 Fatty Acids (Fish Oil) 645 MG CAPS Take by mouth, Historical Med      omeprazole (PriLOSEC) 20 mg delayed release capsule Take 1 capsule (20 mg total) by mouth daily before breakfast, Starting Tue 5/19/2020, Normal      Riboflavin (VITAMIN B-2 PO) Take by mouth, Historical Med           No discharge procedures on file      PDMP Review       Value Time User    PDMP Reviewed  Yes 6/3/2020  2:36 PM [place] MD          ED Provider  Electronically Signed by           David Branch DO  01/08/21 2648

## 2021-01-12 NOTE — PROGRESS NOTES
Assessment and Plan:   Patient is a 72-year-old female with osteoarthritis who presents for rheumatology consult  She reports that she has multiple sites of joint pain and mainly complains about her chronic bilateral knee pain  She does have osteoarthritis in her knees and follows with Orthopedics, who just recently gave her Visco supplement injections which she thinks are helping  She otherwise has joint pain hands and there are changes suggestive of osteoarthritis present, there are no findings on exam to suggest an underlying inflammatory type arthritis  She also has had autoimmune lab workup which is grossly negative  We discussed that my suspicion is low for an inflammatory type arthritis and her symptoms and findings on exam are related to osteoarthritis  I did give her referral for physical therapy for her knees  She can continue using Tylenol or NSAIDs as needed and I suggested that she continues following with Orthopedics  Ultimately she may consider knee replacement  She does not need any additional rheumatologic workup or follow-up  Plan:  Diagnoses and all orders for this visit:    Polyarthralgia    Myalgia  -     Ambulatory referral to Rheumatology    Bilateral primary osteoarthritis of knee  -     Ambulatory referral to Physical Therapy; Future    Other orders  -     Cancel: Cyclic citrul peptide antibody, IgG        Follow-up plan: no rheumatology follow-up needed      YAN Ramos is a 70 y o   female anxiety, history of pericarditis s/p pericardial window (2009), GERD, knee OA, h/o right wrist fracture s/p ORIF who presents for rheumatology consult by request of Dr Winnie Bush for myalgia and arthralgia  Patient has never seen rheumatology  States her knees bother her the most, it is getting harder for her to walk  Getting pain from her knees down into her distal legs  Also has pain from baker's cyst on the left     Patient follows with Orthopedics for bilateral knee osteoarthritis and recently received Visco supplement injections  So far she thinks these shots are helping, more than the cortisone  Otherwise states she has achiness all her body particularly morning  She has joint pain in hands, and her left lateral hip  States the hip pain is from a fall about 2 months ago and states she went down very hard  No joint swelling  Doesn't take any OTC meds for joint pain  Tylenol never worked in the past for her  Takes 1 ibuprofen which helps mildly  She normally likes to walk for exercise but finds this to be more challenging recently  Wonders if physical therapy would be helpful  Denies photosensitivity, rashes, psoriasis, sicca symptoms, oral or nasal ulcers, alopecia, Raynaud's, h/o pericarditis or pleurisy, h/o blood clots  Review of Systems  Review of Systems   Constitutional: Negative for chills, fatigue, fever and unexpected weight change  HENT: Negative for mouth sores and trouble swallowing  Eyes: Negative for pain and visual disturbance  Respiratory: Negative for cough and shortness of breath  Cardiovascular: Negative for chest pain and leg swelling  Gastrointestinal: Negative for abdominal pain, blood in stool, constipation, diarrhea and nausea  Musculoskeletal: Positive for arthralgias  Negative for back pain, joint swelling and myalgias  Skin: Negative for color change and rash  Neurological: Negative for weakness and numbness  Hematological: Negative for adenopathy  Psychiatric/Behavioral: Negative for sleep disturbance         Allergies  Allergies   Allergen Reactions    Ciprofloxacin Myalgia       Home Medications    Current Outpatient Medications:     ALPRAZolam (XANAX) 0 5 mg tablet, Take 1 tablet (0 5 mg total) by mouth daily at bedtime as needed for anxiety or sleep, Disp: 20 tablet, Rfl: 1    ascorbic acid (VITAMIN C) 500 mg tablet, Take 500 mg by mouth daily, Disp: , Rfl:     aspirin 81 mg chewable tablet, Chew 1 tablet (81 mg total) daily (Patient taking differently: Chew 81 mg as needed ), Disp: 30 tablet, Rfl: 0    b complex vitamins tablet, Take 1 tablet by mouth daily, Disp: , Rfl:     Cholecalciferol (VITAMIN D-3) 1000 units CAPS, Daily, Disp: , Rfl:     dicyclomine (BENTYL) 10 mg capsule, Take 1 capsule (10 mg total) by mouth 4 (four) times a day (before meals and at bedtime), Disp: 30 capsule, Rfl: 1    Magnesium 100 MG CAPS, magnesium  300 mg qd, Disp: , Rfl:     Multiple Vitamins-Calcium (ONE-A-DAY WOMENS PO), Take 1 tablet by mouth daily, Disp: , Rfl:     Omega-3 Fatty Acids (Fish Oil) 645 MG CAPS, Take by mouth, Disp: , Rfl:     omeprazole (PriLOSEC) 20 mg delayed release capsule, Take 1 capsule (20 mg total) by mouth daily before breakfast, Disp: 90 capsule, Rfl: 2    Riboflavin (VITAMIN B-2 PO), Take by mouth, Disp: , Rfl:     Past Medical History  Past Medical History:   Diagnosis Date    Anxiety     Arthritis     Back pain     Balance problems     Chest pain     heaviness    Dizziness     Gait disorder     GERD (gastroesophageal reflux disease)     Hypertension     Increased frequency of headaches     Numbness and tingling     Palpitations     Pericarditis     Thyroid disease     Trouble in sleeping        Past Surgical History   Past Surgical History:   Procedure Laterality Date    APPENDECTOMY      CHOLECYSTECTOMY      laparoscopic    COLONOSCOPY      KNEE SURGERY Left     PERICARDIAL WINDOW      [place] OPEN RX DISTAL RADIUS FX, EXTRA-ARTICULAR Right 3/22/2018    Procedure: OPEN REDUCTION W/ INTERNAL FIXATION (ORIF) RADIUS, splint application;  Surgeon: Ramon Llanos MD;  [facility];  Service: Orthopedics    UPPER GASTROINTESTINAL ENDOSCOPY         Family History  No known family history of autoimmune or inflammatory diseases      Family History   Problem Relation Age of Onset    Kidney failure Mother     Hypertension Mother     Lung cancer Father     Lenda Doctor White syndrome Daughter     No Known Problems Sister     Substance Abuse Neg Hx     Alcohol abuse Neg Hx     Mental illness Neg Hx        Social History  Occupation: retired, was a    Social History     Substance and Sexual Activity   Alcohol Use No     Social History     Substance and Sexual Activity   Drug Use No     Social History     Tobacco Use   Smoking Status Never Smoker   Smokeless Tobacco Never Used       Objective:    Vitals:    01/13/21 0849   BP: 144/80   Pulse: 86   Weight: 79 4 kg (175 lb)   Height: 5' 8" (1 727 m)       Physical Exam  Constitutional:       General: She is not in acute distress  Appearance: She is well-developed  HENT:      Head: Normocephalic and atraumatic  Eyes:      General: Lids are normal  No scleral icterus  Conjunctiva/sclera: Conjunctivae normal    Neck:      Musculoskeletal: Neck supple  Pulmonary:      Effort: Pulmonary effort is normal  No tachypnea, accessory muscle usage or respiratory distress  Musculoskeletal:      Comments: No joint swelling or synovitis anywhere  Scattered Sobia's and Heberden's nodes in the PIP and DIP joints in the hands  Bony hypertrophy throughout the knuckles in the the knees  Tenderness at the B/L medial knees    Skin:     General: Skin is dry  Findings: No rash  Neurological:      Mental Status: She is alert  Psychiatric:         Behavior: Behavior normal  Behavior is cooperative           Imaging:   XR right wrist 11/23/20:  Images personally reviewed in PACS and my impression is:  No erosive changes, triscaphe OA     XR knees 5/2020:  Images personally reviewed in PACS and my impression is:  B/L OA     Labs:   Component      Latest Ref Rng & Units 1/8/2021   WBC      4 31 - 10 16 Thousand/uL 7 11   Red Blood Cell Count      3 81 - 5 12 Million/uL 4 95   Hemoglobin      11 5 - 15 4 g/dL 14 8   HCT      34 8 - 46 1 % 45 3   MCV      82 - 98 fL 92   MCH      26 8 - 34 3 pg 29 9   MCHC      31 4 - 37 4 g/dL 32 7   RDW      11 6 - 15 1 % 13 0   MPV      8 9 - 12 7 fL 10 3   Platelet Count      167 - 390 Thousands/uL 201   nRBC      /100 WBCs 0   Neutrophils %      43 - 75 % 63   Immat GRANS %      0 - 2 % 0   Lymphocytes Relative      14 - 44 % 23   Monocytes Relative      4 - 12 % 11   Eosinophils      0 - 6 % 3   Basophils Relative      0 - 1 % 0   Absolute Neutrophils      1 85 - 7 62 Thousands/µL 4 47   Immature Grans Absolute      0 00 - 0 20 Thousand/uL 0 03   Lymphocytes Absolute      0 60 - 4 47 Thousands/µL 1 64   Absolute Monocytes      0 17 - 1 22 Thousand/µL 0 76   Absolute Eosinophils      0 00 - 0 61 Thousand/µL 0 19   Basophils Absolute      0 00 - 0 10 Thousands/µL 0 02   Sodium      136 - 145 mmol/L 141   Potassium      3 5 - 5 3 mmol/L 3 9   Chloride      100 - 108 mmol/L 107   CO2      21 - 32 mmol/L 29   Anion Gap      4 - 13 mmol/L 5   BUN      5 - 25 mg/dL 20   Creatinine      0 60 - 1 30 mg/dL 0 83   Glucose, Random      65 - 140 mg/dL 87   Calcium      8 3 - 10 1 mg/dL 9 5   eGFR      ml/min/1 73sq m 70     Component      Latest Ref Rng & Units 10/21/2020   Total CK      26 - 192 U/L 74   Sed Rate      0 - 29 mm/hour 12     Component      Latest Ref Rng & Units 9/25/2020   TSH 3RD GENERATON      0 358 - 3 740 uIU/mL 0 748   HEPATITIS C ANTIBODY      Non-reactive Non-reactive     Component      Latest Ref Rng & Units 9/25/2020   AST      5 - 45 U/L 20   ALT      12 - 78 U/L 32   Alkaline Phosphatase      46 - 116 U/L 104   Total Protein      6 4 - 8 2 g/dL 7 3   Albumin      3 5 - 5 0 g/dL 3 9   TOTAL BILIRUBIN      0 20 - 1 00 mg/dL 0 74   eGFR      ml/min/1 73sq m 70     Component      Latest Ref Rng & Units 7/17/2020   SSA (RO) ANTIBODY      0 0 - 0 9 AI <0 2   SSB (LA) ANTIBODY      0 0 - 0 9 AI <0 2   ANTI-NUCLEAR ANTIBODY (QUINTIN)      Negative Negative   RHEUMATOID FACTOR      Negative Negative   Lyme IgG/IgM Ab      0 00 - 0 90 ISR <0 91

## 2021-01-13 ENCOUNTER — CONSULT (OUTPATIENT)
Dept: RHEUMATOLOGY | Facility: CLINIC | Age: 76
End: 2021-01-13
Payer: COMMERCIAL

## 2021-01-13 VITALS
WEIGHT: 175 LBS | SYSTOLIC BLOOD PRESSURE: 144 MMHG | BODY MASS INDEX: 26.52 KG/M2 | HEIGHT: 68 IN | DIASTOLIC BLOOD PRESSURE: 80 MMHG | HEART RATE: 86 BPM

## 2021-01-13 DIAGNOSIS — M17.0 BILATERAL PRIMARY OSTEOARTHRITIS OF KNEE: ICD-10-CM

## 2021-01-13 DIAGNOSIS — M79.10 MYALGIA: ICD-10-CM

## 2021-01-13 DIAGNOSIS — M25.50 POLYARTHRALGIA: Primary | ICD-10-CM

## 2021-01-13 PROCEDURE — 3008F BODY MASS INDEX DOCD: CPT | Performed by: INTERNAL MEDICINE

## 2021-01-13 PROCEDURE — 99204 OFFICE O/P NEW MOD 45 MIN: CPT | Performed by: INTERNAL MEDICINE

## 2021-01-20 ENCOUNTER — OFFICE VISIT (OUTPATIENT)
Dept: PULMONOLOGY | Facility: CLINIC | Age: 76
End: 2021-01-20
Payer: COMMERCIAL

## 2021-01-20 VITALS
BODY MASS INDEX: 26.61 KG/M2 | HEART RATE: 72 BPM | TEMPERATURE: 96.6 F | SYSTOLIC BLOOD PRESSURE: 124 MMHG | WEIGHT: 175 LBS | OXYGEN SATURATION: 95 % | RESPIRATION RATE: 16 BRPM | DIASTOLIC BLOOD PRESSURE: 80 MMHG

## 2021-01-20 DIAGNOSIS — J98.4 RESTRICTIVE LUNG DISEASE: ICD-10-CM

## 2021-01-20 DIAGNOSIS — J84.9 ILD (INTERSTITIAL LUNG DISEASE) (HCC): Primary | ICD-10-CM

## 2021-01-20 DIAGNOSIS — R06.00 DYSPNEA ON EXERTION: ICD-10-CM

## 2021-01-20 PROCEDURE — 1160F RVW MEDS BY RX/DR IN RCRD: CPT | Performed by: INTERNAL MEDICINE

## 2021-01-20 PROCEDURE — 1036F TOBACCO NON-USER: CPT | Performed by: INTERNAL MEDICINE

## 2021-01-20 PROCEDURE — 3074F SYST BP LT 130 MM HG: CPT | Performed by: INTERNAL MEDICINE

## 2021-01-20 PROCEDURE — 3079F DIAST BP 80-89 MM HG: CPT | Performed by: INTERNAL MEDICINE

## 2021-01-20 PROCEDURE — 99214 OFFICE O/P EST MOD 30 MIN: CPT | Performed by: INTERNAL MEDICINE

## 2021-01-20 NOTE — PROGRESS NOTES
Progress Note - Pulmonary   Shasha Simons 70 y o  female MRN: [id number]   Encounter: [id number]      Assessment/Plan:  Patient is a 62-year-old female with past medical history significant for pericarditis come mold exposure, secondhand smoke who presents for routine follow-up  The patient's primary concern at this point is she was told by an emergency room doctor that she may have Chronic Obstructive Pulmonary Disease  The patient does have concern for hyperinflation on her lungs but has no evidence of obstruction on her PFTs  Given the significant amount of time since last check, will repeat PFTs to see if any change has occurred  The patient does report that she will become short of breath with significant exertion  She feels this is not significantly different than what it was previously  On review of previous high-resolution CT scan, there is no significant fibrotic changes appreciated to be concern for hypersensitivity pneumonitis  For her chest heaviness, the patient is followed by her primary care provider  She denies any chest pain at this time  If she were to have chest pain, the patient understands to go to the emergency department  Patient may follow up in 6 months or sooner as necessary  Orders:  Orders Placed This Encounter   Procedures    Complete PFT with post bronchodilator     Standing Status:   Future     Standing Expiration Date:   1/20/2022     Order Specific Question:   Would you like to add MVV, MIP, MEP into this order? Answer:   No     Subjective: The patient was hospitalized about 1 week ago for heaviness in her chest   She had previous similar episodes which were relieved with aspirin  She was told by the ER doctor that she may have COPD  The patient reports that she is always tired  Her  reports that she will get short of breath going up and down the stairs        She is not current a smoker but does have a significant second hand smoke exposure  Inhaler Regimen:  None    Remainder of review of systems negative except as described in HPI  The following portions of the patient's history were reviewed and updated as appropriate: allergies, current medications, past family history, past medical history, past social history, past surgical history and problem list      Objective:   Vitals: Blood pressure 124/80, pulse 72, temperature (!) 96 6 °F (35 9 °C), resp  rate 16, weight 79 4 kg (175 lb), SpO2 95 % , RA, Body mass index is 26 61 kg/m²  Physical Exam  Gen: Pleasant, awake, alert, oriented x 3, no acute distress  HEENT: Mucous membranes moist, no oral lesions, no thrush  NECK: No accessory muscle use, JVP not elevated  Cardiac: RRR, single S1, single S2, no murmurs, no rubs, no gallops  Lungs: CTA b/l  Abdomen: normoactive bowel sounds, soft nontender, nondistended, no rebound or rigidity, no guarding  Extremities: no cyanosis, no clubbing, no LE edema  MSK:  Strength equal in all extremities  Derm:  No rashes/lesions noted  Neuro:  Appropriate mood/affect    Labs: I have personally reviewed pertinent lab results  Lab Results   Component Value Date    WBC 7 11 01/08/2021    HGB 14 8 01/08/2021     01/08/2021     Lab Results   Component Value Date    CREATININE 0 83 01/08/2021        Imaging and other studies: I have personally reviewed pertinent reports  and I have personally reviewed pertinent films in PACS  CXR 1/8/2021  My interpretation:  No acute intrathoracic pathology; possible hyperinflation    Radiology findings:  Bilateral air trapping consistent with COPD, unchanged  Cardiomediastinal silhouette appears unremarkable  The lungs are clear  No pneumothorax or pleural effusion  Osseous structures appear within normal limits for patient age  Pulmonary Function Testing: I have personally reviewed pertinent reports     and I have personally reviewed pertinent films in PACS  10/7/2019:  Mild restriction; mild diffusion defect; no bronchodilator response  FEV1/FVC Ratio: 78 %  Forced Vital Capacity: 2 17 L    68 % predicted  FEV1: 1 70 L     69 % predicted     Lung volumes by body plethysmography:   Total Lung Capacity 73 % predicted   Residual volume 77 % predicted     DLCO corrected for patients hemoglobin level: 58 %    Ronnie Son

## 2021-02-10 ENCOUNTER — OFFICE VISIT (OUTPATIENT)
Dept: FAMILY MEDICINE CLINIC | Facility: CLINIC | Age: 76
End: 2021-02-10
Payer: COMMERCIAL

## 2021-02-10 VITALS
HEART RATE: 76 BPM | WEIGHT: 176 LBS | SYSTOLIC BLOOD PRESSURE: 130 MMHG | RESPIRATION RATE: 16 BRPM | BODY MASS INDEX: 26.67 KG/M2 | DIASTOLIC BLOOD PRESSURE: 80 MMHG | HEIGHT: 68 IN

## 2021-02-10 DIAGNOSIS — E78.5 HYPERLIPIDEMIA, UNSPECIFIED HYPERLIPIDEMIA TYPE: ICD-10-CM

## 2021-02-10 DIAGNOSIS — M25.552 LEFT HIP PAIN: Primary | ICD-10-CM

## 2021-02-10 DIAGNOSIS — R06.00 DYSPNEA ON EXERTION: ICD-10-CM

## 2021-02-10 DIAGNOSIS — K21.9 GASTROESOPHAGEAL REFLUX DISEASE, UNSPECIFIED WHETHER ESOPHAGITIS PRESENT: ICD-10-CM

## 2021-02-10 DIAGNOSIS — R45.0 NERVOUSNESS: ICD-10-CM

## 2021-02-10 PROCEDURE — 99214 OFFICE O/P EST MOD 30 MIN: CPT | Performed by: FAMILY MEDICINE

## 2021-02-10 RX ORDER — OMEPRAZOLE 20 MG/1
20 CAPSULE, DELAYED RELEASE ORAL
Qty: 90 CAPSULE | Refills: 3 | Status: SHIPPED | OUTPATIENT
Start: 2021-02-10 | End: 2021-08-06 | Stop reason: SDUPTHER

## 2021-02-10 NOTE — PROGRESS NOTES
Assessment/Plan:    1  Left hip pain  - will obtain XR and call with the results  - advised to take Tylenol as needed  - XR hip/pelv 2-3 vws left if performed; Future    2  Gastroesophageal reflux disease  - doing well on Omeprazole  - follows with GI   - omeprazole (PriLOSEC) 20 mg delayed release capsule; Take 1 capsule (20 mg total) by mouth daily before breakfast  Dispense: 90 capsule; Refill: 3    3  Hyperlipidemia  - discussed low fat/ low cholesterol diet  - continue Fish oil  - will recheck lipid panel  - Lipid Panel with Direct LDL reflex; Future  - Comprehensive metabolic panel; Future    4  Nervousness  - continue Xanax as needed    5  Dyspnea on exertion  - PFT's scheduled on 02/23/21  - follow up with Pulmonary and Cardiology     Return in 6 months or sooner as needed  The patient understands and agrees with the treatment plan  Subjective:   Chief Complaint   Patient presents with    Hyperlipidemia    Nervousness      Patient ID: Coco Barroso is a 70 y o  female who presents today for follow up visit for her chronic conditions  Patient reports left hip pain onset about 3 weeks ago after she tripped over a marker and fell at home landing onto her left hip  Her pain is worse when getting up form sitting position, getting in or out of her car or when lying on her left side, no low back pain, no leg weakness or paresthesias  Patient was recently seen in ER on 01/08/21 for shortness of breath, she is followed by Pulmonary and is scheduled for PFT's this month, she is also scheduled for cardiology evaluation on 02/17/21         The following portions of the patient's history were reviewed and updated as appropriate: allergies, current medications, past family history, past medical history, past social history, past surgical history and problem list     Past Medical History:   Diagnosis Date    Anxiety     Arthritis     Back pain     Balance problems     Chest pain     heaviness    Dizziness     Gait disorder     GERD (gastroesophageal reflux disease)     Hypertension     Increased frequency of headaches     Numbness and tingling     Palpitations     Pericarditis     Thyroid disease     Trouble in sleeping      Past Surgical History:   Procedure Laterality Date    APPENDECTOMY      CHOLECYSTECTOMY      laparoscopic    COLONOSCOPY      KNEE SURGERY Left     PERICARDIAL WINDOW      SC OPEN RX DISTAL RADIUS FX, EXTRA-ARTICULAR Right 3/22/2018    Procedure: OPEN REDUCTION W/ INTERNAL FIXATION (ORIF) RADIUS, splint application;  Surgeon: Ramon Llanos MD;  [facility];  Service: Orthopedics    UPPER GASTROINTESTINAL ENDOSCOPY       Family History   Problem Relation Age of Onset    Kidney failure Mother     Hypertension Mother     Lung cancer Father    [place] Parkinson White syndrome Daughter     No Known Problems Sister     Substance Abuse Neg Hx     Alcohol abuse Neg Hx     Mental illness Neg Hx      Social History     Socioeconomic History    Marital status: /Civil Union     Spouse name: Not on file    Number of children: Not on file    Years of education: Not on file    Highest education level: Not on file   Occupational History    Not on file   Social Needs    Financial resource strain: Not on file    Food insecurity     Worry: Not on file     Inability: Not on file    Transportation needs     Medical: Not on file     Non-medical: Not on file   Tobacco Use    Smoking status: Never Smoker    Smokeless tobacco: Never Used   Substance and Sexual Activity    Alcohol use: No    Drug use: No    Sexual activity: Not on file   Lifestyle    Physical activity     Days per week: Not on file     Minutes per session: Not on file    Stress: Not on file   Relationships    Social connections     Talks on phone: Not on file     Gets together: Not on file     Attends Scientology service: Not on file     Active member of club or organization: Not on file Attends meetings of clubs or organizations: Not on file     Relationship status: Not on file    Intimate partner violence     Fear of current or ex partner: Not on file     Emotionally abused: Not on file     Physically abused: Not on file     Forced sexual activity: Not on file   Other Topics Concern    Not on file   Social History Narrative    WORK:    1   ([employer]; [employer]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    2  [employer]        HOBBIES:    1  Singing    2  Dancing    3  Hx of ceramics (+ dust)        PETS:    1    Dogs    -  Denies birds        TRAVEL:    -  [place]         EXPOSURES:    Denies mold; down pillows/comforters; hot tubs       Current Outpatient Medications:     ALPRAZolam (XANAX) 0 5 mg tablet, Take 1 tablet (0 5 mg total) by mouth daily at bedtime as needed for anxiety or sleep, Disp: 20 tablet, Rfl: 1    ascorbic acid (VITAMIN C) 500 mg tablet, Take 500 mg by mouth daily, Disp: , Rfl:     aspirin 81 mg chewable tablet, Chew 1 tablet (81 mg total) daily (Patient taking differently: Chew 81 mg as needed ), Disp: 30 tablet, Rfl: 0    b complex vitamins tablet, Take 1 tablet by mouth daily, Disp: , Rfl:     Cholecalciferol (VITAMIN D-3) 1000 units CAPS, Daily, Disp: , Rfl:     dicyclomine (BENTYL) 10 mg capsule, Take 1 capsule (10 mg total) by mouth 4 (four) times a day (before meals and at bedtime) (Patient taking differently: Take 10 mg by mouth as needed ), Disp: 30 capsule, Rfl: 1    Magnesium 100 MG CAPS, magnesium  300 mg qd, Disp: , Rfl:     Melatonin 2 5 MG CHEW, Chew 1 tablet, Disp: , Rfl:     Multiple Vitamins-Calcium (ONE-A-DAY WOMENS PO), Take 1 tablet by mouth daily, Disp: , Rfl:     Omega-3 Fatty Acids (Fish Oil) 645 MG CAPS, Take by mouth, Disp: , Rfl:     omeprazole (PriLOSEC) 20 mg delayed release capsule, Take 1 capsule (20 mg total) by mouth daily before breakfast, Disp: 90 capsule, Rfl: 2    Riboflavin (VITAMIN B-2 PO), Take by mouth, Disp: , Rfl:     Review of Systems   Constitutional: Negative for appetite change, chills, fatigue, fever and unexpected weight change  HENT: Negative for congestion, ear pain, sore throat and trouble swallowing  Respiratory: Positive for shortness of breath  Negative for cough and wheezing  Cardiovascular: Negative for chest pain, palpitations and leg swelling  Gastrointestinal: Negative for abdominal pain, blood in stool, constipation, diarrhea, nausea and vomiting  Genitourinary: Negative for dysuria, flank pain, frequency, hematuria, pelvic pain and urgency  Musculoskeletal:        Left hip pain   Neurological: Negative for dizziness, syncope, numbness and headaches  Psychiatric/Behavioral: Negative for dysphoric mood and sleep disturbance  The patient is nervous/anxious  Objective:    Vitals:    02/10/21 0954   BP: 130/80   Pulse: 76   Resp: 16   Weight: 79 8 kg (176 lb)   Height: 5' 8" (1 727 m)     Wt Readings from Last 3 Encounters:   02/10/21 79 8 kg (176 lb)   01/20/21 79 4 kg (175 lb)   01/13/21 79 4 kg (175 lb)     BP Readings from Last 3 Encounters:   02/10/21 130/80   01/20/21 124/80   01/13/21 144/80        Physical Exam  Constitutional:       General: She is not in acute distress  Appearance: She is well-developed  Cardiovascular:      Rate and Rhythm: Normal rate and regular rhythm  Heart sounds: Normal heart sounds  No murmur  Pulmonary:      Effort: Pulmonary effort is normal  No respiratory distress  Breath sounds: Normal breath sounds  Abdominal:      Palpations: Abdomen is soft  Abdomen is not rigid  There is no mass  Tenderness: There is no abdominal tenderness  Musculoskeletal:      Left hip: She exhibits normal strength and no tenderness  Right lower leg: No edema  Left lower leg: No edema  Neurological:      Mental Status: She is alert and oriented to person, place, and time     Psychiatric:         Mood and Affect: Mood normal  Behavior: Behavior normal          Thought Content: Thought content normal           Lab Results   Component Value Date    [id number] 0 748 09/25/2020     Lab Results   Component Value Date    WBC 7 11 01/08/2021    HGB 14 8 01/08/2021    HCT 45 3 01/08/2021    MCV 92 01/08/2021     01/08/2021     Lab Results   Component Value Date    SODIUM 141 01/08/2021    K 3 9 01/08/2021     01/08/2021    CO2 29 01/08/2021    AGAP 5 01/08/2021    BUN 20 01/08/2021    CREATININE 0 83 01/08/2021    GLUC 87 01/08/2021    GLUF 77 09/25/2020    CALCIUM 9 5 01/08/2021    AST 20 09/25/2020    ALT 32 09/25/2020    ALKPHOS 104 09/25/2020    TP 7 3 09/25/2020    TBILI 0 74 09/25/2020    EGFR 70 01/08/2021     Lab Results   Component Value Date    CHOLESTEROL 212 (H) 09/25/2020    CHOLESTEROL 216 (H) 10/21/2019    CHOLESTEROL 204 (H) 01/07/2019     Lab Results   Component Value Date    HDL 61 09/25/2020    HDL 55 10/21/2019    HDL 62 (H) 01/07/2019     Lab Results   Component Value Date    TRIG 83 09/25/2020    TRIG 100 10/21/2019    TRIG 90 01/07/2019     Lab Results   Component Value Date    LDLCALC 134 (H) 09/25/2020     Transthoracic Echocardiogram 12/09/2020    SUMMARY     LEFT VENTRICLE:  Systolic function was normal by visual assessment  Ejection fraction was estimated in the range of 60 % to 65 %  There were no regional wall motion abnormalities  Doppler parameters were consistent with abnormal left ventricular relaxation (grade 1 diastolic dysfunction)      TRICUSPID VALVE:  There was trace regurgitation

## 2021-02-17 ENCOUNTER — OFFICE VISIT (OUTPATIENT)
Dept: CARDIOLOGY CLINIC | Facility: CLINIC | Age: 76
End: 2021-02-17
Payer: COMMERCIAL

## 2021-02-17 VITALS
HEART RATE: 70 BPM | SYSTOLIC BLOOD PRESSURE: 156 MMHG | HEIGHT: 68 IN | WEIGHT: 176.8 LBS | BODY MASS INDEX: 26.8 KG/M2 | TEMPERATURE: 97.9 F | DIASTOLIC BLOOD PRESSURE: 82 MMHG

## 2021-02-17 DIAGNOSIS — R07.9 CHEST PAIN, UNSPECIFIED TYPE: Primary | ICD-10-CM

## 2021-02-17 DIAGNOSIS — R00.2 PALPITATIONS: ICD-10-CM

## 2021-02-17 PROCEDURE — 93000 ELECTROCARDIOGRAM COMPLETE: CPT | Performed by: INTERNAL MEDICINE

## 2021-02-17 PROCEDURE — 99204 OFFICE O/P NEW MOD 45 MIN: CPT | Performed by: INTERNAL MEDICINE

## 2021-02-17 NOTE — PROGRESS NOTES
Cardiology Follow Up    Adrien Riley [facility], [facility]   1945  [id number]  [address] [facility] [address]  [phone]    1  Chest pain, unspecified type  Holter monitor - 24 hour    Echo stress test w contrast if indicated    POCT ECG   2  Palpitations         Interval History:  72-year-old female with history of pericarditis following a viral infection in a pericardial window 11 years ago who presents with multiple cardiovascular complaints  She states in the evening she is aware of her heartbeat and it feels to race a bit as she does not notice it during the day  She will occasionally have some heaviness on her chest   There is no relationship to exertion  She also notices some dyspnea on exertion that she says is new  She denies orthopnea paroxysmal nocturnal dyspnea or syncope      Patient Active Problem List   Diagnosis    Closed fracture distal radius and ulna, right, initial encounter    Anxiety    Gastroesophageal reflux disease with esophagitis    History of pericarditis    Heart murmur    Primary hyperparathyroidism (HCC)    Thyroid nodule    Vitamin D deficiency    Chest pain    Migraine without aura and without status migrainosus, not intractable    Dizziness and giddiness    Insomnia    Cervicalgia    Abnormal CT scan of lung    Irritable bowel syndrome with diarrhea    Elevated amylase and lipase    Paresthesias    Carpal tunnel syndrome of left wrist    Dysphonia    Reflux laryngitis    Paresis of left vocal fold    Glottic insufficiency    Muscle tension dysphonia    TMJ dysfunction    Pharyngoesophageal dysphagia    Palpitations     Past Medical History:   Diagnosis Date    Anxiety     Arthritis     Back pain     Balance problems     Chest pain     heaviness    Dizziness     Gait disorder     GERD (gastroesophageal reflux disease)     Hypertension     Increased frequency of headaches     Numbness and tingling     Palpitations     Pericarditis     Thyroid disease     Trouble in sleeping      Social History     Socioeconomic History    Marital status: /Civil Union     Spouse name: Not on file    Number of children: Not on file    Years of education: Not on file    Highest education level: Not on file   Occupational History    Not on file   Social Needs    Financial resource strain: Not on file    Food insecurity     Worry: Not on file     Inability: Not on file   [employer] needs     Medical: Not on file     Non-medical: Not on file   Tobacco Use    Smoking status: Never Smoker    Smokeless tobacco: Never Used   Substance and Sexual Activity    Alcohol use: No    Drug use: No    Sexual activity: Not on file   Lifestyle    Physical activity     Days per week: Not on file     Minutes per session: Not on file    Stress: Not on file   Relationships    Social connections     Talks on phone: Not on file     Gets together: Not on file     Attends Restorationism service: Not on file     Active member of club or organization: Not on file     Attends meetings of clubs or organizations: Not on file     Relationship status: Not on file    Intimate partner violence     Fear of current or ex partner: Not on file     Emotionally abused: Not on file     Physically abused: Not on file     Forced sexual activity: Not on file   Other Topics Concern    Not on file   Social History Narrative    WORK:    1   ([employer]itt[employer]; [employer]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    2  [employer]        HOBBIES:    1  Singing    2  Dancing    3  Hx of ceramics (+ dust)        PETS:    1    Dogs    -  Denies birds        TRAVEL:    -  [place]         EXPOSURES:    Denies mold; down pillows/comforters; hot tubs      Family History   Problem Relation Age of Onset    Kidney failure Mother     Hypertension Mother     Lung cancer Father    Celestina Avan[place] Parkinson White syndrome Daughter  No Known Problems Sister     Substance Abuse Neg Hx     Alcohol abuse Neg Hx     Mental illness Neg Hx      Past Surgical History:   Procedure Laterality Date    APPENDECTOMY      CHOLECYSTECTOMY      laparoscopic    COLONOSCOPY      KNEE SURGERY Left     PERICARDIAL WINDOW      ND OPEN RX DISTAL RADIUS FX, EXTRA-ARTICULAR Right 3/22/2018    Procedure: OPEN REDUCTION W/ INTERNAL FIXATION (ORIF) RADIUS, splint application;  Surgeon: Destin Abrams MD;  [facility];  Service: Orthopedics    UPPER GASTROINTESTINAL ENDOSCOPY         Current Outpatient Medications:     ALPRAZolam (XANAX) 0 5 mg tablet, Take 1 tablet (0 5 mg total) by mouth daily at bedtime as needed for anxiety or sleep, Disp: 20 tablet, Rfl: 1    ascorbic acid (VITAMIN C) 500 mg tablet, Take 500 mg by mouth daily, Disp: , Rfl:     aspirin 81 mg chewable tablet, Chew 1 tablet (81 mg total) daily (Patient taking differently: Chew 81 mg as needed ), Disp: 30 tablet, Rfl: 0    b complex vitamins tablet, Take 1 tablet by mouth daily, Disp: , Rfl:     Cholecalciferol (VITAMIN D-3) 1000 units CAPS, Daily, Disp: , Rfl:     dicyclomine (BENTYL) 10 mg capsule, Take 1 capsule (10 mg total) by mouth 4 (four) times a day (before meals and at bedtime) (Patient taking differently: Take 10 mg by mouth as needed ), Disp: 30 capsule, Rfl: 1    Magnesium 100 MG CAPS, magnesium  300 mg qd, Disp: , Rfl:     Melatonin 2 5 MG CHEW, Chew 1 tablet, Disp: , Rfl:     Multiple Vitamins-Calcium (ONE-A-DAY WOMENS PO), Take 1 tablet by mouth daily, Disp: , Rfl:     omeprazole (PriLOSEC) 20 mg delayed release capsule, Take 1 capsule (20 mg total) by mouth daily before breakfast, Disp: 90 capsule, Rfl: 3    Riboflavin (VITAMIN B-2 PO), Take by mouth, Disp: , Rfl:     Omega-3 Fatty Acids (Fish Oil) 645 MG CAPS, Take by mouth, Disp: , Rfl:   Allergies   Allergen Reactions    Ciprofloxacin Myalgia       Labs:  Admission on 01/08/2021, Discharged on 01/08/2021   Component Date Value    WBC 01/08/2021 7 11     RBC 01/08/2021 4 95     Hemoglobin 01/08/2021 14 8     Hematocrit 01/08/2021 45 3     MCV 01/08/2021 92     MCH 01/08/2021 29 9     MCHC 01/08/2021 32 7     RDW 01/08/2021 13 0     MPV 01/08/2021 10 3     Platelets 92/28/6275 201     nRBC 01/08/2021 0     Neutrophils Relative 01/08/2021 63     Immat GRANS % 01/08/2021 0     Lymphocytes Relative 01/08/2021 23     Monocytes Relative 01/08/2021 11     Eosinophils Relative 01/08/2021 3     Basophils Relative 01/08/2021 0     Neutrophils Absolute 01/08/2021 4 47     Immature Grans Absolute 01/08/2021 0 03     Lymphocytes Absolute 01/08/2021 1 64     Monocytes Absolute 01/08/2021 0 76     Eosinophils Absolute 01/08/2021 0 19     Basophils Absolute 01/08/2021 0 02     Sodium 01/08/2021 141     Potassium 01/08/2021 3 9     Chloride 01/08/2021 107     CO2 01/08/2021 29     ANION GAP 01/08/2021 5     BUN 01/08/2021 20     Creatinine 01/08/2021 0 83     Glucose 01/08/2021 87     Calcium 01/08/2021 9 5     eGFR 01/08/2021 70     Troponin I 01/08/2021 <0 02     SARS-CoV-2 01/08/2021 Negative     INFLUENZA A PCR 01/08/2021 Negative     INFLUENZA B PCR 01/08/2021 Negative     RSV PCR 01/08/2021 Negative     Ventricular Rate 01/08/2021 69     Atrial Rate 01/08/2021 69     MS Interval 01/08/2021 176     QRSD Interval 01/08/2021 100     QT Interval 01/08/2021 406     QTC Interval 01/08/2021 435     P Axis 01/08/2021 27     QRS Axis 01/08/2021 98     T Wave Axis 01/08/2021 44      Imaging: No results found  Review of Systems:  Review of Systems   Constitutional: Negative for chills and fever  HENT: Negative for ear pain and sore throat  Eyes: Negative for pain and visual disturbance  Respiratory: Positive for cough and shortness of breath  Cardiovascular: Positive for chest pain and palpitations  Gastrointestinal: Negative for abdominal pain and vomiting  Genitourinary: Negative for dysuria and hematuria  Musculoskeletal: Negative for arthralgias and back pain  Skin: Negative for color change and rash  Neurological: Negative for seizures and syncope  All other systems reviewed and are negative  Physical Exam:  Physical Exam  Vitals signs and nursing note reviewed  Constitutional:       Appearance: She is well-developed  HENT:      Head: Normocephalic and atraumatic  Neck:      Musculoskeletal: Normal range of motion and neck supple  Vascular: No JVD  Cardiovascular:      Rate and Rhythm: Normal rate and regular rhythm  Heart sounds: Normal heart sounds  Pulmonary:      Effort: Pulmonary effort is normal       Breath sounds: Normal breath sounds  Abdominal:      General: Bowel sounds are normal       Palpations: Abdomen is soft  Musculoskeletal: Normal range of motion  Skin:     General: Skin is warm and dry  Neurological:      Mental Status: She is alert and oriented to person, place, and time  Psychiatric:         Behavior: Behavior normal          Thought Content: Thought content normal          Judgment: Judgment normal          Discussion/Summary:  She complains of chest heaviness that is atypical for angina, dyspnea on exertion and palpitations  As noted she has a remote history of a pericardial effusion  Echocardiogram in December showed normal left ventricular size and function  Was no significant valvular pathology  The pericardium was unremarkable and there was no effusion  I will order a stress echocardiogram to rule out obstructive epicardial coronary artery disease as a cause for her symptoms and we will also place a Holter monitor to assess her palpitations  I should note that her blood pressure was 156/82 however she tells me it typically runs normal   I will see her back to follow-up on the tests and the blood pressure and make further recommendations in the near future

## 2021-03-02 DIAGNOSIS — Z23 ENCOUNTER FOR IMMUNIZATION: ICD-10-CM

## 2021-03-03 ENCOUNTER — HOSPITAL ENCOUNTER (OUTPATIENT)
Dept: PULMONOLOGY | Facility: HOSPITAL | Age: 76
Discharge: HOME | End: 2021-03-03
Attending: INTERNAL MEDICINE
Payer: COMMERCIAL

## 2021-03-03 ENCOUNTER — TELEPHONE (OUTPATIENT)
Dept: FAMILY MEDICINE CLINIC | Facility: CLINIC | Age: 76
End: 2021-03-03

## 2021-03-03 DIAGNOSIS — J84.9 ILD (INTERSTITIAL LUNG DISEASE) (HCC): ICD-10-CM

## 2021-03-03 PROCEDURE — 94729 DIFFUSING CAPACITY: CPT

## 2021-03-03 PROCEDURE — 94010 BREATHING CAPACITY TEST: CPT | Performed by: INTERNAL MEDICINE

## 2021-03-03 PROCEDURE — 94726 PLETHYSMOGRAPHY LUNG VOLUMES: CPT

## 2021-03-03 PROCEDURE — 94729 DIFFUSING CAPACITY: CPT | Performed by: INTERNAL MEDICINE

## 2021-03-03 PROCEDURE — 94760 N-INVAS EAR/PLS OXIMETRY 1: CPT

## 2021-03-03 PROCEDURE — 94726 PLETHYSMOGRAPHY LUNG VOLUMES: CPT | Performed by: INTERNAL MEDICINE

## 2021-03-03 PROCEDURE — 94010 BREATHING CAPACITY TEST: CPT

## 2021-03-03 NOTE — TELEPHONE ENCOUNTER
Spoke with patient, advised to keep her mammogram appt on 03/17/21 unless she is able to schedule her Covid vaccine before 03/17/21

## 2021-03-03 NOTE — TELEPHONE ENCOUNTER
Pt would like to speak to you, if possible, about the covid vaccine  First, she is scheduled for a mammo on 3/17 but is anticipating being able to get the covid vaccine soon  She is concerned about having those 2 things happen close together  She also has many other questions and concerns about the vaccine and would like to speak to you about them

## 2021-03-04 NOTE — RESULT ENCOUNTER NOTE
I have reviewed the results of the pulmonary function testing and they are consistent with restriction not COPD  They are essentially unchanged when compared to October 2019

## 2021-03-05 ENCOUNTER — TELEPHONE (OUTPATIENT)
Dept: OTHER | Facility: HOSPITAL | Age: 76
End: 2021-03-05

## 2021-03-05 NOTE — TELEPHONE ENCOUNTER
----- Message from Jamari Dixon MD sent at 3/4/2021  4:27 PM EST -----  I have reviewed the results of the pulmonary function testing and they are consistent with restriction not COPD  They are essentially unchanged when compared to October 2019

## 2021-03-05 NOTE — TELEPHONE ENCOUNTER
Spoke with patient PFTs have remained relatively unchanged since 2019 consistent with restriction patient does not have COPD

## 2021-03-09 ENCOUNTER — IMMUNIZATIONS (OUTPATIENT)
Dept: FAMILY MEDICINE CLINIC | Facility: HOSPITAL | Age: 76
End: 2021-03-09

## 2021-03-09 DIAGNOSIS — Z23 ENCOUNTER FOR IMMUNIZATION: Primary | ICD-10-CM

## 2021-03-09 PROCEDURE — 0001A SARS-COV-2 / COVID-19 MRNA VACCINE (PFIZER-BIONTECH) 30 MCG: CPT

## 2021-03-09 PROCEDURE — 91300 SARS-COV-2 / COVID-19 MRNA VACCINE (PFIZER-BIONTECH) 30 MCG: CPT

## 2021-03-10 ENCOUNTER — TELEPHONE (OUTPATIENT)
Dept: PULMONOLOGY | Facility: CLINIC | Age: 76
End: 2021-03-10

## 2021-03-18 ENCOUNTER — OFFICE VISIT (OUTPATIENT)
Dept: GASTROENTEROLOGY | Facility: CLINIC | Age: 76
End: 2021-03-18
Payer: COMMERCIAL

## 2021-03-18 ENCOUNTER — PREP FOR PROCEDURE (OUTPATIENT)
Dept: GASTROENTEROLOGY | Facility: CLINIC | Age: 76
End: 2021-03-18

## 2021-03-18 VITALS
HEIGHT: 68 IN | TEMPERATURE: 98.2 F | BODY MASS INDEX: 26.4 KG/M2 | SYSTOLIC BLOOD PRESSURE: 150 MMHG | WEIGHT: 174.2 LBS | HEART RATE: 62 BPM | DIASTOLIC BLOOD PRESSURE: 90 MMHG

## 2021-03-18 DIAGNOSIS — R13.14 PHARYNGOESOPHAGEAL DYSPHAGIA: ICD-10-CM

## 2021-03-18 DIAGNOSIS — K21.00 GASTROESOPHAGEAL REFLUX DISEASE WITH ESOPHAGITIS, UNSPECIFIED WHETHER HEMORRHAGE: Primary | ICD-10-CM

## 2021-03-18 DIAGNOSIS — K58.0 IRRITABLE BOWEL SYNDROME WITH DIARRHEA: ICD-10-CM

## 2021-03-18 DIAGNOSIS — K21.9 GASTROESOPHAGEAL REFLUX DISEASE, UNSPECIFIED WHETHER ESOPHAGITIS PRESENT: Primary | ICD-10-CM

## 2021-03-18 PROCEDURE — 99214 OFFICE O/P EST MOD 30 MIN: CPT | Performed by: INTERNAL MEDICINE

## 2021-03-18 PROCEDURE — 3008F BODY MASS INDEX DOCD: CPT | Performed by: FAMILY MEDICINE

## 2021-03-18 NOTE — PROGRESS NOTES
[facility] - Outpatient Follow-up Note  Anu Harry 70 y o  female MRN: [id number]  Encounter: [id number]          ASSESSMENT AND PLAN:      1  Gastroesophageal reflux disease with esophagitis, unspecified whether hemorrhage  2  Pharyngeal dysphagia   she is taking omeprazole 20 mg daily in the morning  She reports intermittent dysphagia and choking while swallowing both solid and liquid  Her last EGD was 3 years ago  I will schedule her for repeat EGD for further evaluation  Will consider dilation if there is any stricture  continue omeprazole 20 mg daily    3  Irritable bowel syndrome with diarrhea    You could continue taking  Bentyl as needed for crampy abdominal pain   fiber supplement    ______________________________________________________________________    SUBJECTIVE:  31-year-old female with irritable bowel syndrome, gastroesophageal reflux disease here for evaluation of intermittent dysphagia  She has chronic intermittent dysphagia  Currently reporting dysphagia to both solids and liquids  She also reports intermittent choking  she denies weight loss, nausea or vomiting      REVIEW OF SYSTEMS IS OTHERWISE NEGATIVE        Historical Information   Past Medical History:   Diagnosis Date    Anxiety     Arthritis     Back pain     Balance problems     Chest pain     heaviness    Dizziness     Gait disorder     GERD (gastroesophageal reflux disease)     Hypertension     Increased frequency of headaches     Numbness and tingling     Palpitations     Pericarditis     Thyroid disease     Trouble in sleeping      Past Surgical History:   Procedure Laterality Date    APPENDECTOMY      CHOLECYSTECTOMY      laparoscopic    COLONOSCOPY      KNEE SURGERY Left     PERICARDIAL WINDOW      ME OPEN RX DISTAL RADIUS FX, EXTRA-ARTICULAR Right 3/22/2018    Procedure: OPEN REDUCTION W/ INTERNAL FIXATION (ORIF) RADIUS, splint application;  Surgeon: Julissa Hong MD Sh[place];  [facility]: [facility];  Service: Orthopedics    UPPER GASTROINTESTINAL ENDOSCOPY       Social History   Social History     Substance and Sexual Activity   Alcohol Use No     Social History     Substance and Sexual Activity   Drug Use No     Social History     Tobacco Use   Smoking Status Never Smoker   Smokeless Tobacco Never Used     Family History   Problem Relation Age of Onset    Kidney failure Mother     Hypertension Mother     Lung cancer Father    Michele De Leon Parkinson White syndrome Daughter     No Known Problems Sister     Substance Abuse Neg Hx     Alcohol abuse Neg Hx     Mental illness Neg Hx        Meds/Allergies       Current Outpatient Medications:     ALPRAZolam (XANAX) 0 5 mg tablet    ascorbic acid (VITAMIN C) 500 mg tablet    aspirin 81 mg chewable tablet    b complex vitamins tablet    Cholecalciferol (VITAMIN D-3) 1000 units CAPS    dicyclomine (BENTYL) 10 mg capsule    Magnesium 100 MG CAPS    Melatonin 2 5 MG CHEW    Multiple Vitamins-Calcium (ONE-A-DAY WOMENS PO)    Omega-3 Fatty Acids (Fish Oil) 645 MG CAPS    omeprazole (PriLOSEC) 20 mg delayed release capsule    Riboflavin (VITAMIN B-2 PO)    Allergies   Allergen Reactions    Ciprofloxacin Myalgia           Objective     Blood pressure 150/90, pulse 62, temperature 98 2 °F (36 8 °C), temperature source Tympanic, height 5' 8" (1 727 m), weight 79 kg (174 lb 3 2 oz)  Body mass index is 26 49 kg/m²  PHYSICAL EXAM:      General Appearance:   Alert, cooperative, no distress   HEENT:   Normocephalic, atraumatic, anicteric      Neck:  Supple, symmetrical, trachea midline   Lungs:   Clear to auscultation bilaterally; no rales, rhonchi or wheezing; respirations unlabored    Heart[de-identified]   Regular rate and rhythm; no murmur, rub, or gallop     Abdomen:   Soft, non-tender, non-distended; normal bowel sounds; no masses, no organomegaly    Genitalia:   Deferred    Rectal:   Deferred    Extremities:  No cyanosis, clubbing or edema    Pulses:  2+ and symmetric    Skin:  No jaundice, rashes, or lesions    Lymph nodes:  No palpable cervical lymphadenopathy        Lab Results:   No visits with results within 1 Day(s) from this visit  Latest known visit with results is:   Admission on 01/08/2021, Discharged on 01/08/2021   Component Date Value    WBC 01/08/2021 7 11     RBC 01/08/2021 4 95     Hemoglobin 01/08/2021 14 8     Hematocrit 01/08/2021 45 3     MCV 01/08/2021 92     MCH 01/08/2021 29 9     MCHC 01/08/2021 32 7     RDW 01/08/2021 13 0     MPV 01/08/2021 10 3     Platelets 71/89/6796 201     nRBC 01/08/2021 0     Neutrophils Relative 01/08/2021 63     Immat GRANS % 01/08/2021 0     Lymphocytes Relative 01/08/2021 23     Monocytes Relative 01/08/2021 11     Eosinophils Relative 01/08/2021 3     Basophils Relative 01/08/2021 0     Neutrophils Absolute 01/08/2021 4 47     Immature Grans Absolute 01/08/2021 0 03     Lymphocytes Absolute 01/08/2021 1 64     Monocytes Absolute 01/08/2021 0 76     Eosinophils Absolute 01/08/2021 0 19     Basophils Absolute 01/08/2021 0 02     Sodium 01/08/2021 141     Potassium 01/08/2021 3 9     Chloride 01/08/2021 107     CO2 01/08/2021 29     ANION GAP 01/08/2021 5     BUN 01/08/2021 20     Creatinine 01/08/2021 0 83     Glucose 01/08/2021 87     Calcium 01/08/2021 9 5     eGFR 01/08/2021 70     Troponin I 01/08/2021 <0 02     SARS-CoV-2 01/08/2021 Negative     INFLUENZA A PCR 01/08/2021 Negative     INFLUENZA B PCR 01/08/2021 Negative     RSV PCR 01/08/2021 Negative     Ventricular Rate 01/08/2021 69     Atrial Rate 01/08/2021 69     ID Interval 01/08/2021 176     QRSD Interval 01/08/2021 100     QT Interval 01/08/2021 406     QTC Interval 01/08/2021 435     P Axis 01/08/2021 27     QRS Axis 01/08/2021 98     T Wave Axis 01/08/2021 44          Radiology Results:   No results found

## 2021-03-22 ENCOUNTER — OFFICE VISIT (OUTPATIENT)
Dept: FAMILY MEDICINE CLINIC | Facility: CLINIC | Age: 76
End: 2021-03-22
Payer: COMMERCIAL

## 2021-03-22 VITALS
TEMPERATURE: 98.8 F | WEIGHT: 175.8 LBS | BODY MASS INDEX: 26.73 KG/M2 | SYSTOLIC BLOOD PRESSURE: 132 MMHG | HEART RATE: 73 BPM | DIASTOLIC BLOOD PRESSURE: 80 MMHG

## 2021-03-22 DIAGNOSIS — G47.00 INSOMNIA, UNSPECIFIED TYPE: Primary | ICD-10-CM

## 2021-03-22 DIAGNOSIS — R26.89 IMPAIRMENT OF BALANCE: ICD-10-CM

## 2021-03-22 PROCEDURE — 1160F RVW MEDS BY RX/DR IN RCRD: CPT | Performed by: FAMILY MEDICINE

## 2021-03-22 PROCEDURE — 99213 OFFICE O/P EST LOW 20 MIN: CPT | Performed by: FAMILY MEDICINE

## 2021-03-22 RX ORDER — TRAZODONE HYDROCHLORIDE 50 MG/1
50 TABLET ORAL
Qty: 30 TABLET | Refills: 1 | Status: SHIPPED | OUTPATIENT
Start: 2021-03-22

## 2021-03-23 ENCOUNTER — HOSPITAL ENCOUNTER (OUTPATIENT)
Dept: NON INVASIVE DIAGNOSTICS | Facility: HOSPITAL | Age: 76
Discharge: HOME | End: 2021-03-23
Attending: INTERNAL MEDICINE
Payer: COMMERCIAL

## 2021-03-23 DIAGNOSIS — R07.9 CHEST PAIN, UNSPECIFIED TYPE: ICD-10-CM

## 2021-03-23 PROCEDURE — 93225 XTRNL ECG REC<48 HRS REC: CPT

## 2021-03-23 PROCEDURE — 93226 XTRNL ECG REC<48 HR SCAN A/R: CPT

## 2021-03-23 NOTE — PROGRESS NOTES
Assessment/Plan:    1  Insomnia  - advised to start Trazodone 50 mg 1/2 tablet at bedtime for 1 week, then increase to 1 tablet at bedtime, continue Xanax as needed for anxiety, discussed counseling  - traZODone (DESYREL) 50 mg tablet; Take 1 tablet (50 mg total) by mouth daily at bedtime  Dispense: 30 tablet; Refill: 1    2  Impairment of balance  - will refer to Physical therapy  - Ambulatory referral to Physical Therapy; Future    Return as previously scheduled or sooner if symptoms not better  Possible side effects of new medication were reviewed with the patient today  The patient understands and agrees with the treatment plan  Subjective:   Chief Complaint   Patient presents with    Insomnia    Fatigue      Patient ID: Clarice Wong is a 70 y o  female who presents with c/o trouble falling and staying asleep for the past few weeks, she reports getting 2-3 hours of sleep per night, and feeling very tired  Patient reports increased anxiety and excessive worry, denies depressed mood  She is taking Xanax as needed which helps to stay asleep for at least 4-5 hours, but does not want to take Xanax every night, she also tried Melatonin without success  Patient recently started walking and states that her balance if not what it used to be, she denies dizziness, leg weakness/ numbness or recent falls           The following portions of the patient's history were reviewed and updated as appropriate: allergies, current medications, past family history, past medical history, past social history, past surgical history and problem list     Past Medical History:   Diagnosis Date    Anxiety     Arthritis     Back pain     Balance problems     Chest pain     heaviness    Dizziness     Gait disorder     GERD (gastroesophageal reflux disease)     Hypertension     Increased frequency of headaches     Numbness and tingling     Palpitations     Pericarditis     Thyroid disease     Trouble in sleeping Past Surgical History:   Procedure Laterality Date    APPENDECTOMY      CHOLECYSTECTOMY      laparoscopic    COLONOSCOPY      KNEE SURGERY Left     PERICARDIAL WINDOW      OR OPEN RX DISTAL RADIUS FX, EXTRA-ARTICULAR Right 3/22/2018    Procedure: OPEN REDUCTION W/ INTERNAL FIXATION (ORIF) RADIUS, splint application;  Surgeon: Rodney Peralta MD;  [facility];  Service: Orthopedics    UPPER GASTROINTESTINAL ENDOSCOPY       Family History   Problem Relation Age of Onset    Kidney failure Mother     Hypertension Mother     Lung cancer Father    Saira Freeman Parkinson White syndrome Daughter     No Known Problems Sister     Substance Abuse Neg Hx     Alcohol abuse Neg Hx     Mental illness Neg Hx      Social History     Socioeconomic History    Marital status: /Civil Union     Spouse name: Not on file    Number of children: Not on file    Years of education: Not on file    Highest education level: Not on file   Occupational History    Not on file   Social Needs    Financial resource strain: Not on file    Food insecurity     Worry: Not on file     Inability: Not on file   [employer] needs     Medical: Not on file     Non-medical: Not on file   Tobacco Use    Smoking status: Never Smoker    Smokeless tobacco: Never Used   Substance and Sexual Activity    Alcohol use: No    Drug use: No    Sexual activity: Not on file   Lifestyle    Physical activity     Days per week: Not on file     Minutes per session: Not on file    Stress: Not on file   Relationships    Social connections     Talks on phone: Not on file     Gets together: Not on file     Attends Yarsanism service: Not on file     Active member of club or organization: Not on file     Attends meetings of clubs or organizations: Not on file     Relationship status: Not on file    Intimate partner violence     Fear of current or ex partner: Not on file     Emotionally abused: Not on file     Physically abused: Not on file     Forced sexual activity: Not on file   Other Topics Concern    Not on file   Social History Narrative    WORK:    1   (Clara Gianni; Dwaine Salazar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    2  [employer]        HOBBIES:    1  Singing    2  Dancing    3  Hx of ceramics (+ dust)        PETS:    1  Dogs    -  Denies birds        TRAVEL:    -  [place]         EXPOSURES:    Denies mold; down pillows/comforters; hot tubs       Current Outpatient Medications:     ALPRAZolam (XANAX) 0 5 mg tablet, Take 1 tablet (0 5 mg total) by mouth daily at bedtime as needed for anxiety or sleep, Disp: 20 tablet, Rfl: 1    ascorbic acid (VITAMIN C) 500 mg tablet, Take 500 mg by mouth daily, Disp: , Rfl:     aspirin 81 mg chewable tablet, Chew 1 tablet (81 mg total) daily (Patient taking differently: Chew 81 mg as needed ), Disp: 30 tablet, Rfl: 0    b complex vitamins tablet, Take 1 tablet by mouth daily, Disp: , Rfl:     Cholecalciferol (VITAMIN D-3) 1000 units CAPS, Daily, Disp: , Rfl:     dicyclomine (BENTYL) 10 mg capsule, Take 1 capsule (10 mg total) by mouth 4 (four) times a day (before meals and at bedtime) (Patient taking differently: Take 10 mg by mouth as needed ), Disp: 30 capsule, Rfl: 1    Magnesium 100 MG CAPS, magnesium  300 mg qd, Disp: , Rfl:     Melatonin 2 5 MG CHEW, Chew 1 tablet, Disp: , Rfl:     Multiple Vitamins-Calcium (ONE-A-DAY WOMENS PO), Take 1 tablet by mouth daily, Disp: , Rfl:     Omega-3 Fatty Acids (Fish Oil) 645 MG CAPS, Take by mouth, Disp: , Rfl:     omeprazole (PriLOSEC) 20 mg delayed release capsule, Take 1 capsule (20 mg total) by mouth daily before breakfast, Disp: 90 capsule, Rfl: 3    Riboflavin (VITAMIN B-2 PO), Take by mouth, Disp: , Rfl:     traZODone (DESYREL) 50 mg tablet, Take 1 tablet (50 mg total) by mouth daily at bedtime, Disp: 30 tablet, Rfl: 1    Review of Systems   Constitutional: Positive for fatigue   Negative for appetite change, chills, fever and unexpected weight change  Respiratory: Negative for cough, shortness of breath and wheezing  Cardiovascular: Negative for chest pain, palpitations and leg swelling  Gastrointestinal: Negative for abdominal pain, blood in stool, constipation, diarrhea, nausea and vomiting  Genitourinary: Negative for difficulty urinating, dysuria, flank pain, frequency, hematuria, pelvic pain and urgency  Musculoskeletal: Positive for gait problem  Negative for back pain  Neurological: Negative for dizziness, syncope, weakness, numbness and headaches  Hematological: Negative for adenopathy  Psychiatric/Behavioral: Positive for sleep disturbance  Negative for dysphoric mood and suicidal ideas  The patient is nervous/anxious  Objective:    Vitals:    03/22/21 1523   BP: 132/80   Pulse: 73   Temp: 98 8 °F (37 1 °C)   Weight: 79 7 kg (175 lb 12 8 oz)        Physical Exam  Constitutional:       General: She is not in acute distress  Appearance: She is well-developed  Neck:      Thyroid: No thyroid mass, thyromegaly or thyroid tenderness  Cardiovascular:      Rate and Rhythm: Normal rate and regular rhythm  Heart sounds: Normal heart sounds  No murmur  Pulmonary:      Effort: Pulmonary effort is normal  No respiratory distress  Breath sounds: Normal breath sounds  Abdominal:      Palpations: Abdomen is soft  Abdomen is not rigid  There is no mass  Tenderness: There is no abdominal tenderness  Musculoskeletal:      Right lower leg: No edema  Left lower leg: No edema  Lymphadenopathy:      Cervical: No cervical adenopathy  Neurological:      General: No focal deficit present  Mental Status: She is alert and oriented to person, place, and time  Cranial Nerves: No cranial nerve deficit  Motor: Motor function is intact  Psychiatric:         Mood and Affect: Mood normal          Behavior: Behavior normal          Thought Content:  Thought content normal

## 2021-03-29 ENCOUNTER — TELEPHONE (OUTPATIENT)
Dept: NEUROLOGY | Facility: CLINIC | Age: 76
End: 2021-03-29

## 2021-03-29 DIAGNOSIS — G47.00 INSOMNIA, UNSPECIFIED TYPE: Primary | ICD-10-CM

## 2021-03-29 NOTE — TELEPHONE ENCOUNTER
pt called and states that she would now like to see sleep  medicine but needs a new referral as the prior one   i gave her sleep medicine phone # to follow up in a few days     please enter new referral  [phone] to leave detailed message  No need to call back unless any issues with referral

## 2021-03-30 PROCEDURE — 93227 XTRNL ECG REC<48 HR R&I: CPT | Performed by: INTERNAL MEDICINE

## 2021-03-31 ENCOUNTER — IMMUNIZATIONS (OUTPATIENT)
Dept: FAMILY MEDICINE CLINIC | Facility: HOSPITAL | Age: 76
End: 2021-03-31

## 2021-03-31 DIAGNOSIS — Z23 ENCOUNTER FOR IMMUNIZATION: Primary | ICD-10-CM

## 2021-03-31 PROCEDURE — 0002A SARS-COV-2 / COVID-19 MRNA VACCINE (PFIZER-BIONTECH) 30 MCG: CPT

## 2021-03-31 PROCEDURE — 91300 SARS-COV-2 / COVID-19 MRNA VACCINE (PFIZER-BIONTECH) 30 MCG: CPT

## 2021-04-08 ENCOUNTER — VBI (OUTPATIENT)
Dept: ADMINISTRATIVE | Facility: OTHER | Age: 76
End: 2021-04-08

## 2021-04-15 ENCOUNTER — HOSPITAL ENCOUNTER (OUTPATIENT)
Dept: NON INVASIVE DIAGNOSTICS | Facility: HOSPITAL | Age: 76
Discharge: HOME | End: 2021-04-15
Attending: INTERNAL MEDICINE
Payer: COMMERCIAL

## 2021-04-15 DIAGNOSIS — R07.9 CHEST PAIN, UNSPECIFIED TYPE: ICD-10-CM

## 2021-04-15 PROCEDURE — 93351 STRESS TTE COMPLETE: CPT | Performed by: INTERNAL MEDICINE

## 2021-04-15 PROCEDURE — 93350 STRESS TTE ONLY: CPT

## 2021-04-16 LAB
CHEST PAIN STATEMENT: NORMAL
MAX DIASTOLIC BP: 84 MMHG
MAX HEART RATE: 153 BPM
MAX PREDICTED HEART RATE: 147 BPM
MAX. SYSTOLIC BP: 194 MMHG
PROTOCOL NAME: NORMAL
REASON FOR TERMINATION: NORMAL
TARGET HR FORMULA: NORMAL
TIME IN EXERCISE PHASE: NORMAL

## 2021-04-20 ENCOUNTER — OFFICE VISIT (OUTPATIENT)
Dept: FAMILY MEDICINE CLINIC | Facility: CLINIC | Age: 76
End: 2021-04-20
Payer: COMMERCIAL

## 2021-04-20 VITALS
RESPIRATION RATE: 16 BRPM | TEMPERATURE: 97.7 F | HEIGHT: 68 IN | WEIGHT: 172 LBS | HEART RATE: 70 BPM | BODY MASS INDEX: 26.07 KG/M2 | SYSTOLIC BLOOD PRESSURE: 140 MMHG | DIASTOLIC BLOOD PRESSURE: 88 MMHG

## 2021-04-20 DIAGNOSIS — M79.675 GREAT TOE PAIN, LEFT: ICD-10-CM

## 2021-04-20 DIAGNOSIS — B35.1 ONYCHOMYCOSIS OF TOENAIL: ICD-10-CM

## 2021-04-20 DIAGNOSIS — L60.0 INGROWN LEFT BIG TOENAIL: Primary | ICD-10-CM

## 2021-04-20 DIAGNOSIS — Z12.31 ENCOUNTER FOR SCREENING MAMMOGRAM FOR MALIGNANT NEOPLASM OF BREAST: ICD-10-CM

## 2021-04-20 DIAGNOSIS — E78.5 HYPERLIPIDEMIA, UNSPECIFIED HYPERLIPIDEMIA TYPE: ICD-10-CM

## 2021-04-20 PROCEDURE — 99213 OFFICE O/P EST LOW 20 MIN: CPT | Performed by: FAMILY MEDICINE

## 2021-04-20 PROCEDURE — 3725F SCREEN DEPRESSION PERFORMED: CPT | Performed by: FAMILY MEDICINE

## 2021-04-20 RX ORDER — CEPHALEXIN 250 MG/1
250 CAPSULE ORAL
Qty: 21 CAPSULE | Refills: 0 | Status: SHIPPED | OUTPATIENT
Start: 2021-04-20 | End: 2021-04-27

## 2021-04-21 ENCOUNTER — OFFICE VISIT (OUTPATIENT)
Dept: CARDIOLOGY CLINIC | Facility: CLINIC | Age: 76
End: 2021-04-21
Payer: COMMERCIAL

## 2021-04-21 VITALS
BODY MASS INDEX: 26.18 KG/M2 | DIASTOLIC BLOOD PRESSURE: 80 MMHG | WEIGHT: 172.2 LBS | SYSTOLIC BLOOD PRESSURE: 128 MMHG | HEART RATE: 67 BPM

## 2021-04-21 DIAGNOSIS — Z86.79 HISTORY OF PERICARDITIS: Primary | ICD-10-CM

## 2021-04-21 DIAGNOSIS — R00.2 PALPITATIONS: ICD-10-CM

## 2021-04-21 PROCEDURE — 99214 OFFICE O/P EST MOD 30 MIN: CPT | Performed by: INTERNAL MEDICINE

## 2021-04-21 NOTE — PROGRESS NOTES
Assessment/Plan:    1  Ingrown left big toenail  - advised to start Keflex and do warm soaks  - advised to see Podiatry if not better  - cephalexin (KEFLEX) 250 mg capsule; Take 1 capsule (250 mg total) by mouth 3 (three) times a day with meals for 7 days  Dispense: 21 capsule; Refill: 0  - Ambulatory referral to Podiatry; Future    2  Great toe pain, left  - Uric acid; Future    3  Hyperlipidemia  - Lipid Panel with Direct LDL reflex; Future  - Comprehensive metabolic panel; Future    4  Onychomycosis of toenail  - Ambulatory referral to Podiatry; Future       Return if symptoms persist or worsen  The patient understands and agrees with the treatment plan  Subjective:   Chief Complaint   Patient presents with    Toe Pain     L big toe      Patient ID: Keyana Jovel is a 70 y o  female who presents today with c/o left toe pain and swelling about lateral and distal nailfold for the past few days shortly after she cut her toenails, no injury, no drainage, no joint pain, redness or swelling  Patient is worried about gout and would like to discuss getting blood test for uric acid         The following portions of the patient's history were reviewed and updated as appropriate: allergies, current medications, past family history, past medical history, past social history, past surgical history and problem list     Past Medical History:   Diagnosis Date    Anxiety     Arthritis     Back pain     Balance problems     Chest pain     heaviness    Dizziness     Gait disorder     GERD (gastroesophageal reflux disease)     Hypertension     Increased frequency of headaches     Numbness and tingling     Palpitations     Pericarditis     Thyroid disease     Trouble in sleeping      Past Surgical History:   Procedure Laterality Date    APPENDECTOMY      CHOLECYSTECTOMY      laparoscopic    COLONOSCOPY      KNEE SURGERY Left     PERICARDIAL WINDOW      [place] OPEN RX DISTAL RADIUS FX, EXTRA-ARTICULAR Right 3/22/2018    Procedure: OPEN REDUCTION W/ INTERNAL FIXATION (ORIF) RADIUS, splint application;  Surgeon: Lesley Andino MD;  [facility];  Service: Orthopedics    UPPER GASTROINTESTINAL ENDOSCOPY       Family History   Problem Relation Age of Onset    Kidney failure Mother     Hypertension Mother     Lung cancer Father    Brynn Mcburney Parkinson White syndrome Daughter     No Known Problems Sister     Substance Abuse Neg Hx     Alcohol abuse Neg Hx     Mental illness Neg Hx      Social History     Socioeconomic History    Marital status: /Civil Union     Spouse name: Not on file    Number of children: Not on file    Years of education: Not on file    Highest education level: Not on file   Occupational History    Not on file   Social Needs    Financial resource strain: Not on file    Food insecurity     Worry: Not on file     Inability: Not on file   [employer] needs     Medical: Not on file     Non-medical: Not on file   Tobacco Use    Smoking status: Never Smoker    Smokeless tobacco: Never Used   Substance and Sexual Activity    Alcohol use: No    Drug use: No    Sexual activity: Not on file   Lifestyle    Physical activity     Days per week: Not on file     Minutes per session: Not on file    Stress: Not on file   Relationships    Social connections     Talks on phone: Not on file     Gets together: Not on file     Attends Church service: Not on file     Active member of club or organization: Not on file     Attends meetings of clubs or organizations: Not on file     Relationship status: Not on file    Intimate partner violence     Fear of current or ex partner: Not on file     Emotionally abused: Not on file     Physically abused: Not on file     Forced sexual activity: Not on file   Other Topics Concern    Not on file   Social History Narrative    WORK:    1   (Emma Contreras; Madie Ruggiero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    2  [employer]        HOBBIES:    1  Singing    2  Dancing    3  Hx of ceramics (+ dust)        PETS:    1  Dogs    -  Denies birds        TRAVEL:    -  [place]         EXPOSURES:    Denies mold; down pillows/comforters; hot tubs       Current Outpatient Medications:     ALPRAZolam (XANAX) 0 5 mg tablet, Take 1 tablet (0 5 mg total) by mouth daily at bedtime as needed for anxiety or sleep, Disp: 20 tablet, Rfl: 1    ascorbic acid (VITAMIN C) 500 mg tablet, Take 500 mg by mouth daily, Disp: , Rfl:     aspirin 81 mg chewable tablet, Chew 1 tablet (81 mg total) daily (Patient taking differently: Chew 81 mg as needed ), Disp: 30 tablet, Rfl: 0    b complex vitamins tablet, Take 1 tablet by mouth daily, Disp: , Rfl:     Cholecalciferol (VITAMIN D-3) 1000 units CAPS, Daily, Disp: , Rfl:     dicyclomine (BENTYL) 10 mg capsule, Take 1 capsule (10 mg total) by mouth 4 (four) times a day (before meals and at bedtime) (Patient taking differently: Take 10 mg by mouth as needed ), Disp: 30 capsule, Rfl: 1    Magnesium 100 MG CAPS, magnesium  300 mg qd, Disp: , Rfl:     Melatonin 2 5 MG CHEW, Chew 1 tablet, Disp: , Rfl:     Multiple Vitamins-Calcium (ONE-A-DAY WOMENS PO), Take 1 tablet by mouth daily, Disp: , Rfl:     Omega-3 Fatty Acids (Fish Oil) 645 MG CAPS, Take by mouth, Disp: , Rfl:     omeprazole (PriLOSEC) 20 mg delayed release capsule, Take 1 capsule (20 mg total) by mouth daily before breakfast, Disp: 90 capsule, Rfl: 3    Riboflavin (VITAMIN B-2 PO), Take by mouth, Disp: , Rfl:     traZODone (DESYREL) 50 mg tablet, Take 1 tablet (50 mg total) by mouth daily at bedtime, Disp: 30 tablet, Rfl: 1    cephalexin (KEFLEX) 250 mg capsule, Take 1 capsule (250 mg total) by mouth 3 (three) times a day with meals for 7 days, Disp: 21 capsule, Rfl: 0    Review of Systems   Constitutional: Negative for appetite change, chills, fatigue, fever and unexpected weight change     HENT: Negative for congestion, ear pain, sore throat and trouble swallowing  Respiratory: Negative for cough, shortness of breath and wheezing  Cardiovascular: Negative for chest pain, palpitations and leg swelling  Gastrointestinal: Negative for abdominal pain, blood in stool, constipation, diarrhea, nausea and vomiting  Genitourinary: Negative for dysuria, flank pain, frequency, hematuria, pelvic pain and urgency  Musculoskeletal:        Left big toe pain   Neurological: Negative for dizziness, weakness, numbness and headaches  Objective:    Vitals:    04/20/21 1555   BP: 140/88   Pulse: 70   Resp: 16   Temp: 97 7 °F (36 5 °C)   Weight: 78 kg (172 lb)   Height: 5' 8" (1 727 m)        Physical Exam  Constitutional:       General: She is not in acute distress  Appearance: She is well-developed  Cardiovascular:      Rate and Rhythm: Normal rate and regular rhythm  Heart sounds: Normal heart sounds  No murmur  Pulmonary:      Effort: Pulmonary effort is normal  No respiratory distress  Breath sounds: Normal breath sounds  Abdominal:      Palpations: Abdomen is not rigid  Musculoskeletal:      Left hip: She exhibits normal strength and no tenderness  Right lower leg: No edema  Left lower leg: No edema  Comments: Left great toe with good ROM, + tenderness and mild swelling over lateral and distal nail fold, no drainage   Neurological:      Mental Status: She is alert and oriented to person, place, and time     Psychiatric:         Mood and Affect: Mood normal          Behavior: Behavior normal

## 2021-04-22 NOTE — PROGRESS NOTES
Cardiology Follow Up    [facility], [facility]   1945  [id number]  [address] [facility] [address] [phone]    1  History of pericarditis     2  Palpitations         Interval History:  Juliane Zeng presents to follow-up on her studies  She had some palpitations and atypical chest pain  Currently she is not having any symptoms    She denies shortness of breath orthopnea paroxysmal nocturnal dyspnea or    Patient Active Problem List   Diagnosis    Closed fracture distal radius and ulna, right, initial encounter    Anxiety    Gastroesophageal reflux disease with esophagitis    History of pericarditis    Heart murmur    Primary hyperparathyroidism (HCC)    Thyroid nodule    Vitamin D deficiency    Chest pain    Migraine without aura and without status migrainosus, not intractable    Dizziness and giddiness    Insomnia    Cervicalgia    Abnormal CT scan of lung    Irritable bowel syndrome with diarrhea    Elevated amylase and lipase    Paresthesias    Carpal tunnel syndrome of left wrist    Dysphonia    Reflux laryngitis    Paresis of left vocal fold    Glottic insufficiency    Muscle tension dysphonia    TMJ dysfunction    Pharyngoesophageal dysphagia    Palpitations     Past Medical History:   Diagnosis Date    Anxiety     Arthritis     Back pain     Balance problems     Chest pain     heaviness    Dizziness     Gait disorder     GERD (gastroesophageal reflux disease)     Hypertension     Increased frequency of headaches     Numbness and tingling     Palpitations     Pericarditis     Thyroid disease     Trouble in sleeping      Social History     Socioeconomic History    Marital status: /Civil Union     Spouse name: Not on file    Number of children: Not on file    Years of education: Not on file    Highest education level: Not on file   Occupational History    Not on file   Social Needs    Financial resource strain: Not on file    Food insecurity     Worry: Not on file     Inability: Not on file    Transportation needs     Medical: Not on file     Non-medical: Not on file   Tobacco Use    Smoking status: Never Smoker    Smokeless tobacco: Never Used   Substance and Sexual Activity    Alcohol use: No    Drug use: No    Sexual activity: Not on file   Lifestyle    Physical activity     Days per week: Not on file     Minutes per session: Not on file    Stress: Not on file   Relationships    Social connections     Talks on phone: Not on file     Gets together: Not on file     Attends Jainism service: Not on file     Active member of club or organization: Not on file     Attends meetings of clubs or organizations: Not on file     Relationship status: Not on file    Intimate partner violence     Fear of current or ex partner: Not on file     Emotionally abused: Not on file     Physically abused: Not on file     Forced sexual activity: Not on file   Other Topics Concern    Not on file   Social History Narrative    WORK:    1  [place] ([employer]; [employer]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    2  Car reviews        HOBBIES:    1  Singing    2  Dancing    3  Hx of ceramics (+ dust)        PETS:    1    Dogs    -  Denies birds        TRAVEL:    -  [place]         EXPOSURES:    Denies mold; down pillows/comforters; hot tubs      Family History   Problem Relation Age of Onset    Kidney failure Mother     Hypertension Mother     Lung cancer Father    Rella Jonathon Parkinson White syndrome Daughter     No Known Problems Sister     Substance Abuse Neg Hx     Alcohol abuse Neg Hx     Mental illness Neg Hx      Past Surgical History:   Procedure Laterality Date    APPENDECTOMY      CHOLECYSTECTOMY      laparoscopic    COLONOSCOPY      KNEE SURGERY Left     PERICARDIAL WINDOW      AK OPEN RX DISTAL RADIUS FX, EXTRA-ARTICULAR Right 3/22/2018    Procedure: OPEN REDUCTION W/ INTERNAL FIXATION (ORIF) RADIUS, splint application;  Surgeon: Luis Alfredo Casillas MD;  Location: [facility];  Service: Orthopedics    UPPER GASTROINTESTINAL ENDOSCOPY         Current Outpatient Medications:     ALPRAZolam (XANAX) 0 5 mg tablet, Take 1 tablet (0 5 mg total) by mouth daily at bedtime as needed for anxiety or sleep, Disp: 20 tablet, Rfl: 1    ascorbic acid (VITAMIN C) 500 mg tablet, Take 500 mg by mouth daily, Disp: , Rfl:     aspirin 81 mg chewable tablet, Chew 1 tablet (81 mg total) daily (Patient taking differently: Chew 81 mg as needed ), Disp: 30 tablet, Rfl: 0    b complex vitamins tablet, Take 1 tablet by mouth daily, Disp: , Rfl:     cephalexin (KEFLEX) 250 mg capsule, Take 1 capsule (250 mg total) by mouth 3 (three) times a day with meals for 7 days, Disp: 21 capsule, Rfl: 0    Cholecalciferol (VITAMIN D-3) 1000 units CAPS, Daily, Disp: , Rfl:     dicyclomine (BENTYL) 10 mg capsule, Take 1 capsule (10 mg total) by mouth 4 (four) times a day (before meals and at bedtime) (Patient taking differently: Take 10 mg by mouth as needed ), Disp: 30 capsule, Rfl: 1    Magnesium 100 MG CAPS, magnesium  300 mg qd, Disp: , Rfl:     Multiple Vitamins-Calcium (ONE-A-DAY WOMENS PO), Take 1 tablet by mouth daily, Disp: , Rfl:     Omega-3 Fatty Acids (Fish Oil) 645 MG CAPS, Take by mouth, Disp: , Rfl:     omeprazole (PriLOSEC) 20 mg delayed release capsule, Take 1 capsule (20 mg total) by mouth daily before breakfast, Disp: 90 capsule, Rfl: 3    Riboflavin (VITAMIN B-2 PO), Take by mouth, Disp: , Rfl:     Melatonin 2 5 MG CHEW, Chew 1 tablet, Disp: , Rfl:     traZODone (DESYREL) 50 mg tablet, Take 1 tablet (50 mg total) by mouth daily at bedtime (Patient not taking: Reported on 4/21/2021), Disp: 30 tablet, Rfl: 1  Allergies   Allergen Reactions    Ciprofloxacin Myalgia       Labs:  Hospital Outpatient Visit on 04/15/2021   Component Date Value    Protocol Name 04/15/2021 [facility] In Exercise Phase 04/15/2021 00:10:00     MAX  SYSTOLIC BP 78/72/5646 864     Max Diastolic Bp 53/09/8343 84     Max Heart Rate 04/15/2021 153     Max Predicted Heart Rate 04/15/2021 147     Reason for Termination 04/15/2021 Target Heart Rate Achieved     Target Hr Formular 04/15/2021 (220 - Age)*100%     Chest Pain Statement 04/15/2021 none      Imaging: No results found  Review of Systems:  Review of Systems   Constitutional: Negative for chills and fever  HENT: Negative for ear pain and sore throat  Eyes: Negative for pain and visual disturbance  Respiratory: Negative for cough and shortness of breath  Cardiovascular: Negative for chest pain and palpitations  Gastrointestinal: Negative for abdominal pain and vomiting  Genitourinary: Negative for dysuria and hematuria  Musculoskeletal: Negative for arthralgias and back pain  Skin: Negative for color change and rash  Neurological: Negative for seizures and syncope  All other systems reviewed and are negative  Physical Exam:  Physical Exam  Vitals signs and nursing note reviewed  Constitutional:       Appearance: She is well-developed  HENT:      Head: Normocephalic and atraumatic  Neck:      Musculoskeletal: Normal range of motion and neck supple  Vascular: No JVD  Cardiovascular:      Rate and Rhythm: Normal rate and regular rhythm  Heart sounds: Normal heart sounds  Pulmonary:      Effort: Pulmonary effort is normal       Breath sounds: Normal breath sounds  Abdominal:      General: Bowel sounds are normal       Palpations: Abdomen is soft  Musculoskeletal: Normal range of motion  Skin:     General: Skin is warm and dry  Neurological:      Mental Status: She is alert and oriented to person, place, and time  Psychiatric:         Behavior: Behavior normal          Thought Content: Thought content normal          Judgment: Judgment normal          Discussion/Summary:  She underwent stress testing  She did 10 minutes on a Pankaj protocol with no evidence of ischemia and normal left ventricular systolic function  Holter monitor over 24 hours revealed 401 PVCs 86 supraventricular ectopic beats  She does have some minor ectopy which she may occur shortly since  At this time beta-blocker treatment is not warranted we will continue to follow her  I will see her again in 1 year  She will call if the palpitations get worse

## 2021-04-24 ENCOUNTER — NURSE TRIAGE (OUTPATIENT)
Dept: OTHER | Facility: OTHER | Age: 76
End: 2021-04-24

## 2021-04-24 NOTE — TELEPHONE ENCOUNTER
Reason for Disposition   [1] Caller has URGENT medication question about med that PCP or specialist prescribed AND [2] triager unable to answer question    Answer Assessment - Initial Assessment Questions  1  NAME of MEDICATION: "What medicine are you calling about?"      Cephalexin 250 mg     2  QUESTION: "What is your question?"      I started last night with nausea, dry heaves, diarrhea, headache    3  PRESCRIBING HCP: "Who prescribed it?" Reason: if prescribed by specialist, call should be referred to that group  Dr Aileen Layne    4  SYMPTOMS: "Do you have any symptoms?"      Yes, see above; denies fever    5   SEVERITY: If symptoms are present, ask "Are they mild, moderate or severe?"      Mild    Protocols used: MEDICATION QUESTION CALL-ADULT-

## 2021-04-24 NOTE — TELEPHONE ENCOUNTER
Regarding: Headache, Loose Bowels, Chills  ----- Message from [facility] sent at 4/24/2021 11:19 AM EDT -----  I am taking Cephalexin 250 mg ( Keflex ) for an Infected Toe, I woke up this morning with a very bad Headache, Loose Bowels and I have Dry Heaves  I also have Chills and my throat is dry   I am not sure if it's from the medication  "

## 2021-04-27 ENCOUNTER — OFFICE VISIT (OUTPATIENT)
Dept: PODIATRY | Facility: CLINIC | Age: 76
End: 2021-04-27
Payer: COMMERCIAL

## 2021-04-27 VITALS
HEART RATE: 79 BPM | BODY MASS INDEX: 26.07 KG/M2 | SYSTOLIC BLOOD PRESSURE: 135 MMHG | HEIGHT: 68 IN | DIASTOLIC BLOOD PRESSURE: 74 MMHG | WEIGHT: 172 LBS

## 2021-04-27 DIAGNOSIS — M20.12 HALLUX ABDUCTO VALGUS, BILATERAL: ICD-10-CM

## 2021-04-27 DIAGNOSIS — L60.8 PINCER NAIL DEFORMITY: Primary | ICD-10-CM

## 2021-04-27 DIAGNOSIS — M20.11 HALLUX ABDUCTO VALGUS, BILATERAL: ICD-10-CM

## 2021-04-27 PROCEDURE — 1160F RVW MEDS BY RX/DR IN RCRD: CPT | Performed by: PODIATRIST

## 2021-04-27 PROCEDURE — 99213 OFFICE O/P EST LOW 20 MIN: CPT | Performed by: PODIATRIST

## 2021-04-27 PROCEDURE — 1036F TOBACCO NON-USER: CPT | Performed by: PODIATRIST

## 2021-04-27 NOTE — PROGRESS NOTES
Assessment/Plan:         Diagnoses and all orders for this visit:    Pincer nail deformity  Comments:  left lateral pincer nail, removed offending nail spicule, If returns, consider nail avulsion  Hallux abducto valgus, bilateral  Comments:  asymptomatic          Subjective:      Patient ID: Eusebio Conte is a 70 y o  female  Patient presents with acute toe pain  About a week ago she started getting "spasms" in her left great toe  The pain would ramp up then go down randomly  Her PCP thought possible gout vs ingrown toenail  She was given a script for keflex thinking she might have an ingrown  Uric acid was ordered but she didn't have it drawn  She ended up only taking 3 doses of the antibiotic but stopped because she did not feel she needed it  PMH significant for IBS, migraine, hyperparathyroidism  She does not smoke  The following portions of the patient's history were reviewed and updated as appropriate:   She  has a past medical history of Anxiety, Arthritis, Back pain, Balance problems, Chest pain, Dizziness, Gait disorder, GERD (gastroesophageal reflux disease), Hypertension, Increased frequency of headaches, Numbness and tingling, Palpitations, Pericarditis, Thyroid disease, and Trouble in sleeping    She   Patient Active Problem List    Diagnosis Date Noted    Hallux abducto valgus, bilateral 04/27/2021    Palpitations 02/17/2021    Dysphonia 07/20/2020    Reflux laryngitis 07/20/2020    Paresis of left vocal fold 07/20/2020    Glottic insufficiency 07/20/2020    Muscle tension dysphonia 07/20/2020    TMJ dysfunction 07/20/2020    Pharyngoesophageal dysphagia 07/20/2020    Paresthesias     Carpal tunnel syndrome of left wrist     Irritable bowel syndrome with diarrhea 01/08/2020    Elevated amylase and lipase 01/08/2020    Abnormal CT scan of lung 07/03/2019    Migraine without aura and without status migrainosus, not intractable 03/18/2019    Dizziness and giddiness 03/18/2019    Insomnia 03/18/2019    Cervicalgia 03/18/2019    Chest pain 01/06/2019    Anxiety 11/13/2018    Primary hyperparathyroidism ([address] ) 11/13/2018    Thyroid nodule 11/13/2018    Vitamin D deficiency 11/13/2018    Closed fracture distal radius and ulna, right, initial encounter 03/20/2018    History of pericarditis 03/12/2018    Gastroesophageal reflux disease with esophagitis 10/18/2017    Heart murmur 04/17/2015     She  has a past surgical history that includes Pericardial window; Knee surgery (Left); pr open rx distal radius fx, extra-articular (Right, 3/22/2018); Cholecystectomy; Appendectomy; Colonoscopy; and Upper gastrointestinal endoscopy  Her family history includes Hypertension in her mother; Kidney failure in her mother; Lung cancer in her father; No Known Problems in her sister; Tatuma Rice Parkinson White syndrome in her daughter  She  reports that she has never smoked  She has never used smokeless tobacco  She reports that she does not drink alcohol or use drugs    Current Outpatient Medications   Medication Sig Dispense Refill    ALPRAZolam (XANAX) 0 5 mg tablet Take 1 tablet (0 5 mg total) by mouth daily at bedtime as needed for anxiety or sleep 20 tablet 1    ascorbic acid (VITAMIN C) 500 mg tablet Take 500 mg by mouth daily      aspirin 81 mg chewable tablet Chew 1 tablet (81 mg total) daily (Patient taking differently: Chew 81 mg as needed ) 30 tablet 0    b complex vitamins tablet Take 1 tablet by mouth daily      cephalexin (KEFLEX) 250 mg capsule Take 1 capsule (250 mg total) by mouth 3 (three) times a day with meals for 7 days 21 capsule 0    Cholecalciferol (VITAMIN D-3) 1000 units CAPS Daily      dicyclomine (BENTYL) 10 mg capsule Take 1 capsule (10 mg total) by mouth 4 (four) times a day (before meals and at bedtime) (Patient taking differently: Take 10 mg by mouth as needed ) 30 capsule 1    Magnesium 100 MG CAPS magnesium   300 mg qd      Melatonin 2 5 MG CHEW Chew 1 tablet      Multiple Vitamins-Calcium (ONE-A-DAY WOMENS PO) Take 1 tablet by mouth daily      Omega-3 Fatty Acids (Fish Oil) 645 MG CAPS Take by mouth      omeprazole (PriLOSEC) 20 mg delayed release capsule Take 1 capsule (20 mg total) by mouth daily before breakfast 90 capsule 3    Riboflavin (VITAMIN B-2 PO) Take by mouth      traZODone (DESYREL) 50 mg tablet Take 1 tablet (50 mg total) by mouth daily at bedtime (Patient not taking: Reported on 4/21/2021) 30 tablet 1     No current facility-administered medications for this visit        Current Outpatient Medications on File Prior to Visit   Medication Sig    ALPRAZolam (XANAX) 0 5 mg tablet Take 1 tablet (0 5 mg total) by mouth daily at bedtime as needed for anxiety or sleep    ascorbic acid (VITAMIN C) 500 mg tablet Take 500 mg by mouth daily    aspirin 81 mg chewable tablet Chew 1 tablet (81 mg total) daily (Patient taking differently: Chew 81 mg as needed )    b complex vitamins tablet Take 1 tablet by mouth daily    cephalexin (KEFLEX) 250 mg capsule Take 1 capsule (250 mg total) by mouth 3 (three) times a day with meals for 7 days    Cholecalciferol (VITAMIN D-3) 1000 units CAPS Daily    dicyclomine (BENTYL) 10 mg capsule Take 1 capsule (10 mg total) by mouth 4 (four) times a day (before meals and at bedtime) (Patient taking differently: Take 10 mg by mouth as needed )    Magnesium 100 MG CAPS magnesium   300 mg qd    Melatonin 2 5 MG CHEW Chew 1 tablet    Multiple Vitamins-Calcium (ONE-A-DAY WOMENS PO) Take 1 tablet by mouth daily    Omega-3 Fatty Acids (Fish Oil) 645 MG CAPS Take by mouth    omeprazole (PriLOSEC) 20 mg delayed release capsule Take 1 capsule (20 mg total) by mouth daily before breakfast    Riboflavin (VITAMIN B-2 PO) Take by mouth    traZODone (DESYREL) 50 mg tablet Take 1 tablet (50 mg total) by mouth daily at bedtime (Patient not taking: Reported on 4/21/2021)     No current facility-administered medications on file prior to visit  She is allergic to ciprofloxacin       Review of Systems   Constitutional: Negative  Gastrointestinal: Negative for diarrhea, nausea and vomiting  Musculoskeletal: Positive for joint swelling  Negative for arthralgias  Skin: Positive for color change  Negative for wound  Neurological: Negative for weakness and numbness  Objective:      /74 (BP Location: Left arm, Patient Position: Sitting, Cuff Size: Standard)   Pulse 79   Ht 5' 8" (1 727 m)   Wt 78 kg (172 lb)   BMI 26 15 kg/m²          Physical Exam    Vitals reviewed    Constitutional: Patient is not distressed  Patient is well developed  Patient is   Normal weight  Vascular: Dorsalis pedis and posterior tibial pulses palpable  Capillary refill time within normal limits to all digits  No erythema  No edema  No significant varicosities  Dermatology: No rash  No open lesions  Present pedal hair  Skin has healthy turgor  Severe pincer nail deformity left great toenail with tenderness to the lateral distal nail fold  No cellulitis  Musculoskeletal: Normal range of motion to ankle, subtalar joint, and midtarsal joint  Normal range of motion first MTPJ   And interphalangeal joint of the great toe on the left      Manual muscle testing 5 out of 5 for inversion/eversion/dorsiflexion/plantarflexion  On stance patients feet are generally rectus  Neurological: Monofilament sensation is intact  Vibratory sensation is intact     Achilles reflex is normal    Proprioception is normal

## 2021-04-28 ENCOUNTER — ANESTHESIA EVENT (OUTPATIENT)
Dept: GASTROENTEROLOGY | Facility: HOSPITAL | Age: 76
End: 2021-04-28

## 2021-04-28 ENCOUNTER — ANESTHESIA (OUTPATIENT)
Dept: GASTROENTEROLOGY | Facility: HOSPITAL | Age: 76
End: 2021-04-28

## 2021-04-28 ENCOUNTER — HOSPITAL ENCOUNTER (OUTPATIENT)
Dept: GASTROENTEROLOGY | Facility: HOSPITAL | Age: 76
Setting detail: OUTPATIENT SURGERY
Discharge: HOME | End: 2021-04-28
Attending: INTERNAL MEDICINE | Admitting: INTERNAL MEDICINE
Payer: COMMERCIAL

## 2021-04-28 VITALS
DIASTOLIC BLOOD PRESSURE: 65 MMHG | BODY MASS INDEX: 26.07 KG/M2 | HEART RATE: 60 BPM | HEIGHT: 68 IN | TEMPERATURE: 97.8 F | RESPIRATION RATE: 18 BRPM | WEIGHT: 172 LBS | OXYGEN SATURATION: 98 % | SYSTOLIC BLOOD PRESSURE: 118 MMHG

## 2021-04-28 DIAGNOSIS — K21.9 GASTROESOPHAGEAL REFLUX DISEASE, UNSPECIFIED WHETHER ESOPHAGITIS PRESENT: ICD-10-CM

## 2021-04-28 PROCEDURE — 88305 TISSUE EXAM BY PATHOLOGIST: CPT | Performed by: PATHOLOGY

## 2021-04-28 PROCEDURE — C1726 CATH, BAL DIL, NON-VASCULAR: HCPCS

## 2021-04-28 PROCEDURE — 43239 EGD BIOPSY SINGLE/MULTIPLE: CPT | Performed by: INTERNAL MEDICINE

## 2021-04-28 PROCEDURE — 43249 ESOPH EGD DILATION <30 MM: CPT | Performed by: INTERNAL MEDICINE

## 2021-04-28 RX ORDER — PROPOFOL 10 MG/ML
INJECTION, EMULSION INTRAVENOUS CONTINUOUS PRN
Status: DISCONTINUED | OUTPATIENT
Start: 2021-04-28 | End: 2021-04-28

## 2021-04-28 RX ORDER — LIDOCAINE HYDROCHLORIDE 10 MG/ML
0.5 INJECTION, SOLUTION EPIDURAL; INFILTRATION; INTRACAUDAL; PERINEURAL ONCE AS NEEDED
Status: CANCELLED | OUTPATIENT
Start: 2021-04-28

## 2021-04-28 RX ORDER — PROPOFOL 10 MG/ML
INJECTION, EMULSION INTRAVENOUS AS NEEDED
Status: DISCONTINUED | OUTPATIENT
Start: 2021-04-28 | End: 2021-04-28

## 2021-04-28 RX ORDER — SODIUM CHLORIDE 9 MG/ML
INJECTION, SOLUTION INTRAVENOUS CONTINUOUS PRN
Status: DISCONTINUED | OUTPATIENT
Start: 2021-04-28 | End: 2021-04-28

## 2021-04-28 RX ORDER — SODIUM CHLORIDE 9 MG/ML
25 INJECTION, SOLUTION INTRAVENOUS CONTINUOUS
Status: CANCELLED | OUTPATIENT
Start: 2021-04-28

## 2021-04-28 RX ADMIN — PROPOFOL 100 MCG/KG/MIN: 10 INJECTION, EMULSION INTRAVENOUS at 08:12

## 2021-04-28 RX ADMIN — SODIUM CHLORIDE: 0.9 INJECTION, SOLUTION INTRAVENOUS at 07:50

## 2021-04-28 RX ADMIN — PROPOFOL 100 MG: 10 INJECTION, EMULSION INTRAVENOUS at 08:12

## 2021-04-28 NOTE — ANESTHESIA POSTPROCEDURE EVALUATION
Post-Op Assessment Note    CV Status:  Stable  Pain Score: 0    Pain management: adequate     Mental Status:  Sleepy   Hydration Status:  Euvolemic   PONV Controlled:  Controlled   Airway Patency:  Patent      Post Op Vitals Reviewed: Yes      Staff: CRNA         No complications documented      /59 (04/28/21 0829)    Temp 97 8 °F (36 6 °C) (04/28/21 0829)    Pulse 69 (04/28/21 0829)   Resp 18 (04/28/21 0829)    SpO2 96 % (04/28/21 0829)

## 2021-04-28 NOTE — ANESTHESIA PREPROCEDURE EVALUATION
Procedure:  EGD    Relevant Problems   CARDIO   (+) Chest pain   (+) Heart murmur      ENDO   (+) Primary hyperparathyroidism (HCC)      GI/HEPATIC   (+) Gastroesophageal reflux disease with esophagitis   (+) Pharyngoesophageal dysphagia      NEURO/PSYCH   (+) Anxiety   (+) History of pericarditis (status post pericardial window 11 years ago)   (+) Paresthesias      Other   (+) Paresis of left vocal fold        Physical Exam    Airway    Mallampati score: II  TM Distance: >3 FB  Neck ROM: full     Dental   Comment: Implant #7, denies loose dentition,     Cardiovascular      Pulmonary      Other Findings        4/15/21 Stress Echo:  BASELINE: There were no regional wall motion abnormalities  Left ventricular size was normal  Overall left ventricular systolic function was normal  Estimated left ventricular ejection fraction was 60 %       PEAK STRESS: There were no regional wall motion abnormalities  There was an appropriate reduction in left ventricular size  There was an appropriate augmentation in LV function      OTHER ECHO FINDINGS: Moderate tricuspid regurgitation      ECHO CONCLUSIONS: There was no echocardiographic evidence for stress-induced ischemia  The image quality was good  12/9/20 TTE:  LEFT VENTRICLE:  Systolic function was normal by visual assessment  Ejection fraction was estimated in the range of 60 % to 65 %  There were no regional wall motion abnormalities  Doppler parameters were consistent with abnormal left ventricular relaxation (grade 1 diastolic dysfunction)      TRICUSPID VALVE:  There was trace regurgitation  3/3/21 PFTs:  FVC (L)  64% of predicted  FEV1 (L)  66% of predicted  FEV1/FVC (%) 0 79      Anesthesia Plan  ASA Score- 2     Anesthesia Type- IV sedation with anesthesia with ASA Monitors           Additional Monitors:   Airway Plan:     Comment: I discussed the risks and benefits of IV sedation anesthesia including the possibility of the need to convert to general anesthesia and the potential risk of awareness  Plan Factors-Exercise tolerance (METS): >4 METS  Exercise comment: Able to climb two flights of stairs without cardiopulmonary limitation  Chart reviewed  Existing labs reviewed  Patient summary reviewed  Induction- intravenous  Postoperative Plan-     Informed Consent- Anesthetic plan and risks discussed with patient

## 2021-04-28 NOTE — H&P
History and Physical - [facility]  Jm Malhotra 70 y o  female MRN: [id number]    HPI: Jm Malhotra is a 70y o  year old female who presents For EGD  She has GERD and dysphagia        Review of Systems    Historical Information   Past Medical History:   Diagnosis Date    Anxiety     Arthritis     Back pain     Balance problems     Chest pain     heaviness    Difficulty swallowing     Dizziness     Gait disorder     GERD (gastroesophageal reflux disease)     Hypertension     Increased frequency of headaches     Numbness and tingling     Palpitations     Pericarditis     Thyroid disease     Trouble in sleeping      Past Surgical History:   Procedure Laterality Date    APPENDECTOMY      CHOLECYSTECTOMY      laparoscopic    COLONOSCOPY      KNEE SURGERY Left     PERICARDIAL WINDOW      OK OPEN RX DISTAL RADIUS FX, EXTRA-ARTICULAR Right 3/22/2018    Procedure: OPEN REDUCTION W/ INTERNAL FIXATION (ORIF) RADIUS, splint application;  Surgeon: Willa Ashford MD;  Location:  MAIN OR;  Service: Orthopedics    UPPER GASTROINTESTINAL ENDOSCOPY       Social History   Social History     Substance and Sexual Activity   Alcohol Use No     Social History     Substance and Sexual Activity   Drug Use No     Social History     Tobacco Use   Smoking Status Never Smoker   Smokeless Tobacco Never Used     Family History   Problem Relation Age of Onset    Kidney failure Mother     Hypertension Mother     Lung cancer Father    [place] Parkinson White syndrome Daughter     No Known Problems Sister     Substance Abuse Neg Hx     Alcohol abuse Neg Hx     Mental illness Neg Hx        Meds/Allergies     (Not in a hospital admission)      Allergies   Allergen Reactions    Ciprofloxacin Myalgia       Objective     BP (!) 180/84   Pulse 72   Temp 97 6 °F (36 4 °C) (Tympanic)   Resp 20   Ht 5' 8" (1 727 m)   Wt 78 kg (172 lb)   SpO2 96%   BMI 26 15 kg/m²       PHYSICAL EXAM    Gen: NAD  CV: RRR  CHEST: Clear  ABD: soft, NT/ND  EXT: no edema  Neuro: AAO      ASSESSMENT/PLAN:  This is a 70y o  year old female here for EGD with possible dilation for dysphagia and GERD      PLAN:   Procedure:  EGD with biopsy and possible dilation

## 2021-05-04 ENCOUNTER — APPOINTMENT (OUTPATIENT)
Dept: LAB | Facility: CLINIC | Age: 76
End: 2021-05-04
Payer: COMMERCIAL

## 2021-05-04 DIAGNOSIS — E78.5 HYPERLIPIDEMIA, UNSPECIFIED HYPERLIPIDEMIA TYPE: ICD-10-CM

## 2021-05-04 DIAGNOSIS — M79.10 MYALGIA: ICD-10-CM

## 2021-05-04 DIAGNOSIS — M79.675 GREAT TOE PAIN, LEFT: ICD-10-CM

## 2021-05-04 LAB
ALBUMIN SERPL BCP-MCNC: 3.5 G/DL (ref 3.5–5)
ALP SERPL-CCNC: 97 U/L (ref 46–116)
ALT SERPL W P-5'-P-CCNC: 30 U/L (ref 12–78)
ANION GAP SERPL CALCULATED.3IONS-SCNC: 1 MMOL/L (ref 4–13)
AST SERPL W P-5'-P-CCNC: 18 U/L (ref 5–45)
BILIRUB SERPL-MCNC: 0.95 MG/DL (ref 0.2–1)
BUN SERPL-MCNC: 26 MG/DL (ref 5–25)
CALCIUM SERPL-MCNC: 10.1 MG/DL (ref 8.3–10.1)
CHLORIDE SERPL-SCNC: 107 MMOL/L (ref 100–108)
CHOLEST SERPL-MCNC: 217 MG/DL (ref 50–200)
CO2 SERPL-SCNC: 30 MMOL/L (ref 21–32)
CREAT SERPL-MCNC: 0.89 MG/DL (ref 0.6–1.3)
ERYTHROCYTE [SEDIMENTATION RATE] IN BLOOD: 12 MM/HOUR (ref 0–29)
GFR SERPL CREATININE-BSD FRML MDRD: 65 ML/MIN/1.73SQ M
GLUCOSE P FAST SERPL-MCNC: 80 MG/DL (ref 65–99)
HDLC SERPL-MCNC: 65 MG/DL
LDLC SERPL CALC-MCNC: 134 MG/DL (ref 0–100)
POTASSIUM SERPL-SCNC: 4 MMOL/L (ref 3.5–5.3)
PROT SERPL-MCNC: 7.1 G/DL (ref 6.4–8.2)
SODIUM SERPL-SCNC: 138 MMOL/L (ref 136–145)
TRIGL SERPL-MCNC: 92 MG/DL
URATE SERPL-MCNC: 5.8 MG/DL (ref 2–6.8)

## 2021-05-04 PROCEDURE — 80053 COMPREHEN METABOLIC PANEL: CPT

## 2021-05-04 PROCEDURE — 80061 LIPID PANEL: CPT

## 2021-05-04 PROCEDURE — 36415 COLL VENOUS BLD VENIPUNCTURE: CPT

## 2021-05-04 PROCEDURE — 84550 ASSAY OF BLOOD/URIC ACID: CPT

## 2021-05-04 PROCEDURE — 85652 RBC SED RATE AUTOMATED: CPT

## 2021-05-07 ENCOUNTER — TELEPHONE (OUTPATIENT)
Dept: FAMILY MEDICINE CLINIC | Facility: CLINIC | Age: 76
End: 2021-05-07

## 2021-05-07 NOTE — TELEPHONE ENCOUNTER
Please let patient know that her cholesterol was mildly elevated, but stable, her other lab results were all normal

## 2021-05-21 DIAGNOSIS — R45.0 NERVOUSNESS: ICD-10-CM

## 2021-05-21 RX ORDER — ALPRAZOLAM 0.5 MG/1
0.5 TABLET ORAL
Qty: 20 TABLET | Refills: 1 | Status: SHIPPED | OUTPATIENT
Start: 2021-05-21 | End: 2022-02-23 | Stop reason: SDUPTHER

## 2021-06-03 ENCOUNTER — TELEPHONE (OUTPATIENT)
Dept: NEUROLOGY | Facility: CLINIC | Age: 76
End: 2021-06-03

## 2021-06-03 NOTE — TELEPHONE ENCOUNTER
Called and left a voicemail for patient - Please call back to confirm upcoming appointment with Dr Nahum Acuna  Provided patient with apt date, time and location  Informed patient that check in is at least 15 minutes prior to apt time

## 2021-06-14 ENCOUNTER — TELEPHONE (OUTPATIENT)
Dept: FAMILY MEDICINE CLINIC | Facility: CLINIC | Age: 76
End: 2021-06-14

## 2021-06-14 NOTE — TELEPHONE ENCOUNTER
Patient got a summons for jury duty  She would like a DR note excusing her due to the problem with her knees and legs  Could you write a note excusing her?     JUROR [id number]  Date: 8/9/21  [facility]  Fax [phone]

## 2021-06-15 ENCOUNTER — OFFICE VISIT (OUTPATIENT)
Dept: FAMILY MEDICINE CLINIC | Facility: CLINIC | Age: 76
End: 2021-06-15
Payer: COMMERCIAL

## 2021-06-15 VITALS
DIASTOLIC BLOOD PRESSURE: 80 MMHG | RESPIRATION RATE: 16 BRPM | TEMPERATURE: 98.2 F | HEIGHT: 68 IN | HEART RATE: 78 BPM | BODY MASS INDEX: 25.98 KG/M2 | SYSTOLIC BLOOD PRESSURE: 120 MMHG | WEIGHT: 171.4 LBS

## 2021-06-15 DIAGNOSIS — R21 FACIAL RASH: Primary | ICD-10-CM

## 2021-06-15 PROCEDURE — 99213 OFFICE O/P EST LOW 20 MIN: CPT | Performed by: FAMILY MEDICINE

## 2021-06-15 RX ORDER — METRONIDAZOLE 7.5 MG/G
GEL TOPICAL 2 TIMES DAILY
Qty: 45 G | Refills: 0 | Status: SHIPPED | OUTPATIENT
Start: 2021-06-15 | End: 2021-08-10

## 2021-06-15 RX ORDER — VALACYCLOVIR HYDROCHLORIDE 500 MG/1
500 TABLET, FILM COATED ORAL 3 TIMES DAILY
Qty: 21 TABLET | Refills: 0 | Status: SHIPPED | OUTPATIENT
Start: 2021-06-15 | End: 2021-10-15 | Stop reason: ALTCHOICE

## 2021-06-15 NOTE — PROGRESS NOTES
Assessment/Plan:    1  Facial rash  - will start Valtrex and topical metronidazole gel, advised to follow up in the office with any change in her symptoms and follow up with her dermatologist as scheduled  - valACYclovir (VALTREX) 500 mg tablet; Take 1 tablet (500 mg total) by mouth 3 (three) times a day for 7 days  Dispense: 21 tablet; Refill: 0  - metroNIDAZOLE (METROGEL) 0 75 % gel; Apply topically 2 (two) times a day  Dispense: 45 g; Refill: 0       Possible side effects of new medications were reviewed with the patient today  The patient understands and agrees with the treatment plan  Subjective:   Chief Complaint   Patient presents with    Rash     R side of face      Patient ID: Rossy Robbins is a 70 y o  female who presents today with c/o right sided facial rash onset several days ago, she reports erythematous spots over her right cheek that are itchy and sensitive to touch, she denies using new soaps, detergents, make up or facial creams  Patient is concerned about shingles  She is scheduled to see her dermatologist in [place] in July for her yearly skin check         The following portions of the patient's history were reviewed and updated as appropriate: allergies, current medications, past family history, past medical history, past social history, past surgical history and problem list     Past Medical History:   Diagnosis Date    Anxiety     Arthritis     Back pain     Balance problems     Chest pain     heaviness    Difficulty swallowing     Dizziness     Gait disorder     GERD (gastroesophageal reflux disease)     Hypertension     Increased frequency of headaches     Numbness and tingling     Palpitations     Pericarditis     Thyroid disease     Trouble in sleeping      Past Surgical History:   Procedure Laterality Date    APPENDECTOMY      CHOLECYSTECTOMY      laparoscopic    COLONOSCOPY      KNEE SURGERY Left     PERICARDIAL WINDOW      MD OPEN RX DISTAL RADIUS FX, EXTRA-ARTICULAR Right 3/22/2018    Procedure: OPEN REDUCTION W/ INTERNAL FIXATION (ORIF) RADIUS, splint application;  Surgeon: Kristel Mullen MD;  Location: [facility];  Service: Orthopedics    UPPER GASTROINTESTINAL ENDOSCOPY       Family History   Problem Relation Age of Onset    Kidney failure Mother     Hypertension Mother     Lung cancer Father    Mellissa Villavicencio Parkinson White syndrome Daughter     No Known Problems Sister     Substance Abuse Neg Hx     Alcohol abuse Neg Hx     Mental illness Neg Hx      Social History     Socioeconomic History    Marital status: /Civil Union     Spouse name: Not on file    Number of children: Not on file    Years of education: Not on file    Highest education level: Not on file   Occupational History    Not on file   Tobacco Use    Smoking status: Never Smoker    Smokeless tobacco: Never Used   Vaping Use    Vaping Use: Never used   Substance and Sexual Activity    Alcohol use: No    Drug use: No    Sexual activity: Not on file   Other Topics Concern    Not on file   Social History Narrative    WORK:    1  [employer] ([employer]artol[employer] Karimi; Jacki Kira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    2  [employer]        HOBBIES:    1  Singing    2  Dancing    3  Hx of ceramics (+ dust)        PETS:    1  Dogs    -  Denies birds        TRAVEL:    -  [place]         EXPOSURES:    Denies mold; down pillows/comforters; hot tubs     Social Determinants of Health     Financial Resource Strain:     Difficulty of Paying Living Expenses:    Food Insecurity:     Worried About Running Out of Food in the Last Year:     [address] in the Last Year:    Transportation Needs:     Lack of Transportation (Medical):      Lack of Transportation (Non-Medical):    Physical Activity:     Days of Exercise per Week:     Minutes of Exercise per Session:    Stress:     Feeling of Stress :    Social Connections:     Frequency of Communication with Friends and Family:     Frequency of Social Gatherings with Friends and Family:     Attends Gnosticist Services:     Active Member of Clubs or Organizations:     Attends Club or Organization Meetings:     Marital Status:    Intimate Partner Violence:     Fear of Current or Ex-Partner:     Emotionally Abused:     Physically Abused:     Sexually Abused:        Current Outpatient Medications:     ALPRAZolam (XANAX) 0 5 mg tablet, Take 1 tablet (0 5 mg total) by mouth daily at bedtime as needed for anxiety or sleep, Disp: 20 tablet, Rfl: 1    ascorbic acid (VITAMIN C) 500 mg tablet, Take 500 mg by mouth daily, Disp: , Rfl:     aspirin 81 mg chewable tablet, Chew 1 tablet (81 mg total) daily (Patient taking differently: Chew 81 mg as needed ), Disp: 30 tablet, Rfl: 0    b complex vitamins tablet, Take 1 tablet by mouth daily, Disp: , Rfl:     Cholecalciferol (VITAMIN D-3) 1000 units CAPS, Daily, Disp: , Rfl:     dicyclomine (BENTYL) 10 mg capsule, Take 1 capsule (10 mg total) by mouth 4 (four) times a day (before meals and at bedtime) (Patient taking differently: Take 10 mg by mouth as needed ), Disp: 30 capsule, Rfl: 1    Magnesium 100 MG CAPS, magnesium  300 mg qd, Disp: , Rfl:     Melatonin 2 5 MG CHEW, Chew 1 tablet, Disp: , Rfl:     Multiple Vitamins-Calcium (ONE-A-DAY WOMENS PO), Take 1 tablet by mouth daily, Disp: , Rfl:     Omega-3 Fatty Acids (Fish Oil) 645 MG CAPS, Take by mouth, Disp: , Rfl:     omeprazole (PriLOSEC) 20 mg delayed release capsule, Take 1 capsule (20 mg total) by mouth daily before breakfast, Disp: 90 capsule, Rfl: 3    Riboflavin (VITAMIN B-2 PO), Take by mouth, Disp: , Rfl:     traZODone (DESYREL) 50 mg tablet, Take 1 tablet (50 mg total) by mouth daily at bedtime, Disp: 30 tablet, Rfl: 1    Review of Systems   Constitutional: Negative for appetite change, chills, fatigue, fever and unexpected weight change  HENT: Negative for congestion, ear pain, sore throat and trouble swallowing      Eyes: Negative for discharge, redness, itching and visual disturbance  Respiratory: Negative for cough, shortness of breath and wheezing  Cardiovascular: Negative for chest pain, palpitations and leg swelling  Gastrointestinal: Negative for abdominal pain, blood in stool, constipation, diarrhea, nausea and vomiting  Genitourinary: Negative for dysuria, flank pain, frequency, hematuria, pelvic pain and urgency  Skin: Positive for rash  Neurological: Negative for dizziness, facial asymmetry, weakness, numbness and headaches  Objective:    Vitals:    06/15/21 1112   BP: 120/80   Pulse: 78   Resp: 16   Temp: 98 2 °F (36 8 °C)   Weight: 77 7 kg (171 lb 6 4 oz)   Height: 5' 8" (1 727 m)        Physical Exam  Constitutional:       General: She is not in acute distress  Appearance: She is well-developed  Eyes:      Extraocular Movements: Extraocular movements intact  Conjunctiva/sclera: Conjunctivae normal       Pupils: Pupils are equal, round, and reactive to light  Cardiovascular:      Rate and Rhythm: Normal rate and regular rhythm  Heart sounds: Normal heart sounds  No murmur heard  Pulmonary:      Effort: Pulmonary effort is normal  No respiratory distress  Breath sounds: Normal breath sounds  Abdominal:      Palpations: Abdomen is not rigid  Musculoskeletal:      Right lower leg: No edema  Left lower leg: No edema  Lymphadenopathy:      Cervical: No cervical adenopathy  Skin:     Comments: Right cheek with several erythematous papules, no vesicles or pustules   Neurological:      Mental Status: She is alert and oriented to person, place, and time  Motor: Motor function is intact  Psychiatric:         Mood and Affect: Mood normal          Behavior: Behavior normal          Thought Content: Thought content normal         BMI Counseling: Body mass index is 26 06 kg/m²   The BMI is above normal  Nutrition recommendations include 3-5 servings of fruits/vegetables daily and consuming healthier snacks  Exercise recommendations include exercising 3-5 times per week

## 2021-06-15 NOTE — TELEPHONE ENCOUNTER
Dr Oneill Overall gave me the information and I wrote the letter and faxed it to [facility]  Patient took a copy with her

## 2021-06-16 ENCOUNTER — OFFICE VISIT (OUTPATIENT)
Dept: NEUROLOGY | Facility: CLINIC | Age: 76
End: 2021-06-16
Payer: COMMERCIAL

## 2021-06-16 VITALS
DIASTOLIC BLOOD PRESSURE: 73 MMHG | HEIGHT: 68 IN | BODY MASS INDEX: 25.91 KG/M2 | WEIGHT: 171 LBS | HEART RATE: 77 BPM | SYSTOLIC BLOOD PRESSURE: 141 MMHG

## 2021-06-16 DIAGNOSIS — G44.85 STABBING HEADACHE: ICD-10-CM

## 2021-06-16 DIAGNOSIS — G44.229 CHRONIC TENSION-TYPE HEADACHE, NOT INTRACTABLE: ICD-10-CM

## 2021-06-16 DIAGNOSIS — G43.009 MIGRAINE WITHOUT AURA AND WITHOUT STATUS MIGRAINOSUS, NOT INTRACTABLE: Primary | ICD-10-CM

## 2021-06-16 DIAGNOSIS — H53.9 VISUAL CHANGES: ICD-10-CM

## 2021-06-16 DIAGNOSIS — M54.2 CERVICALGIA: ICD-10-CM

## 2021-06-16 PROCEDURE — 1160F RVW MEDS BY RX/DR IN RCRD: CPT | Performed by: PSYCHIATRY & NEUROLOGY

## 2021-06-16 PROCEDURE — 3008F BODY MASS INDEX DOCD: CPT | Performed by: PSYCHIATRY & NEUROLOGY

## 2021-06-16 PROCEDURE — 1036F TOBACCO NON-USER: CPT | Performed by: PSYCHIATRY & NEUROLOGY

## 2021-06-16 PROCEDURE — 99215 OFFICE O/P EST HI 40 MIN: CPT | Performed by: PSYCHIATRY & NEUROLOGY

## 2021-06-16 RX ORDER — GABAPENTIN 100 MG/1
CAPSULE ORAL
Qty: 90 CAPSULE | Refills: 4 | Status: SHIPPED | OUTPATIENT
Start: 2021-06-16 | End: 2021-10-15

## 2021-06-16 NOTE — PROGRESS NOTES
[facility] [facility] Consult - Follow up   PATIENT:  Carmelo Betancur  MRN:  [id number]  :  1945  DATE OF SERVICE:  2021  REFERRED BY: No ref  provider found  PMD: Carmen Hutchison MD    Assessment/Plan:     Avel Jeong a 70 y  o  female with a past medical history of hyperparathyroidism, GERD, pericarditis, heart murmur, chronic gait instability, vitamin-D deficiency, insomnia, depression, anxiety referred here for evaluation of headache    Initial evaluation by me 2018  Follow-up 2019, 2019, 19, 10/09/2019, 2020, 2021     Migraine without aura without status migrainosus, not intractable  Chronic tension headaches   Stabbing headache  Visual changes    She has had migraines since childhood, she has followed with neurologist in the past back in [place]   She moved here to be closer to her daughter and granddaughter in the area  She has multiple types of headaches,  bioccipital pain, mid throbbing pain  Migraine is without aura and has associated nausea sometimes vomiting, stiff and sore neck, problems with concentration, photophobia, phonophobia, osmophobia, sometimes nasal congestion, lightheadedness   Migraines worse with any movement    - as of 2019 she reports headaches are significantly improved with lifestyle interventions  - as of 2019:  headaches 6 times a month and go away with ibuprofen   She has not followed up with eye doctor but has an appointment in July  - as of 2019: as of 19: headaches 6-8 a month - often in the am or with anxiety/stress - these she can breathe through it  Morning headaches the last 4-5 days, goes away with coffee usually, if not goes away with ibuprofen   Last big migraine 9 months or so ago   - As of 10/9/2019:  headaches - has not had in quite some time, around 8 a month and go away with ibuprofen, occasional sharp pains randomly for 5 minutes on various locations on head 1-2 times a week  No migraines in a year  - she is taking magnesium and riboflavin - she thinks this may be helping  - as of 03/2/2020: no longer having significant headaches, occasional am headaches that improve with coffee, taking mg and B2 for prevention  - as of 6/16/2021: She reports 1-2 months of near daily headaches that may last throughout the day, new type of stabbing headache and new onset left visual changes that lasted 4 days and resolved  Continue baby asa for now, ordered MRI Brain  Trial of gabapentin if she would like that may help with multiple things  Follow up with endocrine since hyperparathyroidism may be causing a lot of problems       Workup  - due to increased frequency and severity of headaches and migraines as well as new onset visual issues of left eye, I recommend further evaluation with MRI brain with without contrast to rule out structural or treatable causes of symptoms   - MRI Brain without contrast  7/9/19:   1   No acute intracranial abnormality  2   Mild nonspecific cerebral white matter signal abnormality, which can be seen in patients with migraines as well as microangiopathic disease  3   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   This may represent an anatomic variation   If concerned of vertebrobasilar insufficiency, consider follow-up CTA or contrast enhanced MRA imaging    - MRA head and neck/carotids 09/30/2019:  No large vessel flow restrictive disease      - 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   This could be reevaluated with CTA  -> per NSGY This requires no treatment and no further imaging is required either     - Minor short segment stenosis of the dominant right V1 origin   Mild long segment narrowing of the origin and nondominant left v1   Anterior circulation normal    - EMG/NCS 6/24/20: Mildly abnormal study    The reduced amplitudes noted on the eroneal  marker studies from extensor digitorum brevis muscles is likely due to reduced muscle bulk of muscles  The low amplitudes noted on the sensory studies in the lower extremities can be considered normal for this patient's age  These changes can be considered normal for this patient's age consider normal sural sensory response on the right and normal tibial responses, or could suggest a very mild axonal sensorimotor neuropathy  In addition, there is evidence to support a 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   To note, patient has history of remote carpal tunnel surgeries, could be remnant of prior surgery   - B12 3/10/20 721        Preventive:  - We discussed headache hygiene and lifestyle interventions that may improve her headaches   -  She has never been on a prescription  headache preventive medicine   - discussed headache preventative supplements including magnesium,  riboflavin   -  She will consider gabapentin 100 mg nightly for 1 week and then if needed/tolerated increase to 200 mg nightly and then if needed/tolerated increase to 300 mg nightly   Discussed proper use, possible side effects and risks      Abortive:  - past: fiorinal with codeine years ago, - 11/2018 prescribed rizatriptan for trial, however patient never filled as she has not had any severe migraine - never took and will hold off on triptan at this time until repeat MRI Brain   - Toradol IM has worked for in the past  - future options:  steroids, indomethacin, Toradol etc       Chronic neck pain   - we discussed this is not my area of expertise  - physical therapy in the past on her neck made it worse   - have referred her to physiatry in the past 3/2020 and she would like a new referral      Dizziness/Vertigo and gait abnormality  She reports a history of BPPV 2 years ago that got better with maneuvers including Epley per her description     - At visit 03/18/2019 she reported positional vertigo that improved with physical therapy maneuver 4/1/19     - She returns 06/03/2019 reporting positional vertigo again for the past week   Provoked by bending over, positions in bed   She also reports she feels like she cannot walk right and has to hold onto her  and daughter -Adenike Mcleod is new compared to the last episode  - as of 7/31/19: vertigo improved after denice hallpike at our last visit 6/3/19, lightheadedness continues occasionally  No recent falls, still occasional off balance, plans to see PT soon  - as of 10/09/2019: no falls, working with PT, getting orthotics, just one episode of vertigo for 5 mins since last visit   - as of 03/2/2020: no falls, no BPPV recently  Had 1 week of TMJ pain bilaterally, got better with antibiotics  Discussed following up with physiatry as well as orthopedic surgery which is already scheduled  Ordering EMG/NCS to further assess for neuropathy     - as of 6/16/2021: no vertigo, gait Does not appear to be neurologic without significant findings on EMG, normal B12, no findings to suggest neurologic cause on MRI brain  Has had PT and restarting PT soon  Following with ortho for valgus deformity     Insomnia  She reports chronic insomnia and that she currently gets less than 4 hr a night of sleep   Had initial visit 11/2018 we discussed sleep hygiene and she has stopped drinking coffee late at night which has helped some  Kaia Adams is wondering if a referral to sleep medicine would be indicated and I am happy to refer her   Likely multifactorial including possible psychogenic component versus other sleep disorder as well    - as of 06/03/2019: Aakashbritgautam Gutierres reports she did not go to sleep medicine appointment and tried cutting coffee and tea at dinner which helps somewhat  - as of 7/31/19: sleep sometimes good - was good for a while after starting mg, sometimes wakes up at night again now, possible symptoms of RLS and still only averaging 4-5 - will have her see  Sleep med   - as of 10/09/2019:  Did not follow up with sleep medicine as recommended  - as of 3/2/2020 reports she plans to follow up with sleep med soon, may have RLS? Or other sleep disorder   - as of 6/16/2021: sees sleep medicine next week        Patient instructions     For now agree with baby aspirin until MRI Brain done     Follow up with endocrine about what appears to be possible hyperparathyroidism     Referral to physiatry/physical medicine and rehab      Follow up with sleep Medicine      Headache/migraine treatment:   Abortive medications (for immediate treatment of a headache): It is ok to take ibuprofen, acetaminophen or naproxen (Advil, Tylenol,  Aleve, Excedrin) if they help your headaches you should limit these to No more than 3 times a week to avoid medication overuse/rebound headaches    For more severe headaches ok to take 600 mg ibuprofen     Over the counter preventive supplements for headaches/migraines   (to take every day to help prevent headaches - not to take at the time of headache):  [x] Magnesium 400mg daily (If any diarrhea or upset stomach, decrease dose  as tolerated)  [x] Riboflavin (Vitamin B2)  400mg daily   (FYI B2 may make your urine bright/neon yellow)     Prescription preventive medications for headaches/migraines   (to take every day to help prevent headaches - not to take at the time of headache):  [x] gabapentin 100 mg nightly for 1 week and then if needed/tolerated increase to 200 mg nightly and then if needed/tolerated increase to 300 mg nightly     *Typically these types of medications take time untill you see the benefit, although some may see improvement in days, often it may take weeks, especially if the medication is being titrated up to a beneficial level  Please contact us if there are any concerns or questions regarding the medication       Lifestyle Recommendations:  [x] SLEEP - Maintain a regular sleep schedule: Adults need at least 7-8 hours of uninterrupted a night   Maintain good sleep hygiene:  Going to bed and waking up at consistent times, avoiding excessive daytime naps, avoiding caffeinated beverages in the evening, avoid excessive stimulation in the evening and generally using bed primarily for sleeping   One hour before bedtime would recommend turning lights down lower, decreasing your activity (may read quietly, listen to music at a low volume)  When you get into bed, should eliminate all technology (no texting, emailing, playing with your phone, iPad or tablet in bed)  [x] HYDRATION - Maintain good hydration   Drink  2L of fluid a day (4 typical small water bottles)  [x] DIET - Maintain good nutrition  In particular don't skip meals and try and eat healthy balanced meals regularly  [x] TRIGGERS - Look for other triggers and avoid them: Limit caffeine to 1-2 cups a day or less  Avoid dietary triggers that you have noticed bring on your headaches (this could include aged cheese, peanuts, MSG, aspartame and nitrates)      Education and Follow-up  [x] Please call with any questions or concerns  Go to the ED with any new or concerning symptoms  [x] Follow up 2-3 months    - As we discussed if there are no abnormalities on the brain MRI that need urgent intervention (very often there are incidental findings that may not mean anything significant and are better discussed in person), you will not necessarily receive a call from us, but feel free to call in to check on the status of the report (since not knowing can be anxiety provoking) and the nurses will let you know the result or make sure I have nothing to pass on regarding the results first   Ideally, all of this will be discussed in detail in the follow-up visit           CC:    We had the pleasure of evaluating Nella Yancey in neurological consultation today  she is a right handed female who presents today for evaluation of headaches       History of Present Illness:   Interval history as of 6/16/2021  - following with eye doctor  - PTH high 3/10/20, she has had high calcium for years it appears, recently had bone density shows major issues - she sees endocrine this month - we discussed hyperparathyroidism can cause headaches    Dr Campos Babcock outside of [facility]  - 3 weeks ago had eye issue In left eye -  fireworks and line across for 4 days 5/2021, thinks it was just left eye, no headache associated, has not happened again - eye exam ok recently although she says he did not do a full exam     No vertigo episodes     Headaches and migraines   Almost daily headaches for the past 2 months  - bifrontal, bitemporal, apex - always both sides, sometimes lasts all day, if takes advil PM or 2 tylenol ED - helps it go away  No migraine symptoms  Also may get sharp pains at different points in head for 5 mins     Preventative: supplement  Abortive:   if takes advil PM or 2 tylenol ED - helps it go away       Chronic balance issues  Does not appear to be neurologic without significant findings on EMG, normal B12, no findings to suggest cause on MRI brain  -  Has worked with PT   - valgus deformity of gait - following with ortho   -  referred to physiatry - she can also discuss neck pain with them     Sleep - seeing next week     Interval history as of 03/2/2020:  - has an apmt upcoming with sleep medicine   - for about a week she was having TMJ pain and had old azithromycin and took it and it feels a little better   - then at night while laying in bed was having pain, strange feeling in her legs all the way up the legs   - wants to walk but afraid of falling   - has not had any BPPV since   - has appointment with sleep medicine   - taking mg and B2    Interval history as of 10/09/2019  -  Did not follow up with sleep medicine as recommended  - she has been following with physical therapy for gait instability/history of BPPV - also seeing them for shoulder  - getting orthotics for feet  - denies any falls     MRA head and neck 09/30/2019:  No large vessel flow restrictive disease      - 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   This could be reevaluated with CTA   - Minor short segment stenosis of the dominant right V1 origin   Mild long segment narrowing of the origin and nondominant left v1   Anterior circulation normal   - had Neurosurgery Dr Lisa Peterson review image and there may be nothing really there, even if there is would just be surveillance indicated  Will order CTA and have he is ok with having her follow up with him to review the images/discuss results just in case         Otherwise she is doing good  - chronic lightheadedness and vertigo history - 2 weeks ago with some vertigo and went away on its own in about 5 minutes  - headaches - has not had in quite some time, occasional sharp pains randomly for 5 minutes on various locations on head 1-2 times a week   - no big migraines in a year  - she is taking magnesium and riboflavin - she thinks this may be helping           Interval history as of 7/31/19  - MRI Brain without contrast  7/9/19:   1   No acute intracranial abnormality  2   Mild nonspecific cerebral white matter signal abnormality, which can be seen in patients with migraines as well as microangiopathic disease    3   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   This may represent an anatomic variation   If concerned of vertebrobasilar insufficiency, consider follow-up CTA or contrast enhanced MRA imaging       - she saw pulmonary 7/3/19  - followed up with cardiology and normal stress test per patient since last visit - they wanted to start metoprolol   -  has been in and out of the hospital for diverticulitis and then surgery for removing infected intestine so that has been taking some of her time   - daughter was pregnant and last the baby after 3 months     Lightheadedness/dizziness, h/o vertigo  Mild gait instability  Denies recent falls  *2-3 weeks of pain in the bottom of her right foot that she plans to see a foot doctor   - also has decreased sensation bilateral top of the toes and into the top of the shins for about 6 months   - she has not followed up with PT as recommended for gait and lightheadedness  - lightheadedness occurs the most with getting up   - the denice vernon last visit improved her vertigo 6/3/19     Migraines/headaches  Morning headaches the last 4-5 days, goes away with coffee usually, if not goes away with ibuprofen  Taking deep breaths and has not been having migraines   - taking magnesium and not riboflavin      Sleep   Sleep medicine  - sometimes feels sensations in bilateral shins when in bed  - falls asleep easily with TV on, stress though has affected her some  - usually have been sleeping straight through, past week wakes up to check on  once a night     --------------------     Interval history as of 06/03/2019:  Evelia Cardoso been following with physical therapy and has been noticing improvement in neck pain, - - first PT visit 04/01/2019 also significantly improved dizziness following 1st treatment - the found signs of BPPV - was vertigo free up for almost 2 months   - around 5/27/19: woke up and was lying in bed when suddenly had room spinning - sat up and it went away in a 3 mins - since then every day has happened when triggered - from bending over sometimes soon   - feels like she can not walk right, hold on to  and daughter   - reports LE shin numbness      - 05/02/2019 - phone conversation with Cardiology - metoprolol 25 mg long-acting  - referred to eye doctor - apmt in July  - referred to Sleep Medicine - did not go, tried cutting coffee and tea at dinner time and sleeps fine now   - magnesium, riboflavin - reports she is taking        Interval history as of  03/18/2019  - presented to the ED 01/06/2019 with chest pain - has a stress test next week      - her biggest complaint today is trouble sleeping, chronic dizziness   - Dizziness, history of BPPV 2 years ago, got better, scares her   Not dizzy with walking, more positional      Headaches  - never filled rizatriptan  - headaches are not really a complaint today: in a month 8 times but go away with ibuprofen  Stopped drinking caffeine at night  - was referred to physical therapy has not made an appointment  - recommended supplements including - taking magnesium, riboflavin may be in her B complex   - has joined the gym, loves walking      Plans to see eye doctor, they are tearing, no blurred or double vision            Headaches started at what age? Migraines Since childhood, didn't have them when pregnant x 2  How often do the headaches occur?   *As of 11/2018:   - migraines 3-4 times a week,   - Mild throbbing pain right scalp 3-4 times a week  - lasts hours, with advil goes away most of the time  - Occasionally for sharp pain throughout head for a second or two - once a week  - Also bilateral occipital pain 1-2 times a week    *As of 3/18/19: - headaches are not really a complaint today: in a month 8 times but go away with ibuprofen   *As of 06/03/2019: headaches 6 times a month and go away with ibuprofen  *as of 7/31/19: headaches 6-8 a month - often in the am or with anxiety/stress - these she can breathe through it   Morning headaches the last 4-5 days, goes away with coffee usually, if not goes away with ibuprofen  - As of 10/9/2019 headaches - has not had in quite some time, around 8 a month and go away with ibuprofen occasional sharp pains randomly for 5 minutes on various locations on head 1-2 times a week   No migraines in a year      What time of the day do the headaches start? no particular time of day  How long do the headaches last? 2-3 days in teenage years - now 1 full day and into the morning  Are you ever headache free? Yes  Prodrome of feeling like she is getting a headache  Aura? without aura  Describe your usual headache pain quality? throbbing  Where is your headache located? bilateral frontal area and bilateral occipital area, also right temporal   Does the pain Radiate? no radiation of pain  What is the intensity of pain? Up to a 10/10, at its best a 4  Associated symptoms:   [x] Nausea       [x] Vomiting - once in a while        [] Diarrhea         [] Insomnia           [x] Stiff or sore neck         [x] Problems with concentration  [x] Photophobia     [x]Phonophobia      [x] Osmophobia  [] Blurred vision   [x] Prefer quiet, dark room        [x] Light-headed or dizzy - sometimes   [] Tinnitus      Things that make the headache worse? +movement  Headache triggers:  Stress, mint,strong perfume, tobacco or fireplaces, If she goes to bed with a headache will wake up with worse one   What time of the year do headaches occur more frequently?   do not seem to be related to any time of the year  Have you seen someone else for headaches or pain? Yes, neurologist back in [place]  Have you had trigger point injection performed and how often? No  Have you had Botox injection performed and how often? No   Have you had epidural injections or transforaminal injections performed? No  Have you used CBD or THC for your headaches and how often? No  Are you current pregnant or planning on getting pregnant? No  Have you ever had any Brain imaging? yes years ago and normal     What medications do you take or have you taken for your headaches? ABORTIVE:       - was prescribed rizatriptan but did not need it   - advil 200 mg  - my pillow seems to be helping the bioccipital      PREVENTIVE: no     Alternative therapies used in the past for headaches? Physical therapy -  For wrist, but not headache   Coffee, has one in the morning  thank out the p m   Coffee     LIFESTYLE  Sleep - "if I get 4 hours I am johnnie"   - as of 06/03/2019:  Reports improved  As of 7/31/19 - avg 4-5   - sometimes feels sensations in bilateral shins when in bed  - falls asleep easily with TV on, stress though has affected her some  - usually have been sleeping straight through, past week wakes up to check on  once a night      as of 11/2018: Is your sleep restful? No, tired  What time do you go to bed at night? 10   What time do you wake up in am? Has coffee at 4:30 am with  and goes back to sleep, otherwise wakes up at 7 am  How often do you get up at night? once  Do you wake up with headaches? Sometimes   Do you snore while asleep? No  Have you been told that you stop breathing during sleeping? No - but makes noises  Do you wake up tired in the morning? Yes  Do you take frequent naps during the day? sometimes  Do you have jaw pain? No, but has TMJ  Do you grind/clench your teeth at night? No  Do you have restless leg syndrome? No     Physical activity:   Joined the [employer]  Goes once a week  Walks with her daughter  - considering line dancing or something slower at the [facility]  - has joined the gym, loves walking       Water: not enough - 4 glasses      Diet:  Do you ever skip meals?  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   Daughter, granddaughter live near by  Other two grandkids in [facility]     Illicit Drugs: denies  Alcohol/tobacco: Denies tobacco use, alcohol intake: social drinker    Past Medical History:     left PCOM origin infundibulum  - MRA head and neck 09/30/2019: No large vessel flow restrictive disease   - 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   This could be reevaluated with CTA  - Minor short segment stenosis of the dominant right V1 origin   Mild long segment narrowing of the origin and nondominant left v1   Anterior circulation normal   - CTA head with without contrast 10/28/2019:  No aneurysm identified at the site of the previously noted focal dilatation in the left supraclinoid ICA at the expected origin of the left posterior communicating artery  A left posterior communicating artery is not identified  The focal outpouching   may therefore represent anatomic variation versus residual junctional dilatation at the site of an atretic posterior communicating artery   - NSGY note 11/22/19 left PCOM origin infundibulum  This requires no treatment and no further imaging is required either      Past Medical History:   Diagnosis Date    Anxiety     Arthritis     Back pain     Balance problems     Chest pain     heaviness    Difficulty swallowing     Dizziness     Gait disorder     GERD (gastroesophageal reflux disease)     Hypertension     Increased frequency of headaches     Numbness and tingling     Palpitations     Pericarditis     Thyroid disease     Trouble in sleeping        Patient Active Problem List   Diagnosis    Closed fracture distal radius and ulna, right, initial encounter    Anxiety    Gastroesophageal reflux disease with esophagitis    History of pericarditis    Heart murmur    Primary hyperparathyroidism (HCC)    Thyroid nodule    Vitamin D deficiency    Chest pain    Migraine without aura and without status migrainosus, not intractable    Dizziness and giddiness    Insomnia    Cervicalgia    Abnormal CT scan of lung    Irritable bowel syndrome with diarrhea    Elevated amylase and lipase    Paresthesias    Carpal tunnel syndrome of left wrist    Dysphonia    Reflux laryngitis    Paresis of left vocal fold    Glottic insufficiency    Muscle tension dysphonia    TMJ dysfunction    Pharyngoesophageal dysphagia    Palpitations    Hallux abducto valgus, bilateral    Pincer nail deformity       Medications:      Current Outpatient Medications   Medication Sig Dispense Refill    ALPRAZolam (XANAX) 0 5 mg tablet Take 1 tablet (0 5 mg total) by mouth daily at bedtime as needed for anxiety or sleep 20 tablet 1    ascorbic acid (VITAMIN C) 500 mg tablet Take 500 mg by mouth daily      aspirin 81 mg chewable tablet Chew 1 tablet (81 mg total) daily (Patient taking differently: Chew 81 mg as needed ) 30 tablet 0    b complex vitamins tablet Take 1 tablet by mouth daily      Cholecalciferol (VITAMIN D-3) 1000 units CAPS Daily      dicyclomine (BENTYL) 10 mg capsule Take 1 capsule (10 mg total) by mouth 4 (four) times a day (before meals and at bedtime) (Patient taking differently: Take 10 mg by mouth as needed ) 30 capsule 1    Magnesium 100 MG CAPS magnesium   300 mg qd      Melatonin 2 5 MG CHEW Chew 1 tablet      metroNIDAZOLE (METROGEL) 0 75 % gel Apply topically 2 (two) times a day 45 g 0    Multiple Vitamins-Calcium (ONE-A-DAY WOMENS PO) Take 1 tablet by mouth daily      Omega-3 Fatty Acids (Fish Oil) 645 MG CAPS Take by mouth      omeprazole (PriLOSEC) 20 mg delayed release capsule Take 1 capsule (20 mg total) by mouth daily before breakfast 90 capsule 3    Riboflavin (VITAMIN B-2 PO) Take by mouth      traZODone (DESYREL) 50 mg tablet Take 1 tablet (50 mg total) by mouth daily at bedtime 30 tablet 1    valACYclovir (VALTREX) 500 mg tablet Take 1 tablet (500 mg total) by mouth 3 (three) times a day for 7 days 21 tablet 0     No current facility-administered medications for this visit  Allergies:       Allergies   Allergen Reactions    Ciprofloxacin Myalgia       Family History:     Family History   Problem Relation Age of Onset    Kidney failure Mother     Hypertension Mother     Lung cancer Father    René Conn Parkinson White syndrome Daughter     No Known Problems Sister     Substance Abuse Neg Hx     Alcohol abuse Neg Hx     Mental illness Neg Hx        Social History:     Social History     Socioeconomic History    Marital status: /Civil Union Spouse name: Not on file    Number of children: Not on file    Years of education: Not on file    Highest education level: Not on file   Occupational History    Not on file   Tobacco Use    Smoking status: Never Smoker    Smokeless tobacco: Never Used   Vaping Use    Vaping Use: Never used   Substance and Sexual Activity    Alcohol use: No    Drug use: No    Sexual activity: Not on file   Other Topics Concern    Not on file   Social History Narrative    WORK:    1   ([employer]; [employer]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    2  "[employer]'s        HOBBIES:    1  Singing    2  Dancing    3  Hx of ceramics (+ dust)        PETS:    1  Dogs    -  Denies birds        TRAVEL:    -  [place]         EXPOSURES:    Denies mold; down pillows/comforters; hot tubs     Social Determinants of Health     Financial Resource Strain:     Difficulty of Paying Living Expenses:    Food Insecurity:     Worried About Running Out of Food in the Last Year:     [address] in the Last Year:    Transportation Needs:     Lack of Transportation (Medical):      Lack of Transportation (Non-Medical):    Physical Activity:     Days of Exercise per Week:     Minutes of Exercise per Session:    Stress:     Feeling of Stress :    Social Connections:     Frequency of Communication with Friends and Family:     Frequency of Social Gatherings with Friends and Family:     Attends Latter-day Services:     Active Member of Clubs or Organizations:     Attends Club or Organization Meetings:     Marital Status:    Intimate Partner Violence:     Fear of Current or Ex-Partner:     Emotionally Abused:     Physically Abused:     Sexually Abused:          Objective:       Physical Exam:                                                                 Vitals:            Constitutional:    /73 (BP Location: Left arm, Patient Position: Sitting, Cuff Size: Standard)   Pulse 77   Ht 5' 8" (1 727 m)   Wt 77 6 kg (171 lb) BMI 26 00 kg/m²   BP Readings from Last 3 Encounters:   06/16/21 141/73   06/15/21 120/80   04/28/21 118/65     Pulse Readings from Last 3 Encounters:   06/16/21 77   06/15/21 78   04/28/21 60         Well developed, well nourished, well groomed  No dysmorphic features  Psychiatric:  Normal behavior and appropriate affect        Neurological Examination:     Mental status/cognitive function:   Attention span and concentration as well as fund of knowledge are appropriate for age  Normal language and spontaneous speech  Cranial Nerves:  III, IV, VI-Pupils were equal, round  Extraocular movements were full and conjugate   VII-facial expression symmetric  VIII-hearing grossly intact bilaterally   Motor Exam: symmetric bulk throughout  no atrophy, fasciculations or abnormal movements noted  Coordination:  no apparent dysmetria, ataxia or tremor noted  Gait: steady casual gait - a little unstable with valgus deformity bilaterally         Pertinent lab results:     05/04/2021 CMP unremarkable    9/25/19 - CMP unremarkable     6/10/19 CMP unremarkable  Thyroid studies normal Vit D 28     1/07/2019:  CMP and CBC unremarkable  TSH 0 78     Imaging: I have personally reviewed imaging and radiology read      MRA head and neck 09/30/2019:  No large vessel flow restrictive disease      - 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   This could be reevaluated with CTA    - Minor short segment stenosis of the dominant right V1 origin   Mild long segment narrowing of the origin and nondominant left v1   Anterior circulation normal      MRI Brain without contrast  7/9/19:   1   No acute intracranial abnormality  2   Mild nonspecific cerebral white matter signal abnormality, which can be seen in patients with migraines as well as microangiopathic disease    3   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   This may represent an anatomic variation   If concerned of vertebrobasilar insufficiency, consider follow-up CTA or contrast enhanced MRA imaging    Review of Systems:   ROS obtained by medical assistant Personally reviewed and updated if indicated  Review of Systems   Constitutional: Negative  Negative for appetite change and fever  HENT: Negative  Negative for hearing loss, tinnitus, trouble swallowing and voice change  Eyes: Negative  Negative for photophobia and pain  Respiratory: Negative  Negative for shortness of breath  Cardiovascular: Negative  Negative for palpitations  Gastrointestinal: Negative  Negative for nausea and vomiting  Endocrine: Negative  Negative for cold intolerance  Genitourinary: Negative  Negative for dysuria, frequency and urgency  Musculoskeletal: Positive for gait problem  Negative for myalgias  Skin: Negative  Negative for rash  Neurological: Positive for headaches (daily)  Negative for dizziness, tremors, seizures, syncope, facial asymmetry, speech difficulty, weakness, light-headedness and numbness  Hematological: Negative  Does not bruise/bleed easily  Psychiatric/Behavioral: Negative  Negative for confusion, hallucinations and sleep disturbance  I have spent 40 minutes with Patient  today in which greater than 50% of this time was spent in counseling/coordination of care  I also spent 8 minutes non face to face for this patient the same day         Author:  Lorne Bautista MD 6/16/2021 8:57 AM

## 2021-06-16 NOTE — PATIENT INSTRUCTIONS
For now agree with baby aspirin until MRI Brain done     Follow up with endocrine about what appears to be possible hyperparathyroidism     Referral to physiatry/physical medicine and rehab      Follow up with sleep Medicine      Headache/migraine treatment:   Abortive medications (for immediate treatment of a headache): It is ok to take ibuprofen, acetaminophen or naproxen (Advil, Tylenol,  Aleve, Excedrin) if they help your headaches you should limit these to No more than 3 times a week to avoid medication overuse/rebound headaches    For more severe headaches ok to take 600 mg ibuprofen     Over the counter preventive supplements for headaches/migraines   (to take every day to help prevent headaches - not to take at the time of headache):  [x] Magnesium 400mg daily (If any diarrhea or upset stomach, decrease dose  as tolerated)  [x] Riboflavin (Vitamin B2)  400mg daily   (FYI B2 may make your urine bright/neon yellow)     Prescription preventive medications for headaches/migraines   (to take every day to help prevent headaches - not to take at the time of headache):  [x] gabapentin 100 mg nightly for 1 week and then if needed/tolerated increase to 200 mg nightly and then if needed/tolerated increase to 300 mg nightly     *Typically these types of medications take time untill you see the benefit, although some may see improvement in days, often it may take weeks, especially if the medication is being titrated up to a beneficial level  Please contact us if there are any concerns or questions regarding the medication       Lifestyle Recommendations:  [x] SLEEP - Maintain a regular sleep schedule: Adults need at least 7-8 hours of uninterrupted a night   Maintain good sleep hygiene:  Going to bed and waking up at consistent times, avoiding excessive daytime naps, avoiding caffeinated beverages in the evening, avoid excessive stimulation in the evening and generally using bed primarily for sleeping   One hour before bedtime would recommend turning lights down lower, decreasing your activity (may read quietly, listen to music at a low volume)  When you get into bed, should eliminate all technology (no texting, emailing, playing with your phone, iPad or tablet in bed)  [x] HYDRATION - Maintain good hydration   Drink  2L of fluid a day (4 typical small water bottles)  [x] DIET - Maintain good nutrition  In particular don't skip meals and try and eat healthy balanced meals regularly  [x] TRIGGERS - Look for other triggers and avoid them: Limit caffeine to 1-2 cups a day or less  Avoid dietary triggers that you have noticed bring on your headaches (this could include aged cheese, peanuts, MSG, aspartame and nitrates)      Education and Follow-up  [x] Please call with any questions or concerns  Go to the ED with any new or concerning symptoms  [x] Follow up 2-3 months    - As we discussed if there are no abnormalities on the brain MRI that need urgent intervention (very often there are incidental findings that may not mean anything significant and are better discussed in person), you will not necessarily receive a call from us, but feel free to call in to check on the status of the report (since not knowing can be anxiety provoking) and the nurses will let you know the result or make sure I have nothing to pass on regarding the results first   Ideally, all of this will be discussed in detail in the follow-up visit

## 2021-06-16 NOTE — PROGRESS NOTES
Review of Systems   Constitutional: Negative  Negative for appetite change and fever  HENT: Negative  Negative for hearing loss, tinnitus, trouble swallowing and voice change  Eyes: Negative  Negative for photophobia and pain  Respiratory: Negative  Negative for shortness of breath  Cardiovascular: Negative  Negative for palpitations  Gastrointestinal: Negative  Negative for nausea and vomiting  Endocrine: Negative  Negative for cold intolerance  Genitourinary: Negative  Negative for dysuria, frequency and urgency  Musculoskeletal: Positive for gait problem and neck pain  Negative for myalgias  Skin: Negative  Negative for rash  Neurological: Positive for headaches (daily)  Negative for dizziness, tremors, seizures, syncope, facial asymmetry, speech difficulty, weakness, light-headedness and numbness  Hematological: Negative  Does not bruise/bleed easily  Psychiatric/Behavioral: Negative  Negative for confusion, hallucinations and sleep disturbance

## 2021-06-22 ENCOUNTER — TELEPHONE (OUTPATIENT)
Dept: FAMILY MEDICINE CLINIC | Facility: CLINIC | Age: 76
End: 2021-06-22

## 2021-06-22 ENCOUNTER — OFFICE VISIT (OUTPATIENT)
Dept: SLEEP CENTER | Facility: CLINIC | Age: 76
End: 2021-06-22
Payer: COMMERCIAL

## 2021-06-22 VITALS
DIASTOLIC BLOOD PRESSURE: 80 MMHG | WEIGHT: 175.2 LBS | BODY MASS INDEX: 26.55 KG/M2 | SYSTOLIC BLOOD PRESSURE: 130 MMHG | HEIGHT: 68 IN

## 2021-06-22 DIAGNOSIS — G47.00 INSOMNIA, UNSPECIFIED TYPE: ICD-10-CM

## 2021-06-22 PROCEDURE — 99204 OFFICE O/P NEW MOD 45 MIN: CPT | Performed by: INTERNAL MEDICINE

## 2021-06-22 NOTE — TELEPHONE ENCOUNTER
Patient is on her final day/dose of Valtrex  She took for 6 days  She woke up this morning and her feet and ankles are swollen and diarrhea  The rash on her face has cleared up  Can she skip the final day?

## 2021-06-22 NOTE — TELEPHONE ENCOUNTER
Yes, ok to skip the last day  She should also reduce salty foods and keep her legs elevated when possible  If her symptoms persist, she should come for a visit
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
## 2021-06-25 ENCOUNTER — APPOINTMENT (OUTPATIENT)
Dept: LAB | Facility: CLINIC | Age: 76
End: 2021-06-25
Payer: COMMERCIAL

## 2021-06-25 DIAGNOSIS — E55.9 VITAMIN D DEFICIENCY DISEASE: ICD-10-CM

## 2021-06-25 DIAGNOSIS — E04.1 THYROID NODULE: ICD-10-CM

## 2021-06-25 LAB
25(OH)D3 SERPL-MCNC: 33.6 NG/ML (ref 30–100)
ALBUMIN SERPL BCP-MCNC: 3.5 G/DL (ref 3.5–5)
ALP SERPL-CCNC: 109 U/L (ref 46–116)
ALT SERPL W P-5'-P-CCNC: 39 U/L (ref 12–78)
ANION GAP SERPL CALCULATED.3IONS-SCNC: 6 MMOL/L (ref 4–13)
AST SERPL W P-5'-P-CCNC: 25 U/L (ref 5–45)
BILIRUB SERPL-MCNC: 0.85 MG/DL (ref 0.2–1)
BUN SERPL-MCNC: 25 MG/DL (ref 5–25)
CALCIUM SERPL-MCNC: 10.2 MG/DL (ref 8.3–10.1)
CHLORIDE SERPL-SCNC: 105 MMOL/L (ref 100–108)
CO2 SERPL-SCNC: 27 MMOL/L (ref 21–32)
CREAT SERPL-MCNC: 0.82 MG/DL (ref 0.6–1.3)
GFR SERPL CREATININE-BSD FRML MDRD: 71 ML/MIN/1.73SQ M
GLUCOSE P FAST SERPL-MCNC: 87 MG/DL (ref 65–99)
POTASSIUM SERPL-SCNC: 4.5 MMOL/L (ref 3.5–5.3)
PROT SERPL-MCNC: 7.6 G/DL (ref 6.4–8.2)
SODIUM SERPL-SCNC: 138 MMOL/L (ref 136–145)
T4 FREE SERPL-MCNC: 1 NG/DL (ref 0.76–1.46)
TSH SERPL DL<=0.05 MIU/L-ACNC: 0.9 UIU/ML (ref 0.36–3.74)

## 2021-06-25 PROCEDURE — 84439 ASSAY OF FREE THYROXINE: CPT

## 2021-06-25 PROCEDURE — 84443 ASSAY THYROID STIM HORMONE: CPT

## 2021-06-25 PROCEDURE — 82306 VITAMIN D 25 HYDROXY: CPT

## 2021-06-25 PROCEDURE — 80053 COMPREHEN METABOLIC PANEL: CPT

## 2021-06-25 PROCEDURE — 36415 COLL VENOUS BLD VENIPUNCTURE: CPT

## 2021-07-02 ENCOUNTER — OFFICE VISIT (OUTPATIENT)
Dept: OBGYN CLINIC | Facility: CLINIC | Age: 76
End: 2021-07-02
Payer: COMMERCIAL

## 2021-07-02 VITALS
BODY MASS INDEX: 26.52 KG/M2 | HEIGHT: 68 IN | WEIGHT: 175 LBS | SYSTOLIC BLOOD PRESSURE: 141 MMHG | DIASTOLIC BLOOD PRESSURE: 88 MMHG | HEART RATE: 84 BPM

## 2021-07-02 DIAGNOSIS — M71.22 BAKER'S CYST OF KNEE, LEFT: Primary | ICD-10-CM

## 2021-07-02 DIAGNOSIS — M17.0 PRIMARY OSTEOARTHRITIS OF BOTH KNEES: ICD-10-CM

## 2021-07-02 DIAGNOSIS — M71.21 BAKER'S CYST OF KNEE, RIGHT: ICD-10-CM

## 2021-07-02 PROCEDURE — 1160F RVW MEDS BY RX/DR IN RCRD: CPT | Performed by: ORTHOPAEDIC SURGERY

## 2021-07-02 PROCEDURE — 3008F BODY MASS INDEX DOCD: CPT | Performed by: ORTHOPAEDIC SURGERY

## 2021-07-02 PROCEDURE — 99213 OFFICE O/P EST LOW 20 MIN: CPT | Performed by: ORTHOPAEDIC SURGERY

## 2021-07-02 PROCEDURE — 1036F TOBACCO NON-USER: CPT | Performed by: ORTHOPAEDIC SURGERY

## 2021-07-02 NOTE — PROGRESS NOTES
Ortho Sports Medicine Knee Follow Up Visit     Assesment:     70 y o  female bilateral knee severe lateral compartment OA with symptomatic baker's cysts    Plan:    Conservative treatment:    Ice to knee for 20 minutes at least 1-2 times daily  OTC NSAIDS prn for pain  Imaging: All imaging from today was reviewed by myself and explained to the patient  Injection:    Referred for US guided aspiration of bakers cyst per patient request, IA csi or visco denied by patient      Surgery:     No surgery is recommended at this point, continue with conservative treatment plan as noted  Follow up:    Return in about 6 weeks (around 8/13/2021) for Recheck  Chief Complaint   Patient presents with    Left Knee - Follow-up    Right Knee - Follow-up       History of Present Illness: The patient is returns for follow up of bilateral knee pain with baker's cysts  Since the prior visit, She reports no improvement  At her last appointment she received bilateral knee visco supplementation injections and she states that this provided her with improvement for many months  She states only recently has she had exacerbation of pain in the posterior aspect of the knees  She states that the left is worse than right  But she is having pain and swelling  Pain is located posterior  Pain is improved by rest, ice, NSAIDS and injection  Pain is aggravated by stairs, squatting, weight bearing and walking  Symptoms include clicking, catching and swelling       The patient has tried rest, ice, NSAIDS, physical therapy and injection, CSI and visco           Knee Surgical History:  None    Past Medical, Social and Family History:  Past Medical History:   Diagnosis Date    Anxiety     Arthritis     Back pain     Balance problems     Chest pain     heaviness    Difficulty swallowing     Dizziness     Gait disorder     GERD (gastroesophageal reflux disease)     Hypertension     Increased frequency of headaches     Numbness and tingling     Palpitations     Pericarditis     Thyroid disease     Trouble in sleeping      Past Surgical History:   Procedure Laterality Date    APPENDECTOMY      CHOLECYSTECTOMY      laparoscopic    COLONOSCOPY      KNEE SURGERY Left     PERICARDIAL WINDOW      NV OPEN RX DISTAL RADIUS FX, EXTRA-ARTICULAR Right 3/22/2018    Procedure: OPEN REDUCTION W/ INTERNAL FIXATION (ORIF) RADIUS, splint application;  Surgeon: Max Chamberlain MD;  Location: [facility];  Service: Orthopedics    UPPER GASTROINTESTINAL ENDOSCOPY       Allergies   Allergen Reactions    Ciprofloxacin Myalgia     Current Outpatient Medications on File Prior to Visit   Medication Sig Dispense Refill    ALPRAZolam (XANAX) 0 5 mg tablet Take 1 tablet (0 5 mg total) by mouth daily at bedtime as needed for anxiety or sleep 20 tablet 1    ascorbic acid (VITAMIN C) 500 mg tablet Take 500 mg by mouth daily      aspirin 81 mg chewable tablet Chew 1 tablet (81 mg total) daily (Patient taking differently: Chew 81 mg as needed ) 30 tablet 0    b complex vitamins tablet Take 1 tablet by mouth daily      Cholecalciferol (VITAMIN D-3) 1000 units CAPS Daily      dicyclomine (BENTYL) 10 mg capsule Take 1 capsule (10 mg total) by mouth 4 (four) times a day (before meals and at bedtime) (Patient taking differently: Take 10 mg by mouth as needed ) 30 capsule 1    gabapentin (NEURONTIN) 100 mg capsule gabapentin 100 mg nightly for 1 week and then if needed/tolerated increase to 200 mg nightly and then if needed/tolerated increase to 300 mg nightly 90 capsule 4    Magnesium 100 MG CAPS magnesium   300 mg qd      Melatonin 2 5 MG CHEW Chew 1 tablet      metroNIDAZOLE (METROGEL) 0 75 % gel Apply topically 2 (two) times a day 45 g 0    Multiple Vitamins-Calcium (ONE-A-DAY WOMENS PO) Take 1 tablet by mouth daily      Omega-3 Fatty Acids (Fish Oil) 645 MG CAPS Take by mouth      omeprazole (PriLOSEC) 20 mg delayed release capsule Take 1 capsule (20 mg total) by mouth daily before breakfast 90 capsule 3    Riboflavin (VITAMIN B-2 PO) Take by mouth      traZODone (DESYREL) 50 mg tablet Take 1 tablet (50 mg total) by mouth daily at bedtime 30 tablet 1    valACYclovir (VALTREX) 500 mg tablet Take 1 tablet (500 mg total) by mouth 3 (three) times a day for 7 days 21 tablet 0     No current facility-administered medications on file prior to visit  Social History     Socioeconomic History    Marital status: /Civil Union     Spouse name: Not on file    Number of children: Not on file    Years of education: Not on file    Highest education level: Not on file   Occupational History    Not on file   Tobacco Use    Smoking status: Never Smoker    Smokeless tobacco: Never Used   Vaping Use    Vaping Use: Never used   Substance and Sexual Activity    Alcohol use: No    Drug use: No    Sexual activity: Not on file   Other Topics Concern    Not on file   Social History Narrative    WORK:    1   ([employer]; [address]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    2  [employer]        HOBBIES:    1  Singing    2  Dancing    3  Hx of ceramics (+ dust)        PETS:    1  Dogs    -  Denies birds        TRAVEL:    -  [place]         EXPOSURES:    Denies mold; down pillows/comforters; hot tubs     Social Determinants of Health     Financial Resource Strain:     Difficulty of Paying Living Expenses:    Food Insecurity:     Worried About Running Out of Food in the Last Year:     [address] in the Last Year:    Transportation Needs:     Lack of Transportation (Medical):      Lack of Transportation (Non-Medical):    Physical Activity:     Days of Exercise per Week:     Minutes of Exercise per Session:    Stress:     Feeling of Stress :    Social Connections:     Frequency of Communication with Friends and Family:     Frequency of Social Gatherings with Friends and Family:     Attends [employer] Services:  Active Member of Clubs or Organizations:     Attends Club or Organization Meetings:     Marital Status:    Intimate Partner Violence:     Fear of Current or Ex-Partner:     Emotionally Abused:     Physically Abused:     Sexually Abused:          I have reviewed the past medical, surgical, social and family history, medications and allergies as documented in the EMR  Review of systems: ROS is negative other than that noted in the HPI  Constitutional: Negative for fatigue and fever  Physical Exam:    Blood pressure 141/88, pulse 84, height 5' 8" (1 727 m), weight 79 4 kg (175 lb)  General/Constitutional: NAD, well developed, well nourished  HENT: Normocephalic, atraumatic  CV: Intact distal pulses, regular rate  Resp: No respiratory distress or labored breathing  Lymphatic: No lymphadenopathy palpated  Neuro: Alert and Oriented x 3, no focal deficits  Psych: Normal mood, normal affect, normal judgement, normal behavior  Skin: Warm, dry, no rashes, no erythema      Knee Exam (focused): RIGHT LEFT   ROM:   0-130 0-130   Palpation: Effusion negative negative     MJL tenderness Positive Positive     LJL tenderness Positive Positive   Meniscus:  Kong Negative Negative    Apley's Compression Negative Negative   Instability: Varus stable stable     Valgus stable stable   Special Tests: Lachman Negative Negative     Posterior drawer Negative Negative     Anterior drawer Negative Negative     Pivot shift not tested not tested     Dial not tested not tested   Patella: Palpation no tenderness no tenderness     Mobility 1/4 1/4     Apprehension Negative Negative   Other: Single leg 1/4 squat not tested not tested      B/L baker's cysts     LE NV Exam: +2 DP/PT pulses bilaterally  Sensation intact to light touch L2-S1 bilaterally    No calf tenderness to palpation bilaterally      Knee Imaging    No imaging was performed today

## 2021-07-06 ENCOUNTER — TELEPHONE (OUTPATIENT)
Dept: GASTROENTEROLOGY | Facility: CLINIC | Age: 76
End: 2021-07-06

## 2021-07-06 NOTE — TELEPHONE ENCOUNTER
I spoke to the patient  She was concern in regards to medication Omeprazole  She had a bone density test done  Pt stated her hip bones are very fragile  Patient's endocrinologist suggested to her to discontinued the omeprazole  Pt is going to wait until her follow up appointment with Dr Simeon Scruggs and his suggestion

## 2021-07-06 NOTE — TELEPHONE ENCOUNTER
Pt called requesting a CB from Dr Simeon Scruggs reguarding medication problem  Stated she was told to reach out to the office  Please advise

## 2021-07-13 ENCOUNTER — VBI (OUTPATIENT)
Dept: ADMINISTRATIVE | Facility: OTHER | Age: 76
End: 2021-07-13

## 2021-07-19 ENCOUNTER — HOSPITAL ENCOUNTER (OUTPATIENT)
Dept: MRI IMAGING | Facility: HOSPITAL | Age: 76
Discharge: HOME | End: 2021-07-19
Attending: PSYCHIATRY & NEUROLOGY
Payer: COMMERCIAL

## 2021-07-19 DIAGNOSIS — G43.009 MIGRAINE WITHOUT AURA AND WITHOUT STATUS MIGRAINOSUS, NOT INTRACTABLE: ICD-10-CM

## 2021-07-19 DIAGNOSIS — H53.9 VISUAL CHANGES: ICD-10-CM

## 2021-07-19 PROCEDURE — 70551 MRI BRAIN STEM W/O DYE: CPT

## 2021-07-19 PROCEDURE — G1004 CDSM NDSC: HCPCS

## 2021-07-23 ENCOUNTER — OFFICE VISIT (OUTPATIENT)
Dept: FAMILY MEDICINE CLINIC | Facility: CLINIC | Age: 76
End: 2021-07-23
Payer: COMMERCIAL

## 2021-07-23 VITALS
RESPIRATION RATE: 15 BRPM | SYSTOLIC BLOOD PRESSURE: 130 MMHG | WEIGHT: 175.6 LBS | BODY MASS INDEX: 26.7 KG/M2 | TEMPERATURE: 98.4 F | HEART RATE: 70 BPM | DIASTOLIC BLOOD PRESSURE: 84 MMHG

## 2021-07-23 DIAGNOSIS — M81.0 AGE-RELATED OSTEOPOROSIS WITHOUT CURRENT PATHOLOGICAL FRACTURE: ICD-10-CM

## 2021-07-23 DIAGNOSIS — K21.9 GASTROESOPHAGEAL REFLUX DISEASE, UNSPECIFIED WHETHER ESOPHAGITIS PRESENT: Primary | ICD-10-CM

## 2021-07-23 PROCEDURE — 99213 OFFICE O/P EST LOW 20 MIN: CPT | Performed by: FAMILY MEDICINE

## 2021-07-23 PROCEDURE — 1101F PT FALLS ASSESS-DOCD LE1/YR: CPT | Performed by: FAMILY MEDICINE

## 2021-07-23 PROCEDURE — 3288F FALL RISK ASSESSMENT DOCD: CPT | Performed by: FAMILY MEDICINE

## 2021-07-23 PROCEDURE — 3725F SCREEN DEPRESSION PERFORMED: CPT | Performed by: FAMILY MEDICINE

## 2021-07-23 RX ORDER — FAMOTIDINE 20 MG/1
20 TABLET, FILM COATED ORAL
COMMUNITY
Start: 2021-07-23 | End: 2021-10-15

## 2021-07-23 NOTE — PATIENT INSTRUCTIONS
Core Strengthening Exercises   AMBULATORY CARE:   What you need to know about core strengthening exercises: Your core includes the muscles of your lower back, hip, pelvis, and abdomen  Core strengthening exercises help heal and strengthen these muscles  This helps prevent another injury, and keeps your pelvis, spine, and hips in the correct position  Contact your healthcare provider if:   · You have sharp or worsening pain during exercise or at rest     · You have questions or concerns about your shoulder exercises  Safety tips:  Talk to your healthcare provider before you start an exercise program  A physical therapist can teach you how to do core strengthening exercises safely  · Do the exercises on a mat or firm surface  A firm surface will support your spine and prevent low back pain  Do not do these exercises on a bed  · Move slowly and smoothly  Avoid fast or jerky motions  · Stop if you feel pain  Core exercises should not be painful  Stop if you feel pain  · Breathe normally during core exercises  Do not hold your breath  This may cause an increase in blood pressure and prevent muscle strengthening  Your healthcare provider will tell you when to inhale and exhale during the exercise  · Begin all of your exercises with abdominal bracing  Abdominal bracing helps warm up your core muscles  You can also practice abdominal bracing throughout the day  Lie on your back with your knees bent and feet flat on the floor  Place your arms in a relaxed position beside your body  Tighten your abdominal muscles  Pull your belly button in and up toward your spine  Hold for 5 seconds  Relax your muscles  Repeat 10 times  Core strengthening exercises: Your healthcare provider will tell you how often to do these exercises  The provider will also tell you how many repetitions of each exercise you should do  Hold each exercise for 5 seconds or as directed   As you get stronger, increase your hold to 10 to 15 seconds  You can do some of these exercises on a stability ball, or with a weight  Ask your healthcare provider how to use a stability ball or weight for these exercises:  · Bridging:  Lie on your back with your knees bent and feet flat on the floor  Rest your arms at your side  Tighten your buttocks, and then lift your hips 1 inch off the floor  Hold for 5 seconds  When you can do this exercise without pain for 10 seconds, increase the distance you lift your hips  A good goal is to be able to lift your hips so that your shoulders, hips, and knees are in a straight line  · Dead bug:  Lie on your back with your knees bent and feet flat on the floor  Place your arms in a relaxed position beside your body  Begin with abdominal bracing  Next, raise one leg, keeping your knee bent  Hold for 5 seconds  Repeat with the other leg  When you can do this exercise without pain for 10 to 15 seconds, you may raise one straight leg and hold  Repeat with the other leg  · Quadruped:  Place your hands and knees on the floor  Keep your wrists directly below your shoulders and your knees directly below your hips  Pull your belly button in toward your spine  Do not flatten or arch your back  Tighten your abdominal muscles below your belly button  Hold for 5 seconds  When you can do this exercise without pain for 10 to 15 seconds, you may extend one arm and hold  Repeat on the other side  · Side bridge exercises:      ? Standing side bridge:  Stand next to a wall and extend one arm toward the wall  Place your palm flat on the wall with your fingers pointing upward  Begin with abdominal bracing  Next, without moving your feet, slowly bend your arm to 90 degrees  Hold for 5 seconds  Repeat on the other side  When you can do this exercise without pain for 10 to 15 seconds, you may do the bent leg side bridge on the floor  ?  Bent leg side bridge:  Lie on one side with your legs, hips, and shoulders in a straight line  Prop yourself up onto your forearm so your elbow is directly below your shoulder  Bend your knees back to 90 degrees  Begin with abdominal bracing  Next, lift your hips and balance yourself on your forearm and knees  Hold for 5 seconds  Repeat on the other side  When you can do this exercise without pain for 10 to 15 seconds, you may do the straight leg side bridge on the floor  ? Straight leg side bridge:  Lie on one side with your legs, hips, and shoulders in a straight line  Prop yourself up onto your forearm so your elbow is directly below your shoulder  Begin with abdominal bracing  Lift your hips off the floor and balance yourself on your forearm and the outside of your flexed foot  Do not let your ankle bend sideways  Hold for 5 seconds  Repeat on the other side  When you can do this exercise without pain for 10 to 15 seconds, ask your healthcare provider for more advanced exercises  · Superman:  Lie on your stomach  Extend your arms forward on the floor  Tighten your abdominal muscles and lift your right hand and left leg off the floor  Hold this position  Slowly return to the starting position  Tighten your abdominal muscles and lift your left hand and right leg off the floor  Hold this position  Slowly return to the starting position  · Clam:  Lie on your side with your knees bent  Put your bottom arm under your head to keep your neck in line  Put your top hand on your hip to keep your pelvis from moving  Put your heels together, and keep them together during this exercise  Slowly raise your top knee toward the ceiling  Then lower your leg so your knees are together  Repeat this exercise 10 times  Then switch sides and do the exercise 10 times with the other leg  · Curl up:  Lie on your back with your knees bent and feet flat on the floor  Place your hands, palms down, underneath your lower back   Next, with your elbows on the floor, lift your shoulders and chest 2 to 3 inches off the floor  Keep your head in line with your shoulders  Hold this position  Slowly return to the starting position  · Straight leg raises:  Lie on your back with one leg straight  Bend the other knee and place your foot flat on the floor  Tighten your abdominal muscles  Keep your leg straight and slowly lift it straight up 6 to 12 inches off the floor  Hold this position  Lower your leg slowly  Do as many repetitions as directed on this side  Repeat with the other leg  · Plank:  Lie on your stomach  Bend your elbows and place your forearms flat on the floor  Lift your chest, stomach, and knees off the floor  Make sure your elbows are below your shoulders  Your body should be in a straight line  Do not let your hips or lower back sink to the ground  Squeeze your abdominal muscles together and hold for 15 seconds  To make this exercise harder, hold for 30 seconds or lift 1 leg at a time  · Bicycles:  Lie on your back  Bend both knees and bring them toward your chest  Your calves should be parallel to the floor  Place the palms of your hands on the back of your head  Straighten your right leg and keep it lifted 2 inches off the floor  Raise your head and shoulders off the floor and twist towards your left  Keep your head and shoulders lifted  Bend your right knee while you straighten your left leg  Keep your left leg 2 inches off the floor  Twist your head and chest towards the left leg  Continue to straighten 1 leg at a time and twist        Follow up with your healthcare provider as directed:  Write down your questions so you remember to ask them during your visits  © Copyright [employer] 2021 Information is for End User's use only and may not be sold, redistributed or otherwise used for commercial purposes   All illustrations and images included in CareNotes® are the copyrighted property of A D [employer] Inc  or [facility] [employer] [place]   The above information is an  only  It is not intended as medical advice for individual conditions or treatments  Talk to your doctor, nurse or pharmacist before following any medical regimen to see if it is safe and effective for you

## 2021-07-23 NOTE — PROGRESS NOTES
Assessment/Plan:    1  Gastroesophageal reflux disease  - advised trial of Pepcid 20 mg 1-2 times a day, reviewed dietary and life style changes and advised to follow up with GI as scheduled  - famotidine (PEPCID) 20 mg tablet; Take 1 tablet (20 mg total) by mouth 2 (two) times a day before meals    2  Osteoporosis   - will obtain recent DEXA scan results, discussed treatment options, advised to continue daily MVI And vitamin D supplements, discussed regular weight bearing and muscle strengthening exercise       Return for next scheduled follow up or sooner as needed  The patient understands and agrees with the treatment plan  Subjective:   Chief Complaint   Patient presents with    GI issues      Patient ID: Jarrell Archer is a 70 y o  female who presents today to discuss changing her omeprazole  Patient is following with Endocrine Dr Preston Burk in [address] she recently had DEXA scan on 06/30/21 and was advised to start treatment for osteoporosis with either Prolia or Reclast  She was also suggested to discontinue he omeprazole  Patient has tried OTC Pepcid with good response, she is scheduled to see GI next week           The following portions of the patient's history were reviewed and updated as appropriate: allergies, current medications, past family history, past medical history, past social history, past surgical history and problem list     Past Medical History:   Diagnosis Date    Anxiety     Arthritis     Back pain     Balance problems     Chest pain     heaviness    Difficulty swallowing     Dizziness     Gait disorder     GERD (gastroesophageal reflux disease)     Hypertension     Increased frequency of headaches     Numbness and tingling     Palpitations     Pericarditis     Thyroid disease     Trouble in sleeping      Past Surgical History:   Procedure Laterality Date    APPENDECTOMY      CHOLECYSTECTOMY      laparoscopic    COLONOSCOPY      KNEE SURGERY Left     PERICARDIAL WINDOW      [place] OPEN RX DISTAL RADIUS FX, EXTRA-ARTICULAR Right 3/22/2018    Procedure: OPEN REDUCTION W/ INTERNAL FIXATION (ORIF) RADIUS, splint application;  Surgeon: Natalie Little MD;  [facility];  Service: Orthopedics    UPPER GASTROINTESTINAL ENDOSCOPY       Family History   Problem Relation Age of Onset    Kidney failure Mother     Hypertension Mother     Lung cancer Father    Louis Dougherty Parkinson White syndrome Daughter     No Known Problems Sister     Substance Abuse Neg Hx     Alcohol abuse Neg Hx     Mental illness Neg Hx      Social History     Socioeconomic History    Marital status: /Civil Union     Spouse name: Not on file    Number of children: Not on file    Years of education: Not on file    Highest education level: Not on file   Occupational History    Not on file   Tobacco Use    Smoking status: Never Smoker    Smokeless tobacco: Never Used   Vaping Use    Vaping Use: Never used   Substance and Sexual Activity    Alcohol use: No    Drug use: No    Sexual activity: Not on file   Other Topics Concern    Not on file   Social History Narrative    WORK:    1  [employer] ([employer]; Yazmin Samuel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    2  [employer]        HOBBIES:    1  Singing    2  Dancing    3  Hx of ceramics (+ dust)        PETS:    1  Dogs    -  Denies birds        TRAVEL:    -  [place]         EXPOSURES:    Denies mold; down pillows/comforters; hot tubs     Social Determinants of Health     Financial Resource Strain:     Difficulty of Paying Living Expenses:    Food Insecurity:     Worried About Running Out of Food in the Last Year:     [address] in the Last Year:    Transportation Needs:     Lack of Transportation (Medical):      Lack of Transportation (Non-Medical):    Physical Activity:     Days of Exercise per Week:     Minutes of Exercise per Session:    Stress:     Feeling of Stress :    Social Connections:     Frequency of Communication with Friends and Family:     Frequency of Social Gatherings with Friends and Family:     Attends [employer] Services:     Active Member of Clubs or Organizations:     Attends Club or Organization Meetings:     Marital Status:    Intimate Partner Violence:     Fear of Current or Ex-Partner:     Emotionally Abused:     Physically Abused:     Sexually Abused:        Current Outpatient Medications:     ALPRAZolam (XANAX) 0 5 mg tablet, Take 1 tablet (0 5 mg total) by mouth daily at bedtime as needed for anxiety or sleep, Disp: 20 tablet, Rfl: 1    ascorbic acid (VITAMIN C) 500 mg tablet, Take 500 mg by mouth daily, Disp: , Rfl:     aspirin 81 mg chewable tablet, Chew 1 tablet (81 mg total) daily (Patient taking differently: Chew 81 mg as needed ), Disp: 30 tablet, Rfl: 0    b complex vitamins tablet, Take 1 tablet by mouth daily, Disp: , Rfl:     Cholecalciferol (VITAMIN D-3) 1000 units CAPS, Daily, Disp: , Rfl:     dicyclomine (BENTYL) 10 mg capsule, Take 1 capsule (10 mg total) by mouth 4 (four) times a day (before meals and at bedtime) (Patient taking differently: Take 10 mg by mouth as needed ), Disp: 30 capsule, Rfl: 1    gabapentin (NEURONTIN) 100 mg capsule, gabapentin 100 mg nightly for 1 week and then if needed/tolerated increase to 200 mg nightly and then if needed/tolerated increase to 300 mg nightly, Disp: 90 capsule, Rfl: 4    Magnesium 100 MG CAPS, magnesium  300 mg qd, Disp: , Rfl:     Melatonin 2 5 MG CHEW, Chew 1 tablet, Disp: , Rfl:     metroNIDAZOLE (METROGEL) 0 75 % gel, Apply topically 2 (two) times a day, Disp: 45 g, Rfl: 0    Multiple Vitamins-Calcium (ONE-A-DAY WOMENS PO), Take 1 tablet by mouth daily, Disp: , Rfl:     omeprazole (PriLOSEC) 20 mg delayed release capsule, Take 1 capsule (20 mg total) by mouth daily before breakfast, Disp: 90 capsule, Rfl: 3    Riboflavin (VITAMIN B-2 PO), Take by mouth, Disp: , Rfl:     traZODone (DESYREL) 50 mg tablet, Take 1 tablet (50 mg total) by mouth daily at bedtime, Disp: 30 tablet, Rfl: 1    famotidine (PEPCID) 20 mg tablet, Take 1 tablet (20 mg total) by mouth 2 (two) times a day before meals, Disp: , Rfl:     Omega-3 Fatty Acids (Fish Oil) 645 MG CAPS, Take by mouth (Patient not taking: Reported on 7/23/2021), Disp: , Rfl:     valACYclovir (VALTREX) 500 mg tablet, Take 1 tablet (500 mg total) by mouth 3 (three) times a day for 7 days, Disp: 21 tablet, Rfl: 0    Review of Systems   Constitutional: Negative for appetite change, chills, fatigue, fever and unexpected weight change  Respiratory: Negative for cough, shortness of breath and wheezing  Cardiovascular: Negative for chest pain, palpitations and leg swelling  Gastrointestinal: Negative for abdominal pain, blood in stool, constipation, diarrhea, nausea and vomiting  Genitourinary: Negative for dysuria, frequency and hematuria  Neurological: Negative for dizziness and headaches  Objective:    Vitals:    07/23/21 1603   BP: 130/84   Pulse: 70   Resp: 15   Temp: 98 4 °F (36 9 °C)   TempSrc: Oral   Weight: 79 7 kg (175 lb 9 6 oz)        Physical Exam  Constitutional:       General: She is not in acute distress  Appearance: She is well-developed  Neck:      Thyroid: No thyromegaly  Cardiovascular:      Rate and Rhythm: Normal rate and regular rhythm  Heart sounds: Normal heart sounds  No murmur heard  Pulmonary:      Effort: Pulmonary effort is normal       Breath sounds: Normal breath sounds  Abdominal:      General: There is no distension  Palpations: Abdomen is soft  There is no mass  Tenderness: There is no abdominal tenderness  Musculoskeletal:      Cervical back: Neck supple  Right lower leg: No edema  Left lower leg: No edema  Lymphadenopathy:      Cervical: No cervical adenopathy  Neurological:      Mental Status: She is alert and oriented to person, place, and time     Psychiatric:         Mood and Affect: Mood normal          Behavior: Behavior normal          Thought Content:  Thought content normal

## 2021-07-27 ENCOUNTER — OFFICE VISIT (OUTPATIENT)
Dept: GASTROENTEROLOGY | Facility: CLINIC | Age: 76
End: 2021-07-27
Payer: COMMERCIAL

## 2021-07-27 VITALS
HEIGHT: 68 IN | TEMPERATURE: 99.1 F | HEART RATE: 75 BPM | SYSTOLIC BLOOD PRESSURE: 140 MMHG | WEIGHT: 173 LBS | DIASTOLIC BLOOD PRESSURE: 80 MMHG | BODY MASS INDEX: 26.22 KG/M2

## 2021-07-27 DIAGNOSIS — E04.1 THYROID NODULE: Primary | ICD-10-CM

## 2021-07-27 DIAGNOSIS — K58.0 IRRITABLE BOWEL SYNDROME WITH DIARRHEA: ICD-10-CM

## 2021-07-27 DIAGNOSIS — R10.11 RIGHT UPPER QUADRANT ABDOMINAL PAIN: ICD-10-CM

## 2021-07-27 PROCEDURE — 99214 OFFICE O/P EST MOD 30 MIN: CPT | Performed by: INTERNAL MEDICINE

## 2021-07-27 RX ORDER — DICYCLOMINE HYDROCHLORIDE 10 MG/1
10 CAPSULE ORAL
Qty: 30 CAPSULE | Refills: 3 | Status: SHIPPED | OUTPATIENT
Start: 2021-07-27 | End: 2021-10-15 | Stop reason: SDUPTHER

## 2021-07-27 NOTE — PROGRESS NOTES
[facility] - Outpatient Follow-up Note  Hiwot Pedro 78 y o  female MRN: [id number]  Encounter: [id number]          ASSESSMENT AND PLAN:      1  Irritable bowel syndrome with diarrhea    She has more consistent bowel movement now  Denies diarrhea or constipation  No abdominal bloating  Continue Bentyl as needed for crampy abdominal pain  - dicyclomine (BENTYL) 10 mg capsule; Take 1 capsule (10 mg total) by mouth 4 (four) times a day (before meals and at bedtime)  Dispense: 30 capsule; Refill: 3     2  Gastroesophageal reflux disease -   her recent EGD is unremarkable  Her dysphagia has improved after empiric dilation  Biopsies negative for eosinophilic esophagitis  She was told by her endocrinologist that she has osteopenia on recent bone density  Since then she discontinued omeprazole  Start taking Pepcid 20 mg at bedtime  Gaviscon as needed  Reflux precautions  3  Right upper quadrant abdominal pain    She reports intermittent bulge and right-sided abdominal pain  No bulge or hernia wasn't  noted on physical exam Will get abdominal wall ultrasound to rule out hernia  - US abdominal wall; Future     4  Thyroid nodule and osteopenia - her endocrinologist his in [place]  Per patient request I gave her a new referral to establish her care with [facility] endocrinology team  ______________________________________________________________________    SUBJECTIVE:   78 year female with gastroesophageal reflux disease, irritable bowel syndrome here for follow-up visit  We reviewed recent EGD which was negative for stricture  Empiric dilation of proximal esophagus was performed to 20 mm  Biopsies negative for eosinophilic esophagitis  She was recently diagnosed with decreased bone mineral density and told to stop  Omeprazole  Now she is reporting reflux symptoms    She is taking Pepcid intermittently   her dysphagia is improving   she continues to have intermittent right-sided abdominal pain and also reports a bulge  On the right side  She is concerned that this could be hernia  REVIEW OF SYSTEMS IS OTHERWISE NEGATIVE        Historical Information   Past Medical History:   Diagnosis Date    Anxiety     Arthritis     Back pain     Balance problems     Chest pain     heaviness    Difficulty swallowing     Dizziness     Gait disorder     GERD (gastroesophageal reflux disease)     Hypertension     Increased frequency of headaches     Numbness and tingling     Palpitations     Pericarditis     Thyroid disease     Trouble in sleeping      Past Surgical History:   Procedure Laterality Date    APPENDECTOMY      CHOLECYSTECTOMY      laparoscopic    COLONOSCOPY      KNEE SURGERY Left     PERICARDIAL WINDOW      [place] OPEN RX DISTAL RADIUS FX, EXTRA-ARTICULAR Right 3/22/2018    Procedure: OPEN REDUCTION W/ INTERNAL FIXATION (ORIF) RADIUS, splint application;  Surgeon: Lin Avendano MD;  [facility];  Service: Orthopedics    UPPER GASTROINTESTINAL ENDOSCOPY       Social History   Social History     Substance and Sexual Activity   Alcohol Use No     Social History     Substance and Sexual Activity   Drug Use No     Social History     Tobacco Use   Smoking Status Never Smoker   Smokeless Tobacco Never Used     Family History   Problem Relation Age of Onset    Kidney failure Mother     Hypertension Mother     Lung cancer Father    Pratibha Scherer Parkinson White syndrome Daughter     No Known Problems Sister     Substance Abuse Neg Hx     Alcohol abuse Neg Hx     Mental illness Neg Hx        Meds/Allergies       Current Outpatient Medications:     ALPRAZolam (XANAX) 0 5 mg tablet    ascorbic acid (VITAMIN C) 500 mg tablet    aspirin 81 mg chewable tablet    b complex vitamins tablet    Cholecalciferol (VITAMIN D-3) 1000 units CAPS    dicyclomine (BENTYL) 10 mg capsule    famotidine (PEPCID) 20 mg tablet    gabapentin (NEURONTIN) 100 mg capsule   Magnesium 100 MG CAPS    Melatonin 2 5 MG CHEW    metroNIDAZOLE (METROGEL) 0 75 % gel    Multiple Vitamins-Calcium (ONE-A-DAY WOMENS PO)    Omega-3 Fatty Acids (Fish Oil) 645 MG CAPS    omeprazole (PriLOSEC) 20 mg delayed release capsule    Riboflavin (VITAMIN B-2 PO)    traZODone (DESYREL) 50 mg tablet    dicyclomine (BENTYL) 10 mg capsule    valACYclovir (VALTREX) 500 mg tablet    Allergies   Allergen Reactions    Ciprofloxacin Myalgia           Objective     Blood pressure 140/80, pulse 75, temperature 99 1 °F (37 3 °C), temperature source Tympanic, height 5' 8" (1 727 m), weight 78 5 kg (173 lb)  Body mass index is 26 3 kg/m²  PHYSICAL EXAM:      General Appearance:   Alert, cooperative, no distress   HEENT:   Normocephalic, atraumatic, anicteric      Neck:  Supple, symmetrical, trachea midline   Lungs:   Clear to auscultation bilaterally; no rales, rhonchi or wheezing; respirations unlabored    Heart[de-identified]   Regular rate and rhythm; no murmur, rub, or gallop  Abdomen:   Soft, non-tender, non-distended; normal bowel sounds; no masses, no organomegaly    Genitalia:   Deferred    Rectal:   Deferred    Extremities:  No cyanosis, clubbing or edema    Pulses:  2+ and symmetric    Skin:  No jaundice, rashes, or lesions    Lymph nodes:  No palpable cervical lymphadenopathy        Lab Results:   No visits with results within 1 Day(s) from this visit     Latest known visit with results is:   Appointment on 06/25/2021   Component Date Value    Free T4 06/25/2021 1 00     TSH 3RD GENERATON 06/25/2021 0 895     Sodium 06/25/2021 138     Potassium 06/25/2021 4 5     Chloride 06/25/2021 105     CO2 06/25/2021 27     ANION GAP 06/25/2021 6     BUN 06/25/2021 25     Creatinine 06/25/2021 0 82     Glucose, Fasting 06/25/2021 87     Calcium 06/25/2021 10 2*    AST 06/25/2021 25     ALT 06/25/2021 39     Alkaline Phosphatase 06/25/2021 109     Total Protein 06/25/2021 7 6     Albumin 06/25/2021 3 5  Total Bilirubin 06/25/2021 0 85     eGFR 06/25/2021 71     Vit D, 25-Hydroxy 06/25/2021 33 6          Radiology Results:   MRI brain wo contrast    Result Date: 7/23/2021  Narrative: MRI BRAIN WITHOUT CONTRAST INDICATION: G43 009: Migraine without aura, not intractable, without status migrainosus H53 9: Unspecified visual disturbance  COMPARISON:   None  TECHNIQUE:  Sagittal T1, axial T2, axial FLAIR, axial T1, axial [place] and axial diffusion imaging  IMAGE QUALITY:  Somewhat degraded by patient motion artifact  FINDINGS: BRAIN PARENCHYMA:  There is no discrete mass, mass effect or midline shift  There is no intracranial hemorrhage  There is no evidence of acute infarction and diffusion imaging is unremarkable  Small scattered hyperintensities on T2/FLAIR imaging are noted in the periventricular and subcortical white matter demonstrating an appearance that is statistically most likely to represent mild microangiopathic change  VENTRICLES:  Normal for the patient's age  SELLA AND PITUITARY GLAND:  Normal  ORBITS:  Normal  PARANASAL SINUSES:  Normal  VASCULATURE:  Evaluation of the major intracranial vasculature demonstrates appropriate flow voids  CALVARIUM AND SKULL BASE:  Normal  EXTRACRANIAL SOFT TISSUES:  Normal      Impression: White matter changes suggestive of chronic microangiopathy  No acute intracranial pathology   Workstation performed: [id number]

## 2021-07-28 ENCOUNTER — CONSULT (OUTPATIENT)
Dept: OBGYN CLINIC | Facility: CLINIC | Age: 76
End: 2021-07-28
Payer: COMMERCIAL

## 2021-07-28 VITALS — WEIGHT: 173 LBS | HEIGHT: 68 IN | BODY MASS INDEX: 26.22 KG/M2

## 2021-07-28 DIAGNOSIS — I83.813 VARICOSE VEINS OF BILATERAL LOWER EXTREMITIES WITH PAIN: ICD-10-CM

## 2021-07-28 DIAGNOSIS — M71.22 BAKER'S CYST OF KNEE, LEFT: ICD-10-CM

## 2021-07-28 DIAGNOSIS — M17.0 PRIMARY OSTEOARTHRITIS OF BOTH KNEES: ICD-10-CM

## 2021-07-28 DIAGNOSIS — R22.42 MASS OF LEFT KNEE: Primary | ICD-10-CM

## 2021-07-28 PROCEDURE — 1160F RVW MEDS BY RX/DR IN RCRD: CPT | Performed by: FAMILY MEDICINE

## 2021-07-28 PROCEDURE — 99214 OFFICE O/P EST MOD 30 MIN: CPT | Performed by: FAMILY MEDICINE

## 2021-07-28 PROCEDURE — 1036F TOBACCO NON-USER: CPT | Performed by: FAMILY MEDICINE

## 2021-07-28 NOTE — PROGRESS NOTES
1  Mass of left knee  MRI knee left  wo contrast   2  Primary osteoarthritis of both knees     3  Baker's cyst of knee, left  Ambulatory referral to Sports Medicine    MRI knee left  wo contrast   4  Varicose veins of bilateral lower extremities with pain  Ambulatory referral to Vascular Surgery    Ambulatory referral to Dermatology    Elastic Bandages & Supports (Medical Compression Stockings) MISC     Orders Placed This Encounter   Procedures    MRI knee left  wo contrast    Ambulatory referral to Vascular Surgery    Ambulatory referral to Dermatology        Imaging Studies (I personally reviewed images in PACS and report):  Xray right knee 5/22/21:  Lateral OA, small effusion  No acute abnormality  Xray Left knee 5/22/21:  Lateral OA  No abnormality    IMPRESSION:  · Left  knee Lateral Compartment OA  · small Baker cyst posterior medial left knee  1 4 x 0 6 cm  · soft tissue tenderness and chief complaint of pain likely due to varicose veins and arthritis symptoms  · patient agreed to forego aspiration today for small cyst      Repeat X-ray next visit: None    Return for follow-up with Dr Sharon Cuevas  Patient Instructions   · Explained to patient she has small baker's cyst posteromedial aspect of the knee measuring 1 4x0 6cm on ultrasound examination which is likely not the cause of her pain and discomfort  · Explained that Much of the pain on examination today is due to her arthritis as well as varicose veins and tender soft tissue  · Recommended MRI for confirmation of cyst since there was complexity to this cyst on ultrasound evaluation   · Referral placed for vascular surgeon as well as dermatologist for treatment of varicose veins  · Also recommend compression stockings  ·  patient may follow-up with Dr Sharon Cuevas for review of MRI   May consider return for aspiration of baker's cyst if increases in size          CHIEF COMPLAINT:  Consultation from Dr Sharon Cuevas for left knee  Baker's Cyst     HPI:  Mina Romo Loly Eisenberg is a 78 y o  female  who presents for       Visit 7/28/2021 :  Bilateral knee pain with baker's cysts  Seen 7/2/21 by Dr Selby  complained of bilaterl knee pain recently posterior pain  Today, patient points to posterior aspect of the Left  knee as source of discomfort on the medial side with intermittent swelling in this region  States has been swollen more in the past than today  Also ask about surrounding soft tissue pain specifically in the anterior medial aspect of the tibial plateau and proximal tibia region where she has scattered significant varicose veins and has tenderness to palpation in this region  Pain is intermittent mild to moderate intensity exacerbated by touch  Review of Systems   Constitutional: Negative for chills, fever and unexpected weight change  HENT: Negative for hearing loss, nosebleeds and sore throat  Eyes: Negative for pain, redness and visual disturbance  Respiratory: Negative for cough, shortness of breath and wheezing  Cardiovascular: Negative for chest pain, palpitations and leg swelling  Gastrointestinal: Negative for abdominal distention, nausea and vomiting  Endocrine: Negative for polydipsia and polyuria  Genitourinary: Negative for dysuria and hematuria  Skin: Negative for rash and wound  Neurological: Negative for dizziness, numbness and headaches  Psychiatric/Behavioral: Negative for decreased concentration and suicidal ideas           Following history reviewed and update:    Past Medical History:   Diagnosis Date    Anxiety     Arthritis     Back pain     Balance problems     Chest pain     heaviness    Difficulty swallowing     Dizziness     Gait disorder     GERD (gastroesophageal reflux disease)     Hypertension     Increased frequency of headaches     Numbness and tingling     Palpitations     Pericarditis     Thyroid disease     Trouble in sleeping      Past Surgical History:   Procedure Laterality Date  APPENDECTOMY      CHOLECYSTECTOMY      laparoscopic    COLONOSCOPY      KNEE SURGERY Left     PERICARDIAL WINDOW      [place] OPEN RX DISTAL RADIUS FX, EXTRA-ARTICULAR Right 3/22/2018    Procedure: OPEN REDUCTION W/ INTERNAL FIXATION (ORIF) RADIUS, splint application;  Surgeon: Charis Ashton MD;  [facility];  Service: Orthopedics    UPPER GASTROINTESTINAL ENDOSCOPY       Social History   Social History     Substance and Sexual Activity   Alcohol Use No     Social History     Substance and Sexual Activity   Drug Use No     Social History     Tobacco Use   Smoking Status Never Smoker   Smokeless Tobacco Never Used     Family History   Problem Relation Age of Onset    Kidney failure Mother     Hypertension Mother     Lung cancer Father    Cele Fent Parkinson White syndrome Daughter     No Known Problems Sister     Substance Abuse Neg Hx     Alcohol abuse Neg Hx     Mental illness Neg Hx      Allergies   Allergen Reactions    Ciprofloxacin Myalgia          Physical Exam  Ht 5' 8" (1 727 m)   Wt 78 5 kg (173 lb)   BMI 26 30 kg/m²     Constitutional:  see vital signs  Gen: well-developed, normocephalic/atraumatic, well-groomed  Eyes: No inflammation or discharge of conjunctiva or lids; sclera clear   Pharynx: no inflammation, lesion, or mass of lips  Neck: supple, no masses, non-distended  MSK: no inflammation, lesion, mass, or clubbing of nails and digits except for other than mentioned below  SKIN: no visible rashes or skin lesions  Pulmonary/Chest: Effort normal  No respiratory distress     NEURO: cranial nerves grossly intact  PSYCH:  Alert and oriented to person, place, and time; recent and remote memory intact; mood normal, no depression, anxiety, or agitation, judgment and insight good and intact     Ortho Exam  LEFT KNEE:  Erythema: no  Swelling: no   scattered bilateral moderate to severe varicose veins tender to touch  Increased Warmth: no  Tenderness: +  Diffuse nonspecific tenderness about the knee including the anterior as well as posterior aspect; she is most tender in the lateral joint line as well as in the anterior medial aspect over soft tissue of the proximal 1/3 of the tibia  Flexion: intact  Extension: intact  Lachman's: negative  Drawer: negative  Varus laxity: negative  Valgus laxity: negative  Gage: + lateral crepitus without locking    Procedures

## 2021-07-28 NOTE — PATIENT INSTRUCTIONS
· Explained to patient she has small baker's cyst posteromedial aspect of the knee measuring 1 4x0 6cm on ultrasound examination which is likely not the cause of her pain and discomfort  · Explained that Much of the pain on examination today is due to her arthritis as well as varicose veins and tender soft tissue  · Recommended MRI for confirmation of cyst since there was complexity to this cyst on ultrasound evaluation   · Referral placed for vascular surgeon as well as dermatologist for treatment of varicose veins  · Also recommend compression stockings  ·  patient may follow-up with Dr Cheikh Atkins for review of MRI   May consider return for aspiration of baker's cyst if increases in size

## 2021-08-02 DIAGNOSIS — K21.9 GASTROESOPHAGEAL REFLUX DISEASE, UNSPECIFIED WHETHER ESOPHAGITIS PRESENT: ICD-10-CM

## 2021-08-02 NOTE — TELEPHONE ENCOUNTER
Patient of Dr Loren Carrera, last seen 7/27/21    History of IBS-D, GERD, RUQ pain, hiatal hernia    Called and spoke with patient  She states that she is having burning in her throat and pain in her chest and upper abdomen  She recently d/uma omeprazole per her endocrinologist due to osteopenia, so she was instructed to use pepcid 20 mg daily and gaviscon as needed  Patient states the pepcid is not working at all and over the weekend the burning in her throat was so bad she took an omeprazole for relief  She has not used gaviscon  I advised that she should not use omeprazole on a prn basis  She is requesting to have EGD done  Last EGD was 4/28/21 but she was on omeprazole at that time  Please advise  Increase pepcid dosing?

## 2021-08-02 NOTE — TELEPHONE ENCOUNTER
Patients GI provider:  Dr Smith [facility]     Number to return call: [phone]    Reason for call: Pt calling requesting to speak with a nurse regarding pepcid that is not agreeing with her   She still has the burning     Scheduled procedure/appointment date if applicable: Apt/procedure n/a

## 2021-08-02 NOTE — TELEPHONE ENCOUNTER
I do not feel repeat EGD is indicated at this time  She could take Pepcid 20 mg twice daily before meals and continue Gaviscon as needed  I think it is okay for her to use omeprazole on an as needed basis (if this works for her), but would try to limit given her recent diagnosis of osteopenia

## 2021-08-05 ENCOUNTER — HOSPITAL ENCOUNTER (OUTPATIENT)
Dept: ULTRASOUND IMAGING | Facility: HOSPITAL | Age: 76
Discharge: HOME | End: 2021-08-05
Payer: COMMERCIAL

## 2021-08-05 DIAGNOSIS — R10.11 RIGHT UPPER QUADRANT ABDOMINAL PAIN: ICD-10-CM

## 2021-08-05 PROCEDURE — 76705 ECHO EXAM OF ABDOMEN: CPT

## 2021-08-06 ENCOUNTER — TELEPHONE (OUTPATIENT)
Dept: OBGYN CLINIC | Facility: HOSPITAL | Age: 76
End: 2021-08-06

## 2021-08-06 RX ORDER — OMEPRAZOLE 20 MG/1
20 CAPSULE, DELAYED RELEASE ORAL
Qty: 90 CAPSULE | Refills: 1 | Status: SHIPPED | OUTPATIENT
Start: 2021-08-06 | End: 2022-02-23 | Stop reason: SDUPTHER

## 2021-08-06 NOTE — TELEPHONE ENCOUNTER
Received voicemail from patient stating that she cannot take the famotidine  She states she took the increased dose 2 days and it made her "very dizzy"  She does not want to take it anymore and it stated she feels better now that she has stopped it  She wants to restart omeprazole however this was stopped due to osteopenia  She will not take gaviscon because "it contains aluminum"   Please advise

## 2021-08-06 NOTE — TELEPHONE ENCOUNTER
Patient is calling to find out if we have a lost & found because she misplaced her jacket & thinks she may have had it that day  Patient described it as a blue zip up hooded sweater jacket that says "life is good" when zipped  I called the office & they checked & stated that they do not have it

## 2021-08-06 NOTE — TELEPHONE ENCOUNTER
Called and advised patient of recommendations  She states she will need more omeprazole sent into pharmacy   She will follow up with endocrinologist  No further questions

## 2021-08-06 NOTE — TELEPHONE ENCOUNTER
Restart omeprazole & use calcium for osteopenia  F/u with endocrine for other recommendations    Luis Umanzor

## 2021-08-10 ENCOUNTER — OFFICE VISIT (OUTPATIENT)
Dept: FAMILY MEDICINE CLINIC | Facility: CLINIC | Age: 76
End: 2021-08-10
Payer: COMMERCIAL

## 2021-08-10 VITALS
WEIGHT: 173 LBS | OXYGEN SATURATION: 97 % | SYSTOLIC BLOOD PRESSURE: 140 MMHG | BODY MASS INDEX: 26.22 KG/M2 | HEIGHT: 68 IN | DIASTOLIC BLOOD PRESSURE: 78 MMHG | HEART RATE: 96 BPM | TEMPERATURE: 98.5 F | RESPIRATION RATE: 15 BRPM

## 2021-08-10 DIAGNOSIS — M81.0 AGE-RELATED OSTEOPOROSIS WITHOUT CURRENT PATHOLOGICAL FRACTURE: ICD-10-CM

## 2021-08-10 DIAGNOSIS — K21.9 GASTROESOPHAGEAL REFLUX DISEASE, UNSPECIFIED WHETHER ESOPHAGITIS PRESENT: Primary | ICD-10-CM

## 2021-08-10 DIAGNOSIS — E55.9 VITAMIN D DEFICIENCY: ICD-10-CM

## 2021-08-10 DIAGNOSIS — E21.0 PRIMARY HYPERPARATHYROIDISM (HCC): ICD-10-CM

## 2021-08-10 DIAGNOSIS — G47.00 INSOMNIA, UNSPECIFIED TYPE: ICD-10-CM

## 2021-08-10 DIAGNOSIS — E78.5 HYPERLIPIDEMIA, UNSPECIFIED HYPERLIPIDEMIA TYPE: ICD-10-CM

## 2021-08-10 DIAGNOSIS — M17.0 PRIMARY OSTEOARTHRITIS OF BOTH KNEES: ICD-10-CM

## 2021-08-10 DIAGNOSIS — E04.1 THYROID NODULE: ICD-10-CM

## 2021-08-10 PROCEDURE — 3008F BODY MASS INDEX DOCD: CPT | Performed by: FAMILY MEDICINE

## 2021-08-10 PROCEDURE — 99214 OFFICE O/P EST MOD 30 MIN: CPT | Performed by: FAMILY MEDICINE

## 2021-08-10 RX ORDER — MULTIVIT-MIN/IRON/FOLIC ACID/K 18-600-40
1 CAPSULE ORAL DAILY
COMMUNITY
Start: 2021-08-10

## 2021-08-12 NOTE — PROGRESS NOTES
Assessment/Plan:    1  Gastroesophageal reflux disease  - dietary changes reviewed, advised trial alternating omeprazole and Pepcid every other day and follow up with GI    2  Hyperlipidemia  - continue working on healthy diet  - will repeat lab work prior to next visit  - Lipid Panel with Direct LDL reflex; Future  - Comprehensive metabolic panel; Future    3  Primary hyperparathyroidism  - follow up with Endocrine  - Comprehensive metabolic panel; Future    4  Thyroid nodule  - recent thyroid US 06/30/21 reviewed, advised to repeat US in 2 years and follow up with Endocrine    5  Osteoporosis   - will obtain recent DEXA scan results, continue daily MVI and vitamin D supplements and follow up with Endocrine to discuss treatment options    6  Primary osteoarthritis of both knees  - follow up with Ortho    7  Vitamin D deficiency  - vitamin D 33 6, continue 2000 IU daily   - Cholecalciferol (Vitamin D) 50 MCG (2000 UT) CAPS; Take 1 capsule (2,000 Units total) by mouth daily    8  Insomnia/ anxiety  - continue Xanax 0 5 mg 1/2 tablet twice a day daily as needed for anxiety and trazodone 50 mg at bedtime as needed for sleep       Follow up in 4 months or sooner as needed  The patient understands and agrees with the treatment plan  Subjective:   Chief Complaint   Patient presents with    Follow-up    GERD    Hyperlipidemia    Anxiety      Patient ID: Teena Mcmillan is a 78 y o  female who presents today for follow up visit for her chronic conditions  Patient recently d/c'd omeprazole per endocrinology recommendations due to osteopenia and has been taking Pepcid 20 mg at bedtime  Patient states that Pepcid is not working as well as omeprazole and she was having increased burning in her epigastric area and in her throat  She was advised by GI to increase Pepcid to twice a day which made her dizzy, so she went back on omeprazole and now doing better          The following portions of the patient's history were reviewed and updated as appropriate: allergies, current medications, past family history, past medical history, past social history, past surgical history and problem list     Past Medical History:   Diagnosis Date    Anxiety     Arthritis     Back pain     Balance problems     Chest pain     heaviness    Difficulty swallowing     Dizziness     Gait disorder     GERD (gastroesophageal reflux disease)     Hypertension     Increased frequency of headaches     Numbness and tingling     Palpitations     Pericarditis     Thyroid disease     Trouble in sleeping      Past Surgical History:   Procedure Laterality Date    APPENDECTOMY      CHOLECYSTECTOMY      laparoscopic    COLONOSCOPY      KNEE SURGERY Left     PERICARDIAL WINDOW      MA OPEN RX DISTAL RADIUS FX, EXTRA-ARTICULAR Right 3/22/2018    Procedure: OPEN REDUCTION W/ INTERNAL FIXATION (ORIF) RADIUS, splint application;  Surgeon: Duncan Encinas MD;  Location:  MAIN OR;  Service: Orthopedics    UPPER GASTROINTESTINAL ENDOSCOPY       Family History   Problem Relation Age of Onset    Kidney failure Mother     Hypertension Mother     Lung cancer Father    Allen Shaper Parkinson White syndrome Daughter     No Known Problems Sister     Substance Abuse Neg Hx     Alcohol abuse Neg Hx     Mental illness Neg Hx      Social History     Socioeconomic History    Marital status: /Civil Union     Spouse name: Not on file    Number of children: Not on file    Years of education: Not on file    Highest education level: Not on file   Occupational History    Not on file   Tobacco Use    Smoking status: Never Smoker    Smokeless tobacco: Never Used   Vaping Use    Vaping Use: Never used   Substance and Sexual Activity    Alcohol use: No    Drug use: No    Sexual activity: Not on file   Other Topics Concern    Not on file   Social History Narrative    WORK:    1   ([employer]; [employer]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    2  [employer]        HOBBIES:    1  Singing    2  Dancing    3  Hx of ceramics (+ dust)        PETS:    1  Dogs    -  Denies birds        TRAVEL:    -  [place] [address]         EXPOSURES:    Denies mold; down pillows/comforters; hot tubs     Social Determinants of Health     Financial Resource Strain:     Difficulty of Paying Living Expenses:    Food Insecurity:     Worried About Running Out of Food in the Last Year:     [address] in the Last Year:    Transportation Needs:     Lack of Transportation (Medical):      Lack of Transportation (Non-Medical):    Physical Activity:     Days of Exercise per Week:     Minutes of Exercise per Session:    Stress:     Feeling of Stress :    Social Connections:     Frequency of Communication with Friends and Family:     Frequency of Social Gatherings with Friends and Family:     Attends [employer] Services:     Active Member of Clubs or Organizations:     Attends Club or Organization Meetings:     Marital Status:    Intimate Partner Violence:     Fear of Current or Ex-Partner:     Emotionally Abused:     Physically Abused:     Sexually Abused:        Current Outpatient Medications:     ALPRAZolam (XANAX) 0 5 mg tablet, Take 1 tablet (0 5 mg total) by mouth daily at bedtime as needed for anxiety or sleep, Disp: 20 tablet, Rfl: 1    ascorbic acid (VITAMIN C) 500 mg tablet, Take 500 mg by mouth daily, Disp: , Rfl:     aspirin 81 mg chewable tablet, Chew 1 tablet (81 mg total) daily (Patient taking differently: Chew 81 mg as needed ), Disp: 30 tablet, Rfl: 0    b complex vitamins tablet, Take 1 tablet by mouth daily, Disp: , Rfl:     dicyclomine (BENTYL) 10 mg capsule, Take 1 capsule (10 mg total) by mouth 4 (four) times a day (before meals and at bedtime) (Patient taking differently: Take 10 mg by mouth as needed ), Disp: 30 capsule, Rfl: 1    dicyclomine (BENTYL) 10 mg capsule, Take 1 capsule (10 mg total) by mouth 4 (four) times a day (before meals and at bedtime), Disp: 30 capsule, Rfl: 3    Elastic Bandages & Supports (Medical Compression Stockings) MISC, Use daily Dispense 1 pair of compression stockings 30mmHg, Disp: 1 each, Rfl: 0    famotidine (PEPCID) 20 mg tablet, Take 1 tablet (20 mg total) by mouth 2 (two) times a day before meals, Disp: , Rfl:     gabapentin (NEURONTIN) 100 mg capsule, gabapentin 100 mg nightly for 1 week and then if needed/tolerated increase to 200 mg nightly and then if needed/tolerated increase to 300 mg nightly, Disp: 90 capsule, Rfl: 4    Magnesium 100 MG CAPS, magnesium  300 mg qd, Disp: , Rfl:     Melatonin 2 5 MG CHEW, Chew 1 tablet, Disp: , Rfl:     Multiple Vitamins-Calcium (ONE-A-DAY WOMENS PO), Take 1 tablet by mouth daily, Disp: , Rfl:     Omega-3 Fatty Acids (Fish Oil) 645 MG CAPS, Take by mouth , Disp: , Rfl:     omeprazole (PriLOSEC) 20 mg delayed release capsule, Take 1 capsule (20 mg total) by mouth daily before breakfast, Disp: 90 capsule, Rfl: 1    Riboflavin (VITAMIN B-2 PO), Take by mouth, Disp: , Rfl:     traZODone (DESYREL) 50 mg tablet, Take 1 tablet (50 mg total) by mouth daily at bedtime, Disp: 30 tablet, Rfl: 1    Cholecalciferol (Vitamin D) 50 MCG (2000 UT) CAPS, Take 1 capsule (2,000 Units total) by mouth daily, Disp: , Rfl:     valACYclovir (VALTREX) 500 mg tablet, Take 1 tablet (500 mg total) by mouth 3 (three) times a day for 7 days, Disp: 21 tablet, Rfl: 0    Review of Systems   Constitutional: Negative for appetite change, chills, fatigue, fever and unexpected weight change  Respiratory: Negative for cough, shortness of breath and wheezing  Cardiovascular: Negative for chest pain, palpitations and leg swelling  Gastrointestinal: Negative for abdominal pain, blood in stool, constipation, diarrhea, nausea and vomiting  Genitourinary: Negative for dysuria, frequency and hematuria     Musculoskeletal:        Knee pain   Neurological: Negative for dizziness, syncope, weakness, numbness and headaches  Psychiatric/Behavioral: Negative for dysphoric mood and sleep disturbance  The patient is not nervous/anxious  Objective:    Vitals:    08/10/21 1102   BP: 140/78   BP Location: Left arm   Patient Position: Sitting   Cuff Size: Adult   Pulse: 96   Resp: 15   Temp: 98 5 °F (36 9 °C)   TempSrc: Temporal   SpO2: 97%   Weight: 78 5 kg (173 lb)   Height: 5' 8" (1 727 m)     BP Readings from Last 3 Encounters:   08/10/21 140/78   07/27/21 140/80   07/23/21 130/84     Wt Readings from Last 3 Encounters:   08/10/21 78 5 kg (173 lb)   07/28/21 78 5 kg (173 lb)   07/27/21 78 5 kg (173 lb)        Physical Exam  Constitutional:       General: She is not in acute distress  Appearance: She is well-developed  Neck:      Thyroid: No thyromegaly  Cardiovascular:      Rate and Rhythm: Normal rate and regular rhythm  Heart sounds: Normal heart sounds  No murmur heard  Pulmonary:      Effort: Pulmonary effort is normal       Breath sounds: Normal breath sounds  Abdominal:      General: There is no distension  Palpations: Abdomen is soft  There is no mass  Tenderness: There is no abdominal tenderness  Musculoskeletal:      Cervical back: Neck supple  Right lower leg: No edema  Left lower leg: No edema  Lymphadenopathy:      Cervical: No cervical adenopathy  Neurological:      Mental Status: She is alert and oriented to person, place, and time  Psychiatric:         Mood and Affect: Mood normal          Behavior: Behavior normal          Thought Content:  Thought content normal         Lab Results   Component Value Date    WBC 7 11 01/08/2021    HGB 14 8 01/08/2021    HCT 45 3 01/08/2021    MCV 92 01/08/2021     01/08/2021     Lab Results   Component Value Date    SODIUM 138 06/25/2021    K 4 5 06/25/2021     06/25/2021    CO2 27 06/25/2021    AGAP 6 06/25/2021    BUN 25 06/25/2021    CREATININE 0 82 06/25/2021    GLUC 87 01/08/2021    GLUF 87 06/25/2021 CALCIUM 10 2 (H) 06/25/2021    AST 25 06/25/2021    ALT 39 06/25/2021    ALKPHOS 109 06/25/2021    TP 7 6 06/25/2021    TBILI 0 85 06/25/2021    EGFR 71 06/25/2021     Lab Results   Component Value Date    [id number] 0 895 06/25/2021

## 2021-09-06 ENCOUNTER — NURSE TRIAGE (OUTPATIENT)
Dept: OTHER | Facility: OTHER | Age: 76
End: 2021-09-06

## 2021-09-06 DIAGNOSIS — Z20.828 SARS-ASSOCIATED CORONAVIRUS EXPOSURE: Primary | ICD-10-CM

## 2021-09-06 NOTE — TELEPHONE ENCOUNTER
Regarding: COVID exposure / symptoms  ----- Message from [employer] sent at 9/6/2021  3:11 PM EDT -----  "I need my COVID test and I am really sick  "  ----- Message from [employer] sent at 9/6/2021 11:38 AM EDT -----  "I was exposed to my daughter and granddaughter and I have been really sick "

## 2021-09-06 NOTE — TELEPHONE ENCOUNTER
Reason for Disposition   [1] COVID-19 infection suspected by caller or triager AND [2] mild symptoms (cough, fever, or others) AND [5] no complications or SOB    Answer Assessment - Initial Assessment Questions  Were you within 6 feet or less, for up to 15 minutes or more with a person that has a confirmed COVID-19 test?        yes     What was the date of your exposure? Last week     Are you experiencing any symptoms attributed to the virus?  (Assess for SOB, cough, fever, difficulty breathing)        Yes   Congestion, fever, headache     HIGH RISK: Do you have any history heart or lung conditions, weakened immune system, diabetes, Asthma, CHF, HIV, COPD, Chemo, renal failure, sickle cell, etc?        Denies    Protocols used: CORONAVIRUS (COVID-19) DIAGNOSED OR SUSPECTED-ADULT-

## 2021-09-07 ENCOUNTER — CLINICAL SUPPORT (OUTPATIENT)
Dept: FAMILY MEDICINE CLINIC | Facility: CLINIC | Age: 76
End: 2021-09-07

## 2021-09-07 ENCOUNTER — RA CDI HCC (OUTPATIENT)
Dept: OTHER | Facility: HOSPITAL | Age: 76
End: 2021-09-07

## 2021-09-07 DIAGNOSIS — Z20.822 SUSPECTED COVID-19 VIRUS INFECTION: Primary | ICD-10-CM

## 2021-09-07 PROCEDURE — U0005 INFEC AGEN DETEC AMPLI PROBE: HCPCS | Performed by: FAMILY MEDICINE

## 2021-09-07 PROCEDURE — U0003 INFECTIOUS AGENT DETECTION BY NUCLEIC ACID (DNA OR RNA); SEVERE ACUTE RESPIRATORY SYNDROME CORONAVIRUS 2 (SARS-COV-2) (CORONAVIRUS DISEASE [COVID-19]), AMPLIFIED PROBE TECHNIQUE, MAKING USE OF HIGH THROUGHPUT TECHNOLOGIES AS DESCRIBED BY CMS-2020-01-R: HCPCS | Performed by: FAMILY MEDICINE

## 2021-09-07 NOTE — PROGRESS NOTES
[facility]  coding opportunities       Chart reviewed, no opportunity found: CHART REVIEWED, NO OPPORTUNITY FOUND                        Patients insurance company: [facility] [address]  (Medicare Advantage and Commercial)

## 2021-09-08 ENCOUNTER — TELEMEDICINE (OUTPATIENT)
Dept: FAMILY MEDICINE CLINIC | Facility: CLINIC | Age: 76
End: 2021-09-08
Payer: COMMERCIAL

## 2021-09-08 ENCOUNTER — TELEPHONE (OUTPATIENT)
Dept: FAMILY MEDICINE CLINIC | Facility: CLINIC | Age: 76
End: 2021-09-08

## 2021-09-08 VITALS — BODY MASS INDEX: 26.07 KG/M2 | HEIGHT: 68 IN | TEMPERATURE: 99.7 F | WEIGHT: 172 LBS

## 2021-09-08 DIAGNOSIS — J06.9 URI WITH COUGH AND CONGESTION: Primary | ICD-10-CM

## 2021-09-08 DIAGNOSIS — Z78.0 MENOPAUSE: ICD-10-CM

## 2021-09-08 DIAGNOSIS — Z12.31 ENCOUNTER FOR SCREENING MAMMOGRAM FOR MALIGNANT NEOPLASM OF BREAST: ICD-10-CM

## 2021-09-08 PROCEDURE — 99213 OFFICE O/P EST LOW 20 MIN: CPT | Performed by: FAMILY MEDICINE

## 2021-09-08 RX ORDER — PROMETHAZINE HYDROCHLORIDE AND CODEINE PHOSPHATE 6.25; 1 MG/5ML; MG/5ML
5 SYRUP ORAL
Qty: 120 ML | Refills: 0 | Status: SHIPPED | OUTPATIENT
Start: 2021-09-08 | End: 2022-01-26 | Stop reason: ALTCHOICE

## 2021-09-08 NOTE — PROGRESS NOTES
COVID-19 Outpatient Progress Note    Assessment/Plan:     1  URI with cough and congestion  - promethazine-codeine (PHENERGAN WITH CODEINE) 6 25-10 mg/5 mL syrup; Take 5 mL by mouth daily at bedtime as needed for cough  Dispense: 120 mL; Refill: 0      Disposition:     Advised patient to self isolate until Covid swab results come back, discussed rest, plenty of fluids, Tylenol as needed for body aches or fever, Mucinex DM as needed for cough/ congestion during the day and Phenergan/ codeine cough syrup at bedtime  Advised to call the office for worsening symptoms or development of shortness of breath or go to ER  Patient understands and agrees with the treatment plan  I have spent 15 minutes directly with the patient  Verification of patient location:    Patient is located in the following state in which I hold an active license [place]    Encounter provider Anastacia Carlin MD    Provider located at [address] [place] [id number] [phone]    Recent Visits  No visits were found meeting these conditions  Showing recent visits within past 7 days and meeting all other requirements  Today's Visits  Date Type Provider Dept   09/08/21 Telemedicine Anastacia Carlin MD [address]   Showing today's visits and meeting all other requirements  Future Appointments  No visits were found meeting these conditions  Showing future appointments within next 150 days and meeting all other requirements     This virtual check-in was done via [employer] and patient was informed that this is a secure, HIPAA-compliant platform  She agrees to proceed  Patient agrees to participate in a virtual check in via telephone or video visit instead of presenting to the office to address urgent/immediate medical needs  Patient is aware this is a billable service      After connecting through [facility], the patient was identified by name and date of birth  Altagracia Lance was informed that this was a telemedicine visit and that the exam was being conducted confidentially over secure lines  My office door was closed  No one else was in the room  Altagracia Lance acknowledged consent and understanding of privacy and security of the telemedicine visit  I informed the patient that I have reviewed her record in Epic and presented the opportunity for her to ask any questions regarding the visit today  The patient agreed to participate  Subjective:   Altagracia Lance is a 78 y o  female who is concerned about COVID-19  Patient's symptoms include fever, fatigue, nasal congestion, rhinorrhea, sore throat, cough and headache  Patient denies chills, malaise, anosmia, loss of taste, shortness of breath, chest tightness, abdominal pain, nausea, vomiting and diarrhea  Date of symptom onset: 9/3/2021  Date of exposure: 9/6/2021  COVID-19 vaccination status: Fully vaccinated    Exposure:   Contact with a person who is under investigation (PUI) for or who is positive for COVID-19 within the last 14 days?: No    Hospitalized recently for fever and/or lower respiratory symptoms?: No      Patient was around her daughter and 10year old granddaughter who had cold symptoms last week, they both tested negative for Covid  Patient had Covid swab yesterday and results are still pending at this time       Lab Results   Component Value Date    SARSCOV2 Negative 01/08/2021    [facility] 98 Not Detected 11/06/2020     Past Medical History:   Diagnosis Date    Anxiety     Arthritis     Back pain     Balance problems     Chest pain     heaviness    Difficulty swallowing     Dizziness     Gait disorder     GERD (gastroesophageal reflux disease)     Hypertension     Increased frequency of headaches     Numbness and tingling     Palpitations     Pericarditis     Thyroid disease     Trouble in sleeping      Past Surgical History: Procedure Laterality Date    APPENDECTOMY      CHOLECYSTECTOMY      laparoscopic    COLONOSCOPY      KNEE SURGERY Left     PERICARDIAL WINDOW      MT OPEN RX DISTAL RADIUS FX, EXTRA-ARTICULAR Right 3/22/2018    Procedure: OPEN REDUCTION W/ INTERNAL FIXATION (ORIF) RADIUS, splint application;  Surgeon: Korey Alvarez MD;  [facility];  Service: Orthopedics    UPPER GASTROINTESTINAL ENDOSCOPY       Current Outpatient Medications   Medication Sig Dispense Refill    ALPRAZolam (XANAX) 0 5 mg tablet Take 1 tablet (0 5 mg total) by mouth daily at bedtime as needed for anxiety or sleep 20 tablet 1    ascorbic acid (VITAMIN C) 500 mg tablet Take 500 mg by mouth daily      aspirin 81 mg chewable tablet Chew 1 tablet (81 mg total) daily (Patient taking differently: Chew 81 mg as needed ) 30 tablet 0    b complex vitamins tablet Take 1 tablet by mouth daily      Cholecalciferol (Vitamin D) 50 MCG (2000 UT) CAPS Take 1 capsule (2,000 Units total) by mouth daily      dicyclomine (BENTYL) 10 mg capsule Take 1 capsule (10 mg total) by mouth 4 (four) times a day (before meals and at bedtime) (Patient taking differently: Take 10 mg by mouth as needed ) 30 capsule 1    dicyclomine (BENTYL) 10 mg capsule Take 1 capsule (10 mg total) by mouth 4 (four) times a day (before meals and at bedtime) 30 capsule 3    Elastic Bandages & Supports (Medical Compression Stockings) MISC Use daily Dispense 1 pair of compression stockings 30mmHg 1 each 0    famotidine (PEPCID) 20 mg tablet Take 1 tablet (20 mg total) by mouth 2 (two) times a day before meals      gabapentin (NEURONTIN) 100 mg capsule gabapentin 100 mg nightly for 1 week and then if needed/tolerated increase to 200 mg nightly and then if needed/tolerated increase to 300 mg nightly 90 capsule 4    Magnesium 100 MG CAPS magnesium   300 mg qd      Melatonin 2 5 MG CHEW Chew 1 tablet      Multiple Vitamins-Calcium (ONE-A-DAY WOMENS PO) Take 1 tablet by mouth daily      Omega-3 Fatty Acids (Fish Oil) 645 MG CAPS Take by mouth       omeprazole (PriLOSEC) 20 mg delayed release capsule Take 1 capsule (20 mg total) by mouth daily before breakfast 90 capsule 1    Riboflavin (VITAMIN B-2 PO) Take by mouth      traZODone (DESYREL) 50 mg tablet Take 1 tablet (50 mg total) by mouth daily at bedtime 30 tablet 1    promethazine-codeine (PHENERGAN WITH CODEINE) 6 25-10 mg/5 mL syrup Take 5 mL by mouth daily at bedtime as needed for cough 120 mL 0    valACYclovir (VALTREX) 500 mg tablet Take 1 tablet (500 mg total) by mouth 3 (three) times a day for 7 days 21 tablet 0     No current facility-administered medications for this visit  Allergies   Allergen Reactions    Ciprofloxacin Myalgia       Review of Systems   Constitutional: Positive for fatigue and fever  Negative for chills  HENT: Positive for congestion, rhinorrhea and sore throat  Respiratory: Positive for cough  Negative for chest tightness, shortness of breath and wheezing  Cardiovascular: Negative for chest pain  Gastrointestinal: Negative for abdominal pain, diarrhea, nausea and vomiting  Neurological: Positive for headaches  Negative for dizziness  Objective:    Vitals:    09/08/21 0829   Temp: 99 7 °F (37 6 °C)   TempSrc: Tympanic   Weight: 78 kg (172 lb)   Height: 5' 8" (1 727 m)       Physical Exam  Constitutional:       General: She is not in acute distress  Pulmonary:      Effort: Pulmonary effort is normal       Comments: No signs of respiratory distress, tachypnea, conversational dyspnea or audible wheezing noted  Neurological:      Mental Status: She is alert and oriented to person, place, and time  Psychiatric:         Mood and Affect: Mood normal          Behavior: Behavior normal          Thought Content: Thought content normal        VIRTUAL VISIT DISCLAIMER    Jarrell Archer verbally agrees to participate in [employer]   Pt is aware that [employer] could be limited without vital signs or the ability to perform a full hands-on physical Connie Pearson understands she or the provider may request at any time to terminate the video visit and request the patient to seek care or treatment in person

## 2021-09-08 NOTE — TELEPHONE ENCOUNTER
----- Message from Jose Shaw MD sent at 9/8/2021  1:46 PM EDT -----  Please let patient know that her COVID-19 swab was negative

## 2021-09-09 ENCOUNTER — TELEPHONE (OUTPATIENT)
Dept: FAMILY MEDICINE CLINIC | Facility: CLINIC | Age: 76
End: 2021-09-09

## 2021-09-09 ENCOUNTER — APPOINTMENT (OUTPATIENT)
Dept: RADIOLOGY | Facility: CLINIC | Age: 76
End: 2021-09-09
Payer: COMMERCIAL

## 2021-09-09 DIAGNOSIS — R05.9 COUGH: ICD-10-CM

## 2021-09-09 PROCEDURE — 71046 X-RAY EXAM CHEST 2 VIEWS: CPT

## 2021-09-09 NOTE — TELEPHONE ENCOUNTER
Justen Sandovalr called this AM   She made an appointment to see you again tomorrow but in the meantime, she would like to go for a chest XR today

## 2021-09-10 ENCOUNTER — TELEPHONE (OUTPATIENT)
Dept: FAMILY MEDICINE CLINIC | Facility: CLINIC | Age: 76
End: 2021-09-10

## 2021-09-10 ENCOUNTER — CONSULT (OUTPATIENT)
Dept: ENDOCRINOLOGY | Facility: CLINIC | Age: 76
End: 2021-09-10
Payer: COMMERCIAL

## 2021-09-10 VITALS
HEIGHT: 68 IN | WEIGHT: 175.8 LBS | HEART RATE: 84 BPM | DIASTOLIC BLOOD PRESSURE: 80 MMHG | BODY MASS INDEX: 26.64 KG/M2 | SYSTOLIC BLOOD PRESSURE: 140 MMHG

## 2021-09-10 DIAGNOSIS — E04.1 THYROID NODULE: ICD-10-CM

## 2021-09-10 DIAGNOSIS — E21.3 HYPERPARATHYROIDISM (HCC): Primary | ICD-10-CM

## 2021-09-10 DIAGNOSIS — M80.00XA OSTEOPOROSIS WITH CURRENT PATHOLOGICAL FRACTURE, UNSPECIFIED OSTEOPOROSIS TYPE, INITIAL ENCOUNTER: ICD-10-CM

## 2021-09-10 DIAGNOSIS — E55.9 VITAMIN D DEFICIENCY: ICD-10-CM

## 2021-09-10 DIAGNOSIS — E83.52 HYPERCALCEMIA: ICD-10-CM

## 2021-09-10 PROCEDURE — 1036F TOBACCO NON-USER: CPT | Performed by: INTERNAL MEDICINE

## 2021-09-10 PROCEDURE — 99204 OFFICE O/P NEW MOD 45 MIN: CPT | Performed by: INTERNAL MEDICINE

## 2021-09-10 PROCEDURE — 1160F RVW MEDS BY RX/DR IN RCRD: CPT | Performed by: INTERNAL MEDICINE

## 2021-09-10 PROCEDURE — 3008F BODY MASS INDEX DOCD: CPT | Performed by: INTERNAL MEDICINE

## 2021-09-10 NOTE — PROGRESS NOTES
Ender Miller 78 y o  female MRN: [id number]    Encounter: [id number]      Assessment/Plan     Assessment: This is a 78y o -year-old female with hyperparathyroidism, hypercalcemia, osteoporosis and thyroid nodules  Plan:    -Hyperparathyroidism: Labs show hypercalcemia since 2019 and PTH was elevated to 111 7 on 03/10/2020  Recent labs on June 2021 showed elevated corrected calcium to 10 6 mg/dL  Vitamin-D level 33 6 on 08/04/2021  Will check calcium, albumin, phosphorus and PTH level  Based on result, will obtain further testing such as 24 hour urine calcium  Explained to the patient that if she has primary hyperparathyroidism that is contributing to osteoporosis, she will benefit from treatment of hyperparathyroidism before considering antiresorptive therapy  - Osteoporosis: evidenced by fragility fracture; right wrist fracture after minor fall  Per patient DEXA scan showed osteopenia  Patient is instructed to send us her  Prior testings  She will benefit from muscles training exercises and weight-bearing exercises  She is referred for physical therapy given her leg weakness and knee pain  She might also benefit from medical fitness  Continue vitamin-D supplementation  Continue intake of calcium from diet  - Hypercalcemia: as above  - Thyroid nodules: have been stable for the last 10 years  Patient is instructed to bring her FNA results to keep in her records  Recent thyroid ultrasound result to be scanned in the chart  CC:    Hyperparathyroidism/osteoporosis    History of Present Illness     HPI:    Patient is a 74-year-old female with history of thyroid nodules who presents for evaluation and management of osteoporosis  Patient was found to have a thyroid nodule  10-12 years ago and has been following with endocrinologist in [place]  She states she might have had an FNA years ago but does not remember details    She has being monitored with thyroid ultrasound for many years, that showed stable thyroid nodules  She denies any symptoms of hyper or hypothyroidism  She denies history radiation exposure  She denies any change in voice or trouble breathing  She does admit to have swallowing difficulties 4 years  For which she follows with GI  She has no family history of thyroid cancer  Thyroid ultrasound on 05/15/2020 showed multiple small thyroid nodules, the biggest of which is a left-sided nodule in the mid gland measuring 1 6 cm, stable compared to 2016  Patient had repeat ultrasound in June 2021 that showed no significant changes  Patient states she has had three DEXA scans; the most recent 2 showed osteopenia  She fell while trying to put her pyjamas 3 years ago and had right wrist fracture  She has had other falls but no fractures  She attributes the falls to weak legs and knee pain  Had a one time steroid injection in the knees  Takes orange juice, green vegetables, yogurt daily , once in while cheese and milk  She was told to stop taking calcium after labs showed elevated calcium  She takes Vitamin D 2400 daily  No adult height loss  She walks but is afraid of falling due to muscle weakness in the legs  She takes Omeprazole for stomach pains; she tried to switch to Pepcid but did not tolerate it  No smpoking or alcohol  Once in a while carbonated beverages  She has occasional nausea and abdominal pain  She denies constipation, polyuria, excess thirst or confusion  She has no history of kidney stones  Mother had multiple fractures and had likely osteoporosis  Review of Systems   Constitutional: Negative for activity change, appetite change, chills, diaphoresis, fatigue, fever and unexpected weight change  HENT: Positive for trouble swallowing  Negative for ear pain, sore throat and voice change  Eyes: Negative for pain and visual disturbance  Respiratory: Negative for cough, choking and shortness of breath      Cardiovascular: Negative for chest pain, palpitations and leg swelling  Gastrointestinal: Negative for abdominal pain, constipation, diarrhea and vomiting  Endocrine: Negative for cold intolerance, heat intolerance, polydipsia, polyphagia and polyuria  Genitourinary: Negative for dysuria  Musculoskeletal: Positive for arthralgias  Negative for back pain and myalgias  Skin: Negative for color change and rash  Neurological: Negative for dizziness, tremors, seizures, weakness, light-headedness, numbness and headaches  Psychiatric/Behavioral: Negative for agitation, behavioral problems, confusion and sleep disturbance  The patient is not nervous/anxious  All other systems reviewed and are negative        Historical Information   Past Medical History:   Diagnosis Date    Anxiety     Arthritis     Back pain     Balance problems     Chest pain     heaviness    Difficulty swallowing     Dizziness     Gait disorder     GERD (gastroesophageal reflux disease)     Hypertension     Increased frequency of headaches     Numbness and tingling     Palpitations     Pericarditis     Thyroid disease     Trouble in sleeping      Past Surgical History:   Procedure Laterality Date    APPENDECTOMY      CHOLECYSTECTOMY      laparoscopic    COLONOSCOPY      KNEE SURGERY Left     PERICARDIAL WINDOW      KS OPEN RX DISTAL RADIUS FX, EXTRA-ARTICULAR Right 3/22/2018    Procedure: OPEN REDUCTION W/ INTERNAL FIXATION (ORIF) RADIUS, splint application;  Surgeon: Charis Ashton MD;  Location: [facility];  Service: Orthopedics    UPPER GASTROINTESTINAL ENDOSCOPY       Social History   Social History     Substance and Sexual Activity   Alcohol Use No     Social History     Substance and Sexual Activity   Drug Use No     Social History     Tobacco Use   Smoking Status Never Smoker   Smokeless Tobacco Never Used     Family History:   Family History   Problem Relation Age of Onset    Kidney failure Mother     Hypertension Mother    [employer] Lung cancer Father    Cele Mtz Parkinson White syndrome Daughter     No Known Problems Sister     Substance Abuse Neg Hx     Alcohol abuse Neg Hx     Mental illness Neg Hx        Meds/Allergies   Current Outpatient Medications   Medication Sig Dispense Refill    ALPRAZolam (XANAX) 0 5 mg tablet Take 1 tablet (0 5 mg total) by mouth daily at bedtime as needed for anxiety or sleep 20 tablet 1    ascorbic acid (VITAMIN C) 500 mg tablet Take 500 mg by mouth daily      aspirin 81 mg chewable tablet Chew 1 tablet (81 mg total) daily (Patient taking differently: Chew 81 mg as needed ) 30 tablet 0    b complex vitamins tablet Take 1 tablet by mouth daily      Cholecalciferol (Vitamin D) 50 MCG (2000 UT) CAPS Take 1 capsule (2,000 Units total) by mouth daily      dicyclomine (BENTYL) 10 mg capsule Take 1 capsule (10 mg total) by mouth 4 (four) times a day (before meals and at bedtime) (Patient taking differently: Take 10 mg by mouth as needed ) 30 capsule 1    dicyclomine (BENTYL) 10 mg capsule Take 1 capsule (10 mg total) by mouth 4 (four) times a day (before meals and at bedtime) 30 capsule 3    Elastic Bandages & Supports (Medical Compression Stockings) MISC Use daily Dispense 1 pair of compression stockings 30mmHg 1 each 0    Magnesium 100 MG CAPS magnesium   300 mg qd      Multiple Vitamins-Calcium (ONE-A-DAY WOMENS PO) Take 1 tablet by mouth daily      omeprazole (PriLOSEC) 20 mg delayed release capsule Take 1 capsule (20 mg total) by mouth daily before breakfast 90 capsule 1    promethazine-codeine (PHENERGAN WITH CODEINE) 6 25-10 mg/5 mL syrup Take 5 mL by mouth daily at bedtime as needed for cough 120 mL 0    Riboflavin (VITAMIN B-2 PO) Take by mouth      famotidine (PEPCID) 20 mg tablet Take 1 tablet (20 mg total) by mouth 2 (two) times a day before meals (Patient not taking: Reported on 9/10/2021)      gabapentin (NEURONTIN) 100 mg capsule gabapentin 100 mg nightly for 1 week and then if needed/tolerated increase to 200 mg nightly and then if needed/tolerated increase to 300 mg nightly (Patient not taking: Reported on 9/10/2021) 90 capsule 4    Melatonin 2 5 MG CHEW Chew 1 tablet (Patient not taking: Reported on 9/10/2021)      Omega-3 Fatty Acids (Fish Oil) 645 MG CAPS Take by mouth  (Patient not taking: Reported on 9/10/2021)      traZODone (DESYREL) 50 mg tablet Take 1 tablet (50 mg total) by mouth daily at bedtime (Patient not taking: Reported on 9/10/2021) 30 tablet 1    valACYclovir (VALTREX) 500 mg tablet Take 1 tablet (500 mg total) by mouth 3 (three) times a day for 7 days (Patient not taking: Reported on 9/10/2021) 21 tablet 0     No current facility-administered medications for this visit  Allergies   Allergen Reactions    Ciprofloxacin Myalgia       Objective   Vitals: Height 5' 8" (1 727 m), not currently breastfeeding  Physical Exam  Vitals reviewed  Constitutional:       General: She is not in acute distress  Appearance: Normal appearance  She is not ill-appearing, toxic-appearing or diaphoretic  HENT:      Head: Normocephalic  Nose: Nose normal       Mouth/Throat:      Mouth: Mucous membranes are moist    Eyes:      General: No scleral icterus  Extraocular Movements: Extraocular movements intact  Conjunctiva/sclera: Conjunctivae normal       Pupils: Pupils are equal, round, and reactive to light  Neck:      Vascular: No carotid bruit  Comments: No thyromegaly, 1-1 5 cm left thyroid nodule mobile and smooth  Cardiovascular:      Rate and Rhythm: Normal rate and regular rhythm  Pulses: Normal pulses  Heart sounds: Normal heart sounds  Pulmonary:      Effort: Pulmonary effort is normal       Breath sounds: Normal breath sounds  No wheezing or rales  Abdominal:      General: Bowel sounds are normal  There is no distension  Palpations: Abdomen is soft  Musculoskeletal:         General: Normal range of motion        Cervical back: Normal range of motion and neck supple  No rigidity or tenderness  Right lower leg: No edema  Left lower leg: No edema  Lymphadenopathy:      Cervical: No cervical adenopathy  Skin:     General: Skin is warm  Neurological:      Mental Status: She is alert and oriented to person, place, and time  Psychiatric:         Mood and Affect: Mood normal          Behavior: Behavior normal          Thought Content: Thought content normal          Judgment: Judgment normal          The history was obtained from the review of the chart, patient  Lab Results:   Lab Results   Component Value Date/Time    TSH 3RD GENERATON 0 895 06/25/2021 07:42 AM    TSH 3RD GENERATON 0 748 09/25/2020 07:02 AM    Free T4 1 00 06/25/2021 07:42 AM      Ref  Range 3/10/2020 14:17   PARATHYROID HORMONE Latest Ref Range: 18 4 - 80 1 pg/mL 111 7 (H)      Ref  Range 6/25/2021 07:42   Sodium Latest Ref Range: 136 - 145 mmol/L 138   Potassium Latest Ref Range: 3 5 - 5 3 mmol/L 4 5   Chloride Latest Ref Range: 100 - 108 mmol/L 105   CO2 Latest Ref Range: 21 - 32 mmol/L 27   Anion Gap Latest Ref Range: 4 - 13 mmol/L 6   BUN Latest Ref Range: 5 - 25 mg/dL 25   Creatinine Latest Ref Range: 0 60 - 1 30 mg/dL 0 82   GLUCOSE FASTING Latest Ref Range: 65 - 99 mg/dL 87   Calcium Latest Ref Range: 8 3 - 10 1 mg/dL 10 2 (H)   AST Latest Ref Range: 5 - 45 U/L 25   ALT Latest Ref Range: 12 - 78 U/L 39   Alkaline Phosphatase Latest Ref Range: 46 - 116 U/L 109   Total Protein Latest Ref Range: 6 4 - 8 2 g/dL 7 6   Albumin Latest Ref Range: 3 5 - 5 0 g/dL 3 5   TOTAL BILIRUBIN Latest Ref Range: 0 20 - 1 00 mg/dL 0 85   eGFR Latest Units: ml/min/1 73sq m 71     Imaging Studies:   Results for orders placed during the hospital encounter of 05/15/20    US thyroid    Addendum 6/17/2020 10:04 AM  ADDENDUM:    Prior ultrasound from 1/26/2016 is now available for comparison      Left-sided nodule noted in the mid gland measuring up to 1 6 cm in size was visible 2016 and measures 2 x 0 8 x 1 4 cm at that time  Right-sided nodule in the mid gland was also visible on the prior examination and measures 0 7 x 0 6 x 0 5 cm at that time  Other smaller nodules are also not significantly changed  No significant change from the prior ultrasound  1 year follow-up is still suggested at which point 5 years of stability would be obtained  Impression  No nodule meets current ACR criteria for requiring biopsy but followup ultrasound is recommended in 1 year  Prior study has been requested at which time an addendum will be rendered  Reference: ACR Thyroid Imaging, Reporting and Data System (TI-RADS): White Paper of the [place]  J AM Yonny Radiol 1109;12:201-869  (additional recommendations based on American Thyroid Association 2015 guidelines )    The study was marked in EPIC for significant notification  Workstation performed: [id number]      I have personally reviewed pertinent reports  Portions of the record may have been created with voice recognition software  Occasional wrong word or "sound a like" substitutions may have occurred due to the inherent limitations of voice recognition software  Read the chart carefully and recognize, using context, where substitutions have occurred

## 2021-09-10 NOTE — TELEPHONE ENCOUNTER
Patient cancelled her appt with you today because she is feeling better  Results of her Chest Xray are in Epic  She would like someone to call her with the results

## 2021-09-13 ENCOUNTER — APPOINTMENT (OUTPATIENT)
Dept: LAB | Facility: CLINIC | Age: 76
End: 2021-09-13
Payer: COMMERCIAL

## 2021-09-13 DIAGNOSIS — E21.3 HYPERPARATHYROIDISM (HCC): ICD-10-CM

## 2021-09-13 DIAGNOSIS — M80.00XA OSTEOPOROSIS WITH CURRENT PATHOLOGICAL FRACTURE, UNSPECIFIED OSTEOPOROSIS TYPE, INITIAL ENCOUNTER: ICD-10-CM

## 2021-09-13 DIAGNOSIS — E83.52 HYPERCALCEMIA: ICD-10-CM

## 2021-09-13 DIAGNOSIS — E55.9 VITAMIN D DEFICIENCY: ICD-10-CM

## 2021-09-13 DIAGNOSIS — E04.1 THYROID NODULE: ICD-10-CM

## 2021-09-13 LAB
ALBUMIN SERPL BCP-MCNC: 3.4 G/DL (ref 3.5–5)
ALP SERPL-CCNC: 98 U/L (ref 46–116)
ALT SERPL W P-5'-P-CCNC: 32 U/L (ref 12–78)
ANION GAP SERPL CALCULATED.3IONS-SCNC: 6 MMOL/L (ref 4–13)
AST SERPL W P-5'-P-CCNC: 18 U/L (ref 5–45)
BILIRUB SERPL-MCNC: 0.72 MG/DL (ref 0.2–1)
BUN SERPL-MCNC: 21 MG/DL (ref 5–25)
CALCIUM ALBUM COR SERPL-MCNC: 10.4 MG/DL (ref 8.3–10.1)
CALCIUM SERPL-MCNC: 9.9 MG/DL (ref 8.3–10.1)
CHLORIDE SERPL-SCNC: 108 MMOL/L (ref 100–108)
CO2 SERPL-SCNC: 27 MMOL/L (ref 21–32)
CREAT SERPL-MCNC: 0.79 MG/DL (ref 0.6–1.3)
GFR SERPL CREATININE-BSD FRML MDRD: 74 ML/MIN/1.73SQ M
GLUCOSE P FAST SERPL-MCNC: 90 MG/DL (ref 65–99)
PHOSPHATE SERPL-MCNC: 2.7 MG/DL (ref 2.3–4.1)
POTASSIUM SERPL-SCNC: 4.3 MMOL/L (ref 3.5–5.3)
PROT SERPL-MCNC: 7.4 G/DL (ref 6.4–8.2)
PTH-INTACT SERPL-MCNC: 101.8 PG/ML (ref 18.4–80.1)
SODIUM SERPL-SCNC: 141 MMOL/L (ref 136–145)

## 2021-09-13 PROCEDURE — 83970 ASSAY OF PARATHORMONE: CPT

## 2021-09-13 PROCEDURE — 80053 COMPREHEN METABOLIC PANEL: CPT

## 2021-09-13 PROCEDURE — 84100 ASSAY OF PHOSPHORUS: CPT

## 2021-09-13 PROCEDURE — 36415 COLL VENOUS BLD VENIPUNCTURE: CPT

## 2021-09-15 ENCOUNTER — TELEPHONE (OUTPATIENT)
Dept: ENDOCRINOLOGY | Facility: CLINIC | Age: 76
End: 2021-09-15

## 2021-09-15 DIAGNOSIS — E83.52 HYPERCALCEMIA: Primary | ICD-10-CM

## 2021-09-15 DIAGNOSIS — E21.3 HYPERPARATHYROIDISM (HCC): ICD-10-CM

## 2021-09-15 NOTE — TELEPHONE ENCOUNTER
I called the patient and discussed the lab results  She has elevated calcium and PTH; likely primary hyperparathyroidism  I asked her to obtain 24 hour urine for calcium  I will review the result and then likely order a sestamibi scan

## 2021-09-16 ENCOUNTER — TELEPHONE (OUTPATIENT)
Dept: NEUROLOGY | Facility: CLINIC | Age: 76
End: 2021-09-16

## 2021-09-16 DIAGNOSIS — R42 VERTIGO: Primary | ICD-10-CM

## 2021-09-16 NOTE — TELEPHONE ENCOUNTER
Lisa Shields MD  to Me  Gaby Spain MD    NM      9/16/21 9:47 AM  Has she ever seen ENT? She might need to be seen by PT (vestibular therapy) - this sounds like it could be peripheral (inner ear) vertigo      Patient said she saw ENT in the past for swallowing difficulties  She is receptive to PT at [facility] for vestibular [facility]  I placed order and scheduled appointment for her at 7 AM tomorrow, 9/17  She will call back next week  if symptoms not resolved

## 2021-09-16 NOTE — TELEPHONE ENCOUNTER
Patient of Dr Vishal Merino, last office visit 6/16, hx of dizziness/vertigo    Patient called to schedule an appointment today with Dr Ree Blount  She said Dr Ree Blount told her when she gets these symptoms to call and we will fit her in for an appointment  She said Dr Ree Blount  performs therapy on her neck  She is reporting "whole body spins", difficulty ambulating  "everything starts spinning" whenever she changes her head position or changes body position  Also can have symptoms when lying down  Denies n/v, tolerating po fine  Currently not in PT  She said she does not have  meds to treat symptoms;  does not want to go to ED because of the wait and does not trust them working on her neck  Please provide recommendation  Thank you      best cb [phone], okay to leave detailed message

## 2021-09-17 ENCOUNTER — EVALUATION (OUTPATIENT)
Dept: PHYSICAL THERAPY | Facility: CLINIC | Age: 76
End: 2021-09-17
Payer: COMMERCIAL

## 2021-09-17 DIAGNOSIS — H81.11 BPPV (BENIGN PAROXYSMAL POSITIONAL VERTIGO), RIGHT: Primary | ICD-10-CM

## 2021-09-17 DIAGNOSIS — R42 VERTIGO: ICD-10-CM

## 2021-09-17 PROCEDURE — 97161 PT EVAL LOW COMPLEX 20 MIN: CPT | Performed by: PHYSICAL THERAPIST

## 2021-09-17 PROCEDURE — 95992 CANALITH REPOSITIONING PROC: CPT | Performed by: PHYSICAL THERAPIST

## 2021-09-17 NOTE — PROGRESS NOTES
PT Evaluation     Today's date: 2021  Patient name: Arnaud Cardozo  : 1945  MRN: [id number]  Referring provider: Cierra Waldron MD  Dx:   Encounter Diagnosis     ICD-10-CM    1  BPPV (benign paroxysmal positional vertigo), right  H81 11    2  Vertigo  R42 Ambulatory referral to Physical Therapy                  Assessment  Assessment details: Shauna Obando is a 79 yo female presenting with diagnosis of vertigo  She demonstrates signs and symptoms of right posterior canalithiasis  She tolerated Epley maneuver at the time of initial evaluation with vertigo lasting less than 30 seconds  No nystagmus noted, however, patient reports taking xanax last night which may have caused suppression  She is a good candidate for outpatient physical therapy to address the above impairments and optimize safe functional mobility  Functional limitations: slow with all functional mobility  Goals  STG - 1 session  1  Test negative for RDix Hallpike to indicate cleared R BPPV  2  Comply with after session instructions for BPPV  LTG - 3 sessions  1  Tolerate all functional transfers without onset of dizziness to allow resuming PLOF  2  Tolerate C/S AROM all planes without onset of dizziness to allow resuming PLOF  Plan  Plan details: Up to 4 visits as needed  Reviewed physical exam findings and plan of care  All questions answered to patient's satisfaction  Patient would benefit from: skilled physical therapy  Planned therapy interventions: patient education and canalith repositioning  Treatment plan discussed with: patient        Subjective Evaluation    History of Present Illness  Mechanism of injury: Patient reports yesterday morning she woke with spinning as soon as she sat up from bed  She reports she couldn't walk bc the vertigo was so intense  She also had a headache all day yesterday  She called her neurologist who put in an order for PT   She was very nervous to lay flat last night so she slept Ther Activity                                       Gait Training                                       Modalities

## 2021-09-20 ENCOUNTER — APPOINTMENT (OUTPATIENT)
Dept: PHYSICAL THERAPY | Facility: CLINIC | Age: 76
End: 2021-09-20
Payer: COMMERCIAL

## 2021-09-22 ENCOUNTER — APPOINTMENT (OUTPATIENT)
Dept: LAB | Facility: CLINIC | Age: 76
End: 2021-09-22
Payer: COMMERCIAL

## 2021-09-22 ENCOUNTER — NURSE TRIAGE (OUTPATIENT)
Dept: OTHER | Facility: OTHER | Age: 76
End: 2021-09-22

## 2021-09-22 DIAGNOSIS — E83.52 HYPERCALCEMIA: ICD-10-CM

## 2021-09-22 DIAGNOSIS — E21.3 HYPERPARATHYROIDISM (HCC): ICD-10-CM

## 2021-09-22 LAB
ALBUMIN SERPL BCP-MCNC: 3.5 G/DL (ref 3.5–5)
ALP SERPL-CCNC: 104 U/L (ref 46–116)
ALT SERPL W P-5'-P-CCNC: 35 U/L (ref 12–78)
ANION GAP SERPL CALCULATED.3IONS-SCNC: 0 MMOL/L (ref 4–13)
AST SERPL W P-5'-P-CCNC: 18 U/L (ref 5–45)
BILIRUB SERPL-MCNC: 0.69 MG/DL (ref 0.2–1)
BUN SERPL-MCNC: 20 MG/DL (ref 5–25)
CALCIUM 24H UR-MCNC: 193.47 MG/24 HRS (ref 42–353)
CALCIUM SERPL-MCNC: 9.9 MG/DL (ref 8.3–10.1)
CHLORIDE SERPL-SCNC: 109 MMOL/L (ref 100–108)
CO2 SERPL-SCNC: 30 MMOL/L (ref 21–32)
CREAT 24H UR-MRATE: 1.1 G/24HR (ref 0.6–1.8)
CREAT SERPL-MCNC: 0.86 MG/DL (ref 0.6–1.3)
GFR SERPL CREATININE-BSD FRML MDRD: 67 ML/MIN/1.73SQ M
GLUCOSE P FAST SERPL-MCNC: 89 MG/DL (ref 65–99)
POTASSIUM SERPL-SCNC: 4.3 MMOL/L (ref 3.5–5.3)
PROT SERPL-MCNC: 7.5 G/DL (ref 6.4–8.2)
SODIUM SERPL-SCNC: 139 MMOL/L (ref 136–145)
SPECIMEN VOL UR: 1775 ML
SPECIMEN VOL UR: 1775 ML

## 2021-09-22 PROCEDURE — 36415 COLL VENOUS BLD VENIPUNCTURE: CPT

## 2021-09-22 PROCEDURE — 82570 ASSAY OF URINE CREATININE: CPT

## 2021-09-22 PROCEDURE — 82340 ASSAY OF CALCIUM IN URINE: CPT

## 2021-09-22 PROCEDURE — 80053 COMPREHEN METABOLIC PANEL: CPT

## 2021-09-22 NOTE — TELEPHONE ENCOUNTER
Message was review with Black Rangel, Pt can add the 7 am urine in the urine specimen container since is part of the 24 hrs  Pt notified

## 2021-09-22 NOTE — TELEPHONE ENCOUNTER
Regarding: Urine Speciman  ----- Message from Figueroa Berry sent at 9/22/2021  6:55 AM EDT -----  " I have a question about my urine specimen "

## 2021-09-22 NOTE — TELEPHONE ENCOUNTER
Reason for Disposition   [1] Caller requesting NON-URGENT health information AND [2] PCP's office is the best resource    Answer Assessment - Initial Assessment Questions  1  REASON FOR CALL or QUESTION: "What is your reason for calling today?" or "How can I best help you?" or "What question do you have that I can help answer?"      [facility] last morning specimen at 7 am she should also put in 24 hours urine specimen container  Patient stated that she woke up at 0230 last night and already put the urine specimen at 0230 in a specimen container  Patient was told that speaking to the office directly would be the best resource  Patient agreeable, will contact with the office when it is open today      Protocols used: INFORMATION ONLY CALL - NO TRIAGE[facility]

## 2021-09-27 ENCOUNTER — EVALUATION (OUTPATIENT)
Dept: PHYSICAL THERAPY | Facility: CLINIC | Age: 76
End: 2021-09-27
Payer: COMMERCIAL

## 2021-09-27 DIAGNOSIS — R29.898 LEG WEAKNESS, BILATERAL: ICD-10-CM

## 2021-09-27 DIAGNOSIS — M80.00XA OSTEOPOROSIS WITH CURRENT PATHOLOGICAL FRACTURE, UNSPECIFIED OSTEOPOROSIS TYPE, INITIAL ENCOUNTER: Primary | ICD-10-CM

## 2021-09-27 DIAGNOSIS — R26.9 GAIT ABNORMALITY: ICD-10-CM

## 2021-09-27 PROCEDURE — 97161 PT EVAL LOW COMPLEX 20 MIN: CPT | Performed by: PHYSICAL THERAPIST

## 2021-09-27 NOTE — PROGRESS NOTES
PT Evaluation     Today's date: 2021  Patient name: Ender Miller  : 1945  MRN: [id number]  Referring provider: Gabriel Lira MD  Dx:   Encounter Diagnosis     ICD-10-CM    1  Osteoporosis with current pathological fracture, unspecified osteoporosis type, initial encounter  M80 00XA Ambulatory referral to Physical Therapy   2  Leg weakness, bilateral  R29 898    3  Gait abnormality  R26 9                   Assessment  Assessment details: Gloria Giang is a 79 yo female presenting with gait dysfunction, decreased BLE strength, decreased functional balance, and the listed functional impairments  She is an excellent candidate for outpatient physical therapy pending 2-3x/week attendance and compliance with HEP to address the above deficits and decrease risk of falls  Functional limitations: fearful of falling with all functional mobility, decreased walking speed, unable to squat, holds on to furniture/walls when waking in the middle of the night, difficulty balancing on uneven surfaces  Goals  8 weeks  1  Increase score on Ramos Balance Scale and Functional Gait Assessment by 5-10 points each to indicate improved functional balance  2  Negotiate stairs in reciprocal pattern with handrail to allow resuming PLOF  3  Tolerate standing/walking for 15 minutes prior to needing seated rest break to allow resuming PLOF  4  Independent with HEP at discharge to maintain gains  Plan  Plan details: Recommend PT 2-3x/week  Reviewed physical exam findings and plan of care  All questions answered to patient's satisfaction      Patient would benefit from: skilled physical therapy  Planned therapy interventions: neuromuscular re-education, patient education, therapeutic activities, therapeutic exercise and home exercise program  Duration in weeks: 8  Treatment plan discussed with: patient        Subjective Evaluation    History of Present Illness  Mechanism of injury: Patient reports she's had a problem with her balance for "quite a while" with frequent falls or near falls  She is fearful of falling and hitting her head  She arrives today with referral to outpatient PT from her endocrinologist but, per patient, her neurologist and PCP also suggested PT  She was recently evaluated for BPPV and symptoms resolved after initial treatment  PMH significant for bilateral knee OA  Pain  No pain reported    Social Support    Employment status: not working  Exercise history: walking      Diagnostic Tests  No diagnostic tests performed  Treatments  No previous or current treatments  Patient Goals  Patient goals for therapy: improved balance          Objective  Lower Extremity Strength    Left     Right  Hip Flex          4-/5    4-/5  Hip Ext  3+/5    3+/5   Hip Abd 4-/5    4-/5  Hip ER  4-/5    4-/5    Knee Flex 4/5    4/5  Knee Ext 4/5    4/5    Ankle DF 5/5    5/5  Ankle PF 4/5    4/5       Flowsheet Rows      Most Recent Value   Ramos Balance Scale   1  Sitting to Standing  3   2  Standing Unsupported  4   3  Sitting with Back Unsupported but Feet Supported on Floor or on a Stool  4   4  Standing to Sitting  3   5  Transfers  3   6  Standing Unsupported with Eyes Closed  4   7  Standing Unsupported with Feet Together  4   8  Reach Forward with Outstretched Arm While Standing  4   9   Object from Floor from a Standing Position  4   10  Turning to Look Behind Over Left and Right Shoulders While Standing  4   11  Turn 360 Degrees  4   12  Place Alternate Foot on Step or Stool While Standing Unsupported  4   13  Standing Unsupported One Foot in [address]  1   14   Standing on One Leg  1   Ramos Balance Score  47   Functional Gait Assessment   Gait Level Surface   1   Change in GaiT Speed  3   Gait with horizontal head turns  2   Gait with vertical head turns  3   Gait and pivot tuen   3   Step over obstacle  3   Gait with narrow base of supprt  0   Gait with eyes closed  2   Ambulating backwards  3   Steps  1   Total score   21               Precautions: fall risk, osteoporosis      Manuals 9/27                                                                Neuro Re-Ed             Std SLR x3 min UE             HR             Alt Step Tap             Tandem Amb             Sidestepping             Marching                          Ther Ex             Clamshell             SL hip abd             Prone hip ext                                                                              Ther Activity             Bike             Step Up F/L             Sit to stand no UE                          Pt education HEP, functional mobility, gait/shoe wear            Gait Training                                       Modalities

## 2021-09-29 ENCOUNTER — TELEPHONE (OUTPATIENT)
Dept: ENDOCRINOLOGY | Facility: CLINIC | Age: 76
End: 2021-09-29

## 2021-09-29 DIAGNOSIS — E21.3 HYPERPARATHYROIDISM (HCC): Primary | ICD-10-CM

## 2021-09-29 DIAGNOSIS — E21.0 PRIMARY HYPERPARATHYROIDISM (HCC): Primary | ICD-10-CM

## 2021-09-29 NOTE — TELEPHONE ENCOUNTER
----- Message from Bia Cabral MD sent at 9/29/2021 12:37 PM EDT -----  Labs show a corrected calcium of 10 3 and a calculated calcium creatinine clearance of 0 015 suggestive of primary hyperparathyroidism  Sestamibi scan ordered  Spoke to patient.   Patient reported that he is having right hip pain. Patient states that he has been taking OTC Tylenol 500 mg once daily and its not helping. Patient requesting a prescription. Informed patient that Dr Smith is not in the office this week and will return on Monday, 4/8/19. Offered patient to see Dr Gillis today. Patient declined, only wants to see Dr Smith. Patient will try to take Tylenol 500 mg, one tablet every 6 hrs and will call on Monday when Dr Smith is in the office.

## 2021-09-29 NOTE — TELEPHONE ENCOUNTER
Labs show a corrected calcium of 10 3 and a calculated calcium creatinine clearance of 0 015 suggestive of primary hyperparathyroidism  Parathyroid scan ordered

## 2021-09-30 ENCOUNTER — OFFICE VISIT (OUTPATIENT)
Dept: PHYSICAL THERAPY | Facility: CLINIC | Age: 76
End: 2021-09-30
Payer: COMMERCIAL

## 2021-09-30 DIAGNOSIS — R26.9 GAIT ABNORMALITY: ICD-10-CM

## 2021-09-30 DIAGNOSIS — R29.898 LEG WEAKNESS, BILATERAL: Primary | ICD-10-CM

## 2021-09-30 PROCEDURE — 97530 THERAPEUTIC ACTIVITIES: CPT | Performed by: PHYSICAL THERAPIST

## 2021-09-30 PROCEDURE — 97112 NEUROMUSCULAR REEDUCATION: CPT | Performed by: PHYSICAL THERAPIST

## 2021-09-30 NOTE — PROGRESS NOTES
Daily Note     Today's date: 2021  Patient name: Serene Damico  : 1945  MRN: [id number]  Referring provider: Ruslan Dowd MD  Dx:   Encounter Diagnosis     ICD-10-CM    1  Leg weakness, bilateral  R29 898    2  Gait abnormality  R26 9                   Subjective: Pt reports she was not able to get sneakers yet but she will be soon  Objective: See treatment diary below      Assessment: Tolerated treatment well  Patient demonstrated fatigue post treatment, exhibited good technique with therapeutic exercises and would benefit from continued PT      Plan: Continue per plan of care        Precautions: fall risk, osteoporosis      Manuals                                                                Neuro Re-Ed             Std SLR x3 min UE  20x ea           HR  20x           Alt Step Tap  6" 20x           Tandem Amb  2 laps           Sidestepping  2 laps           Marching  2 laps                        Ther Ex             Clamshell             SL hip abd             Prone hip ext                                                                              Ther Activity             Bike  6'           Step Up F/L/C  6" 10x ea           Sit to stand no UE             Resisted 4 way walkouts             Std Bwd UBE  6'           Pt education HEP, functional mobility, gait/shoe wear            Gait Training                                       Modalities

## 2021-10-05 ENCOUNTER — OFFICE VISIT (OUTPATIENT)
Dept: PHYSICAL THERAPY | Facility: CLINIC | Age: 76
End: 2021-10-05
Payer: COMMERCIAL

## 2021-10-05 DIAGNOSIS — R26.9 GAIT ABNORMALITY: ICD-10-CM

## 2021-10-05 DIAGNOSIS — R29.898 LEG WEAKNESS, BILATERAL: Primary | ICD-10-CM

## 2021-10-05 PROCEDURE — 97530 THERAPEUTIC ACTIVITIES: CPT

## 2021-10-05 PROCEDURE — 97112 NEUROMUSCULAR REEDUCATION: CPT

## 2021-10-06 ENCOUNTER — APPOINTMENT (OUTPATIENT)
Dept: PHYSICAL THERAPY | Facility: CLINIC | Age: 76
End: 2021-10-06
Payer: COMMERCIAL

## 2021-10-06 ENCOUNTER — HOSPITAL ENCOUNTER (OUTPATIENT)
Dept: NUCLEAR MEDICINE | Facility: HOSPITAL | Age: 76
Discharge: HOME | End: 2021-10-06
Payer: COMMERCIAL

## 2021-10-06 DIAGNOSIS — E21.0 PRIMARY HYPERPARATHYROIDISM (HCC): ICD-10-CM

## 2021-10-06 PROCEDURE — A9500 TC99M SESTAMIBI: HCPCS

## 2021-10-06 PROCEDURE — 78071 PARATHYRD PLANAR W/WO SUBTRJ: CPT

## 2021-10-06 PROCEDURE — G1004 CDSM NDSC: HCPCS

## 2021-10-08 ENCOUNTER — OFFICE VISIT (OUTPATIENT)
Dept: PHYSICAL THERAPY | Facility: CLINIC | Age: 76
End: 2021-10-08
Payer: COMMERCIAL

## 2021-10-08 DIAGNOSIS — R29.898 LEG WEAKNESS, BILATERAL: Primary | ICD-10-CM

## 2021-10-08 DIAGNOSIS — R26.9 GAIT ABNORMALITY: ICD-10-CM

## 2021-10-08 PROCEDURE — 97112 NEUROMUSCULAR REEDUCATION: CPT | Performed by: PHYSICAL THERAPIST

## 2021-10-08 PROCEDURE — 97530 THERAPEUTIC ACTIVITIES: CPT | Performed by: PHYSICAL THERAPIST

## 2021-10-11 ENCOUNTER — TELEPHONE (OUTPATIENT)
Dept: ENDOCRINOLOGY | Facility: CLINIC | Age: 76
End: 2021-10-11

## 2021-10-11 ENCOUNTER — OFFICE VISIT (OUTPATIENT)
Dept: PHYSICAL THERAPY | Facility: CLINIC | Age: 76
End: 2021-10-11
Payer: COMMERCIAL

## 2021-10-11 DIAGNOSIS — R26.9 GAIT ABNORMALITY: ICD-10-CM

## 2021-10-11 DIAGNOSIS — R29.898 LEG WEAKNESS, BILATERAL: Primary | ICD-10-CM

## 2021-10-11 PROCEDURE — 97530 THERAPEUTIC ACTIVITIES: CPT | Performed by: PHYSICAL THERAPIST

## 2021-10-11 PROCEDURE — 97112 NEUROMUSCULAR REEDUCATION: CPT | Performed by: PHYSICAL THERAPIST

## 2021-10-13 ENCOUNTER — OFFICE VISIT (OUTPATIENT)
Dept: PHYSICAL THERAPY | Facility: CLINIC | Age: 76
End: 2021-10-13
Payer: COMMERCIAL

## 2021-10-13 DIAGNOSIS — R26.9 GAIT ABNORMALITY: ICD-10-CM

## 2021-10-13 DIAGNOSIS — R29.898 LEG WEAKNESS, BILATERAL: Primary | ICD-10-CM

## 2021-10-13 PROCEDURE — 97530 THERAPEUTIC ACTIVITIES: CPT | Performed by: PHYSICAL THERAPIST

## 2021-10-13 PROCEDURE — 97112 NEUROMUSCULAR REEDUCATION: CPT | Performed by: PHYSICAL THERAPIST

## 2021-10-13 PROCEDURE — 97110 THERAPEUTIC EXERCISES: CPT | Performed by: PHYSICAL THERAPIST

## 2021-10-15 ENCOUNTER — OFFICE VISIT (OUTPATIENT)
Dept: CARDIOLOGY CLINIC | Facility: CLINIC | Age: 76
End: 2021-10-15
Payer: COMMERCIAL

## 2021-10-15 VITALS
HEIGHT: 68 IN | WEIGHT: 175.6 LBS | DIASTOLIC BLOOD PRESSURE: 78 MMHG | BODY MASS INDEX: 26.61 KG/M2 | HEART RATE: 75 BPM | SYSTOLIC BLOOD PRESSURE: 120 MMHG

## 2021-10-15 DIAGNOSIS — Z86.79 HISTORY OF PERICARDITIS: Primary | ICD-10-CM

## 2021-10-15 DIAGNOSIS — R00.2 PALPITATIONS: ICD-10-CM

## 2021-10-15 PROCEDURE — 99214 OFFICE O/P EST MOD 30 MIN: CPT | Performed by: INTERNAL MEDICINE

## 2021-10-18 ENCOUNTER — TELEPHONE (OUTPATIENT)
Dept: FAMILY MEDICINE CLINIC | Facility: CLINIC | Age: 76
End: 2021-10-18

## 2021-10-18 ENCOUNTER — APPOINTMENT (OUTPATIENT)
Dept: PHYSICAL THERAPY | Facility: CLINIC | Age: 76
End: 2021-10-18
Payer: COMMERCIAL

## 2021-10-18 ENCOUNTER — TELEPHONE (OUTPATIENT)
Dept: OTHER | Facility: HOSPITAL | Age: 76
End: 2021-10-18

## 2021-10-18 DIAGNOSIS — E21.0 PRIMARY HYPERPARATHYROIDISM (HCC): Primary | ICD-10-CM

## 2021-10-19 ENCOUNTER — RA CDI HCC (OUTPATIENT)
Dept: OTHER | Facility: HOSPITAL | Age: 76
End: 2021-10-19

## 2021-10-20 ENCOUNTER — OFFICE VISIT (OUTPATIENT)
Dept: FAMILY MEDICINE CLINIC | Facility: CLINIC | Age: 76
End: 2021-10-20
Payer: COMMERCIAL

## 2021-10-20 ENCOUNTER — OFFICE VISIT (OUTPATIENT)
Dept: PHYSICAL THERAPY | Facility: CLINIC | Age: 76
End: 2021-10-20
Payer: COMMERCIAL

## 2021-10-20 VITALS
BODY MASS INDEX: 26.46 KG/M2 | DIASTOLIC BLOOD PRESSURE: 80 MMHG | RESPIRATION RATE: 16 BRPM | WEIGHT: 174.6 LBS | HEIGHT: 68 IN | HEART RATE: 83 BPM | SYSTOLIC BLOOD PRESSURE: 122 MMHG

## 2021-10-20 DIAGNOSIS — Z00.00 MEDICARE ANNUAL WELLNESS VISIT, SUBSEQUENT: ICD-10-CM

## 2021-10-20 DIAGNOSIS — R10.9 RIGHT FLANK PAIN: Primary | ICD-10-CM

## 2021-10-20 DIAGNOSIS — R31.29 HEMATURIA, MICROSCOPIC: ICD-10-CM

## 2021-10-20 DIAGNOSIS — R26.9 GAIT ABNORMALITY: ICD-10-CM

## 2021-10-20 DIAGNOSIS — R29.898 LEG WEAKNESS, BILATERAL: Primary | ICD-10-CM

## 2021-10-20 DIAGNOSIS — E21.0 PRIMARY HYPERPARATHYROIDISM (HCC): ICD-10-CM

## 2021-10-20 LAB
SL AMB  POCT GLUCOSE, UA: ABNORMAL
SL AMB LEUKOCYTE ESTERASE,UA: ABNORMAL
SL AMB POCT BILIRUBIN,UA: ABNORMAL
SL AMB POCT BLOOD,UA: ABNORMAL
SL AMB POCT CLARITY,UA: CLEAR
SL AMB POCT COLOR,UA: YELLOW
SL AMB POCT KETONES,UA: ABNORMAL
SL AMB POCT NITRITE,UA: ABNORMAL
SL AMB POCT PH,UA: 5
SL AMB POCT SPECIFIC GRAVITY,UA: 1.01
SL AMB POCT URINE PROTEIN: ABNORMAL
SL AMB POCT UROBILINOGEN: 0.2

## 2021-10-20 PROCEDURE — 97530 THERAPEUTIC ACTIVITIES: CPT | Performed by: PHYSICAL THERAPIST

## 2021-10-20 PROCEDURE — 3288F FALL RISK ASSESSMENT DOCD: CPT | Performed by: FAMILY MEDICINE

## 2021-10-20 PROCEDURE — 99213 OFFICE O/P EST LOW 20 MIN: CPT | Performed by: FAMILY MEDICINE

## 2021-10-20 PROCEDURE — G0439 PPPS, SUBSEQ VISIT: HCPCS | Performed by: FAMILY MEDICINE

## 2021-10-20 PROCEDURE — 3725F SCREEN DEPRESSION PERFORMED: CPT | Performed by: FAMILY MEDICINE

## 2021-10-20 PROCEDURE — 81002 URINALYSIS NONAUTO W/O SCOPE: CPT | Performed by: FAMILY MEDICINE

## 2021-10-20 PROCEDURE — 1125F AMNT PAIN NOTED PAIN PRSNT: CPT | Performed by: FAMILY MEDICINE

## 2021-10-20 PROCEDURE — 97110 THERAPEUTIC EXERCISES: CPT | Performed by: PHYSICAL THERAPIST

## 2021-10-20 PROCEDURE — 1170F FXNL STATUS ASSESSED: CPT | Performed by: FAMILY MEDICINE

## 2021-10-20 PROCEDURE — 87086 URINE CULTURE/COLONY COUNT: CPT | Performed by: FAMILY MEDICINE

## 2021-10-20 PROCEDURE — 97112 NEUROMUSCULAR REEDUCATION: CPT | Performed by: PHYSICAL THERAPIST

## 2021-10-20 PROCEDURE — 81003 URINALYSIS AUTO W/O SCOPE: CPT | Performed by: FAMILY MEDICINE

## 2021-10-21 LAB
BILIRUB UR QL STRIP: NEGATIVE
CLARITY UR: CLEAR
COLOR UR: YELLOW
GLUCOSE UR STRIP-MCNC: NEGATIVE MG/DL
HGB UR QL STRIP.AUTO: NEGATIVE
KETONES UR STRIP-MCNC: NEGATIVE MG/DL
LEUKOCYTE ESTERASE UR QL STRIP: NEGATIVE
NITRITE UR QL STRIP: NEGATIVE
PH UR STRIP.AUTO: 6 [PH]
PROT UR STRIP-MCNC: NEGATIVE MG/DL
SP GR UR STRIP.AUTO: 1.02 (ref 1–1.03)
UROBILINOGEN UR QL STRIP.AUTO: 0.2 E.U./DL

## 2021-10-22 ENCOUNTER — TELEPHONE (OUTPATIENT)
Dept: FAMILY MEDICINE CLINIC | Facility: CLINIC | Age: 76
End: 2021-10-22

## 2021-10-22 LAB — BACTERIA UR CULT: NORMAL

## 2021-10-25 ENCOUNTER — OFFICE VISIT (OUTPATIENT)
Dept: PHYSICAL THERAPY | Facility: CLINIC | Age: 76
End: 2021-10-25
Payer: COMMERCIAL

## 2021-10-25 DIAGNOSIS — R29.898 LEG WEAKNESS, BILATERAL: Primary | ICD-10-CM

## 2021-10-25 DIAGNOSIS — R26.9 GAIT ABNORMALITY: ICD-10-CM

## 2021-10-25 PROCEDURE — 97110 THERAPEUTIC EXERCISES: CPT | Performed by: PHYSICAL THERAPIST

## 2021-10-25 PROCEDURE — 97112 NEUROMUSCULAR REEDUCATION: CPT | Performed by: PHYSICAL THERAPIST

## 2021-10-26 ENCOUNTER — OFFICE VISIT (OUTPATIENT)
Dept: PULMONOLOGY | Facility: CLINIC | Age: 76
End: 2021-10-26
Payer: COMMERCIAL

## 2021-10-26 VITALS
SYSTOLIC BLOOD PRESSURE: 150 MMHG | HEART RATE: 73 BPM | WEIGHT: 175.4 LBS | HEIGHT: 68 IN | DIASTOLIC BLOOD PRESSURE: 90 MMHG | TEMPERATURE: 97.3 F | OXYGEN SATURATION: 95 % | BODY MASS INDEX: 26.58 KG/M2

## 2021-10-26 DIAGNOSIS — J84.9 ILD (INTERSTITIAL LUNG DISEASE) (HCC): Primary | ICD-10-CM

## 2021-10-26 PROCEDURE — 99213 OFFICE O/P EST LOW 20 MIN: CPT | Performed by: INTERNAL MEDICINE

## 2021-10-26 PROCEDURE — 3008F BODY MASS INDEX DOCD: CPT | Performed by: INTERNAL MEDICINE

## 2021-10-26 PROCEDURE — 1160F RVW MEDS BY RX/DR IN RCRD: CPT | Performed by: INTERNAL MEDICINE

## 2021-10-26 PROCEDURE — 1036F TOBACCO NON-USER: CPT | Performed by: INTERNAL MEDICINE

## 2021-10-27 ENCOUNTER — OFFICE VISIT (OUTPATIENT)
Dept: PHYSICAL THERAPY | Facility: CLINIC | Age: 76
End: 2021-10-27
Payer: COMMERCIAL

## 2021-10-27 DIAGNOSIS — R26.9 GAIT ABNORMALITY: ICD-10-CM

## 2021-10-27 DIAGNOSIS — R29.898 LEG WEAKNESS, BILATERAL: Primary | ICD-10-CM

## 2021-10-27 PROCEDURE — 97112 NEUROMUSCULAR REEDUCATION: CPT | Performed by: PHYSICAL THERAPIST

## 2021-10-27 PROCEDURE — 97530 THERAPEUTIC ACTIVITIES: CPT | Performed by: PHYSICAL THERAPIST

## 2021-10-27 PROCEDURE — 97110 THERAPEUTIC EXERCISES: CPT | Performed by: PHYSICAL THERAPIST

## 2021-11-01 ENCOUNTER — TELEPHONE (OUTPATIENT)
Dept: FAMILY MEDICINE CLINIC | Facility: CLINIC | Age: 76
End: 2021-11-01

## 2021-11-02 ENCOUNTER — EVALUATION (OUTPATIENT)
Dept: PHYSICAL THERAPY | Facility: CLINIC | Age: 76
End: 2021-11-02
Payer: COMMERCIAL

## 2021-11-02 DIAGNOSIS — R26.9 GAIT ABNORMALITY: ICD-10-CM

## 2021-11-02 DIAGNOSIS — R29.898 LEG WEAKNESS, BILATERAL: Primary | ICD-10-CM

## 2021-11-02 PROCEDURE — 97530 THERAPEUTIC ACTIVITIES: CPT | Performed by: PHYSICAL THERAPIST

## 2021-11-03 ENCOUNTER — CLINICAL SUPPORT (OUTPATIENT)
Dept: FAMILY MEDICINE CLINIC | Facility: CLINIC | Age: 76
End: 2021-11-03

## 2021-11-03 DIAGNOSIS — Z20.822 SUSPECTED COVID-19 VIRUS INFECTION: Primary | ICD-10-CM

## 2021-11-03 PROCEDURE — U0005 INFEC AGEN DETEC AMPLI PROBE: HCPCS | Performed by: FAMILY MEDICINE

## 2021-11-03 PROCEDURE — U0003 INFECTIOUS AGENT DETECTION BY NUCLEIC ACID (DNA OR RNA); SEVERE ACUTE RESPIRATORY SYNDROME CORONAVIRUS 2 (SARS-COV-2) (CORONAVIRUS DISEASE [COVID-19]), AMPLIFIED PROBE TECHNIQUE, MAKING USE OF HIGH THROUGHPUT TECHNOLOGIES AS DESCRIBED BY CMS-2020-01-R: HCPCS | Performed by: FAMILY MEDICINE

## 2021-11-04 ENCOUNTER — APPOINTMENT (OUTPATIENT)
Dept: PHYSICAL THERAPY | Facility: CLINIC | Age: 76
End: 2021-11-04
Payer: COMMERCIAL

## 2021-11-04 ENCOUNTER — TELEPHONE (OUTPATIENT)
Dept: FAMILY MEDICINE CLINIC | Facility: CLINIC | Age: 76
End: 2021-11-04

## 2021-11-04 ENCOUNTER — HOSPITAL ENCOUNTER (OUTPATIENT)
Dept: ULTRASOUND IMAGING | Facility: HOSPITAL | Age: 76
Discharge: HOME | End: 2021-11-04
Payer: COMMERCIAL

## 2021-11-04 DIAGNOSIS — R10.11 RUQ ABDOMINAL PAIN: ICD-10-CM

## 2021-11-04 DIAGNOSIS — R10.9 RIGHT FLANK PAIN: ICD-10-CM

## 2021-11-04 LAB — SARS-COV-2 RNA RESP QL NAA+PROBE: NEGATIVE

## 2021-11-04 PROCEDURE — 76700 US EXAM ABDOM COMPLETE: CPT

## 2021-11-05 ENCOUNTER — TELEPHONE (OUTPATIENT)
Dept: NEUROLOGY | Facility: CLINIC | Age: 76
End: 2021-11-05

## 2021-11-08 ENCOUNTER — HOSPITAL ENCOUNTER (OUTPATIENT)
Dept: MRI IMAGING | Facility: HOSPITAL | Age: 76
Discharge: HOME | End: 2021-11-08
Attending: FAMILY MEDICINE
Payer: COMMERCIAL

## 2021-11-08 DIAGNOSIS — R22.42 MASS OF LEFT KNEE: ICD-10-CM

## 2021-11-08 DIAGNOSIS — M71.22 BAKER'S CYST OF KNEE, LEFT: ICD-10-CM

## 2021-11-08 PROCEDURE — G1004 CDSM NDSC: HCPCS

## 2021-11-08 PROCEDURE — 73721 MRI JNT OF LWR EXTRE W/O DYE: CPT

## 2021-11-09 ENCOUNTER — OFFICE VISIT (OUTPATIENT)
Dept: PHYSICAL THERAPY | Facility: CLINIC | Age: 76
End: 2021-11-09
Payer: COMMERCIAL

## 2021-11-09 DIAGNOSIS — R26.9 GAIT ABNORMALITY: ICD-10-CM

## 2021-11-09 DIAGNOSIS — R29.898 LEG WEAKNESS, BILATERAL: Primary | ICD-10-CM

## 2021-11-09 PROCEDURE — 97530 THERAPEUTIC ACTIVITIES: CPT

## 2021-11-09 PROCEDURE — 97110 THERAPEUTIC EXERCISES: CPT

## 2021-11-09 PROCEDURE — 97112 NEUROMUSCULAR REEDUCATION: CPT

## 2021-11-10 ENCOUNTER — TELEPHONE (OUTPATIENT)
Dept: FAMILY MEDICINE CLINIC | Facility: CLINIC | Age: 76
End: 2021-11-10

## 2021-11-11 ENCOUNTER — APPOINTMENT (OUTPATIENT)
Dept: PHYSICAL THERAPY | Facility: CLINIC | Age: 76
End: 2021-11-11
Payer: COMMERCIAL

## 2021-11-12 ENCOUNTER — OFFICE VISIT (OUTPATIENT)
Dept: NEUROLOGY | Facility: CLINIC | Age: 76
End: 2021-11-12
Payer: COMMERCIAL

## 2021-11-12 VITALS
WEIGHT: 174 LBS | BODY MASS INDEX: 26.37 KG/M2 | DIASTOLIC BLOOD PRESSURE: 76 MMHG | HEIGHT: 68 IN | SYSTOLIC BLOOD PRESSURE: 138 MMHG | HEART RATE: 91 BPM

## 2021-11-12 DIAGNOSIS — G44.229 CHRONIC TENSION HEADACHES: ICD-10-CM

## 2021-11-12 DIAGNOSIS — G43.009 MIGRAINE WITHOUT AURA AND WITHOUT STATUS MIGRAINOSUS, NOT INTRACTABLE: Primary | ICD-10-CM

## 2021-11-12 PROCEDURE — 99215 OFFICE O/P EST HI 40 MIN: CPT | Performed by: PSYCHIATRY & NEUROLOGY

## 2021-11-16 ENCOUNTER — APPOINTMENT (OUTPATIENT)
Dept: PHYSICAL THERAPY | Facility: CLINIC | Age: 76
End: 2021-11-16
Payer: COMMERCIAL

## 2021-11-17 ENCOUNTER — TELEPHONE (OUTPATIENT)
Dept: NEUROLOGY | Facility: CLINIC | Age: 76
End: 2021-11-17

## 2021-11-17 ENCOUNTER — TELEPHONE (OUTPATIENT)
Dept: ENDOCRINOLOGY | Facility: CLINIC | Age: 76
End: 2021-11-17

## 2021-11-18 ENCOUNTER — APPOINTMENT (OUTPATIENT)
Dept: PHYSICAL THERAPY | Facility: CLINIC | Age: 76
End: 2021-11-18
Payer: COMMERCIAL

## 2021-11-22 ENCOUNTER — APPOINTMENT (OUTPATIENT)
Dept: PHYSICAL THERAPY | Facility: CLINIC | Age: 76
End: 2021-11-22
Payer: COMMERCIAL

## 2021-11-23 ENCOUNTER — CLINICAL SUPPORT (OUTPATIENT)
Dept: FAMILY MEDICINE CLINIC | Facility: CLINIC | Age: 76
End: 2021-11-23

## 2021-11-23 DIAGNOSIS — R05.9 COUGH: Primary | ICD-10-CM

## 2021-11-23 PROCEDURE — U0005 INFEC AGEN DETEC AMPLI PROBE: HCPCS | Performed by: FAMILY MEDICINE

## 2021-11-23 PROCEDURE — U0003 INFECTIOUS AGENT DETECTION BY NUCLEIC ACID (DNA OR RNA); SEVERE ACUTE RESPIRATORY SYNDROME CORONAVIRUS 2 (SARS-COV-2) (CORONAVIRUS DISEASE [COVID-19]), AMPLIFIED PROBE TECHNIQUE, MAKING USE OF HIGH THROUGHPUT TECHNOLOGIES AS DESCRIBED BY CMS-2020-01-R: HCPCS | Performed by: FAMILY MEDICINE

## 2021-11-24 ENCOUNTER — APPOINTMENT (OUTPATIENT)
Dept: PHYSICAL THERAPY | Facility: CLINIC | Age: 76
End: 2021-11-24
Payer: COMMERCIAL

## 2021-11-24 LAB — SARS-COV-2 RNA RESP QL NAA+PROBE: NEGATIVE

## 2021-11-26 ENCOUNTER — TELEPHONE (OUTPATIENT)
Dept: FAMILY MEDICINE CLINIC | Facility: CLINIC | Age: 76
End: 2021-11-26

## 2021-11-26 ENCOUNTER — OFFICE VISIT (OUTPATIENT)
Dept: FAMILY MEDICINE CLINIC | Facility: CLINIC | Age: 76
End: 2021-11-26
Payer: COMMERCIAL

## 2021-11-26 VITALS
BODY MASS INDEX: 27.39 KG/M2 | DIASTOLIC BLOOD PRESSURE: 80 MMHG | SYSTOLIC BLOOD PRESSURE: 130 MMHG | OXYGEN SATURATION: 96 % | WEIGHT: 174.5 LBS | HEART RATE: 80 BPM | HEIGHT: 67 IN | RESPIRATION RATE: 16 BRPM

## 2021-11-26 DIAGNOSIS — R05.9 COUGH: Primary | ICD-10-CM

## 2021-11-26 PROCEDURE — 3008F BODY MASS INDEX DOCD: CPT | Performed by: NURSE PRACTITIONER

## 2021-11-26 PROCEDURE — 1036F TOBACCO NON-USER: CPT | Performed by: NURSE PRACTITIONER

## 2021-11-26 PROCEDURE — 99213 OFFICE O/P EST LOW 20 MIN: CPT | Performed by: NURSE PRACTITIONER

## 2021-11-26 PROCEDURE — 1160F RVW MEDS BY RX/DR IN RCRD: CPT | Performed by: NURSE PRACTITIONER

## 2021-11-29 ENCOUNTER — TELEPHONE (OUTPATIENT)
Dept: GASTROENTEROLOGY | Facility: CLINIC | Age: 76
End: 2021-11-29

## 2021-11-30 ENCOUNTER — APPOINTMENT (OUTPATIENT)
Dept: PHYSICAL THERAPY | Facility: CLINIC | Age: 76
End: 2021-11-30
Payer: COMMERCIAL

## 2021-12-07 ENCOUNTER — OFFICE VISIT (OUTPATIENT)
Dept: FAMILY MEDICINE CLINIC | Facility: CLINIC | Age: 76
End: 2021-12-07
Payer: COMMERCIAL

## 2021-12-07 ENCOUNTER — TELEPHONE (OUTPATIENT)
Dept: ADMINISTRATIVE | Facility: OTHER | Age: 76
End: 2021-12-07

## 2021-12-07 VITALS
BODY MASS INDEX: 27.5 KG/M2 | RESPIRATION RATE: 16 BRPM | HEART RATE: 90 BPM | DIASTOLIC BLOOD PRESSURE: 80 MMHG | WEIGHT: 175.2 LBS | SYSTOLIC BLOOD PRESSURE: 136 MMHG | HEIGHT: 67 IN

## 2021-12-07 DIAGNOSIS — F51.04 PSYCHOPHYSIOLOGICAL INSOMNIA: Primary | ICD-10-CM

## 2021-12-07 DIAGNOSIS — M17.0 PRIMARY OSTEOARTHRITIS OF BOTH KNEES: ICD-10-CM

## 2021-12-07 DIAGNOSIS — E78.5 HYPERLIPIDEMIA, UNSPECIFIED HYPERLIPIDEMIA TYPE: ICD-10-CM

## 2021-12-07 DIAGNOSIS — K21.9 GASTROESOPHAGEAL REFLUX DISEASE, UNSPECIFIED WHETHER ESOPHAGITIS PRESENT: ICD-10-CM

## 2021-12-07 DIAGNOSIS — R03.0 ELEVATED BLOOD PRESSURE READING: ICD-10-CM

## 2021-12-07 DIAGNOSIS — R45.0 NERVOUSNESS: ICD-10-CM

## 2021-12-07 DIAGNOSIS — R53.83 FATIGUE, UNSPECIFIED TYPE: ICD-10-CM

## 2021-12-07 DIAGNOSIS — E21.3 HYPERPARATHYROIDISM (HCC): ICD-10-CM

## 2021-12-07 PROCEDURE — 99214 OFFICE O/P EST MOD 30 MIN: CPT | Performed by: FAMILY MEDICINE

## 2021-12-10 ENCOUNTER — APPOINTMENT (OUTPATIENT)
Dept: LAB | Facility: CLINIC | Age: 76
End: 2021-12-10
Payer: COMMERCIAL

## 2021-12-10 DIAGNOSIS — E21.0 PRIMARY HYPERPARATHYROIDISM (HCC): ICD-10-CM

## 2021-12-10 DIAGNOSIS — E78.5 HYPERLIPIDEMIA, UNSPECIFIED HYPERLIPIDEMIA TYPE: ICD-10-CM

## 2021-12-10 DIAGNOSIS — R53.83 FATIGUE, UNSPECIFIED TYPE: ICD-10-CM

## 2021-12-10 LAB
ALBUMIN SERPL BCP-MCNC: 3.7 G/DL (ref 3.5–5)
ALP SERPL-CCNC: 103 U/L (ref 46–116)
ALT SERPL W P-5'-P-CCNC: 44 U/L (ref 12–78)
ANION GAP SERPL CALCULATED.3IONS-SCNC: 10 MMOL/L (ref 4–13)
AST SERPL W P-5'-P-CCNC: 29 U/L (ref 5–45)
BASOPHILS # BLD AUTO: 0.03 THOUSANDS/ΜL (ref 0–0.1)
BASOPHILS NFR BLD AUTO: 1 % (ref 0–1)
BILIRUB SERPL-MCNC: 0.86 MG/DL (ref 0.2–1)
BUN SERPL-MCNC: 22 MG/DL (ref 5–25)
CALCIUM SERPL-MCNC: 10.8 MG/DL (ref 8.3–10.1)
CHLORIDE SERPL-SCNC: 108 MMOL/L (ref 100–108)
CHOLEST SERPL-MCNC: 233 MG/DL
CO2 SERPL-SCNC: 22 MMOL/L (ref 21–32)
CREAT SERPL-MCNC: 0.88 MG/DL (ref 0.6–1.3)
EOSINOPHIL # BLD AUTO: 0.18 THOUSAND/ΜL (ref 0–0.61)
EOSINOPHIL NFR BLD AUTO: 4 % (ref 0–6)
ERYTHROCYTE [DISTWIDTH] IN BLOOD BY AUTOMATED COUNT: 13.2 % (ref 11.6–15.1)
GFR SERPL CREATININE-BSD FRML MDRD: 65 ML/MIN/1.73SQ M
GLUCOSE P FAST SERPL-MCNC: 70 MG/DL (ref 65–99)
HCT VFR BLD AUTO: 46.5 % (ref 34.8–46.1)
HDLC SERPL-MCNC: 66 MG/DL
HGB BLD-MCNC: 15.1 G/DL (ref 11.5–15.4)
IMM GRANULOCYTES # BLD AUTO: 0.01 THOUSAND/UL (ref 0–0.2)
IMM GRANULOCYTES NFR BLD AUTO: 0 % (ref 0–2)
LDLC SERPL CALC-MCNC: 150 MG/DL (ref 0–100)
LYMPHOCYTES # BLD AUTO: 1.62 THOUSANDS/ΜL (ref 0.6–4.47)
LYMPHOCYTES NFR BLD AUTO: 33 % (ref 14–44)
MCH RBC QN AUTO: 30.2 PG (ref 26.8–34.3)
MCHC RBC AUTO-ENTMCNC: 32.5 G/DL (ref 31.4–37.4)
MCV RBC AUTO: 93 FL (ref 82–98)
MONOCYTES # BLD AUTO: 0.6 THOUSAND/ΜL (ref 0.17–1.22)
MONOCYTES NFR BLD AUTO: 12 % (ref 4–12)
NEUTROPHILS # BLD AUTO: 2.53 THOUSANDS/ΜL (ref 1.85–7.62)
NEUTS SEG NFR BLD AUTO: 50 % (ref 43–75)
NRBC BLD AUTO-RTO: 0 /100 WBCS
PLATELET # BLD AUTO: 226 THOUSANDS/UL (ref 149–390)
PMV BLD AUTO: 10.4 FL (ref 8.9–12.7)
POTASSIUM SERPL-SCNC: 4.3 MMOL/L (ref 3.5–5.3)
PROT SERPL-MCNC: 7.6 G/DL (ref 6.4–8.2)
RBC # BLD AUTO: 5 MILLION/UL (ref 3.81–5.12)
SODIUM SERPL-SCNC: 140 MMOL/L (ref 136–145)
TRIGL SERPL-MCNC: 86 MG/DL
TSH SERPL DL<=0.05 MIU/L-ACNC: 1.26 UIU/ML (ref 0.36–3.74)
WBC # BLD AUTO: 4.97 THOUSAND/UL (ref 4.31–10.16)

## 2021-12-10 PROCEDURE — 80053 COMPREHEN METABOLIC PANEL: CPT

## 2021-12-10 PROCEDURE — 80061 LIPID PANEL: CPT

## 2021-12-10 PROCEDURE — 36415 COLL VENOUS BLD VENIPUNCTURE: CPT

## 2021-12-10 PROCEDURE — 85025 COMPLETE CBC W/AUTO DIFF WBC: CPT

## 2021-12-10 PROCEDURE — 84443 ASSAY THYROID STIM HORMONE: CPT

## 2021-12-13 ENCOUNTER — OFFICE VISIT (OUTPATIENT)
Dept: FAMILY MEDICINE CLINIC | Facility: CLINIC | Age: 76
End: 2021-12-13
Payer: COMMERCIAL

## 2021-12-13 VITALS
HEART RATE: 69 BPM | RESPIRATION RATE: 16 BRPM | SYSTOLIC BLOOD PRESSURE: 140 MMHG | DIASTOLIC BLOOD PRESSURE: 80 MMHG | BODY MASS INDEX: 27.5 KG/M2 | OXYGEN SATURATION: 93 % | HEIGHT: 67 IN | WEIGHT: 175.2 LBS

## 2021-12-13 DIAGNOSIS — F51.04 PSYCHOPHYSIOLOGICAL INSOMNIA: ICD-10-CM

## 2021-12-13 DIAGNOSIS — F41.9 ANXIETY: ICD-10-CM

## 2021-12-13 DIAGNOSIS — K21.9 GASTROESOPHAGEAL REFLUX DISEASE, UNSPECIFIED WHETHER ESOPHAGITIS PRESENT: ICD-10-CM

## 2021-12-13 DIAGNOSIS — E78.5 HYPERLIPIDEMIA, UNSPECIFIED HYPERLIPIDEMIA TYPE: ICD-10-CM

## 2021-12-13 DIAGNOSIS — R07.9 CHEST PAIN, UNSPECIFIED TYPE: Primary | ICD-10-CM

## 2021-12-13 PROCEDURE — 99214 OFFICE O/P EST MOD 30 MIN: CPT | Performed by: FAMILY MEDICINE

## 2021-12-13 PROCEDURE — 93000 ELECTROCARDIOGRAM COMPLETE: CPT | Performed by: FAMILY MEDICINE

## 2021-12-14 ENCOUNTER — TELEPHONE (OUTPATIENT)
Dept: ADMINISTRATIVE | Facility: OTHER | Age: 76
End: 2021-12-14

## 2021-12-14 ENCOUNTER — OFFICE VISIT (OUTPATIENT)
Dept: ENDOCRINOLOGY | Facility: CLINIC | Age: 76
End: 2021-12-14
Payer: COMMERCIAL

## 2021-12-14 VITALS
HEART RATE: 88 BPM | SYSTOLIC BLOOD PRESSURE: 141 MMHG | WEIGHT: 177.13 LBS | BODY MASS INDEX: 27.8 KG/M2 | DIASTOLIC BLOOD PRESSURE: 80 MMHG | HEIGHT: 67 IN

## 2021-12-14 DIAGNOSIS — E83.52 HYPERCALCEMIA: ICD-10-CM

## 2021-12-14 DIAGNOSIS — E55.9 VITAMIN D DEFICIENCY: ICD-10-CM

## 2021-12-14 DIAGNOSIS — E21.0 PRIMARY HYPERPARATHYROIDISM (HCC): ICD-10-CM

## 2021-12-14 DIAGNOSIS — E21.3 HYPERPARATHYROIDISM (HCC): Primary | ICD-10-CM

## 2021-12-14 DIAGNOSIS — M80.00XA OSTEOPOROSIS WITH CURRENT PATHOLOGICAL FRACTURE, UNSPECIFIED OSTEOPOROSIS TYPE, INITIAL ENCOUNTER: ICD-10-CM

## 2021-12-14 PROCEDURE — 99214 OFFICE O/P EST MOD 30 MIN: CPT | Performed by: PHYSICIAN ASSISTANT

## 2021-12-17 ENCOUNTER — TELEPHONE (OUTPATIENT)
Dept: CARDIOLOGY CLINIC | Facility: CLINIC | Age: 76
End: 2021-12-17

## 2021-12-22 ENCOUNTER — OFFICE VISIT (OUTPATIENT)
Dept: CARDIOLOGY CLINIC | Facility: CLINIC | Age: 76
End: 2021-12-22
Payer: COMMERCIAL

## 2021-12-22 VITALS
BODY MASS INDEX: 27.78 KG/M2 | SYSTOLIC BLOOD PRESSURE: 136 MMHG | HEIGHT: 67 IN | HEART RATE: 77 BPM | DIASTOLIC BLOOD PRESSURE: 72 MMHG | WEIGHT: 177 LBS

## 2021-12-22 DIAGNOSIS — R00.2 PALPITATIONS: Primary | ICD-10-CM

## 2021-12-22 DIAGNOSIS — R07.89 ATYPICAL CHEST PAIN: ICD-10-CM

## 2021-12-22 PROCEDURE — 1160F RVW MEDS BY RX/DR IN RCRD: CPT | Performed by: INTERNAL MEDICINE

## 2021-12-22 PROCEDURE — 99214 OFFICE O/P EST MOD 30 MIN: CPT | Performed by: INTERNAL MEDICINE

## 2021-12-22 PROCEDURE — 3008F BODY MASS INDEX DOCD: CPT | Performed by: INTERNAL MEDICINE

## 2021-12-22 PROCEDURE — 1036F TOBACCO NON-USER: CPT | Performed by: INTERNAL MEDICINE

## 2021-12-22 PROCEDURE — 93000 ELECTROCARDIOGRAM COMPLETE: CPT | Performed by: INTERNAL MEDICINE

## 2022-01-10 ENCOUNTER — OFFICE VISIT (OUTPATIENT)
Dept: OBGYN CLINIC | Facility: CLINIC | Age: 77
End: 2022-01-10
Payer: COMMERCIAL

## 2022-01-10 VITALS
DIASTOLIC BLOOD PRESSURE: 80 MMHG | BODY MASS INDEX: 27.47 KG/M2 | SYSTOLIC BLOOD PRESSURE: 122 MMHG | WEIGHT: 175 LBS | HEIGHT: 67 IN

## 2022-01-10 DIAGNOSIS — M17.12 PRIMARY OSTEOARTHRITIS OF LEFT KNEE: Primary | ICD-10-CM

## 2022-01-10 PROCEDURE — 99213 OFFICE O/P EST LOW 20 MIN: CPT | Performed by: FAMILY MEDICINE

## 2022-01-10 NOTE — PROGRESS NOTES
1  Primary osteoarthritis of left knee  Ambulatory referral to Orthopedic Surgery     Orders Placed This Encounter   Procedures    Ambulatory referral to Orthopedic Surgery        IMAGING STUDIES: (I personally reviewed images in PACS and report):  MRI left knee 11/8/21:  1  Complex tearing throughout the lateral meniscus  2  Grade 3 patellofemoral and grade 4 lateral compartment chondrosis  3  Trace Baker's cyst      PAST REPORTS:        ASSESSMENT/PLAN:  Left knee severe lateral compartment osteoarthritis   Left knee complex tear lateral meniscus    Repeat X-ray next visit: None    Return if symptoms worsen or fail to improve  Patient Instructions   Explained the patient that she has severe arthritis in the lateral aspect of her knee also with a complex meniscus tear  Explained that meniscus surgery would not result in significant relief because she has such severe arthritis  Explained that definitive curative treatment is knee replacement  I offered patient ultrasound-guided corticosteroid injection as an interim treatment until she is ready to have knee replacement  I also provided patient referral to knee replacement surgeon to discuss this possibility         __________________________________________________________________________    CHIEF COMPLAINT:  Follow-up left knee pain    HPI:  Obed Santo is a 78 y o  female  who presents for       Visit 01/10/2022:   Last visit patient was found to have small Baker cyst of which I recommended against ultrasound-guided aspiration at that time  It or MRI since she had complex Baker cyst on ultrasound evaluation which resulted as trace Baker cyst on MRI 11/08/2021  In addition, MRI of the knee did confirm the patient has severe arthritis of the knee in the patellofemoral as well as lateral compartment and also concomitant complex lateral meniscal tear             Review of Systems   Constitutional: Negative for chills, fever and unexpected weight change  HENT: Negative for hearing loss, nosebleeds and sore throat  Eyes: Negative for pain, redness and visual disturbance  Respiratory: Negative for cough, shortness of breath and wheezing  Cardiovascular: Negative for chest pain, palpitations and leg swelling  Gastrointestinal: Negative for abdominal distention, nausea and vomiting  Endocrine: Negative for polydipsia and polyuria  Genitourinary: Negative for dysuria and hematuria  Skin: Negative for rash and wound  Neurological: Negative for dizziness, numbness and headaches  Psychiatric/Behavioral: Negative for decreased concentration and suicidal ideas           Following history reviewed and update:    Past Medical History:   Diagnosis Date    Anxiety     Arthritis     Back pain     Balance problems     Chest pain     heaviness    Difficulty swallowing     Dizziness     Gait disorder     GERD (gastroesophageal reflux disease)     Hypertension     Increased frequency of headaches     Numbness and tingling     Palpitations     Pericarditis     Thyroid disease     Trouble in sleeping      Past Surgical History:   Procedure Laterality Date    APPENDECTOMY      CHOLECYSTECTOMY      laparoscopic    COLONOSCOPY      KNEE SURGERY Left     PERICARDIAL WINDOW      TX OPEN RX DISTAL RADIUS FX, EXTRA-ARTICULAR Right 3/22/2018    Procedure: OPEN REDUCTION W/ INTERNAL FIXATION (ORIF) RADIUS, splint application;  Surgeon: Briana Viera MD;  [facility]: [facility];  Service: Orthopedics    UPPER GASTROINTESTINAL ENDOSCOPY       Social History   Social History     Substance and Sexual Activity   Alcohol Use Yes    Comment: Rarely      Social History     Substance and Sexual Activity   Drug Use No     Social History     Tobacco Use   Smoking Status Never Smoker   Smokeless Tobacco Never Used     Family History   Problem Relation Age of Onset    Kidney failure Mother     Hypertension Mother     Lung cancer Father    Stephon Holt Parkinson White syndrome Daughter     No Known Problems Sister     Substance Abuse Neg Hx     Alcohol abuse Neg Hx     Mental illness Neg Hx      Allergies   Allergen Reactions    Ciprofloxacin Myalgia          Physical Exam  /80   Ht 5' 7 25" (1 708 m)   Wt 79 4 kg (175 lb)   BMI 27 21 kg/m²     Constitutional:  see vital signs  Gen: well-developed, normocephalic/atraumatic, well-groomed  Eyes: No inflammation or discharge of conjunctiva or lids; sclera clear   Pharynx: no inflammation, lesion, or mass of lips  Neck: supple, no masses, non-distended  MSK: no inflammation, lesion, mass, or clubbing of nails and digits except for other than mentioned below  SKIN: no visible rashes or skin lesions  Pulmonary/Chest: Effort normal  No respiratory distress     NEURO: cranial nerves grossly intact  PSYCH:  Alert and oriented to person, place, and time; recent and remote memory intact; mood normal, no depression, anxiety, or agitation, judgment and insight good and intact     Ortho Exam  LEFT KNEE:  Erythema: no  Swelling: no  Increased Warmth: no  Tenderness:+ lateral joint line  __________________________________________________________________________  Procedures

## 2022-01-10 NOTE — PATIENT INSTRUCTIONS
Explained the patient that she has severe arthritis in the lateral aspect of her knee also with a complex meniscus tear  Explained that meniscus surgery would not result in significant relief because she has such severe arthritis  Explained that definitive curative treatment is knee replacement  I offered patient ultrasound-guided corticosteroid injection as an interim treatment until she is ready to have knee replacement  I also provided patient referral to knee replacement surgeon to discuss this possibility

## 2022-01-12 ENCOUNTER — OFFICE VISIT (OUTPATIENT)
Dept: GASTROENTEROLOGY | Facility: CLINIC | Age: 77
End: 2022-01-12
Payer: COMMERCIAL

## 2022-01-12 VITALS
WEIGHT: 177.8 LBS | HEART RATE: 93 BPM | DIASTOLIC BLOOD PRESSURE: 80 MMHG | OXYGEN SATURATION: 98 % | SYSTOLIC BLOOD PRESSURE: 142 MMHG | TEMPERATURE: 98.6 F | BODY MASS INDEX: 27.85 KG/M2

## 2022-01-12 DIAGNOSIS — R13.14 PHARYNGOESOPHAGEAL DYSPHAGIA: ICD-10-CM

## 2022-01-12 DIAGNOSIS — E21.0 PRIMARY HYPERPARATHYROIDISM (HCC): ICD-10-CM

## 2022-01-12 DIAGNOSIS — K58.0 IRRITABLE BOWEL SYNDROME WITH DIARRHEA: ICD-10-CM

## 2022-01-12 DIAGNOSIS — K21.00 GASTROESOPHAGEAL REFLUX DISEASE WITH ESOPHAGITIS WITHOUT HEMORRHAGE: Primary | ICD-10-CM

## 2022-01-12 PROCEDURE — 99214 OFFICE O/P EST MOD 30 MIN: CPT | Performed by: INTERNAL MEDICINE

## 2022-01-12 NOTE — PROGRESS NOTES
[facility] - Outpatient Follow-up Note  Jackie Watters 78 y o  female MRN: [id number]  Encounter: [id number]          ASSESSMENT AND PLAN:    1  Gastroesophageal reflux disease with esophagitis without hemorrhage  2  Irritable bowel syndrome with diarrhea  3  Pharyngoesophageal dysphagia  She reports mild intermittent reflux and burning sensation in her esophagus  For the most part her reflux symptoms are well controlled with omeprazole 20 mg in the morning and Pepcid at bedtime  Her dysphagia is stable  He has no recent weight loss  Continue Bentyl as needed for crampy abdominal pain  She is undergoing evaluation for primary hyperparathyroidism with hypercalcemia  She is concerned about effects of calcium in omeprazole and Pepcid  I do not think this will significantly affect calcium level  Given her significant symptoms without these medication I favor continuing them  4  Primary hyperparathyroidism ([address] )  Follow-up with René Mueller         ______________________________________________________________________    SUBJECTIVE:  74-year-old female here for follow-up visit  She has longstanding reflux symptoms  Currently she is on omeprazole 20 mg daily and famotidine at bedtime  We try to wean her off omeprazole but because of recurrent reflux symptoms she has restarted back on omeprazole  Overall her reflux symptoms are well controlled except mild intermittent reflux and heartburn  She has mild oropharyngeal dysphagia  Her EGD in March 2021 was unremarkable  She has hypercalcemia currently undergoing evaluation for primary hyperparathyroidism  She was just seen by René Mueller yesterday  Her IBS symptoms are stable  REVIEW OF SYSTEMS IS OTHERWISE NEGATIVE        Historical Information   Past Medical History:   Diagnosis Date    Anxiety     Arthritis     Back pain     Balance problems     Chest pain     heaviness    Difficulty swallowing     Dizziness     Gait disorder     GERD (gastroesophageal reflux disease)     Hypertension     Increased frequency of headaches     Numbness and tingling     Palpitations     Pericarditis     Thyroid disease     Trouble in sleeping      Past Surgical History:   Procedure Laterality Date    APPENDECTOMY      CHOLECYSTECTOMY      laparoscopic    COLONOSCOPY      KNEE SURGERY Left     PERICARDIAL WINDOW      MA OPEN RX DISTAL RADIUS FX, EXTRA-ARTICULAR Right 3/22/2018    Procedure: OPEN REDUCTION W/ INTERNAL FIXATION (ORIF) RADIUS, splint application;  Surgeon: Tahir Butler MD;  [facility];  Service: Orthopedics    UPPER GASTROINTESTINAL ENDOSCOPY       Social History   Social History     Substance and Sexual Activity   Alcohol Use Yes    Comment: Rarely      Social History     Substance and Sexual Activity   Drug Use No     Social History     Tobacco Use   Smoking Status Never Smoker   Smokeless Tobacco Never Used     Family History   Problem Relation Age of Onset    Kidney failure Mother     Hypertension Mother     Lung cancer Father    Eugenio Matthews Parkinson White syndrome Daughter     No Known Problems Sister     Substance Abuse Neg Hx     Alcohol abuse Neg Hx     Mental illness Neg Hx        Meds/Allergies       Current Outpatient Medications:     ALPRAZolam (XANAX) 0 5 mg tablet    ascorbic acid (VITAMIN C) 500 mg tablet    aspirin 81 mg chewable tablet    b complex vitamins tablet    Cholecalciferol (Vitamin D) 50 MCG (2000 UT) CAPS    dicyclomine (BENTYL) 10 mg capsule    Elastic Bandages & Supports (Medical Compression Stockings) MISC    famotidine (PEPCID) 20 mg tablet    Magnesium 100 MG CAPS    Multiple Vitamins-Calcium (ONE-A-DAY WOMENS PO)    omeprazole (PriLOSEC) 20 mg delayed release capsule    promethazine-codeine (PHENERGAN WITH CODEINE) 6 25-10 mg/5 mL syrup    Riboflavin (VITAMIN B-2 PO)    traZODone (DESYREL) 50 mg tablet    Allergies   Allergen Reactions    Ciprofloxacin Myalgia           Objective     Blood pressure 142/80, pulse 93, temperature 98 6 °F (37 °C), weight 80 6 kg (177 lb 12 8 oz), SpO2 98 %, not currently breastfeeding  Body mass index is 27 85 kg/m²  PHYSICAL EXAM:      General Appearance:   Alert, cooperative, no distress   HEENT:   Normocephalic, atraumatic, anicteric      Neck:  Supple, symmetrical, trachea midline   Lungs:   Clear to auscultation bilaterally; no rales, rhonchi or wheezing; respirations unlabored    Heart[de-identified]   Regular rate and rhythm; no murmur, rub, or gallop  Abdomen:   Soft, non-tender, non-distended; normal bowel sounds; no masses, no organomegaly    Genitalia:   Deferred    Rectal:   Deferred    Extremities:  No cyanosis, clubbing or edema    Pulses:  2+ and symmetric    Skin:  No jaundice, rashes, or lesions    Lymph nodes:  No palpable cervical lymphadenopathy        Lab Results:   No visits with results within 1 Day(s) from this visit  Latest known visit with results is:   Office Visit on 12/13/2021   Component Date Value    OTHER 12/13/2021           Radiology Results:   No results found

## 2022-01-17 ENCOUNTER — TELEPHONE (OUTPATIENT)
Dept: ENDOCRINOLOGY | Facility: CLINIC | Age: 77
End: 2022-01-17

## 2022-01-17 ENCOUNTER — TELEPHONE (OUTPATIENT)
Dept: PULMONOLOGY | Facility: CLINIC | Age: 77
End: 2022-01-17

## 2022-01-17 NOTE — TELEPHONE ENCOUNTER
Called [facility] @ [phone] to initiated a pre-cert for CT parathyroid   Approved from 01/17/2022- 07/16/2022  Case # [id number]

## 2022-01-21 ENCOUNTER — HOSPITAL ENCOUNTER (OUTPATIENT)
Dept: CT IMAGING | Facility: HOSPITAL | Age: 77
Discharge: HOME | End: 2022-01-21
Payer: COMMERCIAL

## 2022-01-21 DIAGNOSIS — E21.3 HYPERPARATHYROIDISM (HCC): ICD-10-CM

## 2022-01-21 PROCEDURE — 70492 CT SFT TSUE NCK W/O & W/DYE: CPT

## 2022-01-21 PROCEDURE — G1004 CDSM NDSC: HCPCS

## 2022-01-21 RX ADMIN — IOHEXOL 100 ML: 350 INJECTION, SOLUTION INTRAVENOUS at 15:59

## 2022-01-26 ENCOUNTER — OFFICE VISIT (OUTPATIENT)
Dept: FAMILY MEDICINE CLINIC | Facility: CLINIC | Age: 77
End: 2022-01-26
Payer: COMMERCIAL

## 2022-01-26 VITALS
HEART RATE: 77 BPM | DIASTOLIC BLOOD PRESSURE: 82 MMHG | TEMPERATURE: 98 F | SYSTOLIC BLOOD PRESSURE: 146 MMHG | BODY MASS INDEX: 27.5 KG/M2 | WEIGHT: 175.2 LBS | RESPIRATION RATE: 16 BRPM | OXYGEN SATURATION: 97 % | HEIGHT: 67 IN

## 2022-01-26 DIAGNOSIS — H00.012 HORDEOLUM EXTERNUM OF RIGHT LOWER EYELID: ICD-10-CM

## 2022-01-26 DIAGNOSIS — Z20.822 SUSPECTED COVID-19 VIRUS INFECTION: ICD-10-CM

## 2022-01-26 DIAGNOSIS — R51.9 NONINTRACTABLE HEADACHE, UNSPECIFIED CHRONICITY PATTERN, UNSPECIFIED HEADACHE TYPE: ICD-10-CM

## 2022-01-26 DIAGNOSIS — R53.83 FATIGUE, UNSPECIFIED TYPE: Primary | ICD-10-CM

## 2022-01-26 DIAGNOSIS — I10 ESSENTIAL HYPERTENSION: ICD-10-CM

## 2022-01-26 PROCEDURE — 1036F TOBACCO NON-USER: CPT | Performed by: FAMILY MEDICINE

## 2022-01-26 PROCEDURE — 99214 OFFICE O/P EST MOD 30 MIN: CPT | Performed by: FAMILY MEDICINE

## 2022-01-26 PROCEDURE — 1160F RVW MEDS BY RX/DR IN RCRD: CPT | Performed by: FAMILY MEDICINE

## 2022-01-26 PROCEDURE — 3008F BODY MASS INDEX DOCD: CPT | Performed by: FAMILY MEDICINE

## 2022-01-26 PROCEDURE — U0005 INFEC AGEN DETEC AMPLI PROBE: HCPCS | Performed by: FAMILY MEDICINE

## 2022-01-26 PROCEDURE — U0003 INFECTIOUS AGENT DETECTION BY NUCLEIC ACID (DNA OR RNA); SEVERE ACUTE RESPIRATORY SYNDROME CORONAVIRUS 2 (SARS-COV-2) (CORONAVIRUS DISEASE [COVID-19]), AMPLIFIED PROBE TECHNIQUE, MAKING USE OF HIGH THROUGHPUT TECHNOLOGIES AS DESCRIBED BY CMS-2020-01-R: HCPCS | Performed by: FAMILY MEDICINE

## 2022-01-26 RX ORDER — ERYTHROMYCIN 5 MG/G
0.5 OINTMENT OPHTHALMIC 2 TIMES DAILY
Qty: 3.5 G | Refills: 0 | Status: SHIPPED | OUTPATIENT
Start: 2022-01-26 | End: 2022-02-08 | Stop reason: ALTCHOICE

## 2022-01-26 RX ORDER — AMLODIPINE BESYLATE 2.5 MG/1
2.5 TABLET ORAL DAILY
Qty: 30 TABLET | Refills: 3 | Status: SHIPPED | OUTPATIENT
Start: 2022-01-26 | End: 2022-02-08 | Stop reason: SINTOL

## 2022-01-27 ENCOUNTER — TELEPHONE (OUTPATIENT)
Dept: FAMILY MEDICINE CLINIC | Facility: CLINIC | Age: 77
End: 2022-01-27

## 2022-01-27 LAB — SARS-COV-2 RNA RESP QL NAA+PROBE: NEGATIVE

## 2022-01-27 NOTE — PROGRESS NOTES
Assessment/Plan:    1  Fatigue/ headache  - will obtain Covid PCR swab and call with the results, advised rest, plenty of fluids, small frequent meals, adequate sleep and to take Tylenol as needed, patient was encouraged to call the office with any change in her symptoms  - COVID Only- Office Collect    2  Hordeolum externum of right lower eyelid  - continue warm compresses 3 times a day and start erythromycin eye oint, patient was encouraged to call the office if her symptoms fail to improve or see Ophthalmology  - erythromycin (ILOTYCIN) ophthalmic ointment; Administer 0 5 inches to the right eye 2 (two) times a day for 5 days  Dispense: 3 5 g; Refill: 0    3  Essential hypertension  - will start amlodipine 2 5 mg daily, discussed low sodium diet, advised to monitor BP at home and bring BP log to the next visit  - amLODIPine (NORVASC) 2 5 mg tablet; Take 1 tablet (2 5 mg total) by mouth daily  Dispense: 30 tablet; Refill: 3       Follow up in 3-4 weeks or sooner if needed  The treatment plan and possible side effects of new medications were reviewed with the patient today  The patient understands and agrees with the treatment plan  Subjective:   Chief Complaint   Patient presents with    Fatigue    Headache    Eye Pain      Patient ID: Carmelina Verma is a 78 y o  female who presents today with c/o fatigue, headache and not feeling well for the past 3-4 days, she also developed painful lump on her right lower eyelid, she has been using warm compresses and reports no eye discharge or crusting, no itching, no fever/chills, no body aches, no sore throat, no cough, no chest pain, no shortness of breath, no decreased appetite, no nausea/ vomiting or diarrhea, no abdominal pain, no dysuria, no urinary frequency or urgency         The following portions of the patient's history were reviewed and updated as appropriate: allergies, current medications, past family history, past medical history, past social history, past surgical history and problem list     Past Medical History:   Diagnosis Date    Anxiety     Arthritis     Back pain     Balance problems     Chest pain     heaviness    Difficulty swallowing     Dizziness     Gait disorder     GERD (gastroesophageal reflux disease)     Hypertension     Increased frequency of headaches     Numbness and tingling     Palpitations     Pericarditis     Thyroid disease     Trouble in sleeping      Past Surgical History:   Procedure Laterality Date    APPENDECTOMY      CHOLECYSTECTOMY      laparoscopic    COLONOSCOPY      KNEE SURGERY Left     PERICARDIAL WINDOW      CO OPEN RX DISTAL RADIUS FX, EXTRA-ARTICULAR Right 3/22/2018    Procedure: OPEN REDUCTION W/ INTERNAL FIXATION (ORIF) RADIUS, splint application;  Surgeon: Gene Norman MD;  [facility];  Service: Orthopedics    UPPER GASTROINTESTINAL ENDOSCOPY       Family History   Problem Relation Age of Onset    Kidney failure Mother     Hypertension Mother     Lung cancer Father    Elvia Govea Parkinson White syndrome Daughter     No Known Problems Sister     Substance Abuse Neg Hx     Alcohol abuse Neg Hx     Mental illness Neg Hx      Social History     Socioeconomic History    Marital status: /Civil Union     Spouse name: Not on file    Number of children: Not on file    Years of education: Not on file    Highest education level: Not on file   Occupational History    Not on file   Tobacco Use    Smoking status: Never Smoker    Smokeless tobacco: Never Used   Vaping Use    Vaping Use: Never used   Substance and Sexual Activity    Alcohol use: Yes     Comment: Rarely     Drug use: No    Sexual activity: Not on file   Other Topics Concern    Not on file   Social History Narrative    WORK:    1  [employer] ([employer]; [employer]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    2  [employer]        HOBBIES:    1  Singing    2  Dancing    3  Hx of ceramics (+ dust)        PETS:    1    Dogs -  Denies birds        TRAVEL:    -  [place]         EXPOSURES:    Denies mold; down pillows/comforters; hot tubs     Social Determinants of Health     Financial Resource Strain: Not on file   Food Insecurity: Not on file   Transportation Needs: Not on file   Physical Activity: Not on file   Stress: Not on file   Social Connections: Not on file   Intimate Partner Violence: Not on file   Housing Stability: Not on file       Current Outpatient Medications:     ALPRAZolam (XANAX) 0 5 mg tablet, Take 1 tablet (0 5 mg total) by mouth daily at bedtime as needed for anxiety or sleep, Disp: 20 tablet, Rfl: 1    ascorbic acid (VITAMIN C) 500 mg tablet, Take 500 mg by mouth daily, Disp: , Rfl:     aspirin 81 mg chewable tablet, Chew 1 tablet (81 mg total) daily (Patient taking differently: Chew 81 mg as needed ), Disp: 30 tablet, Rfl: 0    b complex vitamins tablet, Take 1 tablet by mouth daily, Disp: , Rfl:     Cholecalciferol (Vitamin D) 50 MCG (2000 UT) CAPS, Take 1 capsule (2,000 Units total) by mouth daily, Disp: , Rfl:     dicyclomine (BENTYL) 10 mg capsule, Take 1 capsule (10 mg total) by mouth 4 (four) times a day (before meals and at bedtime) (Patient taking differently: Take 10 mg by mouth as needed ), Disp: 30 capsule, Rfl: 1    famotidine (PEPCID) 20 mg tablet, Take 1 tablet (20 mg total) by mouth daily, Disp: 30 tablet, Rfl: 5    Magnesium 100 MG CAPS, magnesium  300 mg qd, Disp: , Rfl:     Multiple Vitamins-Calcium (ONE-A-DAY WOMENS PO), Take 1 tablet by mouth daily, Disp: , Rfl:     omeprazole (PriLOSEC) 20 mg delayed release capsule, Take 1 capsule (20 mg total) by mouth daily before breakfast, Disp: 90 capsule, Rfl: 1    Riboflavin (VITAMIN B-2 PO), Take by mouth, Disp: , Rfl:     traZODone (DESYREL) 50 mg tablet, Take 1 tablet (50 mg total) by mouth daily at bedtime (Patient taking differently: Take 50 mg by mouth as needed ), Disp: 30 tablet, Rfl: 1    amLODIPine (NORVASC) 2 5 mg tablet, Take 1 tablet (2 5 mg total) by mouth daily, Disp: 30 tablet, Rfl: 3    erythromycin (ILOTYCIN) ophthalmic ointment, Administer 0 5 inches to the right eye 2 (two) times a day for 5 days, Disp: 3 5 g, Rfl: 0    Review of Systems   Constitutional: Positive for fatigue  Negative for chills and fever  HENT: Positive for congestion  Negative for ear pain, sore throat and trouble swallowing  Eyes: Positive for pain  Negative for photophobia, discharge, itching and visual disturbance  Respiratory: Negative for cough, shortness of breath and wheezing  Cardiovascular: Negative for chest pain and palpitations  Gastrointestinal: Negative for abdominal pain, blood in stool, diarrhea, nausea and vomiting  Genitourinary: Negative for dysuria, frequency, hematuria and urgency  Neurological: Negative for dizziness, syncope, facial asymmetry, speech difficulty, weakness, numbness and headaches  Hematological: Negative for adenopathy  Objective:    Vitals:    01/26/22 1453 01/26/22 1519   BP: 150/96 146/82   BP Location:  Left arm   Patient Position:  Sitting   Cuff Size:  Standard   Pulse: 77    Resp: 16    Temp: 98 °F (36 7 °C)    TempSrc: Oral    SpO2: 97%    Weight: 79 5 kg (175 lb 3 2 oz)    Height: 5' 7" (1 702 m)      BP Readings from Last 3 Encounters:   01/26/22 146/82   01/12/22 142/80   01/10/22 122/80      Physical Exam  Constitutional:       General: She is not in acute distress  HENT:      Right Ear: Tympanic membrane and ear canal normal       Left Ear: Tympanic membrane and ear canal normal       Nose: Mucosal edema present  Mouth/Throat:      Mouth: Mucous membranes are moist       Pharynx: Oropharynx is clear  Eyes:      General:         Right eye: Hordeolum present  Extraocular Movements: Extraocular movements intact  Conjunctiva/sclera: Conjunctivae normal       Pupils: Pupils are equal, round, and reactive to light     Cardiovascular:      Rate and Rhythm: Normal rate and regular rhythm  Pulses: Normal pulses  Heart sounds: Normal heart sounds  No murmur heard  Pulmonary:      Effort: Pulmonary effort is normal  No respiratory distress  Breath sounds: Normal breath sounds  No stridor  No wheezing  Abdominal:      Palpations: Abdomen is soft  There is no mass  Tenderness: There is no abdominal tenderness  Musculoskeletal:      Cervical back: Normal range of motion  Right lower leg: No edema  Left lower leg: No edema  Lymphadenopathy:      Cervical: No cervical adenopathy  Neurological:      General: No focal deficit present  Mental Status: She is alert and oriented to person, place, and time  Mental status is at baseline  Cranial Nerves: No cranial nerve deficit     Psychiatric:         Mood and Affect: Mood normal          Behavior: Behavior normal

## 2022-01-27 NOTE — TELEPHONE ENCOUNTER
----- Message from Clifford Bray MD sent at 1/27/2022 12:18 PM EST -----  Please let patient know that her Covid swab was negative

## 2022-02-04 NOTE — PROGRESS NOTES
Daily Note     Today's date: 2019  Patient name: Kamille Torres  : 1945  MRN: [id number]  Referring provider: Scott Morris MD  Dx:   Encounter Diagnosis     ICD-10-CM    1  Plantar fasciitis M72 2    2  Acute pain of left shoulder M25 512                 Subjective: Patient arrives to today's PT treatment with complaints of fatigue  Patient states, "I don't know if I over did it last time I was here but my back was hurting "  Patient denies left shoulder pain today however reports she had pain yesterday  Patient reports symptoms of dizziness have improved  Objective: See treatment diary below  Assessment: Fatigue noted with addition of bicep and tricep strengthening exercises  Plan: Continue treatment as per PT plan of care       Precautions: none    Manual  9/23 10/4 10/22 10/25 10/29 11/1 11/4 11/8     Laser to R PF 4' 4' np  NP        Graston to R PF 8' 6' np  NP        Orthotic casting       mb                                    Exercise Diary  9/23 10/4 10/22 10/25 10/29 11/1 11/4 11/8     UBE 3'/3' 3'/3' 5'/5' 5'/5' 4'/4' 4'/4'  4'/4'     Wall stretches (gastroc/  soleus) HEP            PF stretch with strap HEP            Pulleys (flex/ab) DC            Cane AAROM (IR/ext) 10x10" 10"x10 10"x10 10"x10 10"x10 10'x10  10"x10     Cane supine AAROM (flex/ab/ER) Flex/  ER 10"x10 flex, ER  10"x10 10"x10 10"x10 NV nv       IR stretch with strap 3x30" 30"x3 30"x3 30"x4 30"x4 30"x4  30"x4     Posture tband RTB x15ea row and ext only  red 20 ea  no T row due to pain green tb x20 green tb x30 green  3x10 Blue tb x30  blue  3x10     Supine flexion x15 1x15 20 20 NV        SL abduction x15 1x15 20 20 NV        Supine serratus punch x15 1x15 5"x20 5"x20 NV        Standing 3 way arm lift x10 10 ea 10ea 10 1#  1x15 1#x20  1#  2x10     Standing cane scaption      5"x20  5"x20     Wall ball rolling cw/ccw      2#x20ea       bicep curl        4#  2x10     tricep ext        blue  2x10 Patient is overdue for CT chest as ordered by Dr. Sonny Johnson. Certified letter mailed to patient.

## 2022-02-08 ENCOUNTER — OFFICE VISIT (OUTPATIENT)
Dept: FAMILY MEDICINE CLINIC | Facility: CLINIC | Age: 77
End: 2022-02-08
Payer: COMMERCIAL

## 2022-02-08 VITALS
BODY MASS INDEX: 27.53 KG/M2 | RESPIRATION RATE: 16 BRPM | HEART RATE: 80 BPM | HEIGHT: 67 IN | WEIGHT: 175.4 LBS | SYSTOLIC BLOOD PRESSURE: 136 MMHG | DIASTOLIC BLOOD PRESSURE: 82 MMHG

## 2022-02-08 DIAGNOSIS — I10 ESSENTIAL HYPERTENSION: Primary | ICD-10-CM

## 2022-02-08 DIAGNOSIS — H57.9 EYE PROBLEM: ICD-10-CM

## 2022-02-08 PROCEDURE — 99213 OFFICE O/P EST LOW 20 MIN: CPT | Performed by: FAMILY MEDICINE

## 2022-02-08 RX ORDER — METOPROLOL SUCCINATE 25 MG/1
12.5 TABLET, EXTENDED RELEASE ORAL
Qty: 30 TABLET | Refills: 1 | Status: SHIPPED | OUTPATIENT
Start: 2022-02-08 | End: 2022-03-03

## 2022-02-16 ENCOUNTER — TELEPHONE (OUTPATIENT)
Dept: ENDOCRINOLOGY | Facility: CLINIC | Age: 77
End: 2022-02-16

## 2022-02-16 NOTE — TELEPHONE ENCOUNTER
Called patient again and left message reviewed message below  Last time I called her was 1/26 with results of CT Scan  It looks like she has met with surgeon (Dr Selin Ramos) and decided on surgical intervention  If she has any additional question she can give the office a call  CT Scan did not localize a parathyroid adenoma  Options are   1- Continue to monitor lab testing and treat with medication for osteoporosis  2- Refer to surgeon for opinion and consider exploratory surgery as lab testing is consistent with hyperparathyroidism  Advised her to let us know what she prefers or this can be discussed at visit with Dr Chelsie Issa in April

## 2022-02-17 ENCOUNTER — TELEPHONE (OUTPATIENT)
Dept: ENDOCRINOLOGY | Facility: CLINIC | Age: 77
End: 2022-02-17

## 2022-02-17 NOTE — TELEPHONE ENCOUNTER
Patient called back and was informed of CT results, she is going to think about it and might even go for a second opinion but has not decided yet

## 2022-02-17 NOTE — TELEPHONE ENCOUNTER
Called patient and left message  CT Scan did not localize a parathyroid adenoma  Options are   1- Continue to monitor lab testing and treat with medication for osteoporosis  2- Refer to surgeon for opinion and consider exploratory surgery as lab testing is consistent with hyperparathyroidism    Advised her to let us know what she prefers or this can be discussed at visit with Dr Pablo Metz in April

## 2022-02-17 NOTE — TELEPHONE ENCOUNTER
Sounds good, no rush to decide, if she is interested in second Surgical opinion would suggest Dr Xena Wilson

## 2022-02-23 ENCOUNTER — OFFICE VISIT (OUTPATIENT)
Dept: FAMILY MEDICINE CLINIC | Facility: CLINIC | Age: 77
End: 2022-02-23
Payer: COMMERCIAL

## 2022-02-23 VITALS
WEIGHT: 178.8 LBS | BODY MASS INDEX: 28.06 KG/M2 | SYSTOLIC BLOOD PRESSURE: 142 MMHG | HEIGHT: 67 IN | RESPIRATION RATE: 16 BRPM | HEART RATE: 80 BPM | DIASTOLIC BLOOD PRESSURE: 84 MMHG

## 2022-02-23 DIAGNOSIS — R45.0 NERVOUSNESS: ICD-10-CM

## 2022-02-23 DIAGNOSIS — E21.0 PRIMARY HYPERPARATHYROIDISM (HCC): ICD-10-CM

## 2022-02-23 DIAGNOSIS — I10 ESSENTIAL HYPERTENSION: Primary | ICD-10-CM

## 2022-02-23 DIAGNOSIS — K21.9 GASTROESOPHAGEAL REFLUX DISEASE, UNSPECIFIED WHETHER ESOPHAGITIS PRESENT: ICD-10-CM

## 2022-02-23 PROCEDURE — 99214 OFFICE O/P EST MOD 30 MIN: CPT | Performed by: FAMILY MEDICINE

## 2022-02-23 RX ORDER — ALPRAZOLAM 0.5 MG/1
0.5 TABLET ORAL
Qty: 20 TABLET | Refills: 1 | Status: SHIPPED | OUTPATIENT
Start: 2022-02-23

## 2022-02-23 RX ORDER — OMEPRAZOLE 20 MG/1
20 CAPSULE, DELAYED RELEASE ORAL
Qty: 90 CAPSULE | Refills: 2 | Status: SHIPPED | OUTPATIENT
Start: 2022-02-23 | End: 2022-05-27 | Stop reason: SDUPTHER

## 2022-02-23 NOTE — PROGRESS NOTES
Assessment/Plan:    1  Essential hypertension  - encouraged better compliance with Toprol XL 25 mg 1/2 tablet at bedtime, continue home BP monitoring and bring BP log to the next visit    2  Gastroesophageal reflux disease  - continue omeprazole, doing well  - omeprazole (PriLOSEC) 20 mg delayed release capsule; Take 1 capsule (20 mg total) by mouth daily before breakfast  Dispense: 90 capsule; Refill: 2    3  Primary hyperparathyroidism  - follow up with Endocrine    4  Nervousness  - continue trazodone 50 mg at bedtime and Xanax as needed  - ALPRAZolam (XANAX) 0 5 mg tablet; Take 1 tablet (0 5 mg total) by mouth daily at bedtime as needed for anxiety or sleep  Dispense: 20 tablet; Refill: 1       Follow up as scheduled on 06/15/22 or sooner if needed  The patient understands and agrees with the treatment plan  Subjective:   Chief Complaint   Patient presents with    Hypertension      Patient ID: Carmelo Betancur is a 78 y o  female who presents today for follow up for hypertension  At her last visit patient was prescribed Toprol XL 25 mg 1/2 tablet at bedtime which she has not been consistent with, she reports home BP readings in 140's/ 70's- 80's, this am was 149/88  Patient offers no other complaints          The following portions of the patient's history were reviewed and updated as appropriate: allergies, current medications, past family history, past medical history, past social history, past surgical history and problem list     Past Medical History:   Diagnosis Date    Anxiety     Arthritis     Back pain     Balance problems     Chest pain     heaviness    Difficulty swallowing     Dizziness     Gait disorder     GERD (gastroesophageal reflux disease)     Hypertension     Increased frequency of headaches     Numbness and tingling     Palpitations     Pericarditis     Thyroid disease     Trouble in sleeping      Past Surgical History:   Procedure Laterality Date    APPENDECTOMY      CHOLECYSTECTOMY      laparoscopic    COLONOSCOPY      KNEE SURGERY Left     PERICARDIAL WINDOW      NH OPEN RX DISTAL RADIUS FX, EXTRA-ARTICULAR Right 3/22/2018    Procedure: OPEN REDUCTION W/ INTERNAL FIXATION (ORIF) RADIUS, splint application;  Surgeon: Lesley Andino MD;  [facility]: [facility];  Service: Orthopedics    UPPER GASTROINTESTINAL ENDOSCOPY       Family History   Problem Relation Age of Onset    Kidney failure Mother     Hypertension Mother     Lung cancer Father    Brynn Mcburney Parkinson White syndrome Daughter     No Known Problems Sister     Substance Abuse Neg Hx     Alcohol abuse Neg Hx     Mental illness Neg Hx      Social History     Socioeconomic History    Marital status: /Civil Union     Spouse name: Not on file    Number of children: Not on file    Years of education: Not on file    Highest education level: Not on file   Occupational History    Not on file   Tobacco Use    Smoking status: Never Smoker    Smokeless tobacco: Never Used   Vaping Use    Vaping Use: Never used   Substance and Sexual Activity    Alcohol use: Yes     Comment: Rarely     Drug use: No    Sexual activity: Not on file   Other Topics Concern    Not on file   Social History Narrative    WORK:    1  [employer] (Emma [employer]reras; Madie Ruggiero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    2  [employer]        HOBBIES:    1  Singing    2  Dancing    3  Hx of ceramics (+ dust)        PETS:    1    Dogs    -  Denies birds        TRAVEL:    -  [place]         EXPOSURES:    Denies mold; down pillows/comforters; hot tubs     Social Determinants of Health     Financial Resource Strain: Not on file   Food Insecurity: Not on file   Transportation Needs: Not on file   Physical Activity: Not on file   Stress: Not on file   Social Connections: Not on file   Intimate Partner Violence: Not on file   Housing Stability: Not on file       Current Outpatient Medications:     ALPRAZolam (XANAX) 0 5 mg tablet, Take 1 tablet (0 5 mg total) by mouth daily at bedtime as needed for anxiety or sleep, Disp: 20 tablet, Rfl: 1    ascorbic acid (VITAMIN C) 500 mg tablet, Take 500 mg by mouth daily, Disp: , Rfl:     aspirin 81 mg chewable tablet, Chew 1 tablet (81 mg total) daily (Patient taking differently: Chew 81 mg as needed ), Disp: 30 tablet, Rfl: 0    b complex vitamins tablet, Take 1 tablet by mouth daily, Disp: , Rfl:     Cholecalciferol (Vitamin D) 50 MCG (2000 UT) CAPS, Take 1 capsule (2,000 Units total) by mouth daily, Disp: , Rfl:     dicyclomine (BENTYL) 10 mg capsule, Take 1 capsule (10 mg total) by mouth 4 (four) times a day (before meals and at bedtime) (Patient taking differently: Take 10 mg by mouth as needed ), Disp: 30 capsule, Rfl: 1    famotidine (PEPCID) 20 mg tablet, Take 1 tablet (20 mg total) by mouth daily, Disp: 30 tablet, Rfl: 5    Magnesium 100 MG CAPS, magnesium  300 mg qd, Disp: , Rfl:     metoprolol succinate (Toprol XL) 25 mg 24 hr tablet, Take 0 5 tablets (12 5 mg total) by mouth daily at bedtime, Disp: 30 tablet, Rfl: 1    Multiple Vitamins-Calcium (ONE-A-DAY WOMENS PO), Take 1 tablet by mouth daily, Disp: , Rfl:     omeprazole (PriLOSEC) 20 mg delayed release capsule, Take 1 capsule (20 mg total) by mouth daily before breakfast, Disp: 90 capsule, Rfl: 2    Riboflavin (VITAMIN B-2 PO), Take by mouth, Disp: , Rfl:     traZODone (DESYREL) 50 mg tablet, Take 1 tablet (50 mg total) by mouth daily at bedtime (Patient taking differently: Take 50 mg by mouth as needed ), Disp: 30 tablet, Rfl: 1    Review of Systems   Constitutional: Negative for chills, fatigue and fever  Respiratory: Negative for cough, shortness of breath and wheezing  Cardiovascular: Negative for chest pain and palpitations  Gastrointestinal: Negative for abdominal pain, blood in stool, diarrhea, nausea and vomiting  Neurological: Negative for dizziness, syncope, weakness, numbness and headaches     Hematological: Negative for adenopathy  Psychiatric/Behavioral: Negative for dysphoric mood  The patient is nervous/anxious  Objective:    Vitals:    02/23/22 1525 02/23/22 1544   BP: 140/82 142/84   BP Location:  Left arm   Patient Position:  Sitting   Cuff Size:  Standard   Pulse: 80    Resp: 16    Weight: 81 1 kg (178 lb 12 8 oz)    Height: 5' 7" (1 702 m)      BP Readings from Last 3 Encounters:   02/23/22 142/84   02/08/22 136/82   01/26/22 146/82         Physical Exam  Constitutional:       General: She is not in acute distress  Appearance: She is well-developed  Eyes:      Extraocular Movements: Extraocular movements intact  Pupils: Pupils are equal, round, and reactive to light  Neck:      Thyroid: No thyromegaly  Cardiovascular:      Rate and Rhythm: Normal rate and regular rhythm  Heart sounds: Normal heart sounds  No murmur heard  Pulmonary:      Effort: Pulmonary effort is normal       Breath sounds: Normal breath sounds  No wheezing, rhonchi or rales  Chest:      Chest wall: No tenderness  Abdominal:      General: There is no distension  Palpations: Abdomen is soft  There is no mass  Tenderness: There is no abdominal tenderness  Musculoskeletal:      Cervical back: Neck supple  Right lower leg: No edema  Left lower leg: No edema  Lymphadenopathy:      Cervical: No cervical adenopathy  Neurological:      Mental Status: She is alert and oriented to person, place, and time  Psychiatric:         Mood and Affect: Mood normal          Behavior: Behavior normal          Thought Content:  Thought content normal        Lab Results   Component Value Date    [id number] 1 260 12/10/2021     Lab Results   Component Value Date    WBC 4 97 12/10/2021    HGB 15 1 12/10/2021    HCT 46 5 (H) 12/10/2021    MCV 93 12/10/2021     12/10/2021     Lab Results   Component Value Date    SODIUM 140 12/10/2021    K 4 3 12/10/2021     12/10/2021    CO2 22 12/10/2021    AGAP 10 12/10/2021    BUN 22 12/10/2021    CREATININE 0 88 12/10/2021    GLUC 87 01/08/2021    GLUF 70 12/10/2021    CALCIUM 10 8 (H) 12/10/2021    AST 29 12/10/2021    ALT 44 12/10/2021    ALKPHOS 103 12/10/2021    TP 7 6 12/10/2021    TBILI 0 86 12/10/2021    EGFR 65 12/10/2021     Lab Results   Component Value Date     8 (H) 09/13/2021    CALCIUM 10 8 (H) 12/10/2021    PHOS 2 7 09/13/2021

## 2022-02-25 ENCOUNTER — OFFICE VISIT (OUTPATIENT)
Dept: FAMILY MEDICINE CLINIC | Facility: CLINIC | Age: 77
End: 2022-02-25
Payer: COMMERCIAL

## 2022-02-25 VITALS
HEIGHT: 67 IN | RESPIRATION RATE: 16 BRPM | BODY MASS INDEX: 27.72 KG/M2 | SYSTOLIC BLOOD PRESSURE: 140 MMHG | WEIGHT: 176.6 LBS | HEART RATE: 57 BPM | DIASTOLIC BLOOD PRESSURE: 92 MMHG

## 2022-02-25 DIAGNOSIS — K21.9 GASTROESOPHAGEAL REFLUX DISEASE, UNSPECIFIED WHETHER ESOPHAGITIS PRESENT: Primary | ICD-10-CM

## 2022-02-25 DIAGNOSIS — R10.11 RUQ ABDOMINAL PAIN: ICD-10-CM

## 2022-02-25 DIAGNOSIS — R07.81 RIB PAIN ON RIGHT SIDE: ICD-10-CM

## 2022-02-25 PROCEDURE — 1036F TOBACCO NON-USER: CPT | Performed by: FAMILY MEDICINE

## 2022-02-25 PROCEDURE — 3080F DIAST BP >= 90 MM HG: CPT | Performed by: FAMILY MEDICINE

## 2022-02-25 PROCEDURE — 3077F SYST BP >= 140 MM HG: CPT | Performed by: FAMILY MEDICINE

## 2022-02-25 PROCEDURE — 3008F BODY MASS INDEX DOCD: CPT | Performed by: FAMILY MEDICINE

## 2022-02-25 PROCEDURE — 99213 OFFICE O/P EST LOW 20 MIN: CPT | Performed by: FAMILY MEDICINE

## 2022-02-25 PROCEDURE — 1160F RVW MEDS BY RX/DR IN RCRD: CPT | Performed by: FAMILY MEDICINE

## 2022-02-25 NOTE — PROGRESS NOTES
Assessment/Plan:     Diagnosis ICD-10-CM Associated Orders   1  Gastroesophageal reflux disease, unspecified whether esophagitis present  K21 9    2  RUQ abdominal pain  R10 11 US right upper quadrant   3  Rib pain on right side  R07 81 XR ribs right w pa chest min 3 views     Discussion:   - advised to continue omeprazole 20 mg in am and take Pepcid 20 mg before dinner, avoid caffeine, acidic, spicy or fried foods, scripts given for x-ray right ribs and US RUQ and advised to schedule if her symptoms are not better in the next few days  Encouraged patient to follow up for persistent symptoms or go to ER if her symptoms worsen  The patient understands and agrees with the treatment plan  Subjective:   Chief Complaint   Patient presents with    Abdominal Pain     RUQ since last night  Pt states it feels like a stabbing pain       Patient ID: Apple Lott is a 78 y o  female who presents today with c/o intermittent RUQ abdominal pain onset last evening shortly after having spaghetti with tomato meat sauce for dinner  Patient took Tums with slight relief  Her pain is better today, but still comes and goes, no related to meals or activity, no heavy lifting, no skin rash, no nausea or vomiting, no trouble swallowing, no melena/ hematochezia         The following portions of the patient's history were reviewed and updated as appropriate: allergies, current medications, past family history, past medical history, past social history, past surgical history and problem list     Past Medical History:   Diagnosis Date    Anxiety     Arthritis     Back pain     Balance problems     Chest pain     heaviness    Difficulty swallowing     Dizziness     Gait disorder     GERD (gastroesophageal reflux disease)     Hypertension     Increased frequency of headaches     Numbness and tingling     Palpitations     Pericarditis     Thyroid disease     Trouble in sleeping      Past Surgical History:   Procedure Laterality Date    APPENDECTOMY      CHOLECYSTECTOMY      laparoscopic    COLONOSCOPY      KNEE SURGERY Left     PERICARDIAL WINDOW      [facility] OPEN RX DISTAL RADIUS FX, EXTRA-ARTICULAR Right 3/22/2018    Procedure: OPEN REDUCTION W/ INTERNAL FIXATION (ORIF) RADIUS, splint application;  Surgeon: No Park MD;  [facility];  Service: Orthopedics    UPPER GASTROINTESTINAL ENDOSCOPY       Family History   Problem Relation Age of Onset    Kidney failure Mother     Hypertension Mother     Lung cancer Father    Adonis Addison Parkinson White syndrome Daughter     No Known Problems Sister     Substance Abuse Neg Hx     Alcohol abuse Neg Hx     Mental illness Neg Hx      Social History     Socioeconomic History    Marital status: /Civil Union     Spouse name: Not on file    Number of children: Not on file    Years of education: Not on file    Highest education level: Not on file   Occupational History    Not on file   Tobacco Use    Smoking status: Never Smoker    Smokeless tobacco: Never Used   Vaping Use    Vaping Use: Never used   Substance and Sexual Activity    Alcohol use: Yes     Comment: Rarely     Drug use: No    Sexual activity: Not on file   Other Topics Concern    Not on file   Social History Narrative    WORK:    1   ([employer]; [address]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    2  [employer]        HOBBIES:    1  Singing    2  Dancing    3  Hx of ceramics (+ dust)        PETS:    1    Dogs    -  Denies birds        TRAVEL:    -  [place]         EXPOSURES:    Denies mold; down pillows/comforters; hot tubs     Social Determinants of Health     Financial Resource Strain: Not on file   Food Insecurity: Not on file   Transportation Needs: Not on file   Physical Activity: Not on file   Stress: Not on file   Social Connections: Not on file   Intimate Partner Violence: Not on file   Housing Stability: Not on file       Current Outpatient Medications:     ALPRAZolam Forestine Yvesitar) 0 5 mg tablet, Take 1 tablet (0 5 mg total) by mouth daily at bedtime as needed for anxiety or sleep, Disp: 20 tablet, Rfl: 1    ascorbic acid (VITAMIN C) 500 mg tablet, Take 500 mg by mouth daily, Disp: , Rfl:     aspirin 81 mg chewable tablet, Chew 1 tablet (81 mg total) daily (Patient taking differently: Chew 81 mg as needed ), Disp: 30 tablet, Rfl: 0    b complex vitamins tablet, Take 1 tablet by mouth daily, Disp: , Rfl:     Cholecalciferol (Vitamin D) 50 MCG (2000 UT) CAPS, Take 1 capsule (2,000 Units total) by mouth daily, Disp: , Rfl:     dicyclomine (BENTYL) 10 mg capsule, Take 1 capsule (10 mg total) by mouth 4 (four) times a day (before meals and at bedtime) (Patient taking differently: Take 10 mg by mouth as needed ), Disp: 30 capsule, Rfl: 1    famotidine (PEPCID) 20 mg tablet, Take 1 tablet (20 mg total) by mouth daily, Disp: 30 tablet, Rfl: 5    Magnesium 100 MG CAPS, magnesium  300 mg qd, Disp: , Rfl:     metoprolol succinate (Toprol XL) 25 mg 24 hr tablet, Take 0 5 tablets (12 5 mg total) by mouth daily at bedtime, Disp: 30 tablet, Rfl: 1    Multiple Vitamins-Calcium (ONE-A-DAY WOMENS PO), Take 1 tablet by mouth daily, Disp: , Rfl:     omeprazole (PriLOSEC) 20 mg delayed release capsule, Take 1 capsule (20 mg total) by mouth daily before breakfast, Disp: 90 capsule, Rfl: 2    Riboflavin (VITAMIN B-2 PO), Take by mouth, Disp: , Rfl:     traZODone (DESYREL) 50 mg tablet, Take 1 tablet (50 mg total) by mouth daily at bedtime (Patient taking differently: Take 50 mg by mouth as needed ), Disp: 30 tablet, Rfl: 1    Review of Systems   Constitutional: Negative for appetite change, chills, fatigue, fever and unexpected weight change  Respiratory: Negative for cough, shortness of breath and wheezing  Cardiovascular: Negative for chest pain, palpitations and leg swelling  Gastrointestinal: Negative for blood in stool, constipation, diarrhea, nausea and vomiting          As per HPI Genitourinary: Negative for dysuria, flank pain, frequency, hematuria and urgency  Skin: Negative for rash  Neurological: Negative for dizziness, syncope and headaches  Objective:    Vitals:    02/25/22 1449   BP: 140/92   Pulse: 57   Resp: 16   Weight: 80 1 kg (176 lb 9 6 oz)   Height: 5' 7" (1 702 m)        Physical Exam  Constitutional:       General: She is not in acute distress  Appearance: She is well-developed  Neck:      Thyroid: No thyromegaly  Cardiovascular:      Rate and Rhythm: Normal rate and regular rhythm  Heart sounds: Normal heart sounds  No murmur heard  Pulmonary:      Effort: Pulmonary effort is normal       Breath sounds: Normal breath sounds  Comments: Tenderness along right lower ribs anterior aspect  Abdominal:      General: There is no distension  Palpations: Abdomen is soft  There is no mass  Tenderness: There is no abdominal tenderness  There is no right CVA tenderness, left CVA tenderness or rebound  Musculoskeletal:      Cervical back: Neck supple  Right lower leg: No edema  Left lower leg: No edema  Lymphadenopathy:      Cervical: No cervical adenopathy  Neurological:      Mental Status: She is alert and oriented to person, place, and time     Psychiatric:         Mood and Affect: Mood normal          Behavior: Behavior normal

## 2022-03-03 ENCOUNTER — HOSPITAL ENCOUNTER (OUTPATIENT)
Dept: ULTRASOUND IMAGING | Facility: HOSPITAL | Age: 77
Discharge: HOME | End: 2022-03-03
Payer: COMMERCIAL

## 2022-03-03 DIAGNOSIS — I10 ESSENTIAL HYPERTENSION: ICD-10-CM

## 2022-03-03 DIAGNOSIS — R10.11 RUQ ABDOMINAL PAIN: ICD-10-CM

## 2022-03-03 PROCEDURE — 76705 ECHO EXAM OF ABDOMEN: CPT

## 2022-03-03 RX ORDER — METOPROLOL SUCCINATE 25 MG/1
12.5 TABLET, EXTENDED RELEASE ORAL
Qty: 45 TABLET | Refills: 2 | Status: SHIPPED | OUTPATIENT
Start: 2022-03-03

## 2022-03-07 ENCOUNTER — TELEPHONE (OUTPATIENT)
Dept: FAMILY MEDICINE CLINIC | Facility: CLINIC | Age: 77
End: 2022-03-07

## 2022-03-07 NOTE — TELEPHONE ENCOUNTER
----- Message from Vivi Crowley MD sent at 3/7/2022 12:58 PM EST -----  Please let patient know that her US was normal

## 2022-03-17 ENCOUNTER — HOSPITAL ENCOUNTER (OUTPATIENT)
Dept: RADIOLOGY | Facility: HOSPITAL | Age: 77
Discharge: HOME | End: 2022-03-17
Attending: INTERNAL MEDICINE
Payer: COMMERCIAL

## 2022-03-17 ENCOUNTER — HOSPITAL ENCOUNTER (OUTPATIENT)
Dept: PULMONOLOGY | Facility: HOSPITAL | Age: 77
Discharge: HOME | End: 2022-03-17
Attending: INTERNAL MEDICINE
Payer: COMMERCIAL

## 2022-03-17 DIAGNOSIS — J84.9 ILD (INTERSTITIAL LUNG DISEASE) (HCC): ICD-10-CM

## 2022-03-17 PROCEDURE — 94729 DIFFUSING CAPACITY: CPT | Performed by: INTERNAL MEDICINE

## 2022-03-17 PROCEDURE — 94010 BREATHING CAPACITY TEST: CPT

## 2022-03-17 PROCEDURE — 71250 CT THORAX DX C-: CPT

## 2022-03-17 PROCEDURE — 94729 DIFFUSING CAPACITY: CPT

## 2022-03-17 PROCEDURE — G1004 CDSM NDSC: HCPCS

## 2022-03-17 PROCEDURE — 94760 N-INVAS EAR/PLS OXIMETRY 1: CPT

## 2022-03-17 PROCEDURE — 94010 BREATHING CAPACITY TEST: CPT | Performed by: INTERNAL MEDICINE

## 2022-03-25 ENCOUNTER — TELEPHONE (OUTPATIENT)
Dept: FAMILY MEDICINE CLINIC | Facility: CLINIC | Age: 77
End: 2022-03-25

## 2022-03-28 ENCOUNTER — TELEPHONE (OUTPATIENT)
Dept: PULMONOLOGY | Facility: CLINIC | Age: 77
End: 2022-03-28

## 2022-03-28 NOTE — TELEPHONE ENCOUNTER
----- Message from Mone Newton MD sent at 3/27/2022  2:11 PM EDT -----  I have reviewed the results of high-resolution CT scan  The CT scan is essentially unchanged as compared to 2018  Please follow-up as previously directed

## 2022-03-31 ENCOUNTER — TELEPHONE (OUTPATIENT)
Dept: FAMILY MEDICINE CLINIC | Facility: CLINIC | Age: 77
End: 2022-03-31

## 2022-03-31 NOTE — TELEPHONE ENCOUNTER
Patient has a cough  She has some medication called Promethazine which you prescribed for her on 9/8/21  She is wondering if it's okay if she take this medication for her cough  She said when you prescribed it, she wasn't on any medication for HBP  She's now on metoprolol for HBP  Can she take them together? If she can take them, what should (if any) the spacing apart from each other? She's afraid to take them together

## 2022-04-01 ENCOUNTER — OFFICE VISIT (OUTPATIENT)
Dept: FAMILY MEDICINE CLINIC | Facility: CLINIC | Age: 77
End: 2022-04-01
Payer: COMMERCIAL

## 2022-04-01 VITALS
OXYGEN SATURATION: 98 % | TEMPERATURE: 98.8 F | RESPIRATION RATE: 16 BRPM | SYSTOLIC BLOOD PRESSURE: 124 MMHG | HEART RATE: 78 BPM | WEIGHT: 176.4 LBS | HEIGHT: 67 IN | BODY MASS INDEX: 27.69 KG/M2 | DIASTOLIC BLOOD PRESSURE: 82 MMHG

## 2022-04-01 DIAGNOSIS — J06.9 UPPER RESPIRATORY TRACT INFECTION, UNSPECIFIED TYPE: Primary | ICD-10-CM

## 2022-04-01 DIAGNOSIS — B34.9 VIRAL ILLNESS: ICD-10-CM

## 2022-04-01 LAB
SARS-COV-2 AG UPPER RESP QL IA: NEGATIVE
VALID CONTROL: NORMAL

## 2022-04-01 PROCEDURE — 87811 SARS-COV-2 COVID19 W/OPTIC: CPT | Performed by: FAMILY MEDICINE

## 2022-04-01 PROCEDURE — 99213 OFFICE O/P EST LOW 20 MIN: CPT | Performed by: FAMILY MEDICINE

## 2022-04-01 NOTE — TELEPHONE ENCOUNTER
Ok to take her metoprolol and cough medication together, she may take cough syrup 1 teaspoon ( 5 ml) every 6 hours as needed for cough

## 2022-04-01 NOTE — TELEPHONE ENCOUNTER
Patient called back and would like an appointment   Scheduled for this afternoon and is requesting a covid test

## 2022-04-01 NOTE — PROGRESS NOTES
Assessment/Plan:     Diagnosis ICD-10-CM Associated Orders   1  Upper respiratory tract infection, unspecified type  J06 9    2  Viral illness  B34 9 POCT Rapid Covid Ag     Discussion:   - Covid rapid test was negative, advised rest, plenty of fluids, Robitussin or Mucinex as needed during the day and Promethazine with Codeine cough syrup at bedtime, advised to call the office for worsening symptoms or development of shortness of breath or go to ER  Patient understands and agrees with the treatment plan  Subjective:   Chief Complaint   Patient presents with    Cough     times 4-5 days     Fatigue      Patient ID: Celine Alegria is a 78 y o  female who presents today with c/o fatigue, nasal congestion and non productive cough onset 4 days ago, her  had URI last week  Patient denies fever, chills, night sweats, purulent mucus production, difficulty breathing or chest pain  Patient states that her cough now interferes with her sleep, she still has more than 1/2 bottle of Phenergan with Codeine cough syrup at home from a few months ago        The following portions of the patient's history were reviewed and updated as appropriate: allergies, current medications, past family history, past medical history, past social history, past surgical history and problem list     Past Medical History:   Diagnosis Date    Anxiety     Arthritis     Back pain     Balance problems     Chest pain     heaviness    Difficulty swallowing     Dizziness     Gait disorder     GERD (gastroesophageal reflux disease)     Hypertension     Increased frequency of headaches     Numbness and tingling     Palpitations     Pericarditis     Thyroid disease     Trouble in sleeping      Past Surgical History:   Procedure Laterality Date    APPENDECTOMY      CHOLECYSTECTOMY      laparoscopic    COLONOSCOPY      KNEE SURGERY Left     PERICARDIAL WINDOW      TX OPEN RX DISTAL RADIUS FX, EXTRA-ARTICULAR Right 3/22/2018 Procedure: OPEN REDUCTION W/ INTERNAL FIXATION (ORIF) RADIUS, splint application;  Surgeon: David Castle MD;  Location: [facility];  Service: Orthopedics    UPPER GASTROINTESTINAL ENDOSCOPY       Family History   Problem Relation Age of Onset    Kidney failure Mother     Hypertension Mother     Lung cancer Father    Rony Rdz Parkinson White syndrome Daughter     No Known Problems Sister     Substance Abuse Neg Hx     Alcohol abuse Neg Hx     Mental illness Neg Hx      Social History     Socioeconomic History    Marital status: /Civil Union     Spouse name: Not on file    Number of children: Not on file    Years of education: Not on file    Highest education level: Not on file   Occupational History    Not on file   Tobacco Use    Smoking status: Never Smoker    Smokeless tobacco: Never Used   Vaping Use    Vaping Use: Never used   Substance and Sexual Activity    Alcohol use: Yes     Comment: Rarely     Drug use: No    Sexual activity: Not on file   Other Topics Concern    Not on file   Social History Narrative    WORK:    1   (Darrius Motley; 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    2  [employer]        HOBBIES:    1  Singing    2  Dancing    3  Hx of ceramics (+ dust)        PETS:    1    Dogs    -  Denies birds        TRAVEL:    -  [place]         EXPOSURES:    Denies mold; down pillows/comforters; hot tubs     Social Determinants of Health     Financial Resource Strain: Not on file   Food Insecurity: Not on file   Transportation Needs: Not on file   Physical Activity: Not on file   Stress: Not on file   Social Connections: Not on file   Intimate Partner Violence: Not on file   Housing Stability: Not on file       Current Outpatient Medications:     ALPRAZolam (XANAX) 0 5 mg tablet, Take 1 tablet (0 5 mg total) by mouth daily at bedtime as needed for anxiety or sleep, Disp: 20 tablet, Rfl: 1    ascorbic acid (VITAMIN C) 500 mg tablet, Take 500 mg by mouth daily, Disp: , Rfl:     aspirin 81 mg chewable tablet, Chew 1 tablet (81 mg total) daily (Patient taking differently: Chew 81 mg as needed ), Disp: 30 tablet, Rfl: 0    b complex vitamins tablet, Take 1 tablet by mouth daily, Disp: , Rfl:     Cholecalciferol (Vitamin D) 50 MCG (2000 UT) CAPS, Take 1 capsule (2,000 Units total) by mouth daily, Disp: , Rfl:     dicyclomine (BENTYL) 10 mg capsule, Take 1 capsule (10 mg total) by mouth 4 (four) times a day (before meals and at bedtime) (Patient taking differently: Take 10 mg by mouth as needed ), Disp: 30 capsule, Rfl: 1    famotidine (PEPCID) 20 mg tablet, Take 1 tablet (20 mg total) by mouth daily, Disp: 30 tablet, Rfl: 5    Magnesium 100 MG CAPS, magnesium  300 mg qd, Disp: , Rfl:     metoprolol succinate (TOPROL-XL) 25 mg 24 hr tablet, Take 0 5 tablets (12 5 mg total) by mouth daily at bedtime, Disp: 45 tablet, Rfl: 2    Multiple Vitamins-Calcium (ONE-A-DAY WOMENS PO), Take 1 tablet by mouth daily, Disp: , Rfl:     omeprazole (PriLOSEC) 20 mg delayed release capsule, Take 1 capsule (20 mg total) by mouth daily before breakfast, Disp: 90 capsule, Rfl: 2    Riboflavin (VITAMIN B-2 PO), Take by mouth, Disp: , Rfl:     traZODone (DESYREL) 50 mg tablet, Take 1 tablet (50 mg total) by mouth daily at bedtime (Patient taking differently: Take 50 mg by mouth as needed ), Disp: 30 tablet, Rfl: 1    Review of Systems   Constitutional: Positive for fatigue  Negative for chills and fever  HENT: Positive for congestion and rhinorrhea  Negative for ear pain, sore throat, trouble swallowing and voice change  Respiratory: Positive for cough  Negative for chest tightness, shortness of breath and wheezing  Cardiovascular: Negative for chest pain and palpitations  Gastrointestinal: Negative for abdominal pain, diarrhea, nausea and vomiting  Neurological: Negative for dizziness and headaches  Hematological: Negative for adenopathy           Objective:    Vitals: 04/01/22 1450   BP: 124/82   Pulse: 78   Resp: 16   Temp: 98 8 °F (37 1 °C)   SpO2: 98%   Weight: 80 kg (176 lb 6 4 oz)   Height: 5' 7" (1 702 m)        Physical Exam  Constitutional:       General: She is not in acute distress  Appearance: Normal appearance  She is not ill-appearing  HENT:      Right Ear: Tympanic membrane and ear canal normal       Left Ear: Tympanic membrane and ear canal normal       Nose: Mucosal edema present  Mouth/Throat:      Mouth: Mucous membranes are moist       Pharynx: Oropharynx is clear  Cardiovascular:      Rate and Rhythm: Normal rate and regular rhythm  Pulses: Normal pulses  Heart sounds: Normal heart sounds  No murmur heard  Pulmonary:      Effort: Pulmonary effort is normal  No respiratory distress  Breath sounds: Normal breath sounds  No stridor  No wheezing  Neurological:      Mental Status: She is alert and oriented to person, place, and time  Psychiatric:         Mood and Affect: Mood normal          Behavior: Behavior normal          Thought Content:  Thought content normal           Office Visit on 04/01/2022   Component Date Value Ref Range Status    POCT SARS-CoV-2 Ag 04/01/2022 Negative  Negative Final    VALID CONTROL 04/01/2022 Valid   Final

## 2022-04-13 ENCOUNTER — OFFICE VISIT (OUTPATIENT)
Dept: PULMONOLOGY | Facility: HOSPITAL | Age: 77
End: 2022-04-13
Payer: COMMERCIAL

## 2022-04-13 VITALS
TEMPERATURE: 98.6 F | WEIGHT: 177 LBS | SYSTOLIC BLOOD PRESSURE: 157 MMHG | RESPIRATION RATE: 20 BRPM | BODY MASS INDEX: 26.83 KG/M2 | DIASTOLIC BLOOD PRESSURE: 87 MMHG | OXYGEN SATURATION: 93 % | HEIGHT: 68 IN

## 2022-04-13 DIAGNOSIS — E04.1 THYROID NODULE: ICD-10-CM

## 2022-04-13 DIAGNOSIS — J84.9 ILD (INTERSTITIAL LUNG DISEASE) (HCC): Primary | ICD-10-CM

## 2022-04-13 PROCEDURE — 99213 OFFICE O/P EST LOW 20 MIN: CPT | Performed by: INTERNAL MEDICINE

## 2022-04-13 PROCEDURE — 3077F SYST BP >= 140 MM HG: CPT | Performed by: INTERNAL MEDICINE

## 2022-04-13 PROCEDURE — 3079F DIAST BP 80-89 MM HG: CPT | Performed by: INTERNAL MEDICINE

## 2022-04-13 PROCEDURE — 1160F RVW MEDS BY RX/DR IN RCRD: CPT | Performed by: INTERNAL MEDICINE

## 2022-04-13 PROCEDURE — 3008F BODY MASS INDEX DOCD: CPT | Performed by: INTERNAL MEDICINE

## 2022-04-13 PROCEDURE — 1036F TOBACCO NON-USER: CPT | Performed by: INTERNAL MEDICINE

## 2022-04-13 NOTE — PROGRESS NOTES
Progress Note - Pulmonary   Hiral Child 78 y o  female MRN: [id number]   Encounter: [id number]      Assessment/Plan:  Patient is a 60-year-old female with past medical history significant for secondhand smoke exposure who presents for routine follow-up  The patient has done very well since last visit  She has no complaints with her activities  Her primary concern is a thyroid issue for which she is determining medical management versus invasive surgery  For her history of interstitial lung disease, reviewed her high-resolution CT scan and her PFTs which are stable  The exact cause of her ILD is unclear but possibly related to hypersensitive pneumonitis  Given the fact that her findings are stable and her exercise tolerance is unchanged would do no further testing at this time  May repeat PFTs in approximately 6 months  Patient would prefer to up focus on her thyroid issues currently  1  [facility]) ([facility] [address] )  -     Spirometry with diffusing capacity; Future      Patient may follow up in 6 months or sooner as necessary  Orders:  Orders Placed This Encounter   Procedures    Spirometry with diffusing capacity     Standing Status:   Future     Standing Expiration Date:   4/13/2023     Order Specific Question:   Would you like to add MVV, MIP, MEP into this order? Answer:   No       Subjective: The patient reports she is doing okay  She is dealing with thyroid issues currently  She reports her breathing is okay  She has no problems with breathing when she walks  She is able to go up and down stairs with out difficulty; reports more related to her knee pain  She reports only occasional wheezing and a cough  She feels there may be something irritating within the vents  She denies use of an inhaler  She did try one previously but had no benefit  The patients concern is her thyroid for which she is seeking a third opinion        The patient does report some balance issues  Inhaler Regimen:  None    Remainder of review of systems negative except as described in HPI  The following portions of the patient's history were reviewed and updated as appropriate: allergies, current medications, past family history, past medical history, past social history, past surgical history and problem list      Objective:   Vitals: Blood pressure 157/87, temperature 98 6 °F (37 °C), temperature source Tympanic, resp  rate 20, height 5' 8" (1 727 m), weight 80 3 kg (177 lb), SpO2 93 %, not currently breastfeeding , RA, Body mass index is 26 91 kg/m²  Physical Exam  Gen: Pleasant, awake, alert, oriented x 3, no acute distress  HEENT: Mucous membranes moist, no oral lesions, no thrush  NECK: No accessory muscle use, JVP not elevated  Cardiac: RRR, single S1, single S2, no murmurs, no rubs, no gallops  Lungs: CTA b/l  Abdomen: normoactive bowel sounds, soft nontender, nondistended, no rebound or rigidity, no guarding  Extremities: no cyanosis, no clubbing, no LE edema  MSK:  Strength equal in all extremities  Derm:  No rashes/lesions noted  Neuro:  Appropriate mood/affect    Labs: I have personally reviewed pertinent lab results  Lab Results   Component Value Date    WBC 4 97 12/10/2021    HGB 15 1 12/10/2021     12/10/2021     Lab Results   Component Value Date    CREATININE 0 88 12/10/2021        Imaging and other studies: I have personally reviewed pertinent reports  and I have personally reviewed pertinent films in PACS  HRCT 3/17/22  My interpretation:  Stable fibrotic changes    Radiology findings:  LUNGS:  No significant change in minimal juxtapleural reticulation in the lateral basal segments of the lower lobes since December 2018  No groundglass opacity, bronchiectasis, bronchiolectasis, or honeycombing  Minimal air trapping on expiration    Redemonstration of linear scar in the medial segment right middle lobe and focal scar in the juxtapleural lateral basal right lower lobe (4/60)  AIRWAYS: New tubular opacity in the lateral right upper lobe due to endobronchial mucous plugging (4/52)  PLEURA:  Unremarkable  HEART/GREAT VESSELS:  Normal for age  MEDIASTINUM AND SREEKANTH:  Unremarkable  CHEST WALL AND LOWER NECK: Unremarkable  UPPER ABDOMEN:  Unremarkable  Pulmonary Function Testing: I have personally reviewed pertinent reports  and I have personally reviewed pertinent films in PACS    FEV1  FVC  FEV1/FVC DLCOcor  10/7/2019 1 70 69% 2 17 68% 78%  58%  3/3/2021 1 59 66% 2 02 64% 79%  58%  3/17/2022 1 46 62% 1 89 60% 78%  55%    Rohit LazaroQuincy Valley Medical Centerbear

## 2022-05-10 ENCOUNTER — TELEPHONE (OUTPATIENT)
Dept: NEUROLOGY | Facility: CLINIC | Age: 77
End: 2022-05-10

## 2022-05-10 NOTE — TELEPHONE ENCOUNTER
Called and left message to remind patient of her upcoming appointment with Dr eJevan Lau on 05/17/522

## 2022-05-16 ENCOUNTER — TELEPHONE (OUTPATIENT)
Dept: NEUROLOGY | Facility: CLINIC | Age: 77
End: 2022-05-16

## 2022-05-16 NOTE — PROGRESS NOTES
[facility] Consult - Follow up   PATIENT:  Mario Whittaker  MRN:  [id number]  :  1945  DATE OF SERVICE:  2022  REFERRED BY: No ref  provider found  PMD: Jaun Seymour MD    Assessment/Plan:   Rom mosley 78 y  o  female with a past medical history of hyperparathyroidism, GERD, pericarditis, heart murmur, chronic gait instability, chronic neck pain, vitamin-D deficiency, insomnia, depression, anxiety referred here for evaluation of headache    Initial evaluation by me 2018    Migraine without aura without status migrainosus, not intractable  Chronic tension headaches    She has had migraines since childhood, she has followed with neurologist in the past back in [place]   She moved here to be closer to her daughter and granddaughter in the area  She has multiple types of headaches,  bioccipital pain, mid throbbing pain  Migraine is without aura and has associated nausea sometimes vomiting, stiff and sore neck, problems with concentration, photophobia, phonophobia, osmophobia, sometimes nasal congestion, lightheadedness   Migraines worse with any movement    - as of 2019 she reports headaches are significantly improved with lifestyle interventions  - as of 2019:  headaches 6 times a month and go away with ibuprofen   She has not followed up with eye doctor but has an appointment in July  - as of 2019: as of 19: headaches 6-8 a month - often in the am or with anxiety/stress - these she can breathe through it  Morning headaches the last 4-5 days, goes away with coffee usually, if not goes away with ibuprofen  Last big migraine 9 months or so ago   - As of 10/9/2019:  headaches - has not had in quite some time, around 8 a month and go away with ibuprofen, occasional sharp pains randomly for 5 minutes on various locations on head 1-2 times a week  No migraines in a year   - she is taking magnesium and riboflavin - she thinks this may be helping  - as of 03/2/2020: no longer having significant headaches, occasional am headaches that improve with coffee, taking mg and B2 for prevention  - as of 6/16/2021: She reports 1-2 months of near daily headaches that may last throughout the day, new type of stabbing headache and new onset left visual changes that lasted 4 days and resolved  Continue baby asa for now, ordered MRI Brain  Trial of gabapentin if she would like that may help with multiple things  Follow up with endocrine since hyperparathyroidism may be causing a lot of problems  - as of 11/12/2021: she reports headaches have improved to 4-5 days per week with mostly tension features and certainly could be related to stress or hyperparathyroidism for which she is following with endocrine  Also dealing with other symptoms concerning for hyperparathyroid  Taking supplements for headache prevention  Did not try gabapentin as she does not want to take prescription meds for her headaches  MRI Brain did not show any new or concerning findings  - as of 5/17/2022: She reports she is getting about 4-5 significant migraines a month and she is now willing to try abortive medication for this so will prescribe rizatriptan 10 mg as needed  She is not interested in prescription preventative      Workup  -  MRI brain without contrast 07/19/2021: White matter changes suggestive of chronic microangiopathy  No acute intracranial pathology   - MRI Brain without contrast  7/9/19: No acute intracranial abnormality   Mild nonspecific cerebral white matter signal abnormality, which can be seen in patients with migraines as well as microangiopathic disease   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   This may represent an anatomic variation   If concerned of vertebrobasilar insufficiency, consider follow-up CTA or contrast enhanced MRA imaging    - MRA head and neck/carotids 09/30/2019:  No large vessel flow restrictive disease      - 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   This could be reevaluated with CTA  -> per NSGY This requires no treatment and no further imaging is required either     - Minor short segment stenosis of the dominant right V1 origin   Mild long segment narrowing of the origin and nondominant left v1   Anterior circulation normal    - EMG/NCS 6/24/20: Mildly abnormal study  The reduced amplitudes noted on the eroneal  marker studies from extensor digitorum brevis muscles is likely due to reduced muscle bulk of muscles  The low amplitudes noted on the sensory studies in the lower extremities can be considered normal for this patient's age  These changes can be considered normal for this patient's age consider normal sural sensory response on the right and normal tibial responses, or could suggest a very mild axonal sensorimotor neuropathy  In addition, there is evidence to support a 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   To note, patient has history of remote carpal tunnel surgeries, could be remnant of prior surgery   - B12 3/10/20 721        Preventive:  - We discussed headache hygiene and lifestyle interventions that may improve her headaches   - she is not interested in prescription preventative at this time  - continue headache preventative supplements including magnesium,  riboflavin   - through other providers: trazodone as needed for sleep, xanax for anxiety, metoprolol  - past/failed/contraindicated: metoprolol, propranolol contraindicated due to interaction with current medications   -  future options:  Gabapentin, venlafaxine, CGRP med     Abortive:  - discussed not taking over-the-counter or prescription pain medications more than 3 days per week to prevent medication overuse/rebound headache  - trial of rizatriptan prescribed in the past and she never took it  Discussed proper use, possible side effects and risks     - past/contraindicated: fiorinal with codeine years ago  - past/helped: Toradol IM has worked for in the past  - future options: Alternative Triptan, prochlorperazine, Toradol IM or p o , could consider trial for 5 days of Depakote or dexamethasone for prolonged migraine, ubrelvy, reyvow, nurtec    ------------past ----------  Chronic neck pain   - we discussed this is not my area of expertise  - physical therapy in the past on her neck made it worse   - have referred her to physiatry in the past for this 3/2020 and 6/16/2021     Dizziness/Vertigo and separate issue of gait abnormality  She reports a history of BPPV 2 years ago that got better with maneuvers including Epley per her description     - At visit 03/18/2019 she reported positional vertigo that improved with physical therapy maneuver 4/1/19     - She returns 06/03/2019 reporting positional vertigo again for the past week   Provoked by bending over, positions in bed   She also reports she feels like she cannot walk right and has to hold onto her  and daughter -Catalina King is new compared to the last episode  - as of 7/31/19: vertigo improved after denice hallpike at our last visit 6/3/19, lightheadedness continues occasionally  No recent falls, still occasional off balance, plans to see PT soon  - as of 10/09/2019: no falls, working with PT, getting orthotics, just one episode of vertigo for 5 mins since last visit   - as of 03/2/2020: no falls, no BPPV recently  Had 1 week of TMJ pain bilaterally, got better with antibiotics  Discussed following up with physiatry as well as orthopedic surgery which is already scheduled  Ordering EMG/NCS to further assess for neuropathy     - as of 6/16/2021: no vertigo, gait Does not appear to be neurologic without significant findings on EMG, normal B12, no findings to suggest neurologic cause on MRI brain  Has had PT and restarting PT soon   Following with ortho for valgus deformity   - 09/16/2021 called in with vertigo, saw physical therapy the next day with testing for BPPV and continued PT with improvement    Insomnia  She reports chronic insomnia and that she currently gets less than 4 hr a night of sleep   At initial visit 11/2018 we discussed sleep hygiene and she has stopped drinking coffee late at night which has helped some  Ivonne Dukes is wondering if a referral to sleep medicine would be indicated and I am happy to refer her   Likely multifactorial including possible psychogenic component versus other sleep disorder as well  - as of 06/03/2019: Gosia Rodriguez reports she did not go to sleep medicine appointment and tried cutting coffee and tea at dinner which helps somewhat  - as of 7/31/19: sleep sometimes good - was good for a while after starting mg, sometimes wakes up at night again now, possible symptoms of RLS and still only averaging 4-5 - will have her see  Sleep med   - as of 10/09/2019:  Did not follow up with sleep medicine as recommended  - as of 3/2/2020 reports she plans to follow up with sleep med soon, may have RLS? Or other sleep disorder  -   Saw Sleep Medicine 06/22/2021 who felt no PSG was indicated - Psychophysiologic insomnia  Recommended sleep hygiene, relaxation techniques, consider CBT-I         Patient instructions     Safety/fall precautions      Headache/migraine treatment:   Abortive medications (for immediate treatment of a headache): It is ok to take ibuprofen, acetaminophen or naproxen (Advil, Tylenol,  Aleve, Excedrin) if they help your headaches you should limit these to No more than 3 times a week to avoid medication overuse/rebound headaches      For your more moderate to severe migraines take this medication early   Maxalt (rizatriptan) 10mg tabs - take one at the onset of headache  May repeat one time after 1-2 hours if pain has not resolved  (Max 2 a day and 9 a month)     - some people may have some side effects from this medication, most typically do not    Common side effects include making you feel tired, palpitations, tingling or tightness of the face or chest   Most people report the side effects are nothing compared to their migraines and do not mind these  If you have intolerable side effects we will stop      Over the counter preventive supplements for headaches/migraines   (to take every day to help prevent headaches - not to take at the time of headache):  [x] Magnesium 400mg daily (If any diarrhea or upset stomach, decrease dose  as tolerated)  [x] Riboflavin (Vitamin B2)  400mg daily   (FYI B2 may make your urine bright/neon yellow)     Prescription preventive medications for headaches/migraines   (to take every day to help prevent headaches - not to take at the time of headache):  [x]  we have options if you ever wanted     *Typically these types of medications take time untill you see the benefit, although some may see improvement in days, often it may take weeks, especially if the medication is being titrated up to a beneficial level  Please contact us if there are any concerns or questions regarding the medication       Lifestyle Recommendations:  [x] SLEEP - Maintain a regular sleep schedule: Adults need at least 7-8 hours of uninterrupted a night  Maintain good sleep hygiene:  Going to bed and waking up at consistent times, avoiding excessive daytime naps, avoiding caffeinated beverages in the evening, avoid excessive stimulation in the evening and generally using bed primarily for sleeping   One hour before bedtime would recommend turning lights down lower, decreasing your activity (may read quietly, listen to music at a low volume)  When you get into bed, should eliminate all technology (no texting, emailing, playing with your phone, iPad or tablet in bed)  [x] HYDRATION - Maintain good hydration   Drink  2L of fluid a day (4 typical small water bottles)  [x] DIET - Maintain good nutrition  In particular don't skip meals and try and eat healthy balanced meals regularly    [x] TRIGGERS - Look for other triggers and avoid them: Limit caffeine to 1-2 cups a day or less  Avoid dietary triggers that you have noticed bring on your headaches (this could include aged cheese, peanuts, MSG, aspartame and nitrates)      Education and Follow-up  [x] Please call with any questions or concerns  Go to the ED with any new or concerning symptoms  [x] Follow up 4 months, sooner if needed          CC: We had the pleasure of evaluating Nella Yancey in neurological consultation today  she is a right handed female who presents today for evaluation of headaches       History of Present Illness:   Interval history as of 5/17/2022  - walking more, sleeping better often     Headaches and migraines   - Bad migraines 4-5 days a month where she has to lay in bed all day, they can last 1-2 days, not responsive to OTC meds and now willing to try abortive  - stress can trigger them  - also sometimes pain left parietal scalp bone region, pushing on the region helps    - Saw Dr Danisha English in ENT for hyperparathyroidism   - saw endocrine with us and wanted another opinion Dr Sangeeta Bui endocrine at     Preventative: -  Magnesium and Riboflavin  - through other providers: trazodone as needed for sleep, xanax for anxiety, metoprolol   Abortive: - Tylenol or advil dont often work     Interval history as of 11/12/2021  - denies any new or concerning neurologic symptoms since last visit  -  MRI brain without contrast 07/19/2021: White matter changes suggestive of chronic microangiopathy  No acute intracranial pathology  - Was seen by Sleep Med on 6/22/21  Psychophysiologic insomnia  Recommended sleep hygiene, relaxation techniques, consider CBT-I  Headaches   Having 4-5 headaches per week  Almost always bifrontal or bitemporal  Always on both sides  Headaches can last all day, usually better with Advil or Tylenol  No migraine symptoms  Not sleeping well  Having significant stress      Preventative: Magnesium and Riboflavin   - Did not try gabapentin as she does not like the idea of taking a medication every day to present a headache    Abortive: Tylenol or advil    Reports dizziness that happens when she bend over forward  chronic neck pain  Being worked up for thyroid/parathyroid  - Ca is high, PTH is high   Knee pain, currently in PT for gait      Interval history as of 6/16/2021  - following with eye doctor  - PTH high 3/10/20, she has had high calcium for years it appears, recently had bone density shows major issues - she sees endocrine this month - we discussed hyperparathyroidism can cause headaches    Dr Robe Funes outside of [facility]  - 3 weeks ago had eye issue In left eye -  fireworks and line across for 4 days 5/2021, thinks it was just left eye, no headache associated, has not happened again - eye exam ok recently although she says he did not do a full exam     No vertigo episodes     Headaches and migraines   Almost daily headaches for the past 2 months  - bifrontal, bitemporal, apex - always both sides, sometimes lasts all day, if takes advil PM or 2 tylenol ED - helps it go away  No migraine symptoms  Also may get sharp pains at different points in head for 5 mins     Preventative: supplement  Abortive:   if takes advil PM or 2 tylenol ED - helps it go away       Chronic balance issues  Does not appear to be neurologic without significant findings on EMG, normal B12, no findings to suggest cause on MRI brain  -  Has worked with PT   - valgus deformity of gait - following with ortho   -  referred to physiatry - she can also discuss neck pain with them     Sleep - seeing next week     Interval history as of 03/2/2020:  - has an apmt upcoming with sleep medicine   - for about a week she was having TMJ pain and had old azithromycin and took it and it feels a little better   - then at night while laying in bed was having pain, strange feeling in her legs all the way up the legs   - wants to walk but afraid of falling   - has not had any BPPV since   - has appointment with sleep medicine   - taking mg and B2    Interval history as of 10/09/2019  -  Did not follow up with sleep medicine as recommended  - she has been following with physical therapy for gait instability/history of BPPV - also seeing them for shoulder  - getting orthotics for feet  - denies any falls     MRA head and neck 09/30/2019:  No large vessel flow restrictive disease      - 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   This could be reevaluated with CTA   - Minor short segment stenosis of the dominant right V1 origin   Mild long segment narrowing of the origin and nondominant left v1   Anterior circulation normal   - had Neurosurgery Dr Kip Finnegan review image and there may be nothing really there, even if there is would just be surveillance indicated  Will order CTA and have he is ok with having her follow up with him to review the images/discuss results just in case         Otherwise she is doing good  - chronic lightheadedness and vertigo history - 2 weeks ago with some vertigo and went away on its own in about 5 minutes  - headaches - has not had in quite some time, occasional sharp pains randomly for 5 minutes on various locations on head 1-2 times a week   - no big migraines in a year  - she is taking magnesium and riboflavin - she thinks this may be helping           Interval history as of 7/31/19  - MRI Brain without contrast  7/9/19:   1   No acute intracranial abnormality  2   Mild nonspecific cerebral white matter signal abnormality, which can be seen in patients with migraines as well as microangiopathic disease    3   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   This may represent an anatomic variation   If concerned of vertebrobasilar insufficiency, consider follow-up CTA or contrast enhanced MRA imaging       - she saw pulmonary 7/3/19  - followed up with cardiology and normal stress test per patient since last visit - they wanted to start metoprolol   -  has been in and out of the hospital for diverticulitis and then surgery for removing infected intestine so that has been taking some of her time   - daughter was pregnant and last the baby after 3 months     Lightheadedness/dizziness, h/o vertigo  Mild gait instability  Denies recent falls  *2-3 weeks of pain in the bottom of her right foot that she plans to see a foot doctor   - also has decreased sensation bilateral top of the toes and into the top of the shins for about 6 months   - she has not followed up with PT as recommended for gait and lightheadedness  - lightheadedness occurs the most with getting up   - the denice hallpike last visit improved her vertigo 6/3/19     Migraines/headaches  Morning headaches the last 4-5 days, goes away with coffee usually, if not goes away with ibuprofen  Taking deep breaths and has not been having migraines   - taking magnesium and not riboflavin      Sleep   Sleep medicine  - sometimes feels sensations in bilateral shins when in bed  - falls asleep easily with TV on, stress though has affected her some  - usually have been sleeping straight through, past week wakes up to check on  once a night     --------------------     Interval history as of 06/03/2019:  Bernard Stamp been following with physical therapy and has been noticing improvement in neck pain, - - first PT visit 04/01/2019 also significantly improved dizziness following 1st treatment - the found signs of BPPV - was vertigo free up for almost 2 months   - around 5/27/19: woke up and was lying in bed when suddenly had room spinning - sat up and it went away in a 3 mins - since then every day has happened when triggered - from bending over sometimes soon   - feels like she can not walk right, hold on to  and daughter   - reports LE shin numbness      - 05/02/2019 - phone conversation with Cardiology - metoprolol 25 mg long-acting  - referred to eye doctor - apmt in July  - referred to Sleep Medicine - did not go, tried cutting coffee and tea at dinner time and sleeps fine now   - magnesium, riboflavin - reports she is taking        Interval history as of  03/18/2019  - presented to the ED 01/06/2019 with chest pain - has a stress test next week      - her biggest complaint today is trouble sleeping, chronic dizziness   - Dizziness, history of BPPV 2 years ago, got better, scares her  Not dizzy with walking, more positional      Headaches  - never filled rizatriptan  - headaches are not really a complaint today: in a month 8 times but go away with ibuprofen  Stopped drinking caffeine at night  - was referred to physical therapy has not made an appointment  - recommended supplements including - taking magnesium, riboflavin may be in her B complex   - has joined the gym, loves walking      Plans to see eye doctor, they are tearing, no blurred or double vision            Headaches started at what age? Migraines Since childhood, didn't have them when pregnant x 2  How often do the headaches occur?   *As of 11/2018:   - migraines 3-4 times a week,   - Mild throbbing pain right scalp 3-4 times a week  - lasts hours, with advil goes away most of the time  - Occasionally for sharp pain throughout head for a second or two - once a week  - Also bilateral occipital pain 1-2 times a week    *As of 3/18/19: - headaches are not really a complaint today: in a month 8 times but go away with ibuprofen   *As of 06/03/2019: headaches 6 times a month and go away with ibuprofen  *as of 7/31/19: headaches 6-8 a month - often in the am or with anxiety/stress - these she can breathe through it   Morning headaches the last 4-5 days, goes away with coffee usually, if not goes away with ibuprofen  - As of 10/9/2019 headaches - has not had in quite some time, around 8 a month and go away with ibuprofen occasional sharp pains randomly for 5 minutes on various locations on head 1-2 times a week   No migraines in a year      What time of the day do the headaches start? no particular time of day  How long do the headaches last? 2-3 days in teenage years - now 1 full day and into the morning  Are you ever headache free? Yes  Prodrome of feeling like she is getting a headache  Aura? without aura  Describe your usual headache pain quality? throbbing  Where is your headache located? bilateral frontal area and bilateral occipital area, also right temporal   Does the pain Radiate? no radiation of pain  What is the intensity of pain? Up to a 10/10, at its best a 4  Associated symptoms:   [x] Nausea       [x] Vomiting - once in a while        [] Diarrhea         [] Insomnia           [x] Stiff or sore neck         [x] Problems with concentration  [x] Photophobia     [x]Phonophobia      [x] Osmophobia  [] Blurred vision   [x] Prefer quiet, dark room        [x] Light-headed or dizzy - sometimes   [] Tinnitus      Things that make the headache worse? +movement  Headache triggers:  Stress, mint,strong perfume, tobacco or fireplaces, If she goes to bed with a headache will wake up with worse one   What time of the year do headaches occur more frequently?   do not seem to be related to any time of the year  Have you seen someone else for headaches or pain? Yes, neurologist back in [place]  Have you had trigger point injection performed and how often? No  Have you had Botox injection performed and how often? No   Have you had epidural injections or transforaminal injections performed? No  Have you used CBD or THC for your headaches and how often? No  Are you current pregnant or planning on getting pregnant? No  Have you ever had any Brain imaging? yes years ago and normal     What medications do you take or have you taken for your headaches?    ABORTIVE:       - was prescribed rizatriptan but did not need it   - advil 200 mg  - my pillow seems to be helping the bioccipital      PREVENTIVE: no     Alternative therapies used in the past for headaches? Physical therapy -  For wrist, but not headache   Coffee, has one in the morning  thank out the p m  Coffee     LIFESTYLE  Sleep - "if I get 4 hours I am johnnie"   - as of 06/03/2019:  Reports improved  As of 7/31/19 - avg 4-5   - sometimes feels sensations in bilateral shins when in bed  - falls asleep easily with TV on, stress though has affected her some  - usually have been sleeping straight through, past week wakes up to check on  once a night      as of 11/2018: Is your sleep restful? No, tired  What time do you go to bed at night? 10   What time do you wake up in am? Has coffee at 4:30 am with  and goes back to sleep, otherwise wakes up at 7 am  How often do you get up at night? once  Do you wake up with headaches? Sometimes   Do you snore while asleep? No  Have you been told that you stop breathing during sleeping? No - but makes noises  Do you wake up tired in the morning? Yes  Do you take frequent naps during the day? sometimes  Do you have jaw pain? No, but has TMJ  Do you grind/clench your teeth at night? No  Do you have restless leg syndrome? No     Physical activity:   Joined [employer]  Goes once a week  Walks with her daughter  - considering line dancing or something slower at the [facility]  - has joined the gym, loves walking       Water: not enough - 4 glasses      Diet:  Do you ever skip meals?  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[employer] with   Daughter, granddaughter live near by  Other two grandkids in [facility] Drugs: denies  Alcohol/tobacco: Denies tobacco use, alcohol intake: social drinker    Past Medical History:     left PCOM origin infundibulum  - MRA head and neck 09/30/2019: No large vessel flow restrictive disease   - 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   This could be reevaluated with CTA  - Minor short segment stenosis of the dominant right V1 origin   Mild long segment narrowing of the origin and nondominant left v1   Anterior circulation normal   - CTA head with without contrast 10/28/2019:  No aneurysm identified at the site of the previously noted focal dilatation in the left supraclinoid ICA at the expected origin of the left posterior communicating artery  A left posterior communicating artery is not identified  The focal outpouching   may therefore represent anatomic variation versus residual junctional dilatation at the site of an atretic posterior communicating artery   - NSGY note 11/22/19 left PCOM origin infundibulum  This requires no treatment and no further imaging is required either        Past Medical History:     Past Medical History:   Diagnosis Date    Anxiety     Arthritis     Back pain     Balance problems     Chest pain     heaviness    Difficulty swallowing     Dizziness     Gait disorder     GERD (gastroesophageal reflux disease)     Hypertension     Increased frequency of headaches     Numbness and tingling     Palpitations     Pericarditis     Thyroid disease     Trouble in sleeping        Patient Active Problem List   Diagnosis    Closed fracture distal radius and ulna, right, initial encounter    Anxiety    Gastroesophageal reflux disease with esophagitis    History of pericarditis    Heart murmur    Primary hyperparathyroidism (HCC)    Thyroid nodule    Vitamin D deficiency    Atypical chest pain    Migraine without aura and without status migrainosus, not intractable    Dizziness and giddiness    Insomnia    Cervicalgia    Abnormal CT scan of lung    Irritable bowel syndrome with diarrhea    Elevated amylase and lipase    Paresthesias    Carpal tunnel syndrome of left wrist    Dysphonia    Reflux laryngitis    Paresis of left vocal fold    Glottic insufficiency    Muscle tension dysphonia    TMJ dysfunction    Pharyngoesophageal dysphagia    Palpitations    Hallux abducto valgus, bilateral    Pincer nail deformity    Osteoporosis with current pathological fracture    Hypercalcemia       Medications:      Current Outpatient Medications   Medication Sig Dispense Refill    ALPRAZolam (XANAX) 0 5 mg tablet Take 1 tablet (0 5 mg total) by mouth daily at bedtime as needed for anxiety or sleep 20 tablet 1    ascorbic acid (VITAMIN C) 500 mg tablet Take 500 mg by mouth daily      aspirin 81 mg chewable tablet Chew 1 tablet (81 mg total) daily (Patient taking differently: Chew 81 mg  as needed in the morning ) 30 tablet 0    b complex vitamins tablet Take 1 tablet by mouth daily      Cholecalciferol (Vitamin D) 50 MCG (2000 UT) CAPS Take 1 capsule (2,000 Units total) by mouth daily (Patient taking differently: Take 1 capsule by mouth in the morning  4,000 units daily )      famotidine (PEPCID) 20 mg tablet Take 1 tablet (20 mg total) by mouth daily (Patient taking differently: Take 20 mg by mouth if needed) 30 tablet 5    Magnesium 100 MG CAPS magnesium   300 mg qd      metoprolol succinate (TOPROL-XL) 25 mg 24 hr tablet Take 0 5 tablets (12 5 mg total) by mouth daily at bedtime 45 tablet 2    Multiple Vitamins-Calcium (ONE-A-DAY WOMENS PO) Take 1 tablet by mouth daily      omeprazole (PriLOSEC) 20 mg delayed release capsule Take 1 capsule (20 mg total) by mouth daily before breakfast 90 capsule 2    Riboflavin (VITAMIN B-2 PO) Take by mouth      rizatriptan (MAXALT) 10 MG tablet Take 1 tablet (10 mg total) by mouth once as needed for migraine May repeat in 2 hours if needed  Max 2/24 hours, 9/month   9 tablet 6    traZODone (DESYREL) 50 mg tablet Take 1 tablet (50 mg total) by mouth daily at bedtime (Patient taking differently: Take 50 mg by mouth as needed in the morning ) 30 tablet 1    vitamin k 100 MCG tablet Take 200 mcg by mouth in the morning   dicyclomine (BENTYL) 10 mg capsule Take 1 capsule (10 mg total) by mouth 4 (four) times a day (before meals and at bedtime) (Patient not taking: No sig reported) 30 capsule 1     No current facility-administered medications for this visit  Allergies: Allergies   Allergen Reactions    Ciprofloxacin Myalgia       Family History:     Family History   Problem Relation Age of Onset    Kidney failure Mother     Hypertension Mother     Lung cancer Father    Ether Sicard Parkinson White syndrome Daughter     No Known Problems Sister     Substance Abuse Neg Hx     Alcohol abuse Neg Hx     Mental illness Neg Hx        Social History:     Social History     Socioeconomic History    Marital status: /Civil Union     Spouse name: Not on file    Number of children: Not on file    Years of education: Not on file    Highest education level: Not on file   Occupational History    Not on file   Tobacco Use    Smoking status: Never Smoker    Smokeless tobacco: Never Used   Vaping Use    Vaping Use: Never used   Substance and Sexual Activity    Alcohol use: Yes     Comment: Rarely     Drug use: No    Sexual activity: Not on file   Other Topics Concern    Not on file   Social History Narrative    WORK:    1  [employer] ([employer]; [employer]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    2  [employer]        HOBBIES:    1  Singing    2  Dancing    3  Hx of ceramics (+ dust)        PETS:    1    Dogs    -  Denies birds        TRAVEL:    -  [place]         EXPOSURES:    Denies mold; down pillows/comforters; hot tubs     Social Determinants of Health     Financial Resource Strain: Not on file   Food Insecurity: Not on file   Transportation Needs: Not on file   Physical Activity: Not on file   Stress: Not on file   Social Connections: Not on file   Intimate Partner Violence: Not on file   Housing Stability: Not on file         Objective:     Physical Exam:                                                                 Vitals:            Constitutional:    /76 (BP Location: Right arm, Patient Position: Sitting, Cuff Size: Standard)   Pulse 75   Temp 98 3 °F (36 8 °C) (Tympanic)   Ht 5' 7" (1 702 m)   Wt 79 8 kg (176 lb)   BMI 27 57 kg/m²   BP Readings from Last 3 Encounters:   05/17/22 138/76   04/13/22 157/87   04/01/22 124/82     Pulse Readings from Last 3 Encounters:   05/17/22 75   04/01/22 78   02/25/22 57         Well developed, well nourished, well groomed  No dysmorphic features  Psychiatric:  Normal behavior and appropriate affect        Neurological Examination:     Mental status/cognitive function:   Recent and remote memory intact  Attention span and concentration as well as fund of knowledge are appropriate for age  Normal language and spontaneous speech  Cranial Nerves:  VII-facial expression symmetric  Motor Exam: symmetric bulk throughout  no atrophy, fasciculations or abnormal movements noted     Coordination:  no apparent dysmetria, ataxia or tremor noted          Pertinent lab results:   See EMR for recent labs    05/04/2021 CMP unremarkable     9/25/19 - CMP unremarkable     6/10/19 CMP unremarkable  Thyroid studies normal Vit D 28     1/07/2019:  CMP and CBC unremarkable  TSH 0 78     Imaging: I have personally reviewed imaging and radiology read      MRA head and neck 09/30/2019:  No large vessel flow restrictive disease      - 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   This could be reevaluated with CTA    - Minor short segment stenosis of the dominant right V1 origin   Mild long segment narrowing of the origin and nondominant left v1   Anterior circulation normal      MRI Brain without contrast  7/9/19:   1   No acute intracranial abnormality  2   Mild nonspecific cerebral white matter signal abnormality, which can be seen in patients with migraines as well as microangiopathic disease  3   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   This may represent an anatomic variation   If concerned of vertebrobasilar insufficiency, consider follow-up CTA or contrast enhanced MRA imaging    Review of Systems:   ROS obtained by medical assistant Personally reviewed and updated if indicated  I recommended PCP follow up for non neurologic problems  Review of Systems   Constitutional: Negative for appetite change and fever  HENT: Negative  Negative for hearing loss, tinnitus, trouble swallowing and voice change  Eyes: Negative  Negative for photophobia and pain  Respiratory: Negative  Negative for shortness of breath  Cardiovascular: Negative  Negative for palpitations  Gastrointestinal: Negative  Negative for nausea and vomiting  Endocrine: Negative  Negative for cold intolerance  Genitourinary: Negative  Negative for dysuria, frequency and urgency  Musculoskeletal: Positive for neck pain  Negative for myalgias  Skin: Negative  Negative for rash  Neurological: Positive for headaches  Negative for dizziness, tremors, seizures, syncope, facial asymmetry, speech difficulty, weakness, light-headedness and numbness  Had a headache that was about an 8 yesterday and spent the entire day in bed because of it  Tried taking tylenol for it but that didn't help  Hematological: Negative  Does not bruise/bleed easily  Psychiatric/Behavioral: Negative for confusion and hallucinations  All other systems reviewed and are negative  I have spent 23 minutes with Patient  today in which greater than 50% of this time was spent in counseling/coordination of care  I also spent 8 minutes non face to face for this patient the same day         Author:  Valdo Hill MD 5/17/2022 1:55 PM

## 2022-05-17 ENCOUNTER — OFFICE VISIT (OUTPATIENT)
Dept: NEUROLOGY | Facility: CLINIC | Age: 77
End: 2022-05-17
Payer: COMMERCIAL

## 2022-05-17 VITALS
DIASTOLIC BLOOD PRESSURE: 76 MMHG | HEART RATE: 75 BPM | SYSTOLIC BLOOD PRESSURE: 138 MMHG | BODY MASS INDEX: 27.62 KG/M2 | HEIGHT: 67 IN | WEIGHT: 176 LBS | TEMPERATURE: 98.3 F

## 2022-05-17 DIAGNOSIS — G44.229 CHRONIC TENSION HEADACHES: ICD-10-CM

## 2022-05-17 DIAGNOSIS — G43.009 MIGRAINE WITHOUT AURA AND WITHOUT STATUS MIGRAINOSUS, NOT INTRACTABLE: Primary | ICD-10-CM

## 2022-05-17 PROCEDURE — 99214 OFFICE O/P EST MOD 30 MIN: CPT | Performed by: PSYCHIATRY & NEUROLOGY

## 2022-05-17 RX ORDER — RIZATRIPTAN BENZOATE 10 MG/1
10 TABLET ORAL ONCE AS NEEDED
Qty: 9 TABLET | Refills: 6 | Status: SHIPPED | OUTPATIENT
Start: 2022-05-17

## 2022-05-17 NOTE — PATIENT INSTRUCTIONS
Safety/fall precautions      Headache/migraine treatment:   Abortive medications (for immediate treatment of a headache): It is ok to take ibuprofen, acetaminophen or naproxen (Advil, Tylenol,  Aleve, Excedrin) if they help your headaches you should limit these to No more than 3 times a week to avoid medication overuse/rebound headaches  For your more moderate to severe migraines take this medication early   Maxalt (rizatriptan) 10mg tabs - take one at the onset of headache  May repeat one time after 1-2 hours if pain has not resolved  (Max 2 a day and 9 a month)     - some people may have some side effects from this medication, most typically do not  Common side effects include making you feel tired, palpitations, tingling or tightness of the face or chest   Most people report the side effects are nothing compared to their migraines and do not mind these  If you have intolerable side effects we will stop  Over the counter preventive supplements for headaches/migraines   (to take every day to help prevent headaches - not to take at the time of headache):  [x] Magnesium 400mg daily (If any diarrhea or upset stomach, decrease dose  as tolerated)  [x] Riboflavin (Vitamin B2)  400mg daily   (FYI B2 may make your urine bright/neon yellow)     Prescription preventive medications for headaches/migraines   (to take every day to help prevent headaches - not to take at the time of headache):  [x]  we have options if you ever wanted     *Typically these types of medications take time untill you see the benefit, although some may see improvement in days, often it may take weeks, especially if the medication is being titrated up to a beneficial level  Please contact us if there are any concerns or questions regarding the medication  Lifestyle Recommendations:  [x] SLEEP - Maintain a regular sleep schedule: Adults need at least 7-8 hours of uninterrupted a night   Maintain good sleep hygiene:  Going to bed and waking up at consistent times, avoiding excessive daytime naps, avoiding caffeinated beverages in the evening, avoid excessive stimulation in the evening and generally using bed primarily for sleeping  One hour before bedtime would recommend turning lights down lower, decreasing your activity (may read quietly, listen to music at a low volume)  When you get into bed, should eliminate all technology (no texting, emailing, playing with your phone, iPad or tablet in bed)  [x] HYDRATION - Maintain good hydration  Drink  2L of fluid a day (4 typical small water bottles)  [x] DIET - Maintain good nutrition  In particular don't skip meals and try and eat healthy balanced meals regularly  [x] TRIGGERS - Look for other triggers and avoid them: Limit caffeine to 1-2 cups a day or less  Avoid dietary triggers that you have noticed bring on your headaches (this could include aged cheese, peanuts, MSG, aspartame and nitrates)  Education and Follow-up  [x] Please call with any questions or concerns   Go to the ED with any new or concerning symptoms  [x] Follow up 4 months, sooner if needed

## 2022-05-17 NOTE — PROGRESS NOTES
Review of Systems   Constitutional: Negative for appetite change and fever  HENT: Negative  Negative for hearing loss, tinnitus, trouble swallowing and voice change  Eyes: Negative  Negative for photophobia and pain  Respiratory: Negative  Negative for shortness of breath  Cardiovascular: Negative  Negative for palpitations  Gastrointestinal: Negative  Negative for nausea and vomiting  Endocrine: Negative  Negative for cold intolerance  Genitourinary: Negative  Negative for dysuria, frequency and urgency  Musculoskeletal: Positive for neck pain  Negative for myalgias  Skin: Negative  Negative for rash  Neurological: Positive for headaches  Negative for dizziness, tremors, seizures, syncope, facial asymmetry, speech difficulty, weakness, light-headedness and numbness  Had a headache that was about an 8 yesterday and spent the entire day in bed because of it  Tried taking tylenol for it but that didn't help  Hematological: Negative  Does not bruise/bleed easily  Psychiatric/Behavioral: Negative for confusion and hallucinations  All other systems reviewed and are negative

## 2022-05-24 ENCOUNTER — TELEPHONE (OUTPATIENT)
Dept: NEUROLOGY | Facility: CLINIC | Age: 77
End: 2022-05-24

## 2022-05-24 ENCOUNTER — CONSULT (OUTPATIENT)
Dept: NEUROLOGY | Facility: CLINIC | Age: 77
End: 2022-05-24
Payer: COMMERCIAL

## 2022-05-24 VITALS
TEMPERATURE: 97 F | WEIGHT: 177.9 LBS | HEIGHT: 67 IN | BODY MASS INDEX: 27.92 KG/M2 | DIASTOLIC BLOOD PRESSURE: 72 MMHG | HEART RATE: 68 BPM | SYSTOLIC BLOOD PRESSURE: 127 MMHG

## 2022-05-24 DIAGNOSIS — M54.2 CERVICALGIA: ICD-10-CM

## 2022-05-24 DIAGNOSIS — M79.18 CERVICAL MYOFASCIAL PAIN SYNDROME: Primary | ICD-10-CM

## 2022-05-24 PROCEDURE — 99213 OFFICE O/P EST LOW 20 MIN: CPT | Performed by: PHYSICAL MEDICINE & REHABILITATION

## 2022-05-24 RX ORDER — METHOCARBAMOL 500 MG/1
500 TABLET, FILM COATED ORAL 3 TIMES DAILY PRN
Qty: 30 TABLET | Refills: 0 | Status: SHIPPED | OUTPATIENT
Start: 2022-05-24

## 2022-05-24 NOTE — PROGRESS NOTES
[facility]  New Patient Evaluation  Sierra Kawasaki 78 y o  female      ASSESSMENT/PLAN:     Janis Memos was seen today for cervicalgia  Diagnoses and all orders for this visit:    Cervical myofascial pain syndrome  -     methocarbamol (ROBAXIN) 500 mg tablet; Take 1 tablet (500 mg total) by mouth as needed in the morning and 1 tablet (500 mg total) as needed at noon and 1 tablet (500 mg total) as needed in the evening for muscle spasms   -     Ambulatory Referral to Physical Therapy; Future    Cervicalgia  -     Ambulatory referral to Physical Medicine Rehab        Ms Teodoro Trejo is a very pleasant 79 yo woman with medical history of Anxiety, Arthritis, GERD, HTN, Pericarditis, Hyperparathyroidism, Vitamin D deficiency, Migraines/Chronic tension headaches, BPPV, and insomnia who is present for evaluation of her cervical pain  Her exam is very reassuring, and consistent with trigger points/myofascial pain with replication/exacerbation of her headaches with palpation  She has not tried too much specifically for her neck pain, so we reviewed multiple potential treatment options  Her neurologic exam is reassuring  She has a wide based gait, but romberg is negative and no ataxia, so I suspect this is due to her significant knee valgus/knock knee more than anything  I think at this time, it is reasonable to start with a course of therapy to help develop a HEP to improve her neck range of motion, teach her about non-pharmacologic modalities (TENs unit, heat, topicals, manual release, needling, etc)  I also provided a prescription of Robaxin  She can take 250-500mg TID PRN  I told her to start with the dose she feels more comfortable with at night time, not during the day  She was in agreement  If there is no improvement, can get XR imaging of her neck, and trial trigger point injections in her next visit in 4-6 weeks  She is in agreement with this plan   At this point will hold off on MRI  I reviewed red flag symptoms that would warrant more urgent intervention  She expressed understanding  HPI/SUBJECTIVE: Anu Harry 78 y o  female right handed, with medical history of Anxiety, Arthritis, GERD, HTN, Pericarditis, hyperparathyroidism, chronic neck pain, Vitamin D deficiency, depression, and Migraine without aura and chronic tension headaches, BPPV, Insomnia who is presenting here as a referral for consult from Dr Mitch Bernal for evaluation of her neck pain  Here with  with [facility]  She has had neck pain for multiple years now, without one clear inciting event of accident or trauma that contributed to her neck pain  It has not gotten progressively worse, but it's become more persistent  It's worse when she's doing activities like crossword puzzles, and sometimes if her neck is not support properly when she's sleeping that makes it worse  In general neck flexion makes it worse  She describes the pain as throbbing, and it can precipitate headaches  It is in the back of her neck, bilateral, and will get headaches on the sides of her head and behind her ears  She denies numbness/tingling radiating down to her arms or hands, just mostly around her shoulders and to her head  She denies any new bowel/bladder issues  She has some frequency (bladder)  No incontinence  She does chronically have issues with her balance  She had one fall that was more mechanical (Got tripped up while putting her pajama pants on)  No increased hand clumsiness, able to button and zipper things  She has tried:  Medication: Takes 1-2 extra strength Tylenol, it helps a little bit but makes her feel tired  Does not take NSAIDs due to her blood pressure  She does take xanax at bedtime to help with sleep - it helps with the pain in that it helps her sleep  She has not tried any other muscle relaxers  She likes to limit the amount of medications that she is on    PT: Mostly vestibular for her vertigo  She has massaged herself, and her  will massage her shoulders  Icy hot did seem to help  Never done acupuncture or seen a chiropractor  She has never had injections in her neck or surgery on her neck  Expanded Social History/Living Situation:   Patient lives with spouse in a 3 story home, with 0 CIRA  14 stairs to the living room, then another 14 stairs to the bedroom/bathroom  Patient is retired  She used to be a /  Driving: is fine  Independent with ADLs and mobility, no assistive devices, primarily ambulatory  Enjoys hanging out with friends and family, enjoys reading  Review of Systems  A 10 point review of systems was negative except for what is noted in the HPI  See attached as well  OBJECTIVE:   /72 (BP Location: Left arm, Patient Position: Sitting)   Pulse 68   Temp (!) 97 °F (36 1 °C) (Temporal)   Ht 5' 7" (1 702 m)   Wt 80 7 kg (177 lb 14 4 oz)   BMI 27 86 kg/m²        Gen: No acute distress, Well-nourished, well-appearing  HEENT: Moist mucus membranes, Normocephalic/Atraumatic  Cardiovascular: Distal pulses palpable  Heme/Extr: BL LE lymphedema  Has varicose veins and evidence of venous insufficiency  Pulmonary: Non-labored breathing  : No wilkes  GI: Soft, non-tender, non-distended  MSK:  Hand arthritis  Significant knee valgus  Also tends to overpronate L   Integumentary: Skin is warm, dry  Neuro: AAOx3, CN 2-12 intact  Sensation intact to light touch throughout  Speech is intact  Appropriate to questioning  Tone is normal  Reflexes are symmetric with some overflow in bicep, brachioradialis, tricep  1+ in patella, absent in achilles  No russ's, clonus, and babinski  MMT:   Strength: *has evidence of hand arthritis     Right  Left  Site  Right  Left  Site    5 5  S Ab: Shoulder Abductors  4- 4  HF: Hip Flexors    5 5  EF: Elbow Flexors  5  5 KF: Knee Flexors    5  5  EE: Elbow Extensors  5  5  KE: Knee Extensors    5  5 WE: Wrist Extensors  5  5  DR: Dorsi Flexors    5  5  FF: Finger Flexors  5  5  PF: Plantar Flexors    4* 4*  HI: Hand Intrinsics  5  5  EHL: Extensor Hallucis Longus   Psych: Normal mood and affect  Pain with rotation to the L  On the L paraspinals  Limited range of motion with lateral flexion with pain on the L  Limited range of motion with lateral flexion with pain on the L  Imaging: I personally reviewed pertinent imaging  -  MRI brain without contrast 07/19/2021: White matter changes suggestive of chronic microangiopathy   No acute intracranial pathology   - MRI Brain without contrast  7/9/19: No acute intracranial abnormality  Mild nonspecific cerebral white matter signal abnormality, which can be seen in patients with migraines as well as microangiopathic disease   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   This may represent an anatomic variation   If concerned of vertebrobasilar insufficiency, consider follow-up CTA or contrast enhanced MRA imaging    - MRA head and neck/carotids 09/30/2019:  No large vessel flow restrictive disease      - 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   This could be reevaluated with CTA  -> per NSGY This requires no treatment and no further imaging is required either     - Minor short segment stenosis of the dominant right V1 origin   Mild long segment narrowing of the origin and nondominant left v1   Anterior circulation normal    - EMG/NCS 6/24/20: Mildly abnormal study  The reduced amplitudes noted on the peroneal  marker studies from extensor digitorum brevis muscles is likely due to reduced muscle bulk of muscles  The low amplitudes noted on the sensory studies in the lower extremities can be considered normal for this patient's age    These changes can be considered normal for this patient's age consider normal sural sensory response on the right and normal tibial responses, or could suggest a very mild axonal sensorimotor neuropathy    In addition, there is evidence to support a 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   To note, patient has history of remote carpal tunnel surgeries, could be remnant of prior surgery    - B12 3/10/20 721     - 6/25/2021 Vitamin D: 33 6    Labs: Personally reviewed  Lab Results   Component Value Date    WBC 4 97 12/10/2021    HGB 15 1 12/10/2021    HCT 46 5 (H) 12/10/2021    MCV 93 12/10/2021     12/10/2021     Lab Results   Component Value Date    CALCIUM 10 8 (H) 12/10/2021    K 4 3 12/10/2021    CO2 22 12/10/2021     12/10/2021    BUN 22 12/10/2021    CREATININE 0 88 12/10/2021         The following portions of the patient's history were reviewed and updated as appropriate: allergies, current medications, past family history, past medical history, past social history, past surgical history and problem list     Past Medical History:   Diagnosis Date    Anxiety     Arthritis     Back pain     Balance problems     Chest pain     heaviness    Difficulty swallowing     Dizziness     Gait disorder     GERD (gastroesophageal reflux disease)     Hypertension     Increased frequency of headaches     Numbness and tingling     Palpitations     Pericarditis     Thyroid disease     Trouble in sleeping        Patient Active Problem List    Diagnosis Date Noted    Osteoporosis with current pathological fracture 09/10/2021    Hypercalcemia 09/10/2021    Hallux abducto valgus, bilateral 04/27/2021    Pincer nail deformity 04/27/2021    Palpitations 02/17/2021    Dysphonia 07/20/2020    Reflux laryngitis 07/20/2020    Paresis of left vocal fold 07/20/2020    Glottic insufficiency 07/20/2020    Muscle tension dysphonia 07/20/2020    TMJ dysfunction 07/20/2020    Pharyngoesophageal dysphagia 07/20/2020    Paresthesias     Carpal tunnel syndrome of left wrist     Irritable bowel syndrome with diarrhea 01/08/2020    Elevated amylase and lipase 01/08/2020    Abnormal CT scan of lung 07/03/2019    Migraine without aura and without status migrainosus, not intractable 03/18/2019    Dizziness and giddiness 03/18/2019    Insomnia 03/18/2019    Cervicalgia 03/18/2019    Atypical chest pain 01/06/2019    Anxiety 11/13/2018    Primary hyperparathyroidism ([facility] [address] ) 11/13/2018    Thyroid nodule 11/13/2018    Vitamin D deficiency 11/13/2018    Closed fracture distal radius and ulna, right, initial encounter 03/20/2018    History of pericarditis 03/12/2018    Gastroesophageal reflux disease with esophagitis 10/18/2017    Heart murmur 04/17/2015       Past Surgical History:   Procedure Laterality Date    APPENDECTOMY      CHOLECYSTECTOMY      laparoscopic    COLONOSCOPY      KNEE SURGERY Left     PERICARDIAL WINDOW      RI OPEN RX DISTAL RADIUS FX, EXTRA-ARTICULAR Right 3/22/2018    Procedure: OPEN REDUCTION W/ INTERNAL FIXATION (ORIF) RADIUS, splint application;  Surgeon: Abdon Lemus MD;  [facility];  Service: Orthopedics    UPPER GASTROINTESTINAL ENDOSCOPY         Family History   Problem Relation Age of Onset    Kidney failure Mother     Hypertension Mother     Lung cancer Father    Landry Boer Parkinson White syndrome Daughter     No Known Problems Sister     Substance Abuse Neg Hx     Alcohol abuse Neg Hx     Mental illness Neg Hx        Social History     Allergies   Allergen Reactions    Ciprofloxacin Myalgia         Current Outpatient Medications:     ALPRAZolam (XANAX) 0 5 mg tablet, Take 1 tablet (0 5 mg total) by mouth daily at bedtime as needed for anxiety or sleep, Disp: 20 tablet, Rfl: 1    ascorbic acid (VITAMIN C) 500 mg tablet, Take 500 mg by mouth daily, Disp: , Rfl:     aspirin 81 mg chewable tablet, Chew 1 tablet (81 mg total) daily (Patient taking differently: Chew 81 mg  as needed in the morning ), Disp: 30 tablet, Rfl: 0    b complex vitamins tablet, Take 1 tablet by mouth daily, Disp: , Rfl:     Cholecalciferol (Vitamin D) 50 MCG (2000 UT) CAPS, Take 1 capsule (2,000 Units total) by mouth daily (Patient taking differently: Take 1 capsule by mouth in the morning  4,000 units daily ), Disp: , Rfl:     dicyclomine (BENTYL) 10 mg capsule, Take 1 capsule (10 mg total) by mouth 4 (four) times a day (before meals and at bedtime) (Patient not taking: No sig reported), Disp: 30 capsule, Rfl: 1    famotidine (PEPCID) 20 mg tablet, Take 1 tablet (20 mg total) by mouth daily (Patient taking differently: Take 20 mg by mouth if needed), Disp: 30 tablet, Rfl: 5    Magnesium 100 MG CAPS, magnesium  300 mg qd, Disp: , Rfl:     metoprolol succinate (TOPROL-XL) 25 mg 24 hr tablet, Take 0 5 tablets (12 5 mg total) by mouth daily at bedtime, Disp: 45 tablet, Rfl: 2    Multiple Vitamins-Calcium (ONE-A-DAY WOMENS PO), Take 1 tablet by mouth daily, Disp: , Rfl:     omeprazole (PriLOSEC) 20 mg delayed release capsule, Take 1 capsule (20 mg total) by mouth daily before breakfast, Disp: 90 capsule, Rfl: 2    Riboflavin (VITAMIN B-2 PO), Take by mouth, Disp: , Rfl:     rizatriptan (MAXALT) 10 MG tablet, Take 1 tablet (10 mg total) by mouth once as needed for migraine May repeat in 2 hours if needed   Max 2/24 hours, 9/month , Disp: 9 tablet, Rfl: 6    traZODone (DESYREL) 50 mg tablet, Take 1 tablet (50 mg total) by mouth daily at bedtime (Patient taking differently: Take 50 mg by mouth as needed in the morning ), Disp: 30 tablet, Rfl: 1    vitamin k 100 MCG tablet, Take 200 mcg by mouth in the morning , Disp: , Rfl:

## 2022-05-25 ENCOUNTER — TELEPHONE (OUTPATIENT)
Dept: GASTROENTEROLOGY | Facility: AMBULARY SURGERY CENTER | Age: 77
End: 2022-05-25

## 2022-05-25 NOTE — TELEPHONE ENCOUNTER
Patients GI provider:  Dr Ortega Claire     Number to return call: [phone]    Reason for call: Pt calling requesting to speak with Dr Ortega Claire only regarding an appt she needs sooner than 7/13/22     Appt was not made because patient was upset because Dr Ortega Claire does not have anything sooner and requesting to speak with him specifically       Scheduled procedure/appointment date if applicable: Apt/procedure n/a

## 2022-05-27 ENCOUNTER — OFFICE VISIT (OUTPATIENT)
Dept: GASTROENTEROLOGY | Facility: CLINIC | Age: 77
End: 2022-05-27
Payer: COMMERCIAL

## 2022-05-27 VITALS
TEMPERATURE: 97.8 F | HEIGHT: 67 IN | SYSTOLIC BLOOD PRESSURE: 140 MMHG | WEIGHT: 176 LBS | BODY MASS INDEX: 27.62 KG/M2 | DIASTOLIC BLOOD PRESSURE: 90 MMHG

## 2022-05-27 DIAGNOSIS — K58.0 IRRITABLE BOWEL SYNDROME WITH DIARRHEA: ICD-10-CM

## 2022-05-27 DIAGNOSIS — K21.00 GASTROESOPHAGEAL REFLUX DISEASE WITH ESOPHAGITIS WITHOUT HEMORRHAGE: Primary | ICD-10-CM

## 2022-05-27 DIAGNOSIS — K21.9 GASTROESOPHAGEAL REFLUX DISEASE, UNSPECIFIED WHETHER ESOPHAGITIS PRESENT: ICD-10-CM

## 2022-05-27 DIAGNOSIS — R13.14 PHARYNGOESOPHAGEAL DYSPHAGIA: ICD-10-CM

## 2022-05-27 PROCEDURE — 1160F RVW MEDS BY RX/DR IN RCRD: CPT | Performed by: INTERNAL MEDICINE

## 2022-05-27 PROCEDURE — 3008F BODY MASS INDEX DOCD: CPT | Performed by: INTERNAL MEDICINE

## 2022-05-27 PROCEDURE — 99213 OFFICE O/P EST LOW 20 MIN: CPT | Performed by: INTERNAL MEDICINE

## 2022-05-27 PROCEDURE — 3080F DIAST BP >= 90 MM HG: CPT | Performed by: INTERNAL MEDICINE

## 2022-05-27 PROCEDURE — 3077F SYST BP >= 140 MM HG: CPT | Performed by: INTERNAL MEDICINE

## 2022-05-27 PROCEDURE — 1036F TOBACCO NON-USER: CPT | Performed by: INTERNAL MEDICINE

## 2022-05-27 RX ORDER — ASCORBIC ACID 125 MG
200 TABLET,CHEWABLE ORAL
COMMUNITY

## 2022-05-27 RX ORDER — SUCRALFATE ORAL 1 G/10ML
1 SUSPENSION ORAL 3 TIMES DAILY
Qty: 360 ML | Refills: 2 | Status: SHIPPED | OUTPATIENT
Start: 2022-05-27

## 2022-05-27 RX ORDER — OMEPRAZOLE 20 MG/1
20 CAPSULE, DELAYED RELEASE ORAL
Qty: 180 CAPSULE | Refills: 2 | Status: SHIPPED | OUTPATIENT
Start: 2022-05-27

## 2022-05-27 NOTE — PATIENT INSTRUCTIONS
Take omeprazole 1st thing in the morning  First dose of Carafate after breakfast  Second dose of Carafate before lunch  Second dose of omeprazole before dinnert  Third dose of Carafate before going to bed  Dicyclomine/Bentyl as needed for pain

## 2022-05-27 NOTE — PROGRESS NOTES
[facility] - Outpatient Follow-up Note  Clover Clay 78 y o  female MRN: [id number]  Encounter: [id number]          ASSESSMENT AND PLAN:    1  Gastroesophageal reflux disease with esophagitis without hemorrhage  2  Gastroesophageal reflux disease, unspecified whether esophagitis present  3  Pharyngoesophageal dysphagia    She is seen today for increasing reflux symptoms  She reports burning sensation and excessive mucus  Currently taking omeprazole 20 mg and famotidine  We reviewed reflux precautions again  Increase omeprazole to 20 mg po bid  Add carafate 1 g tid ( if the liquid is not covered you can take sucralfate 1 g 3 times a day  Okay to crush the pill)    4  Irritable bowel syndrome with diarrhea  Her symptoms are stable  Bentyl as needed      ______________________________________________________________________    SUBJECTIVE:  78 year female with gastroesophageal reflux disease, irritable bowel syndrome seen for worsening reflux symptoms  She reports increasing reflux symptoms including burning sensation in her throat and lower esophagus  She also reports excessive mucus and cough  She is on omeprazole 20 mg daily  Also takes famotidine but reports that no change her symptoms after taking famotidine  She had EGD in March 2021    REVIEW OF SYSTEMS IS OTHERWISE NEGATIVE        Historical Information   Past Medical History:   Diagnosis Date    Anxiety     Arthritis     Back pain     Balance problems     Chest pain     heaviness    Difficulty swallowing     Dizziness     Gait disorder     GERD (gastroesophageal reflux disease)     Hypertension     Increased frequency of headaches     Numbness and tingling     Palpitations     Pericarditis     Thyroid disease     Trouble in sleeping      Past Surgical History:   Procedure Laterality Date    APPENDECTOMY      CHOLECYSTECTOMY      laparoscopic    COLONOSCOPY      KNEE SURGERY Left     PERICARDIAL WINDOW  CT OPEN RX DISTAL RADIUS FX, EXTRA-ARTICULAR Right 3/22/2018    Procedure: OPEN REDUCTION W/ INTERNAL FIXATION (ORIF) RADIUS, splint application;  Surgeon: Sonja Galarza MD;  [facility];  Service: Orthopedics    UPPER GASTROINTESTINAL ENDOSCOPY       Social History   Social History     Substance and Sexual Activity   Alcohol Use Yes    Comment: Rarely      Social History     Substance and Sexual Activity   Drug Use No     Social History     Tobacco Use   Smoking Status Never Smoker   Smokeless Tobacco Never Used     Family History   Problem Relation Age of Onset    Kidney failure Mother     Hypertension Mother     Lung cancer Father    Vertie Eng Parkinson White syndrome Daughter     No Known Problems Sister     Substance Abuse Neg Hx     Alcohol abuse Neg Hx     Mental illness Neg Hx        Meds/Allergies       Current Outpatient Medications:     ALPRAZolam (XANAX) 0 5 mg tablet    ascorbic acid (VITAMIN C) 500 mg tablet    aspirin 81 mg chewable tablet    b complex vitamins tablet    Cholecalciferol (Vitamin D) 50 MCG (2000 UT) CAPS    dicyclomine (BENTYL) 10 mg capsule    Magnesium 100 MG CAPS    Menaquinone-7 (Vitamin K2) 100 MCG CAPS    methocarbamol (ROBAXIN) 500 mg tablet    metoprolol succinate (TOPROL-XL) 25 mg 24 hr tablet    Multiple Vitamins-Calcium (ONE-A-DAY WOMENS PO)    omeprazole (PriLOSEC) 20 mg delayed release capsule    Riboflavin (VITAMIN B-2 PO)    rizatriptan (MAXALT) 10 MG tablet    traZODone (DESYREL) 50 mg tablet    famotidine (PEPCID) 20 mg tablet    vitamin k 100 MCG tablet    Allergies   Allergen Reactions    Ciprofloxacin Myalgia           Objective     Blood pressure 140/90, temperature 97 8 °F (36 6 °C), temperature source Tympanic, height 5' 7" (1 702 m), weight 79 8 kg (176 lb), not currently breastfeeding  Body mass index is 27 57 kg/m²        PHYSICAL EXAM:      General Appearance:   Alert, cooperative, no distress   HEENT:   Normocephalic, atraumatic, anicteric      Neck:  Supple, symmetrical, trachea midline   Lungs:   Clear to auscultation bilaterally; no rales, rhonchi or wheezing; respirations unlabored    Heart[de-identified]   Regular rate and rhythm; no murmur, rub, or gallop  Abdomen:   Soft, non-tender, non-distended; normal bowel sounds; no masses, no organomegaly    Genitalia:   Deferred    Rectal:   Deferred    Extremities:  No cyanosis, clubbing or edema    Pulses:  2+ and symmetric    Skin:  No jaundice, rashes, or lesions    Lymph nodes:  No palpable cervical lymphadenopathy        Lab Results:   No visits with results within 1 Day(s) from this visit  Latest known visit with results is:   Office Visit on 04/01/2022   Component Date Value    POCT SARS-CoV-2 Ag 04/01/2022 Negative     VALID CONTROL 04/01/2022 Valid          Radiology Results:   No results found

## 2022-05-31 ENCOUNTER — TELEPHONE (OUTPATIENT)
Dept: GASTROENTEROLOGY | Facility: CLINIC | Age: 77
End: 2022-05-31

## 2022-05-31 NOTE — TELEPHONE ENCOUNTER
Received call from [facility] call [place], patient's , Ruffus Hammans, or daughter, Celso Pemberton will  patient's AVS

## 2022-06-01 ENCOUNTER — TELEPHONE (OUTPATIENT)
Dept: GASTROENTEROLOGY | Facility: CLINIC | Age: 77
End: 2022-06-01

## 2022-06-01 NOTE — TELEPHONE ENCOUNTER
Patients GI provider:  Dr Ulices Mccarty    Number to return call: [phone]    Reason for call: Pt called stating that she is having a bone density test on Mon 6/6 and 24 hours prior she was told not take certain meds  Pt would like to know if the carafate and omeprazole will affect her testing  She wants to know if she will need to stop them as well  Above is her number       Scheduled procedure/appointment date if applicable: Appt  1/98/04

## 2022-06-01 NOTE — TELEPHONE ENCOUNTER
Advised pt to call ordering providers office or [facility] dept who is doing bone density scan to advised regarding medication holds  Pt verbalized understanding and had no further questions

## 2022-06-01 NOTE — TELEPHONE ENCOUNTER
Patient called and asked same question, sated no one got back to her  Advised that it lokoed like someone had spoken to her about asking the ordering provider  I asked who ordered the test and she stated it was her Endocronologist   Advised her to call that office, she understood and will do so

## 2022-06-07 ENCOUNTER — EVALUATION (OUTPATIENT)
Dept: PHYSICAL THERAPY | Facility: CLINIC | Age: 77
End: 2022-06-07
Payer: COMMERCIAL

## 2022-06-07 DIAGNOSIS — M79.18 CERVICAL MYOFASCIAL PAIN SYNDROME: Primary | ICD-10-CM

## 2022-06-07 PROCEDURE — 97161 PT EVAL LOW COMPLEX 20 MIN: CPT | Performed by: PHYSICAL THERAPIST

## 2022-06-07 NOTE — PROGRESS NOTES
PT Evaluation     Today's date: 2022  Patient name: Zach Funes  : 1945  MRN: [id number]  Referring provider: Helen Wiley MD  Dx:   Encounter Diagnosis     ICD-10-CM    1  Cervical myofascial pain syndrome  M79 [id number] Ambulatory Referral to Physical Therapy                  Assessment/Plan    Subjective Evaluation    History of Present Illness  Mechanism of injury: Pt reprots that she has had chronic neck pain for years  Reports that she has been having an exacerbation of pain for the past several weeks, pt is unable to identify a trigger for this  Reports that her pain starts in her neck and then radiates into her upper traps  Denies symptoms below her shoulders  Reports that she also experiences frequennt headaches but is unsure if this is cause her neck or a recent change in GI upset  Denies dizziness  Reports that she has increased pain first thing in the morning but denies pain that wakes her up at night  Reports increased pain when she is watching TV, doing a puzzle among other activities when her spine is held flexion  Reports pain reduction with IcyHot  History of dizziness      Pain  Current pain ratin  At worst pain ratin          Objective       Cervical spine:    ROM:   Flexion: wfl   Extension: min limited   Right Rotation: mod limited, pain   Left Rotation: mod limited, stiff   Right Lateral Flexion: max limited, stiff   Left Lateral Flexion:  Max limited, pain        Poor control of deep cervical flexors noted with chin tuck  Reactive trigger points UT and levator  Rounded shoulder/forward head posture  Hypomobility noted with spring testing  ULTT: neg x3  Traction: decreased pain  Compression: increased pain                 Precautions: chronic neck pain, positional vertigo, pt request no mobilizations      Manuals /            laser 4'            SOR             DTM UT B/L                          Neuro Re-Ed Ther Ex             Chin tucks             Thoracic extension stretch             Cervical rotation rom             rows             Low rows             Doorway pect stretch             Diaphragmatic breathing                          Ther Activity             ube                          Gait Training                                       Modalities             Trial TENS (possible candidate for  home unit)

## 2022-06-08 ENCOUNTER — OFFICE VISIT (OUTPATIENT)
Dept: FAMILY MEDICINE CLINIC | Facility: CLINIC | Age: 77
End: 2022-06-08
Payer: COMMERCIAL

## 2022-06-08 VITALS
HEIGHT: 67 IN | WEIGHT: 175.9 LBS | TEMPERATURE: 98.7 F | HEART RATE: 73 BPM | SYSTOLIC BLOOD PRESSURE: 122 MMHG | RESPIRATION RATE: 16 BRPM | OXYGEN SATURATION: 97 % | DIASTOLIC BLOOD PRESSURE: 80 MMHG | BODY MASS INDEX: 27.61 KG/M2

## 2022-06-08 DIAGNOSIS — Z20.822 SUSPECTED COVID-19 VIRUS INFECTION: ICD-10-CM

## 2022-06-08 DIAGNOSIS — J06.9 UPPER RESPIRATORY TRACT INFECTION, UNSPECIFIED TYPE: Primary | ICD-10-CM

## 2022-06-08 LAB
SARS-COV-2 AG UPPER RESP QL IA: NEGATIVE
VALID CONTROL: NORMAL

## 2022-06-08 PROCEDURE — 99213 OFFICE O/P EST LOW 20 MIN: CPT | Performed by: FAMILY MEDICINE

## 2022-06-08 PROCEDURE — 1160F RVW MEDS BY RX/DR IN RCRD: CPT | Performed by: FAMILY MEDICINE

## 2022-06-08 PROCEDURE — 87811 SARS-COV-2 COVID19 W/OPTIC: CPT | Performed by: FAMILY MEDICINE

## 2022-06-08 PROCEDURE — 3725F SCREEN DEPRESSION PERFORMED: CPT | Performed by: FAMILY MEDICINE

## 2022-06-08 PROCEDURE — 1036F TOBACCO NON-USER: CPT | Performed by: FAMILY MEDICINE

## 2022-06-08 PROCEDURE — 87636 SARSCOV2 & INF A&B AMP PRB: CPT | Performed by: FAMILY MEDICINE

## 2022-06-08 PROCEDURE — 3079F DIAST BP 80-89 MM HG: CPT | Performed by: FAMILY MEDICINE

## 2022-06-08 PROCEDURE — 3008F BODY MASS INDEX DOCD: CPT | Performed by: FAMILY MEDICINE

## 2022-06-08 PROCEDURE — 3074F SYST BP LT 130 MM HG: CPT | Performed by: FAMILY MEDICINE

## 2022-06-08 RX ORDER — ALBUTEROL SULFATE 90 UG/1
2 AEROSOL, METERED RESPIRATORY (INHALATION) EVERY 6 HOURS PRN
Qty: 8 G | Refills: 0 | Status: SHIPPED | OUTPATIENT
Start: 2022-06-08

## 2022-06-08 NOTE — PROGRESS NOTES
Assessment/Plan:    1  Upper respiratory tract infection  - rapid Covid test was negative, will obtain Covid/ flu PCR swab, advised rest, plenty of fluids, take Robitussin or Mucinex as needed for cough/ congestion and use Albuterol inhaler as needed for chest tightness, encouraged patient to follow up for persistent or worsening symptoms  - albuterol (PROVENTIL HFA,VENTOLIN HFA) 90 mcg/act inhaler; Inhale 2 puffs every 6 (six) hours as needed for wheezing or shortness of breath  Dispense: 8 g; Refill: 0  - Covid/Flu- Office Collect       The treatment plan and possible side effects of new medications were reviewed with the patient today  The patient understands and agrees with the treatment plan  Subjective:   Chief Complaint   Patient presents with    Shortness of Breath    Cough      Patient ID: Irene Mclaughlin is a 78 y o  female who presents today with c/o body aches, fatigue, nasal congestion and cough for the past few days, she is also having chest tightness with coughing spells, no fever or chills, no purulent mucus production, no chest pain, no nausea/ vomiting or diarrhea  Her granddaughter had URI last week and tested positive for flu         The following portions of the patient's history were reviewed and updated as appropriate: allergies, current medications, past family history, past medical history, past social history, past surgical history and problem list     Past Medical History:   Diagnosis Date    Anxiety     Arthritis     Back pain     Balance problems     Chest pain     heaviness    Difficulty swallowing     Dizziness     Gait disorder     GERD (gastroesophageal reflux disease)     Hypertension     Increased frequency of headaches     Numbness and tingling     Palpitations     Pericarditis     Thyroid disease     Trouble in sleeping      Past Surgical History:   Procedure Laterality Date    APPENDECTOMY      CHOLECYSTECTOMY      laparoscopic    COLONOSCOPY      KNEE SURGERY Left     PERICARDIAL WINDOW      FL OPEN RX DISTAL RADIUS FX, EXTRA-ARTICULAR Right 3/22/2018    Procedure: OPEN REDUCTION W/ INTERNAL FIXATION (ORIF) RADIUS, splint application;  Surgeon: Jaimee Whipple MD;  [facility]: [facility];  Service: Orthopedics    UPPER GASTROINTESTINAL ENDOSCOPY       Family History   Problem Relation Age of Onset    Kidney failure Mother     Hypertension Mother     Lung cancer Father    Oscar Dumontucher Parkinson White syndrome Daughter     No Known Problems Sister     Substance Abuse Neg Hx     Alcohol abuse Neg Hx     Mental illness Neg Hx      Social History     Socioeconomic History    Marital status: /Civil Union     Spouse name: Not on file    Number of children: Not on file    Years of education: Not on file    Highest education level: Not on file   Occupational History    Not on file   Tobacco Use    Smoking status: Never Smoker    Smokeless tobacco: Never Used   Vaping Use    Vaping Use: Never used   Substance and Sexual Activity    Alcohol use: Yes     Comment: Rarely     Drug use: No    Sexual activity: Not on file   Other Topics Concern    Not on file   Social History Narrative    WORK:    1  [employer] ([employer]; [employer]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    2  [employer]        HOBBIES:    1  Singing    2  Dancing    3  Hx of ceramics (+ dust)        PETS:    1    Dogs    -  Denies birds        TRAVEL:    -  [place]         EXPOSURES:    Denies mold; down pillows/comforters; hot tubs     Social Determinants of Health     Financial Resource Strain: Not on file   Food Insecurity: Not on file   Transportation Needs: Not on file   Physical Activity: Not on file   Stress: Not on file   Social Connections: Not on file   Intimate Partner Violence: Not on file   Housing Stability: Not on file       Current Outpatient Medications:     ALPRAZolam (XANAX) 0 5 mg tablet, Take 1 tablet (0 5 mg total) by mouth daily at bedtime as needed for anxiety or sleep, Disp: 20 tablet, Rfl: 1    ascorbic acid (VITAMIN C) 500 mg tablet, Take 500 mg by mouth daily, Disp: , Rfl:     aspirin 81 mg chewable tablet, Chew 1 tablet (81 mg total) daily (Patient taking differently: Chew 81 mg as needed), Disp: 30 tablet, Rfl: 0    b complex vitamins tablet, Take 1 tablet by mouth daily, Disp: , Rfl:     Cholecalciferol (Vitamin D) 50 MCG (2000 UT) CAPS, Take 1 capsule (2,000 Units total) by mouth daily (Patient taking differently: Take 1 capsule by mouth daily 4,000 units daily), Disp: , Rfl:     dicyclomine (BENTYL) 10 mg capsule, Take 1 capsule (10 mg total) by mouth 4 (four) times a day (before meals and at bedtime), Disp: 30 capsule, Rfl: 1    Magnesium 100 MG CAPS, magnesium  300 mg qd, Disp: , Rfl:     Menaquinone-7 (Vitamin K2) 100 MCG CAPS, Take 200 mcg by mouth, Disp: , Rfl:     methocarbamol (ROBAXIN) 500 mg tablet, Take 1 tablet (500 mg total) by mouth as needed in the morning and 1 tablet (500 mg total) as needed at noon and 1 tablet (500 mg total) as needed in the evening for muscle spasms  , Disp: 30 tablet, Rfl: 0    metoprolol succinate (TOPROL-XL) 25 mg 24 hr tablet, Take 0 5 tablets (12 5 mg total) by mouth daily at bedtime, Disp: 45 tablet, Rfl: 2    Multiple Vitamins-Calcium (ONE-A-DAY WOMENS PO), Take 1 tablet by mouth daily, Disp: , Rfl:     omeprazole (PriLOSEC) 20 mg delayed release capsule, Take 1 capsule (20 mg total) by mouth 2 (two) times a day before meals, Disp: 180 capsule, Rfl: 2    Riboflavin (VITAMIN B-2 PO), Take by mouth, Disp: , Rfl:     rizatriptan (MAXALT) 10 MG tablet, Take 1 tablet (10 mg total) by mouth once as needed for migraine May repeat in 2 hours if needed   Max 2/24 hours, 9/month , Disp: 9 tablet, Rfl: 6    sucralfate (CARAFATE) 1 g/10 mL suspension, Take 10 mL (1 g total) by mouth 3 (three) times a day, Disp: 360 mL, Rfl: 2    traZODone (DESYREL) 50 mg tablet, Take 1 tablet (50 mg total) by mouth daily at bedtime (Patient taking differently: Take 50 mg by mouth as needed), Disp: 30 tablet, Rfl: 1    vitamin k 100 MCG tablet, Take 200 mcg by mouth daily, Disp: , Rfl:     Review of Systems   Constitutional: Positive for fatigue  Negative for chills and fever  HENT: Positive for congestion and rhinorrhea  Negative for ear pain, sore throat, trouble swallowing and voice change  Respiratory: Positive for cough and chest tightness  Negative for shortness of breath and wheezing  Cardiovascular: Negative for chest pain and palpitations  Gastrointestinal: Negative for abdominal pain, diarrhea, nausea and vomiting  Neurological: Negative for dizziness and headaches  Hematological: Negative for adenopathy  Objective:    Vitals:    06/08/22 1450   BP: 122/80   Pulse: 73   Resp: 16   Temp: 98 7 °F (37 1 °C)   SpO2: 97%   Weight: 79 8 kg (175 lb 14 4 oz)   Height: 5' 7" (1 702 m)        Physical Exam  Constitutional:       General: She is not in acute distress  Appearance: Normal appearance  She is not ill-appearing  HENT:      Right Ear: Tympanic membrane and ear canal normal       Left Ear: Tympanic membrane and ear canal normal       Nose: Mucosal edema present  Mouth/Throat:      Mouth: Mucous membranes are moist       Pharynx: Oropharynx is clear  Cardiovascular:      Rate and Rhythm: Normal rate and regular rhythm  Pulses: Normal pulses  Heart sounds: Normal heart sounds  No murmur heard  Pulmonary:      Effort: Pulmonary effort is normal  No respiratory distress  Breath sounds: Normal breath sounds  No stridor  No wheezing  Neurological:      Mental Status: She is alert and oriented to person, place, and time  Psychiatric:         Mood and Affect: Mood normal          Behavior: Behavior normal          Thought Content:  Thought content normal          Office Visit on 06/08/2022   Component Date Value Ref Range Status    POCT SARS-CoV-2 Ag 06/08/2022 Negative  Negative Final  VALID CONTROL 06/08/2022 Valid   Final

## 2022-06-09 LAB
FLUAV RNA RESP QL NAA+PROBE: NEGATIVE
FLUBV RNA RESP QL NAA+PROBE: NEGATIVE
SARS-COV-2 RNA RESP QL NAA+PROBE: NEGATIVE

## 2022-06-10 ENCOUNTER — APPOINTMENT (OUTPATIENT)
Dept: PHYSICAL THERAPY | Facility: CLINIC | Age: 77
End: 2022-06-10
Payer: COMMERCIAL

## 2022-06-10 ENCOUNTER — TELEPHONE (OUTPATIENT)
Dept: FAMILY MEDICINE CLINIC | Facility: CLINIC | Age: 77
End: 2022-06-10

## 2022-06-10 NOTE — TELEPHONE ENCOUNTER
----- Message from Willi Gar MD sent at 6/9/2022  9:42 PM EDT -----  Please let patient know that her Covid/ flu swab was negative

## 2022-06-13 ENCOUNTER — OFFICE VISIT (OUTPATIENT)
Dept: PHYSICAL THERAPY | Facility: CLINIC | Age: 77
End: 2022-06-13
Payer: COMMERCIAL

## 2022-06-13 DIAGNOSIS — M79.18 CERVICAL MYOFASCIAL PAIN SYNDROME: Primary | ICD-10-CM

## 2022-06-13 PROCEDURE — 97110 THERAPEUTIC EXERCISES: CPT

## 2022-06-13 PROCEDURE — 97140 MANUAL THERAPY 1/> REGIONS: CPT

## 2022-06-13 NOTE — PROGRESS NOTES
Daily Note     Today's date: 2022  Patient name: Sierra Kawasaki  : 1945  MRN: [id number]  Referring provider: Sia Bonilla MD  Dx:   Encounter Diagnosis     ICD-10-CM    1  Cervical myofascial pain syndrome  M79 18        Start Time: 1446  Stop Time: 1529  Total time in clinic (min): 43 minutes       Subjective: Patient reports sleeping is sometimes difficult due to neck pain  Objective: See treatment diary below  Assessment: Patient requires verbal cues to perform therapeutic exercises correctly  Relief of neck pain and stiffness reported with DTM  Plan: Continue treatment as per PT plan of care         Precautions: chronic neck pain, positional vertigo, pt request no mobilizations      Manuals            laser 4' 4'           SOR             DTM UT B/L  JLW                        Neuro Re-Ed                                                                                                        Ther Ex             Chin tucks             Thoracic extension stretch  10"x10           Cervical rotation rom  5"x10 ea           rows  red  20           Low rows  red  20           Doorway pec stretch  30"x2           Diaphragmatic breathing             ube  retro 6'                        Ther Activity                                       Gait Training                                       Modalities             Trial TENS (possible candidate for  home unit)

## 2022-06-15 ENCOUNTER — OFFICE VISIT (OUTPATIENT)
Dept: PHYSICAL THERAPY | Facility: CLINIC | Age: 77
End: 2022-06-15
Payer: COMMERCIAL

## 2022-06-15 DIAGNOSIS — M79.18 CERVICAL MYOFASCIAL PAIN SYNDROME: Primary | ICD-10-CM

## 2022-06-15 PROCEDURE — 97140 MANUAL THERAPY 1/> REGIONS: CPT

## 2022-06-15 PROCEDURE — 97110 THERAPEUTIC EXERCISES: CPT

## 2022-06-15 NOTE — PROGRESS NOTES
Daily Note     Today's date: 6/15/2022  Patient name: Ingrid Matthews  : 1945  MRN: [id number]  Referring provider: Carmel Hays MD  Dx:   Encounter Diagnosis     ICD-10-CM    1  Cervical myofascial pain syndrome  M79 18        Start Time: 1403  Stop Time: 7536  Total time in clinic (min): 42 minutes       Subjective: Patient reports she woke up this morning and "my right ear was ringing and I got dizzy "  Patient reports her shoulders feel a little bit tight however reports she got relief of neck pain after Monday's PT treatment  Objective: See treatment diary below  Assessment: Patient requires verbal cues to perform therapeutic exercises correctly  Relief of neck pain and stiffness reported with DTM       Plan: Continue treatment as per PT plan of care         Precautions: chronic neck pain, positional vertigo, pt request no mobilizations      Manuals 6/7 6/13 6/15          laser 4' 4' 4'          SOR             DTM UT B/L  JLW JLW                       Neuro Re-Ed                                                                                                        Ther Ex             Chin tucks             Thoracic extension stretch  10"x10 10"x10          Cervical rotation rom  5"x10 ea 5"x10 ea          rows  red  20 red  20          Low rows  red  20 red  20          Doorway pec stretch  30"x2 30"x3          Diaphragmatic breathing             ube  retro 6' retro 6'                       Ther Activity                                       Gait Training                                       Modalities             Trial TENS (possible candidate for  home unit)

## 2022-06-20 ENCOUNTER — TELEPHONE (OUTPATIENT)
Dept: GASTROENTEROLOGY | Facility: CLINIC | Age: 77
End: 2022-06-20

## 2022-06-20 ENCOUNTER — APPOINTMENT (OUTPATIENT)
Dept: PHYSICAL THERAPY | Facility: CLINIC | Age: 77
End: 2022-06-20
Payer: COMMERCIAL

## 2022-06-20 NOTE — TELEPHONE ENCOUNTER
Patients GI provider:  Dr John Thurston    Number to return call: [phone]    Reason for call: patient ca;;ed and want to speak with someone regarding Sucralfate 10 mg  She has some questions regarding medication        Scheduled procedure/appointment date if applicable: 20/96/9801

## 2022-06-23 ENCOUNTER — APPOINTMENT (OUTPATIENT)
Dept: PHYSICAL THERAPY | Facility: CLINIC | Age: 77
End: 2022-06-23
Payer: COMMERCIAL

## 2022-06-28 ENCOUNTER — APPOINTMENT (OUTPATIENT)
Dept: PHYSICAL THERAPY | Facility: CLINIC | Age: 77
End: 2022-06-28
Payer: COMMERCIAL

## 2022-06-30 ENCOUNTER — TELEPHONE (OUTPATIENT)
Dept: FAMILY MEDICINE CLINIC | Facility: CLINIC | Age: 77
End: 2022-06-30

## 2022-06-30 NOTE — TELEPHONE ENCOUNTER
Patient called again asking whether she needs an appointment  Advised patient we will call her when we receive an answer from Dr Sd Alfred

## 2022-06-30 NOTE — TELEPHONE ENCOUNTER
Patient would like to discuss if she should still be taking Sucralfate 10 mg  Patient is requesting a call back to discuss

## 2022-06-30 NOTE — TELEPHONE ENCOUNTER
Patient called this morning her feet and ankles are swelling started Saturday  How would you like me to handle this?

## 2022-06-30 NOTE — TELEPHONE ENCOUNTER
Patient stated she is experiencing a side effect of the medication Carafate  Her feet and ankles are swelling  Please call back

## 2022-06-30 NOTE — TELEPHONE ENCOUNTER
OV 5/27/22 Dr Haile France  Hx: GERD    FYI  Pt stated she has new onset of feet/ankle swelling this past week and questioned if it could be from carafate  Also is she allowed to take carafate as needed for acid reflux, "most of the time I just have it at night"  Recs  1  unlikely lower extremity swelling is from carafate, advised to call PCP  2   Explained pt can take carafate prn up to 3x daily and if most symptomatic at night can take before bed and in the AM with breakfast

## 2022-07-01 ENCOUNTER — APPOINTMENT (OUTPATIENT)
Dept: LAB | Facility: CLINIC | Age: 77
End: 2022-07-01
Payer: COMMERCIAL

## 2022-07-01 ENCOUNTER — OFFICE VISIT (OUTPATIENT)
Dept: FAMILY MEDICINE CLINIC | Facility: CLINIC | Age: 77
End: 2022-07-01
Payer: COMMERCIAL

## 2022-07-01 ENCOUNTER — TELEPHONE (OUTPATIENT)
Dept: FAMILY MEDICINE CLINIC | Facility: CLINIC | Age: 77
End: 2022-07-01

## 2022-07-01 VITALS
SYSTOLIC BLOOD PRESSURE: 130 MMHG | BODY MASS INDEX: 27.47 KG/M2 | TEMPERATURE: 98.6 F | RESPIRATION RATE: 16 BRPM | WEIGHT: 175 LBS | HEART RATE: 70 BPM | DIASTOLIC BLOOD PRESSURE: 80 MMHG | HEIGHT: 67 IN

## 2022-07-01 DIAGNOSIS — R60.0 BILATERAL LEG EDEMA: Primary | ICD-10-CM

## 2022-07-01 DIAGNOSIS — R60.0 BILATERAL LEG EDEMA: ICD-10-CM

## 2022-07-01 DIAGNOSIS — I10 ESSENTIAL HYPERTENSION: ICD-10-CM

## 2022-07-01 PROBLEM — M25.471 EDEMA OF BOTH ANKLES: Status: ACTIVE | Noted: 2022-07-01

## 2022-07-01 PROBLEM — M25.472 EDEMA OF BOTH ANKLES: Status: ACTIVE | Noted: 2022-07-01

## 2022-07-01 LAB
ALBUMIN SERPL BCP-MCNC: 3.5 G/DL (ref 3.5–5)
ALP SERPL-CCNC: 101 U/L (ref 46–116)
ALT SERPL W P-5'-P-CCNC: 37 U/L (ref 12–78)
ANION GAP SERPL CALCULATED.3IONS-SCNC: 4 MMOL/L (ref 4–13)
AST SERPL W P-5'-P-CCNC: 25 U/L (ref 5–45)
BASOPHILS # BLD AUTO: 0.02 THOUSANDS/ΜL (ref 0–0.1)
BASOPHILS NFR BLD AUTO: 0 % (ref 0–1)
BILIRUB SERPL-MCNC: 0.69 MG/DL (ref 0.2–1)
BUN SERPL-MCNC: 21 MG/DL (ref 5–25)
CALCIUM SERPL-MCNC: 10 MG/DL (ref 8.3–10.1)
CHLORIDE SERPL-SCNC: 108 MMOL/L (ref 100–108)
CO2 SERPL-SCNC: 29 MMOL/L (ref 21–32)
CREAT SERPL-MCNC: 0.82 MG/DL (ref 0.6–1.3)
EOSINOPHIL # BLD AUTO: 0.16 THOUSAND/ΜL (ref 0–0.61)
EOSINOPHIL NFR BLD AUTO: 3 % (ref 0–6)
ERYTHROCYTE [DISTWIDTH] IN BLOOD BY AUTOMATED COUNT: 13.1 % (ref 11.6–15.1)
GFR SERPL CREATININE-BSD FRML MDRD: 70 ML/MIN/1.73SQ M
GLUCOSE P FAST SERPL-MCNC: 79 MG/DL (ref 65–99)
HCT VFR BLD AUTO: 43.9 % (ref 34.8–46.1)
HGB BLD-MCNC: 14.7 G/DL (ref 11.5–15.4)
IMM GRANULOCYTES # BLD AUTO: 0.02 THOUSAND/UL (ref 0–0.2)
IMM GRANULOCYTES NFR BLD AUTO: 0 % (ref 0–2)
LYMPHOCYTES # BLD AUTO: 1.42 THOUSANDS/ΜL (ref 0.6–4.47)
LYMPHOCYTES NFR BLD AUTO: 23 % (ref 14–44)
MCH RBC QN AUTO: 30.2 PG (ref 26.8–34.3)
MCHC RBC AUTO-ENTMCNC: 33.5 G/DL (ref 31.4–37.4)
MCV RBC AUTO: 90 FL (ref 82–98)
MONOCYTES # BLD AUTO: 0.68 THOUSAND/ΜL (ref 0.17–1.22)
MONOCYTES NFR BLD AUTO: 11 % (ref 4–12)
NEUTROPHILS # BLD AUTO: 4.01 THOUSANDS/ΜL (ref 1.85–7.62)
NEUTS SEG NFR BLD AUTO: 63 % (ref 43–75)
NRBC BLD AUTO-RTO: 0 /100 WBCS
PLATELET # BLD AUTO: 214 THOUSANDS/UL (ref 149–390)
PMV BLD AUTO: 10.3 FL (ref 8.9–12.7)
POTASSIUM SERPL-SCNC: 4.5 MMOL/L (ref 3.5–5.3)
PROT SERPL-MCNC: 7.4 G/DL (ref 6.4–8.2)
RBC # BLD AUTO: 4.86 MILLION/UL (ref 3.81–5.12)
SODIUM SERPL-SCNC: 141 MMOL/L (ref 136–145)
TSH SERPL DL<=0.05 MIU/L-ACNC: 0.75 UIU/ML (ref 0.45–4.5)
WBC # BLD AUTO: 6.31 THOUSAND/UL (ref 4.31–10.16)

## 2022-07-01 PROCEDURE — 36415 COLL VENOUS BLD VENIPUNCTURE: CPT

## 2022-07-01 PROCEDURE — 85025 COMPLETE CBC W/AUTO DIFF WBC: CPT

## 2022-07-01 PROCEDURE — 80053 COMPREHEN METABOLIC PANEL: CPT

## 2022-07-01 PROCEDURE — 84443 ASSAY THYROID STIM HORMONE: CPT

## 2022-07-01 PROCEDURE — 1003F LEVEL OF ACTIVITY ASSESS: CPT | Performed by: FAMILY MEDICINE

## 2022-07-01 PROCEDURE — 99214 OFFICE O/P EST MOD 30 MIN: CPT | Performed by: FAMILY MEDICINE

## 2022-07-01 RX ORDER — FUROSEMIDE 20 MG/1
20 TABLET ORAL DAILY PRN
Qty: 10 TABLET | Refills: 0 | Status: SHIPPED | OUTPATIENT
Start: 2022-07-01

## 2022-07-01 NOTE — PROGRESS NOTES
Assessment/Plan:    1  Bilateral leg edema  - recent stress Echo 04/15/21 was negative for ischemia, showed moderate TR, no regional wall motion abnormalities, LVEF was 60%, advised to follow a low salt diet, use compression stocking and elevate leg when possible, take Lasix as needed, will obtain lab work and repeat Echo, follow up with Cardiology as schedule on 07/07/22  - furosemide (LASIX) 20 mg tablet; Take 1 tablet (20 mg total) by mouth daily as needed (leg swelling)  Dispense: 10 tablet; Refill: 0  - Echo complete w/ contrast if indicated; Future  - CBC and differential; Future  - TSH, 3rd generation with Free T4 reflex; Future  - Comprehensive metabolic panel; Future    2  Essential hypertension  - continue Toprol XL 25 mg 1/2 tablet at bedtime       Follow up as scheduled or sooner as needed  The treatment plan and possible side effects of new medications were reviewed with the patient today  The patient understands and agrees with the treatment plan  Subjective:   Chief Complaint   Patient presents with    Edema     Feet and ankles       Patient ID: lAia Chapman is a 78 y o  female who presents today with c/o increased swelling in her feet and ankles for the past few weeks, usually better when she gets up from bed in am and worsens by afternoon and evening time  Their a/c broke last week and she noted worsening of her swelling since  Patient states her breathing is at baseline, no orthopnea, no paroxysmal nocturnal dyspnea, chest pain, palpitations, dizziness or syncope           The following portions of the patient's history were reviewed and updated as appropriate: allergies, current medications, past family history, past medical history, past social history, past surgical history and problem list     Past Medical History:   Diagnosis Date    Anxiety     Arthritis     Back pain     Balance problems     Chest pain     heaviness    Difficulty swallowing     Dizziness     Gait disorder  GERD (gastroesophageal reflux disease)     Hypertension     Increased frequency of headaches     Numbness and tingling     Palpitations     Pericarditis     Thyroid disease     Trouble in sleeping      Past Surgical History:   Procedure Laterality Date    APPENDECTOMY      CHOLECYSTECTOMY      laparoscopic    COLONOSCOPY      KNEE SURGERY Left     PERICARDIAL WINDOW      AL OPEN RX DISTAL RADIUS FX, EXTRA-ARTICULAR Right 3/22/2018    Procedure: OPEN REDUCTION W/ INTERNAL FIXATION (ORIF) RADIUS, splint application;  Surgeon: Juvencio Diamond MD;  [facility];  Service: Orthopedics    UPPER GASTROINTESTINAL ENDOSCOPY       Family History   Problem Relation Age of Onset    Kidney failure Mother     Hypertension Mother     Lung cancer Father    Vy Leeroman Parkinson White syndrome Daughter     No Known Problems Sister     Substance Abuse Neg Hx     Alcohol abuse Neg Hx     Mental illness Neg Hx      Social History     Socioeconomic History    Marital status: /Civil Union     Spouse name: Not on file    Number of children: Not on file    Years of education: Not on file    Highest education level: Not on file   Occupational History    Not on file   Tobacco Use    Smoking status: Never Smoker    Smokeless tobacco: Never Used   Vaping Use    Vaping Use: Never used   Substance and Sexual Activity    Alcohol use: Yes     Comment: Rarely     Drug use: No    Sexual activity: Not on file   Other Topics Concern    Not on file   Social History Narrative    WORK:    1   ([employer]; [employer]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    2  [employer]        HOBBIES:    1  Singing    2  Dancing    3  Hx of ceramics (+ dust)        PETS:    1    Dogs    -  Denies birds        TRAVEL:    -  [place]         EXPOSURES:    Denies mold; down pillows/comforters; hot tubs     Social Determinants of Health     Financial Resource Strain: Not on file   Food Insecurity: Not on file Transportation Needs: Not on file   Physical Activity: Not on file   Stress: Not on file   Social Connections: Not on file   Intimate Partner Violence: Not on file   Housing Stability: Not on file       Current Outpatient Medications:     albuterol (PROVENTIL HFA,VENTOLIN HFA) 90 mcg/act inhaler, Inhale 2 puffs every 6 (six) hours as needed for wheezing or shortness of breath, Disp: 8 g, Rfl: 0    ALPRAZolam (XANAX) 0 5 mg tablet, Take 1 tablet (0 5 mg total) by mouth daily at bedtime as needed for anxiety or sleep, Disp: 20 tablet, Rfl: 1    ascorbic acid (VITAMIN C) 500 mg tablet, Take 500 mg by mouth daily, Disp: , Rfl:     aspirin 81 mg chewable tablet, Chew 1 tablet (81 mg total) daily (Patient taking differently: Chew 81 mg as needed), Disp: 30 tablet, Rfl: 0    b complex vitamins tablet, Take 1 tablet by mouth daily, Disp: , Rfl:     Cholecalciferol (Vitamin D) 50 MCG (2000 UT) CAPS, Take 1 capsule (2,000 Units total) by mouth daily (Patient taking differently: Take 1 capsule by mouth daily 4,000 units daily), Disp: , Rfl:     dicyclomine (BENTYL) 10 mg capsule, Take 1 capsule (10 mg total) by mouth 4 (four) times a day (before meals and at bedtime), Disp: 30 capsule, Rfl: 1    Magnesium 100 MG CAPS, magnesium  300 mg qd, Disp: , Rfl:     Menaquinone-7 (Vitamin K2) 100 MCG CAPS, Take 200 mcg by mouth, Disp: , Rfl:     methocarbamol (ROBAXIN) 500 mg tablet, Take 1 tablet (500 mg total) by mouth as needed in the morning and 1 tablet (500 mg total) as needed at noon and 1 tablet (500 mg total) as needed in the evening for muscle spasms  , Disp: 30 tablet, Rfl: 0    metoprolol succinate (TOPROL-XL) 25 mg 24 hr tablet, Take 0 5 tablets (12 5 mg total) by mouth daily at bedtime, Disp: 45 tablet, Rfl: 2    Multiple Vitamins-Calcium (ONE-A-DAY WOMENS PO), Take 1 tablet by mouth daily, Disp: , Rfl:     omeprazole (PriLOSEC) 20 mg delayed release capsule, Take 1 capsule (20 mg total) by mouth 2 (two) times a day before meals, Disp: 180 capsule, Rfl: 2    Riboflavin (VITAMIN B-2 PO), Take by mouth, Disp: , Rfl:     rizatriptan (MAXALT) 10 MG tablet, Take 1 tablet (10 mg total) by mouth once as needed for migraine May repeat in 2 hours if needed  Max 2/24 hours, 9/month , Disp: 9 tablet, Rfl: 6    sucralfate (CARAFATE) 1 g/10 mL suspension, Take 10 mL (1 g total) by mouth 3 (three) times a day, Disp: 360 mL, Rfl: 2    traZODone (DESYREL) 50 mg tablet, Take 1 tablet (50 mg total) by mouth daily at bedtime (Patient taking differently: Take 50 mg by mouth as needed), Disp: 30 tablet, Rfl: 1    vitamin k 100 MCG tablet, Take 200 mcg by mouth daily, Disp: , Rfl:     Review of Systems   Constitutional: Negative for chills, fatigue, fever and unexpected weight change  Respiratory: Negative for cough, shortness of breath and wheezing  Cardiovascular: Positive for leg swelling  Negative for chest pain and palpitations  Gastrointestinal: Negative for abdominal pain, blood in stool, diarrhea, nausea and vomiting  Neurological: Negative for dizziness, syncope, weakness, numbness and headaches  Hematological: Negative for adenopathy  Psychiatric/Behavioral: Negative for dysphoric mood  The patient is nervous/anxious  Objective:    Vitals:    07/01/22 0846   BP: 130/80   Pulse: 70   Resp: 16   Temp: 98 6 °F (37 °C)   Weight: 79 4 kg (175 lb)   Height: 5' 7" (1 702 m)     Wt Readings from Last 3 Encounters:   07/01/22 79 4 kg (175 lb)   06/08/22 79 8 kg (175 lb 14 4 oz)   05/27/22 79 8 kg (176 lb)        Physical Exam  Constitutional:       General: She is not in acute distress  Appearance: She is well-developed  Neck:      Thyroid: No thyromegaly  Cardiovascular:      Rate and Rhythm: Normal rate and regular rhythm  Heart sounds: Normal heart sounds  No murmur heard  Pulmonary:      Effort: Pulmonary effort is normal       Breath sounds: Normal breath sounds   No wheezing, rhonchi or rales    Chest:      Chest wall: No tenderness  Abdominal:      General: There is no distension  Palpations: Abdomen is soft  There is no mass  Tenderness: There is no abdominal tenderness  Musculoskeletal:      Cervical back: Neck supple  Comments: Mild bilateral pedal and ankle edema, no pretibial edema, no calf tenderness   Lymphadenopathy:      Cervical: No cervical adenopathy  Neurological:      Mental Status: She is alert and oriented to person, place, and time  Psychiatric:         Mood and Affect: Mood normal          Behavior: Behavior normal         BMI Counseling: Body mass index is 27 41 kg/m²  The BMI is above normal  Nutrition recommendations include reducing portion sizes and consuming healthier snacks  Exercise recommendations include exercising 3-5 times per week

## 2022-07-05 ENCOUNTER — TELEPHONE (OUTPATIENT)
Dept: NEUROLOGY | Facility: CLINIC | Age: 77
End: 2022-07-05

## 2022-07-05 ENCOUNTER — HOSPITAL ENCOUNTER (OUTPATIENT)
Dept: NON INVASIVE DIAGNOSTICS | Facility: HOSPITAL | Age: 77
Discharge: HOME | End: 2022-07-05
Payer: COMMERCIAL

## 2022-07-05 VITALS
WEIGHT: 175 LBS | HEIGHT: 67 IN | HEART RATE: 65 BPM | SYSTOLIC BLOOD PRESSURE: 130 MMHG | BODY MASS INDEX: 27.47 KG/M2 | DIASTOLIC BLOOD PRESSURE: 80 MMHG

## 2022-07-05 DIAGNOSIS — R60.0 BILATERAL LEG EDEMA: ICD-10-CM

## 2022-07-05 LAB
AORTIC ROOT: 2.9 CM
AORTIC VALVE MEAN VELOCITY: 8.8 M/S
APICAL FOUR CHAMBER EJECTION FRACTION: 62 %
ASCENDING AORTA: 3.2 CM
AV LVOT MEAN GRADIENT: 2 MMHG
AV LVOT PEAK GRADIENT: 3 MMHG
AV MEAN GRADIENT: 4 MMHG
AV PEAK GRADIENT: 7 MMHG
DOP CALC AO VTI: 27.3 CM
DOP CALC LVOT PEAK VEL VTI: 20.7 CM
DOP CALC LVOT PEAK VEL: 0.93 M/S
DOP CALC MV VTI: 30.6 CM
E WAVE DECELERATION TIME: 211 MS
FRACTIONAL SHORTENING: 35 % (ref 28–44)
INTERVENTRICULAR SEPTUM IN DIASTOLE (PARASTERNAL SHORT AXIS VIEW): 1 CM
INTERVENTRICULAR SEPTUM: 1 CM (ref 0.6–1.1)
LA/AORTA RATIO 2D: 1.14
LAAS-AP2: 13 CM2
LAAS-AP4: 13.1 CM2
LEFT ATRIUM SIZE: 3.3 CM
LEFT INTERNAL DIMENSION IN SYSTOLE: 2.6 CM (ref 2.1–4)
LEFT VENTRICLE DIASTOLIC VOLUME (MOD BIPLANE): 109 ML
LEFT VENTRICLE SYSTOLIC VOLUME (MOD BIPLANE): 40 ML
LEFT VENTRICULAR INTERNAL DIMENSION IN DIASTOLE: 4 CM (ref 3.5–6)
LEFT VENTRICULAR POSTERIOR WALL IN END DIASTOLE: 0.7 CM
LEFT VENTRICULAR STROKE VOLUME: 44 ML
LV EF: 64 %
LVSV (TEICH): 44 ML
MV E'TISSUE VEL-LAT: 6 CM/S
MV E'TISSUE VEL-SEP: 4 CM/S
MV MEAN GRADIENT: 1 MMHG
MV PEAK A VEL: 1.1 M/S
MV PEAK E VEL: 75 CM/S
MV PEAK GRADIENT: 5 MMHG
MV STENOSIS PRESSURE HALF TIME: 62 MS
MV VALVE AREA P 1/2 METHOD: 3.55 CM2
RA PRESSURE ESTIMATED: 3 MMHG
RIGHT VENTRICLE ID DIMENSION: 3.9 CM
RV PSP: 21 MMHG
SL CV LV EF: 60
SL CV PED ECHO LEFT VENTRICLE DIASTOLIC VOLUME (MOD BIPLANE) 2D: 68 ML
SL CV PED ECHO LEFT VENTRICLE SYSTOLIC VOLUME (MOD BIPLANE) 2D: 24 ML
TR MAX PG: 18 MMHG
TR PEAK VELOCITY: 2.1 M/S
TRICUSPID ANNULAR PLANE SYSTOLIC EXCURSION: 1.6 CM
TRICUSPID VALVE PEAK REGURGITATION VELOCITY: 2.12 M/S

## 2022-07-05 PROCEDURE — 93306 TTE W/DOPPLER COMPLETE: CPT | Performed by: INTERNAL MEDICINE

## 2022-07-05 PROCEDURE — 93306 TTE W/DOPPLER COMPLETE: CPT

## 2022-07-05 NOTE — TELEPHONE ENCOUNTER
[facility] regarding letter of medical for a tens unit   this was faxed on 6/29   i do not see that we received this    she will refax to [facility] office   Will await fax

## 2022-07-07 ENCOUNTER — TELEPHONE (OUTPATIENT)
Dept: NEUROLOGY | Facility: CLINIC | Age: 77
End: 2022-07-07

## 2022-07-07 ENCOUNTER — TELEPHONE (OUTPATIENT)
Dept: OTHER | Facility: OTHER | Age: 77
End: 2022-07-07

## 2022-07-07 NOTE — TELEPHONE ENCOUNTER
Patient called to reschedule her appointment for [phone] today  Appointment changed to 7/21/22 at 1120

## 2022-07-11 ENCOUNTER — APPOINTMENT (OUTPATIENT)
Dept: LAB | Facility: CLINIC | Age: 77
End: 2022-07-11
Payer: COMMERCIAL

## 2022-07-11 DIAGNOSIS — E55.9 AVITAMINOSIS D: ICD-10-CM

## 2022-07-11 DIAGNOSIS — E21.0 PRIMARY HYPERPARATHYROIDISM (HCC): ICD-10-CM

## 2022-07-11 LAB
25(OH)D3 SERPL-MCNC: 38.9 NG/ML (ref 30–100)
ANION GAP SERPL CALCULATED.3IONS-SCNC: 4 MMOL/L (ref 4–13)
BUN SERPL-MCNC: 24 MG/DL (ref 5–25)
CALCIUM SERPL-MCNC: 10.1 MG/DL (ref 8.3–10.1)
CHLORIDE SERPL-SCNC: 109 MMOL/L (ref 100–108)
CO2 SERPL-SCNC: 26 MMOL/L (ref 21–32)
CREAT SERPL-MCNC: 0.83 MG/DL (ref 0.6–1.3)
GFR SERPL CREATININE-BSD FRML MDRD: 69 ML/MIN/1.73SQ M
GLUCOSE P FAST SERPL-MCNC: 103 MG/DL (ref 65–99)
POTASSIUM SERPL-SCNC: 4.2 MMOL/L (ref 3.5–5.3)
PTH-INTACT SERPL-MCNC: 94 PG/ML (ref 18.4–80.1)
SODIUM SERPL-SCNC: 139 MMOL/L (ref 136–145)

## 2022-07-11 PROCEDURE — 82306 VITAMIN D 25 HYDROXY: CPT

## 2022-07-11 PROCEDURE — 83970 ASSAY OF PARATHORMONE: CPT

## 2022-07-11 PROCEDURE — 36415 COLL VENOUS BLD VENIPUNCTURE: CPT

## 2022-07-11 PROCEDURE — 80048 BASIC METABOLIC PNL TOTAL CA: CPT

## 2022-07-14 ENCOUNTER — TELEPHONE (OUTPATIENT)
Dept: PULMONOLOGY | Facility: CLINIC | Age: 77
End: 2022-07-14

## 2022-07-14 NOTE — TELEPHONE ENCOUNTER
Established patient needing pre-op clearance  Surgery: Thyroid Surgery  Surgery date: 8/17/2022  Last seen by us: 4/13/2022  On oxygen: NO  Kind of Sedation: General  Length of surgery: 1 5 hours  Surgeon: Alpa Graham  Office contact: [phone] ext [phone] Mindy  Fax(To send office note): [phone]    Would you like to clear patient via a phone call from last visit or would like us to schedule them with you or an AP?

## 2022-07-18 ENCOUNTER — TELEPHONE (OUTPATIENT)
Dept: NEUROLOGY | Facility: CLINIC | Age: 77
End: 2022-07-18

## 2022-07-18 NOTE — TELEPHONE ENCOUNTER
Patient called wants to use spouse appointment on 7/20 with Dr Xena Rm  She is not feeling well with dizziness and states  is not up to come to the appointment  Spoke with Erin Yeboah, Dr Xena Rm MA agree that the patient switch the appointment for herself  Informed patient verbalized understanding

## 2022-07-20 ENCOUNTER — TELEMEDICINE (OUTPATIENT)
Dept: NEUROLOGY | Facility: CLINIC | Age: 77
End: 2022-07-20
Payer: COMMERCIAL

## 2022-07-20 VITALS
HEART RATE: 74 BPM | DIASTOLIC BLOOD PRESSURE: 90 MMHG | WEIGHT: 174 LBS | TEMPERATURE: 98.1 F | SYSTOLIC BLOOD PRESSURE: 172 MMHG | HEIGHT: 67 IN | BODY MASS INDEX: 27.31 KG/M2

## 2022-07-20 DIAGNOSIS — G44.229 CHRONIC TENSION HEADACHES: ICD-10-CM

## 2022-07-20 DIAGNOSIS — G43.009 MIGRAINE WITHOUT AURA AND WITHOUT STATUS MIGRAINOSUS, NOT INTRACTABLE: Primary | ICD-10-CM

## 2022-07-20 PROCEDURE — 99215 OFFICE O/P EST HI 40 MIN: CPT | Performed by: PSYCHIATRY & NEUROLOGY

## 2022-07-20 NOTE — PROGRESS NOTES
Review of Systems   Constitutional: Negative for appetite change and fever  HENT: Positive for tinnitus  Negative for hearing loss, trouble swallowing and voice change  Eyes: Negative  Negative for photophobia and pain  Respiratory: Negative  Negative for shortness of breath  Cardiovascular: Negative  Negative for palpitations  Gastrointestinal: Negative  Negative for nausea and vomiting  Endocrine: Negative  Negative for cold intolerance  Genitourinary: Negative  Negative for dysuria, frequency and urgency  Musculoskeletal: Negative  Negative for myalgias and neck pain  Skin: Negative  Negative for rash  Neurological: Positive for numbness and headaches  Negative for dizziness, tremors, seizures, syncope, facial asymmetry, speech difficulty, weakness and light-headedness  Numbness in left side of face   Hematological: Negative  Does not bruise/bleed easily  Psychiatric/Behavioral: Negative  Negative for confusion, hallucinations and sleep disturbance

## 2022-07-20 NOTE — PATIENT INSTRUCTIONS
Safety/fall precautions     Try to not lay down the rest of the day, if BPP[facility] returns you can see if I have an opening that day or I can place referral for PT       Headache/migraine treatment:   Abortive medications (for immediate treatment of a headache): It is ok to take ibuprofen, acetaminophen or naproxen (Advil, Tylenol,  Aleve, Excedrin) if they help your headaches you should limit these to No more than 3 times a week to avoid medication overuse/rebound headaches  For your more moderate to severe migraines take this medication early   Maxalt (rizatriptan) 10mg tabs - take one at the onset of headache  May repeat one time after 1-2 hours if pain has not resolved  (Max 2 a day and 9 a month)      Over the counter preventive supplements for headaches/migraines   (to take every day to help prevent headaches - not to take at the time of headache):  [x] Magnesium 400mg daily (If any diarrhea or upset stomach, decrease dose  as tolerated)  [x] Riboflavin (Vitamin B2)  400mg daily   (FYI B2 may make your urine bright/neon yellow)     Prescription preventive medications for headaches/migraines   (to take every day to help prevent headaches - not to take at the time of headache):  [x]  we have options if you ever wanted       Lifestyle Recommendations:  [x] SLEEP - Maintain a regular sleep schedule: Adults need at least 7-8 hours of uninterrupted a night  Maintain good sleep hygiene:  Going to bed and waking up at consistent times, avoiding excessive daytime naps, avoiding caffeinated beverages in the evening, avoid excessive stimulation in the evening and generally using bed primarily for sleeping  One hour before bedtime would recommend turning lights down lower, decreasing your activity (may read quietly, listen to music at a low volume)  When you get into bed, should eliminate all technology (no texting, emailing, playing with your phone, iPad or tablet in bed)  [x] HYDRATION - Maintain good hydration  Drink  2L of fluid a day (4 typical small water bottles)  [x] DIET - Maintain good nutrition  In particular don't skip meals and try and eat healthy balanced meals regularly  [x] TRIGGERS - Look for other triggers and avoid them: Limit caffeine to 1-2 cups a day or less  Avoid dietary triggers that you have noticed bring on your headaches (this could include aged cheese, peanuts, MSG, aspartame and nitrates)  Education and Follow-up  [x] Please call with any questions or concerns   Go to the ED with any new or concerning symptoms  [x] Follow up as scheduled 10/26/22, sooner if needed

## 2022-07-21 ENCOUNTER — OFFICE VISIT (OUTPATIENT)
Dept: CARDIOLOGY CLINIC | Facility: CLINIC | Age: 77
End: 2022-07-21
Payer: COMMERCIAL

## 2022-07-21 VITALS
SYSTOLIC BLOOD PRESSURE: 146 MMHG | HEIGHT: 67 IN | DIASTOLIC BLOOD PRESSURE: 72 MMHG | HEART RATE: 59 BPM | BODY MASS INDEX: 27.47 KG/M2 | WEIGHT: 175 LBS

## 2022-07-21 DIAGNOSIS — Z01.810 PRE-OPERATIVE CARDIOVASCULAR EXAMINATION: Primary | ICD-10-CM

## 2022-07-21 DIAGNOSIS — I72.9 ANEURYSM (HCC): ICD-10-CM

## 2022-07-21 DIAGNOSIS — R01.1 HEART MURMUR: ICD-10-CM

## 2022-07-21 DIAGNOSIS — R00.2 PALPITATIONS: ICD-10-CM

## 2022-07-21 PROCEDURE — 99213 OFFICE O/P EST LOW 20 MIN: CPT | Performed by: INTERNAL MEDICINE

## 2022-07-21 PROCEDURE — 93000 ELECTROCARDIOGRAM COMPLETE: CPT | Performed by: INTERNAL MEDICINE

## 2022-07-21 NOTE — LETTER
Cardiology Pre Operative Clearance      PRE OPERATIVE CARDIAC RISK ASSESSMENT    07/21/22    Sumit Posey  1945  [id number]    Date of Surgery: TBD    Type of Surgery: thyroidectomy    Surgeon: Ford Francisco MD    No Cardiac Contraindication for Planned Surgical Procedures    Anticoagulation: not applicable    Physician Comment: Ms Shawn Brower is low risk for cardiac complications of planned thyroid surgery  Her [employer] perioperative risk score, for MI or sudden cardiac death up to 30 days postoperatively, is 0 1%  She has no cardiac symptoms with exertion  She has a normal stress test in April 2021  Her EKG is nonischemic  Her cardiovascular exam is unremarkable  No further cardiac workup recommended prior to proceeding with surgery       Electronically Signed: Jeff López MD

## 2022-07-21 NOTE — PROGRESS NOTES
Cardiology Outpatient Follow-Up Note - Boyd Coffman 78 y o  female MRN: [id number]      Assessment/Plan:  1  Pre-operative cardiovascular examination    Ms  Mario Schuler is low risk for cardiac complications of planned thyroid surgery  Her [employer] perioperative risk score, for MI or sudden cardiac death up to 30 days postoperatively, is 0 1%  She has no cardiac symptoms with exertion  She has a normal stress test in April 2021  Her EKG today is nonischemic  Her cardiovascular exam is unremarkable  No further cardiac workup recommended prior to proceeding with surgery  We will see Boyd Coffman back for her regular scheduled appointment with her primary cardiologist     Subjective:     HPI: Boyd Coffman is a 78y o  year old female with a history of pericarditis, remote, presents today for preoperative risk stratification prior to thyroidectomy  No cardiac symptoms  Echo on 07/05/2022 showed normal LV size and function, EF 68%, grade 1 diastolic dysfunction, no significant valve disease  Stress echo on 04/15/2021, patient exercised for 10 minutes 0 seconds on Pankaj protocol, 153 bpm, 104% MPHR, without EKG or echocardiographic evidence of ischemia  ROS:  Review of Systems:  Review of Systems   Constitutional: Negative for activity change and fatigue  HENT: Negative for facial swelling  Eyes: Negative for visual disturbance  Respiratory: Negative for chest tightness and shortness of breath  Cardiovascular: Negative for chest pain, palpitations and leg swelling  Gastrointestinal: Negative for nausea and vomiting  Musculoskeletal: Negative for myalgias  Skin: Negative for pallor  Allergic/Immunologic: Negative for immunocompromised state  Neurological: Negative for dizziness, syncope and light-headedness  Psychiatric/Behavioral: Negative for agitation and confusion  The patient is not nervous/anxious              Objective:     Vitals:   Vitals:    07/21/22 0953   BP: 146/72   BP Location: Left arm   Cuff Size: Standard   Pulse: 59   Weight: 79 4 kg (175 lb)   Height: 5' 7" (1 702 m)    Body surface area is 1 91 meters squared  Wt Readings from Last 3 Encounters:   07/21/22 79 4 kg (175 lb)   07/20/22 78 9 kg (174 lb)   07/05/22 79 4 kg (175 lb)       Physical Exam:  General: Chad Abrams is a well appearing female, in no acute distress, sitting comfortably  HEENT: moist mucous membranes, EOMI  Neck:  No JVD, supple, trachea midline  Cardiovascular: unremarkable S1/S2, regular rate and rhythm, no murmurs, rubs or gallops  Pulmonary: normal respiratory effort, CTAB  Abdomen: soft and nondistended  Extremities: No lower extremity edema  Warm and well perfused extremities  Neuro: no focal motor deficits, AAOx3 (person, place, time)  Psych: Normal mood and affect, cooperative      Medications (at the START of this encounter):   Outpatient Medications Prior to Visit   Medication Sig Dispense Refill    albuterol (PROVENTIL HFA,VENTOLIN HFA) 90 mcg/act inhaler Inhale 2 puffs every 6 (six) hours as needed for wheezing or shortness of breath 8 g 0    ALPRAZolam (XANAX) 0 5 mg tablet Take 1 tablet (0 5 mg total) by mouth daily at bedtime as needed for anxiety or sleep 20 tablet 1    ascorbic acid (VITAMIN C) 500 mg tablet Take 500 mg by mouth daily      b complex vitamins tablet Take 1 tablet by mouth daily      Cholecalciferol (Vitamin D) 50 MCG (2000 UT) CAPS Take 1 capsule (2,000 Units total) by mouth daily (Patient taking differently: Take 1 capsule by mouth daily 4,000 units daily)      Magnesium 100 MG CAPS magnesium   300 mg qd      Menaquinone-7 (Vitamin K2) 100 MCG CAPS Take 200 mcg by mouth      metoprolol succinate (TOPROL-XL) 25 mg 24 hr tablet Take 0 5 tablets (12 5 mg total) by mouth daily at bedtime 45 tablet 2    Multiple Vitamins-Calcium (ONE-A-DAY WOMENS PO) Take 1 tablet by mouth daily      omeprazole (PriLOSEC) 20 mg delayed release capsule Take 1 capsule (20 mg total) by mouth 2 (two) times a day before meals 180 capsule 2    Riboflavin (VITAMIN B-2 PO) Take by mouth      aspirin 81 mg chewable tablet Chew 1 tablet (81 mg total) daily (Patient not taking: No sig reported) 30 tablet 0    dicyclomine (BENTYL) 10 mg capsule Take 1 capsule (10 mg total) by mouth 4 (four) times a day (before meals and at bedtime) (Patient not taking: No sig reported) 30 capsule 1    furosemide (LASIX) 20 mg tablet Take 1 tablet (20 mg total) by mouth daily as needed (leg swelling) (Patient not taking: No sig reported) 10 tablet 0    methocarbamol (ROBAXIN) 500 mg tablet Take 1 tablet (500 mg total) by mouth as needed in the morning and 1 tablet (500 mg total) as needed at noon and 1 tablet (500 mg total) as needed in the evening for muscle spasms  (Patient not taking: No sig reported) 30 tablet 0    rizatriptan (MAXALT) 10 MG tablet Take 1 tablet (10 mg total) by mouth once as needed for migraine May repeat in 2 hours if needed  Max 2/24 hours, 9/month  (Patient not taking: No sig reported) 9 tablet 6    sucralfate (CARAFATE) 1 g/10 mL suspension Take 10 mL (1 g total) by mouth 3 (three) times a day (Patient not taking: Reported on 7/21/2022) 360 mL 2    traZODone (DESYREL) 50 mg tablet Take 1 tablet (50 mg total) by mouth daily at bedtime (Patient not taking: No sig reported) 30 tablet 1    vitamin k 100 MCG tablet Take 200 mcg by mouth daily       No facility-administered medications prior to visit  Labs & Results:        EKG personally reviewed:  EKG today shows sinus rhythm, 59 bpm, normal axis, normal intervals, no pathologic Q-waves, normal ST/T, no ischemic findings  Normal study  Time Spent:  Total time (face-to-face and non-face-to-face) spent on today's visit was 20 minutes   This includes preparation for the visits (i e  reviewing test results), performance of a medically appropriate history and examination, and orders for medications, tests or other procedures  This time is exclusive of procedures performed and time spent teaching  This note was completed in part utilizing [employer] direct voice recognition software  Grammatical errors, random word insertion, spelling mistakes, occasional wrong word or "sound-alike" substitutions and incomplete sentences may be an occasional consequence of the system secondary to software limitations, ambient noise and hardware issues  At the time of dictation, efforts were made to edit, clarify and /or correct errors  Please read the chart carefully and recognize, using context, where substitutions have occurred  If you have any questions or concerns about the context, text or information contained within the body of this dictation, please contact myself, the provider, for further clarification

## 2022-07-25 ENCOUNTER — TELEPHONE (OUTPATIENT)
Dept: FAMILY MEDICINE CLINIC | Facility: CLINIC | Age: 77
End: 2022-07-25

## 2022-07-25 NOTE — TELEPHONE ENCOUNTER
Dr Allen Said office called  Patient needs a pre-op clearance appt for her parathyroidectomy  Dr Allen Said office is faxing over the forms to be filled out

## 2022-07-26 ENCOUNTER — OFFICE VISIT (OUTPATIENT)
Dept: FAMILY MEDICINE CLINIC | Facility: CLINIC | Age: 77
End: 2022-07-26
Payer: COMMERCIAL

## 2022-07-26 VITALS
SYSTOLIC BLOOD PRESSURE: 138 MMHG | RESPIRATION RATE: 16 BRPM | HEIGHT: 67 IN | DIASTOLIC BLOOD PRESSURE: 80 MMHG | HEART RATE: 73 BPM | BODY MASS INDEX: 27.47 KG/M2 | WEIGHT: 175 LBS

## 2022-07-26 DIAGNOSIS — Z01.818 VISIT FOR PRE-OPERATIVE EXAMINATION: ICD-10-CM

## 2022-07-26 DIAGNOSIS — G43.009 MIGRAINE WITHOUT AURA AND WITHOUT STATUS MIGRAINOSUS, NOT INTRACTABLE: ICD-10-CM

## 2022-07-26 DIAGNOSIS — F41.9 ANXIETY: ICD-10-CM

## 2022-07-26 DIAGNOSIS — E21.3 HYPERPARATHYROIDISM (HCC): Primary | ICD-10-CM

## 2022-07-26 DIAGNOSIS — K21.9 GASTROESOPHAGEAL REFLUX DISEASE, UNSPECIFIED WHETHER ESOPHAGITIS PRESENT: ICD-10-CM

## 2022-07-26 DIAGNOSIS — I10 ESSENTIAL HYPERTENSION: ICD-10-CM

## 2022-07-26 PROCEDURE — 1160F RVW MEDS BY RX/DR IN RCRD: CPT | Performed by: FAMILY MEDICINE

## 2022-07-26 PROCEDURE — 99214 OFFICE O/P EST MOD 30 MIN: CPT | Performed by: FAMILY MEDICINE

## 2022-07-26 PROCEDURE — 3079F DIAST BP 80-89 MM HG: CPT | Performed by: FAMILY MEDICINE

## 2022-07-26 PROCEDURE — 3075F SYST BP GE 130 - 139MM HG: CPT | Performed by: FAMILY MEDICINE

## 2022-07-26 NOTE — PROGRESS NOTES
FAMILY PRACTICE PRE-OPERATIVE EVALUATION  [facility]       NAME: Boyd Coffman  AGE: 78 y o  SEX: female  : 1945     DATE: 2022    Family Practice Pre-Operative Evaluation      Chief Complaint: Pre-operative Evaluation     Surgery: parathyroidectomy  Anticipated Date of Surgery: 22  Referring Provider: Dr Harmeet Liu       History of Present Illness:     Boyd Coffman is a 78 y o  female who presents to the office today for a preoperative consultation at the request of surgeon, Dr Harmeet Liu, who plans on performing parathyroidectomy on 22  Planned anesthesia is general  Patient has a bleeding risk of: no recent abnormal bleeding  Patient does not have objections to receiving blood products if needed  Current anti-platelet/anti-coagulation medications that the patient is prescribed includes: none     Assessment of Chronic Conditions:     1  Hypertension - well controlled, continue Toprol XL 25 mg 1/2 tablet at bedtime    2  GERD - stable on omeprazole, follows with GI    3  Migraine - continue Maxalt as needed, follows with Neurology    4   Anxiety - continue trazodone at bedtime and Xanax as needed     Assessment of Cardiac Risk:  · Denies unstable or severe angina or MI in the last 6 weeks or history of stent placement in the last year   · Denies decompensated heart failure (e g  New onset heart failure, NYHA functional class IV heart failure, or worsening existing heart failure)  · Denies significant arrhythmias such as high grade AV block, symptomatic ventricular arrhythmia, newly recognized ventricular tachycardia, supraventricular tachycardia with resting heart rate >100, or symptomatic bradycardia  · Denies severe heart valve disease including aortic stenosis or symptomatic mitral stenosis     Exercise Capacity:  · Able to walk 4 blocks without symptoms?: Yes  · Able to walk 2 flights without symptoms?: Yes    Prior Anesthesia Reactions: No     Personal history of venous thromboembolic disease? No    History of steroid use for >2 weeks within last year? No       Review of Systems:     Review of Systems   Constitutional: Negative for chills, fatigue, fever and unexpected weight change  HENT: Negative for congestion, ear pain and sore throat  Eyes: Negative for pain, discharge, redness, itching and visual disturbance  Respiratory: Negative for cough, shortness of breath and wheezing  Cardiovascular: Negative for chest pain, palpitations and leg swelling  Gastrointestinal: Negative for abdominal pain, blood in stool, diarrhea, nausea and vomiting  Endocrine: Negative for cold intolerance, heat intolerance, polydipsia and polyuria  Genitourinary: Negative for difficulty urinating, dysuria, frequency, hematuria and urgency  Skin: Negative for rash  Neurological: Negative for dizziness, syncope, weakness, numbness and headaches  Hematological: Negative for adenopathy  Psychiatric/Behavioral: Negative for dysphoric mood  The patient is nervous/anxious          Current Problem List:     Patient Active Problem List   Diagnosis    Closed fracture distal radius and ulna, right, initial encounter    Anxiety    Gastroesophageal reflux disease with esophagitis    History of pericarditis    Heart murmur    Primary hyperpar[facility]roidism ([facility] [address] )    Thyroid nodule    Vitamin D deficiency    Atypical chest pain    Migraine without aura and without status migrainosus, not intractable    Dizziness and giddiness    Insomnia    Cervicalgia    Abnormal CT scan of lung    Irritable bowel syndrome with diarrhea    Elevated amylase and lipase    Paresthesias    Carpal tunnel syndrome of left wrist    Dysphonia    Reflux laryngitis    Paresis of left vocal fold    Glottic insufficiency    Muscle tension dysphonia    TMJ dysfunction    Pharyngoesophageal dysphagia    Palpitations    Hallux abducto valgus, bilateral    Pincer nail deformity    Osteoporosis with current pathological fracture    Hypercalcemia    Cervical myofascial pain syndrome    Edema of both ankles    Aneurysm (HCC)       Allergies: Allergies   Allergen Reactions    Ciprofloxacin Myalgia       Current Medications:       Current Outpatient Medications:     albuterol (PROVENTIL HFA,VENTOLIN HFA) 90 mcg/act inhaler, Inhale 2 puffs every 6 (six) hours as needed for wheezing or shortness of breath, Disp: 8 g, Rfl: 0    ALPRAZolam (XANAX) 0 5 mg tablet, Take 1 tablet (0 5 mg total) by mouth daily at bedtime as needed for anxiety or sleep, Disp: 20 tablet, Rfl: 1    ascorbic acid (VITAMIN C) 500 mg tablet, Take 500 mg by mouth daily, Disp: , Rfl:     b complex vitamins tablet, Take 1 tablet by mouth daily, Disp: , Rfl:     Cholecalciferol (Vitamin D) 50 MCG (2000 UT) CAPS, Take 1 capsule (2,000 Units total) by mouth daily (Patient taking differently: Take 1 capsule by mouth daily 4,000 units daily), Disp: , Rfl:     dicyclomine (BENTYL) 10 mg capsule, Take 1 capsule (10 mg total) by mouth 4 (four) times a day (before meals and at bedtime), Disp: 30 capsule, Rfl: 1    furosemide (LASIX) 20 mg tablet, Take 1 tablet (20 mg total) by mouth daily as needed (leg swelling), Disp: 10 tablet, Rfl: 0    Magnesium 100 MG CAPS, magnesium  300 mg qd, Disp: , Rfl:     Menaquinone-7 (Vitamin K2) 100 MCG CAPS, Take 200 mcg by mouth, Disp: , Rfl:     methocarbamol (ROBAXIN) 500 mg tablet, Take 1 tablet (500 mg total) by mouth as needed in the morning and 1 tablet (500 mg total) as needed at noon and 1 tablet (500 mg total) as needed in the evening for muscle spasms  , Disp: 30 tablet, Rfl: 0    metoprolol succinate (TOPROL-XL) 25 mg 24 hr tablet, Take 0 5 tablets (12 5 mg total) by mouth daily at bedtime, Disp: 45 tablet, Rfl: 2    Multiple Vitamins-Calcium (ONE-A-DAY WOMENS PO), Take 1 tablet by mouth daily, Disp: , Rfl:     omeprazole (PriLOSEC) 20 mg delayed release capsule, Take 1 capsule (20 mg total) by mouth 2 (two) times a day before meals, Disp: 180 capsule, Rfl: 2    Riboflavin (VITAMIN B-2 PO), Take by mouth, Disp: , Rfl:     rizatriptan (MAXALT) 10 MG tablet, Take 1 tablet (10 mg total) by mouth once as needed for migraine May repeat in 2 hours if needed   Max 2/24 hours, 9/month , Disp: 9 tablet, Rfl: 6    sucralfate (CARAFATE) 1 g/10 mL suspension, Take 10 mL (1 g total) by mouth 3 (three) times a day, Disp: 360 mL, Rfl: 2    traZODone (DESYREL) 50 mg tablet, Take 1 tablet (50 mg total) by mouth daily at bedtime, Disp: 30 tablet, Rfl: 1    vitamin k 100 MCG tablet, Take 200 mcg by mouth daily, Disp: , Rfl:     Past Medical History:       Past Medical History:   Diagnosis Date    Anxiety     Arthritis     Back pain     Balance problems     Chest pain     heaviness    Difficulty swallowing     Dizziness     Gait disorder     GERD (gastroesophageal reflux disease)     Hypertension     Increased frequency of headaches     Numbness and tingling     Palpitations     Pericarditis     Thyroid disease     Trouble in sleeping         Past Surgical History:   Procedure Laterality Date    APPENDECTOMY      CHOLECYSTECTOMY      laparoscopic    COLONOSCOPY      KNEE SURGERY Left     PERICARDIAL WINDOW      CO OPEN RX DISTAL RADIUS FX, EXTRA-ARTICULAR Right 3/22/2018    Procedure: OPEN REDUCTION W/ INTERNAL FIXATION (ORIF) RADIUS, splint application;  Surgeon: Konrad Fajardo MD;  Location: [facility];  Service: Orthopedics    UPPER GASTROINTESTINAL ENDOSCOPY          Family History   Problem Relation Age of Onset    Kidney failure Mother     Hypertension Mother     Lung cancer Father    Skeet Marika Parkinson White syndrome Daughter     No Known Problems Sister     Substance Abuse Neg Hx     Alcohol abuse Neg Hx     Mental illness Neg Hx         Social History     Socioeconomic History    Marital status: /Civil Union Spouse name: Not on file    Number of children: Not on file    Years of education: Not on file    Highest education level: Not on file   Occupational History    Not on file   Tobacco Use    Smoking status: Never Smoker    Smokeless tobacco: Never Used   Vaping Use    Vaping Use: Never used   Substance and Sexual Activity    Alcohol use: Not Currently     Comment: Rarely     Drug use: No    Sexual activity: Not on file   Other Topics Concern    Not on file   Social History Narrative    WORK:    1   ([employer]y [employer]; [employer]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    2  [email or web address]        HOBBIES:    1  Singing    2  Dancing    3  Hx of ceramics (+ dust)        PETS:    1  Dogs    -  Denies birds        TRAVEL:    -  [place]         EXPOSURES:    Denies mold; down pillows/comforters; hot tubs     Social Determinants of Health     Financial Resource Strain: Not on file   Food Insecurity: Not on file   Transportation Needs: Not on file   Physical Activity: Not on file   Stress: Not on file   Social Connections: Not on file   Intimate Partner Violence: Not on file   Housing Stability: Not on file        Physical Exam:     /80   Pulse 73   Resp 16   Ht 5' 7" (1 702 m)   Wt 79 4 kg (175 lb)   BMI 27 41 kg/m²     Physical Exam  Constitutional:       General: She is not in acute distress  Appearance: She is well-developed  HENT:      Right Ear: Tympanic membrane and ear canal normal       Left Ear: Tympanic membrane and ear canal normal       Mouth/Throat:      Mouth: Mucous membranes are moist       Pharynx: Oropharynx is clear  Eyes:      General: No scleral icterus  Extraocular Movements: Extraocular movements intact  Conjunctiva/sclera: Conjunctivae normal       Pupils: Pupils are equal, round, and reactive to light  Neck:      Thyroid: No thyromegaly  Cardiovascular:      Rate and Rhythm: Normal rate and regular rhythm  Heart sounds: Normal heart sounds   No murmur heard   Pulmonary:      Effort: Pulmonary effort is normal       Breath sounds: Normal breath sounds  No wheezing, rhonchi or rales  Chest:      Chest wall: No tenderness  Abdominal:      General: There is no distension  Palpations: Abdomen is soft  There is no mass  Tenderness: There is no abdominal tenderness  Musculoskeletal:      Cervical back: Neck supple  Right lower leg: No edema  Left lower leg: No edema  Lymphadenopathy:      Cervical: No cervical adenopathy  Neurological:      Mental Status: She is alert and oriented to person, place, and time  Psychiatric:         Mood and Affect: Mood normal          Behavior: Behavior normal          Thought Content: Thought content normal           Data:     Pre-operative work-up    Laboratory Results: I have personally reviewed the pertinent laboratory results/reports      EKG: I have personally reviewed pertinent reports  Chest x-ray: not indicated    Previous cardiopulmonary studies within the past year:  · Echocardiogram: 07/05/2022 showed normal LV size and function, EF 01-45%, grade 1 diastolic dysfunction, no significant valve disease  · Stress Test: stress echo on 04/15/2021 showed no EKG or echocardiographic evidence of ischemia  Assessment & Recommendations:     1  Visit for pre-operative examination     2  Hyperparathyroidism ([address] )     3  Essential hypertension     4  Gastroesophageal reflux disease, unspecified whether esophagitis present     5  Migraine without aura and without status migrainosus, not intractable     6  Anxiety         Pre-Op Evaluation Assessment  78 y o  female with planned surgery: parathyroidectomy    Known risk factors for perioperative complications: None  Pre-Op Evaluation Plan  1  Further preoperative workup as follows:   - None; no further preoperative work-up is required    2   Medication Management/Recommendations:   - Patient has been instructed to avoid herbs or non-directed vitamins the week prior to surgery to ensure no drug interactions with perioperative surgical and anesthetic medications  - Patient should continue beta-blocker medication up through and including the day of surgery  - Patient has been instructed to avoid aspirin containing medications or non-steroidal anti-inflammatory drugs for the week preceding surgery  3  Prophylaxis for cardiac events with perioperative beta-blockers: on Toprol    4  Patient requires further consultation with: None    Clearance  Patient is CLEARED for surgery without any additional cardiac testing       Marisel Nugent MD  [facility]  [address] [id number]  Phone#  [phone]  Fax#  [phone]

## 2022-08-02 ENCOUNTER — TELEPHONE (OUTPATIENT)
Dept: PULMONOLOGY | Facility: CLINIC | Age: 77
End: 2022-08-02

## 2022-08-02 NOTE — TELEPHONE ENCOUNTER
Called patient to review her current status  The patient only reports slight congestion  She reports her breathing is at baseline  She denies any fevers, chills, nausea or vomiting  She has had no recent infections  She has had no recent antibiotics  Patient may use allergy meds as needed  For her upcoming surgical procedure, the patient is a low risk for an intermediate risk procedure  She may proceed as scheduled

## 2022-08-12 RX ORDER — ACETAMINOPHEN 500 MG
500-1000 TABLET ORAL EVERY 6 HOURS PRN
COMMUNITY

## 2022-08-12 NOTE — PRE-PROCEDURE INSTRUCTIONS
Pre-Surgery Instructions:   Medication Instructions    acetaminophen (TYLENOL) 500 mg tablet Uses PRN- OK to take day of surgery    albuterol (PROVENTIL HFA,VENTOLIN HFA) 90 mcg/act inhaler Uses PRN- OK to take day of surgery    ALPRAZolam (XANAX) 0 5 mg tablet Take night before surgery    ascorbic acid (VITAMIN C) 500 mg tablet Stop taking 7 days prior to surgery   b complex vitamins tablet Stop taking 7 days prior to surgery   Cholecalciferol (Vitamin D) 50 MCG (2000 UT) CAPS Stop taking 7 days prior to surgery   dicyclomine (BENTYL) 10 mg capsule Hold day of surgery   furosemide (LASIX) 20 mg tablet Hold day of surgery   Magnesium 100 MG CAPS Stop taking 7 days prior to surgery   methocarbamol (ROBAXIN) 500 mg tablet Hold day of surgery   metoprolol succinate (TOPROL-XL) 25 mg 24 hr tablet Take night before surgery    Multiple Vitamins-Calcium (ONE-A-DAY WOMENS PO) Stop taking 7 days prior to surgery   omeprazole (PriLOSEC) 20 mg delayed release capsule Hold day of surgery   Riboflavin (VITAMIN B-2 PO) Stop taking 7 days prior to surgery   rizatriptan (MAXALT) 10 MG tablet Hold day of surgery   traZODone (DESYREL) 50 mg tablet Take night before surgery   Verbal pre-op instructions given to pt via phone  Pt verbalizes understanding

## 2022-08-16 ENCOUNTER — ANESTHESIA EVENT (OUTPATIENT)
Dept: PERIOP | Facility: HOSPITAL | Age: 77
End: 2022-08-16
Payer: COMMERCIAL

## 2022-08-17 ENCOUNTER — ANESTHESIA (OUTPATIENT)
Dept: PERIOP | Facility: HOSPITAL | Age: 77
End: 2022-08-17
Payer: COMMERCIAL

## 2022-08-17 ENCOUNTER — HOSPITAL ENCOUNTER (OUTPATIENT)
Facility: HOSPITAL | Age: 77
Setting detail: OUTPATIENT SURGERY
Discharge: HOME | End: 2022-08-17
Attending: SPECIALIST | Admitting: SPECIALIST
Payer: COMMERCIAL

## 2022-08-17 VITALS
SYSTOLIC BLOOD PRESSURE: 147 MMHG | RESPIRATION RATE: 20 BRPM | WEIGHT: 175 LBS | TEMPERATURE: 97.6 F | HEIGHT: 68 IN | HEART RATE: 76 BPM | OXYGEN SATURATION: 95 % | DIASTOLIC BLOOD PRESSURE: 76 MMHG | BODY MASS INDEX: 26.52 KG/M2

## 2022-08-17 DIAGNOSIS — E83.52 PARATHYROID RELATED HYPERCALCEMIA (HCC): Primary | ICD-10-CM

## 2022-08-17 DIAGNOSIS — E21.5 PARATHYROID RELATED HYPERCALCEMIA (HCC): Primary | ICD-10-CM

## 2022-08-17 DIAGNOSIS — E21.3 HYPERPARATHYROIDISM (HCC): ICD-10-CM

## 2022-08-17 DIAGNOSIS — E21.0 PRIMARY HYPERPARATHYROIDISM (HCC): ICD-10-CM

## 2022-08-17 PROBLEM — R03.0 ELEVATED BLOOD-PRESSURE READING WITHOUT DIAGNOSIS OF HYPERTENSION: Status: ACTIVE | Noted: 2020-07-30

## 2022-08-17 PROBLEM — R91.1 LUNG NODULE: Status: ACTIVE | Noted: 2019-01-16

## 2022-08-17 PROBLEM — M79.669 CALF PAIN: Status: ACTIVE | Noted: 2020-10-08

## 2022-08-17 PROBLEM — I31.39 PERICARDIAL EFFUSION: Status: ACTIVE | Noted: 2019-11-20

## 2022-08-17 PROBLEM — M79.10 MYALGIA: Status: ACTIVE | Noted: 2020-10-08

## 2022-08-17 PROBLEM — I31.3 PERICARDIAL EFFUSION: Status: ACTIVE | Noted: 2019-11-20

## 2022-08-17 PROBLEM — R06.02 SOBOE (SHORTNESS OF BREATH ON EXERTION): Status: ACTIVE | Noted: 2022-08-17

## 2022-08-17 LAB — PTH-INTACT SERPL-MCNC: 78.9 PG/ML (ref 12–88)

## 2022-08-17 PROCEDURE — 88305 TISSUE EXAM BY PATHOLOGIST: CPT | Performed by: PATHOLOGY

## 2022-08-17 PROCEDURE — 60500 EXPLORE PARATHYROID GLANDS: CPT | Performed by: SPECIALIST

## 2022-08-17 PROCEDURE — 88331 PATH CONSLTJ SURG 1 BLK 1SPC: CPT | Performed by: PATHOLOGY

## 2022-08-17 PROCEDURE — 83970 ASSAY OF PARATHORMONE: CPT | Performed by: SPECIALIST

## 2022-08-17 RX ORDER — NEOSTIGMINE METHYLSULFATE 1 MG/ML
INJECTION INTRAVENOUS AS NEEDED
Status: DISCONTINUED | OUTPATIENT
Start: 2022-08-17 | End: 2022-08-17

## 2022-08-17 RX ORDER — SODIUM CHLORIDE, SODIUM LACTATE, POTASSIUM CHLORIDE, CALCIUM CHLORIDE 600; 310; 30; 20 MG/100ML; MG/100ML; MG/100ML; MG/100ML
INJECTION, SOLUTION INTRAVENOUS CONTINUOUS PRN
Status: DISCONTINUED | OUTPATIENT
Start: 2022-08-17 | End: 2022-08-17

## 2022-08-17 RX ORDER — MAGNESIUM HYDROXIDE 1200 MG/15ML
LIQUID ORAL AS NEEDED
Status: DISCONTINUED | OUTPATIENT
Start: 2022-08-17 | End: 2022-08-17 | Stop reason: HOSPADM

## 2022-08-17 RX ORDER — FENTANYL CITRATE 50 UG/ML
INJECTION, SOLUTION INTRAMUSCULAR; INTRAVENOUS AS NEEDED
Status: DISCONTINUED | OUTPATIENT
Start: 2022-08-17 | End: 2022-08-17

## 2022-08-17 RX ORDER — SUCCINYLCHOLINE/SOD CL,ISO/PF 100 MG/5ML
SYRINGE (ML) INTRAVENOUS AS NEEDED
Status: DISCONTINUED | OUTPATIENT
Start: 2022-08-17 | End: 2022-08-17

## 2022-08-17 RX ORDER — LIDOCAINE HYDROCHLORIDE 10 MG/ML
INJECTION, SOLUTION EPIDURAL; INFILTRATION; INTRACAUDAL; PERINEURAL AS NEEDED
Status: DISCONTINUED | OUTPATIENT
Start: 2022-08-17 | End: 2022-08-17

## 2022-08-17 RX ORDER — HYDROMORPHONE HCL IN WATER/PF 6 MG/30 ML
0.2 PATIENT CONTROLLED ANALGESIA SYRINGE INTRAVENOUS
Status: DISCONTINUED | OUTPATIENT
Start: 2022-08-17 | End: 2022-08-17 | Stop reason: HOSPADM

## 2022-08-17 RX ORDER — ACETAMINOPHEN 325 MG/1
650 TABLET ORAL EVERY 6 HOURS PRN
Status: DISCONTINUED | OUTPATIENT
Start: 2022-08-17 | End: 2022-08-17 | Stop reason: HOSPADM

## 2022-08-17 RX ORDER — ONDANSETRON 2 MG/ML
INJECTION INTRAMUSCULAR; INTRAVENOUS AS NEEDED
Status: DISCONTINUED | OUTPATIENT
Start: 2022-08-17 | End: 2022-08-17

## 2022-08-17 RX ORDER — SODIUM CHLORIDE, SODIUM LACTATE, POTASSIUM CHLORIDE, CALCIUM CHLORIDE 600; 310; 30; 20 MG/100ML; MG/100ML; MG/100ML; MG/100ML
75 INJECTION, SOLUTION INTRAVENOUS CONTINUOUS
Status: DISCONTINUED | OUTPATIENT
Start: 2022-08-17 | End: 2022-08-17 | Stop reason: HOSPADM

## 2022-08-17 RX ORDER — ROCURONIUM BROMIDE 10 MG/ML
INJECTION, SOLUTION INTRAVENOUS AS NEEDED
Status: DISCONTINUED | OUTPATIENT
Start: 2022-08-17 | End: 2022-08-17

## 2022-08-17 RX ORDER — OXYCODONE HYDROCHLORIDE 5 MG/1
5 TABLET ORAL EVERY 6 HOURS PRN
Qty: 6 TABLET | Refills: 0 | Status: SHIPPED | OUTPATIENT
Start: 2022-08-17 | End: 2022-08-24 | Stop reason: ALTCHOICE

## 2022-08-17 RX ORDER — PROPOFOL 10 MG/ML
INJECTION, EMULSION INTRAVENOUS CONTINUOUS PRN
Status: DISCONTINUED | OUTPATIENT
Start: 2022-08-17 | End: 2022-08-17

## 2022-08-17 RX ORDER — PROPOFOL 10 MG/ML
INJECTION, EMULSION INTRAVENOUS AS NEEDED
Status: DISCONTINUED | OUTPATIENT
Start: 2022-08-17 | End: 2022-08-17

## 2022-08-17 RX ORDER — FENTANYL CITRATE/PF 50 MCG/ML
25 SYRINGE (ML) INJECTION
Status: DISCONTINUED | OUTPATIENT
Start: 2022-08-17 | End: 2022-08-17 | Stop reason: HOSPADM

## 2022-08-17 RX ORDER — ONDANSETRON 2 MG/ML
4 INJECTION INTRAMUSCULAR; INTRAVENOUS ONCE AS NEEDED
Status: DISCONTINUED | OUTPATIENT
Start: 2022-08-17 | End: 2022-08-17 | Stop reason: HOSPADM

## 2022-08-17 RX ORDER — OXYCODONE HYDROCHLORIDE 5 MG/1
5 TABLET ORAL EVERY 4 HOURS PRN
Status: DISCONTINUED | OUTPATIENT
Start: 2022-08-17 | End: 2022-08-17 | Stop reason: HOSPADM

## 2022-08-17 RX ORDER — HYDROMORPHONE HCL/PF 1 MG/ML
SYRINGE (ML) INJECTION AS NEEDED
Status: DISCONTINUED | OUTPATIENT
Start: 2022-08-17 | End: 2022-08-17

## 2022-08-17 RX ORDER — GLYCOPYRROLATE 0.2 MG/ML
INJECTION INTRAMUSCULAR; INTRAVENOUS AS NEEDED
Status: DISCONTINUED | OUTPATIENT
Start: 2022-08-17 | End: 2022-08-17

## 2022-08-17 RX ORDER — DEXAMETHASONE SODIUM PHOSPHATE 10 MG/ML
INJECTION, SOLUTION INTRAMUSCULAR; INTRAVENOUS AS NEEDED
Status: DISCONTINUED | OUTPATIENT
Start: 2022-08-17 | End: 2022-08-17

## 2022-08-17 RX ADMIN — PROPOFOL 200 MG: 10 INJECTION, EMULSION INTRAVENOUS at 09:24

## 2022-08-17 RX ADMIN — DEXAMETHASONE SODIUM PHOSPHATE 10 MG: 10 INJECTION, SOLUTION INTRAMUSCULAR; INTRAVENOUS at 09:33

## 2022-08-17 RX ADMIN — SODIUM CHLORIDE, SODIUM LACTATE, POTASSIUM CHLORIDE, AND CALCIUM CHLORIDE: .6; .31; .03; .02 INJECTION, SOLUTION INTRAVENOUS at 10:17

## 2022-08-17 RX ADMIN — SODIUM CHLORIDE, SODIUM LACTATE, POTASSIUM CHLORIDE, AND CALCIUM CHLORIDE: .6; .31; .03; .02 INJECTION, SOLUTION INTRAVENOUS at 09:13

## 2022-08-17 RX ADMIN — FENTANYL CITRATE 25 MCG: 50 INJECTION INTRAMUSCULAR; INTRAVENOUS at 12:36

## 2022-08-17 RX ADMIN — NEOSTIGMINE METHYLSULFATE 5 MG: 1 INJECTION INTRAVENOUS at 11:40

## 2022-08-17 RX ADMIN — Medication 100 MG: at 09:25

## 2022-08-17 RX ADMIN — GLYCOPYRROLATE 0.4 MG: 0.2 INJECTION, SOLUTION INTRAMUSCULAR; INTRAVENOUS at 11:40

## 2022-08-17 RX ADMIN — ONDANSETRON 4 MG: 2 INJECTION INTRAMUSCULAR; INTRAVENOUS at 09:33

## 2022-08-17 RX ADMIN — HYDROMORPHONE HYDROCHLORIDE 0.5 MG: 1 INJECTION, SOLUTION INTRAMUSCULAR; INTRAVENOUS; SUBCUTANEOUS at 09:53

## 2022-08-17 RX ADMIN — PHENYLEPHRINE HYDROCHLORIDE 20 MCG/MIN: 10 INJECTION INTRAVENOUS at 09:32

## 2022-08-17 RX ADMIN — FENTANYL CITRATE 25 MCG: 50 INJECTION INTRAMUSCULAR; INTRAVENOUS at 12:23

## 2022-08-17 RX ADMIN — FENTANYL CITRATE 100 MCG: 50 INJECTION, SOLUTION INTRAMUSCULAR; INTRAVENOUS at 09:21

## 2022-08-17 RX ADMIN — PROPOFOL 80 MCG/KG/MIN: 10 INJECTION, EMULSION INTRAVENOUS at 09:32

## 2022-08-17 RX ADMIN — ROCURONIUM BROMIDE 30 MG: 10 SOLUTION INTRAVENOUS at 09:32

## 2022-08-17 RX ADMIN — ROCURONIUM BROMIDE 10 MG: 10 SOLUTION INTRAVENOUS at 10:26

## 2022-08-17 RX ADMIN — LIDOCAINE HYDROCHLORIDE 50 MG: 10 INJECTION, SOLUTION EPIDURAL; INFILTRATION; INTRACAUDAL at 09:24

## 2022-08-17 RX ADMIN — HYDROMORPHONE HYDROCHLORIDE 0.5 MG: 1 INJECTION, SOLUTION INTRAMUSCULAR; INTRAVENOUS; SUBCUTANEOUS at 11:00

## 2022-08-17 NOTE — H&P
H&P Exam - ENT   Jackie Watters 78 y o  female MRN: [id number]  Unit/Bed#: OR POOL Encounter: [id number]    Assessment/Plan     Assessment:  Hyperparathyroidism  Hypercalcemia    Plan:  Four gland parathyroid exploration, parathyroidectomy    History of Present Illness   HPI:  Jackie Watters is a 78 y o  female who presents for scheduled surgery  Review of Systems   Constitutional: Negative for fatigue  Respiratory: Negative for cough          Historical Information   Past Medical History:   Diagnosis Date    Anxiety     Arthritis     Back pain     Balance problems     Chest pain     heaviness    Difficulty swallowing     Dizziness     Dry cough     Gait disorder     GERD (gastroesophageal reflux disease)     Hyperparathyroidism (HCC)     Hypertension     Increased frequency of headaches     Migraines     Neck pain     Numbness and tingling     bles    Palpitations     Pericarditis     Thyroid disease     nodule    Trouble in sleeping     Wears glasses      Past Surgical History:   Procedure Laterality Date    APPENDECTOMY      CHOLECYSTECTOMY      laparoscopic    COLONOSCOPY      KNEE SURGERY Left     PERICARDIAL WINDOW      VT OPEN RX DISTAL RADIUS FX, EXTRA-ARTICULAR Right 3/22/2018    Procedure: OPEN REDUCTION W/ INTERNAL FIXATION (ORIF) RADIUS, splint application;  Surgeon: Ashkan Pérez MD;  [facility];  Service: Orthopedics    UPPER GASTROINTESTINAL ENDOSCOPY       Social History   Social History     Substance and Sexual Activity   Alcohol Use Yes    Comment: Rarely      Social History     Substance and Sexual Activity   Drug Use No     Social History     Tobacco Use   Smoking Status Never Smoker   Smokeless Tobacco Never Used     E-Cigarette/Vaping    E-Cigarette Use Never User      E-Cigarette/Vaping Substances    Nicotine No     THC No     CBD No     Flavoring No     Other No     Unknown No      Family History: non-contributory    Meds/Allergies all medications and allergies reviewed  Allergies   Allergen Reactions    Ciprofloxacin Myalgia       Objective   Vitals: Blood pressure (!) 178/78, pulse 71, temperature 98 6 °F (37 °C), temperature source Temporal, resp  rate 20, height 5' 8" (1 727 m), weight 79 4 kg (175 lb), SpO2 93 %, not currently breastfeeding  No intake or output data in the 24 hours ending 08/17/22 0906    Invasive Devices  Report    Peripheral Intravenous Line  Duration           Peripheral IV 08/17/22 Dorsal (posterior); Right Hand <1 day                Physical Exam    Constitutional: Oriented to person, place, and time  Well-developed and well-nourished, no apparent distress, non-toxic appearance  Cooperative, able to hear and answer questions without difficulty  Voice: Normal voice quality  Head: Normocephalic, atraumatic  No scars, masses or lesions  Face: Symmetric, no edema, no sinus tenderness  Eyes: Vision grossly intact, extra-ocular movement intact  Right Ear: External ear normal     Left Ear: External ear normal     Nose: External nose well appearing  Oral cavity:  Mucosa moist, lips normal   Tongue mobile  Neck: Trachea midline  No masses or lesions  No palpable adenopathy  Pulmonary/Chest: Normal effort and rate  No respiratory distress  Clear to auscultation  Cardiac: Regular rate and rhythm  Musculoskeletal: Normal range of motion  Neurological: Cranial nerves 2-12 intact  Skin: Skin is warm and dry  Psychiatric: Normal mood and affect  Lab Results: I have personally reviewed pertinent lab results      Imaging: I have personally reviewed pertinent films in PACS  EKG, Pathology, and Other Studies:         Code Status: Prior  Advance Directive and Living Will:      Power of :    POLST:      None

## 2022-08-17 NOTE — ANESTHESIA POSTPROCEDURE EVALUATION
Post-Op Assessment Note    CV Status:  Stable    Pain management: adequate     Mental Status:  Sleepy   Hydration Status:  Euvolemic   PONV Controlled:  Controlled   Airway Patency:  Patent      Post Op Vitals Reviewed: Yes      Staff: Anesthesiologist         No complications documented      /78 (08/17/22 1200)    Temp (!) 96 8 °F (36 °C) (08/17/22 1200)    Pulse 82 (08/17/22 1200)   Resp 18 (08/17/22 1200)    SpO2 100 % (08/17/22 1200)

## 2022-08-17 NOTE — ANESTHESIA PREPROCEDURE EVALUATION
Procedure:  PARATHYROIDECTOMY, POSSIBLE 4 GLAND EXPLORATION (N/A Neck)    Relevant Problems   CARDIO   (+) Atypical chest pain   (+) Heart murmur   (+) Migraine without aura and without status migrainosus, not intractable   (+) SOBOE (shortness of breath on exertion)      ENDO   (+) Primary hyperparathyroidism (HCC)      GI/HEPATIC   (+) Gastroesophageal reflux disease with esophagitis   (+) Pharyngoesophageal dysphagia      MUSCULOSKELETAL   (+) Cervical myofascial pain syndrome      NEURO/PSYCH   (+) Anxiety   (+) Cervical myofascial pain syndrome   (+) History of pericarditis   (+) Migraine without aura and without status migrainosus, not intractable   (+) Paresthesias      PULMONARY   (+) SOBOE (shortness of breath on exertion)      Cardiovascular and Mediastinum   (+) Aneurysm (HCC)   (+) Pericardial effusion      Respiratory   (+) Glottic insufficiency   (+) Paresis of left vocal fold   (+) Reflux laryngitis      Digestive   (+) Irritable bowel syndrome with diarrhea      Endocrine   (+) Thyroid nodule      Nervous and Auditory   (+) Carpal tunnel syndrome of left wrist      Musculoskeletal and Integument   (+) Closed fracture distal radius and ulna, right, initial encounter   (+) Hallux abducto valgus, bilateral   (+) Osteoporosis with current pathological fracture   (+) Pincer nail deformity   (+) TMJ dysfunction      Other   (+) Abnormal CT scan of lung   (+) Calf pain   (+) Cervicalgia   (+) Dizziness and giddiness   (+) Dysphonia   (+) Edema of both ankles   (+) Elevated amylase and lipase   (+) Hypercalcemia   (+) Lung nodule   (+) Muscle tension dysphonia   (+) Myalgia   (+) Palpitations      Lab Results   Component Value Date    SODIUM 139 07/11/2022    K 4 2 07/11/2022     (H) 07/11/2022    CO2 26 07/11/2022    AGAP 4 07/11/2022    BUN 24 07/11/2022    CREATININE 0 83 07/11/2022    GLUC 87 01/08/2021    GLUF 103 (H) 07/11/2022    CALCIUM 10 1 07/11/2022    AST 25 07/01/2022    ALT 37 07/01/2022 ALKPHOS 101 07/01/2022    TP 7 4 07/01/2022    TBILI 0 69 07/01/2022    EGFR 69 07/11/2022     Lab Results   Component Value Date    WBC 6 31 07/01/2022    HGB 14 7 07/01/2022    HCT 43 9 07/01/2022    MCV 90 07/01/2022     07/01/2022         Physical Exam    Airway    Mallampati score: II  TM Distance: >3 FB  Neck ROM: full     Dental       Cardiovascular      Pulmonary      Other Findings        Anesthesia Plan  ASA Score- 2     Anesthesia Type- general with ASA Monitors  Additional Monitors:   Airway Plan: ETT  Plan Factors-Exercise tolerance (METS): >4 METS  Chart reviewed  EKG reviewed  Imaging results reviewed  Existing labs reviewed  Patient summary reviewed  Induction- intravenous  Postoperative Plan- Plan for postoperative opioid use  Planned trial extubation    Informed Consent- Anesthetic plan and risks discussed with patient  I personally reviewed this patient with the CRNA  Discussed and agreed on the Anesthesia Plan with the CRNA  Billy Grider

## 2022-08-17 NOTE — OP NOTE
OPERATIVE REPORT  PATIENT NAME: Pamela Arellano    :  1945  MRN: [id number]  Pt Location: [facility] OR ROOM 03    SURGERY DATE: 2022    Surgeon(s) and Role:     * Jose Putnam MD - Primary     * Roshni Rice MD - Assisting     * Deejay Guidry DMD - Assisting    Preop Diagnosis:  Hyperparathyroidism [facility]) [E21 3]     * Parathyroid related hypercalcemia ([facility] [address] ) [E83 52, E21 5]    Post-Op Diagnosis Codes:     * Hyperparathyroidism ([facility] [address] ) [E21 3]     * Parathyroid related hypercalcemia ([facility] [address] ) [E83 52, E21 5]    Procedure(s):  4 GLAND PARATHYROID EXPLORATION  PARATHYROIDECTOMY X 2    Specimen(s):  ID Type Source Tests Collected by Time Destination   1 : Pretracheal Lymph Node Tissue Lymph Node TISSUE EXAM Jose Putnam MD  0957    2 : Possible Right Upper Parathyroid Tissue Parathyroid TISSUE EXAM Jose Putnam MD  1014    3 : Possible Right Inferior Parathyroid Tissue Parathyroid TISSUE EXAM Jose Putnam MD  1035    4 : Suspected Left Inferior Parathyroid Tissue Parathyroid TISSUE EXAM Jose Putnam MD  1104        Estimated Blood Loss:   Minimal    Drains:  NONE    Anesthesia Type:   General    Operative Indications:  Hyperparathyroidism ([facility] [address] ) [E21 3] and hypercalcemia, pre-operative studies did not localize a parathyroid adenoma  Given her history of osteoporosis treatment via four gland exploration and parathyroidectomy was indicated  Operative Findings:  Small left upper and right lower parathyroid glands identified and removed, confirmed as hypercellular parathyroid tissue on frozen section evaluation  Remaining glands not definitively identified  Bilateral recurrent laryngeal nerves identified and preserved  Complications:   None    Procedure and Technique:  Pamela Arellano was positively identified and transferred onto the operating table in the supine position   Appropriate monitoring devices were put in place, anesthesia was induced and the patient was intubated without difficulty  A shoulder roll was placed, and the patients neck was examined  An appropriate site for skin incision was demarcated 1 cm below the cricoid cartilage  Before proceeding further, the timeout process was completed  Local anesthesia in the form of 1% lidocaine with 1/100,000 epinephrine was then injected to the marked site  The patients neck was then prepped and draped in the usual sterile fashion  Skin incision was then made at the marked site in a transverse fashion using a 15 blade  Dissection continued through subcutaneous tissues and platysma using the 15 blade  Dissection then continued vertically in midline in between strap musculature using DeBakey forceps and Bovie cautery  Strap muscles were then elevated off the underlying thyroid gland on the right side using Bovie cautery and blunt dissection  The strap muscles were then held in a retracted position using an [facility] retractor  Dissection then continued around the thyroid gland, immediately adjacent to the capsule of the gland using bipolar cautery  Dissection started at the inferior aspect of the isthmus, as a possible parathyroid gland was seen to be in this location on the pre-op CT  A mass consistent with a lymph node was identified, corresponding to the finding on CT  It was carefully dissected free of surrounding tissue and sent to pathology for permanent section evaluation  The gland was retracted medially, and dissection continued immediately adjacent to the capsule of the gland using mosquito forceps and bipolar cautery  With medial retraction of the gland the upper parathyroid gland was identified on the right and noted to have a normal appearance and relatively small size  This gland was dissected free and sent for frozen section evaluation  While awaiting frozen section dissection continued    Tissue possibly representing the inferior parathyroid gland was removed and sent for frozen section evaluation  The upper parathyroid specimen was confirmed as hypercellular parathyroid tissue  Dissection continued, and the recurrent laryngeal nerve was identified  The frozen section evaluation of the possible inferior parathyroid gland revealed a lymph node  With further continued dissection the left inferior parathyroid gland was not identified, and a such attention was directed to the right side  Strap muscles were then elevated off the underlying thyroid gland on the left side using Bovie cautery and blunt dissection  The strap muscles were then held in a retracted position using an [employer] retractor  Dissection then continued around the thyroid gland, immediately adjacent to the capsule of the gland using bipolar cautery  The gland was retracted medially, and dissection continued using mosquito forceps and bipolar cautery  With meticulous dissection the inferior parathyroid gland was identified, and noted to be of grossly normal in size and appearance  This gland was dissected free and sent to pathology for frozen section evaluation  Attention was directed superiorly and dissection and medial reflection of the upper pole was accomplished  The recurrent laryngeal nerve was identified and preserved  Frozen section evaluation confirmed the removed left lower parathyroid gland to be hypercellular  In spite of continued meticulous dissection the left superior parathyroid gland was not definitively identified  The surgical site was then irrigated with saline which was suctioned free, and hemostasis was ensured using bipolar cautery  The surgical site was then closed in multiple layers  Strap muscles were re-approximated across midline using 3-0 Vicryl stitches in a running fashion, and the same stitch was used in an interrupted fashion to reapproximate the plastysma and tissue in midline at the same plane    Skin was closed using a 4-0 Monocryl stitch in a running sub-cuticular fashion, followed by a Steristrip  Anesthesia was then reversed, and the patient was awakened, extubated and taken to the recovery room in stable condition  All counts were correct at the end of the case, and no complications were encountered       I was present for the entire procedure    Patient Disposition:  PACU  and extubated and stable      SIGNATURE: Donny Roman MD  DATE: August 17, 2022  TIME: 11:55 AM

## 2022-08-24 ENCOUNTER — OFFICE VISIT (OUTPATIENT)
Dept: FAMILY MEDICINE CLINIC | Facility: CLINIC | Age: 77
End: 2022-08-24
Payer: COMMERCIAL

## 2022-08-24 ENCOUNTER — RA CDI HCC (OUTPATIENT)
Dept: OTHER | Facility: HOSPITAL | Age: 77
End: 2022-08-24

## 2022-08-24 VITALS
DIASTOLIC BLOOD PRESSURE: 78 MMHG | HEART RATE: 95 BPM | SYSTOLIC BLOOD PRESSURE: 122 MMHG | RESPIRATION RATE: 16 BRPM | HEIGHT: 68 IN | BODY MASS INDEX: 26.49 KG/M2 | WEIGHT: 174.8 LBS

## 2022-08-24 DIAGNOSIS — E21.0 PRIMARY HYPERPARATHYROIDISM (HCC): ICD-10-CM

## 2022-08-24 DIAGNOSIS — K21.9 GASTROESOPHAGEAL REFLUX DISEASE, UNSPECIFIED WHETHER ESOPHAGITIS PRESENT: ICD-10-CM

## 2022-08-24 DIAGNOSIS — R10.11 RUQ ABDOMINAL PAIN: Primary | ICD-10-CM

## 2022-08-24 DIAGNOSIS — K58.9 IRRITABLE BOWEL SYNDROME, UNSPECIFIED TYPE: ICD-10-CM

## 2022-08-24 PROCEDURE — 1160F RVW MEDS BY RX/DR IN RCRD: CPT | Performed by: FAMILY MEDICINE

## 2022-08-24 PROCEDURE — 99214 OFFICE O/P EST MOD 30 MIN: CPT | Performed by: FAMILY MEDICINE

## 2022-08-24 PROCEDURE — 3074F SYST BP LT 130 MM HG: CPT | Performed by: FAMILY MEDICINE

## 2022-08-24 PROCEDURE — 3078F DIAST BP <80 MM HG: CPT | Performed by: FAMILY MEDICINE

## 2022-08-24 NOTE — PROGRESS NOTES
[facility]  coding opportunities       Chart reviewed, no opportunity found:   [address]        Patients Insurance     Medicare Insurance: [employer] Ad[employer]

## 2022-08-25 DIAGNOSIS — K58.0 IRRITABLE BOWEL SYNDROME WITH DIARRHEA: ICD-10-CM

## 2022-08-25 RX ORDER — DICYCLOMINE HYDROCHLORIDE 10 MG/1
10 CAPSULE ORAL
Qty: 30 CAPSULE | Refills: 3 | Status: SHIPPED | OUTPATIENT
Start: 2022-08-25 | End: 2022-08-31 | Stop reason: SDUPTHER

## 2022-08-26 NOTE — PROGRESS NOTES
Assessment/Plan:    1  RUQ abdominal pain  - US RUQ 3/3/22 was unremarkable, advised to restart Bentyl 10 mg with meals and at bedtime and follow up with GI as scheduled on 8/31/22, discussed CT abdomen if symptoms persist or worsen    2  Irritable bowel syndrome  - restart Bentyl and follow up with GI     3  Gastroesophageal reflux disease  - stable on omeprazole 20 mg twice a day    4  Primary hyperparathyroidism   - patient underwent parathyroidectomy on 4/76/35 without complications and is doing well, follow up with ENT and Endocrine       Return as scheduled or sooner as needed  The patient understands and agrees with the treatment plan  Subjective:   Chief Complaint   Patient presents with    Abdominal Pain     RUQ      Patient ID: Dav Apodaca is a 78 y o  female who presents with c/o recurrent right sided upper abdominal pain which seems to get worse after meals, she also reports a bulge on the right side that comes and goes and concerned that this could be hernia  She denies fever, chills, nausea, vomiting, diarrhea, constipation, melena or hematochezia  She recently had [address] on 3/3/22 which was unremarkable, also had US abdominal wall on 8/5/21 which was normal  Patient is following with GI for GERD and IBS and was prescribed Bentyl which she is not using        The following portions of the patient's history were reviewed and updated as appropriate: allergies, current medications, past family history, past medical history, past social history, past surgical history and problem list     Past Medical History:   Diagnosis Date    Anxiety     Arthritis     Back pain     Balance problems     Chest pain     heaviness    Difficulty swallowing     Dizziness     Dry cough     Gait disorder     GERD (gastroesophageal reflux disease)     Hyperparathyroidism ([address] )     Hypertension     Increased frequency of headaches     Migraines     Neck pain     Numbness and tingling     bles    Palpitations     Pericarditis     Thyroid disease     nodule    Trouble in sleeping     Wears glasses      Past Surgical History:   Procedure Laterality Date    APPENDECTOMY      CHOLECYSTECTOMY      laparoscopic    COLONOSCOPY      KNEE SURGERY Left     PERICARDIAL WINDOW      VA EXPLORE PARATHYROID GLANDS N/A 8/17/2022    Procedure: PARATHYROIDECTOMY, 4 GLAND EXPLORATION;  Surgeon: Michael Lu MD;  [facility];  Service: ENT    VA OPEN RX DISTAL RADIUS FX, EXTRA-ARTICULAR Right 3/22/2018    Procedure: OPEN REDUCTION W/ INTERNAL FIXATION (ORIF) RADIUS, splint application;  Surgeon: Ashkan Pérez MD;  [facility];  Service: Orthopedics    UPPER GASTROINTESTINAL ENDOSCOPY       Family History   Problem Relation Age of Onset    Kidney failure Mother     Hypertension Mother     Lung cancer Father    Allyson Flight Parkinson White syndrome Daughter     No Known Problems Sister     Substance Abuse Neg Hx     Alcohol abuse Neg Hx     Mental illness Neg Hx      Social History     Socioeconomic History    Marital status: /Civil Union     Spouse name: Not on file    Number of children: Not on file    Years of education: Not on file    Highest education level: Not on file   Occupational History    Not on file   Tobacco Use    Smoking status: Never Smoker    Smokeless tobacco: Never Used   Vaping Use    Vaping Use: Never used   Substance and Sexual Activity    Alcohol use: Yes     Comment: Rarely     Drug use: No    Sexual activity: Yes   Other Topics Concern    Not on file   Social History Narrative    WORK:    1   ([employer]; [employer]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    2  [employer]        HOBBIES:    1  Singing    2  Dancing    3  Hx of ceramics (+ dust)        PETS:    1    Dogs    -  Denies birds        TRAVEL:    -  [place]         EXPOSURES:    Denies mold; down pillows/comforters; hot tubs     Social Determinants of Health     Financial Resource Strain: Not on file   Food Insecurity: Not on file   Transportation Needs: Not on file   Physical Activity: Not on file   Stress: Not on file   Social Connections: Not on file   Intimate Partner Violence: Not on file   Housing Stability: Not on file       Current Outpatient Medications:     acetaminophen (TYLENOL) 500 mg tablet, Take 500-1,000 mg by mouth every 6 (six) hours as needed for mild pain, Disp: , Rfl:     albuterol (PROVENTIL HFA,VENTOLIN HFA) 90 mcg/act inhaler, Inhale 2 puffs every 6 (six) hours as needed for wheezing or shortness of breath, Disp: 8 g, Rfl: 0    ALPRAZolam (XANAX) 0 5 mg tablet, Take 1 tablet (0 5 mg total) by mouth daily at bedtime as needed for anxiety or sleep, Disp: 20 tablet, Rfl: 1    ascorbic acid (VITAMIN C) 500 mg tablet, Take 500 mg by mouth daily, Disp: , Rfl:     b complex vitamins tablet, Take 1 tablet by mouth daily, Disp: , Rfl:     calcium carbonate-vitamin D (OSCAL-D) 500 mg-200 units per tablet, Take 2 tablets by mouth 3 (three) times a day Dr Dellar Fabry will let you know if you should start this medication, Disp: 180 tablet, Rfl: 0    Cholecalciferol (Vitamin D) 50 MCG (2000 UT) CAPS, Take 1 capsule (2,000 Units total) by mouth daily (Patient taking differently: Take 1 capsule by mouth daily 4,000 units daily), Disp: , Rfl:     furosemide (LASIX) 20 mg tablet, Take 1 tablet (20 mg total) by mouth daily as needed (leg swelling), Disp: 10 tablet, Rfl: 0    Magnesium 100 MG CAPS, magnesium  300 mg qd, Disp: , Rfl:     methocarbamol (ROBAXIN) 500 mg tablet, Take 1 tablet (500 mg total) by mouth as needed in the morning and 1 tablet (500 mg total) as needed at noon and 1 tablet (500 mg total) as needed in the evening for muscle spasms  , Disp: 30 tablet, Rfl: 0    metoprolol succinate (TOPROL-XL) 25 mg 24 hr tablet, Take 0 5 tablets (12 5 mg total) by mouth daily at bedtime, Disp: 45 tablet, Rfl: 2    Multiple Vitamins-Calcium (ONE-A-DAY WOMENS PO), Take 1 tablet by mouth daily, Disp: , Rfl:     omeprazole (PriLOSEC) 20 mg delayed release capsule, Take 1 capsule (20 mg total) by mouth 2 (two) times a day before meals, Disp: 180 capsule, Rfl: 2    Riboflavin (VITAMIN B-2 PO), Take by mouth in the morning, Disp: , Rfl:     rizatriptan (MAXALT) 10 MG tablet, Take 1 tablet (10 mg total) by mouth once as needed for migraine May repeat in 2 hours if needed  Max 2/24 hours, 9/month , Disp: 9 tablet, Rfl: 6    traZODone (DESYREL) 50 mg tablet, Take 1 tablet (50 mg total) by mouth daily at bedtime (Patient taking differently: Take 50 mg by mouth daily at bedtime as needed), Disp: 30 tablet, Rfl: 1    dicyclomine (BENTYL) 10 mg capsule, TAKE 1 CAPSULE (10 MG TOTAL) BY MOUTH 4 (FOUR) TIMES A DAY (BEFORE MEALS AND AT BEDTIME), Disp: 30 capsule, Rfl: 3    Review of Systems   Constitutional: Negative for appetite change, chills, fatigue, fever and unexpected weight change  Respiratory: Negative for cough, shortness of breath and wheezing  Cardiovascular: Negative for chest pain, palpitations and leg swelling  Gastrointestinal: Negative for blood in stool, constipation, diarrhea, nausea and vomiting  As per HPI   Genitourinary: Negative for dysuria, flank pain, frequency, hematuria and urgency  Skin: Negative for rash  Neurological: Negative for dizziness, syncope and headaches  Objective:    Vitals:    08/24/22 1449   BP: 122/78   Pulse: 95   Resp: 16   Weight: 79 3 kg (174 lb 12 8 oz)   Height: 5' 8" (1 727 m)        Physical Exam  Constitutional:       General: She is not in acute distress  Appearance: She is well-developed  Neck:      Thyroid: No thyromegaly  Cardiovascular:      Rate and Rhythm: Normal rate and regular rhythm  Heart sounds: Normal heart sounds  No murmur heard  Pulmonary:      Effort: Pulmonary effort is normal       Breath sounds: Normal breath sounds  No wheezing, rhonchi or rales     Abdominal:      General: There is no distension  Palpations: Abdomen is soft  There is no mass  Tenderness: There is no abdominal tenderness  There is no right CVA tenderness, left CVA tenderness, guarding or rebound  Hernia: No hernia is present  Musculoskeletal:      Cervical back: Neck supple  Right lower leg: No edema  Left lower leg: No edema  Lymphadenopathy:      Cervical: No cervical adenopathy  Neurological:      Mental Status: She is alert and oriented to person, place, and time     Psychiatric:         Mood and Affect: Mood normal          Behavior: Behavior normal

## 2022-08-31 ENCOUNTER — OFFICE VISIT (OUTPATIENT)
Dept: GASTROENTEROLOGY | Facility: CLINIC | Age: 77
End: 2022-08-31
Payer: COMMERCIAL

## 2022-08-31 VITALS
HEART RATE: 70 BPM | DIASTOLIC BLOOD PRESSURE: 72 MMHG | HEIGHT: 68 IN | SYSTOLIC BLOOD PRESSURE: 112 MMHG | TEMPERATURE: 98.4 F | BODY MASS INDEX: 26.49 KG/M2 | WEIGHT: 174.8 LBS | OXYGEN SATURATION: 97 %

## 2022-08-31 DIAGNOSIS — K21.9 REFLUX LARYNGITIS: ICD-10-CM

## 2022-08-31 DIAGNOSIS — K58.0 IRRITABLE BOWEL SYNDROME WITH DIARRHEA: ICD-10-CM

## 2022-08-31 DIAGNOSIS — K21.00 GASTROESOPHAGEAL REFLUX DISEASE WITH ESOPHAGITIS WITHOUT HEMORRHAGE: Primary | ICD-10-CM

## 2022-08-31 DIAGNOSIS — J04.0 REFLUX LARYNGITIS: ICD-10-CM

## 2022-08-31 DIAGNOSIS — K64.4 SKIN TAG OF ANUS: ICD-10-CM

## 2022-08-31 PROCEDURE — 99213 OFFICE O/P EST LOW 20 MIN: CPT | Performed by: INTERNAL MEDICINE

## 2022-08-31 RX ORDER — DICYCLOMINE HYDROCHLORIDE 10 MG/1
10 CAPSULE ORAL
Qty: 60 CAPSULE | Refills: 3 | Status: SHIPPED | OUTPATIENT
Start: 2022-08-31 | End: 2022-09-22

## 2022-09-06 NOTE — PROGRESS NOTES
[facility] - Outpatient Follow-up Note  Samuel Alarcon 78 y o  female MRN: [id number]  Encounter: [id number]          ASSESSMENT AND PLAN:      1  Irritable bowel syndrome with diarrhea  -high-fiber diet  -avoid any food that triggers his symptoms  -continue dicyclomine  - dicyclomine (BENTYL) 10 mg capsule; Take 1 capsule (10 mg total) by mouth 4 (four) times a day (before meals and at bedtime)  Dispense: 60 capsule; Refill: 3    2  Gastroesophageal reflux disease with esophagitis without hemorrhage  3  Reflux laryngitis  Continue reflux precautions  Continue omeprazole 20 mg twice a day  She had normal EGD in the past    4  Skin tag of anus  Small external hemorrhoid and skin tag but no polyps  No further evaluation necessary    ______________________________________________________________________    SUBJECTIVE: 78 year female with gastroesophageal reflux disease, irritable bowel syndrome here for follow-up visit  She reports reflux symptoms are improving with omeprazole twice a day esophagus  She she reports lump around her anus     She had normal EGD in March 2021    REVIEW OF SYSTEMS IS OTHERWISE NEGATIVE        Historical Information   Past Medical History:   Diagnosis Date    Anxiety     Arthritis     Back pain     Balance problems     Chest pain     heaviness    Difficulty swallowing     Dizziness     Dry cough     Gait disorder     GERD (gastroesophageal reflux disease)     Hyperparathyroidism (HCC)     Hypertension     Increased frequency of headaches     Migraines     Neck pain     Numbness and tingling     bles    Palpitations     Pericarditis     Thyroid disease     nodule    Trouble in sleeping     Wears glasses      Past Surgical History:   Procedure Laterality Date    APPENDECTOMY      CHOLECYSTECTOMY      laparoscopic    COLONOSCOPY      KNEE SURGERY Left     PERICARDIAL WINDOW      ME EXPLORE PARATHYROID GLANDS N/A 8/17/2022    Procedure: PARATHYROIDECTOMY, 4 GLAND EXPLORATION;  Surgeon: Fernando Pereyra MD;  [facility];  Service: ENT    RI OPEN RX DISTAL RADIUS FX, EXTRA-ARTICULAR Right 3/22/2018    Procedure: OPEN REDUCTION W/ INTERNAL FIXATION (ORIF) RADIUS, splint application;  Surgeon: Kristel Mullen MD;  [facility];  Service: Orthopedics    UPPER GASTROINTESTINAL ENDOSCOPY       Social History   Social History     Substance and Sexual Activity   Alcohol Use Yes    Comment: Rarely      Social History     Substance and Sexual Activity   Drug Use No     Social History     Tobacco Use   Smoking Status Never Smoker   Smokeless Tobacco Never Used     Family History   Problem Relation Age of Onset    Kidney failure Mother     Hypertension Mother     Lung cancer Father    Mellissa Villavicencio Parkinson White syndrome Daughter     No Known Problems Sister     Substance Abuse Neg Hx     Alcohol abuse Neg Hx     Mental illness Neg Hx        Meds/Allergies       Current Outpatient Medications:     acetaminophen (TYLENOL) 500 mg tablet    ALPRAZolam (XANAX) 0 5 mg tablet    ascorbic acid (VITAMIN C) 500 mg tablet    b complex vitamins tablet    Cholecalciferol (Vitamin D) 50 MCG (2000 UT) CAPS    dicyclomine (BENTYL) 10 mg capsule    Magnesium 100 MG CAPS    metoprolol succinate (TOPROL-XL) 25 mg 24 hr tablet    Multiple Vitamins-Calcium (ONE-A-DAY WOMENS PO)    omeprazole (PriLOSEC) 20 mg delayed release capsule    Riboflavin (VITAMIN B-2 PO)    albuterol (PROVENTIL HFA,VENTOLIN HFA) 90 mcg/act inhaler    Calcium Carb-Cholecalciferol (Calcium/Vitamin D) 500-200 MG-UNIT TABS    furosemide (LASIX) 20 mg tablet    methocarbamol (ROBAXIN) 500 mg tablet    rizatriptan (MAXALT) 10 MG tablet    traZODone (DESYREL) 50 mg tablet    Allergies   Allergen Reactions    Ciprofloxacin Myalgia           Objective     Blood pressure 112/72, pulse 70, temperature 98 4 °F (36 9 °C), temperature source Tympanic, height 5' 8" (1 727 m), weight 79 3 kg (174 lb 12 8 oz), SpO2 97 %, not currently breastfeeding  Body mass index is 26 58 kg/m²  PHYSICAL EXAM:      General Appearance:   Alert, cooperative, no distress   HEENT:   Normocephalic, atraumatic, anicteric      Neck:  Supple, symmetrical, trachea midline   Lungs:   Clear to auscultation bilaterally; no rales, rhonchi or wheezing; respirations unlabored    Heart[de-identified]   Regular rate and rhythm; no murmur, rub, or gallop  Abdomen:   Soft, non-tender, non-distended; normal bowel sounds; no masses, no organomegaly    Genitalia:   Deferred    Rectal:   External skin tags   Extremities:  No cyanosis, clubbing or edema    Pulses:  2+ and symmetric    Skin:  No jaundice, rashes, or lesions    Lymph nodes:  No palpable cervical lymphadenopathy        Lab Results:   No visits with results within 1 Day(s) from this visit     Latest known visit with results is:   Admission on 08/17/2022, Discharged on 08/17/2022   Component Date Value    Case Report 08/17/2022                      Value:Surgical Pathology Report                         Case: [id number]                                   Authorizing Provider:  Cathy Dial MD      Collected:           08/17/2022 0957              Ordering Location:     [facility]        Received:            08/17/2022 [place]  Oper[facility]                                                      Pathologist:           Lebron Cote MD                                                                  Intraop:               Lebron Cote MD                                                                  Specimens:   A) - Lymph Node, Pretracheal Lymph Node                                                             B) - Parathyroid, Possible Right Upper Parathyroid                                                  C) - Parathyroid, Possible Right Inferior Parathyroid                                               D) - Parathyroid, Suspected Left Inferior Parathyroid                                      Final Diagnosis 08/17/2022                      Value: This result contains rich text formatting which cannot be displayed here   Additional Information 08/17/2022                      Value: This result contains rich text formatting which cannot be displayed here   Intraoperative Consultat* 08/17/2022                      Value: This result contains rich text formatting which cannot be displayed here  Nallely Belem Gross Description 08/17/2022                      Value: This result contains rich text formatting which cannot be displayed here   PTH 08/17/2022 78 9          Radiology Results:   No results found

## 2022-09-14 ENCOUNTER — OFFICE VISIT (OUTPATIENT)
Dept: ENDOCRINOLOGY | Facility: CLINIC | Age: 77
End: 2022-09-14
Payer: COMMERCIAL

## 2022-09-14 VITALS
DIASTOLIC BLOOD PRESSURE: 88 MMHG | SYSTOLIC BLOOD PRESSURE: 140 MMHG | BODY MASS INDEX: 26.91 KG/M2 | HEART RATE: 66 BPM | WEIGHT: 177 LBS

## 2022-09-14 DIAGNOSIS — E04.2 MULTINODULAR GOITER: Primary | ICD-10-CM

## 2022-09-14 DIAGNOSIS — E21.0 PRIMARY HYPERPARATHYROIDISM (HCC): ICD-10-CM

## 2022-09-14 DIAGNOSIS — M80.00XA OSTEOPOROSIS WITH CURRENT PATHOLOGICAL FRACTURE, UNSPECIFIED OSTEOPOROSIS TYPE, INITIAL ENCOUNTER: ICD-10-CM

## 2022-09-14 DIAGNOSIS — E04.1 THYROID NODULE: ICD-10-CM

## 2022-09-14 DIAGNOSIS — E89.2 H/O PARATHYROIDECTOMY (HCC): ICD-10-CM

## 2022-09-14 DIAGNOSIS — E83.52 HYPERCALCEMIA: ICD-10-CM

## 2022-09-14 DIAGNOSIS — E55.9 VITAMIN D DEFICIENCY: ICD-10-CM

## 2022-09-14 DIAGNOSIS — E21.3 HYPERPARATHYROIDISM (HCC): ICD-10-CM

## 2022-09-14 PROBLEM — Z98.890 H/O PARATHYROIDECTOMY: Status: ACTIVE | Noted: 2022-09-14

## 2022-09-14 PROBLEM — Z90.89 H/O PARATHYROIDECTOMY: Status: ACTIVE | Noted: 2022-09-14

## 2022-09-14 PROCEDURE — 99214 OFFICE O/P EST MOD 30 MIN: CPT | Performed by: INTERNAL MEDICINE

## 2022-09-14 NOTE — PROGRESS NOTES
Kash Coughlin 78 y o  female MRN: [id number]    Encounter: [id number]      Assessment/Plan     Assessment: This is a 78y o -year-old female with osteoporosis     Plan:    Diagnoses and all orders for this visit:    Multinodular goiter  Will obtain ultrasound of thyroid, last ultrasound was done in 2016  Will follow-up  -     US thyroid; Future    Hyperparathyroidism ([facility] [address] )  Status post parathyroid surgery  Obtain PTH, basic metabolic profile  Previously parathyroid scan and the CT scan of parathyroids was negative  For parathyroid adenoma  Patient underwent exploration parathyroidectomy, 3 parathyroid glands were removed   ) - Parathyroid, Possible Right Upper Parathyroid                                                   C) - Parathyroid, Possible Right Inferior Parathyroid                                                D) - Parathyroid, Suspected Left Inferior Parathyroid                                                     -     PTH, intact- Lab Collect; Future  -     Basic metabolic panel; Future  -     PTH, intact- Lab Collect; Future    Osteoporosis with current pathological fracture, unspecified osteoporosis type, initial encounter  Patient has history of osteopenia at lumbar spine as well as femoral neck,  She has T-score of -3 5 at forearm  As she has undergone surgery for primary hyperparathyroidism, will continue to monitor and see if there is any improvement in bone density  If the bone density is not improving, would consider bisphosphonates  Hypercalcemia  Repeat calcium and PTH now  -     Basic metabolic panel; Future  -     PTH, intact- Lab Collect; Future    Primary hyperparathyroidism [facility])  Patient underwent parathyroid exploration surgery, with removal of 3 parathyroid gland  Obtain calcium and PTH  -     Basic metabolic panel; Future  -     Albumin Lab Collect; Future  -     PTH, intact- Lab Collect;  Future      Vitamin D deficiency  Obtain vitamin-D level  -     Vitamin D 25 hydroxy; Future    Thyroid nodules  Obtain ultrasound of thyroid, to assess thyroid nodules  No history of radiation to the neck or chest  -     US thyroid; Future    CC:   Multiple thyroid nodules, history of parathyroid surgery for primary hyperparathyroidism  History of Present Illness     HPI:    Bianka Mobley is 74-year-old woman with medical history of osteoporosis, hyperparathyroidism, hypercalcemia, vitamin-D deficiency, thyroid nodule is here for   Management of parathyroid problem as well as osteoporosis  Reviewed blood work from medical records patient had calcium in the range of 9 9 to 10 8    Patient gives history of thyroid nodules previous  She had a CT scan for parathyroids, which was done in January 2022, which did not detect any parathyroid lesion  Diffuse osteopenia with degenerative changes of cervical spine  Bilateral thyroid nodules identified which are close to 1 cm  Patient denies any obstructive symptoms  She denies history of kidney stones  She denies history of fracture bones    She underwent parathyroid exploration surgery with removal of 3 parathyroid glands, in August 2022  Recovering well    She had a DEXA scan done in June 2022 for forearm which showed T-score of -3 7 suggestive of osteoporosis    Thyroid ultrasound was done in 2016  COMPARISON:   Thyroid ultrasound dated June 24, 2013  FINDINGS:     The bilateral thyroid lobes are mildly heterogeneous in echotexture  The right thyroid lobe measures 5 6 x 1 9 x 1 1 cm,   5 4 ml and the left thyroid lobe measures 5 8 x 1 9 x 1 5 cm 7 9 ml  There are several subcentimeter right thyroid lobe nodules  The   largest measures 0 8 x 0 6 x 0 7 cm in the inferior   right lower lobe  On the left, there is a dominant 2 x 1 4 x 0 8 cm   solid isoechoic nodule with hypoechoic rim in the   mid left thyroid lobe  Overall, the nodule is smaller with less   pronounced cystic component   Previously this nodule   measured 2 x 1 7 x 1 2 cm Additional subcentimeter left thyroid   nodules are noted  The thyroid isthmus is unremarkable  IMPRESSION:     Interval decrease in size of dominant 1 4 cm left thyroid lobe nodule   with less pronounced cystic component  Additional   subcentimeter bilateral nodules  Latest Reference Range & Units 03/10/20 14:17 09/13/21 08:36 07/11/22 09:34 08/17/22 12:18 09/15/22 07:20   PARATHYROID HORMONE 18 4 - 80 1 pg/mL 111 7 (H) 101 8 (H) 94 0 (H) 78 9 91 6 (H)        Latest Reference Range & Units 09/15/22 07:20   Sodium 135 - 147 mmol/L 141   Potassium 3 5 - 5 3 mmol/L 4 5   Chloride 96 - 108 mmol/L 109 (H)   CO2 21 - 32 mmol/L 27   Anion Gap 4 - 13 mmol/L 5   BUN 5 - 25 mg/dL 20   Creatinine 0 60 - 1 30 mg/dL 0 88   GLUCOSE FASTING 65 - 99 mg/dL 82   Calcium 8 3 - 10 1 mg/dL 9 9   Albumin 3 5 - 5 0 g/dL 3 3 (L)   eGFR ml/min/1 73sq m 64   Cholesterol See Comment mg/dL 220 (H)   Triglycerides See Comment mg/dL 86   HDL >=50 mg/dL 61   LDL Calculated 0 - 100 mg/dL 142 (H)   Vit D, 25-Hydroxy 30 0 - 100 0 ng/mL 38 1   (H): Data is abnormally high  (L): Data is abnormally low    Review of Systems   Constitutional: Positive for fatigue  Negative for activity change, diaphoresis, fever and unexpected weight change  HENT: Negative  Eyes: Negative for visual disturbance  Respiratory: Negative for cough, chest tightness and shortness of breath  Cardiovascular: Negative for chest pain, palpitations and leg swelling  Gastrointestinal: Negative for abdominal pain, constipation, diarrhea, nausea and vomiting  Endocrine: Negative for cold intolerance, heat intolerance, polydipsia, polyphagia and polyuria  Genitourinary: Negative for dysuria, enuresis, frequency and urgency  Musculoskeletal: Positive for arthralgias and myalgias  Skin: Negative for pallor, rash and wound  Allergic/Immunologic: Negative  Neurological: Negative for dizziness, tremors, weakness and numbness  Hematological: Negative  Psychiatric/Behavioral: Negative          Historical Information   Past Medical History:   Diagnosis Date    Anxiety     Arthritis     Back pain     Balance problems     Chest pain     heaviness    Difficulty swallowing     Dizziness     Dry cough     Gait disorder     GERD (gastroesophageal reflux disease)     Hyperparathyroidism (HCC)     Hypertension     Increased frequency of headaches     Migraines     Neck pain     Numbness and tingling     bles    Palpitations     Pericarditis     Thyroid disease     nodule    Trouble in sleeping     Wears glasses      Past Surgical History:   Procedure Laterality Date    APPENDECTOMY      CHOLECYSTECTOMY      laparoscopic    COLONOSCOPY      KNEE SURGERY Left     PERICARDIAL WINDOW      WA EXPLORE PARATHYROID GLANDS N/A 8/17/2022    Procedure: PARATHYROIDECTOMY, 4 GLAND EXPLORATION;  Surgeon: Fernando Pereyra MD;  [facility];  Service: ENT    WA OPEN RX DISTAL RADIUS FX, EXTRA-ARTICULAR Right 3/22/2018    Procedure: OPEN REDUCTION W/ INTERNAL FIXATION (ORIF) RADIUS, splint application;  Surgeon: Kristel Mullen MD;  [facility];  Service: Orthopedics    UPPER GASTROINTESTINAL ENDOSCOPY       Social History   Social History     Substance and Sexual Activity   Alcohol Use Yes    Comment: Rarely      Social History     Substance and Sexual Activity   Drug Use No     Social History     Tobacco Use   Smoking Status Never Smoker   Smokeless Tobacco Never Used     Family History:   Family History   Problem Relation Age of Onset    Kidney failure Mother     Hypertension Mother     Lung cancer Father    Mellissa Fickle Parkinson White syndrome Daughter     No Known Problems Sister     Substance Abuse Neg Hx     Alcohol abuse Neg Hx     Mental illness Neg Hx        Meds/Allergies   Current Outpatient Medications   Medication Sig Dispense Refill    acetaminophen (TYLENOL) 500 mg tablet Take 500-1,000 mg by mouth every 6 (six) hours as needed for mild pain      ALPRAZolam (XANAX) 0 5 mg tablet Take 1 tablet (0 5 mg total) by mouth daily at bedtime as needed for anxiety or sleep 20 tablet 1    ascorbic acid (VITAMIN C) 500 mg tablet Take 500 mg by mouth daily      b complex vitamins tablet Take 1 tablet by mouth daily      Cholecalciferol (Vitamin D) 50 MCG (2000 UT) CAPS Take 1 capsule (2,000 Units total) by mouth daily (Patient taking differently: Take 1 capsule by mouth daily 4,000 units daily)      dicyclomine (BENTYL) 10 mg capsule Take 1 capsule (10 mg total) by mouth 4 (four) times a day (before meals and at bedtime) 60 capsule 3    Magnesium 100 MG CAPS magnesium   300 mg qd      metoprolol succinate (TOPROL-XL) 25 mg 24 hr tablet Take 0 5 tablets (12 5 mg total) by mouth daily at bedtime 45 tablet 2    Multiple Vitamins-Calcium (ONE-A-DAY WOMENS PO) Take 1 tablet by mouth daily      omeprazole (PriLOSEC) 20 mg delayed release capsule Take 1 capsule (20 mg total) by mouth 2 (two) times a day before meals 180 capsule 2    Riboflavin (VITAMIN B-2 PO) Take by mouth in the morning      albuterol (PROVENTIL HFA,VENTOLIN HFA) 90 mcg/act inhaler Inhale 2 puffs every 6 (six) hours as needed for wheezing or shortness of breath (Patient not taking: No sig reported) 8 g 0    Calcium Carb-Cholecalciferol (Calcium/Vitamin D) 500-200 MG-UNIT TABS TAKE 2 TABS BY MOUTH 3 TIMES A DAY  DR Feliberto Sylvester WILL LET YOU KNOW IF YOU SHOULD START MEDICATION (Patient not taking: No sig reported) 540 tablet 1    furosemide (LASIX) 20 mg tablet Take 1 tablet (20 mg total) by mouth daily as needed (leg swelling) (Patient not taking: No sig reported) 10 tablet 0    methocarbamol (ROBAXIN) 500 mg tablet Take 1 tablet (500 mg total) by mouth as needed in the morning and 1 tablet (500 mg total) as needed at noon and 1 tablet (500 mg total) as needed in the evening for muscle spasms   (Patient not taking: No sig reported) 30 tablet 0    rizatriptan (MAXALT) 10 MG tablet Take 1 tablet (10 mg total) by mouth once as needed for migraine May repeat in 2 hours if needed  Max 2/24 hours, 9/month  (Patient not taking: No sig reported) 9 tablet 6    traZODone (DESYREL) 50 mg tablet Take 1 tablet (50 mg total) by mouth daily at bedtime (Patient not taking: No sig reported) 30 tablet 1     No current facility-administered medications for this visit  Allergies   Allergen Reactions    Ciprofloxacin Myalgia       Objective   Vitals: Blood pressure 140/88, pulse 66, weight 80 3 kg (177 lb), not currently breastfeeding  Physical Exam  Vitals reviewed  Constitutional:       General: She is not in acute distress  Appearance: Normal appearance  She is well-developed  She is not diaphoretic  HENT:      Head: Normocephalic and atraumatic  Eyes:      General:         Right eye: No discharge  Left eye: No discharge  Conjunctiva/sclera: Conjunctivae normal    Neck:      Thyroid: No thyromegaly  Cardiovascular:      Rate and Rhythm: Normal rate and regular rhythm  Heart sounds: Normal heart sounds  No murmur heard  Pulmonary:      Effort: Pulmonary effort is normal  No respiratory distress  Breath sounds: Normal breath sounds  No wheezing  Abdominal:      General: Bowel sounds are normal  There is no distension  Palpations: Abdomen is soft  Musculoskeletal:         General: No tenderness or deformity  Normal range of motion  Cervical back: Normal range of motion and neck supple  Skin:     General: Skin is warm and dry  Findings: No erythema or rash  Neurological:      General: No focal deficit present  Mental Status: She is alert and oriented to person, place, and time  Cranial Nerves: No cranial nerve deficit  Motor: No abnormal muscle tone  Deep Tendon Reflexes: Reflexes are normal and symmetric   Reflexes normal    Psychiatric:         Mood and Affect: Mood normal          Behavior: Behavior normal  The history was obtained from the review of the chart, patient  Lab Results:   Lab Results   Component Value Date/Time    Potassium 4 2 07/11/2022 09:34 AM    Potassium 4 5 07/01/2022 10:10 AM    Potassium 4 3 12/10/2021 08:26 AM    Chloride 109 (H) 07/11/2022 09:34 AM    Chloride 108 07/01/2022 10:10 AM    Chloride 108 12/10/2021 08:26 AM    CO2 26 07/11/2022 09:34 AM    CO2 29 07/01/2022 10:10 AM    CO2 22 12/10/2021 08:26 AM    BUN 24 07/11/2022 09:34 AM    BUN 21 07/01/2022 10:10 AM    BUN 22 12/10/2021 08:26 AM    Creatinine 0 83 07/11/2022 09:34 AM    Creatinine 0 82 07/01/2022 10:10 AM    Creatinine 0 88 12/10/2021 08:26 AM    Glucose, Fasting 103 (H) 07/11/2022 09:34 AM    Glucose, Fasting 79 07/01/2022 10:10 AM    Glucose, Fasting 70 12/10/2021 08:26 AM    Calcium 10 1 07/11/2022 09:34 AM    Calcium 10 0 07/01/2022 10:10 AM    Calcium 10 8 (H) 12/10/2021 08:26 AM    eGFR 69 07/11/2022 09:34 AM    eGFR 70 07/01/2022 10:10 AM    eGFR 65 12/10/2021 08:26 AM    TSH 3RD GENERATON 0 747 07/01/2022 10:10 AM    TSH 3RD GENERATON 1 260 12/10/2021 08:26 AM    PTH 78 9 08/17/2022 12:18 PM    PTH 94 0 (H) 07/11/2022 09:34 AM    Vit D, 25-Hydroxy 38 9 07/11/2022 09:34 AM         Imaging Studies:                 I have personally reviewed pertinent reports  Portions of the record may have been created with voice recognition software  Occasional wrong word or "sound a like" substitutions may have occurred due to the inherent limitations of voice recognition software  Read the chart carefully and recognize, using context, where substitutions have occurred

## 2022-09-15 ENCOUNTER — APPOINTMENT (OUTPATIENT)
Dept: LAB | Facility: CLINIC | Age: 77
End: 2022-09-15
Payer: COMMERCIAL

## 2022-09-15 DIAGNOSIS — E21.0 PRIMARY HYPERPARATHYROIDISM (HCC): ICD-10-CM

## 2022-09-15 DIAGNOSIS — E21.3 HYPERPARATHYROIDISM (HCC): ICD-10-CM

## 2022-09-15 DIAGNOSIS — E83.52 HYPERCALCEMIA: ICD-10-CM

## 2022-09-15 DIAGNOSIS — E78.5 HYPERLIPIDEMIA, UNSPECIFIED HYPERLIPIDEMIA TYPE: ICD-10-CM

## 2022-09-15 DIAGNOSIS — E55.9 VITAMIN D DEFICIENCY: ICD-10-CM

## 2022-09-15 LAB
25(OH)D3 SERPL-MCNC: 38.1 NG/ML (ref 30–100)
ALBUMIN SERPL BCP-MCNC: 3.3 G/DL (ref 3.5–5)
ANION GAP SERPL CALCULATED.3IONS-SCNC: 5 MMOL/L (ref 4–13)
BUN SERPL-MCNC: 20 MG/DL (ref 5–25)
CALCIUM SERPL-MCNC: 9.9 MG/DL (ref 8.3–10.1)
CHLORIDE SERPL-SCNC: 109 MMOL/L (ref 96–108)
CHOLEST SERPL-MCNC: 220 MG/DL
CO2 SERPL-SCNC: 27 MMOL/L (ref 21–32)
CREAT SERPL-MCNC: 0.88 MG/DL (ref 0.6–1.3)
GFR SERPL CREATININE-BSD FRML MDRD: 64 ML/MIN/1.73SQ M
GLUCOSE P FAST SERPL-MCNC: 82 MG/DL (ref 65–99)
HDLC SERPL-MCNC: 61 MG/DL
LDLC SERPL CALC-MCNC: 142 MG/DL (ref 0–100)
POTASSIUM SERPL-SCNC: 4.5 MMOL/L (ref 3.5–5.3)
PTH-INTACT SERPL-MCNC: 91.6 PG/ML (ref 18.4–80.1)
SODIUM SERPL-SCNC: 141 MMOL/L (ref 135–147)
TRIGL SERPL-MCNC: 86 MG/DL

## 2022-09-15 PROCEDURE — 82040 ASSAY OF SERUM ALBUMIN: CPT

## 2022-09-15 PROCEDURE — 83970 ASSAY OF PARATHORMONE: CPT

## 2022-09-15 PROCEDURE — 80061 LIPID PANEL: CPT

## 2022-09-15 PROCEDURE — 36415 COLL VENOUS BLD VENIPUNCTURE: CPT

## 2022-09-15 PROCEDURE — 80048 BASIC METABOLIC PNL TOTAL CA: CPT

## 2022-09-15 PROCEDURE — 82306 VITAMIN D 25 HYDROXY: CPT

## 2022-09-16 ENCOUNTER — TELEPHONE (OUTPATIENT)
Dept: ENDOCRINOLOGY | Facility: CLINIC | Age: 77
End: 2022-09-16

## 2022-09-16 DIAGNOSIS — E21.3 HYPERPARATHYROIDISM (HCC): Primary | ICD-10-CM

## 2022-09-16 NOTE — TELEPHONE ENCOUNTER
Spoke with pt, and discussed that her PTH is still elevated, we should repeat the PTH and calcium again in 3 months and follow-up    Also discussed that she should get the ultrasound for thyroid in 3 months  Patient appreciated a call and verbalized understanding    Rama Wang MD

## 2022-09-16 NOTE — TELEPHONE ENCOUNTER
----- Message from Aquiles Varela MD sent at 9/16/2022 12:52 PM EDT -----  Please call the patient regarding her results, PTH is slightly elevated, calcium is 10 2, will have to continue to monitor PTH and calcium closely, she should follow-up in March with blood work prior, also she should keep herself hydrated  Stop calcium supplementation

## 2022-09-21 ENCOUNTER — OFFICE VISIT (OUTPATIENT)
Dept: FAMILY MEDICINE CLINIC | Facility: CLINIC | Age: 77
End: 2022-09-21
Payer: COMMERCIAL

## 2022-09-21 ENCOUNTER — APPOINTMENT (OUTPATIENT)
Dept: LAB | Facility: CLINIC | Age: 77
End: 2022-09-21
Payer: COMMERCIAL

## 2022-09-21 ENCOUNTER — APPOINTMENT (OUTPATIENT)
Dept: RADIOLOGY | Facility: CLINIC | Age: 77
End: 2022-09-21
Payer: COMMERCIAL

## 2022-09-21 VITALS
RESPIRATION RATE: 16 BRPM | DIASTOLIC BLOOD PRESSURE: 80 MMHG | BODY MASS INDEX: 26.66 KG/M2 | SYSTOLIC BLOOD PRESSURE: 140 MMHG | WEIGHT: 175.9 LBS | HEART RATE: 79 BPM | TEMPERATURE: 98.9 F | HEIGHT: 68 IN

## 2022-09-21 DIAGNOSIS — R20.2 PARESTHESIA OF BILATERAL LEGS: ICD-10-CM

## 2022-09-21 DIAGNOSIS — R05.9 COUGH: ICD-10-CM

## 2022-09-21 DIAGNOSIS — K21.9 GASTROESOPHAGEAL REFLUX DISEASE, UNSPECIFIED WHETHER ESOPHAGITIS PRESENT: ICD-10-CM

## 2022-09-21 DIAGNOSIS — R39.9 URINARY SYMPTOM OR SIGN: Primary | ICD-10-CM

## 2022-09-21 LAB
BASOPHILS # BLD AUTO: 0.03 THOUSANDS/ΜL (ref 0–0.1)
BASOPHILS NFR BLD AUTO: 1 % (ref 0–1)
EOSINOPHIL # BLD AUTO: 0.24 THOUSAND/ΜL (ref 0–0.61)
EOSINOPHIL NFR BLD AUTO: 4 % (ref 0–6)
ERYTHROCYTE [DISTWIDTH] IN BLOOD BY AUTOMATED COUNT: 13 % (ref 11.6–15.1)
HCT VFR BLD AUTO: 44.8 % (ref 34.8–46.1)
HGB BLD-MCNC: 14.5 G/DL (ref 11.5–15.4)
IMM GRANULOCYTES # BLD AUTO: 0.02 THOUSAND/UL (ref 0–0.2)
IMM GRANULOCYTES NFR BLD AUTO: 0 % (ref 0–2)
LYMPHOCYTES # BLD AUTO: 1.79 THOUSANDS/ΜL (ref 0.6–4.47)
LYMPHOCYTES NFR BLD AUTO: 27 % (ref 14–44)
MCH RBC QN AUTO: 30.5 PG (ref 26.8–34.3)
MCHC RBC AUTO-ENTMCNC: 32.4 G/DL (ref 31.4–37.4)
MCV RBC AUTO: 94 FL (ref 82–98)
MONOCYTES # BLD AUTO: 0.72 THOUSAND/ΜL (ref 0.17–1.22)
MONOCYTES NFR BLD AUTO: 11 % (ref 4–12)
NEUTROPHILS # BLD AUTO: 3.81 THOUSANDS/ΜL (ref 1.85–7.62)
NEUTS SEG NFR BLD AUTO: 58 % (ref 43–75)
NRBC BLD AUTO-RTO: 0 /100 WBCS
PLATELET # BLD AUTO: 215 THOUSANDS/UL (ref 149–390)
PMV BLD AUTO: 10.4 FL (ref 8.9–12.7)
RBC # BLD AUTO: 4.76 MILLION/UL (ref 3.81–5.12)
SL AMB  POCT GLUCOSE, UA: ABNORMAL
SL AMB LEUKOCYTE ESTERASE,UA: ABNORMAL
SL AMB POCT BILIRUBIN,UA: ABNORMAL
SL AMB POCT BLOOD,UA: ABNORMAL
SL AMB POCT CLARITY,UA: CLEAR
SL AMB POCT COLOR,UA: YELLOW
SL AMB POCT KETONES,UA: ABNORMAL
SL AMB POCT NITRITE,UA: ABNORMAL
SL AMB POCT PH,UA: 5
SL AMB POCT SPECIFIC GRAVITY,UA: 1
SL AMB POCT URINE PROTEIN: ABNORMAL
SL AMB POCT UROBILINOGEN: 0.2
TSH SERPL DL<=0.05 MIU/L-ACNC: 0.94 UIU/ML (ref 0.45–4.5)
VIT B12 SERPL-MCNC: 873 PG/ML (ref 100–900)
WBC # BLD AUTO: 6.61 THOUSAND/UL (ref 4.31–10.16)

## 2022-09-21 PROCEDURE — 81002 URINALYSIS NONAUTO W/O SCOPE: CPT | Performed by: FAMILY MEDICINE

## 2022-09-21 PROCEDURE — 36415 COLL VENOUS BLD VENIPUNCTURE: CPT

## 2022-09-21 PROCEDURE — 99214 OFFICE O/P EST MOD 30 MIN: CPT | Performed by: FAMILY MEDICINE

## 2022-09-21 PROCEDURE — 3077F SYST BP >= 140 MM HG: CPT | Performed by: FAMILY MEDICINE

## 2022-09-21 PROCEDURE — 1160F RVW MEDS BY RX/DR IN RCRD: CPT | Performed by: FAMILY MEDICINE

## 2022-09-21 PROCEDURE — 85025 COMPLETE CBC W/AUTO DIFF WBC: CPT

## 2022-09-21 PROCEDURE — 71046 X-RAY EXAM CHEST 2 VIEWS: CPT

## 2022-09-21 PROCEDURE — 3079F DIAST BP 80-89 MM HG: CPT | Performed by: FAMILY MEDICINE

## 2022-09-21 PROCEDURE — 86618 LYME DISEASE ANTIBODY: CPT

## 2022-09-21 PROCEDURE — 84443 ASSAY THYROID STIM HORMONE: CPT

## 2022-09-21 PROCEDURE — 87086 URINE CULTURE/COLONY COUNT: CPT | Performed by: FAMILY MEDICINE

## 2022-09-21 PROCEDURE — 82607 VITAMIN B-12: CPT

## 2022-09-21 RX ORDER — OMEPRAZOLE 20 MG/1
20 CAPSULE, DELAYED RELEASE ORAL
Qty: 180 CAPSULE | Refills: 2 | Status: SHIPPED | OUTPATIENT
Start: 2022-09-21

## 2022-09-21 NOTE — PROGRESS NOTES
Assessment/Plan:    1  Urinary frequency  - urine dip showed trace blood and was otherwise negative, will obtain urine culture and call with the results    2  Paresthesia of bilateral legs  - will obtain lab work, discussed EMG for persistent symptoms  - CBC and differential; Future  - Lyme Total Antibody Profile with reflex to WB; Future  - Vitamin B12; Future  - TSH, 3rd generation with Free T4 reflex; Future    3  Cough  - will obtain chest x-ray  - XR chest pa & lateral; Future    4  Gastroesophageal reflux disease  - doing well, continue omeprazole  - omeprazole (PriLOSEC) 20 mg delayed release capsule; Take 1 capsule (20 mg total) by mouth 2 (two) times a day before meals  Dispense: 180 capsule; Refill: 2       Return as scheduled or sooner for persistent ro worsening symptoms  The patient understands and agrees with the treatment plan  Subjective:   Chief Complaint   Patient presents with    Numbness     Since having surgery       Patient ID: Liliana Pacheco is a 78 y o  female who presents today with c/o intermittent tingling in her lower legs for the past few weeks, no leg weakness, no lower back pain  Patient also reports suprapubic pressure, increased frequency of urination and having chills on and off for the past several days  She is also having intermittent non productive cough and tickle in the back of her throat since undergoing parathyroidectomy on 8/17/22, no fevers, no purulent mucus production, no chest pain or shortness of breath         The following portions of the patient's history were reviewed and updated as appropriate: allergies, current medications, past family history, past medical history, past social history, past surgical history and problem list     Past Medical History:   Diagnosis Date    Anxiety     Arthritis     Back pain     Balance problems     Chest pain     heaviness    Difficulty swallowing     Dizziness     Dry cough     Gait disorder     GERD (gastroesophageal reflux disease)     Hyperparathyroidism (HCC)     Hypertension     Increased frequency of headaches     Migraines     Neck pain     Numbness and tingling     bles    Palpitations     Pericarditis     Thyroid disease     nodule    Trouble in sleeping     Wears glasses      Past Surgical History:   Procedure Laterality Date    APPENDECTOMY      CHOLECYSTECTOMY      laparoscopic    COLONOSCOPY      KNEE SURGERY Left     PERICARDIAL WINDOW      WY EXPLORE PARATHYROID GLANDS N/A 8/17/2022    Procedure: PARATHYROIDECTOMY, 4 GLAND EXPLORATION;  Surgeon: Trino Burkett MD;  [facility];  Service: ENT    WY OPEN RX DISTAL RADIUS FX, EXTRA-ARTICULAR Right 3/22/2018    Procedure: OPEN REDUCTION W/ INTERNAL FIXATION (ORIF) RADIUS, splint application;  Surgeon: Tahir Butler MD;  Location: [facility];  Service: Orthopedics    UPPER GASTROINTESTINAL ENDOSCOPY       Family History   Problem Relation Age of Onset    Kidney failure Mother     Hypertension Mother     Lung cancer Father    Eugenio Cassie Parkinson White syndrome Daughter     No Known Problems Sister     Substance Abuse Neg Hx     Alcohol abuse Neg Hx     Mental illness Neg Hx      Social History     Socioeconomic History    Marital status: /Civil Union     Spouse name: Not on file    Number of children: Not on file    Years of education: Not on file    Highest education level: Not on file   Occupational History    Not on file   Tobacco Use    Smoking status: Never Smoker    Smokeless tobacco: Never Used   Vaping Use    Vaping Use: Never used   Substance and Sexual Activity    Alcohol use: Yes     Comment: Rarely     Drug use: No    Sexual activity: Yes   Other Topics Concern    Not on file   Social History Narrative    WORK:    1   ([employer]; [employer]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    2  [employer]        HOBBIES:    1  Singing    2  Dancing    3    Hx of ceramics (+ dust)        PETS: 1   Dogs    -  Denies birds        TRAVEL:    -  [place]         EXPOSURES:    Denies mold; down pillows/comforters; hot tubs     Social Determinants of Health     Financial Resource Strain: Not on file   Food Insecurity: Not on file   Transportation Needs: Not on file   Physical Activity: Not on file   Stress: Not on file   Social Connections: Not on file   Intimate Partner Violence: Not on file   Housing Stability: Not on file       Current Outpatient Medications:     acetaminophen (TYLENOL) 500 mg tablet, Take 500-1,000 mg by mouth every 6 (six) hours as needed for mild pain, Disp: , Rfl:     ALPRAZolam (XANAX) 0 5 mg tablet, Take 1 tablet (0 5 mg total) by mouth daily at bedtime as needed for anxiety or sleep, Disp: 20 tablet, Rfl: 1    ascorbic acid (VITAMIN C) 500 mg tablet, Take 500 mg by mouth daily, Disp: , Rfl:     b complex vitamins tablet, Take 1 tablet by mouth daily, Disp: , Rfl:     Cholecalciferol (Vitamin D) 50 MCG (2000 UT) CAPS, Take 1 capsule (2,000 Units total) by mouth daily (Patient taking differently: Take 1 capsule by mouth daily 4,000 units daily), Disp: , Rfl:     furosemide (LASIX) 20 mg tablet, Take 1 tablet (20 mg total) by mouth daily as needed (leg swelling), Disp: 10 tablet, Rfl: 0    Magnesium 100 MG CAPS, magnesium  300 mg qd, Disp: , Rfl:     metoprolol succinate (TOPROL-XL) 25 mg 24 hr tablet, Take 0 5 tablets (12 5 mg total) by mouth daily at bedtime, Disp: 45 tablet, Rfl: 2    Multiple Vitamins-Calcium (ONE-A-DAY WOMENS PO), Take 1 tablet by mouth daily, Disp: , Rfl:     omeprazole (PriLOSEC) 20 mg delayed release capsule, Take 1 capsule (20 mg total) by mouth 2 (two) times a day before meals, Disp: 180 capsule, Rfl: 2    Riboflavin (VITAMIN B-2 PO), Take by mouth in the morning, Disp: , Rfl:     albuterol (PROVENTIL HFA,VENTOLIN HFA) 90 mcg/act inhaler, Inhale 2 puffs every 6 (six) hours as needed for wheezing or shortness of breath (Patient not taking: No sig reported), Disp: 8 g, Rfl: 0    Calcium Carb-Cholecalciferol (Calcium/Vitamin D) 500-200 MG-UNIT TABS, TAKE 2 TABS BY MOUTH 3 TIMES A DAY  DR Jin Johnson WILL LET YOU KNOW IF YOU SHOULD START MEDICATION (Patient not taking: No sig reported), Disp: 540 tablet, Rfl: 1    dicyclomine (BENTYL) 10 mg capsule, TAKE 1 CAPSULE BY MOUTH 4 TIMES A DAY (BEFORE MEALS AND AT BEDTIME), Disp: 60 capsule, Rfl: 3    methocarbamol (ROBAXIN) 500 mg tablet, Take 1 tablet (500 mg total) by mouth as needed in the morning and 1 tablet (500 mg total) as needed at noon and 1 tablet (500 mg total) as needed in the evening for muscle spasms  (Patient not taking: No sig reported), Disp: 30 tablet, Rfl: 0    rizatriptan (MAXALT) 10 MG tablet, Take 1 tablet (10 mg total) by mouth once as needed for migraine May repeat in 2 hours if needed  Max 2/24 hours, 9/month  (Patient not taking: No sig reported), Disp: 9 tablet, Rfl: 6    traZODone (DESYREL) 50 mg tablet, Take 1 tablet (50 mg total) by mouth daily at bedtime (Patient not taking: No sig reported), Disp: 30 tablet, Rfl: 1    Review of Systems   Constitutional: Positive for chills  Negative for fatigue, fever and unexpected weight change  HENT: Negative for congestion, postnasal drip and sore throat  Respiratory: Positive for cough  Negative for shortness of breath and wheezing  Cardiovascular: Negative for chest pain, palpitations and leg swelling  Gastrointestinal: Negative for abdominal pain, blood in stool, diarrhea, nausea and vomiting  Genitourinary: Positive for frequency  Negative for dysuria, hematuria and urgency  Neurological: Negative for dizziness, syncope, weakness and headaches  As per HPI   Hematological: Negative for adenopathy  Psychiatric/Behavioral: Negative for dysphoric mood  The patient is nervous/anxious            Objective:    Vitals:    09/21/22 1004   BP: 140/80   Pulse: 79   Resp: 16   Temp: 98 9 °F (37 2 °C)   TempSrc: Oral   Weight: 79 8 kg (175 lb 14 4 oz)   Height: 5' 8" (1 727 m)     Wt Readings from Last 3 Encounters:   09/21/22 79 8 kg (175 lb 14 4 oz)   09/14/22 80 3 kg (177 lb)   08/31/22 79 3 kg (174 lb 12 8 oz)     BP Readings from Last 3 Encounters:   09/21/22 140/80   09/14/22 140/88   08/31/22 112/72        Physical Exam  Constitutional:       General: She is not in acute distress  Appearance: She is well-developed  HENT:      Right Ear: Tympanic membrane and ear canal normal       Left Ear: Tympanic membrane and ear canal normal       Mouth/Throat:      Mouth: Mucous membranes are moist       Pharynx: Oropharynx is clear  Neck:      Thyroid: No thyromegaly  Cardiovascular:      Rate and Rhythm: Normal rate and regular rhythm  Heart sounds: Normal heart sounds  No murmur heard  Pulmonary:      Effort: Pulmonary effort is normal       Breath sounds: Normal breath sounds  No wheezing, rhonchi or rales  Abdominal:      Palpations: Abdomen is soft  There is no mass  Tenderness: There is no abdominal tenderness  There is no right CVA tenderness, left CVA tenderness, guarding or rebound  Musculoskeletal:      Cervical back: Neck supple  Right lower leg: No edema  Left lower leg: No edema  Lymphadenopathy:      Cervical: No cervical adenopathy  Neurological:      Mental Status: She is alert and oriented to person, place, and time     Psychiatric:         Mood and Affect: Mood normal          Behavior: Behavior normal         Lab Results   Component Value Date    SODIUM 141 09/15/2022    K 4 5 09/15/2022     (H) 09/15/2022    CO2 27 09/15/2022    BUN 20 09/15/2022    CREATININE 0 88 09/15/2022    GLUC 87 01/08/2021    CALCIUM 9 9 09/15/2022

## 2022-09-22 DIAGNOSIS — K58.0 IRRITABLE BOWEL SYNDROME WITH DIARRHEA: ICD-10-CM

## 2022-09-22 LAB
B BURGDOR IGG+IGM SER-ACNC: 0.2 AI
BACTERIA UR CULT: NORMAL

## 2022-09-22 RX ORDER — DICYCLOMINE HYDROCHLORIDE 10 MG/1
CAPSULE ORAL
Qty: 60 CAPSULE | Refills: 3 | Status: SHIPPED | OUTPATIENT
Start: 2022-09-22 | End: 2022-09-24

## 2022-09-24 DIAGNOSIS — K58.0 IRRITABLE BOWEL SYNDROME WITH DIARRHEA: ICD-10-CM

## 2022-09-24 RX ORDER — DICYCLOMINE HYDROCHLORIDE 10 MG/1
CAPSULE ORAL
Qty: 360 CAPSULE | Refills: 1 | Status: SHIPPED | OUTPATIENT
Start: 2022-09-24

## 2022-09-26 ENCOUNTER — TELEPHONE (OUTPATIENT)
Dept: FAMILY MEDICINE CLINIC | Facility: CLINIC | Age: 77
End: 2022-09-26

## 2022-09-26 NOTE — TELEPHONE ENCOUNTER
----- Message from Marizol German MD sent at 9/26/2022 12:48 PM EDT -----  Please let patient know that her chest x-ray was normal 
Pt was informed 
Site: Sternum (20 May 2022 14:00)  Bilirubin: 5.2 (20 May 2022 14:00)

## 2022-09-28 ENCOUNTER — CLINICAL SUPPORT (OUTPATIENT)
Dept: FAMILY MEDICINE CLINIC | Facility: CLINIC | Age: 77
End: 2022-09-28
Payer: COMMERCIAL

## 2022-09-28 DIAGNOSIS — J02.9 SORE THROAT: ICD-10-CM

## 2022-09-28 DIAGNOSIS — Z20.822 SUSPECTED COVID-19 VIRUS INFECTION: Primary | ICD-10-CM

## 2022-09-28 LAB — S PYO AG THROAT QL: NEGATIVE

## 2022-09-28 PROCEDURE — 87070 CULTURE OTHR SPECIMN AEROBIC: CPT | Performed by: FAMILY MEDICINE

## 2022-09-28 PROCEDURE — U0003 INFECTIOUS AGENT DETECTION BY NUCLEIC ACID (DNA OR RNA); SEVERE ACUTE RESPIRATORY SYNDROME CORONAVIRUS 2 (SARS-COV-2) (CORONAVIRUS DISEASE [COVID-19]), AMPLIFIED PROBE TECHNIQUE, MAKING USE OF HIGH THROUGHPUT TECHNOLOGIES AS DESCRIBED BY CMS-2020-01-R: HCPCS | Performed by: FAMILY MEDICINE

## 2022-09-28 PROCEDURE — U0005 INFEC AGEN DETEC AMPLI PROBE: HCPCS | Performed by: FAMILY MEDICINE

## 2022-09-28 PROCEDURE — 87880 STREP A ASSAY W/OPTIC: CPT

## 2022-09-29 ENCOUNTER — TELEPHONE (OUTPATIENT)
Dept: FAMILY MEDICINE CLINIC | Facility: CLINIC | Age: 77
End: 2022-09-29

## 2022-09-29 LAB — SARS-COV-2 RNA RESP QL NAA+PROBE: NEGATIVE

## 2022-09-29 NOTE — TELEPHONE ENCOUNTER
----- Message from Krista Dean MD sent at 9/29/2022  2:49 PM EDT -----  Please let patient know that her Covid swab was negative

## 2022-09-30 ENCOUNTER — TELEPHONE (OUTPATIENT)
Dept: FAMILY MEDICINE CLINIC | Facility: CLINIC | Age: 77
End: 2022-09-30

## 2022-09-30 LAB — BACTERIA THROAT CULT: NORMAL

## 2022-09-30 NOTE — TELEPHONE ENCOUNTER
----- Message from Valentino Mcintyre MD sent at 9/30/2022  9:49 AM EDT -----  Please let patient know that her throat culture is negative

## 2022-10-14 ENCOUNTER — NURSE TRIAGE (OUTPATIENT)
Dept: OTHER | Facility: OTHER | Age: 77
End: 2022-10-14

## 2022-10-14 DIAGNOSIS — K21.9 GASTROESOPHAGEAL REFLUX DISEASE, UNSPECIFIED WHETHER ESOPHAGITIS PRESENT: Primary | ICD-10-CM

## 2022-10-14 RX ORDER — SUCRALFATE ORAL 1 G/10ML
1 SUSPENSION ORAL
Qty: 300 ML | Refills: 0 | Status: SHIPPED | OUTPATIENT
Start: 2022-10-14 | End: 2022-11-13

## 2022-10-14 NOTE — TELEPHONE ENCOUNTER
Spoke with patient  History of GERD and IBS  Patient complains burning in the throat for two weeks mostly at night  She recently had parathyroid removed and complains of irritation from procedure  She is currently taking omeprazole 20mg 2x/day  Recommended Pepcid, but patient stated she tried it in the past and caused dizziness  Also recommended increasing omeprazole to 40mg 2x/day, but patient stated she did not feel comfortable with that  Educated patient to avoid eating within 3 hours of bedtime and to sleep with head elevated  She confirmed she was already doing so  Recommended Carafate  Patient agreeable, and stated this has helped her in the past   Educated patient to take Carafate 1 hour apart from other mediations  She requested to take Carafate at bedtime  Advised and sent short supply of Carafate to be taken 1x/day at bedtime

## 2022-10-14 NOTE — TELEPHONE ENCOUNTER
Regarding: burning in throat  ----- Message from [facility] sent at 10/14/2022  9:55 AM EDT -----  "At night I am experiencing burning in my throat   I am currently taking omeprazole (PriLOSEC) 20 mg delayed release capsule but it seems to wear off "

## 2022-10-14 NOTE — TELEPHONE ENCOUNTER
Reason for Disposition  • Sore throat is main symptom and persists > 48 hours    Answer Assessment - Initial Assessment Questions  1  ONSET: "When did the throat start hurting?" (Hours or days ago)       2 weeks ago   2  SEVERITY: "How bad is the sore throat?" (Scale 1-10; mild, moderate or severe)    - MILD (1-3):  doesn't interfere with eating or normal activities    - MODERATE (4-7): interferes with eating some solids and normal activities    - SEVERE (8-10):  excruciating pain, interferes with most normal activities    - SEVERE DYSPHAGIA: can't swallow liquids, drooling      Moderate, 7 out of 10   3  STREP EXPOSURE: "Has there been any exposure to strep within the past week?" If Yes, ask: "What type of contact occurred?"       No   4  VIRAL SYMPTOMS: "Are there any symptoms of a cold, such as a runny nose, cough, hoarse voice or red eyes?"       No   5  FEVER: "Do you have a fever?" If Yes, ask: "What is your temperature, how was it measured, and when did it start?"      No   7  OTHER SYMPTOMS: "Do you have any other symptoms?" (e g , difficulty breathing, headache, rash)      No   8   PREGNANCY: "Is there any chance you are pregnant?" "When was your last menstrual period?"      N/A    Protocols used: SORE THROAT-ADULT-OH

## 2022-10-14 NOTE — TELEPHONE ENCOUNTER
Patient has hx of GERD, developed flare up of burning down in her throat for the past 2 weeks  Patient stated that she has a history of burning in a throat when she has worsening symptoms of GERD  Patient stated that she is able to swallow liquids and food  Patient was instructed that Gastroenterology office would follow up today

## 2022-10-17 ENCOUNTER — HOSPITAL ENCOUNTER (OUTPATIENT)
Dept: PULMONOLOGY | Facility: HOSPITAL | Age: 77
Discharge: HOME | End: 2022-10-17
Attending: INTERNAL MEDICINE
Payer: COMMERCIAL

## 2022-10-17 DIAGNOSIS — J84.9 ILD (INTERSTITIAL LUNG DISEASE) (HCC): ICD-10-CM

## 2022-10-17 PROCEDURE — 94729 DIFFUSING CAPACITY: CPT | Performed by: INTERNAL MEDICINE

## 2022-10-17 PROCEDURE — 94760 N-INVAS EAR/PLS OXIMETRY 1: CPT

## 2022-10-17 PROCEDURE — 94729 DIFFUSING CAPACITY: CPT

## 2022-10-17 PROCEDURE — 94010 BREATHING CAPACITY TEST: CPT

## 2022-10-17 PROCEDURE — 94010 BREATHING CAPACITY TEST: CPT | Performed by: INTERNAL MEDICINE

## 2022-10-18 ENCOUNTER — TELEPHONE (OUTPATIENT)
Dept: NEUROLOGY | Facility: CLINIC | Age: 77
End: 2022-10-18

## 2022-10-18 NOTE — TELEPHONE ENCOUNTER
Called and spoke to patient to confirm their upcoming appointment with Dr Crystal Ye  Informed patient about arriving in the [place] location 15 minutes prior to their appointment to get checked in and going over chart

## 2022-10-20 ENCOUNTER — OFFICE VISIT (OUTPATIENT)
Dept: OBGYN CLINIC | Facility: CLINIC | Age: 77
End: 2022-10-20

## 2022-10-20 VITALS
DIASTOLIC BLOOD PRESSURE: 76 MMHG | WEIGHT: 175.6 LBS | BODY MASS INDEX: 27.56 KG/M2 | HEIGHT: 67 IN | SYSTOLIC BLOOD PRESSURE: 124 MMHG

## 2022-10-20 DIAGNOSIS — Z12.31 ENCOUNTER FOR SCREENING MAMMOGRAM FOR MALIGNANT NEOPLASM OF BREAST: ICD-10-CM

## 2022-10-20 DIAGNOSIS — Z01.419 ENCOUNTER FOR ANNUAL ROUTINE GYNECOLOGICAL EXAMINATION: Primary | ICD-10-CM

## 2022-10-20 NOTE — PROGRESS NOTES
Assessment/Plan:      Encounter for annual routine gynecological examination    Encounter for screening mammogram for malignant neoplasm of breast  -     Mammo screening bilateral w 3d & cad; Future      Subjective      Graymessi Garza is a 78 y o  female who presents for annual exam  The patient has no complaints today  The patient is sexually active  The patient is not taking hormone replacement therapy  Patient denies post-menopausal vaginal bleeding  She reports urinary frequency but minimal leakage  Menstrual History:  OB History        2    Para   1    Term   1            AB        Living   2       SAB        IAB        Ectopic        Multiple        Live Births   2                No LMP recorded  Patient is postmenopausal      Past Medical History:   Diagnosis Date   • Anxiety    • Arthritis    • Back pain    • Balance problems    • Chest pain     heaviness   • Difficulty swallowing    • Dizziness    • Dry cough    • Gait disorder    • GERD (gastroesophageal reflux disease)    • Hyperparathyroidism (HCC)    • Hypertension    • Increased frequency of headaches    • Migraines    • Neck pain    • Numbness and tingling     bles   • Palpitations    • Pericarditis    • Thyroid disease     nodule   • Trouble in sleeping    • Wears glasses        Family History   Problem Relation Age of Onset   • Kidney failure Mother    • Hypertension Mother    • Lung cancer Father    • Vonzell Pacer Parkinson White syndrome Daughter    • No Known Problems Sister    • Substance Abuse Neg Hx    • Alcohol abuse Neg Hx    • Mental illness Neg Hx        The following portions of the patient's history were reviewed and updated as appropriate: allergies, current medications, past family history, past medical history, past social history, past surgical history and problem list     Review of Systems  Pertinent items are noted in HPI       Objective      /76 (BP Location: Right arm, Patient Position: Sitting, Cuff Size: Large)   Ht 5' 6 75" (1 695 m)   Wt 79 7 kg (175 lb 9 6 oz)   BMI 27 71 kg/m²     General:   alert and oriented, in no acute distress   Heart:  Breasts: regular rate and rhythm   appear normal, no suspicious masses, no skin or nipple changes or axillary nodes     Lungs: effort normal   Abdomen: soft, non-tender, without masses or organomegaly   Vulva: normal   Vagina: normal mucosa   Cervix: no lesions   Uterus: normal size, mobile, non-tender   Adnexa: normal adnexa; no fullness

## 2022-10-24 ENCOUNTER — OFFICE VISIT (OUTPATIENT)
Dept: FAMILY MEDICINE CLINIC | Facility: CLINIC | Age: 77
End: 2022-10-24
Payer: COMMERCIAL

## 2022-10-24 VITALS
HEART RATE: 74 BPM | SYSTOLIC BLOOD PRESSURE: 130 MMHG | HEIGHT: 67 IN | RESPIRATION RATE: 16 BRPM | BODY MASS INDEX: 27.65 KG/M2 | WEIGHT: 176.2 LBS | DIASTOLIC BLOOD PRESSURE: 84 MMHG

## 2022-10-24 DIAGNOSIS — R45.0 NERVOUSNESS: ICD-10-CM

## 2022-10-24 DIAGNOSIS — G89.29 CHRONIC PAIN OF BOTH KNEES: ICD-10-CM

## 2022-10-24 DIAGNOSIS — M25.561 CHRONIC PAIN OF BOTH KNEES: ICD-10-CM

## 2022-10-24 DIAGNOSIS — Z00.00 MEDICARE ANNUAL WELLNESS VISIT, SUBSEQUENT: Primary | ICD-10-CM

## 2022-10-24 DIAGNOSIS — E78.5 HYPERLIPIDEMIA, UNSPECIFIED HYPERLIPIDEMIA TYPE: ICD-10-CM

## 2022-10-24 DIAGNOSIS — E21.0 PRIMARY HYPERPARATHYROIDISM (HCC): ICD-10-CM

## 2022-10-24 DIAGNOSIS — K21.9 GASTROESOPHAGEAL REFLUX DISEASE, UNSPECIFIED WHETHER ESOPHAGITIS PRESENT: ICD-10-CM

## 2022-10-24 DIAGNOSIS — M25.562 CHRONIC PAIN OF BOTH KNEES: ICD-10-CM

## 2022-10-24 DIAGNOSIS — M81.0 AGE-RELATED OSTEOPOROSIS WITHOUT CURRENT PATHOLOGICAL FRACTURE: ICD-10-CM

## 2022-10-24 DIAGNOSIS — I10 ESSENTIAL HYPERTENSION: ICD-10-CM

## 2022-10-24 DIAGNOSIS — G43.009 MIGRAINE WITHOUT AURA AND WITHOUT STATUS MIGRAINOSUS, NOT INTRACTABLE: ICD-10-CM

## 2022-10-24 DIAGNOSIS — M17.0 PRIMARY OSTEOARTHRITIS OF BOTH KNEES: ICD-10-CM

## 2022-10-24 PROCEDURE — 99214 OFFICE O/P EST MOD 30 MIN: CPT | Performed by: FAMILY MEDICINE

## 2022-10-24 PROCEDURE — G0439 PPPS, SUBSEQ VISIT: HCPCS | Performed by: FAMILY MEDICINE

## 2022-10-24 RX ORDER — METOPROLOL SUCCINATE 25 MG/1
12.5 TABLET, EXTENDED RELEASE ORAL
Qty: 45 TABLET | Refills: 2 | Status: SHIPPED | OUTPATIENT
Start: 2022-10-24

## 2022-10-24 RX ORDER — ALPRAZOLAM 0.5 MG/1
0.5 TABLET ORAL
Qty: 20 TABLET | Refills: 1 | Status: SHIPPED | OUTPATIENT
Start: 2022-10-24

## 2022-10-24 NOTE — PROGRESS NOTES
Assessment and Plan:     1  Medicare annual wellness visit, subsequent  - patient is planning to get her flu vaccine and Covid booster at her [facility] pharmacy    2  Essential hypertension  - well controlled on metoprolol 12 5 mg daily  - metoprolol succinate (TOPROL-XL) 25 mg 24 hr tablet; Take 0 5 tablets (12 5 mg total) by mouth daily at bedtime  Dispense: 45 tablet; Refill: 2    3  Nervousness  - continue Xanax as needed, uses 1-2 times a week  - ALPRAZolam (XANAX) 0 5 mg tablet; Take 1 tablet (0 5 mg total) by mouth daily at bedtime as needed for anxiety or sleep  Dispense: 20 tablet; Refill: 1    4  Gastroesophageal reflux disease  - stable on omeprazole, under care of GI    5  Primary hyperparathyroidism   - s/p parathyroidectomy on 8/17/22, following with Endocrine and ENT    6  Migraine   - under care of Neurology and is currently on Maxalt as needed for occasional headache with good relief    7  Hyperlipidemia  - improved, reviewed low fat/ low cholesterol diet     8  Chronic pain of both knees/ OA  - follow up with Ortho, continue Tylenol as needed    9  Osteoporosis  - continue vitamin D supplements in addition to daily MVI, due for DEXA scan in June 2023, follow up with Endocrine      Return in 6 months or sooner as needed  Preventive health issues were discussed with patient, and age appropriate screening tests were ordered as noted in patient's After Visit Summary  Personalized health advice and appropriate referrals for health education or preventive services given if needed, as noted in patient's After Visit Summary  History of Present Illness:     Patient presents for a Medicare Wellness Visit and follow up for her chronic conditions  Patient continues having bilateral knee pain left > than right which affects her ambulation and balance  Patient offers no other complaints          Patient Care Team:  Khai Tran MD as PCP - General (Family Medicine)  Julianna Grover MD (Endocrinology) Review of Systems:     Review of Systems   Constitutional: Negative for chills, fatigue, fever and unexpected weight change  Respiratory: Negative for cough, shortness of breath and wheezing  Cardiovascular: Negative for chest pain, palpitations and leg swelling  Gastrointestinal: Negative for abdominal pain, blood in stool, diarrhea, nausea and vomiting  Endocrine: Negative for cold intolerance and heat intolerance  Genitourinary: Negative for difficulty urinating, dysuria, frequency, hematuria and urgency  Musculoskeletal:        As per HPI   Neurological: Negative for dizziness, syncope, weakness, numbness and headaches  Hematological: Negative for adenopathy  Psychiatric/Behavioral: Negative for dysphoric mood  The patient is nervous/anxious           Problem List:     Patient Active Problem List   Diagnosis   • Closed fracture distal radius and ulna, right, initial encounter   • Anxiety   • Gastroesophageal reflux disease with esophagitis   • History of pericarditis   • Heart murmur   • Primary hyperparathyroidism (HCC)   • Thyroid nodule   • Vitamin D deficiency   • Atypical chest pain   • Migraine without aura and without status migrainosus, not intractable   • Dizziness and giddiness   • Insomnia   • Cervicalgia   • Abnormal CT scan of lung   • Irritable bowel syndrome with diarrhea   • Elevated amylase and lipase   • Paresthesias   • Carpal tunnel syndrome of left wrist   • Dysphonia   • Reflux laryngitis   • Paresis of left vocal fold   • Glottic insufficiency   • Muscle tension dysphonia   • TMJ dysfunction   • Pharyngoesophageal dysphagia   • Palpitations   • Hallux abducto valgus, bilateral   • Pincer nail deformity   • Osteoporosis with current pathological fracture   • Parathyroid related hypercalcemia (HCC)   • Cervical myofascial pain syndrome   • Edema of both ankles   • Aneurysm (HCC)   • Calf pain   • Lung nodule   • Myalgia   • Pericardial effusion   • SOBOE (shortness of breath on exertion)   • Elevated blood-pressure reading without diagnosis of hypertension   • Skin tag of anus   • H/O parathyroidectomy (HCC)   • [facility]) ([facility] )      Past Medical and Surgical History:     Past Medical History:   Diagnosis Date   • Anxiety    • Arthritis    • Back pain    • Balance problems    • Chest pain     heaviness   • Difficulty swallowing    • Dizziness    • Dry cough    • Gait disorder    • GERD (gastroesophageal reflux disease)    • Hyperparathyroidism ([facility] )    • Hypertension    • Increased frequency of headaches    • Migraines    • Neck pain    • Numbness and tingling     bles   • Palpitations    • Pericarditis    • Thyroid disease     nodule   • Trouble in sleeping    • Wears glasses      Past Surgical History:   Procedure Laterality Date   • APPENDECTOMY     • CHOLECYSTECTOMY      laparoscopic   • COLONOSCOPY     • KNEE SURGERY Left    • PERICARDIAL WINDOW     • WV EXPLORE PARATHYROID GLANDS N/A 8/17/2022    Procedure: PARATHYROIDECTOMY, 4 GLAND EXPLORATION;  Surgeon: Lexii Braga MD;  Location: [facility];  Service: ENT   • WV OPEN RX DISTAL RADIUS FX, EXTRA-ARTICULAR Right 3/22/2018    Procedure: OPEN REDUCTION W/ INTERNAL FIXATION (ORIF) RADIUS, splint application;  Surgeon: Carol Villanueva MD;  Location: [facility];  Service: Orthopedics   • UPPER GASTROINTESTINAL ENDOSCOPY        Family History:     Family History   Problem Relation Age of Onset   • Kidney failure Mother    • Hypertension Mother    • Lung cancer Father    • Tally Sybil Parkinson White syndrome Daughter    • No Known Problems Sister    • Substance Abuse Neg Hx    • Alcohol abuse Neg Hx    • Mental illness Neg Hx       Social History:     Social History     Socioeconomic History   • Marital status: /Civil Union     Spouse name: None   • Number of children: None   • Years of education: None   • Highest education level: None   Occupational History   • None   Tobacco Use   • Smoking status: Never Smoker   • Smokeless tobacco: Never Used   Vaping Use   • Vaping Use: Never used   Substance and Sexual Activity   • Alcohol use: Yes     Comment: Rarely    • Drug use: No   • Sexual activity: Yes     Partners: Male   Other Topics Concern   • None   Social History Narrative    WORK:    1  [place] ([employer]; [employer]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    2  [employer]        HOBBIES:    1  Singing    2  Dancing    3  Hx of ceramics (+ dust)        PETS:    1    Dogs    -  Denies birds        TRAVEL:    -  [place]         EXPOSURES:    Denies mold; down pillows/comforters; hot tubs     Social Determinants of Health     Financial Resource Strain: Not on file   Food Insecurity: Not on file   Transportation Needs: Not on file   Physical Activity: Not on file   Stress: Not on file   Social Connections: Not on file   Intimate Partner Violence: Not on file   Housing Stability: Not on file      Medications and Allergies:     Current Outpatient Medications   Medication Sig Dispense Refill   • acetaminophen (TYLENOL) 500 mg tablet Take 500-1,000 mg by mouth every 6 (six) hours as needed for mild pain     • albuterol (PROVENTIL HFA,VENTOLIN HFA) 90 mcg/act inhaler Inhale 2 puffs every 6 (six) hours as needed for wheezing or shortness of breath 8 g 0   • ALPRAZolam (XANAX) 0 5 mg tablet Take 1 tablet (0 5 mg total) by mouth daily at bedtime as needed for anxiety or sleep 20 tablet 1   • ascorbic acid (VITAMIN C) 500 mg tablet Take 500 mg by mouth daily     • b complex vitamins tablet Take 1 tablet by mouth daily     • Cholecalciferol (Vitamin D) 50 MCG (2000 UT) CAPS Take 1 capsule (2,000 Units total) by mouth daily (Patient taking differently: Take 1 capsule by mouth daily 4,000 units daily)     • furosemide (LASIX) 20 mg tablet Take 1 tablet (20 mg total) by mouth daily as needed (leg swelling) 10 tablet 0   • Magnesium 100 MG CAPS magnesium   300 mg qd     • metoprolol succinate (TOPROL-XL) 25 mg 24 hr tablet Take 0 5 tablets (12 5 mg total) by mouth daily at bedtime 45 tablet 2   • Multiple Vitamins-Calcium (ONE-A-DAY WOMENS PO) Take 1 tablet by mouth daily     • NON FORMULARY MK-7     • omeprazole (PriLOSEC) 20 mg delayed release capsule Take 1 capsule (20 mg total) by mouth 2 (two) times a day before meals 180 capsule 2   • Riboflavin (VITAMIN B-2 PO) Take by mouth in the morning     • rizatriptan (MAXALT) 10 MG tablet Take 1 tablet (10 mg total) by mouth once as needed for migraine May repeat in 2 hours if needed  Max 2/24 hours, 9/month  9 tablet 6   • sucralfate (CARAFATE) 1 g/10 mL suspension Take 10 mL (1 g total) by mouth daily at bedtime 300 mL 0   • traZODone (DESYREL) 50 mg tablet Take 1 tablet (50 mg total) by mouth daily at bedtime 30 tablet 1   • dicyclomine (BENTYL) 10 mg capsule TAKE 1 CAPSULE BY MOUTH 4 TIMES A DAY (BEFORE MEALS AND AT BEDTIME) (Patient not taking: No sig reported) 360 capsule 1     No current facility-administered medications for this visit       Allergies   Allergen Reactions   • Ciprofloxacin Myalgia      Immunizations:     Immunization History   Administered Date(s) Administered   • COVID-19 PFIZER VACCINE 0 3 ML IM 03/09/2021, 03/31/2021   • INFLUENZA 10/31/2018, 09/30/2020, 11/16/2021   • Influenza, high dose seasonal 0 7 mL 10/14/2019   • Pneumococcal 01/01/2017   • Pneumococcal Conjugate 13-Valent 11/19/2017   • Pneumococcal Polysaccharide PPV23 12/23/2018   • Zoster Vaccine Recombinant 08/20/2021, 02/10/2022   • influenza, injectable, quadrivalent 10/01/2018      Health Maintenance:         Topic Date Due   • Breast Cancer Screening: Mammogram  06/02/2022   • DXA SCAN  06/02/2023   • Colorectal Cancer Screening  07/26/2023   • Hepatitis C Screening  Completed         Topic Date Due   • COVID-19 Vaccine (3 - Booster for Hall Peter series) 08/31/2021   • Influenza Vaccine (1) 09/01/2022      Medicare Screening Tests and Risk Assessments:     Alexus Denny is here for her Subsequent Wellness visit  Health Risk Assessment:   Patient rates overall health as good  Patient feels that their physical health rating is slightly worse  Patient is satisfied with their life  Eyesight was rated as same  Hearing was rated as same  Patient feels that their emotional and mental health rating is same  Patients states they are never, rarely angry  Patient states they are often unusually tired/fatigued  Pain experienced in the last 7 days has been some  Patient's pain rating has been 6/10  Patient states that she has experienced no weight loss or gain in last 6 months  Depression Screening:   PHQ-2 Score: 0      Fall Risk Screening: In the past year, patient has experienced: no history of falling in past year      Urinary Incontinence Screening:   Patient has leaked urine accidently in the last six months  Home Safety:  Patient has trouble with stairs inside or outside of their home  Patient has working smoke alarms and has working carbon monoxide detector  Home safety hazards include: none  Nutrition:   Current diet is Regular and Limited junk food  Medications:   Patient is currently taking over-the-counter supplements  OTC medications include: see medication list  Patient is able to manage medications  Activities of Daily Living (ADLs)/Instrumental Activities of Daily Living (IADLs):   Walk and transfer into and out of bed and chair?: Yes  Dress and groom yourself?: Yes    Bathe or shower yourself?: Yes    Feed yourself?  Yes  Do your laundry/housekeeping?: Yes  Manage your money, pay your bills and track your expenses?: Yes  Make your own meals?: Yes    Do your own shopping?: Yes    Advance Care Planning:   Living will: Yes    Advanced directive: Yes      Cognitive Screening:   Provider or family/friend/caregiver concerned regarding cognition?: No    PREVENTIVE SCREENINGS      Cardiovascular Screening:    General: Screening Not Indicated and History Lipid Disorder      Diabetes Screening: General: Screening Current      Colorectal Cancer Screening:     General: Screening Current      Breast Cancer Screening:     General: Screening Current      Cervical Cancer Screening:    General: Screening Not Indicated      Osteoporosis Screening:    General: Screening Current      Abdominal Aortic Aneurysm (AAA) Screening:        General: Screening Not Indicated      Lung Cancer Screening:     General: Screening Not Indicated      Hepatitis C Screening:    General: Screening Current    Screening, Brief Intervention, and Referral to Treatment (SBIRT)    Screening  Typical number of drinks in a day: 0  Typical number of drinks in a week: 0  Interpretation: Low risk drinking behavior  Single Item Drug Screening:  How often have you used an illegal drug (including marijuana) or a prescription medication for non-medical reasons in the past year? never    Single Item Drug Screen Score: 0  Interpretation: Negative screen for possible drug use disorder       Physical Exam:     /84   Pulse 74   Resp 16   Ht 5' 6 75" (1 695 m)   Wt 79 9 kg (176 lb 3 2 oz)   BMI 27 80 kg/m²     Physical Exam  Constitutional:       General: She is not in acute distress  Cardiovascular:      Rate and Rhythm: Normal rate and regular rhythm  Heart sounds: Normal heart sounds  Pulmonary:      Effort: Pulmonary effort is normal       Breath sounds: Normal breath sounds  No wheezing, rhonchi or rales  Abdominal:      Palpations: Abdomen is soft  There is no mass  Tenderness: There is no abdominal tenderness  Musculoskeletal:      Cervical back: Neck supple  Right lower leg: No edema  Left lower leg: No edema  Lymphadenopathy:      Cervical: No cervical adenopathy  Neurological:      Mental Status: She is alert and oriented to person, place, and time     Psychiatric:         Mood and Affect: Mood normal          Behavior: Behavior normal         Lab Results   Component Value Date    [id number] 0 940 09/21/2022     Lab Results   Component Value Date    WBC 6 61 09/21/2022    HGB 14 5 09/21/2022    HCT 44 8 09/21/2022    MCV 94 09/21/2022     09/21/2022     Lab Results   Component Value Date    SODIUM 141 09/15/2022    K 4 5 09/15/2022     (H) 09/15/2022    CO2 27 09/15/2022    AGAP 5 09/15/2022    BUN 20 09/15/2022    CREATININE 0 88 09/15/2022    GLUC 87 01/08/2021    GLUF 82 09/15/2022    CALCIUM 9 9 09/15/2022    AST 25 07/01/2022    ALT 37 07/01/2022    ALKPHOS 101 07/01/2022    TP 7 4 07/01/2022    TBILI 0 69 07/01/2022    EGFR 64 09/15/2022     Lab Results   Component Value Date    CHOLESTEROL 220 (H) 09/15/2022    CHOLESTEROL 233 (H) 12/10/2021    CHOLESTEROL 217 (H) 05/04/2021     Lab Results   Component Value Date    HDL 61 09/15/2022    HDL 66 12/10/2021    HDL 65 05/04/2021     Lab Results   Component Value Date    TRIG 86 09/15/2022    TRIG 86 12/10/2021    TRIG 92 05/04/2021     Lab Results   Component Value Date    LDLCALC 142 (H) 09/15/2022     Armando Fajardo MD

## 2022-10-24 NOTE — PATIENT INSTRUCTIONS
Medicare Preventive Visit Patient Instructions  Thank you for completing your Welcome to Medicare Visit or Medicare Annual Wellness Visit today  Your next wellness visit will be due in one year (10/25/2023)  The screening/preventive services that you may require over the next 5-10 years are detailed below  Some tests may not apply to you based off risk factors and/or age  Screening tests ordered at today's visit but not completed yet may show as past due  Also, please note that scanned in results may not display below  Preventive Screenings:  Service Recommendations Previous Testing/Comments   Colorectal Cancer Screening  * Colonoscopy    * Fecal Occult Blood Test (FOBT)/Fecal Immunochemical Test (FIT)  * Fecal DNA/Cologuard Test  * Flexible Sigmoidoscopy Age: 39-70 years old   Colonoscopy: every 10 years (may be performed more frequently if at higher risk)  OR  FOBT/FIT: every 1 year  OR  Cologuard: every 3 years  OR  Sigmoidoscopy: every 5 years  Screening may be recommended earlier than age 39 if at higher risk for colorectal cancer  Also, an individualized decision between you and your healthcare provider will decide whether screening between the ages of 74-80 would be appropriate  Colonoscopy: 07/26/2018  FOBT/FIT: Not on file  Cologuard: Not on file  Sigmoidoscopy: Not on file    Screening Current     Breast Cancer Screening Age: 36 years old  Frequency: every 1-2 years  Not required if history of left and right mastectomy Mammogram: 06/02/2021    Screening Current   Cervical Cancer Screening Between the ages of 21-29, pap smear recommended once every 3 years  Between the ages of 33-67, can perform pap smear with HPV co-testing every 5 years     Recommendations may differ for women with a history of total hysterectomy, cervical cancer, or abnormal pap smears in past  Pap Smear: Not on file    Screening Not Indicated   Hepatitis C Screening Once for adults born between 1945 and 1965  More frequently in patients at high risk for Hepatitis C Hep C Antibody: 09/25/2020    Screening Current   Diabetes Screening 1-2 times per year if you're at risk for diabetes or have pre-diabetes Fasting glucose: 82 mg/dL (9/15/2022)  A1C: 5 6 % (1/7/2019)  Screening Current   Cholesterol Screening Once every 5 years if you don't have a lipid disorder  May order more often based on risk factors  Lipid panel: 09/15/2022    Screening Not Indicated  History Lipid Disorder     Other Preventive Screenings Covered by Medicare:  1  Abdominal Aortic Aneurysm (AAA) Screening: covered once if your at risk  You're considered to be at risk if you have a family history of AAA  2  Lung Cancer Screening: covers low dose CT scan once per year if you meet all of the following conditions: (1) Age 50-69; (2) No signs or symptoms of lung cancer; (3) Current smoker or have quit smoking within the last 15 years; (4) You have a tobacco smoking history of at least 20 pack years (packs per day multiplied by number of years you smoked); (5) You get a written order from a healthcare provider  3  Glaucoma Screening: covered annually if you're considered high risk: (1) You have diabetes OR (2) Family history of glaucoma OR (3)  aged 48 and older OR (3)  American aged 72 and older  3  Osteoporosis Screening: covered every 2 years if you meet one of the following conditions: (1) You're estrogen deficient and at risk for osteoporosis based off medical history and other findings; (2) Have a vertebral abnormality; (3) On glucocorticoid therapy for more than 3 months; (4) Have primary hyperparathyroidism; (5) On osteoporosis medications and need to assess response to drug therapy  · Last bone density test (DXA Scan): 06/02/2021   5  HIV Screening: covered annually if you're between the age of 15-65  Also covered annually if you are younger than 13 and older than 72 with risk factors for HIV infection   For pregnant patients, it is covered up to 3 times per pregnancy  Immunizations:  Immunization Recommendations   Influenza Vaccine Annual influenza vaccination during flu season is recommended for all persons aged >= 6 months who do not have contraindications   Pneumococcal Vaccine   * Pneumococcal conjugate vaccine = PCV13 (Prevnar 13), PCV15 (Vaxneuvance), PCV20 (Prevnar 20)  * Pneumococcal polysaccharide vaccine = PPSV23 (Pneumovax) Adults 25-60 years old: 1-3 doses may be recommended based on certain risk factors  Adults 72 years old: 1-2 doses may be recommended based off what pneumonia vaccine you previously received   Hepatitis B Vaccine 3 dose series if at intermediate or high risk (ex: diabetes, end stage renal disease, liver disease)   Tetanus (Td) Vaccine - COST NOT COVERED BY MEDICARE PART B Following completion of primary series, a booster dose should be given every 10 years to maintain immunity against tetanus  Td may also be given as tetanus wound prophylaxis  Tdap Vaccine - COST NOT COVERED BY MEDICARE PART B Recommended at least once for all adults  For pregnant patients, recommended with each pregnancy  Shingles Vaccine (Shingrix) - COST NOT COVERED BY MEDICARE PART B  2 shot series recommended in those aged 48 and above     Health Maintenance Due:      Topic Date Due   • Breast Cancer Screening: Mammogram  06/02/2022   • DXA SCAN  06/02/2023   • Colorectal Cancer Screening  07/26/2023   • Hepatitis C Screening  Completed     Immunizations Due:      Topic Date Due   • COVID-19 Vaccine (3 - Booster for Pfizer series) 08/31/2021   • Influenza Vaccine (1) 09/01/2022     Advance Directives   What are advance directives? Advance directives are legal documents that state your wishes and plans for medical care  These plans are made ahead of time in case you lose your ability to make decisions for yourself  Advance directives can apply to any medical decision, such as the treatments you want, and if you want to donate organs     What are the types of advance directives? There are many types of advance directives, and each state has rules about how to use them  You may choose a combination of any of the following:  · Living will: This is a written record of the treatment you want  You can also choose which treatments you do not want, which to limit, and which to stop at a certain time  This includes surgery, medicine, IV fluid, and tube feedings  · Durable power of  for healthcare [facility]): This is a written record that states who you want to make healthcare choices for you when you are unable to make them for yourself  This person, called a proxy, is usually a family member or a friend  You may choose more than 1 proxy  · Do not resuscitate (DNR) order:  A DNR order is used in case your heart stops beating or you stop breathing  It is a request not to have certain forms of treatment, such as CPR  A DNR order may be included in other types of advance directives  · Medical directive: This covers the care that you want if you are in a coma, near death, or unable to make decisions for yourself  You can list the treatments you want for each condition  Treatment may include pain medicine, surgery, blood transfusions, dialysis, IV or tube feedings, and a ventilator (breathing machine)  · Values history: This document has questions about your views, beliefs, and how you feel and think about life  This information can help others choose the care that you would choose  Why are advance directives important? An advance directive helps you control your care  Although spoken wishes may be used, it is better to have your wishes written down  Spoken wishes can be misunderstood, or not followed  Treatments may be given even if you do not want them  An advance directive may make it easier for your family to make difficult choices about your care  Urinary Incontinence   Urinary incontinence (UI)  is when you lose control of your bladder   UI develops because your bladder cannot store or empty urine properly  The 3 most common types of UI are stress incontinence, urge incontinence, or both  Medicines:   · May be given to help strengthen your bladder control  Report any side effects of medication to your healthcare provider  Do pelvic muscle exercises often:  Your pelvic muscles help you stop urinating  Squeeze these muscles tight for 5 seconds, then relax for 5 seconds  Gradually work up to squeezing for 10 seconds  Do 3 sets of 15 repetitions a day, or as directed  This will help strengthen your pelvic muscles and improve bladder control  Train your bladder:  Go to the bathroom at set times, such as every 2 hours, even if you do not feel the urge to go  You can also try to hold your urine when you feel the urge to go  For example, hold your urine for 5 minutes when you feel the urge to go  As that becomes easier, hold your urine for 10 minutes  Self-care:   · Keep a UI record  Write down how often you leak urine and how much you leak  Make a note of what you were doing when you leaked urine  · Drink liquids as directed  You may need to limit the amount of liquid you drink to help control your urine leakage  Do not drink any liquid right before you go to bed  Limit or do not have drinks that contain caffeine or alcohol  · Prevent constipation  Eat a variety of high-fiber foods  Good examples are high-fiber cereals, beans, vegetables, and whole-grain breads  Walking is the best way to trigger your intestines to have a bowel movement  · Exercise regularly and maintain a healthy weight  Weight loss and exercise will decrease pressure on your bladder and help you control your leakage  · Use a catheter as directed  to help empty your bladder  A catheter is a tiny, plastic tube that is put into your bladder to drain your urine  · Go to behavior therapy as directed    Behavior therapy may be used to help you learn to control your urge to urinate  Weight Management   Why it is important to manage your weight:  Being overweight increases your risk of health conditions such as heart disease, high blood pressure, type 2 diabetes, and certain types of cancer  It can also increase your risk for osteoarthritis, sleep apnea, and other respiratory problems  Aim for a slow, steady weight loss  Even a small amount of weight loss can lower your risk of health problems  How to lose weight safely:  A safe and healthy way to lose weight is to eat fewer calories and get regular exercise  You can lose up about 1 pound a week by decreasing the number of calories you eat by 500 calories each day  Healthy meal plan for weight management:  A healthy meal plan includes a variety of foods, contains fewer calories, and helps you stay healthy  A healthy meal plan includes the following:  · Eat whole-grain foods more often  A healthy meal plan should contain fiber  Fiber is the part of grains, fruits, and vegetables that is not broken down by your body  Whole-grain foods are healthy and provide extra fiber in your diet  Some examples of whole-grain foods are whole-wheat breads and pastas, oatmeal, brown rice, and bulgur  · Eat a variety of vegetables every day  Include dark, leafy greens such as spinach, kale, kacey greens, and mustard greens  Eat yellow and orange vegetables such as carrots, sweet potatoes, and winter squash  · Eat a variety of fruits every day  Choose fresh or canned fruit (canned in its own juice or light syrup) instead of juice  Fruit juice has very little or no fiber  · Eat low-fat dairy foods  Drink fat-free (skim) milk or 1% milk  Eat fat-free yogurt and low-fat cottage cheese  Try low-fat cheeses such as mozzarella and other reduced-fat cheeses  · Choose meat and other protein foods that are low in fat  Choose beans or other legumes such as split peas or lentils   Choose fish, skinless poultry (chicken or turkey), or lean cuts of red meat (beef or pork)  Before you cook meat or poultry, cut off any visible fat  · Use less fat and oil  Try baking foods instead of frying them  Add less fat, such as margarine, sour cream, regular salad dressing and mayonnaise to foods  Eat fewer high-fat foods  Some examples of high-fat foods include french fries, doughnuts, ice cream, and cakes  · Eat fewer sweets  Limit foods and drinks that are high in sugar  This includes candy, cookies, regular soda, and sweetened drinks  Exercise:  Exercise at least 30 minutes per day on most days of the week  Some examples of exercise include walking, biking, dancing, and swimming  You can also fit in more physical activity by taking the stairs instead of the elevator or parking farther away from stores  Ask your healthcare provider about the best exercise plan for you  © Copyright [employer] 2018 Information is for End User's use only and may not be sold, redistributed or otherwise used for commercial purposes   All illustrations and images included in CareNotes® are the copyrighted property of [employer]  or [address] [place] [address]

## 2022-10-26 ENCOUNTER — OFFICE VISIT (OUTPATIENT)
Dept: NEUROLOGY | Facility: CLINIC | Age: 77
End: 2022-10-26
Payer: COMMERCIAL

## 2022-10-26 VITALS
BODY MASS INDEX: 27.75 KG/M2 | WEIGHT: 176.8 LBS | SYSTOLIC BLOOD PRESSURE: 132 MMHG | HEIGHT: 67 IN | DIASTOLIC BLOOD PRESSURE: 78 MMHG | HEART RATE: 94 BPM

## 2022-10-26 DIAGNOSIS — G43.009 MIGRAINE WITHOUT AURA AND WITHOUT STATUS MIGRAINOSUS, NOT INTRACTABLE: Primary | ICD-10-CM

## 2022-10-26 DIAGNOSIS — G44.229 CHRONIC TENSION HEADACHES: ICD-10-CM

## 2022-10-26 PROCEDURE — 99215 OFFICE O/P EST HI 40 MIN: CPT | Performed by: PSYCHIATRY & NEUROLOGY

## 2022-10-26 NOTE — PATIENT INSTRUCTIONS
Follow up with physiatry for the neck pain as she plans to do imaging and consider triger point injects     Safety/fall precautions      Headache/migraine treatment:   Abortive medications (for immediate treatment of a headache): It is ok to take ibuprofen, acetaminophen or naproxen (Advil, Tylenol,  Aleve, Excedrin) if they help your headaches you should limit these to No more than 3 times a week to avoid medication overuse/rebound headaches  For your more moderate to severe migraines take this medication early   Maxalt (rizatriptan) 10mg tabs - take one at the onset of headache  May repeat one time after 1-2 hours if pain has not resolved  (Max 2 a day and 9 a month)       Over the counter preventive supplements for headaches/migraines   (to take every day to help prevent headaches - not to take at the time of headache):  [x] Magnesium 400mg daily (If any diarrhea or upset stomach, decrease dose  as tolerated)  [x] Riboflavin (Vitamin B2)  400mg daily   (FYI B2 may make your urine bright/neon yellow)     Prescription preventive medications for headaches/migraines   (to take every day to help prevent headaches - not to take at the time of headache):  [x]  we have options if you ever wanted     *Typically these types of medications take time untill you see the benefit, although some may see improvement in days, often it may take weeks, especially if the medication is being titrated up to a beneficial level  Please contact us if there are any concerns or questions regarding the medication  Lifestyle Recommendations:  [x] SLEEP - Maintain a regular sleep schedule: Adults need at least 7-8 hours of uninterrupted a night  Maintain good sleep hygiene:  Going to bed and waking up at consistent times, avoiding excessive daytime naps, avoiding caffeinated beverages in the evening, avoid excessive stimulation in the evening and generally using bed primarily for sleeping    One hour before bedtime would recommend turning lights down lower, decreasing your activity (may read quietly, listen to music at a low volume)  When you get into bed, should eliminate all technology (no texting, emailing, playing with your phone, iPad or tablet in bed)  [x] HYDRATION - Maintain good hydration  Drink  2L of fluid a day (4 typical small water bottles)  [x] DIET - Maintain good nutrition  In particular don't skip meals and try and eat healthy balanced meals regularly  [x] TRIGGERS - Look for other triggers and avoid them: Limit caffeine to 1-2 cups a day or less  Avoid dietary triggers that you have noticed bring on your headaches (this could include aged cheese, peanuts, MSG, aspartame and nitrates)  Education and Follow-up  [x] Please call with any questions or concerns   Go to the ED with any new or concerning symptoms  [x] Follow up 4-6 months, sooner if needed

## 2022-10-26 NOTE — PROGRESS NOTES
Review of Systems   Constitutional: Negative  HENT: Negative  Eyes: Negative  Respiratory: Negative  Cardiovascular: Negative  Gastrointestinal: Negative  Endocrine: Negative  Genitourinary: Negative  Musculoskeletal: Positive for neck pain  Skin: Negative  Allergic/Immunologic: Negative  Neurological: Positive for headaches  Hematological: Negative  Psychiatric/Behavioral: Negative

## 2022-11-03 ENCOUNTER — OFFICE VISIT (OUTPATIENT)
Dept: PULMONOLOGY | Facility: CLINIC | Age: 77
End: 2022-11-03

## 2022-11-03 VITALS
WEIGHT: 168 LBS | DIASTOLIC BLOOD PRESSURE: 80 MMHG | HEIGHT: 67 IN | SYSTOLIC BLOOD PRESSURE: 124 MMHG | OXYGEN SATURATION: 97 % | HEART RATE: 69 BPM | BODY MASS INDEX: 26.37 KG/M2 | TEMPERATURE: 97 F

## 2022-11-03 DIAGNOSIS — J84.9 ILD (INTERSTITIAL LUNG DISEASE) (HCC): Primary | ICD-10-CM

## 2022-11-03 NOTE — PROGRESS NOTES
Progress Note - Pulmonary   Idella See 78 y o  female MRN: [id number]   Encounter: [id number]      Assessment/Plan:  Patient is a 72-year-old female with past medical history significant for secondhand smoke exposure who presents for follow-up  The patient had interval spirometry and DLCO which has had a slight but progressive decline  It is unclear the exact cause of this decline as her CT scan appears approximately stable  The patient's functional status is approximately stable as well she notes that this has been complicated by her recent thyroid surgery as well as shoulder issues requiring her to provide extensive care for her   The patient denies any other systemic symptoms  At this time, will refer the patient to pulmonary rehab encouraged exercise as there may be a component of deconditioning associated with this as the DLCO is approximately stable  The pulmonary function testing was reviewed extensively with the patient and her  and they are in agreement  The patient did undergo a 6 minute walk test today in the office at which point there was reported desaturation but this is likely an artifact related to difficulty with testing equipment  Encouraged the patient to monitor her SpO2 at home using pulse ox with exertion  No indication for additional antibody workup at this time  Interstitial Lung Disease  -  Referred to pulm rehab  -  Recheck PFTs in 3 mos  -  No acute indication for steroids or antifbrotics    Reactive Airway Disease  -  May trial albuterol   - provided spacer    1  [facility]) ([facility] )  -     Ambulatory Referral to Pulmonary Rehabilitation; Future  -     Complete PFT with post bronchodilator; Future; Expected date: 02/03/2023  -     POCT 6 minute walk      Patient may follow up in 4 months or sooner as necessary       Greater than 50% of a total visit lasting 40 minutes was spent counseling the patient on workup evaluation and management of progressive decline of spirometry in the setting of interstitial lung disease  Orders:  Orders Placed This Encounter   Procedures   • POCT 6 minute walk   • Ambulatory Referral to Pulmonary Rehabilitation     Standing Status:   Future     Standing Expiration Date:   11/3/2023     Referral Priority:   Routine     Referral Type:   Rehabilitation - Outpatient     Referral Reason:   Specialty Services Required     Requested Specialty:   Pulmonary Disease     Number of Visits Requested:   1     Expiration Date:   11/3/2023   • Complete PFT with post bronchodilator     Standing Status:   Future     Standing Expiration Date:   11/3/2023     Order Specific Question:   Would you like to add MVV, MIP, MEP into this order? 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 knees  She had no problem during a walk  She has a dry cough which has been present for about the past 4-5 years  She associates the cough with moving for [place] to her new house  She currently has forced hot air  She was given an albuterol inhaler which she has not used  She will have occasional periods of aspiration  Her  reports her walking is more steady  She denies chest pain or chest tenderness  Inhaler Regimen:  None    Remainder of review of systems negative except as described in HPI        The following portions of the patient's history were reviewed and updated as appropriate: allergies, current medications, past family history, past medical history, past social history, past surgical history and problem list      Objective:   Vitals: Blood pressure 124/80, pulse 69, temperature (!) 97 °F (36 1 °C), temperature source Tympanic, height 5' 7" (1 702 m), weight 76 2 kg (168 lb), SpO2 97 %, not currently breastfeeding , RA, Body mass index is 26 31 kg/m²  Physical Exam  Gen: Pleasant, awake, alert, oriented x 3, no acute distress  HEENT: Mucous membranes moist, no oral lesions, no thrush  NECK: No accessory muscle use, JVP not elevated  Cardiac: RRR, single S1, single S2, no murmurs, no rubs, no gallops  Lungs: CTA b/l  Abdomen: normoactive bowel sounds, soft nontender, nondistended, no rebound or rigidity, no guarding  Extremities: no cyanosis, no clubbing, no LE edema  MSK:  Strength equal in all extremities  Derm:  No rashes/lesions noted  Neuro:  Appropriate mood/affect    Labs: I have personally reviewed pertinent lab results  Lab Results   Component Value Date    WBC 6 61 09/21/2022    HGB 14 5 09/21/2022     09/21/2022     Lab Results   Component Value Date    CREATININE 0 88 09/15/2022      Imaging and other studies: I have personally reviewed pertinent reports  and I have personally reviewed pertinent films in PACS  HRCT 3/17/2022  My interpretation:  Bibasilar reticulation    Radiology findings:  LUNGS:  No significant change in minimal juxtapleural reticulation in the lateral basal segments of the lower lobes since December 2018  No groundglass opacity, bronchiectasis, bronchiolectasis, or honeycombing  Minimal air trapping on expiration  Redemonstration of linear scar in the medial segment right middle lobe and focal scar in the juxtapleural lateral basal right lower lobe (4/60)  AIRWAYS: New tubular opacity in the lateral right upper lobe due to endobronchial mucous plugging (4/52)  PLEURA:  Unremarkable  HEART/GREAT VESSELS:  Normal for age  MEDIASTINUM AND SREEKANTH:  Unremarkable  Pulmonary Function Testing: I have personally reviewed pertinent reports     and I have personally reviewed pertinent films in PACS                          FEV1               FVC                 FEV1/FVC       DLCOcor  10/7/2019        1 70 69%         2 17 68%         78%                 58%  3/3/2021          1 59 66%         2 02 64%         79%                 58%  3/17/2022        1 46 62%         1 89 60%         78%                 55%  10/17/2022 1 26 53% 1 62 52% 78%  52%     6MWT:  11/3/2022:      ECHO:  7/5/2022:  Left Ventricle: Left ventricular cavity size is normal  Wall thickness is normal  The left ventricular ejection fraction is 60-65% by biplane measurement  Systolic function is normal  Wall motion is normal  Diastolic function is mildly abnormal, consistent with grade I (abnormal) relaxation  •  Right Ventricle: Systolic function is low normal   •  Tricuspid Valve: There is mild regurgitation  Ronnie Vegas

## 2022-11-09 ENCOUNTER — TELEPHONE (OUTPATIENT)
Dept: GASTROENTEROLOGY | Facility: CLINIC | Age: 77
End: 2022-11-09

## 2022-11-09 NOTE — TELEPHONE ENCOUNTER
Patients GI provider:  Dr Hernandez Counts    Number to return call: [phone]    Reason for call: Pt calling to speak to a nurse or Dr Hernandez Counts about her symptoms  Pt states that she is on the omeprazole 2x a day-1 in the am and 1 at night and it doesn't seem to be working  Pt states that she is having very bad heart burn at night  She would like to discuss some other options if possible  Above is her number       Scheduled procedure/appointment date if applicable: Apt/procedure NA

## 2022-11-09 NOTE — TELEPHONE ENCOUNTER
Spoke with patient  Patient calling in due to increased reflux and heart burn at night  Patient states this is occurring everyday, but only at night  She is avoiding acidic and spicy foods  She is sleeping with her head elevated  She states she is currently eating a spoonful of vanilla ice-cream to coat her throat  Patient explains that this provides relief for a short period of time, but experiences symptoms once it dissolves  She is currently taking omeprazole 20mg 2x/day before breakfast and before dinner  She is currently taking Carafate at bedtime with no relief  Recommended to try Pepcid at bedtime  However, patient stated she did not want to do that as Pepcid made her dizzy when she took it once  Advised patient I would speak with Dr Hi Gusman and call her back with his recommendations

## 2022-11-15 ENCOUNTER — HOSPITAL ENCOUNTER (OUTPATIENT)
Dept: ULTRASOUND IMAGING | Facility: HOSPITAL | Age: 77
Discharge: HOME | End: 2022-11-15
Attending: INTERNAL MEDICINE

## 2022-11-15 DIAGNOSIS — E04.2 MULTINODULAR GOITER: ICD-10-CM

## 2022-11-15 DIAGNOSIS — E04.1 THYROID NODULE: ICD-10-CM

## 2022-11-17 NOTE — TELEPHONE ENCOUNTER
I called the patient and discussed  She is agreeable to take Pepcid at bedtime  I asked to take Pepcid Complete at bedtime

## 2022-11-22 ENCOUNTER — TELEPHONE (OUTPATIENT)
Dept: ENDOCRINOLOGY | Facility: CLINIC | Age: 77
End: 2022-11-22

## 2022-11-22 NOTE — TELEPHONE ENCOUNTER
----- Message from Gregory Barbour MD sent at 11/22/2022  9:49 AM EST -----  Stable Ultrasound findings

## 2022-12-02 ENCOUNTER — OFFICE VISIT (OUTPATIENT)
Dept: FAMILY MEDICINE CLINIC | Facility: CLINIC | Age: 77
End: 2022-12-02

## 2022-12-02 VITALS
TEMPERATURE: 97.1 F | WEIGHT: 176.2 LBS | SYSTOLIC BLOOD PRESSURE: 122 MMHG | HEIGHT: 67 IN | DIASTOLIC BLOOD PRESSURE: 80 MMHG | BODY MASS INDEX: 27.65 KG/M2 | HEART RATE: 77 BPM | RESPIRATION RATE: 16 BRPM

## 2022-12-02 DIAGNOSIS — K52.9 ACUTE GASTROENTERITIS: Primary | ICD-10-CM

## 2022-12-02 DIAGNOSIS — R35.0 URINARY FREQUENCY: ICD-10-CM

## 2022-12-02 LAB
SL AMB  POCT GLUCOSE, UA: NORMAL
SL AMB LEUKOCYTE ESTERASE,UA: NORMAL
SL AMB POCT BILIRUBIN,UA: NORMAL
SL AMB POCT BLOOD,UA: NORMAL
SL AMB POCT CLARITY,UA: CLEAR
SL AMB POCT COLOR,UA: YELLOW
SL AMB POCT KETONES,UA: NORMAL
SL AMB POCT NITRITE,UA: NORMAL
SL AMB POCT PH,UA: 5
SL AMB POCT SPECIFIC GRAVITY,UA: 1.02
SL AMB POCT URINE PROTEIN: NORMAL
SL AMB POCT UROBILINOGEN: 0.2

## 2022-12-02 NOTE — PATIENT INSTRUCTIONS
Gastroenteritis   AMBULATORY CARE:   Gastroenteritis , or stomach flu, is an infection of the stomach and intestines  It is caused by bacteria, parasites, or viruses  Common symptoms include the following:   Diarrhea or gas    Nausea, vomiting, or poor appetite    Abdominal cramps, pain, or gurgling    Fever    Tiredness or weakness    Headaches or muscle aches with any of the above symptoms    Call 911 for any of the following: You have trouble breathing or a very fast pulse  Seek care immediately if:   You see blood in your diarrhea  You cannot stop vomiting  You have not urinated for 12 hours  You feel like you are going to faint  Contact your healthcare provider if:   You have a fever  You continue to vomit or have diarrhea, even after treatment  You see worms in your diarrhea  Your mouth or eyes are dry  You are not urinating as much or as often  You have questions or concerns about your condition or care  Treatment for gastroenteritis  may include medicines to slow or stop your diarrhea or vomiting  You may also need medicines to treat an infection caused by bacteria or a parasite  Manage your symptoms:   Drink liquids as directed  Ask your healthcare provider how much liquid to drink each day, and which liquids are best for you  You may also need to drink an oral rehydration solution (ORS)  An ORS has the right amounts of sugar, salt, and minerals in water to replace body fluids  Eat bland foods  When you feel hungry, begin eating soft, bland foods  Examples are bananas, clear soup, potatoes, and applesauce  Do not have dairy products, alcohol, sugary drinks, or drinks with caffeine until you feel better  Rest as much as possible  Slowly start to do more each day when you begin to feel better  Prevent the spread of germs:  Gastroenteritis can spread easily   Keep yourself, your family, and your surroundings clean to help prevent the spread of gastroenteritis:  Wash your hands often  Use soap and water  Wash your hands after you use the bathroom, change a child's diapers, or sneeze  Wash your hands before you prepare or eat food  Clean surfaces and do laundry often  Wash your clothes and towels separately from the rest of the laundry  Clean surfaces in your home with antibacterial  or bleach  Clean food thoroughly and cook safely  Wash raw vegetables before you cook  Cook meat, fish, and eggs fully  Do not use the same dishes for raw meat as you do for other foods  Refrigerate any leftover food immediately  Be aware when you camp or travel  Drink only clean water  Do not drink from rivers or lakes unless you purify or boil the water first  When you travel, drink bottled water and do not add ice  Do not eat fruit that has not been peeled  Do not eat raw fish or meat that is not fully cooked  Follow up with your doctor as directed:  Write down your questions so you remember to ask them during your visits  © Copyright [employer] 2022 Information is for End User's use only and may not be sold, redistributed or otherwise used for commercial purposes  All illustrations and images included in CareNotes® are the copyrighted property of A D A M , Inc  or Leslie Arias   The above information is an  only  It is not intended as medical advice for individual conditions or treatments  Talk to your doctor, nurse or pharmacist before following any medical regimen to see if it is safe and effective for you

## 2022-12-02 NOTE — PROGRESS NOTES
Assessment/Plan:     Diagnoses and all orders for this visit:    Acute gastroenteritis  - advised rest, plenty of fluids, BRAT diet     Urinary frequency  -  urine dip showed trace protein and was otherwise normal, will obtain urine culture and call with the results once available  -     Urine culture      Return as needed for persistent or worsening symptoms  The patient understands and agrees with the treatment plan  Subjective:   Chief Complaint   Patient presents with   • Bloated   • Urinary Frequency   • Diarrhea   • Abdominal Pain     Lower with cramping       Patient ID: Jackie Watters is a 78 y o  female who presents today with c/o nausea, diarrhea and abdominal cramping for the past 3 days, she is also having urinary frequency and pressure with urination since yesterday  She is feeling better today, no longer feeling nauseous and her appetite has improved, she had one loose stool since this am, she denies fever, no blood or mucus in her stool         The following portions of the patient's history were reviewed and updated as appropriate: allergies, current medications, past family history, past medical history, past social history, past surgical history and problem list     Past Medical History:   Diagnosis Date   • Anxiety    • Arthritis    • Back pain    • Balance problems    • Chest pain     heaviness   • Difficulty swallowing    • Dizziness    • Dry cough    • Gait disorder    • GERD (gastroesophageal reflux disease)    • Hyperparathyroidism ([address] )    • Hypertension    • Increased frequency of headaches    • Migraines    • Neck pain    • Numbness and tingling     bles   • Palpitations    • Pericarditis    • Thyroid disease     nodule   • Trouble in sleeping    • Wears glasses      Past Surgical History:   Procedure Laterality Date   • APPENDECTOMY     • CHOLECYSTECTOMY      laparoscopic   • COLONOSCOPY     • KNEE SURGERY Left    • PERICARDIAL WINDOW     • AK EXPLORE PARATHYROID GLANDS N/A 8/17/2022 Procedure: PARATHYROIDECTOMY, 4 GLAND EXPLORATION;  Surgeon: Toro Hernandez MD;  Location: [facility];  Service: ENT   • UT OPEN RX DISTAL RADIUS FX, EXTRA-ARTICULAR Right 3/22/2018    Procedure: OPEN REDUCTION W/ INTERNAL FIXATION (ORIF) RADIUS, splint application;  Surgeon: Seble Francisco MD;  Location: [facility];  Service: Orthopedics   • UPPER GASTROINTESTINAL ENDOSCOPY       Family History   Problem Relation Age of Onset   • Kidney failure Mother    • Hypertension Mother    • Lung cancer Father    • Brinda Dials Parkinson White syndrome Daughter    • No Known Problems Sister    • Substance Abuse Neg Hx    • Alcohol abuse Neg Hx    • Mental illness Neg Hx      Social History     Socioeconomic History   • Marital status: /Civil Union     Spouse name: Not on file   • Number of children: Not on file   • Years of education: Not on file   • Highest education level: Not on file   Occupational History   • Not on file   Tobacco Use   • Smoking status: Never   • Smokeless tobacco: Never   Vaping Use   • Vaping Use: Never used   Substance and Sexual Activity   • Alcohol use: Not Currently     Comment: Rarely    • Drug use: No   • Sexual activity: Yes     Partners: Male   Other Topics Concern   • Not on file   Social History Narrative    WORK:    1  [place] (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; Griselda Molina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    2  [employer]        HOBBIES:    1  Singing    2  Dancing    3  Hx of ceramics (+ dust)        PETS:    1  Dogs    -  Denies birds        TRAVEL:    -  [place]         EXPOSURES:    Denies mold; down pillows/comforters; hot tubs     Social Determinants of Health     Financial Resource Strain: Low Risk    • Difficulty of Paying Living Expenses: Not hard at all   Food Insecurity: Not on file   Transportation Needs: No Transportation Needs   • Lack of Transportation (Medical): No   • Lack of Transportation (Non-Medical):  No   Physical Activity: Not on file   Stress: Not on file   Social Connections: Not on file   Intimate Partner Violence: Not on file   Housing Stability: Not on file       Current Outpatient Medications:   •  acetaminophen (TYLENOL) 500 mg tablet, Take 500-1,000 mg by mouth every 6 (six) hours as needed for mild pain, Disp: , Rfl:   •  albuterol (PROVENTIL HFA,VENTOLIN HFA) 90 mcg/act inhaler, Inhale 2 puffs every 6 (six) hours as needed for wheezing or shortness of breath, Disp: 8 g, Rfl: 0  •  ALPRAZolam (XANAX) 0 5 mg tablet, Take 1 tablet (0 5 mg total) by mouth daily at bedtime as needed for anxiety or sleep, Disp: 20 tablet, Rfl: 1  •  ascorbic acid (VITAMIN C) 500 mg tablet, Take 500 mg by mouth daily, Disp: , Rfl:   •  b complex vitamins tablet, Take 1 tablet by mouth daily, Disp: , Rfl:   •  Cholecalciferol (Vitamin D) 50 MCG (2000 UT) CAPS, Take 1 capsule (2,000 Units total) by mouth daily (Patient taking differently: Take 3,000 Units by mouth daily), Disp: , Rfl:   •  dicyclomine (BENTYL) 10 mg capsule, TAKE 1 CAPSULE BY MOUTH 4 TIMES A DAY (BEFORE MEALS AND AT BEDTIME) (Patient taking differently: Take 10 mg by mouth 4 (four) times a day as needed), Disp: 360 capsule, Rfl: 1  •  furosemide (LASIX) 20 mg tablet, Take 1 tablet (20 mg total) by mouth daily as needed (leg swelling), Disp: 10 tablet, Rfl: 0  •  Magnesium 300 MG CAPS, Take 300 mg by mouth in the morning, Disp: , Rfl:   •  metoprolol succinate (TOPROL-XL) 25 mg 24 hr tablet, Take 0 5 tablets (12 5 mg total) by mouth daily at bedtime, Disp: 45 tablet, Rfl: 2  •  Multiple Vitamins-Calcium (ONE-A-DAY WOMENS PO), Take 1 tablet by mouth daily, Disp: , Rfl:   •  NON FORMULARY, Take 2 tablets by mouth in the morning MK-7, Disp: , Rfl:   •  omeprazole (PriLOSEC) 20 mg delayed release capsule, Take 1 capsule (20 mg total) by mouth 2 (two) times a day before meals, Disp: 180 capsule, Rfl: 2  •  Riboflavin (VITAMIN B-2 PO), Take 250 mg by mouth in the morning, Disp: , Rfl:   •  rizatriptan (MAXALT) 10 MG tablet, Take 1 tablet (10 mg total) by mouth once as needed for migraine May repeat in 2 hours if needed  Max 2/24 hours, 9/month , Disp: 9 tablet, Rfl: 6  •  traZODone (DESYREL) 50 mg tablet, Take 1 tablet (50 mg total) by mouth daily at bedtime, Disp: 30 tablet, Rfl: 1  •  sucralfate (CARAFATE) 1 g/10 mL suspension, Take 10 mL (1 g total) by mouth daily at bedtime, Disp: 300 mL, Rfl: 0    Review of Systems   Constitutional: Negative for chills, fatigue and fever  HENT: Positive for congestion  Negative for ear pain, trouble swallowing and voice change  Respiratory: Negative for cough, shortness of breath and wheezing  Cardiovascular: Negative for chest pain and palpitations  Gastrointestinal: Positive for diarrhea and nausea  Negative for abdominal pain, blood in stool and vomiting  Genitourinary: Positive for dysuria and frequency  Neurological: Positive for headaches  Negative for dizziness  Hematological: Negative for adenopathy  Objective:    Vitals:    12/02/22 1132   BP: 122/80   Pulse: 77   Resp: 16   Temp: (!) 97 1 °F (36 2 °C)   Weight: 79 9 kg (176 lb 3 2 oz)   Height: 5' 7" (1 702 m)        Physical Exam  Constitutional:       General: She is not in acute distress  Appearance: She is well-developed  HENT:      Right Ear: Tympanic membrane and ear canal normal       Left Ear: Tympanic membrane and ear canal normal       Mouth/Throat:      Mouth: Mucous membranes are moist       Pharynx: Oropharynx is clear  Neck:      Thyroid: No thyromegaly  Cardiovascular:      Rate and Rhythm: Normal rate and regular rhythm  Heart sounds: Normal heart sounds  No murmur heard  Pulmonary:      Effort: Pulmonary effort is normal       Breath sounds: Normal breath sounds  No wheezing, rhonchi or rales  Abdominal:      Palpations: Abdomen is soft  There is no mass  Tenderness: There is no abdominal tenderness  There is no right CVA tenderness, left CVA tenderness, guarding or rebound  Musculoskeletal:      Cervical back: Neck supple  Right lower leg: No edema  Left lower leg: No edema  Lymphadenopathy:      Cervical: No cervical adenopathy  Neurological:      Mental Status: She is alert and oriented to person, place, and time     Psychiatric:         Mood and Affect: Mood normal          Behavior: Behavior normal         Component 12/2/22  1:43 PM    LEUKOCYTE ESTERASE,UA neg    NITRITE,UA neg    SL AMB POCT UROBILINOGEN 0 2    POCT URINE PROTEIN trace     PH,UA 5 0    BLOOD,UA neg    SPECIFIC GRAVITY,UA 1 020    KETONES,UA neg    BILIRUBIN,UA neg    GLUCOSE, UA neg     COLOR,UA Yellow    CLARITY,UA Clear

## 2022-12-04 LAB — BACTERIA UR CULT: NORMAL

## 2022-12-05 ENCOUNTER — APPOINTMENT (OUTPATIENT)
Dept: RADIOLOGY | Facility: AMBULARY SURGERY CENTER | Age: 77
End: 2022-12-05
Attending: ORTHOPAEDIC SURGERY

## 2022-12-05 ENCOUNTER — OFFICE VISIT (OUTPATIENT)
Dept: OBGYN CLINIC | Facility: CLINIC | Age: 77
End: 2022-12-05

## 2022-12-05 VITALS
HEIGHT: 67 IN | BODY MASS INDEX: 27.78 KG/M2 | SYSTOLIC BLOOD PRESSURE: 143 MMHG | DIASTOLIC BLOOD PRESSURE: 71 MMHG | HEART RATE: 75 BPM | WEIGHT: 177 LBS

## 2022-12-05 DIAGNOSIS — M17.0 PRIMARY OSTEOARTHRITIS OF BOTH KNEES: Primary | ICD-10-CM

## 2022-12-05 DIAGNOSIS — M71.22 BAKER'S CYST OF KNEE, LEFT: ICD-10-CM

## 2022-12-05 DIAGNOSIS — M17.0 PRIMARY OSTEOARTHRITIS OF BOTH KNEES: ICD-10-CM

## 2022-12-05 RX ORDER — TRIAMCINOLONE ACETONIDE 40 MG/ML
40 INJECTION, SUSPENSION INTRA-ARTICULAR; INTRAMUSCULAR
Status: COMPLETED | OUTPATIENT
Start: 2022-12-05 | End: 2022-12-05

## 2022-12-05 RX ORDER — ROPIVACAINE HYDROCHLORIDE 5 MG/ML
10 INJECTION, SOLUTION EPIDURAL; INFILTRATION; PERINEURAL
Status: COMPLETED | OUTPATIENT
Start: 2022-12-05 | End: 2022-12-05

## 2022-12-05 RX ADMIN — ROPIVACAINE HYDROCHLORIDE 10 ML: 5 INJECTION, SOLUTION EPIDURAL; INFILTRATION; PERINEURAL at 12:22

## 2022-12-05 RX ADMIN — TRIAMCINOLONE ACETONIDE 40 MG: 40 INJECTION, SUSPENSION INTRA-ARTICULAR; INTRAMUSCULAR at 12:22

## 2022-12-06 ENCOUNTER — TELEPHONE (OUTPATIENT)
Dept: FAMILY MEDICINE CLINIC | Facility: CLINIC | Age: 77
End: 2022-12-06

## 2022-12-06 ENCOUNTER — TELEPHONE (OUTPATIENT)
Dept: OBGYN CLINIC | Facility: HOSPITAL | Age: 77
End: 2022-12-06

## 2022-12-06 NOTE — TELEPHONE ENCOUNTER
She may perform activity as tolerated  No restrictions  If she is having any increased pain warmth swelling around the knees or injection sites she should let us know  Otherwise would recommend ice or anti-inflammatory medicine if the knees are just sore  As far as the headache goes, this is not a common side effect from a intra-articular cortisone injection  If the headache persist or worsen or if she has any associated symptoms would recommend she contact her primary care doctor

## 2022-12-06 NOTE — TELEPHONE ENCOUNTER
Pt was informed  Pt also stated she wanted you to give her a call regarding the shots she received in her knees  Pt stated she also has a couple other questions but would not tell me what they were

## 2022-12-06 NOTE — TELEPHONE ENCOUNTER
Caller: Patient     Doctor: Chuck Tellez    Reason for call: Patient has questions regarding the CS injections she had done yesterday  She is has had a headache since last night  She did not receive a AVS so she doesn't know if there are things she should be doing or not doing  I advised her that it would be in her [employer] judi but she said she doesn't really use it       Call back#: [phone]

## 2022-12-06 NOTE — TELEPHONE ENCOUNTER
----- Message from Krista Dean MD sent at 12/5/2022  5:00 PM EST -----  Please inform patient that her urine culture is negative

## 2022-12-06 NOTE — TELEPHONE ENCOUNTER
Spoke with patient and information above relayed and verbalized understanding  She will reach out to PCP  Patient wanted to make sure she was okay to take her vitamin supplements after steroid injection  Advised there should be no contraindication  Patient wanted to know if it is okay to ride her exercise bike  Advised that she should start slow and add a few min per day until she is up to routine  It will take 1-2 weeks for full effect of steroid injection  Please advise

## 2022-12-07 NOTE — TELEPHONE ENCOUNTER
Spoke with patient, she received CS injection in her knees on Monday and developed a headache, felt warm and had elevated BP on Monday night, her headache went away on Tuesday and her BP this am was 142/93  She is currently on Toprol XL 25 mg 1/2 tablet daily, advised to increase to 1 tablet daily and call the office in 1-2 weeks with BP readings  Patient understands and in agreement

## 2022-12-09 ENCOUNTER — TELEPHONE (OUTPATIENT)
Dept: CARDIAC REHAB | Facility: CLINIC | Age: 77
End: 2022-12-09

## 2022-12-09 NOTE — TELEPHONE ENCOUNTER
Confirmed pulm rehab eval for 12/12 at 11am  Insurance approved 24 sessions with a $10 copay/session

## 2022-12-12 ENCOUNTER — CLINICAL SUPPORT (OUTPATIENT)
Dept: PULMONOLOGY | Facility: CLINIC | Age: 77
End: 2022-12-12

## 2022-12-12 DIAGNOSIS — J84.9 ILD (INTERSTITIAL LUNG DISEASE) (HCC): Primary | ICD-10-CM

## 2022-12-12 NOTE — PROGRESS NOTES
[facility] Cardiopulmonary Rehab  [place]    Emotional well-being and depression is addressed in the pulmonary rehab evaluation  To assess the severity of depression and anxiety, patients are given the PHQ-9 Depression Questionnaire and the AIDAN-7 Anxiety Questionnaire  This is to inform you that your patient Naresh Biswas scored a 3 which is interpreted as 1-4 = Minimal Depression and a 6 which is interpreted as 5-9 = Mild anxiety  Your patient was provided contact information for [facility] and has been encouraged to enroll in [employer]  A repeat PHQ -9 and AIDAN-7 will be administered in 30 days to assess improvement

## 2022-12-12 NOTE — PROGRESS NOTES
PULMONARY REHAB ASSESSMENT    Today's date: 2022  Patient name: Masood Martins     : 1945       MRN: [id number]  PCP: Cruz Mooney MD  Referring Physician: Familia Atkins, *  Pulmonologist: Linda Lewis MD     Dx: ILD      Date of onset: 11/3/22  Cultural needs: None     Weight:    Wt Readings from Last 1 Encounters:   22 80 3 kg (177 lb)      Height:   Ht Readings from Last 1 Encounters:   22 5' 7" (1 702 m)     Medical History:   Past Medical History:   Diagnosis Date   • Anxiety    • Arthritis    • Back pain    • Balance problems    • Chest pain     heaviness   • Difficulty swallowing    • Dizziness    • Dry cough    • Gait disorder    • GERD (gastroesophageal reflux disease)    • Hyperparathyroidism ([address] )    • Hypertension    • Increased frequency of headaches    • Migraines    • Neck pain    • Numbness and tingling     bles   • Palpitations    • Pericarditis    • Thyroid disease     nodule   • Trouble in sleeping    • Wears glasses          Physical Limitations: Osteoporosis of knees, edema in knees     Oxygen needs: Room Air     History of Toxic Exposure:  Second hand smoke    Risk Factors   Cholesterol: Yes  Smoking: Never used  HTN: Yes  DM: No  Obesity: Yes    Inactivity: Yes  Stress: perceived stress: 3/10    Stressors: worries about her family a lot, health, financials   Goals for Stress Management: exercise, meditation, relaxation and breathing techniques     Family History:  Family History   Problem Relation Age of Onset   • Kidney failure Mother    • Hypertension Mother    • Lung cancer Father    • Morgan Ill Parkinson White syndrome Daughter    • No Known Problems Sister    • Substance Abuse Neg Hx    • Alcohol abuse Neg Hx    • Mental illness Neg Hx        Allergies: Ciprofloxacin  ETOH:   Social History     Substance and Sexual Activity   Alcohol Use Not Currently    Comment: Rarely          Current Medications:   Current Outpatient Medications Medication Sig Dispense Refill   • acetaminophen (TYLENOL) 500 mg tablet Take 500-1,000 mg by mouth every 6 (six) hours as needed for mild pain     • albuterol (PROVENTIL HFA,VENTOLIN HFA) 90 mcg/act inhaler Inhale 2 puffs every 6 (six) hours as needed for wheezing or shortness of breath 8 g 0   • ALPRAZolam (XANAX) 0 5 mg tablet Take 1 tablet (0 5 mg total) by mouth daily at bedtime as needed for anxiety or sleep 20 tablet 1   • ascorbic acid (VITAMIN C) 500 mg tablet Take 500 mg by mouth daily     • b complex vitamins tablet Take 1 tablet by mouth daily     • Cholecalciferol (Vitamin D) 50 MCG (2000 UT) CAPS Take 1 capsule (2,000 Units total) by mouth daily (Patient taking differently: Take 3,000 Units by mouth daily)     • dicyclomine (BENTYL) 10 mg capsule TAKE 1 CAPSULE BY MOUTH 4 TIMES A DAY (BEFORE MEALS AND AT BEDTIME) (Patient taking differently: Take 10 mg by mouth 4 (four) times a day as needed) 360 capsule 1   • furosemide (LASIX) 20 mg tablet Take 1 tablet (20 mg total) by mouth daily as needed (leg swelling) 10 tablet 0   • Magnesium 300 MG CAPS Take 300 mg by mouth in the morning     • metoprolol succinate (TOPROL-XL) 25 mg 24 hr tablet Take 0 5 tablets (12 5 mg total) by mouth daily at bedtime 45 tablet 2   • Multiple Vitamins-Calcium (ONE-A-DAY WOMENS PO) Take 1 tablet by mouth daily     • NON FORMULARY Take 2 tablets by mouth in the morning MK-7     • omeprazole (PriLOSEC) 20 mg delayed release capsule Take 1 capsule (20 mg total) by mouth 2 (two) times a day before meals 180 capsule 2   • Riboflavin (VITAMIN B-2 PO) Take 250 mg by mouth in the morning     • rizatriptan (MAXALT) 10 MG tablet Take 1 tablet (10 mg total) by mouth once as needed for migraine May repeat in 2 hours if needed  Max 2/24 hours, 9/month   9 tablet 6   • sucralfate (CARAFATE) 1 g/10 mL suspension Take 10 mL (1 g total) by mouth daily at bedtime 300 mL 0   • traZODone (DESYREL) 50 mg tablet Take 1 tablet (50 mg total) by mouth daily at bedtime 30 tablet 1     No current facility-administered medications for this visit  Functional Status Prior to Diagnosis for Treatment   Occupation: retired  Recreation: does a lot of social activities with friends   ADL’s: No limitations Reports it just takes a little that gets to her   [place]: able to perform self-care  Exercise: none   Other: None     Current Functional Status  Occupation: retired  Recreation: social activities   ADL’s:Capable of performing light to moderate ADLs limited by Dyspnea - takes a little to get things going and adjusting to [place] activities   [place]: able to perform self-care  Exercise: none   Other: stays active      Patient Specific Goals:  Stay healthy, keep up with her family, improve activity level, improve SOB with moder-vig activity, start to exercise at home    Short Term Program Goals: dietary modifications increased strength improved energy/stamina with ADLs exercise 120-150 mins/wk return to work    Long Term Goals: increased maximal walking duration  increased intial training workload  Improved [facility] Activity Status score  Improved functional capacity  Improved Quality of Life - [facility] score reduced  Improved lipid profile  Reduced body fat%  Reduced waist circumference  Abstain from smoking  Reduced stress  improved Rate Your Plate Score    Oxygen Goals: Maintain SpO2>90% titrating supplemental oxygen as needed     Ability to reach goals/rehabilitation potential:  Very Good     Projected return to function: 12 weeks  Objective tests: 6 MWT      Nutritional   Reviewed details of Rate your Plate  Discussed key elements of heart healthy eating  Reviewed patient goals for dietary modifications and their clinical implications  Reviewed most recent lipid profile       Goals for dietary modification: choose lean cuts of meat  poultry without the skin  low fat ground meat and poultry  eliminate processed meats  reduce portions of meat to 3 oz  increase fish intake  more meatless meals  low fat dairy   reduced fat cheese  increase whole grains  increase fruits and vegetables  eliminate butter  low sodium  improved snack choices  more nuts/seeds  reduce sweets/frozen desserts  heathier choices while dining out      Emotional/Social  Patient reports feelings of anxiety  Reports sufficient emotional support  compliant with medical therapy for depression/anxiety  Provided contact information for [facility] [address]    Marital status:       Domestic Violence Screening: No    Comments: Hx of ILD with continued decrease in DLCO  Patient was recently rescheduled for repeat PFT in the next 3 months for update  Patient has Hx of Smoking exposure with no smoking hx herself  Recent Hospitalization for hyperparathyroidism with surgery 8/2022  S/S of dry cough and Aspiration       Hx of anxiety, Reflux, IBS, Migraines, Pericardial effusion, Osteoporosis, heart murmur, pericarditis, edema of knees

## 2022-12-12 NOTE — PROGRESS NOTES
Pulmonary Rehabilitation Plan of Care   Initial Care Plan      Today's date: 2022   # of Exercise Sessions Completed: 1 - Eval  Patient name: Alia Chapman      : 1945  Age: 78 y o  MRN: [id number]  Referring Physician: Harsha Washburn, *  Pulmonologist: Cristiane Hewitt MD   Provider: Mattie Desouza  Clinician: Niraj Pierce MS, CEP    Dx:   Encounter Diagnosis   Name Primary? • [facility]) ([facility] ) Yes     Date of onset: 11/3/22      SUMMARY OF PROGRESS:  Today is Nella's initial evaluation to begin Pulmonary Rehab for the diagnosis of ILD  She has a Hx of parathyroidectomy, murmur, osteoporosis, and pericarditis  Patient does not have a PFT on file, revealing an FEV1/FVC ratio of 79% and an FEV1 of 1 59L  This is suggestive of moderate diffusion capacity  Since their diagnosis, the patient has been experiencing increased dyspnea and however has not let this stop their daily/social activities  They report dyspnea when completing ADLs  The patient currently does not follow a formal exercise program at home  Pt reports the following physical limitations: bilat knee pain due to osteoporosis  Depression screening using the PHQ-9 interprets the patient's score of 3 1-4 = Minimal Depression  Anxiety screening using the AIDAN-7 interprets the patients score of 6  5-9 = Mild anxiety  When addressed, the patient admits to having anxiety which she is medicated for as needed however has not had to use it recently  Patient reports excellent social/emotional support from her family and friends  Information to begin using [employer] was provided as well as contact information for counseling through [employer]  Gabby Sanders does report that it is under control at this time  PHQ-9/AIDAN-7 score will be reassessed in 30 days  The patient is a non-smoker and has second hand smoke exposure  Patient admits to 100% medication compliance   At rest, the patient rated dyspnea 0/10 with SpO2 97% on room air  They completed an initial 6MWT, walking 1035 ft on room air  The patient’s rating of perceived dyspnea during the 6MWT was 1/10 with SpO2 83%  Patient took zero rest breaks  Telemetry revealed NSR during rest and exercise  Resting  /70 with appropriate hemodynamic response to exercise reaching 168/80  Patient will exercise on room air and will be provided O2 if continues to [place] with exercise  Education on smoking cessation, oxygen use, breathing techniques, pulmonary anatomy, exercise for the pulmonary patient, healthy eating, stress, and relaxation will be provided  Patient goals include: Stay healthy, keep up with her family, improve activity level, improve SOB with moder-vig activity, start to exercise at home  Gabby Sanders will attend 24 exercise sessions, 2x/wk for 12-18 weeks beginning 12/14/22  Will progress patient as tolerated over the next 30 days        Medication compliance: Yes   Comments: Pt reports to be compliant with medications  Fall Risk: Low   Comments: Ambulates with a steady gait with no assist device and Denies a fall in the past 6 months    Smoking: Never used    RPD at Rest: 0/10  RPD with Exercise:  1/10    Assessment of progression of lung disease and functional status:  CAT: n/a  Shortness of breath questionnaire: 11/120      EXERCISE ASSESSMENT and PLAN    Current Exercise Program in Rehab:       Frequency: 2 days/week   Supplement with home exercise 2+ days/wk as tolerated       Minutes: 30-35         METS: 2-3              SpO2: >88%              RPD: 1-3                      HR: resting + 30   RPE: 4-5         Modalities: UBE, NuStep and Recumbent bike      Exercise Progression 30 Day Goals :    Frequency: 2-3 days/week    Supplement with home exercise 2+ days/wk as tolerated       Minutes: 35-45         METS: 2-3              SpO2: >88%              RPD: 1-3                      HR: resting +20   RPE: 4-5        Modalities: Treadmill, UBE, NuStep and Recumbent bike     Strength training: Will be added following 2-3 weeks of monitored exercise sessions   Modalities: Lateral Raise, Arm Curl, Seated Row, Sit to [employer] and [employer]    Oxygen Needs: on room air at rest and on room air with exercise  Oxygen Goal: Maintain SpO2>90% during exercise    Home Exercise: none  Education: pursed lipped breathing, diaphragmatic breathing, relaxation breathing, home exercise, benefits of exercise for pulmonary disease, RPE scale, RPD scale, O2 saturation monitoring, appropriate O2 response to exercise and energy conservation    Goals: Improved 6MW distance by 10%, reduced dyspnea during exercise (0-3/10), improved exercise tolerance (max METs tolerated in pulmonary rehab), SpO2 >90% during exercise, improved DUKE activity score, reduced RPD at rest, attend pulmonary rehab regularly and decrease sitting time at home  Progressing:  Reviewed Pt goals and determined plan of care, Patient will start to exercise at home, work on improving balance/strength in the next 30 days, Will continue to educate and progress as tolerated      Plan: Titrate supplemental oxygen as needed to maintain SpO2>90% with exercise, learn to conserve energy with ADLs , practice breathing techniques 3x/day and reduce time sitting at home    Readiness to change: Preparation:  (Getting ready to change)       NUTRITION ASSESSMENT AND PLAN    Weight control:    Starting weight: 173 4 lbs    Current weight:       Diabetes: N/A    Goals:LDL <100, CHOL <200, decreased body fat % <33%  (F), reduced waist circumference <35 inches (F), 2 5-5%  wt loss, increase intake of fish, shellfish, use low fat dairy, eat 3 or more servings of whole grains a day, Eat 4-5 cups of fruits and vegetables daily and eliminate or choose low-fat sweets  Education: heart healthy eating  low sodium diet  hydration  wt  loss   healthy choices while dining out  Progressing:Reviewed Pt goals and determined plan of care, Patient will maintain diet and dietary changes made for heart health  in the next 30 days, Will continue to educate and progress as tolerated  Plan: Education class: Reading Food Labels, Education Class: Heart Healthy Eating, switch to low fat cheeses, replace unhealthy snacks with fruits & vegs, reduce portion sizes, remove salt shaker from table, drink more water and keep added daily sugar <25g/day  Readiness to change: Action:  (Changing behavior)      PSYCHOSOCIAL ASSESSMENT AND PLAN    Emotional:  Depression assessment:  PHQ-9 = 3 1-4 = Minimal Depression            Anxiety measure:  AIDAN-7 = 6 5-9 = Mild anxiety  Self-reported stress level: 3   Social support: Excellent and Patient reports excellent emotional/social support from family and friends     Goals:  Reduce perceived stress to 1-3/10, continue medical therapy, [facility] < 3, increased energy, stop worrying, take time to relax and Feel less anxious  Education: signs/sxs of depression, benefits of a positive support system and stress management techniques    Progressing:Reviewed Pt goals and determined plan of care, Patient will work on anxiety management and how to take time to relax in the next 30 days, Will continue to educate and progress as tolerated    Plan: Class: Stress and Your Health, Class: Relaxation, PHQ-9 >5 will refer to MD, Practice relaxation techniques and Exercise  Readiness to change: Preparation:  (Getting ready to change)       OTHER CORE COMPONENTS     Tobacco:   Social History     Tobacco Use   Smoking Status Never   Smokeless Tobacco Never       Tobacco Use Intervention: Referral to tobacco expert:   N/A:  Patient is a non-smoker     Blood pressure:    Restin/70   Exercise: 168/80    Goals: consistent BP < 130/80, reduced dietary sodium <2300mg, moderate intensity exercise >150 mins/wk and medication compliance  Education:  pathophysiology of pulmonary disease, preventing infections, dangers of smoking, control coughing, inspiratory muscle training and environmental triggers  Progressing:Reviewed Pt goals and determined plan of care, Patient will monitor BP at home  in the next 30 days, Will continue to educate and progress as tolerated    Plan: Complete abstention from smoking, engage in regular exercise and monitor home BP  Readiness to change: Preparation:  (Getting ready to change)

## 2022-12-14 ENCOUNTER — CLINICAL SUPPORT (OUTPATIENT)
Dept: PULMONOLOGY | Facility: CLINIC | Age: 77
End: 2022-12-14

## 2022-12-14 ENCOUNTER — APPOINTMENT (OUTPATIENT)
Dept: PULMONOLOGY | Facility: CLINIC | Age: 77
End: 2022-12-14

## 2022-12-14 DIAGNOSIS — J84.9 ILD (INTERSTITIAL LUNG DISEASE) (HCC): Primary | ICD-10-CM

## 2022-12-16 ENCOUNTER — APPOINTMENT (OUTPATIENT)
Dept: LAB | Facility: CLINIC | Age: 77
End: 2022-12-16

## 2022-12-16 ENCOUNTER — TELEPHONE (OUTPATIENT)
Dept: ENDOCRINOLOGY | Facility: CLINIC | Age: 77
End: 2022-12-16

## 2022-12-16 DIAGNOSIS — E21.3 HYPERPARATHYROIDISM (HCC): ICD-10-CM

## 2022-12-16 LAB
CA-I BLD-SCNC: 1.32 MMOL/L (ref 1.12–1.32)
PTH-INTACT SERPL-MCNC: 107.9 PG/ML (ref 18.4–80.1)

## 2022-12-16 NOTE — TELEPHONE ENCOUNTER
I let the pt know  She said she is taking 4000u of vit d  She wants to know if she can take pepcid sometimes for heart burn she's just worried if there is calcium in it?

## 2022-12-16 NOTE — TELEPHONE ENCOUNTER
----- Message from Lety Hutson MD sent at 12/16/2022  1:39 PM EST -----  Please call the patient regarding her abnormal result  Her calcium is within normal range, PTH is still elevated  She should take vitamin D3 supplementation 1000 IUs daily  Follow-up for appointment as scheduled, she will stay away from calcium supplementation

## 2022-12-17 DIAGNOSIS — K21.9 GASTROESOPHAGEAL REFLUX DISEASE, UNSPECIFIED WHETHER ESOPHAGITIS PRESENT: ICD-10-CM

## 2022-12-17 RX ORDER — OMEPRAZOLE 20 MG/1
CAPSULE, DELAYED RELEASE ORAL
Qty: 180 CAPSULE | Refills: 1 | Status: SHIPPED | OUTPATIENT
Start: 2022-12-17

## 2022-12-19 ENCOUNTER — APPOINTMENT (OUTPATIENT)
Dept: PULMONOLOGY | Facility: CLINIC | Age: 77
End: 2022-12-19

## 2022-12-19 ENCOUNTER — HOSPITAL ENCOUNTER (EMERGENCY)
Facility: HOSPITAL | Age: 77
Discharge: HOME | End: 2022-12-19
Attending: EMERGENCY MEDICINE

## 2022-12-19 ENCOUNTER — APPOINTMENT (OUTPATIENT)
Dept: RADIOLOGY | Facility: HOSPITAL | Age: 77
End: 2022-12-19

## 2022-12-19 VITALS
TEMPERATURE: 97.6 F | DIASTOLIC BLOOD PRESSURE: 99 MMHG | HEART RATE: 76 BPM | SYSTOLIC BLOOD PRESSURE: 140 MMHG | RESPIRATION RATE: 16 BRPM | OXYGEN SATURATION: 98 %

## 2022-12-19 DIAGNOSIS — J12.9 VIRAL PNEUMONIA: Primary | ICD-10-CM

## 2022-12-19 LAB
FLUAV RNA RESP QL NAA+PROBE: NEGATIVE
FLUBV RNA RESP QL NAA+PROBE: NEGATIVE
RSV RNA RESP QL NAA+PROBE: NEGATIVE
SARS-COV-2 RNA RESP QL NAA+PROBE: NEGATIVE

## 2022-12-19 RX ORDER — PROMETHAZINE HYDROCHLORIDE 12.5 MG/1
12.5 TABLET ORAL EVERY 6 HOURS PRN
Qty: 16 TABLET | Refills: 0 | Status: SHIPPED | OUTPATIENT
Start: 2022-12-19

## 2022-12-19 NOTE — ED PROVIDER NOTES
History  Chief Complaint   Patient presents with   • Flu Symptoms     Patient presents to ED with cough, congestion, sore throat for the past 3-4 days  3 to 4 days coughing runny nose mucus dripping in the back sometimes makes her nauseous  She has chills chest discomfort with cough  She has been taking Tylenol without relief  HPI: Patient is a 78 y o  female who presents with *3-4** days of cough which the patient describes at mild The patient has not had contact with people with similar symptoms  The patient without relief of symptoms  -- Ciprofloxacin -- Myalgia    Past Medical History:  No date: Anxiety  No date: Arthritis  No date: Back pain  No date: Balance problems  No date: Chest pain      Comment:  heaviness  No date: Difficulty swallowing  No date: Dizziness  No date: Dry cough  No date: Gait disorder  No date: GERD (gastroesophageal reflux disease)  No date: Hyperparathyroidism ([address] )  No date: Hypertension  No date: Increased frequency of headaches  No date: Migraines  No date: Neck pain  No date: Numbness and tingling      Comment:  bles  No date: Palpitations  No date: Pericarditis  No date: Thyroid disease      Comment:  nodule  No date: Trouble in sleeping  No date: Wears glasses   Past Surgical History:  No date: APPENDECTOMY  No date: CHOLECYSTECTOMY      Comment:  laparoscopic  No date: COLONOSCOPY  No date: KNEE SURGERY;  Left  No date: PERICARDIAL WINDOW  8/17/2022: [place] EXPLORE PARATHYROID GLANDS; N/A      Comment:  Procedure: PARATHYROIDECTOMY, 4 GLAND EXPLORATION;                 Surgeon: Basilio Sr MD;  [facility];                 Service: ENT  3/22/2018: [place] OPEN RX DISTAL RADIUS FX, EXTRA-ARTICULAR; Right      Comment:  Procedure: OPEN REDUCTION W/ INTERNAL FIXATION (ORIF)                RADIUS, splint application;  Surgeon: Delonte Morgan MD;  [facility];  Service: Orthopedics  No date: UPPER GASTROINTESTINAL ENDOSCOPY  Social History    Tobacco Use      Smoking status: Never      Smokeless tobacco: Never    Vaping Use      Vaping Use: Never used    Alcohol use: Not Currently      Comment: Rarely     Drug use: No      Nursing notes reviewed  Physical Exam:  ED Triage Vitals (12/19/22 1256)  Temperature: 97 6 °F (36 4 °C)  Pulse: 76  Respirations: 16  Blood Pressure: 140/99  SpO2: 98 %  Temp src: n/a  Heart Rate Source: Monitor  Patient Position - Orthostatic VS: Sitting  BP Location: Left arm  FiO2 (%): n/a  Pain Score: n/a    ROS: Positive for cough, runny nose, post-nasal drip, the remainder of a 10 organ system ROS was otherwise unremarkable  General: awake, alert, no acute distress    Head: normocephalic, atraumatic    Eyes: no scleral icterus  Ears: external ears normal, hearing grossly intact  Nose: external exam grossly normal, positive nasal discharge  Neck: symmetric, No JVD noted, trachea midline  Pulmonary: no respiratory distress, no tachypnea noted  Cardiovascular: appears well perfused  Abdomen: no distention noted  Musculoskeletal: no deformities noted, tone normal  Neuro: grossly non-focal  Psych: mood and affect appropriate    The patient is stable and has a history and physical exam consistent with a viral illness  COVID19 testing has not been performed  I considered the patient's other medical conditions as applicable/noted above in my medical decision making  The patient is stable upon discharge  The plan is for supportive care at home  The patient (and any family present) verbalized understanding of the discharge instructions and warnings that would necessitate return to the Emergency Department  All questions were answered prior to discharge      Medications - No data to display  Final diagnoses:  Viral pneumonia  Time reflects when diagnosis was documented in both MDM as applicable and   the Disposition within this note     Time User Action Codes Description Comment    12/19/2022  3:16 PM Rajani Shields, [address] [place] [address] (J12 9) Viral pneumonia       ED Disposition     ED Disposition   Discharge    Condition   Stable    Date/Time   Mon Dec 19, 2022  3:16 PM    Comment   [facility]  discharge to home/self care                 Follow-up Information     Follow up With Specialties Details Why Contact Info    Elder Bruno MD Family Medicine Schedule an appointment as soon as   possible for a visit in 1 month  [address] [facility]  [phone]        Discharge Medication List as of 12/19/2022  3:19 PM    START taking these medications    promethazine (PHENERGAN) 12 5 MG tablet  Take 1 tablet (12 5 mg total) by mouth every 6 (six) hours as needed for nausea or vomiting (cough), Starting Mon 12/19/2022, Normal      CONTINUE these medications which have NOT CHANGED    acetaminophen (TYLENOL) 500 mg tablet  Take 500-1,000 mg by mouth every 6 (six) hours as needed for mild pain, Historical Med    albuterol (PROVENTIL HFA,VENTOLIN HFA) 90 mcg/act inhaler  Inhale 2 puffs every 6 (six) hours as needed for wheezing or shortness of breath, Starting Wed 6/8/2022, Normal    ALPRAZolam (XANAX) 0 5 mg tablet  Take 1 tablet (0 5 mg total) by mouth daily at bedtime as needed for anxiety or sleep, Starting Mon 10/24/2022, Normal    ascorbic acid (VITAMIN C) 500 mg tablet  Take 500 mg by mouth daily, Historical Med    b complex vitamins tablet  Take 1 tablet by mouth daily, Historical Med    Cholecalciferol (Vitamin D) 50 MCG (2000 UT) CAPS  Take 1 capsule (2,000 Units total) by mouth daily, Starting Tue 8/10/2021, OTC    dicyclomine (BENTYL) 10 mg capsule  TAKE 1 CAPSULE BY MOUTH 4 TIMES A DAY (BEFORE MEALS AND AT BEDTIME), Normal    furosemide (LASIX) 20 mg tablet  Take 1 tablet (20 mg total) by mouth daily as needed (leg swelling), Starting Fri 7/1/2022, Normal    Magnesium 300 MG CAPS  Take 300 mg by mouth in the morning, Historical Med    metoprolol succinate (TOPROL-XL) 25 mg 24 hr tablet  Take 0 5 tablets (12 5 mg total) by mouth daily at bedtime, Starting Mon 10/24/2022, Normal    Multiple Vitamins-Calcium (ONE-A-DAY WOMENS PO)  Take 1 tablet by mouth daily, Historical Med    NON FORMULARY  Take 2 tablets by mouth in the morning MK-7, Historical Med    omeprazole (PriLOSEC) 20 mg delayed release capsule  TAKE 1 CAPSULE BY MOUTH TWICE A DAY BEFORE MEALS, Normal    Riboflavin (VITAMIN B-2 PO)  Take 250 mg by mouth in the morning, Historical Med    rizatriptan (MAXALT) 10 MG tablet  Take 1 tablet (10 mg total) by mouth once as needed for migraine May repeat in 2 hours if needed  Max 2/24 hours, 9/month , Starting Tue 5/17/2022, Normal    sucralfate (CARAFATE) 1 g/10 mL suspension  Take 10 mL (1 g total) by mouth daily at bedtime, Starting Fri 10/14/2022, Until Sun 11/13/2022, Normal    traZODone (DESYREL) 50 mg tablet  Take 1 tablet (50 mg total) by mouth daily at bedtime, Starting Mon 3/22/2021, Normal        No discharge procedures on file  Electronically Signed by            Prior to Admission Medications   Prescriptions Last Dose Informant Patient Reported? Taking?    ALPRAZolam (XANAX) 0 5 mg tablet   No No   Sig: Take 1 tablet (0 5 mg total) by mouth daily at bedtime as needed for anxiety or sleep   Cholecalciferol (Vitamin D) 50 MCG (2000 UT) CAPS   No No   Sig: Take 1 capsule (2,000 Units total) by mouth daily   Patient taking differently: Take 3,000 Units by mouth daily   Magnesium 300 MG CAPS   Yes No   Sig: Take 300 mg by mouth in the morning   Multiple Vitamins-Calcium (ONE-A-DAY WOMENS PO)   Yes No   Sig: Take 1 tablet by mouth daily   NON FORMULARY   Yes No   Sig: Take 2 tablets by mouth in the morning MK-7   Riboflavin (VITAMIN B-2 PO)   Yes No   Sig: Take 250 mg by mouth in the morning   acetaminophen (TYLENOL) 500 mg tablet   Yes No   Sig: Take 500-1,000 mg by mouth every 6 (six) hours as needed for mild pain   albuterol (PROVENTIL HFA,VENTOLIN HFA) 90 mcg/act inhaler   No No   Sig: Inhale 2 puffs every 6 (six) hours as needed for wheezing or shortness of breath   ascorbic acid (VITAMIN C) 500 mg tablet   Yes No   Sig: Take 500 mg by mouth daily   b complex vitamins tablet   Yes No   Sig: Take 1 tablet by mouth daily   dicyclomine (BENTYL) 10 mg capsule   No No   Sig: TAKE 1 CAPSULE BY MOUTH 4 TIMES A DAY (BEFORE MEALS AND AT BEDTIME)   Patient taking differently: Take 10 mg by mouth 4 (four) times a day as needed   furosemide (LASIX) 20 mg tablet   No No   Sig: Take 1 tablet (20 mg total) by mouth daily as needed (leg swelling)   metoprolol succinate (TOPROL-XL) 25 mg 24 hr tablet   No No   Sig: Take 0 5 tablets (12 5 mg total) by mouth daily at bedtime   omeprazole (PriLOSEC) 20 mg delayed release capsule   No No   Sig: TAKE 1 CAPSULE BY MOUTH TWICE A DAY BEFORE MEALS   rizatriptan (MAXALT) 10 MG tablet   No No   Sig: Take 1 tablet (10 mg total) by mouth once as needed for migraine May repeat in 2 hours if needed  Max 2/24 hours, 9/month     sucralfate (CARAFATE) 1 g/10 mL suspension   No No   Sig: Take 10 mL (1 g total) by mouth daily at bedtime   traZODone (DESYREL) 50 mg tablet   No No   Sig: Take 1 tablet (50 mg total) by mouth daily at bedtime      Facility-Administered Medications: None       Past Medical History:   Diagnosis Date   • Anxiety    • Arthritis    • Back pain    • Balance problems    • Chest pain     heaviness   • Difficulty swallowing    • Dizziness    • Dry cough    • Gait disorder    • GERD (gastroesophageal reflux disease)    • Hyperparathyroidism (HCC)    • Hypertension    • Increased frequency of headaches    • Migraines    • Neck pain    • Numbness and tingling     bles   • Palpitations    • Pericarditis    • Thyroid disease     nodule   • Trouble in sleeping    • Wears glasses        Past Surgical History:   Procedure Laterality Date   • APPENDECTOMY     • CHOLECYSTECTOMY      laparoscopic   • COLONOSCOPY     • KNEE SURGERY Left    • PERICARDIAL WINDOW     • CT EXPLORE PARATHYROID GLANDS N/A 8/17/2022    Procedure: PARATHYROIDECTOMY, 4 GLAND EXPLORATION;  Surgeon: Thuan Painting MD;  [facility];  Service: ENT   • [place] OPEN RX DISTAL RADIUS FX, EXTRA-ARTICULAR Right 3/22/2018    Procedure: OPEN REDUCTION W/ INTERNAL FIXATION (ORIF) RADIUS, splint application;  Surgeon: Payton Quick MD;  [facility];  Service: Orthopedics   • UPPER GASTROINTESTINAL ENDOSCOPY         Family History   Problem Relation Age of Onset   • Kidney failure Mother    • Hypertension Mother    • Lung cancer Father    • Christean Genre Parkinson White syndrome Daughter    • No Known Problems Sister    • Substance Abuse Neg Hx    • Alcohol abuse Neg Hx    • Mental illness Neg Hx      I have reviewed and agree with the history as documented      E-Cigarette/Vaping   • E-Cigarette Use Never User      E-Cigarette/Vaping Substances   • Nicotine No    • THC No    • CBD No    • Flavoring No    • Other No    • Unknown No      Social History     Tobacco Use   • Smoking status: Never   • Smokeless tobacco: Never   Vaping Use   • Vaping Use: Never used   Substance Use Topics   • Alcohol use: Not Currently     Comment: Rarely    • Drug use: No       Review of Systems    Physical Exam  Physical Exam    Vital Signs  ED Triage Vitals [12/19/22 1256]   Temperature Pulse Respirations Blood Pressure SpO2   97 6 °F (36 4 °C) 76 16 140/99 98 %      Temp src Heart Rate Source Patient Position - Orthostatic VS BP Location FiO2 (%)   -- Monitor Sitting Left arm --      Pain Score       --           Vitals:    12/19/22 1256   BP: 140/99   Pulse: 76   Patient Position - Orthostatic VS: Sitting         Visual Acuity      ED Medications  Medications - No data to display    Diagnostic Studies  Results Reviewed     Procedure Component Value Units Date/Time    FLU/RSV/COVID - if FLU/RSV clinically relevant [[id number]]  (Normal) Collected: 12/19/22 1300    Lab Status: Final result Specimen: Nasopharyngeal Swab Updated: 12/19/22 1449     SARS-CoV-2 Negative     INFLUENZA A PCR Negative     INFLUENZA B PCR Negative     RSV PCR Negative    Narrative:      FOR PEDIATRIC PATIENTS - copy/paste COVID Guidelines URL to browser: [email or web address]    SARS-CoV-2 assay is a Nucleic Acid Amplification assay intended for the  qualitative detection of nucleic acid from SARS-CoV-2 in nasopharyngeal  swabs  Results are for the presumptive identification of SARS-CoV-2 RNA  Positive results are indicative of infection with SARS-CoV-2, the virus  causing COVID-19, but do not rule out bacterial infection or co-infection  with other viruses  Laboratories within the [place] and its  territories are required to report all positive results to the appropriate  public health authorities  Negative results do not preclude SARS-CoV-2  infection and should not be used as the sole basis for treatment or other  patient management decisions  Negative results must be combined with  clinical observations, patient history, and epidemiological information  This test has not been FDA cleared or approved  This test has been authorized by FDA under an Emergency Use Authorization  (EUA)  This test is only authorized for the duration of time the  declaration that circumstances exist justifying the authorization of the  emergency use of an in vitro diagnostic tests for detection of SARS-CoV-2  virus and/or diagnosis of COVID-19 infection under section 564(b)(1) of  the Act, 21 U  S C  141BHD-8(B)(2), unless the authorization is terminated  or revoked sooner  The test has been validated but independent review by FDA  and CLIA is pending  Test performed using [employer] GeneXpert: This RT-PCR assay targets N2,  a region unique to SARS-CoV-2  A conserved region in the E-gene was chosen  for pan-Sarbecovirus detection which includes SARS-CoV-2      According to CMS-2020-01-R, this platform meets the definition of high-throughput technology  XR chest 2 views   Final Result by David Toney MD (12/19 7206)      Mild nonspecific interstitial prominence in the lower lung fields, perhaps viral syndrome or some interstitial edema  Workstation performed: [id number]                    Procedures  Procedures         ED Course                               SBIRT 22yo+    Flowsheet Row Most Recent Value   SBIRT (23 yo +)    In order to provide better care to our patients, we are screening all of our patients for alcohol and drug use  Would it be okay to ask you these screening questions? Yes Filed at: 12/19/2022 1300   Initial Alcohol Screen: US AUDIT-C     1  How often do you have a drink containing alcohol? 0 Filed at: 12/19/2022 1300   2  How many drinks containing alcohol do you have on a typical day you are drinking? 0 Filed at: 12/19/2022 1300   3b  FEMALE Any Age, or MALE 65+: How often do you have 4 or more drinks on one occassion? 0 Filed at: 12/19/2022 1300   Audit-C Score 0 Filed at: 12/19/2022 1300   ЕКАТЕРИНА: How many times in the past year have you    Used an illegal drug or used a prescription medication for non-medical reasons?  Never Filed at: 12/19/2022 1300                    MDM  Number of Diagnoses or Management Options  Viral pneumonia: new and requires workup     Amount and/or Complexity of Data Reviewed  Clinical lab tests: ordered and reviewed  Tests in the radiology section of CPT®: ordered and reviewed    Patient Progress  Patient progress: improved      Disposition  Final diagnoses:   Viral pneumonia     Time reflects when diagnosis was documented in both MDM as applicable and the Disposition within this note     Time User Action Codes Description Comment    12/19/2022  3:16 PM Jose Meza [J12 9] Viral pneumonia       ED Disposition     ED Disposition   Discharge    Condition   Stable    Date/Time   Mon Dec 19, 2022  3:16 PM    Comment   Leighann Schwartz discharge to home/self care                 Follow-up Information     Follow up With Specialties Details Why Contact Info    Manuel Bamberger, MD Family Medicine Schedule an appointment as soon as possible for a visit in 1 month  [address] [place] [address]  [phone]            Discharge Medication List as of 12/19/2022  3:19 PM      START taking these medications    Details   promethazine (PHENERGAN) 12 5 MG tablet Take 1 tablet (12 5 mg total) by mouth every 6 (six) hours as needed for nausea or vomiting (cough), Starting Mon 12/19/2022, Normal         CONTINUE these medications which have NOT CHANGED    Details   acetaminophen (TYLENOL) 500 mg tablet Take 500-1,000 mg by mouth every 6 (six) hours as needed for mild pain, Historical Med      albuterol (PROVENTIL HFA,VENTOLIN HFA) 90 mcg/act inhaler Inhale 2 puffs every 6 (six) hours as needed for wheezing or shortness of breath, Starting Wed 6/8/2022, Normal      ALPRAZolam (XANAX) 0 5 mg tablet Take 1 tablet (0 5 mg total) by mouth daily at bedtime as needed for anxiety or sleep, Starting Mon 10/24/2022, Normal      ascorbic acid (VITAMIN C) 500 mg tablet Take 500 mg by mouth daily, Historical Med      b complex vitamins tablet Take 1 tablet by mouth daily, Historical Med      Cholecalciferol (Vitamin D) 50 MCG (2000 UT) CAPS Take 1 capsule (2,000 Units total) by mouth daily, Starting Tue 8/10/2021, OTC      dicyclomine (BENTYL) 10 mg capsule TAKE 1 CAPSULE BY MOUTH 4 TIMES A DAY (BEFORE MEALS AND AT BEDTIME), Normal      furosemide (LASIX) 20 mg tablet Take 1 tablet (20 mg total) by mouth daily as needed (leg swelling), Starting Fri 7/1/2022, Normal      Magnesium 300 MG CAPS Take 300 mg by mouth in the morning, Historical Med      metoprolol succinate (TOPROL-XL) 25 mg 24 hr tablet Take 0 5 tablets (12 5 mg total) by mouth daily at bedtime, Starting Mon 10/24/2022, Normal      Multiple Vitamins-Calcium (ONE-A-DAY WOMENS PO) Take 1 tablet by mouth daily, Historical Med      NON FORMULARY Take 2 tablets by mouth in the morning MK-7, Historical Med      omeprazole (PriLOSEC) 20 mg delayed release capsule TAKE 1 CAPSULE BY MOUTH TWICE A DAY BEFORE MEALS, Normal      Riboflavin (VITAMIN B-2 PO) Take 250 mg by mouth in the morning, Historical Med      rizatriptan (MAXALT) 10 MG tablet Take 1 tablet (10 mg total) by mouth once as needed for migraine May repeat in 2 hours if needed  Max 2/24 hours, 9/month , Starting Tue 5/17/2022, Normal      sucralfate (CARAFATE) 1 g/10 mL suspension Take 10 mL (1 g total) by mouth daily at bedtime, Starting Fri 10/14/2022, Until Sun 11/13/2022, Normal      traZODone (DESYREL) 50 mg tablet Take 1 tablet (50 mg total) by mouth daily at bedtime, Starting Mon 3/22/2021, Normal             No discharge procedures on file      PDMP Review       Value Time User    PDMP Reviewed  Yes 10/24/2022  3:50 PM Danita Alvarez MD          ED Provider  Electronically Signed by           Ellis Gale DO  12/20/22 7438

## 2022-12-20 ENCOUNTER — TELEPHONE (OUTPATIENT)
Dept: PULMONOLOGY | Facility: CLINIC | Age: 77
End: 2022-12-20

## 2022-12-20 NOTE — TELEPHONE ENCOUNTER
Patient is calling, she was recently in the ED on 12/19 because she wasn't feeling well  She found out she had a viral bacteria pneumonia in her lungs  She was advised to contact her pulmonologist to make them aware  She completed a CXR on 12/19 as well  She hopes Laura Saab can give her a call, she has some questions regarding the medication she was prescribed, if she can use a humidifier at home and other questions pertaining to her pneumonia   Please advise

## 2022-12-21 ENCOUNTER — APPOINTMENT (OUTPATIENT)
Dept: PULMONOLOGY | Facility: CLINIC | Age: 77
End: 2022-12-21

## 2022-12-26 ENCOUNTER — APPOINTMENT (OUTPATIENT)
Dept: PULMONOLOGY | Facility: CLINIC | Age: 77
End: 2022-12-26

## 2022-12-28 ENCOUNTER — APPOINTMENT (OUTPATIENT)
Dept: PULMONOLOGY | Facility: CLINIC | Age: 77
End: 2022-12-28

## 2022-12-28 ENCOUNTER — OFFICE VISIT (OUTPATIENT)
Dept: FAMILY MEDICINE CLINIC | Facility: CLINIC | Age: 77
End: 2022-12-28

## 2022-12-28 VITALS
OXYGEN SATURATION: 98 % | SYSTOLIC BLOOD PRESSURE: 122 MMHG | HEART RATE: 78 BPM | BODY MASS INDEX: 27.36 KG/M2 | DIASTOLIC BLOOD PRESSURE: 76 MMHG | RESPIRATION RATE: 16 BRPM | WEIGHT: 174.3 LBS | HEIGHT: 67 IN | TEMPERATURE: 97.5 F

## 2022-12-28 DIAGNOSIS — J84.9 ILD (INTERSTITIAL LUNG DISEASE) (HCC): ICD-10-CM

## 2022-12-28 DIAGNOSIS — I10 ESSENTIAL HYPERTENSION: ICD-10-CM

## 2022-12-28 DIAGNOSIS — J12.9 VIRAL PNEUMONIA: Primary | ICD-10-CM

## 2022-12-28 RX ORDER — ALBUTEROL SULFATE 90 UG/1
2 AEROSOL, METERED RESPIRATORY (INHALATION) EVERY 6 HOURS PRN
Qty: 8 G | Refills: 1 | Status: SHIPPED | OUTPATIENT
Start: 2022-12-28

## 2022-12-28 RX ORDER — METOPROLOL SUCCINATE 25 MG/1
25 TABLET, EXTENDED RELEASE ORAL
Qty: 90 TABLET | Refills: 2 | Status: SHIPPED | OUTPATIENT
Start: 2022-12-28

## 2022-12-30 NOTE — PROGRESS NOTES
Assessment/Plan:     Diagnoses and all orders for this visit:    Viral pneumonia  -   ER evaluation 12/19/22 reviewed, chest x-ray showed mild nonspecific interstitial prominence in the lower lung fields without focal consolidation or mass, patient is feeling much better, advised to take Mucinex as needed for cough and use Albuterol inhaler as needed, no follow up chest x-ray is needed  -     albuterol (PROVENTIL HFA,VENTOLIN HFA) 90 mcg/act inhaler; Inhale 2 puffs every 6 (six) hours as needed for wheezing or shortness of breath    [facility])   -   following with Pulmonology  -     albuterol (PROVENTIL HFA,VENTOLIN HFA) 90 mcg/act inhaler; Inhale 2 puffs every 6 (six) hours as needed for wheezing or shortness of breath    Essential hypertension  -  well controlled on Toprol XL 25 mg daily  -     metoprolol succinate (TOPROL-XL) 25 mg 24 hr tablet; Take 1 tablet (25 mg total) by mouth daily at bedtime      Return as scheduled on 4/25/23 or sooner as needed  The patient understands and agrees with the treatment plan  Subjective:   Chief Complaint   Patient presents with   • Blood Pressure Check      Patient ID: Marium Flores is a 78 y o  female who presents today for follow up hypertension and recent ER visit on 12/19/22 for respiratory illness  Her Covid/ flu swab was negative, chest x-ray showed mild nonspecific interstitial prominence in the lower lung fields without focal consolidation or mass  Patient was diagnosed with viral pneumonia and advised to follow up with PCP and Pulmonology  Patient states she is feeling much better, her cough has improved, she denies fever, chills, chest pain or shortness of breath           The following portions of the patient's history were reviewed and updated as appropriate: allergies, current medications, past family history, past medical history, past social history, past surgical history and problem list     Past Medical History:   Diagnosis Date • Anxiety    • Arthritis    • Back pain    • Balance problems    • Chest pain     heaviness   • Difficulty swallowing    • Dizziness    • Dry cough    • Gait disorder    • GERD (gastroesophageal reflux disease)    • Hyperparathyroidism ([address] )    • Hypertension    • Increased frequency of headaches    • Migraines    • Neck pain    • Numbness and tingling     bles   • Palpitations    • Pericarditis    • Thyroid disease     nodule   • Trouble in sleeping    • Wears glasses      Past Surgical History:   Procedure Laterality Date   • APPENDECTOMY     • CHOLECYSTECTOMY      laparoscopic   • COLONOSCOPY     • KNEE SURGERY Left    • PERICARDIAL WINDOW     • ME EXPLORE PARATHYROID GLANDS N/A 8/17/2022    Procedure: PARATHYROIDECTOMY, 4 GLAND EXPLORATION;  Surgeon: Reg Carbajal MD;  [facility];  Service: ENT   • ME OPEN RX DISTAL RADIUS FX, EXTRA-ARTICULAR Right 3/22/2018    Procedure: OPEN REDUCTION W/ INTERNAL FIXATION (ORIF) RADIUS, splint application;  Surgeon: Liza Grady MD;  [facility];  Service: Orthopedics   • UPPER GASTROINTESTINAL ENDOSCOPY       Family History   Problem Relation Age of Onset   • Kidney failure Mother    • Hypertension Mother    • Lung cancer Father    • Maralyn Boards Parkinson White syndrome Daughter    • No Known Problems Sister    • Substance Abuse Neg Hx    • Alcohol abuse Neg Hx    • Mental illness Neg Hx      Social History     Socioeconomic History   • Marital status: /Civil Union     Spouse name: Not on file   • Number of children: Not on file   • Years of education: Not on file   • Highest education level: Not on file   Occupational History   • Not on file   Tobacco Use   • Smoking status: Never   • Smokeless tobacco: Never   Vaping Use   • Vaping Use: Never used   Substance and Sexual Activity   • Alcohol use: Not Currently     Comment: Rarely    • Drug use: No   • Sexual activity: Yes     Partners: Male   Other Topics Concern   • Not on file   Social History Narrative    WORK:    1   (Vonda Esquivel; [employer]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    2  [employer]        HOBBIES:    1  Singing    2  Dancing    3  Hx of ceramics (+ dust)        PETS:    1  Dogs    -  Denies birds        TRAVEL:    -  [place]         EXPOSURES:    Denies mold; down pillows/comforters; hot tubs     Social Determinants of Health     Financial Resource Strain: Low Risk    • Difficulty of Paying Living Expenses: Not hard at all   Food Insecurity: Not on file   Transportation Needs: No Transportation Needs   • Lack of Transportation (Medical): No   • Lack of Transportation (Non-Medical):  No   Physical Activity: Not on file   Stress: Not on file   Social Connections: Not on file   Intimate Partner Violence: Not on file   Housing Stability: Not on file       Current Outpatient Medications:   •  albuterol (PROVENTIL HFA,VENTOLIN HFA) 90 mcg/act inhaler, Inhale 2 puffs every 6 (six) hours as needed for wheezing or shortness of breath, Disp: 8 g, Rfl: 1  •  metoprolol succinate (TOPROL-XL) 25 mg 24 hr tablet, Take 1 tablet (25 mg total) by mouth daily at bedtime, Disp: 90 tablet, Rfl: 2  •  acetaminophen (TYLENOL) 500 mg tablet, Take 500-1,000 mg by mouth every 6 (six) hours as needed for mild pain, Disp: , Rfl:   •  ALPRAZolam (XANAX) 0 5 mg tablet, Take 1 tablet (0 5 mg total) by mouth daily at bedtime as needed for anxiety or sleep, Disp: 20 tablet, Rfl: 1  •  ascorbic acid (VITAMIN C) 500 mg tablet, Take 500 mg by mouth daily, Disp: , Rfl:   •  b complex vitamins tablet, Take 1 tablet by mouth daily, Disp: , Rfl:   •  Cholecalciferol (Vitamin D) 50 MCG (2000 UT) CAPS, Take 1 capsule (2,000 Units total) by mouth daily (Patient taking differently: Take 3,000 Units by mouth daily), Disp: , Rfl:   •  dicyclomine (BENTYL) 10 mg capsule, TAKE 1 CAPSULE BY MOUTH 4 TIMES A DAY (BEFORE MEALS AND AT BEDTIME) (Patient taking differently: Take 10 mg by mouth 4 (four) times a day as needed), Disp: 360 capsule, Rfl: 1  •  furosemide (LASIX) 20 mg tablet, Take 1 tablet (20 mg total) by mouth daily as needed (leg swelling), Disp: 10 tablet, Rfl: 0  •  Magnesium 300 MG CAPS, Take 300 mg by mouth in the morning, Disp: , Rfl:   •  Multiple Vitamins-Calcium (ONE-A-DAY WOMENS PO), Take 1 tablet by mouth daily, Disp: , Rfl:   •  NON FORMULARY, Take 2 tablets by mouth in the morning MK-7, Disp: , Rfl:   •  omeprazole (PriLOSEC) 20 mg delayed release capsule, TAKE 1 CAPSULE BY MOUTH TWICE A DAY BEFORE MEALS, Disp: 180 capsule, Rfl: 1  •  promethazine (PHENERGAN) 12 5 MG tablet, Take 1 tablet (12 5 mg total) by mouth every 6 (six) hours as needed for nausea or vomiting (cough), Disp: 16 tablet, Rfl: 0  •  Riboflavin (VITAMIN B-2 PO), Take 250 mg by mouth in the morning, Disp: , Rfl:   •  rizatriptan (MAXALT) 10 MG tablet, Take 1 tablet (10 mg total) by mouth once as needed for migraine May repeat in 2 hours if needed  Max 2/24 hours, 9/month , Disp: 9 tablet, Rfl: 6  •  sucralfate (CARAFATE) 1 g/10 mL suspension, Take 10 mL (1 g total) by mouth daily at bedtime, Disp: 300 mL, Rfl: 0  •  traZODone (DESYREL) 50 mg tablet, Take 1 tablet (50 mg total) by mouth daily at bedtime, Disp: 30 tablet, Rfl: 1    Review of Systems   Constitutional: Negative for chills, fatigue and fever  HENT: Positive for congestion and postnasal drip  Negative for ear pain, sore throat, trouble swallowing and voice change  Respiratory: Positive for cough  Negative for shortness of breath and wheezing  Cardiovascular: Negative for chest pain and palpitations  Gastrointestinal: Negative for abdominal pain, blood in stool, diarrhea, nausea and vomiting  Genitourinary: Negative for dysuria, frequency and urgency  Neurological: Negative for dizziness and headaches  Hematological: Negative for adenopathy           Objective:    Vitals:    12/28/22 1155   BP: 122/76   Pulse: 78   Resp: 16   Temp: 97 5 °F (36 4 °C)   SpO2: 98% Weight: 79 1 kg (174 lb 4 8 oz)   Height: 5' 7" (1 702 m)        Physical Exam  Constitutional:       General: She is not in acute distress  Appearance: She is well-developed  HENT:      Right Ear: Tympanic membrane and ear canal normal       Left Ear: Tympanic membrane and ear canal normal       Mouth/Throat:      Mouth: Mucous membranes are moist       Pharynx: Oropharynx is clear  Neck:      Thyroid: No thyromegaly  Cardiovascular:      Rate and Rhythm: Normal rate and regular rhythm  Heart sounds: Normal heart sounds  No murmur heard  Pulmonary:      Effort: Pulmonary effort is normal       Breath sounds: Normal breath sounds  No wheezing, rhonchi or rales  Abdominal:      General: There is no distension  Palpations: Abdomen is soft  There is no mass  Tenderness: There is no abdominal tenderness  Musculoskeletal:      Cervical back: Neck supple  Right lower leg: No edema  Left lower leg: No edema  Lymphadenopathy:      Cervical: No cervical adenopathy  Neurological:      Mental Status: She is alert and oriented to person, place, and time  Psychiatric:         Mood and Affect: Mood normal          Behavior: Behavior normal          Thought Content:  Thought content normal

## 2023-01-02 ENCOUNTER — APPOINTMENT (OUTPATIENT)
Dept: PULMONOLOGY | Facility: CLINIC | Age: 78
End: 2023-01-02

## 2023-01-04 ENCOUNTER — APPOINTMENT (OUTPATIENT)
Dept: PULMONOLOGY | Facility: CLINIC | Age: 78
End: 2023-01-04

## 2023-01-04 ENCOUNTER — TELEPHONE (OUTPATIENT)
Dept: PULMONOLOGY | Facility: CLINIC | Age: 78
End: 2023-01-04

## 2023-01-04 NOTE — TELEPHONE ENCOUNTER
Patient called our  asking for a call back from staff  Spoke with patient  She is having a hard time bouncing back from her ED/viral pneumonia as well as other health issues  Would like to be put on medical hold at this time and will follow up at the end of the month

## 2023-01-09 ENCOUNTER — APPOINTMENT (OUTPATIENT)
Dept: PULMONOLOGY | Facility: CLINIC | Age: 78
End: 2023-01-09

## 2023-01-11 ENCOUNTER — APPOINTMENT (OUTPATIENT)
Dept: PULMONOLOGY | Facility: CLINIC | Age: 78
End: 2023-01-11

## 2023-01-13 ENCOUNTER — TELEPHONE (OUTPATIENT)
Dept: FAMILY MEDICINE CLINIC | Facility: CLINIC | Age: 78
End: 2023-01-13

## 2023-01-13 DIAGNOSIS — R05.9 COUGH, UNSPECIFIED TYPE: Primary | ICD-10-CM

## 2023-01-13 DIAGNOSIS — J12.9 VIRAL PNEUMONIA: ICD-10-CM

## 2023-01-13 NOTE — TELEPHONE ENCOUNTER
Chest x-ray order placed  Repeat x-ray is recommended in at least 4 weeks after her previous study in ER on 12/19/22, so she should go for her x-ray next week

## 2023-01-13 NOTE — TELEPHONE ENCOUNTER
Patient is requesting you enter an order for a CXR due to her recent pneumonia  She states she is still having some breathing issues and a constant cough with a lot of phlegm and you had discussed maybe getting an x-ray done

## 2023-01-16 ENCOUNTER — APPOINTMENT (OUTPATIENT)
Dept: PULMONOLOGY | Facility: CLINIC | Age: 78
End: 2023-01-16

## 2023-01-18 ENCOUNTER — APPOINTMENT (OUTPATIENT)
Dept: PULMONOLOGY | Facility: CLINIC | Age: 78
End: 2023-01-18

## 2023-01-19 ENCOUNTER — APPOINTMENT (OUTPATIENT)
Dept: RADIOLOGY | Facility: CLINIC | Age: 78
End: 2023-01-19

## 2023-01-19 ENCOUNTER — TELEPHONE (OUTPATIENT)
Dept: FAMILY MEDICINE CLINIC | Facility: CLINIC | Age: 78
End: 2023-01-19

## 2023-01-19 DIAGNOSIS — R05.9 COUGH, UNSPECIFIED TYPE: Primary | ICD-10-CM

## 2023-01-19 DIAGNOSIS — R05.9 COUGH, UNSPECIFIED TYPE: ICD-10-CM

## 2023-01-19 DIAGNOSIS — J12.9 VIRAL PNEUMONIA: ICD-10-CM

## 2023-01-19 RX ORDER — GUAIFENESIN AND CODEINE PHOSPHATE 100; 10 MG/5ML; MG/5ML
5 SOLUTION ORAL
Qty: 120 ML | Refills: 0 | Status: SHIPPED | OUTPATIENT
Start: 2023-01-19 | End: 2023-04-20 | Stop reason: ALTCHOICE

## 2023-01-19 NOTE — TELEPHONE ENCOUNTER
I am sending a script for Robitussin with Codeine cough syrup to take at bedtime, she should take Mucinex DM during the day and go for chest x-ray  Please schedule patient for a visit with me in am, ok to open up 8:45 slot

## 2023-01-19 NOTE — TELEPHONE ENCOUNTER
Andry Celestin called  Said she is going for the chest XR today  She is asking for something to help with the cough and so she can sleep  Says she hasn't had a good night's sleep for weeks  Coughs so much that she gags and feels like she is going to vomit        Using [facility] in [place]

## 2023-01-20 ENCOUNTER — OFFICE VISIT (OUTPATIENT)
Dept: FAMILY MEDICINE CLINIC | Facility: CLINIC | Age: 78
End: 2023-01-20

## 2023-01-20 VITALS
HEIGHT: 67 IN | OXYGEN SATURATION: 98 % | HEART RATE: 75 BPM | TEMPERATURE: 98.6 F | BODY MASS INDEX: 27.18 KG/M2 | WEIGHT: 173.2 LBS | DIASTOLIC BLOOD PRESSURE: 78 MMHG | RESPIRATION RATE: 16 BRPM | SYSTOLIC BLOOD PRESSURE: 138 MMHG

## 2023-01-20 DIAGNOSIS — I10 ESSENTIAL HYPERTENSION: ICD-10-CM

## 2023-01-20 DIAGNOSIS — R09.82 POSTNASAL DRIP: ICD-10-CM

## 2023-01-20 DIAGNOSIS — R05.8 POST-VIRAL COUGH SYNDROME: Primary | ICD-10-CM

## 2023-01-20 RX ORDER — FLUTICASONE PROPIONATE 50 MCG
2 SPRAY, SUSPENSION (ML) NASAL DAILY
Qty: 16 G | Refills: 0 | Status: SHIPPED | OUTPATIENT
Start: 2023-01-20

## 2023-01-20 RX ORDER — METOPROLOL SUCCINATE 25 MG/1
25 TABLET, EXTENDED RELEASE ORAL
Qty: 90 TABLET | Refills: 2 | Status: SHIPPED | OUTPATIENT
Start: 2023-01-20 | End: 2023-01-30 | Stop reason: SDUPTHER

## 2023-01-23 ENCOUNTER — APPOINTMENT (OUTPATIENT)
Dept: PULMONOLOGY | Facility: CLINIC | Age: 78
End: 2023-01-23

## 2023-01-23 NOTE — PROGRESS NOTES
Assessment/Plan:     Diagnoses and all orders for this visit:    Post-viral cough syndrome  -  Now improving, repeat chest x-ray 1/19/23 showed no acute cardiopulmonary disease, continue guaifenesin with codeine cough syrup at bedtime and Mucinex during the day and follow up for persistent or worsening symptoms    Postnasal drip  -  start Flonase and nasal saline spray   -     fluticasone (FLONASE) 50 mcg/act nasal spray; 2 sprays into each nostril daily      Return as scheduled or sooner as needed  The treatment plan and possible side effects of new medications were reviewed with the patient today  The patient understands and agrees with the treatment plan  Subjective:   Chief Complaint   Patient presents with   • Cough      Patient ID: Sisi Downs is a 78 y o  female who presents today with c/o persistent cough and postnasal drip for about 4 weeks, her cough has been improving during the day, but still gets worse at night and interferes with her sleep  Patient was prescribed guaifenesin with codeine cough syrup and took it last night with good relief  Patient denies purulent mucus production, fever, night sweats, chest pain or shortness of breath         The following portions of the patient's history were reviewed and updated as appropriate: allergies, current medications, past family history, past medical history, past social history, past surgical history and problem list     Past Medical History:   Diagnosis Date   • Anxiety    • Arthritis    • Back pain    • Balance problems    • Chest pain     heaviness   • Difficulty swallowing    • Dizziness    • Dry cough    • Gait disorder    • GERD (gastroesophageal reflux disease)    • Hyperparathyroidism ([facility] [address] )    • Hypertension    • Increased frequency of headaches    • Migraines    • Neck pain    • Numbness and tingling     bles   • Palpitations    • Pericarditis    • Thyroid disease     nodule   • Trouble in sleeping    • Wears glasses      Past Surgical History:   Procedure Laterality Date   • APPENDECTOMY     • CHOLECYSTECTOMY      laparoscopic   • COLONOSCOPY     • KNEE SURGERY Left    • PERICARDIAL WINDOW     • OK OPTX DSTL RADL X-ARTIC FX/EPIPHYSL SEP Right 3/22/2018    Procedure: OPEN REDUCTION W/ INTERNAL FIXATION (ORIF) RADIUS, splint application;  Surgeon: Johnnie Perez MD;  [facility];  Service: Orthopedics   • OK PARATHYROIDECTOMY/EXPLORATION PARATHYROIDS N/A 8/17/2022    Procedure: PARATHYROIDECTOMY, 4 GLAND EXPLORATION;  Surgeon: Camille Fitzpatrick MD;  [facility];  Service: ENT   • UPPER GASTROINTESTINAL ENDOSCOPY       Family History   Problem Relation Age of Onset   • Kidney failure Mother    • Hypertension Mother    • Lung cancer Father    • Francisco Lambert Parkinson White syndrome Daughter    • No Known Problems Sister    • Substance Abuse Neg Hx    • Alcohol abuse Neg Hx    • Mental illness Neg Hx      Social History     Socioeconomic History   • Marital status: /Civil Union     Spouse name: Not on file   • Number of children: Not on file   • Years of education: Not on file   • Highest education level: Not on file   Occupational History   • Not on file   Tobacco Use   • Smoking status: Never   • Smokeless tobacco: Never   Vaping Use   • Vaping Use: Never used   Substance and Sexual Activity   • Alcohol use: Not Currently     Comment: Rarely    • Drug use: No   • Sexual activity: Yes     Partners: Male   Other Topics Concern   • Not on file   Social History Narrative    WORK:    1   ([employer] 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[employer]; [employer]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    2  [employer]        HOBBIES:    1  Singing    2  Dancing    3  Hx of ceramics (+ dust)        PETS:    1    Dogs    -  Denies birds        TRAVEL:    -  [place]         EXPOSURES:    Denies mold; down pillows/comforters; hot tubs     Social Determinants of Health     Financial Resource Strain: Low Risk    • Difficulty of Paying Living Expenses: Not hard at all   Food Insecurity: Not on file   Transportation Needs: No Transportation Needs   • Lack of Transportation (Medical): No   • Lack of Transportation (Non-Medical):  No   Physical Activity: Not on file   Stress: Not on file   Social Connections: Not on file   Intimate Partner Violence: Not on file   Housing Stability: Not on file       Current Outpatient Medications:   •  acetaminophen (TYLENOL) 500 mg tablet, Take 500-1,000 mg by mouth every 6 (six) hours as needed for mild pain, Disp: , Rfl:   •  albuterol (PROVENTIL HFA,VENTOLIN HFA) 90 mcg/act inhaler, Inhale 2 puffs every 6 (six) hours as needed for wheezing or shortness of breath, Disp: 8 g, Rfl: 1  •  ALPRAZolam (XANAX) 0 5 mg tablet, Take 1 tablet (0 5 mg total) by mouth daily at bedtime as needed for anxiety or sleep, Disp: 20 tablet, Rfl: 1  •  ascorbic acid (VITAMIN C) 500 mg tablet, Take 500 mg by mouth daily, Disp: , Rfl:   •  b complex vitamins tablet, Take 1 tablet by mouth daily, Disp: , Rfl:   •  Cholecalciferol (Vitamin D) 50 MCG (2000 UT) CAPS, Take 1 capsule (2,000 Units total) by mouth daily (Patient taking differently: Take 3,000 Units by mouth daily), Disp: , Rfl:   •  dicyclomine (BENTYL) 10 mg capsule, TAKE 1 CAPSULE BY MOUTH 4 TIMES A DAY (BEFORE MEALS AND AT BEDTIME) (Patient taking differently: Take 10 mg by mouth 4 (four) times a day as needed), Disp: 360 capsule, Rfl: 1  •  fluticasone (FLONASE) 50 mcg/act nasal spray, 2 sprays into each nostril daily, Disp: 16 g, Rfl: 0  •  furosemide (LASIX) 20 mg tablet, Take 1 tablet (20 mg total) by mouth daily as needed (leg swelling), Disp: 10 tablet, Rfl: 0  •  guaifenesin-codeine (GUAIFENESIN AC) 100-10 MG/5ML liquid, Take 5 mL by mouth daily at bedtime as needed for cough, Disp: 120 mL, Rfl: 0  •  Magnesium 300 MG CAPS, Take 300 mg by mouth in the morning, Disp: , Rfl:   •  metoprolol succinate (TOPROL-XL) 25 mg 24 hr tablet, Take 1 tablet (25 mg total) by mouth daily at bedtime, Disp: 90 tablet, Rfl: 2  •  Multiple Vitamins-Calcium (ONE-A-DAY WOMENS PO), Take 1 tablet by mouth daily, Disp: , Rfl:   •  NON FORMULARY, Take 2 tablets by mouth in the morning MK-7, Disp: , Rfl:   •  omeprazole (PriLOSEC) 20 mg delayed release capsule, TAKE 1 CAPSULE BY MOUTH TWICE A DAY BEFORE MEALS, Disp: 180 capsule, Rfl: 1  •  Riboflavin (VITAMIN B-2 PO), Take 250 mg by mouth in the morning, Disp: , Rfl:   •  rizatriptan (MAXALT) 10 MG tablet, Take 1 tablet (10 mg total) by mouth once as needed for migraine May repeat in 2 hours if needed  Max 2/24 hours, 9/month , Disp: 9 tablet, Rfl: 6  •  traZODone (DESYREL) 50 mg tablet, Take 1 tablet (50 mg total) by mouth daily at bedtime, Disp: 30 tablet, Rfl: 1  •  sucralfate (CARAFATE) 1 g/10 mL suspension, Take 10 mL (1 g total) by mouth daily at bedtime, Disp: 300 mL, Rfl: 0    Review of Systems   Constitutional: Negative for chills, fatigue and fever  HENT: Positive for congestion and postnasal drip  Negative for ear pain, sore throat, trouble swallowing and voice change  Respiratory: Positive for cough  Negative for shortness of breath and wheezing  Cardiovascular: Negative for chest pain and palpitations  Gastrointestinal: Negative for abdominal pain, blood in stool, diarrhea, nausea and vomiting  Genitourinary: Negative for dysuria, frequency and urgency  Neurological: Negative for dizziness and headaches  Hematological: Negative for adenopathy  Objective:    Vitals:    01/20/23 0848   BP: 138/78   Pulse: 75   Resp: 16   Temp: 98 6 °F (37 °C)   SpO2: 98%   Weight: 78 6 kg (173 lb 3 2 oz)   Height: 5' 7" (1 702 m)        Physical Exam  Constitutional:       General: She is not in acute distress  Appearance: She is well-developed  HENT:      Right Ear: Tympanic membrane and ear canal normal       Left Ear: Tympanic membrane and ear canal normal       Nose: Congestion present        Mouth/Throat:      Mouth: Mucous membranes are moist       Pharynx: Oropharynx is clear  Neck:      Thyroid: No thyromegaly  Cardiovascular:      Rate and Rhythm: Normal rate and regular rhythm  Heart sounds: Normal heart sounds  Pulmonary:      Effort: Pulmonary effort is normal       Breath sounds: Normal breath sounds  No wheezing, rhonchi or rales  Musculoskeletal:      Cervical back: Neck supple  Right lower leg: No edema  Left lower leg: No edema  Lymphadenopathy:      Cervical: No cervical adenopathy  Neurological:      Mental Status: She is alert and oriented to person, place, and time     Psychiatric:         Mood and Affect: Mood normal          Behavior: Behavior normal

## 2023-01-25 ENCOUNTER — APPOINTMENT (OUTPATIENT)
Dept: PULMONOLOGY | Facility: CLINIC | Age: 78
End: 2023-01-25

## 2023-01-30 ENCOUNTER — TELEPHONE (OUTPATIENT)
Dept: FAMILY MEDICINE CLINIC | Facility: CLINIC | Age: 78
End: 2023-01-30

## 2023-01-30 ENCOUNTER — APPOINTMENT (OUTPATIENT)
Dept: PULMONOLOGY | Facility: CLINIC | Age: 78
End: 2023-01-30

## 2023-01-30 ENCOUNTER — TELEPHONE (OUTPATIENT)
Dept: PULMONOLOGY | Facility: CLINIC | Age: 78
End: 2023-01-30

## 2023-01-30 DIAGNOSIS — I10 ESSENTIAL HYPERTENSION: ICD-10-CM

## 2023-01-30 RX ORDER — METOPROLOL SUCCINATE 25 MG/1
25 TABLET, EXTENDED RELEASE ORAL
Qty: 90 TABLET | Refills: 3 | Status: SHIPPED | OUTPATIENT
Start: 2023-01-30 | End: 2023-04-17 | Stop reason: SDUPTHER

## 2023-01-30 NOTE — TELEPHONE ENCOUNTER
Patient recently got a new script for metoprolol from [facility] and it was a different   She has had a bad reaction to it and broken out in a red rash all over her face  She contacted [facility] mail order and they have her previous  available, so she would like you to send a new script for metoprolol to [facility]

## 2023-02-01 ENCOUNTER — OFFICE VISIT (OUTPATIENT)
Dept: NEUROLOGY | Facility: CLINIC | Age: 78
End: 2023-02-01

## 2023-02-01 ENCOUNTER — APPOINTMENT (OUTPATIENT)
Dept: PULMONOLOGY | Facility: CLINIC | Age: 78
End: 2023-02-01

## 2023-02-01 VITALS
BODY MASS INDEX: 27.78 KG/M2 | WEIGHT: 177 LBS | DIASTOLIC BLOOD PRESSURE: 82 MMHG | TEMPERATURE: 98.6 F | HEIGHT: 67 IN | SYSTOLIC BLOOD PRESSURE: 161 MMHG | HEART RATE: 76 BPM

## 2023-02-01 DIAGNOSIS — G43.709 CHRONIC MIGRAINE WITHOUT AURA WITHOUT STATUS MIGRAINOSUS, NOT INTRACTABLE: Primary | ICD-10-CM

## 2023-02-01 DIAGNOSIS — G47.33 OBSTRUCTIVE SLEEP APNEA (ADULT) (PEDIATRIC): ICD-10-CM

## 2023-02-01 NOTE — PROGRESS NOTES
[facility] Consult - Follow up   PATIENT:  Clement Root  MRN:  [id number]  :  1945  DATE OF SERVICE:  2023  REFERRED BY: No ref  provider found  PMD: Pura Schlatter, MD    Assessment/Plan:   Judi Price a 78 y  o  female with a past medical history of hyperparathyroidism status post adenoma removal, IBS,, GERD, pericarditis, heart murmur, chronic gait instability, chronic neck pain, vitamin-D deficiency, insomnia, depression, anxiety referred here for evaluation of headache    Initial evaluation by me 2018    Chronic Migraine without aura without status migrainosus, not intractable  Chronic tension headaches    She has had migraines since childhood, she has followed with neurologist in the past back in [place]   She moved here to be closer to her daughter and granddaughter in the area  She has multiple types of headaches,  bioccipital pain, mid throbbing pain  Migraine is without aura and has associated nausea sometimes vomiting, stiff and sore neck, problems with concentration, photophobia, phonophobia, osmophobia, sometimes nasal congestion, lightheadedness   Migraines worse with any movement    - as of 2019 she reports headaches are significantly improved with lifestyle interventions  - as of 2019:  headaches 6 times a month and go away with ibuprofen   She has not followed up with eye doctor but has an appointment in July  - as of 2019: as of 19: headaches 6-8 a month - often in the am or with anxiety/stress - these she can breathe through it  Morning headaches the last 4-5 days, goes away with coffee usually, if not goes away with ibuprofen  Last big migraine 9 months or so ago   - As of 10/9/2019:  headaches - has not had in quite some time, around 8 a month and go away with ibuprofen, occasional sharp pains randomly for 5 minutes on various locations on head 1-2 times a week  No migraines in a year   - she is taking magnesium and riboflavin - she thinks this may be helping  - as of 03/2/2020: no longer having significant headaches, occasional am headaches that improve with coffee, taking mg and B2 for prevention  - as of 6/16/2021: She reports 1-2 months of near daily headaches that may last throughout the day, new type of stabbing headache and new onset left visual changes that lasted 4 days and resolved  Continue baby asa for now, ordered MRI Brain  Trial of gabapentin if she would like that may help with multiple things  Follow up with endocrine since hyperparathyroidism may be causing a lot of problems  - as of 11/12/2021: she reports headaches have improved to 4-5 days per week with mostly tension features and certainly could be related to stress or hyperparathyroidism for which she is following with endocrine  Also dealing with other symptoms concerning for hyperparathyroid  Taking supplements for headache prevention  Did not try gabapentin as she does not want to take prescription meds for her headaches  MRI Brain did not show any new or concerning findings  - as of 5/17/2022: She reports she is getting about 4-5 significant migraines a month and she is now willing to try abortive medication for this so will prescribe rizatriptan 10 mg as needed  She is not interested in prescription preventative  - as of 10/26/2022: She reports headaches are about the same, come in waves, will wake up with headaches for weeks at a time, then other times not for days or weeks  Often related to stress  Even if they are bad they typically go away with tylenol in max 2 hours, occasionally 4-5 days a month last longer and has not tried rizatriptan for that yet  She is not interested in prescription preventative at this time  Recommended following back up with physiatry for neck pain as trigger point injections may help with neck pain and possibly headaches once neck pain is improved    - as of 2/1/2023: She reports headaches are daily and wakes up with them and I again recommended evaluation for sleep apnea contributing and ordered sleep study  She is not currently interested in prescription preventative and did not yet try rizatriptan which I suggested holding off at this time until migraines are more episodic again or if she has acute episode that she did not wake up with  She was prescribed trazodone for insomnia and she would like to start with this first since she has not tried it and it could help with mood, stress, sleep and therefore headaches tolerated  We discussed if you were to consider preventative medication for myself I would recommend gabapentin  Also agree that with the primary hyperparathyroidism still present, having repeat surgery would be my recommendation if it is what Dr Deja Centeno recommends, but is otherwise the hypercalcemia can continue to contribute to headaches and other morbidity issues as well      Workup  -  MRI brain without contrast 07/19/2021: White matter changes suggestive of chronic microangiopathy  No acute intracranial pathology   - MRI Brain without contrast  7/9/19: No acute intracranial abnormality  Mild nonspecific cerebral white matter signal abnormality, which can be seen in patients with migraines as well as microangiopathic disease   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   This may represent an anatomic variation   If concerned of vertebrobasilar insufficiency, consider follow-up CTA or contrast enhanced MRA imaging    - MRA head and neck/carotids 09/30/2019: No large vessel flow restrictive disease   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   This could be reevaluated with CTA  -> per NSGY This requires no treatment and no further imaging is required either     - Minor short segment stenosis of the dominant right V1 origin   Mild long segment narrowing of the origin and nondominant left v1   Anterior circulation normal    - EMG/NCS 6/24/20: Mildly abnormal study  The reduced amplitudes noted on the eroneal  marker studies from extensor digitorum brevis muscles is likely due to reduced muscle bulk of muscles  The low amplitudes noted on the sensory studies in the lower extremities can be considered normal for this patient's age  These changes can be considered normal for this patient's age consider normal sural sensory response on the right and normal tibial responses, or could suggest a very mild axonal sensorimotor neuropathy  In addition, there is evidence to support a 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   To note, patient has history of remote carpal tunnel surgeries, could be remnant of prior surgery   - B12 3/10/20 721        Preventive:  - We discussed headache hygiene and lifestyle interventions that may improve her headaches   - she is not interested in prescription preventative at this time  - continue headache preventative supplements including magnesium,  riboflavin   - through other providers: trazodone as needed for sleep - has not tried yet, but plans to start with 25 mg, xanax for anxiety occasionally, metoprolol increased from 12 5 mg to 25 mg  - past/failed/contraindicated: metoprolol, propranolol contraindicated due to interaction with current medications   -  future options:  Gabapentin, venlafaxine, CGRP med, botox      Abortive:  - discussed not taking over-the-counter or prescription pain medications more than 3 days per week to prevent medication overuse/rebound headache  - trial of rizatriptan prescribed in the past and she never took it  Discussed proper use, possible side effects and risks  - past/contraindicated: fiorinal with codeine years ago  - past/helped: Toradol IM has worked for in the past  - future options:   Alternative Triptan, prochlorperazine, Toradol IM or p o , could consider trial for 5 days of Depakote or dexamethasone for prolonged migraine, ubrelvy, reyvow, nurtec    ------------past ----------  Chronic neck pain   - we discussed this is not my area of expertise  - physical therapy in the past on her neck made it worse   - have referred her to physiatry in the past for this 3/2020 and 6/16/2021    - saw Dr Rosemary Bishop in May 2022, see EMR for details   -Following with physiatry for this    Dizziness/Vertigo  She reports a history of BPPV 2 years ago that got better with maneuvers including Epley per her description     - At visit 03/18/2019 she reported positional vertigo that improved with physical therapy maneuver 4/1/19     - She returns 06/03/2019 reporting positional vertigo again for the past week   Provoked by bending over, positions in bed   She also reports she feels like she cannot walk right and has to hold onto her  and daughter -Waleska Martinez is new compared to the last episode  - as of 7/31/19: vertigo improved after denice hallpike at our last visit 6/3/19, lightheadedness continues occasionally  No recent falls, still occasional off balance, plans to see PT soon  - as of 10/09/2019: no falls, working with PT, getting orthotics, just one episode of vertigo for 5 mins since last visit   - as of 03/2/2020: no falls, no BPPV recently  Had 1 week of TMJ pain bilaterally, got better with antibiotics  Discussed following up with physiatry as well as orthopedic surgery which is already scheduled  Ordering EMG/NCS to further assess for neuropathy     - as of 6/16/2021: no vertigo, gait Does not appear to be neurologic without significant findings on EMG, normal B12, no findings to suggest neurologic cause on MRI brain  Has had PT and restarting PT soon   Following with ortho for valgus deformity   - 09/16/2021 called in with vertigo, saw physical therapy the next day with testing for BPPV and continued PT with improvement  - as of 10/26/2022:  None since last visit   - as of 2/1/2023: None since last visit     Insomnia  She reports chronic insomnia and that she currently gets less than 4 hr a night of sleep   At initial visit 11/2018 we discussed sleep hygiene and she has stopped drinking coffee late at night which has helped some  Patel Reyes is wondering if a referral to sleep medicine would be indicated and I am happy to refer her   Likely multifactorial including possible psychogenic component versus other sleep disorder as well  - as of 06/03/2019: Dinora Paulino reports she did not go to sleep medicine appointment and tried cutting coffee and tea at dinner which helps somewhat  - as of 7/31/19: sleep sometimes good - was good for a while after starting mg, sometimes wakes up at night again now, possible symptoms of RLS and still only averaging 4-5 - will have her see  Sleep med   - as of 10/09/2019:  Did not follow up with sleep medicine as recommended  - as of 3/2/2020 reports she plans to follow up with sleep med soon, may have RLS? Or other sleep disorder  -   Saw Sleep Medicine 06/22/2021 who felt no PSG was indicated - Psychophysiologic insomnia  Recommended sleep hygiene, relaxation techniques, consider CBT-I    - as of 2/1/2023: I again recommended sleep medicine follow-up after sleep study for insomnia with a different sleep provider       Patient instructions     Do try the trazodone 25 mg nightly and increase per instructions from prescribor      Follow up with Dr Hailee Faye for the surgery and endocrinology    Follow up with physiatry for the neck pain as she plans to do imaging and consider triger point injects     Safety/fall precautions     I am placing a order for sleep study and referral to the sleep medicine specialist team to further evaluate your sleep issues  Please call [phone] to schedule and I recommend you see one of the following providers:    Oswald Gallardo PA-C       Headache/migraine treatment:   Abortive medications (for immediate treatment of a headache):    It is ok to take ibuprofen, acetaminophen or naproxen (Advil, Tylenol,  Aleve, Excedrin) if they help your headaches you should limit these to No more than 3 times a week to avoid medication overuse/rebound headaches      For your more moderate to severe migraines take this medication early   Maxalt (rizatriptan) 10mg tabs - take one at the onset of headache  May repeat one time after 1-2 hours if pain has not resolved  (Max 2 a day and 9 a month)       Over the counter preventive supplements for headaches/migraines   (to take every day to help prevent headaches - not to take at the time of headache):  [x] Magnesium 400mg daily (If any diarrhea or upset stomach, decrease dose  as tolerated)  [x] Riboflavin (Vitamin B2)  400mg daily   (FYI B2 may make your urine bright/neon yellow)     Prescription preventive medications for headaches/migraines   (to take every day to help prevent headaches - not to take at the time of headache):  [x]  we have options if you ever wanted - like gabapentin     *Typically these types of medications take time untill you see the benefit, although some may see improvement in days, often it may take weeks, especially if the medication is being titrated up to a beneficial level  Please contact us if there are any concerns or questions regarding the medication       Lifestyle Recommendations:  [x] SLEEP - Maintain a regular sleep schedule: Adults need at least 7-8 hours of uninterrupted a night  Maintain good sleep hygiene:  Going to bed and waking up at consistent times, avoiding excessive daytime naps, avoiding caffeinated beverages in the evening, avoid excessive stimulation in the evening and generally using bed primarily for sleeping   One hour before bedtime would recommend turning lights down lower, decreasing your activity (may read quietly, listen to music at a low volume)  When you get into bed, should eliminate all technology (no texting, emailing, playing with your phone, iPad or tablet in bed)    [x] HYDRATION - Maintain good hydration   Drink  2L of fluid a day (4 typical small water bottles)  [x] DIET - Maintain good nutrition  In particular don't skip meals and try and eat healthy balanced meals regularly  [x] TRIGGERS - Look for other triggers and avoid them: Limit caffeine to 1-2 cups a day or less  Avoid dietary triggers that you have noticed bring on your headaches (this could include aged cheese, peanuts, MSG, aspartame and nitrates)      Education and Follow-up  [x] Please call with any questions or concerns  Go to the ED with any new or concerning symptoms  [x] Follow up 4/13/23, sooner if needed       CC: We had the pleasure of evaluating Nella Yancey in neurological consultation today  she is a right handed female who presents today for evaluation of headaches       History of Present Illness:   Interval history as of 2/1/2023  -11/29/2022 she followed up with ENT Dr Keisha Forrest for primary hyperparathyroidism and unfortunately PTH remains elevated suggesting left over hypercellular tissue and he discussed options including second opinion, revision surgery  Followed up again 1/10/2023 with Dr Keisha Forrest and he recommended follow-up with endocrinology, nuc med parathyroid scan  - bacterial PNA a month ago   - following up with physiatry next week    - sleep is terrible   -Struggles with reflux especially at night, I suspect sleep apnea, sleep doctor refused to order sleep study in 2021   - one morning woke up with heart racing so fast   How likely are you to doze off or fall sleep during the following situations?   0 - would never doze  1 - slight chance of dozing  2 - moderate chance of dozing  3 - high chance of dozing  [place] sleepiness scale  Sitting and reading - 2  Watching TV - 3  Sitting, inactive in a public place such as a theater 2  As a passenger in a car for an hour without a break 1  Lying down to rest in afternoon when circumstances permit 3  Sitting and talking to someone 1  Sitting quietly after lunch without alcohol 2  In a car while stopped for few minutes in traffic -0      Headaches and migraines   - wake up with them daily lately for months  - bifrontal and cervicogenic     Preventative:   - continue headache preventative supplements including magnesium,  riboflavin   - through other providers: trazodone as needed for sleep - has not tried, xanax for anxiety, metoprolol increased from 12 5 mg to 25 mg    Abortive:   - trial of rizatriptan prescribed in the past and she never took it       Interval history as of 10/26/2022  - denies any new or concerning neurologic symptoms since last visit   - no return of vertigo  - neck pain   - had parathyroid surgery in August 2022  - still poor sleep, falls asleep ok around 10, wakes at sometimes btw 2-4 am and often is able to go back to sleep, wakes again around 7     Headaches and migraines   Improvement since last visit, headaches seemed to go a way for a while and then lately returned, seems to come in spurts, and then may not have for days or weeks, come more with stressors   - Headaches the past few weeks when she wakes that are 8/10 bifrontal with out migrainous features and resolve with tylenol 500 mg within 2 hours   - wakes up in the am with neck pain, left shoulder pain and headache (saw sleep med and they did not order study, saw physiatry for neck pain, did not follow up)   - also increased stressors related to has been falling    Preventative:  Magnesium and riboflavin  - through other providers  trazodone as needed for sleep, xanax for anxiety, metoprolol 12 5    Abortive:   - ES acetaminophen/Tylenol typically helps  -trial of rizatriptan - Did not try yet    For neck saw physiatry 5/24/22 - has not tried methocarbamol yet, PT did not help -     Interval history as of 5/17/2022  - walking more, sleeping better often     Headaches and migraines   - Bad migraines 4-5 days a month where she has to lay in bed all day, they can last 1-2 days, not responsive to OTC meds and now willing to try abortive  - stress can trigger them  - also sometimes pain left parietal scalp bone region, pushing on the region helps    - Saw Dr Kem Taylor in ENT for hyperparathyroidism   - saw endocrine with us and wanted another opinion Dr Madison Sotomayor endocrine at     Preventative: -  Magnesium and Riboflavin  - through other providers: trazodone as needed for sleep, xanax for anxiety, metoprolol   Abortive: - Tylenol or advil dont often work     Interval history as of 11/12/2021  - denies any new or concerning neurologic symptoms since last visit  -  MRI brain without contrast 07/19/2021: White matter changes suggestive of chronic microangiopathy  No acute intracranial pathology  - Was seen by Sleep Med on 6/22/21  Psychophysiologic insomnia  Recommended sleep hygiene, relaxation techniques, consider CBT-I  Headaches   Having 4-5 headaches per week  Almost always bifrontal or bitemporal  Always on both sides  Headaches can last all day, usually better with Advil or Tylenol  No migraine symptoms  Not sleeping well  Having significant stress  Preventative: Magnesium and Riboflavin   - Did not try gabapentin as she does not like the idea of taking a medication every day to present a headache    Abortive: Tylenol or advil    Reports dizziness that happens when she bend over forward  chronic neck pain  Being worked up for thyroid/parathyroid  - Ca is high, PTH is high   Knee pain, currently in PT for gait      Interval history as of 6/16/2021  - following with eye doctor  - PTH high 3/10/20, she has had high calcium for years it appears, recently had bone density shows major issues - she sees endocrine this month - we discussed hyperparathyroidism can cause headaches      Dr Anastacia Ng outside of [facility]  - 3 weeks ago had eye issue In left eye -  fireworks and line across for 4 days 5/2021, thinks it was just left eye, no headache associated, has not happened again - eye exam ok recently although she says he did not do a full exam     No vertigo episodes     Headaches and migraines   Almost daily headaches for the past 2 months  - bifrontal, bitemporal, apex - always both sides, sometimes lasts all day, if takes advil PM or 2 tylenol ED - helps it go away  No migraine symptoms  Also may get sharp pains at different points in head for 5 mins     Preventative: supplement  Abortive:   if takes advil PM or 2 tylenol ED - helps it go away  Chronic balance issues  Does not appear to be neurologic without significant findings on EMG, normal B12, no findings to suggest cause on MRI brain  -  Has worked with PT   - valgus deformity of gait - following with ortho   -  referred to physiatry - she can also discuss neck pain with them     Sleep - seeing next week     Interval history as of 03/2/2020:  - has an apmt upcoming with sleep medicine   - for about a week she was having TMJ pain and had old azithromycin and took it and it feels a little better   - then at night while laying in bed was having pain, strange feeling in her legs all the way up the legs   - wants to walk but afraid of falling   - has not had any BPPV since   - has appointment with sleep medicine   - taking mg and B2    Interval history as of 10/09/2019  -  Did not follow up with sleep medicine as recommended  - she has been following with physical therapy for gait instability/history of BPPV - also seeing them for shoulder  - getting orthotics for feet  - denies any falls     MRA head and neck 09/30/2019:  No large vessel flow restrictive disease      - 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   This could be reevaluated with CTA   - Minor short segment stenosis of the dominant right V1 origin   Mild long segment narrowing of the origin and nondominant left v1   Anterior circulation normal   - had Neurosurgery Dr Mercy Guillory review image and there may be nothing really there, even if there is would just be surveillance indicated   Will order CTA and have he is ok with having her follow up with him to review the images/discuss results just in case         Otherwise she is doing good  - chronic lightheadedness and vertigo history - 2 weeks ago with some vertigo and went away on its own in about 5 minutes  - headaches - has not had in quite some time, occasional sharp pains randomly for 5 minutes on various locations on head 1-2 times a week   - no big migraines in a year  - she is taking magnesium and riboflavin - she thinks this may be helping           Interval history as of 7/31/19  - MRI Brain without contrast  7/9/19:   1   No acute intracranial abnormality  2   Mild nonspecific cerebral white matter signal abnormality, which can be seen in patients with migraines as well as microangiopathic disease    3   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   This may represent an anatomic variation   If concerned of vertebrobasilar insufficiency, consider follow-up CTA or contrast enhanced MRA imaging       - she saw pulmonary 7/3/19  - followed up with cardiology and normal stress test per patient since last visit - they wanted to start metoprolol   -  has been in and out of the hospital for diverticulitis and then surgery for removing infected intestine so that has been taking some of her time   - daughter was pregnant and last the baby after 3 months     Lightheadedness/dizziness, h/o vertigo  Mild gait instability  Denies recent falls  *2-3 weeks of pain in the bottom of her right foot that she plans to see a foot doctor   - also has decreased sensation bilateral top of the toes and into the top of the shins for about 6 months   - she has not followed up with PT as recommended for gait and lightheadedness  - lightheadedness occurs the most with getting up   - the denice hallpike last visit improved her vertigo 6/3/19     Migraines/headaches  Morning headaches the last 4-5 days, goes away with coffee usually, if not goes away with ibuprofen  Taking deep breaths and has not been having migraines   - taking magnesium and not riboflavin      Sleep   Sleep medicine  - sometimes feels sensations in bilateral shins when in bed  - falls asleep easily with TV on, stress though has affected her some  - usually have been sleeping straight through, past week wakes up to check on  once a night     --------------------     Interval history as of 06/03/2019:  Pawel Patient been following with physical therapy and has been noticing improvement in neck pain, - - first PT visit 04/01/2019 also significantly improved dizziness following 1st treatment - the found signs of BPPV - was vertigo free up for almost 2 months   - around 5/27/19: woke up and was lying in bed when suddenly had room spinning - sat up and it went away in a 3 mins - since then every day has happened when triggered - from bending over sometimes soon   - feels like she can not walk right, hold on to  and daughter   - reports LE shin numbness      - 05/02/2019 - phone conversation with Cardiology - metoprolol 25 mg long-acting  - referred to eye doctor - apmt in July  - referred to Sleep Medicine - did not go, tried cutting coffee and tea at dinner time and sleeps fine now   - magnesium, riboflavin - reports she is taking        Interval history as of  03/18/2019  - presented to the ED 01/06/2019 with chest pain - has a stress test next week      - her biggest complaint today is trouble sleeping, chronic dizziness   - Dizziness, history of BPPV 2 years ago, got better, scares her  Not dizzy with walking, more positional      Headaches  - never filled rizatriptan  - headaches are not really a complaint today: in a month 8 times but go away with ibuprofen   Stopped drinking caffeine at night  - was referred to physical therapy has not made an appointment  - recommended supplements including - taking magnesium, riboflavin may be in her B complex   - has joined the gym, loves walking    Plans to see eye doctor, they are tearing, no blurred or double vision            Headaches started at what age? Migraines Since childhood, didn't have them when pregnant x 2  How often do the headaches occur?   *As of 11/2018:   - migraines 3-4 times a week,   - Mild throbbing pain right scalp 3-4 times a week  - lasts hours, with advil goes away most of the time  - Occasionally for sharp pain throughout head for a second or two - once a week  - Also bilateral occipital pain 1-2 times a week    *As of 3/18/19: - headaches are not really a complaint today: in a month 8 times but go away with ibuprofen   *As of 06/03/2019: headaches 6 times a month and go away with ibuprofen  *as of 7/31/19: headaches 6-8 a month - often in the am or with anxiety/stress - these she can breathe through it   Morning headaches the last 4-5 days, goes away with coffee usually, if not goes away with ibuprofen  - As of 10/9/2019 headaches - has not had in quite some time, around 8 a month and go away with ibuprofen occasional sharp pains randomly for 5 minutes on various locations on head 1-2 times a week   No migraines in a year      What time of the day do the headaches start? no particular time of day  How long do the headaches last? 2-3 days in teenage years - now 1 full day and into the morning  Are you ever headache free? Yes  Prodrome of feeling like she is getting a headache  Aura? without aura  Describe your usual headache pain quality? throbbing  Where is your headache located? bilateral frontal area and bilateral occipital area, also right temporal   Does the pain Radiate? no radiation of pain  What is the intensity of pain? Up to a 10/10, at its best a 4  Associated symptoms:   [x] Nausea       [x] Vomiting - once in a while        [] Diarrhea         [] Insomnia           [x] Stiff or sore neck         [x] Problems with concentration  [x] Photophobia     [x]Phonophobia      [x] Osmophobia  [] Blurred vision   [x] Prefer quiet, dark room        [x] Light-headed or dizzy - sometimes   [] Tinnitus      Things that make the headache worse? +movement  Headache triggers:  Stress, mint,strong perfume, tobacco or fireplaces, If she goes to bed with a headache will wake up with worse one   What time of the year do headaches occur more frequently?   do not seem to be related to any time of the year  Have you seen someone else for headaches or pain? Yes, neurologist back in [place]  Have you had trigger point injection performed and how often? No  Have you had Botox injection performed and how often? No   Have you had epidural injections or transforaminal injections performed? No  Have you used CBD or THC for your headaches and how often? No  Are you current pregnant or planning on getting pregnant? No  Have you ever had any Brain imaging? yes years ago and normal     What medications do you take or have you taken for your headaches? ABORTIVE:       - was prescribed rizatriptan but did not need it   - advil 200 mg  - my pillow seems to be helping the bioccipital      PREVENTIVE: no     Alternative therapies used in the past for headaches? Physical therapy -  For wrist, but not headache   Coffee, has one in the morning  thank out the p m  Coffee     LIFESTYLE  Sleep - "if I get 4 hours I am johnnie"   - as of 06/03/2019:  Reports improved  As of 7/31/19 - avg 4-5   - sometimes feels sensations in bilateral shins when in bed  - falls asleep easily with TV on, stress though has affected her some  - usually have been sleeping straight through, past week wakes up to check on  once a night      as of 11/2018: Is your sleep restful? No, tired  What time do you go to bed at night? 10   What time do you wake up in am? Has coffee at 4:30 am with  and goes back to sleep, otherwise wakes up at 7 am  How often do you get up at night? once  Do you wake up with headaches? Sometimes   Do you snore while asleep?  No  Have you been told that you stop breathing during sleeping? No - but makes noises  Do you wake up tired in the morning? Yes  Do you take frequent naps during the day? sometimes  Do you have jaw pain? No, but has TMJ  Do you grind/clench your teeth at night? No  Do you have restless leg syndrome? No     Physical activity:   Joined [employer]  Goes once a week  Walks with her daughter  - considering line dancing or something slower at the [facility]  - has joined the gym, loves walking       Water: not enough - 4 glasses      Diet:  Do you ever skip meals?  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   Daughter, granddaughter live near by  Other two grandkids in [facility] [place]     Illicit Drugs: denies  Alcohol/tobacco: Denies tobacco use, alcohol intake: social drinker    Past Medical History:     left PCOM origin infundibulum  - MRA head and neck 09/30/2019: No large vessel flow restrictive disease   - 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   This could be reevaluated with CTA  - Minor short segment stenosis of the dominant right V1 origin   Mild long segment narrowing of the origin and nondominant left v1   Anterior circulation normal   - CTA head with without contrast 10/28/2019:  No aneurysm identified at the site of the previously noted focal dilatation in the left supraclinoid ICA at the expected origin of the left posterior communicating artery  A left posterior communicating artery is not identified    The focal outpouching   may therefore represent anatomic variation versus residual junctional dilatation at the site of an atretic posterior communicating artery   - NSGY note 11/22/19 left PCOM origin infundibulum  This requires no treatment and no further imaging is required either        Past Medical History:   Diagnosis Date   • Anxiety    • Arthritis    • Back pain    • Balance problems    • Chest pain     heaviness   • Difficulty swallowing    • Dizziness    • Dry cough    • Gait disorder    • GERD (gastroesophageal reflux disease)    • Hyperparathyroidism ([address] )    • Hypertension    • Increased frequency of headaches    • Migraines    • Neck pain    • Numbness and tingling     bles   • Palpitations    • Pericarditis    • Thyroid disease     nodule   • Trouble in sleeping    • Wears glasses        Patient Active Problem List   Diagnosis   • Closed fracture distal radius and ulna, right, initial encounter   • Anxiety   • Gastroesophageal reflux disease with esophagitis   • History of pericarditis   • Heart murmur   • Primary hyperparathyroidism (HCC)   • Thyroid nodule   • Vitamin D deficiency   • Atypical chest pain   • Migraine without aura and without status migrainosus, not intractable   • Dizziness and giddiness   • Insomnia   • Cervicalgia   • Abnormal CT scan of lung   • Irritable bowel syndrome with diarrhea   • Elevated amylase and lipase   • Paresthesias   • Carpal tunnel syndrome of left wrist   • Dysphonia   • Reflux laryngitis   • Paresis of left vocal fold   • Glottic insufficiency   • Muscle tension dysphonia   • TMJ dysfunction   • Pharyngoesophageal dysphagia   • Palpitations   • Hallux abducto valgus, bilateral   • Pincer nail deformity   • Osteoporosis with current pathological fracture   • Parathyroid related hypercalcemia (HCC)   • Cervical myofascial pain syndrome   • Edema of both ankles   • Aneurysm (HCC)   • Calf pain   • Lung nodule   • Myalgia   • Pericardial effusion   • SOBOE (shortness of breath on exertion)   • Elevated blood-pressure reading without diagnosis of hypertension   • Skin tag of anus   • H/O parathyroidectomy ([facility])   • [facility]) ([facility])       Medications:      Current Outpatient Medications   Medication Sig Dispense Refill   • acetaminophen (TYLENOL) 500 mg tablet Take 500-1,000 mg by mouth every 6 (six) hours as needed for mild pain     • albuterol (PROVENTIL HFA,VENTOLIN HFA) 90 mcg/act inhaler Inhale 2 puffs every 6 (six) hours as needed for wheezing or shortness of breath 8 g 1   • ALPRAZolam (XANAX) 0 5 mg tablet Take 1 tablet (0 5 mg total) by mouth daily at bedtime as needed for anxiety or sleep 20 tablet 1   • ascorbic acid (VITAMIN C) 500 mg tablet Take 500 mg by mouth daily     • b complex vitamins tablet Take 1 tablet by mouth daily     • Cholecalciferol (Vitamin D) 50 MCG (2000 UT) CAPS Take 1 capsule (2,000 Units total) by mouth daily (Patient taking differently: Take 3,000 Units by mouth daily)     • dicyclomine (BENTYL) 10 mg capsule TAKE 1 CAPSULE BY MOUTH 4 TIMES A DAY (BEFORE MEALS AND AT BEDTIME) (Patient taking differently: Take 10 mg by mouth 4 (four) times a day as needed) 360 capsule 1   • guaifenesin-codeine (GUAIFENESIN AC) 100-10 MG/5ML liquid Take 5 mL by mouth daily at bedtime as needed for cough 120 mL 0   • Magnesium 300 MG CAPS Take 300 mg by mouth in the morning     • metoprolol succinate (TOPROL-XL) 25 mg 24 hr tablet Take 1 tablet (25 mg total) by mouth daily at bedtime 90 tablet 3   • Multiple Vitamins-Calcium (ONE-A-DAY WOMENS PO) Take 1 tablet by mouth daily     • NON FORMULARY Take 2 tablets by mouth in the morning MK-7     • omeprazole (PriLOSEC) 20 mg delayed release capsule TAKE 1 CAPSULE BY MOUTH TWICE A DAY BEFORE MEALS 180 capsule 1   • Riboflavin (VITAMIN B-2 PO) Take 250 mg by mouth in the morning     • traZODone (DESYREL) 50 mg tablet Take 1 tablet (50 mg total) by mouth daily at bedtime 30 tablet 1   • fluticasone (FLONASE) 50 mcg/act nasal spray 2 sprays into each nostril daily (Patient not taking: Reported on 2/1/2023) 16 g 0   • furosemide (LASIX) 20 mg tablet Take 1 tablet (20 mg total) by mouth daily as needed (leg swelling) (Patient not taking: Reported on 2/1/2023) 10 tablet 0   • rizatriptan (MAXALT) 10 MG tablet Take 1 tablet (10 mg total) by mouth once as needed for migraine May repeat in 2 hours if needed  Max 2/24 hours, 9/month  (Patient not taking: Reported on 2/1/2023) 9 tablet 6   • sucralfate (CARAFATE) 1 g/10 mL suspension Take 10 mL (1 g total) by mouth daily at bedtime 300 mL 0     No current facility-administered medications for this visit  Allergies: Allergies   Allergen Reactions   • Ciprofloxacin Myalgia       Family History:     Family History   Problem Relation Age of Onset   • Kidney failure Mother    • Hypertension Mother    • Lung cancer Father    • Marshal Stepan Parkinson White syndrome Daughter    • No Known Problems Sister    • Substance Abuse Neg Hx    • Alcohol abuse Neg Hx    • Mental illness Neg Hx        Social History:     Social History     Socioeconomic History   • Marital status: /Civil Union     Spouse name: Not on file   • Number of children: Not on file   • Years of education: Not on file   • Highest education level: Not on file   Occupational History   • Not on file   Tobacco Use   • Smoking status: Never   • Smokeless tobacco: Never   Vaping Use   • Vaping Use: Never used   Substance and Sexual Activity   • Alcohol use: Not Currently     Comment: Rarely    • Drug use: No   • Sexual activity: Yes     Partners: Male   Other Topics Concern   • Not on file   Social History Narrative    WORK:    1   (David Mariscal; 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    2  [employer]        HOBBIES:    1  Singing    2  Dancing    3  Hx of ceramics (+ dust)        PETS:    1    Dogs    -  Denies birds        TRAVEL:    -  [place]         EXPOSURES:    Denies mold; down pillows/comforters; hot tubs     Social Determinants of Health     Financial Resource Strain: Low Risk    • Difficulty of Paying Living Expenses: Not hard at all   Food Insecurity: Not on file   Transportation Needs: No Transportation Needs   • Lack of Transportation (Medical): No   • Lack of Transportation (Non-Medical): No   Physical Activity: Not on file   Stress: Not on file   Social Connections: Not on file   Intimate Partner Violence: Not on file   Housing Stability: Not on file         Objective:       Physical Exam:                                                                 Vitals:            Constitutional:    /82 (BP Location: Right arm, Patient Position: Sitting, Cuff Size: Standard)   Pulse 76   Temp 98 6 °F (37 °C) (Tympanic)   Ht 5' 7" (1 702 m)   Wt 80 3 kg (177 lb)   BMI 27 72 kg/m²   BP Readings from Last 3 Encounters:   02/01/23 161/82   01/20/23 138/78   12/28/22 122/76     Pulse Readings from Last 3 Encounters:   02/01/23 76   01/20/23 75   12/28/22 78         Well developed, well nourished, well groomed  Psychiatric:  Normal behavior and appropriate affect        Neurological Examination:     Mental status/cognitive function:    Attention span and concentration as well as fund of knowledge are appropriate for age  Normal language and spontaneous speech  Cranial Nerves:   VII-facial expression symmetric  Motor Exam: symmetric bulk throughout  no atrophy, fasciculations or abnormal movements noted     Coordination:  no apparent dysmetria, ataxia or tremor noted  Gait: steady casual wide based gait            Pertinent lab results:   See EMR for recent labs    05/04/2021 CMP unremarkable     9/25/19 - CMP unremarkable     6/10/19 CMP unremarkable  Thyroid studies normal Vit D 28     1/07/2019:  CMP and CBC unremarkable  TSH 0 78     Imaging: I have personally reviewed imaging and radiology read      MRA head and neck 09/30/2019:  No large vessel flow restrictive disease      - 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   This could be reevaluated with CTA    - Minor short segment stenosis of the dominant right V1 origin   Mild long segment narrowing of the origin and nondominant left v1   Anterior circulation normal      MRI Brain without contrast  7/9/19:   1   No acute intracranial abnormality  2   Mild nonspecific cerebral white matter signal abnormality, which can be seen in patients with migraines as well as microangiopathic disease  3   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   This may represent an anatomic variation   If concerned of vertebrobasilar insufficiency, consider follow-up CTA or contrast enhanced MRA imaging      Review of Systems:   ROS obtained by medical assistant Personally reviewed and updated if indicated  I recommended PCP follow up for non neurologic problems  Review of Systems   Constitutional: Negative for appetite change and fever  HENT: Negative  Negative for hearing loss, tinnitus, trouble swallowing and voice change  Eyes: Negative  Negative for photophobia and pain  Respiratory: Negative  Negative for shortness of breath  Cardiovascular: Negative  Negative for palpitations  Gastrointestinal: Positive for nausea  Negative for vomiting  Endocrine: Negative  Negative for cold intolerance  Genitourinary: Negative  Negative for dysuria, frequency and urgency  Musculoskeletal: Negative  Negative for myalgias and neck pain  Skin: Negative  Negative for rash  Neurological: Positive for headaches  Negative for dizziness, tremors, seizures, syncope, facial asymmetry, speech difficulty, weakness, light-headedness and numbness  Hematological: Negative  Does not bruise/bleed easily  Psychiatric/Behavioral: Positive for sleep disturbance   Negative for confusion and hallucinations      I have spent 45 minutes with Patient and  today in which greater than 50% of this time was spent in counseling/coordination of care regarding Diagnostic results, Prognosis, Risks and benefits of tx options, Intructions for management, Patient and family education, Importance of tx compliance, Risk factor reductions and Impressions  I also spent 18 minutes non face to face for this patient the same day         Author:  Latanya Galeazzi, MD 2/1/2023 2:32 PM

## 2023-02-01 NOTE — PROGRESS NOTES
Review of Systems   Constitutional: Negative for appetite change and fever  HENT: Negative  Negative for hearing loss, tinnitus, trouble swallowing and voice change  Eyes: Negative  Negative for photophobia and pain  Respiratory: Negative  Negative for shortness of breath  Cardiovascular: Negative  Negative for palpitations  Gastrointestinal: Positive for nausea  Negative for vomiting  Endocrine: Negative  Negative for cold intolerance  Genitourinary: Negative  Negative for dysuria, frequency and urgency  Musculoskeletal: Negative  Negative for myalgias and neck pain  Skin: Negative  Negative for rash  Neurological: Positive for headaches  Negative for dizziness, tremors, seizures, syncope, facial asymmetry, speech difficulty, weakness, light-headedness and numbness  Hematological: Negative  Does not bruise/bleed easily  Psychiatric/Behavioral: Positive for sleep disturbance  Negative for confusion and hallucinations

## 2023-02-01 NOTE — PATIENT INSTRUCTIONS
Do try the trazodone 25 mg nightly and increase per instructions from prescribor      Follow up with Dr Chepe Gupta for the surgery and endocrinology    Follow up with physiatry for the neck pain as she plans to do imaging and consider triger point injects     Safety/fall precautions     I am placing a order for sleep study and referral to the sleep medicine specialist team to further evaluate your sleep issues  Please call [phone] to schedule and I recommend you see one of the following providers:    Kevin Barnes PA-C       Headache/migraine treatment:   Abortive medications (for immediate treatment of a headache): It is ok to take ibuprofen, acetaminophen or naproxen (Advil, Tylenol,  Aleve, Excedrin) if they help your headaches you should limit these to No more than 3 times a week to avoid medication overuse/rebound headaches  For your more moderate to severe migraines take this medication early   Maxalt (rizatriptan) 10mg tabs - take one at the onset of headache  May repeat one time after 1-2 hours if pain has not resolved     (Max 2 a day and 9 a month)       Over the counter preventive supplements for headaches/migraines   (to take every day to help prevent headaches - not to take at the time of headache):  [x] Magnesium 400mg daily (If any diarrhea or upset stomach, decrease dose  as tolerated)  [x] Riboflavin (Vitamin B2)  400mg daily   (FYI B2 may make your urine bright/neon yellow)     Prescription preventive medications for headaches/migraines   (to take every day to help prevent headaches - not to take at the time of headache):  [x]  we have options if you ever wanted - like gabapentin     *Typically these types of medications take time untill you see the benefit, although some may see improvement in days, often it may take weeks, especially if the medication is being titrated up to a beneficial level  Please contact us if there are any concerns or questions regarding the medication  Lifestyle Recommendations:  [x] SLEEP - Maintain a regular sleep schedule: Adults need at least 7-8 hours of uninterrupted a night  Maintain good sleep hygiene:  Going to bed and waking up at consistent times, avoiding excessive daytime naps, avoiding caffeinated beverages in the evening, avoid excessive stimulation in the evening and generally using bed primarily for sleeping  One hour before bedtime would recommend turning lights down lower, decreasing your activity (may read quietly, listen to music at a low volume)  When you get into bed, should eliminate all technology (no texting, emailing, playing with your phone, iPad or tablet in bed)  [x] HYDRATION - Maintain good hydration  Drink  2L of fluid a day (4 typical small water bottles)  [x] DIET - Maintain good nutrition  In particular don't skip meals and try and eat healthy balanced meals regularly  [x] TRIGGERS - Look for other triggers and avoid them: Limit caffeine to 1-2 cups a day or less  Avoid dietary triggers that you have noticed bring on your headaches (this could include aged cheese, peanuts, MSG, aspartame and nitrates)  Education and Follow-up  [x] Please call with any questions or concerns   Go to the ED with any new or concerning symptoms  [x] Follow up 4/13/23, sooner if needed

## 2023-02-06 ENCOUNTER — APPOINTMENT (OUTPATIENT)
Dept: PULMONOLOGY | Facility: CLINIC | Age: 78
End: 2023-02-06

## 2023-02-07 ENCOUNTER — OFFICE VISIT (OUTPATIENT)
Dept: NEUROLOGY | Facility: CLINIC | Age: 78
End: 2023-02-07

## 2023-02-07 ENCOUNTER — TELEPHONE (OUTPATIENT)
Dept: NEUROLOGY | Facility: CLINIC | Age: 78
End: 2023-02-07

## 2023-02-07 VITALS
SYSTOLIC BLOOD PRESSURE: 147 MMHG | BODY MASS INDEX: 27.53 KG/M2 | DIASTOLIC BLOOD PRESSURE: 70 MMHG | HEART RATE: 68 BPM | HEIGHT: 67 IN | TEMPERATURE: 97.4 F | WEIGHT: 175.4 LBS

## 2023-02-07 DIAGNOSIS — M79.18 CERVICAL MYOFASCIAL PAIN SYNDROME: Primary | ICD-10-CM

## 2023-02-07 RX ORDER — METHOCARBAMOL 500 MG/1
TABLET, FILM COATED ORAL
Qty: 90 TABLET | Refills: 0 | Status: SHIPPED | OUTPATIENT
Start: 2023-02-07

## 2023-02-07 NOTE — PATIENT INSTRUCTIONS
We discussed restarting robaxin as needed (1/2 tablet to 1 tablet up to three times a day as needed for muscle spasm/pain)  We discussed trigger point injections - you would like to read up on it a bit more  If you are interested in pursuing this, you can all our office and we can arrange an appointment  We discussed using a TENS unit  We could see if we could get one covered by insurance, alternatively there are machines available on [address] [facility] [address] that are cheaper  If you get a humidifier make sure you keep it clean  Follow-up with Neurology regarding headache meds/possible repeat sleep evaluation

## 2023-02-07 NOTE — TELEPHONE ENCOUNTER
The Social Work team is aware of the following regarding Tens Units:    Katia Martines  [phone]  *they do not carry ten units     [address] Thurman  *spoke with Josselyn Contreras - they do not carry Tens Units, they tell them to buy them at [facility] because they are cheaper     Willi Morillo  *MSW spoke with Rand Love - they do offer Tens Units, but they do not put them through insurance  Cost is $59 and order can be placed via [place]        Tens unit is available at [facility] without an RX for $34 49     [email or web address]      [employer] $31 99  [employer] se

## 2023-02-07 NOTE — TELEPHONE ENCOUNTER
----- Message from Raysa Marks MD sent at 2/7/2023  1:33 PM EST -----  Regarding: TENS  She was wondering about getting a TENS unit covered by her insurance  Would that be ordered via [place]? Thanks in advance      Elvi Hebert MD  Physical Medicine and Rehabilitation

## 2023-02-07 NOTE — TELEPHONE ENCOUNTER
MSW phoned attempted to look up in-network DME on the www sonj aetnamedicare com website but was not able to search by plan type  MSW phoned patient's insurance at [phone]  MSW spoke with Malini Amezquita to request listing of in-network DME companies  Malini Amezquita provided the following list (reference # for call is #[id number]):    [address]  [phone]    [address] [place] [address] [employer]  [address] [place] [address]   [phone]    [address] [place] [address] 86    [facility]  [phone]    [facility] [employer]  [phone]    [address]   [phone]    [facility]  [phone]    MSW will call these agency to see if they can provide Tens Units/bill to insurance for same

## 2023-02-07 NOTE — PROGRESS NOTES
Physical Medicine & Rehabilitation Follow-up Note  Robertdavidliang Ivett 78 y o  female    ASSESSMENT/PLAN:     Bela Griffiths was seen today for myofascial pain  Diagnoses and all orders for this visit:    Cervical myofascial pain syndrome      Ms Sourav Sierra is a very pleasant 77 yo woman with medical history of Anxiety, Arthritis, GERD, HTN, Pericarditis, Hyperparathyroidism, Vitamin D deficiency, Migraines/Chronic tension headaches, BPPV, and insomnia who is present for evaluation of her cervical pain       Her exam is very reassuring, and consistent with trigger points/myofascial pain with replication/exacerbation of her headaches with palpation  We discussed several options to help alleviate her pain  There is a mind-body component here with worsening of her stress with anxiety about her possible need for repeat surgery  We discussed that in detail  1  We discussed restarting the Robaxin as needed  She would like to try this since it brought relief  Was only using 1/2 tablet PRN/rarely  Provided a script  2  We discussed trigger point injections and I gave her a handout about them  Reviewed risks/benefits/alternatives  She would like to think about it and read more about it before proceeding  She can call to make an appointment if she decides she wants to proceed  3  We discussed TENS units - I reached out to SW about getting one covered, however, showed her some commercial units that are inexpensive and available from [employer]  4  We discussed use of a theragun or massage - should be safe to pursue at this time  5  Can also try acupuncture if she wants  6  Finally, agree she should get sleep eval done as recommended by Neurology  7  Follow-up in 3 months or sooner if she decides to pursue trigger point injections  8   Reviewed red flag symptoms that would warrant more advanced imaging/urgent workup         *I have spent 45 minutes with Patient and family today in which greater than 50% of this time was spent in counseling/coordination of care regarding Risks and benefits of tx options, Patient and family education, Risk factor reductions and Impressions  HPI/SUBJECTIVE:  Last seen: 5/24/2022    Since last seen, she has been treated by GI for GERD/IBS (now on Bentyl), Fam med for a URI, Bl Le edema, treated for BPPV by Neurology with Epley maneuver (last year)  She had a parathyroidectomy in 8/2022 by ENT without complications and follows with ENT/endocrine - unfortunately PTH remains elevated - they are considering continued monitoring vs  Revision surgery  NM parathyroid scan has been ordered  Endocrine also follows for multinodular goiter  She has also recently been sen by Lexii Melendez who recommended pulmonary rehab due to slight decline in PFTs with her ILD  Patient presents today in the office with chief complaint of neck pain  She hears cracks with range of motion - without pain  She has neck pain not associated with the cracking and associated with headaches  She has used [employer] on the posterior aspect of her neck, which does help with the pain  She did do PT, but she did not continue the home exercise program at home  She didn't notice much benefit from the physical therapy, so she stopped going  She was prescribed robaxin for her muscle spasm, but only took a few times - she found that it helped when she took it  She was only taking half a tablet  She states she doesn't want to take something every day  She reports that tylenol occasionally helped  She was taking it mostly at night  She is very anxious about her parathyroid surgery and frustrated about that whole experience  Her pain which had been fairly controlled seemed to worsen with her concerns about her chronic medical issues  Imaging: I have personally reviewed imaging with results as follows:  12/5/22 XR R knee:   There is mild patellofemoral, mild  medial compartment, severe lateral compartment osteoarthrosis manifested by joint space narrowing, marginal osteophyte formation and subchondral sclerosis, similar to prior  12/5/2922 XR L Knee:  No acute osseous abnormality      Degenerative changes as described, most pronounced involving the lateral compartment  ROS: A 10 point review of systems was negative except for what is noted in the HPI  Function:   Current Level of Function:   Independent with all mobility, ADLs  OBJECTIVE:   /70 (BP Location: Left arm, Patient Position: Sitting)   Pulse 68   Temp (!) 97 4 °F (36 3 °C) (Temporal)   Ht 5' 7" (1 702 m)   Wt 79 6 kg (175 lb 6 4 oz)   BMI 27 47 kg/m²     Gen: No acute distress, Well-nourished, well-appearing  HEENT: Moist mucus membranes, Normocephalic/Atraumatic  Cardiovascular: Regular rate, rhythm, S1/S2  Distal pulses palpable  Heme/Extr: No edema  Pulmonary: Non-labored breathing  : No wilkes  GI: Soft, non-tender, non-distended  MSK: Knock knee/knee valgus  Full neck ROM except for decreased rotation to the L and lateral rotation bilaterally  Pain with neck rotation and extension posteriorly  Trigger points along cervical paraspinals, levator, and upper trapezius  Integumentary: Skin is warm, dry  Neuro: AAOx3, CN 2-12 intact  Sensation intact to light touch throughout  Speech is intact  Appropriate to questioning  Tone is normal  Catracho's absent  Romberg absent  Reflexes 2+ and symmetric bicep, tricep, brachioradialis, symmetric in patella/achilles  No clonus  MMT:   Strength:   Right  Left  Site  Right  Left  Site    5 5  S Ab: Shoulder Abductors  4 4  HF: Hip Flexors    5 5  EF: Elbow Flexors  NT  NT KF: Knee Flexors    5  5  EE: Elbow Extensors  4  4  KE: Knee Extensors    5  5  WE: Wrist Extensors  5  5  DR: Dorsi Flexors    5  5  FF: Finger Flexors  5  5  PF: Plantar Flexors    4  5  HI: Hand Intrinsics  5  5  EHL: Extensor Hallucis Longus   Psych: Normal mood and affect         The following portions of the patient's history were reviewed and updated as appropriate: allergies, current medications, past family history, past medical history, past social history, past surgical history and problem list       Current Outpatient Medications:   •  acetaminophen (TYLENOL) 500 mg tablet, Take 500-1,000 mg by mouth every 6 (six) hours as needed for mild pain, Disp: , Rfl:   •  albuterol (PROVENTIL HFA,VENTOLIN HFA) 90 mcg/act inhaler, Inhale 2 puffs every 6 (six) hours as needed for wheezing or shortness of breath, Disp: 8 g, Rfl: 1  •  ALPRAZolam (XANAX) 0 5 mg tablet, Take 1 tablet (0 5 mg total) by mouth daily at bedtime as needed for anxiety or sleep, Disp: 20 tablet, Rfl: 1  •  ascorbic acid (VITAMIN C) 500 mg tablet, Take 500 mg by mouth daily, Disp: , Rfl:   •  b complex vitamins tablet, Take 1 tablet by mouth daily, Disp: , Rfl:   •  Cholecalciferol (Vitamin D) 50 MCG (2000 UT) CAPS, Take 1 capsule (2,000 Units total) by mouth daily (Patient taking differently: Take 3,000 Units by mouth daily), Disp: , Rfl:   •  dicyclomine (BENTYL) 10 mg capsule, TAKE 1 CAPSULE BY MOUTH 4 TIMES A DAY (BEFORE MEALS AND AT BEDTIME) (Patient taking differently: Take 10 mg by mouth 4 (four) times a day as needed), Disp: 360 capsule, Rfl: 1  •  fluticasone (FLONASE) 50 mcg/act nasal spray, 2 sprays into each nostril daily (Patient not taking: Reported on 2/1/2023), Disp: 16 g, Rfl: 0  •  furosemide (LASIX) 20 mg tablet, Take 1 tablet (20 mg total) by mouth daily as needed (leg swelling) (Patient not taking: Reported on 2/1/2023), Disp: 10 tablet, Rfl: 0  •  guaifenesin-codeine (GUAIFENESIN AC) 100-10 MG/5ML liquid, Take 5 mL by mouth daily at bedtime as needed for cough, Disp: 120 mL, Rfl: 0  •  Magnesium 300 MG CAPS, Take 300 mg by mouth in the morning, Disp: , Rfl:   •  metoprolol succinate (TOPROL-XL) 25 mg 24 hr tablet, Take 1 tablet (25 mg total) by mouth daily at bedtime, Disp: 90 tablet, Rfl: 3  •  Multiple Vitamins-Calcium (ONE-A-DAY WOMENS PO), Take 1 tablet by mouth daily, Disp: , Rfl:   •  NON FORMULARY, Take 2 tablets by mouth in the morning MK-7, Disp: , Rfl:   •  omeprazole (PriLOSEC) 20 mg delayed release capsule, TAKE 1 CAPSULE BY MOUTH TWICE A DAY BEFORE MEALS, Disp: 180 capsule, Rfl: 1  •  Riboflavin (VITAMIN B-2 PO), Take 250 mg by mouth in the morning, Disp: , Rfl:   •  rizatriptan (MAXALT) 10 MG tablet, Take 1 tablet (10 mg total) by mouth once as needed for migraine May repeat in 2 hours if needed  Max 2/24 hours, 9/month   (Patient not taking: Reported on 2/1/2023), Disp: 9 tablet, Rfl: 6  •  sucralfate (CARAFATE) 1 g/10 mL suspension, Take 10 mL (1 g total) by mouth daily at bedtime, Disp: 300 mL, Rfl: 0  •  traZODone (DESYREL) 50 mg tablet, Take 1 tablet (50 mg total) by mouth daily at bedtime, Disp: 30 tablet, Rfl: 1    Past Surgical History:   Procedure Laterality Date   • APPENDECTOMY     • CHOLECYSTECTOMY      laparoscopic   • COLONOSCOPY     • KNEE SURGERY Left    • PERICARDIAL WINDOW     • UT OPTX DSTL RADL X-ARTIC FX/EPIPHYSL SEP Right 3/22/2018    Procedure: OPEN REDUCTION W/ INTERNAL FIXATION (ORIF) RADIUS, splint application;  Surgeon: Sher Harding MD;  [facility];  Service: Orthopedics   • UT PARATHYROIDECTOMY/EXPLORATION PARATHYROIDS N/A 8/17/2022    Procedure: PARATHYROIDECTOMY, 4 GLAND EXPLORATION;  Surgeon: Crow Garcia MD;  [facility];  Service: ENT   • UPPER GASTROINTESTINAL ENDOSCOPY         Patient Active Problem List    Diagnosis Date Noted   • [facility]) ([facility] [address] )    • H/O parathyroidectomy ([address] ) 09/14/2022   • Skin tag of anus 08/31/2022   • SOBOE (shortness of breath on exertion) 08/17/2022   • Aneurysm ([address] ) 07/21/2022   • Edema of both ankles 07/01/2022   • Cervical myofascial pain syndrome 05/24/2022   • Osteoporosis with current pathological fracture 09/10/2021   • Parathyroid related hypercalcemia ([address] ) 09/10/2021   • Hallux abducto valgus, bilateral 04/27/2021   • Pincer nail deformity 04/27/2021   • Palpitations 02/17/2021   • Calf pain 10/08/2020   • Myalgia 10/08/2020   • Elevated blood-pressure reading without diagnosis of hypertension 07/30/2020   • Dysphonia 07/20/2020   • Reflux laryngitis 07/20/2020   • Paresis of left vocal fold 07/20/2020   • Glottic insufficiency 07/20/2020   • Muscle tension dysphonia 07/20/2020   • TMJ dysfunction 07/20/2020   • Pharyngoesophageal dysphagia 07/20/2020   • Paresthesias    • Carpal tunnel syndrome of left wrist    • Irritable bowel syndrome with diarrhea 01/08/2020   • Elevated amylase and lipase 01/08/2020   • Pericardial effusion 11/20/2019   • Abnormal CT scan of lung 07/03/2019   • Migraine without aura and without status migrainosus, not intractable 03/18/2019   • Dizziness and giddiness 03/18/2019   • Insomnia 03/18/2019   • Cervicalgia 03/18/2019   • Lung nodule 01/16/2019   • Atypical chest pain 01/06/2019   • Anxiety 11/13/2018   • Primary hyperparathyroidism ([facility] [address] ) 11/13/2018   • Thyroid nodule 11/13/2018   • Vitamin D deficiency 11/13/2018   • Closed fracture distal radius and ulna, right, initial encounter 03/20/2018   • History of pericarditis 03/12/2018   • Gastroesophageal reflux disease with esophagitis 10/18/2017   • Heart murmur 04/17/2015

## 2023-02-07 NOTE — PROGRESS NOTES
Review of Systems   Constitutional: Negative  Negative for appetite change, chills and fever  HENT: Negative  Negative for ear pain, hearing loss, sore throat, tinnitus, trouble swallowing and voice change  Eyes: Negative  Negative for photophobia, pain and visual disturbance  Respiratory: Negative  Negative for cough and shortness of breath  Cardiovascular: Negative  Negative for chest pain and palpitations  Gastrointestinal: Positive for vomiting  Negative for abdominal pain and nausea  Endocrine: Negative  Negative for cold intolerance  Genitourinary: Negative  Negative for dysuria, frequency, hematuria and urgency  Musculoskeletal: Positive for neck pain and neck stiffness  Negative for arthralgias, back pain, gait problem and myalgias  Skin: Negative  Negative for color change and rash  Allergic/Immunologic: Negative  Neurological: Positive for headaches  Negative for tremors, seizures, syncope, facial asymmetry, speech difficulty, weakness, light-headedness and numbness  Hematological: Negative  Does not bruise/bleed easily  Psychiatric/Behavioral: Positive for sleep disturbance  Negative for confusion and hallucinations  All other systems reviewed and are negative

## 2023-02-08 ENCOUNTER — APPOINTMENT (OUTPATIENT)
Dept: PULMONOLOGY | Facility: CLINIC | Age: 78
End: 2023-02-08

## 2023-02-08 NOTE — TELEPHONE ENCOUNTER
MSW attempted to reach patient at [phone]  No answer  MSW left a message with results thus far, but that this writer will be calling more DME companies to see if they can bill a TENS unit through her insurance  MSW will update patient as able

## 2023-02-09 ENCOUNTER — TELEPHONE (OUTPATIENT)
Dept: SLEEP CENTER | Facility: CLINIC | Age: 78
End: 2023-02-09

## 2023-02-09 NOTE — TELEPHONE ENCOUNTER
MSW called the following in-network DME companies with the following results:    [facility]  [phone]  *this is a wrong number    [facility]  [phone]  *spoke with Aminata Hagan at the [place] location at [phone] - they do not carry Tens units    [facility]  [phone]  *left message with answering service    [employer]  [phone]  *they do not carry Tens units    [address]   [phone]  *this is a wrong number    [facility]  [phone]  *spoke with Laura Dunlap at the [place] location at [phone] - they no longer carry Tens units    [address]  *this # is to a therapy location    [address]  [phone]  *this is a wrong number    [facility]  [phone]  *they do not carry Tens units    [address]   [phone]  *spoke with Clark Mtz at [phone] - they no longer carry Tens units    [facility]  [phone]  *fast busy signal    It appears that Tens units are no longer available through insurances at [address], but they are available for purchase at pharmacies/online  [facility] also carries a unit for $59 if patient wishes to go through [facility] DME  MSW will inquire with Dr Julieta Newell if she has a preference for a private pay Tens Unit

## 2023-02-09 NOTE — TELEPHONE ENCOUNTER
----- Message from Joselin Sanchez MD sent at 2/9/2023 11:06 AM EST -----  Approved    ----- Message -----  From: Bindu Beck  Sent: 2/9/2023   8:11 AM EST  To: Sleep Medicine René Provider    This Diagnostic sleep study needs approval      If approved please sign and return to clerical pool  If denied please include reasons why  Also provide alternative testing if warranted  Please sign and return to clerical pool

## 2023-02-10 ENCOUNTER — OFFICE VISIT (OUTPATIENT)
Dept: CARDIOLOGY CLINIC | Facility: CLINIC | Age: 78
End: 2023-02-10

## 2023-02-10 VITALS
DIASTOLIC BLOOD PRESSURE: 72 MMHG | HEIGHT: 67 IN | SYSTOLIC BLOOD PRESSURE: 130 MMHG | WEIGHT: 176.4 LBS | BODY MASS INDEX: 27.69 KG/M2 | HEART RATE: 72 BPM | OXYGEN SATURATION: 98 %

## 2023-02-10 DIAGNOSIS — R00.2 PALPITATIONS: Primary | ICD-10-CM

## 2023-02-10 DIAGNOSIS — R07.89 ATYPICAL CHEST PAIN: ICD-10-CM

## 2023-02-10 NOTE — PROGRESS NOTES
Cardiology Follow Up    Haylie Peters [facility], [facility]   1945  [id number]  [address] [place], [place] [id number] [phone]    1  Palpitations        2  Atypical chest pain            Interval History: Overall she feels well  She has very infrequent palpitations  She has no further chest discomfort  She denies dyspnea on exertion orthopnea paroxysmal nocturnal dyspnea or syncope      Patient Active Problem List   Diagnosis   • Closed fracture distal radius and ulna, right, initial encounter   • Anxiety   • Gastroesophageal reflux disease with esophagitis   • History of pericarditis   • Heart murmur   • Primary hyperparathyroidism (HCC)   • Thyroid nodule   • Vitamin D deficiency   • Atypical chest pain   • Migraine without aura and without status migrainosus, not intractable   • Dizziness and giddiness   • Insomnia   • Cervicalgia   • Abnormal CT scan of lung   • Irritable bowel syndrome with diarrhea   • Elevated amylase and lipase   • Paresthesias   • Carpal tunnel syndrome of left wrist   • Dysphonia   • Reflux laryngitis   • Paresis of left vocal fold   • Glottic insufficiency   • Muscle tension dysphonia   • TMJ dysfunction   • Pharyngoesophageal dysphagia   • Palpitations   • Hallux abducto valgus, bilateral   • Pincer nail deformity   • Osteoporosis with current pathological fracture   • Parathyroid related hypercalcemia (HCC)   • Cervical myofascial pain syndrome   • Edema of both ankles   • Aneurysm (HCC)   • Calf pain   • Lung nodule   • Myalgia   • Pericardial effusion   • SOBOE (shortness of breath on exertion)   • Elevated blood-pressure reading without diagnosis of hypertension   • Skin tag of anus   • H/O parathyroidectomy (HCC)   • [facility]) ([facility])     Past Medical History:   Diagnosis Date   • Anxiety    • Arthritis    • Back pain    • Balance problems    • Chest pain     heaviness   • Difficulty swallowing    • Dizziness    • Dry cough    • Gait disorder    • GERD (gastroesophageal reflux disease)    • Hyperparathyroidism ([address] )    • Hypertension    • Increased frequency of headaches    • Migraines    • Neck pain    • Numbness and tingling     bles   • Palpitations    • Pericarditis    • Thyroid disease     nodule   • Trouble in sleeping    • Wears glasses      Social History     Socioeconomic History   • Marital status: /Civil Union     Spouse name: Not on file   • Number of children: Not on file   • Years of education: Not on file   • Highest education level: Not on file   Occupational History   • Not on file   Tobacco Use   • Smoking status: Never   • Smokeless tobacco: Never   Vaping Use   • Vaping Use: Never used   Substance and Sexual Activity   • Alcohol use: Not Currently     Comment: Rarely    • Drug use: No   • Sexual activity: Yes     Partners: Male   Other Topics Concern   • Not on file   Social History Narrative    WORK:    1   (Teresita Molina; 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    2  [employer]        HOBBIES:    1  Singing    2  Dancing    3  Hx of ceramics (+ dust)        PETS:    1  Dogs    -  Denies birds        TRAVEL:    -  [place]         EXPOSURES:    Denies mold; down pillows/comforters; hot tubs     Social Determinants of Health     Financial Resource Strain: Low Risk    • Difficulty of Paying Living Expenses: Not hard at all   Food Insecurity: Not on file   Transportation Needs: No Transportation Needs   • Lack of Transportation (Medical): No   • Lack of Transportation (Non-Medical):  No   Physical Activity: Not on file   Stress: Not on file   Social Connections: Not on file   Intimate Partner Violence: Not on file   Housing Stability: Not on file      Family History   Problem Relation Age of Onset   • Kidney failure Mother    • Hypertension Mother    • Lung cancer Father    • Jeneal Mixer White syndrome Daughter    • No Known Problems Sister • Substance Abuse Neg Hx    • Alcohol abuse Neg Hx    • Mental illness Neg Hx      Past Surgical History:   Procedure Laterality Date   • APPENDECTOMY     • CHOLECYSTECTOMY      laparoscopic   • COLONOSCOPY     • KNEE SURGERY Left    • PERICARDIAL WINDOW     • MI OPTX DSTL RADL X-ARTIC FX/EPIPHYSL SEP Right 3/22/2018    Procedure: OPEN REDUCTION W/ INTERNAL FIXATION (ORIF) RADIUS, splint application;  Surgeon: Kyler Brooks MD;  [facility];  Service: Orthopedics   • MI PARATHYROIDECTOMY/EXPLORATION PARATHYROIDS N/A 8/17/2022    Procedure: PARATHYROIDECTOMY, 4 GLAND EXPLORATION;  Surgeon: Renetta Smith MD;  [facility];  Service: ENT   • UPPER GASTROINTESTINAL ENDOSCOPY         Current Outpatient Medications:   •  acetaminophen (TYLENOL) 500 mg tablet, Take 500-1,000 mg by mouth every 6 (six) hours as needed for mild pain, Disp: , Rfl:   •  albuterol (PROVENTIL HFA,VENTOLIN HFA) 90 mcg/act inhaler, Inhale 2 puffs every 6 (six) hours as needed for wheezing or shortness of breath, Disp: 8 g, Rfl: 1  •  ALPRAZolam (XANAX) 0 5 mg tablet, Take 1 tablet (0 5 mg total) by mouth daily at bedtime as needed for anxiety or sleep, Disp: 20 tablet, Rfl: 1  •  ascorbic acid (VITAMIN C) 500 mg tablet, Take 500 mg by mouth daily, Disp: , Rfl:   •  b complex vitamins tablet, Take 1 tablet by mouth daily, Disp: , Rfl:   •  Cholecalciferol (Vitamin D) 50 MCG (2000 UT) CAPS, Take 1 capsule (2,000 Units total) by mouth daily (Patient taking differently: Take 3,000 Units by mouth daily), Disp: , Rfl:   •  dicyclomine (BENTYL) 10 mg capsule, TAKE 1 CAPSULE BY MOUTH 4 TIMES A DAY (BEFORE MEALS AND AT BEDTIME) (Patient taking differently: if needed), Disp: 360 capsule, Rfl: 1  •  Magnesium 300 MG CAPS, Take 300 mg by mouth in the morning, Disp: , Rfl:   •  methocarbamol (ROBAXIN) 500 mg tablet, Take 0 5 (250mg) to 1 (500mg) tablet by mouth every 8 hours as needed for muscle spasm/neck pain , Disp: 90 tablet, Rfl: 0  •  metoprolol succinate (TOPROL-XL) 25 mg 24 hr tablet, Take 1 tablet (25 mg total) by mouth daily at bedtime, Disp: 90 tablet, Rfl: 3  •  Multiple Vitamins-Calcium (ONE-A-DAY WOMENS PO), Take 1 tablet by mouth daily, Disp: , Rfl:   •  Nutritional Supplements (OSAPLEX MK-7 PO), Take by mouth, Disp: , Rfl:   •  omeprazole (PriLOSEC) 20 mg delayed release capsule, TAKE 1 CAPSULE BY MOUTH TWICE A DAY BEFORE MEALS, Disp: 180 capsule, Rfl: 1  •  Riboflavin (VITAMIN B-2 PO), Take 250 mg by mouth in the morning, Disp: , Rfl:   •  rizatriptan (MAXALT) 10 MG tablet, Take 1 tablet (10 mg total) by mouth once as needed for migraine May repeat in 2 hours if needed   Max 2/24 hours, 9/month , Disp: 9 tablet, Rfl: 6  •  traZODone (DESYREL) 50 mg tablet, Take 1 tablet (50 mg total) by mouth daily at bedtime, Disp: 30 tablet, Rfl: 1  •  fluticasone (FLONASE) 50 mcg/act nasal spray, 2 sprays into each nostril daily (Patient not taking: Reported on 2/1/2023), Disp: 16 g, Rfl: 0  •  furosemide (LASIX) 20 mg tablet, Take 1 tablet (20 mg total) by mouth daily as needed (leg swelling) (Patient not taking: Reported on 2/1/2023), Disp: 10 tablet, Rfl: 0  •  guaifenesin-codeine (GUAIFENESIN AC) 100-10 MG/5ML liquid, Take 5 mL by mouth daily at bedtime as needed for cough (Patient not taking: Reported on 2/10/2023), Disp: 120 mL, Rfl: 0  •  sucralfate (CARAFATE) 1 g/10 mL suspension, Take 10 mL (1 g total) by mouth daily at bedtime (Patient not taking: Reported on 2/7/2023), Disp: 300 mL, Rfl: 0  Allergies   Allergen Reactions   • Ciprofloxacin Myalgia       Labs:  Admission on 12/19/2022, Discharged on 12/19/2022   Component Date Value   • SARS-CoV-2 12/19/2022 Negative    • INFLUENZA A PCR 12/19/2022 Negative    • INFLUENZA B PCR 12/19/2022 Negative    • RSV PCR 12/19/2022 Negative    Appointment on 12/16/2022   Component Date Value   • Calcium, Ionized 12/16/2022 1 32    • PTH 12/16/2022 107 9 (H)      Imaging: XR chest pa & lateral    Result Date: 1/19/2023  Narrative: CHEST INDICATION:   R05 9: Cough, unspecified J12 9: Viral pneumonia, unspecified  COMPARISON:  Chest x-ray December 2022, CT thorax March 2022 EXAM PERFORMED/VIEWS:  XR CHEST PA & LATERAL FINDINGS: Cardiomediastinal silhouette appears unremarkable  The lungs are clear  No pneumothorax or pleural effusion  Osseous structures appear within normal limits for patient age  Impression: No acute cardiopulmonary disease  Workstation performed: [id number]       Review of Systems:  Review of Systems    Physical Exam:  Physical Exam    Discussion/Summary: She is doing well from a cardiovascular standpoint without significant cardiovascular complaint  Her blood pressure is well controlled  She recently had a normal stress echocardiogram   I have made no changes and I will see her again in 1 year

## 2023-02-10 NOTE — TELEPHONE ENCOUNTER
MSW phoned patient at [phone]  MSW explained that TENS units no longer go through the insurance  MSW offered to send patient 2 options that she can purchase online  Patient requested that same be sent to her at [email or web address]  MSW sent the following email to patient as [email or web address]:    "Jose Cantor,    As per my research, it does not appear that TENS units are processed through insurance any more  I have heard it is because they are very affordable online/pharmacies  Here are 2 options for TENS units that Dr Lei Adan feels would be appropriate for you:     [employer] $34 49  [email or web address]        [employer] $31 99   [employer]    Feel free to select the unit that meets your needs and purchase same  If I can be of further assistance, please feel free to reach out to me  Best Regards,    Jael Kay, MSW   [facility]  [facility] [address], [address]  [phone] - phone  [phone] - fax   [facility]"    MSW will be available to patient as needed

## 2023-02-13 ENCOUNTER — APPOINTMENT (OUTPATIENT)
Dept: PULMONOLOGY | Facility: CLINIC | Age: 78
End: 2023-02-13

## 2023-02-13 ENCOUNTER — CLINICAL SUPPORT (OUTPATIENT)
Dept: PULMONOLOGY | Facility: CLINIC | Age: 78
End: 2023-02-13

## 2023-02-13 DIAGNOSIS — J84.9 ILD (INTERSTITIAL LUNG DISEASE) (HCC): Primary | ICD-10-CM

## 2023-02-15 ENCOUNTER — APPOINTMENT (OUTPATIENT)
Dept: PULMONOLOGY | Facility: CLINIC | Age: 78
End: 2023-02-15

## 2023-02-15 ENCOUNTER — CLINICAL SUPPORT (OUTPATIENT)
Dept: PULMONOLOGY | Facility: CLINIC | Age: 78
End: 2023-02-15

## 2023-02-15 ENCOUNTER — HOSPITAL ENCOUNTER (OUTPATIENT)
Dept: PULMONOLOGY | Facility: HOSPITAL | Age: 78
Discharge: HOME | End: 2023-02-15
Attending: INTERNAL MEDICINE

## 2023-02-15 DIAGNOSIS — J84.9 ILD (INTERSTITIAL LUNG DISEASE) (HCC): Primary | ICD-10-CM

## 2023-02-15 DIAGNOSIS — J84.9 ILD (INTERSTITIAL LUNG DISEASE) (HCC): ICD-10-CM

## 2023-02-15 RX ORDER — ALBUTEROL SULFATE 2.5 MG/3ML
2.5 SOLUTION RESPIRATORY (INHALATION) EVERY 6 HOURS PRN
Status: DISCONTINUED | OUTPATIENT
Start: 2023-02-15 | End: 2023-02-19 | Stop reason: HOSPADM

## 2023-02-15 RX ADMIN — ALBUTEROL SULFATE 2.5 MG: 2.5 SOLUTION RESPIRATORY (INHALATION) at 10:59

## 2023-02-20 ENCOUNTER — APPOINTMENT (OUTPATIENT)
Dept: PULMONOLOGY | Facility: CLINIC | Age: 78
End: 2023-02-20

## 2023-02-22 ENCOUNTER — CLINICAL SUPPORT (OUTPATIENT)
Dept: PULMONOLOGY | Facility: CLINIC | Age: 78
End: 2023-02-22

## 2023-02-22 ENCOUNTER — APPOINTMENT (OUTPATIENT)
Dept: PULMONOLOGY | Facility: CLINIC | Age: 78
End: 2023-02-22

## 2023-02-22 DIAGNOSIS — J84.9 ILD (INTERSTITIAL LUNG DISEASE) (HCC): Primary | ICD-10-CM

## 2023-02-27 ENCOUNTER — CLINICAL SUPPORT (OUTPATIENT)
Dept: PULMONOLOGY | Facility: CLINIC | Age: 78
End: 2023-02-27

## 2023-02-27 ENCOUNTER — APPOINTMENT (OUTPATIENT)
Dept: PULMONOLOGY | Facility: CLINIC | Age: 78
End: 2023-02-27

## 2023-02-27 DIAGNOSIS — J84.9 ILD (INTERSTITIAL LUNG DISEASE) (HCC): Primary | ICD-10-CM

## 2023-03-01 ENCOUNTER — APPOINTMENT (OUTPATIENT)
Dept: PULMONOLOGY | Facility: CLINIC | Age: 78
End: 2023-03-01

## 2023-03-06 ENCOUNTER — CLINICAL SUPPORT (OUTPATIENT)
Dept: PULMONOLOGY | Facility: CLINIC | Age: 78
End: 2023-03-06

## 2023-03-06 ENCOUNTER — APPOINTMENT (OUTPATIENT)
Dept: PULMONOLOGY | Facility: CLINIC | Age: 78
End: 2023-03-06

## 2023-03-06 DIAGNOSIS — J84.9 ILD (INTERSTITIAL LUNG DISEASE) (HCC): Primary | ICD-10-CM

## 2023-03-08 ENCOUNTER — CLINICAL SUPPORT (OUTPATIENT)
Dept: PULMONOLOGY | Facility: CLINIC | Age: 78
End: 2023-03-08

## 2023-03-08 ENCOUNTER — APPOINTMENT (OUTPATIENT)
Dept: PULMONOLOGY | Facility: CLINIC | Age: 78
End: 2023-03-08

## 2023-03-08 DIAGNOSIS — J84.9 ILD (INTERSTITIAL LUNG DISEASE) (HCC): Primary | ICD-10-CM

## 2023-03-10 ENCOUNTER — OFFICE VISIT (OUTPATIENT)
Dept: PULMONOLOGY | Facility: CLINIC | Age: 78
End: 2023-03-10

## 2023-03-10 VITALS
OXYGEN SATURATION: 98 % | TEMPERATURE: 98.2 F | HEIGHT: 67 IN | BODY MASS INDEX: 27.58 KG/M2 | WEIGHT: 175.7 LBS | RESPIRATION RATE: 15 BRPM | HEART RATE: 65 BPM | SYSTOLIC BLOOD PRESSURE: 130 MMHG | DIASTOLIC BLOOD PRESSURE: 78 MMHG

## 2023-03-10 DIAGNOSIS — J84.9 ILD (INTERSTITIAL LUNG DISEASE) (HCC): Primary | ICD-10-CM

## 2023-03-10 DIAGNOSIS — R06.02 SOBOE (SHORTNESS OF BREATH ON EXERTION): ICD-10-CM

## 2023-03-10 NOTE — PROGRESS NOTES
Progress Note - Pulmonary   [place] Efren 78 y o  female MRN: [id number]   Encounter: [id number]      Assessment/Plan:  Patient is a 30-year-old female with past medical history significant social lung disease of unclear etiology, history of parathyroid surgery who presents for routine follow-up  The patient has participated in pulmonary rehab and has done exceedingly well since last visit  She denies any shortness of breath  PFTs were reviewed and these are actually improved  Patient may continue to increase her exercise as tolerated but she is limited by knee pain  No indication for 6-minute walk test or repeat PFTs at this time  Interstitial Lung Disease  -  Has had improvement with pulm rehab  -  PFTs improved; reviewed with patient and   -  No acute indication for steroids or antifibrotics     Reactive Airway Disease  -  Noted improvement from albuterol  -  May use on as needed bases    Knee pain  -  Had benefit from previous injections  -  May follow up with ortho  -  May trial OTC knee brace    Thyroid nodule  - reports she may need additional surgery  -  To be determined    1  [facility]) ([address] )    2  SOBOE (shortness of breath on exertion)    Patient may follow up in 6 months or sooner as necessary  Orders:  No orders of the defined types were placed in this encounter  Subjective: The patient is undergoing pulmonary rehab  She does report some pain in her knees  She has previously had some injections about 4-5 years ago  She uses albuterol about 2x/month which is all she feels she needs  She would use it more often if necessary  The patient notes overall she is doing well  Inhaler Regimen:  Albuterol - 2x/month    Remainder of review of systems negative except as described in HPI        The following portions of the patient's history were reviewed and updated as appropriate: allergies, current medications, past family history, past medical history, past social history, past surgical history and problem list      Objective:   Vitals: Blood pressure 130/78, pulse 65, temperature 98 2 °F (36 8 °C), temperature source Tympanic, resp  rate 15, height 5' 7" (1 702 m), weight 79 7 kg (175 lb 11 2 oz), SpO2 98 %, not currently breastfeeding , RA, Body mass index is 27 52 kg/m²  Physical Exam  Gen: Pleasant, awake, alert, oriented x 3, no acute distress  HEENT: Mucous membranes moist, no oral lesions, no thrush  NECK: No accessory muscle use, JVP not elevated  Cardiac: RRR, single S1, single S2, no murmurs, no rubs, no gallops  Lungs: slight crackles  Abdomen: normoactive bowel sounds, soft nontender, nondistended, no rebound or rigidity, no guarding  Extremities: no cyanosis, no clubbing, no LE edema  MSK:  Strength equal in all extremities  Derm:  No rashes/lesions noted  Neuro:  Appropriate mood/affect    Labs: I have personally reviewed pertinent lab results  Lab Results   Component Value Date    WBC 6 61 09/21/2022    HGB 14 5 09/21/2022     09/21/2022     Lab Results   Component Value Date    CREATININE 0 88 09/15/2022        Imaging and other studies: I have personally reviewed pertinent reports  and I have personally reviewed pertinent films in PACS  CXR 1/192023  My interpretation:  No acute intrathoracic pathology    Radiology findings:  Cardiomediastinal silhouette appears unremarkable  The lungs are clear  No pneumothorax or pleural effusion  Osseous structures appear within normal limits for patient age  Pulmonary Function Testing: I have personally reviewed pertinent reports     and I have personally reviewed pertinent films in PACS                          FEV1               FVC                 FEV1/FVC       DLCOcor  10/7/2019        1 70 69%         2 17 68%         78%                 58%  3/3/2021          1 59 66%         2 02 64%         79%                 58%  3/17/2022        1 46 62%         1 89 60%         78%                 55%  10/17/2022      1 26 53%         1 62 52%         78%                 52%      2/15/2023 1 49 65% 1 90 63% 79%  67%    Ronnie Delgado

## 2023-03-13 ENCOUNTER — CLINICAL SUPPORT (OUTPATIENT)
Dept: PULMONOLOGY | Facility: CLINIC | Age: 78
End: 2023-03-13

## 2023-03-13 ENCOUNTER — APPOINTMENT (OUTPATIENT)
Dept: LAB | Facility: CLINIC | Age: 78
End: 2023-03-13

## 2023-03-13 ENCOUNTER — TELEPHONE (OUTPATIENT)
Dept: OBGYN CLINIC | Facility: HOSPITAL | Age: 78
End: 2023-03-13

## 2023-03-13 ENCOUNTER — APPOINTMENT (OUTPATIENT)
Dept: PULMONOLOGY | Facility: CLINIC | Age: 78
End: 2023-03-13

## 2023-03-13 DIAGNOSIS — J84.9 ILD (INTERSTITIAL LUNG DISEASE) (HCC): Primary | ICD-10-CM

## 2023-03-13 LAB
ANION GAP SERPL CALCULATED.3IONS-SCNC: 1 MMOL/L (ref 4–13)
BUN SERPL-MCNC: 23 MG/DL (ref 5–25)
CALCIUM SERPL-MCNC: 10.2 MG/DL (ref 8.3–10.1)
CHLORIDE SERPL-SCNC: 109 MMOL/L (ref 96–108)
CO2 SERPL-SCNC: 28 MMOL/L (ref 21–32)
CREAT SERPL-MCNC: 0.8 MG/DL (ref 0.6–1.3)
GFR SERPL CREATININE-BSD FRML MDRD: 72 ML/MIN/1.73SQ M
GLUCOSE P FAST SERPL-MCNC: 88 MG/DL (ref 65–99)
POTASSIUM SERPL-SCNC: 4.4 MMOL/L (ref 3.5–5.3)
PTH-INTACT SERPL-MCNC: 92.9 PG/ML (ref 18.4–80.1)
SODIUM SERPL-SCNC: 138 MMOL/L (ref 135–147)

## 2023-03-13 NOTE — TELEPHONE ENCOUNTER
Caller: Patient    Doctor: Aislinn Gutierrez    Reason for call: Patient calling asking to speak to the individual who does the bracing for the office  She was suppose to receive a brace but has been sick and she would like to talk to someone in regard to bracing      Call back#: [phone]

## 2023-03-13 NOTE — PROGRESS NOTES
Amee Avery is a 68 Pulmonary Rehabilitation Plan of Care   60 Day Reassessment      Today's date: 3/13/2023   # of Exercise Sessions Completed: 9  Patient name: Van Smith      : 1945  Age: 78 y o  MRN: [id number]  Referring Physician: Chion Plaza, *  Pulmonologist: Evin Sosa MD   Provider: [facility]  Clinician: Yadira Slater MS, CEP     Dx:   Encounter Diagnosis   Name Primary? • [facility]) [facility] Yes     Date of onset: 11/3/22      SUMMARY OF PROGRESS:  Rahul Rausch is compliant attending pulmonary rehab exercise sessions 2x/wk for ILD and has attended 6 sessions  in the past 30 days  The patient tolerates 30-35 mins at 2 0 - 2 7 METs  on room air  Resting SpO2 88 - 97% with decreased O2 saturation during exercise 89 - 99%  Will exercise on RA Resting BP  114/78 - 138/80 with appropriate response to exercise reaching 124/80- 168/96  RHR 67 - 80,  ExHR 84 - 90  The patient reports no symptoms, no limitations and knee pain occ  during exercise  Rating of perceived dyspnea with exercise 0-1/10 at max METs  she has not begun a regular exercise program at home, but states walking and doing hand weights sometimes  The patient is a non-smoker  Depression screening using the PHQ-9 was reassessed  The patient's score was 5-9 = Mild Depression showing an DECLINE   AIDAN-7 was reassessed  The patient's score was 0-4  = Not anxious showing an IMPROVEMENT  When addressed, the patient denies  feelings of depression/anxiety but does state stress that is managable  Patient reports excellent social/emotional support  Patient attends group educational classes on pulmonary disease  Pursed lipped breathing and Diaphragmatic breathing continues to be demonstrated, practiced, and reinforced with the patient  Her exercise program will be progressed as tolerated   The patient has the following personal goals he hopes to achieve by discharge: continue to attend rehab regularly, increase workloads on exercise equipment, and continue to spend lots of time with family and friends! Pt will continue to be educated on lifestyle modifications and encouraged to supplement with a home exercise program to reach the following goals: increase duration and workloads on exercise machines, encourage patient to continue to walk at home, and continue breathing techniques with patient in person in the next 30 days  Medication compliance: Yes   Comments: Pt reports to be compliant with medications  Fall Risk: Low   Comments: Ambulates with a steady gait with no assist device and Denies a fall in the past 6 months    Smoking: Never used    RPD at Rest: 0/10  RPD with Exercise:  0-1/10    Assessment of progression of lung disease and functional status:  CAT: n/a  Shortness of breath questionnaire: 11/120      EXERCISE ASSESSMENT and PLAN    Current Exercise Program in Rehab:       Frequency: 2 days/week   Supplement with home exercise 2+ days/wk as tolerated       Minutes: 30-35        METS: 2 0-2 7              SpO2:89-99               RPD: 0-1                     HR: 84-90   RPE: 3-4         Modalities: UBE, NuStep, Recumbent bike and Room walking      Exercise Progression 30 Day Goals :    Frequency: 2-3 days/week    Supplement with home exercise 2+ days/wk as tolerated       Minutes: 35-45         METS: 2-3              SpO2: >88%              RPD: 1-3                      HR: resting +20   RPE: 4-5        Modalities: Treadmill, UBE, NuStep and Recumbent bike     Strength training:   Will be added following 2-3 weeks of monitored exercise sessions   Modalities: Lateral Raise, Arm Curl, Seated Row, Sit to [employer] and [employer]    Oxygen Needs: on room air at rest and on room air with exercise  Oxygen Goal: Maintain SpO2>90% during exercise    Home Exercise: none  Education: pursed lipped breathing, diaphragmatic breathing, relaxation breathing, home exercise, benefits of exercise for pulmonary disease, RPE scale, RPD scale, O2 saturation monitoring, appropriate O2 response to exercise and energy conservation    Goals: Improved 6MW distance by 10%, reduced dyspnea during exercise (0-3/10), improved exercise tolerance (max METs tolerated in pulmonary rehab), SpO2 >90% during exercise, improved DUKE activity score, reduced RPD at rest, attend pulmonary rehab regularly and decrease sitting time at home    Progressing:  Pt is progressing and showing improvement  toward the following goals:  attending rehab regularly   , Patient will add more formal exercise at home in the next 30 days, Will continue to educate and progress as tolerated  Plan: Titrate supplemental oxygen as needed to maintain SpO2>90% with exercise, learn to conserve energy with ADLs , practice breathing techniques 3x/day and reduce time sitting at home    Readiness to change: Action:  (Changing behavior)      NUTRITION ASSESSMENT AND PLAN    Weight control:    Starting weight: 173 4 lbs    Current weight:  176 lbs     Diabetes: N/A    Goals:LDL <100, CHOL <200, decreased body fat % <33%  (F), reduced waist circumference <35 inches (F), 2 5-5%  wt loss, increase intake of fish, shellfish, use low fat dairy, eat 3 or more servings of whole grains a day, Eat 4-5 cups of fruits and vegetables daily and eliminate or choose low-fat sweets  Education: heart healthy eating  low sodium diet  hydration  wt  loss   healthy choices while dining out     Progressing:Pt is progressing and showing improvement  toward the following goals:  eats a healthy diet, low sodium diet, organic foods, drinks lots of water, and cooks at home   , Patient will continue to watch her diet in the next 30 days, Will continue to educate and progress as tolerated       Plan: Education class: Reading Food Labels, Education Class: Heart Healthy Eating, switch to low fat cheeses, replace unhealthy snacks with fruits & vegs, reduce portion sizes, remove salt shaker from table, drink more water and keep added daily sugar <25g/day  Readiness to change: Action:  (Changing behavior)      PSYCHOSOCIAL ASSESSMENT AND PLAN    Emotional:  Depression assessment:  PHQ-9 = 5 5-9 = Mild Depression            Anxiety measure:  AIDAN-7 = 0 0-4  = Not anxious  Self-reported stress level: 3   Social support: Excellent and Patient reports excellent emotional/social support from family and friends     Goals:  Reduce perceived stress to 1-3/10, continue medical therapy, Pain in [facility] Score < 3, increased energy, stop worrying, take time to relax and Feel less anxious  Education: signs/sxs of depression, benefits of a positive support system and stress management techniques    Progressing:Pt is progressing and showing improvement  toward the following goals:  spends a lot of time with family and friends which reduces her amount of stress  , Patient will work on anxiety management and how to take time to relax in the next 30 days, Will continue to educate and progress as tolerated       Plan: Class: Stress and Your Health, Class: Relaxation, PHQ-9 >5 will refer to MD, Practice relaxation techniques and Exercise  Readiness to change: Action:  (Changing behavior)      OTHER CORE COMPONENTS     Tobacco:   Social History     Tobacco Use   Smoking Status Never   Smokeless Tobacco Never       Tobacco Use Intervention: Referral to tobacco expert:   N/A:  Patient is a non-smoker     Blood pressure:    Restin//80   Exercise: 124//96    Goals: consistent BP < 130/80, reduced dietary sodium <2300mg, moderate intensity exercise >150 mins/wk and medication compliance  Education:  pathophysiology of pulmonary disease, preventing infections, dangers of smoking, control coughing, inspiratory muscle training and environmental triggers     Progressing:Pt is progressing and showing improvement  toward the following goals:  watching her sodium intake and drinking lots of water   , Patient will monitor BP at home  in the next 30 days, Will continue to educate and progress as tolerated       Plan: Complete abstention from smoking, engage in regular exercise and monitor home BP  Readiness to change: Action:  (Changing behavior)

## 2023-03-15 ENCOUNTER — CLINICAL SUPPORT (OUTPATIENT)
Dept: PULMONOLOGY | Facility: CLINIC | Age: 78
End: 2023-03-15

## 2023-03-15 ENCOUNTER — APPOINTMENT (OUTPATIENT)
Dept: PULMONOLOGY | Facility: CLINIC | Age: 78
End: 2023-03-15

## 2023-03-15 DIAGNOSIS — J84.9 ILD (INTERSTITIAL LUNG DISEASE) (HCC): Primary | ICD-10-CM

## 2023-03-15 NOTE — TELEPHONE ENCOUNTER
Caller: patient     Doctor: mikey    Reason for call: Patient is unable to come in tomorrow 3/16 for knee brace fitting  Patient would like to reschedule to some time next week      Call back#: [phone]

## 2023-03-20 ENCOUNTER — APPOINTMENT (OUTPATIENT)
Dept: PULMONOLOGY | Facility: CLINIC | Age: 78
End: 2023-03-20

## 2023-03-22 ENCOUNTER — CLINICAL SUPPORT (OUTPATIENT)
Dept: PULMONOLOGY | Facility: CLINIC | Age: 78
End: 2023-03-22

## 2023-03-22 DIAGNOSIS — M17.11 PRIMARY OSTEOARTHRITIS OF RIGHT KNEE: Primary | ICD-10-CM

## 2023-03-22 DIAGNOSIS — J84.9 ILD (INTERSTITIAL LUNG DISEASE) (HCC): Primary | ICD-10-CM

## 2023-03-23 NOTE — TELEPHONE ENCOUNTER
Called patient and told her that they was no other brace she can wear that would benefit her  She can come in and discuss different options as far doing either visco or surgery  Patient understood, and will call back to set up apt

## 2023-03-23 NOTE — TELEPHONE ENCOUNTER
The lateral  brace would be the best brace for her type of arthritis, if this is not comfortable for her or she cannot tolerate wearing it then there is no other particular brace that we could recommend that would necessarily be effective  We had ordered Visco supplements for her this would help potentially with some stiffness and pain but not the alignment of the knees  Ultimately, only thing that would improve the alignment of her knees would be total knee replacement    If the patient did not have lasting relief from the cortisone injection, would recommend that she come back in the office to discuss treatment options preferably after we have an approval at least for Visco so that we can have that as an option for her at the appointment

## 2023-03-24 ENCOUNTER — HOSPITAL ENCOUNTER (OUTPATIENT)
Dept: NUCLEAR MEDICINE | Facility: HOSPITAL | Age: 78
End: 2023-03-24

## 2023-03-24 DIAGNOSIS — E83.52 HYPERCALCEMIA: ICD-10-CM

## 2023-03-24 DIAGNOSIS — E89.2 H/O PARATHYROIDECTOMY (HCC): ICD-10-CM

## 2023-03-24 DIAGNOSIS — E21.0 PRIMARY HYPERPARATHYROIDISM (HCC): ICD-10-CM

## 2023-03-27 ENCOUNTER — OFFICE VISIT (OUTPATIENT)
Dept: ENDOCRINOLOGY | Facility: CLINIC | Age: 78
End: 2023-03-27

## 2023-03-27 ENCOUNTER — CLINICAL SUPPORT (OUTPATIENT)
Dept: PULMONOLOGY | Facility: CLINIC | Age: 78
End: 2023-03-27

## 2023-03-27 VITALS
DIASTOLIC BLOOD PRESSURE: 68 MMHG | HEART RATE: 66 BPM | WEIGHT: 175.2 LBS | SYSTOLIC BLOOD PRESSURE: 118 MMHG | BODY MASS INDEX: 27.44 KG/M2

## 2023-03-27 DIAGNOSIS — E89.2 H/O PARATHYROIDECTOMY (HCC): ICD-10-CM

## 2023-03-27 DIAGNOSIS — J84.9 ILD (INTERSTITIAL LUNG DISEASE) (HCC): Primary | ICD-10-CM

## 2023-03-27 DIAGNOSIS — E83.52 HYPERCALCEMIA: ICD-10-CM

## 2023-03-27 DIAGNOSIS — M81.0 AGE-RELATED OSTEOPOROSIS WITHOUT CURRENT PATHOLOGICAL FRACTURE: Primary | ICD-10-CM

## 2023-03-27 DIAGNOSIS — E04.2 MULTINODULAR GOITER: ICD-10-CM

## 2023-03-27 DIAGNOSIS — E55.9 VITAMIN D DEFICIENCY: ICD-10-CM

## 2023-03-27 DIAGNOSIS — M80.00XA OSTEOPOROSIS WITH CURRENT PATHOLOGICAL FRACTURE, UNSPECIFIED OSTEOPOROSIS TYPE, INITIAL ENCOUNTER: ICD-10-CM

## 2023-03-27 DIAGNOSIS — E21.3 HYPERPARATHYROIDISM (HCC): ICD-10-CM

## 2023-03-27 DIAGNOSIS — R10.9 ACUTE RIGHT FLANK PAIN: ICD-10-CM

## 2023-03-27 NOTE — PROGRESS NOTES
Chapo Finley 78 y o  female MRN: [id number]    Encounter: [id number]      Assessment/Plan     Assessment: This is a 78y o -year-old female with primary hyperparathyroidism, hypercalcemia, status post parathyroid surgery (parathyroidectomy, 4 gland exploration on August 2022), out of which 3 glands were removed right upper parathyroid, possibly right inferior parathyroid, suspected left inferior parathyroid  Recent PTH is slightly elevated up to 92 9, calcium is slightly elevated up to 10 2 mg per DL       Plan:  -We will obtain, vitamin D, 24-hour urine calcium and creatinine to assess calcium/creatinine excretion ratio  -Also discussed with patient to avoid calcium supplementation  -May have to adjust the dose of vitamin D based on the vitamin D level  -We will obtain DEXA scan in 6 months to follow-up on bone density, specially on forearm   -We will obtain previous DEXA scan report  -Repeat PTH, vitamin D and calcium in 6 months and follow-up   -Also ordered ultrasound of abdomen and bladder to rule out kidney stone as patient has been complaining of flank pain   -Based on the 24-hour urine calcium/creatinine ratio as well as DEXA scan report, abdominal ultrasound report, we will decide whether she would require and benefit from repeat parathyroid surgery  Recommend to hold off, on parathyroid surgery for now    We will also communicate with ENT, Dr Ga Holt      I have spent a total time of 40 minutes on 03/27/23 in caring for this patient including Diagnostic results, Prognosis, Risks and benefits of tx options, Instructions for management, Patient and family education, Importance of tx compliance, Risk factor reductions, Impressions, Counseling / Coordination of care, Documenting in the medical record, Reviewing / ordering tests, medicine, procedures  , Obtaining or reviewing history   and Communicating with other healthcare professionals       CC:   Elevated PTH, hypercalcemia    History of Present Illness     HPI:    Chapo Finley is 53-year-old woman with medical history of osteoporosis, hyperparathyroidism, hypercalcemia, vitamin D deficiency, thyroid nodules is here for follow-up  Patient has history of elevated calcium in the range of 9 9 to 10 8 mg per DL  She underwent 4 gland exploration, removal of 3 parathyroid glands in August 2022  Her most recent calcium was 10 2 mg/dl   Most recent PTH is 92 9    Lab Results   Component Value Date    PTH 92 9 (H) 03/13/2023    CALCIUM 10 2 (H) 03/13/2023    PHOS 2 7 09/13/2021     She denies history of kidney stones  She had fracture of forearm previously    She takes vitamin D3 supplementation, 3000 IU daily      Lab Results   Component Value Date    [id number] 0 940 09/21/2022    [id number] 10 0 06/10/2019       She had a DEXA scan done in June 2022 for a forearm which showed T score of -3 7 suggestive of osteoporosis  Thyroid ultrasound in November 2022 showed left mid gland nodule 1 0 x 0 6 x 0 8 cm this nodule has gotten smaller since 2016, right mid gland nodule measuring 0 9 x 0 7 x 0 7 cm  Stable ultrasound from most recent study  No nodule meet criteria for requiring biopsy or follow-up ultrasound    She denies family history of thyroid cancer  Denies history of obstructive symptoms  History of thyroid blood work was within normal range    Component      Latest Ref Rng & Units 9/21/2022 12/16/2022 3/13/2023   Sodium      135 - 147 mmol/L   138   Potassium      3 5 - 5 3 mmol/L   4 4   Chloride      96 - 108 mmol/L   109 (H)   CO2      21 - 32 mmol/L   28   Anion Gap      4 - 13 mmol/L   1 (L)   BUN      5 - 25 mg/dL   23   Creatinine      0 60 - 1 30 mg/dL   0 80   GLUCOSE FASTING      65 - 99 mg/dL   88   Calcium      8 3 - 10 1 mg/dL   10 2 (H)   eGFR      ml/min/1 73sq m   72   TSH 3RD GENERATON      0 450 - 4 500 uIU/mL 0 940     PARATHYROID HORMONE      18 4 - 80 1 pg/mL  107 9 (H) 92 9 (H)   Calcium, Ionized      1 12 - 1 32 mmol/L  1 32 Review of Systems   Constitutional: Negative for activity change, diaphoresis, fatigue, fever and unexpected weight change  HENT: Negative  Eyes: Negative for visual disturbance  Respiratory: Negative for cough, chest tightness and shortness of breath  Cardiovascular: Negative for chest pain, palpitations and leg swelling  Gastrointestinal: Positive for abdominal pain (Right flank pain)  Negative for blood in stool, constipation, diarrhea, nausea and vomiting  Endocrine: Negative for cold intolerance, heat intolerance, polydipsia, polyphagia and polyuria  Genitourinary: Negative for dysuria, enuresis, frequency and urgency  Musculoskeletal: Negative for arthralgias and myalgias  Skin: Negative for pallor, rash and wound  Allergic/Immunologic: Negative  Neurological: Negative for dizziness, tremors, weakness and numbness  Hematological: Negative  Psychiatric/Behavioral: Negative          Historical Information   Past Medical History:   Diagnosis Date   • Anxiety    • Arthritis    • Back pain    • Balance problems    • Chest pain     heaviness   • Difficulty swallowing    • Dizziness    • Dry cough    • Gait disorder    • GERD (gastroesophageal reflux disease)    • Hyperparathyroidism ([address] )    • Hypertension    • Increased frequency of headaches    • Migraines    • Neck pain    • Numbness and tingling     bles   • Palpitations    • Pericarditis    • Thyroid disease     nodule   • Trouble in sleeping    • Wears glasses      Past Surgical History:   Procedure Laterality Date   • APPENDECTOMY     • CHOLECYSTECTOMY      laparoscopic   • COLONOSCOPY     • KNEE SURGERY Left    • PERICARDIAL WINDOW     • DE OPTX DSTL RADL X-ARTIC FX/EPIPHYSL SEP Right 3/22/2018    Procedure: OPEN REDUCTION W/ INTERNAL FIXATION (ORIF) RADIUS, splint application;  Surgeon: Daisy Lazaro MD;  Location: [facility];  Service: Orthopedics   • DE PARATHYROIDECTOMY/EXPLORATION PARATHYROIDS N/A 8/17/2022 Procedure: PARATHYROIDECTOMY, 4 GLAND EXPLORATION;  Surgeon: Gabriel Sage MD;  Location: [facility];  Service: ENT   • UPPER GASTROINTESTINAL ENDOSCOPY       Social History   Social History     Substance and Sexual Activity   Alcohol Use Not Currently    Comment: Rarely      Social History     Substance and Sexual Activity   Drug Use No     Social History     Tobacco Use   Smoking Status Never   Smokeless Tobacco Never     Family History:   Family History   Problem Relation Age of Onset   • Kidney failure Mother    • Hypertension Mother    • Lung cancer Father    • Myra Morin Parkinson White syndrome Daughter    • No Known Problems Sister    • Substance Abuse Neg Hx    • Alcohol abuse Neg Hx    • Mental illness Neg Hx        Meds/Allergies   Current Outpatient Medications   Medication Sig Dispense Refill   • acetaminophen (TYLENOL) 500 mg tablet Take 500-1,000 mg by mouth every 6 (six) hours as needed for mild pain     • albuterol (PROVENTIL HFA,VENTOLIN HFA) 90 mcg/act inhaler Inhale 2 puffs every 6 (six) hours as needed for wheezing or shortness of breath 8 g 1   • ALPRAZolam (XANAX) 0 5 mg tablet Take 1 tablet (0 5 mg total) by mouth daily at bedtime as needed for anxiety or sleep 20 tablet 1   • ascorbic acid (VITAMIN C) 500 mg tablet Take 500 mg by mouth daily     • b complex vitamins tablet Take 1 tablet by mouth daily     • Cholecalciferol (Vitamin D) 50 MCG (2000 UT) CAPS Take 1 capsule (2,000 Units total) by mouth daily (Patient taking differently: Take 3,000 Units by mouth daily)     • dicyclomine (BENTYL) 10 mg capsule TAKE 1 CAPSULE BY MOUTH 4 TIMES A DAY (BEFORE MEALS AND AT BEDTIME) (Patient taking differently: if needed) 360 capsule 1   • guaifenesin-codeine (GUAIFENESIN AC) 100-10 MG/5ML liquid Take 5 mL by mouth daily at bedtime as needed for cough 120 mL 0   • Magnesium 300 MG CAPS Take 300 mg by mouth in the morning     • methocarbamol (ROBAXIN) 500 mg tablet Take 0 5 (250mg) to 1 (500mg) tablet by mouth every 8 hours as needed for muscle spasm/neck pain  90 tablet 0   • metoprolol succinate (TOPROL-XL) 25 mg 24 hr tablet Take 1 tablet (25 mg total) by mouth daily at bedtime 90 tablet 3   • Multiple Vitamins-Calcium (ONE-A-DAY WOMENS PO) Take 1 tablet by mouth daily     • Nutritional Supplements (OSAPLEX MK-7 PO) Take by mouth     • omeprazole (PriLOSEC) 20 mg delayed release capsule TAKE 1 CAPSULE BY MOUTH TWICE A DAY BEFORE MEALS 180 capsule 1   • Riboflavin (VITAMIN B-2 PO) Take 250 mg by mouth in the morning     • traZODone (DESYREL) 50 mg tablet Take 1 tablet (50 mg total) by mouth daily at bedtime 30 tablet 1   • fluticasone (FLONASE) 50 mcg/act nasal spray 2 sprays into each nostril daily (Patient not taking: Reported on 2/1/2023) 16 g 0   • furosemide (LASIX) 20 mg tablet Take 1 tablet (20 mg total) by mouth daily as needed (leg swelling) (Patient not taking: Reported on 2/1/2023) 10 tablet 0   • rizatriptan (MAXALT) 10 MG tablet Take 1 tablet (10 mg total) by mouth once as needed for migraine May repeat in 2 hours if needed  Max 2/24 hours, 9/month  (Patient not taking: Reported on 3/10/2023) 9 tablet 6   • sucralfate (CARAFATE) 1 g/10 mL suspension Take 10 mL (1 g total) by mouth daily at bedtime (Patient not taking: Reported on 2/7/2023) 300 mL 0     No current facility-administered medications for this visit  Allergies   Allergen Reactions   • Ciprofloxacin Myalgia       Objective   Vitals: Blood pressure 118/68, pulse 66, weight 79 5 kg (175 lb 3 2 oz), not currently breastfeeding  Physical Exam  Vitals reviewed  Constitutional:       General: She is not in acute distress  Appearance: Normal appearance  She is not ill-appearing  HENT:      Head: Normocephalic and atraumatic  Nose: Nose normal    Eyes:      Extraocular Movements: Extraocular movements intact  Conjunctiva/sclera: Conjunctivae normal    Cardiovascular:      Rate and Rhythm: Normal rate and regular rhythm  "     Pulses: Normal pulses  Heart sounds: Normal heart sounds  Pulmonary:      Effort: Pulmonary effort is normal  No respiratory distress  Breath sounds: Normal breath sounds  Musculoskeletal:         General: Normal range of motion  Cervical back: Normal range of motion  Right lower leg: No edema  Left lower leg: No edema  Skin:     General: Skin is warm and dry  Findings: No rash  Neurological:      General: No focal deficit present  Mental Status: She is alert and oriented to person, place, and time  Psychiatric:         Mood and Affect: Mood normal          Behavior: Behavior normal          The history was obtained from the review of the chart, patient  Lab Results:   Lab Results   Component Value Date/Time    TSH 3RD GENERATON 0 940 09/21/2022 11:08 AM    TSH 3RD GENERATON 0 747 07/01/2022 10:10 AM       Imaging Studies:   Results for orders placed during the hospital encounter of 11/15/22    US thyroid    Impression  Stable ultrasound from most recent study  No nodule meets current ACR criteria for requiring biopsy or followup ultrasound at this time  Reference: ACR Thyroid Imaging, Reporting and Data System (TI-RADS): White Paper of the [facility]  J AM Yonny Radiol 3811;26:[id number]  (additional recommendations based on American Thyroid Association 2015 guidelines )      Workstation performed: [id number]      I have personally reviewed pertinent reports  Portions of the record may have been created with voice recognition software  Occasional wrong word or \"sound a like\" substitutions may have occurred due to the inherent limitations of voice recognition software  Read the chart carefully and recognize, using context, where substitutions have occurred    "

## 2023-03-29 ENCOUNTER — APPOINTMENT (OUTPATIENT)
Dept: LAB | Facility: CLINIC | Age: 78
End: 2023-03-29

## 2023-03-29 ENCOUNTER — CLINICAL SUPPORT (OUTPATIENT)
Dept: PULMONOLOGY | Facility: CLINIC | Age: 78
End: 2023-03-29

## 2023-03-29 DIAGNOSIS — J84.9 ILD (INTERSTITIAL LUNG DISEASE) (HCC): Primary | ICD-10-CM

## 2023-03-29 DIAGNOSIS — E83.52 HYPERCALCEMIA: ICD-10-CM

## 2023-03-29 DIAGNOSIS — E21.3 HYPERPARATHYROIDISM (HCC): ICD-10-CM

## 2023-03-29 LAB
CALCIUM 24H UR-MCNC: 231 MG/24 HRS (ref 42–353)
CREAT 24H UR-MRATE: 0.9 G/24HR (ref 0.6–1.8)
SPECIMEN VOL UR: 2100 ML
SPECIMEN VOL UR: 2100 ML

## 2023-03-30 ENCOUNTER — OFFICE VISIT (OUTPATIENT)
Dept: INTERNAL MEDICINE CLINIC | Facility: CLINIC | Age: 78
End: 2023-03-30

## 2023-03-30 VITALS
BODY MASS INDEX: 27.47 KG/M2 | HEIGHT: 67 IN | DIASTOLIC BLOOD PRESSURE: 76 MMHG | SYSTOLIC BLOOD PRESSURE: 124 MMHG | RESPIRATION RATE: 15 BRPM | WEIGHT: 175 LBS | HEART RATE: 66 BPM | OXYGEN SATURATION: 98 %

## 2023-03-30 DIAGNOSIS — E61.1 IRON DEFICIENCY: ICD-10-CM

## 2023-03-30 DIAGNOSIS — Z13.29 SCREENING FOR THYROID DISORDER: ICD-10-CM

## 2023-03-30 DIAGNOSIS — K21.9 GASTROESOPHAGEAL REFLUX DISEASE, UNSPECIFIED WHETHER ESOPHAGITIS PRESENT: ICD-10-CM

## 2023-03-30 DIAGNOSIS — J84.9 ILD (INTERSTITIAL LUNG DISEASE) (HCC): ICD-10-CM

## 2023-03-30 DIAGNOSIS — R07.81 RIB PAIN ON RIGHT SIDE: ICD-10-CM

## 2023-03-30 DIAGNOSIS — I72.9 ANEURYSM (HCC): ICD-10-CM

## 2023-03-30 DIAGNOSIS — M79.18 CERVICAL MYOFASCIAL PAIN SYNDROME: ICD-10-CM

## 2023-03-30 DIAGNOSIS — Z12.11 SCREENING FOR COLON CANCER: ICD-10-CM

## 2023-03-30 DIAGNOSIS — Z12.31 SCREENING MAMMOGRAM FOR BREAST CANCER: ICD-10-CM

## 2023-03-30 DIAGNOSIS — K21.00 GASTROESOPHAGEAL REFLUX DISEASE WITH ESOPHAGITIS WITHOUT HEMORRHAGE: Primary | ICD-10-CM

## 2023-03-30 DIAGNOSIS — E83.52 HYPERCALCEMIA: ICD-10-CM

## 2023-03-30 DIAGNOSIS — E78.2 MODERATE MIXED HYPERLIPIDEMIA NOT REQUIRING STATIN THERAPY: ICD-10-CM

## 2023-03-30 DIAGNOSIS — Z01.419 WOMEN'S ANNUAL ROUTINE GYNECOLOGICAL EXAMINATION: ICD-10-CM

## 2023-03-30 DIAGNOSIS — E21.0 PRIMARY HYPERPARATHYROIDISM (HCC): ICD-10-CM

## 2023-03-30 DIAGNOSIS — R73.9 HYPERGLYCEMIA: ICD-10-CM

## 2023-03-30 RX ORDER — OMEPRAZOLE 20 MG/1
20 CAPSULE, DELAYED RELEASE ORAL EVERY OTHER DAY
Qty: 180 CAPSULE | Refills: 1 | Status: SHIPPED | OUTPATIENT
Start: 2023-03-30

## 2023-03-30 RX ORDER — FAMOTIDINE 40 MG/1
40 TABLET, FILM COATED ORAL EVERY OTHER DAY
Qty: 90 TABLET | Refills: 1 | Status: SHIPPED | OUTPATIENT
Start: 2023-03-30

## 2023-03-30 NOTE — PROGRESS NOTES
Assessment/Plan:    #RUQ Pain  -has tenderness over ribs  -present last week  -history gallbladder removal  -denies trauma  -will obtain xr ribs    #Hypercalcemia  -Ca 10 2 and PTH 92 9  -seeing ENT and endocrine  -awaiting DEXA  -encouraged to stop multivitamin due to elevated calcium for now  -will check SPEP, free light chains, immunoglobulins    #Flank Pain  -seeing urology  -will undergo US kidney next week    #GERD  -chronic and has been on omeprazole for decades  -2021 EGD normal and underwent dilation  -will alternate pepcid with pantoprazole every other day    #Knee Arthritis  -chronic in b/l knees  -seeing orthopedics for periodic injections  -defers replacement for now    #ILD  -chronic history of  -never smoker but father was a smoker and  was a former pipe smoker  -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  -seeing pulmonary and doing pulmonary rehab  -history of bacterial PNA infections and recently had viral pneumonia in December 2022    #Palpitations  -seeing cardiology  -remains on metoprolol  -[address] EF 46-76%, grade I diastolic dysfunction, mild TVR    #Neck Spasm  -seeing PMNR  -defers trigger point injections for now  -using icy hot    #Migraine  -seeing neurology  -will trial trazodone, metoprolol, rizatriptan  -failed propranolol in the past  -toradol IM helped previously    #Health Maintaince  -routine labs and followup 6 months  -mammogram, gynecology evaluation, colonoscopy all pending  -retired as a from working in [employer] office    I have spent a total time of 45 minutes on 03/30/23 in caring for this patient including Risks and benefits of tx options, Instructions for management, Patient and family education, Risk factor reductions, Impressions, Counseling / Coordination of care, Documenting in the medical record, Reviewing / ordering tests, medicine, procedures   and Obtaining or reviewing history         No problem-specific Assessment & Plan notes found for this encounter  Diagnoses and all orders for this visit:    Gastroesophageal reflux disease with esophagitis without hemorrhage  -     CBC and differential; Future  -     Comprehensive metabolic panel; Future  -     famotidine (PEPCID) 40 MG tablet; Take 1 tablet (40 mg total) by mouth every other day    Primary hyperparathyroidism (HCC)  -     CBC and differential; Future  -     Comprehensive metabolic panel; Future    [facility]) (HCC)  -     CBC and differential; Future  -     Comprehensive metabolic panel; Future    Cervical myofascial pain syndrome  -     CBC and differential; Future  -     Comprehensive metabolic panel; Future    Rib pain on right side  -     CBC and differential; Future  -     Comprehensive metabolic panel; Future  -     XR ribs 2 vw right; Future    Hypercalcemia  -     CBC and differential; Future  -     Comprehensive metabolic panel; Future  -     Protein electrophoresis, serum; Future  -     IgG, IgA, IgM; Future  -     Immunoglobulin free LT chains blood; Future    Moderate mixed hyperlipidemia not requiring statin therapy  -     Lipid Panel with Direct LDL reflex; Future    Hyperglycemia  -     Hemoglobin A1C; Future    Iron deficiency  -     Iron, TIBC and Ferritin Panel; Future    Screening for thyroid disorder  -     TSH, 3rd generation with Free T4 reflex; Future    Screening mammogram for breast cancer  -     Mammo screening bilateral w 3d & cad; Future    Screening for colon cancer  -     Ambulatory referral for colonoscopy; Future    Women's annual routine gynecological examination  -     Ambulatory Referral to Gynecology; Future    Aneurysm (HCC)    Gastroesophageal reflux disease, unspecified whether esophagitis present  -     omeprazole (PriLOSEC) 20 mg delayed release capsule;  Take 1 capsule (20 mg total) by mouth every other day            Current Outpatient Medications:   •  famotidine (PEPCID) 40 MG tablet, Take 1 tablet (40 mg total) by mouth every other day, Disp: 90 tablet, Rfl: 1  •  omeprazole (PriLOSEC) 20 mg delayed release capsule, Take 1 capsule (20 mg total) by mouth every other day, Disp: 180 capsule, Rfl: 1  •  acetaminophen (TYLENOL) 500 mg tablet, Take 500-1,000 mg by mouth every 6 (six) hours as needed for mild pain, Disp: , Rfl:   •  albuterol (PROVENTIL HFA,VENTOLIN HFA) 90 mcg/act inhaler, Inhale 2 puffs every 6 (six) hours as needed for wheezing or shortness of breath, Disp: 8 g, Rfl: 1  •  ALPRAZolam (XANAX) 0 5 mg tablet, Take 1 tablet (0 5 mg total) by mouth daily at bedtime as needed for anxiety or sleep, Disp: 20 tablet, Rfl: 1  •  ascorbic acid (VITAMIN C) 500 mg tablet, Take 500 mg by mouth daily, Disp: , Rfl:   •  b complex vitamins tablet, Take 1 tablet by mouth daily, Disp: , Rfl:   •  Cholecalciferol (Vitamin D) 50 MCG (2000 UT) CAPS, Take 1 capsule (2,000 Units total) by mouth daily (Patient taking differently: Take 3,000 Units by mouth daily), Disp: , Rfl:   •  dicyclomine (BENTYL) 10 mg capsule, TAKE 1 CAPSULE BY MOUTH 4 TIMES A DAY (BEFORE MEALS AND AT BEDTIME) (Patient taking differently: if needed), Disp: 360 capsule, Rfl: 1  •  guaifenesin-codeine (GUAIFENESIN AC) 100-10 MG/5ML liquid, Take 5 mL by mouth daily at bedtime as needed for cough, Disp: 120 mL, Rfl: 0  •  Magnesium 300 MG CAPS, Take 300 mg by mouth in the morning, Disp: , Rfl:   •  methocarbamol (ROBAXIN) 500 mg tablet, Take 0 5 (250mg) to 1 (500mg) tablet by mouth every 8 hours as needed for muscle spasm/neck pain , Disp: 90 tablet, Rfl: 0  •  metoprolol succinate (TOPROL-XL) 25 mg 24 hr tablet, Take 1 tablet (25 mg total) by mouth daily at bedtime, Disp: 90 tablet, Rfl: 3  •  Multiple Vitamins-Calcium (ONE-A-DAY WOMENS PO), Take 1 tablet by mouth daily, Disp: , Rfl:   •  Nutritional Supplements (OSAPLEX MK-7 PO), Take by mouth, Disp: , Rfl:   •  Riboflavin (VITAMIN B-2 PO), Take 250 mg by mouth in the morning, Disp: , Rfl:   •  rizatriptan (MAXALT) 10 MG tablet, Take 1 tablet (10 mg total) by mouth once as needed for migraine May repeat in 2 hours if needed  Max 2/24 hours, 9/month  (Patient not taking: Reported on 3/10/2023), Disp: 9 tablet, Rfl: 6  •  sucralfate (CARAFATE) 1 g/10 mL suspension, Take 10 mL (1 g total) by mouth daily at bedtime (Patient not taking: Reported on 2/7/2023), Disp: 300 mL, Rfl: 0  •  traZODone (DESYREL) 50 mg tablet, Take 1 tablet (50 mg total) by mouth daily at bedtime, Disp: 30 tablet, Rfl: 1    Subjective:      Patient ID: Meron Mai is a 78 y o  female  HPI     Patient presents for a new patient visit  She has a history of hypercalcemia secondary to hyperparathyroidism and underwent parathyroid resection in 2022  She continues to have hypercalcemia with hyperparathyroidism  Her calcium is 10 2 and PTH is 92 9  She follows up with ENT and endocrinology  She had a repeat nuclear medicine parathyroid scan that was unremarkable  We will obtain an SPEP as well as immunoglobulins and free light chains  She reports that she has been having right-sided rib discomfort  She has had a history of cholecystectomy  She denies any association with meals but reports that palpation causes her symptoms to flareup  We will obtain an x-ray of her right ribs  She has a history of acid reflux and underwent an EGD in 2021 that was normal   She remains on omeprazole  Recommended that she alternate this with Pepcid every other day  Patient also has right flank pain and will undergo an ultrasound of the kidney soon  She has knee arthritis and follows up with orthopedics and undergoes periodic bilateral knee injections  She is holding off on knee replacement  She has a history of heart palpitations and follows up with cardiology  She remains on metoprolol  Her echo reveals an EF of 60 to 65% with mild tricuspid valve regurgitation and grade 1 diastolic dysfunction  She has neck spasms and follows up with PMNR    She trigger point "injections for now  She is using IcyHot which only provides temporary relief  Patient will undergo a DEXA scan  She is also due for mammogram   She will follow-up with gynecology for an annual pelvic exam and we gave her the referral for this  She is also due for colonoscopy  She has interstitial lung disease and follows up with pulmonary  She states that she worked for [employer] 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  She has been doing pulmonary rehab  She has multiple bouts of bacterial pneumonia and most recently had a bout of viral pneumonia back in December 2022  She remains on albuterol as needed  She will return to care in 6 months  The following portions of the patient's history were reviewed and updated as appropriate: allergies, current medications, past family history, past medical history, past social history, past surgical history and problem list     Review of Systems   Constitutional: Negative for activity change, appetite change, chills, fatigue and fever  HENT: Negative for congestion, ear pain, facial swelling, hearing loss, sore throat, tinnitus and trouble swallowing  Eyes: Negative for photophobia and visual disturbance  Respiratory: Negative for cough, shortness of breath and wheezing  Cardiovascular: Negative for chest pain and leg swelling  Gastrointestinal: Negative for abdominal distention, abdominal pain, blood in stool, constipation, diarrhea, nausea and vomiting  Genitourinary: Negative for difficulty urinating, dysuria and pelvic pain  Musculoskeletal: Positive for arthralgias (knees), neck pain and neck stiffness  Negative for back pain, gait problem, joint swelling and myalgias  Skin: Negative for rash and wound  Neurological: Negative for dizziness, tremors, light-headedness and headaches           Objective:      /76   Pulse 66   Resp 15   Ht 5' 7\" (1 702 m)   Wt 79 4 kg (175 lb)   SpO2 " 98%   BMI 27 41 kg/m²          Physical Exam  Vitals reviewed  Constitutional:       Appearance: Normal appearance  She is well-developed  HENT:      Head: Normocephalic and atraumatic  Right Ear: Tympanic membrane, ear canal and external ear normal  There is no impacted cerumen  Left Ear: Tympanic membrane, ear canal and external ear normal  There is no impacted cerumen  Nose: Nose normal       Mouth/Throat:      Pharynx: Oropharynx is clear  No oropharyngeal exudate or posterior oropharyngeal erythema  Eyes:      Conjunctiva/sclera: Conjunctivae normal       Pupils: Pupils are equal, round, and reactive to light  Neck:      Thyroid: No thyromegaly  Vascular: No JVD  Cardiovascular:      Rate and Rhythm: Normal rate and regular rhythm  Pulses: Normal pulses  Heart sounds: Normal heart sounds  No murmur heard  Pulmonary:      Effort: Pulmonary effort is normal  No respiratory distress  Breath sounds: Normal breath sounds  No stridor  No rhonchi or rales  Abdominal:      General: Bowel sounds are normal  There is no distension  Palpations: Abdomen is soft  There is no mass  Tenderness: There is no abdominal tenderness  There is no guarding or rebound  Musculoskeletal:         General: Tenderness (knees) and deformity (valgus knees) present  Normal range of motion  Cervical back: Normal range of motion and neck supple  Right lower leg: No edema  Left lower leg: No edema  Lymphadenopathy:      Cervical: No cervical adenopathy  Skin:     General: Skin is warm and dry  Findings: No erythema or rash  Neurological:      Mental Status: She is alert and oriented to person, place, and time  Mental status is at baseline  Sensory: No sensory deficit  Motor: No weakness  Coordination: Coordination normal       Gait: Gait normal       Deep Tendon Reflexes: Reflexes are normal and symmetric   Reflexes normal    Psychiatric: Mood and Affect: Mood normal          Behavior: Behavior normal          Thought Content: Thought content normal          Judgment: Judgment normal            This note was completed in part utilizing [employer] fluency direct voice recognition software  Grammatical errors, random word insertion, spelling mistakes, and incomplete sentences may be an occasional consequence of the system secondary to software limitations, ambient noise and hardware issues  At the time of dictation, efforts were made to edit, clarify and /or correct errors  Please read the chart carefully and recognize, using context, where substitutions have occurred  If you have any questions or concerns about the context, text or information contained within the body of this dictation, please contact myself, the provider, for further clarification

## 2023-04-03 ENCOUNTER — APPOINTMENT (OUTPATIENT)
Dept: PULMONOLOGY | Facility: CLINIC | Age: 78
End: 2023-04-03

## 2023-04-03 ENCOUNTER — TELEPHONE (OUTPATIENT)
Dept: ENDOCRINOLOGY | Facility: CLINIC | Age: 78
End: 2023-04-03

## 2023-04-03 NOTE — TELEPHONE ENCOUNTER
Patient called and wanted to know did Dr Rasheed Co call Dr Juma Mitchell about thyroid surgey?  And what is conclusion  Please call patient  Thank you

## 2023-04-04 ENCOUNTER — HOSPITAL ENCOUNTER (OUTPATIENT)
Dept: ULTRASOUND IMAGING | Facility: HOSPITAL | Age: 78
Discharge: HOME | End: 2023-04-04
Attending: INTERNAL MEDICINE

## 2023-04-04 DIAGNOSIS — R10.9 ACUTE RIGHT FLANK PAIN: ICD-10-CM

## 2023-04-04 NOTE — TELEPHONE ENCOUNTER
Spoke with pt and discussed that I spoke with Dr Leeanne Anderson, and it is [place] to hold off on parathyroid surgery for now, until we get repeat dexa scan and will continue to monitor calcium and PTH     Patient verbalized understanding and appreciated a call  Anastasia Salcedo

## 2023-04-05 ENCOUNTER — CLINICAL SUPPORT (OUTPATIENT)
Dept: PULMONOLOGY | Facility: CLINIC | Age: 78
End: 2023-04-05

## 2023-04-05 DIAGNOSIS — J84.9 ILD (INTERSTITIAL LUNG DISEASE) (HCC): Primary | ICD-10-CM

## 2023-04-06 ENCOUNTER — TELEPHONE (OUTPATIENT)
Dept: NEUROLOGY | Facility: CLINIC | Age: 78
End: 2023-04-06

## 2023-04-06 NOTE — TELEPHONE ENCOUNTER
Called and spoke to patient to confirm their upcoming appointment with Dr Jerry Route  Informed patient about arriving in the [place] location 15 minutes prior to their appointment to get checked in and going over chart

## 2023-04-12 ENCOUNTER — APPOINTMENT (OUTPATIENT)
Dept: PULMONOLOGY | Facility: CLINIC | Age: 78
End: 2023-04-12

## 2023-04-17 ENCOUNTER — APPOINTMENT (OUTPATIENT)
Dept: PULMONOLOGY | Facility: CLINIC | Age: 78
End: 2023-04-17

## 2023-04-17 DIAGNOSIS — I10 ESSENTIAL HYPERTENSION: ICD-10-CM

## 2023-04-17 RX ORDER — METOPROLOL SUCCINATE 25 MG/1
25 TABLET, EXTENDED RELEASE ORAL
Qty: 90 TABLET | Refills: 3 | Status: SHIPPED | OUTPATIENT
Start: 2023-04-17

## 2023-04-19 ENCOUNTER — APPOINTMENT (OUTPATIENT)
Dept: PULMONOLOGY | Facility: CLINIC | Age: 78
End: 2023-04-19

## 2023-04-20 DIAGNOSIS — R05.9 COUGH, UNSPECIFIED TYPE: Primary | ICD-10-CM

## 2023-04-20 RX ORDER — PROMETHAZINE HYDROCHLORIDE AND CODEINE PHOSPHATE 6.25; 1 MG/5ML; MG/5ML
5 SYRUP ORAL
Qty: 120 ML | Refills: 0 | Status: SHIPPED | OUTPATIENT
Start: 2023-04-20 | End: 2023-04-25 | Stop reason: ALTCHOICE

## 2023-04-24 ENCOUNTER — APPOINTMENT (OUTPATIENT)
Dept: PULMONOLOGY | Facility: CLINIC | Age: 78
End: 2023-04-24

## 2023-04-25 ENCOUNTER — OFFICE VISIT (OUTPATIENT)
Dept: FAMILY MEDICINE CLINIC | Facility: CLINIC | Age: 78
End: 2023-04-25

## 2023-04-25 VITALS
HEART RATE: 77 BPM | SYSTOLIC BLOOD PRESSURE: 130 MMHG | OXYGEN SATURATION: 95 % | BODY MASS INDEX: 27.5 KG/M2 | WEIGHT: 175.2 LBS | HEIGHT: 67 IN | DIASTOLIC BLOOD PRESSURE: 74 MMHG | RESPIRATION RATE: 16 BRPM

## 2023-04-25 DIAGNOSIS — G43.009 MIGRAINE WITHOUT AURA AND WITHOUT STATUS MIGRAINOSUS, NOT INTRACTABLE: ICD-10-CM

## 2023-04-25 DIAGNOSIS — F41.9 ANXIETY: ICD-10-CM

## 2023-04-25 DIAGNOSIS — E21.0 PRIMARY HYPERPARATHYROIDISM (HCC): ICD-10-CM

## 2023-04-25 DIAGNOSIS — K21.9 GASTROESOPHAGEAL REFLUX DISEASE, UNSPECIFIED WHETHER ESOPHAGITIS PRESENT: ICD-10-CM

## 2023-04-25 DIAGNOSIS — R07.81 RIB PAIN ON RIGHT SIDE: ICD-10-CM

## 2023-04-25 DIAGNOSIS — M81.0 AGE-RELATED OSTEOPOROSIS WITHOUT CURRENT PATHOLOGICAL FRACTURE: ICD-10-CM

## 2023-04-25 DIAGNOSIS — E78.5 HYPERLIPIDEMIA, UNSPECIFIED HYPERLIPIDEMIA TYPE: ICD-10-CM

## 2023-04-25 DIAGNOSIS — J84.9 ILD (INTERSTITIAL LUNG DISEASE) (HCC): ICD-10-CM

## 2023-04-25 DIAGNOSIS — I10 ESSENTIAL HYPERTENSION: Primary | ICD-10-CM

## 2023-04-25 NOTE — PROGRESS NOTES
Assessment/Plan:    Essential hypertension  - well controlled on Toprol XL 25 mg daily    URI with cough  - improved, continue Mucinex as needed and follow up if symptoms persist or worsen    Hyperlipidemia  - reviewed life style changes, lipid panel ordered    Rib pain on right side  - x-ray right ribs ordered and encouraged to schedule    Gastroesophageal reflux disease  - stable on omeprazole, under care of GI    [facility])  - under care of Pulmonary, currently undergoing pulmonary rehab    Primary hyperparathyroidism  - following with Endocrine    Osteoporosis  - under care of Endocrine, continue vitamin D supplements in addition to daily MVI, DEXA scan is due and encouraged to schedule    Anxiety  - continue Xanax as needed    Migraine  - continue Maxalt as needed, follow up with Neurology       Return in 6 months ro sooner as needed  The patient understands and agrees with the treatment plan  Subjective:   Chief Complaint   Patient presents with   • Hypertension   • Anxiety   • Hyperlipidemia      Patient ID: Jose Ceron is a 78 y o  female who presents today for follow up visit for her chronic conditions  Since her last visit on 4/14/23 patient has been doing much better, her cough has improved, she denies chest pain or shortness or breath  Patient continues having discomfort over her right lower ribs that comes and goes for over a year, not related to meals or activity, no history of trauma  Previous evaluation was unrevealing, including RUQ US 3/3/22, CT chest 3/17/22, chest x-ray 1/19/23 and recently [address] kidneys 4/4/23  Patient offers no other complaints         The following portions of the patient's history were reviewed and updated as appropriate: allergies, current medications, past family history, past medical history, past social history, past surgical history and problem list     Past Medical History:   Diagnosis Date   • Anxiety    • Arthritis    • Back pain    • Balance problems    • Chest pain     heaviness   • Difficulty swallowing    • Dizziness    • Dry cough    • Gait disorder    • GERD (gastroesophageal reflux disease)    • Hyperparathyroidism ([address] )    • Hypertension    • Increased frequency of headaches    • Migraines    • Neck pain    • Numbness and tingling     bles   • Palpitations    • Pericarditis    • Thyroid disease     nodule   • Trouble in sleeping    • Wears glasses      Past Surgical History:   Procedure Laterality Date   • APPENDECTOMY     • CHOLECYSTECTOMY      laparoscopic   • COLONOSCOPY     • KNEE SURGERY Left    • PERICARDIAL WINDOW     • MN OPTX DSTL RADL X-ARTIC FX/EPIPHYSL SEP Right 3/22/2018    Procedure: OPEN REDUCTION W/ INTERNAL FIXATION (ORIF) RADIUS, splint application;  Surgeon: Inga Bolanos MD;  Location: [facility];  Service: Orthopedics   • MN PARATHYROIDECTOMY/EXPLORATION PARATHYROIDS N/A 8/17/2022    Procedure: PARATHYROIDECTOMY, 4 GLAND EXPLORATION;  Surgeon: Chuck Akhtar MD;  Location: [facility];  Service: ENT   • UPPER GASTROINTESTINAL ENDOSCOPY       Family History   Problem Relation Age of Onset   • Kidney failure Mother    • Hypertension Mother    • Lung cancer Father    • Irwin Rashaad Parkinson White syndrome Daughter    • No Known Problems Sister    • Substance Abuse Neg Hx    • Alcohol abuse Neg Hx    • Mental illness Neg Hx      Social History     Socioeconomic History   • Marital status: /Civil Union     Spouse name: Not on file   • Number of children: Not on file   • Years of education: Not on file   • Highest education level: Not on file   Occupational History   • Not on file   Tobacco Use   • Smoking status: Never   • Smokeless tobacco: Never   Vaping Use   • Vaping Use: Never used   Substance and Sexual Activity   • Alcohol use: Not Currently     Comment: Rarely    • Drug use: No   • Sexual activity: Yes     Partners: Male   Other Topics Concern   • Not on file   Social History Narrative    WORK:    1   (Zofia Aguilar; 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    2  [employer]        HOBBIES:    1  Singing    2  Dancing    3  Hx of ceramics (+ dust)        PETS:    1  Dogs    -  Denies birds        TRAVEL:    -  [place]         EXPOSURES:    Denies mold; down pillows/comforters; hot tubs     Social Determinants of Health     Financial Resource Strain: Low Risk    • Difficulty of Paying Living Expenses: Not hard at all   Food Insecurity: Not on file   Transportation Needs: No Transportation Needs   • Lack of Transportation (Medical): No   • Lack of Transportation (Non-Medical):  No   Physical Activity: Not on file   Stress: Not on file   Social Connections: Not on file   Intimate Partner Violence: Not on file   Housing Stability: Not on file       Current Outpatient Medications:   •  acetaminophen (TYLENOL) 500 mg tablet, Take 500-1,000 mg by mouth every 6 (six) hours as needed for mild pain, Disp: , Rfl:   •  albuterol (PROVENTIL HFA,VENTOLIN HFA) 90 mcg/act inhaler, Inhale 2 puffs every 6 (six) hours as needed for wheezing or shortness of breath, Disp: 8 g, Rfl: 1  •  ALPRAZolam (XANAX) 0 5 mg tablet, Take 1 tablet (0 5 mg total) by mouth daily at bedtime as needed for anxiety or sleep, Disp: 20 tablet, Rfl: 1  •  ascorbic acid (VITAMIN C) 500 mg tablet, Take 500 mg by mouth daily, Disp: , Rfl:   •  b complex vitamins tablet, Take 1 tablet by mouth daily, Disp: , Rfl:   •  Cholecalciferol (Vitamin D) 50 MCG (2000 UT) CAPS, Take 1 capsule (2,000 Units total) by mouth daily (Patient taking differently: Take 3,000 Units by mouth daily), Disp: , Rfl:   •  dicyclomine (BENTYL) 10 mg capsule, TAKE 1 CAPSULE BY MOUTH 4 TIMES A DAY (BEFORE MEALS AND AT BEDTIME) (Patient taking differently: if needed), Disp: 360 capsule, Rfl: 1  •  famotidine (PEPCID) 40 MG tablet, Take 1 tablet (40 mg total) by mouth every other day, Disp: 90 tablet, Rfl: 1  •  Magnesium 300 MG CAPS, Take 300 mg by mouth in the morning, Disp: ", Rfl:   •  methocarbamol (ROBAXIN) 500 mg tablet, Take 0 5 (250mg) to 1 (500mg) tablet by mouth every 8 hours as needed for muscle spasm/neck pain , Disp: 90 tablet, Rfl: 0  •  metoprolol succinate (TOPROL-XL) 25 mg 24 hr tablet, Take 1 tablet (25 mg total) by mouth daily at bedtime, Disp: 90 tablet, Rfl: 3  •  Nutritional Supplements (OSAPLEX MK-7 PO), Take by mouth, Disp: , Rfl:   •  omeprazole (PriLOSEC) 20 mg delayed release capsule, Take 1 capsule (20 mg total) by mouth every other day, Disp: 180 capsule, Rfl: 1  •  Riboflavin (VITAMIN B-2 PO), Take 250 mg by mouth in the morning, Disp: , Rfl:   •  rizatriptan (MAXALT) 10 MG tablet, Take 1 tablet (10 mg total) by mouth once as needed for migraine May repeat in 2 hours if needed  Max 2/24 hours, 9/month , Disp: 9 tablet, Rfl: 6  •  traZODone (DESYREL) 50 mg tablet, Take 1 tablet (50 mg total) by mouth daily at bedtime, Disp: 30 tablet, Rfl: 1  •  sucralfate (CARAFATE) 1 g/10 mL suspension, Take 10 mL (1 g total) by mouth daily at bedtime (Patient not taking: Reported on 2/7/2023), Disp: 300 mL, Rfl: 0    Review of Systems   Constitutional: Negative for chills, fatigue, fever and unexpected weight change  Respiratory: Negative for shortness of breath and wheezing  Cough has improved   Cardiovascular: Negative for chest pain, palpitations and leg swelling  Gastrointestinal: Negative for abdominal pain, blood in stool, diarrhea, nausea and vomiting  Genitourinary: Negative for difficulty urinating, dysuria, frequency, hematuria and urgency  Skin: Negative for rash  Neurological: Negative for dizziness, syncope, weakness, numbness and headaches  Hematological: Negative for adenopathy  Psychiatric/Behavioral: Negative for dysphoric mood  The patient is nervous/anxious            Objective:    Vitals:    04/25/23 0923   BP: 130/74   Pulse: 77   Resp: 16   SpO2: 95%   Weight: 79 5 kg (175 lb 3 2 oz)   Height: 5' 7\" (1 702 m)        Physical " Exam  Constitutional:       General: She is not in acute distress  Appearance: She is well-developed  HENT:      Right Ear: Tympanic membrane and ear canal normal       Left Ear: Tympanic membrane and ear canal normal       Mouth/Throat:      Mouth: Mucous membranes are moist       Pharynx: Oropharynx is clear  Eyes:      General: No scleral icterus  Extraocular Movements: Extraocular movements intact  Conjunctiva/sclera: Conjunctivae normal       Pupils: Pupils are equal, round, and reactive to light  Neck:      Thyroid: No thyromegaly  Cardiovascular:      Rate and Rhythm: Normal rate and regular rhythm  Heart sounds: Normal heart sounds  No murmur heard  Pulmonary:      Effort: Pulmonary effort is normal       Breath sounds: Normal breath sounds  No wheezing, rhonchi or rales  Chest:      Chest wall: No tenderness  Abdominal:      General: There is no distension  Palpations: Abdomen is soft  There is no mass  Tenderness: There is no abdominal tenderness  Musculoskeletal:      Cervical back: Neck supple  Right lower leg: No edema  Left lower leg: No edema  Lymphadenopathy:      Cervical: No cervical adenopathy  Neurological:      Mental Status: She is alert and oriented to person, place, and time  Psychiatric:         Mood and Affect: Mood normal          Behavior: Behavior normal          Thought Content:  Thought content normal           Lab Results   Component Value Date    CHOLESTEROL 220 (H) 09/15/2022    CHOLESTEROL 233 (H) 12/10/2021    CHOLESTEROL 217 (H) 05/04/2021     Lab Results   Component Value Date    HDL 61 09/15/2022    HDL 66 12/10/2021    HDL 65 05/04/2021     Lab Results   Component Value Date    TRIG 86 09/15/2022    TRIG 86 12/10/2021    TRIG 92 05/04/2021     Lab Results   Component Value Date    LDLCALC 142 (H) 09/15/2022     Lab Results   Component Value Date    WBC 6 61 09/21/2022    HGB 14 5 09/21/2022    HCT 44 8 09/21/2022    MCV 94 09/21/2022     09/21/2022     Lab Results   Component Value Date    SODIUM 138 03/13/2023    K 4 4 03/13/2023     (H) 03/13/2023    CO2 28 03/13/2023    AGAP 1 (L) 03/13/2023    BUN 23 03/13/2023    CREATININE 0 80 03/13/2023    GLUC 87 01/08/2021    GLUF 88 03/13/2023    CALCIUM 10 2 (H) 03/13/2023    AST 25 07/01/2022    ALT 37 07/01/2022    ALKPHOS 101 07/01/2022    TP 7 4 07/01/2022    TBILI 0 69 07/01/2022    EGFR 72 03/13/2023

## 2023-04-26 ENCOUNTER — APPOINTMENT (OUTPATIENT)
Dept: PULMONOLOGY | Facility: CLINIC | Age: 78
End: 2023-04-26

## 2023-05-01 ENCOUNTER — APPOINTMENT (OUTPATIENT)
Dept: PULMONOLOGY | Facility: CLINIC | Age: 78
End: 2023-05-01
Payer: COMMERCIAL

## 2023-05-02 ENCOUNTER — TELEPHONE (OUTPATIENT)
Dept: CARDIOLOGY CLINIC | Facility: CLINIC | Age: 78
End: 2023-05-02

## 2023-05-02 ENCOUNTER — TELEPHONE (OUTPATIENT)
Dept: FAMILY MEDICINE CLINIC | Facility: CLINIC | Age: 78
End: 2023-05-02

## 2023-05-02 NOTE — TELEPHONE ENCOUNTER
PC from patient who thinks she has Pericarditis, for she has had it before  Patient calling for an appointment  Gave to  to schedule ASAP but nothing available anywhere until July  Sent patient to ER for immediate attention

## 2023-05-02 NOTE — TELEPHONE ENCOUNTER
Ever since she was sick, she has not been feeling well  She has fluctuating temps that range from 98 to 100  She is asking if she should have labs done  She is feeling fatigued as well  Patient is very nervous because she has a history of   History of pericarditis  She is very concerned and would like to get an ECHO done    [phone]

## 2023-05-02 NOTE — TELEPHONE ENCOUNTER
Please call patient  I recommend to start with labs and chest x-ray to r/o pneumonia due to recent respiratory illness  Patient was seen on Friday and denied any chest pain or shortness of breath  If she is having these symptoms, I will then order Echo

## 2023-05-03 ENCOUNTER — APPOINTMENT (OUTPATIENT)
Dept: PULMONOLOGY | Facility: CLINIC | Age: 78
End: 2023-05-03
Payer: COMMERCIAL

## 2023-05-03 DIAGNOSIS — R53.83 FATIGUE, UNSPECIFIED TYPE: Primary | ICD-10-CM

## 2023-05-03 DIAGNOSIS — R20.2 PARESTHESIA: ICD-10-CM

## 2023-05-03 DIAGNOSIS — R05.9 COUGH, UNSPECIFIED TYPE: ICD-10-CM

## 2023-05-03 DIAGNOSIS — E55.9 VITAMIN D DEFICIENCY: ICD-10-CM

## 2023-05-03 DIAGNOSIS — Z79.899 HIGH RISK MEDICATION USE: ICD-10-CM

## 2023-05-03 DIAGNOSIS — E21.3 HYPERPARATHYROIDISM (HCC): ICD-10-CM

## 2023-05-03 NOTE — TELEPHONE ENCOUNTER
Pt called back stating she is going for the blood work and chest xray today  No sx of chest pain or SOB at this time

## 2023-05-04 ENCOUNTER — TELEPHONE (OUTPATIENT)
Dept: NEUROLOGY | Facility: CLINIC | Age: 78
End: 2023-05-04

## 2023-05-04 ENCOUNTER — APPOINTMENT (OUTPATIENT)
Dept: LAB | Facility: CLINIC | Age: 78
End: 2023-05-04

## 2023-05-04 DIAGNOSIS — R53.83 FATIGUE, UNSPECIFIED TYPE: ICD-10-CM

## 2023-05-04 DIAGNOSIS — E21.3 HYPERPARATHYROIDISM (HCC): ICD-10-CM

## 2023-05-04 DIAGNOSIS — E55.9 VITAMIN D DEFICIENCY: ICD-10-CM

## 2023-05-04 DIAGNOSIS — R20.2 PARESTHESIA: ICD-10-CM

## 2023-05-04 LAB
25(OH)D3 SERPL-MCNC: 38 NG/ML (ref 30–100)
ALBUMIN SERPL BCP-MCNC: 3.4 G/DL (ref 3.5–5)
ALP SERPL-CCNC: 97 U/L (ref 46–116)
ALT SERPL W P-5'-P-CCNC: 34 U/L (ref 12–78)
ANION GAP SERPL CALCULATED.3IONS-SCNC: 1 MMOL/L (ref 4–13)
AST SERPL W P-5'-P-CCNC: 22 U/L (ref 5–45)
BASOPHILS # BLD AUTO: 0.02 THOUSANDS/ÂΜL (ref 0–0.1)
BASOPHILS NFR BLD AUTO: 0 % (ref 0–1)
BILIRUB SERPL-MCNC: 0.7 MG/DL (ref 0.2–1)
BUN SERPL-MCNC: 23 MG/DL (ref 5–25)
CALCIUM ALBUM COR SERPL-MCNC: 10.4 MG/DL (ref 8.3–10.1)
CALCIUM SERPL-MCNC: 9.9 MG/DL (ref 8.3–10.1)
CHLORIDE SERPL-SCNC: 108 MMOL/L (ref 96–108)
CO2 SERPL-SCNC: 29 MMOL/L (ref 21–32)
CREAT SERPL-MCNC: 0.83 MG/DL (ref 0.6–1.3)
EOSINOPHIL # BLD AUTO: 0.25 THOUSAND/ÂΜL (ref 0–0.61)
EOSINOPHIL NFR BLD AUTO: 4 % (ref 0–6)
ERYTHROCYTE [DISTWIDTH] IN BLOOD BY AUTOMATED COUNT: 12.5 % (ref 11.6–15.1)
GFR SERPL CREATININE-BSD FRML MDRD: 69 ML/MIN/1.73SQ M
GLUCOSE P FAST SERPL-MCNC: 81 MG/DL (ref 65–99)
HCT VFR BLD AUTO: 45.6 % (ref 34.8–46.1)
HGB BLD-MCNC: 14.9 G/DL (ref 11.5–15.4)
IMM GRANULOCYTES # BLD AUTO: 0.03 THOUSAND/UL (ref 0–0.2)
IMM GRANULOCYTES NFR BLD AUTO: 1 % (ref 0–2)
LYMPHOCYTES # BLD AUTO: 2.06 THOUSANDS/ÂΜL (ref 0.6–4.47)
LYMPHOCYTES NFR BLD AUTO: 34 % (ref 14–44)
MCH RBC QN AUTO: 30.8 PG (ref 26.8–34.3)
MCHC RBC AUTO-ENTMCNC: 32.7 G/DL (ref 31.4–37.4)
MCV RBC AUTO: 94 FL (ref 82–98)
MONOCYTES # BLD AUTO: 0.73 THOUSAND/ÂΜL (ref 0.17–1.22)
MONOCYTES NFR BLD AUTO: 12 % (ref 4–12)
NEUTROPHILS # BLD AUTO: 2.91 THOUSANDS/ÂΜL (ref 1.85–7.62)
NEUTS SEG NFR BLD AUTO: 49 % (ref 43–75)
NRBC BLD AUTO-RTO: 0 /100 WBCS
PLATELET # BLD AUTO: 248 THOUSANDS/UL (ref 149–390)
PMV BLD AUTO: 10.6 FL (ref 8.9–12.7)
POTASSIUM SERPL-SCNC: 4.1 MMOL/L (ref 3.5–5.3)
PROT SERPL-MCNC: 7.2 G/DL (ref 6.4–8.4)
RBC # BLD AUTO: 4.83 MILLION/UL (ref 3.81–5.12)
SODIUM SERPL-SCNC: 138 MMOL/L (ref 135–147)
TSH SERPL DL<=0.05 MIU/L-ACNC: 1.67 UIU/ML (ref 0.45–4.5)
VIT B12 SERPL-MCNC: 825 PG/ML (ref 100–900)
WBC # BLD AUTO: 6 THOUSAND/UL (ref 4.31–10.16)

## 2023-05-04 NOTE — TELEPHONE ENCOUNTER
Called and spoke to patient to confirm their upcoming appointment with Dr Calixto Kellogg  Informed patient about arriving in the [place] location 15 minutes prior to their appointment to get checked in and going over chart

## 2023-05-08 ENCOUNTER — CLINICAL SUPPORT (OUTPATIENT)
Dept: PULMONOLOGY | Facility: CLINIC | Age: 78
End: 2023-05-08

## 2023-05-08 DIAGNOSIS — J84.9 ILD (INTERSTITIAL LUNG DISEASE) (HCC): Primary | ICD-10-CM

## 2023-05-08 NOTE — PROGRESS NOTES
Pulmonary Rehabilitation Plan of Care   120 Day Assessment      Today's date: 2023   # of Exercise Sessions Completed: 16  Patient name: Helder Tanner      : 1945  Age: 78 y o  MRN: [id number]  Referring Physician: Gilberto Temple, *  Pulmonologist: Mirella Ziegler MD   Provider: [facility]  Clinician: Estrellita English MS, CEP     Dx:   Encounter Diagnosis   Name Primary? • [facility]) ([facility] ) Yes     Date of onset: 11/3/22      SUMMARY OF PROGRESS:  Delray Aschoff is compliant attending pulmonary rehab exercise sessions 2x/wk for ILD  She has only attended 2 sessions in the past 30 days due to ongoing sickness recovery  No changes to exercise prescription or progression  The patient tolerates 35-40 mins at 2 0 - 2 8 METs  on room air  She reduced her workloads today due to coming back from being sick  Resting SpO2 95-97% with decreased O2 saturation during exercise 92-97% on room air  Resting //62 with appropriate response to exercise reaching 142//90  The patient reports no symptoms, no limitations and knee pain occ  during exercise  Rating of perceived dyspnea with exercise 0-2/10 at max METs  She has not begun a regular exercise program at home, but states walking and doing hand weights sometimes  Pt was encouraged to add some walking at home especially with the nicer weather  She is very interested in joining the [address] [employer]  The patient is a non-smoker  PHQ-9 and AIDAN-7 were not reassessed due to most recent scores  Most recent assessment found PHQ-9 at a 5 suggesting 5-9 = Mild Depression and AIDAN-7 at a 0 suggesting 0-4  = Not anxious  When addressed, the patient admits feelings of depression/anxiety/stress, it is under control, and manageable  Patient reports excellent social/emotional support  Patient attends group educational classes on pulmonary disease   Pursed lipped breathing and Diaphragmatic breathing continues to be demonstrated, practiced, and reinforced with the patient  Her exercise program will be progressed as tolerated  The patient has the following personal goals she hopes to achieve by discharge: increase water intake, look continue to joining the [address] [facility], attend rehab regularly, increase workloads on exercise equipment, and continue to spend lots of time with family and friends! She continues to work towards her personal goals at 120 days  Pt will continue to be educated on lifestyle modifications and encouraged to supplement with a home exercise program to reach the following goals: increase duration and workloads on exercise machines, encourage patient to continue to walk at home, and continue breathing techniques with patient in person in the next 30 days      Medication compliance: Yes   Comments: Pt reports to be compliant with medications  Fall Risk: Low   Comments: Ambulates with a steady gait with no assist device and Denies a fall in the past 6 months    Smoking: Never used    RPD at Rest: 010  RPD with Exercise:  0-2/10    Assessment of progression of lung disease and functional status:  CAT: n/a  Shortness of breath questionnaire:       EXERCISE ASSESSMENT and PLAN    Current Exercise Program in Rehab: No change to exercise prescription       Frequency: 2 days/week   Supplement with home exercise 2+ days/wk as tolerated       Minutes: 35-40       METS: 2 0-2 8              SpO2: 91-97              RPD: 0                    HR:    RPE: 3-4         Modalities: UBE, NuStep, Recumbent bike and Room walking      Exercise Progression 30 Day Goals :    Frequency: 2-3 days/week    Supplement with home exercise 2+ days/wk as tolerated       Minutes: 35-45         METS: 2-3              SpO2: >88%              RPD: 1-3                      HR: resting +20   RPE: 4-5        Modalities: Treadmill, UBE, NuStep and Recumbent bike     Strength trainin-3 days / week  12-15 repetitions  1-2 sets per modality    Modalities: Chest Press, Lateral Raise, Arm Curl, Sit to Stands and Front Raises    Oxygen Needs: on room air at rest and on room air with exercise  Oxygen Goal: Maintain SpO2>90% during exercise    Home Exercise: none     Education: pursed lipped breathing, diaphragmatic breathing, relaxation breathing, home exercise, benefits of exercise for pulmonary disease, RPE scale, RPD scale, O2 saturation monitoring, appropriate O2 response to exercise and energy conservation    Goals: Improved 6MW distance by 10%, reduced dyspnea during exercise (0-3/10), improved exercise tolerance (max METs tolerated in pulmonary rehab), SpO2 >90% during exercise, improved DUKE activity score, reduced RPD at rest, attend pulmonary rehab regularly and decrease sitting time at home    Progressing:  Pt is progressing and showing improvement  toward the following goals:  SpO2 >90% on room air at rest and exercise and reduced RPD at rest   , Pt has not made progress toward the following goals: only attended 2 sessions in the past 30 days due to recovering from sickness, and no home exercise with rehab  , Patient will add more formal exercise at home and get back to attending regularly 2x/week  in the next 30 days, Will continue to educate and progress as tolerated , No change to exercise prescription or progression due to 2 sessions attended in the past 30 days    Plan: Titrate supplemental oxygen as needed to maintain SpO2>90% with exercise, learn to conserve energy with ADLs , practice breathing techniques 3x/day and reduce time sitting at home    Readiness to change: Action:  (Changing behavior)      NUTRITION ASSESSMENT AND PLAN    Weight control:    Starting weight: 173 4 lbs    Current weight:  176 lbs     Diabetes: N/A    Goals:LDL <100, CHOL <200, decreased body fat % <33%  (F), reduced waist circumference <35 inches (F), 2 5-5%  wt loss, increase intake of fish, shellfish, use low fat dairy, eat 3 or more servings of whole grains a day, Eat 4-5 cups of fruits and vegetables daily and eliminate or choose low-fat sweets  Education: heart healthy eating  low sodium diet  hydration  wt  loss   healthy choices while dining out     Progressing:Pt is progressing and showing improvement  toward the following goals:  eats a healthy diet, low sodium diet, organic foods, drinks lots of water, and cooks at home   , Patient will continue to watch her diet in the next 30 days, Will continue to educate and progress as tolerated , No change due to attendance of 2 sessions in the past 30 days     Plan: Education class: Reading Food Labels, Education Class: Heart Healthy Eating, switch to low fat cheeses, replace unhealthy snacks with fruits & vegs, reduce portion sizes, remove salt shaker from table, drink more water and keep added daily sugar <25g/day  Readiness to change: Action:  (Changing behavior)      PSYCHOSOCIAL ASSESSMENT AND PLAN    Emotional:  Depression assessment:  PHQ-9 = 5 5-9 = Mild Depression            Anxiety measure:  AIDAN-7 = 0 0-4  = Not anxious  Self-reported stress level: 3   Social support: Excellent and Patient reports excellent emotional/social support from family and friends     Goals:  Reduce perceived stress to 1-3/10, continue medical therapy, Pain in [facility] Score < 3, increased energy, stop worrying, take time to relax and Feel less anxious  Education: signs/sxs of depression, benefits of a positive support system and stress management techniques    Progressing:Pt is progressing and showing improvement  toward the following goals:  spends a lot of time with family and friends which reduces her amount of stress    , Patient will work on anxiety management and how to take time to relax in the next 30 days, Will continue to educate and progress as tolerated , No change to progression due to 2 sessions attended in the past 30 days     Plan: Class: Stress and Your Health, Class: Relaxation, PHQ-9 >5 will refer to MD, Practice relaxation techniques and Exercise  Readiness to change: Action:  (Changing behavior)      OTHER CORE COMPONENTS     Tobacco:   Social History     Tobacco Use   Smoking Status Never   Smokeless Tobacco Never       Tobacco Use Intervention: Referral to tobacco expert:   N/A:  Patient is a non-smoker     Blood pressure:    Restin//62   Exercise: 142//90    Goals: consistent BP < 130/80, reduced dietary sodium <2300mg, moderate intensity exercise >150 mins/wk and medication compliance  Education:  pathophysiology of pulmonary disease, preventing infections, dangers of smoking, control coughing, inspiratory muscle training and environmental triggers     Progressing:Pt is progressing and showing improvement  toward the following goals:  watching her sodium intake and drinking lots of water, blood pressures within normal limits at rest and exercise, and medication compliance   , Patient will monitor BP at home  in the next 30 days, Will continue to educate and progress as tolerated , No change to progression due to 2 sessions attended in the past 30 days     Plan: Complete abstention from smoking, engage in regular exercise and monitor home BP  Readiness to change: Action:  (Changing behavior)

## 2023-05-10 ENCOUNTER — OFFICE VISIT (OUTPATIENT)
Dept: NEUROLOGY | Facility: CLINIC | Age: 78
End: 2023-05-10

## 2023-05-10 ENCOUNTER — CLINICAL SUPPORT (OUTPATIENT)
Dept: PULMONOLOGY | Facility: CLINIC | Age: 78
End: 2023-05-10

## 2023-05-10 VITALS
HEART RATE: 67 BPM | WEIGHT: 175.6 LBS | DIASTOLIC BLOOD PRESSURE: 74 MMHG | BODY MASS INDEX: 27.56 KG/M2 | HEIGHT: 67 IN | SYSTOLIC BLOOD PRESSURE: 138 MMHG

## 2023-05-10 DIAGNOSIS — G44.229 CHRONIC TENSION HEADACHES: ICD-10-CM

## 2023-05-10 DIAGNOSIS — G43.709 CHRONIC MIGRAINE WITHOUT AURA WITHOUT STATUS MIGRAINOSUS, NOT INTRACTABLE: Primary | ICD-10-CM

## 2023-05-10 DIAGNOSIS — J84.9 ILD (INTERSTITIAL LUNG DISEASE) (HCC): Primary | ICD-10-CM

## 2023-05-10 NOTE — PROGRESS NOTES
Patient ID: Haydee Hollingsworth is a 78 y o  female  Assessment/Plan:    No problem-specific Assessment & Plan notes found for this encounter  {Assess/PlanSmartLinks:32963}       Subjective:    HPI    {[facility] HPI texts:77579}    {Common ambulatory SmartLinks:57886}         Objective:    not currently breastfeeding  Physical Exam    Neurological Exam      ROS:    Review of Systems   Constitutional: Negative  Negative for appetite change and fever  HENT: Negative  Negative for hearing loss, tinnitus, trouble swallowing and voice change  Eyes: Negative  Negative for photophobia, pain and visual disturbance  Respiratory: Negative  Negative for shortness of breath  Cardiovascular: Negative  Negative for palpitations  Gastrointestinal: Negative  Negative for nausea and vomiting  Endocrine: Negative  Negative for cold intolerance  Genitourinary: Negative  Negative for dysuria, frequency and urgency  Musculoskeletal: Negative  Negative for gait problem, myalgias and neck pain  Skin: Negative  Negative for rash  Allergic/Immunologic: Negative  Neurological: Negative  Negative for dizziness, tremors, seizures, syncope, facial asymmetry, speech difficulty, weakness, light-headedness, numbness and headaches  Hematological: Negative  Does not bruise/bleed easily  Psychiatric/Behavioral: Negative  Negative for confusion, hallucinations and sleep disturbance

## 2023-05-10 NOTE — PATIENT INSTRUCTIONS
"  Do try the trazodone 25 mg nightly and increase per instructions from prescribor      - \"Rewire Your Anxious Brain: How to Use the Neuroscience of Fear to End Anxiety, Panic, and Worry\" By Octaviano Beard PhD    Follow up with Dr Vincent Joseph for the surgery and endocrinology    Follow up with physiatry for the neck pain as she plans to do imaging and consider triger point injects     Safety/fall precautions     Once sleep study is resulted I recommend please call [phone] to schedule a follow up visit  with one of the following providers:  Jasmeet Ellis PA-C       Headache/migraine treatment:   Abortive medications (for immediate treatment of a headache): It is ok to take ibuprofen, acetaminophen or naproxen (Advil, Tylenol,  Aleve, Excedrin) if they help your headaches you should limit these to No more than 3 times a week to avoid medication overuse/rebound headaches  For your more moderate to severe migraines take this medication early   Maxalt (rizatriptan) 10mg tabs - take one at the onset of headache  May repeat one time after 1-2 hours if pain has not resolved     (Max 2 a day and 9 a month)       Over the counter preventive supplements for headaches/migraines   (to take every day to help prevent headaches - not to take at the time of headache):  [x] Magnesium 400mg daily (If any diarrhea or upset stomach, decrease dose  as tolerated)  [x] Riboflavin (Vitamin B2)  400mg daily   (FYI B2 may make your urine bright/neon yellow)     Prescription preventive medications for headaches/migraines   (to take every day to help prevent headaches - not to take at the time of headache):  [x]  we have options if you ever wanted - like gabapentin     *Typically these types of medications take time untill you see the benefit, although some may see improvement in days, often it may take weeks, especially if the medication is being " titrated up to a beneficial level  Please contact us if there are any concerns or questions regarding the medication  Lifestyle Recommendations:  [x] SLEEP - Maintain a regular sleep schedule: Adults need at least 7-8 hours of uninterrupted a night  Maintain good sleep hygiene:  Going to bed and waking up at consistent times, avoiding excessive daytime naps, avoiding caffeinated beverages in the evening, avoid excessive stimulation in the evening and generally using bed primarily for sleeping  One hour before bedtime would recommend turning lights down lower, decreasing your activity (may read quietly, listen to music at a low volume)  When you get into bed, should eliminate all technology (no texting, emailing, playing with your phone, iPad or tablet in bed)  [x] HYDRATION - Maintain good hydration  Drink  2L of fluid a day (4 typical small water bottles)  [x] DIET - Maintain good nutrition  In particular don't skip meals and try and eat healthy balanced meals regularly  [x] TRIGGERS - Look for other triggers and avoid them: Limit caffeine to 1-2 cups a day or less  Avoid dietary triggers that you have noticed bring on your headaches (this could include aged cheese, peanuts, MSG, aspartame and nitrates)  Education and Follow-up  [x] Please call with any questions or concerns   Go to the ED with any new or concerning symptoms  [x] Follow up 3-4 months, sooner if needed

## 2023-05-10 NOTE — PROGRESS NOTES
[facility] [facility] Consult - Follow up   PATIENT:  Linh Jennings  MRN:  [id number]  :  1945  DATE OF SERVICE:  5/10/2023  REFERRED BY: No ref  provider found  PMD: Chanel Monzon MD    Assessment/Plan:   Bryan mosley 78 y  o  female with a past medical history of hyperparathyroidism status post adenoma removal, IBS,, GERD, pericarditis, heart murmur, chronic gait instability, chronic neck pain (following with physiatry), vitamin-D deficiency, insomnia, depression, anxiety, BPPV referred here for evaluation of headache    Initial evaluation by me 2018    Chronic Migraine without aura without status migrainosus, not intractable  Chronic tension headaches    She has had migraines since childhood, she has followed with neurologist in the past back in [place]   She moved here to be closer to her daughter and granddaughter in the area  She has multiple types of headaches,  bioccipital pain, mid throbbing pain  Migraine is without aura and has associated nausea sometimes vomiting, stiff and sore neck, problems with concentration, photophobia, phonophobia, osmophobia, sometimes nasal congestion, lightheadedness   Migraines worse with any movement    - as of 2019 she reports headaches are significantly improved with lifestyle interventions  - as of 2019:  headaches 6 times a month and go away with ibuprofen   She has not followed up with eye doctor but has an appointment in July  - as of 2019: as of 19: headaches 6-8 a month - often in the am or with anxiety/stress - these she can breathe through it  Morning headaches the last 4-5 days, goes away with coffee usually, if not goes away with ibuprofen  Last big migraine 9 months or so ago   - As of 10/9/2019:  headaches - has not had in quite some time, around 8 a month and go away with ibuprofen, occasional sharp pains randomly for 5 minutes on various locations on head 1-2 times a week   No migraines in a year  - she is taking magnesium and riboflavin - she thinks this may be helping  - as of 03/2/2020: no longer having significant headaches, occasional am headaches that improve with coffee, taking mg and B2 for prevention  - as of 6/16/2021: She reports 1-2 months of near daily headaches that may last throughout the day, new type of stabbing headache and new onset left visual changes that lasted 4 days and resolved  Continue baby asa for now, ordered MRI Brain  Trial of gabapentin if she would like that may help with multiple things  Follow up with endocrine since hyperparathyroidism may be causing a lot of problems  - as of 11/12/2021: she reports headaches have improved to 4-5 days per week with mostly tension features and certainly could be related to stress or hyperparathyroidism for which she is following with endocrine  Also dealing with other symptoms concerning for hyperparathyroid  Taking supplements for headache prevention  Did not try gabapentin as she does not want to take prescription meds for her headaches  MRI Brain did not show any new or concerning findings  - as of 5/17/2022: She reports she is getting about 4-5 significant migraines a month and she is now willing to try abortive medication for this so will prescribe rizatriptan 10 mg as needed  She is not interested in prescription preventative  - as of 10/26/2022: She reports headaches are about the same, come in waves, will wake up with headaches for weeks at a time, then other times not for days or weeks  Often related to stress  Even if they are bad they typically go away with tylenol in max 2 hours, occasionally 4-5 days a month last longer and has not tried rizatriptan for that yet  She is not interested in prescription preventative at this time  Recommended following back up with physiatry for neck pain as trigger point injections may help with neck pain and possibly headaches once neck pain is improved    - as of 2/1/2023: She reports headaches are daily and wakes up with them and I again recommended evaluation for sleep apnea contributing and ordered sleep study  She is not currently interested in prescription preventative and did not yet try rizatriptan which I suggested holding off at this time until migraines are more episodic again or if she has acute episode that she did not wake up with  She was prescribed trazodone for insomnia and she would like to start with this first since she has not tried it and it could help with mood, stress, sleep and therefore headaches tolerated  We discussed if you were to consider preventative medication for myself I would recommend gabapentin  Also agree that with the primary hyperparathyroidism still present, having repeat surgery would be my recommendation if it is what Dr Teresa Mendoza recommends, but is otherwise the hypercalcemia can continue to contribute to headaches and other morbidity issues as well  - as of 5/10/2023: She has had improvement in headaches since last visit and reports she only seems to get them now when stressed  She continues to take headache preventative supplements and not interested in additional preventative med  She reports if headache becomes significant enough she may take acetaminophen which helps once a week  Not tried rizatriptan  Sleep study scheduled for September as we discussed the 4 to 5 hours of sleep she is getting is the most modifiable factor contributing to many of her symptoms  She continues to follow with ENT and endocrinology for primary hyperparathyroidism and getting second opinion next week as this is also likely contributing to multiple symptoms      Workup  -  MRI brain without contrast 07/19/2021: White matter changes suggestive of chronic microangiopathy  No acute intracranial pathology   - MRI Brain without contrast  7/9/19: No acute intracranial abnormality   Mild nonspecific cerebral white matter signal abnormality, which can be seen in patients with migraines as well as microangiopathic disease   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   This may represent an anatomic variation   If concerned of vertebrobasilar insufficiency, consider follow-up CTA or contrast enhanced MRA imaging    - MRA head and neck/carotids 09/30/2019: No large vessel flow restrictive disease   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   This could be reevaluated with CTA  -> per NSGY This requires no treatment and no further imaging is required either     - Minor short segment stenosis of the dominant right V1 origin   Mild long segment narrowing of the origin and nondominant left v1   Anterior circulation normal    - EMG/NCS 6/24/20: Mildly abnormal study  The reduced amplitudes noted on the eroneal  marker studies from extensor digitorum brevis muscles is likely due to reduced muscle bulk of muscles  The low amplitudes noted on the sensory studies in the lower extremities can be considered normal for this patient's age  These changes can be considered normal for this patient's age consider normal sural sensory response on the right and normal tibial responses, or could suggest a very mild axonal sensorimotor neuropathy  In addition, there is evidence to support a 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   To note, patient has history of remote carpal tunnel surgeries, could be remnant of prior surgery   - B12 3/10/20 721        Preventive:  - We discussed headache hygiene and lifestyle interventions that may improve her headaches   - she is not interested in prescription preventative at this time     - continue headache preventative supplements including magnesium,  riboflavin   - through other providers: trazodone as needed for sleep - has not tried yet, but plans to start with 25 mg, xanax 1-2 times a week for insomnia, metoprolol 25 mg nightly  - past/failed/contraindicated: metoprolol, propranolol contraindicated due to interaction with current medications   -  future options:  Gabapentin, venlafaxine, CGRP med, botox      Abortive:  - discussed not taking over-the-counter or prescription pain medications more than 3 days per week to prevent medication overuse/rebound headache  - trial of rizatriptan prescribed in the past and she never took it  Discussed proper use, possible side effects and risks  - past/contraindicated: fiorinal with codeine years ago  - past/helped: Toradol IM has worked for in the past  - future options: Alternative Triptan, prochlorperazine, Toradol IM or p o , could consider trial for 5 days of Depakote or dexamethasone for prolonged migraine, ubrelvy, reyvow, nurtec      Insomnia  She reports chronic insomnia and that she currently gets less than 4 hr a night of sleep   At initial visit 11/2018 we discussed sleep hygiene and she has stopped drinking coffee late at night which has helped some  Dora Ordoñez is wondering if a referral to sleep medicine would be indicated and I am happy to refer her   Likely multifactorial including possible psychogenic component versus other sleep disorder as well  - as of 06/03/2019: Mayra Vinnierenny reports she did not go to sleep medicine appointment and tried cutting coffee and tea at dinner which helps somewhat  - as of 7/31/19: sleep sometimes good - was good for a while after starting mg, sometimes wakes up at night again now, possible symptoms of RLS and still only averaging 4-5 - will have her see  Sleep med   - as of 10/09/2019:  Did not follow up with sleep medicine as recommended  - as of 3/2/2020 reports she plans to follow up with sleep med soon, may have RLS? Or other sleep disorder  -   Saw Sleep Medicine 06/22/2021 who felt no PSG was indicated - Psychophysiologic insomnia  Recommended sleep hygiene, relaxation techniques, consider CBT-I    - as of 2/1/2023: I again recommended sleep medicine follow-up after sleep study for insomnia with a different sleep provider     - "as of 5/10/2023: Sleep study scheduled for 9/21/2023       Patient instructions     Do try the trazodone 25 mg nightly and increase per instructions from prescribor      - \"Rewire Your Anxious Brain: How to Use the Neuroscience of Fear to End Anxiety, Panic, and Worry\" By Elizabeth Kc PhD    Follow up with Dr Eli Yuan for the surgery and endocrinology    Follow up with physiatry for the neck pain as she plans to do imaging and consider triger point injects     Safety/fall precautions     Once sleep study is resulted I recommend please call [phone] to schedule a follow up visit  with one of the following providers:  Waleska De Jesus PA-C       Headache/migraine treatment:   Abortive medications (for immediate treatment of a headache): It is ok to take ibuprofen, acetaminophen or naproxen (Advil, Tylenol,  Aleve, Excedrin) if they help your headaches you should limit these to No more than 3 times a week to avoid medication overuse/rebound headaches      For your more moderate to severe migraines take this medication early   Maxalt (rizatriptan) 10mg tabs - take one at the onset of headache  May repeat one time after 1-2 hours if pain has not resolved     (Max 2 a day and 9 a month)       Over the counter preventive supplements for headaches/migraines   (to take every day to help prevent headaches - not to take at the time of headache):  [x] Magnesium 400mg daily (If any diarrhea or upset stomach, decrease dose  as tolerated)  [x] Riboflavin (Vitamin B2)  400mg daily   (FYI B2 may make your urine bright/neon yellow)     Prescription preventive medications for headaches/migraines   (to take every day to help prevent headaches - not to take at the time of headache):  [x]  we have options if you ever wanted - like gabapentin     *Typically these types of medications take time untill you see the benefit, although some may see " improvement in days, often it may take weeks, especially if the medication is being titrated up to a beneficial level  Please contact us if there are any concerns or questions regarding the medication       Lifestyle Recommendations:  [x] SLEEP - Maintain a regular sleep schedule: Adults need at least 7-8 hours of uninterrupted a night  Maintain good sleep hygiene:  Going to bed and waking up at consistent times, avoiding excessive daytime naps, avoiding caffeinated beverages in the evening, avoid excessive stimulation in the evening and generally using bed primarily for sleeping   One hour before bedtime would recommend turning lights down lower, decreasing your activity (may read quietly, listen to music at a low volume)  When you get into bed, should eliminate all technology (no texting, emailing, playing with your phone, iPad or tablet in bed)  [x] HYDRATION - Maintain good hydration   Drink  2L of fluid a day (4 typical small water bottles)  [x] DIET - Maintain good nutrition  In particular don't skip meals and try and eat healthy balanced meals regularly  [x] TRIGGERS - Look for other triggers and avoid them: Limit caffeine to 1-2 cups a day or less  Avoid dietary triggers that you have noticed bring on your headaches (this could include aged cheese, peanuts, MSG, aspartame and nitrates)      Education and Follow-up  [x] Please call with any questions or concerns  Go to the ED with any new or concerning symptoms  [x] Follow up 3-4 months, sooner if needed       CC: We had the pleasure of evaluating Nella Yancey in neurological consultation today  she is a right handed female who presents today for evaluation of headaches       History of Present Illness:   Interval history as of 5/10/2023  - no significant new or concerning neurologic symptoms since last visit   -Sleep study scheduled for 9/21/2023, maybe 4-5 hours of sleep a night     -Parathyroid scan 3/24/2023 was normal, then saw Endo Dr Mayelin Klein and she discussed with Dr Nikki Kwok and they are holding off on repeat surgery right now, since PTH is not extremely high  - seeing another surgeon from \[facility]\"" ENT in next week  - following with physiatry - Dr Imtiaz Matos and has TPI available     Headaches and migraines   Much better, maybe takes tylenol once a week for pain  She reports she gets headaches occasionally and takes acetaminophen right when they start  Now only seems to get them when upset, less often   Previously with heat, so we will see if worsens in summer  They were both sick after Easter with a URI  If she gets upset or overwhelmed, tremulous hands and feels shaky inside     Preventative:   - continue headache preventative supplements including magnesium,  riboflavin   - through other providers: trazodone as needed for sleep - has not tried yet, but plans to start with 25 mg, xanax 1-2 times a week, metoprolol  25 mg    Abortive:   - if feels a headache coming on, tries to relax and think of something else, time alone,helps often  - acetaminophen/tylenol helps some  - trial of rizatriptan prescribed in the past and she never took it    Interval history as of 2/1/2023  -11/29/2022 she followed up with ENT Dr Nikki Kwok for primary hyperparathyroidism and unfortunately PTH remains elevated suggesting left over hypercellular tissue and he discussed options including second opinion, revision surgery  Followed up again 1/10/2023 with Dr Nikki Kwok and he recommended follow-up with endocrinology, nuc med parathyroid scan  - bacterial PNA a month ago   - following up with physiatry next week    - sleep is terrible   -Struggles with reflux especially at night, I suspect sleep apnea, sleep doctor refused to order sleep study in 2021   - one morning woke up with heart racing so fast   How likely are you to doze off or fall sleep during the following situations?   0 - would never doze  1 - slight chance of dozing  2 - moderate chance of dozing  3 - high chance of dozing  Amadou sleepiness scale  Sitting and reading - 2  Watching TV - 3  Sitting, inactive in a public place such as a theater 2  As a passenger in a car for an hour without a break 1  Lying down to rest in afternoon when circumstances permit 3  Sitting and talking to someone 1  Sitting quietly after lunch without alcohol 2  In a car while stopped for few minutes in traffic -0      Headaches and migraines   - wake up with them daily lately for months  - bifrontal and cervicogenic     Preventative:   - continue headache preventative supplements including magnesium,  riboflavin   - through other providers: trazodone as needed for sleep - has not tried, xanax for anxiety, metoprolol increased from 12 5 mg to 25 mg    Abortive:   - trial of rizatriptan prescribed in the past and she never took it       Interval history as of 10/26/2022  - denies any new or concerning neurologic symptoms since last visit   - no return of vertigo  - neck pain   - had parathyroid surgery in August 2022  - still poor sleep, falls asleep ok around 10, wakes at sometimes btw 2-4 am and often is able to go back to sleep, wakes again around 7     Headaches and migraines   Improvement since last visit, headaches seemed to go a way for a while and then lately returned, seems to come in spurts, and then may not have for days or weeks, come more with stressors   - Headaches the past few weeks when she wakes that are 8/10 bifrontal with out migrainous features and resolve with tylenol 500 mg within 2 hours   - wakes up in the am with neck pain, left shoulder pain and headache (saw sleep med and they did not order study, saw physiatry for neck pain, did not follow up)   - also increased stressors related to has been falling    Preventative:  Magnesium and riboflavin  - through other providers  trazodone as needed for sleep, xanax for anxiety, metoprolol 12 5    Abortive:   - ES acetaminophen/Tylenol typically helps  -trial of rizatriptan - Did not try yet    For neck saw physiatry 5/24/22 - has not tried methocarbamol yet, PT did not help -     Interval history as of 5/17/2022  - walking more, sleeping better often     Headaches and migraines   - Bad migraines 4-5 days a month where she has to lay in bed all day, they can last 1-2 days, not responsive to OTC meds and now willing to try abortive  - stress can trigger them  - also sometimes pain left parietal scalp bone region, pushing on the region helps    - Saw Dr Mary Anne Linares in ENT for hyperparathyroidism   - saw endocrine with us and wanted another opinion Dr Chico Juan endocrine at     Preventative: -  Magnesium and Riboflavin  - through other providers: trazodone as needed for sleep, xanax for anxiety, metoprolol   Abortive: - Tylenol or advil dont often work     Interval history as of 11/12/2021  - denies any new or concerning neurologic symptoms since last visit  -  MRI brain without contrast 07/19/2021: White matter changes suggestive of chronic microangiopathy  No acute intracranial pathology  - Was seen by Sleep Med on 6/22/21  Psychophysiologic insomnia  Recommended sleep hygiene, relaxation techniques, consider CBT-I  Headaches   Having 4-5 headaches per week  Almost always bifrontal or bitemporal  Always on both sides  Headaches can last all day, usually better with Advil or Tylenol  No migraine symptoms  Not sleeping well  Having significant stress  Preventative: Magnesium and Riboflavin   - Did not try gabapentin as she does not like the idea of taking a medication every day to present a headache    Abortive: Tylenol or advil    Reports dizziness that happens when she bend over forward  chronic neck pain  Being worked up for thyroid/parathyroid   - Ca is high, PTH is high   Knee pain, currently in PT for gait      Interval history as of 6/16/2021  - following with eye doctor  - PTH high 3/10/20, she has had high calcium for years it appears, recently had bone density shows major issues - she sees endocrine this month - we discussed hyperparathyroidism can cause headaches    Dr Shruti Faye outside of [address]  - 3 weeks ago had eye issue In left eye -  fireworks and line across for 4 days 5/2021, thinks it was just left eye, no headache associated, has not happened again - eye exam ok recently although she says he did not do a full exam     No vertigo episodes     Headaches and migraines   Almost daily headaches for the past 2 months  - bifrontal, bitemporal, apex - always both sides, sometimes lasts all day, if takes advil PM or 2 tylenol ED - helps it go away  No migraine symptoms  Also may get sharp pains at different points in head for 5 mins     Preventative: supplement  Abortive:   if takes advil PM or 2 tylenol ED - helps it go away  Chronic balance issues  Does not appear to be neurologic without significant findings on EMG, normal B12, no findings to suggest cause on MRI brain  -  Has worked with PT   - valgus deformity of gait - following with ortho   -  referred to physiatry - she can also discuss neck pain with them     Sleep - seeing next week     Interval history as of 03/2/2020:  - has an apmt upcoming with sleep medicine   - for about a week she was having TMJ pain and had old azithromycin and took it and it feels a little better   - then at night while laying in bed was having pain, strange feeling in her legs all the way up the legs   - wants to walk but afraid of falling   - has not had any BPPV since   - has appointment with sleep medicine   - taking mg and B2    Interval history as of 10/09/2019  -  Did not follow up with sleep medicine as recommended  - she has been following with physical therapy for gait instability/history of BPPV - also seeing them for shoulder  - getting orthotics for feet  - denies any falls     MRA head and neck 09/30/2019:  No large vessel flow restrictive disease      - 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   This could be reevaluated with CTA     - Minor short segment stenosis of the dominant right V1 origin   Mild long segment narrowing of the origin and nondominant left v1   Anterior circulation normal   - had Neurosurgery Dr Kathrin Sebastian review image and there may be nothing really there, even if there is would just be surveillance indicated  Will order CTA and have he is ok with having her follow up with him to review the images/discuss results just in case         Otherwise she is doing good  - chronic lightheadedness and vertigo history - 2 weeks ago with some vertigo and went away on its own in about 5 minutes  - headaches - has not had in quite some time, occasional sharp pains randomly for 5 minutes on various locations on head 1-2 times a week   - no big migraines in a year  - she is taking magnesium and riboflavin - she thinks this may be helping     Interval history as of 7/31/19  - MRI Brain without contrast  7/9/19:   1   No acute intracranial abnormality  2   Mild nonspecific cerebral white matter signal abnormality, which can be seen in patients with migraines as well as microangiopathic disease    3   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   This may represent an anatomic variation   If concerned of vertebrobasilar insufficiency, consider follow-up CTA or contrast enhanced MRA imaging       - she saw pulmonary 7/3/19  - followed up with cardiology and normal stress test per patient since last visit - they wanted to start metoprolol   -  has been in and out of the hospital for diverticulitis and then surgery for removing infected intestine so that has been taking some of her time   - daughter was pregnant and last the baby after 3 months     Lightheadedness/dizziness, h/o vertigo  Mild gait instability  Denies recent falls  *2-3 weeks of pain in the bottom of her right foot that she plans to see a foot doctor   - also has decreased sensation bilateral top of the toes and into the top of the shins for about 6 months   - she has not followed up with PT as recommended for gait and lightheadedness  - lightheadedness occurs the most with getting up   - the denice vernon last visit improved her vertigo 6/3/19     Migraines/headaches  Morning headaches the last 4-5 days, goes away with coffee usually, if not goes away with ibuprofen  Taking deep breaths and has not been having migraines   - taking magnesium and not riboflavin      Sleep   Sleep medicine  - sometimes feels sensations in bilateral shins when in bed  - falls asleep easily with TV on, stress though has affected her some  - usually have been sleeping straight through, past week wakes up to check on  once a night     --------------------     Interval history as of 06/03/2019:  [facility] been following with physical therapy and has been noticing improvement in neck pain, - - first PT visit 04/01/2019 also significantly improved dizziness following 1st treatment - the found signs of BPPV - was vertigo free up for almost 2 months   - around 5/27/19: woke up and was lying in bed when suddenly had room spinning - sat up and it went away in a 3 mins - since then every day has happened when triggered - from bending over sometimes soon   - feels like she can not walk right, hold on to  and daughter   - reports LE shin numbness      - 05/02/2019 - phone conversation with Cardiology - metoprolol 25 mg long-acting  - referred to eye doctor - apmt in July  - referred to Sleep Medicine - did not go, tried cutting coffee and tea at dinner time and sleeps fine now   - magnesium, riboflavin - reports she is taking        Interval history as of  03/18/2019  - presented to the ED 01/06/2019 with chest pain - has a stress test next week      - her biggest complaint today is trouble sleeping, chronic dizziness   - Dizziness, history of BPPV 2 years ago, got better, scares her   Not dizzy with walking, more positional      Headaches  - never filled rizatriptan  - headaches are not really a complaint today: in a month 8 times but go away with ibuprofen  Stopped drinking caffeine at night  - was referred to physical therapy has not made an appointment  - recommended supplements including - taking magnesium, riboflavin may be in her B complex   - has joined the gym, loves walking      Plans to see eye doctor, they are tearing, no blurred or double vision            Headaches started at what age? Migraines Since childhood, didn't have them when pregnant x 2  How often do the headaches occur?   *As of 11/2018:   - migraines 3-4 times a week,   - Mild throbbing pain right scalp 3-4 times a week  - lasts hours, with advil goes away most of the time  - Occasionally for sharp pain throughout head for a second or two - once a week  - Also bilateral occipital pain 1-2 times a week    *As of 3/18/19: - headaches are not really a complaint today: in a month 8 times but go away with ibuprofen   *As of 06/03/2019: headaches 6 times a month and go away with ibuprofen  *as of 7/31/19: headaches 6-8 a month - often in the am or with anxiety/stress - these she can breathe through it   Morning headaches the last 4-5 days, goes away with coffee usually, if not goes away with ibuprofen  - As of 10/9/2019 headaches - has not had in quite some time, around 8 a month and go away with ibuprofen occasional sharp pains randomly for 5 minutes on various locations on head 1-2 times a week   No migraines in a year      What time of the day do the headaches start? no particular time of day  How long do the headaches last? 2-3 days in teenage years - now 1 full day and into the morning  Are you ever headache free? Yes  Prodrome of feeling like she is getting a headache  Aura? without aura  Describe your usual headache pain quality? throbbing  Where is your headache located? bilateral frontal area and bilateral occipital area, also right temporal   Does the pain Radiate? no radiation of pain  What is the intensity of pain? Up to a "10/10, at its best a 4  Associated symptoms:   [x] Nausea       [x] Vomiting - once in a while        [] Diarrhea         [] Insomnia           [x] Stiff or sore neck         [x] Problems with concentration  [x] Photophobia     [x]Phonophobia      [x] Osmophobia  [] Blurred vision   [x] Prefer quiet, dark room        [x] Light-headed or dizzy - sometimes   [] Tinnitus      Things that make the headache worse? +movement  Headache triggers:  Stress, mint,strong perfume, tobacco or fireplaces, If she goes to bed with a headache will wake up with worse one   What time of the year do headaches occur more frequently?   do not seem to be related to any time of the year  Have you seen someone else for headaches or pain? Yes, neurologist back in [place]  Have you had trigger point injection performed and how often? No  Have you had Botox injection performed and how often? No   Have you had epidural injections or transforaminal injections performed? No  Have you used CBD or THC for your headaches and how often? No  Are you current pregnant or planning on getting pregnant? No  Have you ever had any Brain imaging? yes years ago and normal     What medications do you take or have you taken for your headaches? ABORTIVE:       - was prescribed rizatriptan but did not need it   - advil 200 mg  - my pillow seems to be helping the bioccipital      PREVENTIVE: no     Alternative therapies used in the past for headaches? Physical therapy -  For wrist, but not headache   Coffee, has one in the morning  thank out the p m  Coffee     LIFESTYLE  Sleep - \"if I get 4 hours I am johnnie\"   - as of 06/03/2019:  Reports improved  As of 7/31/19 - avg 4-5   - sometimes feels sensations in bilateral shins when in bed  - falls asleep easily with TV on, stress though has affected her some  - usually have been sleeping straight through, past week wakes up to check on  once a night      as of 11/2018:   Is your sleep restful? No, tired  What " time do you go to bed at night? 10   What time do you wake up in am? Has coffee at 4:30 am with  and goes back to sleep, otherwise wakes up at 7 am  How often do you get up at night? once  Do you wake up with headaches? Sometimes   Do you snore while asleep? No  Have you been told that you stop breathing during sleeping? No - but makes noises  Do you wake up tired in the morning? Yes  Do you take frequent naps during the day? sometimes  Do you have jaw pain? No, but has TMJ  Do you grind/clench your teeth at night? No  Do you have restless leg syndrome? No     Physical activity:   Joined [employer]  Goes once a week  Walks with her daughter  - considering line dancing or something slower at the [facility]  - has joined the [employer], loves walking       Water: not enough - 4 glasses      Diet:  Do you ever skip meals?  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[employer] with   Daughter, granddaughter live near by  Other two grandkids in [facility] [place]     Illicit Drugs: denies  Alcohol/tobacco: Denies tobacco use, alcohol intake: social drinker    Past Medical History:     left PCOM origin infundibulum  - MRA head and neck 09/30/2019: No large vessel flow restrictive disease   - 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   This could be reevaluated with CTA  - Minor short segment stenosis of the dominant right V1 origin   Mild long segment narrowing of the origin and nondominant left v1   Anterior circulation normal   - CTA head with without contrast 10/28/2019:  No aneurysm identified at the site of the previously noted focal dilatation in the left supraclinoid ICA at the expected origin of the left posterior communicating artery  A left posterior communicating artery is not identified  The focal outpouching   may therefore represent anatomic variation versus residual junctional dilatation at the site of an atretic posterior communicating artery   - NSGY note 11/22/19 left PCOM origin infundibulum  This requires no treatment and no further imaging is required either        Past Medical History:   Diagnosis Date   • Anxiety    • Arthritis    • Back pain    • Balance problems    • Chest pain     heaviness   • Difficulty swallowing    • Dizziness    • Dry cough    • Gait disorder    • GERD (gastroesophageal reflux disease)    • Hyperparathyroidism ([facility] [address] )    • Hypertension    • Increased frequency of headaches    • Migraines    • Neck pain    • Numbness and tingling     bles   • Palpitations    • Pericarditis    • Thyroid disease     nodule   • Trouble in sleeping    • Wears glasses        Patient Active Problem List   Diagnosis   • Closed fracture distal radius and ulna, right, initial encounter   • Anxiety   • Gastroesophageal reflux disease with esophagitis   • History of pericarditis   • Heart murmur   • Primary hyperparathyroidism (HCC)   • Thyroid nodule   • Vitamin D deficiency   • Atypical chest pain   • Migraine without aura and without status migrainosus, not intractable   • Dizziness and giddiness   • Insomnia   • Cervicalgia   • Abnormal CT scan of lung   • Irritable bowel syndrome with diarrhea   • Elevated amylase and lipase   • Paresthesias   • Carpal tunnel syndrome of left wrist   • Dysphonia   • Reflux laryngitis   • Paresis of left vocal fold   • Glottic insufficiency   • Muscle tension dysphonia   • TMJ dysfunction   • Pharyngoesophageal dysphagia   • Palpitations   • Hallux abducto valgus, bilateral   • Pincer nail deformity   • Osteoporosis with current pathological fracture   • Parathyroid related hypercalcemia (HCC)   • Cervical myofascial pain syndrome   • Edema of both ankles   • Aneurysm (HCC)   • Calf pain   • Lung nodule   • Myalgia   • Pericardial effusion   • SOBOE (shortness of breath on exertion)   • Elevated blood-pressure reading without diagnosis of hypertension   • Skin tag of anus   • H/O parathyroidectomy ([facility])   • [facility]) ([facility])   • Primary osteoarthritis of right knee       Medications:      Current Outpatient Medications   Medication Sig Dispense Refill   • acetaminophen (TYLENOL) 500 mg tablet Take 500-1,000 mg by mouth every 6 (six) hours as needed for mild pain     • albuterol (PROVENTIL HFA,VENTOLIN HFA) 90 mcg/act inhaler Inhale 2 puffs every 6 (six) hours as needed for wheezing or shortness of breath 8 g 1   • ALPRAZolam (XANAX) 0 5 mg tablet Take 1 tablet (0 5 mg total) by mouth daily at bedtime as needed for anxiety or sleep 20 tablet 1   • ascorbic acid (VITAMIN C) 500 mg tablet Take 500 mg by mouth daily     • b complex vitamins tablet Take 1 tablet by mouth daily     • Cholecalciferol (Vitamin D) 50 MCG (2000 UT) CAPS Take 1 capsule (2,000 Units total) by mouth daily (Patient taking differently: Take 3,000 Units by mouth daily)     • famotidine (PEPCID) 40 MG tablet Take 1 tablet (40 mg total) by mouth every other day 90 tablet 1   • Magnesium 300 MG CAPS Take 300 mg by mouth in the morning     • metoprolol succinate (TOPROL-XL) 25 mg 24 hr tablet Take 1 tablet (25 mg total) by mouth daily at bedtime 90 tablet 3   • Nutritional Supplements (OSAPLEX MK-7 PO) Take by mouth     • omeprazole (PriLOSEC) 20 mg delayed release capsule Take 1 capsule (20 mg total) by mouth every other day 180 capsule 1   • Riboflavin (VITAMIN B-2 PO) Take 250 mg by mouth in the morning     • dicyclomine (BENTYL) 10 mg capsule TAKE 1 CAPSULE BY MOUTH 4 TIMES A DAY (BEFORE MEALS AND AT BEDTIME) (Patient not taking: Reported on 5/10/2023) 360 capsule 1   • methocarbamol (ROBAXIN) 500 mg tablet Take 0 5 (250mg) to 1 (500mg) tablet by mouth every 8 hours as needed for muscle spasm/neck pain  (Patient not taking: Reported on 5/10/2023) 90 tablet 0   • rizatriptan (MAXALT) 10 MG tablet Take 1 tablet (10 mg total) by mouth once as needed for migraine May repeat in 2 hours if needed  Max 2/24 hours, 9/month  (Patient not taking: Reported on 5/10/2023) 9 tablet 6   • sucralfate (CARAFATE) 1 g/10 mL suspension Take 10 mL (1 g total) by mouth daily at bedtime (Patient not taking: Reported on 2/7/2023) 300 mL 0   • traZODone (DESYREL) 50 mg tablet Take 1 tablet (50 mg total) by mouth daily at bedtime (Patient not taking: Reported on 5/10/2023) 30 tablet 1     No current facility-administered medications for this visit  Allergies: Allergies   Allergen Reactions   • Ciprofloxacin Myalgia       Family History:     Family History   Problem Relation Age of Onset   • Kidney failure Mother    • Hypertension Mother    • Lung cancer Father    • [place] Parkinson White syndrome Daughter    • No Known Problems Sister    • Substance Abuse Neg Hx    • Alcohol abuse Neg Hx    • Mental illness Neg Hx        Social History:     Social History     Socioeconomic History   • Marital status: /Civil Union     Spouse name: Not on file   • Number of children: Not on file   • Years of education: Not on file   • Highest education level: Not on file   Occupational History   • Not on file   Tobacco Use   • Smoking status: Never   • Smokeless tobacco: Never   Vaping Use   • Vaping Use: Never used   Substance and Sexual Activity   • Alcohol use: Not Currently     Comment: Rarely    • Drug use: No   • Sexual activity: Yes     Partners: Male   Other Topics Concern   • Not on file   Social History Narrative    WORK:    1  [employer] ([employer]; [employer]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    2  [employer]        HOBBIES:    1  Singing    2  Dancing    3    Hx of "ceramics (+ dust)        PETS:    1  Dogs    -  Denies birds        TRAVEL:    -  [place]         EXPOSURES:    Denies mold; down pillows/comforters; hot tubs     Social Determinants of Health     Financial Resource Strain: Low Risk    • Difficulty of Paying Living Expenses: Not hard at all   Food Insecurity: Not on file   Transportation Needs: No Transportation Needs   • Lack of Transportation (Medical): No   • Lack of Transportation (Non-Medical): No   Physical Activity: Not on file   Stress: Not on file   Social Connections: Not on file   Intimate Partner Violence: Not on file   Housing Stability: Not on file         Objective:       Physical Exam:                                                                 Vitals:            Constitutional:    /74 (BP Location: Left arm, Patient Position: Sitting, Cuff Size: Standard)   Pulse 67   Ht 5' 7\" (1 702 m)   Wt 79 7 kg (175 lb 9 6 oz)   BMI 27 50 kg/m²   BP Readings from Last 3 Encounters:   05/10/23 138/74   04/25/23 130/74   04/14/23 122/80     Pulse Readings from Last 3 Encounters:   05/10/23 67   04/25/23 77   04/14/23 93         Well developed, well nourished, well groomed  Psychiatric:  Normal behavior and appropriate affect        Neurological Examination:     Mental status/cognitive function:   Recent and remote memory appear intact  Attention span and concentration as well as fund of knowledge are appropriate for age  Normal language and spontaneous speech  Cranial Nerves:   VII-facial expression symmetric  Motor Exam: symmetric bulk throughout  no atrophy, fasciculations or abnormal movements noted     Coordination:  no apparent dysmetria, ataxia or tremor noted  Gait: chronic unsteady casual gait                Pertinent lab results:   See EMR for recent labs    05/04/2021 CMP unremarkable     9/25/19 - CMP unremarkable     6/10/19 CMP unremarkable  Thyroid studies normal Vit D 28     1/07/2019:  CMP and CBC unremarkable  TSH " 0 78     Imaging: I have personally reviewed imaging and radiology read      MRA head and neck 09/30/2019:  No large vessel flow restrictive disease      - 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   This could be reevaluated with CTA    - Minor short segment stenosis of the dominant right V1 origin   Mild long segment narrowing of the origin and nondominant left v1   Anterior circulation normal      MRI Brain without contrast  7/9/19:   1   No acute intracranial abnormality  2   Mild nonspecific cerebral white matter signal abnormality, which can be seen in patients with migraines as well as microangiopathic disease  3   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   This may represent an anatomic variation   If concerned of vertebrobasilar insufficiency, consider follow-up CTA or contrast enhanced MRA imaging    Review of Systems:   ROS obtained by medical assistant Personally reviewed and updated if indicated  I recommended PCP follow up for non neurologic problems  Review of Systems   Constitutional: Negative  Negative for appetite change and fever  HENT: Negative  Negative for hearing loss, tinnitus, trouble swallowing and voice change  Eyes: Negative  Negative for photophobia, pain and visual disturbance  Respiratory: Negative  Negative for shortness of breath  Cardiovascular: Negative  Negative for palpitations  Gastrointestinal: Negative  Negative for nausea and vomiting  Endocrine: Negative  Negative for cold intolerance  Genitourinary: Negative  Negative for dysuria, frequency and urgency  Musculoskeletal: Negative  Negative for gait problem, myalgias and neck pain  Skin: Negative  Negative for rash  Allergic/Immunologic: Negative  Neurological: Negative  Negative for dizziness, tremors, seizures, syncope, facial asymmetry, speech difficulty, weakness, light-headedness, numbness and headaches  Hematological: Negative    Does not bruise/bleed easily  Psychiatric/Behavioral: Negative  Negative for confusion, hallucinations and sleep disturbance    I have spent 32 minutes with Patient and  today in which greater than 50% of this time was spent in counseling/coordination of care regarding Diagnostic results, Prognosis, Risks and benefits of tx options, Instructions for management, Patient and family education, Importance of tx compliance, Risk factor reductions, Impressions, Counseling / Coordination of care, Documenting in the medical record, Reviewing / ordering tests, medicine, procedures   and Obtaining or reviewing history    I also spent 14 minutes non face to face for this patient the same day         Author:  Serene Carias MD 5/10/2023 9:37 AM

## 2023-05-11 DIAGNOSIS — G47.00 INSOMNIA, UNSPECIFIED TYPE: ICD-10-CM

## 2023-05-11 RX ORDER — TRAZODONE HYDROCHLORIDE 50 MG/1
TABLET ORAL
Qty: 30 TABLET | Refills: 1 | Status: SHIPPED | OUTPATIENT
Start: 2023-05-11

## 2023-05-15 ENCOUNTER — CLINICAL SUPPORT (OUTPATIENT)
Dept: PULMONOLOGY | Facility: CLINIC | Age: 78
End: 2023-05-15

## 2023-05-15 DIAGNOSIS — J84.9 ILD (INTERSTITIAL LUNG DISEASE) (HCC): Primary | ICD-10-CM

## 2023-05-17 ENCOUNTER — CLINICAL SUPPORT (OUTPATIENT)
Dept: PULMONOLOGY | Facility: CLINIC | Age: 78
End: 2023-05-17

## 2023-05-17 ENCOUNTER — OFFICE VISIT (OUTPATIENT)
Dept: FAMILY MEDICINE CLINIC | Facility: CLINIC | Age: 78
End: 2023-05-17

## 2023-05-17 VITALS
DIASTOLIC BLOOD PRESSURE: 86 MMHG | RESPIRATION RATE: 16 BRPM | TEMPERATURE: 98.7 F | OXYGEN SATURATION: 97 % | HEIGHT: 67 IN | BODY MASS INDEX: 27.47 KG/M2 | WEIGHT: 175 LBS | SYSTOLIC BLOOD PRESSURE: 138 MMHG | HEART RATE: 84 BPM

## 2023-05-17 DIAGNOSIS — R07.81 RIB PAIN ON RIGHT SIDE: ICD-10-CM

## 2023-05-17 DIAGNOSIS — R42 DYSEQUILIBRIUM: ICD-10-CM

## 2023-05-17 DIAGNOSIS — R60.0 BILATERAL LEG EDEMA: ICD-10-CM

## 2023-05-17 DIAGNOSIS — J84.9 ILD (INTERSTITIAL LUNG DISEASE) (HCC): Primary | ICD-10-CM

## 2023-05-17 DIAGNOSIS — R53.83 FATIGUE, UNSPECIFIED TYPE: Primary | ICD-10-CM

## 2023-05-17 DIAGNOSIS — R42 DIZZINESS: ICD-10-CM

## 2023-05-17 DIAGNOSIS — R05.9 COUGH, UNSPECIFIED TYPE: ICD-10-CM

## 2023-05-17 NOTE — PROGRESS NOTES
Assessment/Plan:    Fatigue, unspecified type  - lab results reviewed, all unremarkable, will obtain chest x-ray and Echo and call with results once available  - XR chest pa & lateral; Future    Cough  - XR chest pa & lateral; Future    Dizziness  - will obtain Echo and carotid dopplers  - Echo complete w/ contrast if indicated; Future  - VAS carotid complete study; Future    Rib pain on right side  - XR ribs right w pa chest min 3 views; Future         Return as scheduled or sooner as needed  The patient understands and agrees with the treatment plan  Subjective:   Chief Complaint   Patient presents with   • Fever     Low grade on and off   • Nausea   • Dizziness      Patient ID: Matt Stone is a 78 y o  female who presents today with c/o fatigue, on and off low grade fevers up to 99 7 and night sweats since she had a cold several weeks ago, her cough has improved, no nasal congestion, no wheezing or shortness of breath  She us currently undergoing pulmonary rehab twice a day and reports no exertional chest pain or shortness of breath  Patient also continues having discomfort over her right lower ribs           The following portions of the patient's history were reviewed and updated as appropriate: allergies, current medications, past family history, past medical history, past social history, past surgical history and problem list     Past Medical History:   Diagnosis Date   • Anxiety    • Arthritis    • Back pain    • Balance problems    • Chest pain     heaviness   • Difficulty swallowing    • Dizziness    • Dry cough    • Gait disorder    • GERD (gastroesophageal reflux disease)    • Hyperparathyroidism ([address] )    • Hypertension    • Increased frequency of headaches    • Migraines    • Neck pain    • Numbness and tingling     bles   • Palpitations    • Pericarditis    • Thyroid disease     nodule   • Trouble in sleeping    • Wears glasses      Past Surgical History:   Procedure Laterality Date   • APPENDECTOMY     • CHOLECYSTECTOMY      laparoscopic   • COLONOSCOPY     • KNEE SURGERY Left    • PERICARDIAL WINDOW     • OH OPTX DSTL RADL X-ARTIC FX/EPIPHYSL SEP Right 3/22/2018    Procedure: OPEN REDUCTION W/ INTERNAL FIXATION (ORIF) RADIUS, splint application;  Surgeon: Alpheus Osler, MD;  Location: [facility];  Service: Orthopedics   • OH PARATHYROIDECTOMY/EXPLORATION PARATHYROIDS N/A 8/17/2022    Procedure: PARATHYROIDECTOMY, 4 GLAND EXPLORATION;  Surgeon: Chandrika De Souza MD;  Location[facility];  Service: ENT   • UPPER GASTROINTESTINAL ENDOSCOPY       Family History   Problem Relation Age of Onset   • Kidney failure Mother    • Hypertension Mother    • Lung cancer Father    • Pilar Lush Parkinson White syndrome Daughter    • No Known Problems Sister    • Substance Abuse Neg Hx    • Alcohol abuse Neg Hx    • Mental illness Neg Hx      Social History     Socioeconomic History   • Marital status: /Civil Union     Spouse name: Not on file   • Number of children: Not on file   • Years of education: Not on file   • Highest education level: Not on file   Occupational History   • Not on file   Tobacco Use   • Smoking status: Never   • Smokeless tobacco: Never   Vaping Use   • Vaping Use: Never used   Substance and Sexual Activity   • Alcohol use: Not Currently     Comment: Rarely    • Drug use: No   • Sexual activity: Yes     Partners: Male   Other Topics Concern   • Not on file   Social History Narrative    WORK:    1   ([employer]; [employer]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    2  [employer]        HOBBIES:    1  Singing    2  Dancing    3  Hx of ceramics (+ dust)        PETS:    1    Dogs    -  Denies birds        TRAVEL:    -  [place]         EXPOSURES:    Denies mold; down pillows/comforters; hot tubs     Social Determinants of Health     Financial Resource Strain: Low Risk    • Difficulty of Paying Living Expenses: Not hard at all   Food Insecurity: Not on file   Transportation Needs: No Transportation Needs   • Lack of Transportation (Medical): No   • Lack of Transportation (Non-Medical):  No   Physical Activity: Not on file   Stress: Not on file   Social Connections: Not on file   Intimate Partner Violence: Not on file   Housing Stability: Not on file       Current Outpatient Medications:   •  acetaminophen (TYLENOL) 500 mg tablet, Take 500-1,000 mg by mouth every 6 (six) hours as needed for mild pain, Disp: , Rfl:   •  albuterol (PROVENTIL HFA,VENTOLIN HFA) 90 mcg/act inhaler, Inhale 2 puffs every 6 (six) hours as needed for wheezing or shortness of breath, Disp: 8 g, Rfl: 1  •  ALPRAZolam (XANAX) 0 5 mg tablet, Take 1 tablet (0 5 mg total) by mouth daily at bedtime as needed for anxiety or sleep, Disp: 20 tablet, Rfl: 1  •  ascorbic acid (VITAMIN C) 500 mg tablet, Take 500 mg by mouth daily, Disp: , Rfl:   •  b complex vitamins tablet, Take 1 tablet by mouth daily, Disp: , Rfl:   •  Cholecalciferol (Vitamin D) 50 MCG (2000 UT) CAPS, Take 1 capsule (2,000 Units total) by mouth daily (Patient taking differently: Take 3,000 Units by mouth daily), Disp: , Rfl:   •  famotidine (PEPCID) 40 MG tablet, Take 1 tablet (40 mg total) by mouth every other day, Disp: 90 tablet, Rfl: 1  •  Magnesium 300 MG CAPS, Take 300 mg by mouth in the morning, Disp: , Rfl:   •  metoprolol succinate (TOPROL-XL) 25 mg 24 hr tablet, Take 1 tablet (25 mg total) by mouth daily at bedtime, Disp: 90 tablet, Rfl: 3  •  Nutritional Supplements (OSAPLEX MK-7 PO), Take by mouth, Disp: , Rfl:   •  omeprazole (PriLOSEC) 20 mg delayed release capsule, Take 1 capsule (20 mg total) by mouth every other day, Disp: 180 capsule, Rfl: 1  •  Riboflavin (VITAMIN B-2 PO), Take 250 mg by mouth in the morning, Disp: , Rfl:   •  traZODone (DESYREL) 50 mg tablet, TAKE 1 TABLET BY MOUTH DAILY AT BEDTIME, Disp: 30 tablet, Rfl: 1  •  dicyclomine (BENTYL) 10 mg capsule, TAKE 1 CAPSULE BY MOUTH 4 TIMES A DAY (BEFORE MEALS AND AT BEDTIME) (Patient not "taking: Reported on 5/17/2023), Disp: 360 capsule, Rfl: 1  •  methocarbamol (ROBAXIN) 500 mg tablet, Take 0 5 (250mg) to 1 (500mg) tablet by mouth every 8 hours as needed for muscle spasm/neck pain  (Patient not taking: Reported on 5/10/2023), Disp: 90 tablet, Rfl: 0  •  rizatriptan (MAXALT) 10 MG tablet, Take 1 tablet (10 mg total) by mouth once as needed for migraine May repeat in 2 hours if needed  Max 2/24 hours, 9/month  (Patient not taking: Reported on 5/10/2023), Disp: 9 tablet, Rfl: 6  •  sucralfate (CARAFATE) 1 g/10 mL suspension, Take 10 mL (1 g total) by mouth daily at bedtime (Patient not taking: Reported on 2/7/2023), Disp: 300 mL, Rfl: 0    Review of Systems   Constitutional: Positive for fatigue and fever  Negative for chills and unexpected weight change  Eyes: Negative for visual disturbance  Respiratory: Negative for shortness of breath and wheezing  Cough has improved   Cardiovascular: Negative for chest pain, palpitations and leg swelling  Gastrointestinal: Negative for abdominal pain, blood in stool, diarrhea, nausea and vomiting  Genitourinary: Negative for difficulty urinating, dysuria, frequency, hematuria and urgency  Skin: Negative for rash  Neurological: Positive for dizziness  Negative for syncope, facial asymmetry, speech difficulty, weakness, numbness and headaches  Hematological: Negative for adenopathy  Psychiatric/Behavioral: Negative for dysphoric mood  The patient is nervous/anxious  Objective:    Vitals:    05/17/23 1443   BP: 138/86   Pulse: 84   Resp: 16   Temp: 98 7 °F (37 1 °C)   SpO2: 97%   Weight: 79 4 kg (175 lb)   Height: 5' 7\" (1 702 m)        Physical Exam  Constitutional:       General: She is not in acute distress  Appearance: She is well-developed  Neck:      Thyroid: No thyromegaly  Cardiovascular:      Rate and Rhythm: Normal rate and regular rhythm  Heart sounds: Normal heart sounds  No murmur heard    Pulmonary:      " Effort: Pulmonary effort is normal  No respiratory distress  Breath sounds: Normal breath sounds  No wheezing, rhonchi or rales  Chest:      Chest wall: No tenderness  Abdominal:      General: There is no distension  Palpations: Abdomen is soft  There is no mass  Tenderness: There is no abdominal tenderness  Musculoskeletal:      Cervical back: Neck supple  Right lower leg: No edema  Left lower leg: No edema  Lymphadenopathy:      Cervical: No cervical adenopathy  Neurological:      Mental Status: She is alert and oriented to person, place, and time  Psychiatric:         Mood and Affect: Mood normal          Behavior: Behavior normal          Thought Content:  Thought content normal         Lab Results   Component Value Date    WBC 6 00 05/04/2023    HGB 14 9 05/04/2023    HCT 45 6 05/04/2023    MCV 94 05/04/2023     05/04/2023     Lab Results   Component Value Date    LEZ4AWNFOCVK 1 670 05/04/2023     Lab Results   Component Value Date    CFYNXJBM49 825 05/04/2023     Lab Results   Component Value Date    SODIUM 138 05/04/2023    K 4 1 05/04/2023     05/04/2023    CO2 29 05/04/2023    AGAP 1 (L) 05/04/2023    BUN 23 05/04/2023    CREATININE 0 83 05/04/2023    GLUC 87 01/08/2021    GLUF 81 05/04/2023    CALCIUM 9 9 05/04/2023    AST 22 05/04/2023    ALT 34 05/04/2023    ALKPHOS 97 05/04/2023    TP 7 2 05/04/2023    TBILI 0 70 05/04/2023    EGFR 69 05/04/2023

## 2023-05-19 ENCOUNTER — APPOINTMENT (OUTPATIENT)
Dept: RADIOLOGY | Facility: CLINIC | Age: 78
End: 2023-05-19

## 2023-05-19 DIAGNOSIS — R05.9 COUGH, UNSPECIFIED TYPE: ICD-10-CM

## 2023-05-19 DIAGNOSIS — R07.81 RIB PAIN ON RIGHT SIDE: ICD-10-CM

## 2023-05-22 ENCOUNTER — APPOINTMENT (OUTPATIENT)
Dept: PULMONOLOGY | Facility: CLINIC | Age: 78
End: 2023-05-22
Payer: COMMERCIAL

## 2023-05-24 ENCOUNTER — CLINICAL SUPPORT (OUTPATIENT)
Dept: PULMONOLOGY | Facility: CLINIC | Age: 78
End: 2023-05-24

## 2023-05-24 DIAGNOSIS — J84.9 ILD (INTERSTITIAL LUNG DISEASE) (HCC): Primary | ICD-10-CM

## 2023-05-31 ENCOUNTER — APPOINTMENT (OUTPATIENT)
Dept: PULMONOLOGY | Facility: CLINIC | Age: 78
End: 2023-05-31
Payer: COMMERCIAL

## 2023-06-01 ENCOUNTER — TELEPHONE (OUTPATIENT)
Dept: GASTROENTEROLOGY | Facility: MEDICAL CENTER | Age: 78
End: 2023-06-01

## 2023-06-01 ENCOUNTER — OFFICE VISIT (OUTPATIENT)
Dept: GASTROENTEROLOGY | Facility: MEDICAL CENTER | Age: 78
End: 2023-06-01

## 2023-06-01 VITALS
BODY MASS INDEX: 27.31 KG/M2 | TEMPERATURE: 97.6 F | WEIGHT: 174.4 LBS | HEART RATE: 77 BPM | SYSTOLIC BLOOD PRESSURE: 124 MMHG | DIASTOLIC BLOOD PRESSURE: 80 MMHG

## 2023-06-01 DIAGNOSIS — R13.10 DYSPHAGIA, UNSPECIFIED TYPE: ICD-10-CM

## 2023-06-01 DIAGNOSIS — K21.9 GASTROESOPHAGEAL REFLUX DISEASE WITHOUT ESOPHAGITIS: Primary | ICD-10-CM

## 2023-06-01 DIAGNOSIS — Z12.11 COLON CANCER SCREENING: ICD-10-CM

## 2023-06-01 DIAGNOSIS — K58.0 IRRITABLE BOWEL SYNDROME WITH DIARRHEA: ICD-10-CM

## 2023-06-01 RX ORDER — OMEPRAZOLE 40 MG/1
40 CAPSULE, DELAYED RELEASE ORAL
Qty: 30 CAPSULE | Refills: 3 | Status: SHIPPED | OUTPATIENT
Start: 2023-06-01 | End: 2023-06-08

## 2023-06-01 RX ORDER — FAMOTIDINE 40 MG/1
40 TABLET, FILM COATED ORAL
Qty: 90 TABLET | Refills: 1 | Status: SHIPPED | OUTPATIENT
Start: 2023-06-01

## 2023-06-01 NOTE — PATIENT INSTRUCTIONS
GERD (Gastroesophageal Reflux Disease)   AMBULATORY CARE:   Gastroesophageal reflux disease (GERD)  is reflux that happens more than 2 times a week for a few weeks  Reflux means acid and food in your stomach back up into your esophagus  GERD can cause other health problems over time if it is not treated  Common causes of GERD:  GERD often happens because the lower muscle (sphincter) of the esophagus does not close properly  The sphincter normally opens to let food into the stomach  It then closes to keep food and stomach acid in the stomach  If the sphincter does not close properly, stomach acid and food back up (reflux) into the esophagus  The following may increase your risk for GERD:  Certain foods such as spicy foods, chocolate, foods that contain caffeine, peppermint, and fried foods    Hiatal hernia    Certain medicines such as calcium channel blockers (used to treat high blood pressure), allergy medicines, sedatives, or antidepressants    Pregnancy, obesity, or scleroderma    Lying down after a meal    Drinking alcohol or smoking cigarettes    Signs and symptoms:   Heartburn (burning pain in your chest)    Pain after meals that spreads to your neck, jaw, or shoulder    Pain that gets better when you change positions    Bitter or acid taste in your mouth    A dry cough    Trouble swallowing or pain with swallowing    Hoarseness or a sore throat    Burping or hiccups    Feeling full soon after you start eating    Call your local emergency number (911 in the [address] [facility]) if:   You have severe chest pain and sudden trouble breathing  Seek care immediately if:   You have trouble breathing after you vomit  You have trouble swallowing, or pain with swallowing  Your bowel movements are black, bloody, or tarry-looking  Your vomit looks like coffee grounds or has blood in it  Call your doctor or gastroenterologist if:   You feel full and cannot burp or vomit      You vomit large amounts, or you vomit often     You are losing weight without trying  Your symptoms get worse or do not improve with treatment  You have questions or concerns about your condition or care  Treatment for GERD:   Medicines  are used to decrease stomach acid  Medicine may also be used to help your lower esophageal sphincter and stomach contract (tighten) more  Surgery  is done to wrap the upper part of the stomach around the esophageal sphincter  This will strengthen the sphincter and prevent reflux  Manage GERD:       Do not have foods or drinks that may increase heartburn  These include chocolate, peppermint, fried or fatty foods, drinks that contain caffeine, or carbonated drinks (soda)  Other foods include spicy foods, onions, tomatoes, and tomato-based foods  Do not have foods or drinks that can irritate your esophagus, such as citrus fruits, juices, and alcohol  Do not eat large meals  When you eat a lot of food at one time, your stomach needs more acid to digest it  Eat 6 small meals each day instead of 3 large meals, and eat slowly  Do not eat meals 2 to 3 hours before bedtime  Elevate the head of your bed  Place 6-inch blocks under the head of your bed frame  You may also use more than one pillow under your head and shoulders while you sleep  Maintain a healthy weight  If you are overweight, weight loss may help relieve symptoms of GERD  Do not smoke  Smoking weakens the lower esophageal sphincter and increases the risk of GERD  Ask your healthcare provider for information if you currently smoke and need help to quit  E-cigarettes or smokeless tobacco still contain nicotine  Talk to your healthcare provider before you use these products  Do not put pressure on your abdomen  Pressure pushes acid up into your esophagus  Do not wear clothing that is tight around your waist  Do not bend over  Bend at the knees if you need to pick something up    Follow up with your doctor or gastroenterologist as directed:  Write down your questions so you remember to ask them during your visits  © Copyright [employer] 2022 Information is for End User's use only and may not be sold, redistributed or otherwise used for commercial purposes  The above information is an  only  It is not intended as medical advice for individual conditions or treatments  Talk to your doctor, nurse or pharmacist before following any medical regimen to see if it is safe and effective for you  Constipation   AMBULATORY CARE:   Constipation  means you have hard, dry bowel movements, or you go longer than usual between bowel movements  Constipation may be caused by a lack of water or high-fiber foods  Certain medicines, or a lack of fiber or physical activity may also cause constipation  Common symptoms include the following:   Trouble pushing out your bowel movement    Pain or bleeding during your bowel movement    A feeling that you did not finish having your bowel movement    Nausea    Bloating    Headache    Call your doctor if:   You have blood in your bowel movements  You have a fever and abdominal pain with the constipation  Your constipation gets worse  You start to vomit  You have questions or concerns about your condition or care  Relieve constipation:  Medicine can keep you have a bowel movement more easily  Medicines may increase moisture in your bowel movement or increase the motion of your intestines  A suppository  may be used to help soften your bowel movements  This may make them easier to pass  A suppository is guided into your rectum through your anus  Laxatives  can help stimulate your bowels to have a bowel movement  Your provider may recommend you only use laxatives for a short time  Long-term use may make your bowels dependent on the medicine  An enema  is liquid medicine used to clear bowel movement from your rectum  The medicine is put into your rectum through your anus  Prevent constipation:   Drink liquids as directed  You may need to drink extra liquids to help soften and move your bowels  Ask how much liquid to drink each day and which liquids are best for you  Eat high-fiber foods  This may help decrease constipation by adding bulk to your bowel movements  High-fiber foods include fruit, vegetables, whole-grain breads and cereals, and beans  Your healthcare provider or dietitian can help you create a high-fiber meal plan  Your provider may also recommend a fiber supplement if you cannot get enough fiber from food  Exercise regularly  Regular physical activity can help stimulate your intestines  Walking is a good exercise to prevent or relieve constipation  Ask which exercises are best for you  Schedule a time each day to have a bowel movement  This may help train your body to have regular bowel movements  Bend forward while you are on the toilet to help move the bowel movement out  Sit on the toilet for at least 10 minutes, even if you do not have a bowel movement  Talk to your healthcare provider about your medicines  Certain medicines, such as opioids, can cause constipation  Your provider may be able to make medicine changes  For example, he or she may change the kind of medicine, or change when you take it  Follow up with your doctor as directed:  Write down your questions so you remember to ask them during your visits  © Copyright Homero Cai 2022 Information is for End User's use only and may not be sold, redistributed or otherwise used for commercial purposes  The above information is an  only  It is not intended as medical advice for individual conditions or treatments  Talk to your doctor, nurse or pharmacist before following any medical regimen to see if it is safe and effective for you      Scheduled date of Colon/EGD (as of today) 8/11/2023  Physician performing: Chung Interiano  [facility] of procedure:  [facility]  Instructions given to patient:  Dulcolax/Miralax  Clearances: Cardiac clearance

## 2023-06-01 NOTE — PROGRESS NOTES
[facility] - Outpatient Follow-up Note  Robinson Pham 78 y o  female MRN: [id number]  Encounter: [id number]          ASSESSMENT AND PLAN:  80-year-old female with history of IBS-D, GERD, primary hyperparathyroidism, interstitial lung disease who presents for follow-up evaluation  1  Gastroesophageal reflux disease without esophagitis  2  Dysphagia, unspecified type  She has chronic history of GERD for many years  She has previously gone endoscopy in the past with empiric dilation, with results overall normal   She has had esophageal biopsies negative for EOE in the past   Her current regimen includes omeprazole 20 mg in the morning alternating days with Pepcid 40 mg in the morning  I discussed dietary/lifestyle antireflux measures with her today including avoiding late night eating, trigger foods and sleeping with the head of the bed elevated  I recommend transitioning her acid suppression therapy to omeprazole 40 mg in the morning and Pepcid 40 mg at bedtime  Dysphagia may be related to esophagitis, peptic stricture, or motility disorder  EGD will be performed for evaluation  If her endoscopy is unremarkable, acid reflux symptoms are controlled and she continues to have dysphagia she may require manometry or pH testing in the future  - omeprazole (PriLOSEC) 40 MG capsule; Take 1 capsule (40 mg total) by mouth daily before breakfast  Dispense: 30 capsule; Refill: 3  - famotidine (PEPCID) 40 MG tablet; Take 1 tablet (40 mg total) by mouth daily at bedtime  Dispense: 90 tablet; Refill: 1  - EGD; Future    3  Irritable bowel syndrome with diarrhea  She has chronic symptoms of IBS which are controlled with dietary changes at this time  She no longer needs to use Bentyl as needed  4  Colon cancer screening  Her last colonoscopy was in 2018 showing 1 subcentimeter colon polyp  Pathology showed polypoid mucosa and no adenomatous colon polyp    She was recommended to repeat in 5 years and is due at this time  I discussed the indication, risk and benefit of colonoscopy with her today and she is agreeable to proceed  We discussed if her colonoscopy is normal that she would not require additional screening in the future  - Colonoscopy; Future      Follow-up after EGD and colonoscopy  ______________________________________________________________________    SUBJECTIVE: 61-year-old female with history of IBS-D, GERD, primary hyperparathyroidism, interstitial lung disease who presents for follow-up evaluation  She was last seen in the GI office in August 2022  She is a history of irritable bowel syndrome with diarrhea and was recommended continue dicyclomine as needed  She also has a history of GERD for which she was maintained on omeprazole 20 mg twice a day  Interval history: She reports ongoing symptoms of acid reflux particularly after eating  There are no specific trigger foods  She has intermittent dysphagia particularly to solid food like steak  She is been taking omeprazole 20 mg in the morning and alternating that with Pepcid 40 mg in the morning prior to meals  She previously used Zantac in the past which did control her symptoms  She avoids late night eating but at times does use small amounts of food with her nighttime medications  She reports regular, formed bowel movements daily without melena or hematochezia  She has rare episodes of diarrhea  She no longer uses Bentyl for abdominal discomfort  Prior EGD/colonoscopy     April 2021 EGD was normal   Empiric dilation was performed with balloon to 20 mm without mucosal disruption  Biopsies were negative for EOE  2018 EGD showed mild esophagitis in distal third of the esophagus  2018 colonoscopy showed small polyp in the transverse colon, internal and external hemorrhoids  Pathology showed polypoid mucosa with mild edema  She was recommended repeat colonoscopy in 5 years    Duodenal biopsies were normal, Gastric biopsy showed chronic gastritis negative for H  pylori and distal esophageal biopsy showed chronic esophagitis negative for intestinal metaplasia      REVIEW OF SYSTEMS IS OTHERWISE NEGATIVE    10 point review of systems is negative other than stated as per HPI    Historical Information   Past Medical History:   Diagnosis Date   • Anxiety    • Arthritis    • Back pain    • Balance problems    • Chest pain     heaviness   • Difficulty swallowing    • Dizziness    • Dry cough    • Gait disorder    • GERD (gastroesophageal reflux disease)    • Hyperparathyroidism ([address] )    • Hypertension    • Increased frequency of headaches    • Migraines    • Neck pain    • Numbness and tingling     bles   • Palpitations    • Pericarditis    • Thyroid disease     nodule   • Trouble in sleeping    • Wears glasses      Past Surgical History:   Procedure Laterality Date   • APPENDECTOMY     • CHOLECYSTECTOMY      laparoscopic   • COLONOSCOPY     • KNEE SURGERY Left    • PERICARDIAL WINDOW     • RI OPTX DSTL RADL X-ARTIC FX/EPIPHYSL SEP Right 3/22/2018    Procedure: OPEN REDUCTION W/ INTERNAL FIXATION (ORIF) RADIUS, splint application;  Surgeon: Gregorio Pina MD;  Location: [facility];  Service: Orthopedics   • RI PARATHYROIDECTOMY/EXPLORATION PARATHYROIDS N/A 8/17/2022    Procedure: PARATHYROIDECTOMY, 4 GLAND EXPLORATION;  Surgeon: Lily Salas MD;  Location: [facility];  Service: ENT   • UPPER GASTROINTESTINAL ENDOSCOPY       Social History   Social History     Substance and Sexual Activity   Alcohol Use Not Currently    Comment: Rarely      Social History     Substance and Sexual Activity   Drug Use No     Social History     Tobacco Use   Smoking Status Never   Smokeless Tobacco Never     Family History   Problem Relation Age of Onset   • Kidney failure Mother    • Hypertension Mother    • Lung cancer Father    • Sarah Stammer Parkinson White syndrome Daughter    • No Known Problems Sister    • Substance Abuse Neg Hx    • Alcohol abuse Neg Hx    • Mental illness Neg Hx        Meds/Allergies       Current Outpatient Medications:   •  acetaminophen (TYLENOL) 500 mg tablet  •  albuterol (PROVENTIL HFA,VENTOLIN HFA) 90 mcg/act inhaler  •  ALPRAZolam (XANAX) 0 5 mg tablet  •  ascorbic acid (VITAMIN C) 500 mg tablet  •  b complex vitamins tablet  •  Cholecalciferol (Vitamin D) 50 MCG (2000 UT) CAPS  •  dicyclomine (BENTYL) 10 mg capsule  •  famotidine (PEPCID) 40 MG tablet  •  Magnesium 300 MG CAPS  •  methocarbamol (ROBAXIN) 500 mg tablet  •  metoprolol succinate (TOPROL-XL) 25 mg 24 hr tablet  •  Nutritional Supplements (OSAPLEX MK-7 PO)  •  omeprazole (PriLOSEC) 20 mg delayed release capsule  •  Riboflavin (VITAMIN B-2 PO)  •  rizatriptan (MAXALT) 10 MG tablet  •  sucralfate (CARAFATE) 1 g/10 mL suspension  •  traZODone (DESYREL) 50 mg tablet    Allergies   Allergen Reactions   • Ciprofloxacin Myalgia           Objective     Blood pressure 124/80, pulse 77, temperature 97 6 °F (36 4 °C), weight 79 1 kg (174 lb 6 4 oz), not currently breastfeeding  There is no height or weight on file to calculate BMI  PHYSICAL EXAM:      General Appearance:   Alert, cooperative, no distress   HEENT:   Normocephalic, atraumatic, anicteric  Neck:  Supple, symmetrical, trachea midline   Lungs:   Clear to auscultation bilaterally; no rales, rhonchi or wheezing; respirations unlabored    Heart[de-identified]   Regular rate and rhythm; no murmur, rub, or gallop  Abdomen:   Soft, non-tender, non-distended; normal bowel sounds; no masses, no organomegaly    Genitalia:   Deferred    Rectal:   Deferred    Extremities:  No cyanosis, clubbing or edema    Pulses:  2+ and symmetric    Skin:  No jaundice, rashes, or lesions    Lymph nodes:  No palpable cervical lymphadenopathy        Lab Results:   No visits with results within 1 Day(s) from this visit     Latest known visit with results is:   Appointment on 05/04/2023   Component Date Value   • Sodium 05/04/2023 138 • Potassium 05/04/2023 4 1    • Chloride 05/04/2023 108    • CO2 05/04/2023 29    • ANION GAP 05/04/2023 1 (L)    • BUN 05/04/2023 23    • Creatinine 05/04/2023 0 83    • Glucose, Fasting 05/04/2023 81    • Calcium 05/04/2023 9 9    • Corrected Calcium 05/04/2023 10 4 (H)    • AST 05/04/2023 22    • ALT 05/04/2023 34    • Alkaline Phosphatase 05/04/2023 97    • Total Protein 05/04/2023 7 2    • Albumin 05/04/2023 3 4 (L)    • Total Bilirubin 05/04/2023 0 70    • eGFR 05/04/2023 69    • WBC 05/04/2023 6 00    • RBC 05/04/2023 4 83    • Hemoglobin 05/04/2023 14 9    • Hematocrit 05/04/2023 45 6    • MCV 05/04/2023 94    • MCH 05/04/2023 30 8    • MCHC 05/04/2023 32 7    • RDW 05/04/2023 12 5    • MPV 05/04/2023 10 6    • Platelets 88/52/9430 248    • nRBC 05/04/2023 0    • Neutrophils Relative 05/04/2023 49    • Immat GRANS % 05/04/2023 1    • Lymphocytes Relative 05/04/2023 34    • Monocytes Relative 05/04/2023 12    • Eosinophils Relative 05/04/2023 4    • Basophils Relative 05/04/2023 0    • Neutrophils Absolute 05/04/2023 2 91    • Immature Grans Absolute 05/04/2023 0 03    • Lymphocytes Absolute 05/04/2023 2 06    • Monocytes Absolute 05/04/2023 0 73    • Eosinophils Absolute 05/04/2023 0 25    • Basophils Absolute 05/04/2023 0 02    • TSH 3RD GENERATON 05/04/2023 1 670    • Vit D, 25-Hydroxy 05/04/2023 38 0    • Vitamin B-12 05/04/2023 825          Radiology Results:   XR ribs right w pa chest min 3 views    Result Date: 5/21/2023  Narrative: RIGHT RIBS AND CHEST INDICATION:   R07 81: Pleurodynia  Right anterior lower rib pain under right breast  Cough  COMPARISON: Chest radiograph 1/19/2023, chest CT 3/17/2022  VIEWS:  XR RIBS RIGHT W PA CHEST MIN 3 VIEWS FINDINGS: No acute displaced rib fracture or pathologic bone lesion  No pneumothorax or pleural effusion/hemothorax  Lungs clear   Soft tissues normal  Cardiomediastinal silhouette normal      Impression: No acute displaced rib fracture or pathologic bone lesion  No acute cardiopulmonary disease  Workstation performed: [id number]     XR chest pa & lateral    Result Date: 5/21/2023  Narrative: CHEST INDICATION:   R05 9: Cough, unspecified  Right lower anterior rib pain under breast  COMPARISON: Right rib series from today, CXR 1/19/2023, chest CT 3/17/2022  EXAM PERFORMED/VIEWS:  XR CHEST PA & LATERAL  FINDINGS: Cardiomediastinal silhouette normal  Lungs clear  No effusion or pneumothorax  Upper abdomen normal  Bones normal for age  Impression: No acute cardiopulmonary disease   Workstation performed: [id number]

## 2023-06-05 ENCOUNTER — CLINICAL SUPPORT (OUTPATIENT)
Dept: PULMONOLOGY | Facility: CLINIC | Age: 78
End: 2023-06-05
Payer: COMMERCIAL

## 2023-06-05 DIAGNOSIS — J84.9 ILD (INTERSTITIAL LUNG DISEASE) (HCC): Primary | ICD-10-CM

## 2023-06-05 DIAGNOSIS — G47.00 INSOMNIA, UNSPECIFIED TYPE: ICD-10-CM

## 2023-06-05 PROCEDURE — G0239 OTH RESP PROC, GROUP: HCPCS

## 2023-06-06 RX ORDER — TRAZODONE HYDROCHLORIDE 50 MG/1
TABLET ORAL
Qty: 90 TABLET | Refills: 1 | Status: SHIPPED | OUTPATIENT
Start: 2023-06-06

## 2023-06-07 ENCOUNTER — APPOINTMENT (OUTPATIENT)
Dept: PULMONOLOGY | Facility: CLINIC | Age: 78
End: 2023-06-07
Payer: COMMERCIAL

## 2023-06-08 ENCOUNTER — TELEPHONE (OUTPATIENT)
Dept: GASTROENTEROLOGY | Facility: MEDICAL CENTER | Age: 78
End: 2023-06-08

## 2023-06-08 DIAGNOSIS — K21.9 GASTROESOPHAGEAL REFLUX DISEASE WITHOUT ESOPHAGITIS: Primary | ICD-10-CM

## 2023-06-08 RX ORDER — OMEPRAZOLE 20 MG/1
20 CAPSULE, DELAYED RELEASE ORAL
Qty: 60 CAPSULE | Refills: 3 | Status: SHIPPED | OUTPATIENT
Start: 2023-06-08

## 2023-06-08 NOTE — TELEPHONE ENCOUNTER
Our mutual patient is scheduled for procedure: Colonoscopy/EGD     On: 8/11/2023    With: Dr Gris Cole office is requesting cardiac clearance for their upcoming procedure        Physician Approving clearance: ________________________

## 2023-06-12 ENCOUNTER — CLINICAL SUPPORT (OUTPATIENT)
Dept: PULMONOLOGY | Facility: CLINIC | Age: 78
End: 2023-06-12
Payer: COMMERCIAL

## 2023-06-12 DIAGNOSIS — J84.9 ILD (INTERSTITIAL LUNG DISEASE) (HCC): Primary | ICD-10-CM

## 2023-06-12 PROCEDURE — G0239 OTH RESP PROC, GROUP: HCPCS

## 2023-06-14 ENCOUNTER — CLINICAL SUPPORT (OUTPATIENT)
Dept: PULMONOLOGY | Facility: CLINIC | Age: 78
End: 2023-06-14
Payer: COMMERCIAL

## 2023-06-14 DIAGNOSIS — J84.9 ILD (INTERSTITIAL LUNG DISEASE) (HCC): Primary | ICD-10-CM

## 2023-06-14 PROCEDURE — G0239 OTH RESP PROC, GROUP: HCPCS

## 2023-06-16 ENCOUNTER — HOSPITAL ENCOUNTER (OUTPATIENT)
Dept: NON INVASIVE DIAGNOSTICS | Facility: HOSPITAL | Age: 78
Discharge: HOME | End: 2023-06-16
Payer: COMMERCIAL

## 2023-06-16 VITALS
HEART RATE: 61 BPM | HEIGHT: 67 IN | SYSTOLIC BLOOD PRESSURE: 124 MMHG | BODY MASS INDEX: 27.31 KG/M2 | WEIGHT: 174 LBS | DIASTOLIC BLOOD PRESSURE: 80 MMHG

## 2023-06-16 DIAGNOSIS — R42 DYSEQUILIBRIUM: ICD-10-CM

## 2023-06-16 DIAGNOSIS — R42 DIZZINESS: ICD-10-CM

## 2023-06-16 DIAGNOSIS — R60.0 BILATERAL LEG EDEMA: ICD-10-CM

## 2023-06-16 LAB
AORTIC ROOT: 3.1 CM
AORTIC VALVE MEAN VELOCITY: 8.7 M/S
APICAL FOUR CHAMBER EJECTION FRACTION: 60 %
ASCENDING AORTA: 3.2 CM
AV LVOT MEAN GRADIENT: 2 MMHG
AV LVOT PEAK GRADIENT: 3 MMHG
AV MEAN GRADIENT: 4 MMHG
AV PEAK GRADIENT: 8 MMHG
AV VELOCITY RATIO: 0.67
DOP CALC AO PEAK VEL: 1.38 M/S
DOP CALC AO VTI: 28.91 CM
DOP CALC LVOT PEAK VEL VTI: 20.85 CM
DOP CALC LVOT PEAK VEL: 0.92 M/S
DOP CALC MV VTI: 35.8 CM
E WAVE DECELERATION TIME: 197 MS
FRACTIONAL SHORTENING: 37 (ref 28–44)
GLOBAL LONGITUIDAL STRAIN: -18 %
INTERVENTRICULAR SEPTUM IN DIASTOLE (PARASTERNAL SHORT AXIS VIEW): 0.9 CM
INTERVENTRICULAR SEPTUM: 0.9 CM (ref 0.6–1.1)
LAAS-AP2: 14 CM2
LAAS-AP4: 15 CM2
LEFT ATRIUM SIZE: 3.5 CM
LEFT INTERNAL DIMENSION IN SYSTOLE: 2.4 CM (ref 2.1–4)
LEFT VENTRICLE DIASTOLIC VOLUME (MOD BIPLANE): 77 ML
LEFT VENTRICLE SYSTOLIC VOLUME (MOD BIPLANE): 33 ML
LEFT VENTRICULAR INTERNAL DIMENSION IN DIASTOLE: 3.8 CM (ref 3.5–6)
LEFT VENTRICULAR POSTERIOR WALL IN END DIASTOLE: 0.8 CM
LEFT VENTRICULAR STROKE VOLUME: 42 ML
LV EF: 58 %
LVSV (TEICH): 42 ML
MV E'TISSUE VEL-LAT: 7 CM/S
MV E'TISSUE VEL-SEP: 5 CM/S
MV MEAN GRADIENT: 1 MMHG
MV PEAK A VEL: 1.05 M/S
MV PEAK E VEL: 80 CM/S
MV PEAK GRADIENT: 5 MMHG
MV STENOSIS PRESSURE HALF TIME: 57 MS
MV VALVE AREA P 1/2 METHOD: 3.86
RIGHT ATRIAL 2D VOLUME: 30 ML
RIGHT ATRIUM AREA SYSTOLE A4C: 12.7 CM2
RIGHT VENTRICLE ID DIMENSION: 4 CM
SL CV LEFT ATRIUM LENGTH A2C: 4.4 CM
SL CV LV EF: 60
SL CV PED ECHO LEFT VENTRICLE DIASTOLIC VOLUME (MOD BIPLANE) 2D: 62 ML
SL CV PED ECHO LEFT VENTRICLE SYSTOLIC VOLUME (MOD BIPLANE) 2D: 21 ML
TR MAX PG: 26 MMHG
TR PEAK VELOCITY: 2.5 M/S
TRICUSPID ANNULAR PLANE SYSTOLIC EXCURSION: 2 CM
TRICUSPID VALVE PEAK REGURGITATION VELOCITY: 2.53 M/S

## 2023-06-16 PROCEDURE — 93306 TTE W/DOPPLER COMPLETE: CPT | Performed by: INTERNAL MEDICINE

## 2023-06-16 PROCEDURE — 93880 EXTRACRANIAL BILAT STUDY: CPT

## 2023-06-16 PROCEDURE — 93356 MYOCRD STRAIN IMG SPCKL TRCK: CPT | Performed by: INTERNAL MEDICINE

## 2023-06-16 PROCEDURE — 93306 TTE W/DOPPLER COMPLETE: CPT

## 2023-06-16 PROCEDURE — 93356 MYOCRD STRAIN IMG SPCKL TRCK: CPT

## 2023-06-17 PROCEDURE — 93880 EXTRACRANIAL BILAT STUDY: CPT | Performed by: SURGERY

## 2023-06-19 ENCOUNTER — CLINICAL SUPPORT (OUTPATIENT)
Dept: PULMONOLOGY | Facility: CLINIC | Age: 78
End: 2023-06-19
Payer: COMMERCIAL

## 2023-06-19 DIAGNOSIS — J84.9 ILD (INTERSTITIAL LUNG DISEASE) (HCC): Primary | ICD-10-CM

## 2023-06-19 PROCEDURE — G0239 OTH RESP PROC, GROUP: HCPCS

## 2023-06-19 NOTE — PROGRESS NOTES
Pulmonary Rehabilitation Plan of Care   Discharge      Today's date: 2023   # of Exercise Sessions Completed: 24  Patient name: Ghada Graham      : 1945  Age: 78 y o  MRN: [id number]  Referring Physician: Terry Mckeon, *  Pulmonologist: Eleazar Soni MD   Provider: Ozzie Nichols  Clinician: Gary Bennett MS, CEP     Dx:   Encounter Diagnosis   Name Primary? • [facility]) ([facility] [address] ) Yes     Date of onset: 11/3/22      SUMMARY OF PROGRESS:  Maryan Antonio is compliant attending pulmonary rehab exercise sessions 2x/wk for ILD and has been discharged from the program today completing 24 sessions! The patient tolerates 35-40 minutes of aerobic exercise achieving 2 8 METs on different modalities such as the UBE, Nustep, recumbent bike, and room walking  Resting SpO2 92-95% with decreased O2 saturation during exercise 90-97% on room air  Resting //70 with appropriate response to exercise reaching 138//88  The patient reports no symptoms, no limitations and knee pain occ  during exercise  Rating of perceived dyspnea with exercise 0-3/10 at max METs  She completed her post 6MWT walking 1,020 feet, no rest breaks, SpO2 94-96% on RA, RPE: 4, and RPD: 3  She has begun a regular exercise program at home, but states walking and doing hand weights  She is very interested in joining the [employer] which we will take her down to preview after today's session  Depression screening using the PHQ-9 scoring 4 1-4 = Minimal Depression and anxiety screening using the AIDAN-7 scoring 0 0-4  = Not anxious  The scores stayed about the same and reports these feelings are more toward not having the best sleep schedule which results in low energy levels  Pursed lipped breathing and Diaphragmatic breathing continues to be demonstrated, practiced, and reinforced with the patient  Her exercise program will be progressed as tolerated   She was given all hand outs on pulmonary education  She reports she enjoyed the rehab program and feels she has made progress  She reports increased endurance and she does feel she is breathing better  The patient has the following personal goals she hopes to achieve by discharge: increase water intake, look continue to joining the [employer], attend rehab regularly, increase workloads on exercise equipment, and continue to spend lots of time with family and friends! She has met these goals at discharge! Even with having set backs of getting sick, she reports she was able to come back and get back to original workloads  See outcomes report attached  Reviewed discharge instructions and verbalized understanding           Medication compliance: Yes   Comments: Pt reports to be compliant with medications  Fall Risk: Low   Comments: Ambulates with a steady gait with no assist device and Denies a fall in the past 6 months    Smoking: Never used    RPD at Rest: 0/10  RPD with Exercise:  0-3/10    Assessment of progression of lung disease and functional status:  CAT: n/a  Shortness of breath questionnaire:       EXERCISE ASSESSMENT and PLAN    Current Exercise Program in Rehab: No change to exercise prescription       Frequency: 2 days/week   Supplement with home exercise 2+ days/wk as tolerated       Minutes: 35-40      METS: 2 8              SpO2: 90-97              RPD: 0-3                    HR:    RPE: 3-4         Modalities: UBE, NuStep, Recumbent bike and Room walking      Exercise Progression after Discharge: Walking/[employer]/Free weights    Frequency: 3-5 days of gym or home exercise   Minutes: 30-50  >150 mins/wk of moderate intensity exercise   METS: 2 0 - 3 0   HR: 109 - 139    RPE: 4-6   Modalities: UBE, NuStep, Recumbent bike and Room walking    Strength trainin-3 days / week  12-15 repetitions  1-2 sets per modality    Modalities: Chest Press, Lateral Raise, Arm Curl, Sit to [employer] Raises    Oxygen Needs: on room air at rest and on room air with exercise  Oxygen Goal: Maintain SpO2>90% during exercise    Home Exercise: Type: Walking/[facility], Frequency: 3-5 days/week, Duration: 30-50 mins     Education: pursed lipped breathing, diaphragmatic breathing, relaxation breathing, home exercise, benefits of exercise for pulmonary disease, RPE scale, RPD scale, O2 saturation monitoring, appropriate O2 response to exercise and energy conservation    Goals: Improved 6MW distance by 10%, reduced dyspnea during exercise (0-3/10), improved exercise tolerance (max METs tolerated in pulmonary rehab), SpO2 >90% during exercise, improved DUKE activity score, reduced RPD at rest, attend pulmonary rehab regularly and decrease sitting time at home    Progressing:  Goals not met: did not imrpove in distance on 6MWT    , Goals met: increased overall endurance and strength, increased functional METs, reduced dyspnea at rest and exercise, attended rehab regularly 2x/week, walking and free weights with rehab, and has plan to join [facility] , Patient will be encouraged to focus on lifestyle modifications following discharge      Plan: Titrate supplemental oxygen as needed to maintain SpO2>90% with exercise, learn to conserve energy with ADLs , practice breathing techniques 3x/day and reduce time sitting at home    Readiness to change: Action:  (Changing behavior)      NUTRITION ASSESSMENT AND PLAN    Weight control:    Starting weight: 173 4 lbs    Current weight:  173 4 lbs     Diabetes: N/A    Goals:LDL <100, CHOL <200, decreased body fat % <33%  (F), reduced waist circumference <35 inches (F), 2 5-5%  wt loss, increase intake of fish, shellfish, use low fat dairy, eat 3 or more servings of whole grains a day, Eat 4-5 cups of fruits and vegetables daily and eliminate or choose low-fat sweets  Education: heart healthy eating  low sodium diet  hydration  wt  loss   healthy choices while dining out     Progressing:Goals met: maintained current weight, RYP score improved, low sodium, organic foods, cooks at home, and drinking water , Patient will be encouraged to focus on lifestyle modifications following discharge  Plan: Education class: Reading Food Labels, Education Class: Heart Healthy Eating, switch to low fat cheeses, replace unhealthy snacks with fruits & vegs, reduce portion sizes, remove salt shaker from table, drink more water and keep added daily sugar <25g/day  Readiness to change: Action:  (Changing behavior)      PSYCHOSOCIAL ASSESSMENT AND PLAN    Emotional:  Depression assessment:  PHQ-9 = 4 1-4 = Minimal Depression            Anxiety measure:  AIDAN-7 = 0 0-4  = Not anxious  Self-reported stress level: 3   Social support: Excellent and Patient reports excellent emotional/social support from family and friends     Goals:  Reduce perceived stress to 1-3/10, continue medical therapy, Pain in [facility] Score < 3, increased energy, stop worrying, take time to relax and Feel less anxious  Education: signs/sxs of depression, benefits of a positive support system and stress management techniques    Progressing:Goals met: denies depression (score is geared towards sleeping/energy levels which she is working on reducing caffeine) and reports great support system   , Patient will be encouraged to focus on lifestyle modifications following discharge       Plan: Class: Stress and Your Health, Class: Relaxation, PHQ-9 >5 will refer to MD, Practice relaxation techniques and Exercise  Readiness to change: Action:  (Changing behavior)      OTHER CORE COMPONENTS     Tobacco:   Social History     Tobacco Use   Smoking Status Never   Smokeless Tobacco Never       Tobacco Use Intervention: Referral to tobacco expert:   N/A:  Patient is a non-smoker     Blood pressure:    Restin//70   Exercise: 138//88    Goals: consistent BP < 130/80, reduced dietary sodium <2300mg, moderate intensity exercise >150 mins/wk and medication compliance  Education:  pathophysiology of pulmonary disease, preventing infections, dangers of smoking, control coughing, inspiratory muscle training and environmental triggers     Progressing:Goals met: blood pressures within normal limits at rest and normal response to exercise, low sodium intake, medication compliance, and will continue to acheive >150 mins/week of moderate intensity exercise  , Patient will be encouraged to focus on lifestyle modifications following discharge       Plan: Complete abstention from smoking, engage in regular exercise and monitor home BP  Readiness to change: Action:  (Changing behavior)

## 2023-06-22 ENCOUNTER — TELEPHONE (OUTPATIENT)
Dept: FAMILY MEDICINE CLINIC | Facility: CLINIC | Age: 78
End: 2023-06-22

## 2023-06-22 NOTE — TELEPHONE ENCOUNTER
----- Message from Anitra Do MD sent at 6/21/2023  5:44 PM EDT -----  Please inform patient of normal carotid US results

## 2023-06-26 ENCOUNTER — NURSE TRIAGE (OUTPATIENT)
Age: 78
End: 2023-06-26

## 2023-06-26 ENCOUNTER — APPOINTMENT (OUTPATIENT)
Dept: PULMONOLOGY | Facility: CLINIC | Age: 78
End: 2023-06-26
Payer: COMMERCIAL

## 2023-06-26 NOTE — TELEPHONE ENCOUNTER
Reason for Disposition  • Mild abdominal pain  • Caller has NON-URGENT medicine question about med that PCP or specialist prescribed and triager unable to answer question    Answer Assessment - Initial Assessment Questions  Patient calling in regarding famotidine that was prescribed 6/1/23 by Dr Guzman Palacio  She has chronic history of GERD for many years  She is scheduled for an EGD in August  Her most recent medication regimen was changed to omeprazole 40 mg in the morning and famotidine 40 mg at bedtime  She feels that the famotidine is causing worsening side effects; lower abd cramping & bloating and loose Bm's in the morning  I advised that she try discontinuing the famotidine x 3 days to see if potential side effects dissipate  If she does not have improvement in symptoms she is to call us  Patient agreeable      Protocols used: ABDOMINAL PAIN - FEMALE-ADULT-OH, MEDICATION QUESTION CALL-ADULT-[place]

## 2023-07-03 ENCOUNTER — OFFICE VISIT (OUTPATIENT)
Dept: FAMILY MEDICINE CLINIC | Facility: CLINIC | Age: 78
End: 2023-07-03
Payer: COMMERCIAL

## 2023-07-03 VITALS
DIASTOLIC BLOOD PRESSURE: 80 MMHG | SYSTOLIC BLOOD PRESSURE: 146 MMHG | TEMPERATURE: 98.2 F | HEIGHT: 67 IN | BODY MASS INDEX: 27.12 KG/M2 | RESPIRATION RATE: 16 BRPM | HEART RATE: 81 BPM | WEIGHT: 172.8 LBS | OXYGEN SATURATION: 96 %

## 2023-07-03 DIAGNOSIS — I10 ESSENTIAL HYPERTENSION: ICD-10-CM

## 2023-07-03 DIAGNOSIS — R39.9 UTI SYMPTOMS: Primary | ICD-10-CM

## 2023-07-03 DIAGNOSIS — R39.9 URINARY SYMPTOM OR SIGN: ICD-10-CM

## 2023-07-03 LAB
SL AMB  POCT GLUCOSE, UA: NORMAL
SL AMB LEUKOCYTE ESTERASE,UA: NORMAL
SL AMB POCT BILIRUBIN,UA: NORMAL
SL AMB POCT BLOOD,UA: NORMAL
SL AMB POCT CLARITY,UA: CLEAR
SL AMB POCT COLOR,UA: YELLOW
SL AMB POCT KETONES,UA: NORMAL
SL AMB POCT NITRITE,UA: NORMAL
SL AMB POCT PH,UA: 5
SL AMB POCT SPECIFIC GRAVITY,UA: 1.01
SL AMB POCT URINE PROTEIN: NORMAL
SL AMB POCT UROBILINOGEN: 0.2

## 2023-07-03 PROCEDURE — 3077F SYST BP >= 140 MM HG: CPT | Performed by: FAMILY MEDICINE

## 2023-07-03 PROCEDURE — 99214 OFFICE O/P EST MOD 30 MIN: CPT | Performed by: FAMILY MEDICINE

## 2023-07-03 PROCEDURE — 3079F DIAST BP 80-89 MM HG: CPT | Performed by: FAMILY MEDICINE

## 2023-07-03 PROCEDURE — 81002 URINALYSIS NONAUTO W/O SCOPE: CPT | Performed by: FAMILY MEDICINE

## 2023-07-03 PROCEDURE — 1160F RVW MEDS BY RX/DR IN RCRD: CPT | Performed by: FAMILY MEDICINE

## 2023-07-03 PROCEDURE — 87086 URINE CULTURE/COLONY COUNT: CPT | Performed by: FAMILY MEDICINE

## 2023-07-03 PROCEDURE — 1159F MED LIST DOCD IN RCRD: CPT | Performed by: FAMILY MEDICINE

## 2023-07-03 RX ORDER — CEPHALEXIN 250 MG/1
250 CAPSULE ORAL
Qty: 21 CAPSULE | Refills: 0 | Status: SHIPPED | OUTPATIENT
Start: 2023-07-03 | End: 2023-07-10

## 2023-07-03 NOTE — PROGRESS NOTES
Assessment/Plan:    UTI symptoms  - urine dip is negative, will obtain urine culture and call with results once available, advised to increase fluids and start cephalexin, follow up for persistent or seek ER care if symptoms worsen  - cephalexin (KEFLEX) 250 mg capsule; Take 1 capsule (250 mg total) by mouth 3 (three) times a day with meals for 7 days  Dispense: 21 capsule; Refill: 0  - Urine culture    Essential hypertension  - BP is elevated today 146/80, patient is complaint with her BP meds, advised to continue Toprol XL 25 mg daily, follow a low sodium diet, monitor BP at home and bring BP log to the next visit         Return as scheduled or sooner as needed. The treatment plan and possible side effects of new medications were reviewed with the patient today. The patient understands and agrees with the treatment plan. Subjective:   Chief Complaint   Patient presents with   • Urinary Tract Infection   • Urinary Frequency   • Abdominal Cramping      Patient ID: Antoni Fink is a 78 y.o. female who presents today with c/o increased urinary frequency, urgency, suprapubic pressure on and off for about a week, no pain with urination, no fever or chills, no flank pain, no hematuria, no nausea or vomiting, no diarrhea, no constipation, no melena/ hematochezia. Patient is scheduled for EGD/ colonoscopy on 8/11/23.         The following portions of the patient's history were reviewed and updated as appropriate: allergies, current medications, past family history, past medical history, past social history, past surgical history and problem list.    Past Medical History:   Diagnosis Date   • Anxiety    • Arthritis    • Back pain    • Balance problems    • Chest pain     heaviness   • Difficulty swallowing    • Dizziness    • Dry cough    • Gait disorder    • GERD (gastroesophageal reflux disease)    • Hyperparathyroidism ([address])    • Hypertension    • Increased frequency of headaches    • Migraines    • Neck pain    • Numbness and tingling     bles   • Palpitations    • Pericarditis    • Thyroid disease     nodule   • Trouble in sleeping    • Wears glasses      Past Surgical History:   Procedure Laterality Date   • APPENDECTOMY     • CHOLECYSTECTOMY      laparoscopic   • COLONOSCOPY     • KNEE SURGERY Left    • PERICARDIAL WINDOW     • SD OPTX DSTL RADL X-ARTIC FX/EPIPHYSL SEP Right 3/22/2018    Procedure: OPEN REDUCTION W/ INTERNAL FIXATION (ORIF) RADIUS, splint application;  Surgeon: Blanca Pichardo MD;  [facility];  Service: Orthopedics   • SD PARATHYROIDECTOMY/EXPLORATION PARATHYROIDS N/A 8/17/2022    Procedure: PARATHYROIDECTOMY, 4 GLAND EXPLORATION;  Surgeon: Fleeta Buerger, MD;  [facility];  Service: ENT   • UPPER GASTROINTESTINAL ENDOSCOPY       Family History   Problem Relation Age of Onset   • Kidney failure Mother    • Hypertension Mother    • Lung cancer Father    • Pita Ogren Parkinson White syndrome Daughter    • No Known Problems Sister    • Substance Abuse Neg Hx    • Alcohol abuse Neg Hx    • Mental illness Neg Hx      Social History     Socioeconomic History   • Marital status: /Civil Union     Spouse name: Not on file   • Number of children: Not on file   • Years of education: Not on file   • Highest education level: Not on file   Occupational History   • Not on file   Tobacco Use   • Smoking status: Never   • Smokeless tobacco: Never   Vaping Use   • Vaping Use: Never used   Substance and Sexual Activity   • Alcohol use: Not Currently     Comment: Rarely    • Drug use: No   • Sexual activity: Yes     Partners: Male   Other Topics Concern   • Not on file   Social History Narrative    WORK:    1.  ([employer]; [employer]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    2. [employer]'s        HOBBIES:    1. Singing    2. Dancing    3. Hx of ceramics (+ dust)        PETS:    1.   Dogs    -  Denies birds        TRAVEL:    -  [place]        EXPOSURES:    Denies mold; down pillows/comforters; hot tubs     Social Determinants of Health     Financial Resource Strain: Low Risk  (10/24/2022)    Overall Financial Resource Strain (CARDIA)    • Difficulty of Paying Living Expenses: Not hard at all   Food Insecurity: Not on file   Transportation Needs: No Transportation Needs (10/24/2022)    PRAPARE - Transportation    • Lack of Transportation (Medical): No    • Lack of Transportation (Non-Medical):  No   Physical Activity: Not on file   Stress: Not on file   Social Connections: Not on file   Intimate Partner Violence: Not on file   Housing Stability: Not on file       Current Outpatient Medications:   •  acetaminophen (TYLENOL) 500 mg tablet, Take 500-1,000 mg by mouth every 6 (six) hours as needed for mild pain, Disp: , Rfl:   •  albuterol (PROVENTIL HFA,VENTOLIN HFA) 90 mcg/act inhaler, Inhale 2 puffs every 6 (six) hours as needed for wheezing or shortness of breath, Disp: 8 g, Rfl: 1  •  ALPRAZolam (XANAX) 0.5 mg tablet, Take 1 tablet (0.5 mg total) by mouth daily at bedtime as needed for anxiety or sleep, Disp: 20 tablet, Rfl: 1  •  ascorbic acid (VITAMIN C) 500 mg tablet, Take 500 mg by mouth daily, Disp: , Rfl:   •  b complex vitamins tablet, Take 1 tablet by mouth daily, Disp: , Rfl:   •  cephalexin (KEFLEX) 250 mg capsule, Take 1 capsule (250 mg total) by mouth 3 (three) times a day with meals for 7 days, Disp: 21 capsule, Rfl: 0  •  Cholecalciferol (Vitamin D) 50 MCG (2000 UT) CAPS, Take 1 capsule (2,000 Units total) by mouth daily (Patient taking differently: Take 3,000 Units by mouth daily), Disp: , Rfl:   •  famotidine (PEPCID) 40 MG tablet, Take 1 tablet (40 mg total) by mouth daily at bedtime, Disp: 90 tablet, Rfl: 1  •  Magnesium 300 MG CAPS, Take 300 mg by mouth in the morning, Disp: , Rfl:   •  metoprolol succinate (TOPROL-XL) 25 mg 24 hr tablet, Take 1 tablet (25 mg total) by mouth daily at bedtime, Disp: 90 tablet, Rfl: 3  •  Nutritional Supplements (OSAPLEX MK-7 PO), Take by mouth, Disp: , Rfl:   •  omeprazole (PriLOSEC) 20 mg delayed release capsule, Take 1 capsule (20 mg total) by mouth 2 (two) times a day before meals, Disp: 60 capsule, Rfl: 3  •  Riboflavin (VITAMIN B-2 PO), Take 250 mg by mouth in the morning, Disp: , Rfl:   •  dicyclomine (BENTYL) 10 mg capsule, TAKE 1 CAPSULE BY MOUTH 4 TIMES A DAY (BEFORE MEALS AND AT BEDTIME) (Patient not taking: Reported on 5/17/2023), Disp: 360 capsule, Rfl: 1  •  methocarbamol (ROBAXIN) 500 mg tablet, Take 0.5 (250mg) to 1 (500mg) tablet by mouth every 8 hours as needed for muscle spasm/neck pain. (Patient not taking: Reported on 5/10/2023), Disp: 90 tablet, Rfl: 0  •  rizatriptan (MAXALT) 10 MG tablet, Take 1 tablet (10 mg total) by mouth once as needed for migraine May repeat in 2 hours if needed. Max 2/24 hours, 9/month. (Patient not taking: Reported on 7/3/2023), Disp: 9 tablet, Rfl: 6  •  traZODone (DESYREL) 50 mg tablet, TAKE 1 TABLET BY MOUTH EVERYDAY AT BEDTIME (Patient not taking: Reported on 7/3/2023), Disp: 90 tablet, Rfl: 1    Review of Systems   Constitutional: Negative for chills, fatigue, fever and unexpected weight change. Respiratory: Negative for cough, shortness of breath and wheezing. Cardiovascular: Negative for chest pain, palpitations and leg swelling. Gastrointestinal: Negative for blood in stool, constipation, diarrhea, nausea and vomiting. Genitourinary: Positive for frequency and urgency. Negative for difficulty urinating, dysuria, hematuria and vaginal discharge. Neurological: Negative for dizziness and headaches. Hematological: Negative for adenopathy. Objective:    Vitals:    07/03/23 1447   BP: 146/80   Pulse: 81   Resp: 16   Temp: 98.2 °F (36.8 °C)   SpO2: 96%   Weight: 78.4 kg (172 lb 12.8 oz)   Height: 5' 7" (1.702 m)        Physical Exam  Constitutional:       General: She is not in acute distress. Appearance: She is well-developed.    Cardiovascular:      Rate and Rhythm: Normal rate and regular rhythm. Heart sounds: Normal heart sounds. No murmur heard. Pulmonary:      Effort: Pulmonary effort is normal.      Breath sounds: Normal breath sounds. No wheezing, rhonchi or rales. Chest:      Chest wall: No tenderness. Abdominal:      General: There is no distension. Palpations: Abdomen is soft. There is no mass. Tenderness: There is no abdominal tenderness. There is no right CVA tenderness, left CVA tenderness, guarding or rebound. Musculoskeletal:      Right lower leg: No edema. Left lower leg: No edema. Neurological:      Mental Status: She is alert and oriented to person, place, and time. Psychiatric:         Mood and Affect: Mood normal.         Behavior: Behavior normal.         Thought Content:  Thought content normal.          Office Visit on 07/03/2023   Component Date Value Ref Range Status   • LEUKOCYTE ESTERASE,UA 07/03/2023 neg   Final   • NITRITE,UA 07/03/2023 neg   Final   • SL AMB POCT UROBILINOGEN 07/03/2023 0.2   Final   • POCT URINE PROTEIN 07/03/2023 neg   Final   •  PH,UA 07/03/2023 5.0   Final   • BLOOD,UA 07/03/2023 neg   Final   • SPECIFIC GRAVITY,UA 07/03/2023 1.015   Final   • KETONES,UA 07/03/2023 neg   Final   • BILIRUBIN,UA 07/03/2023 neg   Final   • GLUCOSE, UA 07/03/2023 neg   Final   •  COLOR,UA 07/03/2023 Yellow   Final   • CLARITY,UA 07/03/2023 Clear   Final

## 2023-07-05 ENCOUNTER — TELEPHONE (OUTPATIENT)
Dept: FAMILY MEDICINE CLINIC | Facility: CLINIC | Age: 78
End: 2023-07-05

## 2023-07-05 LAB — BACTERIA UR CULT: NORMAL

## 2023-07-05 NOTE — TELEPHONE ENCOUNTER
----- Message from Rona Bonner MD sent at 7/5/2023  1:07 PM EDT -----  Please let patient know that her urine culture is negative.

## 2023-07-07 ENCOUNTER — OFFICE VISIT (OUTPATIENT)
Dept: CARDIOLOGY CLINIC | Facility: CLINIC | Age: 78
End: 2023-07-07
Payer: COMMERCIAL

## 2023-07-07 VITALS
HEIGHT: 67 IN | HEART RATE: 72 BPM | SYSTOLIC BLOOD PRESSURE: 122 MMHG | BODY MASS INDEX: 27 KG/M2 | WEIGHT: 172 LBS | DIASTOLIC BLOOD PRESSURE: 70 MMHG

## 2023-07-07 DIAGNOSIS — R07.9 CHEST PAIN OF UNKNOWN ETIOLOGY: ICD-10-CM

## 2023-07-07 DIAGNOSIS — R00.2 PALPITATIONS: Primary | ICD-10-CM

## 2023-07-07 PROCEDURE — 99214 OFFICE O/P EST MOD 30 MIN: CPT | Performed by: INTERNAL MEDICINE

## 2023-07-07 PROCEDURE — 93000 ELECTROCARDIOGRAM COMPLETE: CPT | Performed by: INTERNAL MEDICINE

## 2023-07-07 NOTE — PROGRESS NOTES
Cardiology Follow Up    Bentley Rainey [facility]  1945  [id number]  [facility] [id number] [phone]    1. Palpitations  POCT ECG      2. Chest pain of unknown etiology  Stress test only, exercise          Interval History: History of palpitations currently quiesced sent. Had an episode while doing rehabilitation when he was doing significant exertion of chest tightness that resolved with reduction in activity. Denies shortness of breath orthopnea or syncope.     Patient Active Problem List   Diagnosis   • Closed fracture distal radius and ulna, right, initial encounter   • Anxiety   • Gastroesophageal reflux disease with esophagitis   • History of pericarditis   • Heart murmur   • Primary hyperparathyroidism (HCC)   • Thyroid nodule   • Vitamin D deficiency   • Atypical chest pain   • Migraine without aura and without status migrainosus, not intractable   • Dizziness and giddiness   • Insomnia   • Cervicalgia   • Abnormal CT scan of lung   • Irritable bowel syndrome with diarrhea   • Elevated amylase and lipase   • Paresthesias   • Carpal tunnel syndrome of left wrist   • Dysphonia   • Reflux laryngitis   • Paresis of left vocal fold   • Glottic insufficiency   • Muscle tension dysphonia   • TMJ dysfunction   • Pharyngoesophageal dysphagia   • Palpitations   • Hallux abducto valgus, bilateral   • Pincer nail deformity   • Osteoporosis with current pathological fracture   • Parathyroid related hypercalcemia (HCC)   • Cervical myofascial pain syndrome   • Edema of both ankles   • Aneurysm (HCC)   • Calf pain   • Lung nodule   • Myalgia   • Pericardial effusion   • SOBOE (shortness of breath on exertion)   • Elevated blood-pressure reading without diagnosis of hypertension   • Skin tag of anus   • H/O parathyroidectomy (HCC)   • [facility]) (HCC)   • Primary osteoarthritis of right knee     Past Medical History:   Diagnosis Date   • Anxiety    • Arthritis    • Back pain    • Balance problems    • Chest pain     heaviness   • Difficulty swallowing    • Dizziness    • Dry cough    • Gait disorder    • GERD (gastroesophageal reflux disease)    • Hyperparathyroidism (HCC)    • Hypertension    • Increased frequency of headaches    • Migraines    • Neck pain    • Numbness and tingling     bles   • Palpitations    • Pericarditis    • Thyroid disease     nodule   • Trouble in sleeping    • Wears glasses      Social History     Socioeconomic History   • Marital status: /Civil Union     Spouse name: Not on file   • Number of children: Not on file   • Years of education: Not on file   • Highest education level: Not on file   Occupational History   • Not on file   Tobacco Use   • Smoking status: Never   • Smokeless tobacco: Never   Vaping Use   • Vaping Use: Never used   Substance and Sexual Activity   • Alcohol use: Not Currently     Comment: Rarely    • Drug use: No   • Sexual activity: Yes     Partners: Male   Other Topics Concern   • Not on file   Social History Narrative    WORK:    1. [employer]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    2. [employer]        HOBBIES:    1. Singing    2. Dancing    3. Hx of ceramics (+ dust)        PETS:    1. Dogs    -  Denies birds        TRAVEL:    -  [place]        EXPOSURES:    Denies mold; down pillows/comforters; hot tubs     Social Determinants of Health     Financial Resource Strain: Low Risk  (10/24/2022)    Overall Financial Resource Strain (CARDIA)    • Difficulty of Paying Living Expenses: Not hard at all   Food Insecurity: Not on file   Transportation Needs: No Transportation Needs (10/24/2022)    PRAPARE - Transportation    • Lack of Transportation (Medical): No    • Lack of Transportation (Non-Medical):  No   Physical Activity: Not on file   Stress: Not on file   Social Connections: Not on file   Intimate Partner Violence: Not on file   Housing Stability: Not on file      Family History   Problem Relation Age of Onset   • Kidney failure Mother    • Hypertension Mother    • Lung cancer Father    • Onsherif Bailer Parkinson White syndrome Daughter    • No Known Problems Sister    • Substance Abuse Neg Hx    • Alcohol abuse Neg Hx    • Mental illness Neg Hx      Past Surgical History:   Procedure Laterality Date   • APPENDECTOMY     • CHOLECYSTECTOMY      laparoscopic   • COLONOSCOPY     • KNEE SURGERY Left    • PERICARDIAL WINDOW     • UT OPTX DSTL RADL X-ARTIC FX/EPIPHYSL SEP Right 3/22/2018    Procedure: OPEN REDUCTION W/ INTERNAL FIXATION (ORIF) RADIUS, splint application;  Surgeon: Dave Ng MD;  [facility];  Service: Orthopedics   • UT PARATHYROIDECTOMY/EXPLORATION PARATHYROIDS N/A 8/17/2022    Procedure: PARATHYROIDECTOMY, 4 GLAND EXPLORATION;  Surgeon: Ally Ayala MD;  [facility];  Service: ENT   • UPPER GASTROINTESTINAL ENDOSCOPY         Current Outpatient Medications:   •  acetaminophen (TYLENOL) 500 mg tablet, Take 500-1,000 mg by mouth every 6 (six) hours as needed for mild pain, Disp: , Rfl:   •  albuterol (PROVENTIL HFA,VENTOLIN HFA) 90 mcg/act inhaler, Inhale 2 puffs every 6 (six) hours as needed for wheezing or shortness of breath, Disp: 8 g, Rfl: 1  •  ALPRAZolam (XANAX) 0.5 mg tablet, Take 1 tablet (0.5 mg total) by mouth daily at bedtime as needed for anxiety or sleep, Disp: 20 tablet, Rfl: 1  •  ascorbic acid (VITAMIN C) 500 mg tablet, Take 500 mg by mouth daily, Disp: , Rfl:   •  b complex vitamins tablet, Take 1 tablet by mouth daily, Disp: , Rfl:   •  cephalexin (KEFLEX) 250 mg capsule, Take 1 capsule (250 mg total) by mouth 3 (three) times a day with meals for 7 days, Disp: 21 capsule, Rfl: 0  •  Cholecalciferol (Vitamin D) 50 MCG (2000 UT) CAPS, Take 1 capsule (2,000 Units total) by mouth daily (Patient taking differently: Take 3,000 Units by mouth daily), Disp: , Rfl:   •  dicyclomine (BENTYL) 10 mg capsule, TAKE 1 CAPSULE BY MOUTH 4 TIMES A DAY (BEFORE MEALS AND AT BEDTIME), Disp: 360 capsule, Rfl: 1  •  famotidine (PEPCID) 40 MG tablet, Take 1 tablet (40 mg total) by mouth daily at bedtime, Disp: 90 tablet, Rfl: 1  •  Magnesium 300 MG CAPS, Take 300 mg by mouth in the morning, Disp: , Rfl:   •  methocarbamol (ROBAXIN) 500 mg tablet, Take 0.5 (250mg) to 1 (500mg) tablet by mouth every 8 hours as needed for muscle spasm/neck pain., Disp: 90 tablet, Rfl: 0  •  metoprolol succinate (TOPROL-XL) 25 mg 24 hr tablet, Take 1 tablet (25 mg total) by mouth daily at bedtime, Disp: 90 tablet, Rfl: 3  •  Nutritional Supplements (OSAPLEX MK-7 PO), Take by mouth, Disp: , Rfl:   •  omeprazole (PriLOSEC) 20 mg delayed release capsule, TAKE 1 CAPSULE BY MOUTH 2 TIMES A DAY BEFORE MEALS., Disp: 180 capsule, Rfl: 3  •  Riboflavin (VITAMIN B-2 PO), Take 250 mg by mouth in the morning, Disp: , Rfl:   •  rizatriptan (MAXALT) 10 MG tablet, Take 1 tablet (10 mg total) by mouth once as needed for migraine May repeat in 2 hours if needed.  Max 2/24 hours, 9/month., Disp: 9 tablet, Rfl: 6  •  traZODone (DESYREL) 50 mg tablet, TAKE 1 TABLET BY MOUTH EVERYDAY AT BEDTIME, Disp: 90 tablet, Rfl: 1  Allergies   Allergen Reactions   • Ciprofloxacin Myalgia       Labs:  Office Visit on 07/03/2023   Component Date Value   • LEUKOCYTE ESTERASE,UA 07/03/2023 neg    • NITRITE,UA 07/03/2023 neg    • SL AMB POCT UROBILINOGEN 07/03/2023 0.2    • POCT URINE PROTEIN 07/03/2023 neg    •  PH,UA 07/03/2023 5.0    • BLOOD,UA 07/03/2023 neg    • SPECIFIC GRAVITY,UA 07/03/2023 1.015    • KETONES,UA 07/03/2023 neg    • BILIRUBIN,UA 07/03/2023 neg    • GLUCOSE, UA 07/03/2023 neg    •  COLOR,UA 07/03/2023 Yellow    • CLARITY,UA 07/03/2023 Clear    • Urine Culture 07/03/2023 <10,000 cfu/ml    Hospital Outpatient Visit on 06/16/2023   Component Date Value   • A4C EF 06/16/2023 60    • LV Diastolic Volume (BP) 62/94/3437 77    • LV Systolic Volume (BP) 06/16/2023 33    • EF 06/16/2023 58    • LVIDd 06/16/2023 3.80    • LVIDS 06/16/2023 2.40    • IVSd 06/16/2023 0.90    • LVPWd 06/16/2023 0.80    • FS 06/16/2023 37    • MV E' Tissue Velocity Se* 06/16/2023 5    • MV E' Tissue Velocity La* 06/16/2023 7    • E wave deceleration time 06/16/2023 197    • MV Peak E Tadeo 06/16/2023 80    • MV Peak A Tadeo 06/16/2023 1.05    • AV LVOT peak gradient 06/16/2023 3    • LVOT peak VTI 06/16/2023 20.85    • LVOT peak tadeo 06/16/2023 0.92    • RVID d 06/16/2023 4.0    • Tricuspid annular plane * 06/16/2023 2.00    • LA size 06/16/2023 3.5    • LA length (A2C) 06/16/2023 4.40    • RA 2D Volume 06/16/2023 30.0    • RAA A4C 06/16/2023 12.7    • Aortic valve peak veloci* 06/16/2023 1.38    • Ao VTI 06/16/2023 28.91    • AV mean gradient 06/16/2023 4    • LVOT mn grad 06/16/2023 2.0    • AV peak gradient 06/16/2023 8    • MV mean gradient antegra* 06/16/2023 1    • MV peak gradient antegra* 06/16/2023 5    • MV VTI 06/16/2023 35.8    • MV stenosis pressure 1/2* 06/16/2023 57    • MV valve area p 1/2 meth* 06/16/2023 3.86    • TR Peak Tadeo 06/16/2023 2.5    • Triscuspid Valve Regurgi* 06/16/2023 26.0    • Ao root 06/16/2023 3.10    • Asc Ao 06/16/2023 3.2    • Aortic valve mean veloci* 06/16/2023 8.70    • Tricuspid valve peak reg* 06/16/2023 2.53    • Left ventricular stroke * 06/16/2023 42.00    • IVS 06/16/2023 0.9    • LEFT VENTRICLE SYSTOLIC * 32/83/0476 21    • LV DIASTOLIC VOLUME (MOD* 04/72/1449 62    • Left Atrium Area-systoli* 06/16/2023 15    • Left Atrium Area-systoli* 06/16/2023 14    • LVSV, 2D 06/16/2023 42    • DVI 06/16/2023 0.67    • GLS 06/16/2023 -18    • LV EF 06/16/2023 60      Imaging: VAS carotid complete study    Result Date: 6/17/2023  Narrative:  [facility] REPORT [facility]INICAL: Indications: Patient presents with recent episode of dizziness / near syncope over the past several years. She is currently asymptomatic.  Operative History: No prior cardiovascular surgery Clinical Right Pressure:  132/78 mm Hg, Left Pressure:  140/80 mm Hg. FINDINGS:  Right        Impression  PSV  EDV (cm/s)  Ratio  Dist. ICA                 62          21   0.75  Mid. ICA                  45          18   0.54  Prox. ICA    Normal       38           9   0.45  Dist CCA                  67          13         Mid CCA                   83          13   0.84  Prox CCA                  99          11   0.98  Ext Carotid               47          13   0.57  Prox Vert                 37          13         Subclavian                92           0         Innominate               102           5          Left         Impression  PSV  EDV (cm/s)  Ratio  Dist. ICA                 62          22   0.83  Mid. ICA                  57          22   0.76  Prox. ICA    Normal       40          13   0.54  Dist CCA                  52          13         Mid CCA                   74          16   0.91  Prox CCA                  82          14         Ext Carotid               50           9   0.67  Prox Vert                 53          10         Subclavian               108           0            CONCLUSION: Impression RIGHT: There is no evidence of disease throughout the extracranial carotid arteries. Vertebral artery flow is antegrade. There is no significant subclavian artery disease. LEFT: There is no evidence of disease throughout the extracranial carotid arteries. Vertebral artery flow is antegrade. There is no significant subclavian artery disease. SIGNATURE: Electronically Signed by: Altagracia Rogel MD on 2023-06-17 12:18:18 PM    Echo complete w/ contrast if indicated    Result Date: 6/16/2023  Narrative: •  Left Ventricle: Left ventricular cavity size is normal. Wall thickness is normal. The left ventricular ejection fraction is 60%. Systolic function is normal. Global longitudinal strain is normal at -18%.  Wall motion is normal. Diastolic function is mildly abnormal, consistent with grade I (abnormal) relaxation. •  Tricuspid Valve: There is mild to moderate regurgitation. Review of Systems:  Review of Systems   Constitutional: Negative for activity change and fatigue. HENT: Negative for facial swelling. Eyes: Negative for visual disturbance. Respiratory: Negative for chest tightness and shortness of breath. Cardiovascular: Negative for chest pain, palpitations and leg swelling. Gastrointestinal: Negative for nausea and vomiting. Musculoskeletal: Negative for myalgias. Skin: Negative for pallor. Allergic/Immunologic: Negative for immunocompromised state. Neurological: Negative for dizziness, syncope and light-headedness. Psychiatric/Behavioral: Negative for agitation and confusion. The patient is not nervous/anxious. Physical Exam:  Physical Exam  Vitals and nursing note reviewed. Constitutional:       Appearance: Normal appearance. HENT:      Head: Normocephalic and atraumatic. Nose: Nose normal.      Mouth/Throat:      Mouth: Mucous membranes are moist.   Eyes:      Conjunctiva/sclera: Conjunctivae normal.   Cardiovascular:      Rate and Rhythm: Normal rate and regular rhythm. Pulmonary:      Effort: Pulmonary effort is normal.      Breath sounds: Normal breath sounds. Abdominal:      Palpations: Abdomen is soft. Musculoskeletal:         General: Normal range of motion. Cervical back: Normal range of motion. Skin:     General: Skin is warm and dry. Neurological:      General: No focal deficit present. Mental Status: She is alert and oriented to person, place, and time. Psychiatric:         Mood and Affect: Mood normal.         Discussion/Summary: Episode of chest tightness with exertion. rule out myocardial ischemia. We will check a stress echocardiogram.  Twelve-lead EKG today shows normal sinus rhythm with left atrial enlargement otherwise unremarkable. Blood pressure is controlled.   I will call her with the results of her stress test and we will plan on seeing her again in 1 year.

## 2023-07-20 ENCOUNTER — HOSPITAL ENCOUNTER (OUTPATIENT)
Dept: NON INVASIVE DIAGNOSTICS | Age: 78
Discharge: HOME | End: 2023-07-20

## 2023-07-24 ENCOUNTER — TELEPHONE (OUTPATIENT)
Dept: GASTROENTEROLOGY | Facility: MEDICAL CENTER | Age: 78
End: 2023-07-24

## 2023-07-26 NOTE — TELEPHONE ENCOUNTER
I called and spoke with patient to confirm 8/11 procedure, instructions sent in mail per patient's request.

## 2023-07-30 ENCOUNTER — RA CDI HCC (OUTPATIENT)
Dept: OTHER | Facility: HOSPITAL | Age: 78
End: 2023-07-30

## 2023-07-30 NOTE — PROGRESS NOTES
[address] [facility] coding opportunities       Chart reviewed, no opportunity found: [id number]        Patients Insurance     Medicare Insurance: [employer] Advantage

## 2023-07-31 ENCOUNTER — OFFICE VISIT (OUTPATIENT)
Dept: FAMILY MEDICINE CLINIC | Facility: CLINIC | Age: 78
End: 2023-07-31
Payer: COMMERCIAL

## 2023-07-31 VITALS
OXYGEN SATURATION: 98 % | TEMPERATURE: 97.6 F | RESPIRATION RATE: 16 BRPM | SYSTOLIC BLOOD PRESSURE: 132 MMHG | WEIGHT: 172.8 LBS | HEIGHT: 67 IN | BODY MASS INDEX: 27.12 KG/M2 | DIASTOLIC BLOOD PRESSURE: 80 MMHG | HEART RATE: 72 BPM

## 2023-07-31 DIAGNOSIS — M54.6 RIGHT-SIDED THORACIC BACK PAIN, UNSPECIFIED CHRONICITY: ICD-10-CM

## 2023-07-31 DIAGNOSIS — R39.9 URINARY SYMPTOM OR SIGN: ICD-10-CM

## 2023-07-31 DIAGNOSIS — M54.50 RIGHT-SIDED LOW BACK PAIN WITHOUT SCIATICA, UNSPECIFIED CHRONICITY: Primary | ICD-10-CM

## 2023-07-31 DIAGNOSIS — R31.29 MICROSCOPIC HEMATURIA: ICD-10-CM

## 2023-07-31 DIAGNOSIS — Z12.31 ENCOUNTER FOR SCREENING MAMMOGRAM FOR MALIGNANT NEOPLASM OF BREAST: ICD-10-CM

## 2023-07-31 LAB
BILIRUB UR QL STRIP: NEGATIVE
CLARITY UR: CLEAR
COLOR UR: YELLOW
GLUCOSE UR STRIP-MCNC: NEGATIVE MG/DL
HGB UR QL STRIP.AUTO: NEGATIVE
KETONES UR STRIP-MCNC: NEGATIVE MG/DL
LEUKOCYTE ESTERASE UR QL STRIP: NEGATIVE
NITRITE UR QL STRIP: NEGATIVE
PH UR STRIP.AUTO: 6.5 [PH]
PROT UR STRIP-MCNC: NEGATIVE MG/DL
SL AMB  POCT GLUCOSE, UA: ABNORMAL
SL AMB LEUKOCYTE ESTERASE,UA: ABNORMAL
SL AMB POCT BILIRUBIN,UA: ABNORMAL
SL AMB POCT BLOOD,UA: ABNORMAL
SL AMB POCT CLARITY,UA: CLEAR
SL AMB POCT COLOR,UA: YELLOW
SL AMB POCT KETONES,UA: ABNORMAL
SL AMB POCT NITRITE,UA: ABNORMAL
SL AMB POCT PH,UA: 5
SL AMB POCT SPECIFIC GRAVITY,UA: 1
SL AMB POCT URINE PROTEIN: ABNORMAL
SL AMB POCT UROBILINOGEN: 0.2
SP GR UR STRIP.AUTO: 1.01 (ref 1–1.03)
UROBILINOGEN UR STRIP-ACNC: <2 MG/DL

## 2023-07-31 PROCEDURE — 87086 URINE CULTURE/COLONY COUNT: CPT | Performed by: FAMILY MEDICINE

## 2023-07-31 PROCEDURE — 81003 URINALYSIS AUTO W/O SCOPE: CPT | Performed by: FAMILY MEDICINE

## 2023-07-31 PROCEDURE — 81002 URINALYSIS NONAUTO W/O SCOPE: CPT | Performed by: FAMILY MEDICINE

## 2023-07-31 PROCEDURE — 99214 OFFICE O/P EST MOD 30 MIN: CPT | Performed by: FAMILY MEDICINE

## 2023-07-31 NOTE — PROGRESS NOTES
Assessment/Plan:    Right-sided low back pain  - advised to take Tylenol 500 mg 2 tablets every 6 hours as needed, use ice/ heat, will obtain lumbar and thoracic spine x-rays and call with results once available, discussed PT for persistent symptoms  - XR spine lumbar minimum 4 views non injury; Future  - XR spine thoracic 3 vw; Future    Microscopic hematuria  - will obtain ua and urine culture and call with results once available  - UA (URINE) with reflex to Scope  - Urine culture         Return as scheduled or sooner for persistent ro worsening symptoms. The patient understands and agrees with the treatment plan. Subjective:   Chief Complaint   Patient presents with   • Back Pain     Lower R side of back radiating towards side      Patient ID: Antoni Fink is a 70 y.o. female who presents today with c/o right sided mid to lower back pain onset on Friday 7/28/23, worse when standing up, bending or twisting. Patient denies any particular injury, no radiation of pain towards right upper abdomen or down her right leg, no leg weakness or numbness, no urinary frequency or pain with urination, no fever or chills.             The following portions of the patient's history were reviewed and updated as appropriate: allergies, current medications, past family history, past medical history, past social history, past surgical history and problem list.    Past Medical History:   Diagnosis Date   • Anxiety    • Arthritis    • Back pain    • Balance problems    • Chest pain     heaviness   • Difficulty swallowing    • Dizziness    • Dry cough    • Gait disorder    • GERD (gastroesophageal reflux disease)    • Hyperparathyroidism ([address])    • Hypertension    • Increased frequency of headaches    • Migraines    • Neck pain    • Numbness and tingling     bles   • Palpitations    • Pericarditis    • Thyroid disease     nodule   • Trouble in sleeping    • Wears glasses      Past Surgical History:   Procedure Laterality Date   • APPENDECTOMY     • CHOLECYSTECTOMY      laparoscopic   • COLONOSCOPY     • KNEE SURGERY Left    • PERICARDIAL WINDOW     • [place] OPTX DSTL RADL X-ARTIC FX/EPIPHYSL SEP Right 3/22/2018    Procedure: OPEN REDUCTION W/ INTERNAL FIXATION (ORIF) RADIUS, splint application;  Surgeon: Phoebe Lazo MD;  [facility];  Service: Orthopedics   • [place] PARATHYROIDECTOMY/EXPLORATION PARATHYROIDS N/A 8/17/2022    Procedure: PARATHYROIDECTOMY, 4 GLAND EXPLORATION;  Surgeon: John Conte MD;  [facility];  Service: ENT   • UPPER GASTROINTESTINAL ENDOSCOPY       Family History   Problem Relation Age of Onset   • Kidney failure Mother    • Hypertension Mother    • Lung cancer Father    • Alejandro Rogel Parkinson White syndrome Daughter    • No Known Problems Sister    • Substance Abuse Neg Hx    • Alcohol abuse Neg Hx    • Mental illness Neg Hx      Social History     Socioeconomic History   • Marital status: /Civil Union     Spouse name: Not on file   • Number of children: Not on file   • Years of education: Not on file   • Highest education level: Not on file   Occupational History   • Not on file   Tobacco Use   • Smoking status: Never   • Smokeless tobacco: Never   Vaping Use   • Vaping Use: Never used   Substance and Sexual Activity   • Alcohol use: Not Currently     Comment: Rarely    • Drug use: No   • Sexual activity: Yes     Partners: Male   Other Topics Concern   • Not on file   Social History Narrative    WORK:    1. [employer] ([employer]wendolyn [employer]; [employer]aritza [employer]el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    2. [employer]        HOBBIES:    1. Singing    2. Dancing    3. Hx of ceramics (+ dust)        PETS:    1.   Dogs    -  Denies birds        TRAVEL:    -  [place]        EXPOSURES:    Denies mold; down pillows/comforters; hot tubs     Social Determinants of Health     Financial Resource Strain: Low Risk  (10/24/2022)    Overall Financial Resource Strain (CARDIA)    • Difficulty of Paying Living Expenses: Not hard at all   Food Insecurity: Not on file   Transportation Needs: No Transportation Needs (10/24/2022)    PRAPARE - Transportation    • Lack of Transportation (Medical): No    • Lack of Transportation (Non-Medical):  No   Physical Activity: Not on file   Stress: Not on file   Social Connections: Not on file   Intimate Partner Violence: Not on file   Housing Stability: Not on file       Current Outpatient Medications:   •  acetaminophen (TYLENOL) 500 mg tablet, Take 500-1,000 mg by mouth every 6 (six) hours as needed for mild pain, Disp: , Rfl:   •  albuterol (PROVENTIL HFA,VENTOLIN HFA) 90 mcg/act inhaler, Inhale 2 puffs every 6 (six) hours as needed for wheezing or shortness of breath, Disp: 8 g, Rfl: 1  •  ALPRAZolam (XANAX) 0.5 mg tablet, Take 1 tablet (0.5 mg total) by mouth daily at bedtime as needed for anxiety or sleep, Disp: 20 tablet, Rfl: 1  •  ascorbic acid (VITAMIN C) 500 mg tablet, Take 500 mg by mouth daily, Disp: , Rfl:   •  b complex vitamins tablet, Take 1 tablet by mouth daily, Disp: , Rfl:   •  Cholecalciferol (Vitamin D) 50 MCG (2000 UT) CAPS, Take 1 capsule (2,000 Units total) by mouth daily (Patient taking differently: Take 3,000 Units by mouth daily), Disp: , Rfl:   •  dicyclomine (BENTYL) 10 mg capsule, TAKE 1 CAPSULE BY MOUTH 4 TIMES A DAY (BEFORE MEALS AND AT BEDTIME), Disp: 360 capsule, Rfl: 1  •  famotidine (PEPCID) 40 MG tablet, Take 1 tablet (40 mg total) by mouth daily at bedtime, Disp: 90 tablet, Rfl: 1  •  Magnesium 300 MG CAPS, Take 300 mg by mouth in the morning, Disp: , Rfl:   •  methocarbamol (ROBAXIN) 500 mg tablet, Take 0.5 (250mg) to 1 (500mg) tablet by mouth every 8 hours as needed for muscle spasm/neck pain., Disp: 90 tablet, Rfl: 0  •  metoprolol succinate (TOPROL-XL) 25 mg 24 hr tablet, Take 1 tablet (25 mg total) by mouth daily at bedtime, Disp: 90 tablet, Rfl: 3  •  Nutritional Supplements (OSAPLEX MK-7 PO), Take by mouth, Disp: , Rfl:   •  omeprazole (PriLOSEC) 20 mg delayed release capsule, TAKE 1 CAPSULE BY MOUTH 2 TIMES A DAY BEFORE MEALS., Disp: 180 capsule, Rfl: 3  •  Riboflavin (VITAMIN B-2 PO), Take 250 mg by mouth in the morning, Disp: , Rfl:   •  rizatriptan (MAXALT) 10 MG tablet, Take 1 tablet (10 mg total) by mouth once as needed for migraine May repeat in 2 hours if needed. Max 2/24 hours, 9/month., Disp: 9 tablet, Rfl: 6  •  traZODone (DESYREL) 50 mg tablet, TAKE 1 TABLET BY MOUTH EVERYDAY AT BEDTIME, Disp: 90 tablet, Rfl: 1    Review of Systems   Constitutional: Negative for chills, fatigue, fever and unexpected weight change. Respiratory: Negative for cough, shortness of breath and wheezing. Cardiovascular: Negative for chest pain, palpitations and leg swelling. Gastrointestinal: Negative for blood in stool, constipation, diarrhea, nausea and vomiting. Genitourinary: Negative for difficulty urinating, dysuria, frequency, hematuria and urgency. Musculoskeletal: Positive for back pain. Neurological: Negative for dizziness and headaches. Hematological: Negative for adenopathy. Objective:    Vitals:    07/31/23 1341   BP: 132/80   Pulse: 72   Resp: 16   Temp: 97.6 °F (36.4 °C)   SpO2: 98%   Weight: 78.4 kg (172 lb 12.8 oz)   Height: 5' 7" (1.702 m)     BP Readings from Last 3 Encounters:   07/31/23 132/80   07/07/23 122/70   07/03/23 146/80        Physical Exam  Constitutional:       General: She is not in acute distress. Appearance: She is well-developed. Cardiovascular:      Rate and Rhythm: Normal rate and regular rhythm. Heart sounds: Normal heart sounds. No murmur heard. Pulmonary:      Effort: Pulmonary effort is normal.      Breath sounds: Normal breath sounds. No wheezing, rhonchi or rales. Chest:      Chest wall: No tenderness. Abdominal:      General: There is no distension. Palpations: Abdomen is soft. There is no mass. Tenderness: There is no abdominal tenderness.  There is no right CVA tenderness, left CVA tenderness, guarding or rebound. Musculoskeletal:      Right lower leg: No edema. Left lower leg: No edema. Comments: Low back ROM with pain on flexion, no lumbar spine or right paraspinal muscle tenderness, SLR negative b/l, muscle strength 5/5 in lower extremities, DTR's 2/4   Neurological:      Mental Status: She is alert and oriented to person, place, and time. Psychiatric:         Mood and Affect: Mood normal.         Behavior: Behavior normal.          Component 7/31/23  1:50 PM   LEUKOCYTE ESTERASE,UA neg    NITRITE,UA neg    SL AMB POCT UROBILINOGEN 0.2    POCT URINE PROTEIN neg     PH,UA 5.0    BLOOD,UA trace    SPECIFIC GRAVITY,UA 1.005    KETONES,UA neg    BILIRUBIN,UA neg    GLUCOSE, UA neg     COLOR,UA Yellow    CLARITY,UA Clear           BMI Counseling: Body mass index is 27.06 kg/m². The BMI is above normal. Nutrition recommendations include 3-5 servings of fruits/vegetables daily and consuming healthier snacks. Exercise recommendations include exercising 3-5 times per week.

## 2023-08-02 ENCOUNTER — TELEPHONE (OUTPATIENT)
Dept: FAMILY MEDICINE CLINIC | Facility: CLINIC | Age: 78
End: 2023-08-02

## 2023-08-02 LAB — BACTERIA UR CULT: NORMAL

## 2023-08-04 ENCOUNTER — NURSE TRIAGE (OUTPATIENT)
Age: 78
End: 2023-08-04

## 2023-08-04 DIAGNOSIS — K21.9 GASTROESOPHAGEAL REFLUX DISEASE WITHOUT ESOPHAGITIS: ICD-10-CM

## 2023-08-04 RX ORDER — OMEPRAZOLE 20 MG/1
20 CAPSULE, DELAYED RELEASE ORAL
Qty: 180 CAPSULE | Refills: 3 | Status: SHIPPED | OUTPATIENT
Start: 2023-08-04 | End: 2023-08-11 | Stop reason: SDUPTHER

## 2023-08-04 NOTE — TELEPHONE ENCOUNTER
Please let the pt know that I agree that she can do the omeprazole 20 mg BID instead. Please ensure she is taking this half an hour prior to breakfast and half an hour prior to dinner, both on empty stomach. Thanks!

## 2023-08-04 NOTE — TELEPHONE ENCOUNTER
Reason for Disposition  • [1] Patient says chest pain feels exactly the same as previously diagnosed "heartburn" AND [2] describes burning in chest AND [3] accompanying sour taste in mouth    Answer Assessment - Initial Assessment Questions  1. LOCATION: "Where does it hurt?"        Throat    2. RADIATION: "Does the pain go anywhere else?" (e.g., into neck, jaw, arms, back)      No    3. ONSET: "When did the chest pain begin?" (Minutes, hours or days)       Three days ago    4. PATTERN "Does the pain come and go, or has it been constant since it started?"  "Does it get worse with exertion?"       Only at night time    5. DURATION: "How long does it last" (e.g., seconds, minutes, hours)      Unknown    6. SEVERITY: "How bad is the pain?"  (e.g., Scale 1-10; mild, moderate, or severe)     - MILD (1-3): doesn't interfere with normal activities      - MODERATE (4-7): interferes with normal activities or awakens from sleep     - SEVERE (8-10): excruciating pain, unable to do any normal activities        Moderate    7. . CAUSE: "What do you think is causing the chest pain?"      GERD    8. OTHER SYMPTOMS: "Do you have any other symptoms?" (e.g., dizziness, nausea, vomiting, sweating, fever, difficulty breathing, cough)        Some abdominal bloating. No pain. No other symptoms at this time. Protocols used: CHEST PAIN-ADULT-AH      Pt is taking 40 mg of omeprazole in the morning. Says she stopped taking the famotidine 40 mg- because it was causing her to develop lose stool. Now GERD and returned and is wondering if she should split the omeprazole- take 20 mg in the AM an 20 mg PM or would like to know if there is something else she can take for the GERD. Wilmar Lopez only develops at bed time.

## 2023-08-04 NOTE — TELEPHONE ENCOUNTER
Called and spoke to Pt regarding medication, omeprazole twice a day, and its instructions. Pt stated understanding and had no further questions. Thank you!

## 2023-08-10 RX ORDER — SODIUM CHLORIDE, SODIUM LACTATE, POTASSIUM CHLORIDE, CALCIUM CHLORIDE 600; 310; 30; 20 MG/100ML; MG/100ML; MG/100ML; MG/100ML
125 INJECTION, SOLUTION INTRAVENOUS CONTINUOUS
Status: CANCELLED | OUTPATIENT
Start: 2023-08-10

## 2023-08-10 RX ORDER — LIDOCAINE HYDROCHLORIDE 10 MG/ML
0.5 INJECTION, SOLUTION EPIDURAL; INFILTRATION; INTRACAUDAL; PERINEURAL ONCE AS NEEDED
Status: CANCELLED | OUTPATIENT
Start: 2023-08-10

## 2023-08-11 ENCOUNTER — ANESTHESIA EVENT (OUTPATIENT)
Dept: GASTROENTEROLOGY | Facility: HOSPITAL | Age: 78
End: 2023-08-11

## 2023-08-11 ENCOUNTER — HOSPITAL ENCOUNTER (OUTPATIENT)
Dept: GASTROENTEROLOGY | Facility: HOSPITAL | Age: 78
Setting detail: OUTPATIENT SURGERY
End: 2023-08-11
Attending: INTERNAL MEDICINE
Payer: COMMERCIAL

## 2023-08-11 ENCOUNTER — ANESTHESIA (OUTPATIENT)
Dept: GASTROENTEROLOGY | Facility: HOSPITAL | Age: 78
End: 2023-08-11

## 2023-08-11 VITALS
DIASTOLIC BLOOD PRESSURE: 71 MMHG | OXYGEN SATURATION: 96 % | SYSTOLIC BLOOD PRESSURE: 136 MMHG | RESPIRATION RATE: 16 BRPM | TEMPERATURE: 98 F | HEART RATE: 71 BPM

## 2023-08-11 DIAGNOSIS — Z12.11 COLON CANCER SCREENING: ICD-10-CM

## 2023-08-11 DIAGNOSIS — R13.10 DYSPHAGIA, UNSPECIFIED TYPE: ICD-10-CM

## 2023-08-11 DIAGNOSIS — K21.9 GASTROESOPHAGEAL REFLUX DISEASE WITHOUT ESOPHAGITIS: ICD-10-CM

## 2023-08-11 PROCEDURE — 88342 IMHCHEM/IMCYTCHM 1ST ANTB: CPT | Performed by: STUDENT IN AN ORGANIZED HEALTH CARE EDUCATION/TRAINING PROGRAM

## 2023-08-11 PROCEDURE — 43239 EGD BIOPSY SINGLE/MULTIPLE: CPT | Performed by: INTERNAL MEDICINE

## 2023-08-11 PROCEDURE — 88305 TISSUE EXAM BY PATHOLOGIST: CPT | Performed by: STUDENT IN AN ORGANIZED HEALTH CARE EDUCATION/TRAINING PROGRAM

## 2023-08-11 PROCEDURE — 45380 COLONOSCOPY AND BIOPSY: CPT | Performed by: INTERNAL MEDICINE

## 2023-08-11 PROCEDURE — 45385 COLONOSCOPY W/LESION REMOVAL: CPT | Performed by: INTERNAL MEDICINE

## 2023-08-11 RX ORDER — PROPOFOL 10 MG/ML
INJECTION, EMULSION INTRAVENOUS AS NEEDED
Status: DISCONTINUED | OUTPATIENT
Start: 2023-08-11 | End: 2023-08-11

## 2023-08-11 RX ORDER — SODIUM CHLORIDE, SODIUM LACTATE, POTASSIUM CHLORIDE, CALCIUM CHLORIDE 600; 310; 30; 20 MG/100ML; MG/100ML; MG/100ML; MG/100ML
75 INJECTION, SOLUTION INTRAVENOUS CONTINUOUS
Status: CANCELLED | OUTPATIENT
Start: 2023-08-11

## 2023-08-11 RX ORDER — SODIUM CHLORIDE, SODIUM LACTATE, POTASSIUM CHLORIDE, CALCIUM CHLORIDE 600; 310; 30; 20 MG/100ML; MG/100ML; MG/100ML; MG/100ML
125 INJECTION, SOLUTION INTRAVENOUS CONTINUOUS
Status: DISCONTINUED | OUTPATIENT
Start: 2023-08-11 | End: 2023-08-15 | Stop reason: HOSPADM

## 2023-08-11 RX ORDER — FAMOTIDINE 20 MG/1
20 TABLET, FILM COATED ORAL
Qty: 90 TABLET | Refills: 3 | Status: SHIPPED | OUTPATIENT
Start: 2023-08-11

## 2023-08-11 RX ORDER — SODIUM CHLORIDE, SODIUM LACTATE, POTASSIUM CHLORIDE, CALCIUM CHLORIDE 600; 310; 30; 20 MG/100ML; MG/100ML; MG/100ML; MG/100ML
INJECTION, SOLUTION INTRAVENOUS CONTINUOUS PRN
Status: DISCONTINUED | OUTPATIENT
Start: 2023-08-11 | End: 2023-08-11

## 2023-08-11 RX ORDER — LIDOCAINE HYDROCHLORIDE 10 MG/ML
INJECTION, SOLUTION EPIDURAL; INFILTRATION; INTRACAUDAL; PERINEURAL AS NEEDED
Status: DISCONTINUED | OUTPATIENT
Start: 2023-08-11 | End: 2023-08-11

## 2023-08-11 RX ORDER — OMEPRAZOLE 20 MG/1
20 CAPSULE, DELAYED RELEASE ORAL
Qty: 180 CAPSULE | Refills: 3 | Status: SHIPPED | OUTPATIENT
Start: 2023-08-11

## 2023-08-11 RX ORDER — LIDOCAINE HYDROCHLORIDE 10 MG/ML
0.5 INJECTION, SOLUTION EPIDURAL; INFILTRATION; INTRACAUDAL; PERINEURAL ONCE AS NEEDED
Status: DISCONTINUED | OUTPATIENT
Start: 2023-08-11 | End: 2023-08-15 | Stop reason: HOSPADM

## 2023-08-11 RX ADMIN — SIMETHICONE 40 MG: 20 EMULSION ORAL at 10:42

## 2023-08-11 RX ADMIN — PROPOFOL 20 MG: 10 INJECTION, EMULSION INTRAVENOUS at 10:42

## 2023-08-11 RX ADMIN — PROPOFOL 20 MG: 10 INJECTION, EMULSION INTRAVENOUS at 10:25

## 2023-08-11 RX ADMIN — PROPOFOL 50 MG: 10 INJECTION, EMULSION INTRAVENOUS at 10:35

## 2023-08-11 RX ADMIN — PROPOFOL 50 MG: 10 INJECTION, EMULSION INTRAVENOUS at 10:29

## 2023-08-11 RX ADMIN — SODIUM CHLORIDE, SODIUM LACTATE, POTASSIUM CHLORIDE, AND CALCIUM CHLORIDE 125 ML/HR: .6; .31; .03; .02 INJECTION, SOLUTION INTRAVENOUS at 08:52

## 2023-08-11 RX ADMIN — PROPOFOL 80 MG: 10 INJECTION, EMULSION INTRAVENOUS at 10:22

## 2023-08-11 RX ADMIN — SODIUM CHLORIDE, SODIUM LACTATE, POTASSIUM CHLORIDE, AND CALCIUM CHLORIDE: .6; .31; .03; .02 INJECTION, SOLUTION INTRAVENOUS at 08:47

## 2023-08-11 RX ADMIN — LIDOCAINE HYDROCHLORIDE 50 MG: 10 INJECTION, SOLUTION EPIDURAL; INFILTRATION; INTRACAUDAL at 10:22

## 2023-08-11 NOTE — ANESTHESIA POSTPROCEDURE EVALUATION
Post-Op Assessment Note    CV Status:  Stable  Pain Score: 0    Pain management: adequate     Mental Status:  Alert and awake   Hydration Status:  Stable   PONV Controlled:  None   Airway Patency:  Patent      Post Op Vitals Reviewed: Yes      Staff: CRNA         No notable events documented.     BP   117/88   Temp      Pulse  68   Resp   18   SpO2   98

## 2023-08-11 NOTE — H&P
H&P EXAM - Outpatient Endoscopy   Antoni Fink 70 y.o. female MRN: [id number]    [address]   Encounter: [id number]      History and Physical - [facility]  Antoni Fink 70 y.o. female MRN: [id number]                  HPI: Antoni Fink is a 70y.o. year old female who presents for dysphagia, chronic gerd,colon cancer screening      REVIEW OF SYSTEMS: Per the HPI, and otherwise unremarkable.     Historical Information   Past Medical History:   Diagnosis Date   • Anxiety    • Arthritis    • Back pain    • Balance problems    • Chest pain     heaviness   • Colon polyp    • Difficulty swallowing    • Dizziness    • Dry cough    • Gait disorder    • GERD (gastroesophageal reflux disease)    • Hyperparathyroidism ([address])    • Hypertension    • Increased frequency of headaches    • Migraines    • Neck pain    • Numbness and tingling     bles   • Palpitations    • Pericarditis    • Thyroid disease     nodule   • Trouble in sleeping    • Wears glasses      Past Surgical History:   Procedure Laterality Date   • APPENDECTOMY     • CHOLECYSTECTOMY      laparoscopic   • COLONOSCOPY     • KNEE SURGERY Left    • PERICARDIAL WINDOW     • [place] OPTX DSTL RADL X-ARTIC FX/EPIPHYSL SEP Right 3/22/2018    Procedure: OPEN REDUCTION W/ INTERNAL FIXATION (ORIF) RADIUS, splint application;  Surgeon: Mary Grace Simmons MD;  [facility];  Service: Orthopedics   • [place] PARATHYROIDECTOMY/EXPLORATION PARATHYROIDS N/A 8/17/2022    Procedure: PARATHYROIDECTOMY, 4 GLAND EXPLORATION;  Surgeon: Steven Santamaria MD;  [facility];  Service: ENT   • UPPER GASTROINTESTINAL ENDOSCOPY       Social History   Social History     Substance and Sexual Activity   Alcohol Use Not Currently    Comment: Rarely      Social History     Substance and Sexual Activity   Drug Use No     Social History     Tobacco Use   Smoking Status Never   Smokeless Tobacco Never     Family History Problem Relation Age of Onset   • Kidney failure Mother    • Hypertension Mother    • Lung cancer Father    • Drew Du Parkinson White syndrome Daughter    • No Known Problems Sister    • Substance Abuse Neg Hx    • Alcohol abuse Neg Hx    • Mental illness Neg Hx        Meds/Allergies     (Not in a hospital admission)      Allergies   Allergen Reactions   • Ciprofloxacin Myalgia       Objective     /77 (BP Location: Left arm)   Pulse 86   Temp 98.9 °F (37.2 °C) (Temporal)   Resp 18   SpO2 94%       PHYSICAL EXAM    Gen: NAD  CV: RRR  CHEST: Clear  ABD: soft, NT/ND  EXT: no edema      ASSESSMENT/PLAN:  This is a 70y.o. year old female here for egd/colonoscopy, and she is stable and optimized for her procedure.

## 2023-08-11 NOTE — ANESTHESIA PREPROCEDURE EVALUATION
Procedure:  COLONOSCOPY  EGD    Relevant Problems   CARDIO   (+) Atypical chest pain   (+) Heart murmur   (+) Migraine without aura and without status migrainosus, not intractable   (+) SOBOE (shortness of breath on exertion)      ENDO   (+) Primary hyperparathyroidism (HCC)      GI/HEPATIC   (+) Gastroesophageal reflux disease with esophagitis   (+) Pharyngoesophageal dysphagia      MUSCULOSKELETAL   (+) Cervical myofascial pain syndrome   (+) Primary osteoarthritis of right knee      NEURO/PSYCH   (+) Anxiety   (+) Cervical myofascial pain syndrome   (+) Migraine without aura and without status migrainosus, not intractable   (+) Paresthesias      PULMONARY   (+) SOBOE (shortness of breath on exertion)      Cardiovascular and Mediastinum   (+) Pericardial effusion      Respiratory   (+) Glottic insufficiency   (+) [facility]) (HCC)   (+) Lung nodule   (+) Paresis of left vocal fold   (+) Reflux laryngitis      Digestive   (+) Irritable bowel syndrome with diarrhea      Endocrine   (+) Parathyroid related hypercalcemia (HCC)      Musculoskeletal and Integument   (+) Closed fracture distal radius and ulna, right, initial encounter   (+) Hallux abducto valgus, bilateral   (+) Osteoporosis with current pathological fracture   (+) Pincer nail deformity   (+) TMJ dysfunction      Other   (+) Abnormal CT scan of lung   (+) Calf pain   (+) Cervicalgia   (+) Dizziness and giddiness   (+) Dysphonia   (+) Elevated amylase and lipase   (+) Elevated blood-pressure reading without diagnosis of hypertension   (+) H/O parathyroidectomy (HCC)   (+) History of pericarditis   (+) Insomnia   (+) Muscle tension dysphonia   (+) Myalgia   (+) Palpitations      Lab Results   Component Value Date    SODIUM 138 05/04/2023    K 4.1 05/04/2023     05/04/2023    CO2 29 05/04/2023    AGAP 1 (L) 05/04/2023    BUN 23 05/04/2023    CREATININE 0.83 05/04/2023    GLUC 87 01/08/2021    GLUF 81 05/04/2023    CALCIUM 9.9 05/04/2023    AST 22 05/04/2023    ALT 34 05/04/2023    ALKPHOS 97 05/04/2023    TP 7.2 05/04/2023    TBILI 0.70 05/04/2023    EGFR 69 05/04/2023     Lab Results   Component Value Date    WBC 6.00 05/04/2023    HGB 14.9 05/04/2023    HCT 45.6 05/04/2023    MCV 94 05/04/2023     05/04/2023         Physical Exam    Airway    Mallampati score: II  TM Distance: >3 FB  Neck ROM: full     Dental       Cardiovascular      Pulmonary      Other Findings        Anesthesia Plan  ASA Score- 2     Anesthesia Type- IV sedation with anesthesia with ASA Monitors. Additional Monitors:   Airway Plan:           Plan Factors-Exercise tolerance (METS): >4 METS. Chart reviewed. EKG reviewed. Imaging results reviewed. Existing labs reviewed. Patient summary reviewed. Induction-     Postoperative Plan-     Informed Consent- Anesthetic plan and risks discussed with patient. I personally reviewed this patient with the CRNA. Discussed and agreed on the Anesthesia Plan with the CRNA. Rama Mario

## 2023-08-17 PROCEDURE — 88305 TISSUE EXAM BY PATHOLOGIST: CPT | Performed by: STUDENT IN AN ORGANIZED HEALTH CARE EDUCATION/TRAINING PROGRAM

## 2023-08-17 PROCEDURE — 88342 IMHCHEM/IMCYTCHM 1ST ANTB: CPT | Performed by: STUDENT IN AN ORGANIZED HEALTH CARE EDUCATION/TRAINING PROGRAM

## 2023-08-17 NOTE — RESULT ENCOUNTER NOTE
Results relayed via Mychart  1 subcentimeter tubular adenoma  Otherwise unremarkable pathology.   No additional screening colonoscopy required given age greater than 76

## 2023-08-30 ENCOUNTER — OFFICE VISIT (OUTPATIENT)
Dept: GASTROENTEROLOGY | Facility: MEDICAL CENTER | Age: 78
End: 2023-08-30
Payer: COMMERCIAL

## 2023-08-30 VITALS
HEART RATE: 80 BPM | BODY MASS INDEX: 26.84 KG/M2 | WEIGHT: 171 LBS | SYSTOLIC BLOOD PRESSURE: 112 MMHG | HEIGHT: 67 IN | DIASTOLIC BLOOD PRESSURE: 70 MMHG | TEMPERATURE: 98.3 F

## 2023-08-30 DIAGNOSIS — K58.0 IRRITABLE BOWEL SYNDROME WITH DIARRHEA: ICD-10-CM

## 2023-08-30 DIAGNOSIS — K21.9 GASTROESOPHAGEAL REFLUX DISEASE WITHOUT ESOPHAGITIS: ICD-10-CM

## 2023-08-30 DIAGNOSIS — R13.14 PHARYNGOESOPHAGEAL DYSPHAGIA: Primary | ICD-10-CM

## 2023-08-30 PROCEDURE — 99214 OFFICE O/P EST MOD 30 MIN: CPT | Performed by: PHYSICIAN ASSISTANT

## 2023-08-30 NOTE — PATIENT INSTRUCTIONS
High Fiber Diet   AMBULATORY CARE:   A high-fiber diet  includes foods that have a high amount of fiber. Fiber is the part of fruits, vegetables, and grains that is not broken down by your body. Fiber keeps your bowel movements regular. Fiber can also help lower your cholesterol level, control blood sugar in people with diabetes, and relieve constipation. Fiber can also help you control your weight because it helps you feel full faster. Most adults should eat 25 to 35 grams of fiber each day. Talk to your dietitian or healthcare provider about the amount of fiber you need. Good sources of fiber:       Foods with at least 4 grams of fiber per serving:      ? to ½ cup of high-fiber cereal (check the nutrition label on the box)    ½ cup of blackberries or raspberries    4 dried prunes    1 cooked artichoke    ½ cup of cooked legumes, such as lentils, or red, kidney, and gannon beans    Foods with 1 to 3 grams of fiber per servin slice of whole-wheat, pumpernickel, or rye bread    ½ cup of cooked brown rice    4 whole-wheat crackers    1 cup of oatmeal    ½ cup of cereal with 1 to 3 grams of fiber per serving (check the nutrition label on the box)    1 small piece of fruit, such as an apple, banana, pear, kiwi, or orange    3 dates    ½ cup of canned apricots, fruit cocktail, peaches, or pears    ½ cup of raw or cooked vegetables, such as carrots, cauliflower, cabbage, spinach, squash, or corn  Ways that you can increase fiber in your diet:   Choose brown or wild rice instead of white rice. Use whole wheat flour in recipes instead of white or all-purpose flour. Add beans and peas to casseroles or soups. Choose fresh fruit and vegetables with peels or skins on instead of juices. Other diet guidelines to follow: Add fiber to your diet slowly. You may have abdominal discomfort, bloating, and gas if you add fiber to your diet too quickly. Drink plenty of liquids as you add fiber to your diet. You may have nausea or develop constipation if you do not drink enough water. Ask how much liquid to drink each day and which liquids are best for you. © Copyright Melanie [employer]end[employer] 2022 Information is for End User's use only and may not be sold, redistributed or otherwise used for commercial purposes. The above information is an  only. It is not intended as medical advice for individual conditions or treatments. Talk to your doctor, nurse or pharmacist before following any medical regimen to see if it is safe and effective for you.

## 2023-08-30 NOTE — PROGRESS NOTES
[facility] - Outpatient Follow-up Note  Paula Garza 70 y.o. female MRN: [id number]  Encounter: [id number]      Assessment and Plan    1. GERD  2. Dysphagia  The patient has chronic GERD and dysphagia currently on omeprazole 20mg every morning and as needed Pepcid complete. This controls her GERD well. As for her dysphagia she states this is relatively well controlled as well but she does have occasional trouble swallowing large pills and steak. Recent EGD 8/11/23 with a 4cm hiatal hernia but otherwise normal including esophogeal, stomach, and duodenal biopsies  -Continue omeprazole 20mg every morning  -Continue Pepcid complete as needed  -Continue anti reflux diet   -Consider manometry if worsening dysphagia     3. IBS-D  The patient has intermittent diarrhea and abdominal pain currently well controlled with diet. Negative random colon biopsies on recent colonoscopy  -Continue dietary management     4. Colon cancer screening   Last colonoscopy 8/11/23 with one subcentimeter tubular adenomaremoved, scattered diverticuli in the sigmoid, and large internal hemorrhoids. Recall not recommended given at > 75.  -High fiber diet   -No further screening warranted based on age    Follow up 6 months    ______________________________________________________________________    History of Present Illness  Paula Garza is a 70 y.o. female here for follow up evaluation of GERD, dysphagia, and IBS-D. The patient presents today doing well. She is currently taking omeprazole 20 mg every morning for acid reflux which controls her symptoms relatively well. She will get occasional breakthrough symptoms after eating certain food triggers such as red sauce. She takes Pepcid Complete as needed for this. She did try to take Pepcid every night however this caused diarrhea. Otherwise her IBS is well controlled with dietary management and she has no other symptoms or complaints.       Review of Systems      Past Medical History  Past Medical History:   Diagnosis Date   • Anxiety    • Arthritis    • Back pain    • Balance problems    • Chest pain     heaviness   • Colon polyp    • Difficulty swallowing    • Dizziness    • Dry cough    • Gait disorder    • GERD (gastroesophageal reflux disease)    • Hyperparathyroidism (HCC)    • Hypertension    • Increased frequency of headaches    • Migraines    • Neck pain    • Numbness and tingling     bles   • Palpitations    • Pericarditis    • Thyroid disease     nodule   • Trouble in sleeping    • Wears glasses        Past Social history  Past Surgical History:   Procedure Laterality Date   • APPENDECTOMY     • CHOLECYSTECTOMY      laparoscopic   • COLONOSCOPY     • KNEE SURGERY Left    • PERICARDIAL WINDOW     • WA OPTX DSTL RADL X-ARTIC FX/EPIPHYSL SEP Right 3/22/2018    Procedure: OPEN REDUCTION W/ INTERNAL FIXATION (ORIF) RADIUS, splint application;  Surgeon: Cierra Fink MD;  Location: [facility];  Service: Orthopedics   • WA PARATHYROIDECTOMY/EXPLORATION PARATHYROIDS N/A 8/17/2022    Procedure: PARATHYROIDECTOMY, 4 GLAND EXPLORATION;  Surgeon: Janak Garcia MD;  Location[facility];  Service: ENT   • UPPER GASTROINTESTINAL ENDOSCOPY       Social History     Socioeconomic History   • Marital status: /Civil Union     Spouse name: Not on file   • Number of children: Not on file   • Years of education: Not on file   • Highest education level: Not on file   Occupational History   • Not on file   Tobacco Use   • Smoking status: Never   • Smokeless tobacco: Never   Vaping Use   • Vaping Use: Never used   Substance and Sexual Activity   • Alcohol use: Not Currently     Comment: Rarely    • Drug use: No   • Sexual activity: Yes     Partners: Male   Other Topics Concern   • Not on file   Social History Narrative    WORK:    1. [employer] (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    2. [employer]        HOBBIES:    1. Singing    2.   Dancing 3.  Hx of ceramics (+ dust)        PETS:    1. Dogs    -  Denies birds        TRAVEL:    -  [place]        EXPOSURES:    Denies mold; down pillows/comforters; hot tubs     Social Determinants of Health     Financial Resource Strain: Low Risk  (10/24/2022)    Overall Financial Resource Strain (CARDIA)    • Difficulty of Paying Living Expenses: Not hard at all   Food Insecurity: Not on file   Transportation Needs: No Transportation Needs (10/24/2022)    PRAPARE - Transportation    • Lack of Transportation (Medical): No    • Lack of Transportation (Non-Medical):  No   Physical Activity: Not on file   Stress: Not on file   Social Connections: Not on file   Intimate Partner Violence: Not on file   Housing Stability: Not on file     Social History     Substance and Sexual Activity   Alcohol Use Not Currently    Comment: Rarely      Social History     Substance and Sexual Activity   Drug Use No     Social History     Tobacco Use   Smoking Status Never   Smokeless Tobacco Never       Past Family History  Family History   Problem Relation Age of Onset   • Kidney failure Mother    • Hypertension Mother    • Lung cancer Father    • Rasheed Maple Parkinson White syndrome Daughter    • No Known Problems Sister    • Substance Abuse Neg Hx    • Alcohol abuse Neg Hx    • Mental illness Neg Hx        Current Medications  Current Outpatient Medications   Medication Sig Dispense Refill   • acetaminophen (TYLENOL) 500 mg tablet Take 500-1,000 mg by mouth every 6 (six) hours as needed for mild pain     • albuterol (PROVENTIL HFA,VENTOLIN HFA) 90 mcg/act inhaler Inhale 2 puffs every 6 (six) hours as needed for wheezing or shortness of breath 8 g 1   • ALPRAZolam (XANAX) 0.5 mg tablet Take 1 tablet (0.5 mg total) by mouth daily at bedtime as needed for anxiety or sleep 20 tablet 1   • ascorbic acid (VITAMIN C) 500 mg tablet Take 500 mg by mouth daily     • b complex vitamins tablet Take 1 tablet by mouth daily     • Cholecalciferol (Vitamin D) 50 MCG (2000 UT) CAPS Take 1 capsule (2,000 Units total) by mouth daily (Patient taking differently: Take 3,000 Units by mouth daily)     • famotidine (PEPCID) 20 mg tablet Take 1 tablet (20 mg total) by mouth daily at bedtime 90 tablet 3   • Magnesium 300 MG CAPS Take 300 mg by mouth in the morning     • metoprolol succinate (TOPROL-XL) 25 mg 24 hr tablet Take 1 tablet (25 mg total) by mouth daily at bedtime 90 tablet 3   • Nutritional Supplements (OSAPLEX MK-7 PO) Take by mouth     • omeprazole (PriLOSEC) 20 mg delayed release capsule Take 1 capsule (20 mg total) by mouth daily before breakfast Half an hour prior to breakfast and half an hour prior to dinner 180 capsule 3   • Riboflavin (VITAMIN B-2 PO) Take 250 mg by mouth in the morning     • dicyclomine (BENTYL) 10 mg capsule TAKE 1 CAPSULE BY MOUTH 4 TIMES A DAY (BEFORE MEALS AND AT BEDTIME) (Patient not taking: Reported on 8/30/2023) 360 capsule 1   • methocarbamol (ROBAXIN) 500 mg tablet Take 0.5 (250mg) to 1 (500mg) tablet by mouth every 8 hours as needed for muscle spasm/neck pain. (Patient not taking: Reported on 8/30/2023) 90 tablet 0   • rizatriptan (MAXALT) 10 MG tablet Take 1 tablet (10 mg total) by mouth once as needed for migraine May repeat in 2 hours if needed. Max 2/24 hours, 9/month. (Patient not taking: Reported on 8/30/2023) 9 tablet 6   • traZODone (DESYREL) 50 mg tablet TAKE 1 TABLET BY MOUTH EVERYDAY AT BEDTIME (Patient not taking: Reported on 8/30/2023) 90 tablet 1     No current facility-administered medications for this visit.        Allergies  Allergies   Allergen Reactions   • Ciprofloxacin Myalgia         The following portions of the patient's history were reviewed and updated as appropriate: allergies, current medications, past medical history, past social history, past surgical history and problem list.      Vitals  Vitals:    08/30/23 1018   BP: 112/70   Pulse: 80   Temp: 98.3 °F (36.8 °C)   TempSrc: Tympanic   Weight: 77.6 kg (171 lb)   Height: 5' 7" (1.702 m)         Physical Exam  Constitutional   General appearance: Patient is seated and in no acute distress, well appearing and well nourished. Head and Face   Head and face: Normal.    Eyes   Conjunctiva and lids: No erythema, swelling or discharge. Anicteric. Ears, Nose, Mouth, and Throat   Hearing: Normal.    Neck: Supple, trachea midline. Pulmonary   Respiratory effort: No increased work of breathing or signs of respiratory distress. Cardiovascular    Examination of extremities for edema and/or varicosities: Normal.    Musculoskeletal   Gait and station: Normal  Skin   Skin and subcutaneous tissue: Warm, dry, and intact. No visible jaundice, lesions or rashes. Psychiatric   Judgment and insight: Normal  Recent and remote memory:  Normal  Mood and affect: Normal      Results  No visits with results within 1 Day(s) from this visit.    Latest known visit with results is:   Hospital Outpatient Visit on 08/11/2023   Component Date Value   • Case Report 08/11/2023                      Value:Surgical Pathology Report                         Case: [id number]                                   Authorizing Provider:  Vivi Graham MD       Collected:           08/11/2023 1026              Ordering Location:     [address] [place] [address] Received:            08/11/2023 1459                                     Heart Endoscopy                                                              Pathologist:           Amanda Jacinto MD                                                         Specimens:   A) - Duodenum, Duodenum bx-cold                                                                     B) - Stomach, Gastric bx-cold                                                                       C) - Polyp, Stomach/Small Intestine, gastric polyp-bx-cold                                          D) - Esophagus, distal esophagus bx-cold E) - Esophagus, proximal esophagus bx-cold                                                                                    F) - Colon, Random colon bx-cold                                                                    G) - Polyp, Colorectal, Hepatic flexure polyp-cold snare                                  • Final Diagnosis 08/11/2023                      Value: This result contains rich text formatting which cannot be displayed here. • Additional Information 08/11/2023                      Value: This result contains rich text formatting which cannot be displayed here. • Synoptic Checklist 08/11/2023                      Value:                            COLON/RECTUM POLYP FORM - GI - G                                                                                     :    Adenoma(s)     • Gross Description 08/11/2023                      Value: This result contains rich text formatting which cannot be displayed here. Radiology Results  Colonoscopy    Result Date: 8/11/2023  Narrative: Table formatting from the original result was not included. 1900 [facility] [address] [place] [id number] [phone] DATE OF SERVICE: 8/11/23 PHYSICIAN(S): Attending: Mary Gibbs MD Fellow: No Staff Documented INDICATION: Colon cancer screening POST-OP DIAGNOSIS: See the impression below. HISTORY: Prior colonoscopy: 5 years ago. BOWEL PREPARATION: Miralax/Dulcolax PREPROCEDURE: Informed consent was obtained for the procedure, including sedation. Risks including but not limited to bleeding, infection, perforation, adverse drug reaction and aspiration were explained in detail. Also explained about less than 100% sensitivity with the exam and other alternatives. The patient was placed in the left lateral decubitus position.  Procedure: Colonoscopy DETAILS OF PROCEDURE: Patient was taken to the procedure room where a time out was performed to confirm correct patient and correct procedure. The patient underwent monitored anesthesia care, which was administered by an anesthesia professional. The patient's blood pressure, heart rate, level of consciousness, oxygen, respirations and ECG were monitored throughout the procedure. A digital rectal exam was performed. The scope was introduced through the anus and advanced to the cecum. Retroflexion was performed in the rectum. The quality of bowel preparation was evaluated using the [facility] Bowel Preparation Scale with scores of: right colon = 2, transverse colon = 2, left colon = 2. The total BBPS score was 6. Bowel prep was adequate. The patient experienced no blood loss. The procedure was not difficult. The patient tolerated the procedure well. There were no apparent adverse events. ANESTHESIA INFORMATION: ASA: II Anesthesia Type: IV Sedation with Anesthesia MEDICATIONS: simethicone (MYLICON) 40 mg in sterile water 60 mL 40 mg (Totals for administrations occurring from 1018 to 1051 on 08/11/23) FINDINGS: One sessile polyp measuring smaller than 5 mm in the hepatic flexure; completely removed en bloc by cold snare and retrieved specimen Few small, scattered diverticula of mild severity in the sigmoid colon Internal large hemorrhoids Performed random biopsy using biopsy forceps.  Otherwise normal colonic mucosa EVENTS: Procedure Events Event Event Time ENDO CECUM REACHED 8/11/2023 10:41 AM ENDO SCOPE OUT TIME 8/11/2023 10:51 AM SPECIMENS: ID Type Source Tests Collected by Time Destination 1 : Duodenum bx-cold Tissue Duodenum TISSUE EXAM Heriberto Cheek MD 8/11/2023 10:26 AM  2 : Gastric bx-cold Tissue Stomach TISSUE EXAM Heriberto Cheek MD 8/11/2023 10:27 AM  3 : gastric polyp-bx-cold Tissue Polyp, Stomach/Small Intestine TISSUE EXAM Heriberto Cheek MD 8/11/2023 10:28 AM  4 : distal esophagus bx-cold Tissue Esophagus TISSUE EXAM Heriberto Cheek MD 8/11/2023 10:29 AM  5 : proximal esophagus bx-cold Tissue Esophagus TISSUE EXAM Juma Burns MD 8/11/2023 10:30 AM  6 : Random colon bx-cold Tissue Colon TISSUE EXAM Juma Burns MD 8/11/2023 10:44 AM  7 : Hepatic flexure polyp-cold snare Tissue Polyp, Colorectal TISSUE EXAM Juma Burns MD 8/11/2023 10:47 AM  EQUIPMENT: Colonoscope -[id number] ENDOCUFF VISION MED BLUE ID 11.0     Impression: Subcentimeter polyp in the hepatic flexure was removed with cold snare Scattered diverticulosis of mild severity in the sigmoid colon Large hemorrhoids Otherwise normal colonic mucosa. Performed random biopsy using biopsy forceps. RECOMMENDATION:  Await pathology results  No further screening colonoscopies necessary  Age greater than 72    Juma Burns MD     EGD    Result Date: 8/11/2023  Narrative: Table formatting from the original result was not included. [facility] [address] [place] [id number] [phone] DATE OF SERVICE: 8/11/23 PHYSICIAN(S): Attending: Juma Burns MD Fellow: No Staff Documented INDICATION: Gastroesophageal reflux disease without esophagitis, Dysphagia, unspecified type POST-OP DIAGNOSIS: See the impression below. PREPROCEDURE: Informed consent was obtained for the procedure, including sedation. Risks of perforation, hemorrhage, adverse drug reaction and aspiration were discussed. The patient was placed in the left lateral decubitus position. Patient was explained about the risks and benefits of the procedure. Risks including but not limited to bleeding, infection, and perforation were explained in detail. Also explained about less than 100% sensitivity with the exam and other alternatives. PROCEDURE: EGD DETAILS OF PROCEDURE: Patient was taken to the procedure room where a time out was performed to confirm correct patient and correct procedure.  The patient underwent monitored anesthesia care, which was administered by an anesthesia professional. The patient's blood pressure, heart rate, level of consciousness, respirations and oxygen were monitored throughout the procedure. The scope was advanced to the second part of the duodenum. Retroflexion was performed in the fundus. The patient experienced no blood loss. The procedure was not difficult. The patient tolerated the procedure well. There were no apparent adverse events. ANESTHESIA INFORMATION: ASA: II Anesthesia Type: IV Sedation with Anesthesia MEDICATIONS: No administrations occurring from 1018 to 1031 on 08/11/23 FINDINGS: 4 cm hiatal hernia - GE junction 36 cm from the incisors, diaphragmatic impression 40 cm from the incisors:  Hill classification: Grade I Performed forceps biopsies in the esophagus to rule out eosinophilic esophagitis The stomach appeared normal. Performed random biopsy using biopsy forceps to rule out H. pylori. The duodenum appeared normal. Performed random biopsy using biopsy forceps to rule out celiac disease. SPECIMENS: ID Type Source Tests Collected by Time Destination 1 : Duodenum bx-cold Tissue Duodenum TISSUE EXAM Jeremy Ponce MD 8/11/2023 10:26 AM  2 : Gastric bx-cold Tissue Stomach TISSUE EXAM Jeremy Ponce MD 8/11/2023 10:27 AM  3 : gastric polyp-bx-cold Tissue Polyp, Stomach/Small Intestine TISSUE EXAM Jeremy Ponce MD 8/11/2023 10:28 AM  4 : distal esophagus bx-cold Tissue Esophagus TISSUE EXAM Jeremy Ponce MD 8/11/2023 10:29 AM  5 : proximal esophagus bx-cold Tissue Esophagus TISSUE EXAM Jeremy Ponce MD 8/11/2023 10:30 AM      Impression: 4 cm hiatal hernia Performed forceps biopsies in the esophagus to rule out eosinophilic esophagitis The stomach appeared normal. Performed random biopsy to rule out H. pylori. The duodenum appeared normal. Performed random biopsy to rule out celiac disease.  RECOMMENDATION:  Await pathology results Continue daily ppi and nighttime H2 blocker therapy If ongoing dysphagia obtain manometry testing for further workup Proceed with colonoscopy   Davis Medisilvestre Davina Fernandez MD       Orders  No orders of the defined types were placed in this encounter.

## 2023-09-07 ENCOUNTER — TELEPHONE (OUTPATIENT)
Age: 78
End: 2023-09-07

## 2023-09-07 ENCOUNTER — OFFICE VISIT (OUTPATIENT)
Dept: PULMONOLOGY | Facility: CLINIC | Age: 78
End: 2023-09-07
Payer: COMMERCIAL

## 2023-09-07 VITALS
WEIGHT: 171.9 LBS | HEART RATE: 81 BPM | BODY MASS INDEX: 26.98 KG/M2 | HEIGHT: 67 IN | RESPIRATION RATE: 18 BRPM | OXYGEN SATURATION: 96 % | DIASTOLIC BLOOD PRESSURE: 88 MMHG | SYSTOLIC BLOOD PRESSURE: 142 MMHG

## 2023-09-07 DIAGNOSIS — J84.9 ILD (INTERSTITIAL LUNG DISEASE) (HCC): Primary | ICD-10-CM

## 2023-09-07 DIAGNOSIS — E04.1 THYROID NODULE: ICD-10-CM

## 2023-09-07 PROCEDURE — 99214 OFFICE O/P EST MOD 30 MIN: CPT | Performed by: INTERNAL MEDICINE

## 2023-09-07 NOTE — PROGRESS NOTES
Progress Note - Pulmonary   Liza Sa 70 y.o. female MRN: [id number]   Encounter: [id number]      Assessment/Plan:  Patient is a 53-year-old female with past medical history significant thyroid nodule who presents for routine follow-up. Overall, the patient reports she is stable. She enjoyed her participation in pulmonary rehab. The patient would benefit from PFTs in the next several months to ensure stability. No acute indication for intervention. She is currently undergoing evaluation for possible parathyroidectomy versus monitoring. Interstitial Lung Disease  -  Has had improvement with pulm rehab  -  PFTs improved; reviewed with patient and   -  No acute indication for steroids or antifibrotics     Reactive Airway Disease  -  Noted improvement from albuterol  -  May use on as needed basis     Knee pain  -  Had benefit from previous injections  -  May follow up with ortho  -  May trial OTC knee brace     Thyroid nodule  - reports she may need additional surgery  -  To be determined    1. [facility]) ([address])  -     Spirometry with diffusing capacity; Future        Patient may follow up in 4 months or sooner as necessary. Orders:  Orders Placed This Encounter   Procedures   • Spirometry with diffusing capacity     Standing Status:   Future     Standing Expiration Date:   9/7/2024     Order Specific Question:   Would you like to add MVV, MIP, MEP into this order? Answer:   No       Subjective: The patient notes that she did well with pulmonary rehab. She would like to continue to go to the gym. She notes difficulty with heat and humidity. Overall, she feels her shortness of breath has improved. She denies fevers, chills, night sweats or weight loss. She is undergoing evaluation of her parathyroid d/o. She reports pain in both of her knees. She is seeing orthopedics to discuss possibility of knee replacement. She notes fatigue.   Her partner denies snoring. She notes difficulty getting comfortable. Inhaler Regimen:  None    Remainder of review of systems negative except as described in HPI. The following portions of the patient's history were reviewed and updated as appropriate: allergies, current medications, past family history, past medical history, past social history, past surgical history and problem list.     Objective:   Vitals: Blood pressure 142/88, pulse 81, resp. rate 18, height 5' 7" (1.702 m), weight 78 kg (171 lb 14.4 oz), SpO2 96 %, not currently breastfeeding., RA, Body mass index is 26.92 kg/m². Physical Exam  Gen: Pleasant, awake, alert, oriented x 3, no acute distress  HEENT: Mucous membranes moist, no oral lesions, no thrush  NECK: No accessory muscle use, JVP not elevated  Cardiac: RRR, single S1, single S2, no murmurs, no rubs, no gallops  Lungs: CTA b/l  Abdomen: normoactive bowel sounds, soft nontender, nondistended, no rebound or rigidity, no guarding  Extremities: no cyanosis, no clubbing, no LE edema  MSK:  Strength equal in all extremities  Derm:  No rashes/lesions noted  Neuro:  Appropriate mood/affect    Labs: I have personally reviewed pertinent lab results. Lab Results   Component Value Date    WBC 6.00 05/04/2023    HGB 14.9 05/04/2023     05/04/2023     Lab Results   Component Value Date    CREATININE 0.83 05/04/2023     Imaging and other studies: I have personally reviewed pertinent reports. and I have personally reviewed pertinent films in PACS  CXR 5/19/2023  My interpretation:  No acute intrathoracic process    Radiology findings:  Cardiomediastinal silhouette normal.  Lungs clear. No effusion or pneumothorax. Upper abdomen normal. Bones normal for age. Pulmonary Function Testing: I have personally reviewed pertinent reports.    and I have personally reviewed pertinent films in PACS                          FEV1               FVC                 FEV1/FVC       DLCOcor  10/7/2019        1.70 69%         2.17 68%         78%                 58%  3/3/2021          1.59 66%         2.02 64%         79%                 58%  3/17/2022        1.46 62%         1.89 60%         78%                 55%  10/17/2022      1.26 53%         1.62 52%         78%                 52%      2/15/2023        1.49 65%         1.90 63%         79%                 67%    Ifeanyi Noyola, [address] [place] [address]

## 2023-09-07 NOTE — TELEPHONE ENCOUNTER
Patient calling upset about her spot on her back. No bleeding, or pain. No history of cancer. Also only wants a woman. She has My chart but doesn't use it (she is on wait list) Advised pateint to call frequently to see if we have openings.

## 2023-09-12 ENCOUNTER — OFFICE VISIT (OUTPATIENT)
Dept: FAMILY MEDICINE CLINIC | Facility: CLINIC | Age: 78
End: 2023-09-12
Payer: COMMERCIAL

## 2023-09-12 ENCOUNTER — HOSPITAL ENCOUNTER (OUTPATIENT)
Dept: NON INVASIVE DIAGNOSTICS | Age: 78
Discharge: HOME | End: 2023-09-12
Payer: COMMERCIAL

## 2023-09-12 ENCOUNTER — NURSE TRIAGE (OUTPATIENT)
Dept: OTHER | Facility: OTHER | Age: 78
End: 2023-09-12

## 2023-09-12 VITALS
HEIGHT: 67 IN | RESPIRATION RATE: 16 BRPM | SYSTOLIC BLOOD PRESSURE: 120 MMHG | OXYGEN SATURATION: 97 % | DIASTOLIC BLOOD PRESSURE: 74 MMHG | TEMPERATURE: 97.8 F | BODY MASS INDEX: 27 KG/M2 | WEIGHT: 172 LBS | HEART RATE: 78 BPM

## 2023-09-12 DIAGNOSIS — R07.9 CHEST PAIN OF UNKNOWN ETIOLOGY: ICD-10-CM

## 2023-09-12 DIAGNOSIS — D48.5 NEOPLASM OF UNCERTAIN BEHAVIOR OF SKIN OF BACK: Primary | ICD-10-CM

## 2023-09-12 LAB
CHEST PAIN STATEMENT: NORMAL
MAX DIASTOLIC BP: 90 MMHG
MAX HEART RATE: 111 BPM
MAX HR PERCENT: 77 %
MAX HR: 111 BPM
MAX PREDICTED HEART RATE: 144 BPM
MAX. SYSTOLIC BP: 179 MMHG
PROTOCOL NAME: NORMAL
SL CV STRESS STAGE REACHED: 2
STRESS ANGINA INDEX: 0
STRESS PEAK HR: 111 BPM
STRESS POST ESTIMATED WORKLOAD: 5.2 METS
STRESS POST EXERCISE DUR MIN: 3 MIN
STRESS POST EXERCISE DUR SEC: 17 SEC
TARGET HR FORMULA: NORMAL
TEST INDICATION: NORMAL
TIME IN EXERCISE PHASE: NORMAL

## 2023-09-12 PROCEDURE — 93016 CV STRESS TEST SUPVJ ONLY: CPT | Performed by: INTERNAL MEDICINE

## 2023-09-12 PROCEDURE — 99213 OFFICE O/P EST LOW 20 MIN: CPT | Performed by: FAMILY MEDICINE

## 2023-09-12 PROCEDURE — 93018 CV STRESS TEST I&R ONLY: CPT | Performed by: INTERNAL MEDICINE

## 2023-09-12 PROCEDURE — 93017 CV STRESS TEST TRACING ONLY: CPT

## 2023-09-12 NOTE — TELEPHONE ENCOUNTER
Regarding: stress test prep questions  ----- Message from [employer] sent at 9/12/2023  7:14 AM EDT -----  " I have a stress test scheduled today and it says I can have toast, but doesn't go into detail if I can have butter or jelly.  Also how much water, if any, can I drink?"

## 2023-09-12 NOTE — PROGRESS NOTES
Assessment/Plan:    Neoplasm of uncertain behavior of skin of back  - likely seborrheic keratosis, discussed shave biopsy, patient understands and in agreement, will schedule patient in 2-3 weeks. Subjective:   Chief Complaint   Patient presents with   • Nevus     On back       Patient ID: Tara Whitley is a 70 y.o. female who presents today for evaluation on changing skin lesion on her back that seems to be getting darker, no bleeding. She called [facility] and could not get in.                                                      The following portions of the patient's history were reviewed and updated as appropriate: allergies, current medications, past family history, past medical history, past social history, past surgical history and problem list.    Past Medical History:   Diagnosis Date   • Anxiety    • Arthritis    • Back pain    • Balance problems    • Chest pain     heaviness   • Colon polyp    • Difficulty swallowing    • Dizziness    • Dry cough    • Gait disorder    • GERD (gastroesophageal reflux disease)    • Hyperparathyroidism ([address])    • Hypertension    • Increased frequency of headaches    • Migraines    • Neck pain    • Numbness and tingling     bles   • Palpitations    • Pericarditis    • Thyroid disease     nodule   • Trouble in sleeping    • Wears glasses      Past Surgical History:   Procedure Laterality Date   • APPENDECTOMY     • CHOLECYSTECTOMY      laparoscopic   • COLONOSCOPY     • KNEE SURGERY Left    • PERICARDIAL WINDOW     • VT OPTX DSTL RADL X-ARTIC FX/EPIPHYSL SEP Right 3/22/2018    Procedure: OPEN REDUCTION W/ INTERNAL FIXATION (ORIF) RADIUS, splint application;  Surgeon: Griselda Pinks, MD;  Location: [facility];  Service: Orthopedics   • VT PARATHYROIDECTOMY/EXPLORATION PARATHYROIDS N/A 8/17/2022    Procedure: PARATHYROIDECTOMY, 4 GLAND EXPLORATION;  Surgeon: Mariana Quigley MD;  Location: [facility];  Service: ENT   • UPPER GASTROINTESTINAL ENDOSCOPY       Family History   Problem Relation Age of Onset   • Kidney failure Mother    • Hypertension Mother    • Lung cancer Father    • Sydelle Ahr Parkinson White syndrome Daughter    • No Known Problems Sister    • Substance Abuse Neg Hx    • Alcohol abuse Neg Hx    • Mental illness Neg Hx      Social History     Socioeconomic History   • Marital status: /Civil Union     Spouse name: Not on file   • Number of children: Not on file   • Years of education: Not on file   • Highest education level: Not on file   Occupational History   • Not on file   Tobacco Use   • Smoking status: Never   • Smokeless tobacco: Never   Vaping Use   • Vaping Use: Never used   Substance and Sexual Activity   • Alcohol use: Not Currently     Comment: Rarely    • Drug use: No   • Sexual activity: Yes     Partners: Male   Other Topics Concern   • Not on file   Social History Narrative    WORK:    1. [employer] (Shasha Mane; Rajat Kelly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    2. [employer]        HOBBIES:    1. Singing    2. Dancing    3. Hx of ceramics (+ dust)        PETS:    1. Dogs    -  Denies birds        TRAVEL:    -  [place]        EXPOSURES:    Denies mold; down pillows/comforters; hot tubs     Social Determinants of Health     Financial Resource Strain: Low Risk  (10/24/2022)    Overall Financial Resource Strain (CARDIA)    • Difficulty of Paying Living Expenses: Not hard at all   Food Insecurity: Not on file   Transportation Needs: No Transportation Needs (10/24/2022)    PRAPARE - Transportation    • Lack of Transportation (Medical): No    • Lack of Transportation (Non-Medical):  No   Physical Activity: Not on file   Stress: Not on file   Social Connections: Not on file   Intimate Partner Violence: Not on file   Housing Stability: Not on file       Current Outpatient Medications:   •  acetaminophen (TYLENOL) 500 mg tablet, Take 500-1,000 mg by mouth every 6 (six) hours as needed for mild pain, Disp: , Rfl:   •  albuterol (PROVENTIL HFA,VENTOLIN HFA) 90 mcg/act inhaler, Inhale 2 puffs every 6 (six) hours as needed for wheezing or shortness of breath, Disp: 8 g, Rfl: 1  •  ALPRAZolam (XANAX) 0.5 mg tablet, Take 1 tablet (0.5 mg total) by mouth daily at bedtime as needed for anxiety or sleep, Disp: 20 tablet, Rfl: 1  •  ascorbic acid (VITAMIN C) 500 mg tablet, Take 500 mg by mouth daily, Disp: , Rfl:   •  b complex vitamins tablet, Take 1 tablet by mouth daily, Disp: , Rfl:   •  Cholecalciferol (Vitamin D) 50 MCG (2000 UT) CAPS, Take 1 capsule (2,000 Units total) by mouth daily (Patient taking differently: Take 3,000 Units by mouth daily), Disp: , Rfl:   •  Magnesium 300 MG CAPS, Take 300 mg by mouth in the morning, Disp: , Rfl:   •  metoprolol succinate (TOPROL-XL) 25 mg 24 hr tablet, Take 1 tablet (25 mg total) by mouth daily at bedtime, Disp: 90 tablet, Rfl: 3  •  Nutritional Supplements (OSAPLEX MK-7 PO), Take by mouth, Disp: , Rfl:   •  omeprazole (PriLOSEC) 20 mg delayed release capsule, Take 1 capsule (20 mg total) by mouth daily before breakfast Half an hour prior to breakfast and half an hour prior to dinner, Disp: 180 capsule, Rfl: 3  •  Riboflavin (VITAMIN B-2 PO), Take 250 mg by mouth in the morning, Disp: , Rfl:   •  dicyclomine (BENTYL) 10 mg capsule, TAKE 1 CAPSULE BY MOUTH 4 TIMES A DAY (BEFORE MEALS AND AT BEDTIME) (Patient not taking: Reported on 9/12/2023), Disp: 360 capsule, Rfl: 1  •  methocarbamol (ROBAXIN) 500 mg tablet, Take 0.5 (250mg) to 1 (500mg) tablet by mouth every 8 hours as needed for muscle spasm/neck pain. (Patient not taking: Reported on 8/30/2023), Disp: 90 tablet, Rfl: 0  •  rizatriptan (MAXALT) 10 MG tablet, Take 1 tablet (10 mg total) by mouth once as needed for migraine May repeat in 2 hours if needed. Max 2/24 hours, 9/month.  (Patient not taking: Reported on 8/30/2023), Disp: 9 tablet, Rfl: 6  •  traZODone (DESYREL) 50 mg tablet, TAKE 1 TABLET BY MOUTH EVERYDAY AT BEDTIME (Patient not taking: Reported on 9/7/2023), Disp: 90 tablet, Rfl: 1          Objective:    Vitals:    09/12/23 1427   BP: 120/74   Pulse: 78   Resp: 16   Temp: 97.8 °F (36.6 °C)   SpO2: 97%   Weight: 78 kg (172 lb)   Height: 5' 7" (1.702 m)        Physical Exam  Constitutional:       General: She is not in acute distress. Skin:     Comments: Left side back with about 8 mm dark brown hyperkeratotic lesion   Neurological:      Mental Status: She is alert and oriented to person, place, and time.    Psychiatric:         Mood and Affect: Mood normal.         Behavior: Behavior normal.

## 2023-09-12 NOTE — TELEPHONE ENCOUNTER
Patient advised buttered toast with jelly is okay as long as the jelly does not have caffeine. She is also going to drink water. Patient advised to call back with any questions or concerns.      Reason for Disposition  • General information question, no triage required and triager able to answer question    Protocols used: IN[facility] ONLY CALL - [facility] TRIAGE[facility]

## 2023-09-13 ENCOUNTER — TELEPHONE (OUTPATIENT)
Dept: CARDIOLOGY CLINIC | Facility: CLINIC | Age: 78
End: 2023-09-13

## 2023-09-13 NOTE — RESULT ENCOUNTER NOTE
Please call patient and tell results normal.  No evidence of blockage at 77% her max predicted heart rate. Make appt with me next month to reevaluate her and determine if further testing needed.

## 2023-09-13 NOTE — TELEPHONE ENCOUNTER
----- Message from Shivam Goodman MD sent at 9/13/2023  2:12 PM EDT -----  Please call patient and tell results normal.  No evidence of blockage at 77% her max predicted heart rate. Make appt with me next month to reevaluate her and determine if further testing needed.

## 2023-09-13 NOTE — TELEPHONE ENCOUNTER
Placed call to patient. She verbalized understanding. Patient lives in [facility] and would like appt in [facility] office. Routing to [employer] to schedule. Thanks!

## 2023-09-21 ENCOUNTER — TELEPHONE (OUTPATIENT)
Dept: NEUROLOGY | Facility: CLINIC | Age: 78
End: 2023-09-21

## 2023-09-21 NOTE — TELEPHONE ENCOUNTER
Called and spoke to patient to confirm their upcoming appointment with Dr. Adwoa Marquez. Informed patient about arriving in the [facility] location 15 minutes prior to their appointment to get checked in and going over chart.

## 2023-09-25 ENCOUNTER — TELEPHONE (OUTPATIENT)
Dept: ENDOCRINOLOGY | Facility: CLINIC | Age: 78
End: 2023-09-25

## 2023-09-26 ENCOUNTER — LAB (OUTPATIENT)
Dept: LAB | Facility: CLINIC | Age: 78
End: 2023-09-26
Payer: COMMERCIAL

## 2023-09-26 DIAGNOSIS — E55.9 VITAMIN D DEFICIENCY: ICD-10-CM

## 2023-09-26 DIAGNOSIS — E83.52 HYPERCALCEMIA: ICD-10-CM

## 2023-09-26 DIAGNOSIS — E21.3 HYPERPARATHYROIDISM (HCC): ICD-10-CM

## 2023-09-26 LAB
25(OH)D3 SERPL-MCNC: 39.7 NG/ML (ref 30–100)
ANION GAP SERPL CALCULATED.3IONS-SCNC: 4 MMOL/L
BUN SERPL-MCNC: 23 MG/DL (ref 5–25)
CALCIUM SERPL-MCNC: 10.6 MG/DL (ref 8.4–10.2)
CHLORIDE SERPL-SCNC: 106 MMOL/L (ref 96–108)
CO2 SERPL-SCNC: 29 MMOL/L (ref 21–32)
CREAT SERPL-MCNC: 0.86 MG/DL (ref 0.6–1.3)
GFR SERPL CREATININE-BSD FRML MDRD: 65 ML/MIN/1.73SQ M
GLUCOSE P FAST SERPL-MCNC: 84 MG/DL (ref 65–99)
POTASSIUM SERPL-SCNC: 4.9 MMOL/L (ref 3.5–5.3)
PTH-INTACT SERPL-MCNC: 89 PG/ML (ref 12–88)
SODIUM SERPL-SCNC: 139 MMOL/L (ref 135–147)

## 2023-09-26 PROCEDURE — 36415 COLL VENOUS BLD VENIPUNCTURE: CPT

## 2023-09-26 PROCEDURE — 83970 ASSAY OF PARATHORMONE: CPT

## 2023-09-26 PROCEDURE — 82306 VITAMIN D 25 HYDROXY: CPT

## 2023-09-26 PROCEDURE — 80048 BASIC METABOLIC PNL TOTAL CA: CPT

## 2023-09-27 ENCOUNTER — OFFICE VISIT (OUTPATIENT)
Dept: NEUROLOGY | Facility: CLINIC | Age: 78
End: 2023-09-27
Payer: COMMERCIAL

## 2023-09-27 VITALS
HEIGHT: 67 IN | TEMPERATURE: 98.2 F | DIASTOLIC BLOOD PRESSURE: 90 MMHG | BODY MASS INDEX: 26.53 KG/M2 | WEIGHT: 169 LBS | HEART RATE: 84 BPM | SYSTOLIC BLOOD PRESSURE: 144 MMHG

## 2023-09-27 DIAGNOSIS — R20.0 NUMBNESS AND TINGLING OF BOTH LOWER EXTREMITIES: ICD-10-CM

## 2023-09-27 DIAGNOSIS — R20.2 NUMBNESS AND TINGLING OF BOTH LOWER EXTREMITIES: ICD-10-CM

## 2023-09-27 DIAGNOSIS — G43.709 CHRONIC MIGRAINE WITHOUT AURA WITHOUT STATUS MIGRAINOSUS, NOT INTRACTABLE: Primary | ICD-10-CM

## 2023-09-27 PROCEDURE — 99215 OFFICE O/P EST HI 40 MIN: CPT | Performed by: PSYCHIATRY & NEUROLOGY

## 2023-09-27 NOTE — PROGRESS NOTES
[facility] Neurology Concussion[facility]Headache [facility] Consult - Follow up   PATIENT:  Celia Don  MRN:  [id number]  :  1945  DATE OF SERVICE:  2023  REFERRED BY: No ref. provider found  PMD: Nolan Luo MD    Assessment/Plan:   Herbert mosley 70 y. o. female with a past medical history of hyperparathyroidism status post adenoma removal, IBS, GERD, pericarditis, heart murmur, chronic gait instability, chronic neck pain (following with physiatry), vitamin-D deficiency, insomnia, depression, anxiety, BPPV referred here for evaluation of headache.   Initial evaluation by me 2018    Chronic Migraine without aura without status migrainosus, not intractable  Features of idiopathic intracranial hypertension on imaging without papilledema not meeting criteria for IIH   She has had migraines since childhood, she has followed with neurologist in the past back in [place].  She moved here to be closer to her daughter and granddaughter in the area. She has multiple types of headaches,  bioccipital pain, mid throbbing pain. Migraine is without aura and has associated nausea sometimes vomiting, stiff and sore neck, problems with concentration, photophobia, phonophobia, osmophobia, sometimes nasal congestion, lightheadedness.  Migraines worse with any movement.   - as of 2019 she reports headaches are significantly improved with lifestyle interventions  - as of 2019:  headaches 6 times a month and go away with ibuprofen.  She has not followed up with eye doctor but has an appointment in July. - as of 2019: as of 19: headaches 6-8 a month - often in the am or with anxiety/stress - these she can breathe through it. Morning headaches the last 4-5 days, goes away with coffee usually, if not goes away with ibuprofen.  Last big migraine 9 months or so ago   - As of 10/9/2019:  headaches - has not had in quite some time, around 8 a month and go away with ibuprofen, occasional sharp pains randomly for 5 minutes on various locations on head 1-2 times a week. No migraines in a year. - she is taking magnesium and riboflavin - she thinks this may be helping  - as of 03/2/2020: no longer having significant headaches, occasional am headaches that improve with coffee, taking mg and B2 for prevention  - as of 6/16/2021: She reports 1-2 months of near daily headaches that may last throughout the day, new type of stabbing headache and new onset left visual changes that lasted 4 days and resolved. Continue baby asa for now, ordered MRI Brain. Trial of gabapentin if she would like that may help with multiple things. Follow up with endocrine since hyperparathyroidism may be causing a lot of problems. - as of 11/12/2021: she reports headaches have improved to 4-5 days per week with mostly tension features and certainly could be related to stress or hyperparathyroidism for which she is following with endocrine. Also dealing with other symptoms concerning for hyperparathyroid. Taking supplements for headache prevention. Did not try gabapentin as she does not want to take prescription meds for her headaches. MRI Brain did not show any new or concerning findings. - as of 5/17/2022: She reports she is getting about 4-5 significant migraines a month and she is now willing to try abortive medication for this so will prescribe rizatriptan 10 mg as needed. She is not interested in prescription preventative. - as of 10/26/2022: She reports headaches are about the same, come in waves, will wake up with headaches for weeks at a time, then other times not for days or weeks. Often related to stress. Even if they are bad they typically go away with tylenol in max 2 hours, occasionally 4-5 days a month last longer and has not tried rizatriptan for that yet. She is not interested in prescription preventative at this time.   Recommended following back up with physiatry for neck pain as trigger point injections may help with neck pain and possibly headaches once neck pain is improved. - as of 2/1/2023: She reports headaches are daily and wakes up with them and I again recommended evaluation for sleep apnea contributing and ordered sleep study. She is not currently interested in prescription preventative and did not yet try rizatriptan which I suggested holding off at this time until migraines are more episodic again or if she has acute episode that she did not wake up with. She was prescribed trazodone for insomnia and she would like to start with this first since she has not tried it and it could help with mood, stress, sleep and therefore headaches tolerated. We discussed if you were to consider preventative medication for myself I would recommend gabapentin. Also agree that with the primary hyperparathyroidism still present, having repeat surgery would be my recommendation if it is what Dr. Chico Stephenson recommends, but is otherwise the hypercalcemia can continue to contribute to headaches and other morbidity issues as well. - as of 5/10/2023: She has had improvement in headaches since last visit and reports she only seems to get them now when stressed. She continues to take headache preventative supplements and not interested in additional preventative med. She reports if headache becomes significant enough she may take acetaminophen which helps once a week. Not tried rizatriptan. Sleep study scheduled for September as we discussed the 4 to 5 hours of sleep she is getting is the most modifiable factor contributing to many of her symptoms. She continues to follow with ENT and endocrinology for primary hyperparathyroidism and getting second opinion next week as this is also likely contributing to multiple symptoms.   - as of 9/27/2023: She reports 3-4 headaches a week that are typically there upon awakening we discussed I suspect it is due to slight increased intracranial pressure that I see features of on imaging again highly encouraged her to schedule sleep study as I suspect sleep disorder is contributing to this as is potentially primary hyperparathyroidism. Thankfully headaches typically resolve spontaneously, otherwise typically with acetaminophen and if not caffeine can help prior to bed. We discussed if imaging results and medication options and she would rather not add at this time. She is hesitant to get sleep study due to fear of staying alone in the building.     Workup  -  MRI brain without contrast 07/19/2021: White matter changes suggestive of chronic microangiopathy. No acute intracranial pathology. *We discussed as of my retrospective review 9/27/2023 she does have partially empty sella, she has prominent optic nerve sheath bilaterally with some tortuosity bilaterally, cerebellar tonsils overlay the foramen magnum   - MRI Brain without contrast  7/9/19: No acute intracranial abnormality. Mild nonspecific cerebral white matter signal abnormality, which can be seen in patients with migraines as well as microangiopathic disease.  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  This may represent an anatomic variation.  If concerned of vertebrobasilar insufficiency, consider follow-up CTA or contrast enhanced MRA imaging.   - MRA head and neck/carotids 09/30/2019: No large vessel flow restrictive disease.  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  This could be reevaluated with CTA. -> per NSGY This requires no treatment and no further imaging is required either.    - Minor short segment stenosis of the dominant right V1 origin.  Mild long segment narrowing of the origin and nondominant left v1.  Anterior circulation normal.   - B12 3/10/20 721        Preventive:  - We discussed headache hygiene and lifestyle interventions that may improve her headaches   - she is not interested in prescription preventative at this time.    - headache preventative supplements including magnesium,  riboflavin   - through other providers:  xanax 1-2 times a week for insomnia, metoprolol 25 mg nightly  - past/failed/contraindicated: metoprolol, propranolol contraindicated due to interaction with current medications   -  future options: Topiramate, acetazolamide/Diamox, gabapentin, venlafaxine, CGRP med, botox      Abortive:  - discussed not taking over-the-counter or prescription pain medications more than 3 days per week to prevent medication overuse/rebound headache  - trial of rizatriptan prescribed in the past and she never took it. Discussed proper use, possible side effects and risks. - past/contraindicated: fiorinal with codeine years ago  - past/helped: Toradol IM has worked for in the past  - future options: Alternative Triptan, prochlorperazine, Toradol IM or p.o., could consider trial for 5 days of Depakote or dexamethasone for prolonged migraine, ubrelvy, reyvow, nurtec      Insomnia  She reports chronic insomnia and that she currently gets less than 4 hr a night of sleep.  At initial visit 11/2018 we discussed sleep hygiene and she has stopped drinking coffee late at night which has helped some. Darleen Au is wondering if a referral to sleep medicine would be indicated and I am happy to refer her.  Likely multifactorial including possible psychogenic component versus other sleep disorder as well. - as of 06/03/2019: Minda Carney reports she did not go to sleep medicine appointment and tried cutting coffee and tea at dinner which helps somewhat  - as of 7/31/19: sleep sometimes good - was good for a while after starting mg, sometimes wakes up at night again now, possible symptoms of RLS and still only averaging 4-5 - will have her see  Sleep med   - as of 10/09/2019:  Did not follow up with sleep medicine as recommended  - as of 3/2/2020 reports she plans to follow up with sleep med soon, may have RLS? Or other sleep disorder. -   Saw Sleep Medicine 06/22/2021 who felt no PSG was indicated - Psychophysiologic insomnia. Recommended sleep hygiene, relaxation techniques, consider CBT-I.   - as of 2/1/2023: I again recommended sleep medicine follow-up after sleep study for insomnia with a different sleep provider.   - as of 5/10/2023: Sleep study scheduled for 9/21/2023  - as of 9/27/2023: She is nervous to sleep over in the place and canceled it and we discussed in great detail the morbidity and mortality if remains untreated and she will consider       Numbness and tingling of both lower extremities  - EMG/NCS 6/24/20: Mildly abnormal study. The reduced amplitudes noted on the eroneal  marker studies from extensor digitorum brevis muscles is likely due to reduced muscle bulk of muscles. The low amplitudes noted on the sensory studies in the lower extremities can be considered normal for this patient's age. These changes can be considered normal for this patient's age consider normal sural sensory response on the right and normal tibial responses, or could suggest a very mild axonal sensorimotor neuropathy. 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  To note, patient has history of remote carpal tunnel surgeries, could be remnant of prior surgery. - as of 9/27/2023: She reports bilateral shin numbness for the past year or so wax and wanes and is painful to touch in the shin region and tingling at times. She reports discussing this with PCP, plans to discuss with cardiology as well and endocrinology on Monday but wanted to mention.   We discussed can absolutely repeat EMG/NCS    Patient instructions     EMG/NCS ordered     - "Rewire Your Anxious Brain: How to Use the Neuroscience of Fear to End Anxiety, Panic, and Worry" By Keesha Whitman PhD    Follow up with Dr. Artem Verdin for the surgery and endocrinology    Safety/fall precautions     Once sleep study is resulted I recommend please call [phone] to schedule a follow up visit  with one of the following providers:  Rosalee Martin MD Bhavna Seth PA-C       Headache/migraine treatment:   Abortive medications (for immediate treatment of a headache): It is ok to take ibuprofen, acetaminophen or naproxen (Advil, Tylenol,  Aleve, Excedrin) if they help your headaches you should limit these to No more than 3 times a week to avoid medication overuse/rebound headaches.     For your more moderate to severe migraines take this medication early   Maxalt (rizatriptan) 10mg tabs - take one at the onset of headache. May repeat one time after 1-2 hours if pain has not resolved. (Max 2 a day and 9 a month)       Over the counter preventive supplements for headaches/migraines   (to take every day to help prevent headaches - not to take at the time of headache):  [x] Magnesium 400mg daily (If any diarrhea or upset stomach, decrease dose  as tolerated)  [x] Riboflavin (Vitamin B2)  400mg daily   (FYI B2 may make your urine bright/neon yellow)     Prescription preventive medications for headaches/migraines   (to take every day to help prevent headaches - not to take at the time of headache):  [x]  we have options if you ever wanted - like gabapentin     *Typically these types of medications take time untill you see the benefit, although some may see improvement in days, often it may take weeks, especially if the medication is being titrated up to a beneficial level. Please contact us if there are any concerns or questions regarding the medication.      Lifestyle Recommendations:  [x] SLEEP - Maintain a regular sleep schedule: Adults need at least 7-8 hours of uninterrupted a night.  Maintain good sleep hygiene:  Going to bed and waking up at consistent times, avoiding excessive daytime naps, avoiding caffeinated beverages in the evening, avoid excessive stimulation in the evening and generally using bed primarily for sleeping.  One hour before bedtime would recommend turning lights down lower, decreasing your activity (may read quietly, listen to music at a low volume). When you get into bed, should eliminate all technology (no texting, emailing, playing with your phone, iPad or tablet in bed). [x] HYDRATION - Maintain good hydration.  Drink  2L of fluid a day (4 typical small water bottles)  [x] DIET - Maintain good nutrition. In particular don't skip meals and try and eat healthy balanced meals regularly. [x] TRIGGERS - Look for other triggers and avoid them: Limit caffeine to 1-2 cups a day or less. Avoid dietary triggers that you have noticed bring on your headaches (this could include aged cheese, peanuts, MSG, aspartame and nitrates).     Education and Follow-up  [x] Please call with any questions or concerns. Go to the ED with any new or concerning symptoms  [x] Follow up 3-4 months, sooner if needed       CC:    We had the pleasure of evaluating Nella Yancey in neurological consultation today. she is a right handed female who presents today for evaluation of headaches.      History of Present Illness:   Interval history as of 9/27/2023  - no significant new or concerning neurologic symptoms since last visit   - waiting on mammogram results   - lost 6 pounds   - following with endocrine on Monday   - has not obtained sleep study   - bilateral shin numbness for the past year or so, waxes and wanes and is painful to touch and numb and tingling at times, painful bilateral shins - going to see cardiology next week and endo on Monday     Headaches and migraines   3-4 times a week   When she wakes up can have headaches and then goes to bathroom and may take omeprazole and then sometimes it goes away and sometimes it goes away and sometimes it doesn't and other times takes two tylenol and oftentimes at work and if not by the time she goes to bed she will then have coffee and usually that we will get rid of it by the morning    Preventative:   - continue headache preventative supplements including magnesium,  riboflavin   - through other providers: trazodone as needed for sleep - tried 25 mg once and it didn't help so stopped, xanax 1-2 times a week or less at bedtime, metoprolol  25 mg daily bedtime    Abortive:   OTC meds  Denies bothersome side effects     Interval history as of 5/10/2023  - no significant new or concerning neurologic symptoms since last visit   -Sleep study scheduled for 9/21/2023, maybe 4-5 hours of sleep a night. ..  -Parathyroid scan 3/24/2023 was normal, then saw Endo Dr Channing Riggs and she discussed with Dr Kaye Petersen and they are holding off on repeat surgery right now, since PTH is not extremely high  - seeing another surgeon from [facility] in next week  - following with physiatry - Dr Garcia Fears and has TPI available     Headaches and migraines   Much better, maybe takes tylenol once a week for pain  She reports she gets headaches occasionally and takes acetaminophen right when they start  Now only seems to get them when upset, less often   Previously with heat, so we will see if worsens in summer  They were both sick after Easter with a URI  If she gets upset or overwhelmed, tremulous hands and feels shaky inside     Preventative:   - continue headache preventative supplements including magnesium,  riboflavin   - through other providers: trazodone as needed for sleep - has not tried yet, but plans to start with 25 mg, xanax 1-2 times a week, metoprolol  25 mg    Abortive:   - if feels a headache coming on, tries to relax and think of something else, time alone,helps often  - acetaminophen/tylenol helps some  - trial of rizatriptan prescribed in the past and she never took it    Interval history as of 2/1/2023  -11/29/2022 she followed up with ENT Dr. Kaye Petersen for primary hyperparathyroidism and unfortunately PTH remains elevated suggesting left over hypercellular tissue and he discussed options including second opinion, revision surgery.   Followed up again 1/10/2023 with  Shaan Quiroga and he recommended follow-up with endocrinology, nuc med parathyroid scan  - bacterial PNA a month ago   - following up with physiatry next week    - sleep is terrible   -Struggles with reflux especially at night, I suspect sleep apnea, sleep doctor refused to order sleep study in 2021   - one morning woke up with heart racing so fast   How likely are you to doze off or fall sleep during the following situations? 0 - would never doze  1 - slight chance of dozing  2 - moderate chance of dozing  3 - high chance of dozing  [place] sleepiness scale  Sitting and reading - 2  Watching TV - 3  Sitting, inactive in a public place such as a theater 2  As a passenger in a car for an hour without a break 1  Lying down to rest in afternoon when circumstances permit 3  Sitting and talking to someone 1  Sitting quietly after lunch without alcohol 2  In a car while stopped for few minutes in traffic -0      Headaches and migraines   - wake up with them daily lately for months  - bifrontal and cervicogenic     Preventative:   - continue headache preventative supplements including magnesium,  riboflavin   - through other providers: trazodone as needed for sleep - has not tried, xanax for anxiety, metoprolol increased from 12.5 mg to 25 mg    Abortive:   - trial of rizatriptan prescribed in the past and she never took it.      Interval history as of 10/26/2022  - denies any new or concerning neurologic symptoms since last visit   - no return of vertigo  - neck pain   - had parathyroid surgery in August 2022  - still poor sleep, falls asleep ok around 10, wakes at sometimes btw 2-4 am and often is able to go back to sleep, wakes again around 7     Headaches and migraines   Improvement since last visit, headaches seemed to go a way for a while and then lately returned, seems to come in spurts, and then may not have for days or weeks, come more with stressors   - Headaches the past few weeks when she wakes that are 8/10 bifrontal with out migrainous features and resolve with tylenol 500 mg within 2 hours   - wakes up in the am with neck pain, left shoulder pain and headache (saw sleep med and they did not order study, saw physiatry for neck pain, did not follow up)   - also increased stressors related to has been falling    Preventative:  Magnesium and riboflavin  - through other providers  trazodone as needed for sleep, xanax for anxiety, metoprolol 12.5    Abortive:   - ES acetaminophen/Tylenol typically helps  -trial of rizatriptan - Did not try yet    For neck saw physiatry 5/24/22 - has not tried methocarbamol yet, PT did not help -     Interval history as of 5/17/2022  - walking more, sleeping better often     Headaches and migraines   - Bad migraines 4-5 days a month where she has to lay in bed all day, they can last 1-2 days, not responsive to OTC meds and now willing to try abortive. - stress can trigger them  - also sometimes pain left parietal scalp bone region, pushing on the region helps    - Saw Dr. Kvng Quiroga in ENT for hyperparathyroidism   - saw endocrine with us and wanted another opinion Dr. Parvez Mcfarland endocrine at     Preventative: -  Magnesium and Riboflavin  - through other providers: trazodone as needed for sleep, xanax for anxiety, metoprolol   Abortive: - Tylenol or advil dont often work     Interval history as of 11/12/2021  - denies any new or concerning neurologic symptoms since last visit  -  MRI brain without contrast 07/19/2021: White matter changes suggestive of chronic microangiopathy. No acute intracranial pathology. - Was seen by Sleep Med on 6/22/21. Psychophysiologic insomnia. Recommended sleep hygiene, relaxation techniques, consider CBT-I. Headaches   Having 4-5 headaches per week. Almost always bifrontal or bitemporal. Always on both sides. Headaches can last all day, usually better with Advil or Tylenol. No migraine symptoms. Not sleeping well. Having significant stress.     Preventative: Magnesium and Riboflavin   - Did not try gabapentin as she does not like the idea of taking a medication every day to present a headache    Abortive: Tylenol or advil    Reports dizziness that happens when she bend over forward. chronic neck pain. Being worked up for thyroid/parathyroid. - Ca is high, PTH is high   Knee pain, currently in PT for gait      Interval history as of 6/16/2021  - following with eye doctor  - PTH high 3/10/20, she has had high calcium for years it appears, recently had bone density shows major issues - she sees endocrine this month - we discussed hyperparathyroidism can cause headaches. .. Dr. Linda Briceno outside of [facility]  - 3 weeks ago had eye issue In left eye -  fireworks and line across for 4 days 5/2021, thinks it was just left eye, no headache associated, has not happened again - eye exam ok recently although she says he did not do a full exam     No vertigo episodes     Headaches and migraines   Almost daily headaches for the past 2 months  - bifrontal, bitemporal, apex - always both sides, sometimes lasts all day, if takes advil PM or 2 tylenol ED - helps it go away. No migraine symptoms  Also may get sharp pains at different points in head for 5 mins     Preventative: supplement  Abortive:   if takes advil PM or 2 tylenol ED - helps it go away.      Chronic balance issues  Does not appear to be neurologic without significant findings on EMG, normal B12, no findings to suggest cause on MRI brain  -  Has worked with PT   - valgus deformity of gait - following with ortho   -  referred to physiatry - she can also discuss neck pain with them     Sleep - seeing next week     Interval history as of 03/2/2020:  - has an apmt upcoming with sleep medicine   - for about a week she was having TMJ pain and had old azithromycin and took it and it feels a little better   - then at night while laying in bed was having pain, strange feeling in her legs all the way up the legs   - wants to walk but afraid of falling   - has not had any BPPV since   - has appointment with sleep medicine   - taking mg and B2    Interval history as of 10/09/2019  -  Did not follow up with sleep medicine as recommended  - she has been following with physical therapy for gait instability/history of BPPV - also seeing them for shoulder  - getting orthotics for feet  - denies any falls     MRA head and neck 09/30/2019:  No large vessel flow restrictive disease.     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  This could be reevaluated with CTA.  - Minor short segment stenosis of the dominant right V1 origin.  Mild long segment narrowing of the origin and nondominant left v1.  Anterior circulation normal.  - had Neurosurgery Dr. Mechelle Kay review image and there may be nothing really there, even if there is would just be surveillance indicated. Will order CTA and have he is ok with having her follow up with him to review the images/discuss results just in case         Otherwise she is doing good  - chronic lightheadedness and vertigo history - 2 weeks ago with some vertigo and went away on its own in about 5 minutes  - headaches - has not had in quite some time, occasional sharp pains randomly for 5 minutes on various locations on head 1-2 times a week   - no big migraines in a year  - she is taking magnesium and riboflavin - she thinks this may be helping     Interval history as of 7/31/19  - MRI Brain without contrast  7/9/19:   1.  No acute intracranial abnormality. 2.  Mild nonspecific cerebral white matter signal abnormality, which can be seen in patients with migraines as well as microangiopathic disease.   3.  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  This may represent an anatomic variation.  If concerned of vertebrobasilar insufficiency, consider follow-up CTA or contrast enhanced MRA imaging.      - she saw pulmonary 7/3/19  - followed up with cardiology and normal stress test per patient since last visit - they wanted to start metoprolol   -  has been in and out of the hospital for diverticulitis and then surgery for removing infected intestine so that has been taking some of her time   - daughter was pregnant and last the baby after 3 months     Lightheadedness/dizziness, h/o vertigo  Mild gait instability  Denies recent falls  *2-3 weeks of pain in the bottom of her right foot that she plans to see a foot doctor   - also has decreased sensation bilateral top of the toes and into the top of the shins for about 6 months   - she has not followed up with PT as recommended for gait and lightheadedness  - lightheadedness occurs the most with getting up   - the denice hallpike last visit improved her vertigo 6/3/19     Migraines/headaches  Morning headaches the last 4-5 days, goes away with coffee usually, if not goes away with ibuprofen  Taking deep breaths and has not been having migraines   - taking magnesium and not riboflavin      Sleep   Sleep medicine  - sometimes feels sensations in bilateral shins when in bed  - falls asleep easily with TV on, stress though has affected her some  - usually have been sleeping straight through, past week wakes up to check on  once a night     --------------------     Interval history as of 06/03/2019:  Robert Crowley been following with physical therapy and has been noticing improvement in neck pain, - - first PT visit 04/01/2019 also significantly improved dizziness following 1st treatment - the found signs of BPPV - was vertigo free up for almost 2 months   - around 5/27/19: woke up and was lying in bed when suddenly had room spinning - sat up and it went away in a 3 mins - since then every day has happened when triggered - from bending over sometimes soon   - feels like she can not walk right, hold on to  and daughter   - reports LE shin numbness      - 05/02/2019 - phone conversation with Cardiology - metoprolol 25 mg long-acting  - referred to eye doctor - apmt in July  - referred to Sleep Medicine - did not go, tried cutting coffee and tea at dinner time and sleeps fine now   - magnesium, riboflavin - reports she is taking        Interval history as of  03/18/2019  - presented to the ED 01/06/2019 with chest pain - has a stress test next week      - her biggest complaint today is trouble sleeping, chronic dizziness   - Dizziness, history of BPPV 2 years ago, got better, scares her. Not dizzy with walking, more positional      Headaches  - never filled rizatriptan  - headaches are not really a complaint today: in a month 8 times but go away with ibuprofen. Stopped drinking caffeine at night  - was referred to physical therapy has not made an appointment  - recommended supplements including - taking magnesium, riboflavin may be in her B complex   - has joined the gym, loves walking      Plans to see eye doctor, they are tearing, no blurred or double vision            Headaches started at what age? Migraines Since childhood, didn't have them when pregnant x 2  How often do the headaches occur?   *As of 11/2018:   - migraines 3-4 times a week,   - Mild throbbing pain right scalp 3-4 times a week  - lasts hours, with advil goes away most of the time  - Occasionally for sharp pain throughout head for a second or two - once a week  - Also bilateral occipital pain 1-2 times a week    *As of 3/18/19: - headaches are not really a complaint today: in a month 8 times but go away with ibuprofen   *As of 06/03/2019: headaches 6 times a month and go away with ibuprofen  *as of 7/31/19: headaches 6-8 a month - often in the am or with anxiety/stress - these she can breathe through it   Morning headaches the last 4-5 days, goes away with coffee usually, if not goes away with ibuprofen  - As of 10/9/2019 headaches - has not had in quite some time, around 8 a month and go away with ibuprofen occasional sharp pains randomly for 5 minutes on various locations on head 1-2 times a week.  No migraines in a year      What time of the day do the headaches start? no particular time of day  How long do the headaches last? 2-3 days in teenage years - now 1 full day and into the morning  Are you ever headache free? Yes  Prodrome of feeling like she is getting a headache  Aura? without aura  Describe your usual headache pain quality? throbbing  Where is your headache located? bilateral frontal area and bilateral occipital area, also right temporal   Does the pain Radiate? no radiation of pain  What is the intensity of pain? Up to a 10/10, at its best a 4  Associated symptoms:   [x] Nausea       [x] Vomiting - once in a while        [] Diarrhea         [] Insomnia           [x] Stiff or sore neck         [x] Problems with concentration  [x] Photophobia     [x]Phonophobia      [x] Osmophobia  [] Blurred vision   [x] Prefer quiet, dark room        [x] Light-headed or dizzy - sometimes   [] Tinnitus      Things that make the headache worse? +movement  Headache triggers:  Stress, mint,strong perfume, tobacco or fireplaces, If she goes to bed with a headache will wake up with worse one   What time of the year do headaches occur more frequently?   do not seem to be related to any time of the year  Have you seen someone else for headaches or pain? Yes, neurologist back in [place]  Have you had trigger point injection performed and how often? No  Have you had Botox injection performed and how often? No   Have you had epidural injections or transforaminal injections performed? No  Have you used CBD or THC for your headaches and how often? No  Are you current pregnant or planning on getting pregnant? No  Have you ever had any Brain imaging? yes years ago and normal     What medications do you take or have you taken for your headaches?    ABORTIVE:       - was prescribed rizatriptan but did not need it   - advil 200 mg  - my pillow seems to be helping the bioccipital      PREVENTIVE: no     Alternative therapies used in the past for headaches? Physical therapy -  For wrist, but not headache   Coffee, has one in the morning  thank out the p.m. Coffee     LIFESTYLE  Sleep - "if I get 4 hours I am johnnie"   - as of 06/03/2019:  Reports improved  As of 7/31/19 - avg 4-5   - sometimes feels sensations in bilateral shins when in bed  - falls asleep easily with TV on, stress though has affected her some  - usually have been sleeping straight through, past week wakes up to check on  once a night      as of 11/2018: Is your sleep restful? No, tired  What time do you go to bed at night? 10   What time do you wake up in am? Has coffee at 4:30 am with  and goes back to sleep, otherwise wakes up at 7 am  How often do you get up at night? once  Do you wake up with headaches? Sometimes   Do you snore while asleep? No  Have you been told that you stop breathing during sleeping? No - but makes noises  Do you wake up tired in the morning? Yes  Do you take frequent naps during the day? sometimes  Do you have jaw pain? No, but has TMJ  Do you grind/clench your teeth at night? No  Do you have restless leg syndrome? No     Physical activity:   Joined [employer]  Goes once a week  Walks with her daughter  - considering line dancing or something slower at the [facility]  - has joined the gym, loves walking       Water: not enough - 4 glasses      Diet:  Do you ever skip meals?  Eats well     Mood: not really, good mood   History of anxiety, sometimes takes xanax at night 0.25 mg         The following portions of the patient's history were reviewed and updated as appropriate: allergies, current medications, past family history, past medical history, past social history, past surgical history and problem list.     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  This could be reevaluated with CTA. - Minor short segment stenosis of the dominant right V1 origin.  Mild long segment narrowing of the origin and nondominant left v1.  Anterior circulation normal.  - CTA head with without contrast 10/28/2019:  No aneurysm identified at the site of the previously noted focal dilatation in the left supraclinoid ICA at the expected origin of the left posterior communicating artery. A left posterior communicating artery is not identified. The focal outpouching   may therefore represent anatomic variation versus residual junctional dilatation at the site of an atretic posterior communicating artery.  - NSGY note 11/22/19 left PCOM origin infundibulum. This requires no treatment and no further imaging is required either.         Past Medical History:     Past Medical History:   Diagnosis Date   • Anxiety    • Arthritis    • Back pain    • Balance problems    • Chest pain     heaviness   • Colon polyp    • Difficulty swallowing    • Dizziness    • Dry cough    • Gait disorder    • GERD (gastroesophageal reflux disease)    • Hyperparathyroidism (HCC)    • Hypertension    • Increased frequency of headaches    • Migraines    • Neck pain    • Numbness and tingling     bles   • Palpitations    • Pericarditis    • Thyroid disease     nodule   • Trouble in sleeping    • Wears glasses        Patient Active Problem List   Diagnosis   • Closed fracture distal radius and ulna, right, initial encounter   • Anxiety   • Gastroesophageal reflux disease with esophagitis   • History of pericarditis   • Heart murmur   • Primary hyperparathyroidism (HCC)   • Thyroid nodule   • Vitamin D deficiency • Atypical chest pain   • Migraine without aura and without status migrainosus, not intractable   • Dizziness and giddiness   • Insomnia   • Cervicalgia   • Abnormal CT scan of lung   • Irritable bowel syndrome with diarrhea   • Elevated amylase and lipase   • Paresthesias   • Carpal tunnel syndrome of left wrist   • Dysphonia   • Reflux laryngitis   • Paresis of left vocal fold   • Glottic insufficiency   • Muscle tension dysphonia   • TMJ dysfunction   • Pharyngoesophageal dysphagia   • Palpitations   • Hallux abducto valgus, bilateral   • Pincer nail deformity   • Osteoporosis with current pathological fracture   • Parathyroid related hypercalcemia (HCC)   • Cervical myofascial pain syndrome   • Edema of both ankles   • Aneurysm ([facility])   • Calf pain   • Lung nodule   • Myalgia   • Pericardial effusion   • SOBOE (shortness of breath on exertion)   • Elevated blood-pressure reading without diagnosis of hypertension   • Skin tag of anus   • H/O parathyroidectomy ([facility])   • [facility]) ([facility])   • Primary osteoarthritis of right knee       Medications:      Current Outpatient Medications   Medication Sig Dispense Refill   • acetaminophen (TYLENOL) 500 mg tablet Take 500-1,000 mg by mouth every 6 (six) hours as needed for mild pain     • albuterol (PROVENTIL HFA,VENTOLIN HFA) 90 mcg/act inhaler Inhale 2 puffs every 6 (six) hours as needed for wheezing or shortness of breath 8 g 1   • ALPRAZolam (XANAX) 0.5 mg tablet Take 1 tablet (0.5 mg total) by mouth daily at bedtime as needed for anxiety or sleep 20 tablet 1   • ascorbic acid (VITAMIN C) 500 mg tablet Take 500 mg by mouth daily     • b complex vitamins tablet Take 1 tablet by mouth daily     • Cholecalciferol (Vitamin D) 50 MCG (2000 UT) CAPS Take 1 capsule (2,000 Units total) by mouth daily (Patient taking differently: Take 3,000 Units by mouth daily)     • Magnesium 300 MG CAPS Take 300 mg by mouth in the morning     • metoprolol succinate (TOPROL-XL) 25 mg 24 hr tablet Take 1 tablet (25 mg total) by mouth daily at bedtime 90 tablet 3   • Nutritional Supplements (OSAPLEX MK-7 PO) Take by mouth     • omeprazole (PriLOSEC) 20 mg delayed release capsule Take 1 capsule (20 mg total) by mouth daily before breakfast Half an hour prior to breakfast and half an hour prior to dinner 180 capsule 3   • Riboflavin (VITAMIN B-2 PO) Take 250 mg by mouth in the morning     • dicyclomine (BENTYL) 10 mg capsule TAKE 1 CAPSULE BY MOUTH 4 TIMES A DAY (BEFORE MEALS AND AT BEDTIME) (Patient not taking: Reported on 9/27/2023) 360 capsule 1   • methocarbamol (ROBAXIN) 500 mg tablet Take 0.5 (250mg) to 1 (500mg) tablet by mouth every 8 hours as needed for muscle spasm/neck pain. (Patient not taking: Reported on 8/30/2023) 90 tablet 0   • rizatriptan (MAXALT) 10 MG tablet Take 1 tablet (10 mg total) by mouth once as needed for migraine May repeat in 2 hours if needed. Max 2/24 hours, 9/month. (Patient not taking: Reported on 8/30/2023) 9 tablet 6   • traZODone (DESYREL) 50 mg tablet TAKE 1 TABLET BY MOUTH EVERYDAY AT BEDTIME (Patient not taking: Reported on 9/7/2023) 90 tablet 1     No current facility-administered medications for this visit. Allergies:       Allergies   Allergen Reactions   • Ciprofloxacin Myalgia       Family History:     Family History   Problem Relation Age of Onset   • Kidney failure Mother    • Hypertension Mother    • Lung cancer Father    • [facility] White syndrome Daughter    • No Known Problems Sister    • Substance Abuse Neg Hx    • Alcohol abuse Neg Hx    • Mental illness Neg Hx        Social History:     Social History     Socioeconomic History   • Marital status: /Civil Union     Spouse name: Not on file   • Number of children: Not on file   • Years of education: Not on file   • Highest education level: Not on file   Occupational History   • Not on file   Tobacco Use   • Smoking status: Never   • Smokeless tobacco: Never Vaping Use   • Vaping Use: Never used   Substance and Sexual Activity   • Alcohol use: Not Currently     Comment: Rarely    • Drug use: No   • Sexual activity: Yes     Partners: Male   Other Topics Concern   • Not on file   Social History Narrative    WORK:    1.  ([employer]; [employer]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    2. [employer]        HOBBIES:    1. Singing    2. Dancing    3. Hx of ceramics (+ dust)        PETS:    1. Dogs    -  Denies birds        TRAVEL:    -  [place]        EXPOSURES:    Denies mold; down pillows/comforters; hot tubs     Social Determinants of Health     Financial Resource Strain: Low Risk  (10/24/2022)    Overall Financial Resource Strain (CARDIA)    • Difficulty of Paying Living Expenses: Not hard at all   Food Insecurity: Not on file   Transportation Needs: No Transportation Needs (10/24/2022)    PRAPARE - Transportation    • Lack of Transportation (Medical): No    • Lack of Transportation (Non-Medical): No   Physical Activity: Not on file   Stress: Not on file   Social Connections: Not on file   Intimate Partner Violence: Not on file   Housing Stability: Not on file         Objective:       Physical Exam:                                                                 Vitals:            Constitutional:    /90 (BP Location: Right arm, Patient Position: Sitting, Cuff Size: Standard)   Pulse 84   Temp 98.2 °F (36.8 °C) (Tympanic)   Ht 5' 7" (1.702 m)   Wt 76.7 kg (169 lb)   BMI 26.47 kg/m²   BP Readings from Last 3 Encounters:   09/27/23 144/90   09/12/23 120/74   09/07/23 142/88     Pulse Readings from Last 3 Encounters:   09/27/23 84   09/12/23 78   09/07/23 81         Well developed, well nourished, well groomed. Psychiatric:  Normal behavior and appropriate affect        Neurological Examination:     Mental status/cognitive function:   Recent and remote memory appear intact.  Attention span and concentration as well as fund of knowledge are appropriate for age. Normal language and spontaneous speech. Cranial Nerves:   VII-facial expression symmetric  Motor Exam: symmetric bulk throughout. no atrophy, fasciculations or abnormal movements noted. Coordination:  no apparent dysmetria, ataxia or tremor noted  Gait: steady casual antalgic wide based gait            Pertinent lab results:   See EMR for recent labs    05/04/2021 CMP unremarkable     9/25/19 - CMP unremarkable     6/10/19 CMP unremarkable  Thyroid studies normal Vit D 28     1/07/2019:  CMP and CBC unremarkable  TSH 0.78     Imaging: I have personally reviewed imaging and radiology read      MRA head and neck 09/30/2019:  No large vessel flow restrictive disease.     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  This could be reevaluated with CTA.   - Minor short segment stenosis of the dominant right V1 origin.  Mild long segment narrowing of the origin and nondominant left v1.  Anterior circulation normal.     MRI Brain without contrast  7/9/19:   1.  No acute intracranial abnormality. 2.  Mild nonspecific cerebral white matter signal abnormality, which can be seen in patients with migraines as well as microangiopathic disease. 3.  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  This may represent an anatomic variation.  If concerned of vertebrobasilar insufficiency, consider follow-up CTA or contrast enhanced MRA imaging.   Review of Systems:   ROS obtained by medical assistant and personally reviewed, but if any symptoms listed below say negative, does not mean patient has not had this symptom since last visit, please see HPI for details of symptoms discussed this visit. I recommended PCP follow up for non neurologic problems. Review of Systems   Constitutional: Negative for appetite change and fever. HENT: Negative. Negative for hearing loss, tinnitus, trouble swallowing and voice change. Eyes: Negative. Negative for photophobia and pain. Respiratory: Negative. Negative for shortness of breath. Cardiovascular: Negative. Negative for palpitations. Gastrointestinal: Negative. Negative for nausea and vomiting. Endocrine: Negative. Negative for cold intolerance. Genitourinary: Negative. Negative for dysuria, frequency and urgency. Musculoskeletal: Negative. Negative for myalgias and neck pain. Skin: Negative. Negative for rash. Neurological: Positive for headaches. Negative for dizziness, tremors, seizures, syncope, facial asymmetry, speech difficulty, weakness, light-headedness and numbness. Hematological: Negative. Does not bruise/bleed easily. Psychiatric/Behavioral: Negative. Negative for confusion, hallucinations and sleep disturbance. All other systems reviewed and are negative. I have spent 34 minutes with Patient and family today in which greater than 50% of this time was spent in counseling/coordination of care regarding Diagnostic results, Prognosis, Risks and benefits of tx options, Instructions for management, Patient and family education, Importance of tx compliance, Risk factor reductions, Impressions, Counseling / Coordination of care, Documenting in the medical record, Reviewing / ordering tests, medicine, procedures   and Obtaining or reviewing history  . I also spent 8 minutes non face to face for this patient the same day.        Author:  Olena Acuna MD 9/27/2023 11:08 AM

## 2023-09-27 NOTE — PATIENT INSTRUCTIONS
EMG/NCS ordered     - "Rewire Your Anxious Brain: How to Use the Neuroscience of Fear to End Anxiety, Panic, and Worry" By Stanton Lombard PhD    Follow up with Dr. Maddi Lopez for the surgery and endocrinology    Safety/fall precautions     Once sleep study is resulted I recommend please call [phone] to schedule a follow up visit  with one of the following providers:  Nancy Vu PA-C       Headache/migraine treatment:   Abortive medications (for immediate treatment of a headache): It is ok to take ibuprofen, acetaminophen or naproxen (Advil, Tylenol,  Aleve, Excedrin) if they help your headaches you should limit these to No more than 3 times a week to avoid medication overuse/rebound headaches. For your more moderate to severe migraines take this medication early   Maxalt (rizatriptan) 10mg tabs - take one at the onset of headache. May repeat one time after 1-2 hours if pain has not resolved. (Max 2 a day and 9 a month)       Over the counter preventive supplements for headaches/migraines   (to take every day to help prevent headaches - not to take at the time of headache):  [x] Magnesium 400mg daily (If any diarrhea or upset stomach, decrease dose  as tolerated)  [x] Riboflavin (Vitamin B2)  400mg daily   (FYI B2 may make your urine bright/neon yellow)     Prescription preventive medications for headaches/migraines   (to take every day to help prevent headaches - not to take at the time of headache):  [x]  we have options if you ever wanted - like gabapentin     *Typically these types of medications take time untill you see the benefit, although some may see improvement in days, often it may take weeks, especially if the medication is being titrated up to a beneficial level. Please contact us if there are any concerns or questions regarding the medication.       Lifestyle Recommendations:  [x] SLEEP - Maintain a regular sleep schedule: Adults need at least 7-8 hours of uninterrupted a night. Maintain good sleep hygiene:  Going to bed and waking up at consistent times, avoiding excessive daytime naps, avoiding caffeinated beverages in the evening, avoid excessive stimulation in the evening and generally using bed primarily for sleeping. One hour before bedtime would recommend turning lights down lower, decreasing your activity (may read quietly, listen to music at a low volume). When you get into bed, should eliminate all technology (no texting, emailing, playing with your phone, iPad or tablet in bed). [x] HYDRATION - Maintain good hydration. Drink  2L of fluid a day (4 typical small water bottles)  [x] DIET - Maintain good nutrition. In particular don't skip meals and try and eat healthy balanced meals regularly. [x] TRIGGERS - Look for other triggers and avoid them: Limit caffeine to 1-2 cups a day or less. Avoid dietary triggers that you have noticed bring on your headaches (this could include aged cheese, peanuts, MSG, aspartame and nitrates). Education and Follow-up  [x] Please call with any questions or concerns.  Go to the ED with any new or concerning symptoms  [x] Follow up 3-4 months, sooner if needed

## 2023-09-28 NOTE — TELEPHONE ENCOUNTER
Encourage increase oral fluid intake Tylenol for fever pain this is negative today for COVID recommend potentially retesting self in 2 days if symptoms are not improving.  Tylenol or ibuprofen for pain or fever as recommended   Spoke with patient regarding plan for pulmonary rehab and will be returning 2/13 @ 2pm

## 2023-10-02 ENCOUNTER — OFFICE VISIT (OUTPATIENT)
Dept: ENDOCRINOLOGY | Facility: CLINIC | Age: 78
End: 2023-10-02
Payer: COMMERCIAL

## 2023-10-02 VITALS
SYSTOLIC BLOOD PRESSURE: 126 MMHG | HEART RATE: 75 BPM | BODY MASS INDEX: 27.15 KG/M2 | HEIGHT: 67 IN | WEIGHT: 173 LBS | DIASTOLIC BLOOD PRESSURE: 76 MMHG

## 2023-10-02 DIAGNOSIS — M80.00XA OSTEOPOROSIS WITH CURRENT PATHOLOGICAL FRACTURE, UNSPECIFIED OSTEOPOROSIS TYPE, INITIAL ENCOUNTER: Primary | ICD-10-CM

## 2023-10-02 DIAGNOSIS — E55.9 VITAMIN D DEFICIENCY: ICD-10-CM

## 2023-10-02 DIAGNOSIS — E04.2 MULTINODULAR GOITER: ICD-10-CM

## 2023-10-02 DIAGNOSIS — E89.2 H/O PARATHYROIDECTOMY (HCC): ICD-10-CM

## 2023-10-02 DIAGNOSIS — E21.3 HYPERPARATHYROIDISM (HCC): ICD-10-CM

## 2023-10-02 DIAGNOSIS — E83.52 HYPERCALCEMIA: ICD-10-CM

## 2023-10-02 PROCEDURE — 99214 OFFICE O/P EST MOD 30 MIN: CPT | Performed by: INTERNAL MEDICINE

## 2023-10-02 NOTE — PATIENT INSTRUCTIONS
Denosumab (By injection)   Denosumab ([id number])  Treats osteoporosis, bone cancer, hypercalcemia, and other bone problems in patients who have cancer. Brand Name(s): Prolia, Xgeva   There may be other brand names for this medicine. When This Medicine Should Not Be Used: This medicine is not right for everyone. You should not receive it if you had an allergic reaction to denosumab, or if you are pregnant. How to Use This Medicine:   Injectable  Your doctor will prescribe your exact dose and tell you how often it should be given. This medicine is given as a shot under your skin. It is usually given in your upper arm, upper thigh, or stomach. A nurse or other health provider will give you this medicine. You may receive other medicines (including calcium supplements, Vitamin D) to prevent unwanted effects. This medicine should come with a Medication Guide. Ask your pharmacist for a copy if you do not have one. Missed dose: Call your doctor or pharmacist for instructions. Drugs and Foods to Avoid:   Ask your doctor or pharmacist before using any other medicine, including over-the-counter medicines, vitamins, and herbal products. Do not use Prolia® and Xgeva® together. They contain the same medicine. Some medicines can affect how denosumab works. Tell your doctor if you are also using medicine that weakens your immune system, including a steroid or cancer medicine. Warnings While Using This Medicine: It is not safe to take this medicine during pregnancy. It could harm an unborn baby. Tell your doctor right away if you become pregnant. If you are a woman who can get pregnant, your doctor may do tests to make sure you are not pregnant before receiving this medicine. Use an effective form of birth control to keep from getting pregnant during treatment with this medicine and for 5 months after the last dose.   Tell your doctor if you are breastfeeding, or if you have kidney disease, diabetes, gum disease, or a history of fractures. Tell your doctor if you have problems with your thyroid, parathyroid, or digestive system. This medicine may cause the following problems:  Increased risk of broken thigh bone  Increased risk of infections  Serious skin reactions  Severe bone, joint, or muscle pain  This medicine can cause jaw problems. You must have regular dental exams while you are being treated with this medicine. Tell your dentist that you are receiving this medicine. Practice good oral hygiene. Do not suddenly stop using this medicine without checking first with your doctor. Doing so may increase your risk for more fractures. Talk to your doctor about other medicines that you can take. Your doctor will do lab tests at regular visits to check on the effects of this medicine. Keep all appointments. Possible Side Effects While Using This Medicine:   Call your doctor right away if you notice any of these side effects:   Allergic reaction: Itching or hives, swelling in your face or hands, swelling or tingling in your mouth or throat, chest tightness, trouble breathing  Blistering, peeling, red skin rash  Chest pain, fast or uneven heartbeat, trouble breathing  Fever, chills, cough, sore throat, body aches  Lightheadedness, dizziness, fainting  Muscle spasms or twitching, numbness or tingling in your fingers, toes, or lips  Pain or burning during urination, change in how much or how often you urinate  Pain, swelling, heavy feeling, or numbness in your mouth or jaw, loose teeth or other teeth problems  Severe bone, joint, or muscle pain  Sudden and severe stomach pain, nausea, vomiting  Unusual pain in your thigh, groin, or hip  If you notice these less serious side effects, talk with your doctor:   Arm or leg pain  Diarrhea  Redness, pain, itching, burning, swelling, or a lump under your skin where the shot was given  Tiredness or weakness  If you notice other side effects that you think are caused by this medicine, tell your doctor. Call your doctor for medical advice about side effects. You may report side effects to FDA at 1-773-FDA-1054  © Copyright [employer] 2023 Information is for End User's use only and may not be sold, redistributed or otherwise used for commercial purposes. The above information is an  only. It is not intended as medical advice for individual conditions or treatments. Talk to your doctor, nurse or pharmacist before following any medical regimen to see if it is safe and effective for you. Zoledronic Acid (By injection)   Zoledronic Acid ([id number] AS-id)  Treats high blood calcium levels. Also treats bone damage caused by Paget disease, multiple myeloma, and cancers that spread to the bone. Also treats osteoporosis and reduces the risk of hip fractures in certain patients. Brand Name(s): Reclast, Zoledronic Acid Novaplus   There may be other brand names for this medicine. When This Medicine Should Not Be Used: This medicine is not right for everyone. You should not receive it if you had an allergic reaction to zoledronic acid, or if you are pregnant. How to Use This Medicine:   Injectable  A nurse or other health provider will give you this medicine. Your doctor will prescribe your dose and schedule. This medicine is given through a needle placed in a vein. Your doctor may tell you to drink extra liquids before your treatment to prevent kidney problems. Your doctor may also give you vitamin D and calcium supplements. Tell your doctor if you are not able to take these medicines. This medicine should come with a Medication Guide. Ask your pharmacist for a copy if you do not have one. Missed dose: You must use this medicine on a fixed schedule. Call your doctor or pharmacist if you miss a dose. Drugs and Foods to Avoid:   Ask your doctor or pharmacist before using any other medicine, including over-the-counter medicines, vitamins, and herbal products.   Do not use other medicines that also contain zoledronic acid. Do not use zoledronic acid together with another bisphosphonate medicine. Some foods and medicines can affect how zoledronic acid works. Tell your doctor if you are using digoxin, antibiotics, diuretics (water pills), an NSAID pain or arthritis medicine (such as aspirin, celecoxib, ibuprofen, naproxen), steroid medicines, or cancer medicines. Warnings While Using This Medicine: It is not safe to take this medicine during pregnancy. It could harm an unborn baby. Tell your doctor right away if you become pregnant. Tell your doctor if you are breastfeeding, or if you have kidney disease, anemia, aspirin-sensitive asthma, bleeding problems, cancer, congestive heart failure, low blood calcium levels, stomach absorption problems, mineral imbalance, dental problems or gum disease. Also tell your doctor if you had surgery on your bowel or parathyroid or thyroid gland. This medicine may cause the following problems:  Jaw or teeth problems  Severe bone, joint, or muscle pain  Increased risk of thigh bone fracture  Low calcium levels in your blood  You must have regular dental exams while you are being treated with this medicine. Tell your dentist or oral surgeon that you are using this medicine. Your doctor will do lab tests at regular visits to check on the effects of this medicine. Keep all appointments. Possible Side Effects While Using This Medicine:   Call your doctor right away if you notice any of these side effects:   Allergic reaction: Itching or hives, swelling in your face or hands, swelling or tingling in your mouth or throat, chest tightness, trouble breathing  Chest pain, trouble breathing, fast or uneven heartbeat  Decrease in how much or how often you urinate, blood in the urine, lower back or side pain, burning or painful urination  Muscle spasm or twitching, or numbness or tingling in your fingers, feet, or around your mouth  Pain, swelling, or numbness in the mouth or jaw, loose teeth or other teeth problems  Severe muscle, bone, or joint pain  Unusual pain in your thigh, groin, or hip  If you notice these less serious side effects, talk with your doctor:   Fever, chills, cough, sore throat, and body aches  Headache  Mild nausea, constipation, diarrhea, stomach pain or upset  Redness, pain, or swelling of your skin where the needle is placed  If you notice other side effects that you think are caused by this medicine, tell your doctor. Call your doctor for medical advice about side effects. You may report side effects to FDA at 0-231-TVI-7206  © Copyright [facility] 2023 Information is for End User's use only and may not be sold, redistributed or otherwise used for commercial purposes. The above information is an  only. It is not intended as medical advice for individual conditions or treatments. Talk to your doctor, nurse or pharmacist before following any medical regimen to see if it is safe and effective for you.

## 2023-10-02 NOTE — PROGRESS NOTES
10/2/2023      Assessment/Plan:  #Osteoporosis  Evidenced on prior forearm DEXA which showed low T score of -3.7. Obtain previous DXA report  Repeat DXA scan now  She will benefit from medication management of osteoporosis to lower risk of future fractures. Discussed options of Prolia given every 6 months or yearly reclast infusions. Information provided for both classes of medications. Patient will think about it and will let us know. #Hyperparathyroidism  Slowly improving on repeat testing. Continue on vitamin D supplementation and calcium in diet. Check labs including corrected calcium, vitamin D and PTH levels prior to next office visit. Office follow-up in 6 months. CC: Hyperparathyroidism, osteoporosis follow up    History of Present Illness     HPI: Ashli Hines is a 70y.o. year old female with history of primary hyperparathyroidism, osteoporosis, thyroid nodules seen at follow up. Last office visit was in 03/2023. History of 4 gland exploration and parathyroidectomy (3 gland removal) in August 2022. Denies any recent fractures. Reports ongoing intermittent right lower extremity pain which radiates to the hip. Denies any history of kidney stones. Had a DEXA scan of the forearm done in June 2022 which showed T score of -3.7 suggestive of  Osteoporosis. Reports she had a DEXA scan done in [facility] in the last year, report not available on chart. She will call the facility and try to obtain/have results sent to us. Reviewed most recent labs which showed slowly improving PTH of 89 with calcium of 10.6. Review of Systems   Constitutional: Negative for appetite change. Respiratory: Negative for shortness of breath, wheezing, cough. Cardiovascular: Negative for chest pain and palpitations. Gastrointestinal: Negative for abdominal pain, nausea and vomiting. Negative for diarrhea or constipation.    Musculoskeletal: RLE pain  Neurological: Negative for dizziness, light-headedness and headaches.    All other ROS reviewed and negative    Historical Information   Past Medical History:   Diagnosis Date   • Anxiety    • Arthritis    • Back pain    • Balance problems    • Chest pain     heaviness   • Colon polyp    • Difficulty swallowing    • Dizziness    • Dry cough    • Gait disorder    • GERD (gastroesophageal reflux disease)    • Hyperparathyroidism (HCC)    • Hypertension    • Increased frequency of headaches    • Migraines    • Neck pain    • Numbness and tingling     bles   • Palpitations    • Pericarditis    • Thyroid disease     nodule   • Trouble in sleeping    • Wears glasses      Past Surgical History:   Procedure Laterality Date   • APPENDECTOMY     • CHOLECYSTECTOMY      laparoscopic   • COLONOSCOPY     • KNEE SURGERY Left    • PERICARDIAL WINDOW     • SC OPTX DSTL RADL X-ARTIC FX/EPIPHYSL SEP Right 3/22/2018    Procedure: OPEN REDUCTION W/ INTERNAL FIXATION (ORIF) RADIUS, splint application;  Surgeon: Ashkan Aguilar MD;  Location: [facility];  Service: Orthopedics   • SC PARATHYROIDECTOMY/EXPLORATION PARATHYROIDS N/A 8/17/2022    Procedure: PARATHYROIDECTOMY, 4 GLAND EXPLORATION;  Surgeon: Autumn Blas MD;  Location: [facility];  Service: ENT   • UPPER GASTROINTESTINAL ENDOSCOPY       Social History   Social History     Substance and Sexual Activity   Alcohol Use Not Currently    Comment: Rarely      Social History     Substance and Sexual Activity   Drug Use No     Social History     Tobacco Use   Smoking Status Never   Smokeless Tobacco Never     Family History:   Family History   Problem Relation Age of Onset   • Kidney failure Mother    • Hypertension Mother    • Lung cancer Father    • Murrel Cameron Parkinson White syndrome Daughter    • No Known Problems Sister    • Substance Abuse Neg Hx    • Alcohol abuse Neg Hx    • Mental illness Neg Hx        Meds/Allergies   Current Outpatient Medications   Medication Sig Dispense Refill   • acetaminophen (TYLENOL) 500 mg tablet Take 500-1,000 mg by mouth every 6 (six) hours as needed for mild pain     • albuterol (PROVENTIL HFA,VENTOLIN HFA) 90 mcg/act inhaler Inhale 2 puffs every 6 (six) hours as needed for wheezing or shortness of breath 8 g 1   • ALPRAZolam (XANAX) 0.5 mg tablet Take 1 tablet (0.5 mg total) by mouth daily at bedtime as needed for anxiety or sleep 20 tablet 1   • ascorbic acid (VITAMIN C) 500 mg tablet Take 500 mg by mouth daily     • b complex vitamins tablet Take 1 tablet by mouth daily     • Cholecalciferol (Vitamin D) 50 MCG (2000 UT) CAPS Take 1 capsule (2,000 Units total) by mouth daily (Patient taking differently: Take 4,000 Units by mouth daily)     • Magnesium 300 MG CAPS Take 300 mg by mouth in the morning     • metoprolol succinate (TOPROL-XL) 25 mg 24 hr tablet Take 1 tablet (25 mg total) by mouth daily at bedtime 90 tablet 3   • Nutritional Supplements (OSAPLEX MK-7 PO) Take by mouth     • omeprazole (PriLOSEC) 20 mg delayed release capsule Take 1 capsule (20 mg total) by mouth daily before breakfast Half an hour prior to breakfast and half an hour prior to dinner 180 capsule 3   • Riboflavin (VITAMIN B-2 PO) Take 250 mg by mouth in the morning     • dicyclomine (BENTYL) 10 mg capsule TAKE 1 CAPSULE BY MOUTH 4 TIMES A DAY (BEFORE MEALS AND AT BEDTIME) (Patient not taking: Reported on 9/27/2023) 360 capsule 1   • methocarbamol (ROBAXIN) 500 mg tablet Take 0.5 (250mg) to 1 (500mg) tablet by mouth every 8 hours as needed for muscle spasm/neck pain. (Patient not taking: Reported on 8/30/2023) 90 tablet 0   • rizatriptan (MAXALT) 10 MG tablet Take 1 tablet (10 mg total) by mouth once as needed for migraine May repeat in 2 hours if needed. Max 2/24 hours, 9/month.  (Patient not taking: Reported on 8/30/2023) 9 tablet 6   • traZODone (DESYREL) 50 mg tablet TAKE 1 TABLET BY MOUTH EVERYDAY AT BEDTIME (Patient not taking: Reported on 9/7/2023) 90 tablet 1     No current facility-administered medications for this visit. Allergies   Allergen Reactions   • Ciprofloxacin Myalgia       Objective   Vitals: Blood pressure 126/76, pulse 75, height 5' 7" (1.702 m), weight 78.5 kg (173 lb), not currently breastfeeding. Invasive Devices     None                 Physical Exam   Physical exam:   Constitutional:Oriented to person, place, and time  Appears well-developed and well-nourished. Not in any acute distress. HENT:   Head: Normocephalic and atraumatic. Neck: Normal range of motion. Supple, No thyromegaly  Pulmonary/Chest: Effort normal/ breathing comfortably on room air. CTAB   CVS:  Regular rate and rhythm, S1-S2 +  Abdomen: soft, nondistended, nontender  Musculoskeletal: Normal range of motion. Skin:  Warm, no rash  Extremities:  No pedal edema  Psychiatric: Normal mood and affect. Behavior is normal.       The history was obtained from the review of the chart and from the patient.     Lab Results:      Recent Results (from the past 00776 hour(s))   Tissue Exam    Collection Time: 08/17/22 10:14 AM   Result Value Ref Range    Case Report       Surgical Pathology Report                         Case: [id number]                                   Authorizing Provider:  Bishop Tono MD      Collected:           08/17/2022 0957              Ordering Location:     [facility]        Received:            08/17/2022 [id number] [place] [address] Operating Room                                                      Pathologist:           Jones Osborn MD                                                                  Intraop:               Jones Osborn MD                                                                  Specimens:   A) - Lymph Node, Pretracheal Lymph Node                                                             B) - Parathyroid, Possible Right Upper Parathyroid                                                  C) - Parathyroid, Possible Right Inferior Parathyroid D) - Parathyroid, Suspected Left Inferior Parathyroid                                      Final Diagnosis       A. Submitted as "Lymph Node, Pretracheal Lymph Node", Excision:  - Benign thyroid tissue. B. Parathyroid, Possible Right Upper Parathyroid, Parathyroidectomy:  - Hypercellular parathyroid tissue, 80 mg.     C. Submitted as "Parathyroid, Possible Right Inferior Parathyroid", Excision:  - Reactive lymph node. D. Parathyroid, Suspected Left Inferior Parathyroid, Parathyroidectomy:  - Hypercellular parathyroid tissue, 100 mg. Additional Information       All reported additional testing was performed with appropriately reactive controls. These tests were developed and their performance characteristics determined by [facility] or appropriate performing facility, though some tests may be performed on tissues which have not been validated for performance characteristics (such as staining performed on alcohol exposed cell blocks and decalcified tissues). Results should be interpreted with caution and in the context of the patients’ clinical condition. These tests may not be cleared or approved by the U.S. Food and Drug Administration, though the FDA has determined that such clearance or approval is not necessary. These tests are used for clinical purposes and they should not be regarded as investigational or for research. This laboratory has been approved by [place] [id number], designated as a high-complexity laboratory and is qualified to perform these tests. Interpretation performed at [facility], [address] [place] [address]       Intraoperative Consultation          BF1: Hypercellular parathyroid tissue, 80 mg. Dr. Artem Verdin notified by Dr. Alexandria Navarrete on 8/17/2022 at 10:33 am.   CF1: Lymphoid tissue, 30 mg. Dr. Artem Verdin notified by Dr. Alexandria Navarrete on 8/17/2022 at 10:45 am.   DF1: Hypercellular parathyroid tissue, 100 mg.     Dr. Artem Verdin notified by Dr. Alexandria Navarrete on 8/17/2022 at 11:20 am.  Interpretation performed at [facility], [address] [place] [address]      Gross Description          A. The specimen is received in formalin, labeled with the patient's name and hospital number, and is designated "pretracheal lymph node". The specimen consists of 1 reddish tan, dense tissue measuring 1.2 cm in greatest dimension. Entirely submitted. One cassette. B. The specimen is received fresh for frozen section, labeled with the patient's name and hospital number, and is designated "possible right upper parathyroid". The specimen consists of 1 tan, soft tissue fragment measuring 0.9 x 0.3 x 0.3 cm, and collectively weighing 80 mg. The entire specimen is frozen, and remainder is submitted in cassette labeled B1.   C. The specimen is received fresh for frozen section, labeled with the patient's name and hospital number, and is designated "possible right inferior parathyroid". The specimen consists of 1 tan, soft tissue fragment measuring 0.5 x 0.3 x 0.2 cm, and weighing 30 mg. The entire specimen is frozen, and remainder is submitted between sponges in cassette labeled C1.     D. The specimen is received fresh for frozen section, labeled with the patient's name and hospital number, and is designated "suspected left inferior parathyroid". The specimen consists of 1 tan, soft tissue fragments measuring 0.9 x 0.6 x 0.2 cm, and collectively weighing 100 mg. The entire specimen is frozen, and remainder is submitted between sponges in cassette labeled D1. Note: The estimated total formalin fixation time based upon information provided by the submitting clinician and the standard processing schedule is under 72 hours.   MSequino       PTH, intact    Collection Time: 08/17/22 12:18 PM   Result Value Ref Range    PTH 78.9 12.0 - 88.0 pg/mL   Lipid Panel with Direct LDL reflex    Collection Time: 09/15/22  7:20 AM   Result Value Ref Range    Cholesterol 220 (H) See Comment mg/dL    Triglycerides 86 See Comment mg/dL    HDL, Direct 61 >=50 mg/dL    LDL Calculated 142 (H) 0 - 100 mg/dL   Basic metabolic panel    Collection Time: 09/15/22  7:20 AM   Result Value Ref Range    Sodium 141 135 - 147 mmol/L    Potassium 4.5 3.5 - 5.3 mmol/L    Chloride 109 (H) 96 - 108 mmol/L    CO2 27 21 - 32 mmol/L    ANION GAP 5 4 - 13 mmol/L    BUN 20 5 - 25 mg/dL    Creatinine 0.88 0.60 - 1.30 mg/dL    Glucose, Fasting 82 65 - 99 mg/dL    Calcium 9.9 8.3 - 10.1 mg/dL    eGFR 64 ml/min/1.73sq m   Albumin Lab Collect    Collection Time: 09/15/22  7:20 AM   Result Value Ref Range    Albumin 3.3 (L) 3.5 - 5.0 g/dL   Vitamin D 25 hydroxy    Collection Time: 09/15/22  7:20 AM   Result Value Ref Range    Vit D, 25-Hydroxy 38.1 30.0 - 100.0 ng/mL   PTH, intact- Lab Collect    Collection Time: 09/15/22  7:20 AM   Result Value Ref Range    PTH 91.6 (H) 18.4 - 80.1 pg/mL   POCT urine dip    Collection Time: 09/21/22 10:41 AM   Result Value Ref Range    LEUKOCYTE ESTERASE,UA neg     NITRITE,UA neg     SL AMB POCT UROBILINOGEN 0.2     POCT URINE PROTEIN neg      PH,UA 5.0     BLOOD,UA trace     SPECIFIC GRAVITY,UA 1.005     KETONES,UA neg     BILIRUBIN,UA neg     GLUCOSE, UA neg      COLOR,UA Yellow     CLARITY,UA Clear    CBC and differential    Collection Time: 09/21/22 11:08 AM   Result Value Ref Range    WBC 6.61 4.31 - 10.16 Thousand/uL    RBC 4.76 3.81 - 5.12 Million/uL    Hemoglobin 14.5 11.5 - 15.4 g/dL    Hematocrit 44.8 34.8 - 46.1 %    MCV 94 82 - 98 fL    MCH 30.5 26.8 - 34.3 pg    MCHC 32.4 31.4 - 37.4 g/dL    RDW 13.0 11.6 - 15.1 %    MPV 10.4 8.9 - 12.7 fL    Platelets 314 542 - 762 Thousands/uL    nRBC 0 /100 WBCs    Neutrophils Relative 58 43 - 75 %    Immat GRANS % 0 0 - 2 %    Lymphocytes Relative 27 14 - 44 %    Monocytes Relative 11 4 - 12 %    Eosinophils Relative 4 0 - 6 %    Basophils Relative 1 0 - 1 %    Neutrophils Absolute 3.81 1.85 - 7.62 Thousands/µL    Immature Grans Absolute 0.02 0.00 - 0. 20 Thousand/uL    Lymphocytes Absolute 1.79 0.60 - 4.47 Thousands/µL    Monocytes Absolute 0.72 0.17 - 1.22 Thousand/µL    Eosinophils Absolute 0.24 0.00 - 0.61 Thousand/µL    Basophils Absolute 0.03 0.00 - 0.10 Thousands/µL   Lyme Total Antibody Profile with reflex to WB    Collection Time: 09/21/22 11:08 AM   Result Value Ref Range    Lyme Total Antibodies 0.2 0.2 - 8.0 AI   Vitamin B12    Collection Time: 09/21/22 11:08 AM   Result Value Ref Range    Vitamin B-12 873 100 - 900 pg/mL   TSH, 3rd generation with Free T4 reflex    Collection Time: 09/21/22 11:08 AM   Result Value Ref Range    TSH 3RD GENERATON 0.940 0.450 - 4.500 uIU/mL   Urine culture    Collection Time: 09/21/22  3:16 PM    Specimen: Urine, Clean Catch   Result Value Ref Range    Urine Culture No Growth <1000 cfu/mL    COVID Only- Office Collect    Collection Time: 09/28/22 11:22 AM    Specimen: Nose; Nares   Result Value Ref Range    SARS-CoV-2 Negative Negative   Throat culture    Collection Time: 09/28/22 11:23 AM    Specimen: Throat   Result Value Ref Range    Throat Culture Negative for beta-hemolytic Streptococcus    POCT rapid strepA    Collection Time: 09/28/22 11:24 AM   Result Value Ref Range     RAPID STREP A Negative Negative   POCT urine dip    Collection Time: 12/02/22  1:43 PM   Result Value Ref Range    LEUKOCYTE ESTERASE,UA neg     NITRITE,UA neg     SL AMB POCT UROBILINOGEN 0.2     POCT URINE PROTEIN trace      PH,UA 5.0     BLOOD,UA neg     SPECIFIC GRAVITY,UA 1.020     KETONES,UA neg     BILIRUBIN,UA neg     GLUCOSE, UA neg      COLOR,UA Yellow     CLARITY,UA Clear    Urine culture    Collection Time: 12/02/22  1:44 PM    Specimen: Urine, Clean Catch   Result Value Ref Range    Urine Culture 40,000-49,000 cfu/ml    Calcium, ionized    Collection Time: 12/16/22  7:50 AM   Result Value Ref Range    Calcium, Ionized 1.32 1.12 - 1.32 mmol/L   PTH, intact- Lab Collect    Collection Time: 12/16/22  7:50 AM   Result Value Ref Range .9 (H) 18.4 - 80.1 pg/mL   FLU/RSV/COVID - if FLU/RSV clinically relevant    Collection Time: 12/19/22  1:00 PM    Specimen: Nasopharyngeal Swab   Result Value Ref Range    SARS-CoV-2 Negative Negative    INFLUENZA A PCR Negative Negative    INFLUENZA B PCR Negative Negative    RSV PCR Negative Negative   Basic metabolic panel    Collection Time: 03/13/23  7:15 AM   Result Value Ref Range    Sodium 138 135 - 147 mmol/L    Potassium 4.4 3.5 - 5.3 mmol/L    Chloride 109 (H) 96 - 108 mmol/L    CO2 28 21 - 32 mmol/L    ANION GAP 1 (L) 4 - 13 mmol/L    BUN 23 5 - 25 mg/dL    Creatinine 0.80 0.60 - 1.30 mg/dL    Glucose, Fasting 88 65 - 99 mg/dL    Calcium 10.2 (H) 8.3 - 10.1 mg/dL    eGFR 72 ml/min/1.73sq m   PTH, intact- Lab Collect    Collection Time: 03/13/23  7:15 AM   Result Value Ref Range    PTH 92.9 (H) 18.4 - 80.1 pg/mL   Calcium, urine, 24 hour Lab Collect    Collection Time: 03/29/23 12:57 PM   Result Value Ref Range    24H Urine Volume 2,100 mL    Calcium, 24H Urine 231 42 - 353 mg/24 hrs   Creatinine, urine, 24 hour    Collection Time: 03/29/23 12:57 PM   Result Value Ref Range    Creatinine, 24H Ur 0.9 0.6 - 1.8 g/24Hr    TOTAL URINE VOLUME 2,100 ml   POCT Rapid Covid Ag    Collection Time: 04/14/23 11:59 AM   Result Value Ref Range    POCT SARS-CoV-2 Ag Negative Negative    VALID CONTROL Valid    Covid/Flu- Office Collect    Collection Time: 04/14/23  1:59 PM    Specimen: Nose; Nares   Result Value Ref Range    SARS-CoV-2 Negative Negative    INFLUENZA A PCR Negative Negative    INFLUENZA B PCR Negative Negative   Comprehensive metabolic panel    Collection Time: 05/04/23  7:15 AM   Result Value Ref Range    Sodium 138 135 - 147 mmol/L    Potassium 4.1 3.5 - 5.3 mmol/L    Chloride 108 96 - 108 mmol/L    CO2 29 21 - 32 mmol/L    ANION GAP 1 (L) 4 - 13 mmol/L    BUN 23 5 - 25 mg/dL    Creatinine 0.83 0.60 - 1.30 mg/dL    Glucose, Fasting 81 65 - 99 mg/dL    Calcium 9.9 8.3 - 10.1 mg/dL    Corrected Calcium 10.4 (H) 8.3 - 10.1 mg/dL    AST 22 5 - 45 U/L    ALT 34 12 - 78 U/L    Alkaline Phosphatase 97 46 - 116 U/L    Total Protein 7.2 6.4 - 8.4 g/dL    Albumin 3.4 (L) 3.5 - 5.0 g/dL    Total Bilirubin 0.70 0.20 - 1.00 mg/dL    eGFR 69 ml/min/1.73sq m   CBC and differential    Collection Time: 05/04/23  7:15 AM   Result Value Ref Range    WBC 6.00 4.31 - 10.16 Thousand/uL    RBC 4.83 3.81 - 5.12 Million/uL    Hemoglobin 14.9 11.5 - 15.4 g/dL    Hematocrit 45.6 34.8 - 46.1 %    MCV 94 82 - 98 fL    MCH 30.8 26.8 - 34.3 pg    MCHC 32.7 31.4 - 37.4 g/dL    RDW 12.5 11.6 - 15.1 %    MPV 10.6 8.9 - 12.7 fL    Platelets 873 904 - 432 Thousands/uL    nRBC 0 /100 WBCs    Neutrophils Relative 49 43 - 75 %    Immat GRANS % 1 0 - 2 %    Lymphocytes Relative 34 14 - 44 %    Monocytes Relative 12 4 - 12 %    Eosinophils Relative 4 0 - 6 %    Basophils Relative 0 0 - 1 %    Neutrophils Absolute 2.91 1.85 - 7.62 Thousands/µL    Immature Grans Absolute 0.03 0.00 - 0.20 Thousand/uL    Lymphocytes Absolute 2.06 0.60 - 4.47 Thousands/µL    Monocytes Absolute 0.73 0.17 - 1.22 Thousand/µL    Eosinophils Absolute 0.25 0.00 - 0.61 Thousand/µL    Basophils Absolute 0.02 0.00 - 0.10 Thousands/µL   TSH, 3rd generation with Free T4 reflex    Collection Time: 05/04/23  7:15 AM   Result Value Ref Range    TSH 3RD GENERATON 1.670 0.450 - 4.500 uIU/mL   Vitamin D 25 hydroxy    Collection Time: 05/04/23  7:15 AM   Result Value Ref Range    Vit D, 25-Hydroxy 38.0 30.0 - 100.0 ng/mL   Vitamin B12    Collection Time: 05/04/23  7:15 AM   Result Value Ref Range    Vitamin B-12 825 100 - 900 pg/mL   Echo complete w/ contrast if indicated    Collection Time: 06/16/23  2:15 PM   Result Value Ref Range    A4C EF 60 %    LV Diastolic Volume (BP) 77 mL    LV Systolic Volume (BP) 33 mL    EF 58 %    LVIDd 3.80 cm    LVIDS 2.40 cm    IVSd 0.90 cm    LVPWd 0.80 cm    FS 37 28 - 44    MV E' Tissue Velocity Septal 5 cm/s    MV E' Tissue Velocity Lateral 7 cm/s    E wave deceleration time 197 ms    MV Peak E Tadeo 80 cm/s    MV Peak A Tadeo 1.05 m/s    AV LVOT peak gradient 3 mmHg    LVOT peak VTI 20.85 cm    LVOT peak tadeo 0.92 m/s    RVID d 4.0 cm    Tricuspid annular plane systolic excursion 9.08 cm    LA size 3.5 cm    LA length (A2C) 4.40 cm    RA 2D Volume 30.0 mL    RAA A4C 12.7 cm2    Aortic valve peak velocity 1.38 m/s    Ao VTI 28.91 cm    AV mean gradient 4 mmHg    LVOT mn grad 2.0 mmHg    AV peak gradient 8 mmHg    MV mean gradient antegrade 1 mmHg    MV peak gradient antegrade 5 mmHg    MV VTI 35.8 cm    MV stenosis pressure 1/2 time 57 ms    MV valve area p 1/2 method 3.86     TR Peak Tadeo 2.5 m/s    Triscuspid Valve Regurgitation Peak Gradient 26.0 mmHg    Ao root 3.10 cm    Asc Ao 3.2 cm    Aortic valve mean velocity 8.70 m/s    Tricuspid valve peak regurgitation velocity 2.53 m/s    Left ventricular stroke volume (2D) 42.00 mL    IVS 0.9 cm    LEFT VENTRICLE SYSTOLIC VOLUME (MOD BIPLANE) 2D 21 mL    LV DIASTOLIC VOLUME (MOD BIPLANE) 2D 62 mL    Left Atrium Area-systolic Four Chamber 15 cm2    Left Atrium Area-systolic Apical Two Chamber 14 cm2    LVSV, 2D 42 mL    DVI 0.67     GLS -18 %    LV EF 60    POCT urine dip    Collection Time: 07/03/23  3:01 PM   Result Value Ref Range    LEUKOCYTE ESTERASE,UA neg     NITRITE,UA neg     SL AMB POCT UROBILINOGEN 0.2     POCT URINE PROTEIN neg      PH,UA 5.0     BLOOD,UA neg     SPECIFIC GRAVITY,UA 1.015     KETONES,UA neg     BILIRUBIN,UA neg     GLUCOSE, UA neg      COLOR,UA Yellow     CLARITY,UA Clear    Urine culture    Collection Time: 07/03/23  3:17 PM    Specimen: Urine, Clean Catch   Result Value Ref Range    Urine Culture <10,000 cfu/ml    POCT urine dip    Collection Time: 07/31/23  1:50 PM   Result Value Ref Range    LEUKOCYTE ESTERASE,UA neg     NITRITE,UA neg     SL AMB POCT UROBILINOGEN 0.2     POCT URINE PROTEIN neg      PH,UA 5.0     BLOOD,UA trace     SPECIFIC GRAVITY,UA 1.005     KETONES,UA neg BILIRUBIN,UA neg     GLUCOSE, UA neg      COLOR,UA Yellow     CLARITY,UA Clear    UA (URINE) with reflex to Scope    Collection Time: 07/31/23  4:08 PM   Result Value Ref Range    Color, UA Yellow     Clarity, UA Clear     Specific Gravity, UA 1.012 1.003 - 1.030    pH, UA 6.5 4.5, 5.0, 5.5, 6.0, 6.5, 7.0, 7.5, 8.0    Leukocytes, UA Negative Negative    Nitrite, UA Negative Negative    Protein, UA Negative Negative mg/dl    Glucose, UA Negative Negative mg/dl    Ketones, UA Negative Negative mg/dl    Urobilinogen, UA <2.0 <2.0 mg/dl mg/dl    Bilirubin, UA Negative Negative    Occult Blood, UA Negative Negative   Urine culture    Collection Time: 07/31/23  4:08 PM    Specimen: Urine, Clean Catch   Result Value Ref Range    Urine Culture <10,000 cfu/ml    Tissue Exam    Collection Time: 08/11/23 10:26 AM   Result Value Ref Range    Case Report       Surgical Pathology Report                         Case: [id number]                                   Authorizing Provider:  Anahi Henderson MD       Collected:           08/11/2023 1026              Ordering Location:     [facility] Received:            08/11/2023 1459                                     Heart Endoscopy                                                              Pathologist:           Kwaku Leahy MD                                                         Specimens:   A) - Duodenum, Duodenum bx-cold                                                                     B) - Stomach, Gastric bx-cold                                                                       C) - Polyp, Stomach/Small Intestine, gastric polyp-bx-cold                                          D) - Esophagus, distal esophagus bx-cold                                                            E) - Esophagus, proximal esophagus bx-cold                                                           F) - Colon, Random colon bx-cold G) - Polyp, Colorectal, Hepatic flexure polyp-cold snare                                   Final Diagnosis       A. Duodenum, Duodenum bx-cold:      - Duodenal mucosa with normal villous architecture and no significant histopathologic abnormalities      - No increase in intraepithelial lymphocytosis    B. Stomach, Gastric bx-cold:      - Gastric antral-type mucosa with focal chronic inactive gastritis      - Negative for intestinal metaplasia, dysplasia or carcinoma      - An H pylori immunohistochemical stain is negative    C. Polyp, Stomach/Small Intestine, gastric polyp-bx-cold:     - Fundic gland polyp      D. Esophagus, distal esophagus bx-cold:      - Squamous mucosa with no significant histopathologic abnormalities      - No increase in eosinophils    E. Esophagus, proximal esophagus bx-cold:      - Squamous mucosa with no significant histopathologic abnormalities      - No increase in eosinophils     F. Colon, Random colon bx-cold:      - Colonic mucosa with no significant histopathologic abnormalities      - No evidence of lymphocytic or collagenous colitis    G. Polyp, Colorectal, Hepatic flexure polyp-cold snare:      - Tubular adenoma      - No high-grade dysplasia and no evidence of malignancy       Additional Information       Interpretation performed at [facility], [facility] [address] [place] [address]    All reported additional testing was performed with appropriately reactive controls. These tests were developed and their performance characteristics determined by [facility] or appropriate performing facility, though some tests may be performed on tissues which have not been validated for performance characteristics (such as staining performed on alcohol exposed cell blocks and decalcified tissues). Results should be interpreted with caution and in the context of the patients’ clinical condition.  These tests may not be cleared or approved by the U.S. Food and Drug Administration, though the FDA has determined that such clearance or approval is not necessary. These tests are used for clinical purposes and they should not be regarded as investigational or for research. This laboratory has been approved by [place] [employer], designated as a high-complexity laboratory and is qualified to perform these tests. .      Synoptic Checklist          COLON/RECTUM POLYP FORM - GI - G          :    Adenoma(s)      Gross Description       A. The specimen is received in formalin, labeled with the patient's name and hospital number, and is designated " duodenum biopsy-cold". It consists of 4 0.1 cm tan-pink tissue fragments. Entirely submitted in a screen cassette. B. The specimen is received in formalin, labeled with the patient's name and hospital number, and is designated " gastric biopsy-cold". It consists of 3 0.1 cm - 0.3 cm tan-pink tissue fragments. Entirely submitted in a screen cassette. C. The specimen is received in formalin, labeled with the patient's name and hospital number, and is designated " gastric polyp biopsy cold". It consists of two 0.2 cm tan-pink tissue fragments. Entirely submitted in a screen cassette. D. The specimen is received in formalin, labeled with the patient's name and hospital number, and is designated " distal esophagus biopsy-cold". It consists of a 0.3 cm tan tissue fragment. Entirely submitted in a screen cassette. E. The specimen is received in formalin, labeled with the patient's name and hospital number, and is designated " proximal esophagus biopsy-cold". It consists of two 0.1 cm tan-translucent tissue fragments. Entirely submitted in a screen cassette. F. The specimen is received in formalin, labeled with the patient's name and hospital number, and is designated " proximal colon biopsy-cold". It consists of a 1.0 x 0.4 x 0.1 cm aggregate of tan-pink tissue fragments. Entirely submitted in a screen cassette.   G. The specimen is received in formalin, labeled with the patient's name and hospital number, and is designated " hepatic flexure polyp-cold snare". It consists of a 1.1 cm tan-brown elongated tissue fragment. Entirely submitted in a screen cassette. Note: The estimated total formalin fixation time based upon information provided by the submitting clinician and the standard processing schedule is under 72 hours. ESorrentino     Stress strip    Collection Time: 09/12/23 10:05 AM   Result Value Ref Range    Protocol Name RIVERA     Time In Exercise Phase 00:03:17     MAX.  SYSTOLIC  mmHg    Max Diastolic Bp 90 mmHg    Max Heart Rate 111 BPM    Max Predicted Heart Rate 144 BPM    Reason for Termination Dyspnea  Patient Unable to Walk on Treadmill       Test Indication CP/PALPITATIONS     Target Hr Formular (220 - Age)*100%     Arrhy During Ex      ECG Interp Before Ex      ECG Interp during Ex      Ex Summary Comment      Chest Pain Statement none     Overall Hr Response To Exercise      Overall BP Response To Exercise     Stress test only, exercise    Collection Time: 09/12/23 10:21 AM   Result Value Ref Range    Exercise duration (min) 3 min    Exercise duration (sec) 17 sec    Angina Index 0     Estimated workload 5.2 METS    Stress Stage Reached 2.0     Stress peak  bpm    Max HR Percent 77 %    Max  bpm   Basic metabolic panel    Collection Time: 09/26/23  7:03 AM   Result Value Ref Range    Sodium 139 135 - 147 mmol/L    Potassium 4.9 3.5 - 5.3 mmol/L    Chloride 106 96 - 108 mmol/L    CO2 29 21 - 32 mmol/L    ANION GAP 4 mmol/L    BUN 23 5 - 25 mg/dL    Creatinine 0.86 0.60 - 1.30 mg/dL    Glucose, Fasting 84 65 - 99 mg/dL    Calcium 10.6 (H) 8.4 - 10.2 mg/dL    eGFR 65 ml/min/1.73sq m   PTH, intact- Lab Collect    Collection Time: 09/26/23  7:03 AM   Result Value Ref Range    PTH 89.0 (H) 12.0 - 88.0 pg/mL   Vitamin D 25 hydroxy    Collection Time: 09/26/23  7:03 AM   Result Value Ref Range    Vit D, 25-Hydroxy 39.7 30.0 - 100.0 ng/mL         Future Appointments   Date Time Provider [address]   10/4/2023 10:30 AM QU PULM ROOM 01 QU Pulm Diag [facility]   10/6/2023  9:00 AM MD SAPNA Hussein [facility]   10/17/2023  2:00 PM Kaity Reyes MD ORTHO [facility]   10/30/2023  9:45 AM MD HAM [facility]   11/7/2023  3:00 PM MD CHEPE [facility]   12/1/2023  2:00 PM MD SAPNA Hussein [facility]   12/8/2023  8:45 AM NEURO EMG [facility] AL Neur Cetr AL [facility]   1/30/2024 10:00 AM Luz Meyers MD NEURO CTR [facility]   3/5/2024 10:30 AM Roxana Plummer PA-C GASTRO ALL [facility]       Portions of the record may have been created with voice recognition software. Occasional wrong word or "sound a like" substitutions may have occurred due to the inherent limitations of voice recognition software. Read the chart carefully and recognize, using context, where substitutions have occurred. 36.4

## 2023-10-03 ENCOUNTER — TELEPHONE (OUTPATIENT)
Dept: ENDOCRINOLOGY | Facility: CLINIC | Age: 78
End: 2023-10-03

## 2023-10-03 NOTE — TELEPHONE ENCOUNTER
Patient called and would like to know if Dr Luna Maldonado wants her to continue 3000 iu vit d or increase dosage to 4,000 iu vit d due to vit d level low she said per recent labs  Pt would like a call  Thank you

## 2023-10-03 NOTE — TELEPHONE ENCOUNTER
Please inform pt that she should continue vitamin D3, 3000 IU daily       Also can we get the record of DEXA scan from previous [place]

## 2023-10-06 ENCOUNTER — OFFICE VISIT (OUTPATIENT)
Dept: CARDIOLOGY CLINIC | Facility: CLINIC | Age: 78
End: 2023-10-06
Payer: COMMERCIAL

## 2023-10-06 VITALS
OXYGEN SATURATION: 98 % | DIASTOLIC BLOOD PRESSURE: 76 MMHG | WEIGHT: 174.2 LBS | RESPIRATION RATE: 18 BRPM | BODY MASS INDEX: 27.34 KG/M2 | HEIGHT: 67 IN | HEART RATE: 82 BPM | SYSTOLIC BLOOD PRESSURE: 130 MMHG

## 2023-10-06 DIAGNOSIS — R00.2 PALPITATION: ICD-10-CM

## 2023-10-06 DIAGNOSIS — R07.9 CHEST PAIN OF UNKNOWN ETIOLOGY: Primary | ICD-10-CM

## 2023-10-06 PROCEDURE — 99214 OFFICE O/P EST MOD 30 MIN: CPT | Performed by: INTERNAL MEDICINE

## 2023-10-06 NOTE — PROGRESS NOTES
Cardiology Follow Up    Tommy Ritter [facility]  1945  [id number]  [facility] [id number] [phone]    1. Chest pain of unknown etiology  NM myocardial perfusion spect (rx stress and/or rest)      2. Palpitation            Interval History: Since her previous visit she has had no further chest discomfort. Her ability to ambulate is limited by knee pain. She denies shortness of breath orthopnea paroxysmal nocturnal dyspnea or syncope. She continues to get occasional palpitations.     Patient Active Problem List   Diagnosis   • Closed fracture distal radius and ulna, right, initial encounter   • Anxiety   • Gastroesophageal reflux disease with esophagitis   • History of pericarditis   • Heart murmur   • Primary hyperparathyroidism (HCC)   • Thyroid nodule   • Vitamin D deficiency   • Atypical chest pain   • Migraine without aura and without status migrainosus, not intractable   • Dizziness and giddiness   • Insomnia   • Cervicalgia   • Abnormal CT scan of lung   • Irritable bowel syndrome with diarrhea   • Elevated amylase and lipase   • Paresthesias   • Carpal tunnel syndrome of left wrist   • Dysphonia   • Reflux laryngitis   • Paresis of left vocal fold   • Glottic insufficiency   • Muscle tension dysphonia   • TMJ dysfunction   • Pharyngoesophageal dysphagia   • Palpitation   • Hallux abducto valgus, bilateral   • Pincer nail deformity   • Osteoporosis with current pathological fracture   • Parathyroid related hypercalcemia (HCC)   • Cervical myofascial pain syndrome   • Edema of both ankles   • Aneurysm (HCC)   • Calf pain   • Lung nodule   • Myalgia   • Pericardial effusion   • SOBOE (shortness of breath on exertion)   • Elevated blood-pressure reading without diagnosis of hypertension   • Skin tag of anus   • H/O parathyroidectomy (HCC)   • [facility]) (HCC)   • Primary osteoarthritis of right knee     Past Medical History:   Diagnosis Date   • Anxiety    • Arthritis    • Back pain    • Balance problems    • Chest pain     heaviness   • Colon polyp    • Difficulty swallowing    • Dizziness    • Dry cough    • Gait disorder    • GERD (gastroesophageal reflux disease)    • Hyperparathyroidism (HCC)    • Hypertension    • Increased frequency of headaches    • Migraines    • Neck pain    • Numbness and tingling     bles   • Palpitations    • Pericarditis    • Thyroid disease     nodule   • Trouble in sleeping    • Wears glasses      Social History     Socioeconomic History   • Marital status: /Civil Union     Spouse name: Not on file   • Number of children: Not on file   • Years of education: Not on file   • Highest education level: Not on file   Occupational History   • Not on file   Tobacco Use   • Smoking status: Never   • Smokeless tobacco: Never   Vaping Use   • Vaping Use: Never used   Substance and Sexual Activity   • Alcohol use: Not Currently     Comment: Rarely    • Drug use: No   • Sexual activity: Yes     Partners: Male   Other Topics Concern   • Not on file   Social History Narrative    WORK:    1.  ([employer]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    2. [employer]        HOBBIES:    1. Singing    2. Dancing    3. Hx of ceramics (+ dust)        PETS:    1. Dogs    -  Denies birds        TRAVEL:    -  [place]        EXPOSURES:    Denies mold; down pillows/comforters; hot tubs     Social Determinants of Health     Financial Resource Strain: Low Risk  (10/24/2022)    Overall Financial Resource Strain (CARDIA)    • Difficulty of Paying Living Expenses: Not hard at all   Food Insecurity: Not on file   Transportation Needs: No Transportation Needs (10/24/2022)    PRAPARE - Transportation    • Lack of Transportation (Medical): No    • Lack of Transportation (Non-Medical):  No   Physical Activity: Not on file   Stress: Not on file   Social Connections: Not on file   Intimate Partner Violence: Not on file   Housing Stability: Not on file      Family History   Problem Relation Age of Onset   • Kidney failure Mother    • Hypertension Mother    • Lung cancer Father    • Rasheed Maple Parkinson White syndrome Daughter    • No Known Problems Sister    • Substance Abuse Neg Hx    • Alcohol abuse Neg Hx    • Mental illness Neg Hx      Past Surgical History:   Procedure Laterality Date   • APPENDECTOMY     • CHOLECYSTECTOMY      laparoscopic   • COLONOSCOPY     • KNEE SURGERY Left    • PERICARDIAL WINDOW     • PA OPTX DSTL RADL X-ARTIC FX/EPIPHYSL SEP Right 3/22/2018    Procedure: OPEN REDUCTION W/ INTERNAL FIXATION (ORIF) RADIUS, splint application;  Surgeon: Zeina Tuttle MD;  Location: [facility];  Service: Orthopedics   • PA PARATHYROIDECTOMY/EXPLORATION PARATHYROIDS N/A 8/17/2022    Procedure: PARATHYROIDECTOMY, 4 GLAND EXPLORATION;  Surgeon: Kaylynn Garay MD;  Location: [facility];  Service: ENT   • UPPER GASTROINTESTINAL ENDOSCOPY         Current Outpatient Medications:   •  acetaminophen (TYLENOL) 500 mg tablet, Take 500-1,000 mg by mouth every 6 (six) hours as needed for mild pain, Disp: , Rfl:   •  albuterol (PROVENTIL HFA,VENTOLIN HFA) 90 mcg/act inhaler, Inhale 2 puffs every 6 (six) hours as needed for wheezing or shortness of breath, Disp: 8 g, Rfl: 1  •  ALPRAZolam (XANAX) 0.5 mg tablet, Take 1 tablet (0.5 mg total) by mouth daily at bedtime as needed for anxiety or sleep, Disp: 20 tablet, Rfl: 1  •  ascorbic acid (VITAMIN C) 500 mg tablet, Take 500 mg by mouth daily, Disp: , Rfl:   •  b complex vitamins tablet, Take 1 tablet by mouth daily, Disp: , Rfl:   •  Cholecalciferol (Vitamin D) 50 MCG (2000 UT) CAPS, Take 1 capsule (2,000 Units total) by mouth daily (Patient taking differently: Take 3,000 Units by mouth daily), Disp: , Rfl:   •  Magnesium 300 MG CAPS, Take 300 mg by mouth in the morning, Disp: , Rfl:   •  metoprolol succinate (TOPROL-XL) 25 mg 24 hr tablet, Take 1 tablet (25 mg total) by mouth daily at bedtime, Disp: 90 tablet, Rfl: 3  •  Multiple Vitamins-Minerals (MULTIVITAMIN ADULTS 50+ PO), Take by mouth, Disp: , Rfl:   •  Nutritional Supplements (OSAPLEX MK-7 PO), Take by mouth, Disp: , Rfl:   •  omeprazole (PriLOSEC) 20 mg delayed release capsule, Take 1 capsule (20 mg total) by mouth daily before breakfast Half an hour prior to breakfast and half an hour prior to dinner, Disp: 180 capsule, Rfl: 3  •  Riboflavin (VITAMIN B-2 PO), Take 250 mg by mouth in the morning, Disp: , Rfl:   •  dicyclomine (BENTYL) 10 mg capsule, TAKE 1 CAPSULE BY MOUTH 4 TIMES A DAY (BEFORE MEALS AND AT BEDTIME) (Patient not taking: Reported on 9/27/2023), Disp: 360 capsule, Rfl: 1  •  methocarbamol (ROBAXIN) 500 mg tablet, Take 0.5 (250mg) to 1 (500mg) tablet by mouth every 8 hours as needed for muscle spasm/neck pain. (Patient not taking: Reported on 8/30/2023), Disp: 90 tablet, Rfl: 0  •  rizatriptan (MAXALT) 10 MG tablet, Take 1 tablet (10 mg total) by mouth once as needed for migraine May repeat in 2 hours if needed. Max 2/24 hours, 9/month.  (Patient not taking: Reported on 8/30/2023), Disp: 9 tablet, Rfl: 6  •  traZODone (DESYREL) 50 mg tablet, TAKE 1 TABLET BY MOUTH EVERYDAY AT BEDTIME (Patient not taking: Reported on 9/7/2023), Disp: 90 tablet, Rfl: 1  Allergies   Allergen Reactions   • Ciprofloxacin Myalgia       Labs:  Lab on 09/26/2023   Component Date Value   • Sodium 09/26/2023 139    • Potassium 09/26/2023 4.9    • Chloride 09/26/2023 106    • CO2 09/26/2023 29    • ANION GAP 09/26/2023 4    • BUN 09/26/2023 23    • Creatinine 09/26/2023 0.86    • Glucose, Fasting 09/26/2023 84    • Calcium 09/26/2023 10.6 (H)    • eGFR 09/26/2023 65    • PTH 09/26/2023 89.0 (H)    • Vit D, 25-Hydroxy 09/26/2023 39.7    Hospital Outpatient Visit on 09/12/2023   Component Date Value   • Exercise duration (min) 09/12/2023 3    • Exercise duration (sec) 09/12/2023 17    • Angina Index 09/12/2023 0    • Estimated workload 09/12/2023 5.2    • Stress Stage Reached 09/12/2023 2.0    • Stress peak HR 09/12/2023 111    • Max HR Percent 09/12/2023 77    • Max HR 09/12/2023 111    • Protocol Name 09/12/2023 RIVERA    • Time In Exercise Phase 09/12/2023 00:03:17    • MAX.  SYSTOLIC BP 58/87/3097 127    • Max Diastolic Bp 01/39/5834 90    • Max Heart Rate 09/12/2023 111    • Max Predicted Heart Rate 09/12/2023 144    • Reason for Termination 09/12/2023                      Value:Dyspnea  Patient Unable to Walk on Treadmill     • Test Indication 09/12/2023 CP/PALPITATIONS    • Target Hr Formular 09/12/2023 (220 - Age)*100%    • Chest Pain Statement 09/12/2023 none    Hospital Outpatient Visit on 08/11/2023   Component Date Value   • Case Report 08/11/2023                      Value:Surgical Pathology Report                         Case: [id number]                                   Authorizing Provider:  Quirino Ayers MD       Collected:           08/11/2023 1026              Ordering Location:     [facility] Received:            08/11/2023 1459                                     Heart Endoscopy                                                              Pathologist:           Sid Yen MD                                                         Specimens:   A) - Duodenum, Duodenum bx-cold                                                                     B) - Stomach, Gastric bx-cold                                                                       C) - Polyp, Stomach/Small Intestine, gastric polyp-bx-cold                                          D) - Esophagus, distal esophagus bx-cold                                                            E) - Esophagus, proximal esophagus bx-cold                                                                                    F) - Colon, Random colon bx-cold                                                                    G) - Polyp, Colorectal, Hepatic flexure polyp-cold snare                                  • Final Diagnosis 08/11/2023                      Value: This result contains rich text formatting which cannot be displayed here. • Additional Information 08/11/2023                      Value: This result contains rich text formatting which cannot be displayed here. • Synoptic Checklist 08/11/2023                      Value:                            COLON/RECTUM POLYP FORM - GI - G                                                                                     :    Adenoma(s)     • Gross Description 08/11/2023                      Value: This result contains rich text formatting which cannot be displayed here.    Office Visit on 07/31/2023   Component Date Value   • LEUKOCYTE ESTERASE,UA 07/31/2023 neg    • Abigail Laclede 07/31/2023 neg    • SL AMB POCT UROBILINOGEN 07/31/2023 0.2    • POCT URINE PROTEIN 07/31/2023 neg    •  PH,UA 07/31/2023 5.0    • BLOOD,UA 07/31/2023 trace    • SPECIFIC GRAVITY,UA 07/31/2023 1.005    • KETONES,UA 07/31/2023 neg    • BILIRUBIN,UA 07/31/2023 neg    • GLUCOSE, UA 07/31/2023 neg    •  COLOR,UA 07/31/2023 Yellow    • CLARITY,UA 07/31/2023 Clear    • Color, UA 07/31/2023 Yellow    • Clarity, UA 07/31/2023 Clear    • Specific Gravity, UA 07/31/2023 1.012    • pH, UA 07/31/2023 6.5    • Leukocytes, UA 07/31/2023 Negative    • Nitrite, UA 07/31/2023 Negative    • Protein, UA 07/31/2023 Negative    • Glucose, UA 07/31/2023 Negative    • Ketones, UA 07/31/2023 Negative    • Urobilinogen, UA 07/31/2023 <2.0    • Bilirubin, UA 07/31/2023 Negative    • Occult Blood, UA 07/31/2023 Negative    • Urine Culture 07/31/2023 <10,000 cfu/ml    Office Visit on 07/03/2023   Component Date Value   • LEUKOCYTE ESTERASE,UA 07/03/2023 neg    • NITRITE,UA 07/03/2023 neg    • SL AMB POCT UROBILINOGEN 07/03/2023 0.2    • POCT URINE PROTEIN 07/03/2023 neg    •  PH,UA 07/03/2023 5.0    • BLOOD,UA 07/03/2023 neg    • SPECIFIC GRAVITY,UA 07/03/2023 1.015    • KETONES,UA 07/03/2023 neg    • BILIRUBIN,UA 07/03/2023 neg    • GLUCOSE, UA 07/03/2023 neg    •  COLOR,UA 07/03/2023 Yellow    • CLARITY,UA 07/03/2023 Clear    • Urine Culture 07/03/2023 <10,000 cfu/ml    Hospital Outpatient Visit on 06/16/2023   Component Date Value   • A4C EF 06/16/2023 60    • LV Diastolic Volume (BP) 34/92/6633 77    • LV Systolic Volume (BP) 04/44/7888 33    • EF 06/16/2023 58    • LVIDd 06/16/2023 3.80    • LVIDS 06/16/2023 2.40    • IVSd 06/16/2023 0.90    • LVPWd 06/16/2023 0.80    • FS 06/16/2023 37    • MV E' Tissue Velocity Se* 06/16/2023 5    • MV E' Tissue Velocity La* 06/16/2023 7    • E wave deceleration time 06/16/2023 197    • MV Peak E Tadeo 06/16/2023 80    • MV Peak A Tadeo 06/16/2023 1.05    • AV LVOT peak gradient 06/16/2023 3    • LVOT peak VTI 06/16/2023 20.85    • LVOT peak tadeo 06/16/2023 0.92    • RVID d 06/16/2023 4.0    • Tricuspid annular plane * 06/16/2023 2.00    • LA size 06/16/2023 3.5    • LA length (A2C) 06/16/2023 4.40    • RA 2D Volume 06/16/2023 30.0    • RAA A4C 06/16/2023 12.7    • Aortic valve peak veloci* 06/16/2023 1.38    • Ao VTI 06/16/2023 28.91    • AV mean gradient 06/16/2023 4    • LVOT mn grad 06/16/2023 2.0    • AV peak gradient 06/16/2023 8    • MV mean gradient antegra* 06/16/2023 1    • MV peak gradient antegra* 06/16/2023 5    • MV VTI 06/16/2023 35.8    • MV stenosis pressure 1/2* 06/16/2023 57    • MV valve area p 1/2 meth* 06/16/2023 3.86    • TR Peak Tadeo 06/16/2023 2.5    • Triscuspid Valve Regurgi* 06/16/2023 26.0    • Ao root 06/16/2023 3.10    • Asc Ao 06/16/2023 3.2    • Aortic valve mean veloci* 06/16/2023 8.70    • Tricuspid valve peak reg* 06/16/2023 2.53    • Left ventricular stroke * 06/16/2023 42.00    • IVS 06/16/2023 0.9    • LEFT VENTRICLE SYSTOLIC * 07/10/0881 21    • LV DIASTOLIC VOLUME (MOD* 63/54/3842 62    • Left Atrium Area-systoli* 06/16/2023 15    • Left Atrium Area-systoli* 06/16/2023 14 • LVSV, 2D 06/16/2023 42    • DVI 06/16/2023 0.67    • GLS 06/16/2023 -18    • LV EF 06/16/2023 60    Appointment on 05/04/2023   Component Date Value   • Sodium 05/04/2023 138    • Potassium 05/04/2023 4.1    • Chloride 05/04/2023 108    • CO2 05/04/2023 29    • ANION GAP 05/04/2023 1 (L)    • BUN 05/04/2023 23    • Creatinine 05/04/2023 0.83    • Glucose, Fasting 05/04/2023 81    • Calcium 05/04/2023 9.9    • Corrected Calcium 05/04/2023 10.4 (H)    • AST 05/04/2023 22    • ALT 05/04/2023 34    • Alkaline Phosphatase 05/04/2023 97    • Total Protein 05/04/2023 7.2    • Albumin 05/04/2023 3.4 (L)    • Total Bilirubin 05/04/2023 0.70    • eGFR 05/04/2023 69    • WBC 05/04/2023 6.00    • RBC 05/04/2023 4.83    • Hemoglobin 05/04/2023 14.9    • Hematocrit 05/04/2023 45.6    • MCV 05/04/2023 94    • MCH 05/04/2023 30.8    • MCHC 05/04/2023 32.7    • RDW 05/04/2023 12.5    • MPV 05/04/2023 10.6    • Platelets 06/78/3097 248    • nRBC 05/04/2023 0    • Neutrophils Relative 05/04/2023 49    • Immat GRANS % 05/04/2023 1    • Lymphocytes Relative 05/04/2023 34    • Monocytes Relative 05/04/2023 12    • Eosinophils Relative 05/04/2023 4    • Basophils Relative 05/04/2023 0    • Neutrophils Absolute 05/04/2023 2.91    • Immature Grans Absolute 05/04/2023 0.03    • Lymphocytes Absolute 05/04/2023 2.06    • Monocytes Absolute 05/04/2023 0.73    • Eosinophils Absolute 05/04/2023 0.25    • Basophils Absolute 05/04/2023 0.02    • TSH 3RD GENERATON 05/04/2023 1.670    • Vit D, 25-Hydroxy 05/04/2023 38.0    • Vitamin B-12 05/04/2023 825    Office Visit on 04/14/2023   Component Date Value   • POCT SARS-CoV-2 Ag 04/14/2023 Negative    • VALID CONTROL 04/14/2023 Valid    • SARS-CoV-2 04/14/2023 Negative    • INFLUENZA A PCR 04/14/2023 Negative    • INFLUENZA B PCR 04/14/2023 Negative      Imaging: Stress test only, exercise    Result Date: 9/12/2023  Narrative: •  Stress ECG: A Pankaj protocol stress test was performed.  Overall, the patient's exercise capacity was moderately impaired for their age. The patient reached stage 2.0 of the protocol after exercising for 3 min and 17 sec and had a maximal HR of 111 bpm (77 % of MPHR) and 5.2 METS. The patient experienced no angina during the test. The test was stopped because the patient experienced an unsteady gait and was unable to continue safely on the treadmil due to her knees. The patient reported dyspnea during the stress test. Symptoms ended during recovery. •  Stress ECG: The stress ECG is negative for ischemia after maximal exercise, without reproduction of symptoms. Suboptimal exercise treadmill stress test due to patient's limited ability to exercise secondary to DJD of knees. No evidence of myocardial ischemia at this sub-maximal threshold. Would recommend repeat pharmacologic stress testing with imaging for more accurate assessment of myocardial ischemia if clinically indicated. Stress strip    Result Date: 9/12/2023  Narrative: Confirmed by MICHAEL FITZGERALD ([id number]),  Naif Mai ([id number]) on 9/12/2023 12:39:07 PM      Review of Systems:  Review of Systems   Constitutional: Negative for activity change and fatigue. HENT: Negative for facial swelling. Eyes: Negative for visual disturbance. Respiratory: Negative for chest tightness and shortness of breath. Cardiovascular: Positive for palpitations. Negative for chest pain and leg swelling. Gastrointestinal: Negative for nausea and vomiting. Musculoskeletal: Positive for gait problem. Negative for myalgias. Skin: Negative for pallor. Allergic/Immunologic: Negative for immunocompromised state. Neurological: Negative for dizziness, syncope and light-headedness. Psychiatric/Behavioral: Negative for agitation and confusion. The patient is not nervous/anxious. Physical Exam:  Physical Exam  Vitals and nursing note reviewed. Constitutional:       Appearance: Normal appearance.    HENT:      Head: Normocephalic and atraumatic. Nose: Nose normal.      Mouth/Throat:      Mouth: Mucous membranes are moist.   Eyes:      Conjunctiva/sclera: Conjunctivae normal.   Cardiovascular:      Rate and Rhythm: Normal rate and regular rhythm. Pulmonary:      Effort: Pulmonary effort is normal.      Breath sounds: Normal breath sounds. Abdominal:      Palpations: Abdomen is soft. Musculoskeletal:         General: Normal range of motion. Cervical back: Normal range of motion. Skin:     General: Skin is warm and dry. Neurological:      General: No focal deficit present. Mental Status: She is alert and oriented to person, place, and time. Psychiatric:         Mood and Affect: Mood normal.         Discussion/Summary: Her stress test was nondiagnostic secondary to inability to achieve 85% of maximum predicted heart rate. She has had 2 episodes of pain with exercise 11 months ago and she had a normal stress echocardiogram in 2021 4 months ago. She does not have pain on a regular basis. As the pain did occur with exertion we would like to definitely rule out angina. 1 option is a heart catheterization. The other would be to do pharmacologic nuclear stress testing. After discussion we decided to proceed with the latter. I will call her with the test results. I will see her again in 6 months.

## 2023-10-17 ENCOUNTER — HOSPITAL ENCOUNTER (OUTPATIENT)
Dept: RADIOLOGY | Facility: HOSPITAL | Age: 78
Discharge: HOME | End: 2023-10-17
Attending: ORTHOPAEDIC SURGERY
Payer: COMMERCIAL

## 2023-10-17 ENCOUNTER — OFFICE VISIT (OUTPATIENT)
Dept: OBGYN CLINIC | Facility: CLINIC | Age: 78
End: 2023-10-17
Payer: COMMERCIAL

## 2023-10-17 VITALS
DIASTOLIC BLOOD PRESSURE: 70 MMHG | HEIGHT: 67 IN | WEIGHT: 168 LBS | SYSTOLIC BLOOD PRESSURE: 140 MMHG | BODY MASS INDEX: 26.37 KG/M2

## 2023-10-17 DIAGNOSIS — M25.551 RIGHT HIP PAIN: ICD-10-CM

## 2023-10-17 DIAGNOSIS — M70.61 GREATER TROCHANTERIC BURSITIS OF RIGHT HIP: ICD-10-CM

## 2023-10-17 DIAGNOSIS — M25.562 PAIN IN BOTH KNEES, UNSPECIFIED CHRONICITY: ICD-10-CM

## 2023-10-17 DIAGNOSIS — M17.0 PRIMARY OSTEOARTHRITIS OF BOTH KNEES: Primary | ICD-10-CM

## 2023-10-17 DIAGNOSIS — M25.561 PAIN IN BOTH KNEES, UNSPECIFIED CHRONICITY: ICD-10-CM

## 2023-10-17 PROCEDURE — 73502 X-RAY EXAM HIP UNI 2-3 VIEWS: CPT

## 2023-10-17 PROCEDURE — 73562 X-RAY EXAM OF KNEE 3: CPT

## 2023-10-17 PROCEDURE — 99204 OFFICE O/P NEW MOD 45 MIN: CPT | Performed by: ORTHOPAEDIC SURGERY

## 2023-10-17 NOTE — PATIENT INSTRUCTIONS
Bilateral knee osteoarthritis     STRENGTHENING:   Quadriceps:   Isometric Quad sets                        Progression for Quadriceps/VMO:  Hold at 45 degree angle (Picture #1)    Key: Slow & Intentional  Two ways to perform:  Start with 10 reps on each side maintaining neutral pelvis. *   Hold position for a 3-5 count  When form and exercise because less challenging; increase the REPITITIONS or DURATION your holding. Perform Wall Squats below: 3 Sets of 10 Reps         Hold for 3-5 count          Progression for Quadriceps/VMO:  Use a ball hold between knees to activate VMO  Key: Slow & Intentional  Two ways to perform:  Start with 10 reps on each side maintaining neutral pelvis. *   Hold position for a 3-5 count  When form and exercise because less challenging; increase the REPITITIONS or DURATION your holding. Perform the Wall Squat w/ball: 3 sets 10 reps        Hold for 3-5 count                        Progression:   Adding a Theraband for resistance while maintaining good form during a wall squat is more challenging.    Perform Wall squat w/Theraband:   3 sets 10 reps  Hold for a 3-5 count          Right hip greater trochanteric bursitis - Voltaren gel (topical)

## 2023-10-17 NOTE — PROGRESS NOTES
Assessment:  1. Primary osteoarthritis of both knees  Ambulatory Referral to Physical Therapy    Injection Procedure Prior Authorization      2. Right hip pain  XR hip/pelv 2-3 vws right if performed      3. Pain in both knees, unspecified chronicity  XR knee 3 vw left non injury    XR knee 3 vw right non injury      4. Greater trochanteric bursitis of right hip  Ambulatory Referral to Physical Therapy    Diclofenac Sodium (VOLTAREN) 1 %        70-year-old female with severe bilateral knee and patellofemoral osteoarthritis and right hip greater trochanteric bursitis     Plan:    Treatment options were reviewed and discussed with patient including operative and nonoperative modalities and pros and cons of both were explained  The decision was made to continue with conservative treatment as stated below  Referral for physical therapy to focus greater trochanteric bursitis exercises and quad/VMO strengthening exercises for bilateral knee osteoarthritis  Prescription for Voltaren gel provided for right hip  Over-the-counter Tylenol as needed for pain control  We will consider corticosteroid injection to right greater trochanteric bursa if pain is not improved at follow-up  Bilateral knee Durolane Visco injections ordered  Follow-up for bilateral knee Durolane injections once insurance authorization approved        The above stated was discussed in layman's terms and the patient expressed understanding. All questions were answered to the patient's satisfaction. Subjective:   Ji Bundy is a 70 y.o. female who presents with right hip pain and bilateral knee pain. Regarding bilateral knees, pain has been present for the last 5 years and is atraumatic in nature. She states that her right knee is worse than her left. Pain is located on the medial aspect of the bilateral knees. She describes it as a sharp burning pain. Knee pain is worse with weightbearing, ascending and descending stairs and long walks. She has received Visco supplementation injections and corticosteroid injections in the past with significant relief. Regarding her right hip, pain is located over the lateral aspect of her hip and has been present for the last couple months and is atraumatic in nature. The pain occasionally radiates down the lateral aspect of her leg but never past her knee. She has not had any treatment for her right hip pain.         Review of systems negative unless otherwise specified in HPI    Past Medical History:   Diagnosis Date    Anxiety     Arthritis     Back pain     Balance problems     Chest pain     heaviness    Colon polyp     Difficulty swallowing     Dizziness     Dry cough     Gait disorder     GERD (gastroesophageal reflux disease)     Hyperparathyroidism (HCC)     Hypertension     Increased frequency of headaches     Migraines     Neck pain     Numbness and tingling     bles    Palpitations     Pericarditis     Thyroid disease     nodule    Trouble in sleeping     Wears glasses        Past Surgical History:   Procedure Laterality Date    APPENDECTOMY      CHOLECYSTECTOMY      laparoscopic    COLONOSCOPY      KNEE SURGERY Left     PERICARDIAL WINDOW      OK OPTX DSTL RADL X-ARTIC FX/EPIPHYSL SEP Right 3/22/2018    Procedure: OPEN REDUCTION W/ INTERNAL FIXATION (ORIF) RADIUS, splint application;  Surgeon: Guido Burger MD;  Location: [facility];  Service: Orthopedics    OK PARATHYROIDECTOMY/EXPLORATION PARATHYROIDS N/A 8/17/2022    Procedure: PARATHYROIDECTOMY, 4 GLAND EXPLORATION;  Surgeon: Willi Murry MD;  Location: [facility];  Service: ENT    UPPER GASTROINTESTINAL ENDOSCOPY         Family History   Problem Relation Age of Onset    Kidney failure Mother     Hypertension Mother     Lung cancer Father     Denys Patricia White syndrome Daughter     No Known Problems Sister     Substance Abuse Neg Hx     Alcohol abuse Neg Hx     Mental illness Neg Hx        Social History     Occupational History Not on file   Tobacco Use    Smoking status: Never    Smokeless tobacco: Never   Vaping Use    Vaping Use: Never used   Substance and Sexual Activity    Alcohol use: Not Currently     Comment: Rarely     Drug use: No    Sexual activity: Yes     Partners: Male         Current Outpatient Medications:     Diclofenac Sodium (VOLTAREN) 1 %, Apply 2 g topically 4 (four) times a day for 10 days, Disp: 80 g, Rfl: 0    acetaminophen (TYLENOL) 500 mg tablet, Take 500-1,000 mg by mouth every 6 (six) hours as needed for mild pain, Disp: , Rfl:     albuterol (PROVENTIL HFA,VENTOLIN HFA) 90 mcg/act inhaler, Inhale 2 puffs every 6 (six) hours as needed for wheezing or shortness of breath, Disp: 8 g, Rfl: 1    ALPRAZolam (XANAX) 0.5 mg tablet, Take 1 tablet (0.5 mg total) by mouth daily at bedtime as needed for anxiety or sleep, Disp: 20 tablet, Rfl: 1    ascorbic acid (VITAMIN C) 500 mg tablet, Take 500 mg by mouth daily, Disp: , Rfl:     b complex vitamins tablet, Take 1 tablet by mouth daily, Disp: , Rfl:     Cholecalciferol (Vitamin D) 50 MCG (2000 UT) CAPS, Take 1 capsule (2,000 Units total) by mouth daily (Patient taking differently: Take 3,000 Units by mouth daily), Disp: , Rfl:     dicyclomine (BENTYL) 10 mg capsule, TAKE 1 CAPSULE BY MOUTH 4 TIMES A DAY (BEFORE MEALS AND AT BEDTIME) (Patient not taking: Reported on 9/27/2023), Disp: 360 capsule, Rfl: 1    Magnesium 300 MG CAPS, Take 300 mg by mouth in the morning, Disp: , Rfl:     methocarbamol (ROBAXIN) 500 mg tablet, Take 0.5 (250mg) to 1 (500mg) tablet by mouth every 8 hours as needed for muscle spasm/neck pain.  (Patient not taking: Reported on 8/30/2023), Disp: 90 tablet, Rfl: 0    metoprolol succinate (TOPROL-XL) 25 mg 24 hr tablet, Take 1 tablet (25 mg total) by mouth daily at bedtime, Disp: 90 tablet, Rfl: 3    Multiple Vitamins-Minerals (MULTIVITAMIN ADULTS 50+ PO), Take by mouth, Disp: , Rfl:     Nutritional Supplements (OSAPLEX MK-7 PO), Take by mouth, Disp: , Rfl:     omeprazole (PriLOSEC) 20 mg delayed release capsule, Take 1 capsule (20 mg total) by mouth daily before breakfast Half an hour prior to breakfast and half an hour prior to dinner, Disp: 180 capsule, Rfl: 3    Riboflavin (VITAMIN B-2 PO), Take 250 mg by mouth in the morning, Disp: , Rfl:     rizatriptan (MAXALT) 10 MG tablet, Take 1 tablet (10 mg total) by mouth once as needed for migraine May repeat in 2 hours if needed. Max 2/24 hours, 9/month. (Patient not taking: Reported on 8/30/2023), Disp: 9 tablet, Rfl: 6    traZODone (DESYREL) 50 mg tablet, TAKE 1 TABLET BY MOUTH EVERYDAY AT BEDTIME (Patient not taking: Reported on 9/7/2023), Disp: 90 tablet, Rfl: 1    Allergies   Allergen Reactions    Ciprofloxacin Myalgia            Vitals:    10/17/23 1359   BP: 140/70       Objective:            Physical Exam  Physical Exam:      General Appearance:    Alert, cooperative, no distress, appears stated age   Head:    Normocephalic, without obvious abnormality, atraumatic   Eyes:    conjunctiva/corneas clear, both eyes         Nose:   Nares normal, septum midline, no drainage    Throat:   Lips normal; teeth and gums normal   Neck:    symmetrical, trachea midline, ;     thyroid:  no enlargement/   Back:     Symmetric, no curvature, ROM normal   Lungs:   No audible wheezing or labored breathing   Chest Wall:    No tenderness or deformity    Heart:    Regular rate and rhythm                         Pulses:   2+ and symmetric all extremities   Skin:   Skin color, texture, turgor normal, no rashes or lesions   Neurologic:   normal strength, sensation and reflexes     throughout                       Right Knee Exam     Tenderness   The patient is experiencing tenderness in the patella and lateral joint line.     Range of Motion   Extension:  normal   Flexion:  110     Tests   Varus: negative Valgus: negative  Patellar apprehension: negative    Other   Erythema: absent  Scars: absent  Sensation: normal  Pulse: present  Swelling: none  Effusion: no effusion present    Comments:  Positive patella grind      Left Knee Exam     Tenderness   The patient is experiencing tenderness in the lateral joint line and patella. Range of Motion   Extension:  normal   Flexion:  110     Tests   Varus: negative Valgus: negative  Patellar apprehension: negative    Other   Erythema: absent  Scars: absent  Sensation: normal  Pulse: present  Swelling: none  Effusion: no effusion present    Comments:  Positive patellar grind      Right Hip Exam     Tenderness   The patient is experiencing tenderness in the greater trochanter. Range of Motion   The patient has normal right hip ROM. Muscle Strength   The patient has normal right hip strength. Tests   KUMAR: negative    Other   Erythema: absent  Scars: absent  Sensation: normal  Pulse: present    Comments:  Negative [place]              Neurovascularly Intact Distally     Diagnostics, reviewed and taken today if performed as documented:    Orthogonal x-rays of the right hip show well preserved and symmetric joint space without any degenerative changes  Orthogonal x-rays of the right knee show severe joint space narrowing of the lateral compartment and patellofemoral compartment with osteophyte formation  Orthogonal x-rays of the left knee show severe moderate lateral joint space and patellofemoral compartment narrowing with associated osteophyte formation    The attending physician has personally reviewed the pertinent films in PACS and interpretation is as follows:      Procedures, if performed today:    None performed      Scribe Attestation      I,:   am acting as a scribe while in the presence of the attending physician.:       I,:   personally performed the services described in this documentation    as scribed in my presence.:               Portions of the record may have been created with voice recognition software.   Occasional wrong word or "sound a like" substitutions may have occurred due to the inherent limitations of voice recognition software. Read the chart carefully and recognize, using context, where substitutions have occurred.

## 2023-10-30 ENCOUNTER — OFFICE VISIT (OUTPATIENT)
Dept: FAMILY MEDICINE CLINIC | Facility: CLINIC | Age: 78
End: 2023-10-30
Payer: COMMERCIAL

## 2023-10-30 VITALS
SYSTOLIC BLOOD PRESSURE: 124 MMHG | HEART RATE: 57 BPM | TEMPERATURE: 98.5 F | WEIGHT: 172.4 LBS | DIASTOLIC BLOOD PRESSURE: 72 MMHG | OXYGEN SATURATION: 96 % | HEIGHT: 67 IN | RESPIRATION RATE: 16 BRPM | BODY MASS INDEX: 27.06 KG/M2

## 2023-10-30 DIAGNOSIS — Z00.00 MEDICARE ANNUAL WELLNESS VISIT, SUBSEQUENT: Primary | ICD-10-CM

## 2023-10-30 DIAGNOSIS — E04.2 MULTIPLE THYROID NODULES: ICD-10-CM

## 2023-10-30 DIAGNOSIS — I10 ESSENTIAL HYPERTENSION: ICD-10-CM

## 2023-10-30 DIAGNOSIS — R45.0 NERVOUSNESS: ICD-10-CM

## 2023-10-30 DIAGNOSIS — M25.561 PAIN IN BOTH KNEES, UNSPECIFIED CHRONICITY: ICD-10-CM

## 2023-10-30 DIAGNOSIS — E21.3 HYPERPARATHYROIDISM (HCC): ICD-10-CM

## 2023-10-30 DIAGNOSIS — E78.5 HYPERLIPIDEMIA, UNSPECIFIED HYPERLIPIDEMIA TYPE: ICD-10-CM

## 2023-10-30 DIAGNOSIS — M25.562 PAIN IN BOTH KNEES, UNSPECIFIED CHRONICITY: ICD-10-CM

## 2023-10-30 DIAGNOSIS — J84.9 ILD (INTERSTITIAL LUNG DISEASE) (HCC): ICD-10-CM

## 2023-10-30 DIAGNOSIS — M81.0 AGE-RELATED OSTEOPOROSIS WITHOUT CURRENT PATHOLOGICAL FRACTURE: ICD-10-CM

## 2023-10-30 DIAGNOSIS — K21.9 GASTROESOPHAGEAL REFLUX DISEASE, UNSPECIFIED WHETHER ESOPHAGITIS PRESENT: ICD-10-CM

## 2023-10-30 PROCEDURE — 99214 OFFICE O/P EST MOD 30 MIN: CPT | Performed by: FAMILY MEDICINE

## 2023-10-30 PROCEDURE — G0439 PPPS, SUBSEQ VISIT: HCPCS | Performed by: FAMILY MEDICINE

## 2023-10-30 PROCEDURE — 3078F DIAST BP <80 MM HG: CPT | Performed by: FAMILY MEDICINE

## 2023-10-30 PROCEDURE — 1090F PRES/ABSN URINE INCON ASSESS: CPT | Performed by: FAMILY MEDICINE

## 2023-10-30 PROCEDURE — 3725F SCREEN DEPRESSION PERFORMED: CPT | Performed by: FAMILY MEDICINE

## 2023-10-30 PROCEDURE — 3074F SYST BP LT 130 MM HG: CPT | Performed by: FAMILY MEDICINE

## 2023-10-30 PROCEDURE — 1159F MED LIST DOCD IN RCRD: CPT | Performed by: FAMILY MEDICINE

## 2023-10-30 PROCEDURE — 1170F FXNL STATUS ASSESSED: CPT | Performed by: FAMILY MEDICINE

## 2023-10-30 PROCEDURE — 3288F FALL RISK ASSESSMENT DOCD: CPT | Performed by: FAMILY MEDICINE

## 2023-10-30 PROCEDURE — 1160F RVW MEDS BY RX/DR IN RCRD: CPT | Performed by: FAMILY MEDICINE

## 2023-10-30 PROCEDURE — 1125F AMNT PAIN NOTED PAIN PRSNT: CPT | Performed by: FAMILY MEDICINE

## 2023-10-30 RX ORDER — ALPRAZOLAM 0.5 MG/1
0.5 TABLET ORAL
Qty: 20 TABLET | Refills: 1 | Status: SHIPPED | OUTPATIENT
Start: 2023-10-30

## 2023-10-30 NOTE — PROGRESS NOTES
Assessment and Plan:     Medicare annual wellness visit, subsequent  - discussed flu vaccine    Essential hypertension  - well controlled, continue Toprol XL 25 mg daily    Hyperlipidemia  - reviewed dietary changes, will recheck lipids  - Lipid Panel with Direct LDL reflex; Future    Gastroesophageal reflux disease  - stable on omeprazole, follow up with GI    Nervousness  - continue Xanax at bedtime as needed  - ALPRAZolam (XANAX) 0.5 mg tablet; Take 1 tablet (0.5 mg total) by mouth daily at bedtime as needed for anxiety or sleep  Dispense: 20 tablet; Refill: 1    [facility])  - follow up with Pulmonary    Hyperparathyroidism   - follow up with Endocrine     Osteoporosis   - DEXA scan is scheduled on 1/15/24, managed by Endocrine    Bilateral knee pain/ OA  - follow up with Ortho    Multiple thyroid nodules  - follow up with Endocrine      Depression Screening and Follow-up Plan: Patient was screened for depression during today's encounter. They screened negative with a PHQ-2 score of 0. Return in 6 months or sooner as needed. Preventive health issues were discussed with patient, and age appropriate screening tests were ordered as noted in patient's After Visit Summary. Personalized health advice and appropriate referrals for health education or preventive services given if needed, as noted in patient's After Visit Summary. History of Present Illness:     Patient presents for a Medicare Wellness Visit and follow up for hypertension. Patient had Covid about 3 weeks ago, she is still having mild fatigue, but overall feeling better. Patient is following with Ortho for bilateral knee pain and right hip pain and is starting PT next week. Patient offers no other complaints.         Patient Care Team:  Rory Rodas MD as PCP - General (Family Medicine)  Vanna Whatley MD (Endocrinology)     Review of Systems:     Review of Systems   Constitutional:  Negative for appetite change, chills, fever and unexpected weight change. Respiratory:  Negative for cough, shortness of breath and wheezing. Cardiovascular:  Negative for chest pain, palpitations and leg swelling. Gastrointestinal:  Negative for abdominal pain, blood in stool, diarrhea, nausea and vomiting. Genitourinary:  Negative for difficulty urinating, dysuria, frequency, hematuria and urgency. Skin:  Negative for rash. Neurological:  Negative for dizziness, syncope, weakness, numbness and headaches. Hematological:  Negative for adenopathy. Psychiatric/Behavioral:  Negative for dysphoric mood. The patient is nervous/anxious.          Problem List:     Patient Active Problem List   Diagnosis    Closed fracture distal radius and ulna, right, initial encounter    Anxiety    Gastroesophageal reflux disease with esophagitis    History of pericarditis    Heart murmur    Primary hyperparathyroidism (HCC)    Thyroid nodule    Vitamin D deficiency    Atypical chest pain    Migraine without aura and without status migrainosus, not intractable    Dizziness and giddiness    Insomnia    Cervicalgia    Abnormal CT scan of lung    Irritable bowel syndrome with diarrhea    Elevated amylase and lipase    Paresthesias    Carpal tunnel syndrome of left wrist    Dysphonia    Reflux laryngitis    Paresis of left vocal fold    Glottic insufficiency    Muscle tension dysphonia    TMJ dysfunction    Pharyngoesophageal dysphagia    Palpitation    Hallux abducto valgus, bilateral    Pincer nail deformity    Osteoporosis with current pathological fracture    Parathyroid related hypercalcemia (HCC)    Cervical myofascial pain syndrome    Edema of both ankles    Aneurysm (HCC)    Calf pain    Lung nodule    Myalgia    Pericardial effusion    SOBOE (shortness of breath on exertion)    Elevated blood-pressure reading without diagnosis of hypertension    Skin tag of anus    H/O parathyroidectomy ([address])    [facility]) ([address])    Primary osteoarthritis of right knee      Past Medical and Surgical History:     Past Medical History:   Diagnosis Date    Anxiety     Arthritis     Back pain     Balance problems     Chest pain     heaviness    Colon polyp     Difficulty swallowing     Dizziness     Dry cough     Gait disorder     GERD (gastroesophageal reflux disease)     Hyperparathyroidism (HCC)     Hypertension     Increased frequency of headaches     Migraines     Neck pain     Numbness and tingling     bles    Palpitations     Pericarditis     Thyroid disease     nodule    Trouble in sleeping     Wears glasses      Past Surgical History:   Procedure Laterality Date    APPENDECTOMY      CHOLECYSTECTOMY      laparoscopic    COLONOSCOPY      KNEE SURGERY Left     PERICARDIAL WINDOW      AZ OPTX DSTL RADL X-ARTIC FX/EPIPHYSL SEP Right 3/22/2018    Procedure: OPEN REDUCTION W/ INTERNAL FIXATION (ORIF) RADIUS, splint application;  Surgeon: Carolyn Bailon MD;  Location: [facility];  Service: Orthopedics    AZ PARATHYROIDECTOMY/EXPLORATION PARATHYROIDS N/A 8/17/2022    Procedure: PARATHYROIDECTOMY, 4 GLAND EXPLORATION;  Surgeon: Lakeisha Boston MD;  Location: [facility];  Service: ENT    UPPER GASTROINTESTINAL ENDOSCOPY        Family History:     Family History   Problem Relation Age of Onset    Kidney failure Mother     Hypertension Mother     Lung cancer Father     Mili Bene Parkinson White syndrome Daughter     No Known Problems Sister     Substance Abuse Neg Hx     Alcohol abuse Neg Hx     Mental illness Neg Hx       Social History:     Social History     Socioeconomic History    Marital status: /Civil Union     Spouse name: None    Number of children: None    Years of education: None    Highest education level: None   Occupational History    None   Tobacco Use    Smoking status: Never    Smokeless tobacco: Never   Vaping Use    Vaping Use: Never used   Substance and Sexual Activity    Alcohol use: Not Currently     Comment: Rarely     Drug use: No    Sexual activity: Yes     Partners: Male   Other Topics Concern    None   Social History Narrative    WORK:    1.  ([employer]; [employer]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    2. [employer]        HOBBIES:    1. Singing    2. Dancing    3. Hx of ceramics (+ dust)        PETS:    1. Dogs    -  Denies birds        TRAVEL:    -  [place]        EXPOSURES:    Denies mold; down pillows/comforters; hot tubs     Social Determinants of Health     Financial Resource Strain: Low Risk  (10/30/2023)    Overall Financial Resource Strain (CARDIA)     Difficulty of Paying Living Expenses: Not very hard   Food Insecurity: Not on file   Transportation Needs: No Transportation Needs (10/30/2023)    PRAPARE - Transportation     Lack of Transportation (Medical): No     Lack of Transportation (Non-Medical):  No   Physical Activity: Not on file   Stress: Not on file   Social Connections: Not on file   Intimate Partner Violence: Not on file   Housing Stability: Not on file      Medications and Allergies:     Current Outpatient Medications   Medication Sig Dispense Refill    acetaminophen (TYLENOL) 500 mg tablet Take 500-1,000 mg by mouth every 6 (six) hours as needed for mild pain      albuterol (PROVENTIL HFA,VENTOLIN HFA) 90 mcg/act inhaler Inhale 2 puffs every 6 (six) hours as needed for wheezing or shortness of breath 8 g 1    ALPRAZolam (XANAX) 0.5 mg tablet Take 1 tablet (0.5 mg total) by mouth daily at bedtime as needed for anxiety or sleep 20 tablet 1    ascorbic acid (VITAMIN C) 500 mg tablet Take 500 mg by mouth daily      b complex vitamins tablet Take 1 tablet by mouth daily      Cholecalciferol (Vitamin D) 50 MCG (2000 UT) CAPS Take 1 capsule (2,000 Units total) by mouth daily (Patient taking differently: Take 3,000 Units by mouth daily)      Magnesium 300 MG CAPS Take 300 mg by mouth in the morning      metoprolol succinate (TOPROL-XL) 25 mg 24 hr tablet Take 1 tablet (25 mg total) by mouth daily at bedtime 90 tablet 3    Multiple Vitamins-Minerals (MULTIVITAMIN ADULTS 50+ PO) Take by mouth      Nutritional Supplements (OSAPLEX MK-7 PO) Take by mouth      Riboflavin (VITAMIN B-2 PO) Take 250 mg by mouth in the morning      Diclofenac Sodium (VOLTAREN) 1 % Apply 2 g topically 4 (four) times a day for 10 days 80 g 0    dicyclomine (BENTYL) 10 mg capsule TAKE 1 CAPSULE BY MOUTH 4 TIMES A DAY (BEFORE MEALS AND AT BEDTIME) (Patient not taking: Reported on 9/27/2023) 360 capsule 1    methocarbamol (ROBAXIN) 500 mg tablet Take 0.5 (250mg) to 1 (500mg) tablet by mouth every 8 hours as needed for muscle spasm/neck pain. (Patient not taking: Reported on 8/30/2023) 90 tablet 0    omeprazole (PriLOSEC) 20 mg delayed release capsule Take 1 capsule (20 mg total) by mouth daily before breakfast Half an hour prior to breakfast and half an hour prior to dinner 180 capsule 3    rizatriptan (MAXALT) 10 MG tablet Take 1 tablet (10 mg total) by mouth once as needed for migraine May repeat in 2 hours if needed. Max 2/24 hours, 9/month. (Patient not taking: Reported on 8/30/2023) 9 tablet 6    traZODone (DESYREL) 50 mg tablet TAKE 1 TABLET BY MOUTH EVERYDAY AT BEDTIME (Patient not taking: Reported on 9/7/2023) 90 tablet 1     No current facility-administered medications for this visit.      Allergies   Allergen Reactions    Ciprofloxacin Myalgia      Immunizations:     Immunization History   Administered Date(s) Administered    COVID-19 PFIZER VACCINE 0.3 ML IM 03/09/2021, 03/31/2021    INFLUENZA 10/31/2018, 09/30/2020, 11/16/2021, 11/15/2022    Influenza, high dose seasonal 0.7 mL 10/14/2019    Pneumococcal 01/01/2017    Pneumococcal Conjugate 13-Valent 11/19/2017    Pneumococcal Polysaccharide PPV23 12/23/2018    Zoster Vaccine Recombinant 08/20/2021, 02/10/2022    influenza, injectable, quadrivalent 10/01/2018      Health Maintenance:         Topic Date Due    DXA SCAN  06/06/2024    Breast Cancer Screening: Mammogram  09/20/2024 Hepatitis C Screening  Completed    Colorectal Cancer Screening  Discontinued         Topic Date Due    COVID-19 Vaccine (3 - Pfizer series) 05/26/2021      Medicare Screening Tests and Risk Assessments:     Breanna Benjamin is here for her Subsequent Wellness visit. Health Risk Assessment:   Patient rates overall health as fair. Patient feels that their physical health rating is same. Patient is satisfied with their life. Eyesight was rated as same. Hearing was rated as same. Patient feels that their emotional and mental health rating is same. Patients states they are never, rarely angry. Patient states they are never, rarely unusually tired/fatigued. Pain experienced in the last 7 days has been some. Patient's pain rating has been 4/10. Patient states that she has experienced no weight loss or gain in last 6 months. Depression Screening:   PHQ-2 Score: 0      Fall Risk Screening: In the past year, patient has experienced: no history of falling in past year      Urinary Incontinence Screening:   Patient has not leaked urine accidently in the last six months. Home Safety:  Patient has trouble with stairs inside or outside of their home. Patient has working smoke alarms and has working carbon monoxide detector. Home safety hazards include: none. Nutrition:   Current diet is Regular and Limited junk food. Medications:   Patient is currently taking over-the-counter supplements. OTC medications include: see medication list. Patient is able to manage medications. Activities of Daily Living (ADLs)/Instrumental Activities of Daily Living (IADLs):   Walk and transfer into and out of bed and chair?: Yes  Dress and groom yourself?: Yes    Bathe or shower yourself?: Yes    Feed yourself?  Yes  Do your laundry/housekeeping?: Yes  Manage your money, pay your bills and track your expenses?: Yes  Make your own meals?: Yes    Do your own shopping?: Yes    Previous Hospitalizations:   Any hospitalizations or ED visits within the last 12 months?: No      Advance Care Planning:   Living will: Yes    Advanced directive: Yes      Cognitive Screening:   Provider or family/friend/caregiver concerned regarding cognition?: No    PREVENTIVE SCREENINGS      Cardiovascular Screening:    General: Screening Not Indicated and History Lipid Disorder      Diabetes Screening:     General: Screening Current      Colorectal Cancer Screening:     General: Screening Current      Breast Cancer Screening:     General: Screening Current      Cervical Cancer Screening:    General: Screening Not Indicated      Osteoporosis Screening:    General: Screening Not Indicated and History Osteoporosis      Abdominal Aortic Aneurysm (AAA) Screening:        General: Screening Not Indicated      Lung Cancer Screening:     General: Screening Not Indicated      Hepatitis C Screening:    General: Screening Current    Screening, Brief Intervention, and Referral to Treatment (SBIRT)    Screening  Typical number of drinks in a day: 0  Typical number of drinks in a week: 0  Interpretation: Low risk drinking behavior. Single Item Drug Screening:  How often have you used an illegal drug (including marijuana) or a prescription medication for non-medical reasons in the past year? never    Single Item Drug Screen Score: 0  Interpretation: Negative screen for possible drug use disorder         Physical Exam:     /72   Pulse 57   Temp 98.5 °F (36.9 °C)   Resp 16   Ht 5' 7" (1.702 m)   Wt 78.2 kg (172 lb 6.4 oz)   SpO2 96%   BMI 27.00 kg/m²     Physical Exam  Constitutional:       General: She is not in acute distress. Cardiovascular:      Rate and Rhythm: Normal rate and regular rhythm. Heart sounds: Normal heart sounds. Pulmonary:      Effort: Pulmonary effort is normal.      Breath sounds: Normal breath sounds. No wheezing, rhonchi or rales. Abdominal:      Palpations: Abdomen is soft. There is no mass. Tenderness:  There is no abdominal tenderness. Musculoskeletal:      Cervical back: Neck supple. Right lower leg: No edema. Left lower leg: No edema. Lymphadenopathy:      Cervical: No cervical adenopathy. Neurological:      Mental Status: She is alert and oriented to person, place, and time.    Psychiatric:         Mood and Affect: Mood normal.         Behavior: Behavior normal.          Lab Results   Component Value Date    SODIUM 139 09/26/2023    K 4.9 09/26/2023     09/26/2023    CO2 29 09/26/2023    BUN 23 09/26/2023    CREATININE 0.86 09/26/2023    GLUC 87 01/08/2021    CALCIUM 10.6 (H) 09/26/2023     Lab Results   Component Value Date    WBC 6.00 05/04/2023    HGB 14.9 05/04/2023    HCT 45.6 05/04/2023    MCV 94 05/04/2023     05/04/2023     Lab Results   Component Value Date    GRL2UUVCXVAM 1.670 05/04/2023       Celia Peng MD

## 2023-10-30 NOTE — PATIENT INSTRUCTIONS
Medicare Preventive Visit Patient Instructions  Thank you for completing your Welcome to Medicare Visit or Medicare Annual Wellness Visit today. Your next wellness visit will be due in one year (10/30/2024). The screening/preventive services that you may require over the next 5-10 years are detailed below. Some tests may not apply to you based off risk factors and/or age. Screening tests ordered at today's visit but not completed yet may show as past due. Also, please note that scanned in results may not display below. Preventive Screenings:  Service Recommendations Previous Testing/Comments   Colorectal Cancer Screening  * Colonoscopy    * Fecal Occult Blood Test (FOBT)/Fecal Immunochemical Test (FIT)  * Fecal DNA/Cologuard Test  * Flexible Sigmoidoscopy Age: 43-73 years old   Colonoscopy: every 10 years (may be performed more frequently if at higher risk)  OR  FOBT/FIT: every 1 year  OR  Cologuard: every 3 years  OR  Sigmoidoscopy: every 5 years  Screening may be recommended earlier than age 39 if at higher risk for colorectal cancer. Also, an individualized decision between you and your healthcare provider will decide whether screening between the ages of 77-80 would be appropriate. Colonoscopy: 08/11/2023  FOBT/FIT: Not on file  Cologuard: Not on file  Sigmoidoscopy: Not on file    Screening Current     Breast Cancer Screening Age: 36 years old  Frequency: every 1-2 years  Not required if history of left and right mastectomy Mammogram: 09/20/2023    Screening Current   Cervical Cancer Screening Between the ages of 21-29, pap smear recommended once every 3 years. Between the ages of 32-69, can perform pap smear with HPV co-testing every 5 years.    Recommendations may differ for women with a history of total hysterectomy, cervical cancer, or abnormal pap smears in past. Pap Smear: 10/20/2022    Screening Not Indicated   Hepatitis C Screening Once for adults born between 1945 and 1965  More frequently in patients at high risk for Hepatitis C Hep C Antibody: 09/25/2020    Screening Current   Diabetes Screening 1-2 times per year if you're at risk for diabetes or have pre-diabetes Fasting glucose: 84 mg/dL (9/26/2023)  A1C: 5.6 % (1/7/2019)  Screening Current   Cholesterol Screening Once every 5 years if you don't have a lipid disorder. May order more often based on risk factors. Lipid panel: 09/15/2022    Screening Not Indicated  History Lipid Disorder     Other Preventive Screenings Covered by Medicare:  Abdominal Aortic Aneurysm (AAA) Screening: covered once if your at risk. You're considered to be at risk if you have a family history of AAA. Lung Cancer Screening: covers low dose CT scan once per year if you meet all of the following conditions: (1) Age 48-67; (2) No signs or symptoms of lung cancer; (3) Current smoker or have quit smoking within the last 15 years; (4) You have a tobacco smoking history of at least 20 pack years (packs per day multiplied by number of years you smoked); (5) You get a written order from a healthcare provider. Glaucoma Screening: covered annually if you're considered high risk: (1) You have diabetes OR (2) Family history of glaucoma OR (3)  aged 48 and older OR (3)  American aged 72 and older  Osteoporosis Screening: covered every 2 years if you meet one of the following conditions: (1) You're estrogen deficient and at risk for osteoporosis based off medical history and other findings; (2) Have a vertebral abnormality; (3) On glucocorticoid therapy for more than 3 months; (4) Have primary hyperparathyroidism; (5) On osteoporosis medications and need to assess response to drug therapy. Last bone density test (DXA Scan): 06/06/2022. HIV Screening: covered annually if you're between the age of 14-79. Also covered annually if you are younger than 13 and older than 72 with risk factors for HIV infection.  For pregnant patients, it is covered up to 3 times per pregnancy. Immunizations:  Immunization Recommendations   Influenza Vaccine Annual influenza vaccination during flu season is recommended for all persons aged >= 6 months who do not have contraindications   Pneumococcal Vaccine   * Pneumococcal conjugate vaccine = PCV13 (Prevnar 13), PCV15 (Vaxneuvance), PCV20 (Prevnar 20)  * Pneumococcal polysaccharide vaccine = PPSV23 (Pneumovax) Adults 92-42 yo with certain risk factors or if 69+ yo  If never received any pneumonia vaccine: recommend Prevnar 20 (PCV20)  Give PCV20 if previously received 1 dose of PCV13 or PPSV23   Hepatitis B Vaccine 3 dose series if at intermediate or high risk (ex: diabetes, end stage renal disease, liver disease)   Respiratory syncytial virus (RSV) Vaccine - COVERED BY MEDICARE PART D  * RSVPreF3 (Arexvy) CDC recommends that adults 61years of age and older may receive a single dose of RSV vaccine using shared clinical decision-making (SCDM)   Tetanus (Td) Vaccine - COST NOT COVERED BY MEDICARE PART B Following completion of primary series, a booster dose should be given every 10 years to maintain immunity against tetanus. Td may also be given as tetanus wound prophylaxis. Tdap Vaccine - COST NOT COVERED BY MEDICARE PART B Recommended at least once for all adults. For pregnant patients, recommended with each pregnancy. Shingles Vaccine (Shingrix) - COST NOT COVERED BY MEDICARE PART B  2 shot series recommended in those 19 years and older who have or will have weakened immune systems or those 50 years and older     Health Maintenance Due:      Topic Date Due   • DXA SCAN  06/06/2024   • Breast Cancer Screening: Mammogram  09/20/2024   • Hepatitis C Screening  Completed   • Colorectal Cancer Screening  Discontinued     Immunizations Due:      Topic Date Due   • COVID-19 Vaccine (3 - Pfizer series) 05/26/2021     Advance Directives   What are advance directives?   Advance directives are legal documents that state your wishes and plans for medical care. These plans are made ahead of time in case you lose your ability to make decisions for yourself. Advance directives can apply to any medical decision, such as the treatments you want, and if you want to donate organs. What are the types of advance directives? There are many types of advance directives, and each state has rules about how to use them. You may choose a combination of any of the following:  Living will: This is a written record of the treatment you want. You can also choose which treatments you do not want, which to limit, and which to stop at a certain time. This includes surgery, medicine, IV fluid, and tube feedings. Durable power of  for healthcare [facility]): This is a written record that states who you want to make healthcare choices for you when you are unable to make them for yourself. This person, called a proxy, is usually a family member or a friend. You may choose more than 1 proxy. Do not resuscitate (DNR) order:  A DNR order is used in case your heart stops beating or you stop breathing. It is a request not to have certain forms of treatment, such as CPR. A DNR order may be included in other types of advance directives. Medical directive: This covers the care that you want if you are in a coma, near death, or unable to make decisions for yourself. You can list the treatments you want for each condition. Treatment may include pain medicine, surgery, blood transfusions, dialysis, IV or tube feedings, and a ventilator (breathing machine). Values history: This document has questions about your views, beliefs, and how you feel and think about life. This information can help others choose the care that you would choose. Why are advance directives important? An advance directive helps you control your care. Although spoken wishes may be used, it is better to have your wishes written down. Spoken wishes can be misunderstood, or not followed.  Treatments may be given even if you do not want them. An advance directive may make it easier for your family to make difficult choices about your care. Weight Management   Why it is important to manage your weight:  Being overweight increases your risk of health conditions such as heart disease, high blood pressure, type 2 diabetes, and certain types of cancer. It can also increase your risk for osteoarthritis, sleep apnea, and other respiratory problems. Aim for a slow, steady weight loss. Even a small amount of weight loss can lower your risk of health problems. How to lose weight safely:  A safe and healthy way to lose weight is to eat fewer calories and get regular exercise. You can lose up about 1 pound a week by decreasing the number of calories you eat by 500 calories each day. Healthy meal plan for weight management:  A healthy meal plan includes a variety of foods, contains fewer calories, and helps you stay healthy. A healthy meal plan includes the following:  Eat whole-grain foods more often. A healthy meal plan should contain fiber. Fiber is the part of grains, fruits, and vegetables that is not broken down by your body. Whole-grain foods are healthy and provide extra fiber in your diet. Some examples of whole-grain foods are whole-wheat breads and pastas, oatmeal, brown rice, and bulgur. Eat a variety of vegetables every day. Include dark, leafy greens such as spinach, kale, kacey greens, and mustard greens. Eat yellow and orange vegetables such as carrots, sweet potatoes, and winter squash. Eat a variety of fruits every day. Choose fresh or canned fruit (canned in its own juice or light syrup) instead of juice. Fruit juice has very little or no fiber. Eat low-fat dairy foods. Drink fat-free (skim) milk or 1% milk. Eat fat-free yogurt and low-fat cottage cheese. Try low-fat cheeses such as mozzarella and other reduced-fat cheeses. Choose meat and other protein foods that are low in fat.   Choose beans or other legumes such as split peas or lentils. Choose fish, skinless poultry (chicken or turkey), or lean cuts of red meat (beef or pork). Before you cook meat or poultry, cut off any visible fat. Use less fat and oil. Try baking foods instead of frying them. Add less fat, such as margarine, sour cream, regular salad dressing and mayonnaise to foods. Eat fewer high-fat foods. Some examples of high-fat foods include french fries, doughnuts, ice cream, and cakes. Eat fewer sweets. Limit foods and drinks that are high in sugar. This includes candy, cookies, regular soda, and sweetened drinks. Exercise:  Exercise at least 30 minutes per day on most days of the week. Some examples of exercise include walking, biking, dancing, and swimming. You can also fit in more physical activity by taking the stairs instead of the elevator or parking farther away from stores. Ask your healthcare provider about the best exercise plan for you. © Copyright [address] 2018 Information is for End User's use only and may not be sold, redistributed or otherwise used for commercial purposes.  All illustrations and images included in CareNotes® are the copyrighted property of [employer] or [address] [place] [address]

## 2023-11-06 ENCOUNTER — HOSPITAL ENCOUNTER (OUTPATIENT)
Dept: NUCLEAR MEDICINE | Facility: HOSPITAL | Age: 78
Discharge: HOME | End: 2023-11-06
Attending: INTERNAL MEDICINE
Payer: COMMERCIAL

## 2023-11-06 ENCOUNTER — HOSPITAL ENCOUNTER (OUTPATIENT)
Dept: NON INVASIVE DIAGNOSTICS | Facility: HOSPITAL | Age: 78
Discharge: HOME | End: 2023-11-06
Attending: INTERNAL MEDICINE
Payer: COMMERCIAL

## 2023-11-06 ENCOUNTER — HOSPITAL ENCOUNTER (OUTPATIENT)
Dept: NUCLEAR MEDICINE | Facility: HOSPITAL | Age: 78
Discharge: HOME | End: 2023-11-06
Attending: INTERNAL MEDICINE

## 2023-11-06 DIAGNOSIS — R07.9 CHEST PAIN OF UNKNOWN ETIOLOGY: ICD-10-CM

## 2023-11-06 LAB
NUC STRESS EJECTION FRACTION: 74 %
RATE PRESSURE PRODUCT: NORMAL
SL CV REST NUCLEAR ISOTOPE DOSE: 10.2 MCI
SL CV STRESS NUCLEAR ISOTOPE DOSE: 33 MCI
SL CV STRESS RECOVERY BP: NORMAL MMHG
SL CV STRESS RECOVERY HR: 88 BPM
SL CV STRESS RECOVERY O2 SAT: 100 %
STRESS ANGINA INDEX: 0
STRESS BASELINE BP: NORMAL MMHG
STRESS BASELINE HR: 75 BPM
STRESS O2 SAT REST: 96 %
STRESS PEAK HR: 110 BPM
STRESS POST O2 SAT PEAK: 99 %
STRESS POST PEAK BP: 195 MMHG

## 2023-11-06 PROCEDURE — 78452 HT MUSCLE IMAGE SPECT MULT: CPT

## 2023-11-06 PROCEDURE — G1004 CDSM NDSC: HCPCS

## 2023-11-06 PROCEDURE — 78452 HT MUSCLE IMAGE SPECT MULT: CPT | Performed by: INTERNAL MEDICINE

## 2023-11-06 PROCEDURE — A9502 TC99M TETROFOSMIN: HCPCS

## 2023-11-06 PROCEDURE — 93017 CV STRESS TEST TRACING ONLY: CPT

## 2023-11-06 PROCEDURE — 93016 CV STRESS TEST SUPVJ ONLY: CPT | Performed by: INTERNAL MEDICINE

## 2023-11-06 PROCEDURE — 93018 CV STRESS TEST I&R ONLY: CPT | Performed by: INTERNAL MEDICINE

## 2023-11-06 RX ORDER — REGADENOSON 0.08 MG/ML
0.4 INJECTION, SOLUTION INTRAVENOUS ONCE
Status: COMPLETED | OUTPATIENT
Start: 2023-11-06 | End: 2023-11-06

## 2023-11-06 RX ADMIN — REGADENOSON 0.4 MG: 0.08 INJECTION, SOLUTION INTRAVENOUS at 09:14

## 2023-11-07 ENCOUNTER — TELEPHONE (OUTPATIENT)
Dept: CARDIOLOGY CLINIC | Facility: CLINIC | Age: 78
End: 2023-11-07

## 2023-11-07 LAB
CHEST PAIN STATEMENT: NORMAL
MAX DIASTOLIC BP: 97 MMHG
MAX PREDICTED HEART RATE: 144 BPM
PROTOCOL NAME: NORMAL
REASON FOR TERMINATION: NORMAL
STRESS POST EXERCISE DUR MIN: 3 MIN
STRESS POST EXERCISE DUR SEC: 0 SEC
STRESS POST PEAK HR: 110 BPM
STRESS POST PEAK SYSTOLIC BP: 195 MMHG
TARGET HR FORMULA: NORMAL
TEST INDICATION: NORMAL

## 2023-11-07 NOTE — TELEPHONE ENCOUNTER
----- Message from Magda Armstrong MD sent at 11/7/2023  3:14 PM EST -----  Please call patient and tell results normal.

## 2023-11-07 NOTE — TELEPHONE ENCOUNTER
Placed call to patient. She verbalized understanding. Patient states when she gets heaviness in the middle of chest she takes a baby aspirin and it goes away. She wants to know if she should continue to do that. Please advise. Statement Selected

## 2023-11-09 ENCOUNTER — EVALUATION (OUTPATIENT)
Dept: PHYSICAL THERAPY | Facility: REHABILITATION | Age: 78
End: 2023-11-09
Payer: COMMERCIAL

## 2023-11-09 DIAGNOSIS — M70.61 GREATER TROCHANTERIC BURSITIS OF RIGHT HIP: ICD-10-CM

## 2023-11-09 DIAGNOSIS — M17.0 PRIMARY OSTEOARTHRITIS OF BOTH KNEES: ICD-10-CM

## 2023-11-09 PROCEDURE — 97161 PT EVAL LOW COMPLEX 20 MIN: CPT | Performed by: PHYSICAL THERAPIST

## 2023-11-09 PROCEDURE — 97112 NEUROMUSCULAR REEDUCATION: CPT | Performed by: PHYSICAL THERAPIST

## 2023-11-09 NOTE — PROGRESS NOTES
PT Evaluation     Today's date: 2023  Patient name: Zeinab Guillermo  : 1945  MRN: [id number]  Referring provider: Raul Louis MD  Dx:   Encounter Diagnosis     ICD-10-CM    1. Primary osteoarthritis of both knees  M17.0 Ambulatory Referral to Physical Therapy      2. Greater trochanteric bursitis of right hip  M70.61 Ambulatory Referral to Physical Therapy          Start Time: 1400  Stop Time: 1440  Total time in clinic (min): 40 minutes    Assessment  Assessment details: Problem List:  1) weakness of bilateral gluteus medius  2) weakness of bilateral quadriceps    Zeinab Guillermo is a pleasant 70 y.o. female who presents with bilateral knee and R hip pain. Knee pain has been bothering her for a long time while the hip started bothering her recently. she has weakness of bilateral gluteus medius and quadriceps resulting in the pain she is experiencing and fear of not being able to keep active. No further referral appears necessary at this time based upon examination results. I expect she will improve in 12-14 weeks. Impairments: abnormal or restricted ROM, activity intolerance, impaired physical strength, lacks appropriate home exercise program, pain with function, weight-bearing intolerance and poor posture     Symptom irritability: lowUnderstanding of Dx/Px/POC: good   Prognosis: good    Goals  ST-4 weeks  Patient will be independent with home exercise program.   Patient will be able to manage symptoms independently.   Patient will decrease pain by 25-50%    LTG: by discharge  Patient will improve FOTO to goal  Patient will report minimal (1-2/10) pain with aggravating activities to display improvements in overall functional status          Plan  Patient would benefit from: skilled physical therapy  Planned modality interventions: cryotherapy, thermotherapy: hydrocollator packs and unattended electrical stimulation  Planned therapy interventions: IADL retraining, joint mobilization, manual therapy, massage, ADL training, activity modification, abdominal trunk stabilization, ADL retraining, balance, balance/weight bearing training, neuromuscular re-education, body mechanics training, behavior modification, strengthening, stretching, therapeutic activities, therapeutic exercise, therapeutic training, transfer training, graded exercise, graded motor, home exercise program, graded activity, gait training, functional ROM exercises, patient education, postural training, IASTM, kinesiology taping and flexibility  Frequency: 2x week  Duration in visits: 16  Duration in weeks: 8  Treatment plan discussed with: patient        Subjective Evaluation    History of Present Illness  Mechanism of injury: Magda Marsh is a 70 y.o. female presenting to physical therapy on 23 with referral from MD for R hip and bilateral knee pain. Hip pain started recently and feels like the pain started in the knee and went up the leg. Reports the hip bothers her when she is walking, going up stairs and feels her balance is off. Is fearful she is going to fall. Pain in the hip is on the side. She was told there is no cartilage in her knees and that's why they are hurting. Has had injections in the past which helped, but does not want to keep going back for injections. Would like to return to walking with less pain.            Quality of life: good    Patient Goals  Patient goals for therapy: return to sport/leisure activities, independence with ADLs/IADLs, increased strength, decreased pain and increased motion  Patient goal: would like to be able to walk more  Pain  Current pain ratin  At best pain ratin  At worst pain ratin  Location: right hip  Quality: sharp and dull ache  Relieving factors: change in position, relaxation and rest  Aggravating factors: walking and stair climbing    Treatments  Previous treatment: injection treatment        Objective  Posture: bilateral genu valgum  Palpation: TTP over R greater trochanter  Myotomes all intact b/l  Dermatome: all intact b/l    GAIT: severe bilateral knee valgus during weight bearing        MMT         AROM          PROM    Hip       L       R        L           R      L     R   Flex. Extn. Abd. Add. IR.         ER.         G. Max 3+ 3+       G. Med 3 3       Iliop.          .         Knee         Extension     [facility]   Flexion     [facility]            Ankle         Dorsi Flexion         Plantar Flexion                Precautions: HTN    Manuals 11/9                                                                Neuro Re-Ed 11/9            clamshells HEP            LAQ HEP            Hip abd iso             Sand dune step up                                       HEP/education 10'            Ther Ex 11/9            Bike             Leg ext             Leg press             Squats with TB             LSD                                                    Ther Activity                                       Gait Training                                       Modalities

## 2023-11-13 ENCOUNTER — OFFICE VISIT (OUTPATIENT)
Dept: PHYSICAL THERAPY | Facility: REHABILITATION | Age: 78
End: 2023-11-13
Payer: COMMERCIAL

## 2023-11-13 DIAGNOSIS — M17.0 PRIMARY OSTEOARTHRITIS OF BOTH KNEES: Primary | ICD-10-CM

## 2023-11-13 PROCEDURE — 97110 THERAPEUTIC EXERCISES: CPT

## 2023-11-13 NOTE — PROGRESS NOTES
Daily Note     Today's date: 2023  Patient name: Nathaniel Plata  : 1945  MRN: [id number]  Referring provider: Paul Bennett MD  Dx:   Encounter Diagnosis     ICD-10-CM    1. Primary osteoarthritis of both knees  M17.0           Start Time: 1215  Stop Time: 1255  Total time in clinic (min): 40 minutes    Subjective: Patient states that she is sore since IE. She states that last night it was difficult to sleep. Objective: See treatment diary below      Assessment: Tolerated treatment well. Max cues for hip abductor engagement t/o session and to prevent IR of hip. All activities done to tolerance. Patient demonstrated fatigue post treatment and would benefit from continued PT      Plan: Continue per plan of care.       Precautions: HTN    Manuals                                                                Neuro Re-Ed            clamshells HEP 2x10 ea           LAQ HEP 2x10, 3"           Hip abd iso  3x5, 5" GTB           Sand dune step up                                       HEP/education 10'            Ther Ex            Bike  Seat 19, 8 min           Leg ext             Leg press             Squats with TB  Mini STS on hi/lo 3x3, GTB at knees for cue           LSD                                                    Ther Activity                                       Gait Training                                       Modalities

## 2023-11-14 ENCOUNTER — HOSPITAL ENCOUNTER (OUTPATIENT)
Dept: PULMONOLOGY | Facility: HOSPITAL | Age: 78
Discharge: HOME | End: 2023-11-14
Attending: INTERNAL MEDICINE
Payer: COMMERCIAL

## 2023-11-14 ENCOUNTER — TELEPHONE (OUTPATIENT)
Dept: DERMATOLOGY | Facility: CLINIC | Age: 78
End: 2023-11-14

## 2023-11-14 DIAGNOSIS — J84.9 ILD (INTERSTITIAL LUNG DISEASE) (HCC): ICD-10-CM

## 2023-11-14 PROCEDURE — 94760 N-INVAS EAR/PLS OXIMETRY 1: CPT

## 2023-11-14 PROCEDURE — 94729 DIFFUSING CAPACITY: CPT | Performed by: INTERNAL MEDICINE

## 2023-11-14 PROCEDURE — 94729 DIFFUSING CAPACITY: CPT

## 2023-11-14 PROCEDURE — 94010 BREATHING CAPACITY TEST: CPT

## 2023-11-14 PROCEDURE — 94010 BREATHING CAPACITY TEST: CPT | Performed by: INTERNAL MEDICINE

## 2023-11-14 NOTE — TELEPHONE ENCOUNTER
Called and left a message to advise pt's upcoming appt with Dr. Grady Suarez on 11/15 will need to be rescheduled, as it was scheduled incorrectly. Advised pt to call the office to reschedule this appointment into a NP/Consult spot. Provider has availability the week of 12/4 in [facility].

## 2023-11-16 ENCOUNTER — OFFICE VISIT (OUTPATIENT)
Dept: PHYSICAL THERAPY | Facility: REHABILITATION | Age: 78
End: 2023-11-16
Payer: COMMERCIAL

## 2023-11-16 DIAGNOSIS — M70.61 GREATER TROCHANTERIC BURSITIS OF RIGHT HIP: ICD-10-CM

## 2023-11-16 DIAGNOSIS — M17.0 PRIMARY OSTEOARTHRITIS OF BOTH KNEES: Primary | ICD-10-CM

## 2023-11-16 PROCEDURE — 97110 THERAPEUTIC EXERCISES: CPT

## 2023-11-16 PROCEDURE — 97112 NEUROMUSCULAR REEDUCATION: CPT

## 2023-11-16 NOTE — PROGRESS NOTES
Daily Note     Today's date: 2023  Patient name: Maribell Bennett  : 1945  MRN: [id number]  Referring provider: Caty Lopes MD  Dx:   Encounter Diagnosis     ICD-10-CM    1. Primary osteoarthritis of both knees  M17.0       2. Greater trochanteric bursitis of right hip  M70.61           Start Time: 1214  Stop Time: 1252  Total time in clinic (min): 38 minutes    Subjective: Patient reports she is feeling very fatigued and "run down."       Objective: See treatment diary below      Assessment: Patient tolerated treatment well. Decreased range with clamshells on right noted due to weakness. Cueing needed to decrease compensatory techniques with sit to stands and clamshells today. Pt will benefit from continued skilled PT intervention in order to address remaining limitations and to restore maximal function. Plan: Continue per plan of care.       Precautions: HTN    Manuals                                                               Neuro Re-Ed           clamshells HEP 2x10 ea 2x10 ea          LAQ HEP 2x10, 3" 2x10          Hip abd iso  3x5, 5" GTB 3x5 5" GTB          Sand dune step up   X10 ea                                    HEP/education 10'            Ther Ex           Bike  Seat 19, 8 min 8'          Leg ext             Leg press             Squats with TB  Mini STS on hi/lo 3x3, GTB at knees for cue Mini STS on hi/lo x10 GTB           LSD                                                    Ther Activity                                       Gait Training                                       Modalities

## 2023-11-20 ENCOUNTER — TELEPHONE (OUTPATIENT)
Dept: NEUROLOGY | Facility: CLINIC | Age: 78
End: 2023-11-20

## 2023-11-20 ENCOUNTER — OFFICE VISIT (OUTPATIENT)
Dept: PHYSICAL THERAPY | Facility: REHABILITATION | Age: 78
End: 2023-11-20
Payer: COMMERCIAL

## 2023-11-20 DIAGNOSIS — M70.61 GREATER TROCHANTERIC BURSITIS OF RIGHT HIP: ICD-10-CM

## 2023-11-20 DIAGNOSIS — M17.0 PRIMARY OSTEOARTHRITIS OF BOTH KNEES: Primary | ICD-10-CM

## 2023-11-20 PROCEDURE — 97530 THERAPEUTIC ACTIVITIES: CPT | Performed by: PHYSICAL MEDICINE & REHABILITATION

## 2023-11-20 PROCEDURE — 97112 NEUROMUSCULAR REEDUCATION: CPT | Performed by: PHYSICAL MEDICINE & REHABILITATION

## 2023-11-20 PROCEDURE — 97110 THERAPEUTIC EXERCISES: CPT | Performed by: PHYSICAL MEDICINE & REHABILITATION

## 2023-11-20 NOTE — TELEPHONE ENCOUNTER
11/20/23-Called and left voicemail for patient to inform them that unfortunately Dr. eLxii Vasquez will be out of the office on the day of their scheduled appointment and we have to reschedule their appointment. I left the office number for them to call back so we can get them rescheduled. 11/15/23-Called and left voicemail for patient to inform them that unfortunately Dr. Lexii Vasquez will be out of the office on the day of their scheduled appointment and we have to reschedule their appointment. I left the office number for them to call back so we can get them rescheduled.

## 2023-11-20 NOTE — PROGRESS NOTES
Daily Note     Today's date: 2023  Patient name: Mariann Valenzuela  : 1945  MRN: [id number]  Referring provider: Tiffany Batista MD  Dx:   Encounter Diagnosis     ICD-10-CM    1. Primary osteoarthritis of both knees  M17.0       2. Greater trochanteric bursitis of right hip  M70.61                      Subjective: Patient notes some increased knee pain today. Objective: See treatment diary below    Assessment: Patient tolerated treatment well overall, including progressions as charted. Continue as able nv. Plan: Continue per plan of care.       Precautions: HTN    Manuals                                                              Neuro Re-Ed          clamshells HEP 2x10 ea 2x10 ea GTB 10x ea         LAQ HEP 2x10, 3" 2x10 2x10 ea         Hip abd iso  3x5, 5" GTB 3x5 5" GTB 4x5x5" GTB         Sand dune step up   X10 ea 10x ea                                   HEP/education 10'            Ther Ex          Bike  Seat 19, 8 min 8' 8'         Leg ext             Leg press             Squats with TB  Mini STS on hi/lo 3x3, GTB at knees for cue Mini STS on hi/lo x10 GTB  STS hi/lo GTB 2x5         LSD                                                    Ther Activity                                       Gait Training                                       Modalities

## 2023-11-22 ENCOUNTER — OFFICE VISIT (OUTPATIENT)
Dept: PHYSICAL THERAPY | Facility: REHABILITATION | Age: 78
End: 2023-11-22
Payer: COMMERCIAL

## 2023-11-22 DIAGNOSIS — M70.61 GREATER TROCHANTERIC BURSITIS OF RIGHT HIP: ICD-10-CM

## 2023-11-22 DIAGNOSIS — M17.0 PRIMARY OSTEOARTHRITIS OF BOTH KNEES: Primary | ICD-10-CM

## 2023-11-22 PROCEDURE — 97110 THERAPEUTIC EXERCISES: CPT

## 2023-11-22 PROCEDURE — 97112 NEUROMUSCULAR REEDUCATION: CPT

## 2023-11-22 NOTE — PROGRESS NOTES
Daily Note     Today's date: 2023  Patient name: Preston Quiroga  : 1945  MRN: [id number]  Referring provider: Vickie Dowd MD  Dx:   Encounter Diagnosis     ICD-10-CM    1. Primary osteoarthritis of both knees  M17.0       2. Greater trochanteric bursitis of right hip  M70.61           Start Time: 1447  Stop Time: 1530  Total time in clinic (min): 43 minutes    Subjective: Pt reports she feels like she continues to see improvements with physical therapy. Still gets some pain at her knees, but occurrence of this pain is random. Objective: See treatment diary below      Assessment: Tolerated treatment well. Continued with program as outlined below. Progressed with increased reps and addition of standing hip abd iso. Was challenged with progressions made. Slight soreness in R hip following hip abd iso at wall. Patient would benefit from continued PT to further improve strength, decrease pain, and maximize overall function. Plan: Continue per plan of care.       Precautions: HTN    Manuals                                                             Neuro Re-Ed         clamshells HEP 2x10 ea 2x10 ea GTB 10x ea GTB 15x ea        LAQ HEP 2x10, 3" 2x10 2x10 ea 2x10 ea        Hip abd iso  3x5, 5" GTB 3x5 5" GTB 4x5x5" GTB 4x5x5" GTB  Supine    Stand  Ball at wall  5"x5 ea        Sand dune step up   X10 ea 10x ea 5" hold  X10 ea                                  HEP/education 10'            Ther Ex         Bike  Seat 19, 8 min 8' 8' L1 -8'        Leg ext             Leg press             Squats with TB  Mini STS on hi/lo 3x3, GTB at knees for cue Mini STS on hi/lo x10 GTB  STS hi/lo GTB 2x5 nv        LSD                                                    Ther Activity                                       Gait Training                                       Modalities

## 2023-11-27 ENCOUNTER — OFFICE VISIT (OUTPATIENT)
Dept: PHYSICAL THERAPY | Facility: REHABILITATION | Age: 78
End: 2023-11-27
Payer: COMMERCIAL

## 2023-11-27 DIAGNOSIS — M17.0 PRIMARY OSTEOARTHRITIS OF BOTH KNEES: Primary | ICD-10-CM

## 2023-11-27 DIAGNOSIS — M70.61 GREATER TROCHANTERIC BURSITIS OF RIGHT HIP: ICD-10-CM

## 2023-11-27 PROCEDURE — 97110 THERAPEUTIC EXERCISES: CPT | Performed by: PHYSICAL THERAPIST

## 2023-11-27 PROCEDURE — 97112 NEUROMUSCULAR REEDUCATION: CPT | Performed by: PHYSICAL THERAPIST

## 2023-11-27 NOTE — PROGRESS NOTES
Daily Note     Today's date: 2023  Patient name: Janneth Alonzo  : 1945  MRN: [id number]  Referring provider: Zack Pedraza MD  Dx:   Encounter Diagnosis     ICD-10-CM    1. Primary osteoarthritis of both knees  M17.0       2. Greater trochanteric bursitis of right hip  M70.61           Start Time: 1215  Stop Time: 1255  Total time in clinic (min): 40 minutes    Subjective: Pt reports she has been good, but today is a bad day. Objective: See treatment diary below      Assessment: Tolerated treatment well. Patient would benefit from continued PT to further improve strength, decrease pain, and maximize overall function. Plan: Continue per plan of care.       Precautions: HTN    Manuals                                                            Neuro Re-Ed        clamshells HEP 2x10 ea 2x10 ea GTB 10x ea GTB 15x ea YHB 2x10 ea       LAQ HEP 2x10, 3" 2x10 2x10 ea 2x10 ea        Hip abd iso  3x5, 5" GTB 3x5 5" GTB 4x5x5" GTB 4x5x5" GTB  Supine    Stand  Ball at wall  5"x5 ea 20x ea 5" hold supine YHB    Wall ball 5"x5 ea       Sand dune step up   X10 ea 10x ea 5" hold  X10 ea 10x ea 5" hold                                 HEP/education 10'            Ther Ex        Bike  Seat 19, 8 min 8' 8' L1 -8' L1 10' ROM       Leg ext             Leg press             Squats with TB  Mini STS on hi/lo 3x3, GTB at knees for cue Mini STS on hi/lo x10 GTB  STS hi/lo GTB 2x5 nv nv       LSD                                                    Ther Activity                                       Gait Training                                       Modalities

## 2023-11-30 ENCOUNTER — OFFICE VISIT (OUTPATIENT)
Dept: PHYSICAL THERAPY | Facility: REHABILITATION | Age: 78
End: 2023-11-30
Payer: COMMERCIAL

## 2023-11-30 DIAGNOSIS — M70.61 GREATER TROCHANTERIC BURSITIS OF RIGHT HIP: ICD-10-CM

## 2023-11-30 DIAGNOSIS — M17.0 PRIMARY OSTEOARTHRITIS OF BOTH KNEES: Primary | ICD-10-CM

## 2023-11-30 PROCEDURE — 97112 NEUROMUSCULAR REEDUCATION: CPT | Performed by: PHYSICAL THERAPY ASSISTANT

## 2023-11-30 PROCEDURE — 97110 THERAPEUTIC EXERCISES: CPT | Performed by: PHYSICAL THERAPY ASSISTANT

## 2023-11-30 NOTE — PROGRESS NOTES
Daily Note     Today's date: 2023  Patient name: Lester Hardwick  : 1945  MRN: [id number]  Referring provider: Maryann Sen MD  Dx:   Encounter Diagnosis     ICD-10-CM    1. Primary osteoarthritis of both knees  M17.0       2. Greater trochanteric bursitis of right hip  M70.61                  Subjective: Pt reports she has been feeling better lately. Objective: See treatment diary below      Assessment: Tolerated treatment well. Patient demonstrated fatigue post treatment, exhibited good technique with therapeutic exercises, and would benefit from continued PT to further improve strength, decrease pain, and maximize overall function. Plan: Continue per plan of care.       Precautions: HTN    Manuals                                                           Neuro Re-Ed       clamshells HEP 2x10 ea 2x10 ea GTB 10x ea GTB 15x ea YHB 2x10 ea YHB 2x10 ea      LAQ HEP 2x10, 3" 2x10 2x10 ea 2x10 ea  2x10 ea 3#      Hip abd iso  3x5, 5" GTB 3x5 5" GTB 4x5x5" GTB 4x5x5" GTB  Supine    Stand  Ball at wall  5"x5 ea 20x ea 5" hold supine YHB    Wall ball 5"x5 ea YHB 5"x20 supine    Wall ball 5"x5 ea      Sand dune step up   X10 ea 10x ea 5" hold  X10 ea 10x ea 5" hold 10x ea 5" hold                                HEP/education 10'            Ther Ex       Bike  Seat 19, 8 min 8' 8' L1 -8' L1 10' ROM L1 10' ROM      Leg ext             Leg press             Squats with TB  Mini STS on hi/lo 3x3, GTB at knees for cue Mini STS on hi/lo x10 GTB  STS hi/lo GTB 2x5 nv nv       LSD                                                    Ther Activity                                       Gait Training                                       Modalities

## 2023-12-04 ENCOUNTER — OFFICE VISIT (OUTPATIENT)
Dept: FAMILY MEDICINE CLINIC | Facility: CLINIC | Age: 78
End: 2023-12-04
Payer: COMMERCIAL

## 2023-12-04 ENCOUNTER — OFFICE VISIT (OUTPATIENT)
Dept: PHYSICAL THERAPY | Facility: REHABILITATION | Age: 78
End: 2023-12-04
Payer: COMMERCIAL

## 2023-12-04 VITALS
HEIGHT: 67 IN | OXYGEN SATURATION: 96 % | HEART RATE: 84 BPM | SYSTOLIC BLOOD PRESSURE: 122 MMHG | DIASTOLIC BLOOD PRESSURE: 80 MMHG | WEIGHT: 167.6 LBS | BODY MASS INDEX: 26.3 KG/M2 | TEMPERATURE: 98.9 F | RESPIRATION RATE: 16 BRPM

## 2023-12-04 DIAGNOSIS — J02.9 SORE THROAT: ICD-10-CM

## 2023-12-04 DIAGNOSIS — M26.629 TMJ PAIN DYSFUNCTION SYNDROME: Primary | ICD-10-CM

## 2023-12-04 DIAGNOSIS — M17.0 PRIMARY OSTEOARTHRITIS OF BOTH KNEES: Primary | ICD-10-CM

## 2023-12-04 DIAGNOSIS — M70.61 GREATER TROCHANTERIC BURSITIS OF RIGHT HIP: ICD-10-CM

## 2023-12-04 LAB — S PYO AG THROAT QL: NEGATIVE

## 2023-12-04 PROCEDURE — 97112 NEUROMUSCULAR REEDUCATION: CPT

## 2023-12-04 PROCEDURE — 99213 OFFICE O/P EST LOW 20 MIN: CPT | Performed by: FAMILY MEDICINE

## 2023-12-04 PROCEDURE — 97140 MANUAL THERAPY 1/> REGIONS: CPT

## 2023-12-04 PROCEDURE — 87880 STREP A ASSAY W/OPTIC: CPT | Performed by: FAMILY MEDICINE

## 2023-12-04 PROCEDURE — 97110 THERAPEUTIC EXERCISES: CPT

## 2023-12-04 NOTE — PROGRESS NOTES
Daily Note     Today's date: 2023  Patient name: Dale Tanner  : 1945  MRN: [id number]  Referring provider: Claudetta Gong, MD  Dx:   Encounter Diagnosis     ICD-10-CM    1. Primary osteoarthritis of both knees  M17.0       2. Greater trochanteric bursitis of right hip  M70.61           Start Time: 1446  Stop Time: 1530  Total time in clinic (min): 44 minutes    Subjective: Pt reports both knees are bothering her a little bit more than usual today. Aggravation of symptoms began over the weekend. Is not sure if everything was caused by the rainy weather, or if there was something else she may have done to aggravate things. Notes she is having an EMG performed below the knee this Friday. Objective: See treatment diary below      Assessment: Tolerated treatment well. Continued with program as outlined below. Increased soft tissue restriction and TTP present along distal aspect of both lat quad, which did begin to resolve following manual STM. Ball hip iso against wall held d/t aggravated symptoms reported at start of treatment, but would benefit from resuming NV as able. Patient would benefit from continued PT to further improve strength, decrease pain and maximize overall function. Plan: Continue per plan of care.       Precautions: HTN    Manuals  12     Distal quad STM        Bilat   AFB                                            Neuro Re-Ed  12/4     clamshells HEP 2x10 ea 2x10 ea GTB 10x ea GTB 15x ea YHB 2x10 ea YHB 2x10 ea YHB 2x10 ea     LAQ HEP 2x10, 3" 2x10 2x10 ea 2x10 ea  2x10 ea 3# 2x10 ea 3#     Hip abd iso  3x5, 5" GTB 3x5 5" GTB 4x5x5" GTB 4x5x5" GTB  Supine    Stand  Ball at wall  5"x5 ea 20x ea 5" hold supine YHB    Wall ball 5"x5 ea YHB 5"x20 supine    Wall ball 5"x5 ea RHB 5"x20 supine    Wall ball  NV     Sand dune step up   X10 ea 10x ea 5" hold  X10 ea 10x ea 5" hold 10x ea 5" hold 10x ea 5" hold                               HEP/education 10'            Ther Ex 11/9 11/13 11/16 11/20 11/22 11/27 11/30 12/4     Bike  Seat 19, 8 min 8' 8' L1 -8' L1 10' ROM L1 10' ROM L1 10' ROM     Leg ext             Leg press             Squats with TB  Mini STS on hi/lo 3x3, GTB at knees for cue Mini STS on hi/lo x10 GTB  STS hi/lo GTB 2x5 nv nv       LSD                                                    Ther Activity                                       Gait Training                                       Modalities

## 2023-12-04 NOTE — PATIENT INSTRUCTIONS
Temporomandibular Disorder   WHAT YOU NEED TO KNOW:   Temporomandibular disorder is a condition that causes pain in your jaw. The disorder affects the joint between your temporal bone and your mandible (jawbone). The muscles and nerves around the joint are also affected. WHILE YOU ARE HERE:   Informed consent  is a legal document that explains the tests, treatments, or procedures that you may need. Informed consent means you understand what will be done and can make decisions about what you want. You give your permission when you sign the consent form. You can have someone sign this form for you if you are not able to sign it. You have the right to understand your medical care in words you know. Before you sign the consent form, understand the risks and benefits of what will be done. Make sure all your questions are answered. Soft foods: Your healthcare provider may suggest that you eat only soft foods for several days. A dietitian may work with you to find foods that are easier to bite, chew, or swallow. Examples are soup, applesauce, cottage cheese, pudding, yogurt, and soft fruits. Medicines:   Antibiotic medicine: These are used to prevent or treat an infection caused by bacteria. Pain medicine: You may be given a prescription medicine to decrease pain. Do not wait until the pain is severe before you ask for more medicine. Botulinum toxin:  This may be injected into the muscles of your jaw to decrease your pain. Steroid medicine: These may be injected into the joint to decrease pain and swelling. Muscle relaxers  help decrease pain and muscle spasms. Tests:   X-rays: You may need to have x-rays of your skull, jaw, or teeth. Arthrogram:  This is an x-ray that uses contrast liquid to help the pictures show up better. Tell the healthcare provider if you have ever had an allergic reaction to contrast liquid.     MRI:  This scan uses powerful magnets and a computer to take pictures of your jaw. You may be given contrast liquid to help the pictures show up better. Tell the healthcare provider if you have ever had an allergic reaction to contrast liquid. Do not enter the MRI room with anything metal. Metal can cause serious injury. Tell the healthcare provider if you have any metal in or on your body. Bone scan:  A bone scan is used to check your bones for disease or damage. You will get a radioactive liquid, called a tracer, through a vein in your arm. The tracer collects in your bones. Pictures will then be taken to look for problems. Examples of bone problems include fractures (breaks) and infection. Treatment:   Jaw supporting devices:  Splints may be used to support your jaw or keep it from moving. You may need to wear a mouth guard to keep you from clenching or grinding your teeth while you are sleeping. Physical therapy:  A physical therapist will teach you exercises to help improve movement and strength, and to decrease pain in your jaw. A speech therapist may help you with swallowing and speech exercises. Surgery: You may need surgery to fix your teeth, jawbone, or the joint. RISKS:   You may bleed or get an infection if you have surgery. If left untreated, your condition may get worse. You may have trouble breathing, eating, drinking, talking, or opening your mouth. If not treated early, temporomandibular disorder may lead to permanent injury, such as nerve damage, deformity, or paralysis. CARE AGREEMENT:   You have the right to help plan your care. Learn about your health condition and how it may be treated. Discuss treatment options with your healthcare providers to decide what care you want to receive. You always have the right to refuse treatment. © Copyright [employer] 2023 Information is for End User's use only and may not be sold, redistributed or otherwise used for commercial purposes. The above information is an  only.  It is not intended as medical advice for individual conditions or treatments. Talk to your doctor, nurse or pharmacist before following any medical regimen to see if it is safe and effective for you.

## 2023-12-04 NOTE — PROGRESS NOTES
Assessment/Plan:    TMJ pain dysfunction syndrome  - advised to eat soft foods for the next several days, take Tylenol as needed for pain, encouraged to contact the office for persistent or worsening symptoms    Sore throat  - rapid strep is negative         Return as scheduled or sooner as needed. The patient understands and agrees with the treatment plan. Subjective:   Chief Complaint   Patient presents with    Facial Pain    Earache     R    Sore Throat      Patient ID: Ashli Hines is a 70 y.o. female who presents today with c/o right jaw pain radiating into her right ear onset on Saturday morning, worse when chewing or opening her mouth, she is also experiencing sore throat on and off, no trouble swallowing, no runny nose, no ear drainage, no decreased hearing, no fever or chills, no facial weakness or numbness.          The following portions of the patient's history were reviewed and updated as appropriate: allergies, current medications, past family history, past medical history, past social history, past surgical history and problem list.    Past Medical History:   Diagnosis Date    Anxiety     Arthritis     Back pain     Balance problems     Chest pain     heaviness    Colon polyp     Difficulty swallowing     Dizziness     Dry cough     Gait disorder     GERD (gastroesophageal reflux disease)     Hyperparathyroidism (HCC)     Hypertension     Increased frequency of headaches     Migraines     Neck pain     Numbness and tingling     bles    Palpitations     Pericarditis     Thyroid disease     nodule    Trouble in sleeping     Wears glasses      Past Surgical History:   Procedure Laterality Date    APPENDECTOMY      CHOLECYSTECTOMY      laparoscopic    COLONOSCOPY      KNEE SURGERY Left     PERICARDIAL WINDOW      IN OPTX DSTL RADL X-ARTIC FX/EPIPHYSL SEP Right 3/22/2018    Procedure: OPEN REDUCTION W/ INTERNAL FIXATION (ORIF) RADIUS, splint application;  Surgeon: Ricco Blanchard MD; Location: [facility];  Service: Orthopedics    [place] PARATHYROIDECTOMY/EXPLORATION PARATHYROIDS N/A 8/17/2022    Procedure: PARATHYROIDECTOMY, 4 GLAND EXPLORATION;  Surgeon: Juli Padilla MD;  Location: [facility];  Service: ENT    UPPER GASTROINTESTINAL ENDOSCOPY       Family History   Problem Relation Age of Onset    Kidney failure Mother     Hypertension Mother     Lung cancer Father     Lucas Tamez syndrome Daughter     No Known Problems Sister     Substance Abuse Neg Hx     Alcohol abuse Neg Hx     Mental illness Neg Hx      Social History     Socioeconomic History    Marital status: /Civil Union     Spouse name: Not on file    Number of children: Not on file    Years of education: Not on file    Highest education level: Not on file   Occupational History    Not on file   Tobacco Use    Smoking status: Never    Smokeless tobacco: Never   Vaping Use    Vaping Use: Never used   Substance and Sexual Activity    Alcohol use: Not Currently     Comment: Rarely     Drug use: No    Sexual activity: Yes     Partners: Male   Other Topics Concern    Not on file   Social History Narrative    WORK:    1. [employer] ([employer]; [employer]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    2. [employer]        HOBBIES:    1. Singing    2. Dancing    3. Hx of ceramics (+ dust)        PETS:    1. Dogs    -  Denies birds        TRAVEL:    -  [place]        EXPOSURES:    Denies mold; down pillows/comforters; hot tubs     Social Determinants of Health     Financial Resource Strain: Low Risk  (10/30/2023)    Overall Financial Resource Strain (CARDIA)     Difficulty of Paying Living Expenses: Not very hard   Food Insecurity: Not on file   Transportation Needs: No Transportation Needs (10/30/2023)    PRAPARE - Transportation     Lack of Transportation (Medical): No     Lack of Transportation (Non-Medical):  No   Physical Activity: Not on file   Stress: Not on file   Social Connections: Not on file   Intimate Partner Violence: Not on file   Housing Stability: Not on file       Current Outpatient Medications:     acetaminophen (TYLENOL) 500 mg tablet, Take 500-1,000 mg by mouth every 6 (six) hours as needed for mild pain, Disp: , Rfl:     albuterol (PROVENTIL HFA,VENTOLIN HFA) 90 mcg/act inhaler, Inhale 2 puffs every 6 (six) hours as needed for wheezing or shortness of breath, Disp: 8 g, Rfl: 1    ALPRAZolam (XANAX) 0.5 mg tablet, Take 1 tablet (0.5 mg total) by mouth daily at bedtime as needed for anxiety or sleep, Disp: 20 tablet, Rfl: 1    ascorbic acid (VITAMIN C) 500 mg tablet, Take 500 mg by mouth daily, Disp: , Rfl:     b complex vitamins tablet, Take 1 tablet by mouth daily, Disp: , Rfl:     Cholecalciferol (Vitamin D) 50 MCG (2000 UT) CAPS, Take 1 capsule (2,000 Units total) by mouth daily (Patient taking differently: Take 3,000 Units by mouth daily), Disp: , Rfl:     Magnesium 300 MG CAPS, Take 300 mg by mouth in the morning, Disp: , Rfl:     metoprolol succinate (TOPROL-XL) 25 mg 24 hr tablet, Take 1 tablet (25 mg total) by mouth daily at bedtime, Disp: 90 tablet, Rfl: 3    Multiple Vitamins-Minerals (MULTIVITAMIN ADULTS 50+ PO), Take by mouth, Disp: , Rfl:     Nutritional Supplements (OSAPLEX MK-7 PO), Take by mouth, Disp: , Rfl:     omeprazole (PriLOSEC) 20 mg delayed release capsule, Take 1 capsule (20 mg total) by mouth daily before breakfast Half an hour prior to breakfast and half an hour prior to dinner, Disp: 180 capsule, Rfl: 3    Riboflavin (VITAMIN B-2 PO), Take 250 mg by mouth in the morning, Disp: , Rfl:     Diclofenac Sodium (VOLTAREN) 1 %, Apply 2 g topically 4 (four) times a day for 10 days, Disp: 80 g, Rfl: 0    dicyclomine (BENTYL) 10 mg capsule, TAKE 1 CAPSULE BY MOUTH 4 TIMES A DAY (BEFORE MEALS AND AT BEDTIME) (Patient not taking: Reported on 9/27/2023), Disp: 360 capsule, Rfl: 1    methocarbamol (ROBAXIN) 500 mg tablet, Take 0.5 (250mg) to 1 (500mg) tablet by mouth every 8 hours as needed for muscle spasm/neck pain. (Patient not taking: Reported on 8/30/2023), Disp: 90 tablet, Rfl: 0    rizatriptan (MAXALT) 10 MG tablet, Take 1 tablet (10 mg total) by mouth once as needed for migraine May repeat in 2 hours if needed. Max 2/24 hours, 9/month. (Patient not taking: Reported on 8/30/2023), Disp: 9 tablet, Rfl: 6    traZODone (DESYREL) 50 mg tablet, TAKE 1 TABLET BY MOUTH EVERYDAY AT BEDTIME (Patient not taking: Reported on 9/7/2023), Disp: 90 tablet, Rfl: 1    Review of Systems   Constitutional:  Negative for chills, fatigue and fever. HENT:  Positive for sore throat. Negative for congestion, ear discharge, ear pain, hearing loss, rhinorrhea, tinnitus, trouble swallowing and voice change. Respiratory:  Negative for cough, shortness of breath and wheezing. Cardiovascular:  Negative for chest pain and palpitations. Gastrointestinal:  Negative for abdominal pain, diarrhea, nausea and vomiting. Neurological:  Negative for dizziness and headaches. Hematological:  Negative for adenopathy. Objective:    Vitals:    12/04/23 1105   BP: 122/80   Pulse: 84   Resp: 16   Temp: 98.9 °F (37.2 °C)   SpO2: 96%   Weight: 76 kg (167 lb 9.6 oz)   Height: 5' 7" (1.702 m)        Physical Exam  Constitutional:       General: She is not in acute distress. HENT:      Right Ear: Tympanic membrane, ear canal and external ear normal.      Left Ear: Tympanic membrane, ear canal and external ear normal.      Mouth/Throat:      Pharynx: No oropharyngeal exudate or posterior oropharyngeal erythema. Cardiovascular:      Rate and Rhythm: Normal rate and regular rhythm. Heart sounds: Normal heart sounds. Pulmonary:      Effort: Pulmonary effort is normal.      Breath sounds: Normal breath sounds. No wheezing, rhonchi or rales. Lymphadenopathy:      Cervical: No cervical adenopathy. Neurological:      Mental Status: She is alert and oriented to person, place, and time.    Psychiatric:         Mood and Affect: Mood normal.         Behavior: Behavior normal.          Office Visit on 12/04/2023   Component Date Value Ref Range Status     RAPID STREP A 12/04/2023 Negative  Negative Final

## 2023-12-06 ENCOUNTER — OFFICE VISIT (OUTPATIENT)
Dept: PHYSICAL THERAPY | Facility: REHABILITATION | Age: 78
End: 2023-12-06
Payer: COMMERCIAL

## 2023-12-06 ENCOUNTER — OFFICE VISIT (OUTPATIENT)
Dept: DERMATOLOGY | Facility: CLINIC | Age: 78
End: 2023-12-06
Payer: COMMERCIAL

## 2023-12-06 VITALS — HEIGHT: 68 IN | WEIGHT: 167 LBS | TEMPERATURE: 97 F | BODY MASS INDEX: 25.31 KG/M2

## 2023-12-06 DIAGNOSIS — D22.72 MULTIPLE BENIGN MELANOCYTIC NEVI OF UPPER AND LOWER EXTREMITIES AND TRUNK: ICD-10-CM

## 2023-12-06 DIAGNOSIS — M17.0 PRIMARY OSTEOARTHRITIS OF BOTH KNEES: Primary | ICD-10-CM

## 2023-12-06 DIAGNOSIS — D18.01 CHERRY ANGIOMA: ICD-10-CM

## 2023-12-06 DIAGNOSIS — D22.62 MULTIPLE BENIGN MELANOCYTIC NEVI OF UPPER AND LOWER EXTREMITIES AND TRUNK: ICD-10-CM

## 2023-12-06 DIAGNOSIS — L85.3 XEROSIS OF SKIN: Primary | ICD-10-CM

## 2023-12-06 DIAGNOSIS — D22.71 MULTIPLE BENIGN MELANOCYTIC NEVI OF UPPER AND LOWER EXTREMITIES AND TRUNK: ICD-10-CM

## 2023-12-06 DIAGNOSIS — D22.5 MULTIPLE BENIGN MELANOCYTIC NEVI OF UPPER AND LOWER EXTREMITIES AND TRUNK: ICD-10-CM

## 2023-12-06 DIAGNOSIS — L72.0 MILIUM: ICD-10-CM

## 2023-12-06 DIAGNOSIS — D22.61 MULTIPLE BENIGN MELANOCYTIC NEVI OF UPPER AND LOWER EXTREMITIES AND TRUNK: ICD-10-CM

## 2023-12-06 DIAGNOSIS — L82.1 SEBORRHEIC KERATOSIS: ICD-10-CM

## 2023-12-06 DIAGNOSIS — L81.4 LENTIGO: ICD-10-CM

## 2023-12-06 DIAGNOSIS — L72.0 EPIDERMAL CYST: ICD-10-CM

## 2023-12-06 DIAGNOSIS — M70.61 GREATER TROCHANTERIC BURSITIS OF RIGHT HIP: ICD-10-CM

## 2023-12-06 PROCEDURE — 97110 THERAPEUTIC EXERCISES: CPT | Performed by: PHYSICAL THERAPIST

## 2023-12-06 PROCEDURE — 97140 MANUAL THERAPY 1/> REGIONS: CPT | Performed by: PHYSICAL THERAPIST

## 2023-12-06 PROCEDURE — 97112 NEUROMUSCULAR REEDUCATION: CPT | Performed by: PHYSICAL THERAPIST

## 2023-12-06 PROCEDURE — 99203 OFFICE O/P NEW LOW 30 MIN: CPT | Performed by: STUDENT IN AN ORGANIZED HEALTH CARE EDUCATION/TRAINING PROGRAM

## 2023-12-06 NOTE — PROGRESS NOTES
[facility] Note     Patient Name: Arlyn Mathew  Encounter Date: 12/6/23     Have you been cared for by a [facility]ologist in the last 3 years and, if so, which description applies to you? NO. I am considered a "new" patient and must complete all patient intake questions. I am FEMALE/of child-bearing potential.    REVIEW OF SYSTEMS:  Have you recently had or currently have any of the following? Recent fever or chills? No  Any non-healing wound? No  Are you pregnant or planning to become pregnant? No  Are you currently or planning to be nursing or breast feeding? No   PAST MEDICAL HISTORY:  Have you personally ever had or currently have any of the following? If "YES," then please provide more detail. Skin cancer (such as Melanoma, Basal Cell Carcinoma, Squamous Cell Carcinoma? No  Tuberculosis, HIV/AIDS, Hepatitis B or C: No  Radiation Treatment No   HISTORY OF IMMUNOSUPPRESSION:   Do you have a history of any of the following:  Systemic Immunosuppression such as Diabetes, Biologic or Immunotherapy, Chemotherapy, Organ Transplantation, Bone Marrow Transplantation? No    Answering "YES" requires the addition of the dotphrase "IMMUNOSUPPRESSED" as the first diagnosis of the patient's visit. FAMILY HISTORY:  Any "first degree relatives" (parent, brother, sister, or child) with the following? Skin Cancer, Pancreatic or Other Cancer? No   PATIENT EXPERIENCE:    Do you want the Dermatologist to perform a COMPLETE skin exam today including a clinical examination under the "bra and underwear" areas? Yes  If necessary, do we have your permission to call and leave a detailed message on your Preferred Phone number that includes your specific medical information?   Yes      Allergies   Allergen Reactions    Ciprofloxacin Myalgia      Current Outpatient Medications:     acetaminophen (TYLENOL) 500 mg tablet, Take 500-1,000 mg by mouth every 6 (six) hours as needed for mild pain, Disp: , Rfl:     albuterol (PROVENTIL HFA,VENTOLIN HFA) 90 mcg/act inhaler, Inhale 2 puffs every 6 (six) hours as needed for wheezing or shortness of breath, Disp: 8 g, Rfl: 1    ALPRAZolam (XANAX) 0.5 mg tablet, Take 1 tablet (0.5 mg total) by mouth daily at bedtime as needed for anxiety or sleep, Disp: 20 tablet, Rfl: 1    ascorbic acid (VITAMIN C) 500 mg tablet, Take 500 mg by mouth daily, Disp: , Rfl:     b complex vitamins tablet, Take 1 tablet by mouth daily, Disp: , Rfl:     Magnesium 300 MG CAPS, Take 300 mg by mouth in the morning, Disp: , Rfl:     Multiple Vitamins-Minerals (MULTIVITAMIN ADULTS 50+ PO), Take by mouth, Disp: , Rfl:     Nutritional Supplements (OSAPLEX MK-7 PO), Take by mouth, Disp: , Rfl:     omeprazole (PriLOSEC) 20 mg delayed release capsule, Take 1 capsule (20 mg total) by mouth daily before breakfast Half an hour prior to breakfast and half an hour prior to dinner, Disp: 180 capsule, Rfl: 3    Riboflavin (VITAMIN B-2 PO), Take 250 mg by mouth in the morning, Disp: , Rfl:     Cholecalciferol (Vitamin D) 50 MCG (2000 UT) CAPS, Take 1 capsule (2,000 Units total) by mouth daily (Patient taking differently: Take 3,000 Units by mouth daily), Disp: , Rfl:     Diclofenac Sodium (VOLTAREN) 1 %, Apply 2 g topically 4 (four) times a day for 10 days, Disp: 80 g, Rfl: 0    dicyclomine (BENTYL) 10 mg capsule, TAKE 1 CAPSULE BY MOUTH 4 TIMES A DAY (BEFORE MEALS AND AT BEDTIME) (Patient not taking: Reported on 9/27/2023), Disp: 360 capsule, Rfl: 1    methocarbamol (ROBAXIN) 500 mg tablet, Take 0.5 (250mg) to 1 (500mg) tablet by mouth every 8 hours as needed for muscle spasm/neck pain.  (Patient not taking: Reported on 8/30/2023), Disp: 90 tablet, Rfl: 0    metoprolol succinate (TOPROL-XL) 25 mg 24 hr tablet, Take 1 tablet (25 mg total) by mouth daily at bedtime, Disp: 90 tablet, Rfl: 3    rizatriptan (MAXALT) 10 MG tablet, Take 1 tablet (10 mg total) by mouth once as needed for migraine May repeat in 2 hours if needed. Max 2/24 hours, 9/month. (Patient not taking: Reported on 8/30/2023), Disp: 9 tablet, Rfl: 6    traZODone (DESYREL) 50 mg tablet, TAKE 1 TABLET BY MOUTH EVERYDAY AT BEDTIME (Patient not taking: Reported on 9/7/2023), Disp: 90 tablet, Rfl: 1          Whom besides the patient is providing clinical information about today's encounter? NO ADDITIONAL HISTORIAN (patient alone provided history)    Physical Exam and Assessment/Plan by Diagnosis:      SEBORRHEIC KERATOSES  - Relevant exam: Scattered over the trunk/extremities are waxy brown to black plaques and papules with stuck on appearance  - Exam and clinical history consistent with seborrheic keratoses  - Counseled that these are benign growths that do not require treatment  - Counseled that removal of lesions is considered cosmetic and so would incur a fee should patient elect to move forward. - Patient to hold on treatments for now but will inform us should they desire additional treatments    MELANOCYTIC NEVI  -Relevant exam: Scattered over the trunk/extremities are homogenously pigmented brown macules and papules. ELM performed and without concerning findings. - Exam and clinical history consistent with melanocytic nevi  - Educated on the ABCDE's of melanoma; handout provided  - Counseled to return to clinic prior to scheduled appointment should any of these lesions change or should any new lesions of concern arise  - Counseled on use of sun protection daily. Reviewed latest FDA sunscreen guidelines, including use of broad spectrum (UVA and UVB blocking) sunscreen or sun protective clothing with SPF 30-50 every 2-3 hours and reapplied after exposure to water; use of photoprotective clothing, including a broad brim hat and UPF rated clothing if outdoors for several hours; avoid use of tanning beds as these pose significant risk for melanoma and skin cancer.     LENTIGINES  OTHER SKIN CHANGES DUE TO CHRONIC EXPOSURE TO NONIONIZING RADIATION  - Relevant exam: Over sun exposed areas are brown macules. ELM performed and without concerning findings. - Exam and clinical history consistent with lentigines. - Educated that these are indicative of prior sun exposure. - Counseled to return to clinic prior to scheduled appointment should any of these lesions change or should any new lesions of concern arise.  - Recommended use of sunscreen as above and below. - Counseled on use of sun protection daily. Reviewed latest FDA sunscreen guidelines, including use of broad spectrum (UVA and UVB blocking) sunscreen or sun protective clothing with SPF 30-50 every 2-3 hours and reapplied after exposure to water; use of photoprotective clothing, including a broad brim hat and UPF rated clothing if outdoors for several hours; avoid use of tanning beds as these pose significant risk for melanoma and skin cancer. CHERRY ANGIOMAS  - Relevant exam: Scattered over the trunk/extremities are red papules  - Exam and clinical history consistent with cherry angiomas  - Educated that these are benign  - Educated that removal is considered aesthetic and would incur a fee. - Patient does not wish to pursue removal at this time but will contact us should this change. XEROSIS ("DRY SKIN")    Physical Exam:  Anatomic Location Affected:  trunk and extremities  Morphological Description:  xerotic papules and plaques  Pertinent Positives:  Pertinent Negatives:    Assessment and Plan:  - History and physical consistent with xerosis  - Educated that dry skin is often exacerbated by low humidity conditions and during change of seasons  - Educated that gentle skin care and consistent use of emollients are the mainstay of treatment. Recommended use of occlusive emollient, such as vaseline, aquaphor or aveeno eczema balm.  If unable to tolerate, noted that thick white cream is next best.   Based on a thorough discussion of this condition and the management approach to it (including a comprehensive discussion of the known risks, side effects and potential benefits of treatment), the patient (family) agrees to implement the following specific plan:    Start daily emollient to moist skin. Take luke warm showers (not hot)  Avoid scratching. Transition to fragrance free, non-irritating skin care as possible. Tip for relief: If itchy or uncomfortable, placing emollient (like vaseline) in the fridge is a great trick as the cool sensation is soothing. MILIUM     Physical Exam:  Anatomic Location Affected:  back of right scalp  Morphological Description:  white cystic nodule  Pertinent Positives:  Pertinent Negatives: Additional History of Present Condition:  patient notes that felt lump on back of scalp was getting bigger    Assessment and Plan:  Based on a thorough discussion of this condition and the management approach to it (including a comprehensive discussion of the known risks, side effects and potential benefits of treatment), the patient (family) agrees to implement the following specific plan:  Consent signed for extraction    Assessment and Plan  A milium is a small cyst containing keratin (the skin protein); they are usually multiple and are then known as milia. These harmless cysts present as tiny pearly-white bumps just under the surface of the skin. Milia are common in all ages and both sexes. They most often arise on the face and are particularly prominent on the eyelids and cheeks, but they may occur elsewhere. There are various kinds of milia.  milia: Affect 40-50% of  babies, few to numerous lesions, often seen on the nose, but may also arise inside the mouth on the mucosa (Frank pearls) or palate (Sheryl nodules) or more widely on the scalp, face and upper trunk, Heal spontaneously within a few weeks of birth.   Primary milia in children and adults: found around eyelids, cheeks, forehead and genitalia, in young children, a row of milia may appear along the nasal crease, may clear in a few weeks or persist for months or longer. Juvenile milia: associated with Rombo syndrome, basal cell naevus syndrome, Wdkex-Nzgpo-Nsrmuyhh syndrome, pachyonychia congenita, Lawson syndrome and other genetic disorders, may be congenital (present at birth) or appear later in life. Milia en plaque: multiple milia appear on within an inflamed plaque up to several centimeters in diameter, usually found on an eyelid, behind the ear, on a cheek or jaw. 3. Affect children and adults, especially middle-aged women. 4. Sometimes associated with another skin disease including pseudoxanthoma elasticum, discoid lupus erythematosus, lichen planus. Multiple eruptive milia: crops of numerous milia appear over a few weeks to months, lesions may be asymptomatic or itchy, most often affect the face, upper arms and upper trunk. Traumatic milia: occur at the site of injury as skin heals, arise from eccrine sweat ducts, examples include thermal burns, dermabrasion, blistering rashes such as bullous pemphigoid, often seen on the back of hands and fingers in porphyria cutanea tarda, a milia-like calcified nodule may develop after  heel stick blood test.   Milia associated with drugs: may rarely follow the use of topical medication, such as phenols, hydroquinone, 5-fluorouracil cream, and a corticosteroid. Milia have a characteristic appearance. However, on occasion, a skin biopsy may be performed. This shows a small epidermoid cyst coming from a vellus hair follicle. Milia should be distinguished from other types of cyst, comedones, xanthelasma and syringomas. Colloid milia are jimenez coloured bumps on cheeks and temples associated with excessive exposure to sunlight. They should also be distinguished from milia-like cysts noted on dermoscopy in seborrhoeic keratoses, papillomatous moles and some basal cell carcinomas.   Milia do not need to be treated unless they are a cause for concern for the patient. They often clear up by themselves within a few months. Where possible, further trauma should be minimised to reduce the development of new lesions. The lesion may be de-roofed using a sterile needle or blade and the contents squeezed or pricked out. They may be destroyed using diathermy and curettage, or cryotherapy. For widespread lesions, topical retinoids may be helpful. Chemical peels, dermabrasion and laser ablation have been reported to be effective when used for very extensive milia. Milia en plaque may improve with minocycline (a tetracycline antibiotic). LENTIGO WITH SCAR    Physical Exam:  Anatomic Location Affected:  left clavicle   Morphological Description:  brown patch with central scar  Pertinent Positives:  Pertinent Negatives: Additional History of Present Condition:  scar from biopsy with prior dermatologist with benign findings per patient    Assessment and Plan:  Based on a thorough discussion of this condition and the management approach to it (including a comprehensive discussion of the known risks, side effects and potential benefits of treatment), the patient (family) agrees to implement the following specific plan:   Will monitor    Scribe Attestation      I,:  Peyton Matthews am acting as a scribe while in the presence of the attending physician.:       I,:  Philip Kline MD personally performed the services described in this documentation    as scribed in my presence.:

## 2023-12-06 NOTE — PATIENT INSTRUCTIONS
SEBORRHEIC KERATOSES  - Relevant exam: Scattered over the trunk/extremities are waxy brown to black plaques and papules with stuck on appearance  - Exam and clinical history consistent with seborrheic keratoses  - Counseled that these are benign growths that do not require treatment  - Counseled that removal of lesions is considered cosmetic and so would incur a fee should patient elect to move forward. - Patient to hold on treatments for now but will inform us should they desire additional treatments    MELANOCYTIC NEVI  -Relevant exam: Scattered over the trunk/extremities are homogenously pigmented brown macules and papules. ELM performed and without concerning findings. - Exam and clinical history consistent with melanocytic nevi  - Educated on the ABCDE's of melanoma; handout provided  - Counseled to return to clinic prior to scheduled appointment should any of these lesions change or should any new lesions of concern arise  - Counseled on use of sun protection daily. Reviewed latest FDA sunscreen guidelines, including use of broad spectrum (UVA and UVB blocking) sunscreen or sun protective clothing with SPF 30-50 every 2-3 hours and reapplied after exposure to water; use of photoprotective clothing, including a broad brim hat and UPF rated clothing if outdoors for several hours; avoid use of tanning beds as these pose significant risk for melanoma and skin cancer. LENTIGINES  OTHER SKIN CHANGES DUE TO CHRONIC EXPOSURE TO NONIONIZING RADIATION  - Relevant exam: Over sun exposed areas are brown macules. ELM performed and without concerning findings. - Exam and clinical history consistent with lentigines. - Educated that these are indicative of prior sun exposure. - Counseled to return to clinic prior to scheduled appointment should any of these lesions change or should any new lesions of concern arise.  - Recommended use of sunscreen as above and below. - Counseled on use of sun protection daily. Reviewed latest FDA sunscreen guidelines, including use of broad spectrum (UVA and UVB blocking) sunscreen or sun protective clothing with SPF 30-50 every 2-3 hours and reapplied after exposure to water; use of photoprotective clothing, including a broad brim hat and UPF rated clothing if outdoors for several hours; avoid use of tanning beds as these pose significant risk for melanoma and skin cancer. CHERRY ANGIOMAS  - Relevant exam: Scattered over the trunk/extremities are red papules  - Exam and clinical history consistent with cherry angiomas  - Educated that these are benign  - Educated that removal is considered aesthetic and would incur a fee. - Patient does not wish to pursue removal at this time but will contact us should this change. XEROSIS ("DRY SKIN")    Physical Exam:  Anatomic Location Affected:  trunk and extremities  Morphological Description:  xerotic papules and plaques  Pertinent Positives:  Pertinent Negatives:    Assessment and Plan:  - History and physical consistent with xerosis  - Educated that dry skin is often exacerbated by low humidity conditions and during change of seasons  - Educated that gentle skin care and consistent use of emollients are the mainstay of treatment. Recommended use of occlusive emollient, such as vaseline, aquaphor or aveeno eczema balm. If unable to tolerate, noted that thick white cream is next best.   Based on a thorough discussion of this condition and the management approach to it (including a comprehensive discussion of the known risks, side effects and potential benefits of treatment), the patient (family) agrees to implement the following specific plan:    Start daily emollient to moist skin. Take luke warm showers (not hot)  Avoid scratching. Transition to fragrance free, non-irritating skin care as possible.       Tip for relief: If itchy or uncomfortable, placing emollient (like vaseline) in the fridge is a great trick as the cool sensation is soothing. MILIUM     Physical Exam:  Anatomic Location Affected:  back of right scalp  Morphological Description:  white cystic nodule  Pertinent Positives:  Pertinent Negatives: Additional History of Present Condition:  patient notes that felt lump on back of scalp was getting bigger    Assessment and Plan:  Based on a thorough discussion of this condition and the management approach to it (including a comprehensive discussion of the known risks, side effects and potential benefits of treatment), the patient (family) agrees to implement the following specific plan:  Consent signed for extraction    Assessment and Plan  A milium is a small cyst containing keratin (the skin protein); they are usually multiple and are then known as milia. These harmless cysts present as tiny pearly-white bumps just under the surface of the skin. Milia are common in all ages and both sexes. They most often arise on the face and are particularly prominent on the eyelids and cheeks, but they may occur elsewhere. There are various kinds of milia.  milia: Affect 40-50% of  babies, few to numerous lesions, often seen on the nose, but may also arise inside the mouth on the mucosa (Frank pearls) or palate (Sheryl nodules) or more widely on the scalp, face and upper trunk, Heal spontaneously within a few weeks of birth. Primary milia in children and adults: found around eyelids, cheeks, forehead and genitalia, in young children, a row of milia may appear along the nasal crease, may clear in a few weeks or persist for months or longer. Juvenile milia: associated with Rombo syndrome, basal cell naevus syndrome, Pmjwn-Potsb-Fahbpnbb syndrome, pachyonychia congenita, Lawson syndrome and other genetic disorders, may be congenital (present at birth) or appear later in life.   Milia en plaque: multiple milia appear on within an inflamed plaque up to several centimeters in diameter, usually found on an eyelid, behind the ear, on a cheek or jaw. 3. Affect children and adults, especially middle-aged women. 4. Sometimes associated with another skin disease including pseudoxanthoma elasticum, discoid lupus erythematosus, lichen planus. Multiple eruptive milia: crops of numerous milia appear over a few weeks to months, lesions may be asymptomatic or itchy, most often affect the face, upper arms and upper trunk. Traumatic milia: occur at the site of injury as skin heals, arise from eccrine sweat ducts, examples include thermal burns, dermabrasion, blistering rashes such as bullous pemphigoid, often seen on the back of hands and fingers in porphyria cutanea tarda, a milia-like calcified nodule may develop after  heel stick blood test.   Milia associated with drugs: may rarely follow the use of topical medication, such as phenols, hydroquinone, 5-fluorouracil cream, and a corticosteroid. Milia have a characteristic appearance. However, on occasion, a skin biopsy may be performed. This shows a small epidermoid cyst coming from a vellus hair follicle. Milia should be distinguished from other types of cyst, comedones, xanthelasma and syringomas. Colloid milia are jimenez coloured bumps on cheeks and temples associated with excessive exposure to sunlight. They should also be distinguished from milia-like cysts noted on dermoscopy in seborrhoeic keratoses, papillomatous moles and some basal cell carcinomas. Milia do not need to be treated unless they are a cause for concern for the patient. They often clear up by themselves within a few months. Where possible, further trauma should be minimised to reduce the development of new lesions. The lesion may be de-roofed using a sterile needle or blade and the contents squeezed or pricked out. They may be destroyed using diathermy and curettage, or cryotherapy. For widespread lesions, topical retinoids may be helpful.   Chemical peels, dermabrasion and laser ablation have been reported to be effective when used for very extensive milia. Milia en plaque may improve with minocycline (a tetracycline antibiotic). LENTIGO WITH SCAR    Assessment and Plan:  Based on a thorough discussion of this condition and the management approach to it (including a comprehensive discussion of the known risks, side effects and potential benefits of treatment), the patient (family) agrees to implement the following specific plan:   Will monitor

## 2023-12-06 NOTE — PROGRESS NOTES
Daily Note     Today's date: 2023  Patient name: Renuka Prince  : 1945  MRN: [id number]  Referring provider: Cailin Zaidi MD  Dx:   Encounter Diagnosis     ICD-10-CM    1. Primary osteoarthritis of both knees  M17.0       2. Greater trochanteric bursitis of right hip  M70.61           Start Time: 1445  Stop Time: 1530  Total time in clinic (min): 45 minutes    Subjective: Pt reports both knees are feeling good. States she was able to run in to get her phone at [facility]. Objective: See treatment diary below      Assessment: Tolerated treatment well. Patient would benefit from continued PT to further improve strength, decrease pain and maximize overall function. Plan: Continue per plan of care.       Precautions: HTN    Manuals    Distal quad STM QD       Bilat   AFB                                    Neuro Re-Ed    clamshells YHB 2x10 ea 2x10 ea 2x10 ea GTB 10x ea GTB 15x ea YHB 2x10 ea YHB 2x10 ea YHB 2x10 ea   LAQ 2x10 ea 3lb AW 2x10, 3" 2x10 2x10 ea 2x10 ea  2x10 ea 3# 2x10 ea 3#   Hip abd iso 20x YHB 3x5, 5" GTB 3x5 5" GTB 4x5x5" GTB 4x5x5" GTB  Supine    Stand  Ball at wall  5"x5 ea 20x ea 5" hold supine YHB    Wall ball 5"x5 ea YHB 5"x20 supine    Wall ball 5"x5 ea RHB 5"x20 supine    Wall ball  NV   Sand dune step up   X10 ea 10x ea 5" hold  X10 ea 10x ea 5" hold 10x ea 5" hold 10x ea 5" hold   Leg ext 20x 20lbs DL                     HEP/education           Ther Ex  12   Bike 10' L1 ROM Seat 19, 8 min 8' 8' L1 -8' L1 10' ROM L1 10' ROM L1 10' ROM   Leg ext           Leg press           Squats with TB  Mini STS on hi/lo 3x3, GTB at knees for cue Mini STS on hi/lo x10 GTB  STS hi/lo GTB 2x5 nv nv     LSD                                            Ther Activity                                 Gait Training                                 Modalities

## 2023-12-08 ENCOUNTER — HOSPITAL ENCOUNTER (OUTPATIENT)
Dept: NEUROLOGY | Facility: CLINIC | Age: 78
End: 2023-12-08
Payer: COMMERCIAL

## 2023-12-08 DIAGNOSIS — R20.0 NUMBNESS AND TINGLING OF BOTH LOWER EXTREMITIES: ICD-10-CM

## 2023-12-08 DIAGNOSIS — R20.2 NUMBNESS AND TINGLING OF BOTH LOWER EXTREMITIES: ICD-10-CM

## 2023-12-08 PROBLEM — G62.9 NEUROPATHY: Status: ACTIVE | Noted: 2023-12-08

## 2023-12-08 PROCEDURE — 95886 MUSC TEST DONE W/N TEST COMP: CPT | Performed by: PSYCHIATRY & NEUROLOGY

## 2023-12-08 PROCEDURE — 95911 NRV CNDJ TEST 9-10 STUDIES: CPT | Performed by: PSYCHIATRY & NEUROLOGY

## 2023-12-11 ENCOUNTER — OFFICE VISIT (OUTPATIENT)
Dept: PHYSICAL THERAPY | Facility: REHABILITATION | Age: 78
End: 2023-12-11
Payer: COMMERCIAL

## 2023-12-11 DIAGNOSIS — M70.61 GREATER TROCHANTERIC BURSITIS OF RIGHT HIP: ICD-10-CM

## 2023-12-11 DIAGNOSIS — M17.0 PRIMARY OSTEOARTHRITIS OF BOTH KNEES: Primary | ICD-10-CM

## 2023-12-11 PROCEDURE — 97112 NEUROMUSCULAR REEDUCATION: CPT

## 2023-12-11 PROCEDURE — 97110 THERAPEUTIC EXERCISES: CPT

## 2023-12-11 PROCEDURE — 97140 MANUAL THERAPY 1/> REGIONS: CPT

## 2023-12-11 NOTE — PROGRESS NOTES
Daily Note     Today's date: 2023  Patient name: Zeinab Guillermo  : 1945  MRN: [id number]  Referring provider: Raul Louis MD  Dx:   Encounter Diagnosis     ICD-10-CM    1. Primary osteoarthritis of both knees  M17.0       2. Greater trochanteric bursitis of right hip  M70.61           Start Time: 1448  Stop Time: 1530  Total time in clinic (min): 42 minutes    Subjective: Pt reports both knees have been feeling better. Feels like the STM performed the last couple of visits has been helping. Feels like her walking has been improving. Notes she has been getting some dizziness when lying down and turning from one side to another. States this has happened to her on and off over the years and has started to bother her again within the last week. Objective: See treatment diary below      Assessment: Tolerated treatment well. Continued with program as outlined below. Progressed with addition of supine SLR to further improve quad strength and sidestepping at handrail for glute stability. Was challenged with supine SLR, especially on RLE; consider reducing weight NV. Sidelying clamshells held d/t subjective comments and side stepping at handrail performed instead. Patient would benefit from continued PT. Plan: Continue per plan of care.       Precautions: HTN    Manuals    Distal quad STM QD AFB      Bilat   AFB                                    Neuro Re-Ed  12/   clamshells YHB 2x10 ea    GTB 15x ea YHB 2x10 ea YHB 2x10 ea YHB 2x10 ea   LAQ 2x10 ea 3lb AW 2x10 ea 3lb AW   2x10 ea  2x10 ea 3# 2x10 ea 3#   Hip abd iso 20x YHB    4x5x5" GTB  Supine    Stand  Ball at wall  5"x5 ea 20x ea 5" hold supine YHB    Wall ball 5"x5 ea YHB 5"x20 supine    Wall ball 5"x5 ea RHB 5"x20 supine    Wall ball  NV   Side stepping  Handrail  YHB  3 laps         Sand dune step up     5" hold  X10 ea 10x ea 5" hold 10x ea 5" hold 10x ea 5" hold   Leg ext 20x 20lbs DL 20x 20lbs DL                    HEP/education           Ther Ex 12/6 12/11 11/22 11/27 11/30 12/4   Bike 10' L1 ROM 10' L1 ROM   L1 -8' L1 10' ROM L1 10' ROM L1 10' ROM   Supine SLR  3#  2x10 ea 2#        Leg ext           Leg press           Squats with TB     nv nv     LSD                                            Ther Activity                                 Gait Training                                 Modalities

## 2023-12-13 ENCOUNTER — OFFICE VISIT (OUTPATIENT)
Dept: PHYSICAL THERAPY | Facility: REHABILITATION | Age: 78
End: 2023-12-13
Payer: COMMERCIAL

## 2023-12-13 DIAGNOSIS — M17.0 PRIMARY OSTEOARTHRITIS OF BOTH KNEES: Primary | ICD-10-CM

## 2023-12-13 DIAGNOSIS — M70.61 GREATER TROCHANTERIC BURSITIS OF RIGHT HIP: ICD-10-CM

## 2023-12-13 PROCEDURE — 97110 THERAPEUTIC EXERCISES: CPT | Performed by: PHYSICAL THERAPIST

## 2023-12-13 PROCEDURE — 97112 NEUROMUSCULAR REEDUCATION: CPT | Performed by: PHYSICAL THERAPIST

## 2023-12-13 NOTE — PROGRESS NOTES
Daily Note     Today's date: 2023  Patient name: Liza Edouard  : 1945  MRN: [id number]  Referring provider: Ramana Weber MD  Dx:   Encounter Diagnosis     ICD-10-CM    1. Primary osteoarthritis of both knees  M17.0       2. Greater trochanteric bursitis of right hip  M70.61           Start Time: 1445  Stop Time: 1530  Total time in clinic (min): 45 minutes    Subjective: Pt reports both knees are feeling better. She no longer gets pain down in her shins when she sleeps. Objective: See treatment diary below      Assessment: Tolerated treatment well. Patient would benefit from continued PT. Plan: Continue per plan of care.       Precautions: HTN    Manuals    Distal quad STM QD AFB      Bilat   AFB                                    Neuro Re-Ed    clamshells YHB 2x10 ea    GTB 15x ea YHB 2x10 ea YHB 2x10 ea YHB 2x10 ea   LAQ 2x10 ea 3lb AW 2x10 ea 3lb AW 2x10 4lb ea  2x10 ea  2x10 ea 3# 2x10 ea 3#   Hip abd iso 20x YHB  Supine 2x10 YHB  4x5x5" GTB  Supine    Stand  Ball at wall  5"x5 ea 20x ea 5" hold supine YHB    Wall ball 5"x5 ea YHB 5"x20 supine    Wall ball 5"x5 ea RHB 5"x20 supine    Wall ball  NV   Side stepping  Handrail  YHB  3 laps At bar 6 laps YHB        Sand dune step up     5" hold  X10 ea 10x ea 5" hold 10x ea 5" hold 10x ea 5" hold   Leg ext 20x 20lbs DL 20x 20lbs DL 25x 20lbs                    HEP/education           Ther Ex    Bike 10' L1 ROM 10' L1 ROM 10' L1 ROM  L1 -8' L1 10' ROM L1 10' ROM L1 10' ROM   Supine SLR  3#  2x10 ea 2# 2x10 ea        Leg ext           Leg press           Squats with TB     nv nv     LSD                                            Ther Activity                                 Gait Training                                 Modalities

## 2023-12-14 ENCOUNTER — HOSPITAL ENCOUNTER (OUTPATIENT)
Dept: BONE DENSITY | Facility: CLINIC | Age: 78
Discharge: HOME | End: 2023-12-14
Payer: COMMERCIAL

## 2023-12-14 ENCOUNTER — TELEPHONE (OUTPATIENT)
Dept: ENDOCRINOLOGY | Facility: CLINIC | Age: 78
End: 2023-12-14

## 2023-12-14 VITALS — HEIGHT: 68 IN | BODY MASS INDEX: 25.33 KG/M2 | WEIGHT: 167.11 LBS

## 2023-12-14 DIAGNOSIS — E21.3 HYPERPARATHYROIDISM (HCC): ICD-10-CM

## 2023-12-14 DIAGNOSIS — M81.0 AGE-RELATED OSTEOPOROSIS WITHOUT CURRENT PATHOLOGICAL FRACTURE: ICD-10-CM

## 2023-12-14 PROCEDURE — 77080 DXA BONE DENSITY AXIAL: CPT

## 2023-12-18 ENCOUNTER — OFFICE VISIT (OUTPATIENT)
Dept: PHYSICAL THERAPY | Facility: REHABILITATION | Age: 78
End: 2023-12-18
Payer: COMMERCIAL

## 2023-12-18 DIAGNOSIS — M17.0 PRIMARY OSTEOARTHRITIS OF BOTH KNEES: Primary | ICD-10-CM

## 2023-12-18 DIAGNOSIS — M70.61 GREATER TROCHANTERIC BURSITIS OF RIGHT HIP: ICD-10-CM

## 2023-12-18 PROCEDURE — 97112 NEUROMUSCULAR REEDUCATION: CPT

## 2023-12-18 PROCEDURE — 97110 THERAPEUTIC EXERCISES: CPT

## 2023-12-18 NOTE — PROGRESS NOTES
"Daily Note     Today's date: 2023  Patient name: Nella Yancey  : 1945  MRN: [id number]  Referring provider: James Land MD  Dx:   Encounter Diagnosis     ICD-10-CM    1. Primary osteoarthritis of both knees  M17.0       2. Greater trochanteric bursitis of right hip  M70.61                      Subjective: Pt reports her R hip was bothering her a little bit the other day, but nothing significant.  Notes while performing supine SLR LV she felt like she really at to strain at her R hip flex to complete this exercise and caused pain at R knee.        Objective: See treatment diary below      Assessment: Tolerated treatment well. Continued with program as outlined below.  Supine SLR trialled without AW, but was still very challenged with RLE and additional reps held.  Marching on sand dune performed to help address these deficits in hip flexor strength, as well as improve stability of R hip/LE.  Able to complete all exercise with no increased pain.  Patient would benefit from continued PT.      Plan: Continue per plan of care.      Precautions: HTN    Manuals    Distal quad STM QD AFB      Bilat   AFB                                    Neuro Re-Ed    clamshells YHB 2x10 ea      YHB 2x10 ea YHB 2x10 ea   LAQ 2x10 ea 3lb AW 2x10 ea 3lb AW 2x10 4lb ea 2x10 4lb ea   2x10 ea 3# 2x10 ea 3#   Hip abd iso 20x YHB  Supine 2x10 YHB Supine 2x10 GMB   YHB 5\"x20 supine    Wall ball 5\"x5 ea RHB 5\"x20 supine    Wall ball  NV   Side stepping  Handrail  YHB  3 laps At bar 6 laps YHB At counter 3 lap  GMB       Sand dune step up    Sand dune march  1 min x2   10x ea 5\" hold 10x ea 5\" hold   Leg ext 20x 20lbs DL 20x 20lbs DL 25x 20lbs  25x 20lbs                   HEP/education           Ther Ex  12   Bike 10' L1 ROM 10' L1 ROM 10' L1 ROM 10' L1 ROM   L1 10' ROM L1 10' ROM   Supine SLR  3#  2x10 ea 2# 2x10 ea held     "   Leg ext           Leg press           Squats with TB           LSD                                            Ther Activity                                 Gait Training                                 Modalities

## 2023-12-20 ENCOUNTER — OFFICE VISIT (OUTPATIENT)
Dept: PHYSICAL THERAPY | Facility: REHABILITATION | Age: 78
End: 2023-12-20
Payer: COMMERCIAL

## 2023-12-20 DIAGNOSIS — M17.0 PRIMARY OSTEOARTHRITIS OF BOTH KNEES: Primary | ICD-10-CM

## 2023-12-20 DIAGNOSIS — M70.61 GREATER TROCHANTERIC BURSITIS OF RIGHT HIP: ICD-10-CM

## 2023-12-20 PROCEDURE — 97110 THERAPEUTIC EXERCISES: CPT | Performed by: PHYSICAL THERAPIST

## 2023-12-20 PROCEDURE — 97112 NEUROMUSCULAR REEDUCATION: CPT | Performed by: PHYSICAL THERAPIST

## 2023-12-20 NOTE — PROGRESS NOTES
"Daily Note     Today's date: 2023  Patient name: Nella Yancey  : 1945  MRN: [id number]  Referring provider: James Land MD  Dx:   Encounter Diagnosis     ICD-10-CM    1. Primary osteoarthritis of both knees  M17.0       2. Greater trochanteric bursitis of right hip  M70.61           Start Time: 1445  Stop Time: 1525  Total time in clinic (min): 40 minutes    Subjective: Pt reports her knees have been a little sore recently.       Objective: See treatment diary below      Assessment: Tolerated treatment well. No weights with SLR as pt had pain with weights last treatment. Patient would benefit from continued PT.      Plan: Continue per plan of care.      Precautions: HTN    Manuals    Distal quad STM QD AFB      Bilat   AFB                                    Neuro Re-Ed    clamshells YHB 2x10 ea      YHB 2x10 ea YHB 2x10 ea   LAQ 2x10 ea 3lb AW 2x10 ea 3lb AW 2x10 4lb ea 2x10 4lb ea 2x10 4lb ea  2x10 ea 3# 2x10 ea 3#   Hip abd iso 20x YHB  Supine 2x10 YHB Supine 2x10 GMB Supine RHB 2x10  YHB 5\"x20 supine    Wall ball 5\"x5 ea RHB 5\"x20 supine    Wall ball  NV   Side stepping  Handrail  YHB  3 laps At bar 6 laps YHB At counter 3 lap  GMB       Sand dune step up    Sand dune march  1 min x2 Sand dune step ups 20x  10x ea 5\" hold 10x ea 5\" hold   Leg ext 20x 20lbs DL 20x 20lbs DL 25x 20lbs  25x 20lbs  20x 20lbs DL                 HEP/education           Ther Ex  12   Bike 10' L1 ROM 10' L1 ROM 10' L1 ROM 10' L1 ROM 10' L1 ROM  L1 10' ROM L1 10' ROM   Supine SLR  3#  2x10 ea 2# 2x10 ea held 2x10 ea no weights      Leg ext           Leg press           Squats with TB           LSD                                            Ther Activity                                 Gait Training                                 Modalities                                                "

## 2023-12-27 ENCOUNTER — TELEPHONE (OUTPATIENT)
Dept: OTHER | Facility: OTHER | Age: 78
End: 2023-12-27

## 2023-12-27 NOTE — TELEPHONE ENCOUNTER
Patient is calling regarding cancelling an appointment.    Date/Time: 12/27/2023, 10:20 AM.    Patient was rescheduled: YES [] NO [x]    Patient requesting call back to reschedule: YES [x] NO []    Patient reports that she is not feeling well.

## 2023-12-28 ENCOUNTER — APPOINTMENT (OUTPATIENT)
Dept: PHYSICAL THERAPY | Facility: REHABILITATION | Age: 78
End: 2023-12-28
Payer: COMMERCIAL

## 2024-01-03 ENCOUNTER — OFFICE VISIT (OUTPATIENT)
Dept: PHYSICAL THERAPY | Facility: REHABILITATION | Age: 79
End: 2024-01-03
Payer: COMMERCIAL

## 2024-01-03 DIAGNOSIS — M17.0 PRIMARY OSTEOARTHRITIS OF BOTH KNEES: Primary | ICD-10-CM

## 2024-01-03 DIAGNOSIS — M70.61 GREATER TROCHANTERIC BURSITIS OF RIGHT HIP: ICD-10-CM

## 2024-01-03 PROCEDURE — 97112 NEUROMUSCULAR REEDUCATION: CPT

## 2024-01-03 PROCEDURE — 97110 THERAPEUTIC EXERCISES: CPT

## 2024-01-03 NOTE — PROGRESS NOTES
Daily Note     Today's date: 1/3/2024  Patient name: Nella Yancey  : 1945  MRN: [id number]  Referring provider: James Land MD  Dx:   Encounter Diagnosis     ICD-10-CM    1. Primary osteoarthritis of both knees  M17.0       2. Greater trochanteric bursitis of right hip  M70.61           Start Time: 1047  Stop Time: 1130  Total time in clinic (min): 43 minutes    Subjective: Pt reports both knees were really bothering her a couple of days ago, primarily along medial aspect of both knees.  States she has been walking/standing more with the holidays recently.  Has also been having more bouts of dizziness and has an appointment with neurologist to address this issue.      Objective: See treatment diary below      Assessment: Tolerated treatment well. Pt returns to physical therapy for first time in two weeks.  No progressions made d/t recent pain and brief hiatus from physical therapy.  Continues to be very challenged with supine SLR on RLE d/t limited hip flexor strength.  Moderate reliance on handrail for stability during sand dune step ups.  Patient would benefit from continued PT to further improve strength, decrease pain, and maximize overall function.        Plan: Continue per plan of care.      Precautions: HTN    Manuals  1/3     Distal quad STM QD AFB                                          Neuro Re-Ed  1/3     clamshells YHB 2x10 ea          LAQ 2x10 ea 3lb AW 2x10 ea 3lb AW 2x10 4lb ea 2x10 4lb ea 2x10 4lb ea 2x10 4lb ea     Hip abd iso 20x YHB  Supine 2x10 YHB Supine 2x10 GMB Supine RHB 2x10 Supine RHB 2x10     Side stepping  Handrail  YHB  3 laps At bar 6 laps YHB At counter 3 lap  GMB       Sand dune step up    Sand dune  min x2 Sand dune step ups 20x Sand dune step ups 20x     Leg ext 20x 20lbs DL 20x 20lbs DL 25x 20lbs  25x 20lbs  20x 20lbs DL 20x 20lbs DL                HEP/education           Ther Ex   12/20 1/3     Bike 10' L1 ROM 10' L1 ROM 10' L1 ROM 10' L1 ROM 10' L1 ROM 10' L1 ROM     Supine SLR  3#  2x10 ea 2# 2x10 ea held 2x10 ea no weights 0#  1x5 ea     Leg ext           Leg press           Squats with TB           LSD                                            Ther Activity                                 Gait Training                                 Modalities

## 2024-01-04 ENCOUNTER — OFFICE VISIT (OUTPATIENT)
Dept: ENDOCRINOLOGY | Facility: CLINIC | Age: 79
End: 2024-01-04
Payer: COMMERCIAL

## 2024-01-04 VITALS
HEART RATE: 66 BPM | OXYGEN SATURATION: 96 % | WEIGHT: 171 LBS | BODY MASS INDEX: 26 KG/M2 | DIASTOLIC BLOOD PRESSURE: 72 MMHG | SYSTOLIC BLOOD PRESSURE: 130 MMHG

## 2024-01-04 DIAGNOSIS — E21.5 PARATHYROID RELATED HYPERCALCEMIA (HCC): ICD-10-CM

## 2024-01-04 DIAGNOSIS — E83.52 PARATHYROID RELATED HYPERCALCEMIA (HCC): ICD-10-CM

## 2024-01-04 DIAGNOSIS — E21.0 PRIMARY HYPERPARATHYROIDISM (HCC): ICD-10-CM

## 2024-01-04 DIAGNOSIS — E55.9 VITAMIN D DEFICIENCY: ICD-10-CM

## 2024-01-04 DIAGNOSIS — E04.2 MULTINODULAR GOITER: ICD-10-CM

## 2024-01-04 DIAGNOSIS — E89.2 H/O PARATHYROIDECTOMY (HCC): ICD-10-CM

## 2024-01-04 DIAGNOSIS — M81.0 AGE-RELATED OSTEOPOROSIS WITHOUT CURRENT PATHOLOGICAL FRACTURE: Primary | ICD-10-CM

## 2024-01-04 PROCEDURE — 99214 OFFICE O/P EST MOD 30 MIN: CPT | Performed by: INTERNAL MEDICINE

## 2024-01-04 NOTE — PROGRESS NOTES
Nella Yancey 78 y.o. female MRN: [id number]    Encounter: [id number]      Assessment/Plan     Assessment:  This is a 78 y.o.-year-old female with osteoporosis, hyperparathyroidism, history of parathyroidectomy, hypercalcemia.    Plan:  Diagnoses and all orders for this visit:    Age-related osteoporosis without current pathological fracture  Reviewed DEXA scan report, patient has T-score of -2.7 at lumbar spine with history of osteoporosis.  Previously T-score right forearm was -3.4.  Discussed the option of Prolia injection every 6 months as well as Reclast infusion once a year.  Discussed the side effects and benefits of both, educations.  Also discussed option of drug holiday with Reclast infusion.  Discussed the rare risk of jawbone necrosis as well as traumatic fracture of femur.  Patient is agreeable to start Reclast infusion once a year.  Educated to keep herself hydrated during Reclast therapy, as well as take Tylenol as needed for myalgias.  Will put the request for Reclast in [facility].  Blood work ordered, patient is supposed to get blood work done first before Reclast infusion.  Once the blood work is reviewed she will call infusion center to schedule Reclast therapy  Primary hyperparathyroidism (HCC)  Parathyroid scan recently was normal.  Patient has history of 4 gland exploration with removal of 3 glands, persistent hypercalcemia and hyperparathyroidism.  Patient prefers conservative approach.  She will need to be treated for osteoporosis.  Reclast order placed    -     PTH, intact; Future    Multinodular goiter  Obtain TSH and free T4 before next appointment  Thyroid nodules are stable no need to follow-up with ultrasound  -     TSH + Free T4; Future    H/O parathyroidectomy (HCC)  Persistently elevated PTH suggestive of recurrent hyperparathyroidism  Conservative approach, continue to follow calcium and PTH,  Continue to stay away from calcium supplementation, continue vitamin D3 supplementation  3000 IU daily  Parathyroid related hypercalcemia (HCC)  Calcium has been maintained in stable range.  Discussed importance of keeping herself well-hydrated.  Follow-up calcium every 6 months  Avoid calcium supplementation.    Vitamin D deficiency  Continue vitamin D3 supplementation 2000 IU daily  -     Vitamin D 25 hydroxy; Future         CC:     Osteoporosis, vitamin D deficiency, hypercalcemia, primary hyperparathyroidism    History of Present Illness     HPI:    Nella Yancey is 78-year-old woman with medical history of primary hyperparathyroidism, status post parathyroid surgery (parathyroidectomy for gland exploration in August 2022 out of which 3 glands were removed and is here for follow-up)  Patient has persistent elevated calcium as well as PTH.  She had a DEXA scan done on December 14, 2023 which showed T-score of -2.7 at lumbar spine suggestive of osteoporosis.  Appointment was made today to discuss current options for management of osteoporosis.  Previously in June 2022, forearm T-score was -3.7 suggestive of osteoporosis.  Patient also have history of right forearm fracture.    Ultrasound in November 2022 showed right and left thyroid nodules which are stable, no nodules meet criteria for biopsy or follow-up ultrasound.  She denies any family history of thyroid cancer, any risk factors for thyroid cancer.    Recent calcium from September 2023 is 10.6, with elevated PTH.  Underwent parathyroid scan in March 2023 did not show any evidence of parathyroid adenoma  She denies any recent history of falls or fractures    Dexa scan        CLINICAL HISTORY: 78 years postmenopausal female.  OTHER RISK FACTORS: Prior fracture as a result of minor injury, hyperparathyroidism, PPI therapy.     PHARMACOLOGIC THERAPY FOR OSTEOPOROSIS:  None.     TECHNIQUE: Bone densitometry was performed using a HoloXirrus Discovery C bone densitometer.  Regions of interest appear properly placed.        COMPARISON: There are no prior  DXA studies performed on this unit for comparison.     RESULTS:     LUMBAR SPINE  Level: L1, L3, L4  (L2 vertebra excluded from analysis due to local structural abnormalities or artifact):  BMD: 0.757 gm/cm2  T-score: -2.7        LEFT  TOTAL HIP:  BMD: 0.702 gm/cm2  T-score: -2.0     LEFT  FEMORAL NECK:  BMD: 0.640 gm/cm2  T score: -1.9     LEFT  FOREARM:  33% RADIUS BMD: -1.9 gm/cm2  T-score: 0.575        IMPRESSION:     1. Osteoporosis. Based on the lumbar spine     2.  The 10 year risk of hip fracture is 4.5% with the 10 year risk of major osteoporotic fracture being 20% as calculated by the [employer] fracture risk assessment tool (FRAX, which is based on data generated by the WHO Collaborating [place]   for Metabolic Bone Diseases).     3.  The current NOF guidelines recommend treating patients with a T-score of -2.5 or less in the lumbar spine or hips, or in post-menopausal women and men over the age of 50 with low bone mass (osteopenia) and a FRAX 10 year risk score of >3% for hip   fracture and/or >20% for major osteoporotic fracture.     4.  The NOF recommends follow-up DXA in 1-2 years after initiating therapy for osteoporosis and every 2 years thereafter. More frequent evaluation is appropriate for patients with conditions associated with rapid bone loss, such as glucocorticoid   therapy. The interval between DXA screenings may be longer for individuals without major risk factors and initial T-score in the normal or upper low bone mass range.        The FRAX algorithm has certain limitations:  -FRAX has not been validated in patients currently or previously treated with pharmacotherapy for osteoporosis.  In such patients, clinical judgment must be exercised in interpreting FRAX scores.  -Prior hip, vertebral and humeral fragility fractures appear to confer greater risk of subsequent fracture than fractures at other sites (this is especially true for individuals with severe vertebral  fractures), but quantification of this incremental   risk is not possible with FRAX.  -FRAX underestimates fracture risk in patients with history of multiple fragility fractures.  -FRAX may underestimate fracture risk in patients with history of frequent falls.  -It is not appropriate to use FRAX to monitor treatment response.       Component      Latest Ref Rng 9/26/2023 12/4/2023   Sodium      135 - 147 mmol/L 139     Potassium      3.5 - 5.3 mmol/L 4.9     Chloride      96 - 108 mmol/L 106     CO2      21 - 32 mmol/L 29     Anion Gap      mmol/L 4     BUN      5 - 25 mg/dL 23     Creatinine      0.60 - 1.30 mg/dL 0.86     GLUCOSE FASTING      65 - 99 mg/dL 84     Calcium      8.4 - 10.2 mg/dL 10.6 (H)     eGFR      ml/min/1.73sq m 65     PARATHYROID HORMONE      12.0 - 88.0 pg/mL 89.0 (H)     Vit D, 25-Hydroxy      30.0 - 100.0 ng/mL 39.7     RAPID STREP A      Negative   Negative           Review of Systems   Constitutional:  Positive for activity change (Balance problem). Negative for diaphoresis, fatigue, fever and unexpected weight change.   HENT: Negative.     Eyes:  Negative for visual disturbance.   Respiratory:  Negative for cough, chest tightness and shortness of breath.    Cardiovascular:  Negative for chest pain, palpitations and leg swelling.   Gastrointestinal:  Negative for abdominal pain, blood in stool, constipation, diarrhea, nausea and vomiting.   Endocrine: Negative for cold intolerance, heat intolerance, polydipsia, polyphagia and polyuria.   Genitourinary:  Negative for dysuria, enuresis, frequency and urgency.   Musculoskeletal:  Positive for arthralgias, gait problem and myalgias.   Skin:  Negative for pallor, rash and wound.   Allergic/Immunologic: Negative.    Neurological:  Positive for weakness. Negative for dizziness, tremors and numbness.   Hematological: Negative.    Psychiatric/Behavioral: Negative.         Historical Information   Past Medical History:   Diagnosis Date    Anxiety      Arthritis     Back pain     Balance problems     Chest pain     heaviness    Colon polyp     Difficulty swallowing     Dizziness     Dry cough     Gait disorder     GERD (gastroesophageal reflux disease)     Hyperparathyroidism (HCC)     Hypertension     Increased frequency of headaches     Migraines     Neck pain     Numbness and tingling     bles    Palpitations     Pericarditis     Thyroid disease     nodule    Trouble in sleeping     Wears glasses      Past Surgical History:   Procedure Laterality Date    APPENDECTOMY      CHOLECYSTECTOMY      laparoscopic    COLONOSCOPY      KNEE SURGERY Left     PERICARDIAL WINDOW      [place] OPTX DSTL RADL X-ARTIC FX/EPIPHYSL SEP Right 03/22/2018    Procedure: OPEN REDUCTION W/ INTERNAL FIXATION (ORIF) RADIUS, splint application;  Surgeon: Kandice Kimble MD;  Location: [facility];  Service: Orthopedics    NY PARATHYROIDECTOMY/EXPLORATION PARATHYROIDS N/A 08/17/2022    Procedure: PARATHYROIDECTOMY, 4 GLAND EXPLORATION;  Surgeon: Ovidio Perkins MD;  Location: [facility];  Service: ENT    SKIN BIOPSY      UPPER GASTROINTESTINAL ENDOSCOPY       Social History   Social History     Substance and Sexual Activity   Alcohol Use Not Currently    Comment: Rarely      Social History     Substance and Sexual Activity   Drug Use No     Social History     Tobacco Use   Smoking Status Never   Smokeless Tobacco Never     Family History:   Family History   Problem Relation Age of Onset    Kidney failure Mother     Hypertension Mother     Lung cancer Father     Yosef Parkinson White syndrome Daughter     No Known Problems Sister     Substance Abuse Neg Hx     Alcohol abuse Neg Hx     Mental illness Neg Hx        Meds/Allergies   Current Outpatient Medications   Medication Sig Dispense Refill    acetaminophen (TYLENOL) 500 mg tablet Take 500-1,000 mg by mouth every 6 (six) hours as needed for mild pain      albuterol (PROVENTIL HFA,VENTOLIN HFA) 90 mcg/act inhaler Inhale 2 puffs every 6  (six) hours as needed for wheezing or shortness of breath 8 g 1    ALPRAZolam (XANAX) 0.5 mg tablet Take 1 tablet (0.5 mg total) by mouth daily at bedtime as needed for anxiety or sleep 20 tablet 1    amoxicillin (AMOXIL) 500 mg capsule Take 500 mg by mouth every 12 (twelve) hours      ascorbic acid (VITAMIN C) 500 mg tablet Take 500 mg by mouth daily      b complex vitamins tablet Take 1 tablet by mouth daily      Cholecalciferol (Vitamin D) 50 MCG (2000 UT) CAPS Take 1 capsule (2,000 Units total) by mouth daily (Patient taking differently: Take 3,000 Units by mouth daily)      Cholecalciferol (Vitamin D-3) 25 MCG (1000 UT) CAPS Take 1,000 Units by mouth 2 (two) times a day      Magnesium 300 MG CAPS Take 300 mg by mouth in the morning      metoprolol succinate (TOPROL-XL) 25 mg 24 hr tablet Take 1 tablet (25 mg total) by mouth daily at bedtime 90 tablet 3    Multiple Vitamins-Minerals (MULTIVITAMIN ADULTS 50+ PO) Take by mouth      Nutritional Supplements (OSAPLEX MK-7 PO) Take by mouth      omeprazole (PriLOSEC) 20 mg delayed release capsule Take 1 capsule (20 mg total) by mouth daily before breakfast Half an hour prior to breakfast and half an hour prior to dinner 180 capsule 3    Riboflavin (VITAMIN B-2 PO) Take 250 mg by mouth in the morning      rizatriptan (MAXALT) 10 MG tablet Take 1 tablet (10 mg total) by mouth once as needed for migraine May repeat in 2 hours if needed. Max 2/24 hours, 9/month. 9 tablet 6    traZODone (DESYREL) 50 mg tablet TAKE 1 TABLET BY MOUTH EVERYDAY AT BEDTIME 90 tablet 1    Diclofenac Sodium (VOLTAREN) 1 % Apply 2 g topically 4 (four) times a day for 10 days 80 g 0    dicyclomine (BENTYL) 10 mg capsule TAKE 1 CAPSULE BY MOUTH 4 TIMES A DAY (BEFORE MEALS AND AT BEDTIME) (Patient not taking: Reported on 1/4/2024) 360 capsule 1    methocarbamol (ROBAXIN) 500 mg tablet Take 0.5 (250mg) to 1 (500mg) tablet by mouth every 8 hours as needed for muscle spasm/neck pain. (Patient not  taking: Reported on 8/30/2023) 90 tablet 0     No current facility-administered medications for this visit.     Allergies   Allergen Reactions    Ciprofloxacin Myalgia       Objective   Vitals: Blood pressure 130/72, pulse 66, weight 77.6 kg (171 lb), SpO2 96%, not currently breastfeeding.    Physical Exam  Constitutional:       General: She is not in acute distress.     Appearance: Normal appearance. She is not ill-appearing.   HENT:      Head: Normocephalic and atraumatic.      Nose: Nose normal.   Eyes:      Extraocular Movements: Extraocular movements intact.      Conjunctiva/sclera: Conjunctivae normal.   Pulmonary:      Effort: No respiratory distress.   Musculoskeletal:      Cervical back: Normal range of motion.   Neurological:      General: No focal deficit present.      Mental Status: She is alert and oriented to person, place, and time.   Psychiatric:         Mood and Affect: Mood normal.         Behavior: Behavior normal.         The history was obtained from the review of the chart, patient.    Lab Results:   Lab Results   Component Value Date/Time    Potassium 4.9 09/26/2023 07:03 AM    Potassium 4.1 05/04/2023 07:15 AM    Potassium 4.4 03/13/2023 07:15 AM    Chloride 106 09/26/2023 07:03 AM    Chloride 108 05/04/2023 07:15 AM    Chloride 109 (H) 03/13/2023 07:15 AM    CO2 29 09/26/2023 07:03 AM    CO2 29 05/04/2023 07:15 AM    CO2 28 03/13/2023 07:15 AM    BUN 23 09/26/2023 07:03 AM    BUN 23 05/04/2023 07:15 AM    BUN 23 03/13/2023 07:15 AM    Creatinine 0.86 09/26/2023 07:03 AM    Creatinine 0.83 05/04/2023 07:15 AM    Creatinine 0.80 03/13/2023 07:15 AM    Glucose, Fasting 84 09/26/2023 07:03 AM    Glucose, Fasting 81 05/04/2023 07:15 AM    Glucose, Fasting 88 03/13/2023 07:15 AM    Calcium 10.6 (H) 09/26/2023 07:03 AM    Calcium 9.9 05/04/2023 07:15 AM    Calcium 10.2 (H) 03/13/2023 07:15 AM    eGFR 65 09/26/2023 07:03 AM    eGFR 69 05/04/2023 07:15 AM    eGFR 72 03/13/2023 07:15 AM    TSH 3RD  EDWIN 1.670 05/04/2023 07:15 AM    PTH 89.0 (H) 09/26/2023 07:03 AM    PTH 92.9 (H) 03/13/2023 07:15 AM    Vit D, 25-Hydroxy 39.7 09/26/2023 07:03 AM    Vit D, 25-Hydroxy 38.0 05/04/2023 07:15 AM         Imaging Studies:         Results for orders placed during the hospital encounter of 12/14/23    DXA bone density spine hip and pelvis    Impression  1. Osteoporosis. Based on the lumbar spine    2.  The 10 year risk of hip fracture is 4.5% with the 10 year risk of major osteoporotic fracture being 20% as calculated by the [employer] fracture risk assessment tool (FRAX, which is based on data generated by the WHO Collaborating [place]  for Metabolic Bone Diseases).    3.  The current NOF guidelines recommend treating patients with a T-score of -2.5 or less in the lumbar spine or hips, or in post-menopausal women and men over the age of 50 with low bone mass (osteopenia) and a FRAX 10 year risk score of >3% for hip  fracture and/or >20% for major osteoporotic fracture.    4.  The NOF recommends follow-up DXA in 1-2 years after initiating therapy for osteoporosis and every 2 years thereafter. More frequent evaluation is appropriate for patients with conditions associated with rapid bone loss, such as glucocorticoid  therapy. The interval between DXA screenings may be longer for individuals without major risk factors and initial T-score in the normal or upper low bone mass range.      The FRAX algorithm has certain limitations:  -FRAX has not been validated in patients currently or previously treated with pharmacotherapy for osteoporosis.  In such patients, clinical judgment must be exercised in interpreting FRAX scores.  -Prior hip, vertebral and humeral fragility fractures appear to confer greater risk of subsequent fracture than fractures at other sites (this is especially true for individuals with severe vertebral fractures), but quantification of this incremental  risk is not possible with  "FRAX.  -FRAX underestimates fracture risk in patients with history of multiple fragility fractures.  -FRAX may underestimate fracture risk in patients with history of frequent falls.  -It is not appropriate to use FRAX to monitor treatment response.      WHO CLASSIFICATION:  Normal (a T-score of -1.0 or higher)  Low bone mineral density (a T-score of less than -1.0 but higher than -2.5)  Osteoporosis (a T-score of -2.5 or less)  Severe osteoporosis (a T-score of -2.5 or less with a fragility fracture)                Workstation performed: [id number]            I have personally reviewed pertinent reports.      Portions of the record may have been created with voice recognition software. Occasional wrong word or \"sound a like\" substitutions may have occurred due to the inherent limitations of voice recognition software. Read the chart carefully and recognize, using context, where substitutions have occurred.    "

## 2024-01-08 ENCOUNTER — OFFICE VISIT (OUTPATIENT)
Dept: PHYSICAL THERAPY | Facility: REHABILITATION | Age: 79
End: 2024-01-08
Payer: COMMERCIAL

## 2024-01-08 DIAGNOSIS — M70.61 GREATER TROCHANTERIC BURSITIS OF RIGHT HIP: ICD-10-CM

## 2024-01-08 DIAGNOSIS — M17.0 PRIMARY OSTEOARTHRITIS OF BOTH KNEES: Primary | ICD-10-CM

## 2024-01-08 PROCEDURE — 97110 THERAPEUTIC EXERCISES: CPT

## 2024-01-08 PROCEDURE — 97112 NEUROMUSCULAR REEDUCATION: CPT

## 2024-01-08 NOTE — PROGRESS NOTES
Daily Note     Today's date: 2024  Patient name: Nella Yancey  : 1945  MRN: [id number]  Referring provider: James Land MD  Dx:   Encounter Diagnosis     ICD-10-CM    1. Primary osteoarthritis of both knees  M17.0       2. Greater trochanteric bursitis of right hip  M70.61                      Subjective: More hip pain than usual today.       Objective: See treatment diary below      Assessment: Tolerated treatment well. Fair R hip flexor engagement with SLR. Good balance with step ups, minimal UE support required. Limited arch with leg ext machine d/t pain at TKE.  Patient demonstrated fatigue post session and would benefit from continued PT.          Plan: Continue per plan of care.       Precautions: HTN    Manuals 12/6 12/11 12/13 12/18 12/20 1/3 1/8    Distal quad STM QD AFB                                          Neuro Re-Ed 12/6 12/11 12/13 12/18 12/20 1/3 1/8    clamshells YHB 2x10 ea          LAQ 2x10 ea 3lb AW 2x10 ea 3lb AW 2x10 4lb ea 2x10 4lb ea 2x10 4lb ea 2x10 4lb ea 2x10 4lb ea    Hip abd iso 20x YHB  Supine 2x10 YHB Supine 2x10 GMB Supine RHB 2x10 Supine RHB 2x10 Supine RHB 2x10    Side stepping  Handrail  YHB  3 laps At bar 6 laps YHB At counter 3 lap  GMB       Sand dune step up    Sand dune march  1 min x2 Sand dune step ups 20x Sand dune step ups 20x Sand dune step ups 20x    Leg ext 20x 20lbs DL 20x 20lbs DL 25x 20lbs  25x 20lbs  20x 20lbs DL 20x 20lbs DL 25x 20lbs DL               HEP/education           Ther Ex 12/6 12/11 12/13 12/18 12/20 1/3 1/8    Bike 10' L1 ROM 10' L1 ROM 10' L1 ROM 10' L1 ROM 10' L1 ROM 10' L1 ROM 10' L1 ROM    Supine SLR  3#  2x10 ea 2# 2x10 ea held 2x10 ea no weights 0#  1x5 ea 2x10 ea no nafisa    Leg ext           Leg press           Squats with TB           LSD                                            Ther Activity                                 Gait Training                                 Modalities

## 2024-01-09 ENCOUNTER — TELEPHONE (OUTPATIENT)
Dept: ENDOCRINOLOGY | Facility: CLINIC | Age: 79
End: 2024-01-09

## 2024-01-09 ENCOUNTER — TREATMENT (OUTPATIENT)
Dept: ENDOCRINOLOGY | Facility: CLINIC | Age: 79
End: 2024-01-09

## 2024-01-09 ENCOUNTER — TELEPHONE (OUTPATIENT)
Age: 79
End: 2024-01-09

## 2024-01-09 DIAGNOSIS — M81.0 AGE-RELATED OSTEOPOROSIS WITHOUT CURRENT PATHOLOGICAL FRACTURE: Primary | ICD-10-CM

## 2024-01-09 DIAGNOSIS — M25.561 PAIN IN BOTH KNEES, UNSPECIFIED CHRONICITY: Primary | ICD-10-CM

## 2024-01-09 DIAGNOSIS — M17.0 PRIMARY OSTEOARTHRITIS OF BOTH KNEES: ICD-10-CM

## 2024-01-09 DIAGNOSIS — M25.562 PAIN IN BOTH KNEES, UNSPECIFIED CHRONICITY: Primary | ICD-10-CM

## 2024-01-09 RX ORDER — SODIUM CHLORIDE 9 MG/ML
20 INJECTION, SOLUTION INTRAVENOUS ONCE
OUTPATIENT
Start: 2024-01-22

## 2024-01-09 RX ORDER — ZOLEDRONIC ACID 5 MG/100ML
5 INJECTION, SOLUTION INTRAVENOUS ONCE
OUTPATIENT
Start: 2024-01-22

## 2024-01-09 NOTE — TELEPHONE ENCOUNTER
Called and spoke w/pt and relayed MELINDA Chahal's msg.  He wrote for 2-3 times a week for 6-8 weeks.  Pt says, Thank you!

## 2024-01-09 NOTE — TELEPHONE ENCOUNTER
Caller: Nella    Doctor: Shyanne    Reason for call: Would like to know if she should continue PT and if so for how long?     Call back#: [phone]

## 2024-01-09 NOTE — TELEPHONE ENCOUNTER
Called and spoke w/pt and relayed Ciarra's msg. She states that PT has been working and she goes to [facility] in [place] twice a week.  She would like to continue for a while longer as it is helping her knees and right hip.  She held off on doing injections for time being as she is getting infusions for osteoporosis.   She has made an appt for 3/12/24 to readdress injections at that time.    Pt would like a script to continue PT.  Please advise.    Thank you.

## 2024-01-09 NOTE — TELEPHONE ENCOUNTER
Reviewed chart on OV note 10/17/23 and that is when referral was placed for PT for OA of both knees.  Next f/u 3/12/24 w/Dr Kimble.  Please review pt's msg and advise. Thank you.

## 2024-01-10 ENCOUNTER — OFFICE VISIT (OUTPATIENT)
Dept: PHYSICAL THERAPY | Facility: REHABILITATION | Age: 79
End: 2024-01-10
Payer: COMMERCIAL

## 2024-01-10 DIAGNOSIS — M70.61 GREATER TROCHANTERIC BURSITIS OF RIGHT HIP: ICD-10-CM

## 2024-01-10 DIAGNOSIS — M17.0 PRIMARY OSTEOARTHRITIS OF BOTH KNEES: Primary | ICD-10-CM

## 2024-01-10 PROCEDURE — 97110 THERAPEUTIC EXERCISES: CPT

## 2024-01-10 PROCEDURE — 97112 NEUROMUSCULAR REEDUCATION: CPT

## 2024-01-10 NOTE — PROGRESS NOTES
Daily Note     Today's date: 1/10/2024  Patient name: Nella Yancey  : 1945  MRN: [id number]  Referring provider: James Land MD  Dx:   Encounter Diagnosis     ICD-10-CM    1. Primary osteoarthritis of both knees  M17.0       2. Greater trochanteric bursitis of right hip  M70.61           Start Time: 1216  Stop Time: 1300  Total time in clinic (min): 44 minutes    Subjective: Pt reports her R hip has been bothering her more.  Pain in knees still present and varies as to which knee bothers her most.        Objective: See treatment diary below      Assessment: Tolerated treatment well. Continued with program as outlined below.  Strength with supine SLR is improving. Now able to perform with less quad lag and less notable fatigue at R hip flexor.  Progressed with addition of TB resisted bridges and VG with TB resisted to further address limitations in strength at R hip, as well as help prevent valgus of both knees.  Patient would benefit from continued PT.        Plan: Continue per plan of care.      Precautions: HTN    Manuals 12/6 12/11 12/13 12/18 12/20 1/3 1/8 1/10   Distal quad STM QD AFB                                          Neuro Re-Ed 12/6 12/11 12/13 12/18 12/20 1/3 1/8 1/10   clamshells YHB 2x10 ea          LAQ 2x10 ea 3lb AW 2x10 ea 3lb AW 2x10 4lb ea 2x10 4lb ea 2x10 4lb ea 2x10 4lb ea 2x10 4lb ea    Hip abd iso 20x YHB  Supine 2x10 YHB Supine 2x10 GMB Supine RHB 2x10 Supine RHB 2x10 Supine RHB 2x10    bridges        RHB  2x10   Side stepping  Handrail  YHB  3 laps At bar 6 laps YHB At counter 3 lap  GMB       Sand dune step up    Sand dune  min x2 Sand dune step ups 20x Sand dune step ups 20x Sand dune step ups 20x Sand dune step ups 20x   Leg ext 20x 20lbs DL 20x 20lbs DL 25x 20lbs  25x 20lbs  20x 20lbs DL 20x 20lbs DL 25x 20lbs DL nv              HEP/education           Ther Ex 12/6 12/11 12/13 12/18 12/20 1/3 1/8 1/10   Bike 10' L1 ROM 10' L1 ROM 10' L1 ROM 10' L1 ROM 10' L1  ROM 10' L1 ROM 10' L1 ROM 9' L1 ROM   Supine SLR  3#  2x10 ea 2# 2x10 ea held 2x10 ea no weights 0#  1x5 ea 2x10 ea no nafisa 2x10 ea no weight   Leg ext           Leg press           Squats with TB           LSD           VG        L6 RHB  4 min                         Ther Activity                                 Gait Training                                 Modalities

## 2024-01-15 ENCOUNTER — APPOINTMENT (OUTPATIENT)
Dept: PHYSICAL THERAPY | Facility: REHABILITATION | Age: 79
End: 2024-01-15
Payer: COMMERCIAL

## 2024-01-17 ENCOUNTER — OFFICE VISIT (OUTPATIENT)
Dept: PHYSICAL THERAPY | Facility: REHABILITATION | Age: 79
End: 2024-01-17
Payer: COMMERCIAL

## 2024-01-17 DIAGNOSIS — M17.0 PRIMARY OSTEOARTHRITIS OF BOTH KNEES: Primary | ICD-10-CM

## 2024-01-17 DIAGNOSIS — M70.61 GREATER TROCHANTERIC BURSITIS OF RIGHT HIP: ICD-10-CM

## 2024-01-17 PROCEDURE — 97110 THERAPEUTIC EXERCISES: CPT

## 2024-01-17 PROCEDURE — 97112 NEUROMUSCULAR REEDUCATION: CPT

## 2024-01-17 NOTE — PROGRESS NOTES
"Daily Note     Today's date: 2024  Patient name: Nella Yancey  : 1945  MRN: [id number]  Referring provider: James Land MD  Dx:   Encounter Diagnosis     ICD-10-CM    1. Primary osteoarthritis of both knees  M17.0       2. Greater trochanteric bursitis of right hip  M70.61                      Subjective: Pt reports continued achiness at R hip and both knees.  Achiness is better today compared to earlier this week.  Still has trouble with stairs, especially descending.       Objective: See treatment diary below      Assessment: Tolerated treatment well. Continued with program as outlined below.  Progressed with step up and overs, in effort to improve eccentric control for descending stairs.  Is limited with eccentric control during this activity.  Tends to limit knee flexion, keeping leg straight and stepping down farther away from step; did improve once verbal cues provided.  Patient would benefit from continued PT to improve strength, decrease pain, and maximize overall function.        Plan: Continue per plan of care.      Precautions: HTN    Manuals 1/17   12/18 12/20 1/3 1/8 1/10   Distal quad STM                                            Neuro Re-Ed 1/17   12/18 12/20 1/3 1/8 1/10   clamshells           LAQ 2x10 4lb ea   2x10 4lb ea 2x10 4lb ea 2x10 4lb ea 2x10 4lb ea    Hip abd iso    Supine 2x10 GMB Supine RHB 2x10 Supine RHB 2x10 Supine RHB 2x10    bridges RHB  2x10       RHB  2x10   Side stepping    At counter 3 lap  GMB       Sand dune step up    Sand dune  min x2 Sand dune step ups 20x Sand dune step ups 20x Sand dune step ups 20x Sand dune step ups 20x   Leg ext 25x 20lbs DL   25x 20lbs  20x 20lbs DL 20x 20lbs DL 25x 20lbs DL nv   Step up and over 4\"  2x10 ea                     HEP/education           Ther Ex 1/17   12/18 12/20 1/3 1/8 1/10   Bike 10' L1 ROM   10' L1 ROM 10' L1 ROM 10' L1 ROM 10' L1 ROM 9' L1 ROM   Supine SLR 2x10 ea no weight   held 2x10 ea no weights " 0#  1x5 ea 2x10 ea no nafisa 2x10 ea no weight   Leg ext           Leg press           Squats with TB           LSD           VG        L6 RHB  4 min                         Ther Activity                                 Gait Training                                 Modalities

## 2024-01-18 ENCOUNTER — APPOINTMENT (OUTPATIENT)
Dept: LAB | Facility: CLINIC | Age: 79
End: 2024-01-18
Payer: COMMERCIAL

## 2024-01-18 DIAGNOSIS — E78.5 HYPERLIPIDEMIA, UNSPECIFIED HYPERLIPIDEMIA TYPE: ICD-10-CM

## 2024-01-18 DIAGNOSIS — E61.1 IRON DEFICIENCY: ICD-10-CM

## 2024-01-18 DIAGNOSIS — J84.9 ILD (INTERSTITIAL LUNG DISEASE) (HCC): ICD-10-CM

## 2024-01-18 DIAGNOSIS — E04.2 MULTINODULAR GOITER: ICD-10-CM

## 2024-01-18 DIAGNOSIS — K21.00 GASTROESOPHAGEAL REFLUX DISEASE WITH ESOPHAGITIS WITHOUT HEMORRHAGE: ICD-10-CM

## 2024-01-18 DIAGNOSIS — R73.9 HYPERGLYCEMIA: ICD-10-CM

## 2024-01-18 DIAGNOSIS — M81.0 AGE-RELATED OSTEOPOROSIS WITHOUT CURRENT PATHOLOGICAL FRACTURE: ICD-10-CM

## 2024-01-18 DIAGNOSIS — E78.2 MODERATE MIXED HYPERLIPIDEMIA NOT REQUIRING STATIN THERAPY: ICD-10-CM

## 2024-01-18 DIAGNOSIS — E21.0 PRIMARY HYPERPARATHYROIDISM (HCC): ICD-10-CM

## 2024-01-18 DIAGNOSIS — E83.52 HYPERCALCEMIA: ICD-10-CM

## 2024-01-18 DIAGNOSIS — Z13.29 SCREENING FOR THYROID DISORDER: ICD-10-CM

## 2024-01-18 DIAGNOSIS — R07.81 RIB PAIN ON RIGHT SIDE: ICD-10-CM

## 2024-01-18 DIAGNOSIS — M79.18 CERVICAL MYOFASCIAL PAIN SYNDROME: ICD-10-CM

## 2024-01-18 DIAGNOSIS — E55.9 VITAMIN D DEFICIENCY: ICD-10-CM

## 2024-01-18 LAB
ALBUMIN SERPL BCP-MCNC: 4.2 G/DL (ref 3.5–5)
ALP SERPL-CCNC: 83 U/L (ref 34–104)
ALT SERPL W P-5'-P-CCNC: 22 U/L (ref 7–52)
ANION GAP SERPL CALCULATED.3IONS-SCNC: 7 MMOL/L
AST SERPL W P-5'-P-CCNC: 22 U/L (ref 13–39)
BILIRUB SERPL-MCNC: 0.96 MG/DL (ref 0.2–1)
BUN SERPL-MCNC: 15 MG/DL (ref 5–25)
CALCIUM SERPL-MCNC: 10.6 MG/DL (ref 8.4–10.2)
CHLORIDE SERPL-SCNC: 105 MMOL/L (ref 96–108)
CO2 SERPL-SCNC: 30 MMOL/L (ref 21–32)
CREAT SERPL-MCNC: 0.77 MG/DL (ref 0.6–1.3)
GFR SERPL CREATININE-BSD FRML MDRD: 75 ML/MIN/1.73SQ M
GLUCOSE P FAST SERPL-MCNC: 78 MG/DL (ref 65–99)
POTASSIUM SERPL-SCNC: 4.3 MMOL/L (ref 3.5–5.3)
PROT SERPL-MCNC: 6.6 G/DL (ref 6.4–8.4)
SODIUM SERPL-SCNC: 142 MMOL/L (ref 135–147)

## 2024-01-18 PROCEDURE — 80053 COMPREHEN METABOLIC PANEL: CPT

## 2024-01-18 PROCEDURE — 36415 COLL VENOUS BLD VENIPUNCTURE: CPT

## 2024-01-19 ENCOUNTER — TELEPHONE (OUTPATIENT)
Dept: INFUSION CENTER | Facility: CLINIC | Age: 79
End: 2024-01-19

## 2024-01-19 NOTE — TELEPHONE ENCOUNTER
New patient phone call done. Date time and location of appointment confirmed. Infusion center policies and procedures reviewed. All questions answered.

## 2024-01-22 ENCOUNTER — HOSPITAL ENCOUNTER (OUTPATIENT)
Dept: INFUSION CENTER | Facility: CLINIC | Age: 79
Discharge: HOME | End: 2024-01-22
Payer: COMMERCIAL

## 2024-01-22 ENCOUNTER — APPOINTMENT (OUTPATIENT)
Dept: PHYSICAL THERAPY | Facility: REHABILITATION | Age: 79
End: 2024-01-22
Payer: COMMERCIAL

## 2024-01-22 VITALS
WEIGHT: 166 LBS | HEART RATE: 66 BPM | RESPIRATION RATE: 18 BRPM | SYSTOLIC BLOOD PRESSURE: 130 MMHG | OXYGEN SATURATION: 97 % | TEMPERATURE: 96.9 F | DIASTOLIC BLOOD PRESSURE: 70 MMHG | BODY MASS INDEX: 25.24 KG/M2

## 2024-01-22 DIAGNOSIS — M81.0 AGE-RELATED OSTEOPOROSIS WITHOUT CURRENT PATHOLOGICAL FRACTURE: Primary | ICD-10-CM

## 2024-01-22 PROCEDURE — 96374 THER/PROPH/DIAG INJ IV PUSH: CPT

## 2024-01-22 RX ORDER — ZOLEDRONIC ACID 5 MG/100ML
5 INJECTION, SOLUTION INTRAVENOUS ONCE
Status: COMPLETED | OUTPATIENT
Start: 2024-01-22 | End: 2024-01-22

## 2024-01-22 RX ORDER — SODIUM CHLORIDE 9 MG/ML
20 INJECTION, SOLUTION INTRAVENOUS ONCE
OUTPATIENT
Start: 2025-01-17

## 2024-01-22 RX ORDER — SODIUM CHLORIDE 9 MG/ML
20 INJECTION, SOLUTION INTRAVENOUS ONCE
Status: COMPLETED | OUTPATIENT
Start: 2024-01-22 | End: 2024-01-22

## 2024-01-22 RX ORDER — ZOLEDRONIC ACID 5 MG/100ML
5 INJECTION, SOLUTION INTRAVENOUS ONCE
OUTPATIENT
Start: 2025-01-17

## 2024-01-22 RX ADMIN — ZOLEDRONIC ACID 5 MG: 0.05 INJECTION, SOLUTION INTRAVENOUS at 12:09

## 2024-01-22 RX ADMIN — SODIUM CHLORIDE 20 ML/HR: 0.9 INJECTION, SOLUTION INTRAVENOUS at 12:05

## 2024-01-22 NOTE — PROGRESS NOTES
Patient here for reclast infusion. Labs reviewed, CrCl within range to treat. She tolerated her infusion without incident. She will follow up next year. Declined AVS.

## 2024-01-24 ENCOUNTER — APPOINTMENT (OUTPATIENT)
Dept: PHYSICAL THERAPY | Facility: REHABILITATION | Age: 79
End: 2024-01-24
Payer: COMMERCIAL

## 2024-01-29 ENCOUNTER — APPOINTMENT (OUTPATIENT)
Dept: PHYSICAL THERAPY | Facility: REHABILITATION | Age: 79
End: 2024-01-29
Payer: COMMERCIAL

## 2024-01-30 ENCOUNTER — RA CDI HCC (OUTPATIENT)
Dept: OTHER | Facility: HOSPITAL | Age: 79
End: 2024-01-30

## 2024-01-31 NOTE — ADDENDUM NOTE
Encounter addended by: Teresita Hernandez RN on: 1/31/2024 2:49 PM   Actions taken: Order list changed, Therapy plan modified

## 2024-02-01 ENCOUNTER — APPOINTMENT (OUTPATIENT)
Dept: PHYSICAL THERAPY | Facility: REHABILITATION | Age: 79
End: 2024-02-01
Payer: COMMERCIAL

## 2024-02-02 ENCOUNTER — OFFICE VISIT (OUTPATIENT)
Dept: FAMILY MEDICINE CLINIC | Facility: CLINIC | Age: 79
End: 2024-02-02
Payer: COMMERCIAL

## 2024-02-02 VITALS
HEIGHT: 68 IN | DIASTOLIC BLOOD PRESSURE: 78 MMHG | SYSTOLIC BLOOD PRESSURE: 122 MMHG | TEMPERATURE: 97.9 F | WEIGHT: 168.4 LBS | RESPIRATION RATE: 14 BRPM | HEART RATE: 80 BPM | BODY MASS INDEX: 25.52 KG/M2 | OXYGEN SATURATION: 97 %

## 2024-02-02 DIAGNOSIS — K21.9 GASTROESOPHAGEAL REFLUX DISEASE, UNSPECIFIED WHETHER ESOPHAGITIS PRESENT: ICD-10-CM

## 2024-02-02 DIAGNOSIS — R22.1 LUMP ON NECK: ICD-10-CM

## 2024-02-02 DIAGNOSIS — R10.11 RUQ ABDOMINAL PAIN: Primary | ICD-10-CM

## 2024-02-02 DIAGNOSIS — I10 ESSENTIAL HYPERTENSION: ICD-10-CM

## 2024-02-02 DIAGNOSIS — E78.5 HYPERLIPIDEMIA, UNSPECIFIED HYPERLIPIDEMIA TYPE: ICD-10-CM

## 2024-02-02 DIAGNOSIS — M81.0 AGE-RELATED OSTEOPOROSIS WITHOUT CURRENT PATHOLOGICAL FRACTURE: Primary | ICD-10-CM

## 2024-02-02 PROCEDURE — 99214 OFFICE O/P EST MOD 30 MIN: CPT | Performed by: FAMILY MEDICINE

## 2024-02-02 NOTE — PROGRESS NOTES
Assessment/Plan:    RUQ abdominal pain  - will obtain RUQ US and lab work and call with results once available, advised to follow up with GI for persistent symptoms  - CBC and differential; Future  - Comprehensive metabolic panel; Future  - US right upper quadrant; Future    GERD  - continue omeprazole 20 mg twice a day    Lump on neck  - no neck mass or lymphadenopathy appreciated, advised to follow up if symptoms return         Return as scheduled or sooner as needed.   The patient understands and agrees with the treatment plan.      Subjective:   Chief Complaint   Patient presents with    Mass     Neck         Patient ID: Nella Yancey is a 78 y.o. female who presents today with c/o right sided neck lump under her jaw that she noted last week and now seems to go away, no recent illnesses, no fever, no neck pain, no sore throat or trouble swallowing.   Patient also reports intermittent pain in her right upper abdomen/ flank area for the past few weeks, no associated nausea or vomiting, no diarrhea, no constipation, no increased heartburn, no melena/ hematochezia, no urinary frequency or urgency, no hematuria.          The following portions of the patient's history were reviewed and updated as appropriate: allergies, current medications, past family history, past medical history, past social history, past surgical history and problem list.    Past Medical History:   Diagnosis Date    Anxiety     Arthritis     Back pain     Balance problems     Chest pain     heaviness    Colon polyp     Difficulty swallowing     Dizziness     Dry cough     Gait disorder     GERD (gastroesophageal reflux disease)     Hyperparathyroidism (HCC)     Hypertension     Increased frequency of headaches     Migraines     Neck pain     Numbness and tingling     bles    Palpitations     Pericarditis     Thyroid disease     nodule    Trouble in sleeping     Wears glasses      Past Surgical History:   Procedure Laterality Date     APPENDECTOMY      CHOLECYSTECTOMY      laparoscopic    COLONOSCOPY      KNEE SURGERY Left     PERICARDIAL WINDOW      DC OPTX DSTL RADL X-ARTIC FX/EPIPHYSL SEP Right 03/22/2018    Procedure: OPEN REDUCTION W/ INTERNAL FIXATION (ORIF) RADIUS, splint application;  Surgeon: Kandice Kimble MD;  Location: [facility];  Service: Orthopedics    DC PARATHYROIDECTOMY/EXPLORATION PARATHYROIDS N/A 08/17/2022    Procedure: PARATHYROIDECTOMY, 4 GLAND EXPLORATION;  Surgeon: Ovidio Perkins MD;  Location: [facility];  Service: ENT    SKIN BIOPSY      UPPER GASTROINTESTINAL ENDOSCOPY       Family History   Problem Relation Age of Onset    Kidney failure Mother     Hypertension Mother     Lung cancer Father     Yosef Parkinson White syndrome Daughter     No Known Problems Sister     Substance Abuse Neg Hx     Alcohol abuse Neg Hx     Mental illness Neg Hx      Social History     Socioeconomic History    Marital status: /Civil Union     Spouse name: Not on file    Number of children: Not on file    Years of education: Not on file    Highest education level: Not on file   Occupational History    Not on file   Tobacco Use    Smoking status: Never    Smokeless tobacco: Never   Vaping Use    Vaping status: Never Used   Substance and Sexual Activity    Alcohol use: Not Currently     Comment: Rarely     Drug use: No    Sexual activity: Yes     Partners: Male   Other Topics Concern    Not on file   Social History Narrative    WORK:    1.  [employer] ([employer]; [employer]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    2.  [employer]        HOBBIES:    1.  Singing    2.  Dancing    3.  Hx of ceramics (+ dust)        PETS:    1.  Dogs    -  Denies birds        TRAVEL:    -  [place]        EXPOSURES:    Denies mold; down pillows/comforters; hot tubs     Social Determinants of Health     Financial Resource Strain: Low Risk  (10/30/2023)    Overall Financial Resource Strain (CARDIA)     Difficulty of Paying Living Expenses: Not very hard    Food Insecurity: Not on file   Transportation Needs: No Transportation Needs (10/30/2023)    PRAPARE - Transportation     Lack of Transportation (Medical): No     Lack of Transportation (Non-Medical): No   Physical Activity: Not on file   Stress: Not on file   Social Connections: Not on file   Intimate Partner Violence: Not on file   Housing Stability: Not on file       Current Outpatient Medications:     acetaminophen (TYLENOL) 500 mg tablet, Take 500-1,000 mg by mouth every 6 (six) hours as needed for mild pain, Disp: , Rfl:     albuterol (PROVENTIL HFA,VENTOLIN HFA) 90 mcg/act inhaler, Inhale 2 puffs every 6 (six) hours as needed for wheezing or shortness of breath, Disp: 8 g, Rfl: 1    ALPRAZolam (XANAX) 0.5 mg tablet, Take 1 tablet (0.5 mg total) by mouth daily at bedtime as needed for anxiety or sleep, Disp: 20 tablet, Rfl: 1    ascorbic acid (VITAMIN C) 500 mg tablet, Take 500 mg by mouth daily, Disp: , Rfl:     b complex vitamins tablet, Take 1 tablet by mouth daily, Disp: , Rfl:     Cholecalciferol (Vitamin D) 50 MCG (2000 UT) CAPS, Take 1 capsule (2,000 Units total) by mouth daily (Patient taking differently: Take 3,000 Units by mouth daily), Disp: , Rfl:     Cholecalciferol (Vitamin D-3) 25 MCG (1000 UT) CAPS, Take 1,000 Units by mouth 2 (two) times a day, Disp: , Rfl:     Magnesium 300 MG CAPS, Take 300 mg by mouth in the morning, Disp: , Rfl:     metoprolol succinate (TOPROL-XL) 25 mg 24 hr tablet, Take 1 tablet (25 mg total) by mouth daily at bedtime, Disp: 90 tablet, Rfl: 3    Multiple Vitamins-Minerals (MULTIVITAMIN ADULTS 50+ PO), Take by mouth, Disp: , Rfl:     Nutritional Supplements (OSAPLEX MK-7 PO), Take by mouth, Disp: , Rfl:     omeprazole (PriLOSEC) 20 mg delayed release capsule, Take 1 capsule (20 mg total) by mouth daily before breakfast Half an hour prior to breakfast and half an hour prior to dinner, Disp: 180 capsule, Rfl: 3    Riboflavin (VITAMIN B-2 PO), Take 250 mg by mouth in the  "morning, Disp: , Rfl:     amoxicillin (AMOXIL) 500 mg capsule, Take 500 mg by mouth every 12 (twelve) hours (Patient not taking: Reported on 1/22/2024), Disp: , Rfl:     Diclofenac Sodium (VOLTAREN) 1 %, Apply 2 g topically 4 (four) times a day for 10 days, Disp: 80 g, Rfl: 0    dicyclomine (BENTYL) 10 mg capsule, TAKE 1 CAPSULE BY MOUTH 4 TIMES A DAY (BEFORE MEALS AND AT BEDTIME) (Patient not taking: Reported on 1/4/2024), Disp: 360 capsule, Rfl: 1    methocarbamol (ROBAXIN) 500 mg tablet, Take 0.5 (250mg) to 1 (500mg) tablet by mouth every 8 hours as needed for muscle spasm/neck pain. (Patient not taking: Reported on 8/30/2023), Disp: 90 tablet, Rfl: 0    rizatriptan (MAXALT) 10 MG tablet, Take 1 tablet (10 mg total) by mouth once as needed for migraine May repeat in 2 hours if needed. Max 2/24 hours, 9/month. (Patient not taking: Reported on 2/2/2024), Disp: 9 tablet, Rfl: 6    traZODone (DESYREL) 50 mg tablet, TAKE 1 TABLET BY MOUTH EVERYDAY AT BEDTIME (Patient not taking: Reported on 2/2/2024), Disp: 90 tablet, Rfl: 1    Review of Systems   Constitutional:  Negative for chills, fatigue, fever and unexpected weight change.   HENT:  Negative for congestion, ear pain, facial swelling, mouth sores, rhinorrhea, sore throat and trouble swallowing.    Respiratory:  Negative for cough, shortness of breath and wheezing.    Cardiovascular:  Negative for chest pain, palpitations and leg swelling.   Gastrointestinal:  Negative for abdominal distention, blood in stool, constipation, diarrhea, nausea and vomiting.        As per HPI   Genitourinary:  Negative for difficulty urinating, dysuria, frequency, hematuria, urgency, vaginal bleeding and vaginal discharge.   Neurological:  Negative for dizziness and headaches.           Objective:    Vitals:    02/02/24 1405   BP: 122/78   Pulse: 80   Resp: 14   Temp: 97.9 °F (36.6 °C)   TempSrc: Oral   SpO2: 97%   Weight: 76.4 kg (168 lb 6.4 oz)   Height: 5' 8\" (1.727 m)        " Physical Exam  Constitutional:       General: She is not in acute distress.     Appearance: She is well-developed.   HENT:      Mouth/Throat:      Mouth: Mucous membranes are moist.      Pharynx: Oropharynx is clear.   Neck:      Thyroid: No thyroid mass or thyromegaly.   Cardiovascular:      Rate and Rhythm: Normal rate and regular rhythm.      Heart sounds: Normal heart sounds. No murmur heard.  Pulmonary:      Effort: Pulmonary effort is normal. No respiratory distress.      Breath sounds: Normal breath sounds. No wheezing, rhonchi or rales.   Abdominal:      General: There is no distension.      Palpations: Abdomen is soft. There is no mass.      Tenderness: There is no abdominal tenderness. There is no right CVA tenderness, left CVA tenderness, guarding or rebound.   Musculoskeletal:      Cervical back: Neck supple.      Right lower leg: No edema.      Left lower leg: No edema.   Lymphadenopathy:      Cervical: No cervical adenopathy.   Neurological:      Mental Status: She is alert and oriented to person, place, and time.   Psychiatric:         Mood and Affect: Mood normal.         Behavior: Behavior normal.

## 2024-02-05 ENCOUNTER — OFFICE VISIT (OUTPATIENT)
Dept: PHYSICAL THERAPY | Facility: REHABILITATION | Age: 79
End: 2024-02-05
Payer: COMMERCIAL

## 2024-02-05 DIAGNOSIS — M70.61 GREATER TROCHANTERIC BURSITIS OF RIGHT HIP: ICD-10-CM

## 2024-02-05 DIAGNOSIS — M17.0 PRIMARY OSTEOARTHRITIS OF BOTH KNEES: Primary | ICD-10-CM

## 2024-02-05 PROCEDURE — 97112 NEUROMUSCULAR REEDUCATION: CPT | Performed by: PHYSICAL THERAPIST

## 2024-02-05 PROCEDURE — 97110 THERAPEUTIC EXERCISES: CPT | Performed by: PHYSICAL THERAPIST

## 2024-02-05 NOTE — PROGRESS NOTES
"Daily Note     Today's date: 2024  Patient name: Nella Yancey  : 1945  MRN: [id number]  Referring provider: James Land MD  Dx:   Encounter Diagnosis     ICD-10-CM    1. Primary osteoarthritis of both knees  M17.0       2. Greater trochanteric bursitis of right hip  M70.61           Start Time: 1213  Stop Time: 1255  Total time in clinic (min): 42 minutes    Subjective: Pt reports her knees have been feeling pretty good.       Objective: See treatment diary below      Assessment: Tolerated treatment well. Denied discomfort during treatment session today. Patient would benefit from continued PT to improve strength, decrease pain, and maximize overall function.        Plan: Continue per plan of care.      Precautions: HTN    Manuals 1/17 2/5  12/18 12/20 1/3 1/8 1/10   Distal quad STM                                            Neuro Re-Ed 1/17 2/5  12/18 12/20 1/3 1/8 1/10   clamshells           LAQ 2x10 4lb ea 2x10 4lb ea  2x10 4lb ea 2x10 4lb ea 2x10 4lb ea 2x10 4lb ea    Hip abd iso    Supine 2x10 GMB Supine RHB 2x10 Supine RHB 2x10 Supine RHB 2x10    bridges RHB  2x10 YHB 2x10x5\"      RHB  2x10   Side stepping    At counter 3 lap  GMB       Sand dune step up  2x10 ea  Sand dune march  1 min x2 Sand dune step ups 20x Sand dune step ups 20x Sand dune step ups 20x Sand dune step ups 20x   Leg ext 25x 20lbs DL 25x 20lbs DL  25x 20lbs  20x 20lbs DL 20x 20lbs DL 25x 20lbs DL nv   Step up and over 4\"  2x10 ea                     HEP/education           Ther Ex 1/17 2/5  12/18 12/20 1/3 1/8 1/10   Bike 10' L1 ROM 10' L1 ROM  10' L1 ROM 10' L1 ROM 10' L1 ROM 10' L1 ROM 9' L1 ROM   Supine SLR 2x10 ea no weight 2x10 ea no weight  held 2x10 ea no weights 0#  1x5 ea 2x10 ea no nafisa 2x10 ea no weight   Leg ext           Leg press           Squats with TB           LSD           VG        L6 RHB  4 min                         Ther Activity                                 Gait Training                           "       Modalities

## 2024-02-07 ENCOUNTER — OFFICE VISIT (OUTPATIENT)
Dept: PHYSICAL THERAPY | Facility: REHABILITATION | Age: 79
End: 2024-02-07
Payer: COMMERCIAL

## 2024-02-07 DIAGNOSIS — M17.0 PRIMARY OSTEOARTHRITIS OF BOTH KNEES: Primary | ICD-10-CM

## 2024-02-07 DIAGNOSIS — M70.61 GREATER TROCHANTERIC BURSITIS OF RIGHT HIP: ICD-10-CM

## 2024-02-07 PROCEDURE — 97110 THERAPEUTIC EXERCISES: CPT | Performed by: PHYSICAL THERAPIST

## 2024-02-07 PROCEDURE — 97112 NEUROMUSCULAR REEDUCATION: CPT | Performed by: PHYSICAL THERAPIST

## 2024-02-07 NOTE — PROGRESS NOTES
"Daily Note     Today's date: 2024  Patient name: Nella Yancey  : 1945  MRN: [id number]  Referring provider: James Land MD  Dx:   Encounter Diagnosis     ICD-10-CM    1. Primary osteoarthritis of both knees  M17.0       2. Greater trochanteric bursitis of right hip  M70.61           Start Time: 1215  Stop Time: 1259  Total time in clinic (min): 44 minutes    Subjective: Pt reports she is doing pretty well. Does report some right sided low back pain that she thinks began after her last session.       Objective: See treatment diary below      Assessment: Tolerated treatment well. Pt had pain with bridges today so they were modified to no lift. Patient would benefit from continued PT to improve strength, decrease pain, and maximize overall function.        Plan: Continue per plan of care.      Precautions: HTN    Manuals 1/17 2/5  12/18 12/20 1/3 1/8 1/10   Distal quad STM                                            Neuro Re-Ed 1/17 2/5 2/7 12/18 12/20 1/3 1/8 1/10   clamshells           LAQ 2x10 4lb ea 2x10 4lb ea 2x10 4lb ea 2x10 4lb ea 2x10 4lb ea 2x10 4lb ea 2x10 4lb ea    Hip abd iso    Supine 2x10 GMB Supine RHB 2x10 Supine RHB 2x10 Supine RHB 2x10    bridges RHB  2x10 YHB 2x10x5\" YHB 20x5\" no lift     RHB  2x10   Side stepping    At counter 3 lap  GMB       Sand dune step up  2x10 ea 2x10 ea Sand dune  min x2 Sand dune step ups 20x Sand dune step ups 20x Sand dune step ups 20x Sand dune step ups 20x   Leg ext 25x 20lbs DL 25x 20lbs DL 25x 20lbs DL 25x 20lbs  20x 20lbs DL 20x 20lbs DL 25x 20lbs DL nv   Step up and over 4\"  2x10 ea                     HEP/education           Ther Ex 1/17 2/5 2/7 12/18 12/20 1/3 1/8 1/10   Bike 10' L1 ROM 10' L1 ROM 10' L1 ROM 10' L1 ROM 10' L1 ROM 10' L1 ROM 10' L1 ROM 9' L1 ROM   Supine SLR 2x10 ea no weight 2x10 ea no weight 2x10 ea no weight held 2x10 ea no weights 0#  1x5 ea 2x10 ea no nafisa 2x10 ea no weight   Leg ext           Leg press         "   Squats with TB           LSD           VG        L6 RHB  4 min                         Ther Activity                                 Gait Training                                 Modalities

## 2024-02-14 ENCOUNTER — OFFICE VISIT (OUTPATIENT)
Dept: PHYSICAL THERAPY | Facility: REHABILITATION | Age: 79
End: 2024-02-14
Payer: COMMERCIAL

## 2024-02-14 DIAGNOSIS — M17.0 PRIMARY OSTEOARTHRITIS OF BOTH KNEES: Primary | ICD-10-CM

## 2024-02-14 DIAGNOSIS — M70.61 GREATER TROCHANTERIC BURSITIS OF RIGHT HIP: ICD-10-CM

## 2024-02-14 PROCEDURE — 97110 THERAPEUTIC EXERCISES: CPT

## 2024-02-14 PROCEDURE — 97112 NEUROMUSCULAR REEDUCATION: CPT

## 2024-02-14 NOTE — PROGRESS NOTES
"Daily Note     Today's date: 2024  Patient name: Nella Yancey  : 1945  MRN: [id number]  Referring provider: James Land MD  Dx:   Encounter Diagnosis     ICD-10-CM    1. Primary osteoarthritis of both knees  M17.0       2. Greater trochanteric bursitis of right hip  M70.61           Start Time: 1215  Stop Time: 1300  Total time in clinic (min): 45 minutes    Subjective: Pt reports she has been more active taking care of her , and both knees have been holding up well.  Did have some discomfort in both knees last night.  Has been very please with the improvement in her walking lately.        Objective: See treatment diary below      Assessment: Tolerated treatment well. Continued with program as outlined below.  Able to progress LAQ with increased resistance, but did experience slight achiness/throbbing around R patella, which lingered slightly for remainder of treatment.  Was able to complete all other exercise with no additional aggravation of symptoms.  Patient would benefit from continued PT to further improve strength, decrease pain, and maximize function.        Plan: Continue per plan of care.      Precautions: HTN    Manuals 1/17 2/5  2/14  1/3 1/8 1/10   Distal quad STM                                            Neuro Re-Ed 1/17 2/5 2/7 2/14  1/3 1/8 1/10   clamshells           LAQ 2x10 4lb ea 2x10 4lb ea 2x10 4lb ea 2x10 5lb ea  2x10 4lb ea 2x10 4lb ea    Hip abd iso      Supine RHB 2x10 Supine RHB 2x10    bridges RHB  2x10 YHB 2x10x5\" YHB 20x5\" no lift YHB 20x5\" no lift    RHB  2x10   Side stepping           Sand dune step up  2x10 ea 2x10 ea 2x10 ea  Sand dune step ups 20x Sand dune step ups 20x Sand dune step ups 20x   Leg ext 25x 20lbs DL 25x 20lbs DL 25x 20lbs DL 25x 20lbs DL  20x 20lbs DL 25x 20lbs DL nv   Step up and over 4\"  2x10 ea                     HEP/education           Ther Ex 1/17 2/5 2/7 2/14  1/3 1/8 1/10   Bike 10' L1 ROM 10' L1 ROM 10' L1 ROM 10' L1 ROM  10' L1 " ROM 10' L1 ROM 9' L1 ROM   Supine SLR 2x10 ea no weight 2x10 ea no weight 2x10 ea no weight nv  0#  1x5 ea 2x10 ea no nafisa 2x10 ea no weight   Leg ext           Leg press           Squats with TB           LSD           VG        L6 RHB  4 min                         Ther Activity                                 Gait Training                                 Modalities

## 2024-02-19 ENCOUNTER — OFFICE VISIT (OUTPATIENT)
Dept: PHYSICAL THERAPY | Facility: REHABILITATION | Age: 79
End: 2024-02-19
Payer: COMMERCIAL

## 2024-02-19 DIAGNOSIS — M70.61 GREATER TROCHANTERIC BURSITIS OF RIGHT HIP: ICD-10-CM

## 2024-02-19 DIAGNOSIS — M17.0 PRIMARY OSTEOARTHRITIS OF BOTH KNEES: Primary | ICD-10-CM

## 2024-02-19 PROCEDURE — 97112 NEUROMUSCULAR REEDUCATION: CPT

## 2024-02-19 PROCEDURE — 97110 THERAPEUTIC EXERCISES: CPT

## 2024-02-19 PROCEDURE — 97140 MANUAL THERAPY 1/> REGIONS: CPT

## 2024-02-19 NOTE — PROGRESS NOTES
"Daily Note     Today's date: 2024  Patient name: Nella Yancey  : 1945  MRN: [id number]  Referring provider: James Land MD  Dx:   Encounter Diagnosis     ICD-10-CM    1. Primary osteoarthritis of both knees  M17.0       2. Greater trochanteric bursitis of right hip  M70.61           Start Time: 1215  Stop Time: 1258  Total time in clinic (min): 43 minutes    Subjective: Pt reports since LV she has had increased pain in both knees, increased cracking in R knee, and more difficulty walking.        Objective: See treatment diary below      Assessment: Tolerated treatment well. Program modified today d/t subjective comments at start of treatment.  Moderate TTP along distal hip adductors.  Responded well to manual treatment and self stretches, noting decrease in symptoms by end of treatment.  Would benefit from resuming previous programming as able NV.  Patient would benefit from continued PT to further improve strength, decrease pain, and maximize overall function.        Plan: Continue per plan of care.      Precautions: HTN    Manuals 1/17 2/5  2/14 2/19   1/10   Distal quad STM     +adductors  AFB                                       Neuro Re-Ed 1/17 2/5 2/7 2/14 2/19   1/10   clamshells           LAQ 2x10 4lb ea 2x10 4lb ea 2x10 4lb ea 2x10 5lb ea np      Hip abd iso           bridges RHB  2x10 YHB 2x10x5\" YHB 20x5\" no lift YHB 20x5\" no lift YHB 20x5\" no lift   RHB  2x10   Side stepping           Sand dune step up  2x10 ea 2x10 ea 2x10 ea nv   Sand dune step ups 20x   Leg ext 25x 20lbs DL 25x 20lbs DL 25x 20lbs DL 25x 20lbs DL nv   nv   Step up and over 4\"  2x10 ea                     HEP/education           Ther Ex 1/17 2/5 2/7 2/14 2/19   1/10   Bike 10' L1 ROM 10' L1 ROM 10' L1 ROM 10' L1 ROM 10' L1 ROM   9' L1 ROM   Supine SLR 2x10 ea no weight 2x10 ea no weight 2x10 ea no weight nv 1x10 ea no weight   2x10 ea no weight   Leg ext           Leg press           Squats with TB           LSD    " "       VG        L6 RHB  4 min   Slant board gastroc stretch     20\"x4                 Ther Activity                                 Gait Training                                 Modalities                                                            "

## 2024-02-21 ENCOUNTER — APPOINTMENT (OUTPATIENT)
Dept: PHYSICAL THERAPY | Facility: REHABILITATION | Age: 79
End: 2024-02-21
Payer: COMMERCIAL

## 2024-02-22 ENCOUNTER — OFFICE VISIT (OUTPATIENT)
Dept: PHYSICAL THERAPY | Facility: REHABILITATION | Age: 79
End: 2024-02-22
Payer: COMMERCIAL

## 2024-02-22 DIAGNOSIS — M17.0 PRIMARY OSTEOARTHRITIS OF BOTH KNEES: Primary | ICD-10-CM

## 2024-02-22 DIAGNOSIS — M70.61 GREATER TROCHANTERIC BURSITIS OF RIGHT HIP: ICD-10-CM

## 2024-02-22 PROCEDURE — 97112 NEUROMUSCULAR REEDUCATION: CPT | Performed by: PHYSICAL THERAPIST

## 2024-02-22 PROCEDURE — 97110 THERAPEUTIC EXERCISES: CPT | Performed by: PHYSICAL THERAPIST

## 2024-02-22 NOTE — PROGRESS NOTES
"Daily Note     Today's date: 2024  Patient name: Nella Yancey  : 1945  MRN: [id number]  Referring provider: James Land MD  Dx:   Encounter Diagnosis     ICD-10-CM    1. Primary osteoarthritis of both knees  M17.0       2. Greater trochanteric bursitis of right hip  M70.61           Start Time: 1145  Stop Time: 1224  Total time in clinic (min): 39 minutes    Subjective: Pt reports she is doing alright. Has been having some anterior shin pain that started yesterday. She is unsure what caused.       Objective: See treatment diary below      Assessment: Tolerated treatment well. Resumed most of pt's exercises today. Patient would benefit from continued PT to further improve strength, decrease pain, and maximize overall function.        Plan: Continue per plan of care.      Precautions: HTN    Manuals 1/17 2/5  2/14 2/19 2/22  1/10   Distal quad STM     +adductors  AFB                                       Neuro Re-Ed 1/17 2/5 2/7 2/14 2/19 2/22  1/10   clamshells           LAQ 2x10 4lb ea 2x10 4lb ea 2x10 4lb ea 2x10 5lb ea np 2x10x3\" 3lb AW     Hip abd iso           bridges RHB  2x10 YHB 2x10x5\" YHB 20x5\" no lift YHB 20x5\" no lift YHB 20x5\" no lift 2x10x3\" YHB  RHB  2x10   Side stepping           Sand dune step up  2x10 ea 2x10 ea 2x10 ea nv 12x ea  Sand dune step ups 20x   Leg ext 25x 20lbs DL 25x 20lbs DL 25x 20lbs DL 25x 20lbs DL nv nv  nv   Step up and over 4\"  2x10 ea                     HEP/education           Ther Ex 1/17 2/5 2/7 2/14 2/19 2/22  1/10   Bike 10' L1 ROM 10' L1 ROM 10' L1 ROM 10' L1 ROM 10' L1 ROM 10' L1 ROM  9' L1 ROM   Supine SLR 2x10 ea no weight 2x10 ea no weight 2x10 ea no weight nv 1x10 ea no weight 12x ea no weight  2x10 ea no weight   Leg ext           Leg press           Squats with TB           LSD           VG        L6 RHB  4 min   Slant board gastroc stretch     20\"x4                 Ther Activity                                 Gait Training                       "           Modalities

## 2024-02-26 ENCOUNTER — HOSPITAL ENCOUNTER (OUTPATIENT)
Dept: NON INVASIVE DIAGNOSTICS | Age: 79
Discharge: HOME | End: 2024-02-26
Payer: COMMERCIAL

## 2024-02-26 ENCOUNTER — OFFICE VISIT (OUTPATIENT)
Dept: FAMILY MEDICINE CLINIC | Facility: CLINIC | Age: 79
End: 2024-02-26
Payer: COMMERCIAL

## 2024-02-26 ENCOUNTER — TELEPHONE (OUTPATIENT)
Dept: FAMILY MEDICINE CLINIC | Facility: CLINIC | Age: 79
End: 2024-02-26

## 2024-02-26 VITALS
TEMPERATURE: 98.3 F | HEART RATE: 80 BPM | SYSTOLIC BLOOD PRESSURE: 124 MMHG | BODY MASS INDEX: 24.95 KG/M2 | HEIGHT: 68 IN | RESPIRATION RATE: 16 BRPM | DIASTOLIC BLOOD PRESSURE: 80 MMHG | WEIGHT: 164.6 LBS | OXYGEN SATURATION: 96 %

## 2024-02-26 DIAGNOSIS — L03.116 CELLULITIS OF LEFT LOWER LEG: ICD-10-CM

## 2024-02-26 DIAGNOSIS — M79.662 PAIN AND SWELLING OF LEFT LOWER LEG: Primary | ICD-10-CM

## 2024-02-26 DIAGNOSIS — M79.89 PAIN AND SWELLING OF LEFT LOWER LEG: Primary | ICD-10-CM

## 2024-02-26 DIAGNOSIS — M79.662 PAIN AND SWELLING OF LEFT LOWER LEG: ICD-10-CM

## 2024-02-26 DIAGNOSIS — M79.89 PAIN AND SWELLING OF LEFT LOWER LEG: ICD-10-CM

## 2024-02-26 PROCEDURE — 93971 EXTREMITY STUDY: CPT | Performed by: SURGERY

## 2024-02-26 PROCEDURE — 93971 EXTREMITY STUDY: CPT

## 2024-02-26 PROCEDURE — 99214 OFFICE O/P EST MOD 30 MIN: CPT | Performed by: FAMILY MEDICINE

## 2024-02-26 RX ORDER — CEPHALEXIN 500 MG/1
500 CAPSULE ORAL 3 TIMES DAILY
Qty: 30 CAPSULE | Refills: 0 | Status: SHIPPED | OUTPATIENT
Start: 2024-02-26 | End: 2024-03-07

## 2024-02-26 NOTE — PROGRESS NOTES
Assessment/Plan:    Pain and swelling of left lower leg  - will obtain left LE venous duplex r/o DVT which is scheduled this afternoon at [facility]  - [facility] VENOUS DUPLEX -LOWER LIMB UNILATERAL; Future    Cellulitis of left lower leg  - advised to start cephalexin and keep leg elevated as much as possible, encouraged to contact the office for persistent ro worsening symptoms  - cephalexin (KEFLEX) 500 mg capsule; Take 1 capsule (500 mg total) by mouth 3 (three) times a day for 10 days  Dispense: 30 capsule; Refill: 0          Return as scheduled or sooner as needed. The treatment plan and possible side effects of new medications were reviewed with the patient today. The patient understands and agrees with the treatment plan.      Subjective:   Chief Complaint   Patient presents with    Leg Swelling     Shin swelling & pain- left leg  Started about a week ago      Patient ID: Nella Yancey is a 78 y.o. female who presents today with c/o left lower leg swelling and pain, she sustained a small abrasion over her left anterior shin while shaving about a week ago, then the following day noticed increased lower leg swelling, the abrasion seems to be healing well, no drainage, no fever or chills, no recent travel.        The following portions of the patient's history were reviewed and updated as appropriate: allergies, current medications, past family history, past medical history, past social history, past surgical history and problem list.    Past Medical History:   Diagnosis Date    Anxiety     Arthritis     Back pain     Balance problems     Chest pain     heaviness    Colon polyp     Difficulty swallowing     Dizziness     Dry cough     Gait disorder     GERD (gastroesophageal reflux disease)     Hyperparathyroidism (HCC)     Hypertension     Increased frequency of headaches     Migraines     Neck pain     Numbness and tingling     bles    Palpitations     Pericarditis     Thyroid disease     nodule    Trouble in  sleeping     Wears glasses      Past Surgical History:   Procedure Laterality Date    APPENDECTOMY      CHOLECYSTECTOMY      laparoscopic    COLONOSCOPY      KNEE SURGERY Left     PERICARDIAL WINDOW      IL OPTX DSTL RADL X-ARTIC FX/EPIPHYSL SEP Right 03/22/2018    Procedure: OPEN REDUCTION W/ INTERNAL FIXATION (ORIF) RADIUS, splint application;  Surgeon: Kandice Kimble MD;  [facility];  Service: Orthopedics    IL PARATHYROIDECTOMY/EXPLORATION PARATHYROIDS N/A 08/17/2022    Procedure: PARATHYROIDECTOMY, 4 GLAND EXPLORATION;  Surgeon: Ovidio ePrkins MD;  Location[facility];  Service: ENT    SKIN BIOPSY      UPPER GASTROINTESTINAL ENDOSCOPY       Family History   Problem Relation Age of Onset    Kidney failure Mother     Hypertension Mother     Lung cancer Father     Yosef Parkinson White syndrome Daughter     No Known Problems Sister     Substance Abuse Neg Hx     Alcohol abuse Neg Hx     Mental illness Neg Hx      Social History     Socioeconomic History    Marital status: /Civil Union     Spouse name: Not on file    Number of children: Not on file    Years of education: Not on file    Highest education level: Not on file   Occupational History    Not on file   Tobacco Use    Smoking status: Never    Smokeless tobacco: Never   Vaping Use    Vaping status: Never Used   Substance and Sexual Activity    Alcohol use: Not Currently     Comment: Rarely     Drug use: No    Sexual activity: Yes     Partners: Male   Other Topics Concern    Not on file   Social History Narrative    WORK:    1.  [employer]; [employer]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    2.  [employer]        HOBBIES:    1.  Singing    2.  Dancing    3.  Hx of ceramics (+ dust)        PETS:    1.  Dogs    -  Denies birds        TRAVEL:    -  [place]        EXPOSURES:    Denies mold; down pillows/comforters; hot tubs     Social Determinants of Health     Financial Resource Strain: Low Risk  (10/30/2023)    Overall Financial  Resource Strain (CARDIA)     Difficulty of Paying Living Expenses: Not very hard   Food Insecurity: Not on file   Transportation Needs: No Transportation Needs (10/30/2023)    PRAPARE - Transportation     Lack of Transportation (Medical): No     Lack of Transportation (Non-Medical): No   Physical Activity: Not on file   Stress: Not on file   Social Connections: Not on file   Intimate Partner Violence: Not on file   Housing Stability: Not on file       Current Outpatient Medications:     acetaminophen (TYLENOL) 500 mg tablet, Take 500-1,000 mg by mouth every 6 (six) hours as needed for mild pain, Disp: , Rfl:     albuterol (PROVENTIL HFA,VENTOLIN HFA) 90 mcg/act inhaler, Inhale 2 puffs every 6 (six) hours as needed for wheezing or shortness of breath, Disp: 8 g, Rfl: 1    ALPRAZolam (XANAX) 0.5 mg tablet, Take 1 tablet (0.5 mg total) by mouth daily at bedtime as needed for anxiety or sleep, Disp: 20 tablet, Rfl: 1    ascorbic acid (VITAMIN C) 500 mg tablet, Take 500 mg by mouth daily, Disp: , Rfl:     b complex vitamins tablet, Take 1 tablet by mouth daily, Disp: , Rfl:     Cholecalciferol (Vitamin D) 50 MCG (2000 UT) CAPS, Take 1 capsule (2,000 Units total) by mouth daily (Patient taking differently: Take 3,000 Units by mouth daily), Disp: , Rfl:     Cholecalciferol (Vitamin D-3) 25 MCG (1000 UT) CAPS, Take 1,000 Units by mouth 2 (two) times a day, Disp: , Rfl:     Magnesium 300 MG CAPS, Take 300 mg by mouth in the morning, Disp: , Rfl:     metoprolol succinate (TOPROL-XL) 25 mg 24 hr tablet, Take 1 tablet (25 mg total) by mouth daily at bedtime, Disp: 90 tablet, Rfl: 3    Multiple Vitamins-Minerals (MULTIVITAMIN ADULTS 50+ PO), Take by mouth, Disp: , Rfl:     Nutritional Supplements (OSAPLEX MK-7 PO), Take by mouth, Disp: , Rfl:     omeprazole (PriLOSEC) 20 mg delayed release capsule, Take 1 capsule (20 mg total) by mouth daily before breakfast Half an hour prior to breakfast and half an hour prior to dinner, Disp:  "180 capsule, Rfl: 3    Riboflavin (VITAMIN B-2 PO), Take 250 mg by mouth in the morning, Disp: , Rfl:     amoxicillin (AMOXIL) 500 mg capsule, Take 500 mg by mouth every 12 (twelve) hours (Patient not taking: Reported on 1/22/2024), Disp: , Rfl:     Diclofenac Sodium (VOLTAREN) 1 %, Apply 2 g topically 4 (four) times a day for 10 days, Disp: 80 g, Rfl: 0    dicyclomine (BENTYL) 10 mg capsule, TAKE 1 CAPSULE BY MOUTH 4 TIMES A DAY (BEFORE MEALS AND AT BEDTIME) (Patient not taking: Reported on 1/4/2024), Disp: 360 capsule, Rfl: 1    methocarbamol (ROBAXIN) 500 mg tablet, Take 0.5 (250mg) to 1 (500mg) tablet by mouth every 8 hours as needed for muscle spasm/neck pain. (Patient not taking: Reported on 8/30/2023), Disp: 90 tablet, Rfl: 0    rizatriptan (MAXALT) 10 MG tablet, Take 1 tablet (10 mg total) by mouth once as needed for migraine May repeat in 2 hours if needed. Max 2/24 hours, 9/month. (Patient not taking: Reported on 2/2/2024), Disp: 9 tablet, Rfl: 6    traZODone (DESYREL) 50 mg tablet, TAKE 1 TABLET BY MOUTH EVERYDAY AT BEDTIME (Patient not taking: Reported on 2/2/2024), Disp: 90 tablet, Rfl: 1    Review of Systems   Constitutional:  Negative for chills, fatigue and fever.   Respiratory:  Negative for shortness of breath.    Cardiovascular:  Positive for leg swelling. Negative for palpitations.   Gastrointestinal:  Negative for abdominal pain, blood in stool, diarrhea, nausea and vomiting.   Skin:  Positive for color change.   Neurological:  Negative for dizziness, weakness, numbness and headaches.           Objective:    Vitals:    02/26/24 1047   BP: 124/80   Pulse: 80   Resp: 16   Temp: 98.3 °F (36.8 °C)   SpO2: 96%   Weight: 74.7 kg (164 lb 9.6 oz)   Height: 5' 8\" (1.727 m)        Physical Exam  Constitutional:       General: She is not in acute distress.     Appearance: She is well-developed.   Cardiovascular:      Rate and Rhythm: Normal rate and regular rhythm.      Heart sounds: Normal heart sounds. "   Pulmonary:      Effort: Pulmonary effort is normal. No respiratory distress.      Breath sounds: Normal breath sounds. No wheezing, rhonchi or rales.   Musculoskeletal:      Right lower leg: No edema.      Left lower leg: Edema present.      Comments: Left lower leg with 1+ edema, about 8 mm healing abrasion over anterior shin with mild surrounding erythema   Neurological:      Mental Status: She is alert and oriented to person, place, and time.   Psychiatric:         Mood and Affect: Mood normal.         Behavior: Behavior normal.

## 2024-02-26 NOTE — TELEPHONE ENCOUNTER
S/w pt informed her about results. Told her to call the office after 10 day medication course if the pain is still persists.

## 2024-03-01 ENCOUNTER — HOSPITAL ENCOUNTER (OUTPATIENT)
Dept: ULTRASOUND IMAGING | Facility: HOSPITAL | Age: 79
End: 2024-03-01
Payer: COMMERCIAL

## 2024-03-01 DIAGNOSIS — R10.11 RUQ ABDOMINAL PAIN: ICD-10-CM

## 2024-03-01 PROCEDURE — 76705 ECHO EXAM OF ABDOMEN: CPT

## 2024-03-05 ENCOUNTER — OFFICE VISIT (OUTPATIENT)
Dept: GASTROENTEROLOGY | Facility: MEDICAL CENTER | Age: 79
End: 2024-03-05
Payer: COMMERCIAL

## 2024-03-05 VITALS
TEMPERATURE: 98.7 F | HEIGHT: 68 IN | DIASTOLIC BLOOD PRESSURE: 68 MMHG | SYSTOLIC BLOOD PRESSURE: 138 MMHG | WEIGHT: 167.4 LBS | BODY MASS INDEX: 25.37 KG/M2 | HEART RATE: 73 BPM | OXYGEN SATURATION: 98 %

## 2024-03-05 DIAGNOSIS — K21.9 GASTROESOPHAGEAL REFLUX DISEASE, UNSPECIFIED WHETHER ESOPHAGITIS PRESENT: Primary | ICD-10-CM

## 2024-03-05 DIAGNOSIS — R13.19 OTHER DYSPHAGIA: ICD-10-CM

## 2024-03-05 DIAGNOSIS — Z12.11 COLON CANCER SCREENING: ICD-10-CM

## 2024-03-05 DIAGNOSIS — K58.0 IRRITABLE BOWEL SYNDROME WITH DIARRHEA: ICD-10-CM

## 2024-03-05 PROCEDURE — 99214 OFFICE O/P EST MOD 30 MIN: CPT | Performed by: PHYSICIAN ASSISTANT

## 2024-03-05 NOTE — PROGRESS NOTES
[facility] - Outpatient Follow-up Note  Nella Yancey 78 y.o. female MRN: [id number]  Encounter: [id number]        Assessment and Plan    1. GERD  2. Dysphagia  The patient has chronic GERD and dysphagia currently on omeprazole 20mg every morning and as needed Pepcid complete. This controls her GERD well. As for her dysphagia she states this is relatively well controlled as well but she does have occasional trouble swallowing large pills and steak. Recent EGD 8/11/23 with a 4cm hiatal hernia but otherwise normal including esophogeal, stomach, and duodenal biopsies  -Continue omeprazole 20mg every morning  -Continue Pepcid complete as needed  -Continue anti reflux diet   -Consider manometry if worsening dysphagia      3. IBS-D  The patient has intermittent diarrhea and abdominal pain currently well controlled with diet. Negative random colon biopsies on recent colonoscopy  -Continue dietary management      4. Colon cancer screening   Last colonoscopy 8/11/23 with one subcentimeter tubular adenomaremoved, scattered diverticuli in the sigmoid, and large internal hemorrhoids. Recall not recommended given age > 75.  -High fiber diet   -No further screening warranted based on age     Follow up 6 months    ______________________________________________________________________    History of Present Illness  Nella Yancey is a 78 y.o. female here for follow up evaluation of GERD, dysphagia, and IBS-D. The patient presents today doing well. She is currently taking omeprazole 20 mg every morning for acid reflux which controls her symptoms relatively well. She will get occasional breakthrough symptoms after eating certain food triggers such as red sauce. She takes Pepcid Complete as needed for this. She did try to take Pepcid every night however this caused diarrhea. Otherwise her IBS is well controlled with dietary management and she has no other symptoms or complaints.       Review of  Systems      Past Medical History  Past Medical History:   Diagnosis Date    Anxiety     Arthritis     Back pain     Balance problems     Chest pain     heaviness    Colon polyp     Difficulty swallowing     Dizziness     Dry cough     Gait disorder     GERD (gastroesophageal reflux disease)     Hyperparathyroidism (HCC)     Hypertension     Increased frequency of headaches     Migraines     Neck pain     Numbness and tingling     bles    Palpitations     Pericarditis     Thyroid disease     nodule    Trouble in sleeping     Wears glasses        Past Social history  Past Surgical History:   Procedure Laterality Date    APPENDECTOMY      CHOLECYSTECTOMY      laparoscopic    COLONOSCOPY      KNEE SURGERY Left     PERICARDIAL WINDOW      CT OPTX DSTL RADL X-ARTIC FX/EPIPHYSL SEP Right 03/22/2018    Procedure: OPEN REDUCTION W/ INTERNAL FIXATION (ORIF) RADIUS, splint application;  Surgeon: Kandice Kimble MD;  [facility];  Service: Orthopedics    CT PARATHYROIDECTOMY/EXPLORATION PARATHYROIDS N/A 08/17/2022    Procedure: PARATHYROIDECTOMY, 4 GLAND EXPLORATION;  Surgeon: Ovidio Perkins MD;  [facility];  Service: ENT    SKIN BIOPSY      UPPER GASTROINTESTINAL ENDOSCOPY       Social History     Socioeconomic History    Marital status: /Civil Union     Spouse name: Not on file    Number of children: Not on file    Years of education: Not on file    Highest education level: Not on file   Occupational History    Not on file   Tobacco Use    Smoking status: Never    Smokeless tobacco: Never   Vaping Use    Vaping status: Never Used   Substance and Sexual Activity    Alcohol use: Not Currently     Comment: Rarely     Drug use: No    Sexual activity: Yes     Partners: Male   Other Topics Concern    Not on file   Social History Narrative    WORK:    1.   ([employer]; [employer]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    2.  [employer]        HOBBIES:    1.  Singing    2.  Dancing    3.  Hx of ceramics  (+ dust)        PETS:    1.  Dogs    -  Denies birds        TRAVEL:    -  [place]        EXPOSURES:    Denies mold; down pillows/comforters; hot tubs     Social Determinants of Health     Financial Resource Strain: Low Risk  (10/30/2023)    Overall Financial Resource Strain (CARDIA)     Difficulty of Paying Living Expenses: Not very hard   Food Insecurity: Not on file   Transportation Needs: No Transportation Needs (10/30/2023)    PRAPARE - Transportation     Lack of Transportation (Medical): No     Lack of Transportation (Non-Medical): No   Physical Activity: Not on file   Stress: Not on file   Social Connections: Not on file   Intimate Partner Violence: Not on file   Housing Stability: Not on file     Social History     Substance and Sexual Activity   Alcohol Use Not Currently    Comment: Rarely      Social History     Substance and Sexual Activity   Drug Use No     Social History     Tobacco Use   Smoking Status Never   Smokeless Tobacco Never       Past Family History  Family History   Problem Relation Age of Onset    Kidney failure Mother     Hypertension Mother     Lung cancer Father     Yosef Parkinson White syndrome Daughter     No Known Problems Sister     Substance Abuse Neg Hx     Alcohol abuse Neg Hx     Mental illness Neg Hx        Current Medications  Current Outpatient Medications   Medication Sig Dispense Refill    acetaminophen (TYLENOL) 500 mg tablet Take 500-1,000 mg by mouth every 6 (six) hours as needed for mild pain      albuterol (PROVENTIL HFA,VENTOLIN HFA) 90 mcg/act inhaler Inhale 2 puffs every 6 (six) hours as needed for wheezing or shortness of breath 8 g 1    ALPRAZolam (XANAX) 0.5 mg tablet Take 1 tablet (0.5 mg total) by mouth daily at bedtime as needed for anxiety or sleep 20 tablet 1    ascorbic acid (VITAMIN C) 500 mg tablet Take 500 mg by mouth daily      b complex vitamins tablet Take 1 tablet by mouth daily      cephalexin (KEFLEX) 500 mg capsule Take 1 capsule (500 mg  total) by mouth 3 (three) times a day for 10 days 30 capsule 0    Cholecalciferol (Vitamin D) 50 MCG (2000 UT) CAPS Take 1 capsule (2,000 Units total) by mouth daily (Patient taking differently: Take 3,000 Units by mouth daily)      Cholecalciferol (Vitamin D-3) 25 MCG (1000 UT) CAPS Take 1,000 Units by mouth 2 (two) times a day      Magnesium 300 MG CAPS Take 300 mg by mouth in the morning      metoprolol succinate (TOPROL-XL) 25 mg 24 hr tablet Take 1 tablet (25 mg total) by mouth daily at bedtime 90 tablet 3    Multiple Vitamins-Minerals (MULTIVITAMIN ADULTS 50+ PO) Take by mouth      Nutritional Supplements (OSAPLEX MK-7 PO) Take by mouth      omeprazole (PriLOSEC) 20 mg delayed release capsule Take 1 capsule (20 mg total) by mouth daily before breakfast Half an hour prior to breakfast and half an hour prior to dinner 180 capsule 3    Riboflavin (VITAMIN B-2 PO) Take 250 mg by mouth in the morning      amoxicillin (AMOXIL) 500 mg capsule Take 500 mg by mouth every 12 (twelve) hours (Patient not taking: Reported on 1/22/2024)      Diclofenac Sodium (VOLTAREN) 1 % Apply 2 g topically 4 (four) times a day for 10 days 80 g 0    dicyclomine (BENTYL) 10 mg capsule TAKE 1 CAPSULE BY MOUTH 4 TIMES A DAY (BEFORE MEALS AND AT BEDTIME) (Patient not taking: Reported on 1/4/2024) 360 capsule 1    methocarbamol (ROBAXIN) 500 mg tablet Take 0.5 (250mg) to 1 (500mg) tablet by mouth every 8 hours as needed for muscle spasm/neck pain. (Patient not taking: Reported on 8/30/2023) 90 tablet 0    rizatriptan (MAXALT) 10 MG tablet Take 1 tablet (10 mg total) by mouth once as needed for migraine May repeat in 2 hours if needed. Max 2/24 hours, 9/month. (Patient not taking: Reported on 2/2/2024) 9 tablet 6    traZODone (DESYREL) 50 mg tablet TAKE 1 TABLET BY MOUTH EVERYDAY AT BEDTIME (Patient not taking: Reported on 2/2/2024) 90 tablet 1     No current facility-administered medications for this visit.       Allergies  Allergies    Allergen Reactions    Ciprofloxacin Myalgia         The following portions of the patient's history were reviewed and updated as appropriate: allergies, current medications, past medical history, past social history, past surgical history and problem list.      Vitals  There were no vitals filed for this visit.      Physical Exam  Constitutional   General appearance: Patient is seated and in no acute distress, well appearing and well nourished.   Head and Face   Head and face: Normal.    Eyes   Conjunctiva and lids: No erythema, swelling or discharge.  Anicteric.  Ears, Nose, Mouth, and Throat   Hearing: Normal.    Neck: Supple, trachea midline.  Pulmonary   Respiratory effort: No increased work of breathing or signs of respiratory distress.    Cardiovascular   Examination of extremities for edema and/or varicosities: Normal.    Musculoskeletal   Gait and station: Normal   Skin   Skin and subcutaneous tissue: Warm, dry, and intact. No visible jaundice, lesions or rashes.  Psychiatric   Judgment and insight: Normal  Recent and remote memory:  Normal  Mood and affect: Normal      Results  No visits with results within 1 Day(s) from this visit.   Latest known visit with results is:   Appointment on 01/18/2024   Component Date Value    Sodium 01/18/2024 142     Potassium 01/18/2024 4.3     Chloride 01/18/2024 105     CO2 01/18/2024 30     ANION GAP 01/18/2024 7     BUN 01/18/2024 15     Creatinine 01/18/2024 0.77     Glucose, Fasting 01/18/2024 78     Calcium 01/18/2024 10.6 (H)     AST 01/18/2024 22     ALT 01/18/2024 22     Alkaline Phosphatase 01/18/2024 83     Total Protein 01/18/2024 6.6     Albumin 01/18/2024 4.2     Total Bilirubin 01/18/2024 0.96     eGFR 01/18/2024 75        Radiology Results  VAS VENOUS DUPLEX -LOWER LIMB UNILATERAL    Result Date: 2/26/2024  Narrative:  [facility] REPORT [facility]INICAL: Indications: Patient presents with left lower extremity pain . Operative History: No prior  cardiovascular surgery   CONCLUSION: Impression: RIGHT LOWER LIMB LIMITED: Evaluation shows no evidence of thrombus in the common femoral vein. Doppler evaluation shows a normal response to augmentation maneuvers.  LEFT LOWER LIMB: No evidence of acute or chronic deep vein thrombosis No evidence of superficial thrombophlebitis noted. Doppler evaluation shows a normal response to augmentation maneuvers. Popliteal, posterior tibial and anterior tibial arterial Doppler waveform's are triphasic.  Technical findings were given to Dr. Rodriguez on 2/26/2024 @ 6762.  SIGNATURE: Electronically Signed by: SANTO MARTÍNEZ MD on 2024-02-26 04:18:24 PM      Orders  No orders of the defined types were placed in this encounter.

## 2024-03-06 ENCOUNTER — TELEPHONE (OUTPATIENT)
Dept: FAMILY MEDICINE CLINIC | Facility: CLINIC | Age: 79
End: 2024-03-06

## 2024-03-06 ENCOUNTER — APPOINTMENT (OUTPATIENT)
Dept: PHYSICAL THERAPY | Facility: REHABILITATION | Age: 79
End: 2024-03-06
Payer: COMMERCIAL

## 2024-03-06 NOTE — TELEPHONE ENCOUNTER
----- Message from Cecelia Rodriguez MD sent at 3/6/2024 12:45 PM EST -----  Please let patient know her ultrasound is normal.     Spoke with pt about results

## 2024-03-11 ENCOUNTER — OFFICE VISIT (OUTPATIENT)
Dept: FAMILY MEDICINE CLINIC | Facility: CLINIC | Age: 79
End: 2024-03-11
Payer: COMMERCIAL

## 2024-03-11 ENCOUNTER — OFFICE VISIT (OUTPATIENT)
Dept: PHYSICAL THERAPY | Facility: REHABILITATION | Age: 79
End: 2024-03-11
Payer: COMMERCIAL

## 2024-03-11 VITALS
HEART RATE: 79 BPM | BODY MASS INDEX: 25.16 KG/M2 | HEIGHT: 68 IN | TEMPERATURE: 97.8 F | WEIGHT: 166 LBS | RESPIRATION RATE: 16 BRPM | OXYGEN SATURATION: 98 % | DIASTOLIC BLOOD PRESSURE: 80 MMHG | SYSTOLIC BLOOD PRESSURE: 142 MMHG

## 2024-03-11 DIAGNOSIS — Z23 ENCOUNTER FOR IMMUNIZATION: ICD-10-CM

## 2024-03-11 DIAGNOSIS — M17.0 PRIMARY OSTEOARTHRITIS OF BOTH KNEES: Primary | ICD-10-CM

## 2024-03-11 DIAGNOSIS — S61.011A LACERATION OF RIGHT THUMB WITHOUT FOREIGN BODY WITHOUT DAMAGE TO NAIL, INITIAL ENCOUNTER: Primary | ICD-10-CM

## 2024-03-11 DIAGNOSIS — M70.61 GREATER TROCHANTERIC BURSITIS OF RIGHT HIP: ICD-10-CM

## 2024-03-11 PROCEDURE — 97110 THERAPEUTIC EXERCISES: CPT

## 2024-03-11 PROCEDURE — 97112 NEUROMUSCULAR REEDUCATION: CPT

## 2024-03-11 PROCEDURE — 90714 TD VACC NO PRESV 7 YRS+ IM: CPT

## 2024-03-11 PROCEDURE — 99213 OFFICE O/P EST LOW 20 MIN: CPT | Performed by: PHYSICIAN ASSISTANT

## 2024-03-11 PROCEDURE — 90471 IMMUNIZATION ADMIN: CPT

## 2024-03-11 NOTE — PROGRESS NOTES
"Daily Note     Today's date: 3/11/2024  Patient name: Nella Yancey  : 1945  MRN: [id number]  Referring provider: James Land MD  Dx:   Encounter Diagnosis     ICD-10-CM    1. Primary osteoarthritis of both knees  M17.0       2. Greater trochanteric bursitis of right hip  M70.61           Start Time: 1220  Stop Time: 1302  Total time in clinic (min): 42 minutes    Subjective: Pt reports she was prescribed an antibiotic for possible infection of LLE, which has since helped control swelling and pain of LLE.        Objective: See treatment diary below      Assessment: Tolerated treatment well. Continued with program as outlined below.  Program modified slightly d/t subjective comments.  Was able to perform all exercise without any significant aggravation of symptoms.  Patient would benefit from continued PT to further improve strength, decrease pain, and maximize overall function.        Plan: Continue per plan of care.      Precautions: HTN    Manuals 1/17 2/5  2/14 2/19 2/22 3/11    Distal quad STM     +adductors  AFB                                       Neuro Re-Ed  3    clamshells           LAQ 2x10 4lb ea 2x10 4lb ea 2x10 4lb ea 2x10 5lb ea np 2x10x3\" 3lb AW 2x10x3\" 3lb AW    Hip abd iso           bridges RHB  2x10 YHB 2x10x5\" YHB 20x5\" no lift YHB 20x5\" no lift YHB 20x5\" no lift 2x10x3\" YHB 2x10x3\" YHB  No lift    Side stepping           Sand dune step up  2x10 ea 2x10 ea 2x10 ea nv 12x ea 1x10  ea    Leg ext 25x 20lbs DL 25x 20lbs DL 25x 20lbs DL 25x 20lbs DL nv nv nv    Step up and over 4\"  2x10 ea                     HEP/education           Ther Ex  3    Bike 10' L1 ROM 10' L1 ROM 10' L1 ROM 10' L1 ROM 10' L1 ROM 10' L1 ROM 10' L1 ROM    Supine SLR 2x10 ea no weight 2x10 ea no weight 2x10 ea no weight nv 1x10 ea no weight 12x ea no weight 4x5      Leg ext           Leg press           Squats with TB           LSD           VG         " "  Slant board gastroc stretch     20\"x4  Educated for home               Ther Activity                                 Gait Training                                 Modalities                                                              "

## 2024-03-11 NOTE — PROGRESS NOTES
Assessment/Plan:    Laceration right thumb - wound care discussed, signs and symptoms of infection discussed, Td given    F/u as needed and as scheduled with Dr Rodriguez    Subjective:   Chief Complaint   Patient presents with    Laceration     Cut on right thumb yesterday with vegetable princess      Patient ID: Nella Yancey is a 78 y.o. female.    Patient cut right hand on  yesterday. Bled a little. Cleaned it and bandaged without concern. Came here to update Td. Cannot remember her last booster.        The following portions of the patient's history were reviewed and updated as appropriate: allergies, current medications, past family history, past medical history, past social history, past surgical history, and problem list.    Past Medical History:   Diagnosis Date    Anxiety     Arthritis     Back pain     Balance problems     Chest pain     heaviness    Colon polyp     Difficulty swallowing     Dizziness     Dry cough     Gait disorder     GERD (gastroesophageal reflux disease)     Hyperparathyroidism (HCC)     Hypertension     Increased frequency of headaches     Migraines     Neck pain     Numbness and tingling     bles    Palpitations     Pericarditis     Thyroid disease     nodule    Trouble in sleeping     Wears glasses      Past Surgical History:   Procedure Laterality Date    APPENDECTOMY      CHOLECYSTECTOMY      laparoscopic    COLONOSCOPY      KNEE SURGERY Left     PERICARDIAL WINDOW      MA OPTX DSTL RADL X-ARTIC FX/EPIPHYSL SEP Right 03/22/2018    Procedure: OPEN REDUCTION W/ INTERNAL FIXATION (ORIF) RADIUS, splint application;  Surgeon: Kandice Kimble MD;  Location: [facility];  Service: Orthopedics    MA PARATHYROIDECTOMY/EXPLORATION PARATHYROIDS N/A 08/17/2022    Procedure: PARATHYROIDECTOMY, 4 GLAND EXPLORATION;  Surgeon: Ovidio Perkins MD;  Location: [facility];  Service: ENT    SKIN BIOPSY      UPPER GASTROINTESTINAL ENDOSCOPY       Family History   Problem Relation Age  of Onset    Kidney failure Mother     Hypertension Mother     Lung cancer Father     Yosef Parkinson White syndrome Daughter     No Known Problems Sister     Substance Abuse Neg Hx     Alcohol abuse Neg Hx     Mental illness Neg Hx      Social History     Socioeconomic History    Marital status: /Civil Union     Spouse name: Not on file    Number of children: Not on file    Years of education: Not on file    Highest education level: Not on file   Occupational History    Not on file   Tobacco Use    Smoking status: Never    Smokeless tobacco: Never   Vaping Use    Vaping status: Never Used   Substance and Sexual Activity    Alcohol use: Not Currently     Comment: Rarely     Drug use: No    Sexual activity: Yes     Partners: Male   Other Topics Concern    Not on file   Social History Narrative    WORK:    1.  [place] ([employer]; [employer]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    2.  [employer]        HOBBIES:    1.  Singing    2.  Dancing    3.  Hx of ceramics (+ dust)        PETS:    1.  Dogs    -  Denies birds        TRAVEL:    -  [place]        EXPOSURES:    Denies mold; down pillows/comforters; hot tubs     Social Determinants of Health     Financial Resource Strain: Low Risk  (10/30/2023)    Overall Financial Resource Strain (CARDIA)     Difficulty of Paying Living Expenses: Not very hard   Food Insecurity: Not on file   Transportation Needs: No Transportation Needs (10/30/2023)    PRAPARE - Transportation     Lack of Transportation (Medical): No     Lack of Transportation (Non-Medical): No   Physical Activity: Not on file   Stress: Not on file   Social Connections: Not on file   Intimate Partner Violence: Not on file   Housing Stability: Not on file       Current Outpatient Medications:     acetaminophen (TYLENOL) 500 mg tablet, Take 500-1,000 mg by mouth every 6 (six) hours as needed for mild pain, Disp: , Rfl:     albuterol (PROVENTIL HFA,VENTOLIN HFA) 90 mcg/act inhaler, Inhale 2 puffs every 6 (six)  hours as needed for wheezing or shortness of breath, Disp: 8 g, Rfl: 1    ALPRAZolam (XANAX) 0.5 mg tablet, Take 1 tablet (0.5 mg total) by mouth daily at bedtime as needed for anxiety or sleep, Disp: 20 tablet, Rfl: 1    ascorbic acid (VITAMIN C) 500 mg tablet, Take 500 mg by mouth daily, Disp: , Rfl:     b complex vitamins tablet, Take 1 tablet by mouth daily, Disp: , Rfl:     Cholecalciferol (Vitamin D) 50 MCG (2000 UT) CAPS, Take 1 capsule (2,000 Units total) by mouth daily (Patient taking differently: Take 3,000 Units by mouth daily), Disp: , Rfl:     Cholecalciferol (Vitamin D-3) 25 MCG (1000 UT) CAPS, Take 1,000 Units by mouth 2 (two) times a day, Disp: , Rfl:     Magnesium 300 MG CAPS, Take 300 mg by mouth in the morning, Disp: , Rfl:     metoprolol succinate (TOPROL-XL) 25 mg 24 hr tablet, Take 1 tablet (25 mg total) by mouth daily at bedtime, Disp: 90 tablet, Rfl: 3    Multiple Vitamins-Minerals (MULTIVITAMIN ADULTS 50+ PO), Take by mouth, Disp: , Rfl:     Nutritional Supplements (OSAPLEX MK-7 PO), Take by mouth, Disp: , Rfl:     omeprazole (PriLOSEC) 20 mg delayed release capsule, Take 1 capsule (20 mg total) by mouth daily before breakfast Half an hour prior to breakfast and half an hour prior to dinner, Disp: 180 capsule, Rfl: 3    Riboflavin (VITAMIN B-2 PO), Take 250 mg by mouth in the morning, Disp: , Rfl:     amoxicillin (AMOXIL) 500 mg capsule, Take 500 mg by mouth every 12 (twelve) hours (Patient not taking: Reported on 1/22/2024), Disp: , Rfl:     Diclofenac Sodium (VOLTAREN) 1 %, Apply 2 g topically 4 (four) times a day for 10 days (Patient not taking: Reported on 3/11/2024), Disp: 80 g, Rfl: 0    dicyclomine (BENTYL) 10 mg capsule, TAKE 1 CAPSULE BY MOUTH 4 TIMES A DAY (BEFORE MEALS AND AT BEDTIME) (Patient not taking: Reported on 1/4/2024), Disp: 360 capsule, Rfl: 1    methocarbamol (ROBAXIN) 500 mg tablet, Take 0.5 (250mg) to 1 (500mg) tablet by mouth every 8 hours as needed for muscle  "spasm/neck pain. (Patient not taking: Reported on 8/30/2023), Disp: 90 tablet, Rfl: 0    rizatriptan (MAXALT) 10 MG tablet, Take 1 tablet (10 mg total) by mouth once as needed for migraine May repeat in 2 hours if needed. Max 2/24 hours, 9/month. (Patient not taking: Reported on 2/2/2024), Disp: 9 tablet, Rfl: 6    traZODone (DESYREL) 50 mg tablet, TAKE 1 TABLET BY MOUTH EVERYDAY AT BEDTIME (Patient not taking: Reported on 2/2/2024), Disp: 90 tablet, Rfl: 1    Review of Systems          Objective:    Vitals:    03/11/24 1348   BP: 142/80   Pulse: 79   Resp: 16   Temp: 97.8 °F (36.6 °C)   TempSrc: Temporal   SpO2: 98%   Weight: 75.3 kg (166 lb)   Height: 5' 8\" (1.727 m)        Physical Exam  Constitutional:       Appearance: Normal appearance. She is normal weight.   HENT:      Head: Normocephalic and atraumatic.   Skin:     Comments: Right thumb two 2 mm superficial lacerations. No erythema, no discharge.   Neurological:      General: No focal deficit present.      Mental Status: She is alert and oriented to person, place, and time.   Psychiatric:         Mood and Affect: Mood normal.         Behavior: Behavior normal.         Thought Content: Thought content normal.         Judgment: Judgment normal.               "

## 2024-03-13 ENCOUNTER — OFFICE VISIT (OUTPATIENT)
Dept: PHYSICAL THERAPY | Facility: REHABILITATION | Age: 79
End: 2024-03-13
Payer: COMMERCIAL

## 2024-03-13 DIAGNOSIS — M17.0 PRIMARY OSTEOARTHRITIS OF BOTH KNEES: Primary | ICD-10-CM

## 2024-03-13 DIAGNOSIS — M70.61 GREATER TROCHANTERIC BURSITIS OF RIGHT HIP: ICD-10-CM

## 2024-03-13 PROCEDURE — 97112 NEUROMUSCULAR REEDUCATION: CPT | Performed by: PHYSICAL THERAPIST

## 2024-03-13 PROCEDURE — 97110 THERAPEUTIC EXERCISES: CPT | Performed by: PHYSICAL THERAPIST

## 2024-03-13 NOTE — PROGRESS NOTES
"Daily Note     Today's date: 3/13/2024  Patient name: Nella Yancey  : 1945  MRN: [id number]  Referring provider: James Land MD  Dx:   Encounter Diagnosis     ICD-10-CM    1. Primary osteoarthritis of both knees  M17.0       2. Greater trochanteric bursitis of right hip  M70.61           Start Time: 1214  Stop Time: 1254  Total time in clinic (min): 40 minutes    Subjective: Pt reports she is doing alright today. Knees have been hurting on the outside.       Objective: See treatment diary below      Assessment: Tolerated treatment well. Had some discomfort during step ups, but pain was reduced with cueing to squeeze glute during step up. Patient would benefit from continued PT to further improve strength, decrease pain, and maximize overall function.        Plan: Continue per plan of care.      Precautions: HTN    Manuals 1/17 2/5  2/14 2/19 2/22 3/11 3/13   Distal quad STM     +adductors  AFB                                       Neuro Re-Ed 1/17 2/5 2/7 2/14 2/19 2/22 3/11 3/13   clamshells           LAQ 2x10 4lb ea 2x10 4lb ea 2x10 4lb ea 2x10 5lb ea np 2x10x3\" 3lb AW 2x10x3\" 3lb AW 2x10x3\" 3lb AW   Hip abd iso           bridges RHB  2x10 YHB 2x10x5\" YHB 20x5\" no lift YHB 20x5\" no lift YHB 20x5\" no lift 2x10x3\" YHB 2x10x3\" YHB  No lift 2x10x3\" YHB no lift   Side stepping           [place] dune step up  2x10 ea 2x10 ea 2x10 ea nv 12x ea 1x10  ea 15x ea   Leg ext 25x 20lbs DL 25x 20lbs DL 25x 20lbs DL 25x 20lbs DL nv nv nv    Step up and over 4\"  2x10 ea                     HEP/education           Ther Ex 1/17 2/5 2/7 2/14 2/19 2/22 3/11 3/13   Bike 10' L1 ROM 10' L1 ROM 10' L1 ROM 10' L1 ROM 10' L1 ROM 10' L1 ROM 10' L1 ROM 10' L1 ROM   Supine SLR 2x10 ea no weight 2x10 ea no weight 2x10 ea no weight nv 1x10 ea no weight 12x ea no weight 4x5   15x ea   Leg ext           Leg press           Squats with TB           LSD           VG        L5 6'   Slant board gastroc stretch     20\"x4  Educated for home "               Ther Activity                                 Gait Training                                 Modalities

## 2024-03-14 ENCOUNTER — OFFICE VISIT (OUTPATIENT)
Dept: PULMONOLOGY | Facility: CLINIC | Age: 79
End: 2024-03-14
Payer: COMMERCIAL

## 2024-03-14 VITALS
TEMPERATURE: 98.1 F | HEIGHT: 68 IN | WEIGHT: 169 LBS | HEART RATE: 68 BPM | SYSTOLIC BLOOD PRESSURE: 120 MMHG | DIASTOLIC BLOOD PRESSURE: 84 MMHG | BODY MASS INDEX: 25.61 KG/M2 | OXYGEN SATURATION: 96 %

## 2024-03-14 DIAGNOSIS — J12.9 VIRAL PNEUMONIA: ICD-10-CM

## 2024-03-14 DIAGNOSIS — J84.9 ILD (INTERSTITIAL LUNG DISEASE) (HCC): ICD-10-CM

## 2024-03-14 DIAGNOSIS — E89.2 H/O PARATHYROIDECTOMY (HCC): Primary | ICD-10-CM

## 2024-03-14 PROCEDURE — 99213 OFFICE O/P EST LOW 20 MIN: CPT | Performed by: INTERNAL MEDICINE

## 2024-03-14 RX ORDER — ALBUTEROL SULFATE 90 UG/1
2 AEROSOL, METERED RESPIRATORY (INHALATION) EVERY 6 HOURS PRN
Qty: 8 G | Refills: 1 | Status: SHIPPED | OUTPATIENT
Start: 2024-03-14

## 2024-03-14 NOTE — PROGRESS NOTES
Progress Note - Pulmonary   Nella Yancey 78 y.o. female MRN: [id number]   Encounter: [id number]      Assessment/Plan:  Patient is a 78-year-old female with past medical history seen for ILD who presents for routine follow-up.  Overall, patient reports her breathing has been quite stable since last visit.  She has no significant plaints at the current time.  The patient had a minor respiratory issue for which she took a dose of albuterol with good relief.  She otherwise has no changes in her breathing.  She notes knee pain for which she is participating physical therapy.  For her thyroid nodule, no plans for surgery.    Interstitial Lung Disease  -  No acute indication from repeat imaging  -  repeat spirometry/DLCO q6 month  -  No acute indication for steroids or antifibrotics     Reactive Airway Disease  -  Noted improvement from albuterol   -  refill provided  -  May use on as needed basis     Knee pain  -  attending physical therapy  -  May follow up with ortho     Thyroid nodule  -  no plans to undergo surgery soon    1. H/O parathyroidectomy ([facility])    2. Viral pneumonia  -     albuterol (PROVENTIL HFA,VENTOLIN HFA) 90 mcg/act inhaler; Inhale 2 puffs every 6 (six) hours as needed for wheezing or shortness of breath    3. [facility]) ([facility])  -     albuterol (PROVENTIL HFA,VENTOLIN HFA) 90 mcg/act inhaler; Inhale 2 puffs every 6 (six) hours as needed for wheezing or shortness of breath  -     Spirometry with diffusing capacity; Future; Expected date: 06/14/2024      Patient may follow up in 6 months or sooner as necessary.     Orders:  Orders Placed This Encounter   Procedures    Spirometry with diffusing capacity     Standing Status:   Future     Standing Expiration Date:   3/14/2025     Order Specific Question:   Would you like to add MVV, MIP, MEP into this order?     Answer:   No       Subjective:   The patient notes she is doing relatively well.  She used her inhaler when she was having  "difficulty breathing through her nose.  She had great relief.  She notes that she is feeling better.      She is attending physical therapy for her legs.  She was told that she has arthritis in her knees.      On the whole, she is doing well.  She is avoiding triggers.      Inhaler Regimen:  Albuterol HFA - rare    Remainder of review of systems negative except as described in HPI.      The following portions of the patient's history were reviewed and updated as appropriate: allergies, current medications, past family history, past medical history, past social history, past surgical history and problem list.     Objective:   Vitals: Blood pressure 120/84, pulse 68, temperature 98.1 °F (36.7 °C), temperature source Tympanic, height 5' 8\" (1.727 m), weight 76.7 kg (169 lb), SpO2 96%, not currently breastfeeding., RA, Body mass index is 25.7 kg/m².    Physical Exam  Gen: Pleasant, awake, alert, oriented x 3, no acute distress  HEENT: Mucous membranes moist, no oral lesions, no thrush  NECK: No accessory muscle use, JVP not elevated  Cardiac: Regular rate rhythm, single S1, single S2, no murmurs, no rubs, no gallops  Lungs: Clear to auscultation bilaterally  Abdomen: normoactive bowel sounds, soft nontender, nondistended, no rebound or rigidity, no guarding  Extremities: no cyanosis, no clubbing, no LE edema  MSK:  Strength equal in all extremities  Derm:  No rashes/lesions noted  Neuro:  Appropriate mood/affect    Labs: I have personally reviewed pertinent lab results.  Lab Results   Component Value Date    WBC 6.00 05/04/2023    HGB 14.9 05/04/2023     05/04/2023     Lab Results   Component Value Date    CREATININE 0.77 01/18/2024      Imaging and other studies: I have personally reviewed pertinent reports.   and I have personally reviewed pertinent films in PACS  CXR 5/19/2023  Radiology findings:  Cardiomediastinal silhouette normal.  Lungs clear. No effusion or pneumothorax.  Upper abdomen normal. Bones " normal for age.    Pulmonary Function Testing: I have personally reviewed pertinent reports.   and I have personally reviewed pertinent films in PACS                          FEV1               FVC                 FEV1/FVC       DLCOcor  10/7/2019        1.70 69%         2.17 68%         78%                 58%  3/3/2021          1.59 66%         2.02 64%         79%                 58%  3/17/2022        1.46 62%         1.89 60%         78%                 55%  10/17/2022      1.26 53%         1.62 52%         78%                 52%      2/15/2023        1.49 65%         1.90 63%         79%                 67%  11/14/2023 1.48 65%  1.95 65%   76%     61%     Ifeanyi Adler MD  [facility]

## 2024-03-18 ENCOUNTER — APPOINTMENT (OUTPATIENT)
Dept: PHYSICAL THERAPY | Facility: REHABILITATION | Age: 79
End: 2024-03-18
Payer: COMMERCIAL

## 2024-03-20 ENCOUNTER — APPOINTMENT (OUTPATIENT)
Dept: PHYSICAL THERAPY | Facility: REHABILITATION | Age: 79
End: 2024-03-20
Payer: COMMERCIAL

## 2024-03-20 ENCOUNTER — OFFICE VISIT (OUTPATIENT)
Dept: DERMATOLOGY | Facility: CLINIC | Age: 79
End: 2024-03-20
Payer: COMMERCIAL

## 2024-03-20 ENCOUNTER — TELEPHONE (OUTPATIENT)
Dept: NEUROLOGY | Facility: CLINIC | Age: 79
End: 2024-03-20

## 2024-03-20 VITALS — WEIGHT: 170 LBS | BODY MASS INDEX: 25.76 KG/M2 | HEIGHT: 68 IN | TEMPERATURE: 98.1 F

## 2024-03-20 DIAGNOSIS — L72.0 MILIUM: Primary | ICD-10-CM

## 2024-03-20 PROCEDURE — 99213 OFFICE O/P EST LOW 20 MIN: CPT | Performed by: STUDENT IN AN ORGANIZED HEALTH CARE EDUCATION/TRAINING PROGRAM

## 2024-03-20 NOTE — PROGRESS NOTES
"[facility] Note     Patient Name: Nella Yancey  Encounter Date: 3/20/24     Have you been cared for by a [facility] Dermatologist in the last 3 years and, if so, which description applies to you?    Yes.  I have been here within the last 3 years, and my medical history has NOT changed since that time.  I am FEMALE/of child-bearing potential.    REVIEW OF SYSTEMS:  Have you recently had or currently have any of the following? No changes in my recent health.   PAST MEDICAL HISTORY:  Have you personally ever had or currently have any of the following?  If \"YES,\" then please provide more detail. No changes in my medical history.   HISTORY OF IMMUNOSUPPRESSION: Do you have a history of any of the following:  Systemic Immunosuppression such as Diabetes, Biologic or Immunotherapy, Chemotherapy, Organ Transplantation, Bone Marrow Transplantation?  No     Answering \"YES\" requires the addition of the dotphrase \"IMMUNOSUPPRESSED\" as the first diagnosis of the patient's visit.   FAMILY HISTORY:  Any \"first degree relatives\" (parent, brother, sister, or child) with the following?    No changes in my family's known health.   PATIENT EXPERIENCE:    Do you want the Dermatologist to perform a COMPLETE skin exam today including a clinical examination under the \"bra and underwear\" areas?  NO  If necessary, do we have your permission to call and leave a detailed message on your Preferred Phone number that includes your specific medical information?  Yes      Allergies   Allergen Reactions    Ciprofloxacin Myalgia      Current Outpatient Medications:     acetaminophen (TYLENOL) 500 mg tablet, Take 500-1,000 mg by mouth every 6 (six) hours as needed for mild pain, Disp: , Rfl:     albuterol (PROVENTIL HFA,VENTOLIN HFA) 90 mcg/act inhaler, Inhale 2 puffs every 6 (six) hours as needed for wheezing or shortness of breath, Disp: 8 g, Rfl: 1    ALPRAZolam (XANAX) 0.5 mg tablet, Take 1 tablet (0.5 mg total) by mouth daily " at bedtime as needed for anxiety or sleep, Disp: 20 tablet, Rfl: 1    ascorbic acid (VITAMIN C) 500 mg tablet, Take 500 mg by mouth daily, Disp: , Rfl:     b complex vitamins tablet, Take 1 tablet by mouth daily, Disp: , Rfl:     Cholecalciferol (Vitamin D-3) 25 MCG (1000 UT) CAPS, Take 1,000 Units by mouth 2 (two) times a day, Disp: , Rfl:     Magnesium 300 MG CAPS, Take 300 mg by mouth in the morning, Disp: , Rfl:     metoprolol succinate (TOPROL-XL) 25 mg 24 hr tablet, Take 1 tablet (25 mg total) by mouth daily at bedtime, Disp: 90 tablet, Rfl: 3    Multiple Vitamins-Minerals (MULTIVITAMIN ADULTS 50+ PO), Take by mouth, Disp: , Rfl:     Nutritional Supplements (OSAPLEX MK-7 PO), Take by mouth, Disp: , Rfl:     omeprazole (PriLOSEC) 20 mg delayed release capsule, Take 1 capsule (20 mg total) by mouth daily before breakfast Half an hour prior to breakfast and half an hour prior to dinner, Disp: 180 capsule, Rfl: 3    Riboflavin (VITAMIN B-2 PO), Take 250 mg by mouth in the morning, Disp: , Rfl:     dicyclomine (BENTYL) 10 mg capsule, TAKE 1 CAPSULE BY MOUTH 4 TIMES A DAY (BEFORE MEALS AND AT BEDTIME) (Patient not taking: Reported on 1/4/2024), Disp: 360 capsule, Rfl: 1          Whom besides the patient is providing clinical information about today's encounter?   NO ADDITIONAL HISTORIAN (patient alone provided history)    Physical Exam and Assessment/Plan by Diagnosis:      MILIUM     Physical Exam:  Anatomic Location Affected:  Face  Morphological Description:  Yellow to white papules  Pertinent Positives:  Pertinent Negatives:    Additional History of Present Condition:  Patient has had milium extracted before and keep coming back.    Assessment and Plan:  Based on a thorough discussion of this condition and the management approach to it (including a comprehensive discussion of the known risks, side effects and potential benefits of treatment), the patient (family) agrees to implement the following specific  plan:  Start applying compounded medication from skin medicinals to face once a night. Educated to increase to nightly over several weeks.   Extracted today.   Procedure: After obtaining consent and reviewing risks and benefits including scar, infection, bleeding, incomplete treatment, areas were cleaned with alcohol. After cleansing, an 11 blade was used to puncture the skin. Cotton tipped applicators were then used to gently apply lateral pressure. Procedure was tolerated well with minimal bleeding.    Assessment and Plan  A milium is a small cyst containing keratin (the skin protein); they are usually multiple and are then known as milia. These harmless cysts present as tiny pearly-white bumps just under the surface of the skin.  Milia are common in all ages and both sexes. They most often arise on the face and are particularly prominent on the eyelids and cheeks, but they may occur elsewhere.  There are various kinds of milia.   milia: Affect 40-50% of  babies, few to numerous lesions, often seen on the nose, but may also arise inside the mouth on the mucosa (Frank pearls) or palate (Sheryl nodules) or more widely on the scalp, face and upper trunk, Heal spontaneously within a few weeks of birth.  Primary milia in children and adults: found around eyelids, cheeks, forehead and genitalia, in young children, a row of milia may appear along the nasal crease, may clear in a few weeks or persist for months or longer.    Juvenile milia: associated with Rombo syndrome, basal cell naevus syndrome, Sxpmt-Fcdrs-Qfubzega syndrome, pachyonychia congenita, Lawson syndrome and other genetic disorders, may be congenital (present at birth) or appear later in life.  Milia en plaque: multiple milia appear on within an inflamed plaque up to several centimeters in diameter, usually found on an eyelid, behind the ear, on a cheek or jaw.  3. Affect children and adults, especially middle-aged women.  4. Sometimes  associated with another skin disease including pseudoxanthoma elasticum, discoid lupus erythematosus, lichen planus.  Multiple eruptive milia: crops of numerous milia appear over a few weeks to months, lesions may be asymptomatic or itchy, most often affect the face, upper arms and upper trunk.  Traumatic milia: occur at the site of injury as skin heals, arise from eccrine sweat ducts, examples include thermal burns, dermabrasion, blistering rashes such as bullous pemphigoid, often seen on the back of hands and fingers in porphyria cutanea tarda, a milia-like calcified nodule may develop after  heel stick blood test.   Milia associated with drugs: may rarely follow the use of topical medication, such as phenols, hydroquinone, 5-fluorouracil cream, and a corticosteroid.    Milia have a characteristic appearance. However, on occasion, a skin biopsy may be performed. This shows a small epidermoid cyst coming from a vellus hair follicle.  Milia should be distinguished from other types of cyst, comedones, xanthelasma and syringomas. Colloid milia are jimenez coloured bumps on cheeks and temples associated with excessive exposure to sunlight.  They should also be distinguished from milia-like cysts noted on dermoscopy in seborrhoeic keratoses, papillomatous moles and some basal cell carcinomas.  Milia do not need to be treated unless they are a cause for concern for the patient. They often clear up by themselves within a few months. Where possible, further trauma should be minimised to reduce the development of new lesions.  The lesion may be de-roofed using a sterile needle or blade and the contents squeezed or pricked out.  They may be destroyed using diathermy and curettage, or cryotherapy.  For widespread lesions, topical retinoids may be helpful.  Chemical peels, dermabrasion and laser ablation have been reported to be effective when used for very extensive milia.  Milia en plaque may improve with minocycline  (a tetracycline antibiotic).              Scribe Attestation      I,:  Gisella Brown am acting as a scribe while in the presence of the attending physician.:       I,:  Dileep Lopes MD personally performed the services described in this documentation    as scribed in my presence.:

## 2024-03-25 ENCOUNTER — APPOINTMENT (OUTPATIENT)
Dept: LAB | Facility: CLINIC | Age: 79
End: 2024-03-25
Payer: COMMERCIAL

## 2024-03-25 ENCOUNTER — APPOINTMENT (OUTPATIENT)
Dept: PHYSICAL THERAPY | Facility: REHABILITATION | Age: 79
End: 2024-03-25
Payer: COMMERCIAL

## 2024-03-25 DIAGNOSIS — E78.5 HYPERLIPIDEMIA, UNSPECIFIED HYPERLIPIDEMIA TYPE: ICD-10-CM

## 2024-03-25 DIAGNOSIS — E21.3 HYPERPARATHYROIDISM (HCC): ICD-10-CM

## 2024-03-25 DIAGNOSIS — R10.11 RUQ ABDOMINAL PAIN: ICD-10-CM

## 2024-03-25 DIAGNOSIS — M80.00XA OSTEOPOROSIS WITH CURRENT PATHOLOGICAL FRACTURE, UNSPECIFIED OSTEOPOROSIS TYPE, INITIAL ENCOUNTER: ICD-10-CM

## 2024-03-25 DIAGNOSIS — I10 ESSENTIAL HYPERTENSION: ICD-10-CM

## 2024-03-25 LAB
25(OH)D3 SERPL-MCNC: 34.8 NG/ML (ref 30–100)
ALBUMIN SERPL BCP-MCNC: 4 G/DL (ref 3.5–5)
ALP SERPL-CCNC: 65 U/L (ref 34–104)
ALT SERPL W P-5'-P-CCNC: 23 U/L (ref 7–52)
ANION GAP SERPL CALCULATED.3IONS-SCNC: 7 MMOL/L (ref 4–13)
AST SERPL W P-5'-P-CCNC: 23 U/L (ref 13–39)
BASOPHILS # BLD AUTO: 0.03 THOUSANDS/ÂΜL (ref 0–0.1)
BASOPHILS NFR BLD AUTO: 1 % (ref 0–1)
BILIRUB SERPL-MCNC: 0.96 MG/DL (ref 0.2–1)
BUN SERPL-MCNC: 22 MG/DL (ref 5–25)
CALCIUM SERPL-MCNC: 9.4 MG/DL (ref 8.4–10.2)
CHLORIDE SERPL-SCNC: 103 MMOL/L (ref 96–108)
CHOLEST SERPL-MCNC: 219 MG/DL
CO2 SERPL-SCNC: 30 MMOL/L (ref 21–32)
CREAT SERPL-MCNC: 0.79 MG/DL (ref 0.6–1.3)
EOSINOPHIL # BLD AUTO: 0.16 THOUSAND/ÂΜL (ref 0–0.61)
EOSINOPHIL NFR BLD AUTO: 3 % (ref 0–6)
ERYTHROCYTE [DISTWIDTH] IN BLOOD BY AUTOMATED COUNT: 13.3 % (ref 11.6–15.1)
GFR SERPL CREATININE-BSD FRML MDRD: 72 ML/MIN/1.73SQ M
GLUCOSE P FAST SERPL-MCNC: 85 MG/DL (ref 65–99)
HCT VFR BLD AUTO: 43.6 % (ref 34.8–46.1)
HDLC SERPL-MCNC: 70 MG/DL
HGB BLD-MCNC: 14.2 G/DL (ref 11.5–15.4)
IMM GRANULOCYTES # BLD AUTO: 0.02 THOUSAND/UL (ref 0–0.2)
IMM GRANULOCYTES NFR BLD AUTO: 0 % (ref 0–2)
LDLC SERPL CALC-MCNC: 130 MG/DL (ref 0–100)
LYMPHOCYTES # BLD AUTO: 1.8 THOUSANDS/ÂΜL (ref 0.6–4.47)
LYMPHOCYTES NFR BLD AUTO: 34 % (ref 14–44)
MCH RBC QN AUTO: 30.3 PG (ref 26.8–34.3)
MCHC RBC AUTO-ENTMCNC: 32.6 G/DL (ref 31.4–37.4)
MCV RBC AUTO: 93 FL (ref 82–98)
MONOCYTES # BLD AUTO: 0.56 THOUSAND/ÂΜL (ref 0.17–1.22)
MONOCYTES NFR BLD AUTO: 11 % (ref 4–12)
NEUTROPHILS # BLD AUTO: 2.72 THOUSANDS/ÂΜL (ref 1.85–7.62)
NEUTS SEG NFR BLD AUTO: 51 % (ref 43–75)
NRBC BLD AUTO-RTO: 0 /100 WBCS
PLATELET # BLD AUTO: 195 THOUSANDS/UL (ref 149–390)
PMV BLD AUTO: 10.7 FL (ref 8.9–12.7)
POTASSIUM SERPL-SCNC: 4.2 MMOL/L (ref 3.5–5.3)
PROT SERPL-MCNC: 6.7 G/DL (ref 6.4–8.4)
PTH-INTACT SERPL-MCNC: 124.9 PG/ML (ref 12–88)
RBC # BLD AUTO: 4.69 MILLION/UL (ref 3.81–5.12)
SODIUM SERPL-SCNC: 140 MMOL/L (ref 135–147)
TRIGL SERPL-MCNC: 94 MG/DL
WBC # BLD AUTO: 5.29 THOUSAND/UL (ref 4.31–10.16)

## 2024-03-25 PROCEDURE — 80061 LIPID PANEL: CPT

## 2024-03-25 PROCEDURE — 82306 VITAMIN D 25 HYDROXY: CPT

## 2024-03-25 PROCEDURE — 80053 COMPREHEN METABOLIC PANEL: CPT

## 2024-03-25 PROCEDURE — 85025 COMPLETE CBC W/AUTO DIFF WBC: CPT

## 2024-03-25 PROCEDURE — 36415 COLL VENOUS BLD VENIPUNCTURE: CPT

## 2024-03-25 PROCEDURE — 83970 ASSAY OF PARATHORMONE: CPT

## 2024-03-27 ENCOUNTER — APPOINTMENT (OUTPATIENT)
Dept: PHYSICAL THERAPY | Facility: REHABILITATION | Age: 79
End: 2024-03-27
Payer: COMMERCIAL

## 2024-03-27 ENCOUNTER — OFFICE VISIT (OUTPATIENT)
Dept: FAMILY MEDICINE CLINIC | Facility: CLINIC | Age: 79
End: 2024-03-27
Payer: COMMERCIAL

## 2024-03-27 VITALS
SYSTOLIC BLOOD PRESSURE: 128 MMHG | WEIGHT: 168.4 LBS | HEIGHT: 68 IN | OXYGEN SATURATION: 97 % | TEMPERATURE: 98.7 F | HEART RATE: 75 BPM | RESPIRATION RATE: 16 BRPM | DIASTOLIC BLOOD PRESSURE: 78 MMHG | BODY MASS INDEX: 25.52 KG/M2

## 2024-03-27 DIAGNOSIS — R19.7 DIARRHEA, UNSPECIFIED TYPE: Primary | ICD-10-CM

## 2024-03-27 DIAGNOSIS — K58.0 IRRITABLE BOWEL SYNDROME WITH DIARRHEA: ICD-10-CM

## 2024-03-27 DIAGNOSIS — R35.0 URINARY FREQUENCY: ICD-10-CM

## 2024-03-27 LAB
SL AMB  POCT GLUCOSE, UA: NEGATIVE
SL AMB LEUKOCYTE ESTERASE,UA: NEGATIVE
SL AMB POCT BILIRUBIN,UA: NEGATIVE
SL AMB POCT BLOOD,UA: ABNORMAL
SL AMB POCT CLARITY,UA: ABNORMAL
SL AMB POCT COLOR,UA: YELLOW
SL AMB POCT KETONES,UA: NEGATIVE
SL AMB POCT NITRITE,UA: NEGATIVE
SL AMB POCT PH,UA: 6
SL AMB POCT SPECIFIC GRAVITY,UA: 1.02
SL AMB POCT URINE PROTEIN: ABNORMAL
SL AMB POCT UROBILINOGEN: NORMAL

## 2024-03-27 PROCEDURE — 99214 OFFICE O/P EST MOD 30 MIN: CPT | Performed by: FAMILY MEDICINE

## 2024-03-27 PROCEDURE — 81002 URINALYSIS NONAUTO W/O SCOPE: CPT | Performed by: FAMILY MEDICINE

## 2024-03-27 PROCEDURE — 87086 URINE CULTURE/COLONY COUNT: CPT | Performed by: FAMILY MEDICINE

## 2024-03-27 RX ORDER — DICYCLOMINE HYDROCHLORIDE 10 MG/1
10 CAPSULE ORAL 3 TIMES DAILY PRN
Qty: 30 CAPSULE | Refills: 0 | Status: SHIPPED | OUTPATIENT
Start: 2024-03-27

## 2024-03-27 NOTE — PROGRESS NOTES
Assessment/Plan:    Diarrhea, unspecified type  - discussed fluids, BRAT diet, will obtain stool studies and call with results once available, encouraged to follow up for persistent or worsening symptoms  - Stool Enteric Bacterial Panel by PCR; Future  - Clostridium difficile toxin by PCR with EIA; Future  - Ova and parasite examination; Future    Irritable bowel syndrome with diarrhea  - advised to take Bentyl as needed and start probiotic Align  - dicyclomine (BENTYL) 10 mg capsule; Take 1 capsule (10 mg total) by mouth 3 (three) times a day as needed (stomach cramps)  Dispense: 30 capsule; Refill: 0    Urinary frequency  - urine dip positive for small blood and negative for leukocytes or nitrite, will send for urine culture and call with results  - Urine culture          Return as scheduled or sooner as needed.   The patient understands and agrees with the treatment plan.      Subjective:   Chief Complaint   Patient presents with    GI Problem     Loose stools & stomachache for 1 week  Along with cramps    Urinary Frequency      Patient ID: Nella Yancey is a 78 y.o. female who presents today with c/o loose stools with abdominal cramps for about 1.5 weeks, no blood or mucus in stool, no fever, no nausea or vomiting, no change in her appetite, no recent travel. Patient recently took cephalexin for leg cellulitis which she completed on 3/8/24.  Patient also reports urinary frequency and urgency for the past few days, no pain or burning with urination, no hematuria, no vaginal discharge.         The following portions of the patient's history were reviewed and updated as appropriate: allergies, current medications, past family history, past medical history, past social history, past surgical history and problem list.    Past Medical History:   Diagnosis Date    Anxiety     Arthritis     Back pain     Balance problems     Chest pain     heaviness    Colon polyp     Difficulty swallowing     Dizziness     Dry  cough     Gait disorder     GERD (gastroesophageal reflux disease)     Hyperparathyroidism (HCC)     Hypertension     Increased frequency of headaches     Migraines     Neck pain     Numbness and tingling     bles    Palpitations     Pericarditis     Thyroid disease     nodule    Trouble in sleeping     Wears glasses      Past Surgical History:   Procedure Laterality Date    APPENDECTOMY      CHOLECYSTECTOMY      laparoscopic    COLONOSCOPY      KNEE SURGERY Left     PERICARDIAL WINDOW      MA OPTX DSTL RADL X-ARTIC FX/EPIPHYSL SEP Right 03/22/2018    Procedure: OPEN REDUCTION W/ INTERNAL FIXATION (ORIF) RADIUS, splint application;  Surgeon: Kandice Kimble MD;  Location: [facility];  Service: Orthopedics    MA PARATHYROIDECTOMY/EXPLORATION PARATHYROIDS N/A 08/17/2022    Procedure: PARATHYROIDECTOMY, 4 GLAND EXPLORATION;  Surgeon: Ovidio Perkins MD;  Location: [facility];  Service: ENT    SKIN BIOPSY      UPPER GASTROINTESTINAL ENDOSCOPY       Family History   Problem Relation Age of Onset    Kidney failure Mother     Hypertension Mother     Lung cancer Father     Yosef Parkinson White syndrome Daughter     No Known Problems Sister     Substance Abuse Neg Hx     Alcohol abuse Neg Hx     Mental illness Neg Hx      Social History     Socioeconomic History    Marital status: /Civil Union     Spouse name: Not on file    Number of children: Not on file    Years of education: Not on file    Highest education level: Not on file   Occupational History    Not on file   Tobacco Use    Smoking status: Never    Smokeless tobacco: Never   Vaping Use    Vaping status: Never Used   Substance and Sexual Activity    Alcohol use: Not Currently     Comment: Rarely     Drug use: No    Sexual activity: Yes     Partners: Male   Other Topics Concern    Not on file   Social History Narrative    WORK:    1.  [employer] ([employer]; [employer]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    2.  [employer]        HOBBIES:    1.  Singing    2.   Dancing    3.  Hx of ceramics (+ dust)        PETS:    1.  Dogs    -  Denies birds        TRAVEL:    -  [place]        EXPOSURES:    Denies mold; down pillows/comforters; hot tubs     Social Determinants of Health     Financial Resource Strain: Low Risk  (10/30/2023)    Overall Financial Resource Strain (CARDIA)     Difficulty of Paying Living Expenses: Not very hard   Food Insecurity: Not on file   Transportation Needs: No Transportation Needs (10/30/2023)    PRAPARE - Transportation     Lack of Transportation (Medical): No     Lack of Transportation (Non-Medical): No   Physical Activity: Not on file   Stress: Not on file   Social Connections: Not on file   Intimate Partner Violence: Not on file   Housing Stability: Not on file       Current Outpatient Medications:     acetaminophen (TYLENOL) 500 mg tablet, Take 500-1,000 mg by mouth every 6 (six) hours as needed for mild pain, Disp: , Rfl:     albuterol (PROVENTIL HFA,VENTOLIN HFA) 90 mcg/act inhaler, Inhale 2 puffs every 6 (six) hours as needed for wheezing or shortness of breath, Disp: 8 g, Rfl: 1    ALPRAZolam (XANAX) 0.5 mg tablet, Take 1 tablet (0.5 mg total) by mouth daily at bedtime as needed for anxiety or sleep, Disp: 20 tablet, Rfl: 1    ascorbic acid (VITAMIN C) 500 mg tablet, Take 500 mg by mouth daily, Disp: , Rfl:     b complex vitamins tablet, Take 1 tablet by mouth daily, Disp: , Rfl:     Cholecalciferol (Vitamin D-3) 25 MCG (1000 UT) CAPS, Take 1,000 Units by mouth 2 (two) times a day, Disp: , Rfl:     Magnesium 300 MG CAPS, Take 300 mg by mouth in the morning, Disp: , Rfl:     metoprolol succinate (TOPROL-XL) 25 mg 24 hr tablet, Take 1 tablet (25 mg total) by mouth daily at bedtime, Disp: 90 tablet, Rfl: 3    Multiple Vitamins-Minerals (MULTIVITAMIN ADULTS 50+ PO), Take by mouth, Disp: , Rfl:     Nutritional Supplements (OSAPLEX MK-7 PO), Take by mouth, Disp: , Rfl:     omeprazole (PriLOSEC) 20 mg delayed release capsule, Take 1 capsule  "(20 mg total) by mouth daily before breakfast Half an hour prior to breakfast and half an hour prior to dinner, Disp: 180 capsule, Rfl: 3    Riboflavin (VITAMIN B-2 PO), Take 250 mg by mouth in the morning, Disp: , Rfl:     dicyclomine (BENTYL) 10 mg capsule, TAKE 1 CAPSULE BY MOUTH 4 TIMES A DAY (BEFORE MEALS AND AT BEDTIME) (Patient not taking: Reported on 1/4/2024), Disp: 360 capsule, Rfl: 1    Review of Systems   Constitutional:  Negative for appetite change, chills, fatigue, fever and unexpected weight change.   Respiratory:  Negative for cough, shortness of breath and wheezing.    Cardiovascular:  Negative for chest pain, palpitations and leg swelling.   Gastrointestinal:  Positive for diarrhea. Negative for blood in stool, constipation, nausea and vomiting.        As per HPI   Genitourinary:  Positive for frequency and urgency. Negative for difficulty urinating, dysuria, flank pain, hematuria and vaginal discharge.   Neurological:  Negative for dizziness and headaches.           Objective:    Vitals:    03/27/24 1440   BP: 128/78   Pulse: 75   Resp: 16   Temp: 98.7 °F (37.1 °C)   SpO2: 97%   Weight: 76.4 kg (168 lb 6.4 oz)   Height: 5' 8\" (1.727 m)        Physical Exam  Constitutional:       General: She is not in acute distress.     Appearance: She is well-developed.   Neck:      Thyroid: No thyroid mass or thyromegaly.   Cardiovascular:      Rate and Rhythm: Normal rate and regular rhythm.      Heart sounds: Normal heart sounds. No murmur heard.  Pulmonary:      Effort: Pulmonary effort is normal. No respiratory distress.      Breath sounds: Normal breath sounds. No wheezing, rhonchi or rales.   Abdominal:      General: There is no distension.      Palpations: Abdomen is soft. There is no mass.      Tenderness: There is no abdominal tenderness. There is no right CVA tenderness, left CVA tenderness, guarding or rebound.   Musculoskeletal:      Cervical back: Neck supple.      Right lower leg: No edema.      " Left lower leg: No edema.   Lymphadenopathy:      Cervical: No cervical adenopathy.   Neurological:      Mental Status: She is alert and oriented to person, place, and time.   Psychiatric:         Mood and Affect: Mood normal.         Behavior: Behavior normal.        Component 3/27/24  3:19 PM   LEUKOCYTE ESTERASE,UA negative   NITRITE,UA negative   SL AMB POCT UROBILINOGEN normal   POCT URINE PROTEIN trace    PH,UA 6.0   BLOOD,UA small   SPECIFIC GRAVITY,UA 1.020   KETONES,UA negative   BILIRUBIN,UA negative   GLUCOSE, UA negative    COLOR,UA yellow   CLARITY,UA cloudy

## 2024-03-28 LAB — BACTERIA UR CULT: NORMAL

## 2024-03-29 ENCOUNTER — OFFICE VISIT (OUTPATIENT)
Dept: NEUROLOGY | Facility: CLINIC | Age: 79
End: 2024-03-29
Payer: COMMERCIAL

## 2024-03-29 ENCOUNTER — APPOINTMENT (OUTPATIENT)
Dept: LAB | Facility: CLINIC | Age: 79
End: 2024-03-29
Payer: COMMERCIAL

## 2024-03-29 VITALS
DIASTOLIC BLOOD PRESSURE: 68 MMHG | BODY MASS INDEX: 25.46 KG/M2 | SYSTOLIC BLOOD PRESSURE: 143 MMHG | TEMPERATURE: 96.9 F | HEART RATE: 62 BPM | HEIGHT: 68 IN | WEIGHT: 168 LBS

## 2024-03-29 DIAGNOSIS — H81.12 BENIGN PAROXYSMAL POSITIONAL VERTIGO OF LEFT EAR: Primary | ICD-10-CM

## 2024-03-29 DIAGNOSIS — G43.709 CHRONIC MIGRAINE WITHOUT AURA WITHOUT STATUS MIGRAINOSUS, NOT INTRACTABLE: ICD-10-CM

## 2024-03-29 DIAGNOSIS — R19.7 DIARRHEA, UNSPECIFIED TYPE: ICD-10-CM

## 2024-03-29 PROCEDURE — 99215 OFFICE O/P EST HI 40 MIN: CPT | Performed by: PSYCHIATRY & NEUROLOGY

## 2024-03-29 PROCEDURE — G2211 COMPLEX E/M VISIT ADD ON: HCPCS | Performed by: PSYCHIATRY & NEUROLOGY

## 2024-03-29 PROCEDURE — 87209 SMEAR COMPLEX STAIN: CPT

## 2024-03-29 PROCEDURE — 87493 C DIFF AMPLIFIED PROBE: CPT

## 2024-03-29 PROCEDURE — 87505 NFCT AGENT DETECTION GI: CPT

## 2024-03-29 PROCEDURE — 87177 OVA AND PARASITES SMEARS: CPT

## 2024-03-29 NOTE — PROGRESS NOTES
[facility] Consult - Follow up   PATIENT:  Nella Yancey  MRN:  [id number]  :  1945  DATE OF SERVICE:  3/29/2024  REFERRED BY: No ref. provider found  PMD: Cecelia Rodriguez MD    Assessment/Plan:   Nella Yancey is a 78 y.o. female with a past medical history of hyperparathyroidism status post adenoma removal, IBS, GERD, pericarditis, heart murmur, chronic gait instability, chronic neck pain (following with physiatry), vitamin-D deficiency, insomnia, depression, anxiety, BPPV referred here for evaluation of headache.   Initial evaluation by me 2018    Chronic Migraine without aura without status migrainosus, not intractable  Features of idiopathic intracranial hypertension on imaging without papilledema not meeting criteria for IIH  Suspected sleep apnea  Benign paroxysmal positional vertigo of left ear   She has had migraines since childhood, she has followed with neurologist in the past back in [place].  She moved here to be closer to her daughter and granddaughter in the area. She has multiple types of headaches,  bioccipital pain, mid throbbing pain. Migraine is without aura and has associated nausea sometimes vomiting, stiff and sore neck, problems with concentration, photophobia, phonophobia, osmophobia, sometimes nasal congestion, lightheadedness.  Migraines worse with any movement.   - as of 2019 she reports headaches are significantly improved with lifestyle interventions  - as of 2019:  headaches 6 times a month and go away with ibuprofen.  She has not followed up with eye doctor but has an appointment in July.  - as of 2019: as of 19: headaches 6-8 a month - often in the am or with anxiety/stress - these she can breathe through it. Morning headaches the last 4-5 days, goes away with coffee usually, if not goes away with ibuprofen. Last big migraine 9 months or so ago   - As of 10/9/2019:  headaches - has not had in quite some  time, around 8 a month and go away with ibuprofen, occasional sharp pains randomly for 5 minutes on various locations on head 1-2 times a week. No migraines in a year. - she is taking magnesium and riboflavin - she thinks this may be helping  - as of 03/2/2020: no longer having significant headaches, occasional am headaches that improve with coffee, taking mg and B2 for prevention  - as of 6/16/2021: She reports 1-2 months of near daily headaches that may last throughout the day, new type of stabbing headache and new onset left visual changes that lasted 4 days and resolved. Continue baby asa for now, ordered MRI Brain. Trial of gabapentin if she would like that may help with multiple things. Follow up with endocrine since hyperparathyroidism may be causing a lot of problems.   - as of 11/12/2021: she reports headaches have improved to 4-5 days per week with mostly tension features and certainly could be related to stress or hyperparathyroidism for which she is following with endocrine. Also dealing with other symptoms concerning for hyperparathyroid. Taking supplements for headache prevention. Did not try gabapentin as she does not want to take prescription meds for her headaches. MRI Brain did not show any new or concerning findings.   - as of 5/17/2022: She reports she is getting about 4-5 significant migraines a month and she is now willing to try abortive medication for this so will prescribe rizatriptan 10 mg as needed.  She is not interested in prescription preventative.  - as of 10/26/2022: She reports headaches are about the same, come in waves, will wake up with headaches for weeks at a time, then other times not for days or weeks. Often related to stress. Even if they are bad they typically go away with tylenol in max 2 hours, occasionally 4-5 days a month last longer and has not tried rizatriptan for that yet.  She is not interested in prescription preventative at this time.  Recommended following back  up with physiatry for neck pain as trigger point injections may help with neck pain and possibly headaches once neck pain is improved.  - as of 2/1/2023: She reports headaches are daily and wakes up with them and I again recommended evaluation for sleep apnea contributing and ordered sleep study.  She is not currently interested in prescription preventative and did not yet try rizatriptan which I suggested holding off at this time until migraines are more episodic again or if she has acute episode that she did not wake up with.  She was prescribed trazodone for insomnia and she would like to start with this first since she has not tried it and it could help with mood, stress, sleep and therefore headaches tolerated.  We discussed if you were to consider preventative medication for myself I would recommend gabapentin.  Also agree that with the primary hyperparathyroidism still present, having repeat surgery would be my recommendation if it is what Dr. Perkins recommends, but is otherwise the hypercalcemia can continue to contribute to headaches and other morbidity issues as well.  - as of 5/10/2023: She has had improvement in headaches since last visit and reports she only seems to get them now when stressed.  She continues to take headache preventative supplements and not interested in additional preventative med.  She reports if headache becomes significant enough she may take acetaminophen which helps once a week.  Not tried rizatriptan.  Sleep study scheduled for September as we discussed the 4 to 5 hours of sleep she is getting is the most modifiable factor contributing to many of her symptoms.  She continues to follow with ENT and endocrinology for primary hyperparathyroidism and getting second opinion next week as this is also likely contributing to multiple symptoms.  - as of 9/27/2023: She reports 3-4 headaches a week that are typically there upon awakening and we discussed I suspect it is due to slight  increased intracranial pressure that I see features of on imaging again highly encouraged her to schedule sleep study as I suspect sleep disorder is contributing to this as is potentially primary hyperparathyroidism.  Thankfully headaches typically resolve spontaneously, otherwise typically with acetaminophen and if not caffeine can help prior to bed.  We discussed imaging results and medication options and she would rather not add at this time.  She is hesitant to get sleep study due to fear of staying alone in the building.  - as of 3/29/2024: She reports headaches wax and wane in a mild fashion and sometimes she wakes up with them and we discussed I still would highly recommend sleep study to further evaluate for sleep disorder contributing to this and she will consider but is not currently interested due to feeling overwhelmed by too many things.  Headaches when they do come which is hard and MRIs for her typically either self resolve with rest or acetaminophen and she is not currently interested in prescription preventative or abortive medications for headache.  She returns with 3 weeks of positional vertigo with positive [place]-Hallpike testing to the left today and Epley maneuver performed with improvement/resolution of symptoms at least today.  Placed referral to physical therapy so that she is established with them in case it were to return and need help.     Workup  -  MRI brain without contrast 07/19/2021: White matter changes suggestive of chronic microangiopathy.  No acute intracranial pathology.*We discussed as of my retrospective review 9/27/2023 she does have partially empty sella, she has prominent optic nerve sheath bilaterally with some tortuosity bilaterally, cerebellar tonsils overlay the foramen magnum   - MRI Brain without contrast  7/9/19: No acute intracranial abnormality. Mild nonspecific cerebral white matter signal abnormality, which can be seen in patients with migraines as well as  microangiopathic disease.  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  This may represent an anatomic variation.  If concerned of vertebrobasilar insufficiency, consider follow-up CTA or contrast enhanced MRA imaging.   - MRA head and neck/carotids 09/30/2019: No large vessel flow restrictive disease.  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  This could be reevaluated with CTA. -> per NSGY This requires no treatment and no further imaging is required either.    - Minor short segment stenosis of the dominant right V1 origin.  Mild long segment narrowing of the origin and nondominant left v1.  Anterior circulation normal.   - B12 3/10/20 721        Preventive:  - We discussed headache hygiene and lifestyle interventions that may improve her headaches   - she is not interested in prescription preventative at this time.   - headache preventative supplements including magnesium,  riboflavin - takes B complex, MVI  - through other providers:  xanax 1-2 times a week for insomnia, metoprolol 25 mg nightly  - past/failed/contraindicated: metoprolol, propranolol contraindicated due to interaction with current medications   -  future options: Topiramate, acetazolamide/Diamox, gabapentin, venlafaxine, CGRP med, botox      Abortive:  - discussed not taking over-the-counter or prescription pain medications more than 3 days per week to prevent medication overuse/rebound headache  - trial of rizatriptan prescribed in the past and she never took it. Discussed proper use, possible side effects and risks.   - past/contraindicated: fiorinal with codeine years ago  - past/helped: Toradol IM has worked for in the past  - future options:  Alternative Triptan, prochlorperazine, Toradol IM or p.o., could consider trial for 5 days of Depakote or dexamethasone for prolonged migraine, ubrelvy, reyvow, nurtec    Benign paroxysmal positional vertigo of left ear  -     Ambulatory Referral to Physical Therapy;  Future    Insomnia  She reports chronic insomnia and that she currently gets less than 4 hr a night of sleep.  At initial visit 11/2018 we discussed sleep hygiene and she has stopped drinking coffee late at night which has helped some.  She is wondering if a referral to sleep medicine would be indicated and I am happy to refer her.  Likely multifactorial including possible psychogenic component versus other sleep disorder as well.  - as of 06/03/2019:  Patient reports she did not go to sleep medicine appointment and tried cutting coffee and tea at dinner which helps somewhat  - as of 7/31/19: sleep sometimes good - was good for a while after starting mg, sometimes wakes up at night again now, possible symptoms of RLS and still only averaging 4-5 - will have her see  Sleep med   - as of 10/09/2019:  Did not follow up with sleep medicine as recommended  - as of 3/2/2020 reports she plans to follow up with sleep med soon, may have RLS? Or other sleep disorder.  -   Saw Sleep Medicine 06/22/2021 who felt no PSG was indicated - Psychophysiologic insomnia. Recommended sleep hygiene, relaxation techniques, consider CBT-I.   - as of 2/1/2023: I again recommended sleep medicine follow-up after sleep study for insomnia with a different sleep provider.   - as of 5/10/2023: Sleep study scheduled for 9/21/2023  - as of 9/27/2023: She is nervous to sleep over in the place and canceled it and we discussed in great detail the morbidity and mortality if remains untreated and she will consider       Numbness and tingling of both lower extremities  - EMG/NCS 6/24/20: Mildly abnormal study.  The reduced amplitudes noted on the eroneal  marker studies from extensor digitorum brevis muscles is likely due to reduced muscle bulk of muscles.  The low amplitudes noted on the sensory studies in the lower extremities can be considered normal for this patient's age.  These changes can be considered normal for this patient's age consider normal  "sural sensory response on the right and normal tibial responses, or could suggest a very mild axonal sensorimotor neuropathy.  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  To note, patient has history of remote carpal tunnel surgeries, could be remnant of prior surgery.  - as of 9/27/2023: She reports bilateral shin numbness for the past year or so wax and wanes and is painful to touch in the shin region and tingling at times.  She reports discussing this with PCP, plans to discuss with cardiology as well and endocrinology on Monday but wanted to mention.  We discussed can absolutely repeat EMG/NCS  - EMG/NCS 12/8/23   This study is abnormal with minimal progression since the previous study of 2020. The superficial peroneal SNAPs are now absent; the left sural SNAP is absent with a prolonged right sural peak latency. H reflexes are prolonged bilaterally. Motor conduction studies are Nella Yancey 3 of 4 significant only for a low left peroneal CMAP amplitudes. 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   - as of 3/29/2024: Reached out to our neuromuscular specialist just to make sure she does not recommend any further evaluation or workup or other recommendations for these findings    Patient instructions         - consider the book \"Rewire Your Anxious Brain: How to Use the Neuroscience of Fear to End Anxiety, Panic, and Worry\" By Lillian PhD    Follow up with Dr. Perkins for the surgery and endocrinology - PTH remains high     Safety/fall precautions     Once sleep study is resulted I recommend please call [phone] to schedule a follow up visit  with one of the following providers:  Ovidio Hastings PA-C       Headache/migraine treatment:   Abortive medications (for immediate treatment of a " headache):   It is ok to take ibuprofen, acetaminophen or naproxen (Advil, Tylenol,  Aleve, Excedrin) if they help your headaches you should limit these to No more than 3 times a week to avoid medication overuse/rebound headaches.     For your more moderate to severe migraines take this medication early   Maxalt (rizatriptan) 10mg tabs - take one at the onset of headache. May repeat one time after 1-2 hours if pain has not resolved.   (Max 2 a day and 9 a month)       Over the counter preventive supplements for headaches/migraines   (to take every day to help prevent headaches - not to take at the time of headache):  [x] Magnesium 400 - 500 mg daily (If any diarrhea or upset stomach, decrease dose  as tolerated)  [x] Riboflavin (Vitamin B2)  400mg daily   (FYI B2 may make your urine bright/neon yellow)     Prescription preventive medications for headaches/migraines   (to take every day to help prevent headaches - not to take at the time of headache):  [x]  we have options if you ever wanted - like gabapentin     *Typically these types of medications take time untill you see the benefit, although some may see improvement in days, often it may take weeks, especially if the medication is being titrated up to a beneficial level. Please contact us if there are any concerns or questions regarding the medication.      Lifestyle Recommendations:  [x] SLEEP - Maintain a regular sleep schedule: Adults need at least 7-8 hours of uninterrupted a night. Maintain good sleep hygiene:  Going to bed and waking up at consistent times, avoiding excessive daytime naps, avoiding caffeinated beverages in the evening, avoid excessive stimulation in the evening and generally using bed primarily for sleeping.  One hour before bedtime would recommend turning lights down lower, decreasing your activity (may read quietly, listen to music at a low volume). When you get into bed, should eliminate all technology (no texting, emailing, playing  with your phone, iPad or tablet in bed).  [x] HYDRATION - Maintain good hydration.  Drink  2L of fluid a day (4 typical small water bottles)  [x] DIET - Maintain good nutrition. In particular don't skip meals and try and eat healthy balanced meals regularly.  [x] TRIGGERS - Look for other triggers and avoid them: Limit caffeine to 1-2 cups a day or less. Avoid dietary triggers that you have noticed bring on your headaches (this could include aged cheese, peanuts, MSG, aspartame and nitrates).     Education and Follow-up  [x] Please call with any questions or concerns. Go to the ED with any new or concerning symptoms  [x] Follow up 3-6 months, sooner if needed       CC:   We had the pleasure of evaluating Nella Yancey in neurological consultation today. she is a right handed female who presents today for evaluation of headaches.      History of Present Illness:   Interval history as of 3/29/2024  - no significant new or concerning neurologic symptoms since last visit   - osteoporosis shot yearly in Jan   - sleep study not scheduled as recommended, vertigo happens the most at night, sleeps on right side with legs up   - getting PT for legs and balance and it is helping   - following with PCP for constipation and then diarrhea   - no longer having the severe pain in her shins    Headaches and migraines   She reports headaches and migraines are stable, hard to estimated number  For a while she was waking up with them every day and still has not obtained the sleep study for suspected sleep disorder  Tries to stay calm     Vertigo - positional,  Had maybe 3 weeks  Worse laying flat, positional, every time she turns over it starts, left ear  Feels better     Preventative:   - she is not interested in prescription preventative at this time.   - headache preventative supplements including magnesium,  riboflavin   - through other providers:  xanax 1-2 times a week for insomnia, metoprolol 25 mg nightly    Abortive:    headaches typically resolve spontaneously, otherwise typically with 2 acetaminophen and if not caffeine can help prior to bed  -Trial of rizatriptan-never took    - EMG/NCS 12/8/23   This study is abnormal with minimal progression since the previous study of 2020. The superficial peroneal SNAPs are now absent; the left sural SNAP is absent with a prolonged right sural peak latency. H reflexes are prolonged bilaterally. Motor conduction studies are Nella Yancey [id number] 12/8/2023 8:44 AM 3 of 4 significant only for a low left peroneal CMAP amplitudes. 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     Interval history as of 9/27/2023  - no significant new or concerning neurologic symptoms since last visit   - waiting on mammogram results   - lost 6 pounds   - following with endocrine on Monday   - has not obtained sleep study   - bilateral shin numbness for the past year or so, waxes and wanes and is painful to touch and numb and tingling at times, painful bilateral shins - going to see cardiology next week and endo on Monday     Headaches and migraines   3-4 times a week   When she wakes up can have headaches and then goes to bathroom and may take omeprazole and then sometimes it goes away and sometimes it doesn't and other times takes two tylenol and oftentimes at work and if not by the time she goes to bed she will then have coffee and usually that we will get rid of it by the morning    Preventative:   - continue headache preventative supplements including magnesium,  riboflavin   - through other providers: trazodone as needed for sleep - tried 25 mg once and it didn't help so stopped, xanax 1-2 times a week or less at bedtime, metoprolol  25 mg daily bedtime    Abortive:   OTC meds  Denies bothersome side effects     Interval history as of 5/10/2023  - no significant new or concerning neurologic symptoms since last visit   -Sleep  study scheduled for 9/21/2023, maybe 4-5 hours of sleep a night...  -Parathyroid scan 3/24/2023 was normal, then saw Endo Dr Zepeda and she discussed with Dr Perkins and they are holding off on repeat surgery right now, since PTH is not extremely high  - seeing another surgeon from [facility] in next week  - following with physiatry - Dr Depadua and has TPI available     Headaches and migraines   Much better, maybe takes tylenol once a week for pain  She reports she gets headaches occasionally and takes acetaminophen right when they start  Now only seems to get them when upset, less often   Previously with heat, so we will see if worsens in summer  They were both sick after Easter with a URI  If she gets upset or overwhelmed, tremulous hands and feels shaky inside     Preventative:   - continue headache preventative supplements including magnesium,  riboflavin   - through other providers: trazodone as needed for sleep - has not tried yet, but plans to start with 25 mg, xanax 1-2 times a week, metoprolol  25 mg    Abortive:   - if feels a headache coming on, tries to relax and think of something else, time alone,helps often  - acetaminophen/tylenol helps some  - trial of rizatriptan prescribed in the past and she never took it    Interval history as of 2/1/2023  -11/29/2022 she followed up with ENT Dr. Perkins for primary hyperparathyroidism and unfortunately PTH remains elevated suggesting left over hypercellular tissue and he discussed options including second opinion, revision surgery.  Followed up again 1/10/2023 with Dr. Perkins and he recommended follow-up with endocrinology, nuc med parathyroid scan  - bacterial PNA a month ago   - following up with physiatry next week    - sleep is terrible   -Struggles with reflux especially at night, I suspect sleep apnea, sleep doctor refused to order sleep study in 2021   - one morning woke up with heart racing so fast   How likely are you to doze off or fall sleep during the  following situations?  0 - would never doze  1 - slight chance of dozing  2 - moderate chance of dozing  3 - high chance of dozing  [place] sleepiness scale  Sitting and reading - 2  Watching TV - 3  Sitting, inactive in a public place such as a theater 2  As a passenger in a car for an hour without a break 1  Lying down to rest in afternoon when circumstances permit 3  Sitting and talking to someone 1  Sitting quietly after lunch without alcohol 2  In a car while stopped for few minutes in traffic -0      Headaches and migraines   - wake up with them daily lately for months  - bifrontal and cervicogenic     Preventative:   - continue headache preventative supplements including magnesium,  riboflavin   - through other providers: trazodone as needed for sleep - has not tried, xanax for anxiety, metoprolol increased from 12.5 mg to 25 mg    Abortive:   - trial of rizatriptan prescribed in the past and she never took it.     Interval history as of 10/26/2022  - denies any new or concerning neurologic symptoms since last visit   - no return of vertigo  - neck pain   - had parathyroid surgery in August 2022  - still poor sleep, falls asleep ok around 10, wakes at sometimes btw 2-4 am and often is able to go back to sleep, wakes again around 7     Headaches and migraines   Improvement since last visit, headaches seemed to go a way for a while and then lately returned, seems to come in spurts, and then may not have for days or weeks, come more with stressors   - Headaches the past few weeks when she wakes that are 8/10 bifrontal with out migrainous features and resolve with tylenol 500 mg within 2 hours   - wakes up in the am with neck pain, left shoulder pain and headache (saw sleep med and they did not order study, saw physiatry for neck pain, did not follow up)   - also increased stressors related to has been falling    Preventative:  Magnesium and riboflavin  - through other providers  trazodone as needed for sleep,  xanax for anxiety, metoprolol 12.5    Abortive:   - ES acetaminophen/Tylenol typically helps  -trial of rizatriptan - Did not try yet    For neck saw physiatry 5/24/22 - has not tried methocarbamol yet, PT did not help -     Interval history as of 5/17/2022  - walking more, sleeping better often     Headaches and migraines   - Bad migraines 4-5 days a month where she has to lay in bed all day, they can last 1-2 days, not responsive to OTC meds and now willing to try abortive.  - stress can trigger them  - also sometimes pain left parietal scalp bone region, pushing on the region helps    - Saw Dr. Perkins in ENT for hyperparathyroidism   - saw endocrine with us and wanted another opinion Dr. [facility]     Preventative: -  Magnesium and Riboflavin  - through other providers: trazodone as needed for sleep, xanax for anxiety, metoprolol   Abortive: - Tylenol or advil dont often work     Interval history as of 11/12/2021  - denies any new or concerning neurologic symptoms since last visit  -  MRI brain without contrast 07/19/2021: White matter changes suggestive of chronic microangiopathy.  No acute intracranial pathology.  - Was seen by Sleep Med on 6/22/21. Psychophysiologic insomnia. Recommended sleep hygiene, relaxation techniques, consider CBT-I.     Headaches   Having 4-5 headaches per week. Almost always bifrontal or bitemporal. Always on both sides. Headaches can last all day, usually better with Advil or Tylenol. No migraine symptoms.  Not sleeping well. Having significant stress.    Preventative: Magnesium and Riboflavin   - Did not try gabapentin as she does not like the idea of taking a medication every day to present a headache    Abortive: Tylenol or advil    Reports dizziness that happens when she bend over forward.   chronic neck pain.  Being worked up for thyroid/parathyroid. - Ca is high, PTH is high   Knee pain, currently in PT for gait      Interval history as of 6/16/2021  - following with eye  doctor  - PTH high 3/10/20, she has had high calcium for years it appears, recently had bone density shows major issues - she sees endocrine this month - we discussed hyperparathyroidism can cause headaches...  Dr. Storey outside of [facility]  - 3 weeks ago had eye issue In left eye -  fireworks and line across for 4 days 5/2021, thinks it was just left eye, no headache associated, has not happened again - eye exam ok recently although she says he did not do a full exam     No vertigo episodes     Headaches and migraines   Almost daily headaches for the past 2 months  - bifrontal, bitemporal, apex - always both sides, sometimes lasts all day, if takes advil PM or 2 tylenol ED - helps it go away. No migraine symptoms  Also may get sharp pains at different points in head for 5 mins     Preventative: supplement  Abortive:   if takes advil PM or 2 tylenol ED - helps it go away.     Chronic balance issues  Does not appear to be neurologic without significant findings on EMG, normal B12, no findings to suggest cause on MRI brain  -  Has worked with PT   - valgus deformity of gait - following with ortho   -  referred to physiatry - she can also discuss neck pain with them     Sleep - seeing next week     Interval history as of 03/2/2020:  - has an apmt upcoming with sleep medicine   - for about a week she was having TMJ pain and had old azithromycin and took it and it feels a little better   - then at night while laying in bed was having pain, strange feeling in her legs all the way up the legs   - wants to walk but afraid of falling   - has not had any BPPV since   - has appointment with sleep medicine   - taking mg and B2    Interval history as of 10/09/2019  -  Did not follow up with sleep medicine as recommended  - she has been following with physical therapy for gait instability/history of BPPV - also seeing them for shoulder  - getting orthotics for feet  - denies any falls     MRA head and neck 09/30/2019:  No large  vessel flow restrictive disease.     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  This could be reevaluated with CTA.   - Minor short segment stenosis of the dominant right V1 origin.  Mild long segment narrowing of the origin and nondominant left v1.  Anterior circulation normal.  - had Neurosurgery Dr. Reeder review image and there may be nothing really there, even if there is would just be surveillance indicated. Will order CTA and have he is ok with having her follow up with him to review the images/discuss results just in case         Otherwise she is doing good  - chronic lightheadedness and vertigo history - 2 weeks ago with some vertigo and went away on its own in about 5 minutes  - headaches - has not had in quite some time, occasional sharp pains randomly for 5 minutes on various locations on head 1-2 times a week   - no big migraines in a year  - she is taking magnesium and riboflavin - she thinks this may be helping     Interval history as of 7/31/19  - MRI Brain without contrast  7/9/19:   1.  No acute intracranial abnormality.  2.  Mild nonspecific cerebral white matter signal abnormality, which can be seen in patients with migraines as well as microangiopathic disease.  3.  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  This may represent an anatomic variation.  If concerned of vertebrobasilar insufficiency, consider follow-up CTA or contrast enhanced MRA imaging.      - she saw pulmonary 7/3/19  - followed up with cardiology and normal stress test per patient since last visit - they wanted to start metoprolol   -  has been in and out of the hospital for diverticulitis and then surgery for removing infected intestine so that has been taking some of her time   - daughter was pregnant and last the baby after 3 months     Lightheadedness/dizziness, h/o vertigo  Mild gait instability  Denies recent falls  *2-3 weeks of pain in the bottom of her right foot that she plans  to see a foot doctor   - also has decreased sensation bilateral top of the toes and into the top of the shins for about 6 months   - she has not followed up with PT as recommended for gait and lightheadedness  - lightheadedness occurs the most with getting up   - the denice vernon last visit improved her vertigo 6/3/19     Migraines/headaches  Morning headaches the last 4-5 days, goes away with coffee usually, if not goes away with ibuprofen  Taking deep breaths and has not been having migraines   - taking magnesium and not riboflavin      Sleep   Sleep medicine  - sometimes feels sensations in bilateral shins when in bed  - falls asleep easily with TV on, stress though has affected her some  - usually have been sleeping straight through, past week wakes up to check on  once a night     --------------------     Interval history as of 06/03/2019:  -  has been following with physical therapy and has been noticing improvement in neck pain, - - first PT visit 04/01/2019 also significantly improved dizziness following 1st treatment - the found signs of BPPV - was vertigo free up for almost 2 months   - around 5/27/19: woke up and was lying in bed when suddenly had room spinning - sat up and it went away in a 3 mins - since then every day has happened when triggered - from bending over sometimes soon   - feels like she can not walk right, hold on to  and daughter   - reports LE shin numbness      - 05/02/2019 - phone conversation with Cardiology - metoprolol 25 mg long-acting  - referred to eye doctor - apmt in July  - referred to Sleep Medicine - did not go, tried cutting coffee and tea at dinner time and sleeps fine now   - magnesium, riboflavin - reports she is taking        Interval history as of  03/18/2019  - presented to the ED 01/06/2019 with chest pain - has a stress test next week      - her biggest complaint today is trouble sleeping, chronic dizziness   - Dizziness, history of BPPV 2 years ago,  got better, scares her. Not dizzy with walking, more positional      Headaches  - never filled rizatriptan  - headaches are not really a complaint today: in a month 8 times but go away with ibuprofen. Stopped drinking caffeine at night  - was referred to physical therapy has not made an appointment  - recommended supplements including - taking magnesium, riboflavin may be in her B complex   - has joined the gym, loves walking      Plans to see eye doctor, they are tearing, no blurred or double vision            Headaches started at what age? Migraines Since childhood, didn't have them when pregnant x 2  How often do the headaches occur?   *As of 11/2018:   - migraines 3-4 times a week,   - Mild throbbing pain right scalp 3-4 times a week  - lasts hours, with advil goes away most of the time  - Occasionally for sharp pain throughout head for a second or two - once a week  - Also bilateral occipital pain 1-2 times a week    *As of 3/18/19: - headaches are not really a complaint today: in a month 8 times but go away with ibuprofen   *As of 06/03/2019: headaches 6 times a month and go away with ibuprofen  *as of 7/31/19: headaches 6-8 a month - often in the am or with anxiety/stress - these she can breathe through it   Morning headaches the last 4-5 days, goes away with coffee usually, if not goes away with ibuprofen  - As of 10/9/2019 headaches - has not had in quite some time, around 8 a month and go away with ibuprofen occasional sharp pains randomly for 5 minutes on various locations on head 1-2 times a week. No migraines in a year      What time of the day do the headaches start? no particular time of day  How long do the headaches last? 2-3 days in teenage years - now 1 full day and into the morning  Are you ever headache free? Yes  Prodrome of feeling like she is getting a headache  Aura? without aura  Describe your usual headache pain quality? throbbing  Where is your headache located? bilateral frontal area and  "bilateral occipital area, also right temporal   Does the pain Radiate? no radiation of pain  What is the intensity of pain? Up to a 10/10, at its best a 4  Associated symptoms:   [x] Nausea       [x] Vomiting - once in a while        [] Diarrhea         [] Insomnia           [x] Stiff or sore neck         [x] Problems with concentration  [x] Photophobia     [x]Phonophobia      [x] Osmophobia  [] Blurred vision   [x] Prefer quiet, dark room        [x] Light-headed or dizzy - sometimes   [] Tinnitus      Things that make the headache worse? +movement  Headache triggers:  Stress, mint,strong perfume, tobacco or fireplaces, If she goes to bed with a headache will wake up with worse one   What time of the year do headaches occur more frequently?   do not seem to be related to any time of the year  Have you seen someone else for headaches or pain? Yes, neurologist back in [place]  Have you had trigger point injection performed and how often? No  Have you had Botox injection performed and how often? No   Have you had epidural injections or transforaminal injections performed? No  Have you used CBD or THC for your headaches and how often? No  Are you current pregnant or planning on getting pregnant? No  Have you ever had any Brain imaging? yes years ago and normal     What medications do you take or have you taken for your headaches?   ABORTIVE:       - was prescribed rizatriptan but did not need it   - advil 200 mg  - my pillow seems to be helping the bioccipital      PREVENTIVE: no     Alternative therapies used in the past for headaches? Physical therapy -  For wrist, but not headache   Coffee, has one in the morning  thank out the p.m. Coffee     LIFESTYLE  Sleep - \"if I get 4 hours I am johnnie\"   - as of 06/03/2019:  Reports improved  As of 7/31/19 - avg 4-5   - sometimes feels sensations in bilateral shins when in bed  - falls asleep easily with TV on, stress though has affected her some  - usually have been " sleeping straight through, past week wakes up to check on  once a night      as of 11/2018:  Is your sleep restful? No, tired  What time do you go to bed at night? 10   What time do you wake up in am? Has coffee at 4:30 am with  and goes back to sleep, otherwise wakes up at 7 am  How often do you get up at night? once  Do you wake up with headaches? Sometimes   Do you snore while asleep? No  Have you been told that you stop breathing during sleeping? No - but makes noises  Do you wake up tired in the morning? Yes  Do you take frequent naps during the day? sometimes  Do you have jaw pain?No, but has TMJ  Do you grind/clench your teeth at night? No  Do you have restless leg syndrome? No     Physical activity:   Joined [employer]  Goes once a week  Walks with her daughter  - considering line [facility] or something [facility] [employer]   - has joined the [employer], loves walking       Water: not enough - 4 glasses      Diet:  Do you ever skip meals?  Eats well     Mood: not really, good mood   History of anxiety, sometimes takes xanax at night 0.25 mg         The following portions of the patient's history were reviewed and updated as appropriate: allergies, current medications, past family history, past medical history, past social history, past surgical history and problem list.     Family history of headaches:  migraine headaches in mother, aunt and cousins  Any family history of aneurysms - No     Work: retired, worked in [employer] in [place]      Lives with   Daughter, granddaughter live near by  Other two grandkids in [place]      Illicit Drugs: denies  Alcohol/tobacco: Denies tobacco use, alcohol intake: social drinker    Past Medical History:     left PCOM origin infundibulum  - MRA head and neck 09/30/2019: No large vessel flow restrictive disease.  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  This could be reevaluated with CTA.- Minor short segment stenosis of the dominant right V1 origin.   Mild long segment narrowing of the origin and nondominant left v1.  Anterior circulation normal.  - CTA head with without contrast 10/28/2019:  No aneurysm identified at the site of the previously noted focal dilatation in the left supraclinoid ICA at the expected origin of the left posterior communicating artery.  A left posterior communicating artery is not identified.  The focal outpouching   may therefore represent anatomic variation versus residual junctional dilatation at the site of an atretic posterior communicating artery.  - NSGY note 11/22/19 left PCOM origin infundibulum.  This requires no treatment and no further imaging is required either.        Past Medical History:   Diagnosis Date    Anxiety     Arthritis     Back pain     Balance problems     Chest pain     heaviness    Colon polyp     Difficulty swallowing     Dizziness     Dry cough     Gait disorder     GERD (gastroesophageal reflux disease)     Hyperparathyroidism (HCC)     Hypertension     Increased frequency of headaches     Migraines     Neck pain     Numbness and tingling     bles    Palpitations     Pericarditis     Thyroid disease     nodule    Trouble in sleeping     Wears glasses        Patient Active Problem List   Diagnosis    Closed fracture distal radius and ulna, right, initial encounter    Anxiety    Gastroesophageal reflux disease with esophagitis    History of pericarditis    Heart murmur    Primary hyperparathyroidism (HCC)    Thyroid nodule    Vitamin D deficiency    Atypical chest pain    Migraine without aura and without status migrainosus, not intractable    Dizziness and giddiness    Insomnia    Cervicalgia    Abnormal CT scan of lung    Irritable bowel syndrome with diarrhea    Elevated amylase and lipase    Paresthesias    Carpal tunnel syndrome of left wrist    Dysphonia    Reflux laryngitis    Paresis of left vocal fold    Glottic insufficiency    Muscle tension dysphonia    TMJ dysfunction    Pharyngoesophageal  dysphagia    Palpitation    Hallux abducto valgus, bilateral    Pincer nail deformity    Osteoporosis with current pathological fracture    Parathyroid related hypercalcemia (HCC)    Cervical myofascial pain syndrome    Edema of both ankles    Aneurysm (HCC)    Calf pain    Lung nodule    Myalgia    Pericardial effusion    SOBOE (shortness of breath on exertion)    Elevated blood-pressure reading without diagnosis of hypertension    Skin tag of anus    H/O parathyroidectomy (HCC)    [facility]) (HCC)    Primary osteoarthritis of right knee    Neuropathy    Numbness and tingling of both lower extremities    Age-related osteoporosis without current pathological fracture       Medications:      Current Outpatient Medications   Medication Sig Dispense Refill    acetaminophen (TYLENOL) 500 mg tablet Take 500-1,000 mg by mouth every 6 (six) hours as needed for mild pain      albuterol (PROVENTIL HFA,VENTOLIN HFA) 90 mcg/act inhaler Inhale 2 puffs every 6 (six) hours as needed for wheezing or shortness of breath 8 g 1    ALPRAZolam (XANAX) 0.5 mg tablet Take 1 tablet (0.5 mg total) by mouth daily at bedtime as needed for anxiety or sleep 20 tablet 1    ascorbic acid (VITAMIN C) 500 mg tablet Take 500 mg by mouth daily      b complex vitamins tablet Take 1 tablet by mouth daily      Cholecalciferol (Vitamin D-3) 25 MCG (1000 UT) CAPS Take 1,000 Units by mouth 2 (two) times a day      dicyclomine (BENTYL) 10 mg capsule Take 1 capsule (10 mg total) by mouth 3 (three) times a day as needed (stomach cramps) 30 capsule 0    Magnesium 300 MG CAPS Take 300 mg by mouth in the morning      metoprolol succinate (TOPROL-XL) 25 mg 24 hr tablet Take 1 tablet (25 mg total) by mouth daily at bedtime 90 tablet 3    Multiple Vitamins-Minerals (MULTIVITAMIN ADULTS 50+ PO) Take 1 tablet by mouth in the morning      Nutritional Supplements (OSAPLEX MK-7 PO) Take 1 tablet by mouth in the morning      omeprazole (PriLOSEC) 20  mg delayed release capsule Take 1 capsule (20 mg total) by mouth daily before breakfast Half an hour prior to breakfast and half an hour prior to dinner 180 capsule 3    Riboflavin (VITAMIN B-2 PO) Take 250 mg by mouth in the morning       No current facility-administered medications for this visit.        Allergies:      Allergies   Allergen Reactions    Ciprofloxacin Myalgia       Family History:     Family History   Problem Relation Age of Onset    Kidney failure Mother     Hypertension Mother     Lung cancer Father     Yosef Parkinson White syndrome Daughter     No Known Problems Sister     Substance Abuse Neg Hx     Alcohol abuse Neg Hx     Mental illness Neg Hx        Social History:     Social History     Socioeconomic History    Marital status: /Civil Union     Spouse name: Not on file    Number of children: Not on file    Years of education: Not on file    Highest education level: Not on file   Occupational History    Not on file   Tobacco Use    Smoking status: Never    Smokeless tobacco: Never   Vaping Use    Vaping status: Never Used   Substance and Sexual Activity    Alcohol use: Not Currently     Comment: Rarely     Drug use: No    Sexual activity: Yes     Partners: Male   Other Topics Concern    Not on file   Social History Narrative    WORK:    1.   ([employer]; [employer]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    2.  [employer]        HOBBIES:    1.  Singing    2.  Dancing    3.  Hx of ceramics (+ dust)        PETS:    1.  Dogs    -  Denies birds        TRAVEL:    -  [place]        EXPOSURES:    Denies mold; down pillows/comforters; hot tubs     Social Determinants of Health     Financial Resource Strain: Low Risk  (10/30/2023)    Overall Financial Resource Strain (CARDIA)     Difficulty of Paying Living Expenses: Not very hard   Food Insecurity: Not on file   Transportation Needs: No Transportation Needs (10/30/2023)    PRAPARE - Transportation     Lack of Transportation (Medical): No     " Lack of Transportation (Non-Medical): No   Physical Activity: Not on file   Stress: Not on file   Social Connections: Not on file   Intimate Partner Violence: Not on file   Housing Stability: Not on file         Objective:         Physical Exam:                                                                 Vitals:            Constitutional:    /68 (BP Location: Left arm, Patient Position: Sitting, Cuff Size: Standard)   Pulse 62   Temp (!) 96.9 °F (36.1 °C) (Temporal)   Ht 5' 8\" (1.727 m)   Wt 76.2 kg (168 lb)   BMI 25.54 kg/m²   BP Readings from Last 3 Encounters:   03/29/24 143/68   03/27/24 128/78   03/14/24 120/84     Pulse Readings from Last 3 Encounters:   03/29/24 62   03/27/24 75   03/14/24 68         Well developed, well nourished, well groomed.        Psychiatric:  Normal behavior and appropriate affect        Neurological Examination:     Lander hallpike positive to the left for torsional horizontal nystagmus and symptoms of vertigo and Epley maneuver performed    Mental status/cognitive function:   Recent and remote memory appear intact. Attention span and concentration as well as fund of knowledge are appropriate for age. Normal language and spontaneous speech.  Cranial Nerves:   VII-facial expression symmetric  Motor Exam: symmetric bulk and NORMAL tone throughout. no atrophy, fasciculations or abnormal movements noted.   Coordination:  no apparent dysmetria, ataxia or tremor noted - normal FNF,   Gait: steady casual wide based antalgic gait             Pertinent lab results:   See EMR for recent labs    05/04/2021 CMP unremarkable     9/25/19 - CMP unremarkable     6/10/19 CMP unremarkable  Thyroid studies normal Vit D 28     1/07/2019:  CMP and CBC unremarkable  TSH 0.78     Imaging: I have personally reviewed imaging and radiology read      MRA head and neck 09/30/2019:  No large vessel flow restrictive disease.     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  This could be " reevaluated with CTA.   - Minor short segment stenosis of the dominant right V1 origin.  Mild long segment narrowing of the origin and nondominant left v1.  Anterior circulation normal.     MRI Brain without contrast  7/9/19:   1.  No acute intracranial abnormality.  2.  Mild nonspecific cerebral white matter signal abnormality, which can be seen in patients with migraines as well as microangiopathic disease.  3.  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  This may represent an anatomic variation.  If concerned of vertebrobasilar insufficiency, consider follow-up CTA or contrast enhanced MRA imaging.   Review of Systems:   ROS obtained by medical assistant and reviewed, but if any symptoms listed below say negative, does not mean patient has not had this symptom since last visit, please see HPI for details of symptoms discussed this visit.  Regarding any abnormal or positive findings in ROS that are not neurologic related, patient instructed to address these issues with PCP or go to the ER if they are severe.    Review of Systems   Constitutional: Negative.    HENT: Negative.     Eyes: Negative.    Respiratory: Negative.     Cardiovascular: Negative.    Gastrointestinal: Negative.    Endocrine: Negative.    Genitourinary: Negative.    Musculoskeletal: Negative.    Skin: Negative.    Allergic/Immunologic: Negative.    Neurological:  Positive for dizziness/vertigo and headaches.   Hematological: Negative.    Psychiatric/Behavioral: Negative.             I have spent 31 minutes with Patient  today in which greater than 50% of this time was spent in counseling/coordination of care regarding Prognosis, Risks and benefits of tx options, Instructions for management, Patient and family education, Importance of tx compliance, Risk factor reductions, Impressions, Counseling / Coordination of care, Documenting in the medical record, Reviewing / ordering tests, medicine, procedures  , Obtaining or reviewing  history  , and Communicating with other healthcare professionals . I also spent 12 minutes non face to face for this patient the same day.       Author:  Mayra Henson MD 3/29/2024 12:02 PM

## 2024-03-29 NOTE — PATIENT INSTRUCTIONS
"    - consider the book \"Rewire Your Anxious Brain: How to Use the Neuroscience of Fear to End Anxiety, Panic, and Worry\" By Lillian PhD    Follow up with Dr. Perkins for the surgery and endocrinology - PTH remains high     Safety/fall precautions     Once sleep study is resulted I recommend please call [phone] to schedule a follow up visit  with one of the following providers:  Ovidio Hastings PA-C       Headache/migraine treatment:   Abortive medications (for immediate treatment of a headache):   It is ok to take ibuprofen, acetaminophen or naproxen (Advil, Tylenol,  Aleve, Excedrin) if they help your headaches you should limit these to No more than 3 times a week to avoid medication overuse/rebound headaches.     For your more moderate to severe migraines take this medication early   Maxalt (rizatriptan) 10mg tabs - take one at the onset of headache. May repeat one time after 1-2 hours if pain has not resolved.   (Max 2 a day and 9 a month)       Over the counter preventive supplements for headaches/migraines   (to take every day to help prevent headaches - not to take at the time of headache):  [x] Magnesium 400 - 500 mg daily (If any diarrhea or upset stomach, decrease dose  as tolerated)  [x] Riboflavin (Vitamin B2)  400mg daily   (FYI B2 may make your urine bright/neon yellow)     Prescription preventive medications for headaches/migraines   (to take every day to help prevent headaches - not to take at the time of headache):  [x]  we have options if you ever wanted - like gabapentin     *Typically these types of medications take time untill you see the benefit, although some may see improvement in days, often it may take weeks, especially if the medication is being titrated up to a beneficial level. Please contact us if there are any concerns or questions regarding the medication.      Lifestyle " Recommendations:  [x] SLEEP - Maintain a regular sleep schedule: Adults need at least 7-8 hours of uninterrupted a night. Maintain good sleep hygiene:  Going to bed and waking up at consistent times, avoiding excessive daytime naps, avoiding caffeinated beverages in the evening, avoid excessive stimulation in the evening and generally using bed primarily for sleeping.  One hour before bedtime would recommend turning lights down lower, decreasing your activity (may read quietly, listen to music at a low volume). When you get into bed, should eliminate all technology (no texting, emailing, playing with your phone, iPad or tablet in bed).  [x] HYDRATION - Maintain good hydration.  Drink  2L of fluid a day (4 typical small water bottles)  [x] DIET - Maintain good nutrition. In particular don't skip meals and try and eat healthy balanced meals regularly.  [x] TRIGGERS - Look for other triggers and avoid them: Limit caffeine to 1-2 cups a day or less. Avoid dietary triggers that you have noticed bring on your headaches (this could include aged cheese, peanuts, MSG, aspartame and nitrates).     Education and Follow-up  [x] Please call with any questions or concerns. Go to the ED with any new or concerning symptoms  [x] Follow up 3-6 months, sooner if needed

## 2024-03-29 NOTE — PROGRESS NOTES
Review of Systems   Constitutional: Negative.    HENT: Negative.     Eyes: Negative.    Respiratory: Negative.     Cardiovascular: Negative.    Gastrointestinal: Negative.    Endocrine: Negative.    Genitourinary: Negative.    Musculoskeletal: Negative.    Skin: Negative.    Allergic/Immunologic: Negative.    Neurological:  Positive for dizziness and headaches.   Hematological: Negative.    Psychiatric/Behavioral: Negative.

## 2024-03-30 LAB
C COLI+JEJUNI TUF STL QL NAA+PROBE: NEGATIVE
C DIFF TOX B TCDB STL QL NAA+PROBE: NEGATIVE
EC STX1+STX2 GENES STL QL NAA+PROBE: NEGATIVE
SALMONELLA SP SPAO STL QL NAA+PROBE: NEGATIVE
SHIGELLA SP+EIEC IPAH STL QL NAA+PROBE: NEGATIVE

## 2024-04-15 ENCOUNTER — EVALUATION (OUTPATIENT)
Dept: PHYSICAL THERAPY | Facility: CLINIC | Age: 79
End: 2024-04-15
Payer: COMMERCIAL

## 2024-04-15 DIAGNOSIS — H81.11 BPPV (BENIGN PAROXYSMAL POSITIONAL VERTIGO), RIGHT: Primary | ICD-10-CM

## 2024-04-15 DIAGNOSIS — H81.12 BENIGN PAROXYSMAL POSITIONAL VERTIGO OF LEFT EAR: ICD-10-CM

## 2024-04-15 PROCEDURE — 97161 PT EVAL LOW COMPLEX 20 MIN: CPT | Performed by: PHYSICAL THERAPIST

## 2024-04-15 PROCEDURE — 95992 CANALITH REPOSITIONING PROC: CPT | Performed by: PHYSICAL THERAPIST

## 2024-04-15 NOTE — PROGRESS NOTES
PT Evaluation     Today's date: 4/15/2024  Patient name: Nella Yancey  : 1945  MRN: [id number]  Referring provider: Mayra Henson MD  Dx:   Encounter Diagnosis     ICD-10-CM    1. BPPV (benign paroxysmal positional vertigo), right  H81.11       2. Benign paroxysmal positional vertigo of left ear  H81.12 Ambulatory Referral to Physical Therapy                     Assessment  Assessment details: Nella Yancey is a 77 yo female with diagnosis of BPPV presenting with signs and symptoms of right posterior canalithiasis. Decreased symptoms by second trial of Epley. She is an excellent candidate for OPPT for canalith repositioning to optimize functional status and promote return to PLOF.   Functional limitations: slow with all functional position changes  Goals  Up to 6 sessions  1. Independent with self canalith repositioning maneuver if indicated.  2. Negative canalith positioning testing.   3. Tolerate all functional position changes without complaint of vertigo.  4. Tolerate C/S AROM all planes without complaint of vertigo.      Plan  Plan details: Up to 6 visits as needed  Reviewed physical exam findings and plan of care.  All questions answered to patient's satisfaction.    Patient would benefit from: PT eval and skilled physical therapy  Planned therapy interventions: canalith repositioning, neuromuscular re-education, patient education and home exercise program  Duration in visits: 6  Treatment plan discussed with: patient        Subjective Evaluation    History of Present Illness  Mechanism of injury: Patient has been having positional vertigo for the last 3-4 weeks. She saw neurology for headaches, had a positive left Giselle Hallpike, and was treated with the left Epley. She reports temporary relief but her vertigo has returned. She arrives today with referral for OPPT evaluation.     History of right BPPV in  with resolution of symptoms with PT  Patient Goals  Patient goal: eliminate  vertigo  Pain  Current pain ratin  Location: left sided neck pain    Treatments  Previous treatment: physical therapy        Objective      Dysequilibrium: Yes  Lightheadedness: Yes  Vertigo: Yes  Rocking or Swaying: No         Oscillopsia: No  Diplopia: No  Motion sickness: No  Floating, Swimming, Disconnected: Yes    Exacerbation Factors:  Bending over: Yes  Turning Head: Yes  Rolling in bed: Yes  Walking: No  Looking up: Yes  Supine to/from sitting: Yes  Optokinetic movement: No  Walking in busy environment: No    Duration of Symptoms: <1 min     Concurrent Complaints:  Tinnitus:Yes  Aural Fullness:No  Known hearing loss:No  Nausea, Vomiting: No  Altered Vision: Yes - reports not as clear with glasses; due to see eye doctor 24  Peripheral Neuropathy:Yes  Cervical Pain: Yes   Headache: Yes      PHYSICAL FINDINGS:  Oculomotor ROM : WNL  Resting nystagmus: No  Gaze holding nystagmus No   Smooth pursuit Normal    Vertical Saccades:Normal  Horizontal Saccades:Normal  Convergence: Normal    Cover/Uncover/Crosscover Test: Normal    Head thrust (room light): attempted; pt unable to relax    Positional testing: Right Left   Giselle Win pike Right beating torsional nystagmus, vertigo normal   Roll test: NT NT       Modified VBI = normal bilat         Precautions: neck pain      Manuals 4/15            R Epley x2                                                   Neuro Re-Ed             Post canalith repositioning postural ed KT                                                                                          Ther Ex                                                                                                                     Ther Activity                                       Gait Training                                       Modalities

## 2024-04-17 DIAGNOSIS — I10 ESSENTIAL HYPERTENSION: ICD-10-CM

## 2024-04-17 RX ORDER — METOPROLOL SUCCINATE 25 MG/1
25 TABLET, EXTENDED RELEASE ORAL
Qty: 90 TABLET | Refills: 3 | Status: SHIPPED | OUTPATIENT
Start: 2024-04-17

## 2024-04-18 ENCOUNTER — OFFICE VISIT (OUTPATIENT)
Dept: PHYSICAL THERAPY | Facility: CLINIC | Age: 79
End: 2024-04-18
Payer: COMMERCIAL

## 2024-04-18 DIAGNOSIS — H81.11 BPPV (BENIGN PAROXYSMAL POSITIONAL VERTIGO), RIGHT: Primary | ICD-10-CM

## 2024-04-18 PROCEDURE — 95992 CANALITH REPOSITIONING PROC: CPT | Performed by: PHYSICAL THERAPIST

## 2024-04-18 PROCEDURE — 97112 NEUROMUSCULAR REEDUCATION: CPT | Performed by: PHYSICAL THERAPIST

## 2024-04-18 NOTE — PROGRESS NOTES
Daily Note     Today's date: 2024  Patient name: Nella Yancey  : 1945  MRN: [id number]  Referring provider: Mayra Henson MD  Dx:   Encounter Diagnosis     ICD-10-CM    1. BPPV (benign paroxysmal positional vertigo), right  H81.11                      Subjective: Pt reports feeling much better after last visit.       Objective: See treatment diary below      Assessment: Initial R Milliken hallpike negative with no complaints of dizziness. Then after normal L Milliken Hallpike, pt complained of dizziness while sitting. Rechecked R with nystagmus. Patient with high anxiety when vertigo is provoked during testing. Discussed possibility of taking meclizine to dull symptoms.       Plan: Continue per plan of care.      Precautions: neck pain      Manuals 4/15 4/18           R Epley x2 x2                                                  Neuro Re-Ed             Post canalith repositioning postural ed KT KT                                                                                         Ther Ex                                                                                                                     Ther Activity                                       Gait Training                                       Modalities

## 2024-04-19 ENCOUNTER — TELEPHONE (OUTPATIENT)
Dept: FAMILY MEDICINE CLINIC | Facility: CLINIC | Age: 79
End: 2024-04-19

## 2024-04-19 NOTE — TELEPHONE ENCOUNTER
Patient can try OTC meclizine ( Dramamine or [facility] motion sickness) 25 mg every 6-8 hors as needed for dizziness.

## 2024-04-19 NOTE — TELEPHONE ENCOUNTER
Patient is experiencing vertigo and is doing PT ordered by Dr. Culp her neurologist.    Patient's PT tech suggested she call you and have you write a script  For Antivert, a med used for vertigo.  She says her family suffers from this and they all use Antivert, as needed.  I suggested she call Dr. Culp's office for them to write the script, but she insisted that the physical therapist said call you family doctor.    She uses [facility] in [place].

## 2024-04-22 ENCOUNTER — APPOINTMENT (OUTPATIENT)
Dept: PHYSICAL THERAPY | Facility: CLINIC | Age: 79
End: 2024-04-22
Payer: COMMERCIAL

## 2024-04-23 ENCOUNTER — TELEPHONE (OUTPATIENT)
Dept: NEUROLOGY | Facility: CLINIC | Age: 79
End: 2024-04-23

## 2024-04-23 DIAGNOSIS — R42 VERTIGO: Primary | ICD-10-CM

## 2024-04-23 NOTE — TELEPHONE ENCOUNTER
Pt would like to start antivert     Pt needs something to take when she gets her hair done or goes to the dentist.     [phone]

## 2024-04-23 NOTE — TELEPHONE ENCOUNTER
Pt returning nurse call. Informed her waiting for Dr Henson reply if she agrees to prescribe antivert.

## 2024-04-23 NOTE — TELEPHONE ENCOUNTER
Antevert also known as meclizine is over-the-counter as well and certainly I could prescribe it if she finds this helpful?    I find it sometimes helps people a little, otherwise sometimes just makes people tired.    Has she taking it and driven afterwards like she might at the dentist or the hairdresser?  What is she trying to treat?  Worse dizziness that occurs with these activities?

## 2024-04-23 NOTE — TELEPHONE ENCOUNTER
Called [phone] and left a message on pt's answering machine for a call back    Dr Henson, are you agreeable to prescribe antivert?

## 2024-04-25 ENCOUNTER — TELEPHONE (OUTPATIENT)
Age: 79
End: 2024-04-25

## 2024-04-25 DIAGNOSIS — R92.8 ABNORMAL FINDING ON MAMMOGRAPHY: Primary | ICD-10-CM

## 2024-04-25 NOTE — TELEPHONE ENCOUNTER
[facility] called- they faxed a dental clearance over for the patient on 3/14 and still haven't heard anything. There is nothing in the chart, and I cannot get in touch with anyone in the office.    Call back is [phone]    Fax is [phone].    Please advise as soon as possible.

## 2024-04-25 NOTE — TELEPHONE ENCOUNTER
Called patient and she is asking if she can get a steroid injection into her hand for trigger finger?  She is also asking is she is able to get her teeth cleaned, xrays and get a crown? The dentist will be sending over a clearance form.

## 2024-04-25 NOTE — TELEPHONE ENCOUNTER
Patient called with a couple of questions about the reclast infusion she received.  She would like Dr. Zepeda or someone who can answer her questions to please call her back.

## 2024-04-25 NOTE — TELEPHONE ENCOUNTER
Patient called and she would like to speak to Dr Henson before she starts Antivert.  Patient stated that she has never taken this medication before and she would like to discuss some concerns with Dr Henson before she starts medication.    Please assist

## 2024-04-25 NOTE — TELEPHONE ENCOUNTER
It should be Ok to get steroid injection for trigger finger.  We do not provide steroid injection in our office, usually given by orthopedic surgeon or rheumatologist.    Also if dentist can send us the form we can fill it out    Please make sure patient also has a follow-up appointment in 3 to 4 months  Hali Zepeda

## 2024-04-25 NOTE — TELEPHONE ENCOUNTER
Called patient to clarify why she needs diagnostic mammo. [facility] had called patient and told her radiologist recommended diagnostic mammo in 6 months (screening done in September) please advise.

## 2024-04-25 NOTE — TELEPHONE ENCOUNTER
Called and advised pt of the below. She verbalized understanding. Pt states that she went to PT as Dr Henson suggested. The first 2 min, it seems to be fine but after a couple days later, vertigo/dizziness came back all over again. Pt states PT didn't work the second time. States that her symptoms are little much better today but her balance if off. She does not know what is causing her symptoms.   Pt states that she never tried antivert before.   Requesting antivert be sent to [facility] pharmacy

## 2024-04-25 NOTE — TELEPHONE ENCOUNTER
Pt called requesting a script for a Diagnostic Mammogram to be faxed over to [facility].    Fax:[phone]   Phone: [phone]    Please contact pt when script has been faxed.    Pt phone: [phone]

## 2024-04-26 ENCOUNTER — RA CDI HCC (OUTPATIENT)
Dept: OTHER | Facility: HOSPITAL | Age: 79
End: 2024-04-26

## 2024-04-26 RX ORDER — MECLIZINE HCL 12.5 MG/1
TABLET ORAL
Qty: 30 TABLET | Refills: 11 | Status: SHIPPED | OUTPATIENT
Start: 2024-04-26

## 2024-05-03 ENCOUNTER — TELEPHONE (OUTPATIENT)
Dept: OTHER | Facility: OTHER | Age: 79
End: 2024-05-03

## 2024-05-03 ENCOUNTER — TELEPHONE (OUTPATIENT)
Dept: ENDOCRINOLOGY | Facility: CLINIC | Age: 79
End: 2024-05-03

## 2024-05-03 NOTE — TELEPHONE ENCOUNTER
Spoke to patient.  She is home sick with a bad headache.    She will call back to reschedule her 6 month recheck with Dr. Rodriguez.

## 2024-05-03 NOTE — TELEPHONE ENCOUNTER
Patient is calling regarding cancelling an appointment.    Date/Time: 5/3/2024 / 1:30 pm    Patient was rescheduled: YES [] NO [x]    Patient requesting call back to reschedule: YES [x] NO []

## 2024-05-06 ENCOUNTER — TELEPHONE (OUTPATIENT)
Age: 79
End: 2024-05-06

## 2024-05-06 NOTE — TELEPHONE ENCOUNTER
Pt. Called. She needs Mammogram order (Ordered on 4/25) sent to [place] office so that they can compare results.  [facility] [place]  Fax # [phone]  Please advise Pt. When order is faxed over.  Thank you!!

## 2024-05-17 ENCOUNTER — OFFICE VISIT (OUTPATIENT)
Dept: FAMILY MEDICINE CLINIC | Facility: CLINIC | Age: 79
End: 2024-05-17
Payer: COMMERCIAL

## 2024-05-17 VITALS
HEIGHT: 68 IN | BODY MASS INDEX: 25.16 KG/M2 | SYSTOLIC BLOOD PRESSURE: 122 MMHG | WEIGHT: 166 LBS | TEMPERATURE: 99 F | HEART RATE: 80 BPM | DIASTOLIC BLOOD PRESSURE: 84 MMHG | OXYGEN SATURATION: 96 % | RESPIRATION RATE: 16 BRPM

## 2024-05-17 DIAGNOSIS — M81.0 AGE-RELATED OSTEOPOROSIS WITHOUT CURRENT PATHOLOGICAL FRACTURE: ICD-10-CM

## 2024-05-17 DIAGNOSIS — N39.41 URGE URINARY INCONTINENCE: ICD-10-CM

## 2024-05-17 DIAGNOSIS — R39.15 URINARY URGENCY: ICD-10-CM

## 2024-05-17 DIAGNOSIS — K21.9 GASTROESOPHAGEAL REFLUX DISEASE, UNSPECIFIED WHETHER ESOPHAGITIS PRESENT: ICD-10-CM

## 2024-05-17 DIAGNOSIS — I83.93 VARICOSE VEINS OF BOTH LOWER EXTREMITIES, UNSPECIFIED WHETHER COMPLICATED: ICD-10-CM

## 2024-05-17 DIAGNOSIS — I10 ESSENTIAL HYPERTENSION: ICD-10-CM

## 2024-05-17 DIAGNOSIS — J06.9 UPPER RESPIRATORY TRACT INFECTION, UNSPECIFIED TYPE: Primary | ICD-10-CM

## 2024-05-17 DIAGNOSIS — E78.5 HYPERLIPIDEMIA, UNSPECIFIED HYPERLIPIDEMIA TYPE: ICD-10-CM

## 2024-05-17 DIAGNOSIS — E21.3 HYPERPARATHYROIDISM (HCC): ICD-10-CM

## 2024-05-17 LAB
SARS-COV-2 AG UPPER RESP QL IA: NEGATIVE
VALID CONTROL: NORMAL

## 2024-05-17 PROCEDURE — G2211 COMPLEX E/M VISIT ADD ON: HCPCS | Performed by: FAMILY MEDICINE

## 2024-05-17 PROCEDURE — 87811 SARS-COV-2 COVID19 W/OPTIC: CPT | Performed by: FAMILY MEDICINE

## 2024-05-17 PROCEDURE — 99214 OFFICE O/P EST MOD 30 MIN: CPT | Performed by: FAMILY MEDICINE

## 2024-05-17 RX ORDER — CODEINE PHOSPHATE/GUAIFENESIN 10-100MG/5
5 LIQUID (ML) ORAL
Qty: 120 ML | Refills: 0 | Status: SHIPPED | OUTPATIENT
Start: 2024-05-17

## 2024-05-17 NOTE — PROGRESS NOTES
Assessment/Plan:     Diagnoses and all orders for this visit:    Upper respiratory tract infection  -  rapid Covid test negative, advised rest, plenty of fluids, take Tylenol as needed for body aches or fever, Mucinex as needed for cough/congestion and guaifenesin-codeine cough syrup at bedtime. Patient was encouraged to contact the office for persistent or worsening symptoms  -     Poct Covid 19 Rapid Antigen Test  -     guaifenesin-codeine (GUAIFENESIN AC) 100-10 MG/5ML liquid; Take 5 mL by mouth daily at bedtime as needed for cough    Essential hypertension  -  well controlled, continue Toprol XL 25 mg daily    Hyperlipidemia  -  , improved, healthy low cholesterol diet and regular physical activity encouraged, will recheck lipids in 6 months  -     Lipid Panel with Direct LDL reflex; Future  -     Comprehensive metabolic panel; Future    Gastroesophageal reflux disease  - stable on omeprazole, following with GI    Urinary urgency  - will refer yo Uro-gynecology for further evaluation  -     Ambulatory Referral to Urogynecology; Future    Urge urinary incontinence  -     Ambulatory Referral to Urogynecology; Future    Hyperparathyroidism   -  following with Endocrine     Osteoporosis   -  managed by Endocrine, received Reclast infusion on 1/22/24, continue vitamin D supplements    -   Cholecalciferol 50 MCG (2000 UT) CAPS; Take 1 capsule by mouth daily      Varicose veins of both lower extremities  -     Ambulatory Referral to Vascular Surgery; Future      Return in 6 months or sooner as needed. The treatment plan and possible side effects of new medications were reviewed with the patient today. The patient understands and agrees with the treatment plan.      Subjective:   Chief Complaint   Patient presents with    Hypertension    Hyperlipidemia    Osteoporosis    Cough    Fever     Last night 101    Nasal Congestion     Has been sneezing, chills  Symptoms started around Tuesday      Patient ID: Nella SERRA  Bc is a 78 y.o. female who presents today for follow up visit for her chronic conditions. Patient reports fever, fatigue, nasal congestion, runny nose and cough for the past 3 days, her temp last night was 101. She denies purulent mucus production, chest pain or shortness of breath.   Patient also reports urinary urgency and urge incontinence which is getting progressively worse in the past several months, no pain with urination, no hematuria, no abdominal pain. Patient offers no other complaints.         The following portions of the patient's history were reviewed and updated as appropriate: allergies, current medications, past family history, past medical history, past social history, past surgical history and problem list.    Past Medical History:   Diagnosis Date    Anxiety     Arthritis     Back pain     Balance problems     Chest pain     heaviness    Colon polyp     Difficulty swallowing     Dizziness     Dry cough     Gait disorder     GERD (gastroesophageal reflux disease)     Hyperparathyroidism (HCC)     Hypertension     Increased frequency of headaches     Migraines     Neck pain     Numbness and tingling     bles    Palpitations     Pericarditis     Thyroid disease     nodule    Trouble in sleeping     Wears glasses      Past Surgical History:   Procedure Laterality Date    APPENDECTOMY      CHOLECYSTECTOMY      laparoscopic    COLONOSCOPY      KNEE SURGERY Left     PERICARDIAL WINDOW      NM OPTX DSTL RADL X-ARTIC FX/EPIPHYSL SEP Right 03/22/2018    Procedure: OPEN REDUCTION W/ INTERNAL FIXATION (ORIF) RADIUS, splint application;  Surgeon: Kandice Kimble MD;  [facility]: [facility];  Service: Orthopedics    NM PARATHYROIDECTOMY/EXPLORATION PARATHYROIDS N/A 08/17/2022    Procedure: PARATHYROIDECTOMY, 4 GLAND EXPLORATION;  Surgeon: Ovidio Perkins MD;  [facility];  Service: ENT    SKIN BIOPSY      UPPER GASTROINTESTINAL ENDOSCOPY       Family History   Problem Relation Age of Onset     Kidney failure Mother     Hypertension Mother     Lung cancer Father     Yosef Parkinson White syndrome Daughter     No Known Problems Sister     Substance Abuse Neg Hx     Alcohol abuse Neg Hx     Mental illness Neg Hx      Social History     Socioeconomic History    Marital status: /Civil Union     Spouse name: Not on file    Number of children: Not on file    Years of education: Not on file    Highest education level: Not on file   Occupational History    Not on file   Tobacco Use    Smoking status: Never    Smokeless tobacco: Never   Vaping Use    Vaping status: Never Used   Substance and Sexual Activity    Alcohol use: Not Currently     Comment: Rarely     Drug use: No    Sexual activity: Yes     Partners: Male   Other Topics Concern    Not on file   Social History Narrative    WORK:    1.  [place] ([employer]; [employer]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    2.  [employer]        HOBBIES:    1.  Singing    2.  Dancing    3.  Hx of ceramics (+ dust)        PETS:    1.  Dogs    -  Denies birds        TRAVEL:    -  [place]        EXPOSURES:    Denies mold; down pillows/comforters; hot tubs     Social Determinants of Health     Financial Resource Strain: Low Risk  (10/30/2023)    Overall Financial Resource Strain (CARDIA)     Difficulty of Paying Living Expenses: Not very hard   Food Insecurity: Not on file   Transportation Needs: No Transportation Needs (10/30/2023)    PRAPARE - Transportation     Lack of Transportation (Medical): No     Lack of Transportation (Non-Medical): No   Physical Activity: Not on file   Stress: Not on file   Social Connections: Not on file   Intimate Partner Violence: Not on file   Housing Stability: Not on file       Current Outpatient Medications:     acetaminophen (TYLENOL) 500 mg tablet, Take 500-1,000 mg by mouth every 6 (six) hours as needed for mild pain, Disp: , Rfl:     albuterol (PROVENTIL HFA,VENTOLIN HFA) 90 mcg/act inhaler, Inhale 2 puffs every 6 (six) hours as  needed for wheezing or shortness of breath, Disp: 8 g, Rfl: 1    ALPRAZolam (XANAX) 0.5 mg tablet, Take 1 tablet (0.5 mg total) by mouth daily at bedtime as needed for anxiety or sleep, Disp: 20 tablet, Rfl: 1    ascorbic acid (VITAMIN C) 500 mg tablet, Take 500 mg by mouth daily, Disp: , Rfl:     b complex vitamins tablet, Take 1 tablet by mouth daily, Disp: , Rfl:     Cholecalciferol 50 MCG (2000 UT) CAPS, Take 1 capsule by mouth daily, Disp: , Rfl:     dicyclomine (BENTYL) 10 mg capsule, Take 1 capsule (10 mg total) by mouth 3 (three) times a day as needed (stomach cramps), Disp: 30 capsule, Rfl: 0    guaifenesin-codeine (GUAIFENESIN AC) 100-10 MG/5ML liquid, Take 5 mL by mouth daily at bedtime as needed for cough, Disp: 120 mL, Rfl: 0    Magnesium 300 MG CAPS, Take 300 mg by mouth in the morning, Disp: , Rfl:     meclizine (ANTIVERT) 12.5 MG tablet, Can take 1-2 tabs as needed up to BID for dizziness or vertigo, caution as can be sedating, Disp: 30 tablet, Rfl: 11    metoprolol succinate (TOPROL-XL) 25 mg 24 hr tablet, TAKE 1 TABLET BY MOUTH DAILY AT BEDTIME, Disp: 90 tablet, Rfl: 3    Multiple Vitamins-Minerals (MULTIVITAMIN ADULTS 50+ PO), Take 1 tablet by mouth in the morning, Disp: , Rfl:     Nutritional Supplements (OSAPLEX MK-7 PO), Take 1 tablet by mouth in the morning, Disp: , Rfl:     omeprazole (PriLOSEC) 20 mg delayed release capsule, Take 1 capsule (20 mg total) by mouth daily before breakfast Half an hour prior to breakfast and half an hour prior to dinner, Disp: 180 capsule, Rfl: 3    Riboflavin (VITAMIN B-2 PO), Take 250 mg by mouth in the morning, Disp: , Rfl:     Cholecalciferol (Vitamin D-3) 25 MCG (1000 UT) CAPS, Take 1,000 Units by mouth 2 (two) times a day (Patient not taking: Reported on 5/17/2024), Disp: , Rfl:     Review of Systems   Constitutional:  Positive for fatigue and fever. Negative for chills and unexpected weight change.   HENT:  Positive for congestion and rhinorrhea. Negative  "for ear pain and sore throat.    Respiratory:  Positive for cough. Negative for shortness of breath and wheezing.    Cardiovascular:  Negative for chest pain, palpitations and leg swelling.   Gastrointestinal:  Negative for abdominal pain, blood in stool, diarrhea, nausea and vomiting.   Genitourinary:  Negative for difficulty urinating, dysuria, frequency, hematuria and urgency.   Skin:  Negative for rash.   Neurological:  Negative for dizziness, syncope, weakness, numbness and headaches.   Hematological:  Negative for adenopathy.   Psychiatric/Behavioral:  Negative for dysphoric mood. The patient is nervous/anxious.            Objective:    Vitals:    05/17/24 1333   BP: 122/84   Pulse: 80   Resp: 16   Temp: 99 °F (37.2 °C)   SpO2: 96%   Weight: 75.3 kg (166 lb)   Height: 5' 8\" (1.727 m)        Physical Exam  Constitutional:       General: She is not in acute distress.     Appearance: She is well-developed.   HENT:      Right Ear: Tympanic membrane and ear canal normal.      Left Ear: Tympanic membrane and ear canal normal.      Nose: Congestion present.      Mouth/Throat:      Mouth: Mucous membranes are moist.      Pharynx: Oropharynx is clear.   Neck:      Thyroid: No thyromegaly.   Cardiovascular:      Rate and Rhythm: Normal rate and regular rhythm.      Heart sounds: Normal heart sounds. No murmur heard.  Pulmonary:      Effort: Pulmonary effort is normal. No respiratory distress.      Breath sounds: Normal breath sounds. No wheezing, rhonchi or rales.   Chest:      Chest wall: No tenderness.   Abdominal:      General: There is no distension.      Palpations: Abdomen is soft. There is no mass.      Tenderness: There is no abdominal tenderness.   Musculoskeletal:      Cervical back: Neck supple.      Right lower leg: No edema.      Left lower leg: No edema.   Lymphadenopathy:      Cervical: No cervical adenopathy.   Neurological:      Mental Status: She is alert and oriented to person, place, and time. "   Psychiatric:         Mood and Affect: Mood normal.         Behavior: Behavior normal.         Thought Content: Thought content normal.          Office Visit on 05/17/2024   Component Date Value Ref Range Status    POCT SARS-CoV-2 Ag 05/17/2024 Negative  Negative Final    VALID CONTROL 05/17/2024 Valid   Final     Lab Results   Component Value Date    AAO2CRPKOCDQ 1.670 05/04/2023     Lab Results   Component Value Date    SODIUM 140 03/25/2024    K 4.2 03/25/2024     03/25/2024    CO2 30 03/25/2024    AGAP 7 03/25/2024    BUN 22 03/25/2024    CREATININE 0.79 03/25/2024    GLUC 87 01/08/2021    GLUF 85 03/25/2024    CALCIUM 9.4 03/25/2024    AST 23 03/25/2024    ALT 23 03/25/2024    ALKPHOS 65 03/25/2024    TP 6.7 03/25/2024    TBILI 0.96 03/25/2024    EGFR 72 03/25/2024     Lab Results   Component Value Date    WBC 5.29 03/25/2024    HGB 14.2 03/25/2024    HCT 43.6 03/25/2024    MCV 93 03/25/2024     03/25/2024     Lab Results   Component Value Date    CHOLESTEROL 219 (H) 03/25/2024    CHOLESTEROL 220 (H) 09/15/2022    CHOLESTEROL 233 (H) 12/10/2021     Lab Results   Component Value Date    HDL 70 03/25/2024    HDL 61 09/15/2022    HDL 66 12/10/2021     Lab Results   Component Value Date    TRIG 94 03/25/2024    TRIG 86 09/15/2022    TRIG 86 12/10/2021     Lab Results   Component Value Date    LDLCALC 130 (H) 03/25/2024

## 2024-05-20 ENCOUNTER — TELEPHONE (OUTPATIENT)
Age: 79
End: 2024-05-20

## 2024-05-20 NOTE — TELEPHONE ENCOUNTER
Patient stopped in the office and I gave her the message.    Patient is upset she had to wait till lunch time to get an answer. I did explain to patient that the provider has up to 48 hours to respond.

## 2024-05-20 NOTE — TELEPHONE ENCOUNTER
Pt got Mucinex for High BP  but is not sure it that is the right one. She wants advice on if she should take it. She would like to get started on it as soon as possible. She has a lot of mucus and coughing.    Please advice patient [phone]    Thank you

## 2024-06-20 ENCOUNTER — HOSPITAL ENCOUNTER (OUTPATIENT)
Dept: PULMONOLOGY | Facility: HOSPITAL | Age: 79
End: 2024-06-20
Attending: INTERNAL MEDICINE
Payer: COMMERCIAL

## 2024-06-20 DIAGNOSIS — J84.9 ILD (INTERSTITIAL LUNG DISEASE) (HCC): ICD-10-CM

## 2024-06-20 PROCEDURE — 94010 BREATHING CAPACITY TEST: CPT

## 2024-06-20 PROCEDURE — 94729 DIFFUSING CAPACITY: CPT | Performed by: INTERNAL MEDICINE

## 2024-06-20 PROCEDURE — 94760 N-INVAS EAR/PLS OXIMETRY 1: CPT

## 2024-06-20 PROCEDURE — 94010 BREATHING CAPACITY TEST: CPT | Performed by: INTERNAL MEDICINE

## 2024-06-20 PROCEDURE — 94729 DIFFUSING CAPACITY: CPT

## 2024-07-03 ENCOUNTER — LAB (OUTPATIENT)
Dept: LAB | Facility: CLINIC | Age: 79
End: 2024-07-03
Payer: COMMERCIAL

## 2024-07-03 DIAGNOSIS — E04.2 MULTINODULAR GOITER: Primary | ICD-10-CM

## 2024-07-03 LAB
25(OH)D3 SERPL-MCNC: 32.7 NG/ML (ref 30–100)
PTH-INTACT SERPL-MCNC: 115.4 PG/ML (ref 12–88)
T4 FREE SERPL-MCNC: 0.88 NG/DL (ref 0.61–1.12)
TSH SERPL DL<=0.05 MIU/L-ACNC: 1.61 UIU/ML (ref 0.45–4.5)

## 2024-07-03 PROCEDURE — 84443 ASSAY THYROID STIM HORMONE: CPT

## 2024-07-03 PROCEDURE — 84439 ASSAY OF FREE THYROXINE: CPT

## 2024-07-10 ENCOUNTER — OFFICE VISIT (OUTPATIENT)
Dept: FAMILY MEDICINE CLINIC | Facility: CLINIC | Age: 79
End: 2024-07-10
Payer: COMMERCIAL

## 2024-07-10 VITALS
TEMPERATURE: 97.8 F | DIASTOLIC BLOOD PRESSURE: 82 MMHG | WEIGHT: 169 LBS | RESPIRATION RATE: 16 BRPM | BODY MASS INDEX: 25.61 KG/M2 | SYSTOLIC BLOOD PRESSURE: 130 MMHG | OXYGEN SATURATION: 96 % | HEART RATE: 69 BPM | HEIGHT: 68 IN

## 2024-07-10 DIAGNOSIS — Z12.31 ENCOUNTER FOR SCREENING MAMMOGRAM FOR MALIGNANT NEOPLASM OF BREAST: ICD-10-CM

## 2024-07-10 DIAGNOSIS — R10.13 EPIGASTRIC ABDOMINAL PAIN: Primary | ICD-10-CM

## 2024-07-10 DIAGNOSIS — K58.0 IRRITABLE BOWEL SYNDROME WITH DIARRHEA: ICD-10-CM

## 2024-07-10 DIAGNOSIS — R35.0 URINARY FREQUENCY: ICD-10-CM

## 2024-07-10 DIAGNOSIS — K21.9 GASTROESOPHAGEAL REFLUX DISEASE, UNSPECIFIED WHETHER ESOPHAGITIS PRESENT: ICD-10-CM

## 2024-07-10 PROCEDURE — 99214 OFFICE O/P EST MOD 30 MIN: CPT | Performed by: FAMILY MEDICINE

## 2024-07-10 PROCEDURE — G2211 COMPLEX E/M VISIT ADD ON: HCPCS | Performed by: FAMILY MEDICINE

## 2024-07-10 NOTE — PROGRESS NOTES
Assessment/Plan:    Epigastric abdominal pain  - lab work and US abdomen ordered and we will call with the results, advised to follow up with GI for persistent symptoms       - US abdomen complete       - Comprehensive metabolic panel       - CBC and differential       - UA (URINE) with reflex to Scope       - Lipase    GERD  - continue omeprazole 20 mg daily in am before breakfast and take Pepcid 20 mg before dinner    Irritable bowel syndrome with diarrhea  - reviewed dietary modifications and advised to follow up with GI for persistent symptoms    Urinary frequency  - will check ua and urine culture       - UA (URINE) with reflex to Scope       - Urine culture        Return as scheduled or sooner as needed.  The patient understands and agrees with the treatment plan.      Subjective:   Chief Complaint   Patient presents with    Abdominal Pain     Abdominal pain and cramping for the last week  Loose, darker stools      Patient ID: Nella Yancey is a 78 y.o. female who presents epigastric pain that comes and goes, intermittent cramping and loose stools for about a week. Patient denies nausea, vomiting, anorexia, trouble swallowing, melena or hematochezia. She is currently on omeprazole daily and Pepcid as needed.         The following portions of the patient's history were reviewed and updated as appropriate: allergies, current medications, past family history, past medical history, past social history, past surgical history and problem list.    Past Medical History:   Diagnosis Date    Anxiety     Arthritis     Back pain     Balance problems     Chest pain     heaviness    Colon polyp     Difficulty swallowing     Dizziness     Dry cough     Gait disorder     GERD (gastroesophageal reflux disease)     Hyperparathyroidism (HCC)     Hypertension     Increased frequency of headaches     Migraines     Neck pain     Numbness and tingling     bles    Palpitations     Pericarditis     Thyroid disease     nodule     Trouble in sleeping     Wears glasses      Past Surgical History:   Procedure Laterality Date    APPENDECTOMY      CHOLECYSTECTOMY      laparoscopic    COLONOSCOPY      KNEE SURGERY Left     PERICARDIAL WINDOW      CA OPTX DSTL RADL X-ARTIC FX/EPIPHYSL SEP Right 03/22/2018    Procedure: OPEN REDUCTION W/ INTERNAL FIXATION (ORIF) RADIUS, splint application;  Surgeon: Kandice Kimble MD;  Location: [facility];  Service: Orthopedics    CA PARATHYROIDECTOMY/EXPLORATION PARATHYROIDS N/A 08/17/2022    Procedure: PARATHYROIDECTOMY, 4 GLAND EXPLORATION;  Surgeon: Ovidio Perkins MD;  [facility];  Service: ENT    SKIN BIOPSY      UPPER GASTROINTESTINAL ENDOSCOPY       Family History   Problem Relation Age of Onset    Kidney failure Mother     Hypertension Mother     Lung cancer Father     Yosef Parkinson White syndrome Daughter     No Known Problems Sister     Substance Abuse Neg Hx     Alcohol abuse Neg Hx     Mental illness Neg Hx      Social History     Socioeconomic History    Marital status: /Civil Union     Spouse name: Not on file    Number of children: Not on file    Years of education: Not on file    Highest education level: Not on file   Occupational History    Not on file   Tobacco Use    Smoking status: Never    Smokeless tobacco: Never   Vaping Use    Vaping status: Never Used   Substance and Sexual Activity    Alcohol use: Not Currently     Comment: Rarely     Drug use: No    Sexual activity: Yes     Partners: Male   Other Topics Concern    Not on file   Social History Narrative    WORK:    1.   ([employer]; [employer]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    2.  [employer]        HOBBIES:    1.  Singing    2.  Dancing    3.  Hx of ceramics (+ dust)        PETS:    1.  Dogs    -  Denies birds        TRAVEL:    -  [place]        EXPOSURES:    Denies mold; down pillows/comforters; hot tubs     Social Determinants of Health     Financial Resource Strain: Low Risk  (10/30/2023)     Overall Financial Resource Strain (CARDIA)     Difficulty of Paying Living Expenses: Not very hard   Food Insecurity: Not on file   Transportation Needs: No Transportation Needs (10/30/2023)    PRAPARE - Transportation     Lack of Transportation (Medical): No     Lack of Transportation (Non-Medical): No   Physical Activity: Not on file   Stress: Not on file   Social Connections: Not on file   Intimate Partner Violence: Not on file   Housing Stability: Not on file       Current Outpatient Medications:     acetaminophen (TYLENOL) 500 mg tablet, Take 500-1,000 mg by mouth every 6 (six) hours as needed for mild pain, Disp: , Rfl:     albuterol (PROVENTIL HFA,VENTOLIN HFA) 90 mcg/act inhaler, Inhale 2 puffs every 6 (six) hours as needed for wheezing or shortness of breath, Disp: 8 g, Rfl: 1    ALPRAZolam (XANAX) 0.5 mg tablet, Take 1 tablet (0.5 mg total) by mouth daily at bedtime as needed for anxiety or sleep, Disp: 20 tablet, Rfl: 1    ascorbic acid (VITAMIN C) 500 mg tablet, Take 500 mg by mouth daily, Disp: , Rfl:     b complex vitamins tablet, Take 1 tablet by mouth daily, Disp: , Rfl:     Cholecalciferol 50 MCG (2000 UT) CAPS, Take 1 capsule by mouth daily, Disp: , Rfl:     dicyclomine (BENTYL) 10 mg capsule, Take 1 capsule (10 mg total) by mouth 3 (three) times a day as needed (stomach cramps), Disp: 30 capsule, Rfl: 0    Magnesium 300 MG CAPS, Take 300 mg by mouth in the morning, Disp: , Rfl:     meclizine (ANTIVERT) 12.5 MG tablet, Can take 1-2 tabs as needed up to BID for dizziness or vertigo, caution as can be sedating, Disp: 30 tablet, Rfl: 11    metoprolol succinate (TOPROL-XL) 25 mg 24 hr tablet, TAKE 1 TABLET BY MOUTH DAILY AT BEDTIME, Disp: 90 tablet, Rfl: 3    Multiple Vitamins-Minerals (MULTIVITAMIN ADULTS 50+ PO), Take 1 tablet by mouth in the morning, Disp: , Rfl:     Nutritional Supplements (OSAPLEX MK-7 PO), Take 1 tablet by mouth in the morning, Disp: , Rfl:     omeprazole (PriLOSEC) 20 mg delayed  "release capsule, Take 1 capsule (20 mg total) by mouth daily before breakfast Half an hour prior to breakfast and half an hour prior to dinner, Disp: 180 capsule, Rfl: 3    Riboflavin (VITAMIN B-2 PO), Take 250 mg by mouth in the morning, Disp: , Rfl:     Cholecalciferol (Vitamin D-3) 25 MCG (1000 UT) CAPS, Take 1,000 Units by mouth 2 (two) times a day (Patient not taking: Reported on 5/17/2024), Disp: , Rfl:     guaifenesin-codeine (GUAIFENESIN AC) 100-10 MG/5ML liquid, Take 5 mL by mouth daily at bedtime as needed for cough (Patient not taking: Reported on 7/10/2024), Disp: 120 mL, Rfl: 0    Review of Systems   Constitutional:  Negative for appetite change, chills, fatigue, fever and unexpected weight change.   Respiratory:  Negative for cough, shortness of breath and wheezing.    Cardiovascular:  Negative for chest pain, palpitations and leg swelling.   Gastrointestinal:  Negative for blood in stool, constipation, nausea and vomiting.        As per HPI   Genitourinary:  Positive for frequency. Negative for difficulty urinating, dysuria, flank pain, hematuria, urgency and vaginal discharge.   Neurological:  Negative for dizziness and headaches.             Objective:    Vitals:    07/10/24 0832   BP: 130/82   Pulse: 69   Resp: 16   Temp: 97.8 °F (36.6 °C)   TempSrc: Temporal   SpO2: 96%   Weight: 76.7 kg (169 lb)   Height: 5' 8\" (1.727 m)     Wt Readings from Last 3 Encounters:   07/10/24 76.7 kg (169 lb)   05/17/24 75.3 kg (166 lb)   03/29/24 76.2 kg (168 lb)        Physical Exam     "

## 2024-07-11 ENCOUNTER — TELEPHONE (OUTPATIENT)
Dept: ENDOCRINOLOGY | Facility: CLINIC | Age: 79
End: 2024-07-11

## 2024-07-11 ENCOUNTER — OFFICE VISIT (OUTPATIENT)
Dept: ENDOCRINOLOGY | Facility: CLINIC | Age: 79
End: 2024-07-11
Payer: COMMERCIAL

## 2024-07-11 VITALS
HEART RATE: 69 BPM | DIASTOLIC BLOOD PRESSURE: 78 MMHG | SYSTOLIC BLOOD PRESSURE: 132 MMHG | OXYGEN SATURATION: 95 % | BODY MASS INDEX: 25.61 KG/M2 | WEIGHT: 169 LBS | HEIGHT: 68 IN

## 2024-07-11 DIAGNOSIS — E83.52 HYPERCALCEMIA: ICD-10-CM

## 2024-07-11 DIAGNOSIS — E21.0 PRIMARY HYPERPARATHYROIDISM (HCC): ICD-10-CM

## 2024-07-11 DIAGNOSIS — Z98.890 H/O PARATHYROIDECTOMY: ICD-10-CM

## 2024-07-11 DIAGNOSIS — M81.0 AGE-RELATED OSTEOPOROSIS WITHOUT CURRENT PATHOLOGICAL FRACTURE: Primary | ICD-10-CM

## 2024-07-11 DIAGNOSIS — E55.9 VITAMIN D DEFICIENCY: ICD-10-CM

## 2024-07-11 DIAGNOSIS — E04.2 MULTINODULAR GOITER: ICD-10-CM

## 2024-07-11 DIAGNOSIS — Z90.89 H/O PARATHYROIDECTOMY: ICD-10-CM

## 2024-07-11 PROCEDURE — 99214 OFFICE O/P EST MOD 30 MIN: CPT | Performed by: INTERNAL MEDICINE

## 2024-07-11 NOTE — PROGRESS NOTES
Nella Yancey 78 y.o. female MRN: [id number]    Encounter: [id number]      Assessment & Plan     Assessment:  This is a 78 y.o.-year-old female with vitamin D deficiency.    Plan:    Diagnoses and all orders for this visit:    Age-related osteoporosis without current pathological fracture  She was started on Reclast infusion in January 2024.  She is due for her second Reclast infusion in January 2025.  Obtain DEXA scan of hip and pelvis before next appointment  Discussed the importance of fall precautions, continue vitamin D and calcium supplementation  -     DXA bone density spine hip and pelvis; Future    Primary hyperparathyroidism (HCC)  Status post parathyroid adenoma removal  -     Basic metabolic panel; Future  -     PTH, intact; Future    H/O parathyroidectomy    Vitamin D deficiency  Continue vitamin D3 supplementation 2000 IU daily  -     Vitamin D 25 hydroxy; Future    Multinodular goiter  Patient has stable thyroid nodules on right and left lobe, left lobe nodule is 1.0 x 0.6 x 0.8 cm reduced in size, right mid gland nodule measuring 0.9 x 0.7 x 0.7 cm lesser in size.  No nodules meet criteria for biopsy or follow-up ultrasounds.  Pt is  asymptomatic  Hypercalcemia  Resolved, most recent calcium is normal       CC:   Primary hyperparathyroidism, osteoporosis, vitamin D deficiency, multinodular goiter    History of Present Illness     HPI:    Nella aYncey is 78-year-old woman with medical history of primary hyperparathyroidism, status post parathyroid surgery (4 gland exploration in August 2022 out of which 3 glands were removed), with persistent elevated calcium and PTH is here for follow-up.  Patient had a DEXA scan in December 2023 which showed T-score of -2.7 at lumbar spine suggestive of osteoporosis.  Previously in June 2022 forearm T-score was 3.7.  Patient also has history of right forearm fracture.    She was started on Reclast infusion once a year.  She received first Reclast infusion in  January 2024, tolerated it well      Ultrasound of thyroid in November 2022 showed right and left thyroid nodules which are stable no nodules meet criteria for follow-up or ultrasound.  She denies any family history of thyroid cancer, risk factors for thyroid cancer.    Component      Latest Ref Rng 3/25/2024 7/3/2024   Sodium      135 - 147 mmol/L 140     Potassium      3.5 - 5.3 mmol/L 4.2     Chloride      96 - 108 mmol/L 103     Carbon Dioxide      21 - 32 mmol/L 30     ANION GAP      4 - 13 mmol/L 7     BUN      5 - 25 mg/dL 22     Creatinine      0.60 - 1.30 mg/dL 0.79     GLUCOSE, FASTING      65 - 99 mg/dL 85     Calcium      8.4 - 10.2 mg/dL 9.4     AST      13 - 39 U/L 23     ALT      7 - 52 U/L 23     ALK PHOS      34 - 104 U/L 65     Total Protein      6.4 - 8.4 g/dL 6.7     Albumin      3.5 - 5.0 g/dL 4.0     Total Bilirubin      0.20 - 1.00 mg/dL 0.96     GFR, Calculated      ml/min/1.73sq m 72     VITAMIN D, 25-HYDROXY      30.0 - 100.0 ng/mL 34.8  32.7    PTH      12.0 - 88.0 pg/mL 124.9 (H)  115.4 (H)    TSH 3RD GENERATON      0.450 - 4.500 uIU/mL  1.606    FREE T4      0.61 - 1.12 ng/dL  0.88           Component      Latest Ref Rng 7/3/2024   VITAMIN D, 25-HYDROXY      30.0 - 100.0 ng/mL 32.7    PTH      12.0 - 88.0 pg/mL 115.4 (H)    TSH 3RD GENERATON      0.450 - 4.500 uIU/mL 1.606    FREE T4      0.61 - 1.12 ng/dL 0.88           Review of Systems   Constitutional:  Negative for activity change, diaphoresis, fatigue, fever and unexpected weight change.   HENT: Negative.     Eyes:  Negative for visual disturbance.   Respiratory:  Negative for cough, chest tightness and shortness of breath.    Cardiovascular:  Negative for chest pain, palpitations and leg swelling.   Gastrointestinal:  Negative for abdominal pain, blood in stool, constipation, diarrhea, nausea and vomiting.   Endocrine: Negative for cold intolerance, heat intolerance, polydipsia, polyphagia and polyuria.   Genitourinary:  Negative  for dysuria, enuresis, frequency and urgency.   Musculoskeletal:  Negative for arthralgias and myalgias.   Skin:  Negative for pallor, rash and wound.   Allergic/Immunologic: Negative.    Neurological:  Negative for dizziness, tremors, weakness and numbness.   Hematological: Negative.    Psychiatric/Behavioral: Negative.         Historical Information   Past Medical History:   Diagnosis Date    Anxiety     Arthritis     Back pain     Balance problems     Chest pain     heaviness    Colon polyp     Difficulty swallowing     Dizziness     Dry cough     Gait disorder     GERD (gastroesophageal reflux disease)     Hyperparathyroidism (HCC)     Hypertension     Increased frequency of headaches     Migraines     Neck pain     Numbness and tingling     bles    Palpitations     Pericarditis     Thyroid disease     nodule    Trouble in sleeping     Wears glasses      Past Surgical History:   Procedure Laterality Date    APPENDECTOMY      CHOLECYSTECTOMY      laparoscopic    COLONOSCOPY      KNEE SURGERY Left     PERICARDIAL WINDOW      MI OPTX DSTL RADL X-ARTIC FX/EPIPHYSL SEP Right 03/22/2018    Procedure: OPEN REDUCTION W/ INTERNAL FIXATION (ORIF) RADIUS, splint application;  Surgeon: Kandice Kimble MD;  Location: [facility];  Service: Orthopedics    MI PARATHYROIDECTOMY/EXPLORATION PARATHYROIDS N/A 08/17/2022    Procedure: PARATHYROIDECTOMY, 4 GLAND EXPLORATION;  Surgeon: Ovidio Perkins MD;  Location: [facility];  Service: ENT    SKIN BIOPSY      UPPER GASTROINTESTINAL ENDOSCOPY       Social History   Social History     Substance and Sexual Activity   Alcohol Use Not Currently    Comment: Rarely      Social History     Substance and Sexual Activity   Drug Use No     Social History     Tobacco Use   Smoking Status Never   Smokeless Tobacco Never     Family History:   Family History   Problem Relation Age of Onset    Kidney failure Mother     Hypertension Mother     Lung cancer Father     Yosef Parkinson White  syndrome Daughter     No Known Problems Sister     Substance Abuse Neg Hx     Alcohol abuse Neg Hx     Mental illness Neg Hx        Meds/Allergies   Current Outpatient Medications   Medication Sig Dispense Refill    acetaminophen (TYLENOL) 500 mg tablet Take 500-1,000 mg by mouth every 6 (six) hours as needed for mild pain      albuterol (PROVENTIL HFA,VENTOLIN HFA) 90 mcg/act inhaler Inhale 2 puffs every 6 (six) hours as needed for wheezing or shortness of breath 8 g 1    ALPRAZolam (XANAX) 0.5 mg tablet Take 1 tablet (0.5 mg total) by mouth daily at bedtime as needed for anxiety or sleep 20 tablet 1    ascorbic acid (VITAMIN C) 500 mg tablet Take 500 mg by mouth daily      b complex vitamins tablet Take 1 tablet by mouth daily      Cholecalciferol 50 MCG (2000 UT) CAPS Take 1 capsule by mouth daily      dicyclomine (BENTYL) 10 mg capsule Take 1 capsule (10 mg total) by mouth 3 (three) times a day as needed (stomach cramps) 30 capsule 0    Magnesium 300 MG CAPS Take 300 mg by mouth in the morning      meclizine (ANTIVERT) 12.5 MG tablet Can take 1-2 tabs as needed up to BID for dizziness or vertigo, caution as can be sedating 30 tablet 11    metoprolol succinate (TOPROL-XL) 25 mg 24 hr tablet TAKE 1 TABLET BY MOUTH DAILY AT BEDTIME 90 tablet 3    Multiple Vitamins-Minerals (MULTIVITAMIN ADULTS 50+ PO) Take 1 tablet by mouth in the morning      Nutritional Supplements (OSAPLEX MK-7 PO) Take 1 tablet by mouth in the morning      omeprazole (PriLOSEC) 20 mg delayed release capsule Take 1 capsule (20 mg total) by mouth daily before breakfast Half an hour prior to breakfast and half an hour prior to dinner 180 capsule 3    Riboflavin (VITAMIN B-2 PO) Take 250 mg by mouth in the morning      Cholecalciferol (Vitamin D-3) 25 MCG (1000 UT) CAPS Take 1,000 Units by mouth 2 (two) times a day (Patient not taking: Reported on 5/17/2024)      guaifenesin-codeine (GUAIFENESIN AC) 100-10 MG/5ML liquid Take 5 mL by mouth daily at  "bedtime as needed for cough (Patient not taking: Reported on 7/10/2024) 120 mL 0     No current facility-administered medications for this visit.     Allergies   Allergen Reactions    Ciprofloxacin Myalgia       Objective   Vitals: Blood pressure 132/78, pulse 69, height 5' 8\" (1.727 m), weight 76.7 kg (169 lb), SpO2 95%, not currently breastfeeding.    Physical Exam  Vitals reviewed.   Constitutional:       General: She is not in acute distress.     Appearance: Normal appearance. She is not ill-appearing.   HENT:      Head: Normocephalic and atraumatic.      Nose: Nose normal.   Eyes:      Extraocular Movements: Extraocular movements intact.      Conjunctiva/sclera: Conjunctivae normal.   Pulmonary:      Effort: No respiratory distress.   Musculoskeletal:      Cervical back: Normal range of motion.   Neurological:      General: No focal deficit present.      Mental Status: She is alert and oriented to person, place, and time.   Psychiatric:         Mood and Affect: Mood normal.         Behavior: Behavior normal.         The history was obtained from the review of the chart, patient.    Lab Results:   Lab Results   Component Value Date/Time    Potassium 4.6 07/12/2024 07:42 AM    Potassium 4.2 03/25/2024 07:24 AM    Potassium 4.3 01/18/2024 07:15 AM    Chloride 104 07/12/2024 07:42 AM    Chloride 103 03/25/2024 07:24 AM    Chloride 105 01/18/2024 07:15 AM    CO2 28 07/12/2024 07:42 AM    CO2 30 03/25/2024 07:24 AM    CO2 30 01/18/2024 07:15 AM    BUN 21 07/12/2024 07:42 AM    BUN 22 03/25/2024 07:24 AM    BUN 15 01/18/2024 07:15 AM    Creatinine 0.79 07/12/2024 07:42 AM    Creatinine 0.79 03/25/2024 07:24 AM    Creatinine 0.77 01/18/2024 07:15 AM    Glucose, Fasting 86 07/12/2024 07:42 AM    Glucose, Fasting 85 03/25/2024 07:24 AM    Glucose, Fasting 78 01/18/2024 07:15 AM    Calcium 9.9 07/12/2024 07:42 AM    Calcium 9.4 03/25/2024 07:24 AM    Calcium 10.6 (H) 01/18/2024 07:15 AM    eGFR 72 07/12/2024 07:42 AM    " eGFR 72 03/25/2024 07:24 AM    eGFR 75 01/18/2024 07:15 AM    TSH 3RD GENERATON 1.606 07/03/2024 07:26 AM    Free T4 0.88 07/03/2024 07:26 AM    .4 (H) 07/03/2024 07:26 AM    .9 (H) 03/25/2024 07:24 AM    PTH 89.0 (H) 09/26/2023 07:03 AM    Vit D, 25-Hydroxy 32.7 07/03/2024 07:26 AM    Vit D, 25-Hydroxy 34.8 03/25/2024 07:24 AM    Vit D, 25-Hydroxy 39.7 09/26/2023 07:03 AM         Imaging Studies:         Results for orders placed during the hospital encounter of 12/14/23    DXA bone density spine hip and pelvis    Impression  1. Osteoporosis. Based on the lumbar spine    2.  The 10 year risk of hip fracture is 4.5% with the 10 year risk of major osteoporotic fracture being 20% as calculated by the [employer] fracture risk assessment tool (FRAX, which is based on data generated by the WHO Collaborating [place]  for Metabolic Bone Diseases).    3.  The current NOF guidelines recommend treating patients with a T-score of -2.5 or less in the lumbar spine or hips, or in post-menopausal women and men over the age of 50 with low bone mass (osteopenia) and a FRAX 10 year risk score of >3% for hip  fracture and/or >20% for major osteoporotic fracture.    4.  The NOF recommends follow-up DXA in 1-2 years after initiating therapy for osteoporosis and every 2 years thereafter. More frequent evaluation is appropriate for patients with conditions associated with rapid bone loss, such as glucocorticoid  therapy. The interval between DXA screenings may be longer for individuals without major risk factors and initial T-score in the normal or upper low bone mass range.      The FRAX algorithm has certain limitations:  -FRAX has not been validated in patients currently or previously treated with pharmacotherapy for osteoporosis.  In such patients, clinical judgment must be exercised in interpreting FRAX scores.  -Prior hip, vertebral and humeral fragility fractures appear to confer greater risk of subsequent  "fracture than fractures at other sites (this is especially true for individuals with severe vertebral fractures), but quantification of this incremental  risk is not possible with FRAX.  -FRAX underestimates fracture risk in patients with history of multiple fragility fractures.  -FRAX may underestimate fracture risk in patients with history of frequent falls.  -It is not appropriate to use FRAX to monitor treatment response.      WHO CLASSIFICATION:  Normal (a T-score of -1.0 or higher)  Low bone mineral density (a T-score of less than -1.0 but higher than -2.5)  Osteoporosis (a T-score of -2.5 or less)  Severe osteoporosis (a T-score of -2.5 or less with a fragility fracture)                Workstation performed: [id number]            I have personally reviewed pertinent reports.      Portions of the record may have been created with voice recognition software. Occasional wrong word or \"sound a like\" substitutions may have occurred due to the inherent limitations of voice recognition software. Read the chart carefully and recognize, using context, where substitutions have occurred.    "

## 2024-07-12 ENCOUNTER — APPOINTMENT (OUTPATIENT)
Dept: LAB | Facility: CLINIC | Age: 79
End: 2024-07-12
Payer: COMMERCIAL

## 2024-07-12 DIAGNOSIS — R10.13 EPIGASTRIC ABDOMINAL PAIN: ICD-10-CM

## 2024-07-12 DIAGNOSIS — R35.0 URINARY FREQUENCY: ICD-10-CM

## 2024-07-12 LAB
ALBUMIN SERPL BCG-MCNC: 3.8 G/DL (ref 3.5–5)
ALP SERPL-CCNC: 64 U/L (ref 34–104)
ALT SERPL W P-5'-P-CCNC: 21 U/L (ref 7–52)
ANION GAP SERPL CALCULATED.3IONS-SCNC: 8 MMOL/L (ref 4–13)
AST SERPL W P-5'-P-CCNC: 22 U/L (ref 13–39)
BASOPHILS # BLD AUTO: 0.02 THOUSANDS/ÂΜL (ref 0–0.1)
BASOPHILS NFR BLD AUTO: 0 % (ref 0–1)
BILIRUB SERPL-MCNC: 0.68 MG/DL (ref 0.2–1)
BILIRUB UR QL STRIP: NEGATIVE
BUN SERPL-MCNC: 21 MG/DL (ref 5–25)
CALCIUM SERPL-MCNC: 9.9 MG/DL (ref 8.4–10.2)
CHLORIDE SERPL-SCNC: 104 MMOL/L (ref 96–108)
CLARITY UR: CLEAR
CO2 SERPL-SCNC: 28 MMOL/L (ref 21–32)
COLOR UR: YELLOW
CREAT SERPL-MCNC: 0.79 MG/DL (ref 0.6–1.3)
EOSINOPHIL # BLD AUTO: 0.2 THOUSAND/ÂΜL (ref 0–0.61)
EOSINOPHIL NFR BLD AUTO: 4 % (ref 0–6)
ERYTHROCYTE [DISTWIDTH] IN BLOOD BY AUTOMATED COUNT: 13.3 % (ref 11.6–15.1)
GFR SERPL CREATININE-BSD FRML MDRD: 72 ML/MIN/1.73SQ M
GLUCOSE P FAST SERPL-MCNC: 86 MG/DL (ref 65–99)
GLUCOSE UR STRIP-MCNC: NEGATIVE MG/DL
HCT VFR BLD AUTO: 44.2 % (ref 34.8–46.1)
HGB BLD-MCNC: 14.5 G/DL (ref 11.5–15.4)
HGB UR QL STRIP.AUTO: NEGATIVE
IMM GRANULOCYTES # BLD AUTO: 0.03 THOUSAND/UL (ref 0–0.2)
IMM GRANULOCYTES NFR BLD AUTO: 1 % (ref 0–2)
KETONES UR STRIP-MCNC: NEGATIVE MG/DL
LEUKOCYTE ESTERASE UR QL STRIP: NEGATIVE
LIPASE SERPL-CCNC: 158 U/L (ref 11–82)
LYMPHOCYTES # BLD AUTO: 1.97 THOUSANDS/ÂΜL (ref 0.6–4.47)
LYMPHOCYTES NFR BLD AUTO: 35 % (ref 14–44)
MCH RBC QN AUTO: 30.5 PG (ref 26.8–34.3)
MCHC RBC AUTO-ENTMCNC: 32.8 G/DL (ref 31.4–37.4)
MCV RBC AUTO: 93 FL (ref 82–98)
MONOCYTES # BLD AUTO: 0.66 THOUSAND/ÂΜL (ref 0.17–1.22)
MONOCYTES NFR BLD AUTO: 12 % (ref 4–12)
NEUTROPHILS # BLD AUTO: 2.83 THOUSANDS/ÂΜL (ref 1.85–7.62)
NEUTS SEG NFR BLD AUTO: 48 % (ref 43–75)
NITRITE UR QL STRIP: NEGATIVE
NRBC BLD AUTO-RTO: 0 /100 WBCS
PH UR STRIP.AUTO: 7 [PH]
PLATELET # BLD AUTO: 217 THOUSANDS/UL (ref 149–390)
PMV BLD AUTO: 10.8 FL (ref 8.9–12.7)
POTASSIUM SERPL-SCNC: 4.6 MMOL/L (ref 3.5–5.3)
PROT SERPL-MCNC: 6.9 G/DL (ref 6.4–8.4)
PROT UR STRIP-MCNC: NEGATIVE MG/DL
RBC # BLD AUTO: 4.75 MILLION/UL (ref 3.81–5.12)
SODIUM SERPL-SCNC: 140 MMOL/L (ref 135–147)
SP GR UR STRIP.AUTO: 1.01 (ref 1–1.03)
UROBILINOGEN UR STRIP-ACNC: <2 MG/DL
WBC # BLD AUTO: 5.71 THOUSAND/UL (ref 4.31–10.16)

## 2024-07-12 PROCEDURE — 80053 COMPREHEN METABOLIC PANEL: CPT

## 2024-07-12 PROCEDURE — 83690 ASSAY OF LIPASE: CPT

## 2024-07-12 PROCEDURE — 87086 URINE CULTURE/COLONY COUNT: CPT

## 2024-07-12 PROCEDURE — 85025 COMPLETE CBC W/AUTO DIFF WBC: CPT

## 2024-07-12 PROCEDURE — 36415 COLL VENOUS BLD VENIPUNCTURE: CPT

## 2024-07-12 PROCEDURE — 81003 URINALYSIS AUTO W/O SCOPE: CPT

## 2024-07-13 LAB — BACTERIA UR CULT: NORMAL

## 2024-07-17 ENCOUNTER — HOSPITAL ENCOUNTER (OUTPATIENT)
Dept: ULTRASOUND IMAGING | Facility: HOSPITAL | Age: 79
Discharge: HOME | End: 2024-07-17
Payer: COMMERCIAL

## 2024-07-17 DIAGNOSIS — R10.13 EPIGASTRIC ABDOMINAL PAIN: ICD-10-CM

## 2024-07-17 PROCEDURE — 76700 US EXAM ABDOM COMPLETE: CPT

## 2024-08-14 ENCOUNTER — APPOINTMENT (OUTPATIENT)
Dept: MRI IMAGING | Facility: HOSPITAL | Age: 79
End: 2024-08-14
Payer: COMMERCIAL

## 2024-08-14 ENCOUNTER — APPOINTMENT (EMERGENCY)
Dept: CT IMAGING | Facility: HOSPITAL | Age: 79
End: 2024-08-14
Payer: COMMERCIAL

## 2024-08-14 ENCOUNTER — APPOINTMENT (OUTPATIENT)
Dept: RADIOLOGY | Facility: HOSPITAL | Age: 79
End: 2024-08-14
Payer: COMMERCIAL

## 2024-08-14 ENCOUNTER — HOSPITAL ENCOUNTER (OUTPATIENT)
Facility: HOSPITAL | Age: 79
Setting detail: OBSERVATION
Discharge: HOME | End: 2024-08-15
Attending: EMERGENCY MEDICINE | Admitting: INTERNAL MEDICINE
Payer: COMMERCIAL

## 2024-08-14 DIAGNOSIS — R29.90 STROKE-LIKE SYMPTOMS: Primary | ICD-10-CM

## 2024-08-14 DIAGNOSIS — I10 HYPERTENSION: ICD-10-CM

## 2024-08-14 DIAGNOSIS — E78.5 HLD (HYPERLIPIDEMIA): ICD-10-CM

## 2024-08-14 PROBLEM — R29.818 HEADACHE WITH NEUROLOGIC DEFICIT: Status: ACTIVE | Noted: 2024-08-14

## 2024-08-14 PROBLEM — R51.9 HEADACHE WITH NEUROLOGIC DEFICIT: Status: ACTIVE | Noted: 2024-08-14

## 2024-08-14 LAB
2HR DELTA HS TROPONIN: 0 NG/L
4HR DELTA HS TROPONIN: 0 NG/L
ANION GAP SERPL CALCULATED.3IONS-SCNC: 5 MMOL/L (ref 4–13)
APTT PPP: 33 SECONDS (ref 23–34)
ATRIAL RATE: 72 BPM
BUN SERPL-MCNC: 17 MG/DL (ref 5–25)
CALCIUM SERPL-MCNC: 10 MG/DL (ref 8.4–10.2)
CARDIAC TROPONIN I PNL SERPL HS: 3 NG/L
CHLORIDE SERPL-SCNC: 104 MMOL/L (ref 96–108)
CHOLEST SERPL-MCNC: 232 MG/DL
CO2 SERPL-SCNC: 30 MMOL/L (ref 21–32)
CREAT SERPL-MCNC: 0.78 MG/DL (ref 0.6–1.3)
ERYTHROCYTE [DISTWIDTH] IN BLOOD BY AUTOMATED COUNT: 13.1 % (ref 11.6–15.1)
EST. AVERAGE GLUCOSE BLD GHB EST-MCNC: 108 MG/DL
GFR SERPL CREATININE-BSD FRML MDRD: 73 ML/MIN/1.73SQ M
GLUCOSE SERPL-MCNC: 103 MG/DL (ref 65–140)
GLUCOSE SERPL-MCNC: 96 MG/DL (ref 65–140)
HBA1C MFR BLD: 5.4 %
HCT VFR BLD AUTO: 45.1 % (ref 34.8–46.1)
HDLC SERPL-MCNC: 73 MG/DL
HGB BLD-MCNC: 15.1 G/DL (ref 11.5–15.4)
INR PPP: 0.88 (ref 0.85–1.19)
LDLC SERPL CALC-MCNC: 141 MG/DL (ref 0–100)
MCH RBC QN AUTO: 31.1 PG (ref 26.8–34.3)
MCHC RBC AUTO-ENTMCNC: 33.5 G/DL (ref 31.4–37.4)
MCV RBC AUTO: 93 FL (ref 82–98)
P AXIS: 42 DEGREES
PLATELET # BLD AUTO: 212 THOUSANDS/UL (ref 149–390)
PLATELET # BLD AUTO: 220 THOUSANDS/UL (ref 149–390)
PMV BLD AUTO: 10 FL (ref 8.9–12.7)
PMV BLD AUTO: 9.8 FL (ref 8.9–12.7)
POTASSIUM SERPL-SCNC: 4 MMOL/L (ref 3.5–5.3)
PR INTERVAL: 186 MS
PROTHROMBIN TIME: 12.5 SECONDS (ref 12.3–15)
QRS AXIS: 84 DEGREES
QRSD INTERVAL: 98 MS
QT INTERVAL: 406 MS
QTC INTERVAL: 444 MS
RBC # BLD AUTO: 4.85 MILLION/UL (ref 3.81–5.12)
SODIUM SERPL-SCNC: 139 MMOL/L (ref 135–147)
T WAVE AXIS: 33 DEGREES
TRIGL SERPL-MCNC: 91 MG/DL
VENTRICULAR RATE: 72 BPM
WBC # BLD AUTO: 5.86 THOUSAND/UL (ref 4.31–10.16)

## 2024-08-14 PROCEDURE — 99214 OFFICE O/P EST MOD 30 MIN: CPT | Performed by: PSYCHIATRY & NEUROLOGY

## 2024-08-14 PROCEDURE — 70496 CT ANGIOGRAPHY HEAD: CPT

## 2024-08-14 PROCEDURE — 99285 EMERGENCY DEPT VISIT HI MDM: CPT | Performed by: EMERGENCY MEDICINE

## 2024-08-14 PROCEDURE — 82948 REAGENT STRIP/BLOOD GLUCOSE: CPT

## 2024-08-14 PROCEDURE — 70498 CT ANGIOGRAPHY NECK: CPT

## 2024-08-14 PROCEDURE — 80061 LIPID PANEL: CPT | Performed by: PHYSICIAN ASSISTANT

## 2024-08-14 PROCEDURE — 85730 THROMBOPLASTIN TIME PARTIAL: CPT | Performed by: EMERGENCY MEDICINE

## 2024-08-14 PROCEDURE — 99222 1ST HOSP IP/OBS MODERATE 55: CPT | Performed by: HOSPITALIST

## 2024-08-14 PROCEDURE — NC001 PR NO CHARGE: Performed by: PSYCHIATRY & NEUROLOGY

## 2024-08-14 PROCEDURE — 85027 COMPLETE CBC AUTOMATED: CPT | Performed by: EMERGENCY MEDICINE

## 2024-08-14 PROCEDURE — 71046 X-RAY EXAM CHEST 2 VIEWS: CPT

## 2024-08-14 PROCEDURE — 70551 MRI BRAIN STEM W/O DYE: CPT

## 2024-08-14 PROCEDURE — 83036 HEMOGLOBIN GLYCOSYLATED A1C: CPT | Performed by: PHYSICIAN ASSISTANT

## 2024-08-14 PROCEDURE — 85610 PROTHROMBIN TIME: CPT | Performed by: EMERGENCY MEDICINE

## 2024-08-14 PROCEDURE — 80048 BASIC METABOLIC PNL TOTAL CA: CPT | Performed by: EMERGENCY MEDICINE

## 2024-08-14 PROCEDURE — 93005 ELECTROCARDIOGRAM TRACING: CPT

## 2024-08-14 PROCEDURE — 85049 AUTOMATED PLATELET COUNT: CPT | Performed by: HOSPITALIST

## 2024-08-14 PROCEDURE — 93010 ELECTROCARDIOGRAM REPORT: CPT | Performed by: INTERNAL MEDICINE

## 2024-08-14 PROCEDURE — 36415 COLL VENOUS BLD VENIPUNCTURE: CPT | Performed by: EMERGENCY MEDICINE

## 2024-08-14 PROCEDURE — 99285 EMERGENCY DEPT VISIT HI MDM: CPT

## 2024-08-14 PROCEDURE — 84484 ASSAY OF TROPONIN QUANT: CPT | Performed by: EMERGENCY MEDICINE

## 2024-08-14 PROCEDURE — 97163 PT EVAL HIGH COMPLEX 45 MIN: CPT

## 2024-08-14 RX ORDER — PANTOPRAZOLE SODIUM 40 MG/1
40 TABLET, DELAYED RELEASE ORAL
Status: DISCONTINUED | OUTPATIENT
Start: 2024-08-14 | End: 2024-08-15 | Stop reason: HOSPADM

## 2024-08-14 RX ORDER — ASPIRIN 325 MG
325 TABLET ORAL ONCE
Status: COMPLETED | OUTPATIENT
Start: 2024-08-14 | End: 2024-08-14

## 2024-08-14 RX ORDER — METOPROLOL SUCCINATE 25 MG/1
25 TABLET, EXTENDED RELEASE ORAL
Status: DISCONTINUED | OUTPATIENT
Start: 2024-08-15 | End: 2024-08-15 | Stop reason: HOSPADM

## 2024-08-14 RX ORDER — ACETAMINOPHEN 325 MG/1
650 TABLET ORAL EVERY 6 HOURS PRN
Status: DISCONTINUED | OUTPATIENT
Start: 2024-08-14 | End: 2024-08-15 | Stop reason: HOSPADM

## 2024-08-14 RX ORDER — ENOXAPARIN SODIUM 100 MG/ML
40 INJECTION SUBCUTANEOUS DAILY
Status: DISCONTINUED | OUTPATIENT
Start: 2024-08-14 | End: 2024-08-15 | Stop reason: HOSPADM

## 2024-08-14 RX ORDER — ATORVASTATIN CALCIUM 40 MG/1
40 TABLET, FILM COATED ORAL
Status: DISCONTINUED | OUTPATIENT
Start: 2024-08-14 | End: 2024-08-15 | Stop reason: HOSPADM

## 2024-08-14 RX ORDER — ASCORBIC ACID 500 MG
500 TABLET ORAL DAILY
Status: DISCONTINUED | OUTPATIENT
Start: 2024-08-14 | End: 2024-08-15 | Stop reason: HOSPADM

## 2024-08-14 RX ORDER — ALPRAZOLAM 0.5 MG
0.5 TABLET ORAL
Status: DISCONTINUED | OUTPATIENT
Start: 2024-08-14 | End: 2024-08-15 | Stop reason: HOSPADM

## 2024-08-14 RX ORDER — ALBUTEROL SULFATE 90 UG/1
2 AEROSOL, METERED RESPIRATORY (INHALATION) EVERY 6 HOURS PRN
Status: DISCONTINUED | OUTPATIENT
Start: 2024-08-14 | End: 2024-08-15 | Stop reason: HOSPADM

## 2024-08-14 RX ADMIN — ATORVASTATIN CALCIUM 40 MG: 40 TABLET, FILM COATED ORAL at 17:20

## 2024-08-14 RX ADMIN — ENOXAPARIN SODIUM 40 MG: 40 INJECTION SUBCUTANEOUS at 11:46

## 2024-08-14 RX ADMIN — OXYCODONE HYDROCHLORIDE AND ACETAMINOPHEN 500 MG: 500 TABLET ORAL at 11:47

## 2024-08-14 RX ADMIN — PANTOPRAZOLE SODIUM 40 MG: 40 TABLET, DELAYED RELEASE ORAL at 11:47

## 2024-08-14 RX ADMIN — MULTIPLE VITAMINS W/ MINERALS TAB 1 TABLET: TAB ORAL at 11:47

## 2024-08-14 RX ADMIN — ASPIRIN 325 MG ORAL TABLET 325 MG: 325 PILL ORAL at 07:56

## 2024-08-14 RX ADMIN — IOHEXOL 100 ML: 350 INJECTION, SOLUTION INTRAVENOUS at 06:55

## 2024-08-14 RX ADMIN — ALPRAZOLAM 0.5 MG: 0.5 TABLET ORAL at 23:27

## 2024-08-14 RX ADMIN — Medication 2000 UNITS: at 11:47

## 2024-08-14 NOTE — SPEECH THERAPY NOTE
Speech Language/Pathology Screen    Order received, chart reviewed. NIH 1. Pt passed nursing dysphagia screen and has been tolerating regular texture diet, thin liquids, and PO meds. Spoke w/ RN, RN denies concerns for dysphagia, aspiration, and/or speech/language deficits. Spoke w/ pt. Pt denies dysphagia, odynophagia, globus sensation, s/s aspiration, and/or speech/language deficits at this time. No gross speech/language deficits observed during conversation. Formal ST evaluation not indicated at this time. Please re-consult if medically necessary.

## 2024-08-14 NOTE — H&P
[facility]  H&P  Name: Nella Yancey 79 y.o. female I MRN: [id number]  Unit/Bed#: -01 I Date of Admission: 8/14/2024   Date of Service: 8/14/2024 I Hospital Day: 0      Assessment & Plan   HLD (hyperlipidemia)  Assessment & Plan  Cont statin    HTN (hypertension)  Assessment & Plan  Start metoprolol from 8/15   Currently undergoing permissive HTN    * Stroke-like symptoms  Assessment & Plan  Patient presents with left arm numbness and right-sided headache.  Concern for TIA, stroke, or complex migraine.  CT scan did not demonstrate acute ischemic stroke or intracranial acute findings.  MRI ordered   CTA head/neck Negative CTA head and neck for large vessel occlusion, dissection, aneurysm.   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   We will admit the patient to stroke pathway  Check ECHO  Anti-platelet therapy strategy per attending neurologist preference. Currently on ASA.   Monitor closely on telemetry  Permissive hypertension if s/s <24 hours   DVT prophylaxis   PT/OT/ST  Statin  Lipid panel/Hgb A1C ordered   Neurology consult; recommendations appreciated                 Chief Complaint   Patient presents with    Headache     Pt reports right sided headache, left sided arm numbness and nausea, pt reports feeling fine going to bed last night, pt reports not feeling like her self         HPI:  Nella Yancey is a 79 y.o. female who presents with neurological complaints.  She endorses left arm numbness from the elbows down to the fingertips.  Also right-sided frontal headache.  The patient fortunately states both are significantly improved although both are still present.  The numbness was first noted around 2 AM.  Although patient did endorse not feeling well, more fatigued than usual yesterday.  No weakness reported.  No dysuria.  No fevers.  No chills.  No chest pain.  No shortness of breath.  No abdominal pain.   No other complaints.  Historical Information   Past Medical History:   Diagnosis Date    Anxiety     Arthritis     Back pain     Balance problems     Chest pain     heaviness    Colon polyp     Difficulty swallowing     Dizziness     Dry cough     Gait disorder     GERD (gastroesophageal reflux disease)     Hyperparathyroidism (HCC)     Hypertension     Increased frequency of headaches     Migraines     Neck pain     Numbness and tingling     bles    Palpitations     Pericarditis     Thyroid disease     nodule    Trouble in sleeping     Wears glasses      Past Surgical History:   Procedure Laterality Date    APPENDECTOMY      CHOLECYSTECTOMY      laparoscopic    COLONOSCOPY      KNEE SURGERY Left     PERICARDIAL WINDOW      MT OPTX DSTL RADL X-ARTIC FX/EPIPHYSL SEP Right 03/22/2018    Procedure: OPEN REDUCTION W/ INTERNAL FIXATION (ORIF) RADIUS, splint application;  Surgeon: Kandice Kimble MD;  Location: [facility];  Service: Orthopedics    MT PARATHYROIDECTOMY/EXPLORATION PARATHYROIDS N/A 08/17/2022    Procedure: PARATHYROIDECTOMY, 4 GLAND EXPLORATION;  Surgeon: Ovidio Perkins MD;  Location: [facility];  Service: ENT    SKIN BIOPSY      UPPER GASTROINTESTINAL ENDOSCOPY       Social History   Social History     Substance and Sexual Activity   Alcohol Use Not Currently    Comment: Rarely      Social History     Substance and Sexual Activity   Drug Use No     Social History     Tobacco Use   Smoking Status Never   Smokeless Tobacco Never     Family History   Problem Relation Age of Onset    Kidney failure Mother     Hypertension Mother     Lung cancer Father     Yosef Parkinson White syndrome Daughter     No Known Problems Sister     Substance Abuse Neg Hx     Alcohol abuse Neg Hx     Mental illness Neg Hx        Meds/Allergies   Allergies   Allergen Reactions    Ciprofloxacin Myalgia       Meds:    Current Facility-Administered Medications:     acetaminophen (TYLENOL) tablet 650 mg, 650 mg, Oral, Q6H PRN,  Elian Schmidt MD    albuterol (PROVENTIL HFA,VENTOLIN HFA) inhaler 2 puff, 2 puff, Inhalation, Q6H PRN, Elian Schmidt MD    ALPRAZolam (XANAX) tablet 0.5 mg, 0.5 mg, Oral, HS PRN, Elian Schmidt MD    ascorbic acid (VITAMIN C) tablet 500 mg, 500 mg, Oral, Daily, Elian Schmidt MD, 500 mg at 08/14/24 1147    [START ON 8/15/2024] aspirin (ECOTRIN LOW STRENGTH) EC tablet 81 mg, 81 mg, Oral, Daily, Lovely Quintanilla PA-C    atorvastatin (LIPITOR) tablet 40 mg, 40 mg, Oral, Daily With Dinner, Lovely Quintanilla PA-C    Cholecalciferol (VITAMIN D3) tablet 2,000 Units, 2,000 Units, Oral, Daily, Elian Schmidt MD, 2,000 Units at 08/14/24 1147    enoxaparin (LOVENOX) subcutaneous injection 40 mg, 40 mg, Subcutaneous, Daily, Elian Schmidt MD, 40 mg at 08/14/24 1146    [START ON 8/15/2024] metoprolol succinate (TOPROL-XL) 24 hr tablet 25 mg, 25 mg, Oral, HS, Elian Schmidt MD    multivitamin-minerals (CENTRUM) tablet 1 tablet, 1 tablet, Oral, Daily, Elian Schmidt MD, 1 tablet at 08/14/24 1147    pantoprazole (PROTONIX) EC tablet 40 mg, 40 mg, Oral, Early Morning, Elian Schmidt MD, 40 mg at 08/14/24 1147      Medications Prior to Admission:     acetaminophen (TYLENOL) 500 mg tablet    albuterol (PROVENTIL HFA,VENTOLIN HFA) 90 mcg/act inhaler    ALPRAZolam (XANAX) 0.5 mg tablet    ascorbic acid (VITAMIN C) 500 mg tablet    b complex vitamins tablet    Cholecalciferol (Vitamin D-3) 25 MCG (1000 UT) CAPS    Cholecalciferol 50 MCG (2000 UT) CAPS    Magnesium 300 MG CAPS    meclizine (ANTIVERT) 12.5 MG tablet    metoprolol succinate (TOPROL-XL) 25 mg 24 hr tablet    Multiple Vitamins-Minerals (MULTIVITAMIN ADULTS 50+ PO)    Nutritional Supplements (OSAPLEX MK-7 PO)    omeprazole (PriLOSEC) 20 mg delayed release capsule    Riboflavin (VITAMIN B-2 PO)      Review of Systems:    A complete and comprehensive 14 point organ system review was performed and all other systems are  negative other than stated above in the HPI    Current Vitals:   Blood Pressure: 154/95 (08/14/24 1135)  Pulse: 60 (08/14/24 1135)  Temperature: 98.1 °F (36.7 °C) (08/14/24 0837)  Temp Source: Temporal (08/14/24 0637)  Respirations: 16 (08/14/24 0837)  Weight - Scale: 75.5 kg (166 lb 7.2 oz) (08/14/24 0637)  SpO2: 95 % (08/14/24 1135)  SPO2 RA Rest      Flowsheet Row ED to Hosp-Admission (Current) from 8/14/2024 in [facility] Med [facility]urg [facility]   SpO2 95 %   SpO2 Activity At Rest   O2 Device None (Room air)   O2 Flow Rate --            Intake/Output Summary (Last 24 hours) at 8/14/2024 1217  Last data filed at 8/14/2024 1101  Gross per 24 hour   Intake 480 ml   Output 450 ml   Net 30 ml     Body mass index is 25.31 kg/m².     Physical Exam:       General: well appearing, no acute distress  HEENT: atraumatic, PERRLA, moist mucosa, normal pharynx, normal tonsils and adenoids, normal tongue, no fluid in sinuses  Neck: Trachea midline, no carotid bruit, no masses  Respiratory: normal chest wall expansion, CTA B, no r/r/w, no rubs  Cardiovascular: RRR, no m/r/g, Normal S1 and S2  Abdomen: Soft, non-tender, non-distended, normal bowel sounds in all quadrants, no hepatosplenomegaly, no tympany  Rectal: deferred  Musculoskeletal: normal ROM in upper and lower extremities  Integumentary: warm, dry, and pink, with no rash, purpura, or petechia  Heme/Lymph: no lymphadenopathy, no bruises  Neurological: Cranial Nerves II-XII grossly intact; L arm numbness from fingertips to elbows, strength and sensation intact.   Psychiatric: cooperative with normal mood and affect    Lab Results:   CBC:   Lab Results   Component Value Date    WBC 5.86 08/14/2024    HGB 15.1 08/14/2024    HCT 45.1 08/14/2024    MCV 93 08/14/2024     08/14/2024    RBC 4.85 08/14/2024    MCH 31.1 08/14/2024    MCHC 33.5 08/14/2024    RDW 13.1 08/14/2024    MPV 10.0 08/14/2024    NRBC 0 07/12/2024     CMP:  Lab Results   Component Value Date      08/14/2024    CO2 30 08/14/2024    BUN 17 08/14/2024    CREATININE 0.78 08/14/2024    CALCIUM 10.0 08/14/2024    AST 22 07/12/2024    ALT 21 07/12/2024    ALKPHOS 64 07/12/2024    EGFR 73 08/14/2024     Lab Results   Component Value Date    TROPONINI <0.02 01/08/2021    CKTOTAL 74 10/21/2020     Coagulation:   Lab Results   Component Value Date    INR 0.88 08/14/2024    Urinalysis:  Lab Results   Component Value Date    COLORU Yellow 07/12/2024    CLARITYU Clear 07/12/2024    SPECGRAV 1.012 07/12/2024    PHUR 7.0 07/12/2024    PHUR 7.0 01/06/2020    LEUKOCYTESUR Negative 07/12/2024    NITRITE Negative 07/12/2024    GLUCOSEU Negative 07/12/2024    KETONESU Negative 07/12/2024    BILIRUBINUR Negative 07/12/2024    BLOODU Negative 07/12/2024      Amylase:   Lab Results   Component Value Date    AMYLASE 136 (H) 12/30/2019     Lipase:   Lab Results   Component Value Date    LIPASE 158 (H) 07/12/2024        Imaging: CTA stroke alert (head/neck)    Result Date: 8/14/2024  Narrative: CTA NECK AND BRAIN WITH AND WITHOUT CONTRAST INDICATION: Stroke Alert COMPARISON:   Same day CT stroke alert brain. NM parathyroid scan with SPECT 3/24/2023. Ultrasound thyroid 11/15/2022. CTA head with and without contrast TECHNIQUE:  Post contrast imaging was performed after administration of iodinated contrast through the neck and brain. Post contrast axial 0.625 mm images timed to opacify the arterial system.  3D rendering was performed on an independent workstation.   MIP reconstructions performed. Coronal and sagittal reconstructions were performed of the non contrast portion of the brain. Radiation dose length product (DLP) for this visit:  378 mGy-cm .  This examination, like all CT scans performed in the [facility], was performed utilizing techniques to minimize radiation dose exposure, including the use of iterative reconstruction and automated exposure control. IV Contrast:  100 mL of iohexol (OMNIPAQUE)  IMAGE QUALITY:   Diagnostic FINDINGS: CTA NECK ARCH AND GREAT VESSELS: Patent. Visualized arch and great vessels are normal. VERTEBRAL ARTERIES: Patent extracranial segments. 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 RIGHT CAROTID: Patent. No stenosis.  No dissection. Retropharyngeal course of right carotid artery, normal variant. LEFT CAROTID: Patent. Mild calcified atherosclerotic disease in proximal cervical internal carotid artery. Less than 50% stenosis.  No dissection. NASCET criteria was used to determine the degree of internal carotid artery diameter stenosis. CTA BRAIN: INTERNAL CAROTID ARTERIES: No stenosis or occlusion. ANTERIOR CEREBRAL ARTERY CIRCULATION:  No stenosis or occlusion. Markedly hypoplastic right A1 segment, normal variant. MIDDLE CEREBRAL ARTERY CIRCULATION:  No stenosis or occlusion. DISTAL VERTEBRAL ARTERIES: Patent dominant right and hypoplastic left distal vertebral arteries. Hypoplastic left vertebral artery becomes more diminutive after PICA takeoff. No stenosis or occlusion. BASILAR ARTERY:  No stenosis or occlusion. POSTERIOR CEREBRAL ARTERIES: No stenosis or occlusion. VENOUS STRUCTURES:  Normal. NON VASCULAR ANATOMY BONY STRUCTURES:  No acute osseous abnormality. Kyphotic curvature of cervical spine. Grade 1 anterolisthesis C3-C4 and C4-C5. Moderate-to-severe multilevel degenerative changes of cervical spine, worse at C6-C7. Unchanged dysplastic curved appearance of bilateral posterior ribs. SOFT TISSUES OF THE NECK: Dental amalgam with extensive beam hardening artifact limits evaluation of anterior oral cavity for which direct visualization is recommended; otherwise, normal visualized oral cavity within limitations. No acute neck abnormality. THORACIC INLET: Mild juxtapleural fibrosis visualized bilateral posterior lungs. No focal consolidation in visualized upper lung zones.     Impression: Negative CTA head and neck  for large vessel occlusion, dissection, aneurysm. 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 Additional chronic/incidental findings as detailed above. Findings were directly discussed with Renata Carreno at approximately 7:06 AM. I personally discussed this study with KRISTIE CARLOS on 8/14/2024 7:08 AM. Workstation performed: [id number]     CT stroke alert brain    Result Date: 8/14/2024  Narrative: CT BRAIN - STROKE ALERT PROTOCOL INDICATION:   Stroke Alert. COMPARISON: Same day CTA stroke alert head and neck. CTA head with and without contrast 10/28/2019. TECHNIQUE:  CT examination of the brain was performed.  In addition to axial images, coronal reformatted images were created and submitted for interpretation. Radiation dose length product (DLP) for this visit:  865 mGy-cm .  This examination, like all CT scans performed in the [facility], was performed utilizing techniques to minimize radiation dose exposure, including the use of iterative reconstruction and automated exposure control. IMAGE QUALITY:  Diagnostic. FINDINGS: PARENCHYMA: Decreased attenuation is noted in periventricular and subcortical white matter demonstrating an appearance that is statistically most likely to represent mild microangiopathic change. No CT signs of acute infarction.  No intracranial mass, mass effect or midline shift.  No acute parenchymal hemorrhage. Mild calcification of the cavernous internal carotid ateries. VENTRICLES AND EXTRA-AXIAL SPACES:  Normal for the patient's age. VISUALIZED ORBITS: Normal visualized orbits. PARANASAL SINUSES: Normal visualized paranasal sinuses. CALVARIUM AND EXTRACRANIAL SOFT TISSUES:   Normal.     Impression: No acute intracranial abnormality. Mild chronic microangiopathy. Findings were directly discussed with Renata Carreno at approximately 7:05 AM. I personally discussed this study with KRISTIE CARLOS on 8/14/2024  "7:08 AM. Workstation performed: [id number]     US abdomen complete    Result Date: 7/20/2024  Narrative: ABDOMEN ULTRASOUND, COMPLETE INDICATION: R10.13: Epigastric pain. COMPARISON: Ultrasound 3/1/2024 and priors TECHNIQUE: Real-time ultrasound of the abdomen was performed with a curvilinear transducer with both volumetric sweeps and still imaging techniques. FINDINGS: PANCREAS: Visualized portions of the pancreas are within normal limits. AORTA AND IVC: Visualized portions are normal for patient age. LIVER: Size: Within normal range. The liver measures 14.1 cm in the midclavicular line. Contour: Surface contour is smooth. Parenchyma: Echogenicity and echotexture are within normal limits. No liver mass identified. Limited imaging of the main portal vein shows it to be patent and hepatopetal. BILIARY: Patient has undergone cholecystectomy. No intrahepatic biliary dilatation. CBD measures 4.0 mm. No choledocholithiasis. KIDNEY: Right kidney measures 9.4 x 5.4 x 5.1 cm. Volume 134.4 mL Kidney within normal limits. Left kidney measures 8.1 x 4.1 x 4.3 cm. Volume 75.0 mL No hydronephrosis. Lower pole cyst measures 5 mm. Left lower renal pole echogenic focus measuring 5 mm, possibly sinus fat or angiomyolipoma. SPLEEN: Measures 9.1 x 7.9 x 4.3 cm. Volume 160.7 mL Within normal limits. ASCITES: None.     Impression: 1. Subcentimeter left renal cyst. Left lower renal pole echogenic focus measuring 5 mm, possibly sinus fat or angiomyolipoma. Otherwise no acute findings. Workstation performed: [id number]     EKG, Pathology, and Other Studies: I have personally reviewed the results.  VTE   Prophylaxis: In place    Code Status: Level 1 - Full Code    Anticipated Length of Stay:  Patient will be admitted on an Observation basis with an anticipated length of stay of  less  2 midnights.       \"This note has been constructed using a voice recognition system\"      Eilan Schmidt MD  8/14/2024, 12:17 PM        "

## 2024-08-14 NOTE — CASE MANAGEMENT
Case Management Assessment & Discharge Planning Note    Patient name Nella Yancey  [facility] /-01 MRN [id number]  : 1945 Date 2024       Current Admission Date: 2024  Current Admission Diagnosis:Stroke-like symptoms   Patient Active Problem List    Diagnosis Date Noted Date Diagnosed    Headache with neurologic deficit 2024     Stroke-like symptoms 2024     HTN (hypertension) 2024     HLD (hyperlipidemia) 2024     Age-related osteoporosis without current pathological fracture 2024     Neuropathy 2023     Numbness and tingling of both lower extremities 2023     Primary osteoarthritis of right knee 2023     [facility]) (HCC)      H/O parathyroidectomy 2022     Skin tag of anus 2022     SOBOE (shortness of breath on exertion) 2022     Aneurysm (HCC) 2022     Edema of both ankles 2022     Cervical myofascial pain syndrome 2022     Osteoporosis with current pathological fracture 09/10/2021     Parathyroid related hypercalcemia (HCC) 09/10/2021     Hallux abducto valgus, bilateral 2021     Pincer nail deformity 2021     Palpitation 2021     Calf pain 10/08/2020     Myalgia 10/08/2020     Elevated blood-pressure reading without diagnosis of hypertension 2020     Dysphonia 2020     Reflux laryngitis 2020     Paresis of left vocal fold 2020     Glottic insufficiency 2020     Muscle tension dysphonia 2020     TMJ dysfunction 2020     Pharyngoesophageal dysphagia 2020     Paresthesias      Carpal tunnel syndrome of left wrist      Irritable bowel syndrome with diarrhea 2020     Elevated amylase and lipase 2020     Pericardial effusion 2019     Abnormal CT scan of lung 2019     Migraine without aura and without status migrainosus, not intractable 2019     Dizziness and giddiness 2019      Insomnia 03/18/2019     Cervicalgia 03/18/2019     Lung nodule 01/16/2019     Atypical chest pain 01/06/2019     Anxiety 11/13/2018     Primary hyperparathyroidism (HCC) 11/13/2018     Thyroid nodule 11/13/2018     Vitamin D deficiency 11/13/2018     Closed fracture distal radius and ulna, right, initial encounter 03/20/2018     History of pericarditis 03/12/2018     Gastroesophageal reflux disease with esophagitis 10/18/2017     Heart murmur 04/17/2015       LOS (days): 0  Geometric Mean LOS (GMLOS) (days):   Days to GMLOS:     OBJECTIVE:          Current admission status: Observation       Preferred Pharmacy:   [id number] - [place] - [address]  [address]  [facility] [address] [address]  Phone: [phone] Fax: [phone]    [facility] - [place], [place] - [address]  [address]  [address]  [facility] [address]  Phone: [phone] Fax: [phone]    [facility] - [place], [place] - [address] [place] [address].  [address]  [facility] [address]  Phone: [phone] Fax: [phone]    Primary Care Provider: Cecelia Rodriguez MD    Primary Insurance: [facility]  Secondary Insurance:     ASSESSMENT:  Active Health Care Proxies       Anthony Yancey [facility] Care Representative - Spouse   Primary Phone: [phone] (Mobile)  Home Phone: [phone]                 Advance Directives  Does patient have a Health Care POA?: Yes  Does patient have Advance Directives?: Yes  Advance Directives: Living will, Power of  for health care (delvis Membreno is POA)  Primary Contact: Robertjennifer Bc: : [phone]    Readmission Root Cause  30 Day Readmission: No    Patient Information  Admitted from:: Home  Mental Status: Alert  During Assessment patient was accompanied by: Spouse  Assessment information provided by:: Patient  Primary Caregiver: Self  Support Systems: Self, Spouse/significant other, Children, Family members  [facility]  Residence: [place]  What city do you live in?: [place]  Home entry access options. Select all that apply.: No steps to enter home  Type of Current Residence: 3 story home  Upon entering residence, is there a bedroom on the main floor (no further steps)?: Yes  Upon entering residence, is there a bathroom on the main floor (no further steps)?: No  Indicate which floors of current residence have a bathroom (select all the apply):: 2nd Floor  Number of steps to 2nd floor from main floor: One Flight  Living Arrangements: Lives w/ Spouse/significant other  Is patient a ?: No    Activities of Daily Living Prior to Admission  Functional Status: Independent  Completes ADLs independently?: Yes  Ambulates independently?: Yes  Does patient use assisted devices?: No  Does patient currently own DME?: Yes  What DME does the patient currently own?: Walker, Straight Cane, Other (Comment) (inhaler)  Does patient have a history of Outpatient Therapy (PT/OT)?: No  Does the patient have a history of Short-Term Rehab?: No  Does patient have a history of HHC?: No  Does patient currently have HHC?: No         Patient Information Continued  Income Source: Pension/longterm  Does patient have prescription coverage?: Yes  Does patient receive dialysis treatments?: No  Does patient have a history of substance abuse?: No  Does patient have a history of Mental Health Diagnosis?: Yes (anxiety)    Means of Transportation  Means of Transport to Appts:: Drives Self    Social Determinants of Health (SDOH)      Flowsheet Row Most Recent Value   Housing Stability    In the last 12 months, was there a time when you were not able to pay the mortgage or rent on time? N   In the past 12 months, how many times have you moved where you were living? 0   At any time in the past 12 months, were you homeless or living in a shelter (including now)? N   Transportation Needs    In the past 12 months, has lack of transportation kept you from medical  appointments or from getting medications? no   In the past 12 months, has lack of transportation kept you from meetings, work, or from getting things needed for daily living? No   Food Insecurity    Within the past 12 months, you worried that your food would run out before you got the money to buy more. Never true   Within the past 12 months, the food you bought just didn't last and you didn't have money to get more. Never true   Utilities    In the past 12 months has the electric, gas, oil, or water company threatened to shut off services in your home? No            DISCHARGE DETAILS:    Discharge planning discussed with:: patient and patient's   Freedom of Choice: Yes  Comments - Freedom of Choice: Plans to return home with outpt PT at [facility]  CM contacted family/caregiver?: No- see comments (Pt's brother at bedside)  Were Treatment Team discharge recommendations reviewed with patient/caregiver?: Yes  Did patient/caregiver verbalize understanding of patient care needs?: Yes       Contacts  Patient Contacts: Anthony Yancey:   Relationship to Patient:: Family  Contact Method: In Person  Reason/Outcome: Emergency Contact, Discharge Planning    Requested Home Health Care         Is the patient interested in HHC at discharge?: No    DME Referral Provided  Referral made for DME?: No    Additional Comments: Met with Pt and Pt's  at bedside. Discussed role of case management. Pt lives with  in [facility], no eric. Independent PTA. Uses [facility] pharmacy in [place] and able to afford medications. Denies hx of VNA/SNF/MH/D&A. Pt's son(Haseeb) is POA and Pt has living will. Drives, grocery shopping. Pt reports she worked with therapy this afternoon and plan to return home with outpt PT at [facility]. Pt's  to transport home,

## 2024-08-14 NOTE — ASSESSMENT & PLAN NOTE
Patient presents with left arm numbness and right-sided headache.  Concern for TIA, stroke, or complex migraine.  CT scan did not demonstrate acute ischemic stroke or intracranial acute findings.  MRI ordered   CTA head/neck Negative CTA head and neck for large vessel occlusion, dissection, aneurysm.   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   We will admit the patient to stroke pathway  Check ECHO  Anti-platelet therapy strategy per attending neurologist preference. Currently on ASA.   Monitor closely on telemetry  Permissive hypertension if s/s <24 hours   DVT prophylaxis   PT/OT/ST  Statin  Lipid panel/Hgb A1C ordered   Neurology consult; recommendations appreciated

## 2024-08-14 NOTE — QUICK NOTE
Notified at 6:40 AM. Response immediate.     Pt is a 78 yo F PMHx migraines, HTN, vertigo who presented as a stroke alert. LKW last night prior to going to sleep, unclear time. She states that she woke up at 230 in the morning experiencing numbness/tingling in the L forearm, hand, fingers. She went back to sleep and woke up around 330 in the morning experiencing flashing lights in her vision. Overall feels more off balance. NIHSS 1 for sensory loss. Per review of her chart, she does also have history of L CTS.     CTH showed no acute intracranial abnormalities. CTA showed no evidence of LVO. Not a candidate for TNK as she presented outside of the window. Suspect that numbness may be 2/2 positioning overnight in the setting of CTS hx vs migrainous phenomenon, given coexistent visual auras and headache, but cannot r/o underlying infarct. Recommended admission stroke pathway, loading ASA, statin, MRI brain w/o, TTE, PT/OT.

## 2024-08-14 NOTE — OCCUPATIONAL THERAPY NOTE
Occupational Therapy Screen Note     Patient Name: Nella Yancey  Today's Date: 8/14/2024  Problem List  Principal Problem:    Stroke-like symptoms  Active Problems:    HTN (hypertension)    HLD (hyperlipidemia)            08/14/24 1138   OT Last Visit   OT Visit Date 08/14/24   Note Type   Note type Screen   Additional Comments OT orders received, chart reviewed. Pt documented with AMPAC scores of 24 for both basic/daily & achieving HLM goal of 7. No acute OT needs indicated at this time, DC OT orders. Please reconsult should a change in functional status arise.       Venita Teran, OTR/L

## 2024-08-14 NOTE — ED PROVIDER NOTES
History  Chief Complaint   Patient presents with    Headache     Pt reports right sided headache, left sided arm numbness and nausea, pt reports feeling fine going to bed last night, pt reports not feeling like her self      79-year-old female with history of hypertension, vertigo, migraines, hyperparathyroidism status post parathyroid adenoma removal states that she had transient right eye pain yesterday around 3 in the afternoon and since then felt very fatigued.  She woke up this morning around 2:30 and felt numbness of the left forearm, hand and fingers.  This persists.  She went back to sleep and around 3:30 she woke and had right-sided headache.  She noted flashing light that she saw across the visual fields of both eyes.  Left arm continues to be numb.  She felt a little more dizzy than usual but does have history of vertigo.  She felt that her left arm was slightly clumsy but was able to use it today.  Also felt a little bit more off balance and had to hold on a little more than usual when walking.  Denies chest pain, palpitations, recent illness or injury.  She takes no blood thinners nor aspirin.  Patient states this does not feel like her typical migraines and that she does not typically have any visual auras.        Prior to Admission Medications   Prescriptions Last Dose Informant Patient Reported? Taking?   ALPRAZolam (XANAX) 0.5 mg tablet  Self No No   Sig: Take 1 tablet (0.5 mg total) by mouth daily at bedtime as needed for anxiety or sleep   Cholecalciferol (Vitamin D-3) 25 MCG (1000 UT) CAPS  Self Yes No   Sig: Take 1,000 Units by mouth 2 (two) times a day   Patient not taking: Reported on 5/17/2024   Cholecalciferol 50 MCG (2000 UT) CAPS   Yes No   Sig: Take 1 capsule by mouth daily   Magnesium 300 MG CAPS  Self Yes No   Sig: Take 300 mg by mouth in the morning   Multiple Vitamins-Minerals (MULTIVITAMIN ADULTS 50+ PO)  Self Yes No   Sig: Take 1 tablet by mouth in the morning   Nutritional  Supplements (OSAPLEX MK-7 PO)  Self Yes No   Sig: Take 1 tablet by mouth in the morning   Riboflavin (VITAMIN B-2 PO)  Self Yes No   Sig: Take 250 mg by mouth in the morning   acetaminophen (TYLENOL) 500 mg tablet  Self Yes No   Sig: Take 500-1,000 mg by mouth every 6 (six) hours as needed for mild pain   albuterol (PROVENTIL HFA,VENTOLIN HFA) 90 mcg/act inhaler   No No   Sig: Inhale 2 puffs every 6 (six) hours as needed for wheezing or shortness of breath   ascorbic acid (VITAMIN C) 500 mg tablet  Self Yes No   Sig: Take 500 mg by mouth daily   b complex vitamins tablet  Self Yes No   Sig: Take 1 tablet by mouth daily   meclizine (ANTIVERT) 12.5 MG tablet   No No   Sig: Can take 1-2 tabs as needed up to BID for dizziness or vertigo, caution as can be sedating   metoprolol succinate (TOPROL-XL) 25 mg 24 hr tablet   No No   Sig: TAKE 1 TABLET BY MOUTH DAILY AT BEDTIME   omeprazole (PriLOSEC) 20 mg delayed release capsule  Self No No   Sig: Take 1 capsule (20 mg total) by mouth daily before breakfast Half an hour prior to breakfast and half an hour prior to dinner      Facility-Administered Medications: None       Past Medical History:   Diagnosis Date    Anxiety     Arthritis     Back pain     Balance problems     Chest pain     heaviness    Colon polyp     Difficulty swallowing     Dizziness     Dry cough     Gait disorder     GERD (gastroesophageal reflux disease)     Hyperparathyroidism (HCC)     Hypertension     Increased frequency of headaches     Migraines     Neck pain     Numbness and tingling     bles    Palpitations     Pericarditis     Thyroid disease     nodule    Trouble in sleeping     Wears glasses        Past Surgical History:   Procedure Laterality Date    APPENDECTOMY      CHOLECYSTECTOMY      laparoscopic    COLONOSCOPY      KNEE SURGERY Left     PERICARDIAL WINDOW      VT OPTX DSTL RADL X-ARTIC FX/EPIPHYSL SEP Right 03/22/2018    Procedure: OPEN REDUCTION W/ INTERNAL FIXATION (ORIF) RADIUS, splint  application;  Surgeon: Kandice Kimble MD;  Location: [facility];  Service: Orthopedics    W[place] PARATHYROIDECTOMY/EXPLORATION PARATHYROIDS N/A 08/17/2022    Procedure: PARATHYROIDECTOMY, 4 GLAND EXPLORATION;  Surgeon: Ovidio Perkins MD;  Location: [facility];  Service: ENT    SKIN BIOPSY      UPPER GASTROINTESTINAL ENDOSCOPY         Family History   Problem Relation Age of Onset    Kidney failure Mother     Hypertension Mother     Lung cancer Father     Yosef Parkinson White syndrome Daughter     No Known Problems Sister     Substance Abuse Neg Hx     Alcohol abuse Neg Hx     Mental illness Neg Hx      I have reviewed and agree with the history as documented.    E-Cigarette/Vaping    E-Cigarette Use Never User      E-Cigarette/Vaping Substances    Nicotine No     THC No     CBD No     Flavoring No     Other No     Unknown No      Social History     Tobacco Use    Smoking status: Never    Smokeless tobacco: Never   Vaping Use    Vaping status: Never Used   Substance Use Topics    Alcohol use: Not Currently     Comment: Rarely     Drug use: No       Review of Systems   Constitutional:  Negative for fever.   Respiratory:  Negative for shortness of breath.    Cardiovascular:  Negative for chest pain and palpitations.   Neurological:  Positive for dizziness, numbness and headaches. Negative for syncope and speech difficulty.       Physical Exam  Physical Exam  Vitals and nursing note reviewed.   Constitutional:       General: She is not in acute distress.     Appearance: She is well-developed. She is not ill-appearing or diaphoretic.   HENT:      Head: Normocephalic and atraumatic.      Right Ear: Tympanic membrane, ear canal and external ear normal.      Left Ear: Tympanic membrane, ear canal and external ear normal.   Eyes:      General: No scleral icterus.     Extraocular Movements: EOM normal.      Conjunctiva/sclera: Conjunctivae normal.      Pupils: Pupils are equal, round, and reactive to light.   Neck:       Vascular: No carotid bruit.   Cardiovascular:      Rate and Rhythm: Normal rate and regular rhythm.      Pulses: Normal pulses.      Heart sounds: Normal heart sounds.   Pulmonary:      Effort: Pulmonary effort is normal. No respiratory distress.      Breath sounds: Normal breath sounds.   Abdominal:      Palpations: Abdomen is soft. There is no mass.      Tenderness: There is no abdominal tenderness.   Musculoskeletal:         General: No tenderness or edema. Normal range of motion.      Cervical back: Neck supple. No tenderness.      Right lower leg: No edema.      Left lower leg: No edema.   Skin:     General: Skin is warm and dry.      Capillary Refill: Capillary refill takes less than 2 seconds.      Findings: No rash.   Neurological:      Mental Status: She is alert and oriented to person, place, and time.      Cranial Nerves: No cranial nerve deficit.      Sensory: No sensory deficit.      Motor: No weakness.      Coordination: Coordination normal.      Gait: Gait normal.      Deep Tendon Reflexes: Reflexes are normal and symmetric. Reflexes normal.   Psychiatric:         Mood and Affect: Mood and affect and mood normal.         Behavior: Behavior normal.         Vital Signs  ED Triage Vitals   Temperature Pulse Respirations Blood Pressure SpO2   08/14/24 0637 08/14/24 0637 08/14/24 0637 08/14/24 0637 08/14/24 0637   97.8 °F (36.6 °C) 82 18 (!) 200/119 94 %      Temp Source Heart Rate Source Patient Position - Orthostatic VS BP Location FiO2 (%)   08/14/24 0637 08/14/24 0637 08/14/24 1135 08/14/24 1135 --   Temporal Monitor Sitting Right arm       Pain Score       08/14/24 0730       7           Vitals:    08/14/24 1932 08/14/24 2238 08/15/24 0307 08/15/24 0718   BP: 167/80 167/96 157/95 157/85   Pulse: 74 77 77 59   Patient Position - Orthostatic VS:   Lying          Visual Acuity  Visual Acuity      Flowsheet Row Most Recent Value   L Pupil Size (mm) 3   R Pupil Size (mm) 3   L Pupil Shape Round   R Pupil  Shape Round            ED Medications  Medications   iohexol (OMNIPAQUE) 350 MG/ML injection (MULTI-DOSE) 100 mL (100 mL Intravenous Given 8/14/24 0655)   aspirin tablet 325 mg (325 mg Oral Given 8/14/24 0756)       Diagnostic Studies  Results Reviewed       Procedure Component Value Units Date/Time    Hemoglobin A1C [[id number]] Collected: 08/14/24 0656    Lab Status: Final result Specimen: Blood from Arm, Left Updated: 08/14/24 1527     Hemoglobin A1C 5.4 %       mg/dl     HS Troponin I 4hr [[id number]]  (Normal) Collected: 08/14/24 1142    Lab Status: Final result Specimen: Blood from Arm, Left Updated: 08/14/24 1221     hs TnI 4hr 3 ng/L      Delta 4hr hsTnI 0 ng/L     Lipid Panel with Direct LDL reflex [[id number]]  (Abnormal) Collected: 08/14/24 0656    Lab Status: Final result Specimen: Blood from Arm, Left Updated: 08/14/24 1128     Cholesterol 232 mg/dL      Triglycerides 91 mg/dL      HDL, Direct 73 mg/dL      LDL Calculated 141 mg/dL     HS Troponin I 2hr [[id number]]  (Normal) Collected: 08/14/24 0933    Lab Status: Final result Specimen: Blood from Arm, Right Updated: 08/14/24 1016     hs TnI 2hr 3 ng/L      Delta 2hr hsTnI 0 ng/L     HS Troponin 0hr (reflex protocol) [[id number]]  (Normal) Collected: 08/14/24 0656    Lab Status: Final result Specimen: Blood from Arm, Left Updated: 08/14/24 0732     hs TnI 0hr 3 ng/L     Basic metabolic panel [[id number]] Collected: 08/14/24 0656    Lab Status: Final result Specimen: Blood from Arm, Left Updated: 08/14/24 0727     Sodium 139 mmol/L      Potassium 4.0 mmol/L      Chloride 104 mmol/L      CO2 30 mmol/L      ANION GAP 5 mmol/L      BUN 17 mg/dL      Creatinine 0.78 mg/dL      Glucose 96 mg/dL      Calcium 10.0 mg/dL      eGFR 73 ml/min/1.73sq m     Narrative:      National Kid[employer] Foundation guidelines for Chronic Kidney Disease (CKD):     Stage 1 with normal or high GFR (GFR > 90 mL/min/1.73 square meters)    Stage 2 Mild CKD (GFR = 60-89  mL/min/1.73 square meters)    Stage 3A Moderate CKD (GFR = 45-59 mL/min/1.73 square meters)    Stage 3B Moderate CKD (GFR = 30-44 mL/min/1.73 square meters)    Stage 4 Severe CKD (GFR = 15-29 mL/min/1.73 square meters)    Stage 5 End Stage CKD (GFR <15 mL/min/1.73 square meters)  Note: GFR calculation is accurate only with a steady state creatinine    Protime-INR [[id number]]  (Normal) Collected: 08/14/24 0656    Lab Status: Final result Specimen: Blood from Arm, Left Updated: 08/14/24 0721     Protime 12.5 seconds      INR 0.88    Narrative:      INR Therapeutic Range    Indication                                             INR Range      Atrial Fibrillation                                               2.0-3.0  Hypercoagulable State                                    2.0.2.3  Left Ventricular Asist Device                            2.0-3.0  Mechanical Heart Valve                                  -    Aortic(with afib, MI, embolism, HF, LA enlargement,    and/or coagulopathy)                                     2.0-3.0 (2.5-3.5)     Mitral                                                             2.5-3.5  Prosthetic/Bioprosthetic Heart Valve               2.0-3.0  Venous thromboembolism (VTE: VT, PE        2.0-3.0    APTT [[id number]]  (Normal) Collected: 08/14/24 0656    Lab Status: Final result Specimen: Blood from Arm, Left Updated: 08/14/24 0721     PTT 33 seconds     Fingerstick Glucose (POCT) [[id number]]  (Normal) Collected: 08/14/24 0704    Lab Status: Final result Specimen: Blood Updated: 08/14/24 0705     POC Glucose 103 mg/dl     CBC and Platelet [[id number]]  (Normal) Collected: 08/14/24 0656    Lab Status: Final result Specimen: Blood from Arm, Left Updated: 08/14/24 0702     WBC 5.86 Thousand/uL      RBC 4.85 Million/uL      Hemoglobin 15.1 g/dL      Hematocrit 45.1 %      MCV 93 fL      MCH 31.1 pg      MCHC 33.5 g/dL      RDW 13.1 %      Platelets 220 Thousands/uL      MPV 10.0 fL                     MRI brain wo contrast   Final Result by Shad Castaneda MD (08/14 4555)      No evidence of acute infarct, intracranial hemorrhage or mass..      Workstation performed: [id number]         XR chest pa & lateral   Final Result by Pancho Wheat MD (08/15 0517)      Scarring and/or atelectasis at the left lung base.            Workstation performed: [id number]         CTA stroke alert (head/neck)   Final Result by Cabrera Fang MD (08/14 0717)      Negative CTA head and neck for large vessel occlusion, dissection, aneurysm.      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      Additional chronic/incidental findings as detailed above.         Findings were directly discussed with Renata Carreno at approximately 7:06 AM.      I personally discussed this study with KRISTIE CARLOS on 8/14/2024 7:08 AM.         Workstation performed: [id number]         CT stroke alert brain   Final Result by Cabrera Fang MD (08/14 0708)      No acute intracranial abnormality.      Mild chronic microangiopathy.      Findings were directly discussed with Renata Carreno at approximately 7:05 AM.      I personally discussed this study with KRISTIE CARLOS on 8/14/2024 7:08 AM.            Workstation performed: [id number]                    Procedures  ECG 12 Lead Documentation Only    Date/Time: 8/14/2024 6:40 AM    Performed by: Kristie Carlos DO  Authorized by: Kristie Carlos DO    ECG reviewed by me, the ED Provider: yes    Patient location:  ED  Previous ECG:     Previous ECG:  Unavailable    Comparison to cardiac monitor: Yes    Quality:     Tracing quality:  Limited by artifact  Rate:     ECG rate assessment: normal    Rhythm:     Rhythm: sinus rhythm    Ectopy:     Ectopy: none    QRS:     QRS axis:  Normal    QRS intervals:  Normal  Conduction:     Conduction: abnormal      Abnormal conduction: incomplete RBBB    ST segments:     ST  segments:  Normal  T waves:     T waves: normal             ED Course                                 SBIRT 20yo+      Flowsheet Row Most Recent Value   Initial Alcohol Screen: US AUDIT-C     1. How often do you have a drink containing alcohol? 0 Filed at: 08/14/2024 0639   2. How many drinks containing alcohol do you have on a typical day you are drinking?  0 Filed at: 08/14/2024 0639   3a. Male UNDER 65: How often do you have five or more drinks on one occasion? 0 Filed at: 08/14/2024 0639   3b. FEMALE Any Age, or MALE 65+: How often do you have 4 or more drinks on one occassion? 0 Filed at: 08/14/2024 0639   Audit-C Score 0 Filed at: 08/14/2024 0639   ЕКАТЕРИНА: How many times in the past year have you...    Used an illegal drug or used a prescription medication for non-medical reasons? Never Filed at: 08/14/2024 0639                      Medical Decision Making  79-year-old female with history of hyperparathyroidism, migraine headache, anxiety, hypertension and vertigo complains of right sided headache, left arm numbness and clumsiness.  Feels fatigued.  Last known well was yesterday afternoon.  Had flash of light this morning which is not normal for her migraines.  Concern for complex migraine, hemorrhagic or ischemic CVA, hypertensive crisis, dysrhythmia.  Very low indications for dissection, CNS infection, tumor, seizure.  Will do stroke workup, monitor blood pressure.  Discussed with neurology, Dr. Carreno.    Workup reviewed.  CTA with severe hypoplastic right vertebral artery.  Blood pressure stabilized.  As per neurology aspirin started.  Patient admitted as observation on stroke pathway    Amount and/or Complexity of Data Reviewed  Independent Historian: spouse  External Data Reviewed: notes.  Labs: ordered.  Radiology: ordered.  ECG/medicine tests: ordered and independent interpretation performed.  Discussion of management or test interpretation with external provider(s): Neurologist, Dr. Carreno.    Risk  OTC  drugs.  Prescription drug management.  Decision regarding hospitalization.                 Disposition  Final diagnoses:   Stroke-like symptoms   Hypertension     Time reflects when diagnosis was documented in both MDM as applicable and the Disposition within this note       Time User Action Codes Description Comment    8/14/2024  6:52 AM Leonides France Add [R29.90] Stroke-like symptoms     8/14/2024  7:55 AM Leonides France Add [I10] Hypertension     8/14/2024 11:31 AM Elian Schmidt Add [E78.5] HLD (hyperlipidemia)           ED Disposition       ED Disposition   Admit    Condition   Stable    Date/Time   Wed Aug 14, 2024 7673    Comment   Case was discussed with Dr. Cline and the patient's admission status was agreed to be Admission Status: observation status to the service of Dr. Cline .               Follow-up Information       Follow up With Specialties Details Why Contact Info    Cecelia Rodriguez MD Family Medicine Follow up  [address] [facility] [place] [address]  [place] [address]  [phone]              Discharge Medication List as of 8/15/2024  9:29 AM        START taking these medications    Details   pravastatin (PRAVACHOL) 20 mg tablet Take 1 tablet (20 mg total) by mouth daily, Starting Thu 8/15/2024, Normal           CONTINUE these medications which have NOT CHANGED    Details   acetaminophen (TYLENOL) 500 mg tablet Take 500-1,000 mg by mouth every 6 (six) hours as needed for mild pain, Historical Med      albuterol (PROVENTIL HFA,VENTOLIN HFA) 90 mcg/act inhaler Inhale 2 puffs every 6 (six) hours as needed for wheezing or shortness of breath, Starting Thu 3/14/2024, Normal      ALPRAZolam (XANAX) 0.5 mg tablet Take 1 tablet (0.5 mg total) by mouth daily at bedtime as needed for anxiety or sleep, Starting Mon 10/30/2023, Normal      ascorbic acid (VITAMIN C) 500 mg tablet Take 500 mg by mouth daily, Historical Med      b complex vitamins tablet Take 1 tablet by mouth daily, Historical Med       Cholecalciferol 50 MCG (2000 UT) CAPS Take 1 capsule by mouth daily, Historical Med      Magnesium 300 MG CAPS Take 300 mg by mouth in the morning, Historical Med      meclizine (ANTIVERT) 12.5 MG tablet Can take 1-2 tabs as needed up to BID for dizziness or vertigo, caution as can be sedating, Normal      metoprolol succinate (TOPROL-XL) 25 mg 24 hr tablet TAKE 1 TABLET BY MOUTH DAILY AT BEDTIME, Starting Wed 4/17/2024, Normal      Multiple Vitamins-Minerals (MULTIVITAMIN ADULTS 50+ PO) Take 1 tablet by mouth in the morning, Historical Med      Nutritional Supplements (OSAPLEX MK-7 PO) Take 1 tablet by mouth in the morning, Historical Med      omeprazole (PriLOSEC) 20 mg delayed release capsule Take 1 capsule (20 mg total) by mouth daily before breakfast Half an hour prior to breakfast and half an hour prior to dinner, Starting Fri 8/11/2023, Normal      Riboflavin (VITAMIN B-2 PO) Take 250 mg by mouth in the morning, Historical Med                 PDMP Review         Value Time User    PDMP Reviewed  Yes 5/17/2024  1:59 PM Cecelia Rodriguez MD            ED Provider  Electronically Signed by             Leonides France DO  08/16/24 1017

## 2024-08-14 NOTE — ASSESSMENT & PLAN NOTE
"79 y.o. right handed female with HTN, HLD, hyperparathyroidism, osteoporosis, BPPV, chronic neck pain, chronic gait instability, insomnia, depression, anxiety, chronic migraines, reported pericarditis, heart murmur, who presents with numbness of L forearm and fingers starting at 0230 AM 8/14 as well as R sided headache starting around 0330 AM 8/14. A stroke alert was initiated in the ED. NIHSS per ED 1 (sensory deficit). BP on arrival 200/119. CTH revealed no acute intracranial abnormality. CTA H/N wwo contrast revealed no IR target. Pt did not receive IV thrombolytic therapy. Pt was loaded with  mg x1 in ED. AP/AC naive PTA.     Workup  - CTH wo contrast 8/14/24:  \"No acute intracranial abnormality. chronic microangiopathy.\"  - CTA H/N wwo contrast 8/14/24:  \"Negative CTA head and neck for large vessel occlusion, dissection, aneurysm. 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 Additional chronic/incidental findings as detailed above.\"  - Labs  - hemoglobin A1c pending   - lipid panel pending      Higher suspicion for migraine with neurologic features +/- anxiety, less likely stroke, though will workup the latter     Plan  - Stroke pathway  MRI brain wo contrast pending  Echo pending   S/p  mg x1 on 8/14/24. Aspirin 81 mg daily starting 8/15/24  Atorvastatin 40 mg  Permissive HTN, allow for SBP up to 220 x 24 hours from onset of stroke like symptoms, then goal normotension. Goal normotension sooner if MRI brain negative for acute infarct   Euglycemic, normothermic goal  Continue telemetry  PT/OT/ST  Stroke education  Frequent neuro checks. Continue to monitor and notify neurology with any changes.  STAT CT head for any acute change in neuro exam  - Medical management and supportive care per primary team. Correction of any metabolic or infectious disturbances.         "

## 2024-08-14 NOTE — CONSULTS
"Consultation - Neurology   Nella Yancey 79 y.o. female MRN: [id number]  Unit/Bed#: -01 Encounter: [id number]      Assessment & Plan     Headache with neurologic deficit  Assessment & Plan  79 y.o. right handed female with HTN, HLD, hyperparathyroidism, osteoporosis, BPPV, chronic neck pain, chronic gait instability, insomnia, depression, anxiety, chronic migraines, reported pericarditis, heart murmur, who presents with numbness of L forearm and fingers starting at 0230 AM 8/14 as well as R sided headache starting around 0330 AM 8/14. A stroke alert was initiated in the ED. NIHSS per ED not yet documented. BP on arrival 200/119. CTH revealed no acute intracranial abnormality. CTA H/N wwo contrast revealed no IR target. Pt did not receive IV thrombolytic therapy. Pt was loaded with  mg x1 in ED. AP/AC naive PTA.     Workup  - CTH wo contrast 8/14/24:  \"No acute intracranial abnormality. chronic microangiopathy.\"  - CTA H/N wwo contrast 8/14/24:  \"Negative CTA head and neck for large vessel occlusion, dissection, aneurysm. 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 Additional chronic/incidental findings as detailed above.\"  - Labs  - hemoglobin A1c pending   - lipid panel pending      Higher suspicion for migraine with neurologic features +/- anxiety, less likely stroke, though will workup the latter     Plan  - Stroke pathway  MRI brain wo contrast pending  Echo pending   S/p  mg x1 on 8/14/24. Aspirin 81 mg daily starting 8/15/24  Atorvastatin 40 mg  Permissive HTN, allow for SBP up to 220 x 24 hours from onset of stroke like symptoms, then goal normotension. Goal normotension sooner if MRI brain negative for acute infarct   Euglycemic, normothermic goal  Continue telemetry  PT/OT/ST  Stroke education  Frequent neuro checks. Continue to monitor and notify neurology with any changes.  STAT CT head for any acute change in neuro " exam  - Medical management and supportive care per primary team. Correction of any metabolic or infectious disturbances.                 Recommendations for outpatient neurological follow up have yet to be determined.    **History and physical examination obtained by attending neurologist. AP in room as witness to history and physical examination obtained and acted solely as a scribe for attending neurologist. This AP did not provide any decision making for this encounter.**        History of Present Illness     Reason for Consult / Principal Problem: Stroke like symptoms   Hx and PE limited by: N/A   HPI: Nella Yancey is a 79 y.o. right handed female with HTN, HLD, hyperparathyroidism, osteoporosis, BPPV, chronic neck pain, chronic gait instability, insomnia, depression, anxiety, chronic migraines, reported pericarditis, heart murmur, who presents with numbness of L forearm and fingers starting at 0230 AM 8/14 as well as R sided headache starting around 0330 AM 8/14. A stroke alert was initiated in the ED. NIHSS per ED not yet documented. BP on arrival 200/119. CTH revealed no acute intracranial abnormality. CTA H/N wwo contrast revealed no IR target. Pt did not receive IV thrombolytic therapy. Pt was loaded with  mg x1 in ED. AP/AC naive PTA.     Patient reports not feeling well overall yesterday.  Patient reports that she was exhausted after not doing much of anything yesterday.  Patient also reports having a sharp pain in her eye at some point yesterday.  Patient reports she went to bed around 10/10:30 PM 8/13.  Patient reports she woke up at 2 AM this morning and noted she had numbness in her left fingers to elbow.  Patient reports that she rubbed her left arm and noticed that the numbness started to go away.  Patient reports she was walking around for a little bit when she noticed she started with a right-sided headache.  When patient tried to go back to bed soon after, she reports that she saw a  "flash of light in her closet.  Patient reports the flash of light was white, occurred when her eyes were open, was transient, and did not come back.  Patient reports that she was able to see completely normal afterwards.  Patient also felt nauseous and felt that her gait was more unsteady than usual when walking around, and she felt a little dizzy. Patient felt that she \"did not feel right,\" so she decided to wake up her .  Patient denies facial droop.  Patient denies upper extremity weakness.  Patient denies similar symptoms in the past.  Patient reported to ED for further evaluation.    At time of neurology examination, patient reports her left fingers still feel \"a little bit funny\" but denies numbness in her left arm.    He reports having very poor sleep in general.  Patient reports that she only tried one half of 1 trazodone in the past but it did not help her sleep, so she no longer took the medication.  Patient reports her PCP prescribed the trazodone in the past.        Previous Neurologic History:   [facility], last seen by Dr. Henson on 3/29/24. Pt reportedly with migraines since childhood. Pt has multiple headache types including bioccipital pain, mid throbbing pain. Migraine at times without aura but other times with nausea +/- vomiting, stiff and sore neck, photophobia, phonophobia, osmophobia, +/- nasal congestion, light headedness, and difficulty with concentration. Migraines worse with any movement. Most recently, headaches wax and wane in mild fashion, at times wakes up with headaches. Pt with poor sleep (on average 4 hours per night). Pt was recommended to undergo sleep study, but pt did not want to at time of last outpatient neurology visit. Headaches usually self resolve or improve with acetaminophen. At time of last outpatient neurology visit, pt was not interested in preventative or abortive migraine medications. Past/failed/contraindicated " preventative medications include metoprolol, propanolol (c/I due to interaction with current medications). Past abortive medications include Toradol IM (helped), trial of rizatriptan never used, past/contraindicated include fiorinal and codeine. Pt has reported numbness and tingling of both LE in past per chart review.     Consult to Neurology  Consult performed by: Lovely Quintanilla PA-C  Consult ordered by: Leonides France DO          Review of Systems    Historical Information   Past Medical History:   Diagnosis Date    Anxiety     Arthritis     Back pain     Balance problems     Chest pain     heaviness    Colon polyp     Difficulty swallowing     Dizziness     Dry cough     Gait disorder     GERD (gastroesophageal reflux disease)     Hyperparathyroidism (HCC)     Hypertension     Increased frequency of headaches     Migraines     Neck pain     Numbness and tingling     bles    Palpitations     Pericarditis     Thyroid disease     nodule    Trouble in sleeping     Wears glasses      Past Surgical History:   Procedure Laterality Date    APPENDECTOMY      CHOLECYSTECTOMY      laparoscopic    COLONOSCOPY      KNEE SURGERY Left     PERICARDIAL WINDOW      DC OPTX DSTL RADL X-ARTIC FX/EPIPHYSL SEP Right 03/22/2018    Procedure: OPEN REDUCTION W/ INTERNAL FIXATION (ORIF) RADIUS, splint application;  Surgeon: Kandice Kimble MD;  Location: [facility];  Service: Orthopedics    DC PARATHYROIDECTOMY/EXPLORATION PARATHYROIDS N/A 08/17/2022    Procedure: PARATHYROIDECTOMY, 4 GLAND EXPLORATION;  Surgeon: Ovidio Perkins MD;  Location: [facility];  Service: ENT    SKIN BIOPSY      UPPER GASTROINTESTINAL ENDOSCOPY       Social History   Social History     Substance and Sexual Activity   Alcohol Use Not Currently    Comment: Rarely      Social History     Substance and Sexual Activity   Drug Use No     E-Cigarette/Vaping    E-Cigarette Use Never User      E-Cigarette/Vaping Substances    Nicotine No     THC No     CBD No      Flavoring No     Other No     Unknown No      Social History     Tobacco Use   Smoking Status Never   Smokeless Tobacco Never     Family History:   Family History   Problem Relation Age of Onset    Kidney failure Mother     Hypertension Mother     Lung cancer Father     Yosef Parkinson White syndrome Daughter     No Known Problems Sister     Substance Abuse Neg Hx     Alcohol abuse Neg Hx     Mental illness Neg Hx        Review of previous medical records was  completed.     Meds/Allergies   all current active meds have been reviewed, current meds:   Current Facility-Administered Medications   Medication Dose Route Frequency    [START ON 8/15/2024] aspirin (ECOTRIN LOW STRENGTH) EC tablet 81 mg  81 mg Oral Daily    atorvastatin (LIPITOR) tablet 40 mg  40 mg Oral Daily With Dinner   , and PTA meds:   Prior to Admission Medications   Prescriptions Last Dose Informant Patient Reported? Taking?   ALPRAZolam (XANAX) 0.5 mg tablet  Self No No   Sig: Take 1 tablet (0.5 mg total) by mouth daily at bedtime as needed for anxiety or sleep   Cholecalciferol (Vitamin D-3) 25 MCG (1000 UT) CAPS  Self Yes No   Sig: Take 1,000 Units by mouth 2 (two) times a day   Patient not taking: Reported on 5/17/2024   Cholecalciferol 50 MCG (2000 UT) CAPS   Yes No   Sig: Take 1 capsule by mouth daily   Magnesium 300 MG CAPS  Self Yes No   Sig: Take 300 mg by mouth in the morning   Multiple Vitamins-Minerals (MULTIVITAMIN ADULTS 50+ PO)  Self Yes No   Sig: Take 1 tablet by mouth in the morning   Nutritional Supplements (OSAPLEX MK-7 PO)  Self Yes No   Sig: Take 1 tablet by mouth in the morning   Riboflavin (VITAMIN B-2 PO)  Self Yes No   Sig: Take 250 mg by mouth in the morning   acetaminophen (TYLENOL) 500 mg tablet  Self Yes No   Sig: Take 500-1,000 mg by mouth every 6 (six) hours as needed for mild pain   albuterol (PROVENTIL HFA,VENTOLIN HFA) 90 mcg/act inhaler   No No   Sig: Inhale 2 puffs every 6 (six) hours as needed for wheezing or  shortness of breath   ascorbic acid (VITAMIN C) 500 mg tablet  Self Yes No   Sig: Take 500 mg by mouth daily   b complex vitamins tablet  Self Yes No   Sig: Take 1 tablet by mouth daily   meclizine (ANTIVERT) 12.5 MG tablet   No No   Sig: Can take 1-2 tabs as needed up to BID for dizziness or vertigo, caution as can be sedating   metoprolol succinate (TOPROL-XL) 25 mg 24 hr tablet   No No   Sig: TAKE 1 TABLET BY MOUTH DAILY AT BEDTIME   omeprazole (PriLOSEC) 20 mg delayed release capsule  Self No No   Sig: Take 1 capsule (20 mg total) by mouth daily before breakfast Half an hour prior to breakfast and half an hour prior to dinner      Facility-Administered Medications: None       Allergies   Allergen Reactions    Ciprofloxacin Myalgia       Objective   Vitals:Blood pressure 145/84, pulse 65, temperature 98.1 °F (36.7 °C), resp. rate 16, weight 75.5 kg (166 lb 7.2 oz), SpO2 91%, not currently breastfeeding.,Body mass index is 25.31 kg/m².  No intake or output data in the 24 hours ending 08/14/24 0956    Invasive Devices:   Invasive Devices       Peripheral Intravenous Line  Duration             Peripheral IV 08/14/24 Left Antecubital <1 day                    Physical Exam  Vitals and nursing note reviewed.   HENT:      Head: Normocephalic and atraumatic.   Eyes:      Extraocular Movements: Extraocular movements intact and EOM normal.   Cardiovascular:      Rate and Rhythm: Normal rate.   Pulmonary:      Effort: Pulmonary effort is normal.   Musculoskeletal:      Right lower leg: Edema present.      Left lower leg: Edema present.   Neurological:      Mental Status: She is alert.      Coordination: Finger-Nose-Finger Test and Heel to Shin Test normal.   Psychiatric:      Comments: Pleasant and cooperative during exam        Neurologic Exam     Mental Status   Follows 1 step commands.   Attention: appropriately attends to provider.   Speech: (Speech fluent, no dysarthria appreciated on exam )  Level of consciousness:  "alert    - correctly states    - oriented to month, day, day of week, and year  - oriented to place, \"[facility]\"     Cranial Nerves     CN II   Right visual field deficit: none  Left visual field deficit: none     CN III, IV, VI   Extraocular motions are normal.   Conjugate gaze: present    CN V   Facial sensation intact.   Right facial sensation deficit: none  Left facial sensation deficit: none    CN VII   Right facial weakness: none  Left facial weakness: none    CN VIII   Hearing impaired: audition to finger rub intact b/l.    CN XI   CN XI normal.   Right sternocleidomastoid strength: normal  Left sternocleidomastoid strength: normal  Right trapezius strength: normal  Left trapezius strength: normal    CN XII   CN XII normal.   Tongue: not atrophic  Fasciculations: absent  Tongue deviation: none    Motor Exam   Right arm pronator drift: absent  Left arm pronator drift: absent  Strength to confrontation testing:  - R shoulder sore, slightly weaker than L due to pain  - b/l elbow flexion 5/5   - b/l elbow extension 5/5   - b/l wrist extension 5/5  - b/l finger abduction 5/5   - b/l hip flexion 5/5   - b/l dorsiflexion 5/5   - b/l knee flexion 5/5, slightly limited due to pain          Sensory Exam   Right arm vibration: normal  Left arm vibration: normal  Right leg vibration: decreased from knee  Left leg vibration: decreased from knee  Right arm pinprick: normal  Left arm pinprick: normal  Right leg pinprick: normal  Left leg pinprick: normal    -temperature sensation intact above feet b/l, symmetric  - temperature sensation intact in BUE      Gait, Coordination, and Reflexes     Gait  Gait: (deferred for patient safety)    Coordination   Finger to nose coordination: normal  Heel to shin coordination: normal    Reflexes   Right brachioradialis reflex rdgrdrrdarddrderd:rd rd3rd. Left brachioradialis reflex rdgrdrrdarddrderd:rd rd3rd. Right biceps reflex rdgrdrrdarddrderd:rd rd3rd. Left biceps reflex rdgrdrrdarddrderd:rd rd3rd. Right triceps reflex rdgrdrrdarddrderd:rd rd3rd. Left " "triceps reflex stgstrstastdstest:st st1st. Right patellar reflex stgstrstastdstest:st st1st. Left patellar reflex stgstrstastdstest:st st1st. Right achilles reflex stgstrstastdstest:st st1st. Left achilles reflex ndgndrndanddndend:nd nd2nd. Lab Results: CBC:   Results from last 7 days   Lab Units 08/14/24  0656   WBC Thousand/uL 5.86   RBC Million/uL 4.85   HEMOGLOBIN g/dL 15.1   HEMATOCRIT % 45.1   MCV fL 93   PLATELETS Thousands/uL 220   , BMP/CMP:   Results from last 7 days   Lab Units 08/14/24  0656   SODIUM mmol/L 139   POTASSIUM mmol/L 4.0   CHLORIDE mmol/L 104   CO2 mmol/L 30   BUN mg/dL 17   CREATININE mg/dL 0.78   CALCIUM mg/dL 10.0   EGFR ml/min/1.73sq m 73   , Vitamin B12:   , HgBA1C:   , TSH:   , Coagulation:   Results from last 7 days   Lab Units 08/14/24  0656   INR  0.88   , Lipid Profile:   , Ammonia:   , Urinalysis:       Invalid input(s): \"URIBILINOGEN\", Drug Screen:   , Medication Drug Levels:       Invalid input(s): \"CARBAMAZEPINE\", \"OXCARBAZEPINE\"  Imaging Studies: Attending neurologist personally reviewed CTH wo contrast 8/14/24 CTA H/N wwo contrast images in PACS and personally their reports.   EKG, Pathology, and Other Studies: Attending neurologist personally reviewed pertinent reports       Code Status: Prior  Advance Directive and Living Will:      Power of :    POLST:      This note was completed in part utilizing Dragon Software.  Grammatical errors, random word insertions, spelling mistakes, and incomplete sentences may be an occasional consequence of this system secondary to software limitations, ambient noise, and hardware issues.  If you have any questions or concerns about the content, text, or information contained within the body of this dictation, please contact the provider for clarification.         "

## 2024-08-14 NOTE — PHYSICAL THERAPY NOTE
PHYSICAL THERAPY EVALUATION NOTE      Patient Name: Nella Yancey  Today's Date: 2024    AGE:   79 y.o.  Mrn:   [id number]  ADMIT DX:  Hypertension [I10]  Stroke-like symptoms [R29.90]  Headache [R51.9]    Past Medical History:   Diagnosis Date    Anxiety     Arthritis     Back pain     Balance problems     Chest pain     heaviness    Colon polyp     Difficulty swallowing     Dizziness     Dry cough     Gait disorder     GERD (gastroesophageal reflux disease)     Hyperparathyroidism (HCC)     Hypertension     Increased frequency of headaches     Migraines     Neck pain     Numbness and tingling     bles    Palpitations     Pericarditis     Thyroid disease     nodule    Trouble in sleeping     Wears glasses      Length Of Stay: 0  PHYSICAL THERAPY EVALUATION :   Patient's identity confirmed via 2 patient identifiers (full name and ) at start of session       24 1256   PT Last Visit   PT Visit Date 24   Note Type   Note type Evaluation   Pain Assessment   Pain Assessment Tool 0-10   Pain Score No Pain   Restrictions/Precautions   Weight Bearing Precautions Per Order No   Other Precautions Chair Alarm;Bed Alarm;Telemetry   Home Living   Type of Home Other (Comment)  (Condo)   Home Layout Multi-level;Stairs to enter with rails;Able to live on main level with bedroom/bathroom  (0 CRIA)   Home Equipment Cane   Additional Comments Pt reports not using AD PTA, uses  as ambulatory aid when out in the community   Prior Function   Level of [place] Independent with ADLs;Independent with functional mobility   Lives With Spouse   Receives Help From Family   Falls in the last 6 months 0   Vocational Retired   General   Family/Caregiver Present Yes   Cognition   Overall Cognitive Status WFL   Arousal/Participation Alert   Orientation Level Oriented X4   Memory Within functional limits   Following Commands Follows multistep  commands with increased time or repetition   Comments Pt is pleasant and agreeable to participate in PT eval   RLE Assessment   RLE Assessment WFL   Strength RLE   RLE Overall Strength 4-/5  (chronic knee valgus)   LLE Assessment   LLE Assessment WFL   Strength LLE   LLE Overall Strength 4-/5  (chronic knee valgus)   Vision-Basic Assessment   Current Vision Wears glasses all the time   Light Touch   RLE Light Touch Grossly intact   LLE Light Touch Grossly intact   Bed Mobility   Supine to Sit 6  Modified independent   Sit to Supine 6  Modified independent   Transfers   Sit to Stand 6  Modified independent   Stand to Sit 6  Modified independent   Ambulation/Elevation   Gait pattern Decreased foot clearance;Wide NEAL  (minimal knee flexion, moderate knee valgus)   Gait Assistance 5  Supervision   Assistive Device None   Distance 80 ft   Ambulation/Elevation Additional Comments Pt reports this is her baseline mobility, also reports she usually furniture surfs at home   Balance   Static Sitting Normal   Dynamic Sitting Normal   Static Standing Normal   Dynamic Standing Normal   Ambulatory Good   Activity Tolerance   Activity Tolerance Patient tolerated treatment well   Assessment   Problem List Impaired balance;Decreased mobility;Obesity  ((+) remote hx of vertigo, multiple episodes)   Assessment Nella Yancey is a 79 y.o. Female who [facility]14[facility]2024 from home w/ c/o unsteadiness, numbness and diagnosis of stroke-like symptoms. Orders for PT eval and treat received. Pt presents w/ comorbidities of HTN, HLD, anxiety, migraines, GERD, vertigo, ILD, osteoporosis. At baseline, pt mobilizes independently w/ no AD, and reports 0 falls in the last 6 months. Upon evaluation, pt presents w/ the following deficits: edema of extremities, impaired balance, and gait deviations . Upon eval, pt requires mod I x 1 for bed mobility, mod I x 1 for transfers, and supervision for gait. Based on this PT evaluation today,  patient's discharge recommendation is for Level III - Low Rehab Resource Intensity (pt requesting OPPT referral for balance). Given the above findings from this evaluation, at this time this patient does not require skilled inpatient PT for the remainder of this admission. Will D/C patient from PT caseload, please reconsult if any changes or needs arise.   Goals   Patient Goals to go home soon   Discharge Recommendation   Rehab Resource Intensity Level, PT III (Minimum Resource Intensity)  (OPPT - for balance)   AM-PAC Basic Mobility Inpatient   Turning in Flat Bed Without Bedrails 4   Lying on Back to Sitting on Edge of Flat Bed Without Bedrails 4   Moving Bed to Chair 4   Standing Up From Chair Using Arms 4   Walk in Room 3   Climb 3-5 Stairs With Railing 3   Basic Mobility Inpatient Raw Score 22   Basic Mobility Standardized Score 47.4   [facility] Highest Level Of Mobility   -HL Goal 7: Walk 25 feet or more   -HLM Achieved 7: Walk 25 feet or more   End of Consult   Patient Position at End of Consult Supine;Bed/Chair alarm activated;All needs within reach         The patient's AM-PAC Basic Mobility Inpatient Short Form Raw Score is 22, Standardized Score is 47.4. A standardized score greater than 38.32 (raw score of 16) suggests the patient may benefit from discharge to home which may not coincide with above PT recommendations. However please refer to therapist recommendation for discharge planning given other factors that may influence destination.      Given the above findings from this evaluation, at this time this patient does not require skilled inpatient PT for the remainder of this admission. Will D/C patient from PT caseload, please reconsult if any changes or needs arise.      Courtney Richardson PT, DPT

## 2024-08-15 ENCOUNTER — TELEPHONE (OUTPATIENT)
Age: 79
End: 2024-08-15

## 2024-08-15 ENCOUNTER — APPOINTMENT (OUTPATIENT)
Dept: NON INVASIVE DIAGNOSTICS | Facility: HOSPITAL | Age: 79
End: 2024-08-15
Payer: COMMERCIAL

## 2024-08-15 ENCOUNTER — TRANSITIONAL CARE MANAGEMENT (OUTPATIENT)
Dept: FAMILY MEDICINE CLINIC | Facility: CLINIC | Age: 79
End: 2024-08-15

## 2024-08-15 VITALS
OXYGEN SATURATION: 95 % | TEMPERATURE: 97.5 F | SYSTOLIC BLOOD PRESSURE: 157 MMHG | RESPIRATION RATE: 16 BRPM | DIASTOLIC BLOOD PRESSURE: 85 MMHG | BODY MASS INDEX: 25.72 KG/M2 | HEART RATE: 59 BPM | WEIGHT: 169.19 LBS

## 2024-08-15 LAB
ALBUMIN SERPL BCG-MCNC: 3.8 G/DL (ref 3.5–5)
ALP SERPL-CCNC: 60 U/L (ref 34–104)
ALT SERPL W P-5'-P-CCNC: 21 U/L (ref 7–52)
ANION GAP SERPL CALCULATED.3IONS-SCNC: 6 MMOL/L (ref 4–13)
AST SERPL W P-5'-P-CCNC: 21 U/L (ref 13–39)
BASOPHILS # BLD AUTO: 0.02 THOUSANDS/ÂΜL (ref 0–0.1)
BASOPHILS NFR BLD AUTO: 0 % (ref 0–1)
BILIRUB SERPL-MCNC: 1.08 MG/DL (ref 0.2–1)
BUN SERPL-MCNC: 16 MG/DL (ref 5–25)
CALCIUM SERPL-MCNC: 9.8 MG/DL (ref 8.4–10.2)
CHLORIDE SERPL-SCNC: 107 MMOL/L (ref 96–108)
CHOLEST SERPL-MCNC: 216 MG/DL
CO2 SERPL-SCNC: 26 MMOL/L (ref 21–32)
CREAT SERPL-MCNC: 0.76 MG/DL (ref 0.6–1.3)
EOSINOPHIL # BLD AUTO: 0.17 THOUSAND/ÂΜL (ref 0–0.61)
EOSINOPHIL NFR BLD AUTO: 3 % (ref 0–6)
ERYTHROCYTE [DISTWIDTH] IN BLOOD BY AUTOMATED COUNT: 12.9 % (ref 11.6–15.1)
GFR SERPL CREATININE-BSD FRML MDRD: 75 ML/MIN/1.73SQ M
GLUCOSE SERPL-MCNC: 92 MG/DL (ref 65–140)
HCT VFR BLD AUTO: 44.1 % (ref 34.8–46.1)
HDLC SERPL-MCNC: 66 MG/DL
HGB BLD-MCNC: 14.8 G/DL (ref 11.5–15.4)
IMM GRANULOCYTES # BLD AUTO: 0.03 THOUSAND/UL (ref 0–0.2)
IMM GRANULOCYTES NFR BLD AUTO: 1 % (ref 0–2)
LDLC SERPL CALC-MCNC: 130 MG/DL (ref 0–100)
LYMPHOCYTES # BLD AUTO: 2.08 THOUSANDS/ÂΜL (ref 0.6–4.47)
LYMPHOCYTES NFR BLD AUTO: 39 % (ref 14–44)
MCH RBC QN AUTO: 30.8 PG (ref 26.8–34.3)
MCHC RBC AUTO-ENTMCNC: 33.6 G/DL (ref 31.4–37.4)
MCV RBC AUTO: 92 FL (ref 82–98)
MONOCYTES # BLD AUTO: 0.59 THOUSAND/ÂΜL (ref 0.17–1.22)
MONOCYTES NFR BLD AUTO: 11 % (ref 4–12)
NEUTROPHILS # BLD AUTO: 2.49 THOUSANDS/ÂΜL (ref 1.85–7.62)
NEUTS SEG NFR BLD AUTO: 46 % (ref 43–75)
NRBC BLD AUTO-RTO: 0 /100 WBCS
PLATELET # BLD AUTO: 211 THOUSANDS/UL (ref 149–390)
PMV BLD AUTO: 10.4 FL (ref 8.9–12.7)
POTASSIUM SERPL-SCNC: 3.9 MMOL/L (ref 3.5–5.3)
PROT SERPL-MCNC: 6.8 G/DL (ref 6.4–8.4)
RBC # BLD AUTO: 4.81 MILLION/UL (ref 3.81–5.12)
SODIUM SERPL-SCNC: 139 MMOL/L (ref 135–147)
TRIGL SERPL-MCNC: 102 MG/DL
WBC # BLD AUTO: 5.38 THOUSAND/UL (ref 4.31–10.16)

## 2024-08-15 PROCEDURE — 85025 COMPLETE CBC W/AUTO DIFF WBC: CPT | Performed by: HOSPITALIST

## 2024-08-15 PROCEDURE — 80061 LIPID PANEL: CPT | Performed by: HOSPITALIST

## 2024-08-15 PROCEDURE — 80053 COMPREHEN METABOLIC PANEL: CPT | Performed by: HOSPITALIST

## 2024-08-15 PROCEDURE — 99239 HOSP IP/OBS DSCHRG MGMT >30: CPT | Performed by: HOSPITALIST

## 2024-08-15 RX ORDER — PRAVASTATIN SODIUM 20 MG
20 TABLET ORAL DAILY
Qty: 30 TABLET | Refills: 0 | Status: SHIPPED | OUTPATIENT
Start: 2024-08-15 | End: 2024-08-23

## 2024-08-15 RX ADMIN — Medication 2000 UNITS: at 09:20

## 2024-08-15 RX ADMIN — PANTOPRAZOLE SODIUM 40 MG: 40 TABLET, DELAYED RELEASE ORAL at 06:17

## 2024-08-15 RX ADMIN — MULTIPLE VITAMINS W/ MINERALS TAB 1 TABLET: TAB ORAL at 09:20

## 2024-08-15 RX ADMIN — OXYCODONE HYDROCHLORIDE AND ACETAMINOPHEN 500 MG: 500 TABLET ORAL at 09:20

## 2024-08-15 RX ADMIN — ENOXAPARIN SODIUM 40 MG: 40 INJECTION SUBCUTANEOUS at 09:20

## 2024-08-15 RX ADMIN — ASPIRIN 81 MG: 81 TABLET, COATED ORAL at 09:20

## 2024-08-15 NOTE — DISCHARGE SUMMARY
[facility]  Discharge- Nella Yancey 1945, 79 y.o. female MRN: [id number]  Unit/Bed#: -01 Encounter: [id number]  Primary Care Provider: Cecelia Rodriguez MD   Date and time admitted to hospital: 8/14/2024  6:32 AM    HLD (hyperlipidemia)  Assessment & Plan  Cont statin    HTN (hypertension)  Assessment & Plan  Start metoprolol from 8/15   Currently undergoing permissive HTN    * Stroke-like symptoms  Assessment & Plan  Patient presents with left arm numbness and right-sided headache.  Concern for TIA, stroke, or complex migraine.  CT scan did not demonstrate acute ischemic stroke or intracranial acute findings.  MRI -no acute infarct  CTA head/neck Negative CTA head and neck for large vessel occlusion, dissection, aneurysm.   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   Does not require antiplatelet  Monitor closely on telemetry  Permissive hypertension if s/s <24 hours   DVT prophylaxis   PT/OT/ST  Statin - dc on pravachol  Lipid panel/Hgb A1C ordered   Neurology consult; recommendations appreciated         Hospital Course:     Nella Yancey is a 79 y.o. female patient who originally presented to the hospital on   Admission Orders (From admission, onward)       Ordered        08/14/24 0755  Place in Observation  Once                         due to complex migraine.  Patient had right-sided headache and left-sided weakness.  Fortunately these markedly improved.  MRI was performed and negative for stroke.  Neurology does not have any further inpatient recommendations.    Please see above list of diagnoses and related plan for additional information.     Physical Exam:    GEN: No acute distress, comfortable  HEEENT: No JVD, PERRLA, no scleral icterus  RESP: Lungs clear to auscultation bilaterally  CV: RRR, +s1/s2   ABD: SOFT NON TENDER, POSITIVE BOWEL SOUNDS, NO DISTENTION  PSYCH: CALM  NEURO: Mentation  baseline, NO FOCAL DEFICITS  SKIN: NO RASH  EXTREM: NO EDEMA    CONSULTING PROVIDERS   IP CONSULT TO NEUROLOGY  IP CONSULT TO CASE MANAGEMENT  IP CONSULT TO NUTRITION SERVICES    PROCEDURES PERFORMED  * No surgery found *    RADIOLOGY RESULTS  XR chest pa & lateral    Result Date: 8/15/2024  Narrative: XR CHEST PA & LATERAL INDICATION: Short of breath. COMPARISON: 05/19/2023 FINDINGS: Scar and/or atelectasis at the left lung base. No pneumothorax or pleural effusion. Normal cardiomediastinal silhouette. Bones are unremarkable for age. Normal upper abdomen.     Impression: Scarring and/or atelectasis at the left lung base. Workstation performed: [id number]     MRI brain wo contrast    Result Date: 8/14/2024  Narrative: MRI BRAIN WITHOUT CONTRAST INDICATION: Acute stroke. COMPARISON:   7/19/2021and 8/14/2024. TECHNIQUE:  Multiplanar, multisequence imaging of the brain was performed. IMAGE QUALITY:  Diagnostic. FINDINGS: BRAIN PARENCHYMA: Punctate T2/FLAIR hyperintense foci in the subcortical and periventricular white matter are likely to represent mild microangiopathic change. No restricted diffusion. No intracranial hemorrhage, mass or mass effect. VENTRICLES: No hydrocephalus or extra-axial collection. SELLA AND PITUITARY GLAND:  Normal. ORBITS:  Normal. PARANASAL SINUSES:  Normal. VASCULATURE:  Evaluation of the major intracranial vasculature demonstrates appropriate flow voids. CALVARIUM AND SKULL BASE:  Normal. EXTRACRANIAL SOFT TISSUES:  Normal.     Impression: No evidence of acute infarct, intracranial hemorrhage or mass.. Workstation performed: [id number]     CTA stroke alert (head/neck)    Result Date: 8/14/2024  Narrative: CTA NECK AND BRAIN WITH AND WITHOUT CONTRAST INDICATION: Stroke Alert COMPARISON:   Same day CT stroke alert brain. NM parathyroid scan with SPECT 3/24/2023. Ultrasound thyroid 11/15/2022. CTA head with and without contrast TECHNIQUE:  Post contrast imaging was performed after  administration of iodinated contrast through the neck and brain. Post contrast axial 0.625 mm images timed to opacify the arterial system.  3D rendering was performed on an independent workstation.   MIP reconstructions performed. Coronal and sagittal reconstructions were performed of the non contrast portion of the brain. Radiation dose length product (DLP) for this visit:  378 mGy-cm .  This examination, like all CT scans performed in the [facility], was performed utilizing techniques to minimize radiation dose exposure, including the use of iterative reconstruction and automated exposure control. IV Contrast:  100 mL of iohexol (OMNIPAQUE) IMAGE QUALITY:   Diagnostic FINDINGS: CTA NECK ARCH AND GREAT VESSELS: Patent. Visualized arch and great vessels are normal. VERTEBRAL ARTERIES: Patent extracranial segments. 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 RIGHT CAROTID: Patent. No stenosis.  No dissection. Retropharyngeal course of right carotid artery, normal variant. LEFT CAROTID: Patent. Mild calcified atherosclerotic disease in proximal cervical internal carotid artery. Less than 50% stenosis.  No dissection. NASCET criteria was used to determine the degree of internal carotid artery diameter stenosis. CTA BRAIN: INTERNAL CAROTID ARTERIES: No stenosis or occlusion. ANTERIOR CEREBRAL ARTERY CIRCULATION:  No stenosis or occlusion. Markedly hypoplastic right A1 segment, normal variant. MIDDLE CEREBRAL ARTERY CIRCULATION:  No stenosis or occlusion. DISTAL VERTEBRAL ARTERIES: Patent dominant right and hypoplastic left distal vertebral arteries. Hypoplastic left vertebral artery becomes more diminutive after PICA takeoff. No stenosis or occlusion. BASILAR ARTERY:  No stenosis or occlusion. POSTERIOR CEREBRAL ARTERIES: No stenosis or occlusion. VENOUS STRUCTURES:  Normal. NON VASCULAR ANATOMY BONY STRUCTURES:  No acute osseous abnormality.  Kyphotic curvature of cervical spine. Grade 1 anterolisthesis C3-C4 and C4-C5. Moderate-to-severe multilevel degenerative changes of cervical spine, worse at C6-C7. Unchanged dysplastic curved appearance of bilateral posterior ribs. SOFT TISSUES OF THE NECK: Dental amalgam with extensive beam hardening artifact limits evaluation of anterior oral cavity for which direct visualization is recommended; otherwise, normal visualized oral cavity within limitations. No acute neck abnormality. THORACIC INLET: Mild juxtapleural fibrosis visualized bilateral posterior lungs. No focal consolidation in visualized upper lung zones.     Impression: Negative CTA head and neck for large vessel occlusion, dissection, aneurysm. 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 Additional chronic/incidental findings as detailed above. Findings were directly discussed with Renata Carreno at approximately 7:06 AM. I personally discussed this study with KRISTIE CARLOS on 8/14/2024 7:08 AM. Workstation performed: [id number]     CT stroke alert brain    Result Date: 8/14/2024  Narrative: CT BRAIN - STROKE ALERT PROTOCOL INDICATION:   Stroke Alert. COMPARISON: Same day CTA stroke alert head and neck. CTA head with and without contrast 10/28/2019. TECHNIQUE:  CT examination of the brain was performed.  In addition to axial images, coronal reformatted images were created and submitted for interpretation. Radiation dose length product (DLP) for this visit:  865 mGy-cm .  This examination, like all CT scans performed in the [facility], was performed utilizing techniques to minimize radiation dose exposure, including the use of iterative reconstruction and automated exposure control. IMAGE QUALITY:  Diagnostic. FINDINGS: PARENCHYMA: Decreased attenuation is noted in periventricular and subcortical white matter demonstrating an appearance that is statistically most likely to  represent mild microangiopathic change. No CT signs of acute infarction.  No intracranial mass, mass effect or midline shift.  No acute parenchymal hemorrhage. Mild calcification of the cavernous internal carotid ateries. VENTRICLES AND EXTRA-AXIAL SPACES:  Normal for the patient's age. VISUALIZED ORBITS: Normal visualized orbits. PARANASAL SINUSES: Normal visualized paranasal sinuses. CALVARIUM AND EXTRACRANIAL SOFT TISSUES:   Normal.     Impression: No acute intracranial abnormality. Mild chronic microangiopathy. Findings were directly discussed with Renata Carreno at approximately 7:05 AM. I personally discussed this study with KRISTIE CARLOS on 8/14/2024 7:08 AM. Workstation performed: [id number]     US abdomen complete    Result Date: 7/20/2024  Narrative: ABDOMEN ULTRASOUND, COMPLETE INDICATION: R10.13: Epigastric pain. COMPARISON: Ultrasound 3/1/2024 and priors TECHNIQUE: Real-time ultrasound of the abdomen was performed with a curvilinear transducer with both volumetric sweeps and still imaging techniques. FINDINGS: PANCREAS: Visualized portions of the pancreas are within normal limits. AORTA AND IVC: Visualized portions are normal for patient age. LIVER: Size: Within normal range. The liver measures 14.1 cm in the midclavicular line. Contour: Surface contour is smooth. Parenchyma: Echogenicity and echotexture are within normal limits. No liver mass identified. Limited imaging of the main portal vein shows it to be patent and hepatopetal. BILIARY: Patient has undergone cholecystectomy. No intrahepatic biliary dilatation. CBD measures 4.0 mm. No choledocholithiasis. KIDNEY: Right kidney measures 9.4 x 5.4 x 5.1 cm. Volume 134.4 mL Kidney within normal limits. Left kidney measures 8.1 x 4.1 x 4.3 cm. Volume 75.0 mL No hydronephrosis. Lower pole cyst measures 5 mm. Left lower renal pole echogenic focus measuring 5 mm, possibly sinus fat or angiomyolipoma. SPLEEN: Measures 9.1 x 7.9 x 4.3 cm. Volume 160.7 mL  "Within normal limits. ASCITES: None.     Impression: 1. Subcentimeter left renal cyst. Left lower renal pole echogenic focus measuring 5 mm, possibly sinus fat or angiomyolipoma. Otherwise no acute findings. Workstation performed: [id number]       LABS  Results from last 7 days   Lab Units 08/15/24  0314 08/14/24  1422 08/14/24  0656   WBC Thousand/uL 5.38  --  5.86   HEMOGLOBIN g/dL 14.8  --  15.1   HEMATOCRIT % 44.1  --  45.1   MCV fL 92  --  93   PLATELETS Thousands/uL 211 212 220   INR   --   --  0.88     Results from last 7 days   Lab Units 08/15/24  0314 08/14/24  0656   SODIUM mmol/L 139 139   POTASSIUM mmol/L 3.9 4.0   CHLORIDE mmol/L 107 104   CO2 mmol/L 26 30   BUN mg/dL 16 17   CREATININE mg/dL 0.76 0.78   CALCIUM mg/dL 9.8 10.0   ALBUMIN g/dL 3.8  --    TOTAL BILIRUBIN mg/dL 1.08*  --    ALK PHOS U/L 60  --    ALT U/L 21  --    AST U/L 21  --    EGFR ml/min/1.73sq m 75 73   GLUCOSE RANDOM mg/dL 92 96     Results from last 7 days   Lab Units 08/14/24  1142 08/14/24  0933 08/14/24  0656   HS TNI 0HR ng/L  --   --  3   HS TNI 2HR ng/L  --  3  --    HS TNI 4HR ng/L 3  --   --               Results from last 7 days   Lab Units 08/14/24  0704   POC GLUCOSE mg/dl 103     Results from last 7 days   Lab Units 08/14/24  0656   HEMOGLOBIN A1C % 5.4             Results from last 7 days   Lab Units 08/15/24  0314   TRIGLYCERIDES mg/dL 102   CHOLESTEROL mg/dL 216*   LDL CALC mg/dL 130*   HDL mg/dL 66       Cultures:         Invalid input(s): \"URIBILINOGEN\"                Condition at Discharge:  good      Discharge instructions/Information to patient and family:   See after visit summary for information provided to patient and family.  The above hospital course, results, incidental findings, need for follow up was discussed in detail with the patient and/or family.    Provisions for Follow-Up Care:  See after visit summary for information related to follow-up care and any pertinent home health orders.      Disposition: "     Home       Discharge Statement:  I spent 36 minutes discharging the patient. This time was spent on the day of discharge. I had direct contact with the patient on the day of discharge. Greater than 50% of the total time was spent examining patient, answering all patient questions, arranging and discussing plan of care with patient as well as directly providing post-discharge instructions.  Additional time then spent on discharge activities.    Discharge Medications:  See after visit summary for reconciled discharge medications provided to patient and family.      ** Please Note: This note has been constructed using a voice recognition system **

## 2024-08-15 NOTE — PLAN OF CARE
Problem: Potential for Falls  Goal: Patient will remain free of falls  Description: INTERVENTIONS:  - Educate patient/family on patient safety including physical limitations  - Instruct patient to call for assistance with activity   - Consult OT/PT to assist with strengthening/mobility   - Keep Call bell within reach  - Keep bed low and locked with side rails adjusted as appropriate  - Keep care items and personal belongings within reach  - Initiate and maintain comfort rounds  - Make Fall Risk Sign visible to staff  - Offer Toileting every 2 Hours, in advance of need  - Initiate/Maintain 2alarm  - Obtain necessary fall risk management equipment: 2  - Apply yellow socks and bracelet for high fall risk patients  - Consider moving patient to room near nurses station  Outcome: Progressing     Problem: Activity Intolerance/Impaired Mobility  Goal: Mobility/activity is maintained at optimum level for patient  Description: Interventions:  - Assess and monitor patient  barriers to mobility and need for assistive/adaptive devices.  - Assess patient's emotional response to limitations.  - Collaborate with interdisciplinary team and initiate plans and interventions as ordered.  - Encourage independent activity per ability.  - Maintain proper body alignment.  - Perform active/passive rom as tolerated/ordered.  - Plan activities to conserve energy.  - Turn patient as appropriate  Outcome: Progressing

## 2024-08-15 NOTE — ASSESSMENT & PLAN NOTE
Patient presents with left arm numbness and right-sided headache.  Concern for TIA, stroke, or complex migraine.  CT scan did not demonstrate acute ischemic stroke or intracranial acute findings.  MRI -no acute infarct  CTA head/neck Negative CTA head and neck for large vessel occlusion, dissection, aneurysm.   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   Does not require antiplatelet  Monitor closely on telemetry  Permissive hypertension if s/s <24 hours   DVT prophylaxis   PT/OT/ST  Statin - dc on pravachol  Lipid panel/Hgb A1C ordered   Neurology consult; recommendations appreciated

## 2024-08-15 NOTE — PLAN OF CARE
Problem: Potential for Falls  Goal: Patient will remain free of falls  Description: INTERVENTIONS:  - Educate patient/family on patient safety including physical limitations  - Instruct patient to call for assistance with activity   - Consult OT/PT to assist with strengthening/mobility   - Keep Call bell within reach  - Keep bed low and locked with side rails adjusted as appropriate  - Keep care items and personal belongings within reach  - Initiate and maintain comfort rounds  - Make Fall Risk Sign visible to staff  - Offer Toileting every 2 Hours, in advance of need  - Initiate/Maintain 2alarm  - Obtain necessary fall risk management equipment: 2  - Apply yellow socks and bracelet for high fall risk patients  - Consider moving patient to room near nurses station  Outcome: Progressing     Problem: Neurological Deficit  Goal: Neurological status is stable or improving  Description: Interventions:  - Monitor and assess patient's level of consciousness, motor function, sensory function, and level of assistance needed for ADLs.   - Monitor and report changes from baseline. Collaborate with interdisciplinary team to initiate plan and implement interventions as ordered.   - Provide and maintain a safe environment.  - Consider seizure precautions.  - Consider fall precautions.  - Consider aspiration precautions.  - Consider bleeding precautions.  Outcome: Progressing     Problem: Activity Intolerance/Impaired Mobility  Goal: Mobility/activity is maintained at optimum level for patient  Description: Interventions:  - Assess and monitor patient  barriers to mobility and need for assistive/adaptive devices.  - Assess patient's emotional response to limitations.  - Collaborate with interdisciplinary team and initiate plans and interventions as ordered.  - Encourage independent activity per ability.  - Maintain proper body alignment.  - Perform active/passive rom as tolerated/ordered.  - Plan activities to conserve  energy.  - Turn patient as appropriate  Outcome: Progressing     Problem: Communication Impairment  Goal: Ability to express needs and understand communication  Description: Assess patient's communication skills and ability to understand information.  Patient will demonstrate use of effective communication techniques, alternative methods of communication and understanding even if not able to speak.     - Encourage communication and provide alternate methods of communication as needed.  - Collaborate with case management/ for discharge needs.  - Include patient/family/caregiver in decisions related to communication.  Outcome: Progressing     Problem: Potential for Aspiration  Goal: Non-ventilated patient's risk of aspiration is minimized  Description: Assess and monitor vital signs, respiratory status, and labs (WBC).  Monitor for signs of aspiration (tachypnea, cough, rales, wheezing, cyanosis, fever).    - Assess and monitor patient's ability to swallow.  - Place patient up in chair to eat if possible.  - HOB up at 90 degrees to eat if unable to get patient up into chair.  - Supervise patient during oral intake.   - Instruct patient/ family to take small bites.  - Instruct patient/ family to take small single sips when taking liquids.  - Follow patient-specific strategies generated by speech pathologist.  Outcome: Progressing  Goal: Ventilated patient's risk of aspiration is minimized  Description: Assess and monitor vital signs, respiratory status, airway cuff pressure, and labs (WBC).  Monitor for signs of aspiration (tachypnea, cough, rales, wheezing, cyanosis, fever).    - Elevate head of bed 30 degrees if patient has tube feeding.  - Monitor tube feeding.  Outcome: Progressing     Problem: Nutrition  Goal: Nutrition/Hydration status is improving  Description: Monitor and assess patient's nutrition/hydration status for malnutrition (ex- brittle hair, bruises, dry skin, pale skin and conjunctiva,  muscle wasting, smooth red tongue, and disorientation). Collaborate with interdisciplinary team and initiate plan and interventions as ordered.  Monitor patient's weight and dietary intake as ordered or per policy. Utilize nutrition screening tool and intervene per policy. Determine patient's food preferences and provide high-protein, high-caloric foods as appropriate.     - Assist patient with eating.  - Allow adequate time for meals.  - Encourage patient to take dietary supplement as ordered.  - Collaborate with clinical nutritionist.  - Include patient/family/caregiver in decisions related to nutrition.  Outcome: Progressing

## 2024-08-15 NOTE — TELEPHONE ENCOUNTER
Patient got a miss call from the office for a TCM. I transfer the call to the office for a TCM. Thank you

## 2024-08-15 NOTE — QUICK NOTE
Reviewed case with attending neurologist, Dr Mckay:     MRI brain is negative for acute stroke and clinical suspicion for vascular event remains low. Patient does not need to continue on aspirin from a neurology standpoint. Echo is no longer needed from a neurology standpoint, however will defer to primary team.   LDL on arrival was elevated (141), therefore would consider continuing statin therapy.     No additional inpatient neurology recommendations at this time. Patient is stable for discharge from our standpoint.   Follow up with Dr. Henson as scheduled for 9/6 at 11:30 am in [place].

## 2024-08-16 ENCOUNTER — RA CDI HCC (OUTPATIENT)
Dept: OTHER | Facility: HOSPITAL | Age: 79
End: 2024-08-16

## 2024-08-20 ENCOUNTER — OFFICE VISIT (OUTPATIENT)
Dept: FAMILY MEDICINE CLINIC | Facility: CLINIC | Age: 79
End: 2024-08-20
Payer: COMMERCIAL

## 2024-08-20 VITALS
RESPIRATION RATE: 16 BRPM | TEMPERATURE: 97.6 F | DIASTOLIC BLOOD PRESSURE: 86 MMHG | HEIGHT: 68 IN | HEART RATE: 64 BPM | OXYGEN SATURATION: 96 % | SYSTOLIC BLOOD PRESSURE: 142 MMHG | BODY MASS INDEX: 25.85 KG/M2 | WEIGHT: 170.6 LBS

## 2024-08-20 DIAGNOSIS — E78.5 HYPERLIPIDEMIA, UNSPECIFIED HYPERLIPIDEMIA TYPE: ICD-10-CM

## 2024-08-20 DIAGNOSIS — I67.2 CEREBRAL ATHEROSCLEROSIS: ICD-10-CM

## 2024-08-20 DIAGNOSIS — R29.90 STROKE-LIKE SYMPTOMS: Primary | ICD-10-CM

## 2024-08-20 DIAGNOSIS — I10 ESSENTIAL HYPERTENSION: ICD-10-CM

## 2024-08-20 DIAGNOSIS — G43.709 CHRONIC MIGRAINE WITHOUT AURA WITHOUT STATUS MIGRAINOSUS, NOT INTRACTABLE: ICD-10-CM

## 2024-08-20 DIAGNOSIS — E21.3 HYPERPARATHYROIDISM (HCC): ICD-10-CM

## 2024-08-20 DIAGNOSIS — K21.9 GASTROESOPHAGEAL REFLUX DISEASE, UNSPECIFIED WHETHER ESOPHAGITIS PRESENT: ICD-10-CM

## 2024-08-20 PROCEDURE — 99495 TRANSJ CARE MGMT MOD F2F 14D: CPT | Performed by: FAMILY MEDICINE

## 2024-08-20 NOTE — PROGRESS NOTES
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  The patient understands and agrees with the treatment plan.        Transitional Care Management Review:   Nella Yancey is a 79 y.o. female here for TCM follow up.     During the TCM phone call patient stated:  TCM Call       Date and time call was made  8/15/2024  2:16 PM    Hospital care reviewed  Records reviewed    Patient was hospitialized at  [facility]    Date of Admission  24    Date of discharge  08/15/24    Diagnosis  stroke-like symptoms    Disposition  Home    Were the patients medications reviewed and updated  Yes    Current Symptoms  None          TCM Call       Post hospital issues  None    Scheduled for follow up?  Yes    I have advised the patient to call PCP with any new or worsening symptoms  Beth SAUCEDO MA          Patient presents today for follow up hospitalization on 24 - 8/15/24. Patient  "presented to ER with left arm numbness and right sided headache. 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 Patient was seen by Neurology and started on pravastatin, no ASA therapy was recommended. Her symptoms gradually resolved and she was discharged home in stable condition.   Patient states she is doing much better and reports no further episodes of headache, focal weakness or paresthesias.        Review of Systems   Constitutional:  Negative for appetite change, chills, fever and unexpected weight change.   Eyes:  Negative for visual disturbance.   Respiratory:  Negative for cough, shortness of breath and wheezing.    Cardiovascular:  Negative for chest pain, palpitations and leg swelling.   Gastrointestinal:  Negative for abdominal pain, blood in stool, diarrhea, nausea and vomiting.   Genitourinary:  Negative for difficulty urinating, dysuria, frequency, hematuria and urgency.   Skin:  Negative for rash.   Neurological:  Negative for dizziness, syncope, weakness, numbness and headaches.   Hematological:  Negative for adenopathy.   Psychiatric/Behavioral:  Negative for dysphoric mood and sleep disturbance. The patient is nervous/anxious.        Objective     /86   Pulse 64   Temp 97.6 °F (36.4 °C)   Resp 16   Ht 5' 8\" (1.727 m)   Wt 77.4 kg (170 lb 9.6 oz)   SpO2 96%   BMI 25.94 kg/m²     Physical Exam  Constitutional:       General: She is not in acute distress.  Cardiovascular:      Rate and Rhythm: Normal rate and regular rhythm.      Heart sounds: Normal heart sounds.   Pulmonary:      Effort: Pulmonary effort is normal.      Breath sounds: Normal breath sounds. No wheezing, rhonchi " or rales.   Abdominal:      Palpations: Abdomen is soft. There is no mass.      Tenderness: There is no abdominal tenderness.   Musculoskeletal:      Cervical back: Neck supple.      Right lower leg: No edema.      Left lower leg: No edema.   Lymphadenopathy:      Cervical: No cervical adenopathy.   Neurological:      Mental Status: She is alert and oriented to person, place, and time.   Psychiatric:         Mood and Affect: Mood normal.         Behavior: Behavior normal.         Thought Content: Thought content normal.         Judgment: Judgment normal.       Medications have been reviewed by provider in current encounter    Lab Results   Component Value Date    CHOLESTEROL 216 (H) 08/15/2024    CHOLESTEROL 232 (H) 08/14/2024    CHOLESTEROL 219 (H) 03/25/2024     Lab Results   Component Value Date    HDL 66 08/15/2024    HDL 73 08/14/2024    HDL 70 03/25/2024     Lab Results   Component Value Date    TRIG 102 08/15/2024    TRIG 91 08/14/2024    TRIG 94 03/25/2024     Lab Results   Component Value Date    LDLCALC 130 (H) 08/15/2024     Lab Results   Component Value Date    HGBA1C 5.4 08/14/2024     Lab Results   Component Value Date    WBC 5.38 08/15/2024    HGB 14.8 08/15/2024    HCT 44.1 08/15/2024    MCV 92 08/15/2024     08/15/2024     Lab Results   Component Value Date    SODIUM 139 08/15/2024    K 3.9 08/15/2024     08/15/2024    CO2 26 08/15/2024    AGAP 6 08/15/2024    BUN 16 08/15/2024    CREATININE 0.76 08/15/2024    GLUC 92 08/15/2024    GLUF 86 07/12/2024    CALCIUM 9.8 08/15/2024    AST 21 08/15/2024    ALT 21 08/15/2024    ALKPHOS 60 08/15/2024    TP 6.8 08/15/2024    TBILI 1.08 (H) 08/15/2024    EGFR 75 08/15/2024

## 2024-08-22 ENCOUNTER — TELEPHONE (OUTPATIENT)
Age: 79
End: 2024-08-22

## 2024-08-22 RX ORDER — ASPIRIN 81 MG/1
81 TABLET ORAL DAILY
COMMUNITY
Start: 2024-08-22

## 2024-08-22 NOTE — TELEPHONE ENCOUNTER
Pt called. Recently saw PCP. Pt received her script from the Pharmacy for pravastatin (PRAVACHOL) 20 mg tablet . Pt would like clarification as to whether she should take the medication at night or in the morning? Medication states to take in the morning, but Pt remembers PCP telling her to take it at night. Pt also states concerned that the medication is labeled with sodium.     Please inform PCP and further advise Pt. Thank you

## 2024-08-22 NOTE — TELEPHONE ENCOUNTER
Patient stopped in office and I gave her the message regarding pravastatin.    Patient does not want to take the pravasatin because of the sodium.  She does not want to take it because she does not want to take sodium.  She is asking if you can change it to Lipitor?    Patient noticed she should not take pravasatin because of her thyroid and lungs do you agree?

## 2024-08-22 NOTE — TELEPHONE ENCOUNTER
Attempted to call pt to rely information, could not get passed the phone screening for certain phone calls. Will try again.

## 2024-08-23 DIAGNOSIS — E78.5 HYPERLIPIDEMIA, UNSPECIFIED HYPERLIPIDEMIA TYPE: Primary | ICD-10-CM

## 2024-08-23 DIAGNOSIS — R29.90 STROKE-LIKE SYMPTOMS: ICD-10-CM

## 2024-08-23 RX ORDER — ATORVASTATIN CALCIUM 10 MG/1
10 TABLET, FILM COATED ORAL
Qty: 30 TABLET | Refills: 6 | Status: SHIPPED | OUTPATIENT
Start: 2024-08-23

## 2024-08-23 NOTE — TELEPHONE ENCOUNTER
Please call patient, pravastatin should not affect her sodium. Pravastatin and atorvastatin ( Lipitor) are from the same group of medications with similar side effects. Does she still wants me to change it to Lipitor?

## 2024-08-23 NOTE — TELEPHONE ENCOUNTER
Called and spoke to pt. Did inform her that pravastatin should not affect her sodium, and that Lipitor is from the same group of medications. Pt stated she would feel better if she was on Lipitor instead.

## 2024-09-06 ENCOUNTER — OFFICE VISIT (OUTPATIENT)
Dept: NEUROLOGY | Facility: CLINIC | Age: 79
End: 2024-09-06
Payer: COMMERCIAL

## 2024-09-06 VITALS
DIASTOLIC BLOOD PRESSURE: 70 MMHG | SYSTOLIC BLOOD PRESSURE: 129 MMHG | HEIGHT: 68 IN | TEMPERATURE: 97.7 F | BODY MASS INDEX: 25.91 KG/M2 | HEART RATE: 69 BPM | WEIGHT: 171 LBS

## 2024-09-06 DIAGNOSIS — M54.12 CERVICAL RADICULOPATHY AT C5: ICD-10-CM

## 2024-09-06 DIAGNOSIS — M47.10: ICD-10-CM

## 2024-09-06 DIAGNOSIS — G43.709 CHRONIC MIGRAINE WITHOUT AURA WITHOUT STATUS MIGRAINOSUS, NOT INTRACTABLE: Primary | ICD-10-CM

## 2024-09-06 PROCEDURE — 99215 OFFICE O/P EST HI 40 MIN: CPT | Performed by: PSYCHIATRY & NEUROLOGY

## 2024-09-06 PROCEDURE — G2211 COMPLEX E/M VISIT ADD ON: HCPCS | Performed by: PSYCHIATRY & NEUROLOGY

## 2024-09-06 NOTE — PROGRESS NOTES
[facility]ache [facility] Consult - Follow up   PATIENT:  Nella Yancey  MRN:  [id number]  :  1945  DATE OF SERVICE:  2024  REFERRED BY: No ref. provider found  PMD: Cecelia Rodriguez MD    Assessment/Plan:   Nella Yancey is a 78 y.o. female with a past medical history of hyperparathyroidism status post adenoma removal, IBS, GERD, pericarditis, heart murmur, chronic gait instability, chronic neck pain (following with physiatry), vitamin-D deficiency, insomnia, depression, anxiety, BPPV referred here for evaluation of headache.   Initial evaluation by me 2018    Chronic Migraine without aura without status migrainosus, not intractable  Suspected sleep apnea -has not obtained sleep study ordered  Benign paroxysmal positional vertigo of left ear   She has had migraines since childhood, she has followed with neurologist in the past back in [place].  She moved here to be closer to her daughter and granddaughter in the area. She has multiple types of headaches,  bioccipital pain, mid throbbing pain. Migraine is without aura and has associated nausea sometimes vomiting, stiff and sore neck, problems with concentration, photophobia, phonophobia, osmophobia, sometimes nasal congestion, lightheadedness.  Migraines worse with any movement.   - as of 2019 she reports headaches are significantly improved with lifestyle interventions  - as of 2019:  headaches 6 times a month and go away with ibuprofen.  She has not followed up with eye doctor but has an appointment in July.  - as of 2019: as of 19: headaches 6-8 a month - often in the am or with anxiety/stress - these she can breathe through it. Morning headaches the last 4-5 days, goes away with coffee usually, if not goes away with ibuprofen. Last big migraine 9 months or so ago   - As of 10/9/2019:  headaches - has not had in quite some time, around 8 a month and go away with ibuprofen, occasional sharp pains  randomly for 5 minutes on various locations on head 1-2 times a week. No migraines in a year. - she is taking magnesium and riboflavin - she thinks this may be helping  - as of 03/2/2020: no longer having significant headaches, occasional am headaches that improve with coffee, taking mg and B2 for prevention  - as of 6/16/2021: She reports 1-2 months of near daily headaches that may last throughout the day, new type of stabbing headache and new onset left visual changes that lasted 4 days and resolved. Continue baby asa for now, ordered MRI Brain. Trial of gabapentin if she would like that may help with multiple things. Follow up with endocrine since hyperparathyroidism may be causing a lot of problems.   - as of 11/12/2021: she reports headaches have improved to 4-5 days per week with mostly tension features and certainly could be related to stress or hyperparathyroidism for which she is following with endocrine. Also dealing with other symptoms concerning for hyperparathyroid. Taking supplements for headache prevention. Did not try gabapentin as she does not want to take prescription meds for her headaches. MRI Brain did not show any new or concerning findings.   - as of 5/17/2022: She reports she is getting about 4-5 significant migraines a month and she is now willing to try abortive medication for this so will prescribe rizatriptan 10 mg as needed.  She is not interested in prescription preventative.  - as of 10/26/2022: She reports headaches are about the same, come in waves, will wake up with headaches for weeks at a time, then other times not for days or weeks. Often related to stress. Even if they are bad they typically go away with tylenol in max 2 hours, occasionally 4-5 days a month last longer and has not tried rizatriptan for that yet.  She is not interested in prescription preventative at this time.  Recommended following back up with physiatry for neck pain as trigger point injections may help with  neck pain and possibly headaches once neck pain is improved.  - as of 2/1/2023: She reports headaches are daily and wakes up with them and I again recommended evaluation for sleep apnea contributing and ordered sleep study.  She is not currently interested in prescription preventative and did not yet try rizatriptan which I suggested holding off at this time until migraines are more episodic again or if she has acute episode that she did not wake up with.  She was prescribed trazodone for insomnia and she would like to start with this first since she has not tried it and it could help with mood, stress, sleep and therefore headaches tolerated.  We discussed if you were to consider preventative medication for myself I would recommend gabapentin.  Also agree that with the primary hyperparathyroidism still present, having repeat surgery would be my recommendation if it is what Dr. Perkins recommends, but is otherwise the hypercalcemia can continue to contribute to headaches and other morbidity issues as well.  - as of 5/10/2023: She has had improvement in headaches since last visit and reports she only seems to get them now when stressed.  She continues to take headache preventative supplements and not interested in additional preventative med.  She reports if headache becomes significant enough she may take acetaminophen which helps once a week.  Not tried rizatriptan.  Sleep study scheduled for September as we discussed the 4 to 5 hours of sleep she is getting is the most modifiable factor contributing to many of her symptoms.  She continues to follow with ENT and endocrinology for primary hyperparathyroidism and getting second opinion next week as this is also likely contributing to multiple symptoms.  - as of 9/27/2023: She reports 3-4 headaches a week that are typically there upon awakening and we discussed I suspect it is due to slight increased intracranial pressure that I see features of on imaging again highly  "encouraged her to schedule sleep study as I suspect sleep disorder is contributing to this as is potentially primary hyperparathyroidism.  Thankfully headaches typically resolve spontaneously, otherwise typically with acetaminophen and if not caffeine can help prior to bed.  We discussed imaging results and medication options and she would rather not add at this time.  She is hesitant to get sleep study due to fear of staying alone in the building.  - as of 3/29/2024: She reports headaches wax and wane in a mild fashion and sometimes she wakes up with them and we discussed I still would highly recommend sleep study to further evaluate for sleep disorder contributing to this and she will consider but is not currently interested due to feeling overwhelmed by too many things.  Headaches when they do come which is hard and MRIs for her typically either self resolve with rest or acetaminophen and she is not currently interested in prescription preventative or abortive medications for headache.  She returns with 3 weeks of positional vertigo with positive Channing-Hallpike testing to the left today and Epley maneuver performed with improvement/resolution of symptoms at least today.  Placed referral to physical therapy so that she is established with them in case it were to return and need help.  - as of 9/6/2024: She returns for headache follow-up and she reports head pains a few times a week, but they do not last long and she does not take anything for them.  Episodic lightheadedness and vertiginous symptoms not bad enough to suspect \"the crystals\" AKA BPPV at this time she reports.  She also reports that she was hospitalized since I last saw her which I was not aware of, was admitted for left upper extremity paresthesias 8/14/2024 that she woke up with, typically this would go away with moving her hands and it did not and also had dizziness and flashes of light in vision and therefore went in for evaluation.  Please see EMR " for details as she was evaluated by neurology multiple times head CT, MRI brain, CTA head and neck and was started on aspirin and statin.  No stroke was found.  We discussed certainly if needing further evaluation of this issue if there is concern regarding continuing or coming off aspirin and statin, I would recommend evaluation by one of my stroke colleagues for most up-to-date recommendations.  Otherwise when reviewing her imaging we discussed it appears upon imaging review, that she has worsening of the spondylolisthesis, that should be further evaluated with MRI C-spine.  She has completed well over 6 weeks of PT and continues to have symptoms related to this.  We discussed and I showed her dermatome chart and how it is possible to region where the spinal cord is impinged on the MRI brain (not commented on by radiology has not purpose of the imaging of the brain looking for stroke I assume) could exactly match up with her left-sided numbness symptoms that day potentially worsen in my opinion due to untreated sleep apnea overnight.  Again highly recommend sleep study pending and not obtained.  She already follows with other providers for chronic neck pain management, will place evaluation for neurosurgery to make sure they do not recommend any surgical intervention as these spinal findings are otherwise not my of expertise.     Workup  - MRI C spine ordered to evaluate acute worsening of anterolisthesis both symptomatically and per imaging on MRI brain August 2024 and CTA as below  - MRI Brain 8/14/24: No evidence of acute infarct, intracranial hemorrhage or mass..   - CTA head and neck  8/14/2024: Negative CTA head and neck for large vessel occlusion, dissection, aneurysm.  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 Kyphotic curvature of cervical spine. Grade 1 anterolisthesis C3-C4 and C4-C5. Moderate-to-severe multilevel degenerative changes of cervical spine, worse at C6-C7.   -  MRI brain without contrast 07/19/2021: White matter changes suggestive of chronic microangiopathy.  No acute intracranial pathology.*We discussed as of my retrospective review 9/27/2023 she does have partially empty sella, she has prominent optic nerve sheath bilaterally with some tortuosity bilaterally, cerebellar tonsils overlay the foramen magnum   - MRI Brain without contrast  7/9/19: No acute intracranial abnormality. Mild nonspecific cerebral white matter signal abnormality, which can be seen in patients with migraines as well as microangiopathic disease.  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  This may represent an anatomic variation.  If concerned of vertebrobasilar insufficiency, consider follow-up CTA or contrast enhanced MRA imaging.   - MRA head and neck/carotids 09/30/2019: No large vessel flow restrictive disease.  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  This could be reevaluated with CTA. -> per NSGY This requires no treatment and no further imaging is required either.    - Minor short segment stenosis of the dominant right V1 origin.  Mild long segment narrowing of the origin and nondominant left v1.  Anterior circulation normal.   - B12 3/10/20 721        Preventive:  - We discussed headache hygiene and lifestyle interventions that may improve her headaches   - she is not interested in prescription preventative at this time.   - headache preventative supplements including magnesium,  riboflavin - takes B complex, MVI  - through other providers: Aspirin 81 mg started  8/2024, xanax 1-2 times a week for insomnia and we have discussed if she does have sleep apnea this  could make it worse, metoprolol 25 mg nightly XR, omeprazole/Prilosec 20 mg, atorvastatin/Lipitor 10 mg started August 2024, meclizine 12.5 mg as needed, vitamin D3 1000 units daily  - past/failed/contraindicated: metoprolol, propranolol contraindicated due to interaction with current medications   -  future options: Topiramate, acetazolamide/Diamox, gabapentin, venlafaxine, CGRP med, botox      Abortive:  - discussed not taking over-the-counter or prescription pain medications more than 3 days per week to prevent medication overuse/rebound headache  -She is not interested in prescription rescue agent at this time  - past/contraindicated: fiorinal with codeine years ago, out of an abundance of precaution we discussed avoiding triptans (she never took previously prescribed rizatriptan) after events of August 2024  - past/helped: Toradol IM has worked for in the past  - future options:  prochlorperazine, Toradol IM or p.o., could consider trial for 5 days of Depakote or dexamethasone for prolonged migraine, ubrelvy, reyvow, nurtec    Cervical radiculopathy at C5  Spondylogenic/anterolisthesis with what appears to be potential compression of spinal cord  -     MRI cervical spine wo contrast; Future  -     Ambulatory referral to Neurosurgery; Future -  to evaluate whether spinal fusion would be indicated    Benign paroxysmal positional vertigo of left ear  -     Ambulatory Referral to Physical Therapy; Future    Insomnia  She reports chronic insomnia and that she currently gets less than 4 hr a night of sleep.  At initial visit 11/2018 we discussed sleep hygiene and she has stopped drinking coffee late at night which has helped some.  She is wondering if a referral to sleep medicine would be indicated and I am happy to refer her.  Likely multifactorial including possible psychogenic component versus other sleep disorder as well.  - as of 06/03/2019:  Patient reports she did not go to sleep medicine appointment and tried  cutting coffee and tea at dinner which helps somewhat  - as of 7/31/19: sleep sometimes good - was good for a while after starting mg, sometimes wakes up at night again now, possible symptoms of RLS and still only averaging 4-5 - will have her see  Sleep med   - as of 10/09/2019:  Did not follow up with sleep medicine as recommended  - as of 3/2/2020 reports she plans to follow up with sleep med soon, may have RLS? Or other sleep disorder.  -   Saw Sleep Medicine 06/22/2021 who felt no PSG was indicated - Psychophysiologic insomnia. Recommended sleep hygiene, relaxation techniques, consider CBT-I.   - as of 2/1/2023: I again recommended sleep medicine follow-up after sleep study for insomnia with a different sleep provider.   - as of 5/10/2023: Sleep study scheduled for 9/21/2023  - as of 9/27/2023: She is nervous to sleep over in the place and canceled it and we discussed in great detail the morbidity and mortality if remains untreated and she will consider  - as of 9/6/24: Still highly recommend sleep study for multiple reasons and discussed morbidity/mortality if present and untreated frequently with her       Numbness and tingling of both lower extremities  - EMG/NCS 6/24/20: Mildly abnormal study.  The reduced amplitudes noted on the eroneal  marker studies from extensor digitorum brevis muscles is likely due to reduced muscle bulk of muscles.  The low amplitudes noted on the sensory studies in the lower extremities can be considered normal for this patient's age.  These changes can be considered normal for this patient's age consider normal sural sensory response on the right and normal tibial responses, or could suggest a very mild axonal sensorimotor neuropathy.  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  To note, patient has history of remote carpal tunnel surgeries, could be remnant of prior surgery.  - as of 9/27/2023: She reports bilateral shin  numbness for the past year or so wax and wanes and is painful to touch in the shin region and tingling at times.  She reports discussing this with PCP, plans to discuss with cardiology as well and endocrinology on Monday but wanted to mention.  We discussed can absolutely repeat EMG/NCS  - EMG/NCS 12/8/23   This study is abnormal with minimal progression since the previous study of 2020. The superficial peroneal SNAPs are now absent; the left sural SNAP is absent with a prolonged right sural peak latency. H reflexes are prolonged bilaterally. Motor conduction studies are Nella Yancey 3 of 4 significant only for a low left peroneal CMAP amplitudes. 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   - as of 3/29/2024: Reached out to our neuromuscular specialist just to make sure she does not recommend any further evaluation or workup or other recommendations for these findings        Patient instructions     Continue aspirin and statin as others have recommended at least for now and agree with discussing with cardiology.  As we discussed some of the features you describe sound migrainous, when reviewing your MRI brain the cervical spine also looks much worse in this appears like it also could be in the C5 distribution on the left.  We discussed I do not typically order MRI C-spine or manage cervical issues, but I will see if I can get it authorized otherwise I recommend you follow-up with orthopedic spine or neurosurgery for further evaluation and pain management for pain control.  As we discussed I would be happy to place another PT referral, I am sorry it was not helpful for you in the past.    Continue to follow with your the providers regarding the parathyroid issue    Safety/fall precautions     Once sleep study is resulted I recommend please call [phone] to schedule a follow up visit  with one of the following providers:  Ovidio  Armand Hastings PA-C         Headache/migraine treatment:   Abortive medications (for immediate treatment of a headache):   It is ok to take ibuprofen, acetaminophen or naproxen (Advil, Tylenol,  Aleve, Excedrin) if they help your headaches you should limit these to No more than 3 times a week to avoid medication overuse/rebound headaches.         Over the counter preventive supplements for headaches/migraines   (to take every day to help prevent headaches - not to take at the time of headache):  [x] Magnesium 400 - 500 mg daily (If any diarrhea or upset stomach, decrease dose  as tolerated)  [x] Riboflavin (Vitamin B2)  400mg daily   (FYI B2 may make your urine bright/neon yellow)     Prescription preventive medications for headaches/migraines   (to take every day to help prevent headaches - not to take at the time of headache):  [x]      *Typically these types of medications take time untill you see the benefit, although some may see improvement in days, often it may take weeks, especially if the medication is being titrated up to a beneficial level. Please contact us if there are any concerns or questions regarding the medication.      Lifestyle Recommendations:  [x] SLEEP - Maintain a regular sleep schedule: Adults need at least 7-8 hours of uninterrupted a night. Maintain good sleep hygiene:  Going to bed and waking up at consistent times, avoiding excessive daytime naps, avoiding caffeinated beverages in the evening, avoid excessive stimulation in the evening and generally using bed primarily for sleeping.  One hour before bedtime would recommend turning lights down lower, decreasing your activity (may read quietly, listen to music at a low volume). When you get into bed, should eliminate all technology (no texting, emailing, playing with your phone, iPad or tablet in bed).  [x] HYDRATION - Maintain good hydration.  Drink   2L of fluid a day (4 typical small water bottles)  [x] DIET - Maintain good nutrition. In particular don't skip meals and try and eat healthy balanced meals regularly.  [x] TRIGGERS - Look for other triggers and avoid them: Limit caffeine to 1-2 cups a day or less. Avoid dietary triggers that you have noticed bring on your headaches (this could include aged cheese, peanuts, MSG, aspartame and nitrates).     Education and Follow-up  [x] Please call with any questions or concerns. Go to the ED with any new or concerning symptoms  [x] Follow up 3-4 months, sooner if needed   As we discussed go to the ER if any new or concerning symptoms, no risky traumatic activity      CC:   We had the pleasure of evaluating Nella Yancey in neurological consultation today. she is a right handed female who presents today for evaluation of headaches.      History of Present Illness:   Interval history as of 9/6/2024  - 8/14/24 woke up with numbness in left arm and often would go away with moving fingers, was dizzy and flashing lights in vision, and went in and had her stay over [facility] and did a lot of testing -   Please see EMR for details, I was not aware until now  - MRI Brain 8/14/24: No evidence of acute infarct, intracranial hemorrhage or mass..   - CTA head and neck: Negative CTA head and neck for large vessel occlusion, dissection, aneurysm.  **BONY STRUCTURES: No acute osseous abnormality. Kyphotic curvature of cervical spine. Grade 1 anterolisthesis C3-C4 and C4-C5. Moderate-to-severe multilevel degenerative changes of cervical spine, worse at C6-C7.   - they started baby asa and seeing Dr. Samson in Oct, sent to heart doctor yesterday   Did pt and did not find it helpful -much longer than 6 weeks    No falls, chronic imbalance  Chronic neck pain, has seen other providers, did over 6 weeks of PT without improvement, worsening     Headaches and migraines   She reports pains in her head a few times a week but it doesn't last long  enough to take anything,   Episodic lightheadedness/vertiginous but not severe like it was in the past when it was BPPV    -Discussed with recent events would not take rizatriptan, she never did take it in the past    Interval history as of 3/29/2024  - no significant new or concerning neurologic symptoms since last visit   - osteoporosis shot yearly in Jan   - sleep study not scheduled as recommended, vertigo happens the most at night, sleeps on right side with legs up   - getting PT for legs and balance and it is helping   - following with PCP for constipation and then diarrhea   - no longer having the severe pain in her shins    Headaches and migraines   She reports headaches and migraines are stable, hard to estimated number  For a while she was waking up with them every day and still has not obtained the sleep study for suspected sleep disorder  Tries to stay calm     Vertigo - positional,  Had maybe 3 weeks  Worse laying flat, positional, every time she turns over it starts, left ear  Feels better     Preventative:   - she is not interested in prescription preventative at this time.   - headache preventative supplements including magnesium,  riboflavin   - through other providers:  xanax 1-2 times a week for insomnia, metoprolol 25 mg nightly    Abortive:   headaches typically resolve spontaneously, otherwise typically with 2 acetaminophen and if not caffeine can help prior to bed  -Trial of rizatriptan-never took    - EMG/NCS 12/8/23   This study is abnormal with minimal progression since the previous study of 2020. The superficial peroneal SNAPs are now absent; the left sural SNAP is absent with a prolonged right sural peak latency. H reflexes are prolonged bilaterally. Motor conduction studies are Nella Yancey [id number] 12/8/2023 8:44 AM 3 of 4 significant only for a low left peroneal CMAP amplitudes. 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     Interval history as of 9/27/2023  - no significant new or concerning neurologic symptoms since last visit   - waiting on mammogram results   - lost 6 pounds   - following with endocrine on Monday   - has not obtained sleep study   - bilateral shin numbness for the past year or so, waxes and wanes and is painful to touch and numb and tingling at times, painful bilateral shins - going to see cardiology next week and endo on Monday     Headaches and migraines   3-4 times a week   When she wakes up can have headaches and then goes to bathroom and may take omeprazole and then sometimes it goes away and sometimes it doesn't and other times takes two tylenol and oftentimes at work and if not by the time she goes to bed she will then have coffee and usually that we will get rid of it by the morning    Preventative:   - continue headache preventative supplements including magnesium,  riboflavin   - through other providers: trazodone as needed for sleep - tried 25 mg once and it didn't help so stopped, xanax 1-2 times a week or less at bedtime, metoprolol  25 mg daily bedtime    Abortive:   OTC meds  Denies bothersome side effects     Interval history as of 5/10/2023  - no significant new or concerning neurologic symptoms since last visit   -Sleep study scheduled for 9/21/2023, maybe 4-5 hours of sleep a night...  -Parathyroid scan 3/24/2023 was normal, then saw Juvenal Zepeda and she discussed with Dr Perkins and they are holding off on repeat surgery right now, since PTH is not extremely high  - seeing another surgeon from [facility] in next week  - following with physiatry - Dr Depadua and has TPI available     Headaches and migraines   Much better, maybe takes tylenol once a week for pain  She reports she gets headaches occasionally and takes acetaminophen right when they start  Now only seems to get them when upset, less often   Previously with heat, so we will see if worsens in summer  They were both  sick after Easter with a URI  If she gets upset or overwhelmed, tremulous hands and feels shaky inside     Preventative:   - continue headache preventative supplements including magnesium,  riboflavin   - through other providers: trazodone as needed for sleep - has not tried yet, but plans to start with 25 mg, xanax 1-2 times a week, metoprolol  25 mg    Abortive:   - if feels a headache coming on, tries to relax and think of something else, time alone,helps often  - acetaminophen/tylenol helps some  - trial of rizatriptan prescribed in the past and she never took it    Interval history as of 2/1/2023  -11/29/2022 she followed up with ENT Dr. Perkins for primary hyperparathyroidism and unfortunately PTH remains elevated suggesting left over hypercellular tissue and he discussed options including second opinion, revision surgery.  Followed up again 1/10/2023 with Dr. Perkins and he recommended follow-up with endocrinology, nuc med parathyroid scan  - bacterial PNA a month ago   - following up with physiatry next week    - sleep is terrible   -Struggles with reflux especially at night, I suspect sleep apnea, sleep doctor refused to order sleep study in 2021   - one morning woke up with heart racing so fast   How likely are you to doze off or fall sleep during the following situations?  0 - would never doze  1 - slight chance of dozing  2 - moderate chance of dozing  3 - high chance of dozing  Firth sleepiness scale  Sitting and reading - 2  Watching TV - 3  Sitting, inactive in a public place such as a theater 2  As a passenger in a car for an hour without a break 1  Lying down to rest in afternoon when circumstances permit 3  Sitting and talking to someone 1  Sitting quietly after lunch without alcohol 2  In a car while stopped for few minutes in traffic -0      Headaches and migraines   - wake up with them daily lately for months  - bifrontal and cervicogenic     Preventative:   - continue headache preventative  supplements including magnesium,  riboflavin   - through other providers: trazodone as needed for sleep - has not tried, xanax for anxiety, metoprolol increased from 12.5 mg to 25 mg    Abortive:   - trial of rizatriptan prescribed in the past and she never took it.     Interval history as of 10/26/2022  - denies any new or concerning neurologic symptoms since last visit   - no return of vertigo  - neck pain   - had parathyroid surgery in August 2022  - still poor sleep, falls asleep ok around 10, wakes at sometimes btw 2-4 am and often is able to go back to sleep, wakes again around 7     Headaches and migraines   Improvement since last visit, headaches seemed to go a way for a while and then lately returned, seems to come in spurts, and then may not have for days or weeks, come more with stressors   - Headaches the past few weeks when she wakes that are 8/10 bifrontal with out migrainous features and resolve with tylenol 500 mg within 2 hours   - wakes up in the am with neck pain, left shoulder pain and headache (saw sleep med and they did not order study, saw physiatry for neck pain, did not follow up)   - also increased stressors related to has been falling    Preventative:  Magnesium and riboflavin  - through other providers  trazodone as needed for sleep, xanax for anxiety, metoprolol 12.5    Abortive:   - ES acetaminophen/Tylenol typically helps  -trial of rizatriptan - Did not try yet    For neck saw physiatry 5/24/22 - has not tried methocarbamol yet, PT did not help -     Interval history as of 5/17/2022  - walking more, sleeping better often     Headaches and migraines   - Bad migraines 4-5 days a month where she has to lay in bed all day, they can last 1-2 days, not responsive to OTC meds and now willing to try abortive.  - stress can trigger them  - also sometimes pain left parietal scalp bone region, pushing on the region helps    - Saw Dr. Perkins in [facility] for hyperparathyroidism   - saw endocrine with us  and wanted another opinion Dr. Lopez endocrine at     Preventative: -  Magnesium and Riboflavin  - through other providers: trazodone as needed for sleep, xanax for anxiety, metoprolol   Abortive: - Tylenol or advil dont often work     Interval history as of 11/12/2021  - denies any new or concerning neurologic symptoms since last visit  -  MRI brain without contrast 07/19/2021: White matter changes suggestive of chronic microangiopathy.  No acute intracranial pathology.  - Was seen by Sleep Med on 6/22/21. Psychophysiologic insomnia. Recommended sleep hygiene, relaxation techniques, consider CBT-I.     Headaches   Having 4-5 headaches per week. Almost always bifrontal or bitemporal. Always on both sides. Headaches can last all day, usually better with Advil or Tylenol. No migraine symptoms.  Not sleeping well. Having significant stress.    Preventative: Magnesium and Riboflavin   - Did not try gabapentin as she does not like the idea of taking a medication every day to present a headache    Abortive: Tylenol or advil    Reports dizziness that happens when she bend over forward.   chronic neck pain.  Being worked up for thyroid/parathyroid. - Ca is high, PTH is high   Knee pain, currently in PT for gait      Interval history as of 6/16/2021  - following with eye doctor  - PTH high 3/10/20, she has had high calcium for years it appears, recently had bone density shows major issues - she sees endocrine this month - we discussed hyperparathyroidism can cause headaches...  Dr. Storey outside of [facility]  - 3 weeks ago had eye issue In left eye -  fireworks and line across for 4 days 5/2021, thinks it was just left eye, no headache associated, has not happened again - eye exam ok recently although she says he did not do a full exam     No vertigo episodes     Headaches and migraines   Almost daily headaches for the past 2 months  - bifrontal, bitemporal, apex - always both sides, sometimes lasts all day, if takes advil  PM or 2 tylenol ED - helps it go away. No migraine symptoms  Also may get sharp pains at different points in head for 5 mins     Preventative: supplement  Abortive:   if takes advil PM or 2 tylenol ED - helps it go away.     Chronic balance issues  Does not appear to be neurologic without significant findings on EMG, normal B12, no findings to suggest cause on MRI brain  -  Has worked with PT   - valgus deformity of gait - following with ortho   -  referred to physiatry - she can also discuss neck pain with them     Sleep - seeing next week     Interval history as of 03/2/2020:  - has an apmt upcoming with sleep medicine   - for about a week she was having TMJ pain and had old azithromycin and took it and it feels a little better   - then at night while laying in bed was having pain, strange feeling in her legs all the way up the legs   - wants to walk but afraid of falling   - has not had any BPPV since   - has appointment with sleep medicine   - taking mg and B2    Interval history as of 10/09/2019  -  Did not follow up with sleep medicine as recommended  - she has been following with physical therapy for gait instability/history of BPPV - also seeing them for shoulder  - getting orthotics for feet  - denies any falls     MRA head and neck 09/30/2019:  No large vessel flow restrictive disease.     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  This could be reevaluated with CTA.   - Minor short segment stenosis of the dominant right V1 origin.  Mild long segment narrowing of the origin and nondominant left v1.  Anterior circulation normal.  - had Neurosurgery Dr. Reeder review image and there may be nothing really there, even if there is would just be surveillance indicated. Will order CTA and have he is ok with having her follow up with him to review the images/discuss results just in case         Otherwise she is doing good  - chronic lightheadedness and vertigo history - 2 weeks ago with some  vertigo and went away on its own in about 5 minutes  - headaches - has not had in quite some time, occasional sharp pains randomly for 5 minutes on various locations on head 1-2 times a week   - no big migraines in a year  - she is taking magnesium and riboflavin - she thinks this may be helping     Interval history as of 7/31/19  - MRI Brain without contrast  7/9/19:   1.  No acute intracranial abnormality.  2.  Mild nonspecific cerebral white matter signal abnormality, which can be seen in patients with migraines as well as microangiopathic disease.  3.  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  This may represent an anatomic variation.  If concerned of vertebrobasilar insufficiency, consider follow-up CTA or contrast enhanced MRA imaging.      - she saw pulmonary 7/3/19  - followed up with cardiology and normal stress test per patient since last visit - they wanted to start metoprolol   -  has been in and out of the hospital for diverticulitis and then surgery for removing infected intestine so that has been taking some of her time   - daughter was pregnant and last the baby after 3 months     Lightheadedness/dizziness, h/o vertigo  Mild gait instability  Denies recent falls  *2-3 weeks of pain in the bottom of her right foot that she plans to see a foot doctor   - also has decreased sensation bilateral top of the toes and into the top of the shins for about 6 months   - she has not followed up with PT as recommended for gait and lightheadedness  - lightheadedness occurs the most with getting up   - the denice hallpike last visit improved her vertigo 6/3/19     Migraines/headaches  Morning headaches the last 4-5 days, goes away with coffee usually, if not goes away with ibuprofen  Taking deep breaths and has not been having migraines   - taking magnesium and not riboflavin      Sleep   Sleep medicine  - sometimes feels sensations in bilateral shins when in bed  - falls asleep easily with TV  on, stress though has affected her some  - usually have been sleeping straight through, past week wakes up to check on  once a night     --------------------     Interval history as of 06/03/2019:  -  has been following with physical therapy and has been noticing improvement in neck pain, - - first PT visit 04/01/2019 also significantly improved dizziness following 1st treatment - the found signs of BPPV - was vertigo free up for almost 2 months   - around 5/27/19: woke up and was lying in bed when suddenly had room spinning - sat up and it went away in a 3 mins - since then every day has happened when triggered - from bending over sometimes soon   - feels like she can not walk right, hold on to  and daughter   - reports LE shin numbness      - 05/02/2019 - phone conversation with Cardiology - metoprolol 25 mg long-acting  - referred to eye doctor - apmt in July  - referred to Sleep Medicine - did not go, tried cutting coffee and tea at dinner time and sleeps fine now   - magnesium, riboflavin - reports she is taking        Interval history as of  03/18/2019  - presented to the ED 01/06/2019 with chest pain - has a stress test next week      - her biggest complaint today is trouble sleeping, chronic dizziness   - Dizziness, history of BPPV 2 years ago, got better, scares her. Not dizzy with walking, more positional      Headaches  - never filled rizatriptan  - headaches are not really a complaint today: in a month 8 times but go away with ibuprofen. Stopped drinking caffeine at night  - was referred to physical therapy has not made an appointment  - recommended supplements including - taking magnesium, riboflavin may be in her B complex   - has joined the gym, loves walking      Plans to see eye doctor, they are tearing, no blurred or double vision            Headaches started at what age? Migraines Since childhood, didn't have them when pregnant x 2  How often do the headaches occur?   *As of 11/2018:    - migraines 3-4 times a week,   - Mild throbbing pain right scalp 3-4 times a week  - lasts hours, with advil goes away most of the time  - Occasionally for sharp pain throughout head for a second or two - once a week  - Also bilateral occipital pain 1-2 times a week    *As of 3/18/19: - headaches are not really a complaint today: in a month 8 times but go away with ibuprofen   *As of 06/03/2019: headaches 6 times a month and go away with ibuprofen  *as of 7/31/19: headaches 6-8 a month - often in the am or with anxiety/stress - these she can breathe through it   Morning headaches the last 4-5 days, goes away with coffee usually, if not goes away with ibuprofen  - As of 10/9/2019 headaches - has not had in quite some time, around 8 a month and go away with ibuprofen occasional sharp pains randomly for 5 minutes on various locations on head 1-2 times a week. No migraines in a year      What time of the day do the headaches start? no particular time of day  How long do the headaches last? 2-3 days in teenage years - now 1 full day and into the morning  Are you ever headache free? Yes  Prodrome of feeling like she is getting a headache  Aura? without aura  Describe your usual headache pain quality? throbbing  Where is your headache located? bilateral frontal area and bilateral occipital area, also right temporal   Does the pain Radiate? no radiation of pain  What is the intensity of pain? Up to a 10/10, at its best a 4  Associated symptoms:   [x] Nausea       [x] Vomiting - once in a while        [] Diarrhea         [] Insomnia           [x] Stiff or sore neck         [x] Problems with concentration  [x] Photophobia     [x]Phonophobia      [x] Osmophobia  [] Blurred vision   [x] Prefer quiet, dark room        [x] Light-headed or dizzy - sometimes   [] Tinnitus      Things that make the headache worse? +movement  Headache triggers:  Stress, mint,strong perfume, tobacco or fireplaces, If she goes to bed with a headache  "will wake up with worse one   What time of the year do headaches occur more frequently?   do not seem to be related to any time of the year  Have you seen someone else for headaches or pain? Yes, neurologist back in [place]  Have you had trigger point injection performed and how often? No  Have you had Botox injection performed and how often? No   Have you had epidural injections or transforaminal injections performed? No  Have you used CBD or THC for your headaches and how often? No  Are you current pregnant or planning on getting pregnant? No  Have you ever had any Brain imaging? yes years ago and normal     What medications do you take or have you taken for your headaches?   ABORTIVE:       - was prescribed rizatriptan but did not need it   - advil 200 mg  - my pillow seems to be helping the bioccipital      PREVENTIVE: no     Alternative therapies used in the past for headaches? Physical therapy -  For wrist, but not headache   Coffee, has one in the morning  thank out the p.m. Coffee     LIFESTYLE  Sleep - \"if I get 4 hours I am johnnie\"   - as of 06/03/2019:  Reports improved  As of 7/31/19 - avg 4-5   - sometimes feels sensations in bilateral shins when in bed  - falls asleep easily with TV on, stress though has affected her some  - usually have been sleeping straight through, past week wakes up to check on  once a night      as of 11/2018:  Is your sleep restful? No, tired  What time do you go to bed at night? 10   What time do you wake up in am? Has coffee at 4:30 am with  and goes back to sleep, otherwise wakes up at 7 am  How often do you get up at night? once  Do you wake up with headaches? Sometimes   Do you snore while asleep? No  Have you been told that you stop breathing during sleeping? No - but makes noises  Do you wake up tired in the morning? Yes  Do you take frequent naps during the day? sometimes  Do you have jaw pain?No, but has TMJ  Do you grind/clench your teeth at night? " No  Do you have restless leg syndrome? No     Physical activity:   Joined the [employer]  Goes once a week  Walks with her daughter  - considering line dancing or something slower at the [employer]   - has joined the gym, loves walking       Water: not enough - 4 glasses      Diet:  Do you ever skip meals?  Eats well     Mood: not really, good mood   History of anxiety, sometimes takes xanax at night 0.25 mg         The following portions of the patient's history were reviewed and updated as appropriate: allergies, current medications, past family history, past medical history, past social history, past surgical history and problem list.     Family history of headaches:  migraine headaches in mother, aunt and cousins  Any family history of aneurysms - No     Work: retired, worked in Sales in [place]      Lives with   Daughter, granddaughter live near by  Other two grandkids in [place]      Illicit Drugs: denies  Alcohol/tobacco: Denies tobacco use, alcohol intake: social drinker    Pertinent PMH    left PCOM origin infundibulum  - MRA head and neck 09/30/2019: No large vessel flow restrictive disease.  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  This could be reevaluated with CTA.- Minor short segment stenosis of the dominant right V1 origin.  Mild long segment narrowing of the origin and nondominant left v1.  Anterior circulation normal.  - CTA head with without contrast 10/28/2019:  No aneurysm identified at the site of the previously noted focal dilatation in the left supraclinoid ICA at the expected origin of the left posterior communicating artery.  A left posterior communicating artery is not identified.  The focal outpouching   may therefore represent anatomic variation versus residual junctional dilatation at the site of an atretic posterior communicating artery.  - NSGY note 11/22/19 left PCOM origin infundibulum.  This requires no treatment and no further imaging is required either.             Pertinent lab results:   See EMR for recent labs    05/04/2021 CMP unremarkable     9/25/19 - CMP unremarkable     6/10/19 CMP unremarkable  Thyroid studies normal Vit D 28     1/07/2019:  CMP and CBC unremarkable  TSH 0.78     Imaging: I have personally reviewed imaging and radiology read      MRA head and neck 09/30/2019:  No large vessel flow restrictive disease.     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  This could be reevaluated with CTA.   - Minor short segment stenosis of the dominant right V1 origin.  Mild long segment narrowing of the origin and nondominant left v1.  Anterior circulation normal.     MRI Brain without contrast  7/9/19:   1.  No acute intracranial abnormality.  2.  Mild nonspecific cerebral white matter signal abnormality, which can be seen in patients with migraines as well as microangiopathic disease.  3.  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  This may represent an anatomic variation.  If concerned of vertebrobasilar insufficiency, consider follow-up CTA or contrast enhanced MRA imaging.     Past Medical History:     Past Medical History:   Diagnosis Date    Anxiety     Arthritis     Back pain     Balance problems     Chest pain     heaviness    Colon polyp     Difficulty swallowing     Dizziness     Dry cough     Gait disorder     GERD (gastroesophageal reflux disease)     Hyperparathyroidism (HCC)     Hypertension     Increased frequency of headaches     Migraines     Neck pain     Numbness and tingling     bles    Palpitations     Pericarditis     Thyroid disease     nodule    Trouble in sleeping     Wears glasses        Patient Active Problem List   Diagnosis    Closed fracture distal radius and ulna, right, initial encounter    Anxiety    Gastroesophageal reflux disease with esophagitis    History of pericarditis    Heart murmur    Primary hyperparathyroidism (HCC)    Thyroid nodule    Vitamin D deficiency    Atypical chest pain    Migraine  without aura and without status migrainosus, not intractable    Dizziness and giddiness    Insomnia    Cervicalgia    Abnormal CT scan of lung    Irritable bowel syndrome with diarrhea    Elevated amylase and lipase    Paresthesias    Carpal tunnel syndrome of left wrist    Dysphonia    Reflux laryngitis    Paresis of left vocal fold    Glottic insufficiency    Muscle tension dysphonia    TMJ dysfunction    Pharyngoesophageal dysphagia    Palpitation    Hallux abducto valgus, bilateral    Pincer nail deformity    Osteoporosis with current pathological fracture    Parathyroid related hypercalcemia (HCC)    Cervical myofascial pain syndrome    Edema of both ankles    Aneurysm (HCC)    Calf pain    Lung nodule    Myalgia    Pericardial effusion    SOBOE (shortness of breath on exertion)    Elevated blood-pressure reading without diagnosis of hypertension    Skin tag of anus    H/O parathyroidectomy    [facility]) (HCC)    Primary osteoarthritis of right knee    Neuropathy    Numbness and tingling of both lower extremities    Age-related osteoporosis without current pathological fracture    Headache with neurologic deficit    Stroke-like symptoms    HTN (hypertension)    HLD (hyperlipidemia)       Medications:      Current Outpatient Medications   Medication Sig Dispense Refill    acetaminophen (TYLENOL) 500 mg tablet Take 500-1,000 mg by mouth every 6 (six) hours as needed for mild pain      albuterol (PROVENTIL HFA,VENTOLIN HFA) 90 mcg/act inhaler Inhale 2 puffs every 6 (six) hours as needed for wheezing or shortness of breath 8 g 1    ALPRAZolam (XANAX) 0.5 mg tablet Take 1 tablet (0.5 mg total) by mouth daily at bedtime as needed for anxiety or sleep 20 tablet 1    ascorbic acid (VITAMIN C) 500 mg tablet Take 500 mg by mouth daily      aspirin (Aspirin 81) 81 mg EC tablet Take 1 tablet (81 mg total) by mouth daily      atorvastatin (LIPITOR) 10 mg tablet Take 1 tablet (10 mg total) by mouth daily  after dinner 30 tablet 6    b complex vitamins tablet Take 1 tablet by mouth daily      Cholecalciferol 50 MCG (2000 UT) CAPS Take 3,000 Units by mouth daily      Magnesium 300 MG CAPS Take 300 mg by mouth in the morning      meclizine (ANTIVERT) 12.5 MG tablet Can take 1-2 tabs as needed up to BID for dizziness or vertigo, caution as can be sedating 30 tablet 11    metoprolol succinate (TOPROL-XL) 25 mg 24 hr tablet TAKE 1 TABLET BY MOUTH DAILY AT BEDTIME 90 tablet 3    Multiple Vitamins-Minerals (MULTIVITAMIN ADULTS 50+ PO) Take 1 tablet by mouth in the morning      Nutritional Supplements (OSAPLEX MK-7 PO) Take 1 tablet by mouth in the morning      omeprazole (PriLOSEC) 20 mg delayed release capsule Take 1 capsule (20 mg total) by mouth daily before breakfast Half an hour prior to breakfast and half an hour prior to dinner 180 capsule 3    Riboflavin (VITAMIN B-2 PO) Take 250 mg by mouth in the morning       No current facility-administered medications for this visit.        Allergies:      Allergies   Allergen Reactions    Ciprofloxacin Myalgia       Family History:     Family History   Problem Relation Age of Onset    Kidney failure Mother     Hypertension Mother     Lung cancer Father     Yosef Parkinson White syndrome Daughter     No Known Problems Sister     Substance Abuse Neg Hx     Alcohol abuse Neg Hx     Mental illness Neg Hx        Social History:     Social History     Socioeconomic History    Marital status: /Civil Union     Spouse name: Not on file    Number of children: Not on file    Years of education: Not on file    Highest education level: Not on file   Occupational History    Not on file   Tobacco Use    Smoking status: Never    Smokeless tobacco: Never   Vaping Use    Vaping status: Never Used   Substance and Sexual Activity    Alcohol use: Not Currently     Comment: Rarely     Drug use: No    Sexual activity: Yes     Partners: Male   Other Topics Concern    Not on file   Social  "History Narrative    WORK:    1.   ([employer]; [employer]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    2.  [employer]        HOBBIES:    1.  Singing    2.  Dancing    3.  Hx of ceramics (+ dust)        PETS:    1.  Dogs    -  Denies birds        TRAVEL:    -  [place]        EXPOSURES:    Denies mold; down pillows/comforters; hot tubs     Social Determinants of Health     Financial Resource Strain: Low Risk  (10/30/2023)    Overall Financial Resource Strain (CARDIA)     Difficulty of Paying Living Expenses: Not very hard   Food Insecurity: No Food Insecurity (8/14/2024)    Hunger Vital Sign     Worried About Running Out of Food in the Last Year: Never true     Ran Out of Food in the Last Year: Never true   Transportation Needs: No Transportation Needs (8/14/2024)    PRAPARE - Transportation     Lack of Transportation (Medical): No     Lack of Transportation (Non-Medical): No   Physical Activity: Not on file   Stress: Not on file   Social Connections: Not on file   Intimate Partner Violence: Not on file   Housing Stability: Low Risk  (8/14/2024)    Housing Stability Vital Sign     Unable to Pay for Housing in the Last Year: No     Number of Times Moved in the Last Year: 0     Homeless in the Last Year: No         Objective:       Physical Exam:                                                                 Vitals:            Constitutional:    /70 (BP Location: Right arm, Patient Position: Sitting, Cuff Size: Standard)   Pulse 69   Temp 97.7 °F (36.5 °C) (Temporal)   Ht 5' 8\" (1.727 m)   Wt 77.6 kg (171 lb)   BMI 26.00 kg/m²   BP Readings from Last 3 Encounters:   09/06/24 129/70   08/20/24 142/86   08/15/24 157/85     Pulse Readings from Last 3 Encounters:   09/06/24 69   08/20/24 64   08/15/24 59         Well developed, well nourished, well groomed. No dysmorphic features.       Psychiatric:  Normal behavior and appropriate affect        Neurological Examination:     Mental status/cognitive function: "   Recent and remote memory intact. Attention span and concentration as well as fund of knowledge are appropriate for age. Normal language and spontaneous speech.     Cranial Nerves:  III, IV, VI-Pupils were equal, round, and reactive to light and accommodation. Extraocular movements were full and conjugate without nystagmus.   V-facial sensation symmetric.    VII-facial expression symmetric, intact forehead wrinkle, strong eye closure, symmetric smile    VIII-hearing grossly intact bilaterally   IX, X-palate elevation symmetric, no dysarthria.   XI-shoulder shrug strength intact    XII-tongue protrusion midline.    Motor Exam: symmetric bulk and tone throughout, LUE drifts down without pronation slightly*. Power/strength 4/5 left slightly stronger than right Hip flexion and 4 +knee extension   Sensory:  decreased to LT bilateral lower extremities up to the shins  Reflexes: brachioradialis 2+, biceps 2+, knee L3+R2+, ankle 0 bilaterally. No ankle clonus   Coordination: Finger nose finger intact bilaterally, no apparent dysmetria, ataxia or tremor noted  Gait: wide based gait.          Review of Systems:   ROS obtained by medical assistant and reviewed, but if any symptoms listed below say negative, does not mean patient has not had this symptom since last visit, please see HPI for details of symptoms discussed this visit.  Regarding any abnormal or positive findings in ROS that are not neurologic related, patient instructed to address these issues with PCP or go to the ER if they are severe.      Review of Systems   Constitutional:  Negative for appetite change and fever.   HENT: Negative.  Negative for hearing loss, tinnitus, trouble swallowing and voice change.    Eyes: Negative.  Negative for photophobia and pain.   Respiratory: Negative.  Negative for shortness of breath.    Cardiovascular: Negative.  Negative for palpitations.   Gastrointestinal: Negative.  Negative for nausea and vomiting.   Endocrine:  Negative.  Negative for cold intolerance.   Genitourinary: Negative.  Negative for dysuria, frequency and urgency.   Musculoskeletal: Negative.  Negative for myalgias and neck pain.   Skin: Negative.  Negative for rash.   Neurological:  Positive for dizziness and headaches. Negative for tremors, seizures, syncope, facial asymmetry, speech difficulty, weakness, light-headedness and numbness.   Hematological: Negative.  Does not bruise/bleed easily.   Psychiatric/Behavioral:  Positive for sleep disturbance (max 3-4 hours of sleep). Negative for confusion and hallucinations.       I have spent 36 minutes with Patient and family today in which greater than 50% of this time was spent in counseling/coordination of care regarding Diagnostic results, Prognosis, Risks and benefits of tx options, Instructions for management, Patient and family education, Importance of tx compliance, Risk factor reductions, Impressions, Counseling / Coordination of care, Documenting in the medical record, Reviewing / ordering tests, medicine, procedures  , Obtaining or reviewing history  , and Communicating with other healthcare professionals . I also spent 5 minutes non face to face for this patient the same day.       Author:  Mayra Henson MD 9/6/2024 6:52 PM

## 2024-09-06 NOTE — PROGRESS NOTES
Review of Systems   Constitutional:  Negative for appetite change and fever.   HENT: Negative.  Negative for hearing loss, tinnitus, trouble swallowing and voice change.    Eyes: Negative.  Negative for photophobia and pain.   Respiratory: Negative.  Negative for shortness of breath.    Cardiovascular: Negative.  Negative for palpitations.   Gastrointestinal: Negative.  Negative for nausea and vomiting.   Endocrine: Negative.  Negative for cold intolerance.   Genitourinary: Negative.  Negative for dysuria, frequency and urgency.   Musculoskeletal: Negative.  Negative for myalgias and neck pain.   Skin: Negative.  Negative for rash.   Neurological:  Positive for dizziness and headaches. Negative for tremors, seizures, syncope, facial asymmetry, speech difficulty, weakness, light-headedness and numbness.   Hematological: Negative.  Does not bruise/bleed easily.   Psychiatric/Behavioral:  Positive for sleep disturbance (max 3-4 hours of sleep). Negative for confusion and hallucinations.

## 2024-09-06 NOTE — PATIENT INSTRUCTIONS
Continue aspirin and statin as others have recommended at least for now and agree with discussing with cardiology.  As we discussed some of the features you describe sound migrainous, when reviewing your MRI brain the cervical spine also looks much worse in this appears like it also could be in the C5 distribution on the left.  We discussed I do not typically order MRI C-spine or manage cervical issues, but I will see if I can get it authorized otherwise I recommend you follow-up with orthopedic spine or neurosurgery for further evaluation and pain management for pain control.  As we discussed I would be happy to place another PT referral, I am sorry it was not helpful for you in the past.    Continue to follow with your the providers regarding the parathyroid issue    Safety/fall precautions     Once sleep study is resulted I recommend please call [phone] to schedule a follow up visit  with one of the following providers:  Ovidio Linares-GAYLA         Headache/migraine treatment:   Abortive medications (for immediate treatment of a headache):   It is ok to take ibuprofen, acetaminophen or naproxen (Advil, Tylenol,  Aleve, Excedrin) if they help your headaches you should limit these to No more than 3 times a week to avoid medication overuse/rebound headaches.         Over the counter preventive supplements for headaches/migraines   (to take every day to help prevent headaches - not to take at the time of headache):  [x] Magnesium 400 - 500 mg daily (If any diarrhea or upset stomach, decrease dose  as tolerated)  [x] Riboflavin (Vitamin B2)  400mg daily   (FYI B2 may make your urine bright/neon yellow)     Prescription preventive medications for headaches/migraines   (to take every day to help prevent headaches - not to take at the time of headache):  [x]      *Typically these types of medications take time untill  you see the benefit, although some may see improvement in days, often it may take weeks, especially if the medication is being titrated up to a beneficial level. Please contact us if there are any concerns or questions regarding the medication.      Lifestyle Recommendations:  [x] SLEEP - Maintain a regular sleep schedule: Adults need at least 7-8 hours of uninterrupted a night. Maintain good sleep hygiene:  Going to bed and waking up at consistent times, avoiding excessive daytime naps, avoiding caffeinated beverages in the evening, avoid excessive stimulation in the evening and generally using bed primarily for sleeping.  One hour before bedtime would recommend turning lights down lower, decreasing your activity (may read quietly, listen to music at a low volume). When you get into bed, should eliminate all technology (no texting, emailing, playing with your phone, iPad or tablet in bed).  [x] HYDRATION - Maintain good hydration.  Drink  2L of fluid a day (4 typical small water bottles)  [x] DIET - Maintain good nutrition. In particular don't skip meals and try and eat healthy balanced meals regularly.  [x] TRIGGERS - Look for other triggers and avoid them: Limit caffeine to 1-2 cups a day or less. Avoid dietary triggers that you have noticed bring on your headaches (this could include aged cheese, peanuts, MSG, aspartame and nitrates).     Education and Follow-up  [x] Please call with any questions or concerns. Go to the ED with any new or concerning symptoms  [x] Follow up 3-4 months, sooner if needed   As we discussed go to the ER if any new or concerning symptoms, no risky traumatic activity

## 2024-09-15 DIAGNOSIS — E78.5 HYPERLIPIDEMIA, UNSPECIFIED HYPERLIPIDEMIA TYPE: ICD-10-CM

## 2024-09-17 ENCOUNTER — TELEPHONE (OUTPATIENT)
Age: 79
End: 2024-09-17

## 2024-09-17 RX ORDER — ATORVASTATIN CALCIUM 10 MG/1
10 TABLET, FILM COATED ORAL
Qty: 90 TABLET | Refills: 1 | Status: SHIPPED | OUTPATIENT
Start: 2024-09-17

## 2024-09-17 NOTE — TELEPHONE ENCOUNTER
In my experience in the past, a separate order for an open MRI outside of our system has not been needed?

## 2024-09-17 NOTE — TELEPHONE ENCOUNTER
Pt called. She is currently scheduled for a c-spine MRI on 9/29/24. Pt stated that she will need a new order placed for an open MRI, as she is claustrophobic. Stated that she had a MRI done in the past at [facility], and it was a terrible experience, as she panicked and felt that she was unable to breath. Once the order for the open MRI is placed, she will  need to cancel the MRI that is already scheduled and schedule the open MRI.

## 2024-09-18 NOTE — TELEPHONE ENCOUNTER
Called pt re: below. Pt states that she wants to find out where can she go for open MRI. Advised pt that there is an open MRI in [facility] and their phone # is  [phone]. Pt verbalized understanding. Pt will call us back to let us know the date of her appt so we can alert the precert team.

## 2024-10-01 ENCOUNTER — OFFICE VISIT (OUTPATIENT)
Dept: FAMILY MEDICINE CLINIC | Facility: CLINIC | Age: 79
End: 2024-10-01
Payer: COMMERCIAL

## 2024-10-01 VITALS
RESPIRATION RATE: 15 BRPM | WEIGHT: 169.4 LBS | BODY MASS INDEX: 25.67 KG/M2 | OXYGEN SATURATION: 95 % | HEART RATE: 76 BPM | DIASTOLIC BLOOD PRESSURE: 82 MMHG | TEMPERATURE: 97.9 F | HEIGHT: 68 IN | SYSTOLIC BLOOD PRESSURE: 132 MMHG

## 2024-10-01 DIAGNOSIS — E78.5 HYPERLIPIDEMIA, UNSPECIFIED HYPERLIPIDEMIA TYPE: Primary | ICD-10-CM

## 2024-10-01 DIAGNOSIS — R45.0 NERVOUSNESS: ICD-10-CM

## 2024-10-01 DIAGNOSIS — R10.9 RIGHT FLANK PAIN: ICD-10-CM

## 2024-10-01 LAB
BILIRUB UR QL STRIP: NEGATIVE
CLARITY UR: CLEAR
COLOR UR: NORMAL
GLUCOSE UR STRIP-MCNC: NEGATIVE MG/DL
HGB UR QL STRIP.AUTO: NEGATIVE
KETONES UR STRIP-MCNC: NEGATIVE MG/DL
LEUKOCYTE ESTERASE UR QL STRIP: NEGATIVE
NITRITE UR QL STRIP: NEGATIVE
PH UR STRIP.AUTO: 6.5 [PH]
PROT UR STRIP-MCNC: NEGATIVE MG/DL
SP GR UR STRIP.AUTO: 1.01 (ref 1–1.03)
UROBILINOGEN UR STRIP-ACNC: <2 MG/DL

## 2024-10-01 PROCEDURE — 81003 URINALYSIS AUTO W/O SCOPE: CPT | Performed by: FAMILY MEDICINE

## 2024-10-01 PROCEDURE — 99214 OFFICE O/P EST MOD 30 MIN: CPT | Performed by: FAMILY MEDICINE

## 2024-10-01 PROCEDURE — 87086 URINE CULTURE/COLONY COUNT: CPT | Performed by: FAMILY MEDICINE

## 2024-10-01 PROCEDURE — G2211 COMPLEX E/M VISIT ADD ON: HCPCS | Performed by: FAMILY MEDICINE

## 2024-10-01 RX ORDER — ATORVASTATIN CALCIUM 10 MG/1
5 TABLET, FILM COATED ORAL
Qty: 30 TABLET | Refills: 0 | Status: SHIPPED | OUTPATIENT
Start: 2024-10-01

## 2024-10-01 RX ORDER — ALPRAZOLAM 0.5 MG
0.5 TABLET ORAL
Qty: 20 TABLET | Refills: 1 | Status: SHIPPED | OUTPATIENT
Start: 2024-10-01

## 2024-10-01 NOTE — PROGRESS NOTES
Assessment/Plan:    Hyperlipidemia  - advised trial of taking atorvastatin 5 mg 3 days a week for 2 weeks, if no problems, increase to 5 mg daily, will recheck lipid panel and LFT's prior to next visit       - Lipid Panel with Direct LDL reflex       - Comprehensive metabolic panel    Nervousness  - continue alprazolam 0.5 mg 1/2 tablet at bedtime as needed, patient reports occasional use    Right flank pain  - now resolved, ua and urine culture obtained for further evaluation, encouraged to contact the office if symptoms return       - Urine culture       - UA w Reflex to Microscopic           Return as scheduled or sooner as needed.   The patient understands and agrees with the treatment plan.      Subjective:   Chief Complaint   Patient presents with    Medication Management     lipitor      Patient ID: Nella Yancey is a 79 y.o. female who presents today for follow up hyperlipidemia. Patient was recently started on atorvastatin 10 mg daily about a month ago and developed muscle aches in her legs. She stopped taking the medication 2 weeks ago and her muscle aches have resolved. Patient also reports right flank pain a few days ago which has now resolved, no urinary frequency or urgency, no pain with urination, no hematuria, no injury, no heavy lifting, no skin rash, no fever or chills.         The following portions of the patient's history were reviewed and updated as appropriate: allergies, current medications, past family history, past medical history, past social history, past surgical history and problem list.    Past Medical History:   Diagnosis Date    Anxiety     Arthritis     Back pain     Balance problems     Chest pain     heaviness    Colon polyp     Difficulty swallowing     Dizziness     Dry cough     Gait disorder     GERD (gastroesophageal reflux disease)     Hyperparathyroidism (HCC)     Hypertension     Increased frequency of headaches     Migraines     Neck pain     Numbness and tingling      bles    Palpitations     Pericarditis     Thyroid disease     nodule    Trouble in sleeping     Wears glasses      Past Surgical History:   Procedure Laterality Date    APPENDECTOMY      CHOLECYSTECTOMY      laparoscopic    COLONOSCOPY      KNEE SURGERY Left     PERICARDIAL WINDOW      WV OPTX DSTL RADL X-ARTIC FX/EPIPHYSL SEP Right 03/22/2018    Procedure: OPEN REDUCTION W/ INTERNAL FIXATION (ORIF) RADIUS, splint application;  Surgeon: Kandice Kimble MD;  Location: [facility];  Service: Orthopedics    WV PARATHYROIDECTOMY/EXPLORATION PARATHYROIDS N/A 08/17/2022    Procedure: PARATHYROIDECTOMY, 4 GLAND EXPLORATION;  Surgeon: Ovidio Perkins MD;  Location: [facility];  Service: ENT    SKIN BIOPSY      UPPER GASTROINTESTINAL ENDOSCOPY       Family History   Problem Relation Age of Onset    Kidney failure Mother     Hypertension Mother     Lung cancer Father     Yosef Parkinson White syndrome Daughter     No Known Problems Sister     Substance Abuse Neg Hx     Alcohol abuse Neg Hx     Mental illness Neg Hx      Social History     Socioeconomic History    Marital status: /Civil Union     Spouse name: Not on file    Number of children: Not on file    Years of education: Not on file    Highest education level: Not on file   Occupational History    Not on file   Tobacco Use    Smoking status: Never    Smokeless tobacco: Never   Vaping Use    Vaping status: Never Used   Substance and Sexual Activity    Alcohol use: Not Currently     Comment: Rarely     Drug use: No    Sexual activity: Yes     Partners: Male   Other Topics Concern    Not on file   Social History Narrative    WORK:    1.   ([employer]; [employer]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    2.  [employer]        HOBBIES:    1.  Singing    2.  Dancing    3.  Hx of ceramics (+ dust)        PETS:    1.  Dogs    -  Denies birds        TRAVEL:    -  [place]        EXPOSURES:    Denies mold; down pillows/comforters; hot tubs     Social Determinants  of Health     Financial Resource Strain: Low Risk  (10/30/2023)    Overall Financial Resource Strain (CARDIA)     Difficulty of Paying Living Expenses: Not very hard   Food Insecurity: No Food Insecurity (8/14/2024)    Hunger Vital Sign     Worried About Running Out of Food in the Last Year: Never true     Ran Out of Food in the Last Year: Never true   Transportation Needs: No Transportation Needs (8/14/2024)    PRAPARE - Transportation     Lack of Transportation (Medical): No     Lack of Transportation (Non-Medical): No   Physical Activity: Not on file   Stress: Not on file   Social Connections: Not on file   Intimate Partner Violence: Not on file   Housing Stability: Low Risk  (8/14/2024)    Housing Stability Vital Sign     Unable to Pay for Housing in the Last Year: No     Number of Times Moved in the Last Year: 0     Homeless in the Last Year: No       Current Outpatient Medications:     acetaminophen (TYLENOL) 500 mg tablet, Take 500-1,000 mg by mouth every 6 (six) hours as needed for mild pain, Disp: , Rfl:     albuterol (PROVENTIL HFA,VENTOLIN HFA) 90 mcg/act inhaler, Inhale 2 puffs every 6 (six) hours as needed for wheezing or shortness of breath, Disp: 8 g, Rfl: 1    ALPRAZolam (XANAX) 0.5 mg tablet, Take 1 tablet (0.5 mg total) by mouth daily at bedtime as needed for anxiety or sleep, Disp: 20 tablet, Rfl: 1    ascorbic acid (VITAMIN C) 500 mg tablet, Take 500 mg by mouth daily, Disp: , Rfl:     aspirin (Aspirin 81) 81 mg EC tablet, Take 1 tablet (81 mg total) by mouth daily, Disp: , Rfl:     atorvastatin (LIPITOR) 10 mg tablet, TAKE 1 TABLET (10 MG TOTAL) BY MOUTH DAILY AFTER DINNER, Disp: 90 tablet, Rfl: 1    b complex vitamins tablet, Take 1 tablet by mouth daily, Disp: , Rfl:     Cholecalciferol 50 MCG (2000 UT) CAPS, Take 3,000 Units by mouth daily, Disp: , Rfl:     Magnesium 300 MG CAPS, Take 300 mg by mouth in the morning, Disp: , Rfl:     meclizine (ANTIVERT) 12.5 MG tablet, Can take 1-2 tabs as  "needed up to BID for dizziness or vertigo, caution as can be sedating, Disp: 30 tablet, Rfl: 11    metoprolol succinate (TOPROL-XL) 25 mg 24 hr tablet, TAKE 1 TABLET BY MOUTH DAILY AT BEDTIME, Disp: 90 tablet, Rfl: 3    Multiple Vitamins-Minerals (MULTIVITAMIN ADULTS 50+ PO), Take 1 tablet by mouth in the morning, Disp: , Rfl:     Nutritional Supplements (OSAPLEX MK-7 PO), Take 1 tablet by mouth in the morning, Disp: , Rfl:     omeprazole (PriLOSEC) 20 mg delayed release capsule, Take 1 capsule (20 mg total) by mouth daily before breakfast Half an hour prior to breakfast and half an hour prior to dinner, Disp: 180 capsule, Rfl: 3    Riboflavin (VITAMIN B-2 PO), Take 250 mg by mouth in the morning, Disp: , Rfl:     Review of Systems   Constitutional:  Negative for appetite change, chills, fever and unexpected weight change.   Respiratory:  Negative for cough, shortness of breath and wheezing.    Cardiovascular:  Negative for chest pain, palpitations and leg swelling.   Gastrointestinal:  Negative for abdominal pain, blood in stool, diarrhea, nausea and vomiting.   Genitourinary:  Negative for difficulty urinating, dysuria, frequency, hematuria and urgency.        As per HPI   Skin:  Negative for rash.   Neurological:  Negative for dizziness, syncope, weakness, numbness and headaches.   Hematological:  Negative for adenopathy.   Psychiatric/Behavioral:  Negative for dysphoric mood and sleep disturbance. The patient is nervous/anxious.              Objective:    Vitals:    10/01/24 1042   BP: 132/82   Pulse: 76   Resp: 15   Temp: 97.9 °F (36.6 °C)   SpO2: 95%   Weight: 76.8 kg (169 lb 6.4 oz)   Height: 5' 8\" (1.727 m)        Physical Exam  Constitutional:       General: She is not in acute distress.     Appearance: She is well-developed.   Neck:      Thyroid: No thyroid mass or thyromegaly.   Cardiovascular:      Rate and Rhythm: Normal rate and regular rhythm.      Heart sounds: Normal heart sounds. No murmur " heard.  Pulmonary:      Effort: Pulmonary effort is normal. No respiratory distress.      Breath sounds: Normal breath sounds. No wheezing, rhonchi or rales.   Abdominal:      General: There is no distension.      Palpations: Abdomen is soft. There is no mass.      Tenderness: There is no abdominal tenderness. There is no right CVA tenderness, left CVA tenderness, guarding or rebound.   Musculoskeletal:      Cervical back: Neck supple.      Right lower leg: No edema.      Left lower leg: No edema.   Lymphadenopathy:      Cervical: No cervical adenopathy.   Skin:     Findings: No rash.   Neurological:      Mental Status: She is alert and oriented to person, place, and time.   Psychiatric:         Mood and Affect: Mood normal.         Behavior: Behavior normal.

## 2024-10-02 LAB — BACTERIA UR CULT: NORMAL

## 2024-10-03 ENCOUNTER — NURSE TRIAGE (OUTPATIENT)
Age: 79
End: 2024-10-03

## 2024-10-03 DIAGNOSIS — R10.10 PAIN OF UPPER ABDOMEN: Primary | ICD-10-CM

## 2024-10-03 NOTE — TELEPHONE ENCOUNTER
"LOV 3/5/24 [facility] Simms (GERD, dysphagia, IBS-D), Procedure EGD/Colon 8/11/23, Labs 8/15/24 (UA/culture 10/1/24), Imaging us abd 7/17/24    Patient is noting pain right abdomen, started two weeks ago with back pain radiating to front, kidney issues ruled out. Patient feels could be pancreatic issue and very concerned. She is currently scheduled for appointment 11/14/24 but states she was told by Dr. Jaiyeola in the past if she called in with symptoms she dolores be fit in to schedule. Patient notes change in stool pattern with loose stool not formed \"wiring\" and stomach pain. She has been eating/hydrating. I advised clear liquid diet for now to see if symptoms improve and then start bland diet. She does have some medications she has to take with foods and I reviewed that is fine. Patient is taking all medications as ordered.  I reviewed to report to ED for severe abdominal pain.    Please review/advise on earlier/urgent appointment or overbooking or if testing should be completed at this time.     Reason for Disposition   Patient wants to be seen    Answer Assessment - Initial Assessment Questions  1. LOCATION: \"Where does it hurt?\"       Right upper   2. RADIATION: \"Does the pain shoot anywhere else?\" (e.g., chest, back)      From back to right front  3. ONSET: \"When did the pain begin?\" (e.g., minutes, hours or days ago)       Started two weeks ago  4. SUDDEN: \"Gradual or sudden onset?\"      Gradually worsening  5. PATTERN \"Does the pain come and go, or is it constant?\"     - If constant: \"Is it getting better, staying the same, or worsening?\"       (Note: Constant means the pain never goes away completely; most serious pain is constant and it progresses)      - If intermittent: \"How long does it last?\" \"Do you have pain now?\"      (Note: Intermittent means the pain goes away completely between bouts)      Constant   6. SEVERITY: \"How bad is the pain?\"  (e.g., Scale 1-10; mild, moderate, or severe)    - MILD (1-3): " "doesn't interfere with normal activities, abdomen soft and not tender to touch     - MODERATE (4-7): interferes with normal activities or awakens from sleep, tender to touch     - SEVERE (8-10): excruciating pain, doubled over, unable to do any normal activities       Moderate painful to touch   7. RECURRENT SYMPTOM: \"Have you ever had this type of stomach pain before?\" If Yes, ask: \"When was the last time?\" and \"What happened that time?\"       No patient states this pain is new  8. CAUSE: \"What do you think is causing the stomach pain?\"      Thought it could be kidney but it is not  9. RELIEVING/AGGRAVATING FACTORS: \"What makes it better or worse?\" (e.g., movement, antacids, bowel movement)      denies  10. OTHER SYMPTOMS: \"Has there been any vomiting, diarrhea, constipation, or urine problems?\"        Loose stool not formed \"wiring\"  11. PREGNANCY: \"Is there any chance you are pregnant?\" \"When was your last menstrual period?\"        N/A    Protocols used: Abdominal Pain - Female-ADULT-[place]    "

## 2024-10-03 NOTE — TELEPHONE ENCOUNTER
Regarding: symptoms  ----- Message from Kaylynn NOVAK sent at 10/3/2024  9:52 AM EDT -----  Patient called in to schedule a sooner appt with Dr. Jaiyeola at the [facility] due to pain on right side of abdomen. Offered [facility] Simms on 10/15/24 pt stated there is a conflict with appts on that day.

## 2024-10-04 ENCOUNTER — TELEPHONE (OUTPATIENT)
Dept: FAMILY MEDICINE CLINIC | Facility: CLINIC | Age: 79
End: 2024-10-04

## 2024-10-04 DIAGNOSIS — K21.9 GASTROESOPHAGEAL REFLUX DISEASE WITHOUT ESOPHAGITIS: ICD-10-CM

## 2024-10-04 NOTE — TELEPHONE ENCOUNTER
----- Message from Cecelia Rodriguez MD sent at 10/4/2024  9:17 AM EDT -----  Please let patient know that her urine test results were normal.

## 2024-10-15 ENCOUNTER — OFFICE VISIT (OUTPATIENT)
Dept: PULMONOLOGY | Facility: CLINIC | Age: 79
End: 2024-10-15
Payer: COMMERCIAL

## 2024-10-15 VITALS
WEIGHT: 169 LBS | HEART RATE: 70 BPM | SYSTOLIC BLOOD PRESSURE: 132 MMHG | OXYGEN SATURATION: 95 % | TEMPERATURE: 97.3 F | DIASTOLIC BLOOD PRESSURE: 80 MMHG | BODY MASS INDEX: 25.61 KG/M2 | HEIGHT: 68 IN

## 2024-10-15 DIAGNOSIS — J84.9 ILD (INTERSTITIAL LUNG DISEASE) (HCC): Primary | ICD-10-CM

## 2024-10-15 DIAGNOSIS — J45.20 MILD INTERMITTENT ASTHMA WITHOUT COMPLICATION: ICD-10-CM

## 2024-10-15 PROCEDURE — G2211 COMPLEX E/M VISIT ADD ON: HCPCS | Performed by: INTERNAL MEDICINE

## 2024-10-15 PROCEDURE — 99213 OFFICE O/P EST LOW 20 MIN: CPT | Performed by: INTERNAL MEDICINE

## 2024-10-15 NOTE — PROGRESS NOTES
Progress Note - Pulmonary   Nella Yancey 79 y.o. female MRN: [id number]   Encounter: [id number]      Assessment/Plan:  Patient is a 79-year-old female past medical history seen for ILD, reactive airway disease who presents for routine follow-up.  Overall, the patient has done quite well since last visit.  She has no significant complaints.  She notes her breathing and functional status are otherwise stable.    Interstitial Lung Disease  -  No acute indication from repeat imaging  -  repeat spirometry/DLCO q6 month  -  No acute indication for steroids or antifibrotics     Reactive Airway Disease  -  Noted improvement from albuterol  -  may continue to use PRN  -  May use on as needed basis     Balance issues  -  consider physical therapy      Thyroid nodule  -  no plans to undergo surgery     1. [facility]) (HCC)  -     Spirometry with diffusing capacity; Future; Expected date: 01/15/2025  2. Mild intermittent asthma without complication      Patient may follow up in 6 months or sooner as necessary.     Orders:  Orders Placed This Encounter   Procedures    Spirometry with diffusing capacity     Patient Instructions:    No breathing medications, inhalers or antihistamines 24 hours prior to test time. To schedule this appointment or change existing appointment , please contact Central Scheduling at [phone].     Standing Status:   Future     Standing Expiration Date:   10/15/2025     Order Specific Question:   Would you like to add MVV, MIP, MEP into this order?     Answer:   No       Subjective:   The patient notes that she is doing relatively well. She had a mild degree of heaviness in her chest. She used her albuterol inhaler which relieved the pain. She has no current chest pain or chest heaviness.  She has noted some pain in her shoulders but this is more likely related to muscles as she is putting more pressure after using a walking stick.      The patient notes some balance issues.   "She is interested in trying physical therapy.      The patient was in the hospital with stroke like symptoms.      Inhaler Regimen:  Albuterol MDI    Remainder of review of systems negative except as described in HPI.      The following portions of the patient's history were reviewed and updated as appropriate: allergies, current medications, past family history, past medical history, past social history, past surgical history and problem list.     Objective:   Vitals: Blood pressure 132/80, pulse 70, temperature (!) 97.3 °F (36.3 °C), temperature source Tympanic, height 5' 8\" (1.727 m), weight 76.7 kg (169 lb), SpO2 95%, not currently breastfeeding., RA, Body mass index is 25.7 kg/m².    Physical Exam  Gen: Pleasant, awake, alert, oriented x 3, no acute distress  HEENT: Mucous membranes moist, no oral lesions, no thrush  NECK: No accessory muscle use, JVP not elevated  Cardiac: RRR, single S1, single S2, no murmurs, no rubs, no gallops  Lungs: slight crackles at bases  Abdomen: normoactive bowel sounds, soft nontender, nondistended, no rebound or rigidity, no guarding  Extremities: no cyanosis, no clubbing, no LE edema  MSK:  Strength equal in all extremities  Derm:  No rashes/lesions noted  Neuro:  Appropriate mood/affect    Labs: I have personally reviewed pertinent lab results.  Lab Results   Component Value Date    WBC 5.38 08/15/2024    HGB 14.8 08/15/2024     08/15/2024     Lab Results   Component Value Date    CREATININE 0.76 08/15/2024        Imaging and other studies: Results Review Statement: I reviewed radiology reports from this admission including: chest xray.  CXR 8/14/2024  Radiology findings:  Scar and/or atelectasis at the left lung base. No pneumothorax or pleural effusion.  Normal cardiomediastinal silhouette.  Bones are unremarkable for age.  Normal upper abdomen.    Pulmonary Function Testing: Results Review Statement: I reviewed radiology reports from this admission including: PFT.      "                     FEV1               FVC                 FEV1/FVC       DLCOcor  10/7/2019        1.70 69%         2.17 68%         78%                 58%  3/3/2021          1.59 66%         2.02 64%         79%                 58%  3/17/2022        1.46 62%         1.89 60%         78%                 55%  10/17/2022      1.26 53%         1.62 52%         78%                 52%      2/15/2023        1.49 65%         1.90 63%         79%                 67%  11/14/2023      1.48 65%          1.95 65%          76%               61%    6/20/2024 1.50 67%  1.96 66%   77%    58%    Ifeanyi Adler MD  [facility]

## 2024-10-16 PROBLEM — J45.20 MILD INTERMITTENT ASTHMA WITHOUT COMPLICATION: Status: ACTIVE | Noted: 2024-10-16

## 2024-10-21 NOTE — ASSESSMENT & PLAN NOTE
New evaluation of neck pain that radiates to the shoulders  Pain is intermittent, can also cause headaches. It extends out to the shoulders equally, usually does not extend into the arm or hand.  No issues with fine motor skills or BBI.  No recent PT. takes Tylenol or uses IcyHot for pain.  No EDIN    Imaging:  MRI cervical spine 10/1/2024 (open): Cervical kyphosis and mild alignment abnormalities.  Central neuroforaminal narrowing C5-6 and C6-7.    Plan:  Reviewed imaging with patient, , and Dr. rBuce.  She has degenerative changes in her cervical spine with kyphosis as well as spondylolisthesis.  There is some foraminal stenosis at C5-6 and C6-7.  Would not expect these areas of foraminal narrowing to be contributing to her neck pain.  Reviewed spinal anatomy and dermatomes.  Unless she was having pain that was worsening despite conservative measures or focal neurologic deficits, surgery would not be indicated.  She does not feel that her symptoms are severe enough to warrant prescription medications on a regular basis or injections.    If her pain worsens or she develops radiating pain to her arms, weakness, fine motor issues, etc. advised to let us know when she can be seen for reevaluation.  Also discussed recent CTA and her vertebral arteries.  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  No intervention recommended from this perspective.  Follow-up as needed.  Call any questions or concerns.

## 2024-10-24 ENCOUNTER — OFFICE VISIT (OUTPATIENT)
Dept: NEUROSURGERY | Facility: CLINIC | Age: 79
End: 2024-10-24
Payer: COMMERCIAL

## 2024-10-24 VITALS
WEIGHT: 170 LBS | RESPIRATION RATE: 16 BRPM | TEMPERATURE: 97.6 F | DIASTOLIC BLOOD PRESSURE: 78 MMHG | OXYGEN SATURATION: 97 % | HEIGHT: 68 IN | BODY MASS INDEX: 25.76 KG/M2 | SYSTOLIC BLOOD PRESSURE: 136 MMHG | HEART RATE: 66 BPM

## 2024-10-24 DIAGNOSIS — M54.2 CERVICALGIA: Primary | ICD-10-CM

## 2024-10-24 DIAGNOSIS — M54.12 CERVICAL RADICULOPATHY AT C5: ICD-10-CM

## 2024-10-24 PROCEDURE — 99204 OFFICE O/P NEW MOD 45 MIN: CPT | Performed by: PHYSICIAN ASSISTANT

## 2024-10-24 NOTE — PROGRESS NOTES
Ambulatory Visit  Name: Nella Yancey      : 1945      MRN: [id number]  Encounter Provider: John Diane MD  Encounter Date: 10/24/2024   Enc[facility]    Assessment & Plan  Cervicalgia  New evaluation of neck pain that radiates to the shoulders  Pain is intermittent, can also cause headaches. It extends out to the shoulders equally, usually does not extend into the arm or hand.  No issues with fine motor skills or BBI.  No recent PT. takes Tylenol or uses IcyHot for pain.  No EDIN    Imaging:  MRI cervical spine 10/1/2024 (open): Cervical kyphosis and mild alignment abnormalities.  Central neuroforaminal narrowing C5-6 and C6-7.    Plan:  Reviewed imaging with patient, , and Dr. Bruce.  She has degenerative changes in her cervical spine with kyphosis as well as spondylolisthesis.  There is some foraminal stenosis at C5-6 and C6-7.  Would not expect these areas of foraminal narrowing to be contributing to her neck pain.  Reviewed spinal anatomy and dermatomes.  Unless she was having pain that was worsening despite conservative measures or focal neurologic deficits, surgery would not be indicated.  She does not feel that her symptoms are severe enough to warrant prescription medications on a regular basis or injections.    If her pain worsens or she develops radiating pain to her arms, weakness, fine motor issues, etc. advised to let us know when she can be seen for reevaluation.  Also discussed recent CTA and her vertebral arteries.  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  No intervention recommended from this perspective.  Follow-up as needed.  Call any questions or concerns.       Cervical radiculopathy at C5    Orders:    Ambulatory referral to Neurosurgery      History of Present Illness   79 year old female seen for evaluation of neck pain. She  feels that it causes headaches as well. The pain can be as bad as 8/10, sometimes is 0/10. She has to sleep in the correct position to help decrease the pain. The pain is bilaterally in the neck to shoulders, both sides bother her equally. Sometimes it goes to the proximal lateral arms. It does not radiate into the arms further or hands. She has stiffness in her fingers and numbness at times. Mentions that a few weeks ago she woke up from sleep and the left arm was completely numb. These symptoms resolved. Right handed. No fine motor issues. She has difficulty with balance, but also deals with vertigo. Uses walking sticks for distances outside.  No PT, takes Tylenol or uses IcyHot for pain if needed.  No injections.  Patient was concerned about possible blockages in her neck.  Reviewed that she does have some narrowing in the left vertebral artery, but the right vertebral is patent and dominant.      Review of Systems   HENT:  Negative for trouble swallowing.    Genitourinary:  Positive for frequency.        UA leaking   Musculoskeletal:  Positive for gait problem (uses walking sticks, no recent falls) and neck pain (intermittent neck pain, occasional arm pain).   Neurological:  Positive for dizziness (vertigo), weakness (arms), numbness (N/T intermittent in hands and legs, pretty constant in toes) and headaches.   Hematological:  Does not bruise/bleed easily.        ASA     I have personally reviewed the MA's review of systems and made changes as necessary.    Past Medical History   Past Medical History:   Diagnosis Date    Anxiety     Arthritis     Back pain     Balance problems     Chest pain     heaviness    Colon polyp     Difficulty swallowing     Dizziness     Dry cough     Gait disorder     GERD (gastroesophageal reflux disease)     Hyperparathyroidism (HCC)     Hypertension     Increased frequency of headaches     Migraines     Neck pain     Numbness and tingling     bles    Palpitations     Pericarditis      Thyroid disease     nodule    Trouble in sleeping     Wears glasses      Past Surgical History:   Procedure Laterality Date    APPENDECTOMY      CHOLECYSTECTOMY      laparoscopic    COLONOSCOPY      KNEE SURGERY Left     PERICARDIAL WINDOW      MO OPTX DSTL RADL X-ARTIC FX/EPIPHYSL SEP Right 03/22/2018    Procedure: OPEN REDUCTION W/ INTERNAL FIXATION (ORIF) RADIUS, splint application;  Surgeon: Kandice Kimble MD;  Location: [facility];  Service: Orthopedics    MO PARATHYROIDECTOMY/EXPLORATION PARATHYROIDS N/A 08/17/2022    Procedure: PARATHYROIDECTOMY, 4 GLAND EXPLORATION;  Surgeon: Ovidio Perkins MD;  Location: [facility];  Service: ENT    SKIN BIOPSY      UPPER GASTROINTESTINAL ENDOSCOPY       Family History   Problem Relation Age of Onset    Kidney failure Mother     Hypertension Mother     Lung cancer Father     Yosef Parkinson White syndrome Daughter     No Known Problems Sister     Substance Abuse Neg Hx     Alcohol abuse Neg Hx     Mental illness Neg Hx      Current Outpatient Medications on File Prior to Visit   Medication Sig Dispense Refill    acetaminophen (TYLENOL) 500 mg tablet Take 500-1,000 mg by mouth every 6 (six) hours as needed for mild pain      albuterol (PROVENTIL HFA,VENTOLIN HFA) 90 mcg/act inhaler Inhale 2 puffs every 6 (six) hours as needed for wheezing or shortness of breath 8 g 1    ALPRAZolam (XANAX) 0.5 mg tablet Take 1 tablet (0.5 mg total) by mouth daily at bedtime as needed for anxiety or sleep 20 tablet 1    ascorbic acid (VITAMIN C) 500 mg tablet Take 500 mg by mouth daily      aspirin (Aspirin 81) 81 mg EC tablet Take 1 tablet (81 mg total) by mouth daily      atorvastatin (LIPITOR) 10 mg tablet Take 0.5 tablets (5 mg total) by mouth daily after dinner 30 tablet 0    b complex vitamins tablet Take 1 tablet by mouth daily      Cholecalciferol 50 MCG (2000 UT) CAPS Take 3,000 Units by mouth daily      Magnesium 300 MG CAPS Take 300 mg by mouth in the morning       "meclizine (ANTIVERT) 12.5 MG tablet Can take 1-2 tabs as needed up to BID for dizziness or vertigo, caution as can be sedating 30 tablet 11    metoprolol succinate (TOPROL-XL) 25 mg 24 hr tablet TAKE 1 TABLET BY MOUTH DAILY AT BEDTIME 90 tablet 3    Multiple Vitamins-Minerals (MULTIVITAMIN ADULTS 50+ PO) Take 1 tablet by mouth in the morning      Nutritional Supplements (OSAPLEX MK-7 PO) Take 1 tablet by mouth in the morning      omeprazole (PriLOSEC) 20 mg delayed release capsule TAKE 1 CAPSULE BY MOUTH HALF AN HOUR PRIOR TO BREAKFAST AND DINNER 180 capsule 1    Riboflavin (VITAMIN B-2 PO) Take 250 mg by mouth in the morning       No current facility-administered medications on file prior to visit.     Allergies   Allergen Reactions    Ciprofloxacin Myalgia      Social History     Tobacco Use    Smoking status: Never    Smokeless tobacco: Never   Vaping Use    Vaping status: Never Used   Substance and Sexual Activity    Alcohol use: Not Currently     Comment: Rarely     Drug use: No    Sexual activity: Yes     Partners: Male     Objective     /78 (BP Location: Right arm, Patient Position: Sitting, Cuff Size: Standard)   Pulse 66   Temp 97.6 °F (36.4 °C) (Temporal)   Resp 16   Ht 5' 8\" (1.727 m)   Wt 77.1 kg (170 lb)   SpO2 97%   BMI 25.85 kg/m²     Physical Exam  Vitals reviewed.   Constitutional:       General: She is awake.      Appearance: Normal appearance.   HENT:      Head: Normocephalic and atraumatic.   Eyes:      Conjunctiva/sclera: Conjunctivae normal.   Neck:      Comments: No midline TTP  Discomfort with ROM  Cardiovascular:      Rate and Rhythm: Normal rate.   Pulmonary:      Effort: Pulmonary effort is normal.   Skin:     General: Skin is warm and dry.   Neurological:      Mental Status: She is alert and oriented to person, place, and time.      Coordination: Finger-Nose-Finger Test normal.      Gait: Gait is intact.      Deep Tendon Reflexes:      Reflex Scores:       Bicep reflexes are " 2+ on the right side and 2+ on the left side.       Brachioradialis reflexes are 2+ on the right side and 2+ on the left side.       Patellar reflexes are 2+ on the right side and 2+ on the left side.  Psychiatric:         Attention and Perception: Attention and perception normal.         Mood and Affect: Mood and affect normal.         Speech: Speech normal.         Behavior: Behavior normal. Behavior is cooperative.         Thought Content: Thought content normal.         Cognition and Memory: Cognition and memory normal.         Judgment: Judgment normal.       Neurologic Exam     Mental Status   Oriented to person, place, and time.   Follows 2 step commands.   Attention: normal. Concentration: normal.   Speech: speech is normal   Level of consciousness: alert  Knowledge: good.   Normal comprehension.     Motor Exam   Muscle bulk: normal  Overall muscle tone: normal  Right arm pronator drift: absent  Left arm pronator drift: absent  BUE 5/5  BLE HF/KE 4+/5, otherwise 5/5     Sensory Exam   Light touch normal.     Gait, Coordination, and Reflexes     Gait  Gait: normal    Coordination   Finger to nose coordination: normal    Tremor   Resting tremor: absent  Intention tremor: absent  Action tremor: absent    Reflexes   Right brachioradialis: 2+  Left brachioradialis: 2+  Right biceps: 2+  Left biceps: 2+  Right patellar: 2+  Left patellar: 2+  Right Howard: absent  Left Howard: absent  Right ankle clonus: absent  Left ankle clonus: absent  Intact rapid finger movements       I personally reviewed the following image studies in PACS and associated radiology reports: MRI, CTA. My interpretation of the radiology images/reports is: as above.    Administrative Statements   I have spent a total time of 40 minutes in caring for this patient on the day of the visit/encounter including Diagnostic results, Risks and benefits of tx options, Instructions for management, Patient and family education, Impressions, Counseling /  Coordination of care, Documenting in the medical record, Reviewing / ordering tests, medicine, procedures  , Obtaining or reviewing history  , and Communicating with other healthcare professionals .

## 2024-10-25 ENCOUNTER — OFFICE VISIT (OUTPATIENT)
Dept: CARDIOLOGY CLINIC | Facility: CLINIC | Age: 79
End: 2024-10-25
Payer: COMMERCIAL

## 2024-10-25 VITALS
SYSTOLIC BLOOD PRESSURE: 134 MMHG | HEIGHT: 68 IN | HEART RATE: 69 BPM | WEIGHT: 170 LBS | DIASTOLIC BLOOD PRESSURE: 90 MMHG | BODY MASS INDEX: 25.76 KG/M2

## 2024-10-25 DIAGNOSIS — I10 PRIMARY HYPERTENSION: ICD-10-CM

## 2024-10-25 DIAGNOSIS — E78.2 MIXED HYPERLIPIDEMIA: Primary | ICD-10-CM

## 2024-10-25 DIAGNOSIS — R07.89 ATYPICAL CHEST PAIN: ICD-10-CM

## 2024-10-25 PROCEDURE — 99214 OFFICE O/P EST MOD 30 MIN: CPT | Performed by: INTERNAL MEDICINE

## 2024-10-25 NOTE — PROGRESS NOTES
Cardiology Follow Up    Nella SERRA Bc  1945  [id number]  [facility] [address] [address] [phone]    1. Mixed hyperlipidemia        2. Primary hypertension        3. Atypical chest pain            Interval History: No complaints.  Infrequently gets some chest discomfort at rest that comes and goes spontaneously within seconds.  Denies dyspnea orthopnea paroxysmal nocturnal dyspnea or syncope.    Patient Active Problem List   Diagnosis    Closed fracture distal radius and ulna, right, initial encounter    Anxiety    Gastroesophageal reflux disease with esophagitis    History of pericarditis    Heart murmur    Primary hyperparathyroidism (HCC)    Thyroid nodule    Vitamin D deficiency    Atypical chest pain    Migraine without aura and without status migrainosus, not intractable    Dizziness and giddiness    Insomnia    Cervicalgia    Abnormal CT scan of lung    Irritable bowel syndrome with diarrhea    Elevated amylase and lipase    Paresthesias    Carpal tunnel syndrome of left wrist    Dysphonia    Reflux laryngitis    Paresis of left vocal fold    Glottic insufficiency    Muscle tension dysphonia    TMJ dysfunction    Pharyngoesophageal dysphagia    Palpitation    Hallux abducto valgus, bilateral    Pincer nail deformity    Osteoporosis with current pathological fracture    Parathyroid related hypercalcemia (HCC)    Cervical myofascial pain syndrome    Edema of both ankles    Aneurysm (HCC)    Calf pain    Lung nodule    Myalgia    Pericardial effusion    SOBOE (shortness of breath on exertion)    Elevated blood-pressure reading without diagnosis of hypertension    Skin tag of anus    H/O parathyroidectomy    [facility]) (HCC)    Primary osteoarthritis of right knee    Neuropathy    Numbness and tingling of both lower extremities    Age-related osteoporosis without  current pathological fracture    Headache with neurologic deficit    Stroke-like symptoms    HTN (hypertension)    HLD (hyperlipidemia)    Mild intermittent asthma without complication     Past Medical History:   Diagnosis Date    Anxiety     Arthritis     Back pain     Balance problems     Chest pain     heaviness    Colon polyp     Difficulty swallowing     Dizziness     Dry cough     Gait disorder     GERD (gastroesophageal reflux disease)     Hyperparathyroidism (HCC)     Hypertension     Increased frequency of headaches     Migraines     Neck pain     Numbness and tingling     bles    Palpitations     Pericarditis     Thyroid disease     nodule    Trouble in sleeping     Wears glasses      Social History     Socioeconomic History    Marital status: /Civil Union     Spouse name: Not on file    Number of children: Not on file    Years of education: Not on file    Highest education level: Not on file   Occupational History    Not on file   Tobacco Use    Smoking status: Never    Smokeless tobacco: Never   Vaping Use    Vaping status: Never Used   Substance and Sexual Activity    Alcohol use: Not Currently     Comment: Rarely     Drug use: No    Sexual activity: Yes     Partners: Male   Other Topics Concern    Not on file   Social History Narrative    WORK:    1.   ([employer]; [employer]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    2.  [employer]        HOBBIES:    1.  Singing    2.  Dancing    3.  Hx of ceramics (+ dust)        PETS:    1.  Dogs    -  Denies birds        TRAVEL:    -  [place]        EXPOSURES:    Denies mold; down pillows/comforters; hot tubs     Social Determinants of Health     Financial Resource Strain: Low Risk  (10/30/2023)    Overall Financial Resource Strain (CARDIA)     Difficulty of Paying Living Expenses: Not very hard   Food Insecurity: No Food Insecurity (8/14/2024)    Nursing - Inadequate Food Risk Classification     Worried About Running Out of Food in the Last Year: Never  true     Ran Out of Food in the Last Year: Never true     Ran Out of Food in the Last Year: Not on file   Transportation Needs: No Transportation Needs (8/14/2024)    PRAPARE - Transportation     Lack of Transportation (Medical): No     Lack of Transportation (Non-Medical): No   Physical Activity: Not on file   Stress: Not on file   Social Connections: Not on file   Intimate Partner Violence: Not on file   Housing Stability: Low Risk  (8/14/2024)    Housing Stability Vital Sign     Unable to Pay for Housing in the Last Year: No     Number of Times Moved in the Last Year: 0     Homeless in the Last Year: No      Family History   Problem Relation Age of Onset    Kidney failure Mother     Hypertension Mother     Lung cancer Father     Yosef Parkinson White syndrome Daughter     No Known Problems Sister     Substance Abuse Neg Hx     Alcohol abuse Neg Hx     Mental illness Neg Hx      Past Surgical History:   Procedure Laterality Date    APPENDECTOMY      CHOLECYSTECTOMY      laparoscopic    COLONOSCOPY      KNEE SURGERY Left     PERICARDIAL WINDOW      MO OPTX DSTL RADL X-ARTIC FX/EPIPHYSL SEP Right 03/22/2018    Procedure: OPEN REDUCTION W/ INTERNAL FIXATION (ORIF) RADIUS, splint application;  Surgeon: Kandice Kimble MD;  Location: [facility];  Service: Orthopedics    MO PARATHYROIDECTOMY/EXPLORATION PARATHYROIDS N/A 08/17/2022    Procedure: PARATHYROIDECTOMY, 4 GLAND EXPLORATION;  Surgeon: Ovidio Perkins MD;  Location: [facility];  Service: ENT    SKIN BIOPSY      UPPER GASTROINTESTINAL ENDOSCOPY         Current Outpatient Medications:     acetaminophen (TYLENOL) 500 mg tablet, Take 500-1,000 mg by mouth every 6 (six) hours as needed for mild pain, Disp: , Rfl:     albuterol (PROVENTIL HFA,VENTOLIN HFA) 90 mcg/act inhaler, Inhale 2 puffs every 6 (six) hours as needed for wheezing or shortness of breath, Disp: 8 g, Rfl: 1    ALPRAZolam (XANAX) 0.5 mg tablet, Take 1 tablet (0.5 mg total) by mouth daily at  bedtime as needed for anxiety or sleep, Disp: 20 tablet, Rfl: 1    ascorbic acid (VITAMIN C) 500 mg tablet, Take 500 mg by mouth daily, Disp: , Rfl:     aspirin (Aspirin 81) 81 mg EC tablet, Take 1 tablet (81 mg total) by mouth daily, Disp: , Rfl:     atorvastatin (LIPITOR) 10 mg tablet, Take 0.5 tablets (5 mg total) by mouth daily after dinner, Disp: 30 tablet, Rfl: 0    b complex vitamins tablet, Take 1 tablet by mouth daily, Disp: , Rfl:     Cholecalciferol 50 MCG (2000 UT) CAPS, Take 3,000 Units by mouth daily, Disp: , Rfl:     Magnesium 300 MG CAPS, Take 300 mg by mouth in the morning, Disp: , Rfl:     meclizine (ANTIVERT) 12.5 MG tablet, Can take 1-2 tabs as needed up to BID for dizziness or vertigo, caution as can be sedating, Disp: 30 tablet, Rfl: 11    metoprolol succinate (TOPROL-XL) 25 mg 24 hr tablet, TAKE 1 TABLET BY MOUTH DAILY AT BEDTIME, Disp: 90 tablet, Rfl: 3    Multiple Vitamins-Minerals (MULTIVITAMIN ADULTS 50+ PO), Take 1 tablet by mouth in the morning, Disp: , Rfl:     Nutritional Supplements (OSAPLEX MK-7 PO), Take 1 tablet by mouth in the morning, Disp: , Rfl:     omeprazole (PriLOSEC) 20 mg delayed release capsule, TAKE 1 CAPSULE BY MOUTH HALF AN HOUR PRIOR TO BREAKFAST AND DINNER, Disp: 180 capsule, Rfl: 1    Riboflavin (VITAMIN B-2 PO), Take 250 mg by mouth in the morning, Disp: , Rfl:   Allergies   Allergen Reactions    Ciprofloxacin Myalgia       Labs:  Office Visit on 10/01/2024   Component Date Value    Urine Culture 10/01/2024 10,000-19,000 cfu/ml     Color, UA 10/01/2024 Light Yellow     Clarity, UA 10/01/2024 Clear     Specific Gravity, UA 10/01/2024 1.009     pH, UA 10/01/2024 6.5     Leukocytes, UA 10/01/2024 Negative     Nitrite, UA 10/01/2024 Negative     Protein, UA 10/01/2024 Negative     Glucose, UA 10/01/2024 Negative     Ketones, UA 10/01/2024 Negative     Urobilinogen, UA 10/01/2024 <2.0     Bilirubin, UA 10/01/2024 Negative     Occult Blood, UA 10/01/2024 Negative     Admission on 08/14/2024, Discharged on 08/15/2024   Component Date Value    Sodium 08/14/2024 139     Potassium 08/14/2024 4.0     Chloride 08/14/2024 104     CO2 08/14/2024 30     ANION GAP 08/14/2024 5     BUN 08/14/2024 17     Creatinine 08/14/2024 0.78     Glucose 08/14/2024 96     Calcium 08/14/2024 10.0     eGFR 08/14/2024 73     WBC 08/14/2024 5.86     RBC 08/14/2024 4.85     Hemoglobin 08/14/2024 15.1     Hematocrit 08/14/2024 45.1     MCV 08/14/2024 93     MCH 08/14/2024 31.1     MCHC 08/14/2024 33.5     RDW 08/14/2024 13.1     Platelets 08/14/2024 220     MPV 08/14/2024 10.0     Protime 08/14/2024 12.5     INR 08/14/2024 0.88     PTT 08/14/2024 33     hs TnI 0hr 08/14/2024 3     POC Glucose 08/14/2024 103     hs TnI 2hr 08/14/2024 3     Delta 2hr hsTnI 08/14/2024 0     hs TnI 4hr 08/14/2024 3     Delta 4hr hsTnI 08/14/2024 0     Hemoglobin A1C 08/14/2024 5.4     EAG 08/14/2024 108     Cholesterol 08/14/2024 232 (H)     Triglycerides 08/14/2024 91     HDL, Direct 08/14/2024 73     LDL Calculated 08/14/2024 141 (H)     Platelets 08/14/2024 212     MPV 08/14/2024 9.8     Ventricular Rate 08/14/2024 72     Atrial Rate 08/14/2024 72     ID Interval 08/14/2024 186     QRSD Interval 08/14/2024 98     QT Interval 08/14/2024 406     QTC Interval 08/14/2024 444     P Axis 08/14/2024 42     QRS Axis 08/14/2024 84     T Wave Axis 08/14/2024 33     WBC 08/15/2024 5.38     RBC 08/15/2024 4.81     Hemoglobin 08/15/2024 14.8     Hematocrit 08/15/2024 44.1     MCV 08/15/2024 92     MCH 08/15/2024 30.8     MCHC 08/15/2024 33.6     RDW 08/15/2024 12.9     MPV 08/15/2024 10.4     Platelets 08/15/2024 211     nRBC 08/15/2024 0     Segmented % 08/15/2024 46     Immature Grans % 08/15/2024 1     Lymphocytes % 08/15/2024 39     Monocytes % 08/15/2024 11     Eosinophils Relative 08/15/2024 3     Basophils Relative 08/15/2024 0     Absolute Neutrophils 08/15/2024 2.49     Absolute Immature Grans 08/15/2024 0.03     Absolute  Lymphocytes 08/15/2024 2.08     Absolute Monocytes 08/15/2024 0.59     Eosinophils Absolute 08/15/2024 0.17     Basophils Absolute 08/15/2024 0.02     Sodium 08/15/2024 139     Potassium 08/15/2024 3.9     Chloride 08/15/2024 107     CO2 08/15/2024 26     ANION GAP 08/15/2024 6     BUN 08/15/2024 16     Creatinine 08/15/2024 0.76     Glucose 08/15/2024 92     Calcium 08/15/2024 9.8     AST 08/15/2024 21     ALT 08/15/2024 21     Alkaline Phosphatase 08/15/2024 60     Total Protein 08/15/2024 6.8     Albumin 08/15/2024 3.8     Total Bilirubin 08/15/2024 1.08 (H)     eGFR 08/15/2024 75     Cholesterol 08/15/2024 216 (H)     Triglycerides 08/15/2024 102     HDL, Direct 08/15/2024 66     LDL Calculated 08/15/2024 130 (H)    Appointment on 07/12/2024   Component Date Value    Sodium 07/12/2024 140     Potassium 07/12/2024 4.6     Chloride 07/12/2024 104     CO2 07/12/2024 28     ANION GAP 07/12/2024 8     BUN 07/12/2024 21     Creatinine 07/12/2024 0.79     Glucose, Fasting 07/12/2024 86     Calcium 07/12/2024 9.9     AST 07/12/2024 22     ALT 07/12/2024 21     Alkaline Phosphatase 07/12/2024 64     Total Protein 07/12/2024 6.9     Albumin 07/12/2024 3.8     Total Bilirubin 07/12/2024 0.68     eGFR 07/12/2024 72     WBC 07/12/2024 5.71     RBC 07/12/2024 4.75     Hemoglobin 07/12/2024 14.5     Hematocrit 07/12/2024 44.2     MCV 07/12/2024 93     MCH 07/12/2024 30.5     MCHC 07/12/2024 32.8     RDW 07/12/2024 13.3     MPV 07/12/2024 10.8     Platelets 07/12/2024 217     nRBC 07/12/2024 0     Segmented % 07/12/2024 48     Immature Grans % 07/12/2024 1     Lymphocytes % 07/12/2024 35     Monocytes % 07/12/2024 12     Eosinophils Relative 07/12/2024 4     Basophils Relative 07/12/2024 0     Absolute Neutrophils 07/12/2024 2.83     Absolute Immature Grans 07/12/2024 0.03     Absolute Lymphocytes 07/12/2024 1.97     Absolute Monocytes 07/12/2024 0.66     Eosinophils Absolute 07/12/2024 0.20     Basophils Absolute 07/12/2024  0.02     Color, UA 07/12/2024 Yellow     Clarity, UA 07/12/2024 Clear     Specific Gravity, UA 07/12/2024 1.012     pH, UA 07/12/2024 7.0     Leukocytes, UA 07/12/2024 Negative     Nitrite, UA 07/12/2024 Negative     Protein, UA 07/12/2024 Negative     Glucose, UA 07/12/2024 Negative     Ketones, UA 07/12/2024 Negative     Urobilinogen, UA 07/12/2024 <2.0     Bilirubin, UA 07/12/2024 Negative     Occult Blood, UA 07/12/2024 Negative     Urine Culture 07/12/2024 No Growth <1000 cfu/mL     Lipase 07/12/2024 158 (H)    Lab on 07/03/2024   Component Date Value    TSH 3RD GENERATON 07/03/2024 1.606     Free T4 07/03/2024 0.88    Office Visit on 05/17/2024   Component Date Value    POCT SARS-CoV-2 Ag 05/17/2024 Negative     VALID CONTROL 05/17/2024 Valid      Imaging: MRI cervical spine wo contrast    Result Date: 10/1/2024  Narrative: [id number]      Review of Systems:  Review of Systems   Constitutional:  Negative for activity change and fatigue.   HENT:  Negative for facial swelling.    Eyes:  Negative for visual disturbance.   Respiratory:  Negative for chest tightness and shortness of breath.    Cardiovascular:  Positive for palpitations. Negative for chest pain and leg swelling.   Gastrointestinal:  Negative for nausea and vomiting.   Musculoskeletal:  Negative for myalgias.   Skin:  Negative for pallor.   Allergic/Immunologic: Negative for immunocompromised state.   Neurological:  Negative for dizziness, syncope and light-headedness.   Psychiatric/Behavioral:  Negative for agitation and confusion. The patient is not nervous/anxious.        Physical Exam:  Physical Exam  Vitals and nursing note reviewed.   Constitutional:       Appearance: Normal appearance.   HENT:      Head: Normocephalic and atraumatic.      Nose: Nose normal.      Mouth/Throat:      Mouth: Mucous membranes are moist.   Eyes:      Conjunctiva/sclera: Conjunctivae normal.   Cardiovascular:       Rate and Rhythm: Normal rate and regular rhythm.   Pulmonary:      Effort: Pulmonary effort is normal.      Breath sounds: Normal breath sounds.   Abdominal:      Palpations: Abdomen is soft.   Musculoskeletal:         General: Normal range of motion.      Cervical back: Normal range of motion.   Skin:     General: Skin is warm and dry.   Neurological:      General: No focal deficit present.      Mental Status: She is alert and oriented to person, place, and time.   Psychiatric:         Mood and Affect: Mood normal.         Discussion/Summary: Nella has a history of atypical chest pain.  Nuclear stress testing recently showed no evidence of ischemia.  She was recently placed on atorvastatin 10 mg daily for her hyperlipidemia.  She told me that it did not agree with her and it was reduced to 5 mg daily.  If LDL is not at goal consider addition of Zetia.  Blood pressure sure 134/90 today metoprolol this will continue to be monitored and she has a follow-up with her PCP next month.  I made no changes I will see her again in 1 year.

## 2024-11-12 ENCOUNTER — APPOINTMENT (OUTPATIENT)
Dept: LAB | Facility: CLINIC | Age: 79
End: 2024-11-12
Payer: COMMERCIAL

## 2024-11-12 ENCOUNTER — RA CDI HCC (OUTPATIENT)
Dept: OTHER | Facility: HOSPITAL | Age: 79
End: 2024-11-12

## 2024-11-12 DIAGNOSIS — R10.10 PAIN OF UPPER ABDOMEN: ICD-10-CM

## 2024-11-12 DIAGNOSIS — R10.10 UPPER ABDOMINAL PAIN: ICD-10-CM

## 2024-11-12 DIAGNOSIS — E78.5 HYPERLIPIDEMIA, UNSPECIFIED HYPERLIPIDEMIA TYPE: ICD-10-CM

## 2024-11-12 LAB
ALBUMIN SERPL BCG-MCNC: 3.9 G/DL (ref 3.5–5)
ALP SERPL-CCNC: 67 U/L (ref 34–104)
ALT SERPL W P-5'-P-CCNC: 22 U/L (ref 7–52)
ANION GAP SERPL CALCULATED.3IONS-SCNC: 7 MMOL/L (ref 4–13)
AST SERPL W P-5'-P-CCNC: 24 U/L (ref 13–39)
BASOPHILS # BLD AUTO: 0.02 THOUSANDS/ÂΜL (ref 0–0.1)
BASOPHILS NFR BLD AUTO: 0 % (ref 0–1)
BILIRUB SERPL-MCNC: 0.8 MG/DL (ref 0.2–1)
BUN SERPL-MCNC: 16 MG/DL (ref 5–25)
CALCIUM SERPL-MCNC: 9.5 MG/DL (ref 8.4–10.2)
CHLORIDE SERPL-SCNC: 104 MMOL/L (ref 96–108)
CHOLEST SERPL-MCNC: 185 MG/DL
CO2 SERPL-SCNC: 29 MMOL/L (ref 21–32)
CREAT SERPL-MCNC: 0.74 MG/DL (ref 0.6–1.3)
EOSINOPHIL # BLD AUTO: 0.19 THOUSAND/ÂΜL (ref 0–0.61)
EOSINOPHIL NFR BLD AUTO: 3 % (ref 0–6)
ERYTHROCYTE [DISTWIDTH] IN BLOOD BY AUTOMATED COUNT: 13 % (ref 11.6–15.1)
GFR SERPL CREATININE-BSD FRML MDRD: 78 ML/MIN/1.73SQ M
GLUCOSE P FAST SERPL-MCNC: 84 MG/DL (ref 65–99)
HCT VFR BLD AUTO: 45.3 % (ref 34.8–46.1)
HDLC SERPL-MCNC: 66 MG/DL
HGB BLD-MCNC: 15 G/DL (ref 11.5–15.4)
IMM GRANULOCYTES # BLD AUTO: 0.02 THOUSAND/UL (ref 0–0.2)
IMM GRANULOCYTES NFR BLD AUTO: 0 % (ref 0–2)
LDLC SERPL CALC-MCNC: 101 MG/DL (ref 0–100)
LIPASE SERPL-CCNC: 61 U/L (ref 11–82)
LYMPHOCYTES # BLD AUTO: 1.69 THOUSANDS/ÂΜL (ref 0.6–4.47)
LYMPHOCYTES NFR BLD AUTO: 25 % (ref 14–44)
MCH RBC QN AUTO: 30.5 PG (ref 26.8–34.3)
MCHC RBC AUTO-ENTMCNC: 33.1 G/DL (ref 31.4–37.4)
MCV RBC AUTO: 92 FL (ref 82–98)
MONOCYTES # BLD AUTO: 0.64 THOUSAND/ÂΜL (ref 0.17–1.22)
MONOCYTES NFR BLD AUTO: 9 % (ref 4–12)
NEUTROPHILS # BLD AUTO: 4.27 THOUSANDS/ÂΜL (ref 1.85–7.62)
NEUTS SEG NFR BLD AUTO: 63 % (ref 43–75)
NRBC BLD AUTO-RTO: 0 /100 WBCS
PLATELET # BLD AUTO: 203 THOUSANDS/UL (ref 149–390)
PMV BLD AUTO: 10.8 FL (ref 8.9–12.7)
POTASSIUM SERPL-SCNC: 4 MMOL/L (ref 3.5–5.3)
PROT SERPL-MCNC: 7.1 G/DL (ref 6.4–8.4)
RBC # BLD AUTO: 4.91 MILLION/UL (ref 3.81–5.12)
SODIUM SERPL-SCNC: 140 MMOL/L (ref 135–147)
TRIGL SERPL-MCNC: 92 MG/DL
WBC # BLD AUTO: 6.83 THOUSAND/UL (ref 4.31–10.16)

## 2024-11-12 PROCEDURE — 80053 COMPREHEN METABOLIC PANEL: CPT

## 2024-11-12 PROCEDURE — 83690 ASSAY OF LIPASE: CPT

## 2024-11-12 PROCEDURE — 36415 COLL VENOUS BLD VENIPUNCTURE: CPT

## 2024-11-12 PROCEDURE — 85025 COMPLETE CBC W/AUTO DIFF WBC: CPT

## 2024-11-12 PROCEDURE — 80061 LIPID PANEL: CPT

## 2024-11-14 ENCOUNTER — TELEPHONE (OUTPATIENT)
Age: 79
End: 2024-11-14

## 2024-11-14 ENCOUNTER — OFFICE VISIT (OUTPATIENT)
Dept: GASTROENTEROLOGY | Facility: MEDICAL CENTER | Age: 79
End: 2024-11-14
Payer: COMMERCIAL

## 2024-11-14 VITALS
HEIGHT: 68 IN | DIASTOLIC BLOOD PRESSURE: 85 MMHG | OXYGEN SATURATION: 96 % | TEMPERATURE: 97.7 F | BODY MASS INDEX: 25.98 KG/M2 | SYSTOLIC BLOOD PRESSURE: 151 MMHG | WEIGHT: 171.4 LBS

## 2024-11-14 DIAGNOSIS — K58.0 IRRITABLE BOWEL SYNDROME WITH DIARRHEA: ICD-10-CM

## 2024-11-14 DIAGNOSIS — R10.84 GENERALIZED ABDOMINAL PAIN: Primary | ICD-10-CM

## 2024-11-14 DIAGNOSIS — Z12.11 COLON CANCER SCREENING: ICD-10-CM

## 2024-11-14 DIAGNOSIS — K21.9 GASTROESOPHAGEAL REFLUX DISEASE, UNSPECIFIED WHETHER ESOPHAGITIS PRESENT: ICD-10-CM

## 2024-11-14 DIAGNOSIS — R13.19 OTHER DYSPHAGIA: ICD-10-CM

## 2024-11-14 PROCEDURE — 99214 OFFICE O/P EST MOD 30 MIN: CPT | Performed by: PHYSICIAN ASSISTANT

## 2024-11-14 RX ORDER — DICYCLOMINE HYDROCHLORIDE 10 MG/1
10 CAPSULE ORAL 4 TIMES DAILY PRN
Qty: 30 CAPSULE | Refills: 11 | Status: SHIPPED | OUTPATIENT
Start: 2024-11-14

## 2024-11-14 NOTE — PROGRESS NOTES
Name: Nella Yancey      : 1945      MRN: [id number]  Encounter Provider: Roxana Simms PA-C  Encounter Date: 2024   Encounter department: [facility]  :  Assessment & Plan  Irritable bowel syndrome with diarrhea  The patient has intermittent diarrhea and abdominal pain currently well controlled with diet. Does get severe abdominal pain secondary to gas very infrequently. When this occurs I recommend simethicone, gas-x, and bentyl as needed. Will continue to avoid known food triggers       Generalized abdominal pain  See above   Orders:    dicyclomine (BENTYL) 10 mg capsule; Take 1 capsule (10 mg total) by mouth 4 (four) times a day as needed (abdominal pain)    Gastroesophageal reflux disease, unspecified whether esophagitis present  Chronic. Last EGD 23 with a 4cm hiatal hernia but otherwise normal including esophogeal, stomach, and duodenal biopsies. Currently on omeprazole 20mg every morning and as needed Pepcid complete. This controls her GERD well so she will continue this.        Other dysphagia  Chronic. Last EGD 23 with a 4cm hiatal hernia but otherwise normal including esophogeal, stomach, and duodenal biopsies. No complaints of dysphagia at her visit today. If this returns can consider manometry       Colon cancer screening  Last colonoscopy 23 with one subcentimeter tubular adenomaremoved, scattered diverticuli in the sigmoid, and large internal hemorrhoids. Recall not recommended given age > 75.        Follow up 6 months       History of Present Illness   HPI  Nella Yancey is a 79 y.o. female who presents for GERD, dysphagia, and IBS-D. The patient presents today doing well. She is currently taking omeprazole 20 mg every morning for acid reflux which controls her symptoms relatively well. She will get occasional breakthrough symptoms after eating certain food triggers such as red sauce. She takes Pepcid Complete as needed  for this. Otherwise her IBS is well controlled with dietary management aside for very rare bouts of severe abdominal pain. When these bouts occur the pain is relief with burping, moving, and having a bowel movement      Review of Systems   Constitutional:  Negative for activity change, appetite change, chills, fatigue, fever and unexpected weight change.   Gastrointestinal:  Negative for abdominal distention, abdominal pain, anal bleeding, blood in stool, constipation, diarrhea, nausea, rectal pain and vomiting.   Musculoskeletal:  Negative for back pain and gait problem.   Psychiatric/Behavioral:  Negative for confusion.        Medical History Reviewed by provider this encounter:     .  Current Outpatient Medications on File Prior to Visit   Medication Sig Dispense Refill    acetaminophen (TYLENOL) 500 mg tablet Take 500-1,000 mg by mouth every 6 (six) hours as needed for mild pain      albuterol (PROVENTIL HFA,VENTOLIN HFA) 90 mcg/act inhaler Inhale 2 puffs every 6 (six) hours as needed for wheezing or shortness of breath 8 g 1    ALPRAZolam (XANAX) 0.5 mg tablet Take 1 tablet (0.5 mg total) by mouth daily at bedtime as needed for anxiety or sleep 20 tablet 1    ascorbic acid (VITAMIN C) 500 mg tablet Take 500 mg by mouth daily      atorvastatin (LIPITOR) 10 mg tablet Take 0.5 tablets (5 mg total) by mouth daily after dinner 30 tablet 0    b complex vitamins tablet Take 1 tablet by mouth daily      Cholecalciferol 50 MCG (2000 UT) CAPS Take 3,000 Units by mouth daily      Magnesium 300 MG CAPS Take 300 mg by mouth in the morning      meclizine (ANTIVERT) 12.5 MG tablet Can take 1-2 tabs as needed up to BID for dizziness or vertigo, caution as can be sedating 30 tablet 11    metoprolol succinate (TOPROL-XL) 25 mg 24 hr tablet TAKE 1 TABLET BY MOUTH DAILY AT BEDTIME 90 tablet 3    Multiple Vitamins-Minerals (MULTIVITAMIN ADULTS 50+ PO) Take 1 tablet by mouth in the morning      Nutritional Supplements (OSAPLEX MK-7  "PO) Take 1 tablet by mouth in the morning      omeprazole (PriLOSEC) 20 mg delayed release capsule TAKE 1 CAPSULE BY MOUTH HALF AN HOUR PRIOR TO BREAKFAST AND DINNER 180 capsule 1    Riboflavin (VITAMIN B-2 PO) Take 250 mg by mouth in the morning      aspirin (Aspirin 81) 81 mg EC tablet Take 1 tablet (81 mg total) by mouth daily (Patient not taking: Reported on 11/14/2024)       No current facility-administered medications on file prior to visit.      Social History     Tobacco Use    Smoking status: Never    Smokeless tobacco: Never   Vaping Use    Vaping status: Never Used   Substance and Sexual Activity    Alcohol use: Not Currently     Comment: Rarely     Drug use: No    Sexual activity: Yes     Partners: Male        Objective   /85 (BP Location: Left arm, Patient Position: Sitting, Cuff Size: Standard)   Temp 97.7 °F (36.5 °C) (Tympanic)   Ht 5' 8\" (1.727 m)   Wt 77.7 kg (171 lb 6.4 oz)   SpO2 96%   BMI 26.06 kg/m²        Physical Exam  Constitutional:       General: She is not in acute distress.     Appearance: Normal appearance. She is normal weight. She is not ill-appearing, toxic-appearing or diaphoretic.   HENT:      Head: Normocephalic and atraumatic.      Nose: No congestion or rhinorrhea.   Eyes:      General:         Right eye: No discharge.         Left eye: No discharge.   Pulmonary:      Effort: Pulmonary effort is normal.   Musculoskeletal:         General: Normal range of motion.      Cervical back: Normal range of motion.   Skin:     Coloration: Skin is not jaundiced.   Neurological:      Mental Status: She is alert.   Psychiatric:         Mood and Affect: Mood normal.         Behavior: Behavior normal.         Thought Content: Thought content normal.         Judgment: Judgment normal.         "

## 2024-11-14 NOTE — ASSESSMENT & PLAN NOTE
The patient has intermittent diarrhea and abdominal pain currently well controlled with diet. Does get severe abdominal pain secondary to gas very infrequently. When this occurs I recommend simethicone, gas-x, and bentyl as needed. Will continue to avoid known food triggers

## 2024-11-14 NOTE — TELEPHONE ENCOUNTER
PA for dicyclomine 10 mg  SUBMITTED to [employer]    via    []CMM-KEY:   [x]Surescripts-Case ID #     [id number]    Payer: [employer]   Phone: [phone]   Fax: [phone]     []Availity-Auth ID # NDC #   []Faxed to plan   []Other website   []Phone call Case ID #     []PA sent as URGENT    All office notes, labs and other pertaining documents and studies sent. Clinical questions answered. Awaiting determination from insurance company.     Turnaround time for your insurance to make a decision on your Prior Authorization can take 7-21 business days.

## 2024-11-18 NOTE — TELEPHONE ENCOUNTER
PA for dicyclomine DENIED    Reason:(Screenshot if applicable)        Message sent to office clinical pool Yes    Denial letter scanned into Media Yes    Appeal started No (Provider will need to decide if appeal is warranted and send clinical documentation to Prior Authorization Team for initiation.)    **Please follow up with your patient regarding denial and next steps**

## 2024-11-19 ENCOUNTER — OFFICE VISIT (OUTPATIENT)
Dept: FAMILY MEDICINE CLINIC | Facility: CLINIC | Age: 79
End: 2024-11-19
Payer: COMMERCIAL

## 2024-11-19 VITALS
BODY MASS INDEX: 26.13 KG/M2 | HEIGHT: 68 IN | HEART RATE: 82 BPM | DIASTOLIC BLOOD PRESSURE: 84 MMHG | OXYGEN SATURATION: 95 % | WEIGHT: 172.4 LBS | SYSTOLIC BLOOD PRESSURE: 140 MMHG | RESPIRATION RATE: 16 BRPM | TEMPERATURE: 97.7 F

## 2024-11-19 DIAGNOSIS — M81.0 AGE-RELATED OSTEOPOROSIS WITHOUT CURRENT PATHOLOGICAL FRACTURE: ICD-10-CM

## 2024-11-19 DIAGNOSIS — R26.2 AMBULATORY DYSFUNCTION: ICD-10-CM

## 2024-11-19 DIAGNOSIS — K21.9 GASTROESOPHAGEAL REFLUX DISEASE, UNSPECIFIED WHETHER ESOPHAGITIS PRESENT: ICD-10-CM

## 2024-11-19 DIAGNOSIS — Z00.00 MEDICARE ANNUAL WELLNESS VISIT, SUBSEQUENT: Primary | ICD-10-CM

## 2024-11-19 DIAGNOSIS — Z12.31 ENCOUNTER FOR SCREENING MAMMOGRAM FOR BREAST CANCER: ICD-10-CM

## 2024-11-19 DIAGNOSIS — G43.709 CHRONIC MIGRAINE WITHOUT AURA WITHOUT STATUS MIGRAINOSUS, NOT INTRACTABLE: ICD-10-CM

## 2024-11-19 DIAGNOSIS — M17.0 PRIMARY OSTEOARTHRITIS OF BOTH KNEES: ICD-10-CM

## 2024-11-19 DIAGNOSIS — I10 ESSENTIAL HYPERTENSION: ICD-10-CM

## 2024-11-19 DIAGNOSIS — J84.9 ILD (INTERSTITIAL LUNG DISEASE) (HCC): ICD-10-CM

## 2024-11-19 DIAGNOSIS — E21.3 HYPERPARATHYROIDISM (HCC): ICD-10-CM

## 2024-11-19 DIAGNOSIS — E78.5 HYPERLIPIDEMIA, UNSPECIFIED HYPERLIPIDEMIA TYPE: ICD-10-CM

## 2024-11-19 PROCEDURE — 99214 OFFICE O/P EST MOD 30 MIN: CPT | Performed by: FAMILY MEDICINE

## 2024-11-19 PROCEDURE — G0439 PPPS, SUBSEQ VISIT: HCPCS | Performed by: FAMILY MEDICINE

## 2024-11-19 NOTE — PATIENT INSTRUCTIONS
Medicare Preventive Visit Patient Instructions  Thank you for completing your Welcome to Medicare Visit or Medicare Annual Wellness Visit today. Your next wellness visit will be due in one year (11/20/2025).  The screening/preventive services that you may require over the next 5-10 years are detailed below. Some tests may not apply to you based off risk factors and/or age. Screening tests ordered at today's visit but not completed yet may show as past due. Also, please note that scanned in results may not display below.  Preventive Screenings:  Service Recommendations Previous Testing/Comments   Colorectal Cancer Screening  * Colonoscopy    * Fecal Occult Blood Test (FOBT)/Fecal Immunochemical Test (FIT)  * Fecal DNA/Cologuard Test  * Flexible Sigmoidoscopy Age: 45-75 years old   Colonoscopy: every 10 years (may be performed more frequently if at higher risk)  OR  FOBT/FIT: every 1 year  OR  Cologuard: every 3 years  OR  Sigmoidoscopy: every 5 years  Screening may be recommended earlier than age 45 if at higher risk for colorectal cancer. Also, an individualized decision between you and your healthcare provider will decide whether screening between the ages of 76-85 would be appropriate. Colonoscopy: 08/11/2023  FOBT/FIT: Not on file  Cologuard: Not on file  Sigmoidoscopy: Not on file    Screening Current     Breast Cancer Screening Age: 40+ years old  Frequency: every 1-2 years  Not required if history of left and right mastectomy Mammogram: 09/20/2023    Screening Current   Cervical Cancer Screening Between the ages of 21-29, pap smear recommended once every 3 years.   Between the ages of 30-65, can perform pap smear with HPV co-testing every 5 years.   Recommendations may differ for women with a history of total hysterectomy, cervical cancer, or abnormal pap smears in past. Pap Smear: 10/20/2022    Screening Not Indicated   Hepatitis C Screening Once for adults born between 1945 and 1965  More frequently in  patients at high risk for Hepatitis C Hep C Antibody: 09/25/2020    Screening Current   Diabetes Screening 1-2 times per year if you're at risk for diabetes or have pre-diabetes Fasting glucose: 84 mg/dL (11/12/2024)  A1C: 5.4 % (8/14/2024)  Screening Current   Cholesterol Screening Once every 5 years if you don't have a lipid disorder. May order more often based on risk factors. Lipid panel: 11/12/2024    Screening Not Indicated  History Lipid Disorder     Other Preventive Screenings Covered by Medicare:  Abdominal Aortic Aneurysm (AAA) Screening: covered once if your at risk. You're considered to be at risk if you have a family history of AAA.  Lung Cancer Screening: covers low dose CT scan once per year if you meet all of the following conditions: (1) Age 55-77; (2) No signs or symptoms of lung cancer; (3) Current smoker or have quit smoking within the last 15 years; (4) You have a tobacco smoking history of at least 20 pack years (packs per day multiplied by number of years you smoked); (5) You get a written order from a healthcare provider.  Glaucoma Screening: covered annually if you're considered high risk: (1) You have diabetes OR (2) Family history of glaucoma OR (3)  aged 50 and older OR (4)  American aged 65 and older  Osteoporosis Screening: covered every 2 years if you meet one of the following conditions: (1) You're estrogen deficient and at risk for osteoporosis based off medical history and other findings; (2) Have a vertebral abnormality; (3) On glucocorticoid therapy for more than 3 months; (4) Have primary hyperparathyroidism; (5) On osteoporosis medications and need to assess response to drug therapy.   Last bone density test (DXA Scan): 12/14/2023.  HIV Screening: covered annually if you're between the age of 15-65. Also covered annually if you are younger than 15 and older than 65 with risk factors for HIV infection. For pregnant patients, it is covered up to 3 times per  pregnancy.    Immunizations:  Immunization Recommendations   Influenza Vaccine Annual influenza vaccination during flu season is recommended for all persons aged >= 6 months who do not have contraindications   Pneumococcal Vaccine   * Pneumococcal conjugate vaccine = PCV13 (Prevnar 13), PCV15 (Vaxneuvance), PCV20 (Prevnar 20)  * Pneumococcal polysaccharide vaccine = PPSV23 (Pneumovax) Adults 19-65 yo with certain risk factors or if 65+ yo  If never received any pneumonia vaccine: recommend Prevnar 20 (PCV20)  Give PCV20 if previously received 1 dose of PCV13 or PPSV23   Hepatitis B Vaccine 3 dose series if at intermediate or high risk (ex: diabetes, end stage renal disease, liver disease)   Respiratory syncytial virus (RSV) Vaccine - COVERED BY MEDICARE PART D  * RSVPreF3 (Arexvy) CDC recommends that adults 60 years of age and older may receive a single dose of RSV vaccine using shared clinical decision-making (SCDM)   Tetanus (Td) Vaccine - COST NOT COVERED BY MEDICARE PART B Following completion of primary series, a booster dose should be given every 10 years to maintain immunity against tetanus. Td may also be given as tetanus wound prophylaxis.   Tdap Vaccine - COST NOT COVERED BY MEDICARE PART B Recommended at least once for all adults. For pregnant patients, recommended with each pregnancy.   Shingles Vaccine (Shingrix) - COST NOT COVERED BY MEDICARE PART B  2 shot series recommended in those 19 years and older who have or will have weakened immune systems or those 50 years and older     Health Maintenance Due:      Topic Date Due   • Breast Cancer Screening: Mammogram  09/20/2024   • DXA SCAN  12/14/2025   • Hepatitis C Screening  Completed   • Colorectal Cancer Screening  Discontinued     Immunizations Due:      Topic Date Due   • Influenza Vaccine (1) 09/01/2024     Advance Directives   What are advance directives?  Advance directives are legal documents that state your wishes and plans for medical care.  These plans are made ahead of time in case you lose your ability to make decisions for yourself. Advance directives can apply to any medical decision, such as the treatments you want, and if you want to donate organs.   What are the types of advance directives?  There are many types of advance directives, and each state has rules about how to use them. You may choose a combination of any of the following:  Living will:  This is a written record of the treatment you want. You can also choose which treatments you do not want, which to limit, and which to stop at a certain time. This includes surgery, medicine, IV fluid, and tube feedings.   Durable power of  for healthcare (DPAHC):  This is a written record that states who you want to make healthcare choices for you when you are unable to make them for yourself. This person, called a proxy, is usually a family member or a friend. You may choose more than 1 proxy.  Do not resuscitate (DNR) order:  A DNR order is used in case your heart stops beating or you stop breathing. It is a request not to have certain forms of treatment, such as CPR. A DNR order may be included in other types of advance directives.  Medical directive:  This covers the care that you want if you are in a coma, near death, or unable to make decisions for yourself. You can list the treatments you want for each condition. Treatment may include pain medicine, surgery, blood transfusions, dialysis, IV or tube feedings, and a ventilator (breathing machine).  Values history:  This document has questions about your views, beliefs, and how you feel and think about life. This information can help others choose the care that you would choose.  Why are advance directives important?  An advance directive helps you control your care. Although spoken wishes may be used, it is better to have your wishes written down. Spoken wishes can be misunderstood, or not followed. Treatments may be given even if you  do not want them. An advance directive may make it easier for your family to make difficult choices about your care.   Urinary Incontinence   Urinary incontinence (UI)  is when you lose control of your bladder. UI develops because your bladder cannot store or empty urine properly. The 3 most common types of UI are stress incontinence, urge incontinence, or both.  Medicines:   May be given to help strengthen your bladder control. Report any side effects of medication to your healthcare provider.  Do pelvic muscle exercises often:  Your pelvic muscles help you stop urinating. Squeeze these muscles tight for 5 seconds, then relax for 5 seconds. Gradually work up to squeezing for 10 seconds. Do 3 sets of 15 repetitions a day, or as directed. This will help strengthen your pelvic muscles and improve bladder control.  Train your bladder:  Go to the bathroom at set times, such as every 2 hours, even if you do not feel the urge to go. You can also try to hold your urine when you feel the urge to go. For example, hold your urine for 5 minutes when you feel the urge to go. As that becomes easier, hold your urine for 10 minutes.   Self-care:   Keep a UI record.  Write down how often you leak urine and how much you leak. Make a note of what you were doing when you leaked urine.  Drink liquids as directed. You may need to limit the amount of liquid you drink to help control your urine leakage. Do not drink any liquid right before you go to bed. Limit or do not have drinks that contain caffeine or alcohol.   Prevent constipation.  Eat a variety of high-fiber foods. Good examples are high-fiber cereals, beans, vegetables, and whole-grain breads. Walking is the best way to trigger your intestines to have a bowel movement.  Exercise regularly and maintain a healthy weight.  Weight loss and exercise will decrease pressure on your bladder and help you control your leakage.   Use a catheter as directed  to help empty your bladder. A  catheter is a tiny, plastic tube that is put into your bladder to drain your urine.   Go to behavior therapy as directed.  Behavior therapy may be used to help you learn to control your urge to urinate.    Weight Management   Why it is important to manage your weight:  Being overweight increases your risk of health conditions such as heart disease, high blood pressure, type 2 diabetes, and certain types of cancer. It can also increase your risk for osteoarthritis, sleep apnea, and other respiratory problems. Aim for a slow, steady weight loss. Even a small amount of weight loss can lower your risk of health problems.  How to lose weight safely:  A safe and healthy way to lose weight is to eat fewer calories and get regular exercise. You can lose up about 1 pound a week by decreasing the number of calories you eat by 500 calories each day.   Healthy meal plan for weight management:  A healthy meal plan includes a variety of foods, contains fewer calories, and helps you stay healthy. A healthy meal plan includes the following:  Eat whole-grain foods more often.  A healthy meal plan should contain fiber. Fiber is the part of grains, fruits, and vegetables that is not broken down by your body. Whole-grain foods are healthy and provide extra fiber in your diet. Some examples of whole-grain foods are whole-wheat breads and pastas, oatmeal, brown rice, and bulgur.  Eat a variety of vegetables every day.  Include dark, leafy greens such as spinach, kale, kacey greens, and mustard greens. Eat yellow and orange vegetables such as carrots, sweet potatoes, and winter squash.   Eat a variety of fruits every day.  Choose fresh or canned fruit (canned in its own juice or light syrup) instead of juice. Fruit juice has very little or no fiber.  Eat low-fat dairy foods.  Drink fat-free (skim) milk or 1% milk. Eat fat-free yogurt and low-fat cottage cheese. Try low-fat cheeses such as mozzarella and other reduced-fat  cheeses.  Choose meat and other protein foods that are low in fat.  Choose beans or other legumes such as split peas or lentils. Choose fish, skinless poultry (chicken or turkey), or lean cuts of red meat (beef or pork). Before you cook meat or poultry, cut off any visible fat.   Use less fat and oil.  Try baking foods instead of frying them. Add less fat, such as margarine, sour cream, regular salad dressing and mayonnaise to foods. Eat fewer high-fat foods. Some examples of high-fat foods include french fries, doughnuts, ice cream, and cakes.  Eat fewer sweets.  Limit foods and drinks that are high in sugar. This includes candy, cookies, regular soda, and sweetened drinks.  Exercise:  Exercise at least 30 minutes per day on most days of the week. Some examples of exercise include walking, biking, dancing, and swimming. You can also fit in more physical activity by taking the stairs instead of the elevator or parking farther away from stores. Ask your healthcare provider about the best exercise plan for you.      © Copyright [employer] 2018 Information is for End User's use only and may not be sold, redistributed or otherwise used for commercial purposes. All illustrations and images included in CareNotes® are the copyrighted property of [employer]M., Inc[employer] or [employer]

## 2024-11-19 NOTE — ASSESSMENT & PLAN NOTE
Orders:    Lipid Panel with Direct LDL reflex; Future    Comprehensive metabolic panel; Future    TSH, 3rd generation with Free T4 reflex; Future

## 2024-11-19 NOTE — TELEPHONE ENCOUNTER
Called the patient and informed that it is recommended to utilize the GoodRx coupon to reduce cost at [facility]. Patient verbalized understanding and thanked us. Patient also mentioned that she recalls the pharmacist indicating it may not be recommended to take dicyclomine while taking omeprazole. Informed the patient it was still prescribed with the omeprazole and I will contact her if I hear differently from Roxana Simms. Please advise, thank you!

## 2024-11-19 NOTE — TELEPHONE ENCOUNTER
I recommend she  the script and utilize a good RX coupon, per their website thirty 10mg tablets should only be $2 from [facility].

## 2024-11-19 NOTE — PROGRESS NOTES
Name: Nella Yancey      : 1945      MRN: [id number]  Encounter Provider: Cecelia Rodriguez MD  Encounter Date: 2024   Encounter department: [facility]    Assessment & Plan    Medicare annual wellness visit, subsequent   - discussed Covid, flu and RSV vaccines    Essential hypertension  - stable on Toprol XL 25 mg daily        Hyperlipidemia  - , HDL 66, better controlled on atorvastatin, will recheck lipid panel and LFT's in 6 months       - Lipid Panel with Direct LDL reflex       - Comprehensive metabolic panel    Gastroesophageal reflux disease  - stable on omeprazole, under care of GI    Primary osteoarthritis of both knees  - continue Tylenol as needed, follow up with Ortho    Hyperparathyroidism   - s/p parathyroid adenoma removal, under care of Endocrine    Osteoporosis   - under care of Endocrine, s/p Reclast infusion in 2024, on vitamin D supplements    [facility])   - under care of Pulmonary, on Albuterol inhaler as needed    Ambulatory dysfunction  - discussed Physical therapy, however patient declined at this time    Chronic migraine  - under care of Neurology    Return in 6 months or sooner as needed.   Preventive health issues were discussed with patient, and age appropriate screening tests were ordered as noted in patient's After Visit Summary. Personalized health advice and appropriate referrals for health education or preventive services given if needed, as noted in patient's After Visit Summary.    Patient presents today for AWV and follow up for her her chronic medical conditions. Patient continues having bilateral knee pain due to arthritis, she also notes worsening balance, she reports no recent falls. Patient has no other complaints.     Hypertension  Pertinent negatives include no chest pain, headaches, palpitations or shortness of breath.   Hyperlipidemia  Pertinent negatives include no chest pain  or shortness of breath.      Patient Care Team:  Cecelia Rodriguez MD as PCP - General (Family Medicine)  Eddie Tipton MD (Endocrinology)  Hali Zepeda MD (Endocrinology)  Diana M Jaiyeola, MD (Gastroenterology)  Roxana Simms PA-C (Gastroenterology)  LIZZIE Rendon as Nurse Practitioner (Gastroenterology)  Ifeanyi Adler MD as Consulting Physician (Pulmonary Disease)    Review of Systems   Constitutional:  Negative for appetite change, chills, fever and unexpected weight change.   Respiratory:  Negative for cough, shortness of breath and wheezing.    Cardiovascular:  Negative for chest pain, palpitations and leg swelling.   Gastrointestinal:  Negative for abdominal pain, blood in stool, diarrhea, nausea and vomiting.   Genitourinary:  Negative for difficulty urinating, dysuria, frequency, hematuria and urgency.   Musculoskeletal:         Bilateral knee pain   Skin:  Negative for rash.   Neurological:  Negative for dizziness, syncope, weakness, numbness and headaches.   Hematological:  Negative for adenopathy.   Psychiatric/Behavioral:  Negative for dysphoric mood and sleep disturbance. The patient is nervous/anxious.      Medical History Reviewed by provider this encounter:       Annual Wellness Visit Questionnaire   Nella is here for her Subsequent Wellness visit.     Health Risk Assessment:   Patient rates overall health as good. Patient feels that their physical health rating is same. Patient is satisfied with their life. Eyesight was rated as same. Hearing was rated as same. Patient feels that their emotional and mental health rating is same. Patients states they are never, rarely angry. Patient states they are never, rarely unusually tired/fatigued. Pain experienced in the last 7 days has been some. Patient's pain rating has been 7/10. Patient states that she has experienced no weight loss or gain in last 6 months.     Fall Risk Screening:   In the past year, patient has  experienced: no history of falling in past year      Urinary Incontinence Screening:   Patient has not leaked urine accidently in the last six months.     Home Safety:  Patient has trouble with stairs inside or outside of their home. Patient has working smoke alarms and has working carbon monoxide detector. Home safety hazards include: none.     Nutrition:   Current diet is Regular and Limited junk food.     Medications:   Patient is currently taking over-the-counter supplements. OTC medications include: see medication list. Patient is able to manage medications.     Activities of Daily Living (ADLs)/Instrumental Activities of Daily Living (IADLs):   Walk and transfer into and out of bed and chair?: Yes  Dress and groom yourself?: Yes    Bathe or shower yourself?: Yes    Feed yourself? Yes  Do your laundry/housekeeping?: Yes  Manage your money, pay your bills and track your expenses?: Yes  Make your own meals?: Yes    Do your own shopping?: Yes    Previous Hospitalizations:   Any hospitalizations or ED visits within the last 12 months?: Yes    How many hospitalizations have you had in the last year?: 1-2    Advance Care Planning:   Living will: Yes    Advanced directive: Yes      PREVENTIVE SCREENINGS      Cardiovascular Screening:    General: Screening Not Indicated and History Lipid Disorder      Diabetes Screening:     General: Screening Current      Colorectal Cancer Screening:     General: Screening Current      Breast Cancer Screening:     General: Screening Current      Cervical Cancer Screening:    General: Screening Not Indicated      Osteoporosis Screening:    General: Screening Not Indicated and History Osteoporosis      Lung Cancer Screening:     General: Screening Not Indicated      Hepatitis C Screening:    General: Screening Current    Screening, Brief Intervention, and Referral to Treatment (SBIRT)    Screening  Typical number of drinks in a day: 0  Typical number of drinks in a week:  "0  Interpretation: Low risk drinking behavior.    Single Item Drug Screening:  How often have you used an illegal drug (including marijuana) or a prescription medication for non-medical reasons in the past year? never    Single Item Drug Screen Score: 0  Interpretation: Negative screen for possible drug use disorder    Social Drivers of Health     Financial Resource Strain: Low Risk  (10/30/2023)    Overall Financial Resource Strain (CARDIA)     Difficulty of Paying Living Expenses: Not very hard   Food Insecurity: No Food Insecurity (11/19/2024)    Hunger Vital Sign     Worried About Running Out of Food in the Last Year: Never true     Ran Out of Food in the Last Year: Never true   Transportation Needs: No Transportation Needs (11/19/2024)    PRAPARE - Transportation     Lack of Transportation (Medical): No     Lack of Transportation (Non-Medical): No   Housing Stability: Low Risk  (11/19/2024)    Housing Stability Vital Sign     Unable to Pay for Housing in the Last Year: No     Number of Times Moved in the Last Year: 0     Homeless in the Last Year: No   Utilities: Not At Risk (11/19/2024)    [facility] Utilities     Threatened with loss of utilities: No         Objective   /84 (Patient Position: Sitting, Cuff Size: Standard)   Pulse 82   Temp 97.7 °F (36.5 °C) (Skin)   Resp 16   Ht 5' 8\" (1.727 m)   Wt 78.2 kg (172 lb 6.4 oz)   SpO2 95%   BMI 26.21 kg/m²     BP Readings from Last 3 Encounters:   11/19/24 140/84   11/14/24 151/85   10/25/24 134/90       Physical Exam  Constitutional:       General: She is not in acute distress.  HENT:      Right Ear: Tympanic membrane and ear canal normal.      Left Ear: Tympanic membrane and ear canal normal.   Eyes:      Extraocular Movements: Extraocular movements intact.      Conjunctiva/sclera: Conjunctivae normal.      Pupils: Pupils are equal, round, and reactive to light.   Cardiovascular:      Rate and Rhythm: Normal rate and regular rhythm.      Heart sounds: " Normal heart sounds.   Pulmonary:      Effort: Pulmonary effort is normal.      Breath sounds: Normal breath sounds. No wheezing, rhonchi or rales.   Abdominal:      Palpations: Abdomen is soft. There is no mass.      Tenderness: There is no abdominal tenderness.   Musculoskeletal:      Cervical back: Neck supple.      Right lower leg: No edema.      Left lower leg: No edema.   Lymphadenopathy:      Cervical: No cervical adenopathy.   Neurological:      Mental Status: She is alert and oriented to person, place, and time.   Psychiatric:         Mood and Affect: Mood normal.         Behavior: Behavior normal.         Thought Content: Thought content normal.         Judgment: Judgment normal.       Lab Results   Component Value Date    CHOLESTEROL 185 11/12/2024    CHOLESTEROL 216 (H) 08/15/2024    CHOLESTEROL 232 (H) 08/14/2024     Lab Results   Component Value Date    HDL 66 11/12/2024    HDL 66 08/15/2024    HDL 73 08/14/2024     Lab Results   Component Value Date    TRIG 92 11/12/2024    TRIG 102 08/15/2024    TRIG 91 08/14/2024     Lab Results   Component Value Date    LDLCALC 101 (H) 11/12/2024     Lab Results   Component Value Date    AQO4FNHNAAJR 1.606 07/03/2024     Lab Results   Component Value Date    SODIUM 140 11/12/2024    K 4.0 11/12/2024     11/12/2024    CO2 29 11/12/2024    AGAP 7 11/12/2024    BUN 16 11/12/2024    CREATININE 0.74 11/12/2024    GLUC 92 08/15/2024    GLUF 84 11/12/2024    CALCIUM 9.5 11/12/2024    AST 24 11/12/2024    ALT 22 11/12/2024    ALKPHOS 67 11/12/2024    TP 7.1 11/12/2024    TBILI 0.80 11/12/2024    EGFR 78 11/12/2024     Lab Results   Component Value Date    WBC 6.83 11/12/2024    HGB 15.0 11/12/2024    HCT 45.3 11/12/2024    MCV 92 11/12/2024     11/12/2024

## 2024-11-21 ENCOUNTER — TELEPHONE (OUTPATIENT)
Age: 79
End: 2024-11-21

## 2024-11-21 NOTE — TELEPHONE ENCOUNTER
Patient has been taking the full 10mg of her Atorvastatin since it was dispensed 10/1 - she misunderstood the directions of taking half a tablet (5 mg). She is also taking it at bedtime, along with her BP medication and is experiencing increased acid reflux. Patient reported feeling stable, she did note some muscle stiffness but stated that is normal for her so she was not able to relate the symptoms to the medication. Please consult with provider and follow up with patient as soon as possible due to her taking 10 mg tabs she is running low on the medication. Please advise thank you

## 2024-11-22 DIAGNOSIS — E78.5 HYPERLIPIDEMIA, UNSPECIFIED HYPERLIPIDEMIA TYPE: ICD-10-CM

## 2024-11-22 RX ORDER — ATORVASTATIN CALCIUM 10 MG/1
5 TABLET, FILM COATED ORAL
Qty: 30 TABLET | Refills: 0 | Status: SHIPPED | OUTPATIENT
Start: 2024-11-22

## 2024-11-22 NOTE — TELEPHONE ENCOUNTER
Please clarify with patient if she would like to stay on atorvastatin 10 mg 1 tablet or 0.5 tablet daily. She should take it after dinner.

## 2024-11-25 NOTE — TELEPHONE ENCOUNTER
"Patient was taking 10 mg of Atorvastatin M/W/F during \"the trial\" period because she cannot break the small pills in half. It was also giving her GERD. She is asking  if she should continue taking it just three days a week after dinner (was taking at HS) or if she should move to taking a daily dose of 10 mg. Please advise   "

## 2024-11-26 ENCOUNTER — TELEPHONE (OUTPATIENT)
Age: 79
End: 2024-11-26

## 2024-11-26 NOTE — TELEPHONE ENCOUNTER
Received call from Patient to reschedule appt. Rescheduled to 5/5/25 at 8:40 am with Brian in [place]. Verified insurance, provided [place] address    Patient asked about cancellation list, told patient they can call for cancellations/sooner appt. Added to recall list.     Patient verbalized understanding.

## 2024-12-02 ENCOUNTER — NURSE TRIAGE (OUTPATIENT)
Age: 79
End: 2024-12-02

## 2024-12-02 NOTE — TELEPHONE ENCOUNTER
"Reason for Conversation: Received call from pt stating last night around 2am, she woke up to feeling like her heart was racing. She stated she does have a hx of indigestion and got a soda to drink to help facility a burp. She stated once she burped, her feeling like her heart was racing went away. She stated she did take both her HR and oxygen levels during the 5 min she was feeling her heart race which was: HR 60s, Oxygen 90s. She denies any chest pain, dizziness, sob, sweating, fever, nor swelling. She denies any s/s currently.     Advised pt since her symptoms went away after burping and has not felt them since, she should monitor her s/s and call the office back if she were to develop them again.     Advised pt will send a message to Dr. Moreno as well as an fyi.     VS/Weight: (Note: Please include date/time vitals/weight were measured)  HR during episode: 60s. HR now 74    Pain: No    Risk Factors: Hypertension    Recent relevant testing and date of testing: EKG 8/14/2024    Medication: metoprolol 25mg daily     Upcoming Office Visit: No    Last Office Visit: 10/25/2024      Reason for Disposition   Palpitations    Answer Assessment - Initial Assessment Questions  1. DESCRIPTION: \"Please describe your heart rate or heartbeat that you are having\" (e.g., fast/slow, regular/irregular, skipped or extra beats, \"palpitations\")      States she woke up last night around 2am feeling like her heart was racing. Denies any chest pain  2. ONSET: \"When did it start?\" (e.g., minutes, hours, days)       Last night  3. DURATION: \"How long does it last\" (e.g., seconds, minutes, hours)      Lasted around 5 minutes   4. PATTERN \"Does it come and go, or has it been constant since it started?\"  \"Does it get worse with exertion?\"   \"Are you feeling it now?\"      Not feeling it now. Pt stated she drank soda during the night to help her burp and it worked. Stated after burping, her s/s went away   6. HEART RATE: \"Can you tell me your " "heart rate?\" \"How many beats in 15 seconds?\"  Note: Not all patients can do this.        HR was in the 60s, oxygen was in the 90s last night.      HR now is 74  7. RECURRENT SYMPTOM: \"Have you ever had this before?\" If Yes, ask: \"When was the last time?\" and \"What happened that time?\"       Yes - found out to just have indigestion. Hx pericarditis from an infection 12-15 years ago.  8. CAUSE: \"What do you think is causing the palpitations?\"      Unsure - maybe indigestion   9. CARDIAC HISTORY: \"Do you have any history of heart disease?\" (e.g., heart attack, angina, bypass surgery, angioplasty, arrhythmia)       HTN, atypical chest pain  10. OTHER SYMPTOMS: \"Do you have any other symptoms?\" (e.g., dizziness, chest pain, sweating, difficulty breathing)        Denies dizziness, sob, sweating, fever, nor swelling    Protocols used: Heart Rate and Heartbeat Questions-Adult-[place]    "

## 2024-12-09 ENCOUNTER — TELEPHONE (OUTPATIENT)
Age: 79
End: 2024-12-09

## 2024-12-09 NOTE — TELEPHONE ENCOUNTER
Pt called regarding fluid behind her knee and ankle numbness that has been on going. Advised pt that she should discuss symptoms with her PCP. She states that she did mention it at her last visit but does not remember what the outcome was. Offered to transfer pt to her PCP however she states that her  has an appointment next week and she will discuss this then.

## 2024-12-10 ENCOUNTER — TELEPHONE (OUTPATIENT)
Age: 79
End: 2024-12-10

## 2024-12-10 NOTE — TELEPHONE ENCOUNTER
Labs Due    Reviewed due prior to next appt. Pt states will go 12/11/24 AM. Patient had no further questions and/or concerns at this time.

## 2024-12-11 ENCOUNTER — APPOINTMENT (OUTPATIENT)
Dept: LAB | Facility: CLINIC | Age: 79
End: 2024-12-11
Payer: COMMERCIAL

## 2024-12-11 DIAGNOSIS — E55.9 VITAMIN D DEFICIENCY: ICD-10-CM

## 2024-12-11 DIAGNOSIS — E21.0 PRIMARY HYPERPARATHYROIDISM (HCC): ICD-10-CM

## 2024-12-11 LAB
25(OH)D3 SERPL-MCNC: 36.6 NG/ML (ref 30–100)
ANION GAP SERPL CALCULATED.3IONS-SCNC: 5 MMOL/L (ref 4–13)
BUN SERPL-MCNC: 19 MG/DL (ref 5–25)
CALCIUM SERPL-MCNC: 9.8 MG/DL (ref 8.4–10.2)
CHLORIDE SERPL-SCNC: 105 MMOL/L (ref 96–108)
CO2 SERPL-SCNC: 29 MMOL/L (ref 21–32)
CREAT SERPL-MCNC: 0.82 MG/DL (ref 0.6–1.3)
GFR SERPL CREATININE-BSD FRML MDRD: 69 ML/MIN/1.73SQ M
GLUCOSE P FAST SERPL-MCNC: 76 MG/DL (ref 65–99)
POTASSIUM SERPL-SCNC: 4.3 MMOL/L (ref 3.5–5.3)
PTH-INTACT SERPL-MCNC: 110.5 PG/ML (ref 12–88)
SODIUM SERPL-SCNC: 139 MMOL/L (ref 135–147)

## 2024-12-11 PROCEDURE — 80048 BASIC METABOLIC PNL TOTAL CA: CPT

## 2024-12-11 PROCEDURE — 36415 COLL VENOUS BLD VENIPUNCTURE: CPT

## 2024-12-11 PROCEDURE — 83970 ASSAY OF PARATHORMONE: CPT

## 2024-12-11 PROCEDURE — 82306 VITAMIN D 25 HYDROXY: CPT

## 2024-12-12 ENCOUNTER — RESULTS FOLLOW-UP (OUTPATIENT)
Dept: ENDOCRINOLOGY | Facility: CLINIC | Age: 79
End: 2024-12-12

## 2024-12-17 ENCOUNTER — OFFICE VISIT (OUTPATIENT)
Dept: GASTROENTEROLOGY | Facility: MEDICAL CENTER | Age: 79
End: 2024-12-17
Payer: COMMERCIAL

## 2024-12-17 ENCOUNTER — TELEPHONE (OUTPATIENT)
Dept: GASTROENTEROLOGY | Facility: MEDICAL CENTER | Age: 79
End: 2024-12-17

## 2024-12-17 VITALS
SYSTOLIC BLOOD PRESSURE: 119 MMHG | BODY MASS INDEX: 25.85 KG/M2 | WEIGHT: 170.6 LBS | DIASTOLIC BLOOD PRESSURE: 70 MMHG | HEART RATE: 71 BPM | TEMPERATURE: 97.5 F | HEIGHT: 68 IN | OXYGEN SATURATION: 96 %

## 2024-12-17 DIAGNOSIS — K58.0 IRRITABLE BOWEL SYNDROME WITH DIARRHEA: ICD-10-CM

## 2024-12-17 DIAGNOSIS — K21.9 GASTROESOPHAGEAL REFLUX DISEASE, UNSPECIFIED WHETHER ESOPHAGITIS PRESENT: ICD-10-CM

## 2024-12-17 DIAGNOSIS — R13.10 DYSPHAGIA, UNSPECIFIED TYPE: Primary | ICD-10-CM

## 2024-12-17 PROCEDURE — 99214 OFFICE O/P EST MOD 30 MIN: CPT | Performed by: INTERNAL MEDICINE

## 2024-12-17 RX ORDER — SODIUM CHLORIDE, SODIUM LACTATE, POTASSIUM CHLORIDE, CALCIUM CHLORIDE 600; 310; 30; 20 MG/100ML; MG/100ML; MG/100ML; MG/100ML
125 INJECTION, SOLUTION INTRAVENOUS CONTINUOUS
OUTPATIENT
Start: 2024-12-17

## 2024-12-17 NOTE — TELEPHONE ENCOUNTER
Procedure: EGD  Date: 03/05/2025  Physician performing: Dr. Jaiyeola   Location of procedure:  [facility]  Instructions given to patient: Yes  Diabetic: No  Clearances: NA

## 2024-12-17 NOTE — PROGRESS NOTES
Name: Nella Yancey      : 1945      MRN: [id number]  Encounter Provider: Diana M Jaiyeola, MD  Encounter Date: 2024   Encounter department: [facility]  :  Assessment & Plan  Dysphagia, unspecified type  She reports dysphagia and has a history of chronic acid reflux.  Her last endoscopy in  showed a 4 cm hiatal hernia otherwise unremarkable exam.  We discussed that her symptoms may be of oropharyngeal etiology given her difficulty with water/liquids and coughing with eating.  I have placed an order for speech and swallow evaluation.  In regards to her chronic reflux she will continue to take omeprazole 20 mg in the morning and in the future taper to the lowest effective dose that controls reflux symptoms. Diagnostic EGD is scheduled given history of dysphagia to rule out Schatzki's ring, esophagitis, peptic stricture.    Orders:    FL barium swallow video w speech; Future    EGD; Future    Gastroesophageal reflux disease, unspecified whether esophagitis present    Orders:    EGD; Future    omeprazole (PriLOSEC) 20 mg delayed release capsule; Take 1 capsule (20 mg total) by mouth daily before breakfast    Irritable bowel syndrome with diarrhea  She has a history of diarrhea predominant IBS.  She should continue to avoid dietary triggers and may use Imodium as needed.           History of Present Illness   HPI  Nella Yancey is a 79 y.o. female who presents follow-up evaluation.  She has a history of IBS-D, GERD, interstitial lung disease.    She was last seen in the GI office 2024 for follow-up evaluation of IBS and GERD, dysphagia symptoms.    Interval history: She reports starting Lipitor and took the medication before bed and had worsening acid reflux.  She transition to take the medication with dinner and her reflux improved.  She continues to take omeprazole 20 mg in the morning.  She reports ongoing dysphagia particularly to liquids.   She denies solid food dysphagia or odynophagia.  She reports coughing with eating at times.  She states that her IBS symptoms are under control and she does not have to use Imodium.  She was previously prescribed Bentyl but this was not covered by her insurance      She has no recent abdominal imaging available for review    11/2024 liver enzymes are within normal limit, hemoglobin 15, platelets 203     Prior EGD/colonoscopy     8/2023 EGD showed a 4 cm hiatal hernia otherwise normal exam.  Duodenal and gastric biopsies were normal.  8/2023 colonoscopy showed a subcentimeter colon polyp and diverticulosis.  Pathology showed tubular adenoma and random colon biopsies were unremarkable and she was recommended to not repeat colonoscopy given her age     April 2021 EGD was normal.  Empiric dilation was performed with balloon to 20 mm without mucosal disruption.  Biopsies were negative for EOE  2018 EGD showed mild esophagitis in distal third of the esophagus.     2018 colonoscopy showed small polyp in the transverse colon, internal and external hemorrhoids.  Pathology showed polypoid mucosa with mild edema.  She was recommended repeat colonoscopy in 5 years.  Duodenal biopsies were normal, Gastric biopsy showed chronic gastritis negative for H. pylori and distal esophageal biopsy showed chronic esophagitis negative for intestinal metaplasia      Review of Systems   Constitutional:  Negative for chills and fever.   HENT:  Negative for ear pain and sore throat.    Eyes:  Negative for pain and visual disturbance.   Respiratory:  Negative for cough and shortness of breath.    Cardiovascular:  Negative for chest pain and palpitations.   Gastrointestinal:  Negative for abdominal pain and vomiting.   Genitourinary:  Negative for dysuria and hematuria.   Musculoskeletal:  Negative for arthralgias and back pain.   Skin:  Negative for color change and rash.   Neurological:  Negative for seizures and syncope.   All other systems  "reviewed and are negative.         Objective   /70 (BP Location: Left arm, Patient Position: Sitting, Cuff Size: Standard)   Pulse 71   Temp 97.5 °F (36.4 °C) (Tympanic)   Ht 5' 8\" (1.727 m)   Wt 77.4 kg (170 lb 9.6 oz)   SpO2 96%   BMI 25.94 kg/m²      Physical Exam  Vitals and nursing note reviewed.   Constitutional:       General: She is not in acute distress.     Appearance: She is well-developed.   HENT:      Head: Normocephalic and atraumatic.   Eyes:      Conjunctiva/sclera: Conjunctivae normal.   Cardiovascular:      Rate and Rhythm: Normal rate and regular rhythm.      Heart sounds: No murmur heard.  Pulmonary:      Effort: Pulmonary effort is normal. No respiratory distress.      Breath sounds: Normal breath sounds.   Abdominal:      Palpations: Abdomen is soft.      Tenderness: There is no abdominal tenderness.   Musculoskeletal:         General: No swelling.      Cervical back: Neck supple.   Skin:     General: Skin is warm and dry.      Capillary Refill: Capillary refill takes less than 2 seconds.   Neurological:      Mental Status: She is alert.   Psychiatric:         Mood and Affect: Mood normal.           "

## 2024-12-17 NOTE — ASSESSMENT & PLAN NOTE
She has a history of diarrhea predominant IBS.  She should continue to avoid dietary triggers and may use Imodium as needed.

## 2024-12-18 ENCOUNTER — OFFICE VISIT (OUTPATIENT)
Dept: ENDOCRINOLOGY | Facility: CLINIC | Age: 79
End: 2024-12-18
Payer: COMMERCIAL

## 2024-12-18 VITALS
WEIGHT: 173 LBS | BODY MASS INDEX: 26.22 KG/M2 | SYSTOLIC BLOOD PRESSURE: 140 MMHG | DIASTOLIC BLOOD PRESSURE: 80 MMHG | HEIGHT: 68 IN

## 2024-12-18 DIAGNOSIS — Z98.890 H/O PARATHYROIDECTOMY: ICD-10-CM

## 2024-12-18 DIAGNOSIS — Z90.89 H/O PARATHYROIDECTOMY: ICD-10-CM

## 2024-12-18 DIAGNOSIS — M81.0 AGE-RELATED OSTEOPOROSIS WITHOUT CURRENT PATHOLOGICAL FRACTURE: ICD-10-CM

## 2024-12-18 DIAGNOSIS — E04.2 MULTINODULAR GOITER: ICD-10-CM

## 2024-12-18 DIAGNOSIS — E55.9 VITAMIN D DEFICIENCY: ICD-10-CM

## 2024-12-18 DIAGNOSIS — R26.9 GAIT ABNORMALITY: Primary | ICD-10-CM

## 2024-12-18 PROCEDURE — 99214 OFFICE O/P EST MOD 30 MIN: CPT | Performed by: INTERNAL MEDICINE

## 2024-12-18 PROCEDURE — G2211 COMPLEX E/M VISIT ADD ON: HCPCS | Performed by: INTERNAL MEDICINE

## 2024-12-18 NOTE — PROGRESS NOTES
Nella Yancey 79 y.o. female MRN: [id number]    Encounter: [id number]      Assessment & Plan     Assessment:  This is a 79 y.o.-year-old female with vitamin D deficiency.    Plan:  Diagnoses and all orders for this visit:    Gait abnormality  Denies history of gait imbalance, increased risk of fracture.  Will refer to physical therapy  -     Ambulatory Referral to Physical Therapy; Future    H/O parathyroidectomy  Most recent PTH is elevated, calcium is within normal range, vitamin D is also within normal range  We will continue to monitor PTH and calcium  -     PTH, intact; Future    Age-related osteoporosis without current pathological fracture  Continue Reclast infusion once a year, she is due for Reclast infusion in January 2025.  Discussed to call infusion center to make appointment.  Her creatinine as well as calcium is within normal range.  She is due for DEXA scan in December 2025.  DEXA scan ordered  Follow-up in 1 year  Vitamin D deficiency  Continue Vitamin D3 supplementation 4000 IU daily  -     Vitamin D 25 hydroxy; Future  -     Basic metabolic panel; Future    Multinodular goiter  Thyroid nodules are stable.  No need to follow-up with ultrasound       CC:   Gait instability, osteoporosis, primary hyperparathyroidism    History of Present Illness     HPI:    Nella Yancey is 78 yr old woman medical history of primary hyperparathyroidism, status post parathyroid surgery(4 gland exploration in August 2022, out of which 3 glands were removed) with persistent elevated calcium and PTH she is here for follow-up.  Had a DEXA scan in December 2023 which showed T-score of -2.7 at lumbar spine suggestive of osteoporosis.  Forearm T-score in 2022 was -3.7.  Patient has history of forearm fracture.  She was started on Reclast infusion in January 2024, tolerated it well    Has history of thyroid nodules, ultrasound was done in 2022 which showed stable right and left thyroid nodules.  He denies family history  of thyroid cancer.  Recently PTH is elevated, vitamin D is normal and calcium is also within normal range.  Does not take calcium supplementation, takes vitamin D3 4000 IU daily        Lab Results   Component Value Date    CREATININE 0.82 12/11/2024     Lab Results   Component Value Date    .5 (H) 12/11/2024    CALCIUM 9.8 12/11/2024    PHOS 2.7 09/13/2021        Review of Systems   Constitutional:  Positive for activity change. Negative for diaphoresis, fatigue, fever and unexpected weight change.   HENT: Negative.     Eyes:  Negative for visual disturbance.   Respiratory:  Negative for cough, chest tightness and shortness of breath.    Cardiovascular:  Negative for chest pain, palpitations and leg swelling.   Gastrointestinal:  Negative for abdominal pain, blood in stool, constipation, diarrhea, nausea and vomiting.   Endocrine: Negative for cold intolerance, heat intolerance, polydipsia, polyphagia and polyuria.   Genitourinary:  Negative for dysuria, enuresis, frequency and urgency.   Musculoskeletal:  Positive for arthralgias, gait problem and myalgias.   Skin:  Negative for pallor, rash and wound.   Allergic/Immunologic: Negative.    Neurological:  Negative for dizziness, tremors, weakness and numbness.   Hematological: Negative.    Psychiatric/Behavioral:  The patient is nervous/anxious.        Historical Information   Past Medical History:   Diagnosis Date    Anxiety     Arthritis     Back pain     Balance problems     Chest pain     heaviness    Colon polyp     Difficulty swallowing     Dizziness     Dry cough     Gait disorder     GERD (gastroesophageal reflux disease)     Hyperparathyroidism (HCC)     Hypertension     Increased frequency of headaches     Migraines     Neck pain     Numbness and tingling     bles    Palpitations     Pericarditis     Thyroid disease     nodule    Trouble in sleeping     Wears glasses      Past Surgical History:   Procedure Laterality Date    APPENDECTOMY       CHOLECYSTECTOMY      laparoscopic    COLONOSCOPY      KNEE SURGERY Left     PERICARDIAL WINDOW      NM OPTX DSTL RADL X-ARTIC FX/EPIPHYSL SEP Right 03/22/2018    Procedure: OPEN REDUCTION W/ INTERNAL FIXATION (ORIF) RADIUS, splint application;  Surgeon: Kandice Kimble MD;  Location: [facility];  Service: Orthopedics    NM PARATHYROIDECTOMY/EXPLORATION PARATHYROIDS N/A 08/17/2022    Procedure: PARATHYROIDECTOMY, 4 GLAND EXPLORATION;  Surgeon: Ovidio Perkins MD;  Location: [facility];  Service: ENT    SKIN BIOPSY      UPPER GASTROINTESTINAL ENDOSCOPY       Social History   Social History     Substance and Sexual Activity   Alcohol Use Not Currently    Comment: Rarely      Social History     Substance and Sexual Activity   Drug Use No     Social History     Tobacco Use   Smoking Status Never   Smokeless Tobacco Never     Family History:   Family History   Problem Relation Age of Onset    Kidney failure Mother     Hypertension Mother     Lung cancer Father     No Known Problems Sister     Yosef Parkinson White syndrome Daughter     Substance Abuse Neg Hx     Alcohol abuse Neg Hx     Mental illness Neg Hx     Colon cancer Neg Hx     Stomach cancer Neg Hx        Meds/Allergies   Current Outpatient Medications   Medication Sig Dispense Refill    acetaminophen (TYLENOL) 500 mg tablet Take 500-1,000 mg by mouth every 6 (six) hours as needed for mild pain      albuterol (PROVENTIL HFA,VENTOLIN HFA) 90 mcg/act inhaler Inhale 2 puffs every 6 (six) hours as needed for wheezing or shortness of breath 8 g 1    ALPRAZolam (XANAX) 0.5 mg tablet Take 1 tablet (0.5 mg total) by mouth daily at bedtime as needed for anxiety or sleep 20 tablet 1    ascorbic acid (VITAMIN C) 500 mg tablet Take 500 mg by mouth daily      atorvastatin (LIPITOR) 10 mg tablet TAKE 0.5 TABLETS (5 MG TOTAL) BY MOUTH DAILY AFTER DINNER 30 tablet 0    b complex vitamins tablet Take 1 tablet by mouth daily      Cholecalciferol 50 MCG (2000 UT) CAPS Take  "3,000 Units by mouth daily      Magnesium 300 MG CAPS Take 300 mg by mouth in the morning      meclizine (ANTIVERT) 12.5 MG tablet Can take 1-2 tabs as needed up to BID for dizziness or vertigo, caution as can be sedating 30 tablet 11    metoprolol succinate (TOPROL-XL) 25 mg 24 hr tablet TAKE 1 TABLET BY MOUTH DAILY AT BEDTIME 90 tablet 3    Multiple Vitamins-Minerals (MULTIVITAMIN ADULTS 50+ PO) Take 1 tablet by mouth in the morning      Nutritional Supplements (OSAPLEX MK-7 PO) Take 1 tablet by mouth in the morning      omeprazole (PriLOSEC) 20 mg delayed release capsule Take 1 capsule (20 mg total) by mouth daily before breakfast 90 capsule 1    Riboflavin (VITAMIN B-2 PO) Take 250 mg by mouth in the morning      aspirin (Aspirin 81) 81 mg EC tablet Take 1 tablet (81 mg total) by mouth daily (Patient not taking: Reported on 11/14/2024)       No current facility-administered medications for this visit.     Allergies   Allergen Reactions    Ciprofloxacin Myalgia       Objective   Vitals: Blood pressure 140/80, height 5' 8\" (1.727 m), weight 78.5 kg (173 lb), not currently breastfeeding.    Physical Exam  Constitutional:       General: She is not in acute distress.     Appearance: Normal appearance. She is not ill-appearing.   HENT:      Head: Normocephalic and atraumatic.      Nose: Nose normal.   Eyes:      Extraocular Movements: Extraocular movements intact.      Conjunctiva/sclera: Conjunctivae normal.   Pulmonary:      Effort: No respiratory distress.   Musculoskeletal:      Cervical back: Normal range of motion.   Neurological:      General: No focal deficit present.      Mental Status: She is alert and oriented to person, place, and time.   Psychiatric:         Mood and Affect: Mood normal.         Behavior: Behavior normal.         The history was obtained from the review of the chart, patient.    Lab Results:   Lab Results   Component Value Date/Time    Potassium 4.3 12/11/2024 07:10 AM    Potassium 4.0 " 11/12/2024 07:35 AM    Potassium 3.9 08/15/2024 03:14 AM    Chloride 105 12/11/2024 07:10 AM    Chloride 104 11/12/2024 07:35 AM    Chloride 107 08/15/2024 03:14 AM    CO2 29 12/11/2024 07:10 AM    CO2 29 11/12/2024 07:35 AM    CO2 26 08/15/2024 03:14 AM    BUN 19 12/11/2024 07:10 AM    BUN 16 11/12/2024 07:35 AM    BUN 16 08/15/2024 03:14 AM    Creatinine 0.82 12/11/2024 07:10 AM    Creatinine 0.74 11/12/2024 07:35 AM    Creatinine 0.76 08/15/2024 03:14 AM    Glucose, Fasting 76 12/11/2024 07:10 AM    Glucose, Fasting 84 11/12/2024 07:35 AM    Glucose, Fasting 86 07/12/2024 07:42 AM    Calcium 9.8 12/11/2024 07:10 AM    Calcium 9.5 11/12/2024 07:35 AM    Calcium 9.8 08/15/2024 03:14 AM    eGFR 69 12/11/2024 07:10 AM    eGFR 78 11/12/2024 07:35 AM    eGFR 75 08/15/2024 03:14 AM    TSH 3RD GENERATON 1.606 07/03/2024 07:26 AM    Free T4 0.88 07/03/2024 07:26 AM    .5 (H) 12/11/2024 07:10 AM    .4 (H) 07/03/2024 07:26 AM    .9 (H) 03/25/2024 07:24 AM    Vit D, 25-Hydroxy 36.6 12/11/2024 07:10 AM    Vit D, 25-Hydroxy 32.7 07/03/2024 07:26 AM    Vit D, 25-Hydroxy 34.8 03/25/2024 07:24 AM         Imaging Studies:         Results for orders placed during the hospital encounter of 12/14/23    DXA bone density spine hip and pelvis    Impression  1. Osteoporosis. Based on the lumbar spine    2.  The 10 year risk of hip fracture is 4.5% with the 10 year risk of major osteoporotic fracture being 20% as calculated by the [employer] fracture risk assessment tool (FRAX, which is based on data generated by the WHO Collaborating [place]  for Metabolic Bone Diseases).    3.  The current NOF guidelines recommend treating patients with a T-score of -2.5 or less in the lumbar spine or hips, or in post-menopausal women and men over the age of 50 with low bone mass (osteopenia) and a FRAX 10 year risk score of >3% for hip  fracture and/or >20% for major osteoporotic fracture.    4.  The NOF recommends  "follow-up DXA in 1-2 years after initiating therapy for osteoporosis and every 2 years thereafter. More frequent evaluation is appropriate for patients with conditions associated with rapid bone loss, such as glucocorticoid  therapy. The interval between DXA screenings may be longer for individuals without major risk factors and initial T-score in the normal or upper low bone mass range.      The FRAX algorithm has certain limitations:  -FRAX has not been validated in patients currently or previously treated with pharmacotherapy for osteoporosis.  In such patients, clinical judgment must be exercised in interpreting FRAX scores.  -Prior hip, vertebral and humeral fragility fractures appear to confer greater risk of subsequent fracture than fractures at other sites (this is especially true for individuals with severe vertebral fractures), but quantification of this incremental  risk is not possible with FRAX.  -FRAX underestimates fracture risk in patients with history of multiple fragility fractures.  -FRAX may underestimate fracture risk in patients with history of frequent falls.  -It is not appropriate to use FRAX to monitor treatment response.      WHO CLASSIFICATION:  Normal (a T-score of -1.0 or higher)  Low bone mineral density (a T-score of less than -1.0 but higher than -2.5)  Osteoporosis (a T-score of -2.5 or less)  Severe osteoporosis (a T-score of -2.5 or less with a fragility fracture)                Workstation performed: [id number]            Results Review Statement: No pertinent imaging studies reviewed.    Portions of the record may have been created with voice recognition software. Occasional wrong word or \"sound a like\" substitutions may have occurred due to the inherent limitations of voice recognition software. Read the chart carefully and recognize, using context, where substitutions have occurred.    "

## 2024-12-23 DIAGNOSIS — N39.41 URGE URINARY INCONTINENCE: Primary | ICD-10-CM

## 2024-12-23 DIAGNOSIS — R39.15 URINARY URGENCY: ICD-10-CM

## 2025-01-08 ENCOUNTER — EVALUATION (OUTPATIENT)
Facility: REHABILITATION | Age: 80
End: 2025-01-08
Payer: COMMERCIAL

## 2025-01-08 DIAGNOSIS — R26.9 GAIT ABNORMALITY: ICD-10-CM

## 2025-01-08 PROCEDURE — 97162 PT EVAL MOD COMPLEX 30 MIN: CPT | Performed by: PHYSICAL THERAPIST

## 2025-01-08 NOTE — PROGRESS NOTES
PT Evaluation     Today's date: 2025  Patient name: Nella Yancey  : 1945  MRN: [id number]  Referring provider: Hali Zepeda MD  Dx:   Encounter Diagnosis     ICD-10-CM    1. Gait abnormality  R26.9 Ambulatory Referral to Physical Therapy          Start Time: 1245  Stop Time: 1330  Total time in clinic (min): 45 minutes    Assessment  Impairments: abnormal gait, impaired balance, impaired physical strength, lacks appropriate home exercise program, unable to perform ADL, participation limitations, activity limitations and endurance  Symptom irritability: low    Assessment details: Nella is a 79 year old female with recent history of falling and imbalance.  Functional leg strength and dynamic balance suggest increased risk of falling, Nella likely lacks ability to reactively step in instances of temporary imbalance, which has contributed to recent falls.  She will benefit from physical therapy to improve lower extremity strength, dynamic and reactive balance.      Understanding of Dx/Px/POC: good     Prognosis: good    Goals  Short Term by 25  1. Patient will walk 1000 ft in 6 minute walk test.   2. Patient will score a 20/30 in functional gait assessment.  3. Patient will be able to perform 5 time sit to stand without use of upper extremities.     Long term goals by 3/8/25  1. Patient will walk 1150 feet in 6 minute walk test.   2. Patient will score a 23/30 on the functional gait assessment.  3. Patient will perform the 5 time sit to  15 seconds without use of UE and using a standard chair.       Plan  Patient would benefit from: skilled physical therapy    Planned therapy interventions: ADL training, balance, neuromuscular re-education, patient/caregiver education, coordination, strengthening, therapeutic activities, therapeutic exercise and home exercise program    Frequency: 2x week  Duration in weeks: 8  Plan of Care beginning date: 2025  Plan of Care expiration date:  3/8/2025  Treatment plan discussed with: patient        Subjective Evaluation    History of Present Illness  Mechanism of injury: Afraid to walk fell niranjan day and new years day  [place] day: getting off of barstool  New Year's Day: tripped over leg of barstool  Broke wrist from fall 7 years ago    History of dizziness when getting out of bed, periodically throughout her life starting 54 years ago. Last episode of dizziness a few weeks ago.  Last year, vergo occurs, instructed to see PT or neurologist  Symptoms provoked when: Laying in bed, changing sides, room spinning    Fear of falling and getting hurt.       When foot hits something, provoke stairs  Lives in 3 floor [facility]: ambulates stairs often.   Holds onto railing with stairs    Did not go to doctor following falls  No Headaches following falls  No changes in sleep    Patient Goals  Patient goals for therapy: increased strength, independence with ADLs/IADLs and return to sport/leisure activities  Patient goal: not be afraid of falling, walking on hikes,  Pain  Current pain ratin  At best pain ratin  At worst pain ratin    Social Support  Lives in: multiple-level home  Lives with: spouse        BP:122/78  HR: 93 bpm  SpO2: 98%       Positional Vertigo testing:  Modified Guthrie-Hallpike right: negative  Modified denice-hallpike left: negative  Supine roll test: negative bilaterally.   VOMS:   Vertical Saccades: Normal  Horizontal Saccades: Normal   Smooth pursuits: normal      Mobility Measures:  FGA:   Six minute walk test: 825 feet  Timed up and go: 16.8 seconds  5 time sit to stand using 19 inch surface and 1 UE support: 25.7 seconds    Flowsheet Rows      Flowsheet Row Most Recent Value   Functional Gait Assessment    Gait Level Surface  2   Change in GaiT Speed 2   Gait with horizontal head turns 2   Gait with vertical head turns 3   Gait and pivot tuen  3   Step over obstacle 2   Gait with narrow base of supprt 0   Gait with eyes  closed 0   Ambulating backwards 1   Steps 1   Total score  16                  Precautions: fall risk      Manuals                                                                 Neuro Re-Ed                                                                                                        Ther Ex                                                                                                                     Ther Activity                                       Gait Training                                       Modalities

## 2025-01-14 ENCOUNTER — OFFICE VISIT (OUTPATIENT)
Facility: REHABILITATION | Age: 80
End: 2025-01-14
Payer: COMMERCIAL

## 2025-01-14 DIAGNOSIS — R26.9 GAIT ABNORMALITY: Primary | ICD-10-CM

## 2025-01-14 PROCEDURE — 97110 THERAPEUTIC EXERCISES: CPT | Performed by: PHYSICAL THERAPIST

## 2025-01-14 PROCEDURE — 97112 NEUROMUSCULAR REEDUCATION: CPT | Performed by: PHYSICAL THERAPIST

## 2025-01-14 NOTE — PROGRESS NOTES
"PHYSICAL THERAPY TREATMENT     Date: 25  Name: Nella Yancey  : 1945  Referring Provider: Hali Zepeda MD  AUTHORIZATION:   Insurance: Payor: [employer]BE[employer]  REP / Plan: [employer] MEDICARE PPO [facility] REP / Product Type: Medicare PPO /     PATIENT HISTORY:  HPI: Nella Yancey is a 79 y.o. female referred to outpatient physical therapy for the following diagnosis   Encounter Diagnosis   Name Primary?    Gait abnormality Yes       Afraid of falling.     Assessment  Tolerated treatment well today. Tends to lack knee flexion when stepping over hurdles resulting in slight circumduction. Did well with backwards walking to blazepods. Preferred to carryweight with one hand as opposed to, so that she had an arm to catch herself if she felt  imbalance. Nella lack confidence in some balance activities due to a fear of falling, but tolerated activities well. Lower extremity weakness is a large limiting factor here also.  Uses one arm to perform sit tot stand as well as an elevated surface, elevating to a 20 inch surface. With continuing to work on strength, can progress sit to stand to no UE/lower surface. Would benefit from continued PT to work on balance and strength.      EXAMINATION / TREATMENT    Precautions: fall risk  Gradually progress balance and strength as patient improves and becomes more confident in activities.       Plan: Continue per plan of care.       Manuals 25                                                                Neuro Re-Ed              Overground walking no  feet  Then same with change of direction forwards to back and 180 degree turns, about 2 min    Blazepods in solostep  18' line  1 min   Then + over foam mat with 8\" beam underneath  1 min   Then + carry 9 lb water weight  X 2 sets    Step up 4\" step   5 each side  No AD    Sit to stand 20\" surface, L hand on chair  5 x 3 sets      4 cone hurdles step overs x 4 laps of 10 ft  Added 2 small yellow hurdles x 4 laps of 10 " ft  Then, blazepods activity (3 sets a 1 minute)    Blazepods bwd walking 20 ft (3sets a 1 minute) 2nd and 3rd sets, 9 lb water weight.                                                                                                   Ther Ex                                                                                                                     Ther Activity                                       Gait Training                                       Modalities

## 2025-01-15 ENCOUNTER — HOSPITAL ENCOUNTER (OUTPATIENT)
Dept: PULMONOLOGY | Facility: HOSPITAL | Age: 80
Discharge: HOME | End: 2025-01-15
Attending: INTERNAL MEDICINE
Payer: COMMERCIAL

## 2025-01-15 DIAGNOSIS — J84.9 ILD (INTERSTITIAL LUNG DISEASE) (HCC): ICD-10-CM

## 2025-01-15 PROCEDURE — 94729 DIFFUSING CAPACITY: CPT | Performed by: INTERNAL MEDICINE

## 2025-01-15 PROCEDURE — 94760 N-INVAS EAR/PLS OXIMETRY 1: CPT

## 2025-01-15 PROCEDURE — 94010 BREATHING CAPACITY TEST: CPT | Performed by: INTERNAL MEDICINE

## 2025-01-15 PROCEDURE — 94729 DIFFUSING CAPACITY: CPT

## 2025-01-15 PROCEDURE — 94010 BREATHING CAPACITY TEST: CPT

## 2025-01-16 ENCOUNTER — OFFICE VISIT (OUTPATIENT)
Facility: REHABILITATION | Age: 80
End: 2025-01-16
Payer: COMMERCIAL

## 2025-01-16 DIAGNOSIS — R26.9 GAIT ABNORMALITY: Primary | ICD-10-CM

## 2025-01-16 PROCEDURE — 97112 NEUROMUSCULAR REEDUCATION: CPT | Performed by: PHYSICAL THERAPIST

## 2025-01-16 PROCEDURE — 97110 THERAPEUTIC EXERCISES: CPT | Performed by: PHYSICAL THERAPIST

## 2025-01-16 NOTE — PROGRESS NOTES
"PHYSICAL THERAPY TREATMENT     Date: 25  Name: Nella Yancey  : 1945  Referring Provider: Hali Zepeda MD  AUTHORIZATION:   Insurance: Payor: ABEBE  REP / Plan: [employer] MEDICARE PPO [facility] REP / Product Type: Medicare PPO /     PATIENT HISTORY:  HPI: Nella Yancey is a 79 y.o. female referred to outpatient physical therapy for the following diagnosis   Encounter Diagnosis   Name Primary?    Gait abnormality Yes      Feels off balance when walking outside. Afraid of falling.      Assessment      Tolerated treatment well. Progressed hurdles to using one higher lemuel and 4 small hurdles. Did well with small hurdles. Struggles to step over higher hurdles which results in imbalance. Better confidence today with balance activities overall. Would benefit from continued PT. Working on LE strength today by performing sit to stands. Has as easier time performing sit to stands with right UE compared to left UE. Performed sit to stands without UE from 24 inch surface. Will continue to work on this exercise in the clinic to work improve strength. It was also instructed to work on sit to stands at home. Would benefit from Continued PT.    EXAMINATION / TREATMENT    Precautions: fall risk  Gradually progress balance and strength as patient improves and becomes more confident in activities.       Plan: Continue per plan of care.       Manuals 25                                                               Neuro Re-Ed              Overground walking no  feet  Then same with change of direction forwards to back and 180 degree turns, about 2 min    Blazepods in solostep  18' line  1 min   Then + over foam mat with 8\" beam underneath  1 min   Then + carry 9 lb water weight  X 2 sets    Step up 4\" step   5 each side  No AD    Sit to stand 20\" surface, L hand on chair  5 x 3 sets      4 cone hurdles step overs x 4 laps of 10 ft  Added 2 small yellow hurdles x 4 laps of 10 ft  Then, blazepods " "activity (3 sets a 1 minute)    Blazepods bwd walking 20 ft (3sets a 1 minute) 2nd and 3rd sets, 9 lb water weight.          Biodex treadmill 0.7 mph  3 min      Cone hurdles 10 ft (4 laps)  All small yellow hurdles (4 laps)  Implementing one high yellow lemuel (4 laps)     Blazepod activity (stepping back on one foot (2 sets x 1 min)    Step ups using 4 inch step without AD.  10 each side    Sit to stands 20 inch surface (3 sets of 5 using one UE) alternating right and left UE each set. 2 sets of 5 sit to stands with 24\" surface without use of UE.     Blazepods with backwards walking 20 ft (3 sets x 1 min)                                                                                         Ther Ex                                                                                                                     Ther Activity                                       Gait Training                                       Modalities                                            "

## 2025-01-17 DIAGNOSIS — E78.5 HYPERLIPIDEMIA, UNSPECIFIED HYPERLIPIDEMIA TYPE: ICD-10-CM

## 2025-01-17 RX ORDER — ATORVASTATIN CALCIUM 10 MG/1
5 TABLET, FILM COATED ORAL
Qty: 45 TABLET | Refills: 1 | Status: SHIPPED | OUTPATIENT
Start: 2025-01-17

## 2025-01-20 ENCOUNTER — RESULTS FOLLOW-UP (OUTPATIENT)
Dept: PULMONOLOGY | Facility: CLINIC | Age: 80
End: 2025-01-20

## 2025-01-21 ENCOUNTER — APPOINTMENT (OUTPATIENT)
Dept: LAB | Facility: CLINIC | Age: 80
End: 2025-01-21
Payer: COMMERCIAL

## 2025-01-21 ENCOUNTER — OFFICE VISIT (OUTPATIENT)
Facility: REHABILITATION | Age: 80
End: 2025-01-21
Payer: COMMERCIAL

## 2025-01-21 DIAGNOSIS — R26.9 GAIT ABNORMALITY: Primary | ICD-10-CM

## 2025-01-21 DIAGNOSIS — M81.0 AGE-RELATED OSTEOPOROSIS WITHOUT CURRENT PATHOLOGICAL FRACTURE: ICD-10-CM

## 2025-01-21 DIAGNOSIS — R10.10 PAIN OF UPPER ABDOMEN: Primary | ICD-10-CM

## 2025-01-21 LAB
ALBUMIN SERPL BCG-MCNC: 3.8 G/DL (ref 3.5–5)
ALP SERPL-CCNC: 71 U/L (ref 34–104)
ALT SERPL W P-5'-P-CCNC: 21 U/L (ref 7–52)
ANION GAP SERPL CALCULATED.3IONS-SCNC: 6 MMOL/L (ref 4–13)
AST SERPL W P-5'-P-CCNC: 20 U/L (ref 13–39)
BILIRUB SERPL-MCNC: 0.89 MG/DL (ref 0.2–1)
BUN SERPL-MCNC: 22 MG/DL (ref 5–25)
CALCIUM SERPL-MCNC: 9.9 MG/DL (ref 8.4–10.2)
CHLORIDE SERPL-SCNC: 103 MMOL/L (ref 96–108)
CO2 SERPL-SCNC: 30 MMOL/L (ref 21–32)
CREAT SERPL-MCNC: 0.81 MG/DL (ref 0.6–1.3)
GFR SERPL CREATININE-BSD FRML MDRD: 70 ML/MIN/1.73SQ M
GLUCOSE P FAST SERPL-MCNC: 82 MG/DL (ref 65–99)
POTASSIUM SERPL-SCNC: 3.9 MMOL/L (ref 3.5–5.3)
PROT SERPL-MCNC: 6.8 G/DL (ref 6.4–8.4)
SODIUM SERPL-SCNC: 139 MMOL/L (ref 135–147)

## 2025-01-21 PROCEDURE — 36415 COLL VENOUS BLD VENIPUNCTURE: CPT

## 2025-01-21 PROCEDURE — 97110 THERAPEUTIC EXERCISES: CPT | Performed by: PHYSICAL THERAPIST

## 2025-01-21 PROCEDURE — 80053 COMPREHEN METABOLIC PANEL: CPT

## 2025-01-21 PROCEDURE — 97112 NEUROMUSCULAR REEDUCATION: CPT | Performed by: PHYSICAL THERAPIST

## 2025-01-21 NOTE — PROGRESS NOTES
PHYSICAL THERAPY TREATMENT     Date: 25  Name: Nella Yancey  : 1945  Referring Provider: Hali Zepeda MD  AUTHORIZATION:   Insurance: Payor: ABEBE  REP / Plan: [employer] MEDICARE PPO [facility] REP / Product Type: Medicare PPO /     PATIENT HISTORY:  HPI: Nella Yancey is a 79 y.o. female referred to outpatient physical therapy for the following diagnosis   Encounter Diagnosis   Name Primary?    Gait abnormality Yes        Felt dizzy moving back and forth while doing chores earlier. Afraid of falling.      Assessment      Tolerated treatment well. Progressed to stepping over 6 inch step due to increased confidence in balance as well as increased strength. Had some difficulty with lower yellow hurdles due to circumduction of the left LE. With cueing to step directly over hurdles, she performs this with minimal imbalance. Moved slower on tumbletrack mat with 2 foams underneath likely due to fear of falling and lack of confidence in balance. Added eccentric lowering to sit to stand to increase LE strength. Would benefit from continued PT.    EXAMINATION / TREATMENT    Precautions: fall risk  Gradually progress balance and strength as patient improves and becomes more confident in activities.     FGA:   Six minute walk test: 825 feet  Timed up and go: 16.8 seconds  5 time sit to stand using 19 inch surface and 1 UE support: 25.7 seconds    Goals  Short Term by 25  1. Patient will walk 1000 ft in 6 minute walk test.   2. Patient will score a 20/30 in functional gait assessment.  3. Patient will be able to perform 5 time sit to stand without use of upper extremities.     Long term goals by 3/8/25  1. Patient will walk 1150 feet in 6 minute walk test.   2. Patient will score a 23/30 on the functional gait assessment.  3. Patient will perform the 5 time sit to  15 seconds without use of UE and using a standard chair.   Plan: Continue per plan of care.       Manuals 25    "                                                           Neuro Re-Ed              Overground walking no  feet  Then same with change of direction forwards to back and 180 degree turns, about 2 min    Blazepods in solostep  18' line  1 min   Then + over foam mat with 8\" beam underneath  1 min   Then + carry 9 lb water weight  X 2 sets    Step up 4\" step   5 each side  No AD    Sit to stand 20\" surface, L hand on chair  5 x 3 sets      4 cone hurdles step overs x 4 laps of 10 ft  Added 2 small yellow hurdles x 4 laps of 10 ft  Then, blazepods activity (3 sets a 1 minute)    Blazepods bwd walking 20 ft (3sets a 1 minute) 2nd and 3rd sets, 9 lb water weight.          Biodex treadmill 0.7 mph  3 min      Cone hurdles 10 ft (4 laps)  All small yellow hurdles (4 laps)  Implementing one high yellow lemuel (4 laps)     Blazepod activity (stepping back on one foot (2 sets x 1 min)    Step ups using 4 inch step without AD.  10 each side    Sit to stands 20 inch surface (3 sets of 5 using one UE) alternating right and left UE each set. 2 sets of 5 sit to stands with 24\" surface without use of UE.     Blazepods with backwards walking 20 ft (3 sets x 1 min)   All small yellow hurdles Blazepods (3 sets x 1 min)      Blazepod activity (stepping back on one foot (2 sets x 1 min)    Step ups blazepods using 4 inch  for 1st set, 6 inch for 2ndand 3rd set without AD.        Sit to stands 20 inch surface (3 sets of 5 using one UE) alternating right and left UE each set. 2 sets of 5 sit to stands with 24\" surface without use of UE.       Fwd/bwd with blazepods (3 sets x 1 minute)    7 min true treadmill   1.0 mph    Blazepods with tumbletrack mat with 1 foam underneath (3 sets x 1 minute) 2nd and 3rd sets with 2 foam mats      Sit to stands 20 inch surface (3 sets of 5 using one UE) alternating right and left UE each set. Eccentric count to 5 lowering                                                                                 "        Ther Ex                                                                                                                     Ther Activity                                       Gait Training                                       Modalities

## 2025-01-22 ENCOUNTER — RESULTS FOLLOW-UP (OUTPATIENT)
Dept: ENDOCRINOLOGY | Facility: CLINIC | Age: 80
End: 2025-01-22

## 2025-01-22 ENCOUNTER — OFFICE VISIT (OUTPATIENT)
Facility: REHABILITATION | Age: 80
End: 2025-01-22
Payer: COMMERCIAL

## 2025-01-22 DIAGNOSIS — R26.9 GAIT ABNORMALITY: Primary | ICD-10-CM

## 2025-01-22 PROCEDURE — 97110 THERAPEUTIC EXERCISES: CPT | Performed by: PHYSICAL THERAPIST

## 2025-01-22 PROCEDURE — 97112 NEUROMUSCULAR REEDUCATION: CPT | Performed by: PHYSICAL THERAPIST

## 2025-01-22 NOTE — PROGRESS NOTES
"PHYSICAL THERAPY TREATMENT     Date: 25  Name: Nella Yancey  : 1945  Referring Provider: Hali Zepeda MD  AUTHORIZATION:   Insurance: Payor: ABEBE  REP / Plan: [employer] MEDICARE PPO [facility] REP / Product Type: Medicare PPO /     PATIENT HISTORY:  HPI: Nella Yancey is a 79 y.o. female referred to outpatient physical therapy for the following diagnosis   Encounter Diagnosis   Name Primary?    Gait abnormality Yes       No complaints since last visit. Being careful when walking outside.     Assessment    Tolerated treatment well. Continued to step onto and over 6 inch step due to increased confidence in balance as well as increased strength.Nella was able to perform sit to stands without use of UE from a 20 inch surface today, indicating increased strength. With cueing to \"push arms\" in front as if she is pushing am object, Nella was able to achieve the required momentum needed to stand up from a seated position. Added lateral step ups/downs today which was difficult for patient. Instructed standing hip abduction with counts of 3 concentrically and eccentrically. Recommended this to Nella for home exercise program.  Would benefit from continued PT.      EXAMINATION / TREATMENT    Precautions: fall risk  Gradually progress balance and strength as patient improves and becomes more confident in activities.     FGA:   Six minute walk test: 825 feet  Timed up and go: 16.8 seconds  5 time sit to stand using 19 inch surface and 1 UE support: 25.7 seconds    Goals  Short Term by 25  1. Patient will walk 1000 ft in 6 minute walk test.   2. Patient will score a 20/30 in functional gait assessment.  3. Patient will be able to perform 5 time sit to stand without use of upper extremities.     Long term goals by 3/8/25  1. Patient will walk 1150 feet in 6 minute walk test.   2. Patient will score a 23/30 on the functional gait assessment.  3. Patient will perform the 5 time sit to  15 " "seconds without use of UE and using a standard chair.   Plan: Continue per plan of care.       Manuals 1/14/25 1/16/25 1/21/25 1/22/25                                                             Neuro Re-Ed              Overground walking no  feet  Then same with change of direction forwards to back and 180 degree turns, about 2 min    Blazepods in solostep  18' line  1 min   Then + over foam mat with 8\" beam underneath  1 min   Then + carry 9 lb water weight  X 2 sets    Step up 4\" step   5 each side  No AD    Sit to stand 20\" surface, L hand on chair  5 x 3 sets      4 cone hurdles step overs x 4 laps of 10 ft  Added 2 small yellow hurdles x 4 laps of 10 ft  Then, blazepods activity (3 sets a 1 minute)    Blazepods bwd walking 20 ft (3sets a 1 minute) 2nd and 3rd sets, 9 lb water weight.          Biodex treadmill 0.7 mph  3 min      Cone hurdles 10 ft (4 laps)  All small yellow hurdles (4 laps)  Implementing one high yellow lemuel (4 laps)     Blazepod activity (stepping back on one foot (2 sets x 1 min)    Step ups using 4 inch step without AD.  10 each side    Sit to stands 20 inch surface (3 sets of 5 using one UE) alternating right and left UE each set. 2 sets of 5 sit to stands with 24\" surface without use of UE.     Blazepods with backwards walking 20 ft (3 sets x 1 min)   All small yellow hurdles Blazepods (3 sets x 1 min)      Blazepod activity (stepping back on one foot (2 sets x 1 min)    Step ups blazepods using 4 inch  for 1st set, 6 inch for 2ndand 3rd set without AD.        Sit to stands 20 inch surface (3 sets of 5 using one UE) alternating right and left UE each set. 2 sets of 5 sit to stands with 24\" surface without use of UE.       Fwd/bwd with blazepods (3 sets x 1 minute)    7 min true treadmill   1.0 mph    Blazepods with tumbletrack mat with 1 foam underneath (3 sets x 1 minute) 2nd and 3rd sets with 2 foam mats      Sit to stands 20 inch surface (3 sets of 5 using one UE) alternating " right and left UE each set. Eccentric count to 5 lowering Scifi bike 1.0 resistance   8 min    Overground  Walking 100 ft   26.0 sec  30. Sec  26.0 sec    Add 10 lbs 100 ft x 1 lap    Add 20 lbs 100 ft  32.6 sec  30.2     100 ft laps without resistance  26.4 sec  24.2 sec    Blazepods with step up and over (4 inch step)  3 sets x 1 minute  2nd set using 6 inch step    Sit to stands   3 sets of 5 reps without UE, cues to push arms in from, from 20 inch surface    Lateral step downs x 5 each side    Fwd step up/downs from 6 inch step x 10    Standing hip abduction with counting to 3, then counting to 5. 5 reps each side                                                                                                 Ther Ex                                                                                                                     Ther Activity                                       Gait Training                                       Modalities

## 2025-01-22 NOTE — RESULT ENCOUNTER NOTE
Hi    Your blood work results is essentially within normal range  Calcium is also within normal range

## 2025-01-23 ENCOUNTER — OFFICE VISIT (OUTPATIENT)
Dept: CARDIOLOGY CLINIC | Facility: CLINIC | Age: 80
End: 2025-01-23
Payer: COMMERCIAL

## 2025-01-23 VITALS
OXYGEN SATURATION: 97 % | HEART RATE: 70 BPM | DIASTOLIC BLOOD PRESSURE: 78 MMHG | WEIGHT: 173.1 LBS | SYSTOLIC BLOOD PRESSURE: 142 MMHG | HEIGHT: 68 IN | BODY MASS INDEX: 26.24 KG/M2

## 2025-01-23 DIAGNOSIS — R07.89 ATYPICAL CHEST PAIN: ICD-10-CM

## 2025-01-23 DIAGNOSIS — E78.2 MIXED HYPERLIPIDEMIA: ICD-10-CM

## 2025-01-23 DIAGNOSIS — R00.2 PALPITATIONS: ICD-10-CM

## 2025-01-23 DIAGNOSIS — I72.9 ANEURYSM (HCC): ICD-10-CM

## 2025-01-23 DIAGNOSIS — M81.0 AGE-RELATED OSTEOPOROSIS WITHOUT CURRENT PATHOLOGICAL FRACTURE: Primary | ICD-10-CM

## 2025-01-23 DIAGNOSIS — I36.1 NONRHEUMATIC TRICUSPID VALVE REGURGITATION: ICD-10-CM

## 2025-01-23 DIAGNOSIS — I10 PRIMARY HYPERTENSION: Primary | ICD-10-CM

## 2025-01-23 PROCEDURE — 99214 OFFICE O/P EST MOD 30 MIN: CPT | Performed by: INTERNAL MEDICINE

## 2025-01-23 RX ORDER — ZOLEDRONIC ACID 0.05 MG/ML
5 INJECTION, SOLUTION INTRAVENOUS ONCE
OUTPATIENT
Start: 2025-01-27

## 2025-01-23 RX ORDER — SODIUM CHLORIDE 9 MG/ML
20 INJECTION, SOLUTION INTRAVENOUS ONCE
OUTPATIENT
Start: 2025-01-27

## 2025-01-23 RX ORDER — AMLODIPINE BESYLATE 2.5 MG/1
2.5 TABLET ORAL DAILY
Qty: 30 TABLET | Refills: 11 | Status: SHIPPED | OUTPATIENT
Start: 2025-01-23

## 2025-01-23 NOTE — PATIENT INSTRUCTIONS
Your blood pressure is above goal.  Lets add a medication called amlodipine at a low dose.  This can help control blood pressure and also may help with your chest pain symptoms.

## 2025-01-23 NOTE — PROGRESS NOTES
Cardiology and Heart Failure Clinic Note    Nella Yancey 79 y.o. female   MRN: [id number]  Encounter: [id number]        Assessment / Plan:    # HTN  Toprol 25  BP above goal at 142/78.    Add norvasc 2.5.    # HLD  Lipitor 10mg M/W/F      # Atypical chest pain  Infrequently gets some chest discomfort at rest that comes and goes spontaneously within seconds   Nuclear stress 2023 - no ischemia  Adding norvasc as above, may also help with atypical chest pain    # Palpitations  On toprol  No concerning findings on holter in 2021    # Mild-moderate TR  Serial follow up    # Pericarditis  Distant.  Around 2014.  Had pericardial effusion that was drained and it sounds like had a surgical window.     # ILD  Follows with pulmonary     # Aneurysm - on prob list.  Added per epic.   Pt reports does not have aneurysm.      Today's Plan Summary:  See above assessment/plan for full details of today's plan.  Briefly,     Add low dose norvasc                  Reason For Visit / Chief Complaint:  Follow up - new to me - transferring cardiac care  - HTN, HLD, atypical chest pain    HPI:   Nella Yancey is a 79 y.o.  female with history as noted in the problem list and further detailed in the above assessment and plan.    Follow up - new to me - transferring cardiac care  - HTN, HLD, atypical chest pain    Last saw cardiology in October, Dr. Conrad Moreno.  As above, the patient has a history of hypertension, hyperlipidemia, atypical chest pain, palpitations, mild to moderate tricuspid regurgitation, and distant pericarditis.      Today, the patient reports -  denies chest pain.   No SOB.   No orthopnea or PND.   No leg edema.  Occasional palpitations, no syncope.    Retired.  Used to be in sales.   Used to live in [place].  Now live in [place].    was a  in [facility].       Never smoker.  Rare ETOH.  No drugs.          Cardiac Imaging personally reviewed:  EKG 8-14-24  Normal sinus  rhythm  Incomplete right bundle branch block       Holter or event monitor Holter 2021 -   1.Normal sinus rhythm   2.Low-density of PVC   3.Low-density of PAC  4.Symptoms of chest discomfort did not correlate with prognostically important arrhythmia or ST segment change.       Echo 6-2023  EF 60%  1-2+ TR   [facility]    Cardiac MRI    Stress testing Nuclear stress   No ischemia or scar. LVEF 74%    Coronary CTA or [facility]    RHC    CPET              Patient Active Problem List    Diagnosis Date Noted   • Nonrheumatic tricuspid valve regurgitation 01/23/2025   • Mild intermittent asthma without complication 10/16/2024   • Headache with neurologic deficit 08/14/2024   • Stroke-like symptoms 08/14/2024   • HTN (hypertension) 08/14/2024   • HLD (hyperlipidemia) 08/14/2024   • Age-related osteoporosis without current pathological fracture 01/09/2024   • Neuropathy 12/08/2023   • Numbness and tingling of both lower extremities 12/08/2023   • Primary osteoarthritis of right knee 03/22/2023   • [facility]) ([facility])    • H/O parathyroidectomy 09/14/2022   • Skin tag of anus 08/31/2022   • SOBOE (shortness of breath on exertion) 08/17/2022   • Aneurysm (HCC) 07/21/2022   • Edema of both ankles 07/01/2022   • Cervical myofascial pain syndrome 05/24/2022   • Osteoporosis with current pathological fracture 09/10/2021   • Parathyroid related hypercalcemia (HCC) 09/10/2021   • Hallux abducto valgus, bilateral 04/27/2021   • Pincer nail deformity 04/27/2021   • Palpitations 02/17/2021   • Calf pain 10/08/2020   • Myalgia 10/08/2020   • Elevated blood-pressure reading without diagnosis of hypertension 07/30/2020   • Dysphonia 07/20/2020   • Reflux laryngitis 07/20/2020   • Paresis of left vocal fold 07/20/2020   • Glottic insufficiency 07/20/2020   • Muscle tension dysphonia 07/20/2020   • TMJ dysfunction 07/20/2020   • Pharyngoesophageal dysphagia 07/20/2020   • Paresthesias    • Carpal tunnel syndrome of left wrist     • Irritable bowel syndrome with diarrhea 01/08/2020   • Elevated amylase and lipase 01/08/2020   • Pericardial effusion 11/20/2019   • Abnormal CT scan of lung 07/03/2019   • Migraine without aura and without status migrainosus, not intractable 03/18/2019   • Dizziness and giddiness 03/18/2019   • Insomnia 03/18/2019   • Cervicalgia 03/18/2019   • Lung nodule 01/16/2019   • Atypical chest pain 01/06/2019   • Anxiety 11/13/2018   • Primary hyperparathyroidism (HCC) 11/13/2018   • Thyroid nodule 11/13/2018   • Vitamin D deficiency 11/13/2018   • Closed fracture distal radius and ulna, right, initial encounter 03/20/2018   • History of pericarditis 03/12/2018   • Gastroesophageal reflux disease with esophagitis 10/18/2017   • Heart murmur 04/17/2015       Past Medical History:   Diagnosis Date   • Anxiety    • Arthritis    • Back pain    • Balance problems    • Chest pain     heaviness   • Colon polyp    • Difficulty swallowing    • Dizziness    • Dry cough    • Gait disorder    • GERD (gastroesophageal reflux disease)    • Hyperparathyroidism (HCC)    • Hypertension    • Increased frequency of headaches    • Migraines    • Neck pain    • Numbness and tingling     bles   • Palpitations    • Pericarditis    • Thyroid disease     nodule   • Trouble in sleeping    • Wears glasses        Allergies   Allergen Reactions   • Ciprofloxacin Myalgia       Current Outpatient Medications   Medication Instructions   • acetaminophen (TYLENOL) 500-1,000 mg, Every 6 hours PRN   • albuterol (PROVENTIL HFA,VENTOLIN HFA) 90 mcg/act inhaler 2 puffs, Inhalation, Every 6 hours PRN   • ALPRAZolam (XANAX) 0.5 mg, Oral, Daily at bedtime PRN   • amLODIPine (NORVASC) 2.5 mg, Oral, Daily   • ascorbic acid (VITAMIN C) 500 mg, Daily   • atorvastatin (LIPITOR) 5 mg, Oral, Daily after dinner   • b complex vitamins tablet 1 tablet, Daily   • Cholecalciferol 3,000 Units, Daily   • Magnesium 300 mg, Daily   • meclizine (ANTIVERT) 12.5 MG tablet Can  take 1-2 tabs as needed up to BID for dizziness or vertigo, caution as can be sedating   • metoprolol succinate (TOPROL-XL) 25 mg, Oral, Daily at bedtime   • Multiple Vitamins-Minerals (MULTIVITAMIN ADULTS 50+ PO) 1 tablet, Daily   • Nutritional Supplements (OSAPLEX MK-7 PO) 1 tablet, Daily   • omeprazole (PRILOSEC) 20 mg, Oral, Daily before breakfast   • Riboflavin (VITAMIN B-2 PO) 250 mg, Daily       Social History     Socioeconomic History   • Marital status: /Civil Union     Spouse name: Not on file   • Number of children: Not on file   • Years of education: Not on file   • Highest education level: Not on file   Occupational History   • Not on file   Tobacco Use   • Smoking status: Never   • Smokeless tobacco: Never   Vaping Use   • Vaping status: Never Used   Substance and Sexual Activity   • Alcohol use: Not Currently     Comment: Rarely    • Drug use: No   • Sexual activity: Yes     Partners: Male   Other Topics Concern   • Not on file   Social History Narrative    WORK:    1.  [place] ([employer]; [employer]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    2.  [employer]'s        HOBBIES:    1.  Singing    2.  Dancing    3.  Hx of ceramics (+ dust)        PETS:    1.  Dogs    -  Denies birds        TRAVEL:    -  [place]        EXPOSURES:    Denies mold; down pillows/comforters; hot tubs     Social Drivers of Health     Financial Resource Strain: Low Risk  (10/30/2023)    Overall Financial Resource Strain (CARDIA)    • Difficulty of Paying Living Expenses: Not very hard   Food Insecurity: No Food Insecurity (11/19/2024)    Hunger Vital Sign    • Worried About Running Out of Food in the Last Year: Never true    • Ran Out of Food in the Last Year: Never true   Transportation Needs: No Transportation Needs (11/19/2024)    PRAPARE - Transportation    • Lack of Transportation (Medical): No    • Lack of Transportation (Non-Medical): No   Physical Activity: Not on file   Stress: Not on file   Social Connections: Not on  "file   Intimate Partner Violence: Not on file   Housing Stability: Low Risk  (11/19/2024)    Housing Stability Vital Sign    • Unable to Pay for Housing in the Last Year: No    • Number of Times Moved in the Last Year: 0    • Homeless in the Last Year: No       Family History   Problem Relation Age of Onset   • Kidney failure Mother    • Hypertension Mother    • Lung cancer Father    • No Known Problems Sister    • Yosef Parkinson White syndrome Daughter    • Substance Abuse Neg Hx    • Alcohol abuse Neg Hx    • Mental illness Neg Hx    • Colon cancer Neg Hx    • Stomach cancer Neg Hx        Physical Exam:  Blood pressure 142/78, pulse 70, height 5' 8\" (1.727 m), weight 78.5 kg (173 lb 1.6 oz), SpO2 97%, not currently breastfeeding.  Body mass index is 26.32 kg/m².  Wt Readings from Last 3 Encounters:   01/23/25 78.5 kg (173 lb 1.6 oz)   12/18/24 78.5 kg (173 lb)   12/17/24 77.4 kg (170 lb 9.6 oz)     Physical Exam  Vitals reviewed.   Constitutional:       General: She is not in acute distress.     Appearance: She is not toxic-appearing.   Neck:      Comments: No JVP elevation  Cardiovascular:      Rate and Rhythm: Normal rate and regular rhythm.      Heart sounds: No murmur heard.     No friction rub. No gallop.   Pulmonary:      Breath sounds: Normal breath sounds. No wheezing, rhonchi or rales.   Abdominal:      General: There is no distension.      Palpations: Abdomen is soft.      Tenderness: There is no abdominal tenderness. There is no guarding.   Musculoskeletal:      Right lower leg: No edema.      Left lower leg: No edema.   Neurological:      Mental Status: She is alert.         Labs & Results:  Lab Results   Component Value Date    SODIUM 139 01/21/2025    K 3.9 01/21/2025     01/21/2025    CO2 30 01/21/2025    BUN 22 01/21/2025    CREATININE 0.81 01/21/2025    GLUC 92 08/15/2024    CALCIUM 9.9 01/21/2025         Time Statement:  I have spent a total time of 37 minutes in caring for this patient on " the day of the visit/encounter including Diagnostic results, Prognosis, Risks and benefits of tx options, Instructions for management, Patient and family education, Importance of tx compliance, Risk factor reductions, Impressions, Counseling / Coordination of care, Documenting in the medical record, and Obtaining or reviewing history  .      Thank you for the opportunity to participate in the care of this patient.    Valerio Adames MD, [facility]  Advanced Heart Failure and Transplant Cardiologist  Director of Cardio-Oncology  [facility]

## 2025-01-24 ENCOUNTER — TELEPHONE (OUTPATIENT)
Dept: GASTROENTEROLOGY | Facility: MEDICAL CENTER | Age: 80
End: 2025-01-24

## 2025-01-24 ENCOUNTER — TELEPHONE (OUTPATIENT)
Dept: DERMATOLOGY | Facility: CLINIC | Age: 80
End: 2025-01-24

## 2025-01-24 NOTE — TELEPHONE ENCOUNTER
Called patient to advise their upcoming appointment on 5/5 with Dr. Torres needs to be rescheduled, as the [place] office is now closed. Spoke with patient and her appt has been r/s for 5/15 @ 2:30 with Dr. Torres in [place] office.

## 2025-01-27 ENCOUNTER — HOSPITAL ENCOUNTER (OUTPATIENT)
Dept: INFUSION CENTER | Facility: CLINIC | Age: 80
Discharge: HOME | End: 2025-01-27
Payer: COMMERCIAL

## 2025-01-27 VITALS
SYSTOLIC BLOOD PRESSURE: 150 MMHG | DIASTOLIC BLOOD PRESSURE: 81 MMHG | OXYGEN SATURATION: 98 % | BODY MASS INDEX: 26.22 KG/M2 | RESPIRATION RATE: 18 BRPM | HEIGHT: 68 IN | WEIGHT: 173 LBS | TEMPERATURE: 96.7 F | HEART RATE: 63 BPM

## 2025-01-27 DIAGNOSIS — M81.0 AGE-RELATED OSTEOPOROSIS WITHOUT CURRENT PATHOLOGICAL FRACTURE: Primary | ICD-10-CM

## 2025-01-27 PROCEDURE — 96374 THER/PROPH/DIAG INJ IV PUSH: CPT

## 2025-01-27 RX ORDER — ZOLEDRONIC ACID 0.05 MG/ML
5 INJECTION, SOLUTION INTRAVENOUS ONCE
OUTPATIENT
Start: 2026-01-27

## 2025-01-27 RX ORDER — SODIUM CHLORIDE 9 MG/ML
20 INJECTION, SOLUTION INTRAVENOUS ONCE
Status: COMPLETED | OUTPATIENT
Start: 2025-01-27 | End: 2025-01-27

## 2025-01-27 RX ORDER — SODIUM CHLORIDE 9 MG/ML
20 INJECTION, SOLUTION INTRAVENOUS ONCE
OUTPATIENT
Start: 2026-01-27

## 2025-01-27 RX ORDER — ZOLEDRONIC ACID 0.05 MG/ML
5 INJECTION, SOLUTION INTRAVENOUS ONCE
Status: COMPLETED | OUTPATIENT
Start: 2025-01-27 | End: 2025-01-27

## 2025-01-27 RX ADMIN — SODIUM CHLORIDE 20 ML/HR: 0.9 INJECTION, SOLUTION INTRAVENOUS at 11:27

## 2025-01-27 RX ADMIN — ZOLEDRONIC ACID 5 MG: 5 INJECTION INTRAVENOUS at 11:47

## 2025-01-27 NOTE — PATIENT INSTRUCTIONS
January 2025 Sunday Monday Tuesday Wednesday Thursday Friday Saturday                  1     2     3     4                5     6     7     8    PT Initial Evaluation  12:30 PM   (45 min.)   Jong Canales, PT   [facility] at [facility] 9     10     11                12     13     14    PT-Treatment   2:45 PM   (45 min.)   Jong Canales, PT   [facility] 15    PULM Diffusing Capacity  12:15 PM   (75 min.)   UB PULM ROOM 1   [facility] 16    PT-Treatment  12:45 PM   (45 min.)   Jong Canales, PT   [facility] 17     18                19     20     21    Laboratory Walk In   8:25 AM   (5 min.)   [facility] LAB PSC CHAIR   [facility]    PT-Treatment   1:45 PM   (45 min.)   Jong Canales PT   [facility] 22    PT-Treatment   3:15 PM   (45 min.)   Jong Canales, PT   [facility] 23    Cardiology Office Visit   1:05 PM   (20 min.)   Valerio Adames MD   [facility] [place] 24     25                26     27    Infusion Therapy Plan  11:00 AM   (165 min.)   AN INF CHAIR 2   [facility] 28     29     30    PT-Treatment   9:45 AM   (45 min.)   Jong Canales, PT   [facility] 31             Cycle 1, Treatment 2               Treatment Details         1/27/2025 - Cycle 1, Treatment 2      Supportive Care: ONCBCN PROVIDER COMMUNICATION9, zoledronic acid (RECLAST)        February 2025 Sunday Monday Tuesday Wednesday Thursday Friday Saturday                                 1                2     3     4    PT-Treatment   1:00 PM   (45 min.)   Jong Canales, PT   [facility] 5     6    PT-Treatment  12:45 PM   (45 min.)   Jong Canales, PT   Neuro  Physical Therapy at [facility] 7     8                9     10     11     12     13     14     15                16     17     18     19     20     21     22                23     24     25     26     27     28

## 2025-01-27 NOTE — PROGRESS NOTES
Patient here for yearly reclast dosing, no c/o or changes to report. CMP completed 1/23/25, calcium 9.9 and creatinine clearance 50.7.  Patient confirms she takes daily multivitamin with calcium and vitamin D, denies dental issues including recent extractions/root canals/other issues.

## 2025-01-27 NOTE — PROGRESS NOTES
Patient tolerated treatment without incident, she does report a headache due to treatment last year that lasted several days, she premedicated with tylenol at home prior to appt. I advised her to continue tylenol per otc instructions for the next several days. Patient verbalized understanding, BP recheck 150/81. Next appt due in 1 year.

## 2025-01-28 ENCOUNTER — APPOINTMENT (OUTPATIENT)
Facility: REHABILITATION | Age: 80
End: 2025-01-28
Payer: COMMERCIAL

## 2025-01-30 ENCOUNTER — OFFICE VISIT (OUTPATIENT)
Facility: REHABILITATION | Age: 80
End: 2025-01-30
Payer: COMMERCIAL

## 2025-01-30 DIAGNOSIS — R26.9 GAIT ABNORMALITY: Primary | ICD-10-CM

## 2025-01-30 PROCEDURE — 97110 THERAPEUTIC EXERCISES: CPT | Performed by: PHYSICAL THERAPIST

## 2025-01-30 PROCEDURE — 97112 NEUROMUSCULAR REEDUCATION: CPT | Performed by: PHYSICAL THERAPIST

## 2025-01-30 NOTE — PROGRESS NOTES
"PHYSICAL THERAPY TREATMENT     Date: 25  Name: Nella Yancey  : 1945  Referring Provider: Hali Zepeda MD  AUTHORIZATION:   Insurance: Payor: [facility] REP / Plan: [employer] MEDICARE PPO [facility] REP / Product Type: Medicare PPO /     PATIENT HISTORY:  HPI: Nella Yancey is a 79 y.o. female referred to outpatient physical therapy for the following diagnosis   Encounter Diagnosis   Name Primary?    Gait abnormality Yes     After therapy last week was in agony, pain about the right anterior and lateral lower stomach area.  It took about 3 days to go away.  No pain during activity.         EXAMINATION / TREATMENT    Precautions: fall risk  Gradually progress balance and strength as patient improves and becomes more confident in activities.     FGA: 25 not tested    Six minute walk test: 825 feet  25 1150 feet    Timed up and go: 16.8 seconds  25 10.1 seconds    5 time sit to stand using 19 inch surface and 1 UE support: 25.7 seconds  25 20 inch surface + 1 hand support on chair 26 seconds, unable 5 times frow lower surface      Manuals 25                                                                 Neuro Re-Ed               Outcome measures    5 times sit to stand x 3 sets  Best from 23.5 inch surface    Blazepods with step up and over (6 inch step)  2 sets x 1 minute    Lateral step downs x 5 each side  4\" step    Fwd step up/downs from 6 inch step x 10 each      Standing hip abduction with counting to 3, then counting to 5. 5 reps each side   Overground walking no  feet  Then same with change of direction forwards to back and 180 degree turns, about 2 min    Blazepods in solostep  18' line  1 min   Then + over foam mat with 8\" beam underneath  1 min   Then + carry 9 lb water weight  X 2 sets    Step up 4\" step   5 each side  No AD    Sit to stand 20\" surface, L hand on chair  5 x 3 sets      4 cone hurdles step overs x 4 laps of " "10 ft  Added 2 small yellow hurdles x 4 laps of 10 ft  Then, blazepods activity (3 sets a 1 minute)    Blazepods bwd walking 20 ft (3sets a 1 minute) 2nd and 3rd sets, 9 lb water weight.          Biodex treadmill 0.7 mph  3 min      Cone hurdles 10 ft (4 laps)  All small yellow hurdles (4 laps)  Implementing one high yellow lemuel (4 laps)     Blazepod activity (stepping back on one foot (2 sets x 1 min)    Step ups using 4 inch step without AD.  10 each side    Sit to stands 20 inch surface (3 sets of 5 using one UE) alternating right and left UE each set. 2 sets of 5 sit to stands with 24\" surface without use of UE.     Blazepods with backwards walking 20 ft (3 sets x 1 min)   All small yellow hurdles Blazepods (3 sets x 1 min)      Blazepod activity (stepping back on one foot (2 sets x 1 min)    Step ups blazepods using 4 inch  for 1st set, 6 inch for 2ndand 3rd set without AD.        Sit to stands 20 inch surface (3 sets of 5 using one UE) alternating right and left UE each set. 2 sets of 5 sit to stands with 24\" surface without use of UE.       Fwd/bwd with blazepods (3 sets x 1 minute)    7 min true treadmill   1.0 mph    Blazepods with tumbletrack mat with 1 foam underneath (3 sets x 1 minute) 2nd and 3rd sets with 2 foam mats      Sit to stands 20 inch surface (3 sets of 5 using one UE) alternating right and left UE each set. Eccentric count to 5 lowering Scifi bike 1.0 resistance   8 min    Overground  Walking 100 ft   26.0 sec  30. Sec  26.0 sec    Add 10 lbs 100 ft x 1 lap    Add 20 lbs 100 ft  32.6 sec  30.2     100 ft laps without resistance  26.4 sec  24.2 sec    Blazepods with step up and over (4 inch step)  3 sets x 1 minute  2nd set using 6 inch step    Sit to stands   3 sets of 5 reps without UE, cues to push arms in from, from 20 inch surface    Lateral step downs x 5 each side    Fwd step up/downs from 6 inch step x 10    Standing hip abduction with counting to 3, then counting to 5. 5 reps each " side                                                                                                       Ther Ex                                                                                                                              Ther Activity                                          Gait Training                                          Modalities                                             ASSESSMENT  Really good improvement in walking duration.  No change in sit to stand.  Quantified this and continue to practice challenging situations at home.         Goals  Short Term by 2/8/25  1. Patient will walk 1000 ft in 6 minute walk test. MET.  2. Patient will score a 20/30 in functional gait assessment.  NOT TESTED.  3. Patient will be able to perform 5 time sit to stand without use of upper extremities. NOT MET.    Long term goals by 3/8/25  1. Patient will walk 1150 feet in 6 minute walk test.  MET.  2. Patient will score a 23/30 on the functional gait assessment.  3. Patient will perform the 5 time sit to  15 seconds without use of UE and using a standard chair.     Plan: Continue per plan of care. Physical therapy 1-2 times per week for 1 month, neuromuscular re-education, therapeutic activity, therapeutic exercise.

## 2025-02-04 ENCOUNTER — OFFICE VISIT (OUTPATIENT)
Facility: REHABILITATION | Age: 80
End: 2025-02-04
Payer: COMMERCIAL

## 2025-02-04 DIAGNOSIS — R26.9 GAIT ABNORMALITY: Primary | ICD-10-CM

## 2025-02-04 PROCEDURE — 97110 THERAPEUTIC EXERCISES: CPT | Performed by: PHYSICAL THERAPIST

## 2025-02-04 PROCEDURE — 97112 NEUROMUSCULAR REEDUCATION: CPT | Performed by: PHYSICAL THERAPIST

## 2025-02-04 NOTE — PROGRESS NOTES
"PHYSICAL THERAPY TREATMENT     Date: 25  Name: Nella Yancey  : 1945  Referring Provider: Hali Zepeda MD  AUTHORIZATION:   Insurance: Payor: [facility] REP / Plan: [employer] MEDICARE PPO [facility] REP / Product Type: Medicare PPO /     PATIENT HISTORY:  HPI: Nella Yancey is a 79 y.o. female referred to outpatient physical therapy for the following diagnosis   Encounter Diagnosis   Name Primary?    Gait abnormality Yes     After therapy last week was in agony, pain about the right anterior and lateral lower stomach area.  It took about 3 days to go away.  No pain during activity.         EXAMINATION / TREATMENT    Precautions: fall risk  Gradually progress balance and strength as patient improves and becomes more confident in activities.     25 FGA: 25 not tested    25 Six minute walk test: 825 feet  25 1150 feet    25 Timed up and go: 16.8 seconds  25 10.1 seconds    25 5 time sit to stand using 19 inch surface and 1 UE support: 25.7 seconds  25 20 inch surface + 1 hand support on chair 26 seconds, unable 5 times frow lower surface  25 completes 1-2 from 20.5\" surface (no hands)        Manuals 25                                                                     Neuro Re-Ed                Sit to stand   Chair with 1 hand to ascend, no hands to descend  X 3    24 - 20.5\" surface about 12 total    Sidestep to hit blazepod targets in solost[place]  Step over 2\" x 2\" obstacles   1 min x 4 sets    Step up and back 4\" step about 10  6\" step use hiking pole about 10    Obstacles including 4\" steps with blazepods no AD  1 min x 3 sets    Standing hip abduction, 1 min  Then + yellow band 1 min  Standing heel raise 1 min    Forwards to backwards blazepods in [place] 1 min x 2 sets    200 feet overground in [facility]  67 sec  65 sec  60 sec     Outcome measures    5 times sit to stand x 3 sets  Best from 23.5 inch " "surface    Blazepods with step up and over (6 inch step)  2 sets x 1 minute    Lateral step downs x 5 each side  4\" step    Fwd step up/downs from 6 inch step x 10 each      Standing hip abduction with counting to 3, then counting to 5. 5 reps each side   Overground walking no  feet  Then same with change of direction forwards to back and 180 degree turns, about 2 min    Blazepods in [place]  18' line  1 min   Then + over foam mat with 8\" beam underneath  1 min   Then + carry 9 lb water weight  X 2 sets    Step up 4\" step   5 each side  No AD    Sit to stand 20\" surface, L hand on chair  5 x 3 sets      4 cone hurdles step overs x 4 laps of 10 ft  Added 2 small yellow hurdles x 4 laps of 10 ft  Then, blazepods activity (3 sets a 1 minute)    Blazepods bwd walking 20 ft (3sets a 1 minute) 2nd and 3rd sets, 9 lb water weight.          Biodex treadmill 0.7 mph  3 min      Cone hurdles 10 ft (4 laps)  All small yellow hurdles (4 laps)  Implementing one high yellow lemuel (4 laps)     Blazepod activity (stepping back on one foot (2 sets x 1 min)    Step ups using 4 inch step without AD.  10 each side    Sit to stands 20 inch surface (3 sets of 5 using one UE) alternating right and left UE each set. 2 sets of 5 sit to stands with 24\" surface without use of UE.     Blazepods with backwards walking 20 ft (3 sets x 1 min)   All small yellow hurdles Blazepods (3 sets x 1 min)      Blazepod activity (stepping back on one foot (2 sets x 1 min)    Step ups blazepods using 4 inch  for 1st set, 6 inch for 2ndand 3rd set without AD.        Sit to stands 20 inch surface (3 sets of 5 using one UE) alternating right and left UE each set. 2 sets of 5 sit to stands with 24\" surface without use of UE.       Fwd/bwd with blazepods (3 sets x 1 minute)    7 min true treadmill   1.0 mph    Blazepods with tumbletrack mat with 1 foam underneath (3 sets x 1 minute) 2nd and 3rd sets with 2 foam mats      Sit to stands 20 inch surface (3 " sets of 5 using one UE) alternating right and left UE each set. Eccentric count to 5 lowering Scifi bike 1.0 resistance   8 min    Overground  Walking 100 ft   26.0 sec  30. Sec  26.0 sec    Add 10 lbs 100 ft x 1 lap    Add 20 lbs 100 ft  32.6 sec  30.2     100 ft laps without resistance  26.4 sec  24.2 sec    Blazepods with step up and over (4 inch step)  3 sets x 1 minute  2nd set using 6 inch step    Sit to stands   3 sets of 5 reps without UE, cues to push arms in from, from 20 inch surface    Lateral step downs x 5 each side    Fwd step up/downs from 6 inch step x 10    Standing hip abduction with counting to 3, then counting to 5. 5 reps each side                                                                                                             Ther Ex                                                                                                                                       Ther Activity                                             Gait Training                                             Modalities                                                ASSESSMENT  Reviewed ways to practice sit to stand including:   - eccentric lowering   - finding raised surface (using chair and pillow at home)  Continue to work on dynamic balance and lower extremity strength.      Goals  Short Term by 2/8/25  1. Patient will walk 1000 ft in 6 minute walk test. MET.  2. Patient will score a 20/30 in functional gait assessment.  NOT TESTED.  3. Patient will be able to perform 5 time sit to stand without use of upper extremities. NOT MET.    Long term goals by 3/8/25  1. Patient will walk 1150 feet in 6 minute walk test.  MET.  2. Patient will score a 23/30 on the functional gait assessment.  3. Patient will perform the 5 time sit to  15 seconds without use of UE and using a standard chair.     Plan: Continue per plan of care. Physical therapy 1-2 times per week for 1 month, neuromuscular re-education,  therapeutic activity, therapeutic exercise.

## 2025-02-06 ENCOUNTER — APPOINTMENT (OUTPATIENT)
Facility: REHABILITATION | Age: 80
End: 2025-02-06
Payer: COMMERCIAL

## 2025-02-07 ENCOUNTER — TELEPHONE (OUTPATIENT)
Dept: GASTROENTEROLOGY | Facility: MEDICAL CENTER | Age: 80
End: 2025-02-07

## 2025-02-07 NOTE — TELEPHONE ENCOUNTER
Called and spoke to patient to confirm procedure for 03/05/2025 with Dr. Jaiyeola at [place], patient has confirmed and asked that it be early morning due to patient getting sick.

## 2025-02-11 ENCOUNTER — OFFICE VISIT (OUTPATIENT)
Facility: REHABILITATION | Age: 80
End: 2025-02-11
Payer: COMMERCIAL

## 2025-02-11 DIAGNOSIS — R26.9 GAIT ABNORMALITY: Primary | ICD-10-CM

## 2025-02-11 PROCEDURE — 97110 THERAPEUTIC EXERCISES: CPT | Performed by: PHYSICAL THERAPIST

## 2025-02-11 PROCEDURE — 97112 NEUROMUSCULAR REEDUCATION: CPT | Performed by: PHYSICAL THERAPIST

## 2025-02-11 NOTE — PROGRESS NOTES
"PHYSICAL THERAPY TREATMENT     Date: 25  Name: Nella Yancey  : 1945  Referring Provider: Hali Zepeda MD  AUTHORIZATION:   Insurance: Payor: [facility] REP / Plan: [employer] MEDICARE PPO [facility] REP / Product Type: Medicare PPO /     PATIENT HISTORY:  HPI: Nella Yancey is a 79 y.o. female referred to outpatient physical therapy for the following diagnosis   Encounter Diagnosis   Name Primary?    Gait abnormality Yes     Legs tired as was working on getting up and down from chair.  Also practicing sidesteppnig.    After therapy last week was in agony, pain about the right anterior and lateral lower stomach area.  It took about 3 days to go away.  No pain during activity.         EXAMINATION / TREATMENT    Precautions: fall risk  Gradually progress balance and strength as patient improves and becomes more confident in activities.     25 FGA: 25 not tested    25 Six minute walk test: 825 feet  25 1150 feet    25 Timed up and go: 16.8 seconds  25 10.1 seconds    25 5 time sit to stand using 19 inch surface and 1 UE support: 25.7 seconds  25 20 inch surface + 1 hand support on chair 26 seconds, unable 5 times frow lower surface  25 completes 1-2 from 20.5\" surface (no hands)        Manuals 25                                                                         Neuro Re-Ed                 Sit to stand 22\" to 19\" surface  About 6 total    Lateral step to hit blazepods  1 min x 2 sets  + stepping over 2\" objects 1 min x 2 sets  Then forwards over 8\" wide objects  1 min  + carry 5 lbs   1 min    4\" and 6\" step  1 min x 2 sets    Forwards/backwards, 1 min x 2 sets    Standing hip abduction, yellow band, about 45 sec x 2    Standing heel raise, increased weight one side  about 45 sec each    Walking forwards and back passing soccer ball, 3 min    Fast overground walking 130 feet with 1 180 degree " "turn  Best 33 sec (without overhead harness), otherwise around 40 sec  X 3-4 total Sit to stand   Chair with 1 hand to ascend, no hands to descend  X 3    24 - 20.5\" surface about 12 total    Sidestep to hit blazepod targets in [facility]  Step over 2\" x 2\" obstacles   1 min x 4 sets    Step up and back 4\" step about 10  6\" step use hiking pole about 10    Obstacles including 4\" steps with blazepods no AD  1 min x 3 sets    Standing hip abduction, 1 min  Then + yellow band 1 min  Standing heel raise 1 min    Forwards to backwards blazepods in [facility] 1 min x 2 sets    200 feet overground in [facility]  67 sec  65 sec  60 sec     Outcome measures    5 times sit to stand x 3 sets  Best from 23.5 inch surface    Blazepods with step up and over (6 inch step)  2 sets x 1 minute    Lateral step downs x 5 each side  4\" step    Fwd step up/downs from 6 inch step x 10 each      Standing hip abduction with counting to 3, then counting to 5. 5 reps each side   Overground walking no  feet  Then same with change of direction forwards to back and 180 degree turns, about 2 min    Blazepods in [facility]  18' line  1 min   Then + over foam mat with 8\" beam underneath  1 min   Then + carry 9 lb water weight  X 2 sets    Step up 4\" step   5 each side  No AD    Sit to stand 20\" surface, L hand on chair  5 x 3 sets      4 cone hurdles step overs x 4 laps of 10 ft  Added 2 small yellow hurdles x 4 laps of 10 ft  Then, blazepods activity (3 sets a 1 minute)    Blazepods bwd walking 20 ft (3sets a 1 minute) 2nd and 3rd sets, 9 lb water weight.          Biodex treadmill 0.7 mph  3 min      Cone hurdles 10 ft (4 laps)  All small yellow hurdles (4 laps)  Implementing one high yellow lemuel (4 laps)     Blazepod activity (stepping back on one foot (2 sets x 1 min)    Step ups using 4 inch step without AD.  10 each side    Sit to stands 20 inch surface (3 sets of 5 using one UE) alternating right and left UE each set. 2 sets of 5 sit to " "stands with 24\" surface without use of UE.     Blazepods with backwards walking 20 ft (3 sets x 1 min)   All small yellow hurdles Blazepods (3 sets x 1 min)      Blazepod activity (stepping back on one foot (2 sets x 1 min)    Step ups blazepods using 4 inch  for 1st set, 6 inch for 2ndand 3rd set without AD.        Sit to stands 20 inch surface (3 sets of 5 using one UE) alternating right and left UE each set. 2 sets of 5 sit to stands with 24\" surface without use of UE.       Fwd/bwd with blazepods (3 sets x 1 minute)    7 min true treadmill   1.0 mph    Blazepods with tumbletrack mat with 1 foam underneath (3 sets x 1 minute) 2nd and 3rd sets with 2 foam mats      Sit to stands 20 inch surface (3 sets of 5 using one UE) alternating right and left UE each set. Eccentric count to 5 lowering Scifi bike 1.0 resistance   8 min    Overground  Walking 100 ft   26.0 sec  30. Sec  26.0 sec    Add 10 lbs 100 ft x 1 lap    Add 20 lbs 100 ft  32.6 sec  30.2     100 ft laps without resistance  26.4 sec  24.2 sec    Blazepods with step up and over (4 inch step)  3 sets x 1 minute  2nd set using 6 inch step    Sit to stands   3 sets of 5 reps without UE, cues to push arms in from, from 20 inch surface    Lateral step downs x 5 each side    Fwd step up/downs from 6 inch step x 10    Standing hip abduction with counting to 3, then counting to 5. 5 reps each side                                                                                                                   Ther Ex                                                                                                                                                Ther Activity                                                Gait Training                                                Modalities                                                   ASSESSMENT  Reviewed ways to practice sit to stand including:   - eccentric lowering   - finding raised surface (using chair and " "pillow at home)  Good return demonstration.    Negotiated 6\" step without external support today.    Continue to work on dynamic balance and lower extremity strength.      Goals  Short Term by 2/8/25  1. Patient will walk 1000 ft in 6 minute walk test. MET.  2. Patient will score a 20/30 in functional gait assessment.  NOT TESTED.  3. Patient will be able to perform 5 time sit to stand without use of upper extremities. NOT MET.    Long term goals by 3/8/25  1. Patient will walk 1150 feet in 6 minute walk test.  MET.  2. Patient will score a 23/30 on the functional gait assessment.  3. Patient will perform the 5 time sit to  15 seconds without use of UE and using a standard chair.     Plan: Continue per plan of care. Physical therapy 1-2 times per week for 1 month, neuromuscular re-education, therapeutic activity, therapeutic exercise.        "

## 2025-02-13 ENCOUNTER — OFFICE VISIT (OUTPATIENT)
Facility: REHABILITATION | Age: 80
End: 2025-02-13
Payer: COMMERCIAL

## 2025-02-13 DIAGNOSIS — R26.9 GAIT ABNORMALITY: Primary | ICD-10-CM

## 2025-02-13 PROCEDURE — 97112 NEUROMUSCULAR REEDUCATION: CPT | Performed by: PHYSICAL THERAPIST

## 2025-02-13 PROCEDURE — 97110 THERAPEUTIC EXERCISES: CPT | Performed by: PHYSICAL THERAPIST

## 2025-02-13 NOTE — PROGRESS NOTES
"PHYSICAL THERAPY TREATMENT     Date: 25  Name: Nella Yancey  : 1945  Referring Provider: Hali Zepeda MD  AUTHORIZATION:   [facility] / Plan: [employer]ET[employer] MEDICARE PPO [facility] REP / Product Type: Medicare PPO /     PATIENT HISTORY:  HPI: Nella Yancey is a 79 y.o. female referred to outpatient physical therapy for the following diagnosis   Encounter Diagnosis   Name Primary?    Gait abnormality Yes     Kept up at night with caffeine.    So need feeling full of energy today.    Averaging 3-4 hours of sleep.  Avoids appointments in morning as talks to daughter and goes back to sleep for 1-2 hours.      EXAMINATION / TREATMENT    Precautions: fall risk  Gradually progress balance and strength as patient improves and becomes more confident in activities.     25 FGA: 25 not tested    25 Six minute walk test: 825 feet  25 1150 feet    25 Timed up and go: 16.8 seconds  25 10.1 seconds    25 5 time sit to stand using 19 inch surface and 1 UE support: 25.7 seconds  25 20 inch surface + 1 hand support on chair 26 seconds, unable 5 times frow lower surface  25 completes 1-2 from 20.5\" surface (no hands)        Manuals 25                                                                             Neuro Re-Ed                  Sit to stand  20.5\"   3 x 3 sets  19.5\" 4 eccentric    Blazepods in solostep  6\" step, 8\" wide \"puddle\" 16' line   1 min x 2 sets    Then 4\" and 6\" step 1 min x 2 sets    Stepping forwards and backwards 12' line 1 min x 2 sets    Step up 6\" step 5 each    Fast overground walking 65 feet down and back 40 sec  36 sec  40 sec  40 sec    Heel raise  40 sec  Then increased one side weight bearing about 10 each side    Standing static, lateral foot tap to 5\" high surface 1 min x 3 sets     Sit to stand 22\" to 19\" surface  About 6 total    Lateral step to hit " "blazepods  1 min x 2 sets  + stepping over 2\" objects 1 min x 2 sets  Then forwards over 8\" wide objects  1 min  + carry 5 lbs   1 min    4\" and 6\" step  1 min x 2 sets    Forwards/backwards, 1 min x 2 sets    Standing hip abduction, yellow band, about 45 sec x 2    Standing heel raise, increased weight one side  about 45 sec each    Walking forwards and back passing soccer ball, 3 min    Fast overground walking 130 feet with 1 180 degree turn  Best 33 sec (without overhead harness), otherwise around 40 sec  X 3-4 total Sit to stand   Chair with 1 hand to ascend, no hands to descend  X 3    24 - 20.5\" surface about 12 total    Sidestep to hit blazepod targets in [facility]  Step over 2\" x 2\" obstacles   1 min x 4 sets    Step up and back 4\" step about 10  6\" step use hiking pole about 10    Obstacles including 4\" steps with blazepods no AD  1 min x 3 sets    Standing hip abduction, 1 min  Then + yellow band 1 min  Standing heel raise 1 min    Forwards to backwards blazepods in [facility] 1 min x 2 sets    200 feet overground in [facility]  67 sec  65 sec  60 sec     Outcome measures    5 times sit to stand x 3 sets  Best from 23.5 inch surface    Blazepods with step up and over (6 inch step)  2 sets x 1 minute    Lateral step downs x 5 each side  4\" step    Fwd step up/downs from 6 inch step x 10 each      Standing hip abduction with counting to 3, then counting to 5. 5 reps each side   Overground walking no  feet  Then same with change of direction forwards to back and 180 degree turns, about 2 min    Blazepods in [facility]  18' line  1 min   Then + over foam mat with 8\" beam underneath  1 min   Then + carry 9 lb water weight  X 2 sets    Step up 4\" step   5 each side  No AD    Sit to stand 20\" surface, L hand on chair  5 x 3 sets      4 cone hurdles step overs x 4 laps of 10 ft  Added 2 small yellow hurdles x 4 laps of 10 ft  Then, blazepods activity (3 sets a 1 minute)    Blazepods bwd walking 20 ft (3sets a 1 " "minute) 2nd and 3rd sets, 9 lb water weight.          Biodex treadmill 0.7 mph  3 min      Cone hurdles 10 ft (4 laps)  All small yellow hurdles (4 laps)  Implementing one high yellow lemuel (4 laps)     Blazepod activity (stepping back on one foot (2 sets x 1 min)    Step ups using 4 inch step without AD.  10 each side    Sit to stands 20 inch surface (3 sets of 5 using one UE) alternating right and left UE each set. 2 sets of 5 sit to stands with 24\" surface without use of UE.     Blazepods with backwards walking 20 ft (3 sets x 1 min)   All small yellow hurdles Blazepods (3 sets x 1 min)      Blazepod activity (stepping back on one foot (2 sets x 1 min)    Step ups blazepods using 4 inch  for 1st set, 6 inch for 2ndand 3rd set without AD.        Sit to stands 20 inch surface (3 sets of 5 using one UE) alternating right and left UE each set. 2 sets of 5 sit to stands with 24\" surface without use of UE.       Fwd/bwd with blazepods (3 sets x 1 minute)    7 min true treadmill   1.0 mph    Blazepods with tumbletrack mat with 1 foam underneath (3 sets x 1 minute) 2nd and 3rd sets with 2 foam mats      Sit to stands 20 inch surface (3 sets of 5 using one UE) alternating right and left UE each set. Eccentric count to 5 lowering Scifi bike 1.0 resistance   8 min    Overground  Walking 100 ft   26.0 sec  30. Sec  26.0 sec    Add 10 lbs 100 ft x 1 lap    Add 20 lbs 100 ft  32.6 sec  30.2     100 ft laps without resistance  26.4 sec  24.2 sec    Blazepods with step up and over (4 inch step)  3 sets x 1 minute  2nd set using 6 inch step    Sit to stands   3 sets of 5 reps without UE, cues to push arms in from, from 20 inch surface    Lateral step downs x 5 each side    Fwd step up/downs from 6 inch step x 10    Standing hip abduction with counting to 3, then counting to 5. 5 reps each side                                                                                                                         Ther Ex           " "                                                                                                                                              Ther Activity                                                   Gait Training                                                   Modalities                                                      ASSESSMENT  Again negotiated 6\" step without external support today.    Continue to work on dynamic balance and lower extremity strength.  Continue to work on sit to stand at home.      Goals  Short Term by 2/8/25  1. Patient will walk 1000 ft in 6 minute walk test. MET.  2. Patient will score a 20/30 in functional gait assessment.  NOT TESTED.  3. Patient will be able to perform 5 time sit to stand without use of upper extremities. NOT MET.    Long term goals by 3/8/25  1. Patient will walk 1150 feet in 6 minute walk test.  MET.  2. Patient will score a 23/30 on the functional gait assessment.  3. Patient will perform the 5 time sit to  15 seconds without use of UE and using a standard chair.     Plan: Continue per plan of care. Physical therapy 1-2 times per week for 1 month, neuromuscular re-education, therapeutic activity, therapeutic exercise.        "

## 2025-02-16 ENCOUNTER — NURSE TRIAGE (OUTPATIENT)
Dept: OTHER | Facility: OTHER | Age: 80
End: 2025-02-16

## 2025-02-16 ENCOUNTER — HOSPITAL ENCOUNTER (EMERGENCY)
Facility: HOSPITAL | Age: 80
Discharge: HOME | End: 2025-02-16
Attending: EMERGENCY MEDICINE | Admitting: EMERGENCY MEDICINE
Payer: COMMERCIAL

## 2025-02-16 VITALS
BODY MASS INDEX: 25.76 KG/M2 | HEIGHT: 68 IN | RESPIRATION RATE: 18 BRPM | SYSTOLIC BLOOD PRESSURE: 160 MMHG | TEMPERATURE: 100.3 F | WEIGHT: 170 LBS | OXYGEN SATURATION: 95 % | DIASTOLIC BLOOD PRESSURE: 90 MMHG | HEART RATE: 90 BPM

## 2025-02-16 DIAGNOSIS — K52.9 GASTROENTERITIS: Primary | ICD-10-CM

## 2025-02-16 LAB
ALBUMIN SERPL BCG-MCNC: 4.3 G/DL (ref 3.5–5)
ALP SERPL-CCNC: 72 U/L (ref 34–104)
ALT SERPL W P-5'-P-CCNC: 24 U/L (ref 7–52)
ANION GAP SERPL CALCULATED.3IONS-SCNC: 8 MMOL/L (ref 4–13)
AST SERPL W P-5'-P-CCNC: 25 U/L (ref 13–39)
ATRIAL RATE: 89 BPM
BASOPHILS # BLD MANUAL: 0 THOUSAND/UL (ref 0–0.1)
BASOPHILS NFR MAR MANUAL: 0 % (ref 0–1)
BILIRUB SERPL-MCNC: 1.17 MG/DL (ref 0.2–1)
BUN SERPL-MCNC: 25 MG/DL (ref 5–25)
CALCIUM SERPL-MCNC: 9.6 MG/DL (ref 8.4–10.2)
CHLORIDE SERPL-SCNC: 107 MMOL/L (ref 96–108)
CO2 SERPL-SCNC: 23 MMOL/L (ref 21–32)
CREAT SERPL-MCNC: 0.73 MG/DL (ref 0.6–1.3)
EOSINOPHIL # BLD MANUAL: 0 THOUSAND/UL (ref 0–0.4)
EOSINOPHIL NFR BLD MANUAL: 0 % (ref 0–6)
ERYTHROCYTE [DISTWIDTH] IN BLOOD BY AUTOMATED COUNT: 12.7 % (ref 11.6–15.1)
FLUAV AG UPPER RESP QL IA.RAPID: NEGATIVE
FLUBV AG UPPER RESP QL IA.RAPID: NEGATIVE
GFR SERPL CREATININE-BSD FRML MDRD: 79 ML/MIN/1.73SQ M
GLUCOSE SERPL-MCNC: 118 MG/DL (ref 65–140)
HCT VFR BLD AUTO: 45.4 % (ref 34.8–46.1)
HGB BLD-MCNC: 15.1 G/DL (ref 11.5–15.4)
LIPASE SERPL-CCNC: 55 U/L (ref 11–82)
LYMPHOCYTES # BLD AUTO: 0.21 THOUSAND/UL (ref 0.6–4.47)
LYMPHOCYTES # BLD AUTO: 2 % (ref 14–44)
MCH RBC QN AUTO: 30.5 PG (ref 26.8–34.3)
MCHC RBC AUTO-ENTMCNC: 33.3 G/DL (ref 31.4–37.4)
MCV RBC AUTO: 92 FL (ref 82–98)
MONOCYTES # BLD AUTO: 0.41 THOUSAND/UL (ref 0–1.22)
MONOCYTES NFR BLD: 4 % (ref 4–12)
NEUTROPHILS # BLD MANUAL: 9.69 THOUSAND/UL (ref 1.85–7.62)
NEUTS SEG NFR BLD AUTO: 94 % (ref 43–75)
P AXIS: 19 DEGREES
PLATELET # BLD AUTO: 198 THOUSANDS/UL (ref 149–390)
PLATELET BLD QL SMEAR: ADEQUATE
PMV BLD AUTO: 10.5 FL (ref 8.9–12.7)
POTASSIUM SERPL-SCNC: 3.9 MMOL/L (ref 3.5–5.3)
PR INTERVAL: 166 MS
PROT SERPL-MCNC: 7.4 G/DL (ref 6.4–8.4)
QRS AXIS: -26 DEGREES
QRSD INTERVAL: 104 MS
QT INTERVAL: 410 MS
QTC INTERVAL: 498 MS
RBC # BLD AUTO: 4.95 MILLION/UL (ref 3.81–5.12)
RBC MORPH BLD: NORMAL
SARS-COV+SARS-COV-2 AG RESP QL IA.RAPID: NEGATIVE
SODIUM SERPL-SCNC: 138 MMOL/L (ref 135–147)
T WAVE AXIS: 32 DEGREES
VENTRICULAR RATE: 89 BPM
WBC # BLD AUTO: 10.31 THOUSAND/UL (ref 4.31–10.16)

## 2025-02-16 PROCEDURE — 96375 TX/PRO/DX INJ NEW DRUG ADDON: CPT

## 2025-02-16 PROCEDURE — 85007 BL SMEAR W/DIFF WBC COUNT: CPT | Performed by: EMERGENCY MEDICINE

## 2025-02-16 PROCEDURE — 99284 EMERGENCY DEPT VISIT MOD MDM: CPT

## 2025-02-16 PROCEDURE — 96361 HYDRATE IV INFUSION ADD-ON: CPT

## 2025-02-16 PROCEDURE — 36415 COLL VENOUS BLD VENIPUNCTURE: CPT | Performed by: EMERGENCY MEDICINE

## 2025-02-16 PROCEDURE — 96365 THER/PROPH/DIAG IV INF INIT: CPT

## 2025-02-16 PROCEDURE — 83690 ASSAY OF LIPASE: CPT | Performed by: EMERGENCY MEDICINE

## 2025-02-16 PROCEDURE — 80053 COMPREHEN METABOLIC PANEL: CPT | Performed by: EMERGENCY MEDICINE

## 2025-02-16 PROCEDURE — 87811 SARS-COV-2 COVID19 W/OPTIC: CPT | Performed by: EMERGENCY MEDICINE

## 2025-02-16 PROCEDURE — 87804 INFLUENZA ASSAY W/OPTIC: CPT | Performed by: EMERGENCY MEDICINE

## 2025-02-16 PROCEDURE — 99284 EMERGENCY DEPT VISIT MOD MDM: CPT | Performed by: EMERGENCY MEDICINE

## 2025-02-16 PROCEDURE — 93005 ELECTROCARDIOGRAM TRACING: CPT

## 2025-02-16 PROCEDURE — 85027 COMPLETE CBC AUTOMATED: CPT | Performed by: EMERGENCY MEDICINE

## 2025-02-16 PROCEDURE — 93010 ELECTROCARDIOGRAM REPORT: CPT | Performed by: INTERNAL MEDICINE

## 2025-02-16 RX ORDER — ONDANSETRON 2 MG/ML
4 INJECTION INTRAMUSCULAR; INTRAVENOUS ONCE
Status: COMPLETED | OUTPATIENT
Start: 2025-02-16 | End: 2025-02-16

## 2025-02-16 RX ORDER — ACETAMINOPHEN 10 MG/ML
1000 INJECTION, SOLUTION INTRAVENOUS ONCE
Status: COMPLETED | OUTPATIENT
Start: 2025-02-16 | End: 2025-02-16

## 2025-02-16 RX ADMIN — ACETAMINOPHEN 1000 MG: 10 INJECTION INTRAVENOUS at 12:46

## 2025-02-16 RX ADMIN — ONDANSETRON 4 MG: 2 INJECTION INTRAMUSCULAR; INTRAVENOUS at 12:45

## 2025-02-16 RX ADMIN — SODIUM CHLORIDE 1000 ML: 0.9 INJECTION, SOLUTION INTRAVENOUS at 12:45

## 2025-02-16 NOTE — TELEPHONE ENCOUNTER
"Reason for Disposition  • [1] MODERATE vomiting (e.g., 3 - 5 times/day) AND [2] age > 60 years    Answer Assessment - Initial Assessment Questions  1. VOMITING SEVERITY: \"How many times have you vomited in the past 24 hours?\"              \"Hasn't stopped since it started\"          2. ONSET: \"When did the vomiting begin?\"               0400 this morning     3. FLUIDS: \"What fluids or food have you vomited up today?\" \"Have you been able to keep any fluids down?\"             Has not been able to keep any fluids down, has been trying small sips of fluids  but unable to keep it down     4. ABDOMEN PAIN: \"Are your having any abdomen pain?\" If Yes : \"How bad is it and what does it feel like?\" (e.g., crampy, dull, intermittent, constant)            Some abdominal discomfort      5. DIARRHEA: \"Is there any diarrhea?\" If Yes, ask: \"How many times today?\"             Very frequent diarrhea \"can't stop\"     6. CONTACTS: \"Is there anyone else in the family with the same symptoms?\"             Denies    7. CAUSE: \"What do you think is causing your vomiting?\"            Unknown     8. HYDRATION STATUS: \"Any signs of dehydration?\" (e.g., dry mouth [not only dry lips], too weak to stand) \"When did you last urinate?\"             Feeling weak     9. OTHER SYMPTOMS: \"Do you have any other symptoms?\" (e.g., fever, headache, vertigo, vomiting blood or coffee grounds, recent head injury)            Nasal congestion       Temperature 99.2-100    Protocols used: Vomiting-Adult-    "
"Regarding: vomiting/diarrhea  ----- Message from Kamla SANDHU sent at 2/16/2025  9:40 AM EST -----  \"I got a call from my dad, my moms been throwing up and having diarrhea since 4am. Is there something we can do for her\"    "
Patient's daughter calling in with concerns due to the patient having persistent vomiting and diarrhea since around 0400 this morning. Stated the patient has not been able to keep down even small sips of fluids. Recommended the patient be evaluated at the nearest emergency department at this time. Instructed patient's daughter to follow up with patient's PCP next week if needed. Patient's daughter verbalized understanding.  
Supple

## 2025-02-16 NOTE — ED PROVIDER NOTES
Time reflects when diagnosis was documented in both MDM as applicable and the Disposition within this note       Time User Action Codes Description Comment    2/16/2025  2:11 PM Cosmo Rosales Add [K52.9] Gastroenteritis           ED Disposition       ED Disposition   Discharge    Condition   Stable    Date/Time   Sun Feb 16, 2025  2:11 PM    Comment   Nella Yancey discharge to home/self care.                   Assessment & Plan       Medical Decision Making  Nausea vomiting diarrhea will check electrolytes rule out dehydration flu COVID swabs    Amount and/or Complexity of Data Reviewed  Labs: ordered.    Risk  Prescription drug management.        ED Course as of 02/16/25 1830   [place] Feb 16, 2025   1328 Feeling improved fluids still infusing       Medications   ondansetron (ZOFRAN) injection 4 mg (4 mg Intravenous Given 2/16/25 1245)   sodium chloride 0.9 % bolus 1,000 mL (0 mL Intravenous Stopped 2/16/25 1418)   acetaminophen (Ofirmev) injection 1,000 mg (0 mg Intravenous Stopped 2/16/25 1311)       ED Risk Strat Scores                            SBIRT 22yo+      Flowsheet Row Most Recent Value   Initial Alcohol Screen: US AUDIT-C     1. How often do you have a drink containing alcohol? 0 Filed at: 02/16/2025 1146   2. How many drinks containing alcohol do you have on a typical day you are drinking?  0 Filed at: 02/16/2025 1146   3a. Male UNDER 65: How often do you have five or more drinks on one occasion? 0 Filed at: 02/16/2025 1146   3b. FEMALE Any Age, or MALE 65+: How often do you have 4 or more drinks on one occassion? 0 Filed at: 02/16/2025 1146   Audit-C Score 0 Filed at: 02/16/2025 1146   ЕКАТЕРИНА: How many times in the past year have you...    Used an illegal drug or used a prescription medication for non-medical reasons? Never Filed at: 02/16/2025 1146                            History of Present Illness       Chief Complaint   Patient presents with    Vomiting     Pt reports started with vomiting and  diarrhea this morning at 0400. Pt reports lower abd cramping that goes away after having a BM       Past Medical History:   Diagnosis Date    Anxiety     Arthritis     Back pain     Balance problems     Chest pain     heaviness    Colon polyp     Difficulty swallowing     Dizziness     Dry cough     Gait disorder     GERD (gastroesophageal reflux disease)     Hyperparathyroidism (HCC)     Hypertension     Increased frequency of headaches     Migraines     Neck pain     Numbness and tingling     bles    Palpitations     Pericarditis     Thyroid disease     nodule    Trouble in sleeping     Wears glasses       Past Surgical History:   Procedure Laterality Date    APPENDECTOMY      CHOLECYSTECTOMY      laparoscopic    COLONOSCOPY      KNEE SURGERY Left     PERICARDIAL WINDOW      ME OPTX DSTL RDL X-ARTIC FX/EPIPHYSL SEPARATION Right 03/22/2018    Procedure: OPEN REDUCTION W/ INTERNAL FIXATION (ORIF) RADIUS, splint application;  Surgeon: Kandice Kimble MD;  Location: [facility];  Service: Orthopedics    ME PARATHYROIDECTOMY/EXPLORATION PARATHYROIDS N/A 08/17/2022    Procedure: PARATHYROIDECTOMY, 4 GLAND EXPLORATION;  Surgeon: Ovidio Perkins MD;  Location: [facility];  Service: ENT    SKIN BIOPSY      UPPER GASTROINTESTINAL ENDOSCOPY        Family History   Problem Relation Age of Onset    Kidney failure Mother     Hypertension Mother     Lung cancer Father     No Known Problems Sister     Yosef Parkinson White syndrome Daughter     Substance Abuse Neg Hx     Alcohol abuse Neg Hx     Mental illness Neg Hx     Colon cancer Neg Hx     Stomach cancer Neg Hx       Social History     Tobacco Use    Smoking status: Never    Smokeless tobacco: Never   Vaping Use    Vaping status: Never Used   Substance Use Topics    Alcohol use: Not Currently     Comment: Rarely     Drug use: No      E-Cigarette/Vaping    E-Cigarette Use Never User       E-Cigarette/Vaping Substances    Nicotine No     THC No     CBD No     Flavoring No      Other No     Unknown No       I have reviewed and agree with the history as documented.     79-year-old female presents with nausea vomiting diarrhea crampy abdominal pain that started earlier this morning also some congestion and achiness      History provided by:  Patient  Medical Problem  Location:  Gastrointestinal  Quality:  Vomiting and diarrhea  Severity:  Moderate  Onset quality:  Sudden  Duration:  10 hours  Timing:  Constant  Progression:  Waxing and waning  Chronicity:  New  Context:  Nausea vomiting diarrhea over the past 10 hours and crampy abdominal pain  Associated symptoms: abdominal pain, congestion, diarrhea, fatigue, nausea and vomiting        Review of Systems   Constitutional:  Positive for fatigue.   HENT:  Positive for congestion.    Gastrointestinal:  Positive for abdominal pain, diarrhea, nausea and vomiting.   Neurological:  Positive for weakness and light-headedness.   All other systems reviewed and are negative.          Objective       ED Triage Vitals   Temperature Pulse Blood Pressure Respirations SpO2 Patient Position - Orthostatic VS   02/16/25 1146 02/16/25 1146 02/16/25 1147 02/16/25 1146 02/16/25 1146 --   100.3 °F (37.9 °C) 90 160/90 18 95 %       Temp Source Heart Rate Source BP Location FiO2 (%) Pain Score    02/16/25 1146 02/16/25 1146 -- -- 02/16/25 1147    Temporal Monitor   6      Vitals      Date and Time Temp Pulse SpO2 Resp BP Pain Score FACES Pain Rating User   02/16/25 1147 -- -- -- -- 160/90 6 -- CM   02/16/25 1146 100.3 °F (37.9 °C) 90 95 % 18 -- -- -- CM            Physical Exam  Vitals and nursing note reviewed.   Constitutional:       Appearance: She is ill-appearing. She is not toxic-appearing.   HENT:      Head: Normocephalic and atraumatic.      Right Ear: External ear normal.      Left Ear: External ear normal.   Eyes:      General:         Right eye: No discharge.         Left eye: No discharge.      Extraocular Movements: Extraocular movements intact.       Pupils: Pupils are equal, round, and reactive to light.   Cardiovascular:      Rate and Rhythm: Normal rate and regular rhythm.      Pulses: Normal pulses.      Heart sounds: No murmur heard.     No friction rub. No gallop.   Pulmonary:      Effort: No respiratory distress.      Breath sounds: No stridor. No wheezing, rhonchi or rales.   Abdominal:      Palpations: Abdomen is soft.      Tenderness: There is abdominal tenderness. There is no guarding or rebound.      Comments: Mild epigastric tenderness   Musculoskeletal:         General: No swelling, tenderness, deformity or signs of injury.      Cervical back: Normal range of motion and neck supple. No rigidity.      Right lower leg: No edema.      Left lower leg: No edema.   Skin:     General: Skin is warm and dry.      Coloration: Skin is not jaundiced.      Findings: No bruising.   Neurological:      General: No focal deficit present.      Mental Status: She is alert and oriented to person, place, and time.      Cranial Nerves: No cranial nerve deficit.   Psychiatric:         Mood and Affect: Mood normal.         Behavior: Behavior normal.         Thought Content: Thought content normal.         Results Reviewed       Procedure Component Value Units Date/Time    Manual Differential(PHLEBS Do Not Order) [[id number]]  (Abnormal) Collected: 02/16/25 1233    Lab Status: Final result Specimen: Blood from Arm, Left Updated: 02/16/25 1332     Segmented % 94 %      Lymphocytes % 2 %      Monocytes % 4 %      Eosinophils % 0 %      Basophils % 0 %      Absolute Neutrophils 9.69 Thousand/uL      Absolute Lymphocytes 0.21 Thousand/uL      Absolute Monocytes 0.41 Thousand/uL      Absolute Eosinophils 0.00 Thousand/uL      Absolute Basophils 0.00 Thousand/uL      Total Counted --     RBC Morphology Normal     Platelet Estimate Adequate    FLU/COVID Rapid Antigen (30 min. TAT) - Preferred screening test in ED [[id number]]  (Normal) Collected: 02/16/25 1248    Lab Status:  Final result Specimen: Nares from Nose Updated: 02/16/25 1313     SARS COV Rapid Antigen Negative     Influenza A Rapid Antigen Negative     Influenza B Rapid Antigen Negative    Narrative:      This test has been performed using the [employer] Paula 2 FLU+SARS Antigen test under the Emergency Use Authorization (EUA). This test has been validated by the  and verified by the performing laboratory. The Paula uses lateral flow immunofluorescent sandwich assay to detect SARS-COV, Influenza A and Influenza B Antigen.     The [employer] Paula 2 SARS Antigen test does not differentiate between SARS-CoV and SARS-CoV-2.     Negative results are presumptive and may be confirmed with a molecular assay, if necessary, for patient management. Negative results do not rule out SARS-CoV-2 or influenza infection and should not be used as the sole basis for treatment or patient management decisions. A negative test result may occur if the level of antigen in a sample is below the limit of detection of this test.     Positive results are indicative of the presence of viral antigens, but do not rule out bacterial infection or co-infection with other viruses.     All test results should be used as an adjunct to clinical observations and other information available to the provider.    FOR PEDIATRIC PATIENTS - copy/paste COVID Guidelines URL to browser: [email or web address]    Lipase [[id number]]  (Normal) Collected: 02/16/25 1233    Lab Status: Final result Specimen: Blood from Arm, Left Updated: 02/16/25 1253     Lipase 55 u/L     Comprehensive metabolic panel [[id number]]  (Abnormal) Collected: 02/16/25 1233    Lab Status: Final result Specimen: Blood from Arm, Left Updated: 02/16/25 1253     Sodium 138 mmol/L      Potassium 3.9 mmol/L      Chloride 107 mmol/L      CO2 23 mmol/L      ANION GAP 8 mmol/L      BUN 25 mg/dL      Creatinine 0.73 mg/dL      Glucose 118 mg/dL      Calcium  9.6 mg/dL      AST 25 U/L      ALT 24 U/L      Alkaline Phosphatase 72 U/L      Total Protein 7.4 g/dL      Albumin 4.3 g/dL      Total Bilirubin 1.17 mg/dL      eGFR 79 ml/min/1.73sq m     Narrative:      National Kidney Disease Foundation guidelines for Chronic Kidney Disease (CKD):     Stage 1 with normal or high GFR (GFR > 90 mL/min/1.73 square meters)    Stage 2 Mild CKD (GFR = 60-89 mL/min/1.73 square meters)    Stage 3A Moderate CKD (GFR = 45-59 mL/min/1.73 square meters)    Stage 3B Moderate CKD (GFR = 30-44 mL/min/1.73 square meters)    Stage 4 Severe CKD (GFR = 15-29 mL/min/1.73 square meters)    Stage 5 End Stage CKD (GFR <15 mL/min/1.73 square meters)  Note: GFR calculation is accurate only with a steady state creatinine    CBC and differential [[id number]]  (Abnormal) Collected: 02/16/25 1233    Lab Status: Final result Specimen: Blood from Arm, Left Updated: 02/16/25 1241     WBC 10.31 Thousand/uL      RBC 4.95 Million/uL      Hemoglobin 15.1 g/dL      Hematocrit 45.4 %      MCV 92 fL      MCH 30.5 pg      MCHC 33.3 g/dL      RDW 12.7 %      MPV 10.5 fL      Platelets 198 Thousands/uL     Narrative:      This is an appended report.  These results have been appended to a previously verified report.            No orders to display       ECG 12 Lead Documentation Only    Date/Time: 2/16/2025 12:55 PM    Performed by: Cosmo Rosales DO  Authorized by: Cosmo Rosales DO    ECG reviewed by me, the ED Provider: yes    Patient location:  ED  Rate:     ECG rate:  89  Rhythm:     Rhythm: sinus rhythm    Conduction:     Conduction: normal    T waves:     T waves: normal        ED Medication and Procedure Management   Prior to Admission Medications   Prescriptions Last Dose Informant Patient Reported? Taking?   ALPRAZolam (XANAX) 0.5 mg tablet  Self No No   Sig: Take 1 tablet (0.5 mg total) by mouth daily at bedtime as needed for anxiety or sleep   Cholecalciferol 50 MCG (2000 UT) CAPS  Self Yes No   Sig:  Take 3,000 Units by mouth daily   Magnesium 300 MG CAPS  Self Yes No   Sig: Take 300 mg by mouth in the morning   Multiple Vitamins-Minerals (MULTIVITAMIN ADULTS 50+ PO)  Self Yes No   Sig: Take 1 tablet by mouth in the morning   Nutritional Supplements (OSAPLEX MK-7 PO)  Self Yes No   Sig: Take 1 tablet by mouth in the morning   Riboflavin (VITAMIN B-2 PO)  Self Yes No   Sig: Take 250 mg by mouth in the morning   acetaminophen (TYLENOL) 500 mg tablet  Self Yes No   Sig: Take 500-1,000 mg by mouth every 6 (six) hours as needed for mild pain   albuterol (PROVENTIL HFA,VENTOLIN HFA) 90 mcg/act inhaler  Self No No   Sig: Inhale 2 puffs every 6 (six) hours as needed for wheezing or shortness of breath   amLODIPine (NORVASC) 2.5 mg tablet   No No   Sig: Take 1 tablet (2.5 mg total) by mouth daily   Patient not taking: Reported on 1/27/2025   ascorbic acid (VITAMIN C) 500 mg tablet  Self Yes No   Sig: Take 500 mg by mouth daily   atorvastatin (LIPITOR) 10 mg tablet   No No   Sig: TAKE 0.5 TABLETS (5 MG TOTAL) BY MOUTH DAILY AFTER DINNER   b complex vitamins tablet  Self Yes No   Sig: Take 1 tablet by mouth daily   meclizine (ANTIVERT) 12.5 MG tablet  Self No No   Sig: Can take 1-2 tabs as needed up to BID for dizziness or vertigo, caution as can be sedating   Patient not taking: Reported on 1/23/2025   metoprolol succinate (TOPROL-XL) 25 mg 24 hr tablet  Self No No   Sig: TAKE 1 TABLET BY MOUTH DAILY AT BEDTIME   omeprazole (PriLOSEC) 20 mg delayed release capsule   No No   Sig: Take 1 capsule (20 mg total) by mouth daily before breakfast      Facility-Administered Medications: None     Discharge Medication List as of 2/16/2025  2:12 PM        CONTINUE these medications which have NOT CHANGED    Details   acetaminophen (TYLENOL) 500 mg tablet Take 500-1,000 mg by mouth every 6 (six) hours as needed for mild pain, Historical Med      albuterol (PROVENTIL HFA,VENTOLIN HFA) 90 mcg/act inhaler Inhale 2 puffs every 6 (six)  hours as needed for wheezing or shortness of breath, Starting Thu 3/14/2024, Normal      ALPRAZolam (XANAX) 0.5 mg tablet Take 1 tablet (0.5 mg total) by mouth daily at bedtime as needed for anxiety or sleep, Starting Tue 10/1/2024, Normal      amLODIPine (NORVASC) 2.5 mg tablet Take 1 tablet (2.5 mg total) by mouth daily, Starting Thu 1/23/2025, Normal      ascorbic acid (VITAMIN C) 500 mg tablet Take 500 mg by mouth daily, Historical Med      atorvastatin (LIPITOR) 10 mg tablet TAKE 0.5 TABLETS (5 MG TOTAL) BY MOUTH DAILY AFTER DINNER, Starting Fri 1/17/2025, Normal      b complex vitamins tablet Take 1 tablet by mouth daily, Historical Med      Cholecalciferol 50 MCG (2000 UT) CAPS Take 3,000 Units by mouth daily, Historical Med      Magnesium 300 MG CAPS Take 300 mg by mouth in the morning, Historical Med      meclizine (ANTIVERT) 12.5 MG tablet Can take 1-2 tabs as needed up to BID for dizziness or vertigo, caution as can be sedating, Normal      metoprolol succinate (TOPROL-XL) 25 mg 24 hr tablet TAKE 1 TABLET BY MOUTH DAILY AT BEDTIME, Starting Wed 4/17/2024, Normal      Multiple Vitamins-Minerals (MULTIVITAMIN ADULTS 50+ PO) Take 1 tablet by mouth in the morning, Historical Med      Nutritional Supplements (OSAPLEX MK-7 PO) Take 1 tablet by mouth in the morning, Historical Med      omeprazole (PriLOSEC) 20 mg delayed release capsule Take 1 capsule (20 mg total) by mouth daily before breakfast, Starting Tue 12/17/2024, Normal      Riboflavin (VITAMIN B-2 PO) Take 250 mg by mouth in the morning, Historical Med           No discharge procedures on file.  ED SEPSIS DOCUMENTATION   Time reflects when diagnosis was documented in both MDM as applicable and the Disposition within this note       Time User Action Codes Description Comment    2/16/2025  2:11 PM Cosmo Rosales Add [K52.9] Gastroenteritis                  Cosmo Rosales DO  02/16/25 9231

## 2025-02-17 ENCOUNTER — TELEPHONE (OUTPATIENT)
Age: 80
End: 2025-02-17

## 2025-02-17 NOTE — TELEPHONE ENCOUNTER
Relayed results to patient from Dr. Rodriguez, patient would also like to know if she should keep her appt for tomorrow.      Please advise patient of next steps.

## 2025-02-17 NOTE — TELEPHONE ENCOUNTER
Spoke with patient, she is still feeling terrible. Currently ate a small portion of apple sauce. Taking little sips of the water. Will try to eat a cracker, will wear a mask when coming to the appt tomorrow.

## 2025-02-17 NOTE — TELEPHONE ENCOUNTER
Patient should follow BRAT diet ( bread, crackers, toast, rice, potatoes, apple sauce, jello) and have plenty of fluids, avoid caffeine and dairy.

## 2025-02-17 NOTE — TELEPHONE ENCOUNTER
Pt was seen in ED yesterday for Noro virus. Scheduled to be seen tomorrow for follow up. Was wondering what she can eat. Was told to eat light at ED but was not given specific food that she can eat. She only had a cup of apple sauce today and feels dehydrated. Please advise and clarify to pt what food she can eat.

## 2025-02-18 ENCOUNTER — NURSE TRIAGE (OUTPATIENT)
Age: 80
End: 2025-02-18

## 2025-02-18 ENCOUNTER — APPOINTMENT (OUTPATIENT)
Facility: REHABILITATION | Age: 80
End: 2025-02-18
Payer: COMMERCIAL

## 2025-02-18 NOTE — TELEPHONE ENCOUNTER
Returned call to Pt. Pt is calm, oriented, and appropriate on the phone. No obvious acute distress.     Pt states was feeling better, vomiting and diarrhea had slowed since initial ilness. Pt reports had a large stool today, diarrhea. Would like to know if she should supplement with Pedialyte to improve symptoms. Pt does report dry mouth, but does not have decreased urine output. Does report fevers 101-102 F. Pt tolerating fluids well. Did eat applesauce, but had diarrhea following.     Advised Pt to supplement water with electrolyte replacement such as pedialyte or gatorade. May drink gingerale, but avoid citrus or spicy beverages. Advised to add crackers/toast slowly to diet as tolerated. Pt to call back for further advice if symptoms worsen as reviewed. Pt verbalized understanding and is agreeable with plan.     Pt had appointment for today, but rescheduled for tomorrow as she states that she is weak and does not want to get out of bed. Denies light headedness or dizziness. Pt will keep appointment as scheduled for tomorrow.

## 2025-02-18 NOTE — TELEPHONE ENCOUNTER
----- Message from Jena SHUKLA sent at 2/18/2025 10:25 AM EST -----  Patient calling to see if she can take adult Pedialyte to help her get over norovirus. She has an ER follow up tomorrow afternoon but would like to start taking this as soon as possible. Please call to discuss. Thank you!

## 2025-02-19 ENCOUNTER — OFFICE VISIT (OUTPATIENT)
Dept: FAMILY MEDICINE CLINIC | Facility: CLINIC | Age: 80
End: 2025-02-19
Payer: COMMERCIAL

## 2025-02-19 VITALS
RESPIRATION RATE: 15 BRPM | SYSTOLIC BLOOD PRESSURE: 110 MMHG | BODY MASS INDEX: 26.07 KG/M2 | DIASTOLIC BLOOD PRESSURE: 70 MMHG | HEIGHT: 68 IN | HEART RATE: 73 BPM | OXYGEN SATURATION: 98 % | WEIGHT: 172 LBS | TEMPERATURE: 98.2 F

## 2025-02-19 DIAGNOSIS — K21.9 GASTROESOPHAGEAL REFLUX DISEASE, UNSPECIFIED WHETHER ESOPHAGITIS PRESENT: ICD-10-CM

## 2025-02-19 DIAGNOSIS — I10 ESSENTIAL HYPERTENSION: ICD-10-CM

## 2025-02-19 DIAGNOSIS — K52.9 ACUTE GASTROENTERITIS: Primary | ICD-10-CM

## 2025-02-19 PROCEDURE — 99214 OFFICE O/P EST MOD 30 MIN: CPT | Performed by: FAMILY MEDICINE

## 2025-02-19 PROCEDURE — G2211 COMPLEX E/M VISIT ADD ON: HCPCS | Performed by: FAMILY MEDICINE

## 2025-02-19 NOTE — PROGRESS NOTES
Name: Nella Yancey      : 1945      MRN: [id number]  Encounter Provider: Cecelia Rodriguez MD  Encounter Date: 2025   Encounter department: [facility]  :  Assessment & Plan  Acute gastroenteritis  - ER evaluation 25 reviewed, patient is doing better, advised to continues fluids and BRAT diet for the next 48 hours, then gradually advance to regular diet as tolerated, encouraged to contact the office for persistent or worsening symptoms.        Gastroesophageal reflux disease, unspecified whether esophagitis present  - continue omeprazole 20 mg daily       Essential hypertension  - stable, on Toprol XL 25 mg daily, amlodipine 2.5 mg daily was recently added by Cardiology       Return as scheduled or sooner as needed.   Patient understands and agrees with the treatment plan.        History of Present Illness   Patient presents today for follow up ER on 25 for evaluation of nausea, vomiting and diarrhea. ER evaluation included lab work which showed mild leukocytosis and was otherwise unremarkable, EKG showed no changes. Patient was treated with IV fluids and antiemetics and discharged home in improved conditions. Patient states she is overall doing better, she reports no further episodes of nausea or vomiting and has bene tolerating fluids, toast and crackers. She had last episode of diarrhea yesterday evening and reports no bowel movement since then. Patient denies abdominal pain, fever or chills.       Review of Systems   Constitutional:  Negative for appetite change, chills, fever and unexpected weight change.   Respiratory:  Negative for cough, shortness of breath and wheezing.    Cardiovascular:  Negative for chest pain, palpitations and leg swelling.   Gastrointestinal:  Positive for diarrhea. Negative for abdominal pain, blood in stool, nausea and vomiting.   Genitourinary:  Negative for difficulty urinating, dysuria, frequency, hematuria and  "urgency.        As per HPI   Skin:  Negative for rash.   Neurological:  Negative for dizziness, syncope, weakness, numbness and headaches.   Hematological:  Negative for adenopathy.   Psychiatric/Behavioral:  Negative for dysphoric mood and sleep disturbance. The patient is not nervous/anxious.        Objective   /70   Pulse 73   Temp 98.2 °F (36.8 °C)   Resp 15   Ht 5' 8\" (1.727 m)   Wt 78 kg (172 lb)   SpO2 98%   BMI 26.15 kg/m²      Physical Exam  Constitutional:       General: She is not in acute distress.  HENT:      Mouth/Throat:      Mouth: Mucous membranes are moist.      Pharynx: Oropharynx is clear.   Cardiovascular:      Rate and Rhythm: Normal rate and regular rhythm.      Heart sounds: Normal heart sounds.   Pulmonary:      Effort: Pulmonary effort is normal.      Breath sounds: Normal breath sounds. No wheezing, rhonchi or rales.   Abdominal:      Palpations: Abdomen is soft. There is no mass.      Tenderness: There is no abdominal tenderness. There is no guarding or rebound.   Musculoskeletal:      Cervical back: Neck supple.      Right lower leg: No edema.      Left lower leg: No edema.   Lymphadenopathy:      Cervical: No cervical adenopathy.   Neurological:      Mental Status: She is alert and oriented to person, place, and time.   Psychiatric:         Mood and Affect: Mood normal.         Behavior: Behavior normal.         Thought Content: Thought content normal.       Lab Results   Component Value Date    WBC 10.31 (H) 02/16/2025    HGB 15.1 02/16/2025    HCT 45.4 02/16/2025    MCV 92 02/16/2025     02/16/2025     Lab Results   Component Value Date    SODIUM 138 02/16/2025    K 3.9 02/16/2025     02/16/2025    CO2 23 02/16/2025    AGAP 8 02/16/2025    BUN 25 02/16/2025    CREATININE 0.73 02/16/2025    GLUC 118 02/16/2025    GLUF 82 01/21/2025    CALCIUM 9.6 02/16/2025    AST 25 02/16/2025    ALT 24 02/16/2025    ALKPHOS 72 02/16/2025    TP 7.4 02/16/2025    TBILI 1.17 " (H) 02/16/2025    EGFR 79 02/16/2025

## 2025-02-20 ENCOUNTER — APPOINTMENT (OUTPATIENT)
Facility: REHABILITATION | Age: 80
End: 2025-02-20
Payer: COMMERCIAL

## 2025-02-20 DIAGNOSIS — I10 PRIMARY HYPERTENSION: ICD-10-CM

## 2025-02-21 ENCOUNTER — ANESTHESIA (OUTPATIENT)
Dept: ANESTHESIOLOGY | Facility: HOSPITAL | Age: 80
End: 2025-02-21

## 2025-02-21 ENCOUNTER — ANESTHESIA EVENT (OUTPATIENT)
Dept: ANESTHESIOLOGY | Facility: HOSPITAL | Age: 80
End: 2025-02-21

## 2025-02-21 RX ORDER — AMLODIPINE BESYLATE 2.5 MG/1
2.5 TABLET ORAL DAILY
Qty: 90 TABLET | Refills: 1 | Status: SHIPPED | OUTPATIENT
Start: 2025-02-21

## 2025-02-21 NOTE — TELEPHONE ENCOUNTER
Pt called back, she understands but is having issues with cramping. She thinks its food poisoning. Please call pt back

## 2025-02-21 NOTE — TELEPHONE ENCOUNTER
Pt calling in as she was told to call if she felt like she wasn't feeling better; she is having loose diarrhea, cramps, nausea, feeling very weak and having hard time eating. She is doing the bland diet already and is probably going to try either having some plain chicken with a baked potato like Dr. Rodriguez suggested or have some chicken noodle soup. Pt is worried with the weekend coming and is wanting to know what else she could take to help with the symptoms, especially for the diarrhea and cramping.    Please advise and give pt a call back at earliest convenience, TY.  Callback # [phone]

## 2025-02-21 NOTE — TELEPHONE ENCOUNTER
I recommend to continue fluids, bland diet, small meals 3-4 times a day and avoid caffeine and dairy, except for yogurt. Patient should contact the office if her diarrhea is not better by Monday, we will then obtain stool cultures.

## 2025-02-24 NOTE — TELEPHONE ENCOUNTER
Called and spoke to pt. She is feeling better, just having loose bowels in the morning after eating breakfast, temp is 99.1, staying hydrated. She wants to know if there is something she can take for the loose bowels.

## 2025-02-24 NOTE — TELEPHONE ENCOUNTER
I recommend to continue fluids and bland diet until her stools are back to normal, then gradually advance her diet as tolerated.

## 2025-02-25 NOTE — PROGRESS NOTES
Patient Name: Robert Cabrera  : 1936    MRN: [id number]                              Pulmonary Rehabilitation Plan of Care   150 Day Assessment      Today's date: 2023   # of Exercise Sessions Completed: 21  Patient name: Susy Howe      : 1945  Age: 78 y o  MRN: [id number]  Referring Physician: Sabino Whatley, *  Pulmonologist: Dominick De La Garza MD   Provider: Dileep Vasquez  Clinician: Cosme Waddell MS, CEP     Dx:   Encounter Diagnosis   Name Primary? • [facility]) [facility] ) Yes     Date of onset: 11/3/22      SUMMARY OF PROGRESS:  Josee Bey is compliant attending pulmonary rehab exercise sessions 2x/wk for ILD  She is back to attending 2x/week and only has 3 more sessions until discharge  The patient tolerates 35 minutes of aerobic exercise achieving 2 8 METs on different modalities such as the UBE, Nustep, recumbent bike, and room walking  Resting SpO2 92-95% with decreased O2 saturation during exercise 90-97% on room air  Resting //70 with appropriate response to exercise reaching 138//88  The patient reports no symptoms, no limitations and knee pain occ  during exercise  Rating of perceived dyspnea with exercise 0-3/10 at max METs  She has not begun a regular exercise program at home, but states walking and doing hand weights sometimes  Pt was encouraged to add some walking at home especially with the nicer weather  She is very interested in joining the [facility] [employer] which we will take her down to preview  The patient is a non-smoker  PHQ-9 and AIDAN-7 were not reassessed due to most recent scores being normal and no staff concerns  Most recent assessment found PHQ-9 at a 5 suggesting 5-9 = Mild Depression and AIDAN-7 at a 0 suggesting 0-4  = Not anxious  When addressed, the patient admits feelings of depression/anxiety/stress, it is under control, and manageable  Patient reports excellent social/emotional support  Patient given educational handouts on pulmonary disease   Pursed lipped breathing and Diaphragmatic breathing continues to be demonstrated, practiced, and reinforced with the patient  Her exercise program will be progressed as tolerated  The patient has the following personal goals she hopes to achieve by discharge: increase water intake, look continue to joining the [facility] [employer], attend rehab regularly, increase workloads on exercise equipment, and continue to spend lots of time with family and friends! She continues to work towards her personal goals at 150 days and will get ready for discharge planning in the next couple sessions       Medication compliance: Yes   Comments: Pt reports to be compliant with medications  Fall Risk: Low   Comments: Ambulates with a steady gait with no assist device and Denies a fall in the past 6 months    Smoking: Never used    RPD at Rest: 010  RPD with Exercise:  0-3/10    Assessment of progression of lung disease and functional status:  CAT: n/a  Shortness of breath questionnaire:       EXERCISE ASSESSMENT and PLAN    Current Exercise Program in Rehab: No change to exercise prescription       Frequency: 2 days/week   Supplement with home exercise 2+ days/wk as tolerated       Minutes: 35      METS: 2 8              SpO2: 90-97              RPD: 0-3                    HR:    RPE: 3-4         Modalities: UBE, NuStep, Recumbent bike and Room walking      Exercise Progression 30 Day Goals :    Frequency: 2-3 days/week    Supplement with home exercise 2+ days/wk as tolerated       Minutes: 35-40        METS: 2 9-3 0              SpO2: >90 on RA               RPD: 1-3                      HR: resting +20   RPE: 4-5        Modalities: UBE, NuStep, Recumbent bike and Room walking     Strength trainin-3 days / week  12-15 repetitions  1-2 sets per modality    Modalities: Chest Press, Lateral Raise, Arm Curl, Sit to Stands and Front Raises    Oxygen Needs: on room air at rest and on room air with exercise  Oxygen Goal: Maintain SpO2>90% during exercise    Home [place]roquois Level (Feeding) supervision  -SD               User Key  (Exercise: none, Interested in joining [facility]  [employer]: pursed lipped breathing, diaphragmatic breathing, relaxation breathing, home exercise, benefits of exercise for pulmonary disease, RPE scale, RPD scale, O2 saturation monitoring, appropriate O2 response to exercise and energy conservation    Goals: Improved 6MW distance by 10%, reduced dyspnea during exercise (0-3/10), improved exercise tolerance (max METs tolerated in pulmonary rehab), SpO2 >90% during exercise, improved DUKE activity score, reduced RPD at rest, attend pulmonary rehab regularly and decrease sitting time at home    Progressing:  Pt is progressing and showing improvement  toward the following goals:  SpO2 >90% on room air at rest and exercise, no SOB at rest, getting back to workloads and durations prior to being sick, and back to attending rehab regularly 2x/week with only 3 more sessions left   , Pt has not made progress toward the following goals: no home exercise with rehab  , Patient will join [address] [place] [address] for discharge  in the next 30 days, Will continue to educate and progress as tolerated      Plan: Titrate supplemental oxygen as needed to maintain SpO2>90% with exercise, learn to conserve energy with ADLs , practice breathing techniques 3x/day and reduce time sitting at home    Readiness to change: Action:  (Changing behavior)      NUTRITION ASSESSMENT AND PLAN    Weight control:    Starting weight: 173 4 lbs    Current weight:  174 lbs     Diabetes: N/A    Goals:LDL <100, CHOL <200, decreased body fat % <33%  (F), reduced waist circumference <35 inches (F), 2 5-5%  wt loss, increase intake of fish, shellfish, use low fat dairy, eat 3 or more servings of whole grains a day, Eat 4-5 cups of fruits and vegetables daily and eliminate or choose low-fat sweets  Education: heart healthy eating  low sodium diet  hydration  wt  loss   healthy choices while dining out     Progressing:Pt is progressing and Sonya Lozoya, MILAD Occupational  showing improvement  toward the following goals:  eats a healthy diet, low sodium diet, organic foods, drinks lots of water, and cooks at home   , Patient will continue to watch her diet in the next 30 days, Will continue to educate and progress as tolerated  Plan: Education class: Reading Food Labels, Education Class: Heart Healthy Eating, switch to low fat cheeses, replace unhealthy snacks with fruits & vegs, reduce portion sizes, remove salt shaker from table, drink more water and keep added daily sugar <25g/day  Readiness to change: Action:  (Changing behavior)      PSYCHOSOCIAL ASSESSMENT AND PLAN    Emotional:  Depression assessment:  PHQ-9 = 5 5-9 = Mild Depression            Anxiety measure:  AIDAN-7 = 0 0-4  = Not anxious  Self-reported stress level: 3   Social support: Excellent and Patient reports excellent emotional/social support from family and friends     Goals:  Reduce perceived stress to 1-3/10, continue medical therapy, Pain in [facility] Score < 3, increased energy, stop worrying, take time to relax and Feel less anxious  Education: signs/sxs of depression, benefits of a positive support system and stress management techniques    Progressing:Pt is progressing and showing improvement  toward the following goals:  spends a lot of time with family and friends which reduces her amount of stress  , Patient will work on anxiety management and how to take time to relax in the next 30 days, Will continue to educate and progress as tolerated       Plan: Class: Stress and Your Health, Class: Relaxation, PHQ-9 >5 will refer to MD, Practice relaxation techniques and Exercise  Readiness to change: Action:  (Changing behavior)      OTHER CORE COMPONENTS     Tobacco:   Social History     Tobacco Use   Smoking Status Never   Smokeless Tobacco Never       Tobacco Use Intervention: Referral to tobacco expert:   N/A:  Patient is a non-smoker     Blood pressure:    Restin//70   Exercise: 371//22    Goals: consistent BP < 130/80, reduced dietary sodium <2300mg, moderate intensity exercise >150 mins/wk and medication compliance  Education:  pathophysiology of pulmonary disease, preventing infections, dangers of smoking, control coughing, inspiratory muscle training and environmental triggers     Progressing:Pt is progressing and showing improvement  toward the following goals:  watching her sodium intake and drinking lots of water, blood pressures within normal limits at rest and exercise, and medication compliance   , Patient will monitor BP at home  in the next 30 days, Will continue to educate and progress as tolerated       Plan: Complete abstention from smoking, engage in regular exercise and monitor home BP  Readiness to change: Action:  (Changing behavior) Sonya Lozoya OT Occupational Therapist    Tata Reynaga, RN Registered Nurse    HW Sandra Chester, JOYCE Sonya Raya OT GO 1                 Sonya Raya OT  2/25/2025

## 2025-02-26 ENCOUNTER — OFFICE VISIT (OUTPATIENT)
Facility: REHABILITATION | Age: 80
End: 2025-02-26
Payer: COMMERCIAL

## 2025-02-26 DIAGNOSIS — R26.9 GAIT ABNORMALITY: Primary | ICD-10-CM

## 2025-02-26 PROCEDURE — 97110 THERAPEUTIC EXERCISES: CPT | Performed by: PHYSICAL THERAPIST

## 2025-02-26 PROCEDURE — 97112 NEUROMUSCULAR REEDUCATION: CPT | Performed by: PHYSICAL THERAPIST

## 2025-02-26 NOTE — PROGRESS NOTES
"PHYSICAL THERAPY TREATMENT / UPDATE    Date: 25  Name: Nella Yancey  : 1945  Referring Provider: Hali Zepeda MD  AUTHORIZATION:   Insurance: Payor: [facility] REP / Plan: [employer] MEDICARE PPO [facility] REP / Product Type: Medicare PPO /     PATIENT HISTORY:  HPI: Nella Yancey is a 79 y.o. female referred to outpatient physical therapy for the following diagnosis   Encounter Diagnosis   Name Primary?    Gait abnormality Yes     Hospitalized with neurovirus.    Doing better now but first day able to keep food down, estimates lost 6-8 lbs in 2 weeks.  Weaker.    EXAMINATION / TREATMENT    Precautions: fall risk  Gradually progress balance and strength as patient improves and becomes more confident in activities.     25 FGA: 25 not tested  25 not tested    25 Six minute walk test: 825 feet  25 1150 feet  25  2 minute walk test 275 feet 78 bpm 96% SpO2    25 Timed up and go: 16.8 seconds  25 10.1 seconds  25  12.9 sec    25 5 time sit to stand using 19 inch surface and 1 UE support: 25.7 seconds  25 20 inch surface + 1 hand support on chair 26 seconds, unable 5 times frow lower surface  25 completes 1-2 from 20.5\" surface (no hands)  25 completes from 22\" surface without hands, unable from 20\" surface without pushing from chair      Manuals 25                                                                                 Neuro Re-Ed                    Sit to stand  20.5\"   3 x 3 sets  19.5\" 4 eccentric    Blazepods in solostep  6\" step, 8\" wide \"puddle\" 16' line   1 min x 2 sets    Then 4\" and 6\" step 1 min x 2 sets    Stepping forwards and backwards 12' line 1 min x 2 sets    Step up 6\" step 5 each    Fast overground walking 65 feet down and back 40 sec  36 sec  40 sec  40 sec    Heel raise  40 sec  Then increased one side weight bearing about 10 each " "side    Standing static, lateral foot tap to 5\" high surface 1 min x 3 sets     Sit to stand 22\" to 19\" surface  About 6 total    Lateral step to hit blazepods  1 min x 2 sets  + stepping over 2\" objects 1 min x 2 sets  Then forwards over 8\" wide objects  1 min  + carry 5 lbs   1 min    4\" and 6\" step  1 min x 2 sets    Forwards/backwards, 1 min x 2 sets    Standing hip abduction, yellow band, about 45 sec x 2    Standing heel raise, increased weight one side  about 45 sec each    Walking forwards and back passing soccer ball, 3 min    Fast overground walking 130 feet with 1 180 degree turn  Best 33 sec (without overhead harness), otherwise around 40 sec  X 3-4 total Sit to stand   Chair with 1 hand to ascend, no hands to descend  X 3    24 - 20.5\" surface about 12 total    Sidestep to hit blazepod targets in [facility]  Step over 2\" x 2\" obstacles   1 min x 4 sets    Step up and back 4\" step about 10  6\" step use hiking pole about 10    Obstacles including 4\" steps with blazepods no AD  1 min x 3 sets    Standing hip abduction, 1 min  Then + yellow band 1 min  Standing heel raise 1 min    Forwards to backwards blazepods in [facility] 1 min x 2 sets    200 feet overground in [facility]  67 sec  65 sec  60 sec     Outcome measures    5 times sit to stand x 3 sets  Best from 23.5 inch surface    Blazepods with step up and over (6 inch step)  2 sets x 1 minute    Lateral step downs x 5 each side  4\" step    Fwd step up/downs from 6 inch step x 10 each      Standing hip abduction with counting to 3, then counting to 5. 5 reps each side   Overground walking no  feet  Then same with change of direction forwards to back and 180 degree turns, about 2 min    Blazepods in [facility]  18' line  1 min   Then + over foam mat with 8\" beam underneath  1 min   Then + carry 9 lb water weight  X 2 sets    Step up 4\" step   5 each side  No AD    Sit to stand 20\" surface, L hand on chair  5 x 3 sets      4 cone hurdles step overs x 4 " "laps of 10 ft  Added 2 small yellow hurdles x 4 laps of 10 ft  Then, blazepods activity (3 sets a 1 minute)    Blazepods bwd walking 20 ft (3sets a 1 minute) 2nd and 3rd sets, 9 lb water weight.          Biodex treadmill 0.7 mph  3 min      Cone hurdles 10 ft (4 laps)  All small yellow hurdles (4 laps)  Implementing one high yellow lemuel (4 laps)     Blazepod activity (stepping back on one foot (2 sets x 1 min)    Step ups using 4 inch step without AD.  10 each side    Sit to stands 20 inch surface (3 sets of 5 using one UE) alternating right and left UE each set. 2 sets of 5 sit to stands with 24\" surface without use of UE.     Blazepods with backwards walking 20 ft (3 sets x 1 min)   All small yellow hurdles Blazepods (3 sets x 1 min)      Blazepod activity (stepping back on one foot (2 sets x 1 min)    Step ups blazepods using 4 inch  for 1st set, 6 inch for 2ndand 3rd set without AD.        Sit to stands 20 inch surface (3 sets of 5 using one UE) alternating right and left UE each set. 2 sets of 5 sit to stands with 24\" surface without use of UE.       Fwd/bwd with blazepods (3 sets x 1 minute)    7 min true treadmill   1.0 mph    Blazepods with tumbletrack mat with 1 foam underneath (3 sets x 1 minute) 2nd and 3rd sets with 2 foam mats      Sit to stands 20 inch surface (3 sets of 5 using one UE) alternating right and left UE each set. Eccentric count to 5 lowering Scifi bike 1.0 resistance   8 min    Overground  Walking 100 ft   26.0 sec  30. Sec  26.0 sec    Add 10 lbs 100 ft x 1 lap    Add 20 lbs 100 ft  32.6 sec  30.2     100 ft laps without resistance  26.4 sec  24.2 sec    Blazepods with step up and over (4 inch step)  3 sets x 1 minute  2nd set using 6 inch step    Sit to stands   3 sets of 5 reps without UE, cues to push arms in from, from 20 inch surface    Lateral step downs x 5 each side    Fwd step up/downs from 6 inch step x 10    Standing hip abduction with counting to 3, then counting to 5. 5 " reps each side                                                                                                                               Ther Ex                                                                                                                                                                  Ther Activity                                                      Gait Training                                                      Modalities                                                         ASSESSMENT  Regression in mobility measures but this is expected following neurovirus and 2 weeks without much physical activity.  Return to normal eating and physical activity should help faster return to prior improvements in mobility.  Recommend physical therapy to improve mobility.            Goals  Short Term by 2/8/25, continued for 4/08/25  1. Patient will walk 1000 ft in 6 minute walk test. MET PREVIOUSLY, regressed..  2. Patient will score a 20/30 in functional gait assessment.  NOT TESTED.  3. Patient will be able to perform 5 time sit to stand without use of upper extremities. NOT MET.    Long term goals by 4/8/25  1. Patient will walk 1150 feet in 6 minute walk test.  MET.  2. Patient will score a 23/30 on the functional gait assessment.  3. Patient will perform the 5 time sit to  15 seconds without use of UE and using a standard chair.     Plan: Continue per plan of care. Physical therapy 1-2 times per week for 1 month, neuromuscular re-education, therapeutic activity, therapeutic exercise.

## 2025-03-04 ENCOUNTER — APPOINTMENT (OUTPATIENT)
Facility: REHABILITATION | Age: 80
End: 2025-03-04
Payer: COMMERCIAL

## 2025-03-06 ENCOUNTER — OFFICE VISIT (OUTPATIENT)
Facility: REHABILITATION | Age: 80
End: 2025-03-06
Payer: COMMERCIAL

## 2025-03-06 DIAGNOSIS — R26.9 GAIT ABNORMALITY: Primary | ICD-10-CM

## 2025-03-06 PROCEDURE — 97112 NEUROMUSCULAR REEDUCATION: CPT | Performed by: PHYSICAL THERAPIST

## 2025-03-06 PROCEDURE — 97110 THERAPEUTIC EXERCISES: CPT | Performed by: PHYSICAL THERAPIST

## 2025-03-06 NOTE — PROGRESS NOTES
"PHYSICAL THERAPY TREATMENT     Date: 25  Name: Nella Yancey  : 1945  Referring Provider: Hali Zepeda MD  AUTHORIZATION:   Insurance: Payor: [facility] REP / Plan: [employer] MEDICARE PPO [facility] REP / Product Type: Medicare PPO /     PATIENT HISTORY:  HPI: Nella Yancey is a 79 y.o. female referred to outpatient physical therapy for the following diagnosis   No diagnosis found.    Still feeling weaker ever since being sick and working on building her strength back up. Notes difficulty rising from a chair, and tripping on things.     EXAMINATION / TREATMENT    Precautions: fall risk  Gradually progress balance and strength as patient improves and becomes more confident in activities.     25 FGA: 25 not tested  25 not tested    25 Six minute walk test: 825 feet  25 1150 feet  25  2 minute walk test 275 feet 78 bpm 96% SpO2    25 Timed up and go: 16.8 seconds  25 10.1 seconds  25  12.9 sec    25 5 time sit to stand using 19 inch surface and 1 UE support: 25.7 seconds  25 20 inch surface + 1 hand support on chair 26 seconds, unable 5 times frow lower surface  25 completes 1-2 from 20.5\" surface (no hands)  25 completes from 22\" surface without hands, unable from 20\" surface without pushing from chair      Manuals 3/25                                   Neuro Re-Ed         -STS 2x10 from chair boosted w/ foam pad. 1UE push off.     -Blaze pods over low hurdles. 2min x2    -Blaze pods on stair , 1 rail. 2min x2     -Overground fast   100ft w/ turn x4  (best 24s)    -Overground walking carrying tidal ball. 100ft w/ turn x2    -Hip abduction YTB 2x10 ea (added for HEP)          Sit to stand  20.5\"   3 x 3 sets  19.5\" 4 eccentric    Blazepods in solostep  6\" step, 8\" wide \"puddle\" 16' line   1 min x 2 sets    Then 4\" and 6\" step 1 min x 2 sets    Stepping forwards and backwards 12' line 1 min x 2 " "sets    Step up 6\" step 5 each    Fast overground walking 65 feet down and back 40 sec  36 sec  40 sec  40 sec    Heel raise  40 sec  Then increased one side weight bearing about 10 each side    Standing static, lateral foot tap to 5\" high surface 1 min x 3 sets     Sit to stand 22\" to 19\" surface  About 6 total    Lateral step to hit blazepods  1 min x 2 sets  + stepping over 2\" objects 1 min x 2 sets  Then forwards over 8\" wide objects  1 min  + carry 5 lbs   1 min    4\" and 6\" step  1 min x 2 sets    Forwards/backwards, 1 min x 2 sets    Standing hip abduction, yellow band, about 45 sec x 2    Standing heel raise, increased weight one side  about 45 sec each    Walking forwards and back passing soccer ball, 3 min    Fast overground walking 130 feet with 1 180 degree turn  Best 33 sec (without overhead harness), otherwise around 40 sec  X 3-4 total Sit to stand   Chair with 1 hand to ascend, no hands to descend  X 3    24 - 20.5\" surface about 12 total    Sidestep to hit blazepod targets in solostep  Step over 2\" x 2\" obstacles   1 min x 4 sets    Step up and back 4\" step about 10  6\" step use hiking pole about 10    Obstacles including 4\" steps with blazepods no AD  1 min x 3 sets    Standing hip abduction, 1 min  Then + yellow band 1 min  Standing heel raise 1 min    Forwards to backwards blazepods in solostep 1 min x 2 sets    200 feet overground in solostep  67 sec  65 sec  60 sec                                                       Ther Ex                                                                        Ther Activity                        Gait Training                        Modalities                           ASSESSMENT  Tolerated treatment well. With some hesitancy stepping over hurdles, but improved confidence with practice. Able to increase overground walking speed with external cueing from timer. Requires considerable compensation to negotiation stairs due to knee OA with non reciprocal pattern " and side stepping. Keen         Goals  Short Term by 2/8/25, continued for 4/08/25  1. Patient will walk 1000 ft in 6 minute walk test. MET PREVIOUSLY, regressed..  2. Patient will score a 20/30 in functional gait assessment.  NOT TESTED.  3. Patient will be able to perform 5 time sit to stand without use of upper extremities. NOT MET.    Long term goals by 4/8/25  1. Patient will walk 1150 feet in 6 minute walk test.  MET.  2. Patient will score a 23/30 on the functional gait assessment.  3. Patient will perform the 5 time sit to  15 seconds without use of UE and using a standard chair.     Plan: Continue per plan of care. Physical therapy 1-2 times per week for 1 month, neuromuscular re-education, therapeutic activity, therapeutic exercise.

## 2025-03-10 ENCOUNTER — ANESTHESIA (OUTPATIENT)
Dept: ANESTHESIOLOGY | Facility: HOSPITAL | Age: 80
End: 2025-03-10

## 2025-03-10 ENCOUNTER — ANESTHESIA EVENT (OUTPATIENT)
Dept: ANESTHESIOLOGY | Facility: HOSPITAL | Age: 80
End: 2025-03-10

## 2025-03-11 ENCOUNTER — OFFICE VISIT (OUTPATIENT)
Dept: NEUROLOGY | Facility: CLINIC | Age: 80
End: 2025-03-11
Payer: COMMERCIAL

## 2025-03-11 ENCOUNTER — OFFICE VISIT (OUTPATIENT)
Facility: REHABILITATION | Age: 80
End: 2025-03-11
Payer: COMMERCIAL

## 2025-03-11 VITALS
WEIGHT: 169 LBS | DIASTOLIC BLOOD PRESSURE: 91 MMHG | BODY MASS INDEX: 25.61 KG/M2 | SYSTOLIC BLOOD PRESSURE: 157 MMHG | HEART RATE: 64 BPM | TEMPERATURE: 97.9 F | HEIGHT: 68 IN

## 2025-03-11 DIAGNOSIS — R26.9 GAIT ABNORMALITY: Primary | ICD-10-CM

## 2025-03-11 DIAGNOSIS — R29.818 SUSPECTED SLEEP APNEA: ICD-10-CM

## 2025-03-11 DIAGNOSIS — G43.709 CHRONIC MIGRAINE WITHOUT AURA WITHOUT STATUS MIGRAINOSUS, NOT INTRACTABLE: Primary | ICD-10-CM

## 2025-03-11 PROCEDURE — G2211 COMPLEX E/M VISIT ADD ON: HCPCS | Performed by: PSYCHIATRY & NEUROLOGY

## 2025-03-11 PROCEDURE — 99214 OFFICE O/P EST MOD 30 MIN: CPT | Performed by: PSYCHIATRY & NEUROLOGY

## 2025-03-11 PROCEDURE — 97112 NEUROMUSCULAR REEDUCATION: CPT | Performed by: PHYSICAL THERAPIST

## 2025-03-11 PROCEDURE — 97110 THERAPEUTIC EXERCISES: CPT | Performed by: PHYSICAL THERAPIST

## 2025-03-11 NOTE — PATIENT INSTRUCTIONS
"Continue aspirin and statin as others have recommended at least for now and agree with discussing with cardiology.  As we discussed some of the features you describe sound migrainous, when reviewing your MRI brain the cervical spine also looks much worse in this appears like it also could be in the C5 distribution on the left.  We discussed I do not typically order MRI C-spine or manage cervical issues, but I will see if I can get it authorized otherwise I recommend you follow-up with orthopedic spine or neurosurgery for further evaluation and pain management for pain control.  As we discussed I would be happy to place another PT referral, I am sorry it was not helpful for you in the past.    Continue to follow with your the providers regarding the parathyroid issue    Safety/fall precautions       -If you feel like you could sleep on your back that night only, certainly could try to sleep on your back for the sleep study, but not if you feel like you cannot sleep this way.  Just getting sleep is the most important thing, as the machine thinks you are sleeping the entire time it is plugged in. Otherwise, we discussed home sleep study can underestimate the problem.  If it comes back that you almost have sleep apnea or other sleep disorder, but not meeting criteria, we could consider in lab study and reach out to me if you would like this ordered before next visit.    I am placing a referral for a sleep study and if it is abnormal, I will be happy to place a referral to the sleep medicine specialist team to further evaluate your sleep issues. That can be done now or anytime you would like, but you do not have to see them until after the results.    Please call [phone] OR can schedule on [employer] via \"scheduling ticket\" to schedule the sleep study and we discussed due to the new order being labeled \" ambulatory referral to sleep medicine\" you have to wait 2-6 days for the sleep team to turn this referral into " the actual sleep study order that you can schedule, otherwise if you call now they may say there is no order for sleep study, just an order for a referral, because they may not see it yet on their end as a study order.  The referral to sleep medicine piece is only if there is abnormalities and you need to see them afterwards.    After the sleep study is completed if there is abnormal results that need treatment, I recommend you see one of the following providers in our office:  Andrea Garland PA-C           Headache/migraine treatment:   Abortive medications (for immediate treatment of a headache):   It is ok to take ibuprofen, acetaminophen or naproxen (Advil, Tylenol,  Aleve, Excedrin) if they help your headaches you should limit these to No more than 3 times a week to avoid medication overuse/rebound headaches.         Over the counter preventive supplements for headaches/migraines   (to take every day to help prevent headaches - not to take at the time of headache):  [x] Magnesium 400 - 500 mg daily (If any diarrhea or upset stomach, decrease dose  as tolerated)  [x] Riboflavin (Vitamin B2)  400mg daily   (FYI B2 may make your urine bright/neon yellow)     Prescription preventive medications for headaches/migraines   (to take every day to help prevent headaches - not to take at the time of headache):  [x]      *Typically these types of medications take time untill you see the benefit, although some may see improvement in days, often it may take weeks, especially if the medication is being titrated up to a beneficial level. Please contact us if there are any concerns or questions regarding the medication.      Lifestyle Recommendations:  [x] SLEEP - Maintain a regular sleep schedule: Adults need at least 7-8 hours of uninterrupted a night. Maintain good sleep hygiene:   Going to bed and waking up at consistent times, avoiding excessive daytime naps, avoiding caffeinated beverages in the evening, avoid excessive stimulation in the evening and generally using bed primarily for sleeping.  One hour before bedtime would recommend turning lights down lower, decreasing your activity (may read quietly, listen to music at a low volume). When you get into bed, should eliminate all technology (no texting, emailing, playing with your phone, iPad or tablet in bed).  [x] HYDRATION - Maintain good hydration.  Drink  2L of fluid a day (4 typical small water bottles)  [x] DIET - Maintain good nutrition. In particular don't skip meals and try and eat healthy balanced meals regularly.  [x] TRIGGERS - Look for other triggers and avoid them: Limit caffeine to 1-2 cups a day or less. Avoid dietary triggers that you have noticed bring on your headaches (this could include aged cheese, peanuts, MSG, aspartame and nitrates).     Education and Follow-up  [x] Please call with any questions or concerns. Go to the ED with any new or concerning symptoms  [x] Follow up 3-4 months, sooner if needed   As we discussed go to the ER if any new or concerning symptoms, no risky traumatic activity

## 2025-03-11 NOTE — PROGRESS NOTES
Neurology Ambulatory Visit  Name: Nella Yancey       : 1945       MRN: [id number]   Encounter Provider: Mayra Henson MD   Encounter Date: 3/11/2025  Encounter department: [facility]      :  Assessment & Plan  Chronic migraine without aura without status migrainosus, not intractable      Assessment/Plan:   Nella Yancey is a 79 y.o. female with a past medical history of hyperparathyroidism status post adenoma removal, IBS, GERD, pericarditis, heart murmur, chronic gait instability, chronic neck pain (following with physiatry), vitamin-D deficiency, insomnia, depression, anxiety, BPPV referred here for evaluation of headache.   Initial evaluation by me 2018    Chronic Migraine without aura without status migrainosus, not intractable  Suspected sleep apnea -has not obtained sleep study ordered  Benign paroxysmal positional vertigo of left ear   She has had migraines since childhood, she has followed with neurologist in the past back in [place].  She moved here to be closer to her daughter and granddaughter in the area. She has multiple types of headaches,  bioccipital pain, mid throbbing pain. Migraine is without aura and has associated nausea sometimes vomiting, stiff and sore neck, problems with concentration, photophobia, phonophobia, osmophobia, sometimes nasal congestion, lightheadedness.  Migraines worse with any movement.   - as of 2019 she reports headaches are significantly improved with lifestyle interventions  - as of 2019:  headaches 6 times a month and go away with ibuprofen.  She has not followed up with eye doctor but has an appointment in July.  - as of 2019: as of 19: headaches 6-8 a month - often in the am or with anxiety/stress - these she can breathe through it. Morning headaches the last 4-5 days, goes away with coffee usually, if not goes away with ibuprofen. Last big migraine 9 months or so ago   - As of 10/9/2019:  headaches  - has not had in quite some time, around 8 a month and go away with ibuprofen, occasional sharp pains randomly for 5 minutes on various locations on head 1-2 times a week. No migraines in a year. - she is taking magnesium and riboflavin - she thinks this may be helping  - as of 03/2/2020: no longer having significant headaches, occasional am headaches that improve with coffee, taking mg and B2 for prevention  - as of 6/16/2021: She reports 1-2 months of near daily headaches that may last throughout the day, new type of stabbing headache and new onset left visual changes that lasted 4 days and resolved. Continue baby asa for now, ordered MRI Brain. Trial of gabapentin if she would like that may help with multiple things. Follow up with endocrine since hyperparathyroidism may be causing a lot of problems.   - as of 11/12/2021: she reports headaches have improved to 4-5 days per week with mostly tension features and certainly could be related to stress or hyperparathyroidism for which she is following with endocrine. Also dealing with other symptoms concerning for hyperparathyroid. Taking supplements for headache prevention. Did not try gabapentin as she does not want to take prescription meds for her headaches. MRI Brain did not show any new or concerning findings.   - as of 5/17/2022: She reports she is getting about 4-5 significant migraines a month and she is now willing to try abortive medication for this so will prescribe rizatriptan 10 mg as needed.  She is not interested in prescription preventative.  - as of 10/26/2022: She reports headaches are about the same, come in waves, will wake up with headaches for weeks at a time, then other times not for days or weeks. Often related to stress. Even if they are bad they typically go away with tylenol in max 2 hours, occasionally 4-5 days a month last longer and has not tried rizatriptan for that yet.  She is not interested in prescription preventative at this time.   Recommended following back up with physiatry for neck pain as trigger point injections may help with neck pain and possibly headaches once neck pain is improved.  - as of 2/1/2023: She reports headaches are daily and wakes up with them and I again recommended evaluation for sleep apnea contributing and ordered sleep study.  She is not currently interested in prescription preventative and did not yet try rizatriptan which I suggested holding off at this time until migraines are more episodic again or if she has acute episode that she did not wake up with.  She was prescribed trazodone for insomnia and she would like to start with this first since she has not tried it and it could help with mood, stress, sleep and therefore headaches tolerated.  We discussed if you were to consider preventative medication for myself I would recommend gabapentin.  Also agree that with the primary hyperparathyroidism still present, having repeat surgery would be my recommendation if it is what Dr. Perkins recommends, but is otherwise the hypercalcemia can continue to contribute to headaches and other morbidity issues as well.  - as of 5/10/2023: She has had improvement in headaches since last visit and reports she only seems to get them now when stressed.  She continues to take headache preventative supplements and not interested in additional preventative med.  She reports if headache becomes significant enough she may take acetaminophen which helps once a week.  Not tried rizatriptan.  Sleep study scheduled for September as we discussed the 4 to 5 hours of sleep she is getting is the most modifiable factor contributing to many of her symptoms.  She continues to follow with ENT and endocrinology for primary hyperparathyroidism and getting second opinion next week as this is also likely contributing to multiple symptoms.  - as of 9/27/2023: She reports 3-4 headaches a week that are typically there upon awakening and we discussed I suspect  "it is due to slight increased intracranial pressure that I see features of on imaging again highly encouraged her to schedule sleep study as I suspect sleep disorder is contributing to this as is potentially primary hyperparathyroidism.  Thankfully headaches typically resolve spontaneously, otherwise typically with acetaminophen and if not caffeine can help prior to bed.  We discussed imaging results and medication options and she would rather not add at this time.  She is hesitant to get sleep study due to fear of staying alone in the building.  - as of 3/29/2024: She reports headaches wax and wane in a mild fashion and sometimes she wakes up with them and we discussed I still would highly recommend sleep study to further evaluate for sleep disorder contributing to this and she will consider but is not currently interested due to feeling overwhelmed by too many things.  Headaches when they do come which is hard and MRIs for her typically either self resolve with rest or acetaminophen and she is not currently interested in prescription preventative or abortive medications for headache.  She returns with 3 weeks of positional vertigo with positive [place]-Hallpike testing to the left today and Epley maneuver performed with improvement/resolution of symptoms at least today.  Placed referral to physical therapy so that she is established with them in case it were to return and need help.  - as of 9/6/2024: She returns for headache follow-up and she reports head pains a few times a week, but they do not last long and she does not take anything for them.  Episodic lightheadedness and vertiginous symptoms not bad enough to suspect \"the crystals\" AKA BPPV at this time she reports.  She also reports that she was hospitalized since I last saw her which I was not aware of, was admitted for left upper extremity paresthesias 8/14/2024 that she woke up with, typically this would go away with moving her hands and it did not and also " had dizziness and flashes of light in vision and therefore went in for evaluation.  Please see EMR for details as she was evaluated by neurology multiple times head CT, MRI brain, CTA head and neck and was started on aspirin and statin.  No stroke was found.  We discussed certainly if needing further evaluation of this issue if there is concern regarding continuing or coming off aspirin and statin, I would recommend evaluation by one of my stroke colleagues for most up-to-date recommendations.  Otherwise when reviewing her imaging we discussed it appears upon imaging review, that she has worsening of the spondylolisthesis, that should be further evaluated with MRI C-spine.  She has completed well over 6 weeks of PT and continues to have symptoms related to this.  We discussed and I showed her dermatome chart and how it is possible to region where the spinal cord is impinged on the MRI brain (not commented on by radiology has not purpose of the imaging of the brain looking for stroke I assume) could exactly match up with her left-sided numbness symptoms that day potentially worsen in my opinion due to untreated sleep apnea overnight.  Again highly recommend sleep study pending and not obtained.  She already follows with other providers for chronic neck pain management, will place evaluation for neurosurgery to make sure they do not recommend any surgical intervention as these spinal findings are otherwise not my of expertise.  - as of 3/11/2025: She reports she is overall doing better. Headaches are improved to 2-3 headaches a month and she feels often related to anxiety/nerves and random pains on the right side of her head infrequently.  If needed 2 acetaminophen usually works.  She reports now that and had a sleep study, she will schedule her is next. No vertigo reported.  Following with neurosurgery for cervical spine findings.  She reports she has had major improvement with physical therapy for her gait.       Workup  - MRI C spine ordered to evaluate acute worsening of anterolisthesis both symptomatically and per imaging on MRI brain August 2024 and CTA as below  - MRI Brain 8/14/24: No evidence of acute infarct, intracranial hemorrhage or mass..   - CTA head and neck  8/14/2024: Negative CTA head and neck for large vessel occlusion, dissection, aneurysm.  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 Kyphotic curvature of cervical spine. Grade 1 anterolisthesis C3-C4 and C4-C5. Moderate-to-severe multilevel degenerative changes of cervical spine, worse at C6-C7.   -  MRI brain without contrast 07/19/2021: White matter changes suggestive of chronic microangiopathy.  No acute intracranial pathology.*We discussed as of my retrospective review 9/27/2023 she does have partially empty sella, she has prominent optic nerve sheath bilaterally with some tortuosity bilaterally, cerebellar tonsils overlay the foramen magnum   - MRI Brain without contrast  7/9/19: No acute intracranial abnormality. Mild nonspecific cerebral white matter signal abnormality, which can be seen in patients with migraines as well as microangiopathic disease.  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  This may represent an anatomic variation.  If concerned of vertebrobasilar insufficiency, consider follow-up CTA or contrast enhanced MRA imaging.   - MRA head and neck/carotids 09/30/2019: No large vessel flow restrictive disease.  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  This could be reevaluated with CTA. -> per NSGY This requires no treatment and no further imaging is required either.    - Minor short  segment stenosis of the dominant right V1 origin.  Mild long segment narrowing of the origin and nondominant left v1.  Anterior circulation normal.      Preventive:  - We discussed headache hygiene and lifestyle interventions that may improve her headaches   - she is not interested in prescription preventative at this time.   - headache preventative supplements including magnesium,  riboflavin - takes B complex, MVI  - through other providers: Aspirin 81 mg started  8/2024, xanax 1-2 times a week for insomnia and we have discussed if she does have sleep apnea this could make it worse, metoprolol 25 mg nightly XR, omeprazole/Prilosec 20 mg, atorvastatin/Lipitor 10 mg started August 2024, meclizine 12.5 mg as needed, vitamin D3 1000 units daily  - past/failed/contraindicated: metoprolol, propranolol contraindicated due to interaction with current medications   -  future options: Topiramate, acetazolamide/Diamox, gabapentin, venlafaxine, CGRP med, botox      Abortive:  - discussed not taking over-the-counter or prescription pain medications more than 3 days per week to prevent medication overuse/rebound headache  -She is not interested in prescription rescue agent at this time  - past/contraindicated: fiorinal with codeine years ago, out of an abundance of precaution we discussed avoiding triptans (she never took previously prescribed rizatriptan) after events of August 2024  - past/helped: Toradol IM has worked for in the past  - future options:  prochlorperazine, Toradol IM or p.o., could consider trial for 5 days of Depakote or dexamethasone for prolonged migraine, ubrelvy, reyvow, nurtec    Insomnia  She reports chronic insomnia and that she currently gets less than 4 hr a night of sleep.  At initial visit 11/2018 we discussed sleep hygiene and she has stopped drinking coffee late at night which has helped some.  She is wondering if a referral to sleep medicine would be indicated and I am happy to refer her.   Likely multifactorial including possible psychogenic component versus other sleep disorder as well.  - as of 06/03/2019:  Patient reports she did not go to sleep medicine appointment and tried cutting coffee and tea at dinner which helps somewhat  - as of 7/31/19: sleep sometimes good - was good for a while after starting mg, sometimes wakes up at night again now, possible symptoms of RLS and still only averaging 4-5 - will have her see  Sleep med   - as of 10/09/2019:  Did not follow up with sleep medicine as recommended  - as of 3/2/2020 reports she plans to follow up with sleep med soon, may have RLS? Or other sleep disorder.  -   Saw Sleep Medicine 06/22/2021 who felt no PSG was indicated - Psychophysiologic insomnia. Recommended sleep hygiene, relaxation techniques, consider CBT-I.   - as of 2/1/2023: I again recommended sleep medicine follow-up after sleep study for insomnia with a different sleep provider.   - as of 5/10/2023: Sleep study scheduled for 9/21/2023  - as of 9/27/2023: She is nervous to sleep over in the place and canceled it and we discussed in great detail the morbidity and mortality if remains untreated and she will consider  - as of 9/6/24: Still highly recommend sleep study for multiple reasons and discussed morbidity/mortality if present and untreated frequently with her  - as of 3/12/2025: She plans on scheduling       Numbness and tingling of both lower extremities  - EMG/NCS 6/24/20: Mildly abnormal study.  The reduced amplitudes noted on the eroneal  marker studies from extensor digitorum brevis muscles is likely due to reduced muscle bulk of muscles.  The low amplitudes noted on the sensory studies in the lower extremities can be considered normal for this patient's age.  These changes can be considered normal for this patient's age consider normal sural sensory response on the right and normal tibial responses, or could suggest a very mild axonal sensorimotor neuropathy.  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  To note, patient has history of remote carpal tunnel surgeries, could be remnant of prior surgery.  - as of 9/27/2023: She reports bilateral shin numbness for the past year or so wax and wanes and is painful to touch in the shin region and tingling at times.  She reports discussing this with PCP, plans to discuss with cardiology as well and endocrinology on Monday but wanted to mention.  We discussed can absolutely repeat EMG/NCS  - EMG/NCS 12/8/23   This study is abnormal with minimal progression since the previous study of 2020. The superficial peroneal SNAPs are now absent; the left sural SNAP is absent with a prolonged right sural peak latency. H reflexes are prolonged bilaterally. Motor conduction studies are Nella Yancey 3 of 4 significant only for a low left peroneal CMAP amplitudes. 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   - as of 3/29/2024: Reached out to our neuromuscular specialist just to make sure she does not recommend any further evaluation or workup or other recommendations for these findings - no        Patient instructions       Continue aspirin and statin as others have recommended at least for now and agree with discussing with cardiology.  As we discussed some of the features you describe sound migrainous, when reviewing your MRI brain the cervical spine also looks much worse in this appears like it also could be in the C5 distribution on the left.  We discussed I do not typically order MRI C-spine or manage cervical issues, but I will see if I can get it authorized otherwise I recommend you follow-up with orthopedic spine or neurosurgery for further evaluation and pain management for pain control.  As we discussed I would be happy to place another PT referral, I am sorry it was not helpful for you in the past.    Continue to follow  "with your the providers regarding the parathyroid issue    Safety/fall precautions       -If you feel like you could sleep on your back that night only, certainly could try to sleep on your back for the sleep study, but not if you feel like you cannot sleep this way.  Just getting sleep is the most important thing, as the machine thinks you are sleeping the entire time it is plugged in. Otherwise, we discussed home sleep study can underestimate the problem.  If it comes back that you almost have sleep apnea or other sleep disorder, but not meeting criteria, we could consider in lab study and reach out to me if you would like this ordered before next visit.    I am placing a referral for a sleep study and if it is abnormal, I will be happy to place a referral to the sleep medicine specialist team to further evaluate your sleep issues. That can be done now or anytime you would like, but you do not have to see them until after the results.    Please call [phone] OR can schedule on [employer] via \"scheduling ticket\" to schedule the sleep study and we discussed due to the new order being labeled \" ambulatory referral to sleep medicine\" you have to wait 2-6 days for the sleep team to turn this referral into the actual sleep study order that you can schedule, otherwise if you call now they may say there is no order for sleep study, just an order for a referral, because they may not see it yet on their end as a study order.  The referral to sleep medicine piece is only if there is abnormalities and you need to see them afterwards.    After the sleep study is completed if there is abnormal results that need treatment, I recommend you see one of the following providers in our office:  Andrea Garland PAJOHNY           Headache/migraine treatment:   Abortive medications (for " immediate treatment of a headache):   It is ok to take ibuprofen, acetaminophen or naproxen (Advil, Tylenol,  Aleve, Excedrin) if they help your headaches you should limit these to No more than 3 times a week to avoid medication overuse/rebound headaches.         Over the counter preventive supplements for headaches/migraines   (to take every day to help prevent headaches - not to take at the time of headache):  [x] Magnesium 400 - 500 mg daily (If any diarrhea or upset stomach, decrease dose  as tolerated)  [x] Riboflavin (Vitamin B2)  400mg daily   (FYI B2 may make your urine bright/neon yellow)     Prescription preventive medications for headaches/migraines   (to take every day to help prevent headaches - not to take at the time of headache):  [x]      *Typically these types of medications take time untill you see the benefit, although some may see improvement in days, often it may take weeks, especially if the medication is being titrated up to a beneficial level. Please contact us if there are any concerns or questions regarding the medication.      Lifestyle Recommendations:  [x] SLEEP - Maintain a regular sleep schedule: Adults need at least 7-8 hours of uninterrupted a night. Maintain good sleep hygiene:  Going to bed and waking up at consistent times, avoiding excessive daytime naps, avoiding caffeinated beverages in the evening, avoid excessive stimulation in the evening and generally using bed primarily for sleeping.  One hour before bedtime would recommend turning lights down lower, decreasing your activity (may read quietly, listen to music at a low volume). When you get into bed, should eliminate all technology (no texting, emailing, playing with your phone, iPad or tablet in bed).  [x] HYDRATION - Maintain good hydration.  Drink  2L of fluid a day (4 typical small water bottles)  [x] DIET - Maintain good nutrition. In particular don't skip meals and try and eat healthy balanced meals regularly.  [x]  TRIGGERS - Look for other triggers and avoid them: Limit caffeine to 1-2 cups a day or less. Avoid dietary triggers that you have noticed bring on your headaches (this could include aged cheese, peanuts, MSG, aspartame and nitrates).     Education and Follow-up  [x] Please call with any questions or concerns. Go to the ED with any new or concerning symptoms  [x] Follow up 3-4 months, sooner if needed   As we discussed go to the ER if any new or concerning symptoms       Suspected sleep apnea    Orders:    Ambulatory Referral to Sleep Medicine; Future      CC:   We had the pleasure of evaluating Nella Yancey in neurological consultation today. Nella Yancey presents today for evaluation of headaches.   History obtained from patient as well as available medical record review.  History of Present Illness:   Interval history as of 3/11/2025  - Of note/managed by others:   -  Cardiology, PCP, GI, endocrinology, NSGY and going for PT - was going well and walking much better until norovirus   - norovirus a couple of weeks ago   - she has been following with PCP, pulmonary  - no significant new or concerning neurologic symptoms since last visit reported  - Patient concerns or questions: none specifically  - sleep: She reports she will schedule sleep study soon since last visit, please see past notes where it was ordered for details  Ever since norovirus has been sleeping on her back and likes it  Vertigo mostly at night but not lately   4 hours of sleep on avg if that  - sleep is terrible   -Struggles with reflux especially at night  Snores  Cough, choking at night  Very tired during the day   - one morning woke up with heart racing so fast     How likely are you to doze off or fall sleep during the following situations?  0 - would never doze  1 - slight chance of dozing  2 - moderate chance of dozing  3 - high chance of dozing  [place] sleepiness scale  Sitting and reading - 2  Watching TV - 3  Sitting, inactive in a  "public place such as a theater 2  As a passenger in a car for an hour without a break 1  Lying down to rest in afternoon when circumstances permit 3  Sitting and talking to someone 1  Sitting quietly after lunch without alcohol 2  In a car while stopped for few minutes in traffic -0    Headaches and migraines   better  Nella gets 2-3 headaches a month often related to anxiety/nerves and random pains on the right side of her head.  2 tylenol usually help.  Now that Ed had his sleep study she will schedule hers next.    Please see previous notes/EMR for details from previous visits.     Objective     Physical Exam:                                                                 Vitals:            Constitutional:    /91 (BP Location: Right arm, Patient Position: Sitting, Cuff Size: Standard)   Pulse 64   Temp 97.9 °F (36.6 °C) (Temporal)   Ht 5' 8\" (1.727 m)   Wt 76.7 kg (169 lb)   BMI 25.70 kg/m²   BP Readings from Last 3 Encounters:   03/11/25 157/91   02/19/25 110/70   02/16/25 160/90     Pulse Readings from Last 3 Encounters:   03/11/25 64   02/19/25 73   02/16/25 90         Well developed, well nourished, well groomed.        Psychiatric:  Normal behavior and appropriate affect        Able to answer questions appropriately, provide history of recent events   Normal language and spontaneous speech.  facial expression symmetric      ROS:  ROS obtained by medical assistant and reviewed, but if any symptoms listed below say negative, does not mean patient has not had this symptom since last visit, please see HPI for details of symptoms discussed this visit.  Regarding any abnormal or positive findings in ROS that are not neurologic related, patient instructed to address these issues with PCP or go to the ER if they are severe.    Review of Systems   Constitutional:  Negative for appetite change and fever.   HENT: Negative.  Negative for hearing loss, tinnitus, trouble swallowing and voice change.    Eyes: " Negative.  Negative for photophobia and pain.   Respiratory: Negative.  Negative for shortness of breath.    Cardiovascular: Negative.  Negative for palpitations.   Gastrointestinal: Negative.  Negative for nausea and vomiting.   Endocrine: Negative.  Negative for cold intolerance.   Genitourinary: Negative.  Negative for dysuria, frequency and urgency.   Musculoskeletal: Negative.  Negative for myalgias and neck pain.   Skin: Negative.  Negative for rash.   Neurological:  Positive for headaches (2-3 a month). Negative for dizziness, tremors, seizures, syncope, facial asymmetry, speech difficulty, weakness, light-headedness and numbness.   Hematological: Negative.  Does not bruise/bleed easily.   Psychiatric/Behavioral:  Positive for sleep disturbance (trouble going and staying asleep). Negative for confusion and hallucinations.    All other systems reviewed and are negative.         Administrative Statements   I have spent a total time of 30 minutes in caring for this patient on the day of the visit/encounter including Diagnostic results, Prognosis, Risks and benefits of tx options, Instructions for management, Patient and family education, Importance of tx compliance, Risk factor reductions, Impressions, Counseling / Coordination of care, Documenting in the medical record, Reviewing/placing orders in the medical record (including tests, medications, and/or procedures), Obtaining or reviewing history  , and Communicating with other healthcare professionals .

## 2025-03-11 NOTE — PROGRESS NOTES
"PHYSICAL THERAPY TREATMENT     Date: 25  Name: Nella Yancey  : 1945  Referring Provider: Hali Zepeda MD  AUTHORIZATION:   Insurance: Payor: [facility] REP / Plan: [employer] MEDICARE PPO [facility] REP / Product Type: Medicare PPO /     PATIENT HISTORY:  HPI: Nella Yancey is a 79 y.o. female referred to outpatient physical therapy for the following diagnosis   Encounter Diagnosis   Name Primary?    Gait abnormality Yes       \"My knees were really sore after last time.\"     EXAMINATION / TREATMENT    Precautions: fall risk  Gradually progress balance and strength as patient improves and becomes more confident in activities.     25 FGA: 25 not tested  25 not tested    25 Six minute walk test: 825 feet  25 1150 feet  25  2 minute walk test 275 feet 78 bpm 96% SpO2    25 Timed up and go: 16.8 seconds  25 10.1 seconds  25  12.9 sec    25 5 time sit to stand using 19 inch surface and 1 UE support: 25.7 seconds  25 20 inch surface + 1 hand support on chair 26 seconds, unable 5 times frow lower surface  25 completes 1-2 from 20.5\" surface (no hands)  25 completes from 22\" surface without hands, unable from 20\" surface without pushing from chair      Manuals 3/11 3/6 25                                       Neuro Re-Ed          - STS from chair with foam pad on chair. 1x3 no UE   1 UE push off  2x5, no UE eccentric.     - Low hurdles   Fwds x3 laps   Side x3     -Overground fast   200ft w/ turn x8  (best 53sec)         -STS 2x10 from chair boosted w/ foam pad. 1UE push off.     -Blaze pods over low hurdles. 2min x2    -Blaze pods on stair , 1 rail. 2min x2     -Overground fast   100ft w/ turn x4  (best 24s)    -Overground walking carrying tidal ball. 100ft w/ turn x2    -Hip abduction YTB 2x10 ea (added for HEP)          Sit to stand  20.5\"   3 x 3 sets  19.5\" 4 eccentric    Blazepods in solostep  6\" " "step, 8\" wide \"puddle\" 16' line   1 min x 2 sets    Then 4\" and 6\" step 1 min x 2 sets    Stepping forwards and backwards 12' line 1 min x 2 sets    Step up 6\" step 5 each    Fast overground walking 65 feet down and back 40 sec  36 sec  40 sec  40 sec    Heel raise  40 sec  Then increased one side weight bearing about 10 each side    Standing static, lateral foot tap to 5\" high surface 1 min x 3 sets     Sit to stand 22\" to 19\" surface  About 6 total    Lateral step to hit blazepods  1 min x 2 sets  + stepping over 2\" objects 1 min x 2 sets  Then forwards over 8\" wide objects  1 min  + carry 5 lbs   1 min    4\" and 6\" step  1 min x 2 sets    Forwards/backwards, 1 min x 2 sets    Standing hip abduction, yellow band, about 45 sec x 2    Standing heel raise, increased weight one side  about 45 sec each    Walking forwards and back passing soccer ball, 3 min    Fast overground walking 130 feet with 1 180 degree turn  Best 33 sec (without overhead harness), otherwise around 40 sec  X 3-4 total Sit to stand   Chair with 1 hand to ascend, no hands to descend  X 3    24 - 20.5\" surface about 12 total    Sidestep to hit blazepod targets in solostep  Step over 2\" x 2\" obstacles   1 min x 4 sets    Step up and back 4\" step about 10  6\" step use hiking pole about 10    Obstacles including 4\" steps with blazepods no AD  1 min x 3 sets    Standing hip abduction, 1 min  Then + yellow band 1 min  Standing heel raise 1 min    Forwards to backwards blazepods in solostep 1 min x 2 sets    200 feet overground in solostep  67 sec  65 sec  60 sec                                                             Ther Ex Sci-fit for endurance training lvl 1                                                                                    ASSESSMENT    Endurance training initiated today due to pt complaints of frequent fatigue. Pt reported fear of falling when stepping over hurdles, no LOB during session. Pt displayed decreased hip flexion and " "increased knee flexion during lemuel negotiation. Solostep used for fall prevention during balance training. Pt displayed difficulty with STS without UE use evident by requiring a 4\" pad on chair to complete without UE's. Pt will benefit from further skilled physical therapy to address deficits in balance, endurance, and strength to reduce risk of falls, improve safety, and maximize functional mobility.      Plan: Continue per plan of care. Physical therapy 1-2 times per week for 1 month, neuromuscular re-education, therapeutic activity, therapeutic exercise.        "

## 2025-03-11 NOTE — PROGRESS NOTES
Review of Systems   Constitutional:  Negative for appetite change and fever.   HENT: Negative.  Negative for hearing loss, tinnitus, trouble swallowing and voice change.    Eyes: Negative.  Negative for photophobia and pain.   Respiratory: Negative.  Negative for shortness of breath.    Cardiovascular: Negative.  Negative for palpitations.   Gastrointestinal: Negative.  Negative for nausea and vomiting.   Endocrine: Negative.  Negative for cold intolerance.   Genitourinary: Negative.  Negative for dysuria, frequency and urgency.   Musculoskeletal: Negative.  Negative for myalgias and neck pain.   Skin: Negative.  Negative for rash.   Neurological:  Positive for headaches (2-3 a month). Negative for dizziness, tremors, seizures, syncope, facial asymmetry, speech difficulty, weakness, light-headedness and numbness.   Hematological: Negative.  Does not bruise/bleed easily.   Psychiatric/Behavioral:  Positive for sleep disturbance (trouble going and staying asleep). Negative for confusion and hallucinations.    All other systems reviewed and are negative.

## 2025-03-12 ENCOUNTER — TELEPHONE (OUTPATIENT)
Dept: GASTROENTEROLOGY | Facility: CLINIC | Age: 80
End: 2025-03-12

## 2025-03-12 NOTE — TELEPHONE ENCOUNTER
Called patient to confirm procedure for 03/24/2025 with Dr. Jaiyeola at [place], pt has confirmed and did go over the instructions for procedure with pt

## 2025-03-13 ENCOUNTER — OFFICE VISIT (OUTPATIENT)
Facility: REHABILITATION | Age: 80
End: 2025-03-13
Payer: COMMERCIAL

## 2025-03-13 DIAGNOSIS — R26.9 GAIT ABNORMALITY: Primary | ICD-10-CM

## 2025-03-13 PROCEDURE — 97112 NEUROMUSCULAR REEDUCATION: CPT | Performed by: PHYSICAL THERAPIST

## 2025-03-13 NOTE — PROGRESS NOTES
"PHYSICAL THERAPY TREATMENT / UPDATE    Date: 25  Name: Nella Yancey  : 1945  Referring Provider: Hali Zepeda MD  AUTHORIZATION:   Insurance: Payor: [facility] REP / Plan: [employer] MEDICARE PPO [facility] REP / Product Type: Medicare PPO /     PATIENT HISTORY:  HPI: Nella Yancey is a 79 y.o. female referred to outpatient physical therapy for the following diagnosis   Encounter Diagnosis   Name Primary?    Gait abnormality Yes     Knees and legs are really bothering her.  Had fluid behind knees.  Was doing good until she got sick.  Follows with orthopedist.     EXAMINATION / TREATMENT    Precautions: fall risk  Gradually progress balance and strength as patient improves and becomes more confident in activities.     25 FGA: 25 not tested  25 not tested    25 Six minute walk test: 825 feet  25 1150 feet  25  2 minute walk test 275 feet 78 bpm 96% SpO2  3/13/25  1322 feet    25 Timed up and go: 16.8 seconds  25 10.1 seconds  25  12.9 sec  3/13/25  10.0 seconds    25 5 time sit to stand using 19 inch surface and 1 UE support: 25.7 seconds  25 20 inch surface + 1 hand support on chair 26 seconds, unable 5 times frow lower surface  25 completes 1-2 from 20.5\" surface (no hands)  25 completes from 22\" surface without hands, unable from 20\" surface without pushing from chair  3/13/25 Completes 2 from 20.5\" surface no hands in 5 attempts    Manuals 3/13/25 3/11 3/6 25                                           Neuro Re-Ed           Outcome measures    Sit to stand 21.5\" surface x 4  X 2 sets    Blazepod reactive balance in solostep  18' line  Step up 4\" step  Over 15\" wide puddle  Carry 5 lbs   X 3 sets  Then sidestep over 8\" wide obstacles to tap blazepods with feet  1 min x 3 sets - STS from chair with foam pad on chair. 1x3 no UE   1 UE push off  2x5, no UE eccentric.     - Low hurdles   Fwds x3 laps   Side x3 " "    -Overground fast   200ft w/ turn x8  (best 53sec)         -STS 2x10 from chair boosted w/ foam pad. 1UE push off.     -Blaze pods over low hurdles. 2min x2    -Blaze pods on stair , 1 rail. 2min x2     -Overground fast   100ft w/ turn x4  (best 24s)    -Overground walking carrying tidal ball. 100ft w/ turn x2    -Hip abduction YTB 2x10 ea (added for HEP)          Sit to stand  20.5\"   3 x 3 sets  19.5\" 4 eccentric    Blazepods in solostep  6\" step, 8\" wide \"puddle\" 16' line   1 min x 2 sets    Then 4\" and 6\" step 1 min x 2 sets    Stepping forwards and backwards 12' line 1 min x 2 sets    Step up 6\" step 5 each    Fast overground walking 65 feet down and back 40 sec  36 sec  40 sec  40 sec    Heel raise  40 sec  Then increased one side weight bearing about 10 each side    Standing static, lateral foot tap to 5\" high surface 1 min x 3 sets     Sit to stand 22\" to 19\" surface  About 6 total    Lateral step to hit blazepods  1 min x 2 sets  + stepping over 2\" objects 1 min x 2 sets  Then forwards over 8\" wide objects  1 min  + carry 5 lbs   1 min    4\" and 6\" step  1 min x 2 sets    Forwards/backwards, 1 min x 2 sets    Standing hip abduction, yellow band, about 45 sec x 2    Standing heel raise, increased weight one side  about 45 sec each    Walking forwards and back passing soccer ball, 3 min    Fast overground walking 130 feet with 1 180 degree turn  Best 33 sec (without overhead harness), otherwise around 40 sec  X 3-4 total Sit to stand   Chair with 1 hand to ascend, no hands to descend  X 3    24 - 20.5\" surface about 12 total    Sidestep to hit blazepod targets in solostep  Step over 2\" x 2\" obstacles   1 min x 4 sets    Step up and back 4\" step about 10  6\" step use hiking pole about 10    Obstacles including 4\" steps with blazepods no AD  1 min x 3 sets    Standing hip abduction, 1 min  Then + yellow band 1 min  Standing heel raise 1 min    Forwards to backwards blazepods in solostep 1 min x 2 " sets    200 feet overground in solostep  67 sec  65 sec  60 sec                                                                   Ther Ex  Sci-fit for endurance training lvl 1                                                                                            ASSESSMENT  Nella improved again to the best prior level since being ill for 2 weeks.  Improved beyond prior levels for the 6 minute walk test.  Recommend continued physical therapy to maximize mobility.        Goals  Short Term by 2/8/25, continued for 4/08/25  1. Patient will walk 1000 ft in 6 minute walk test. MET PREVIOUSLY, regressed..  2. Patient will score a 20/30 in functional gait assessment.  NOT TESTED.  3. Patient will be able to perform 5 time sit to stand without use of upper extremities. NOT MET.    Long term goals by 4/8/25  1. Patient will walk 1150 feet in 6 minute walk test.  MET.  2. Patient will score a 23/30 on the functional gait assessment.  3. Patient will perform the 5 time sit to  15 seconds without use of UE and using a standard chair.     Plan: Continue per plan of care. Physical therapy 1-2 times per week for 1 month, neuromuscular re-education, therapeutic activity, therapeutic exercise.

## 2025-03-14 ENCOUNTER — TRANSCRIBE ORDERS (OUTPATIENT)
Dept: SLEEP CENTER | Facility: CLINIC | Age: 80
End: 2025-03-14

## 2025-03-14 DIAGNOSIS — R29.818 SUSPECTED SLEEP APNEA: ICD-10-CM

## 2025-03-14 DIAGNOSIS — G47.33 OSA (OBSTRUCTIVE SLEEP APNEA): Primary | ICD-10-CM

## 2025-03-14 DIAGNOSIS — G47.8 OTHER SLEEP DISORDERS: Primary | ICD-10-CM

## 2025-03-14 DIAGNOSIS — J84.9 ILD (INTERSTITIAL LUNG DISEASE) (HCC): Primary | ICD-10-CM

## 2025-03-18 ENCOUNTER — TELEPHONE (OUTPATIENT)
Dept: SLEEP CENTER | Facility: CLINIC | Age: 80
End: 2025-03-18

## 2025-03-18 ENCOUNTER — OFFICE VISIT (OUTPATIENT)
Facility: REHABILITATION | Age: 80
End: 2025-03-18
Payer: COMMERCIAL

## 2025-03-18 DIAGNOSIS — R26.9 GAIT ABNORMALITY: Primary | ICD-10-CM

## 2025-03-18 PROCEDURE — 97112 NEUROMUSCULAR REEDUCATION: CPT | Performed by: PHYSICAL THERAPIST

## 2025-03-18 NOTE — TELEPHONE ENCOUNTER
Call made to Nella. Scheduled her 4/4 @ 9408 at [place] w/ Dr. Adler for F2F to discuss symptoms and rationale for home sleep study-- appt notes reflect this.

## 2025-03-18 NOTE — PROGRESS NOTES
"PHYSICAL THERAPY TREATMENT / UPDATE    Date: 25  Name: Nella Yancey  : 1945  Referring Provider: Hali Zepeda MD  AUTHORIZATION:   Insurance: Payor: [facility] REP / Plan: [employer] MEDICARE PPO [facility] REP / Product Type: Medicare PPO /     PATIENT HISTORY:  HPI: Nella Yancey is a 79 y.o. female referred to outpatient physical therapy for the following diagnosis   Encounter Diagnosis   Name Primary?    Gait abnormality Yes     Some good days some bad days with mobility.  Working on getting up and down at home.    EXAMINATION / TREATMENT    Precautions: fall risk  Gradually progress balance and strength as patient improves and becomes more confident in activities.     25 FGA: 25 not tested  25 not tested    25 Six minute walk test: 825 feet  25 1150 feet  25  2 minute walk test 275 feet 78 bpm 96% SpO2  3/13/25  1322 feet    25 Timed up and go: 16.8 seconds  25 10.1 seconds  25  12.9 sec  3/13/25  10.0 seconds    25 5 time sit to stand using 19 inch surface and 1 UE support: 25.7 seconds  25 20 inch surface + 1 hand support on chair 26 seconds, unable 5 times frow lower surface  25 completes 1-2 from 20.5\" surface (no hands)  25 completes from 22\" surface without hands, unable from 20\" surface without pushing from chair  3/13/25 Completes 2 from 20.5\" surface no hands in 5 attempts    Manuals 3/18/25 3/13/25 3/11 3/6 25                                               Neuro Re-Ed            Sit to stand 21.5\" surface  Eccentric  8  22\" surface x 3-5 x 2 sets    Step ups 6\" step  About 30-40 sec x 3 sets plinth nearby    Standing heel raises 1 min    Blazepods in solostep  4\" step  16\" wide \"puddle\"   1 min x 3 sets    Sidestep over 6\" targets or 8\" wide object  1 min x 3 sets    50 feet down and back in solostep  X 1 untimed  36 sec  31 sec  34 sec  33 sec    Single leg tap object 10\" high  1 min x 3 " "sets in [facility]     Outcome measures    Sit to stand 21.5\" surface x 4  X 2 sets    Blazepod reactive balance in [facility]  18' line  Step up 4\" step  Over 15\" wide puddle  Carry 5 lbs   X 3 sets  Then sidestep over 8\" wide obstacles to tap blazepods with feet  1 min x 3 sets - STS from chair with foam pad on chair. 1x3 no UE   1 UE push off  2x5, no UE eccentric.     - Low hurdles   Fwds x3 laps   Side x3     -Overground fast   200ft w/ turn x8  (best 53sec)         -STS 2x10 from chair boosted w/ foam pad. 1UE push off.     -Blaze pods over low hurdles. 2min x2    -Blaze pods on stair , 1 rail. 2min x2     -Overground fast   100ft w/ turn x4  (best 24s)    -Overground walking carrying tidal ball. 100ft w/ turn x2    -Hip abduction YTB 2x10 ea (added for HEP)          Sit to stand  20.5\"   3 x 3 sets  19.5\" 4 eccentric    Blazepods in [facility]  6\" step, 8\" wide \"puddle\" 16' line   1 min x 2 sets    Then 4\" and 6\" step 1 min x 2 sets    Stepping forwards and backwards 12' line 1 min x 2 sets    Step up 6\" step 5 each    Fast overground walking 65 feet down and back 40 sec  36 sec  40 sec  40 sec    Heel raise  40 sec  Then increased one side weight bearing about 10 each side    Standing static, lateral foot tap to 5\" high surface 1 min x 3 sets     Sit to stand 22\" to 19\" surface  About 6 total    Lateral step to hit blazepods  1 min x 2 sets  + stepping over 2\" objects 1 min x 2 sets  Then forwards over 8\" wide objects  1 min  + carry 5 lbs   1 min    4\" and 6\" step  1 min x 2 sets    Forwards/backwards, 1 min x 2 sets    Standing hip abduction, yellow band, about 45 sec x 2    Standing heel raise, increased weight one side  about 45 sec each    Walking forwards and back passing soccer ball, 3 min    Fast overground walking 130 feet with 1 180 degree turn  Best 33 sec (without overhead harness), otherwise around 40 sec  X 3-4 total Sit to stand   Chair with 1 hand to ascend, no hands to descend  X 3    24 - " "20.5\" surface about 12 total    Sidestep to hit blazepod targets in solostep  Step over 2\" x 2\" obstacles   1 min x 4 sets    Step up and back 4\" step about 10  6\" step use hiking pole about 10    Obstacles including 4\" steps with blazepods no AD  1 min x 3 sets    Standing hip abduction, 1 min  Then + yellow band 1 min  Standing heel raise 1 min    Forwards to backwards blazepods in solostep 1 min x 2 sets    200 feet overground in solostep  67 sec  65 sec  60 sec                                                                         Ther Ex   Sci-fit for endurance training lvl 1                                                                                                    ASSESSMENT  Able to transfer to stand without hands via eccentric lowering right into standing.  Continue to work on lower extremity strengthening incorporating dynamic and reactive balance.    Recommend continued physical therapy to maximize mobility.        Goals  Short Term by 2/8/25, continued for 4/08/25  1. Patient will walk 1000 ft in 6 minute walk test. MET PREVIOUSLY, regressed..  2. Patient will score a 20/30 in functional gait assessment.  NOT TESTED.  3. Patient will be able to perform 5 time sit to stand without use of upper extremities. NOT MET.    Long term goals by 4/8/25  1. Patient will walk 1150 feet in 6 minute walk test.  MET.  2. Patient will score a 23/30 on the functional gait assessment.  3. Patient will perform the 5 time sit to  15 seconds without use of UE and using a standard chair.     Plan: Continue per plan of care. Physical therapy 1-2 times per week for 1 month, neuromuscular re-education, therapeutic activity, therapeutic exercise.          "

## 2025-03-18 NOTE — TELEPHONE ENCOUNTER
Referral placed for a home sleep study. Patient needs to be evaluated with a face to face consultation w/in past 6 months discussing symptoms listed on the order.      Please place new referral for a sleep consultation.      Ordering and co-signing providers notified.

## 2025-03-20 ENCOUNTER — APPOINTMENT (OUTPATIENT)
Facility: REHABILITATION | Age: 80
End: 2025-03-20
Payer: COMMERCIAL

## 2025-03-20 NOTE — TELEPHONE ENCOUNTER
Scheduled date of EGD(as of today):4/23/25  Physician performing EGD:Dr Jaiyeola  Location of EGD:[facility]  Instructions reviewed with patient by:Pt confirmed has EGD prep instructions   Clearances: na    Pt calling to r/s EGD due to being sick.

## 2025-03-20 NOTE — TELEPHONE ENCOUNTER
Pt sick. Fever and cough. Has PCP appt tomorrow and would like to reschedule procedure. Transferred to PEP for assistance.

## 2025-03-21 ENCOUNTER — OFFICE VISIT (OUTPATIENT)
Dept: FAMILY MEDICINE CLINIC | Facility: CLINIC | Age: 80
End: 2025-03-21
Payer: COMMERCIAL

## 2025-03-21 VITALS
TEMPERATURE: 98.7 F | RESPIRATION RATE: 16 BRPM | WEIGHT: 166.6 LBS | DIASTOLIC BLOOD PRESSURE: 90 MMHG | HEART RATE: 88 BPM | OXYGEN SATURATION: 97 % | SYSTOLIC BLOOD PRESSURE: 158 MMHG | HEIGHT: 68 IN | BODY MASS INDEX: 25.25 KG/M2

## 2025-03-21 DIAGNOSIS — R05.1 ACUTE COUGH: ICD-10-CM

## 2025-03-21 DIAGNOSIS — J06.9 UPPER RESPIRATORY TRACT INFECTION, UNSPECIFIED TYPE: Primary | ICD-10-CM

## 2025-03-21 LAB
SL AMB POCT RAPID FLU A: NORMAL
SL AMB POCT RAPID FLU B: NORMAL

## 2025-03-21 PROCEDURE — G2211 COMPLEX E/M VISIT ADD ON: HCPCS | Performed by: FAMILY MEDICINE

## 2025-03-21 PROCEDURE — 87804 INFLUENZA ASSAY W/OPTIC: CPT | Performed by: FAMILY MEDICINE

## 2025-03-21 PROCEDURE — 99213 OFFICE O/P EST LOW 20 MIN: CPT | Performed by: FAMILY MEDICINE

## 2025-03-21 NOTE — PROGRESS NOTES
"Name: Nella Yancey      : 1945      MRN: [id number]  Encounter Provider: Cecelia Rodriguez MD  Encounter Date: 3/21/2025   Encounter department: [facility]  :  Assessment & Plan  Upper respiratory tract infection, unspecified type  - rapid flu A and B negative. Advised rest, plenty of fluids, Tylenol as needed for body aches, Mucinex as needed for cough. Script given for chest x-ray of her cough does not improve by Monday. Patient was encouraged to contact the office for persistent or worsening symptoms     Orders:    POCT rapid flu A and B    Acute cough  Orders:    XR chest pa and lateral; Future      Return as scheduled or sooner as needed.   Patient understands and agrees with the treatment plan.        History of Present Illness   Patient presents today with c/o fever up to 101.5, body aches, fatigue, nasal congestion and cough onset on Monday 4 days ago, her  was sick with URI symptoms last week. Patient reports no further episodes of fever since yesterday, but still feeling tired and coughing a lot. She denies purulent mucus production, chest pain or shortness of breath.       Review of Systems   Constitutional:  Positive for fatigue and fever. Negative for chills.   HENT:  Positive for congestion and rhinorrhea. Negative for ear pain, sore throat, trouble swallowing and voice change.    Respiratory:  Positive for cough. Negative for shortness of breath and wheezing.    Cardiovascular:  Negative for chest pain and palpitations.   Gastrointestinal:  Negative for abdominal pain, diarrhea, nausea and vomiting.   Neurological:  Negative for dizziness and headaches.   Hematological:  Negative for adenopathy.       Objective   /90   Pulse 88   Temp 98.7 °F (37.1 °C)   Resp 16   Ht 5' 8\" (1.727 m)   Wt 75.6 kg (166 lb 9.6 oz)   SpO2 97%   BMI 25.33 kg/m²      Physical Exam  Constitutional:       General: She is not in acute distress.  HENT: "      Right Ear: Tympanic membrane and ear canal normal.      Left Ear: Tympanic membrane and ear canal normal.      Nose: Congestion present.      Mouth/Throat:      Mouth: Mucous membranes are moist.      Pharynx: Oropharynx is clear.   Cardiovascular:      Rate and Rhythm: Normal rate and regular rhythm.      Heart sounds: Normal heart sounds.   Pulmonary:      Effort: Pulmonary effort is normal.      Breath sounds: Normal breath sounds. No wheezing, rhonchi or rales.   Musculoskeletal:      Cervical back: Neck supple.   Lymphadenopathy:      Cervical: No cervical adenopathy.   Neurological:      Mental Status: She is alert and oriented to person, place, and time.   Psychiatric:         Mood and Affect: Mood normal.         Behavior: Behavior normal.         Thought Content: Thought content normal.       Office Visit on 03/21/2025   Component Date Value Ref Range Status    RAPID FLU A 03/21/2025 neg   Final    RAPID FLU B 03/21/2025 neg   Final

## 2025-03-25 ENCOUNTER — OFFICE VISIT (OUTPATIENT)
Facility: REHABILITATION | Age: 80
End: 2025-03-25
Payer: COMMERCIAL

## 2025-03-25 DIAGNOSIS — R26.9 GAIT ABNORMALITY: Primary | ICD-10-CM

## 2025-03-25 PROCEDURE — 97112 NEUROMUSCULAR REEDUCATION: CPT | Performed by: PHYSICAL THERAPIST

## 2025-03-25 NOTE — PROGRESS NOTES
"PHYSICAL THERAPY TREATMENT / UPDATE    Date: 25  Name: Nella Yancey  : 1945  Referring Provider: Hali Zepeda MD  AUTHORIZATION:   Insurance: Payor: [facility] REP / Plan: [employer] MEDICARE PPO [facility] REP / Product Type: Medicare PPO /     PATIENT HISTORY:  HPI: Nella Yancey is a 79 y.o. female referred to outpatient physical therapy for the following diagnosis   Encounter Diagnosis   Name Primary?    Gait abnormality Yes     Still getting over illness, may have to go back to doctor for chest x-ray.    EXAMINATION / TREATMENT    Precautions: fall risk  Gradually progress balance and strength as patient improves and becomes more confident in activities.     25 FGA: 25 not tested  25 not tested    25 Six minute walk test: 825 feet  25 1150 feet  25  2 minute walk test 275 feet 78 bpm 96% SpO2  3/13/25  1322 feet    25 Timed up and go: 16.8 seconds  25 10.1 seconds  25  12.9 sec  3/13/25  10.0 seconds    25 5 time sit to stand using 19 inch surface and 1 UE support: 25.7 seconds  25 20 inch surface + 1 hand support on chair 26 seconds, unable 5 times frow lower surface  25 completes 1-2 from 20.5\" surface (no hands)  25 completes from 22\" surface without hands, unable from 20\" surface without pushing from chair  3/13/25 Completes 2 from 20.5\" surface no hands in 5 attempts    Manuals 3/25/25 3/18/25 3/13/25 3/11 3/6 25                                                   Neuro Re-Ed             2 minute walk test   450 feet    Sit to stand 21\" surface   5 x 3 sets    Up and backwards down 8\" step with varying assist from one railing  2.5 min    Solostep with blazepods  2 hiking poles  8\" step  12\" lemuel  1 min x 3 sets    Then 6\" step and 12\" hurdles without assistive device  1 min x 2 sets    Sidestep over 6\" and !2\" hurdles  1 min x 2 sets    Single leg tap object 14\" and 12\" high  1 min x 2 " "sets    50 feet down and back in solostep  29 sec  26 sec  26 sec  25.5 sec     Sit to stand 21.5\" surface  Eccentric  8  22\" surface x 3-5 x 2 sets    Step ups 6\" step  About 30-40 sec x 3 sets plinth nearby    Standing heel raises 1 min    Blazepods in solostep  4\" step  16\" wide \"puddle\"   1 min x 3 sets    Sidestep over 6\" targets or 8\" wide object  1 min x 3 sets    50 feet down and back in solostep  X 1 untimed  36 sec  31 sec  34 sec  33 sec    Single leg tap object 10\" high  1 min x 3 sets in [facility]     Outcome measures    Sit to stand 21.5\" surface x 4  X 2 sets    Blazepod reactive balance in [facility]  18' line  Step up 4\" step  Over 15\" wide puddle  Carry 5 lbs   X 3 sets  Then sidestep over 8\" wide obstacles to tap blazepods with feet  1 min x 3 sets - STS from chair with foam pad on chair. 1x3 no UE   1 UE push off  2x5, no UE eccentric.     - Low hurdles   Fwds x3 laps   Side x3     -Overground fast   200ft w/ turn x8  (best 53sec)         -STS 2x10 from chair boosted w/ foam pad. 1UE push off.     -Blaze pods over low hurdles. 2min x2    -Blaze pods on stair , 1 rail. 2min x2     -Overground fast   100ft w/ turn x4  (best 24s)    -Overground walking carrying tidal ball. 100ft w/ turn x2    -Hip abduction YTB 2x10 ea (added for HEP)          Sit to stand  20.5\"   3 x 3 sets  19.5\" 4 eccentric    Blazepods in solostep  6\" step, 8\" wide \"puddle\" 16' line   1 min x 2 sets    Then 4\" and 6\" step 1 min x 2 sets    Stepping forwards and backwards 12' line 1 min x 2 sets    Step up 6\" step 5 each    Fast overground walking 65 feet down and back 40 sec  36 sec  40 sec  40 sec    Heel raise  40 sec  Then increased one side weight bearing about 10 each side    Standing static, lateral foot tap to 5\" high surface 1 min x 3 sets     Sit to stand 22\" to 19\" surface  About 6 total    Lateral step to hit blazepods  1 min x 2 sets  + stepping over 2\" objects 1 min x 2 sets  Then forwards over 8\" wide " "objects  1 min  + carry 5 lbs   1 min    4\" and 6\" step  1 min x 2 sets    Forwards/backwards, 1 min x 2 sets    Standing hip abduction, yellow band, about 45 sec x 2    Standing heel raise, increased weight one side  about 45 sec each    Walking forwards and back passing soccer ball, 3 min    Fast overground walking 130 feet with 1 180 degree turn  Best 33 sec (without overhead harness), otherwise around 40 sec  X 3-4 total Sit to stand   Chair with 1 hand to ascend, no hands to descend  X 3    24 - 20.5\" surface about 12 total    Sidestep to hit blazepod targets in solostep  Step over 2\" x 2\" obstacles   1 min x 4 sets    Step up and back 4\" step about 10  6\" step use hiking pole about 10    Obstacles including 4\" steps with blazepods no AD  1 min x 3 sets    Standing hip abduction, 1 min  Then + yellow band 1 min  Standing heel raise 1 min    Forwards to backwards blazepods in solostep 1 min x 2 sets    200 feet overground in solostep  67 sec  65 sec  60 sec                                                                               Ther Ex    Sci-fit for endurance training lvl 1                                                                                                            ASSESSMENT  Moving better than previously, moving faster and better able to navigate obstacles with less external assistance.  Continue working on sit to stand at home.    Recommend continued physical therapy to maximize mobility.        Goals  Short Term by 2/8/25, continued for 4/08/25  1. Patient will walk 1000 ft in 6 minute walk test. MET PREVIOUSLY, regressed..  2. Patient will score a 20/30 in functional gait assessment.  NOT TESTED.  3. Patient will be able to perform 5 time sit to stand without use of upper extremities. NOT MET.    Long term goals by 4/8/25  1. Patient will walk 1150 feet in 6 minute walk test.  MET.  2. Patient will score a 23/30 on the functional gait assessment.  3. Patient will perform the 5 time sit " to  15 seconds without use of UE and using a standard chair.     Plan: Continue per plan of care. Physical therapy 1-2 times per week for 1 month, neuromuscular re-education, therapeutic activity, therapeutic exercise.

## 2025-03-26 ENCOUNTER — APPOINTMENT (OUTPATIENT)
Dept: RADIOLOGY | Facility: CLINIC | Age: 80
End: 2025-03-26
Payer: COMMERCIAL

## 2025-03-26 DIAGNOSIS — R05.1 ACUTE COUGH: ICD-10-CM

## 2025-03-26 PROCEDURE — 71046 X-RAY EXAM CHEST 2 VIEWS: CPT

## 2025-03-27 ENCOUNTER — APPOINTMENT (OUTPATIENT)
Facility: REHABILITATION | Age: 80
End: 2025-03-27
Payer: COMMERCIAL

## 2025-03-27 ENCOUNTER — TELEPHONE (OUTPATIENT)
Age: 80
End: 2025-03-27

## 2025-03-27 NOTE — TELEPHONE ENCOUNTER
Please let patient know that her chest x-ray was negative for pneumonia. If patient is not feeling better, please schedule patient for a visit tomorrow.

## 2025-03-27 NOTE — TELEPHONE ENCOUNTER
Pt calling stating she's still not better..  Can Dr Rodriguez send her for blood work to see what's wrong?  She asked.    Pains in stomach, tired, cramping, can hardly move.  Pt had xray done she would like that looked at when she gets a chance.    Please call Pt about this.  Thanks.

## 2025-03-28 ENCOUNTER — TELEPHONE (OUTPATIENT)
Age: 80
End: 2025-03-28

## 2025-03-28 ENCOUNTER — OFFICE VISIT (OUTPATIENT)
Dept: FAMILY MEDICINE CLINIC | Facility: CLINIC | Age: 80
End: 2025-03-28
Payer: COMMERCIAL

## 2025-03-28 VITALS
WEIGHT: 174.2 LBS | HEART RATE: 66 BPM | OXYGEN SATURATION: 97 % | DIASTOLIC BLOOD PRESSURE: 80 MMHG | SYSTOLIC BLOOD PRESSURE: 134 MMHG | BODY MASS INDEX: 26.4 KG/M2 | TEMPERATURE: 98.1 F | RESPIRATION RATE: 16 BRPM | HEIGHT: 68 IN

## 2025-03-28 DIAGNOSIS — J01.90 ACUTE NON-RECURRENT SINUSITIS, UNSPECIFIED LOCATION: Primary | ICD-10-CM

## 2025-03-28 DIAGNOSIS — R06.09 DYSPNEA ON EXERTION: Primary | ICD-10-CM

## 2025-03-28 DIAGNOSIS — R06.09 DYSPNEA ON EXERTION: ICD-10-CM

## 2025-03-28 PROCEDURE — G2211 COMPLEX E/M VISIT ADD ON: HCPCS | Performed by: FAMILY MEDICINE

## 2025-03-28 PROCEDURE — 99213 OFFICE O/P EST LOW 20 MIN: CPT | Performed by: FAMILY MEDICINE

## 2025-03-28 RX ORDER — AZITHROMYCIN 250 MG/1
TABLET, FILM COATED ORAL
Qty: 6 TABLET | Refills: 0 | Status: SHIPPED | OUTPATIENT
Start: 2025-03-28 | End: 2025-04-02

## 2025-03-28 NOTE — PROGRESS NOTES
Name: Nella Yancey      : 1945      MRN: [id number]  Encounter Provider: Cecelia Rodriguez MD  Encounter Date: 3/28/2025   Encounter department: [facility]  :  Assessment & Plan  Acute non-recurrent sinusitis, unspecified location  - chest x-ray 3 /26/25 showed no acute cardiopulmonary disease  - Z-walter prescribed, side effects reviewed and advised to hold atorvastatin while on Z-walter, advised to use saline nasal spray 2-3 times a day and take Mucinex for cough/ congestion, encouraged to contact the office for persistent or worsening symptoms    Orders:    azithromycin (Zithromax) 250 mg tablet; Take 2 tablets (500 mg total) by mouth daily for 1 day, THEN 1 tablet (250 mg total) daily for 4 days.    Dyspnea on exertion  - Echo ordered, advised to go to ER for worsening symptoms    Orders:    Echo complete w/ contrast if indicated; Future    Return as scheduled or sooner as needed.   Patient understands and agrees with the treatment plan.        History of Present Illness   Patient presents today with c/o persistent nasal congestion, postnasal drip, non productive cough and fatigue for about 10 days, she is now having intermittent nausea and facial pressure. Patient also reports low grade fevers in the evening up to 99.7 and continues having dyspnea on moderate exertion. Patient denies any chest pain, palpitations, night sweats, orthopnea, paroxysmal nocturnal dyspnea, dizziness or syncope.       Review of Systems   Constitutional:  Positive for fatigue and fever. Negative for chills.   HENT:  Positive for congestion, postnasal drip and sinus pressure. Negative for ear pain, sore throat, trouble swallowing and voice change.    Respiratory:  Positive for cough and shortness of breath. Negative for wheezing.    Cardiovascular:  Negative for chest pain and palpitations.   Gastrointestinal:  Positive for nausea. Negative for abdominal pain, blood in stool,  "constipation, diarrhea and vomiting.   Neurological:  Negative for dizziness and headaches.   Hematological:  Negative for adenopathy.       Objective   /80   Pulse 66   Temp 98.1 °F (36.7 °C)   Resp 16   Ht 5' 8\" (1.727 m)   Wt 79 kg (174 lb 3.2 oz)   SpO2 97%   BMI 26.49 kg/m²      Wt Readings from Last 3 Encounters:   03/28/25 79 kg (174 lb 3.2 oz)   03/21/25 75.6 kg (166 lb 9.6 oz)   03/11/25 76.7 kg (169 lb)     Physical Exam  Constitutional:       General: She is not in acute distress.  HENT:      Right Ear: Tympanic membrane and ear canal normal.      Left Ear: Tympanic membrane and ear canal normal.      Nose: Congestion present.      Mouth/Throat:      Mouth: Mucous membranes are moist.      Pharynx: Oropharynx is clear.   Cardiovascular:      Rate and Rhythm: Normal rate and regular rhythm.      Heart sounds: Normal heart sounds.   Pulmonary:      Effort: Pulmonary effort is normal.      Breath sounds: Normal breath sounds. No wheezing, rhonchi or rales.   Musculoskeletal:      Cervical back: Neck supple.      Right lower leg: No edema.      Left lower leg: No edema.   Lymphadenopathy:      Cervical: No cervical adenopathy.   Neurological:      Mental Status: She is alert and oriented to person, place, and time.   Psychiatric:         Mood and Affect: Mood normal.         Behavior: Behavior normal.         Thought Content: Thought content normal.       No visits with results within 1 Week(s) from this visit.   Latest known visit with results is:   Office Visit on 03/21/2025   Component Date Value Ref Range Status    RAPID FLU A 03/21/2025 neg   Final    RAPID FLU B 03/21/2025 neg   Final       "

## 2025-03-28 NOTE — TELEPHONE ENCOUNTER
Patient call to the doctor about the trying to schedule a Echo complete w/ contrast if indicated and was inform to call the doctor office and let them know to get her in sooner the need the order to say Stat for the patient to get a sooner appointment. Thank you

## 2025-03-28 NOTE — TELEPHONE ENCOUNTER
L/m for pt informing her the echo order was changed to stat so it should be easier for her to call and schedule to get a sooner appt. Advised to call back with any further questions or concerns.

## 2025-03-30 ENCOUNTER — NURSE TRIAGE (OUTPATIENT)
Dept: OTHER | Facility: OTHER | Age: 80
End: 2025-03-30

## 2025-03-30 NOTE — TELEPHONE ENCOUNTER
"FOLLOW UP: No follow up needed at this time.    REASON FOR CONVERSATION: Medication Problem    SYMPTOMS: No symptoms at this time.     OTHER: Pt is on azithromycin for diagnosed sinus infection since Friday. States she had a headache which she took two Extra Strength Tylenols for. Pt is due to take her one tablet of 250mg azithromycin and wanted to make sure it is okay to take and that there are no interactions with the Extra Strength Tylenol?    DISPOSITION: Home Care (Information or Advice Only Call)    Checked for drug interactions via [employer] for Tylenol and azithromycin. No interactions noted for both medications. Reassured pt that it is okay for her to take her azithromycin dosage as prescribed.     Pt aware and verbalized understanding.    Reason for Disposition   Caller has medicine question, adult has minor symptoms, caller declines triage, AND triager answers question    Answer Assessment - Initial Assessment Questions  1. NAME of MEDICINE: \"What medicine(s) are you calling about?\"      Azithromycin      Tylenol Extra Strength    2. QUESTION: \"What is your question?\" (e.g., double dose of medicine, side effect)      Wants to know if its okay to take both meds close together.    3. PRESCRIBER: \"Who prescribed the medicine?\" Reason: if prescribed by specialist, call should be referred to that group.      Dr. Rodriguez    Protocols used: Medication Question Call-Adult-    "

## 2025-03-31 ENCOUNTER — HOSPITAL ENCOUNTER (OUTPATIENT)
Dept: NON INVASIVE DIAGNOSTICS | Age: 80
Discharge: HOME | End: 2025-03-31
Payer: COMMERCIAL

## 2025-03-31 ENCOUNTER — RESULTS FOLLOW-UP (OUTPATIENT)
Dept: FAMILY MEDICINE CLINIC | Facility: CLINIC | Age: 80
End: 2025-03-31

## 2025-03-31 VITALS
DIASTOLIC BLOOD PRESSURE: 80 MMHG | WEIGHT: 174 LBS | HEART RATE: 70 BPM | HEIGHT: 68 IN | SYSTOLIC BLOOD PRESSURE: 134 MMHG | BODY MASS INDEX: 26.37 KG/M2

## 2025-03-31 DIAGNOSIS — R06.09 DYSPNEA ON EXERTION: ICD-10-CM

## 2025-03-31 DIAGNOSIS — I10 ESSENTIAL HYPERTENSION: ICD-10-CM

## 2025-03-31 LAB
AORTIC ROOT: 2.9 CM
AORTIC VALVE MEAN VELOCITY: 10.4 M/S
ASCENDING AORTA: 3.1 CM
AV LVOT MEAN GRADIENT: 2 MMHG
AV LVOT PEAK GRADIENT: 4 MMHG
AV MEAN PRESS GRAD SYS DOP V1V2: 5 MMHG
AV PEAK GRADIENT: 10 MMHG
AV VELOCITY RATIO: 0.68
AV VMAX SYS DOP: 1.57 M/S
BSA FOR ECHO PROCEDURE: 1.93 M2
DOP CALC AO VTI: 31.77 CM
DOP CALC LVOT PEAK VEL VTI: 21.49 CM
DOP CALC LVOT PEAK VEL: 1.04 M/S
E WAVE DECELERATION TIME: 200 MS
E/A RATIO: 0.68
FRACTIONAL SHORTENING: 36 (ref 28–44)
INTERVENTRICULAR SEPTUM IN DIASTOLE (PARASTERNAL SHORT AXIS VIEW): 1.3 CM
INTERVENTRICULAR SEPTUM: 1.3 CM (ref 0.6–1.1)
LAAS-AP2: 16.6 CM2
LAAS-AP4: 14.6 CM2
LEFT ATRIUM SIZE: 3.4 CM
LEFT ATRIUM VOLUME (MOD BIPLANE): 44 ML
LEFT ATRIUM VOLUME INDEX (MOD BIPLANE): 22.8 ML/M2
LEFT INTERNAL DIMENSION IN SYSTOLE: 2.3 CM (ref 2.1–4)
LEFT VENTRICLE DIASTOLIC VOLUME (MOD BIPLANE): 102 ML
LEFT VENTRICLE DIASTOLIC VOLUME INDEX (MOD BIPLANE): 52.8 ML/M2
LEFT VENTRICLE SYSTOLIC VOLUME (MOD BIPLANE): 36 ML
LEFT VENTRICLE SYSTOLIC VOLUME INDEX (MOD BIPLANE): 18.7 ML/M2
LEFT VENTRICULAR INTERNAL DIMENSION IN DIASTOLE: 3.6 CM (ref 3.5–6)
LEFT VENTRICULAR POSTERIOR WALL IN END DIASTOLE: 0.7 CM
LEFT VENTRICULAR STROKE VOLUME: 38 ML
LV EF BIPLANE MOD: 64 %
LV EF US.2D.A4C+ESTIMATED: 62 %
LVSV (TEICH): 38 ML
MV E'TISSUE VEL-LAT: 6 CM/S
MV E'TISSUE VEL-SEP: 5 CM/S
MV PEAK A VEL: 1.17 M/S
MV PEAK E VEL: 79 CM/S
MV STENOSIS PRESSURE HALF TIME: 58 MS
MV VALVE AREA P 1/2 METHOD: 3.79
RA PRESSURE ESTIMATED: 3 MMHG
RIGHT ATRIAL 2D VOLUME: 34 ML
RIGHT ATRIUM AREA SYSTOLE A4C: 14 CM2
RIGHT VENTRICLE ID DIMENSION: 3.8 CM
RV PSP: 27 MMHG
SL CV LEFT ATRIUM LENGTH A2C: 4.7 CM
SL CV LV EF: 64
SL CV PED ECHO LEFT VENTRICLE DIASTOLIC VOLUME (MOD BIPLANE) 2D: 56 ML
SL CV PED ECHO LEFT VENTRICLE SYSTOLIC VOLUME (MOD BIPLANE) 2D: 18 ML
TR MAX PG: 24 MMHG
TR PEAK VELOCITY: 2.4 M/S
TRICUSPID ANNULAR PLANE SYSTOLIC EXCURSION: 1.9 CM
TRICUSPID VALVE PEAK REGURGITATION VELOCITY: 2.43 M/S

## 2025-03-31 PROCEDURE — 93306 TTE W/DOPPLER COMPLETE: CPT | Performed by: INTERNAL MEDICINE

## 2025-03-31 PROCEDURE — 93306 TTE W/DOPPLER COMPLETE: CPT

## 2025-04-01 RX ORDER — METOPROLOL SUCCINATE 25 MG/1
25 TABLET, EXTENDED RELEASE ORAL
Qty: 90 TABLET | Refills: 1 | Status: SHIPPED | OUTPATIENT
Start: 2025-04-01

## 2025-04-02 ENCOUNTER — TELEPHONE (OUTPATIENT)
Age: 80
End: 2025-04-02

## 2025-04-02 NOTE — TELEPHONE ENCOUNTER
Helen from the Surgeon's office in [place] is asking that we fax over the office note of pt appt on 4/4 with Dr. Adler to [phone] Attn: Helen. Pt will be having a hand procedure

## 2025-04-04 ENCOUNTER — OFFICE VISIT (OUTPATIENT)
Age: 80
End: 2025-04-04
Payer: COMMERCIAL

## 2025-04-04 VITALS
BODY MASS INDEX: 25.76 KG/M2 | WEIGHT: 170 LBS | DIASTOLIC BLOOD PRESSURE: 84 MMHG | HEIGHT: 68 IN | HEART RATE: 68 BPM | OXYGEN SATURATION: 95 % | SYSTOLIC BLOOD PRESSURE: 138 MMHG | TEMPERATURE: 98.3 F

## 2025-04-04 DIAGNOSIS — R20.0 NUMBNESS AND TINGLING OF BOTH LOWER EXTREMITIES: ICD-10-CM

## 2025-04-04 DIAGNOSIS — R20.2 NUMBNESS AND TINGLING OF BOTH LOWER EXTREMITIES: ICD-10-CM

## 2025-04-04 DIAGNOSIS — J84.9 ILD (INTERSTITIAL LUNG DISEASE) (HCC): Primary | ICD-10-CM

## 2025-04-04 DIAGNOSIS — Z01.811 ENCOUNTER FOR PREOPERATIVE PULMONARY EXAMINATION: ICD-10-CM

## 2025-04-04 DIAGNOSIS — J45.20 MILD INTERMITTENT ASTHMA WITHOUT COMPLICATION: ICD-10-CM

## 2025-04-04 DIAGNOSIS — G47.33 OSA (OBSTRUCTIVE SLEEP APNEA): ICD-10-CM

## 2025-04-04 PROCEDURE — 99214 OFFICE O/P EST MOD 30 MIN: CPT | Performed by: INTERNAL MEDICINE

## 2025-04-04 NOTE — ASSESSMENT & PLAN NOTE
-  No acute indication from repeat imaging  -  repeat spirometry/DLCO q6 month  -  No acute indication for steroids or antifibrotics

## 2025-04-04 NOTE — ASSESSMENT & PLAN NOTE
-  Noted improvement from albuterol  -  may continue to use PRN  -  May use on as needed basis

## 2025-04-04 NOTE — TELEPHONE ENCOUNTER
Helen called back regarding OV Note. Faxed last OV note, 10/15/24. Helen states this OV note will be fine, only looking for dx's and why pt was seen by PULM.     10/15/24 OV Note faxed to [phone].    Pt to have hand surgery 04/07/25.

## 2025-04-04 NOTE — PROGRESS NOTES
Progress Note - Pulmonary   Nella Yancey 79 y.o. female MRN: [id number]   Encounter: [id number]    Assessment  Patient is 79-year-old female with past medical history significant for ILD, mild remittent asthma presents for routine follow-up.  The patient is at high risk for EMIR given her symptoms including excessive daytime sleepiness as well as choking and gasping.  Therefore she would benefit from a sleep study.  For ILD, reviewed her spirometry and is stable.  She is scheduled to have surgery for trigger finger for which she is an acceptable risk.:  Assessment & Plan  [facility]) (HCC)  -  No acute indication from repeat imaging  -  repeat spirometry/DLCO q6 month  -  No acute indication for steroids or antifibrotics       Mild intermittent asthma without complication  -  Noted improvement from albuterol  -  may continue to use PRN  -  May use on as needed basis       Numbness and tingling of both lower extremities  -  participating in physical therapy       EMIR (obstructive sleep apnea)  - Significant daytime sleepiness  - notes choking gasping as well as periods of apnea  -Check sleep study       Encounter for preoperative pulmonary examination  - Patient is acceptable risk to proceed with trigger finger surgery.       Patient may follow up in 6 months or sooner as necessary.     Subjective:   The patient is recovering from norovirus. Her  then had a cold and she caught it from him.  She is completing an antibiotic.  The patient has witnessed periods at night where she stops breathing per her .  The patient has coughing episodes which do sometimes feel like choking or gasping.  She describes her self as extremely tired.  She would fall asleep easily with normal activities.  This does interfere with her daily life.      Inhaler Regimen:  Albuterol    Remainder of review of systems negative except as described in HPI.      The following portions of the patient's  "history were reviewed and updated as appropriate: allergies, current medications, past family history, past medical history, past social history, past surgical history and problem list.     Objective:   Vitals: Blood pressure 138/84, pulse 68, temperature 98.3 °F (36.8 °C), temperature source Tympanic, height 5' 8\" (1.727 m), weight 77.1 kg (170 lb), SpO2 95%, not currently breastfeeding., RA, Body mass index is 25.85 kg/m².    Physical Exam  Gen: Pleasant, awake, alert, oriented x 3, no acute distress  HEENT: Mucous membranes moist, no oral lesions, no thrush  NECK: No accessory muscle use, JVP not elevated  Cardiac: RRR, single S1, single S2, no murmurs, no rubs, no gallops  Lungs:  CTA b/l  Abdomen: normoactive bowel sounds, soft nontender, nondistended, no rebound or rigidity, no guarding  Extremities: no cyanosis, no clubbing, no LE edema  MSK:  Strength equal in all extremities  Derm:  No rashes/lesions noted  Neuro:  Appropriate mood/affect    Labs: I have personally reviewed pertinent lab results.  Lab Results   Component Value Date    WBC 10.31 (H) 02/16/2025    HGB 15.1 02/16/2025     02/16/2025     Lab Results   Component Value Date    CREATININE 0.73 02/16/2025     Imaging and other studies: Results Review Statement: I reviewed radiology reports from this admission including: CT chest.  CXR 3/26/2025  My interpretation:  No acute intrathoracic process    Radiology findings:  Cardiomediastinal silhouette appears unremarkable.  The lungs are clear.  No pneumothorax or pleural effusion.  There is question of fractures of the right sixth and seventh ribs     Pulmonary Function Testing: Results Review Statement: I reviewed radiology reports from this admission including: PFT.                          FEV1               FVC                 FEV1/FVC       DLCOcor  10/7/2019        1.70 69%         2.17 68%         78%                 58%  3/3/2021          1.59 66%         2.02 64%         79%            "      58%  3/17/2022        1.46 62%         1.89 60%         78%                 55%  10/17/2022      1.26 53%         1.62 52%         78%                 52%      2/15/2023        1.49 65%         1.90 63%         79%                 67%  11/14/2023      1.48 65%          1.95 65%          76%               61%    6/20/2024        1.50 67%          1.96 66%          77%                 58%  1/15/2025 1.50 67%   1.96 66%    77%    16.66 58%    Ifeanyi Adler MD  [facility]

## 2025-04-09 ENCOUNTER — ANESTHESIA (OUTPATIENT)
Dept: ANESTHESIOLOGY | Facility: HOSPITAL | Age: 80
End: 2025-04-09

## 2025-04-09 ENCOUNTER — ANESTHESIA EVENT (OUTPATIENT)
Dept: ANESTHESIOLOGY | Facility: HOSPITAL | Age: 80
End: 2025-04-09

## 2025-04-15 ENCOUNTER — TELEPHONE (OUTPATIENT)
Dept: GASTROENTEROLOGY | Facility: MEDICAL CENTER | Age: 80
End: 2025-04-15

## 2025-04-15 NOTE — TELEPHONE ENCOUNTER
Called and spoke to patient to confirm procedure for 04/23 with Dr. Jaiyeola but pt needed to cancel due to having surgery on her hand and getting physical therapy. Patient wishes to postpone and will call when ready

## 2025-04-21 ENCOUNTER — NURSE TRIAGE (OUTPATIENT)
Age: 80
End: 2025-04-21

## 2025-04-21 ENCOUNTER — TELEPHONE (OUTPATIENT)
Age: 80
End: 2025-04-21

## 2025-04-21 NOTE — TELEPHONE ENCOUNTER
Regarding: Ankle swelling  ----- Message from Samia FERRARO sent at 4/21/2025  8:53 AM EDT -----  Patient called in regards to leg and ankle swelling.  No appointments available until 4.22.  Scheduled for 4/22 please contact patient.

## 2025-04-21 NOTE — TELEPHONE ENCOUNTER
"FOLLOW UP: OV scheduled for tomorrow at 3:15m     REASON FOR CONVERSATION: Joint Swelling    SYMPTOMS: ongoing off/ on bilateral ankle swelling    OTHER: Patient is requesting \"complete test\" for heart, very concerned about heart failure    DISPOSITION: See Within 3 Days in Office      Reason for Disposition   MILD swelling of both ankles (e.g., ankle joints look swollen; or bilateral mild pedal edema) and new-onset or getting worse  (Exceptions: Caused by hot weather, already seen by doctor or NP/PA for this.)    Answer Assessment - Initial Assessment Questions  1. LOCATION: \"Which ankle is swollen?\" \"Where is the swelling?\"      both  2. ONSET: \"When did the swelling start?\"      Not swollen right now. Noticed some swelling at end of day yesterday  3. SWELLING: \"How bad is the swelling?\" Or, \"How large is it?\" (e.g., mild, moderate, severe; size of localized swelling)       Mild, still able to wear shoes   4. PAIN: \"Is there any pain?\" If Yes, ask: \"How bad is it?\" (Scale 0-10; or none, mild, moderate, severe)      denies  5. CAUSE: \"What do you think caused the ankle swelling?\"      Unsure, concerned about heart failure  6. OTHER SYMPTOMS: \"Do you have any other symptoms?\" (e.g., fever, chest pain, difficulty breathing, calf pain)      denies  7. PREGNANCY: \"Is there any chance you are pregnant?\" \"When was your last menstrual period?\"          Protocols used: Ankle Swelling-Adult-[place]    "

## 2025-04-22 ENCOUNTER — OFFICE VISIT (OUTPATIENT)
Dept: FAMILY MEDICINE CLINIC | Facility: CLINIC | Age: 80
End: 2025-04-22
Payer: COMMERCIAL

## 2025-04-22 ENCOUNTER — TELEPHONE (OUTPATIENT)
Dept: ENDOCRINOLOGY | Facility: CLINIC | Age: 80
End: 2025-04-22

## 2025-04-22 DIAGNOSIS — M25.471 EDEMA OF BOTH ANKLES: ICD-10-CM

## 2025-04-22 DIAGNOSIS — M25.472 EDEMA OF BOTH ANKLES: ICD-10-CM

## 2025-04-22 DIAGNOSIS — R00.2 PALPITATIONS: Primary | ICD-10-CM

## 2025-04-22 DIAGNOSIS — I49.3 FREQUENT PVCS: ICD-10-CM

## 2025-04-22 DIAGNOSIS — I10 ESSENTIAL HYPERTENSION: ICD-10-CM

## 2025-04-22 PROCEDURE — 99214 OFFICE O/P EST MOD 30 MIN: CPT | Performed by: FAMILY MEDICINE

## 2025-04-22 PROCEDURE — G2211 COMPLEX E/M VISIT ADD ON: HCPCS | Performed by: FAMILY MEDICINE

## 2025-04-22 PROCEDURE — 93000 ELECTROCARDIOGRAM COMPLETE: CPT | Performed by: FAMILY MEDICINE

## 2025-04-22 NOTE — PROGRESS NOTES
"Name: Nella Yancey      : 1945      MRN: [id number]  Encounter Provider: Cecelia Rodriguez MD  Encounter Date: 2025   Encounter department: [facility]  :  Assessment & Plan  Palpitations  - EKG showed NSR with frequent PVC\"s and PAC's  - lab work ordered for CBC&diff, TSH, CMP  - recent Echo 3/31/25 showed normal LV size and systolic function with EF 64%, grade 1 diastolic dysfunction, mild to moderate TR  - follow up with Cardiology for further evaluation  - advised to seek ER care for any dizziness, chest pain or shortness of breath    Orders:    Ambulatory Referral to Cardiology; Future    Frequent PVCs  - patient is scheduled to see Cardiology on 25    Orders:    Ambulatory Referral to Cardiology; Future    Essential hypertension  - BP elevated today 146/78, patient was recently prescribed amlodipine 2.5 mg daily by Cardiology which she never started, advised to continue Toprol 25 mg daily and follow up with Cardiology     Orders:    POCT ECG    Edema of both ankles  - discussed low sodium diet, leg elevation and regular use of compression stockings       Return as scheduled or sooner as needed.   Patient understands and agrees with the treatment plan.        History of Present Illness   Patient presents today with c/o increased bilateral ankle edema towards the end of the day and intermittent episodes of palpitations over the past few weeks. She denies chest pain, worsening dyspnea, orthopnea, paroxysmal nocturnal dyspnea, recent unexplained weight changes, dizziness or syncope.       Review of Systems   Constitutional:  Negative for chills, fatigue, fever and unexpected weight change.   Respiratory:  Negative for cough, shortness of breath and wheezing.    Cardiovascular:  Positive for palpitations and leg swelling. Negative for chest pain.   Gastrointestinal:  Negative for abdominal pain, blood in stool, constipation, diarrhea, nausea and " "vomiting.   Neurological:  Negative for dizziness and headaches.   Hematological:  Negative for adenopathy.       Objective   /78 (BP Location: Left arm, Patient Position: Sitting, Cuff Size: Standard)   Pulse (!) 48   Temp 97.6 °F (36.4 °C)   Resp 16   Ht 5' 8\" (1.727 m)   Wt 77.3 kg (170 lb 6.4 oz)   SpO2 96%   BMI 25.91 kg/m²      Wt Readings from Last 3 Encounters:   04/22/25 77.3 kg (170 lb 6.4 oz)   04/04/25 77.1 kg (170 lb)   03/31/25 78.9 kg (174 lb)       Physical Exam  Constitutional:       General: She is not in acute distress.  Cardiovascular:      Rate and Rhythm: Normal rate. Frequent Extrasystoles are present.     Heart sounds: Normal heart sounds.   Pulmonary:      Effort: Pulmonary effort is normal.      Breath sounds: Normal breath sounds. No wheezing, rhonchi or rales.   Musculoskeletal:      Cervical back: Neck supple.      Comments: Mild bilateral ankle edema   Lymphadenopathy:      Cervical: No cervical adenopathy.   Neurological:      Mental Status: She is alert and oriented to person, place, and time.   Psychiatric:         Mood and Affect: Mood normal.         Behavior: Behavior normal.         "

## 2025-04-22 NOTE — ASSESSMENT & PLAN NOTE
"- EKG showed NSR with frequent PVC\"s and PAC's  - lab work ordered for CBC&diff, TSH, CMP  - recent Echo 3/31/25 showed normal LV size and systolic function with EF 64%, grade 1 diastolic dysfunction, mild to moderate TR  - follow up with Cardiology for further evaluation  - advised to seek ER care for any dizziness, chest pain or shortness of breath    Orders:    Ambulatory Referral to Cardiology; Future    "

## 2025-04-23 ENCOUNTER — APPOINTMENT (OUTPATIENT)
Dept: LAB | Facility: CLINIC | Age: 80
End: 2025-04-23
Payer: COMMERCIAL

## 2025-04-23 ENCOUNTER — RESULTS FOLLOW-UP (OUTPATIENT)
Dept: FAMILY MEDICINE CLINIC | Facility: CLINIC | Age: 80
End: 2025-04-23

## 2025-04-23 ENCOUNTER — NURSE TRIAGE (OUTPATIENT)
Age: 80
End: 2025-04-23

## 2025-04-23 VITALS
HEART RATE: 48 BPM | DIASTOLIC BLOOD PRESSURE: 78 MMHG | RESPIRATION RATE: 16 BRPM | BODY MASS INDEX: 25.82 KG/M2 | SYSTOLIC BLOOD PRESSURE: 146 MMHG | TEMPERATURE: 97.6 F | WEIGHT: 170.4 LBS | OXYGEN SATURATION: 96 % | HEIGHT: 68 IN

## 2025-04-23 DIAGNOSIS — I10 ESSENTIAL HYPERTENSION: ICD-10-CM

## 2025-04-23 DIAGNOSIS — E78.5 HYPERLIPIDEMIA, UNSPECIFIED HYPERLIPIDEMIA TYPE: ICD-10-CM

## 2025-04-23 LAB
ALBUMIN SERPL BCG-MCNC: 4 G/DL (ref 3.5–5)
ALP SERPL-CCNC: 65 U/L (ref 34–104)
ALT SERPL W P-5'-P-CCNC: 22 U/L (ref 7–52)
ANION GAP SERPL CALCULATED.3IONS-SCNC: 7 MMOL/L (ref 4–13)
AST SERPL W P-5'-P-CCNC: 22 U/L (ref 13–39)
BASOPHILS # BLD AUTO: 0.03 THOUSANDS/ÂΜL (ref 0–0.1)
BASOPHILS NFR BLD AUTO: 1 % (ref 0–1)
BILIRUB SERPL-MCNC: 0.98 MG/DL (ref 0.2–1)
BUN SERPL-MCNC: 21 MG/DL (ref 5–25)
CALCIUM SERPL-MCNC: 9.8 MG/DL (ref 8.4–10.2)
CHLORIDE SERPL-SCNC: 105 MMOL/L (ref 96–108)
CHOLEST SERPL-MCNC: 184 MG/DL (ref ?–200)
CO2 SERPL-SCNC: 30 MMOL/L (ref 21–32)
CREAT SERPL-MCNC: 0.82 MG/DL (ref 0.6–1.3)
EOSINOPHIL # BLD AUTO: 0.26 THOUSAND/ÂΜL (ref 0–0.61)
EOSINOPHIL NFR BLD AUTO: 4 % (ref 0–6)
ERYTHROCYTE [DISTWIDTH] IN BLOOD BY AUTOMATED COUNT: 13.5 % (ref 11.6–15.1)
GFR SERPL CREATININE-BSD FRML MDRD: 69 ML/MIN/1.73SQ M
GLUCOSE P FAST SERPL-MCNC: 91 MG/DL (ref 65–99)
HCT VFR BLD AUTO: 45.9 % (ref 34.8–46.1)
HDLC SERPL-MCNC: 61 MG/DL
HGB BLD-MCNC: 14.6 G/DL (ref 11.5–15.4)
IMM GRANULOCYTES # BLD AUTO: 0.02 THOUSAND/UL (ref 0–0.2)
IMM GRANULOCYTES NFR BLD AUTO: 0 % (ref 0–2)
LDLC SERPL CALC-MCNC: 105 MG/DL (ref 0–100)
LYMPHOCYTES # BLD AUTO: 1.91 THOUSANDS/ÂΜL (ref 0.6–4.47)
LYMPHOCYTES NFR BLD AUTO: 31 % (ref 14–44)
MCH RBC QN AUTO: 29.9 PG (ref 26.8–34.3)
MCHC RBC AUTO-ENTMCNC: 31.8 G/DL (ref 31.4–37.4)
MCV RBC AUTO: 94 FL (ref 82–98)
MONOCYTES # BLD AUTO: 0.66 THOUSAND/ÂΜL (ref 0.17–1.22)
MONOCYTES NFR BLD AUTO: 11 % (ref 4–12)
NEUTROPHILS # BLD AUTO: 3.2 THOUSANDS/ÂΜL (ref 1.85–7.62)
NEUTS SEG NFR BLD AUTO: 53 % (ref 43–75)
NRBC BLD AUTO-RTO: 0 /100 WBCS
PLATELET # BLD AUTO: 205 THOUSANDS/UL (ref 149–390)
PMV BLD AUTO: 11.6 FL (ref 8.9–12.7)
POTASSIUM SERPL-SCNC: 4.9 MMOL/L (ref 3.5–5.3)
PROT SERPL-MCNC: 6.9 G/DL (ref 6.4–8.4)
RBC # BLD AUTO: 4.88 MILLION/UL (ref 3.81–5.12)
SODIUM SERPL-SCNC: 142 MMOL/L (ref 135–147)
TRIGL SERPL-MCNC: 92 MG/DL (ref ?–150)
TSH SERPL DL<=0.05 MIU/L-ACNC: 1.64 UIU/ML (ref 0.45–4.5)
WBC # BLD AUTO: 6.08 THOUSAND/UL (ref 4.31–10.16)

## 2025-04-23 PROCEDURE — 84443 ASSAY THYROID STIM HORMONE: CPT

## 2025-04-23 PROCEDURE — 80053 COMPREHEN METABOLIC PANEL: CPT

## 2025-04-23 PROCEDURE — 80061 LIPID PANEL: CPT

## 2025-04-23 PROCEDURE — 36415 COLL VENOUS BLD VENIPUNCTURE: CPT

## 2025-04-23 NOTE — TELEPHONE ENCOUNTER
"FOLLOW UP: Spoke with Ashlee in clinical, HP message to be sent to provider for review and recommendation    REASON FOR CONVERSATION: Heart Problem    SYMPTOMS: Heart rate between 40-45 bpm, asymptomatic. Was seen in office yesterday and heart rate 43. Referred to cardiology, has appointment tomorrow. Patient does not have any symptoms at this time, was checking heart rate with finger tip pulse oximeter. Pulse ox 98.    OTHER: Held metoprolol last night as instructed    DISPOSITION: Callback by PCP Within 1 Hour (overriding Go to ED Now)      Reason for Disposition   Heart beating very slowly (e.g., < 50 / minute)  (Exception: Athlete and heart rate normal for caller.)    Answer Assessment - Initial Assessment Questions  1. DESCRIPTION: \"Please describe your heart rate or heartbeat that you are having\" (e.g., fast/slow, regular/irregular, skipped or extra beats, \"palpitations\")      slow  2. ONSET: \"When did it start?\" (e.g., minutes, hours, days)       Noticed yesterday at your appointment  3. DURATION: \"How long does it last\" (e.g., seconds, minutes, hours)      Since yesterday  4. PATTERN \"Does it come and go, or has it been constant since it started?\"  \"Does it get worse with exertion?\"   \"Are you feeling it now?\"      constant  5. TAP: \"Using your hand, can you tap out what you are feeling on a chair or table in front of you, so that I can hear?\" Note: Not all patients can do this.        N/A  6. HEART RATE: \"Can you tell me your heart rate?\" \"How many beats in 15 seconds?\"  Note: Not all patients can do this.        45 bpm   7. RECURRENT SYMPTOM: \"Have you ever had this before?\" If Yes, ask: \"When was the last time?\" and \"What happened that time?\"       unsure  8. CAUSE: \"What do you think is causing the palpitations?\"      unsure  9. CARDIAC HISTORY: \"Do you have any history of heart disease?\" (e.g., heart attack, angina, bypass surgery, angioplasty, arrhythmia)       Aneurysm   10. OTHER SYMPTOMS: \"Do you " "have any other symptoms?\" (e.g., dizziness, chest pain, sweating, difficulty breathing)        denies  11. PREGNANCY: \"Is there any chance you are pregnant?\" \"When was your last menstrual period?\"            Protocols used: Heart Rate and Heartbeat Questions-Adult-[place]    "

## 2025-04-23 NOTE — TELEPHONE ENCOUNTER
L/m for pt regarding dr. Rodriguez's message. Advised to call back with any further questions or concerns.

## 2025-04-23 NOTE — TELEPHONE ENCOUNTER
Please call patient. I recommend to follow up with Cardiology as scheduled on Thursday and contact the office or go to ER if she develops chest pain, shortness of breath or dizziness.

## 2025-04-24 ENCOUNTER — OFFICE VISIT (OUTPATIENT)
Dept: CARDIOLOGY CLINIC | Facility: CLINIC | Age: 80
End: 2025-04-24
Payer: COMMERCIAL

## 2025-04-24 VITALS
BODY MASS INDEX: 26.01 KG/M2 | SYSTOLIC BLOOD PRESSURE: 166 MMHG | DIASTOLIC BLOOD PRESSURE: 80 MMHG | OXYGEN SATURATION: 96 % | WEIGHT: 171.6 LBS | HEIGHT: 68 IN | HEART RATE: 89 BPM

## 2025-04-24 DIAGNOSIS — I36.1 NONRHEUMATIC TRICUSPID VALVE REGURGITATION: ICD-10-CM

## 2025-04-24 DIAGNOSIS — I49.3 FREQUENT PVCS: ICD-10-CM

## 2025-04-24 DIAGNOSIS — I10 PRIMARY HYPERTENSION: ICD-10-CM

## 2025-04-24 DIAGNOSIS — R07.89 ATYPICAL CHEST PAIN: ICD-10-CM

## 2025-04-24 DIAGNOSIS — I10 ESSENTIAL HYPERTENSION: ICD-10-CM

## 2025-04-24 DIAGNOSIS — I31.39 PERICARDIAL EFFUSION: Primary | ICD-10-CM

## 2025-04-24 DIAGNOSIS — R00.2 PALPITATIONS: ICD-10-CM

## 2025-04-24 LAB
ATRIAL RATE: 89 BPM
P AXIS: 36 DEGREES
PR INTERVAL: 170 MS
QRS AXIS: 36 DEGREES
QRSD INTERVAL: 110 MS
QT INTERVAL: 376 MS
QTC INTERVAL: 457 MS
T WAVE AXIS: 10 DEGREES
VENTRICULAR RATE: 89 BPM

## 2025-04-24 PROCEDURE — 99214 OFFICE O/P EST MOD 30 MIN: CPT

## 2025-04-24 PROCEDURE — 93000 ELECTROCARDIOGRAM COMPLETE: CPT

## 2025-04-24 RX ORDER — METOPROLOL SUCCINATE 25 MG/1
12.5 TABLET, EXTENDED RELEASE ORAL
Start: 2025-04-24 | End: 2025-04-24

## 2025-04-24 RX ORDER — METOPROLOL SUCCINATE 25 MG/1
25 TABLET, EXTENDED RELEASE ORAL
Start: 2025-04-24

## 2025-04-24 NOTE — ASSESSMENT & PLAN NOTE
Elevated  166/80  Continue Toprol 25 mg daily   Norvasc 2.5 added in Jan 2025 due to high BP, may also help with atypical chest pain.  Patient reports not taking  Highly advised to restart Norvasc and continue on Toprol XL 25 mg daily

## 2025-04-24 NOTE — ASSESSMENT & PLAN NOTE
Infrequently gets some chest discomfort at rest that comes and goes spontaneously within seconds   Denies chest pain today  Nuclear stress 2023 - no ischemia  continue norvasc as above, may help with atypical chest pain

## 2025-04-24 NOTE — ASSESSMENT & PLAN NOTE
Distant.  Around 2014.  Had pericardial effusion that was drained and it sounds like had a surgical window.

## 2025-04-24 NOTE — PROGRESS NOTES
"[facility]  General Cardiology Note  Nella Yancey  1945  [id number]      Referring Provider - Self, Referral  Chief Complaint   Patient presents with    Follow-up     'heart pounding\"       Assessment & Plan  Pericardial effusion  Distant.  Around 2014.  Had pericardial effusion that was drained and it sounds like had a surgical window.   Palpitations  On toprol  No concerning findings on holter in 2021  EKG today Sinus rhythm with premature atrial complexes in a pattern of bigeminy. 89 bpm  3/31/25 echo    Left Ventricle: Left ventricular cavity size is normal. Wall thickness is mildly increased. The left ventricular ejection fraction is 64% by biplane measurement. Systolic function is normal. Wall motion is normal. Diastolic function is mildly abnormal, consistent with grade I (abnormal) relaxation. Tricuspid Valve: There is mild to moderate regurgitation.    Episodes of  low heart rate on pulse oximetry not accurate, likely not counting the PACs  Will check Zio monitor 7 days to assess PAC burden  Restart toprol xl 25 mg then consider uptitrating to manage atrial bigeminy ( called and discussed  with patient. She will send us a diary of her BP and HR reading in a week)  Restart amlodipine 2.5 mg , consider uptitrating to manage HTN  Recommend Anxiety/ stress management  Atypical chest pain  Infrequently gets some chest discomfort at rest that comes and goes spontaneously within seconds   Denies chest pain today  Nuclear stress 2023 - no ischemia  continue norvasc as above, may help with atypical chest pain  Primary hypertension  Elevated  166/80  Continue Toprol 25 mg daily   Norvasc 2.5 added in Jan 2025 due to high BP, may also help with atypical chest pain.  Patient reports not taking  Highly advised to restart Norvasc and continue on Toprol XL 25 mg daily  Nonrheumatic tricuspid valve regurgitation  Serial follow up  Frequent PVCs    Essential hypertension    Relevant " testing  3/23/21 24 hr holter  1.Normal sinus rhythm throughout the monitoring interval at a controlled rate.    2.Low-density of nonsustained premature ventricular ectopy.    3.Low-density of nonsustained premature supraventricular ectopy.    4.No significant bradycardia.    5.Symptoms of chest discomfort did not correlate with prognostically important arrhythmia or ST segment change.      Will RTO in 3 weeks or sooner if necessary  Patient / Caretaker was advised and educated to call our office  immediately if  patient has any new symptoms of chest pain/shortness of breath, near-syncope, syncope, light headedness sustained palpitations  or any other cardiovascular symptoms before their scheduled follow-up appointment.  ED precautions reviewed    Interval History: 79 y.o.  female  with PMH as below is here for urgent visit , per PCP experiencing frequent PVC's  Patient of Dr. Moreno    She has a history of hypertension, hyperlipidemia, atypical chest pain, palpitations, mild to moderate tricuspid regurgitation, and distant pericarditis.    She is retired.  Used to be in sales.   Used to live in [place].  Now live in [place].    was a  in [employer].     Never smoker.  Rare ETOH.  No drugs.    Today, patient presents with .  The patient reports increased palpitations for past 2 weeks.  Usually occur at rest. No other associated symptoms.  Before that palpitations were less frequent.  She denies chest pain.   No SOB.   No orthopnea or PND.   No leg edema,  no syncope.  She also reports that on her pulse oximetry, her heart rate has been low around 45 for about week since operation for right hand for trigger finger 3 weeks ago, got local anesthesia  She reports increased stress in her life especially when  is driving. Feels like she is not as relaxed. She is teary during visit today  Did not take her BP meds for past 2 days because of low HR in the 40s  BP at home on  140-150/80's    Patient Active Problem List    Diagnosis Date Noted    Nonrheumatic tricuspid valve regurgitation 01/23/2025    Mild intermittent asthma without complication 10/16/2024    Headache with neurologic deficit 08/14/2024    Stroke-like symptoms 08/14/2024    HTN (hypertension) 08/14/2024    HLD (hyperlipidemia) 08/14/2024    Age-related osteoporosis without current pathological fracture 01/09/2024    Neuropathy 12/08/2023    Numbness and tingling of both lower extremities 12/08/2023    Primary osteoarthritis of right knee 03/22/2023    [facility]) ([facility])     H/O parathyroidectomy 09/14/2022    Skin tag of anus 08/31/2022    SOBOE (shortness of breath on exertion) 08/17/2022    Aneurysm ([facility]) 07/21/2022    Edema of both ankles 07/01/2022    Cervical myofascial pain syndrome 05/24/2022    Osteoporosis with current pathological fracture 09/10/2021    Parathyroid related hypercalcemia (HCC) 09/10/2021    Hallux abducto valgus, bilateral 04/27/2021    Pincer nail deformity 04/27/2021    Palpitations 02/17/2021    Calf pain 10/08/2020    Myalgia 10/08/2020    Elevated blood-pressure reading without diagnosis of hypertension 07/30/2020    Dysphonia 07/20/2020    Reflux laryngitis 07/20/2020    Paresis of left vocal fold 07/20/2020    Glottic insufficiency 07/20/2020    Muscle tension dysphonia 07/20/2020    TMJ dysfunction 07/20/2020    Pharyngoesophageal dysphagia 07/20/2020    Paresthesias     Carpal tunnel syndrome of left wrist     Irritable bowel syndrome with diarrhea 01/08/2020    Elevated amylase and lipase 01/08/2020    Pericardial effusion 11/20/2019    Abnormal CT scan of lung 07/03/2019    Migraine without aura and without status migrainosus, not intractable 03/18/2019    Dizziness and giddiness 03/18/2019    Insomnia 03/18/2019    Cervicalgia 03/18/2019    Lung nodule 01/16/2019    Atypical chest pain 01/06/2019    Anxiety 11/13/2018    Primary hyperparathyroidism (HCC) 11/13/2018     Thyroid nodule 11/13/2018    Vitamin D deficiency 11/13/2018    Closed fracture distal radius and ulna, right, initial encounter 03/20/2018    History of pericarditis 03/12/2018    Gastroesophageal reflux disease with esophagitis 10/18/2017    Heart murmur 04/17/2015     Past Medical History:   Diagnosis Date    Anxiety     Arthritis     Back pain     Balance problems     Chest pain     heaviness    Colon polyp     Difficulty swallowing     Dizziness     Dry cough     Gait disorder     GERD (gastroesophageal reflux disease)     Hyperparathyroidism (HCC)     Hypertension     Increased frequency of headaches     Migraines     Neck pain     Numbness and tingling     bles    Palpitations     Pericarditis     Thyroid disease     nodule    Trouble in sleeping     Wears glasses      Social History     Tobacco Use    Smoking status: Never    Smokeless tobacco: Never   Vaping Use    Vaping status: Never Used   Substance Use Topics    Alcohol use: Not Currently     Comment: Rarely     Drug use: No      Family History   Problem Relation Age of Onset    Kidney failure Mother     Hypertension Mother     Lung cancer Father     No Known Problems Sister     Yosef Parkinson White syndrome Daughter     Substance Abuse Neg Hx     Alcohol abuse Neg Hx     Mental illness Neg Hx     Colon cancer Neg Hx     Stomach cancer Neg Hx      Past Surgical History:   Procedure Laterality Date    APPENDECTOMY      CHOLECYSTECTOMY      laparoscopic    COLONOSCOPY      KNEE SURGERY Left     PERICARDIAL WINDOW      AR OPTX DSTL RDL X-ARTIC FX/EPIPHYSL SEPARATION Right 03/22/2018    Procedure: OPEN REDUCTION W/ INTERNAL FIXATION (ORIF) RADIUS, splint application;  Surgeon: Kandice Kimble MD;  Location: [facility];  Service: Orthopedics    AR PARATHYROIDECTOMY/EXPLORATION PARATHYROIDS N/A 08/17/2022    Procedure: PARATHYROIDECTOMY, 4 GLAND EXPLORATION;  Surgeon: Ovidio Perkins MD;  Location: [facility];  Service: ENT    SKIN BIOPSY      UPPER  GASTROINTESTINAL ENDOSCOPY         Current Outpatient Medications:     acetaminophen (TYLENOL) 500 mg tablet, Take 500-1,000 mg by mouth every 6 (six) hours as needed for mild pain, Disp: , Rfl:     albuterol (PROVENTIL HFA,VENTOLIN HFA) 90 mcg/act inhaler, Inhale 2 puffs every 6 (six) hours as needed for wheezing or shortness of breath, Disp: 8 g, Rfl: 1    ALPRAZolam (XANAX) 0.5 mg tablet, Take 1 tablet (0.5 mg total) by mouth daily at bedtime as needed for anxiety or sleep, Disp: 20 tablet, Rfl: 1    amLODIPine (NORVASC) 2.5 mg tablet, TAKE 1 TABLET BY MOUTH EVERY DAY, Disp: 90 tablet, Rfl: 1    ascorbic acid (VITAMIN C) 500 mg tablet, Take 500 mg by mouth daily, Disp: , Rfl:     atorvastatin (LIPITOR) 10 mg tablet, TAKE 0.5 TABLETS (5 MG TOTAL) BY MOUTH DAILY AFTER DINNER, Disp: 45 tablet, Rfl: 1    b complex vitamins tablet, Take 1 tablet by mouth daily, Disp: , Rfl:     Cholecalciferol 50 MCG (2000 UT) CAPS, Take 3,000 Units by mouth daily, Disp: , Rfl:     Magnesium 300 MG CAPS, Take 300 mg by mouth in the morning, Disp: , Rfl:     meclizine (ANTIVERT) 12.5 MG tablet, Can take 1-2 tabs as needed up to BID for dizziness or vertigo, caution as can be sedating, Disp: 30 tablet, Rfl: 11    metoprolol succinate (TOPROL-XL) 25 mg 24 hr tablet, Take 1 tablet (25 mg total) by mouth daily at bedtime, Disp: , Rfl:     Multiple Vitamins-Minerals (MULTIVITAMIN ADULTS 50+ PO), Take 1 tablet by mouth in the morning, Disp: , Rfl:     Nutritional Supplements (OSAPLEX MK-7 PO), Take 1 tablet by mouth in the morning, Disp: , Rfl:     omeprazole (PriLOSEC) 20 mg delayed release capsule, Take 1 capsule (20 mg total) by mouth daily before breakfast, Disp: 90 capsule, Rfl: 1    Riboflavin (VITAMIN B-2 PO), Take 250 mg by mouth in the morning, Disp: , Rfl:     Allergies   Allergen Reactions    Ciprofloxacin Myalgia       Vitals:    04/24/25 0756   BP: 166/80   BP Location: Left arm   Patient Position: Sitting   Cuff Size: Standard  "  Pulse: 89   SpO2: 96%   Weight: 77.8 kg (171 lb 9.6 oz)   Height: 5' 8\" (1.727 m)        Vitals:    04/24/25 0756   Weight: 77.8 kg (171 lb 9.6 oz)      Height: 5' 8\" (172.7 cm)   Body mass index is 26.09 kg/m².    Labs:   Lab Results   Component Value Date/Time    CHOLESTEROL 184 04/23/2025 07:55 AM    TRIG 92 04/23/2025 07:55 AM    HDL 61 04/23/2025 07:55 AM    LDLCALC 105 (H) 04/23/2025 07:55 AM      Lab Results   Component Value Date    SODIUM 142 04/23/2025    K 4.9 04/23/2025     04/23/2025    CREATININE 0.82 04/23/2025    EGFR 69 04/23/2025    BUN 21 04/23/2025    CO2 30 04/23/2025    ALT 22 04/23/2025    AST 22 04/23/2025    INR 0.88 08/14/2024    GLUF 91 04/23/2025    HGBA1C 5.4 08/14/2024    WBC 6.08 04/23/2025    HGB 14.6 04/23/2025    HCT 45.9 04/23/2025     04/23/2025         Imaging: Echo complete w/ contrast if indicated  Result Date: 3/31/2025  Narrative:   Left Ventricle: Left ventricular cavity size is normal. Wall thickness is mildly increased. The left ventricular ejection fraction is 64% by biplane measurement. Systolic function is normal. Wall motion is normal. Diastolic function is mildly abnormal, consistent with grade I (abnormal) relaxation.   Tricuspid Valve: There is mild to moderate regurgitation.     XR chest pa and lateral  Result Date: 3/26/2025  Narrative: CHEST INDICATION:   Acute cough. COMPARISON:  None. EXAM PERFORMED/VIEWS:  XR CHEST PA AND LATERAL FINDINGS: Cardiomediastinal silhouette appears unremarkable. The lungs are clear.  No pneumothorax or pleural effusion. There is question of fractures of the right sixth and seventh ribs     Impression: No acute cardiopulmonary disease. Question right rib fractures. Difficult to assess without oblique projections. No significant change from 8/14/2024 Electronically signed: 03/26/2025 03:05 PM Jason Coker MD      ECG:  Sinus rhythm with premature atrial complexes in a pattern of bigeminy. 89 bpm     Reviewed by Oliver" LIZZIE Wilburn      Review of Systems   Cardiovascular:  Positive for palpitations.   Psychiatric/Behavioral:  The patient is nervous/anxious.    All other systems reviewed and are negative.    Except as noted in HPI, is otherwise reviewed in detail and a 12 point review of systems is negative.    Physical Exam  Vitals reviewed.   Constitutional:       General: She is not in acute distress.     Appearance: Normal appearance. She is not diaphoretic.   HENT:      Head: Normocephalic and atraumatic.   Cardiovascular:      Rate and Rhythm: Normal rate and regular rhythm.      Pulses: Normal pulses.           Radial pulses are 2+ on the right side and 2+ on the left side.      Heart sounds: Normal heart sounds, S1 normal and S2 normal.   Pulmonary:      Effort: Pulmonary effort is normal. No tachypnea or respiratory distress.      Breath sounds: Normal breath sounds. No wheezing or rales.   Abdominal:      General: Bowel sounds are normal. There is no distension.      Palpations: Abdomen is soft.   Musculoskeletal:      Right lower leg: No edema.      Left lower leg: No edema.   Skin:     General: Skin is warm and dry.      Capillary Refill: Capillary refill takes less than 2 seconds.   Neurological:      Mental Status: She is alert and oriented to person, place, and time.   Psychiatric:         Mood and Affect: Mood normal.         Behavior: Behavior normal.          **Please Note: This note may have been constructed using a voice recognition system**     Thank you for the opportunity to participate in the care of this patient.   LIZZIE Mcmahan

## 2025-04-24 NOTE — ASSESSMENT & PLAN NOTE
On toprol  No concerning findings on holter in 2021  EKG today Sinus rhythm with premature atrial complexes in a pattern of bigeminy. 89 bpm  3/31/25 echo    Left Ventricle: Left ventricular cavity size is normal. Wall thickness is mildly increased. The left ventricular ejection fraction is 64% by biplane measurement. Systolic function is normal. Wall motion is normal. Diastolic function is mildly abnormal, consistent with grade I (abnormal) relaxation. Tricuspid Valve: There is mild to moderate regurgitation.    Episodes of  low heart rate on pulse oximetry not accurate, likely not counting the PACs  Will check Zio monitor 7 days to assess PAC burden  Restart toprol xl 25 mg then consider uptitrating to manage atrial bigeminy ( called and discussed  with patient. She will send us a diary of her BP and HR reading in a week)  Restart amlodipine 2.5 mg , consider uptitrating to manage HTN  Recommend Anxiety/ stress management

## 2025-04-28 ENCOUNTER — NURSE TRIAGE (OUTPATIENT)
Age: 80
End: 2025-04-28

## 2025-04-28 ENCOUNTER — CLINICAL SUPPORT (OUTPATIENT)
Dept: CARDIOLOGY CLINIC | Facility: CLINIC | Age: 80
End: 2025-04-28
Payer: COMMERCIAL

## 2025-04-28 DIAGNOSIS — R00.2 PALPITATIONS: Primary | ICD-10-CM

## 2025-04-28 PROCEDURE — 93242 EXT ECG>48HR<7D RECORDING: CPT | Performed by: INTERNAL MEDICINE

## 2025-04-28 NOTE — TELEPHONE ENCOUNTER
"  Reason for Disposition   General information question, no triage required and triager able to answer question     Nurse visit scheduled 11am today for monitor placement; [facility] office notified.    Advised no swimming, hot tubs; can call at any time with questions.    Answer Assessment - Initial Assessment Questions  1. REASON FOR CALL: \"What is the main reason for your call?\" or \"How can I best help you?\"      I was in the office the other week and Oliver ordered a monitor for me that was delivered to my house on Saturday but I'm unsure of how to place it.  Nurse visit scheduled for monitor placement      3. OTHER QUESTIONS: \"Do you have any other questions?\"      Is there anything I shouldn't do with this on?  Advised no swimming/hot tub.    Protocols used: Information Only Call - No Triage-Adult-[place]    "

## 2025-05-11 LAB
CV ZIO BASELINE AVG BPM: 69 BPM
CV ZIO BASELINE BPM HIGH: 143 BPM
CV ZIO BASELINE BPM LOW: 47 BPM
CV ZIO DEVICE ANALYSIS TIME: NORMAL
CV ZIO ECT SVE COUNT: NORMAL EPISODES
CV ZIO ECT SVE CPLT COUNT: 5131 EPISODES
CV ZIO ECT SVE CPLT FREQ: NORMAL
CV ZIO ECT SVE FREQ: NORMAL
CV ZIO ECT SVE TPLT COUNT: 0 EPISODES
CV ZIO ECT SVE TPLT FREQ: 0
CV ZIO ECT VE COUNT: 3092 EPISODES
CV ZIO ECT VE CPLT COUNT: 0 EPISODES
CV ZIO ECT VE CPLT FREQ: 0
CV ZIO ECT VE FREQ: NORMAL
CV ZIO ECT VE TPLT COUNT: 0 EPISODES
CV ZIO ECT VE TPLT FREQ: 0
CV ZIO ECTOPIC SVE COUPLET RAW PERCENT: 1.67 %
CV ZIO ECTOPIC SVE ISOLATED PERCENT: 10.09 %
CV ZIO ECTOPIC SVE TRIPLET RAW PERCENT: 0 %
CV ZIO ECTOPIC VE COUPLET RAW PERCENT: 0 %
CV ZIO ECTOPIC VE ISOLATED PERCENT: 0.5 %
CV ZIO ECTOPIC VE TRIPLET RAW PERCENT: 0 %
CV ZIO ENROLLMENT END: NORMAL
CV ZIO ENROLLMENT START: NORMAL
CV ZIO PATIENT EVENTS DIARIES: 7
CV ZIO PATIENT EVENTS TRIGGERS: 8
CV ZIO PAUSE COUNT: 0
CV ZIO PRESCRIPTION STATUS: NORMAL
CV ZIO SVT AVG BPM: 108 BPM
CV ZIO SVT BPM HIGH: 143 BPM
CV ZIO SVT BPM LOW: 53 BPM
CV ZIO SVT COUNT: 23
CV ZIO SVT F EPI AVG BPM: 140 BPM
CV ZIO SVT F EPI BEATS: 6 BEATS
CV ZIO SVT F EPI BPM HIGH: 143 BPM
CV ZIO SVT F EPI BPM LOW: 133 BPM
CV ZIO SVT F EPI DUR: 2.8 SEC
CV ZIO SVT F EPI END: NORMAL
CV ZIO SVT F EPI START: NORMAL
CV ZIO SVT L EPI AVG BPM: 126 BPM
CV ZIO SVT L EPI BEATS: 25 BEATS
CV ZIO SVT L EPI BPM HIGH: 141 BPM
CV ZIO SVT L EPI BPM LOW: 82 BPM
CV ZIO SVT L EPI DUR: 12.4 SEC
CV ZIO SVT L EPI END: NORMAL
CV ZIO SVT L EPI START: NORMAL
CV ZIO TOTAL  ENROLLMENT PERIOD: NORMAL
CV ZIO VT COUNT: 0

## 2025-05-12 ENCOUNTER — RA CDI HCC (OUTPATIENT)
Dept: OTHER | Facility: HOSPITAL | Age: 80
End: 2025-05-12

## 2025-05-14 ENCOUNTER — OFFICE VISIT (OUTPATIENT)
Dept: PHYSICAL THERAPY | Facility: CLINIC | Age: 80
End: 2025-05-14
Payer: COMMERCIAL

## 2025-05-14 DIAGNOSIS — H81.13 BPPV (BENIGN PAROXYSMAL POSITIONAL VERTIGO), BILATERAL: Primary | ICD-10-CM

## 2025-05-14 PROCEDURE — 97161 PT EVAL LOW COMPLEX 20 MIN: CPT | Performed by: PHYSICAL THERAPIST

## 2025-05-14 PROCEDURE — 95992 CANALITH REPOSITIONING PROC: CPT | Performed by: PHYSICAL THERAPIST

## 2025-05-14 NOTE — PROGRESS NOTES
PT Evaluation     Today's date: 2025  Patient name: Nella Yancey  : 1945  MRN: [id number]  Referring provider: Nicole Loja, PT  Dx:   Encounter Diagnosis     ICD-10-CM    1. BPPV (benign paroxysmal positional vertigo), bilateral  H81.13                      Assessment    Assessment details: Nella Yancey is a 78 yo female with complaint of dizziness and signs and symptoms of bilateral posterior BPPV. She reports increased intensity of vertigo with Right [place] Hallpike today. Elected to treat right side today and left at follow up. She does have a history of BPPV, successfully treated with OPPT. She is an excellent candidate for OPPT to address above impairments and optimize functional status.     Goals  Up to 6 sessions  1. Independent with self canalith repositioning maneuver if indicated.  2. Negative canalith positioning testing.   3. Tolerate all functional position changes without complaint of vertigo.  4. Tolerate C/S AROM all planes without complaint of vertigo.      Plan  Patient would benefit from: PT eval and skilled physical therapy    Planned therapy interventions: neuromuscular re-education, patient/caregiver education, canalith repositioning and home exercise program    Frequency: 1-2x week  Duration in weeks: 4  Treatment plan discussed with: patient  Plan details: Reviewed physical exam findings and plan of care.  All questions answered to patient's satisfaction.          Subjective Evaluation    History of Present Illness  Mechanism of injury: Patient reports noticing dizziness and lightheadedness for the last 2 weeks. Notices it with head up/down, walking, and rolling in bed. History of BPPV. Arrives today via direct access for OPPT evaluation. Has been checking her blood pressure at home and it's been normal. Started taking Norvasc last month; might coincide with complaint of dizziness.   Patient Goals  Patient goal: eliminate dizziness  Pain  No pain  reported    Treatments  Previous treatment: physical therapy        Objective   Neuro Exam:     Dizziness  Positive for disequilibrium, vertigo and light-headedness.   Negative for oscillopsia, rocking or swaying and diplopia.     Exacerbating factors  Positive for bending over, rolling in bed, looking up, walking and supine to/from sitting.   Negative for turning head and optokinetic movement.     Symptoms   Duration: seconds    Positional testing   [place]-Hallpike   Left posterior canal: symptomatic and upbeating  Right posterior canal: symptomatic  [place]-Hallpike comment: pt reports more severe symptoms as compared to left side    BP seated left arm 145/82  Flowsheet Rows      Flowsheet Row Most Recent Value   PT/OT G-Codes    Current Score 52   Projected Score 0               Precautions: standard      Manuals 5/14            R Epley 2x                                                   Neuro Re-Ed             Post canalith repositioning postural ed KT                                                                                          Ther Ex                                                                                                                     Ther Activity                                       Gait Training                                       Modalities

## 2025-05-15 ENCOUNTER — OFFICE VISIT (OUTPATIENT)
Dept: DERMATOLOGY | Facility: CLINIC | Age: 80
End: 2025-05-15
Payer: COMMERCIAL

## 2025-05-15 DIAGNOSIS — Z12.83 SKIN EXAM, SCREENING FOR CANCER: Primary | ICD-10-CM

## 2025-05-15 DIAGNOSIS — D22.62 MULTIPLE BENIGN MELANOCYTIC NEVI OF BOTH UPPER EXTREMITIES, BOTH LOWER EXTREMITIES, AND TRUNK: ICD-10-CM

## 2025-05-15 DIAGNOSIS — L81.4 SOLAR LENTIGO: ICD-10-CM

## 2025-05-15 DIAGNOSIS — D22.5 MULTIPLE BENIGN MELANOCYTIC NEVI OF BOTH UPPER EXTREMITIES, BOTH LOWER EXTREMITIES, AND TRUNK: ICD-10-CM

## 2025-05-15 DIAGNOSIS — D22.71 MULTIPLE BENIGN MELANOCYTIC NEVI OF BOTH UPPER EXTREMITIES, BOTH LOWER EXTREMITIES, AND TRUNK: ICD-10-CM

## 2025-05-15 DIAGNOSIS — L82.1 SEBORRHEIC KERATOSES: ICD-10-CM

## 2025-05-15 DIAGNOSIS — D23.9 DERMATOFIBROMA: ICD-10-CM

## 2025-05-15 DIAGNOSIS — D22.61 MULTIPLE BENIGN MELANOCYTIC NEVI OF BOTH UPPER EXTREMITIES, BOTH LOWER EXTREMITIES, AND TRUNK: ICD-10-CM

## 2025-05-15 DIAGNOSIS — D18.01 CHERRY ANGIOMA: ICD-10-CM

## 2025-05-15 DIAGNOSIS — D22.72 MULTIPLE BENIGN MELANOCYTIC NEVI OF BOTH UPPER EXTREMITIES, BOTH LOWER EXTREMITIES, AND TRUNK: ICD-10-CM

## 2025-05-15 PROCEDURE — 99213 OFFICE O/P EST LOW 20 MIN: CPT | Performed by: DERMATOLOGY

## 2025-05-15 NOTE — PROGRESS NOTES
"[facility] Note     Patient Name: Nella Yancey  Encounter Date: 5/15/2025     Have you been cared for by a [facility] Dermatologist in the last 3 years and, if so, which description applies to you? Yes. I have been here within the last 3 years, and my medical history has NOT changed since that time. I am not of child-bearing potential.     REVIEW OF SYSTEMS:  Have you recently had or currently have any of the following? No changes in my recent health.   PAST MEDICAL HISTORY:  Have you personally ever had or currently have any of the following?  If \"YES,\" then please provide more detail. No changes in my medical history.   HISTORY OF IMMUNOSUPPRESSION: Do you have a history of any of the following:  Systemic Immunosuppression such as Diabetes, Biologic or Immunotherapy, Chemotherapy, Organ Transplantation, Bone Marrow Transplantation or Prednisone?  No     Answering \"YES\" requires the addition of the dotphrase \"IMMUNOSUPPRESSED\" as the first diagnosis of the patient's visit.   FAMILY HISTORY:  Any \"first degree relatives\" (parent, brother, sister, or child) with the following?    No changes in my family's known health.   PATIENT EXPERIENCE:    Do you want the Dermatologist to perform a COMPLETE skin exam today including a clinical examination under the \"bra and underwear\" areas?  Yes  If necessary, do we have your permission to call and leave a detailed message on your Preferred Phone number that includes your specific medical information?  Yes      Allergies[1] Current Medications[2]        Whom besides the patient is providing clinical information about today's encounter?   NO ADDITIONAL HISTORIAN (patient alone provided history)    Physical Exam and Assessment/Plan by Diagnosis:    SEBORRHEIC KERATOSES  - Relevant exam: Scattered over the trunk/extremities are waxy brown to black plaques and papules with stuck on appearance and consistent dermoscopy  - Exam and clinical history consistent " with seborrheic keratoses  - Counseled that these are benign growths that do not require treatment    MELANOCYTIC NEVI  -Relevant exam: Scattered over the trunk/extremities are homogenously pigmented brown macules and papules. ELM performed and without concerning findings. No outliers unless otherwise noted in today's note  - Exam and clinical history consistent with melanocytic nevi  - Counseled to return to clinic prior to scheduled appointment should any of these lesions change or should any new lesions of concern arise  - Counseled on use of sun protection daily. Reviewed latest FDA sunscreen guidelines, including use of broad spectrum (UVA and UVB blocking) sunscreen or sun protective clothing with SPF 30-50 every 2-3 hours and reapplied after exposure to water    LENTIGINES  OTHER SKIN CHANGES DUE TO CHRONIC EXPOSURE TO NONIONIZING RADIATION  - Relevant exam: Over sun exposed areas are brown macules. ELM performed and without concerning findings.  - Exam and clinical history consistent with lentigines.  - Counseled to return to clinic prior to scheduled appointment should any of these lesions change or should any new lesions of concern arise.  - Recommended use of sunscreen as above and below.    CHERRY ANGIOMAS  - Relevant exam: Scattered over the trunk/extremities are red papules  - Exam and clinical history consistent with cherry angiomas  - Educated that these are benign        DERMATOFIBROMA    Physical Exam:  Anatomic Location Affected:  right medial shin  Morphological Description:  pink firm papule with consistent dermoscopy  Pertinent Positives:  Pertinent Negatives:    Additional History of Present Condition:  noted on exam    Assessment and Plan:  Based on a thorough discussion of this condition and the management approach to it (including a comprehensive discussion of the known risks, side effects and potential benefits of treatment), the patient (family) agrees to implement the following specific  plan:  Reassured benign    Scribe Attestation      I,:  Charity Lund MA am acting as a scribe while in the presence of the attending physician.:       I,:  Zoila Torres MD personally performed the services described in this documentation    as scribed in my presence.:                  [1]   Allergies  Allergen Reactions    Ciprofloxacin Myalgia   [2]   Current Outpatient Medications:     acetaminophen (TYLENOL) 500 mg tablet, Take 500-1,000 mg by mouth every 6 (six) hours as needed for mild pain, Disp: , Rfl:     albuterol (PROVENTIL HFA,VENTOLIN HFA) 90 mcg/act inhaler, Inhale 2 puffs every 6 (six) hours as needed for wheezing or shortness of breath, Disp: 8 g, Rfl: 1    ALPRAZolam (XANAX) 0.5 mg tablet, Take 1 tablet (0.5 mg total) by mouth daily at bedtime as needed for anxiety or sleep, Disp: 20 tablet, Rfl: 1    amLODIPine (NORVASC) 2.5 mg tablet, TAKE 1 TABLET BY MOUTH EVERY DAY, Disp: 90 tablet, Rfl: 1    ascorbic acid (VITAMIN C) 500 mg tablet, Take 500 mg by mouth in the morning., Disp: , Rfl:     atorvastatin (LIPITOR) 10 mg tablet, TAKE 0.5 TABLETS (5 MG TOTAL) BY MOUTH DAILY AFTER DINNER, Disp: 45 tablet, Rfl: 1    b complex vitamins tablet, Take 1 tablet by mouth in the morning., Disp: , Rfl:     Cholecalciferol 50 MCG (2000 UT) CAPS, Take 3,000 Units by mouth in the morning., Disp: , Rfl:     Magnesium 300 MG CAPS, Take 300 mg by mouth in the morning, Disp: , Rfl:     meclizine (ANTIVERT) 12.5 MG tablet, Can take 1-2 tabs as needed up to BID for dizziness or vertigo, caution as can be sedating, Disp: 30 tablet, Rfl: 11    metoprolol succinate (TOPROL-XL) 25 mg 24 hr tablet, Take 1 tablet (25 mg total) by mouth daily at bedtime, Disp: , Rfl:     Multiple Vitamins-Minerals (MULTIVITAMIN ADULTS 50+ PO), Take 1 tablet by mouth in the morning, Disp: , Rfl:     Nutritional Supplements (OSAPLEX MK-7 PO), Take 1 tablet by mouth in the morning, Disp: , Rfl:     omeprazole (PriLOSEC) 20 mg delayed  release capsule, Take 1 capsule (20 mg total) by mouth daily before breakfast, Disp: 90 capsule, Rfl: 1    Riboflavin (VITAMIN B-2 PO), Take 250 mg by mouth in the morning, Disp: , Rfl:

## 2025-05-15 NOTE — PATIENT INSTRUCTIONS
SEBORRHEIC KERATOSES  - Relevant exam: Scattered over the trunk/extremities are waxy brown to black plaques and papules with stuck on appearance and consistent dermoscopy  - Exam and clinical history consistent with seborrheic keratoses  - Counseled that these are benign growths that do not require treatment     MELANOCYTIC NEVI  -Relevant exam: Scattered over the trunk/extremities are homogenously pigmented brown macules and papules. ELM performed and without concerning findings. No outliers unless otherwise noted in today's note  - Exam and clinical history consistent with melanocytic nevi  - Counseled to return to clinic prior to scheduled appointment should any of these lesions change or should any new lesions of concern arise  - Counseled on use of sun protection daily. Reviewed latest FDA sunscreen guidelines, including use of broad spectrum (UVA and UVB blocking) sunscreen or sun protective clothing with SPF 30-50 every 2-3 hours and reapplied after exposure to water     LENTIGINES  OTHER SKIN CHANGES DUE TO CHRONIC EXPOSURE TO NONIONIZING RADIATION  - Relevant exam: Over sun exposed areas are brown macules. ELM performed and without concerning findings.  - Exam and clinical history consistent with lentigines.  - Counseled to return to clinic prior to scheduled appointment should any of these lesions change or should any new lesions of concern arise.  - Recommended use of sunscreen as above and below.     CHERRY ANGIOMAS  - Relevant exam: Scattered over the trunk/extremities are red papules  - Exam and clinical history consistent with cherry angiomas  - Educated that these are benign     DERMATOFIBROMA      Assessment and Plan:  Based on a thorough discussion of this condition and the management approach to it (including a comprehensive discussion of the known risks, side effects and potential benefits of treatment), the patient (family) agrees to implement the following specific plan:  Reassure benign    "  Assessment and Plan:  A dermatofibroma is a common benign fibrous nodule that most often arises on the skin of the lower legs.  A dermatofibroma is also called a \"cutaneous fibrous histiocytoma.\"  Dermatofibromas occur at all ages and in people of every ethnicity. They are more common in women than in men.     It is not clear if dermatofibroma is a reactive process or if it is a neoplasm. The lesions are made up of proliferating fibroblasts. Histiocytes may also be involved.  They are sometimes attributed to an insect bite or ingrownhair or local trauma, but not consistently. They may be more numerous in patients with altered immunity.     Dermatofibromas most often occur on the legs and arms, but may also arise on the trunk or any site of the body.  Typical clinical features include the following:  People may have 1 or up to 15 lesions.  Size varies from 0.5-1.5 cm diameter; most lesions are 7-10 mm diameter.  They are firm nodules tethered to the skin surface and mobile over subcutaneous tissue.  The skin \"dimples\" on pinching the lesion.  Color may be pink to light brown in white skin, and dark brown to black in dark skin; some appear paler in the center.  They do not usually cause symptoms, but they are sometimes painful or itchy.  Because they are often raised lesions, they may be traumatized, for example by a razor.  Occasionally dozens may erupt within a few months, usually in the setting of immunosuppression (for example autoimmune disease, cancer or certain medications).  Dermatofibroma does not give rise to cancer. However, occasionally, it may be mistaken for dermatofibrosarcoma or desmoplastic melanoma.     A dermatofibroma is harmless and seldom causes any symptoms. Usually, only reassurance is needed. If it is nuisance or causing concern, the lesion can be removed surgically, resulting in a scar that is, by definition, usually longer in diameter than the widest portion of the dermatofibroma.  " Cryotherapy, shave biopsy and laser surgery are rarely completely successful.  Skin punch biopsy or incisional biopsy may be undertaken if there is an atypical feature such as recent enlargement, ulceration, or asymmetrical structures and colours on dermatoscopy.

## 2025-05-16 ENCOUNTER — OFFICE VISIT (OUTPATIENT)
Dept: PHYSICAL THERAPY | Facility: CLINIC | Age: 80
End: 2025-05-16
Payer: COMMERCIAL

## 2025-05-16 DIAGNOSIS — H81.13 BPPV (BENIGN PAROXYSMAL POSITIONAL VERTIGO), BILATERAL: Primary | ICD-10-CM

## 2025-05-16 PROCEDURE — 95992 CANALITH REPOSITIONING PROC: CPT | Performed by: PHYSICAL THERAPIST

## 2025-05-16 PROCEDURE — 97112 NEUROMUSCULAR REEDUCATION: CPT | Performed by: PHYSICAL THERAPIST

## 2025-05-16 NOTE — PROGRESS NOTES
Daily Note     Today's date: 2025  Patient name: Nella Yancey  : 1945  MRN: [id number]  Referring provider: Nicole Loja, PT  Dx:   Encounter Diagnosis     ICD-10-CM    1. BPPV (benign paroxysmal positional vertigo), bilateral  H81.13                      Subjective: Pt reports feeling much better since last visit. No dizziness.       Objective: See treatment diary below      Assessment: Normal L [place]. Torsional nystagmus and vertigo with R [place]. Symptoms resolved by second trial.       Plan: Continue per plan of care.      Precautions: standard      Manuals            R Epley 2x 2x                                                  Neuro Re-Ed             Post canalith repositioning postural ed KT KT                                                                                         Ther Ex                                                                                                                     Ther Activity                                       Gait Training                                       Modalities

## 2025-05-20 ENCOUNTER — OFFICE VISIT (OUTPATIENT)
Dept: FAMILY MEDICINE CLINIC | Facility: CLINIC | Age: 80
End: 2025-05-20
Payer: COMMERCIAL

## 2025-05-20 VITALS
WEIGHT: 171 LBS | SYSTOLIC BLOOD PRESSURE: 124 MMHG | BODY MASS INDEX: 25.91 KG/M2 | DIASTOLIC BLOOD PRESSURE: 76 MMHG | TEMPERATURE: 97.8 F | OXYGEN SATURATION: 96 % | HEART RATE: 76 BPM | HEIGHT: 68 IN | RESPIRATION RATE: 16 BRPM

## 2025-05-20 DIAGNOSIS — E78.5 HYPERLIPIDEMIA, UNSPECIFIED HYPERLIPIDEMIA TYPE: ICD-10-CM

## 2025-05-20 DIAGNOSIS — M81.0 AGE-RELATED OSTEOPOROSIS WITHOUT CURRENT PATHOLOGICAL FRACTURE: ICD-10-CM

## 2025-05-20 DIAGNOSIS — J84.9 ILD (INTERSTITIAL LUNG DISEASE) (HCC): ICD-10-CM

## 2025-05-20 DIAGNOSIS — R00.2 PALPITATIONS: ICD-10-CM

## 2025-05-20 DIAGNOSIS — K21.9 GASTROESOPHAGEAL REFLUX DISEASE, UNSPECIFIED WHETHER ESOPHAGITIS PRESENT: ICD-10-CM

## 2025-05-20 DIAGNOSIS — I10 ESSENTIAL HYPERTENSION: Primary | ICD-10-CM

## 2025-05-20 PROCEDURE — 99214 OFFICE O/P EST MOD 30 MIN: CPT | Performed by: FAMILY MEDICINE

## 2025-05-20 PROCEDURE — G2211 COMPLEX E/M VISIT ADD ON: HCPCS | Performed by: FAMILY MEDICINE

## 2025-05-20 RX ORDER — METOPROLOL SUCCINATE 25 MG/1
25 TABLET, EXTENDED RELEASE ORAL
Qty: 90 TABLET | Refills: 3 | Status: SHIPPED | OUTPATIENT
Start: 2025-05-20

## 2025-05-20 NOTE — ASSESSMENT & PLAN NOTE
- , HDL 61, continue atorvastatin, reviewed dietary modifications and encouraged to stay physically active, will recheck lipid panel and LFT's prior to next visit    Orders:    Lipid Panel with Direct LDL reflex; Future    Comprehensive metabolic panel; Future

## 2025-05-20 NOTE — PROGRESS NOTES
":  Assessment & Plan  Essential hypertension  - well controlled on metoprolol and amlodipine    Orders:    metoprolol succinate (TOPROL-XL) 25 mg 24 hr tablet; Take 1 tablet (25 mg total) by mouth daily at bedtime    Comprehensive metabolic panel; Future    Hyperlipidemia, unspecified hyperlipidemia type  - , HDL 61, continue atorvastatin, reviewed dietary modifications and encouraged to stay physically active, will recheck lipid panel and LFT's prior to next visit    Orders:    Lipid Panel with Direct LDL reflex; Future    Comprehensive metabolic panel; Future    Palpitations  - following with Cardiology    Gastroesophageal reflux disease, unspecified whether esophagitis present  - stable on omeprazole       Age-related osteoporosis without current pathological fracture  - following with Endocrine, on vitamin D supplements, repeat DEXA scan ordered       [facility]) (HCC)  - following with Pulmonary, on Albuterol inhaler as needed       Return in 6 months or sooner as needed.   Patient understands and agrees with the treatment plan.       History of Present Illness     Nella Yancey is a 79 y.o. female who presents to the office today for follow up visit for her chronic medical conditions and review her lab results. Patient has no new concerns at this time.       Review of Systems   Constitutional:  Negative for chills, fatigue, fever and unexpected weight change.   Respiratory:  Negative for cough, shortness of breath and wheezing.    Cardiovascular:  Negative for chest pain, palpitations and leg swelling.   Gastrointestinal:  Negative for abdominal pain, blood in stool, constipation, diarrhea, nausea and vomiting.   Neurological:  Negative for dizziness and headaches.   Hematological:  Negative for adenopathy.     Objective   /76   Pulse 76   Temp 97.8 °F (36.6 °C)   Resp 16   Ht 5' 8\" (1.727 m)   Wt 77.6 kg (171 lb)   SpO2 96%   BMI 26.00 kg/m²      Physical " Exam  Constitutional:       General: She is not in acute distress.    Cardiovascular:      Rate and Rhythm: Normal rate and regular rhythm.      Heart sounds: Normal heart sounds.   Pulmonary:      Effort: Pulmonary effort is normal.      Breath sounds: Normal breath sounds. No wheezing, rhonchi or rales.   Abdominal:      Palpations: Abdomen is soft. There is no mass.      Tenderness: There is no abdominal tenderness.     Musculoskeletal:      Cervical back: Neck supple.      Right lower leg: No edema.      Left lower leg: No edema.   Lymphadenopathy:      Cervical: No cervical adenopathy.     Neurological:      Mental Status: She is alert and oriented to person, place, and time.     Psychiatric:         Mood and Affect: Mood normal.         Behavior: Behavior normal.         Lab Results   Component Value Date    CHOLESTEROL 184 04/23/2025    CHOLESTEROL 185 11/12/2024    CHOLESTEROL 216 (H) 08/15/2024     Lab Results   Component Value Date    HDL 61 04/23/2025    HDL 66 11/12/2024    HDL 66 08/15/2024     Lab Results   Component Value Date    TRIG 92 04/23/2025    TRIG 92 11/12/2024    TRIG 102 08/15/2024     Lab Results   Component Value Date    LDLCALC 105 (H) 04/23/2025     Lab Results   Component Value Date    AVT3AIOEOHZW 1.642 04/23/2025     Lab Results   Component Value Date    WBC 6.08 04/23/2025    HGB 14.6 04/23/2025    HCT 45.9 04/23/2025    MCV 94 04/23/2025     04/23/2025     Lab Results   Component Value Date    SODIUM 142 04/23/2025    K 4.9 04/23/2025     04/23/2025    CO2 30 04/23/2025    AGAP 7 04/23/2025    BUN 21 04/23/2025    CREATININE 0.82 04/23/2025    GLUC 118 02/16/2025    GLUF 91 04/23/2025    CALCIUM 9.8 04/23/2025    AST 22 04/23/2025    ALT 22 04/23/2025    ALKPHOS 65 04/23/2025    TP 6.9 04/23/2025    TBILI 0.98 04/23/2025    EGFR 69 04/23/2025

## 2025-05-22 ENCOUNTER — APPOINTMENT (OUTPATIENT)
Dept: PHYSICAL THERAPY | Facility: CLINIC | Age: 80
End: 2025-05-22
Payer: COMMERCIAL

## 2025-05-22 ENCOUNTER — RESULTS FOLLOW-UP (OUTPATIENT)
Dept: ENDOCRINOLOGY | Facility: CLINIC | Age: 80
End: 2025-05-22

## 2025-05-22 NOTE — RESULT ENCOUNTER NOTE
Jose Yancey     Your DEXA scan showed improvement in bone mineral density.  Will continue Reclast.  Please follow-up for your appointment as scheduled with endocrinology

## 2025-05-27 ENCOUNTER — NURSE TRIAGE (OUTPATIENT)
Age: 80
End: 2025-05-27

## 2025-05-27 NOTE — TELEPHONE ENCOUNTER
If pt is interested in a sooner appt, she may certainly see any other provider available. However, if she does not wish to see anyone other than Dr. Teague, you may put her on his cancellation list and she may be able to get a sooner appt that way.

## 2025-05-27 NOTE — TELEPHONE ENCOUNTER
"REASON FOR CONVERSATION: Leg Pain  Patient states she has been having b/l leg pain and swelling for several years, becoming increasingly worse. Wants to be seen by Dr. Teague only- new to [facility].     SYMPTOMS: leg pain, swelling from ankle to knee. Patient describes as \"fluid\" can feel her veins.   Denies temperature/color changes, has occasional numbness in feet. Symptoms are worse with walking, relief with rest and elevation.    OTHER HEALTH INFORMATION: Left LEVD- 2/26/24      PROTOCOL DISPOSITION: See Within 3 Days in Office  Soonest Urg New patient with Dr. Teague scheduled for 7/2/25    CARE ADVICE PROVIDED: If further symptoms/increased pain, contact PCP or seek ED evaluation.     PRACTICE FOLLOW-UP: Urgent new patient appointment scheduled for 7/2/25. Pt only wants to see Dr. Teague    Reason for Disposition   MODERATE pain (e.g., interferes with normal activities, limping) and present > 3 days    Answer Assessment - Initial Assessment Questions  1. ONSET: \"When did the pain start?\"       Patient unsure- has had at least a few years  2. LOCATION: \"Where is the pain located?\"       B/l legs from ankle to knee  3. PAIN: \"How bad is the pain?\"    (Scale 1-10; or mild, moderate, severe)      Moderate to severe  4. WORK OR EXERCISE: \"Has there been any recent work or exercise that involved this part of the body?\"       denies  5. CAUSE: \"What do you think is causing the leg pain?\"      unsure  6. OTHER SYMPTOMS: \"Do you have any other symptoms?\" (e.g., chest pain, back pain, breathing difficulty, swelling, rash, fever, numbness, weakness)      Fluid, swelling from ankle to knee; numbness in feet occasionally    Protocols used: Leg Pain-Adult-[place]    "

## 2025-05-28 DIAGNOSIS — I10 PRIMARY HYPERTENSION: ICD-10-CM

## 2025-05-28 RX ORDER — AMLODIPINE BESYLATE 2.5 MG/1
2.5 TABLET ORAL DAILY
Qty: 90 TABLET | Refills: 1 | Status: SHIPPED | OUTPATIENT
Start: 2025-05-28

## 2025-05-28 NOTE — TELEPHONE ENCOUNTER
Patient never picked up the script in Feb. Patient was not taking the medication and when she saw her PCP, she told patient that she needed to take the medication.   Script needs to be resent     Reason for call:   [x] Refill   [] Prior Auth  [] Other:     Office:   [] PCP/Provider -   [x] Specialty/Provider - [facility]  Authorized By: Valerio Adames MD      Medication: amLODIPine (NORVASC) 2.5 mg tablet    Dose/Frequency: TAKE 1 TABLET BY MOUTH EVERY DAY    Quantity: 90    Pharmacy: [facility]/pharmacy #[id number] [place], [place]    Local Pharmacy   Does the patient have enough for 3 days?   [] Yes   [x] No - Send as HP to POD    Mail Away Pharmacy   Does the patient have enough for 10 days?   [] Yes   [] No - Send as HP to POD

## 2025-05-28 NOTE — TELEPHONE ENCOUNTER
Patient calling back to verify that her appointment was moved up to 6/2/25 at 3pm. Confirmed with patient.

## 2025-05-30 NOTE — ASSESSMENT & PLAN NOTE
BP now controlled 132/70  HR 69  Continue Toprol 25 mg daily   Continue Norvasc 2.5 added in Jan 2025 due to high BP

## 2025-05-30 NOTE — PROGRESS NOTES
[facility]  General Cardiology Note  Nella Yancey  1945  [id number]      Referring Provider - No ref. provider found  Chief Complaint   Patient presents with    Follow-up     No cardiac complaints        Assessment & Plan  Primary hypertension  BP now controlled 132/70  HR 69  Continue Toprol 25 mg daily   Continue Norvasc 2.5 added in Jan 2025 due to high BP  Palpitations  No concerning findings on holter in 2021  Recent ecg showed Atrial bigeminy and we ordered a zio. Patient was noncompliant with her medications.  April 2025 1 week zio as below/ shows 10% SVT.   She is now compliant with her toprol xl 25 mg and reports improvement of symptoms  Continue Anxiety/ stress management  Pericardial effusion  Distant.  Around 2014.  Had pericardial effusion that was drained and it sounds like had a surgical window.   Nonrheumatic tricuspid valve regurgitation  Serial follow up     Relevant testing  4/24/25 1 week zio  Patient had a min HR of 47 bpm, max HR of 143 bpm, and avg HR of 69 bpm. Predominant underlying rhythm was Sinus Rhythm. 23 Supraventricular Tachycardia runs occurred, the run with the fastest interval lasting 6 beats with a max rate of 143 bpm, the longest lasting 12.4 secs with an avg rate of 126 bpm. Some episodes of Supraventricular Tachycardia may be possible Atrial Tachycardia with variable block. Isolated SVEs were frequent (10.1%, 93844), SVE Couplets were occasional (1.7%, 5131), and no SVE Triplets were present. Isolated VEs were rare (<1.0%), and no VE Couplets or VE Triplets were present.     3/23/21 24 hr holter  1.Normal sinus rhythm throughout the monitoring interval at a controlled rate.    2.Low-density of nonsustained premature ventricular ectopy.    3.Low-density of nonsustained premature supraventricular ectopy.    4.No significant bradycardia.    5.Symptoms of chest discomfort did not correlate with prognostically important arrhythmia or ST segment  change.    3/31/25 echo    Left Ventricle: Left ventricular cavity size is normal. Wall thickness is mildly increased. The left ventricular ejection fraction is 64% by biplane measurement. Systolic function is normal. Wall motion is normal. Diastolic function is mildly abnormal, consistent with grade I (abnormal) relaxation. Tricuspid Valve: There is mild to moderate regurgitation.      Will RTO in August or sooner if necessary  Patient / Caretaker was advised and educated to call our office  immediately if  patient has any new symptoms of chest pain/shortness of breath, near-syncope, syncope, light headedness sustained palpitations  or any other cardiovascular symptoms before their scheduled follow-up appointment.  ED precautions reviewed     Interval History: 79 y.o.  female  with PMH as below is here for urgent visit , per PCP experiencing frequent PVC's  Patient of Dr. Moreno     She has a history of hypertension, hyperlipidemia, atypical chest pain, palpitations, mild to moderate tricuspid regurgitation, and distant pericarditis.    She is retired.  Used to be in sales.   Used to live in [place].  Now live in [place].    was a  in [employer].     Never smoker.  Rare ETOH.  No drugs.     4/24/25 patient presents with .  The patient reports increased palpitations for past 2 weeks.  Usually occur at rest. No other associated symptoms.  Before that palpitations were less frequent.  She denies chest pain.   No SOB.   No orthopnea or PND.   No leg edema,  no syncope.  She also reports that on her pulse oximetry, her heart rate has been low around 45 for about week since operation for right hand for trigger finger 3 weeks ago, got local anesthesia  She reports increased stress in her life especially when  is driving. Feels like she is not as relaxed. She is teary during visit today  Did not take her BP meds for past 2 days because of low HR in the 40s  BP at home on  140-150/80's    Today,  patient presents with . She reports no acute cardiac symptoms. Palpitations are controlled now. She is compliant with her medications. BP is controlled.     Patient Active Problem List    Diagnosis Date Noted    Nonrheumatic tricuspid valve regurgitation 01/23/2025    Mild intermittent asthma without complication 10/16/2024    Headache with neurologic deficit 08/14/2024    Stroke-like symptoms 08/14/2024    HTN (hypertension) 08/14/2024    HLD (hyperlipidemia) 08/14/2024    Age-related osteoporosis without current pathological fracture 01/09/2024    Neuropathy 12/08/2023    Numbness and tingling of both lower extremities 12/08/2023    Primary osteoarthritis of right knee 03/22/2023    [facility]) ([facility])     H/O parathyroidectomy 09/14/2022    Skin tag of anus 08/31/2022    SOBOE (shortness of breath on exertion) 08/17/2022    Aneurysm (HCC) 07/21/2022    Edema of both ankles 07/01/2022    Cervical myofascial pain syndrome 05/24/2022    Osteoporosis with current pathological fracture 09/10/2021    Parathyroid related hypercalcemia (HCC) 09/10/2021    Hallux abducto valgus, bilateral 04/27/2021    Pincer nail deformity 04/27/2021    Palpitations 02/17/2021    Calf pain 10/08/2020    Myalgia 10/08/2020    Elevated blood-pressure reading without diagnosis of hypertension 07/30/2020    Dysphonia 07/20/2020    Reflux laryngitis 07/20/2020    Paresis of left vocal fold 07/20/2020    Glottic insufficiency 07/20/2020    Muscle tension dysphonia 07/20/2020    TMJ dysfunction 07/20/2020    Pharyngoesophageal dysphagia 07/20/2020    Paresthesias     Carpal tunnel syndrome of left wrist     Irritable bowel syndrome with diarrhea 01/08/2020    Elevated amylase and lipase 01/08/2020    Pericardial effusion 11/20/2019    Abnormal CT scan of lung 07/03/2019    Migraine without aura and without status migrainosus, not intractable 03/18/2019    Dizziness and giddiness 03/18/2019     "Insomnia 03/18/2019    Cervicalgia 03/18/2019    Lung nodule 01/16/2019    Atypical chest pain 01/06/2019    Anxiety 11/13/2018    Primary hyperparathyroidism (HCC) 11/13/2018    Thyroid nodule 11/13/2018    Vitamin D deficiency 11/13/2018    Closed fracture distal radius and ulna, right, initial encounter 03/20/2018    History of pericarditis 03/12/2018    Gastroesophageal reflux disease with esophagitis 10/18/2017    Heart murmur 04/17/2015     Past Medical History[1]  Social History[2]   Family History[3]  Past Surgical History[4]  Current Medications[5]    Allergies   Allergen Reactions    Ciprofloxacin Myalgia       Vitals:    06/02/25 0833   BP: 132/70   BP Location: Left arm   Patient Position: Sitting   Cuff Size: Standard   Pulse: 69   SpO2: 98%   Weight: 79.4 kg (175 lb 1.6 oz)   Height: 5' 8\" (1.727 m)        Vitals:    06/02/25 0833   Weight: 79.4 kg (175 lb 1.6 oz)      Height: 5' 8\" (172.7 cm)   Body mass index is 26.62 kg/m².    Labs:   Lab Results   Component Value Date/Time    CHOLESTEROL 184 04/23/2025 07:55 AM    TRIG 92 04/23/2025 07:55 AM    HDL 61 04/23/2025 07:55 AM    LDLCALC 105 (H) 04/23/2025 07:55 AM      Lab Results   Component Value Date    SODIUM 142 04/23/2025    K 4.9 04/23/2025     04/23/2025    CREATININE 0.82 04/23/2025    EGFR 69 04/23/2025    BUN 21 04/23/2025    CO2 30 04/23/2025    ALT 22 04/23/2025    AST 22 04/23/2025    INR 0.88 08/14/2024    GLUF 91 04/23/2025    HGBA1C 5.4 08/14/2024    WBC 6.08 04/23/2025    HGB 14.6 04/23/2025    HCT 45.9 04/23/2025     04/23/2025         Imaging: No results found.    ECG:     Reviewed by LIZZIE Mcmahan      Review of Systems   Cardiovascular:  Positive for leg swelling (chronic).   All other systems reviewed and are negative.    Except as noted in HPI, is otherwise reviewed in detail and a 12 point review of systems is negative.    Physical Exam  Vitals reviewed.   Constitutional:       General: She is not in acute " distress.     Appearance: Normal appearance. She is not diaphoretic.   HENT:      Head: Normocephalic and atraumatic.     Cardiovascular:      Rate and Rhythm: Normal rate and regular rhythm.      Pulses: Normal pulses.           Radial pulses are 2+ on the right side and 2+ on the left side.      Heart sounds: Normal heart sounds, S1 normal and S2 normal.   Pulmonary:      Effort: Pulmonary effort is normal. No tachypnea or respiratory distress.      Breath sounds: Normal breath sounds. No wheezing or rales.   Abdominal:      General: There is no distension.      Palpations: Abdomen is soft.     Musculoskeletal:      Right lower leg: Edema (non pitting) present.      Left lower leg: Edema (non pitting) present.     Skin:     General: Skin is warm and dry.      Capillary Refill: Capillary refill takes less than 2 seconds.     Neurological:      Mental Status: She is alert and oriented to person, place, and time.     Psychiatric:         Mood and Affect: Mood normal.         Behavior: Behavior normal.          **Please Note: This note may have been constructed using a voice recognition system**     Thank you for the opportunity to participate in the care of this patient.   LIZZIE Mcmahan         [1]   Past Medical History:  Diagnosis Date    Anxiety     Arthritis     Back pain     Balance problems     Chest pain     heaviness    Colon polyp     Difficulty swallowing     Dizziness     Dry cough     Gait disorder     GERD (gastroesophageal reflux disease)     Hyperparathyroidism (HCC)     Hypertension     Increased frequency of headaches     Migraines     Neck pain     Numbness and tingling     bles    Palpitations     Pericarditis     Thyroid disease     nodule    Trouble in sleeping     Wears glasses    [2]   Social History  Tobacco Use    Smoking status: Never    Smokeless tobacco: Never   Vaping Use    Vaping status: Never Used   Substance Use Topics    Alcohol use: Not Currently     Comment: Rarely     Drug  use: No   [3]   Family History  Problem Relation Name Age of Onset    Kidney failure Mother      Hypertension Mother      Lung cancer Father      No Known Problems Sister      Yosef Parkinson White syndrome Daughter      Substance Abuse Neg Hx      Alcohol abuse Neg Hx      Mental illness Neg Hx      Colon cancer Neg Hx      Stomach cancer Neg Hx     [4]   Past Surgical History:  Procedure Laterality Date    APPENDECTOMY      CHOLECYSTECTOMY      laparoscopic    COLONOSCOPY      KNEE SURGERY Left     PERICARDIAL WINDOW      MN OPTX DSTL RDL X-ARTIC FX/EPIPHYSL SEPARATION Right 03/22/2018    Procedure: OPEN REDUCTION W/ INTERNAL FIXATION (ORIF) RADIUS, splint application;  Surgeon: Kandice Kimble MD;  Location: [facility];  Service: Orthopedics    MN PARATHYROIDECTOMY/EXPLORATION PARATHYROIDS N/A 08/17/2022    Procedure: PARATHYROIDECTOMY, 4 GLAND EXPLORATION;  Surgeon: Ovidio Perkins MD;  Location: [facility];  Service: ENT    SKIN BIOPSY      UPPER GASTROINTESTINAL ENDOSCOPY     [5]   Current Outpatient Medications:     acetaminophen (TYLENOL) 500 mg tablet, Take 500-1,000 mg by mouth every 6 (six) hours as needed for mild pain, Disp: , Rfl:     albuterol (PROVENTIL HFA,VENTOLIN HFA) 90 mcg/act inhaler, Inhale 2 puffs every 6 (six) hours as needed for wheezing or shortness of breath, Disp: 8 g, Rfl: 1    ALPRAZolam (XANAX) 0.5 mg tablet, Take 1 tablet (0.5 mg total) by mouth daily at bedtime as needed for anxiety or sleep, Disp: 20 tablet, Rfl: 1    amLODIPine (NORVASC) 2.5 mg tablet, Take 1 tablet (2.5 mg total) by mouth daily, Disp: 90 tablet, Rfl: 1    ascorbic acid (VITAMIN C) 500 mg tablet, Take 500 mg by mouth in the morning., Disp: , Rfl:     atorvastatin (LIPITOR) 10 mg tablet, TAKE 0.5 TABLETS (5 MG TOTAL) BY MOUTH DAILY AFTER DINNER (Patient taking differently: Take 5 mg by mouth daily after dinner Monday, wed, Friday), Disp: 45 tablet, Rfl: 1    b complex vitamins tablet, Take 1 tablet by mouth  in the morning., Disp: , Rfl:     Cholecalciferol 50 MCG (2000 UT) CAPS, Take 3,000 Units by mouth in the morning., Disp: , Rfl:     Magnesium 300 MG CAPS, Take 300 mg by mouth in the morning, Disp: , Rfl:     meclizine (ANTIVERT) 12.5 MG tablet, Can take 1-2 tabs as needed up to BID for dizziness or vertigo, caution as can be sedating, Disp: 30 tablet, Rfl: 11    metoprolol succinate (TOPROL-XL) 25 mg 24 hr tablet, Take 1 tablet (25 mg total) by mouth daily at bedtime, Disp: 90 tablet, Rfl: 3    Multiple Vitamins-Minerals (MULTIVITAMIN ADULTS 50+ PO), Take 1 tablet by mouth in the morning, Disp: , Rfl:     Nutritional Supplements (OSAPLEX MK-7 PO), Take 1 tablet by mouth in the morning, Disp: , Rfl:     omeprazole (PriLOSEC) 20 mg delayed release capsule, Take 1 capsule (20 mg total) by mouth daily before breakfast, Disp: 90 capsule, Rfl: 1    Riboflavin (VITAMIN B-2 PO), Take 250 mg by mouth in the morning, Disp: , Rfl:

## 2025-05-30 NOTE — ASSESSMENT & PLAN NOTE
No concerning findings on holter in 2021  Recent ecg showed Atrial bigeminy and we ordered a zio. Patient was noncompliant with her medications.  April 2025 1 week zio as below/ shows 10% SVT.   She is now compliant with her toprol xl 25 mg and reports improvement of symptoms  Continue Anxiety/ stress management

## 2025-06-02 ENCOUNTER — OFFICE VISIT (OUTPATIENT)
Dept: VASCULAR SURGERY | Facility: CLINIC | Age: 80
End: 2025-06-02
Payer: COMMERCIAL

## 2025-06-02 ENCOUNTER — OFFICE VISIT (OUTPATIENT)
Dept: CARDIOLOGY CLINIC | Facility: CLINIC | Age: 80
End: 2025-06-02

## 2025-06-02 VITALS
SYSTOLIC BLOOD PRESSURE: 132 MMHG | WEIGHT: 175.1 LBS | HEART RATE: 69 BPM | DIASTOLIC BLOOD PRESSURE: 70 MMHG | BODY MASS INDEX: 26.54 KG/M2 | HEIGHT: 68 IN | OXYGEN SATURATION: 98 %

## 2025-06-02 VITALS
DIASTOLIC BLOOD PRESSURE: 78 MMHG | WEIGHT: 172 LBS | RESPIRATION RATE: 16 BRPM | BODY MASS INDEX: 26.07 KG/M2 | HEART RATE: 75 BPM | HEIGHT: 68 IN | OXYGEN SATURATION: 97 % | SYSTOLIC BLOOD PRESSURE: 134 MMHG

## 2025-06-02 DIAGNOSIS — I10 PRIMARY HYPERTENSION: ICD-10-CM

## 2025-06-02 DIAGNOSIS — I36.1 NONRHEUMATIC TRICUSPID VALVE REGURGITATION: ICD-10-CM

## 2025-06-02 DIAGNOSIS — R00.2 PALPITATIONS: Primary | ICD-10-CM

## 2025-06-02 DIAGNOSIS — M79.604 PAIN IN BOTH LOWER EXTREMITIES: Primary | ICD-10-CM

## 2025-06-02 DIAGNOSIS — I31.39 PERICARDIAL EFFUSION: ICD-10-CM

## 2025-06-02 DIAGNOSIS — M79.605 PAIN IN BOTH LOWER EXTREMITIES: Primary | ICD-10-CM

## 2025-06-02 PROCEDURE — 99205 OFFICE O/P NEW HI 60 MIN: CPT | Performed by: SURGERY

## 2025-06-02 NOTE — PROGRESS NOTES
Name: Nella Yancey      : 1945      MRN: [id number]  Encounter Provider: Mynor Teague DO  Encounter Date: 2025   Enc[facility] department: [facility] [place]  :  Assessment & Plan  Pain in both lower extremities  79-year-old female referred for leg pain.  She endorses longstanding pain primarily in her upper bilateral calves.  The pain is not worse with ambulation she does have some chronic foot neuropathy but denies pain in either her right or left foot.  She has no wounds on her lower extremities.  She has had no prior arterial procedures venous surgery or DVT.  She does have some very small scattered varicosities in the anterior distal thighs bilaterally as well as some scattered spider veins.  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  This does appear to be where her majority of her symptoms are coming from.        I have a low suspicion that she has significant arterial disease as she has 1+ DP pulses bilaterally or that she has any significant venous pathology that is causing her pain.  I did offer her a lower extremity arterial duplex and venous insufficiency study to get objective testing to rule out these.  Which she was agreeable to proceeding with.  I will schedule her for a virtual visit in a few weeks once her testing is completed to review the results with her.  I did explain to her clearly I do not believe she has a vascular underlying diagnosis.  I suspect she may want a referral to general or plastic surgery regarding her lipodystrophy.    Orders:    VAS ARTERIAL DUPLEX- LOWER LIMB BILATERAL; Future    VAS Lower extremity venous insufficiency duplex, bilateral w/ measurements; Future        History of Present Illness   HPI  Nella Yancey is a 79 y.o. female     Today for BL leg pain. She complains of numbness in her toes and a tingling feeling that will travel up her legs.  "This has been going on for a year or two. She denies any non healing wounds or skin discoloration. Swelling in both legs right below the knee.      History obtained from: patient    Review of Systems   Cardiovascular:  Positive for leg swelling.   Skin:  Negative for color change and wound.   Neurological:  Positive for numbness.     I have reviewed the ROS above and made changes as needed.         Objective   /78 (BP Location: Left arm, Patient Position: Sitting, Cuff Size: Adult)   Pulse 75   Resp 16   Ht 5' 8\" (1.727 m)   Wt 78 kg (172 lb)   SpO2 97%   BMI 26.15 kg/m²      Physical Exam    General  Exam: alert, awake, oriented, no distress, consistent with stated age    Integumentary  Exam: warm, dry, no gross lesions, no bruises and normal color    Chest and Lung  Exam: chest normal without deformity, bilaterally expansive, clear to auscultation    Cardiovascular  Exam: regular rate, regular rhythm, no murmurs, no rubs or gallops    Adbomen  Exam: soft, non-tender, non-distended, no pulsatile abdominal masses, no abdominal bruit    Upper Extremity:  Palpation: Radial pulse- Bilateral 2+    Lower Extremity:  Palpation: Femoral pulse- Bilateral 2+              Dorsalis Pedis - Bilateral 1+    Scattered distal thigh varicosities and spider veins  Feet warm, no LE wounds  Lipodystrophy bilateral proximal calfs         Neurologic  Exam:alert, non-focal, oriented x 3      Administrative Statements   I have spent a total time of 30 minutes in caring for this patient on the day of the visit/encounter including Counseling / Coordination of care, Documenting in the medical record, and Reviewing/placing orders in the medical record (including tests, medications, and/or procedures).  "

## 2025-06-02 NOTE — ASSESSMENT & PLAN NOTE
79-year-old female referred for leg pain.  She endorses longstanding pain primarily in her upper bilateral calves.  The pain is not worse with ambulation she does have some chronic foot neuropathy but denies pain in either her right or left foot.  She has no wounds on her lower extremities.  She has had no prior arterial procedures venous surgery or DVT.  She does have some very small scattered varicosities in the anterior distal thighs bilaterally as well as some scattered spider veins.  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  This does appear to be where her majority of her symptoms are coming from.        I have a low suspicion that she has significant arterial disease as she has 1+ DP pulses bilaterally or that she has any significant venous pathology that is causing her pain.  I did offer her a lower extremity arterial duplex and venous insufficiency study to get objective testing to rule out these.  Which she was agreeable to proceeding with.  I will schedule her for a virtual visit in a few weeks once her testing is completed to review the results with her.  I did explain to her clearly I do not believe she has a vascular underlying diagnosis.  I suspect she may want a referral to general or plastic surgery regarding her lipodystrophy.    Orders:    VAS ARTERIAL DUPLEX- LOWER LIMB BILATERAL; Future    VAS Lower extremity venous insufficiency duplex, bilateral w/ measurements; Future

## 2025-06-06 ENCOUNTER — OFFICE VISIT (OUTPATIENT)
Dept: FAMILY MEDICINE CLINIC | Facility: CLINIC | Age: 80
End: 2025-06-06
Payer: COMMERCIAL

## 2025-06-06 VITALS
OXYGEN SATURATION: 94 % | HEART RATE: 76 BPM | RESPIRATION RATE: 15 BRPM | HEIGHT: 68 IN | SYSTOLIC BLOOD PRESSURE: 142 MMHG | BODY MASS INDEX: 26.31 KG/M2 | TEMPERATURE: 97.8 F | DIASTOLIC BLOOD PRESSURE: 86 MMHG | WEIGHT: 173.6 LBS

## 2025-06-06 DIAGNOSIS — R22.1 LOCALIZED SWELLING, MASS OR LUMP OF NECK: Primary | ICD-10-CM

## 2025-06-06 DIAGNOSIS — E04.2 MULTIPLE THYROID NODULES: ICD-10-CM

## 2025-06-06 PROCEDURE — G2211 COMPLEX E/M VISIT ADD ON: HCPCS | Performed by: FAMILY MEDICINE

## 2025-06-06 PROCEDURE — 99213 OFFICE O/P EST LOW 20 MIN: CPT | Performed by: FAMILY MEDICINE

## 2025-06-06 NOTE — PROGRESS NOTES
":  Assessment & Plan  Localized swelling, mass or lump of neck  - US soft tissue neck ordered for further evaluation    Orders:    US head neck soft tissue; Future    Multiple thyroid nodules  - last US thyroid 11/15/2022 showed no changes, repeat US thyroid ordered    Orders:    US thyroid; Future    Return as scheduled or sooner as needed.   Patient understands and agrees with the treatment plan.       History of Present Illness     Nella Yancey is a 79 y.o. female who presents today with c/o right anterior neck lump that she noticed a few weeks ago, comes and goes. Patient denies sore throat, runny nose, postnasal drip, fever, trouble swallowing or voice changing. Patient has history of thyroid nodules and had her last US in 11/2022.       Review of Systems   Constitutional:  Negative for chills, fatigue and fever.   HENT:  Negative for congestion, ear pain, postnasal drip, rhinorrhea, sore throat, trouble swallowing and voice change.    Respiratory:  Negative for cough and shortness of breath.    Cardiovascular:  Negative for chest pain.   Gastrointestinal:  Negative for abdominal pain, diarrhea, nausea and vomiting.   Neurological:  Negative for dizziness and headaches.     Objective   /86   Pulse 76   Temp 97.8 °F (36.6 °C)   Resp 15   Ht 5' 8\" (1.727 m)   Wt 78.7 kg (173 lb 9.6 oz)   SpO2 94%   BMI 26.40 kg/m²      Physical Exam  Constitutional:       General: She is not in acute distress.  Neck:      Thyroid: No thyroid mass, thyromegaly or thyroid tenderness.     Cardiovascular:      Rate and Rhythm: Normal rate and regular rhythm.      Heart sounds: Normal heart sounds.   Pulmonary:      Effort: Pulmonary effort is normal.      Breath sounds: Normal breath sounds.   Lymphadenopathy:      Cervical: No cervical adenopathy.     Neurological:      Mental Status: She is alert and oriented to person, place, and time.     Psychiatric:         Mood and Affect: Mood normal.         Behavior: " Behavior normal.

## 2025-06-16 ENCOUNTER — HOSPITAL ENCOUNTER (OUTPATIENT)
Dept: ULTRASOUND IMAGING | Facility: HOSPITAL | Age: 80
Discharge: HOME | End: 2025-06-16
Attending: FAMILY MEDICINE
Payer: COMMERCIAL

## 2025-06-16 DIAGNOSIS — R22.1 LOCALIZED SWELLING, MASS OR LUMP OF NECK: ICD-10-CM

## 2025-06-16 DIAGNOSIS — E04.2 MULTIPLE THYROID NODULES: ICD-10-CM

## 2025-06-16 PROCEDURE — 76536 US EXAM OF HEAD AND NECK: CPT

## 2025-06-17 ENCOUNTER — OFFICE VISIT (OUTPATIENT)
Dept: GASTROENTEROLOGY | Facility: MEDICAL CENTER | Age: 80
End: 2025-06-17
Payer: COMMERCIAL

## 2025-06-17 ENCOUNTER — TELEPHONE (OUTPATIENT)
Dept: GASTROENTEROLOGY | Facility: MEDICAL CENTER | Age: 80
End: 2025-06-17

## 2025-06-17 VITALS
HEIGHT: 68 IN | HEART RATE: 66 BPM | WEIGHT: 174.2 LBS | TEMPERATURE: 95 F | SYSTOLIC BLOOD PRESSURE: 128 MMHG | BODY MASS INDEX: 26.4 KG/M2 | OXYGEN SATURATION: 96 % | DIASTOLIC BLOOD PRESSURE: 60 MMHG

## 2025-06-17 DIAGNOSIS — R13.19 OTHER DYSPHAGIA: ICD-10-CM

## 2025-06-17 DIAGNOSIS — K21.9 GASTROESOPHAGEAL REFLUX DISEASE, UNSPECIFIED WHETHER ESOPHAGITIS PRESENT: ICD-10-CM

## 2025-06-17 DIAGNOSIS — Z12.11 COLON CANCER SCREENING: ICD-10-CM

## 2025-06-17 DIAGNOSIS — K21.9 GASTROESOPHAGEAL REFLUX DISEASE WITHOUT ESOPHAGITIS: Primary | ICD-10-CM

## 2025-06-17 DIAGNOSIS — K58.0 IRRITABLE BOWEL SYNDROME WITH DIARRHEA: ICD-10-CM

## 2025-06-17 PROCEDURE — 99214 OFFICE O/P EST MOD 30 MIN: CPT | Performed by: PHYSICIAN ASSISTANT

## 2025-06-17 RX ORDER — OMEPRAZOLE 20 MG/1
20 CAPSULE, DELAYED RELEASE ORAL
Qty: 90 CAPSULE | Refills: 1 | Status: SHIPPED | OUTPATIENT
Start: 2025-06-17

## 2025-06-17 NOTE — ASSESSMENT & PLAN NOTE
The patient has intermittent diarrhea and abdominal pain currently well controlled with diet

## 2025-06-17 NOTE — TELEPHONE ENCOUNTER
Procedure: EGD  Date: 8/4/25  Physician performing: Dr. Jaiyeola  Location of procedure:    Instructions given to patient: EGD  Diabetic: No  Clearances: N/A

## 2025-06-17 NOTE — PROGRESS NOTES
Name: Nella Yancey      : 1945      MRN: [id number]  Encounter Provider: Roxana Simms PA-C  Encounter Date: 2025   Encounter department: [facility]  :  Assessment & Plan  Gastroesophageal reflux disease without esophagitis  Chronic. Last EGD 23 with a 4cm hiatal hernia but otherwise normal. Currently on omeprazole 20mg every morning with good control.         Other dysphagia  Chronic, to solids and liquids. Last EGD 23 without etiology.  Repeat EGD was ordered and the patient would like to schedule this. If negative, consider barium swallow with speech       Irritable bowel syndrome with diarrhea  The patient has intermittent diarrhea and abdominal pain currently well controlled with diet        Colon cancer screening  Last colonoscopy 23 with one subcentimeter tubular adenomaremoved, scattered diverticuli in the sigmoid, and large internal hemorrhoids. Recall not recommended given age > 75.            History of Present Illness   Nella Yancey is a 79 y.o. female who presents for GERD, dysphagia, and IBS-D. The patient presents today doing well. She is currently taking omeprazole 20 mg every morning for acid reflux which controls her symptoms.  Does continue to have dysphagia to solids and liquids.  At her last visit an EGD was ordered but the patient was unable to proceed with this given other health conditions at the time.  Otherwise her IBS is well controlled with dietary management aside for very rare bouts of severe abdominal pain. When these bouts occur the pain is relief with burping, moving, and having a bowel movement     Prior EGD/colonoscopy      2023 EGD showed a 4 cm hiatal hernia otherwise normal exam.  Duodenal and gastric biopsies were normal.  2023 colonoscopy showed a subcentimeter colon polyp and diverticulosis.  Pathology showed tubular adenoma and random colon biopsies were unremarkable and she was  recommended to not repeat colonoscopy given her age     April 2021 EGD was normal.  Empiric dilation was performed with balloon to 20 mm without mucosal disruption.  Biopsies were negative for EOE  2018 EGD showed mild esophagitis in distal third of the esophagus.     2018 colonoscopy showed small polyp in the transverse colon, internal and external hemorrhoids.  Pathology showed polypoid mucosa with mild edema.  She was recommended repeat colonoscopy in 5 years.  Duodenal biopsies were normal, Gastric biopsy showed chronic gastritis negative for H. pylori and distal esophageal biopsy showed chronic esophagitis negative for intestinal metaplasia    Review of Systems A complete review of systems is negative other than that noted above in the HPI.      Current Medications[1]  Objective   There were no vitals taken for this visit.      Physical Exam  Vitals and nursing note reviewed.   Constitutional:       General: She is not in acute distress.     Appearance: She is well-developed.   HENT:      Head: Normocephalic and atraumatic.     Eyes:      Conjunctiva/sclera: Conjunctivae normal.       Cardiovascular:      Rate and Rhythm: Normal rate and regular rhythm.      Heart sounds: No murmur heard.  Pulmonary:      Effort: Pulmonary effort is normal. No respiratory distress.      Breath sounds: Normal breath sounds.     Musculoskeletal:         General: No swelling.      Cervical back: Neck supple.     Skin:     General: Skin is warm and dry.      Capillary Refill: Capillary refill takes less than 2 seconds.     Neurological:      Mental Status: She is alert.     Psychiatric:         Mood and Affect: Mood normal.          Lab Results: I personally reviewed relevant lab results.       Results for orders placed during the hospital encounter of 08/11/23    EGD    Impression  4 cm hiatal hernia  Performed forceps biopsies in the esophagus to rule out eosinophilic esophagitis  The stomach appeared normal. Performed random  biopsy to rule out H. pylori.  The duodenum appeared normal. Performed random biopsy to rule out celiac disease.      RECOMMENDATION:  Await pathology results    Continue daily ppi and nighttime H2 blocker therapy  If ongoing dysphagia obtain manometry testing for further workup  Proceed with colonoscopy        Diana M Jaiyeola, MD               [1]   Current Outpatient Medications   Medication Sig Dispense Refill    acetaminophen (TYLENOL) 500 mg tablet Take 500-1,000 mg by mouth every 6 (six) hours as needed for mild pain      albuterol (PROVENTIL HFA,VENTOLIN HFA) 90 mcg/act inhaler Inhale 2 puffs every 6 (six) hours as needed for wheezing or shortness of breath 8 g 1    ALPRAZolam (XANAX) 0.5 mg tablet Take 1 tablet (0.5 mg total) by mouth daily at bedtime as needed for anxiety or sleep 20 tablet 1    amLODIPine (NORVASC) 2.5 mg tablet Take 1 tablet (2.5 mg total) by mouth daily 90 tablet 1    ascorbic acid (VITAMIN C) 500 mg tablet Take 500 mg by mouth in the morning.      atorvastatin (LIPITOR) 10 mg tablet TAKE 0.5 TABLETS (5 MG TOTAL) BY MOUTH DAILY AFTER DINNER (Patient taking differently: Take 5 mg by mouth daily after dinner Monday, Wed Friday only) 45 tablet 1    b complex vitamins tablet Take 1 tablet by mouth in the morning.      Cholecalciferol 50 MCG (2000 UT) CAPS Take 3,000 Units by mouth in the morning.      Magnesium 300 MG CAPS Take 300 mg by mouth in the morning      meclizine (ANTIVERT) 12.5 MG tablet Can take 1-2 tabs as needed up to BID for dizziness or vertigo, caution as can be sedating 30 tablet 11    metoprolol succinate (TOPROL-XL) 25 mg 24 hr tablet Take 1 tablet (25 mg total) by mouth daily at bedtime 90 tablet 3    Multiple Vitamins-Minerals (MULTIVITAMIN ADULTS 50+ PO) Take 1 tablet by mouth in the morning      Nutritional Supplements (OSAPLEX MK-7 PO) Take 1 tablet by mouth in the morning      omeprazole (PriLOSEC) 20 mg delayed release capsule Take 1 capsule (20 mg total) by mouth  daily before breakfast 90 capsule 1    Riboflavin (VITAMIN B-2 PO) Take 250 mg by mouth in the morning       No current facility-administered medications for this visit.

## 2025-06-18 ENCOUNTER — RESULTS FOLLOW-UP (OUTPATIENT)
Dept: FAMILY MEDICINE CLINIC | Facility: CLINIC | Age: 80
End: 2025-06-18

## 2025-06-18 DIAGNOSIS — R22.1 LOCALIZED SWELLING, MASS OR LUMP OF NECK: Primary | ICD-10-CM

## 2025-06-20 ENCOUNTER — TELEPHONE (OUTPATIENT)
Age: 80
End: 2025-06-20

## 2025-06-20 NOTE — TELEPHONE ENCOUNTER
Patient called asking if provider can review labs and call her to discuss. States she would like to discuss Reclast and CT that was ordered by PCP. Thank you.

## 2025-06-20 NOTE — TELEPHONE ENCOUNTER
Pt called stating her dopplers scheduled for 06/16 were cx due to technician having emergency.  They are now r/s to  location on 7/3.  Pt is asking for sooner apt. She is willing to travel.  Warm transf her to Tracy in central scheduling to assist in r/s.

## 2025-06-23 ENCOUNTER — OFFICE VISIT (OUTPATIENT)
Dept: ENDOCRINOLOGY | Facility: CLINIC | Age: 80
End: 2025-06-23
Payer: COMMERCIAL

## 2025-06-23 ENCOUNTER — APPOINTMENT (OUTPATIENT)
Dept: LAB | Facility: CLINIC | Age: 80
End: 2025-06-23
Payer: COMMERCIAL

## 2025-06-23 ENCOUNTER — HOSPITAL ENCOUNTER (OUTPATIENT)
Dept: NON INVASIVE DIAGNOSTICS | Facility: CLINIC | Age: 80
Discharge: HOME | End: 2025-06-23
Attending: SURGERY
Payer: COMMERCIAL

## 2025-06-23 VITALS
HEIGHT: 68 IN | HEART RATE: 76 BPM | OXYGEN SATURATION: 93 % | DIASTOLIC BLOOD PRESSURE: 70 MMHG | SYSTOLIC BLOOD PRESSURE: 110 MMHG | BODY MASS INDEX: 25.61 KG/M2 | WEIGHT: 169 LBS

## 2025-06-23 DIAGNOSIS — E83.52 HYPERCALCEMIA: ICD-10-CM

## 2025-06-23 DIAGNOSIS — E06.9 THYROIDITIS: ICD-10-CM

## 2025-06-23 DIAGNOSIS — M81.0 AGE-RELATED OSTEOPOROSIS WITHOUT CURRENT PATHOLOGICAL FRACTURE: ICD-10-CM

## 2025-06-23 DIAGNOSIS — Z90.89 H/O PARATHYROIDECTOMY: ICD-10-CM

## 2025-06-23 DIAGNOSIS — E04.2 MULTINODULAR GOITER: ICD-10-CM

## 2025-06-23 DIAGNOSIS — M79.604 PAIN IN BOTH LOWER EXTREMITIES: ICD-10-CM

## 2025-06-23 DIAGNOSIS — M79.605 PAIN IN BOTH LOWER EXTREMITIES: ICD-10-CM

## 2025-06-23 DIAGNOSIS — E55.9 VITAMIN D DEFICIENCY: ICD-10-CM

## 2025-06-23 DIAGNOSIS — Z98.890 H/O PARATHYROIDECTOMY: ICD-10-CM

## 2025-06-23 DIAGNOSIS — E06.9 THYROIDITIS: Primary | ICD-10-CM

## 2025-06-23 LAB
CRP SERPL QL: 1.5 MG/L
ERYTHROCYTE [SEDIMENTATION RATE] IN BLOOD: 13 MM/HOUR (ref 0–29)
T3 SERPL-MCNC: 1 NG/ML (ref 0.9–1.8)
T4 FREE SERPL-MCNC: 0.8 NG/DL (ref 0.61–1.12)
TSH SERPL DL<=0.05 MIU/L-ACNC: 1.09 UIU/ML (ref 0.45–4.5)

## 2025-06-23 PROCEDURE — 93923 UPR/LXTR ART STDY 3+ LVLS: CPT

## 2025-06-23 PROCEDURE — 85652 RBC SED RATE AUTOMATED: CPT

## 2025-06-23 PROCEDURE — 93925 LOWER EXTREMITY STUDY: CPT

## 2025-06-23 PROCEDURE — 84443 ASSAY THYROID STIM HORMONE: CPT

## 2025-06-23 PROCEDURE — G2211 COMPLEX E/M VISIT ADD ON: HCPCS | Performed by: INTERNAL MEDICINE

## 2025-06-23 PROCEDURE — 84480 ASSAY TRIIODOTHYRONINE (T3): CPT

## 2025-06-23 PROCEDURE — 36415 COLL VENOUS BLD VENIPUNCTURE: CPT

## 2025-06-23 PROCEDURE — 84439 ASSAY OF FREE THYROXINE: CPT

## 2025-06-23 PROCEDURE — 86140 C-REACTIVE PROTEIN: CPT

## 2025-06-23 PROCEDURE — 99214 OFFICE O/P EST MOD 30 MIN: CPT | Performed by: INTERNAL MEDICINE

## 2025-06-23 NOTE — ASSESSMENT & PLAN NOTE
Lab Results   Component Value Date    .5 (H) 12/11/2024    CALCIUM 9.8 04/23/2025    PHOS 2.7 09/13/2021   Calcium and phosphorus normalized which is assuring   PTH continues to be elevated, GFR is 69  Continue vitamin D3 supplementation

## 2025-06-23 NOTE — ASSESSMENT & PLAN NOTE
Continue Reclast infusion once a year  She is due for DEXA scan in December 2025  Will continue to monitor DEXA every 2 years  It is anticipated that bone density at forearm will improve after parathyroidectomy  Continue calcium and vitamin D supplementation

## 2025-06-23 NOTE — PROGRESS NOTES
Name: Nella Yancey      : 1945      MRN: [id number]  Encounter Provider: Hali Zepeda MD  Encounter Date: 2025   Encounter department: [facility] [place]      :  Assessment & Plan  Age-related osteoporosis without current pathological fracture  Continue Reclast infusion once a year  She is due for DEXA scan in 2025  Will continue to monitor DEXA every 2 years  It is anticipated that bone density at forearm will improve after parathyroidectomy  Continue calcium and vitamin D supplementation       H/O parathyroidectomy    Lab Results   Component Value Date    .5 (H) 2024    CALCIUM 9.8 2025    PHOS 2.7 2021   Calcium and phosphorus normalized which is assuring   PTH continues to be elevated, GFR is 69  Continue vitamin D3 supplementation         Vitamin D deficiency  Continue vitamin D3 supplementation 2000 IU daily       Hypercalcemia  Resolved after parathyroidectomy       Multinodular goiter  Thyroid ultrasound showed left mid gland nodule 1.5 x 0.9 x 0.9 cm, stable, right mid gland nodule measuring 1.2 x 1 x 0.8 cm stable.  No nodule meet criteria for biopsy but follow-up ultrasound is recommended in 1 year  Patient complains of pain on the right side of the neck, on palpation no mass felt  As per ultrasound there is area of concern measuring 4.7 x 1.9 x 2.8 cm in the right neck.  However I did not feel any lump or mass in the right side of the neck where patient is complaining of pain.  As per patient that her pain has gotten better in the last 2 weeks.  Will obtain CRP and ESR to rule out thyroiditis  Also discussed to see dentist to rule out any other dental infections or dental caries     Also discussed with patient that she should complete CT scan of the neck as ordered by her primary care physician  Thyroiditis  Patient complains of pain on the right side of the neck, will obtain ESR and C-reactive protein to  rule out thyroiditis  Orders:    T4, free; Future    T3; Future    TSH, 3rd generation; Future    Sedimentation rate, automated; Future    C-reactive protein; Future      Assessment & Plan        Pertinent Medical History         History of Present Illness   History of Present Illness    Nella Yancey is a 79 y.o. female with medical history of primary hyperparathyroidism, status post parathyroid surgery, for gland exploration in August 2022 out of which 3 glands were removed with persistent elevated calcium and PTH is here for follow-up.  Had a DEXA scan in December 2023 which showed T-score of -2.7 at lumbar spine suggestive of osteoporosis, forearm T-score in 2022 was -3.7.  She also has history of forearm fracture.  She was started on Reclast infusion in January 2024 tolerated it well    She has history of thyroid nodules, ultrasound was done in 2022 which showed stable right and left thyroid nodules.  He denies family history of thyroid cancer.  She takes vitamin D3 4000 IU daily and no calcium supplementation  She denies difficulty swallowing    Component      Latest Ref Rng 4/23/2025   Sodium      135 - 147 mmol/L 142    Potassium      3.5 - 5.3 mmol/L 4.9    Chloride      96 - 108 mmol/L 105    Carbon Dioxide      21 - 32 mmol/L 30    ANION GAP      4 - 13 mmol/L 7    BUN      5 - 25 mg/dL 21    Creatinine      0.60 - 1.30 mg/dL 0.82    GLUCOSE, FASTING      65 - 99 mg/dL 91    Calcium      8.4 - 10.2 mg/dL 9.8    AST      13 - 39 U/L 22    ALT      7 - 52 U/L 22    ALK PHOS      34 - 104 U/L 65    Total Protein      6.4 - 8.4 g/dL 6.9    Albumin      3.5 - 5.0 g/dL 4.0    Total Bilirubin      0.20 - 1.00 mg/dL 0.98    GFR, Calculated      ml/min/1.73sq m 69    Cholesterol      See Comment mg/dL 184    Triglycerides      See Comment mg/dL 92    HDL      >=50 mg/dL 61    LDL Calculated      0 - 100 mg/dL 105 (H)    TSH 3RD GENERATON      0.450 - 4.500 uIU/mL 1.642       Legend:  (H) High    Review of  "Systems   Constitutional:  Negative for activity change, diaphoresis, fatigue, fever and unexpected weight change.   HENT: Negative.          C/o pain on ant neck ( right neck) on touch   Eyes:  Negative for visual disturbance.   Respiratory:  Negative for cough, chest tightness and shortness of breath.    Cardiovascular:  Negative for chest pain, palpitations and leg swelling.   Gastrointestinal:  Negative for abdominal pain, blood in stool, constipation, diarrhea, nausea and vomiting.   Endocrine: Negative for cold intolerance, heat intolerance, polydipsia, polyphagia and polyuria.   Genitourinary:  Negative for dysuria, enuresis, frequency and urgency.   Musculoskeletal:  Negative for arthralgias and myalgias.   Skin:  Negative for pallor, rash and wound.   Allergic/Immunologic: Negative.    Neurological:  Negative for dizziness, tremors, weakness and numbness.   Hematological: Negative.    Psychiatric/Behavioral: Negative.      as per HPI  Medications Ordered Prior to Encounter[1]      Medical History Reviewed by provider this encounter:     .    Objective   /70   Pulse 76   Ht 5' 8\" (1.727 m)   Wt 76.7 kg (169 lb)   SpO2 93%   BMI 25.70 kg/m²      Body mass index is 25.7 kg/m².  Wt Readings from Last 3 Encounters:   06/23/25 76.7 kg (169 lb)   06/17/25 79 kg (174 lb 3.2 oz)   06/06/25 78.7 kg (173 lb 9.6 oz)     Physical Exam  Vitals reviewed.   Constitutional:       Appearance: Normal appearance.   HENT:      Head: Normocephalic and atraumatic.     Cardiovascular:      Rate and Rhythm: Normal rate and regular rhythm.   Pulmonary:      Effort: Pulmonary effort is normal.      Breath sounds: Normal breath sounds.     Musculoskeletal:         General: Normal range of motion.      Cervical back: Normal range of motion and neck supple.      Right lower leg: No edema.      Left lower leg: No edema.     Skin:     General: Skin is warm and dry.     Neurological:      General: No focal deficit present.      " Mental Status: She is alert and oriented to person, place, and time.      Deep Tendon Reflexes: Reflexes normal.       Physical Exam      Results    Labs: I have reviewed pertinent labs including:   Lab Results   Component Value Date    HGBA1C 5.4 08/14/2024    HGBA1C 5.6 01/07/2019      Lab Results   Component Value Date    CREATININE 0.82 04/23/2025    CREATININE 0.73 02/16/2025    CREATININE 0.81 01/21/2025    BUN 21 04/23/2025    K 4.9 04/23/2025     04/23/2025    CO2 30 04/23/2025        There are no Patient Instructions on file for this visit.    Discussed with the patient and all questioned fully answered. She will call me if any problems arise.             [1]   Current Outpatient Medications on File Prior to Visit   Medication Sig Dispense Refill    acetaminophen (TYLENOL) 500 mg tablet Take 500-1,000 mg by mouth every 6 (six) hours as needed for mild pain      albuterol (PROVENTIL HFA,VENTOLIN HFA) 90 mcg/act inhaler Inhale 2 puffs every 6 (six) hours as needed for wheezing or shortness of breath 8 g 1    ALPRAZolam (XANAX) 0.5 mg tablet Take 1 tablet (0.5 mg total) by mouth daily at bedtime as needed for anxiety or sleep 20 tablet 1    amLODIPine (NORVASC) 2.5 mg tablet Take 1 tablet (2.5 mg total) by mouth daily 90 tablet 1    ascorbic acid (VITAMIN C) 500 mg tablet Take 500 mg by mouth in the morning.      atorvastatin (LIPITOR) 10 mg tablet TAKE 0.5 TABLETS (5 MG TOTAL) BY MOUTH DAILY AFTER DINNER (Patient taking differently: Take 5 mg by mouth daily after dinner Monday, Wed Friday only) 45 tablet 1    b complex vitamins tablet Take 1 tablet by mouth in the morning.      Cholecalciferol 50 MCG (2000 UT) CAPS Take 3,000 Units by mouth in the morning.      Magnesium 300 MG CAPS Take 300 mg by mouth in the morning      meclizine (ANTIVERT) 12.5 MG tablet Can take 1-2 tabs as needed up to BID for dizziness or vertigo, caution as can be sedating 30 tablet 11    metoprolol succinate (TOPROL-XL) 25 mg  24 hr tablet Take 1 tablet (25 mg total) by mouth daily at bedtime 90 tablet 3    Multiple Vitamins-Minerals (MULTIVITAMIN ADULTS 50+ PO) Take 1 tablet by mouth in the morning      Nutritional Supplements (OSAPLEX MK-7 PO) Take 1 tablet by mouth in the morning      omeprazole (PriLOSEC) 20 mg delayed release capsule Take 1 capsule (20 mg total) by mouth daily before breakfast 90 capsule 1    Riboflavin (VITAMIN B-2 PO) Take 250 mg by mouth in the morning       No current facility-administered medications on file prior to visit.

## 2025-06-24 ENCOUNTER — TELEPHONE (OUTPATIENT)
Dept: ENDOCRINOLOGY | Facility: CLINIC | Age: 80
End: 2025-06-24

## 2025-06-24 ENCOUNTER — HOSPITAL ENCOUNTER (OUTPATIENT)
Dept: NON INVASIVE DIAGNOSTICS | Facility: HOSPITAL | Age: 80
Discharge: HOME | End: 2025-06-24
Attending: SURGERY
Payer: COMMERCIAL

## 2025-06-24 DIAGNOSIS — M79.604 PAIN IN BOTH LOWER EXTREMITIES: ICD-10-CM

## 2025-06-24 DIAGNOSIS — M79.605 PAIN IN BOTH LOWER EXTREMITIES: ICD-10-CM

## 2025-06-24 PROCEDURE — 93970 EXTREMITY STUDY: CPT

## 2025-06-24 NOTE — TELEPHONE ENCOUNTER
Call patient and discussed about the lab results, TSH, free T4 and T3 as well as ESR and C-reactive protein is within normal range not suggestive of thyroiditis.  Discussed that she should still complete the CT scan of the neck as ordered by PCP.  Also make appointment with dentist to get evaluation for dental caries as well as infections that could cause pain in the neck as well as enlarged lymph nodes.

## 2025-06-26 PROCEDURE — 93925 LOWER EXTREMITY STUDY: CPT | Performed by: SURGERY

## 2025-06-26 PROCEDURE — 93970 EXTREMITY STUDY: CPT | Performed by: SURGERY

## 2025-06-26 PROCEDURE — 93922 UPR/L XTREMITY ART 2 LEVELS: CPT | Performed by: SURGERY

## 2025-07-07 ENCOUNTER — HOSPITAL ENCOUNTER (OUTPATIENT)
Dept: CT IMAGING | Facility: HOSPITAL | Age: 80
Discharge: HOME | End: 2025-07-07
Attending: FAMILY MEDICINE
Payer: COMMERCIAL

## 2025-07-07 DIAGNOSIS — R22.1 LOCALIZED SWELLING, MASS OR LUMP OF NECK: ICD-10-CM

## 2025-07-07 PROCEDURE — 70491 CT SOFT TISSUE NECK W/DYE: CPT

## 2025-07-07 RX ADMIN — IOHEXOL 85 ML: 350 INJECTION, SOLUTION INTRAVENOUS at 12:47

## 2025-07-08 ENCOUNTER — TELEPHONE (OUTPATIENT)
Age: 80
End: 2025-07-08

## 2025-07-08 NOTE — TELEPHONE ENCOUNTER
Patient called to schedule follow up visit in October with Dr Adler. I advised her the schedule was not available at this time and she will be contacted to schedule once Dr Adler's calendar opens. I also provided her central scheduling phone number and transferred directly to schedule spirometry with diffusing capacity testing. Patient would like to know if any other testing is needed in mean time. Please advise.

## 2025-07-11 ENCOUNTER — OFFICE VISIT (OUTPATIENT)
Dept: FAMILY MEDICINE CLINIC | Facility: CLINIC | Age: 80
End: 2025-07-11
Payer: COMMERCIAL

## 2025-07-11 VITALS
BODY MASS INDEX: 26.07 KG/M2 | SYSTOLIC BLOOD PRESSURE: 128 MMHG | TEMPERATURE: 97.8 F | HEART RATE: 76 BPM | OXYGEN SATURATION: 94 % | HEIGHT: 68 IN | DIASTOLIC BLOOD PRESSURE: 84 MMHG | RESPIRATION RATE: 15 BRPM | WEIGHT: 172 LBS

## 2025-07-11 DIAGNOSIS — R05.9 COUGH, UNSPECIFIED TYPE: ICD-10-CM

## 2025-07-11 DIAGNOSIS — R39.9 URINARY SYMPTOM OR SIGN: Primary | ICD-10-CM

## 2025-07-11 DIAGNOSIS — J84.9 ILD (INTERSTITIAL LUNG DISEASE) (HCC): ICD-10-CM

## 2025-07-11 LAB
SL AMB  POCT GLUCOSE, UA: NORMAL
SL AMB LEUKOCYTE ESTERASE,UA: NORMAL
SL AMB POCT BILIRUBIN,UA: NORMAL
SL AMB POCT BLOOD,UA: NORMAL
SL AMB POCT CLARITY,UA: CLEAR
SL AMB POCT COLOR,UA: YELLOW
SL AMB POCT KETONES,UA: NORMAL
SL AMB POCT NITRITE,UA: NORMAL
SL AMB POCT PH,UA: 6.5
SL AMB POCT SPECIFIC GRAVITY,UA: 1
SL AMB POCT URINE PROTEIN: NORMAL
SL AMB POCT UROBILINOGEN: NORMAL

## 2025-07-11 PROCEDURE — 81002 URINALYSIS NONAUTO W/O SCOPE: CPT | Performed by: FAMILY MEDICINE

## 2025-07-11 PROCEDURE — 99214 OFFICE O/P EST MOD 30 MIN: CPT | Performed by: FAMILY MEDICINE

## 2025-07-11 PROCEDURE — 87086 URINE CULTURE/COLONY COUNT: CPT | Performed by: FAMILY MEDICINE

## 2025-07-11 RX ORDER — CODEINE PHOSPHATE AND GUAIFENESIN 10; 100 MG/5ML; MG/5ML
5 SOLUTION ORAL
Qty: 120 ML | Refills: 0 | Status: SHIPPED | OUTPATIENT
Start: 2025-07-11

## 2025-07-11 NOTE — PROGRESS NOTES
:  Assessment & Plan  Urinary symptom or sign  - urine dip negative for leukocytes, blood or nitrites, will sed for urine culture and call with the results, advised to increase fluids and contact the office for persistent or worsening symptoms    Orders:    POCT urine dip    Urine culture    Cough, unspecified type  - advised to take Mucinex during the day, guaifenesin with codeine cough syrup prescribed to take at bedtime as needed, discussed chest x-ray for persistent symptoms    Orders:    guaiFENesin-codeine (Guaiatussin AC) 100-10 mg/5 mL oral solution; Take 5 mL by mouth daily at bedtime as needed for cough    [facility]) (HCC)  - following with Pulmonary, encouraged to schedule spirometry as ordered by Pulmonary       Return as scheduled or sooner as needed.   Patient understands and agrees with the treatment plan.       History of Present Illness     Nella Yancey is a 79 y.o. female who presents today with c/o urinary frequency, urgency and burning with urination onset several days ago, no fever or chills, no nausea, no vomiting, no abdominal or flank pain, no vaginal discharge or itching.   Patient also reports dry cough since she had a cold about 2 weeks ago. Patient states her nasal congesting and runny nose now improved, but she continues to have coughing episodes especially at night time. No fever or chills, no purulent mucus production, no chest pain, no wheezing or shortness of breath.         Review of Systems   Constitutional:  Negative for chills, fatigue and fever.   HENT:  Negative for congestion, ear pain, rhinorrhea, sore throat, trouble swallowing and voice change.    Respiratory:  Positive for cough. Negative for shortness of breath and wheezing.    Cardiovascular:  Negative for chest pain and palpitations.   Gastrointestinal:  Negative for abdominal pain, diarrhea, nausea and vomiting.   Genitourinary:  Positive for dysuria, frequency and urgency. Negative for  "difficulty urinating, flank pain, hematuria and vaginal discharge.   Neurological:  Negative for dizziness and headaches.   Hematological:  Negative for adenopathy.     Objective   /84   Pulse 76   Temp 97.8 °F (36.6 °C)   Resp 15   Ht 5' 8\" (1.727 m)   Wt 78 kg (172 lb)   SpO2 94%   BMI 26.15 kg/m²      Physical Exam  Constitutional:       General: She is not in acute distress.  HENT:      Right Ear: Tympanic membrane and ear canal normal.      Left Ear: Tympanic membrane and ear canal normal.      Mouth/Throat:      Mouth: Mucous membranes are moist.      Pharynx: Oropharynx is clear.     Cardiovascular:      Rate and Rhythm: Normal rate and regular rhythm.      Heart sounds: Normal heart sounds.   Pulmonary:      Effort: Pulmonary effort is normal.      Breath sounds: Normal breath sounds. No wheezing, rhonchi or rales.   Abdominal:      Palpations: Abdomen is soft. There is no mass.      Tenderness: There is no abdominal tenderness. There is no right CVA tenderness or left CVA tenderness.     Musculoskeletal:      Cervical back: Neck supple.   Lymphadenopathy:      Cervical: No cervical adenopathy.     Neurological:      Mental Status: She is alert and oriented to person, place, and time.     Psychiatric:         Mood and Affect: Mood normal.         Behavior: Behavior normal.       Component  Ref Range & Units (hover) 7/11/25 10:02 AM   LEUKOCYTE ESTERASE,UA neg   NITRITE,UA neg   SL AMB POCT UROBILINOGEN normal   POCT URINE PROTEIN neg    PH,UA 6.5   BLOOD,UA neg   SPECIFIC GRAVITY,UA 1.005   KETONES,UA neg   BILIRUBIN,UA neg   GLUCOSE, UA neg    COLOR,UA yellow   CLARITY,UA clear       "

## 2025-07-12 LAB — BACTERIA UR CULT: NORMAL

## 2025-07-15 ENCOUNTER — OFFICE VISIT (OUTPATIENT)
Dept: NEUROLOGY | Facility: CLINIC | Age: 80
End: 2025-07-15
Payer: COMMERCIAL

## 2025-07-15 ENCOUNTER — NURSE TRIAGE (OUTPATIENT)
Age: 80
End: 2025-07-15

## 2025-07-15 VITALS
HEIGHT: 68 IN | SYSTOLIC BLOOD PRESSURE: 142 MMHG | TEMPERATURE: 97 F | WEIGHT: 173 LBS | DIASTOLIC BLOOD PRESSURE: 84 MMHG | OXYGEN SATURATION: 95 % | RESPIRATION RATE: 18 BRPM | BODY MASS INDEX: 26.22 KG/M2 | HEART RATE: 73 BPM

## 2025-07-15 DIAGNOSIS — G43.009 MIGRAINE WITHOUT AURA AND WITHOUT STATUS MIGRAINOSUS, NOT INTRACTABLE: Primary | ICD-10-CM

## 2025-07-15 PROCEDURE — 99214 OFFICE O/P EST MOD 30 MIN: CPT | Performed by: PSYCHIATRY & NEUROLOGY

## 2025-07-15 PROCEDURE — G2211 COMPLEX E/M VISIT ADD ON: HCPCS | Performed by: PSYCHIATRY & NEUROLOGY

## 2025-07-18 ENCOUNTER — TELEPHONE (OUTPATIENT)
Age: 80
End: 2025-07-18

## 2025-07-18 DIAGNOSIS — J84.9 ILD (INTERSTITIAL LUNG DISEASE) (HCC): Primary | ICD-10-CM

## 2025-07-18 RX ORDER — BENZONATATE 200 MG/1
200 CAPSULE ORAL 3 TIMES DAILY PRN
Qty: 30 CAPSULE | Refills: 0 | Status: SHIPPED | OUTPATIENT
Start: 2025-07-18

## 2025-07-18 NOTE — TELEPHONE ENCOUNTER
I called and spoke with Nella.  She reports that she has an intermittent dry cough, but sometimes also has mucus.  She reports this is happening throughout the day and causing her difficulty.  We discussed using Mucinex over-the-counter if she is having issues with expectoration of mucus.  I did also send Tessalon to her pharmacy and we discussed indications for both medications.  All questions answered.  I advised her to give the office a call back if this does not work and we can move her appointment sooner if needed or triage other options.

## 2025-07-18 NOTE — TELEPHONE ENCOUNTER
Pt calling and states that she has been having a persistent dry cough that is waking her up at night and can't get it to stop she is wondering if provider has any advice for her this has been going on for about 4 weeks

## 2025-07-21 ENCOUNTER — TELEPHONE (OUTPATIENT)
Age: 80
End: 2025-07-21

## 2025-07-21 NOTE — TELEPHONE ENCOUNTER
Pt called back to confirm that the visit will just be a phone call with no video. Advised the patient that was what was confirmed and that it was also put in the visit notes as phone visit on 8/6 @ 9:30. Pt verbalized understanding

## 2025-07-21 NOTE — TELEPHONE ENCOUNTER
Pt calling stating that her phone is not capable of a virtual visit so it needs to be a phone visit. She states that when she schedule she was told it will be a phone visit however it says virtual on her appointment desk and she does not want the appointment to not work.     Appt-8/6    Please follow up with pt

## 2025-07-23 DIAGNOSIS — M81.0 AGE-RELATED OSTEOPOROSIS WITHOUT CURRENT PATHOLOGICAL FRACTURE: Primary | ICD-10-CM

## 2025-07-26 DIAGNOSIS — E78.5 HYPERLIPIDEMIA, UNSPECIFIED HYPERLIPIDEMIA TYPE: ICD-10-CM

## 2025-07-29 RX ORDER — ATORVASTATIN CALCIUM 10 MG/1
5 TABLET, FILM COATED ORAL
Qty: 45 TABLET | Refills: 1 | Status: SHIPPED | OUTPATIENT
Start: 2025-07-29

## 2025-08-01 ENCOUNTER — TELEPHONE (OUTPATIENT)
Dept: GASTROENTEROLOGY | Facility: CLINIC | Age: 80
End: 2025-08-01

## 2025-08-03 ENCOUNTER — ANESTHESIA (OUTPATIENT)
Dept: ANESTHESIOLOGY | Facility: HOSPITAL | Age: 80
End: 2025-08-03

## 2025-08-03 ENCOUNTER — ANESTHESIA EVENT (OUTPATIENT)
Dept: ANESTHESIOLOGY | Facility: HOSPITAL | Age: 80
End: 2025-08-03

## 2025-08-04 ENCOUNTER — ANESTHESIA EVENT (OUTPATIENT)
Dept: GASTROENTEROLOGY | Facility: HOSPITAL | Age: 80
End: 2025-08-04
Payer: COMMERCIAL

## 2025-08-04 ENCOUNTER — ANESTHESIA (OUTPATIENT)
Dept: GASTROENTEROLOGY | Facility: HOSPITAL | Age: 80
End: 2025-08-04
Payer: COMMERCIAL

## 2025-08-04 ENCOUNTER — HOSPITAL ENCOUNTER (OUTPATIENT)
Dept: GASTROENTEROLOGY | Facility: HOSPITAL | Age: 80
Setting detail: OUTPATIENT SURGERY
Discharge: HOME | End: 2025-08-04
Attending: INTERNAL MEDICINE
Payer: COMMERCIAL

## 2025-08-04 VITALS
RESPIRATION RATE: 18 BRPM | HEART RATE: 67 BPM | DIASTOLIC BLOOD PRESSURE: 75 MMHG | SYSTOLIC BLOOD PRESSURE: 150 MMHG | TEMPERATURE: 97.4 F | OXYGEN SATURATION: 98 %

## 2025-08-04 DIAGNOSIS — R13.10 DYSPHAGIA, UNSPECIFIED TYPE: ICD-10-CM

## 2025-08-04 DIAGNOSIS — K21.9 GASTROESOPHAGEAL REFLUX DISEASE, UNSPECIFIED WHETHER ESOPHAGITIS PRESENT: ICD-10-CM

## 2025-08-04 PROCEDURE — 88342 IMHCHEM/IMCYTCHM 1ST ANTB: CPT | Performed by: STUDENT IN AN ORGANIZED HEALTH CARE EDUCATION/TRAINING PROGRAM

## 2025-08-04 PROCEDURE — 88341 IMHCHEM/IMCYTCHM EA ADD ANTB: CPT | Performed by: STUDENT IN AN ORGANIZED HEALTH CARE EDUCATION/TRAINING PROGRAM

## 2025-08-04 PROCEDURE — 88305 TISSUE EXAM BY PATHOLOGIST: CPT | Performed by: STUDENT IN AN ORGANIZED HEALTH CARE EDUCATION/TRAINING PROGRAM

## 2025-08-04 RX ORDER — PROPOFOL 10 MG/ML
INJECTION, EMULSION INTRAVENOUS AS NEEDED
Status: DISCONTINUED | OUTPATIENT
Start: 2025-08-04 | End: 2025-08-04

## 2025-08-04 RX ORDER — LIDOCAINE HYDROCHLORIDE 10 MG/ML
INJECTION, SOLUTION EPIDURAL; INFILTRATION; INTRACAUDAL; PERINEURAL AS NEEDED
Status: DISCONTINUED | OUTPATIENT
Start: 2025-08-04 | End: 2025-08-04

## 2025-08-04 RX ORDER — SODIUM CHLORIDE, SODIUM LACTATE, POTASSIUM CHLORIDE, CALCIUM CHLORIDE 600; 310; 30; 20 MG/100ML; MG/100ML; MG/100ML; MG/100ML
125 INJECTION, SOLUTION INTRAVENOUS CONTINUOUS
Status: DISCONTINUED | OUTPATIENT
Start: 2025-08-04 | End: 2025-08-08 | Stop reason: HOSPADM

## 2025-08-04 RX ORDER — OMEPRAZOLE 40 MG/1
40 CAPSULE, DELAYED RELEASE ORAL
Qty: 30 CAPSULE | Refills: 3 | Status: SHIPPED | OUTPATIENT
Start: 2025-08-04

## 2025-08-04 RX ORDER — FAMOTIDINE 40 MG/1
40 TABLET, FILM COATED ORAL
Qty: 30 TABLET | Refills: 3 | Status: SHIPPED | OUTPATIENT
Start: 2025-08-04

## 2025-08-04 RX ORDER — SODIUM CHLORIDE, SODIUM LACTATE, POTASSIUM CHLORIDE, CALCIUM CHLORIDE 600; 310; 30; 20 MG/100ML; MG/100ML; MG/100ML; MG/100ML
INJECTION, SOLUTION INTRAVENOUS CONTINUOUS PRN
Status: DISCONTINUED | OUTPATIENT
Start: 2025-08-04 | End: 2025-08-04

## 2025-08-04 RX ADMIN — LIDOCAINE HYDROCHLORIDE 50 MG: 10 INJECTION, SOLUTION EPIDURAL; INFILTRATION; INTRACAUDAL; PERINEURAL at 07:47

## 2025-08-04 RX ADMIN — PROPOFOL 30 MG: 10 INJECTION, EMULSION INTRAVENOUS at 07:51

## 2025-08-04 RX ADMIN — SODIUM CHLORIDE, SODIUM LACTATE, POTASSIUM CHLORIDE, AND CALCIUM CHLORIDE 125 ML/HR: .6; .31; .03; .02 INJECTION, SOLUTION INTRAVENOUS at 07:00

## 2025-08-04 RX ADMIN — PROPOFOL 150 MG: 10 INJECTION, EMULSION INTRAVENOUS at 07:47

## 2025-08-04 RX ADMIN — PROPOFOL 20 MG: 10 INJECTION, EMULSION INTRAVENOUS at 07:56

## 2025-08-04 RX ADMIN — SODIUM CHLORIDE, SODIUM LACTATE, POTASSIUM CHLORIDE, AND CALCIUM CHLORIDE: .6; .31; .03; .02 INJECTION, SOLUTION INTRAVENOUS at 07:43

## 2025-08-06 ENCOUNTER — TELEMEDICINE (OUTPATIENT)
Dept: VASCULAR SURGERY | Facility: CLINIC | Age: 80
End: 2025-08-06
Payer: COMMERCIAL

## 2025-08-06 DIAGNOSIS — M79.661 BILATERAL CALF PAIN: Primary | ICD-10-CM

## 2025-08-06 DIAGNOSIS — M79.662 BILATERAL CALF PAIN: Primary | ICD-10-CM

## 2025-08-06 PROCEDURE — 99212 OFFICE O/P EST SF 10 MIN: CPT | Performed by: SURGERY

## 2025-08-11 PROCEDURE — 88342 IMHCHEM/IMCYTCHM 1ST ANTB: CPT | Performed by: STUDENT IN AN ORGANIZED HEALTH CARE EDUCATION/TRAINING PROGRAM

## 2025-08-11 PROCEDURE — 88305 TISSUE EXAM BY PATHOLOGIST: CPT | Performed by: STUDENT IN AN ORGANIZED HEALTH CARE EDUCATION/TRAINING PROGRAM

## 2025-08-11 PROCEDURE — 88341 IMHCHEM/IMCYTCHM EA ADD ANTB: CPT | Performed by: STUDENT IN AN ORGANIZED HEALTH CARE EDUCATION/TRAINING PROGRAM

## 2025-08-21 ENCOUNTER — OFFICE VISIT (OUTPATIENT)
Dept: CARDIOLOGY CLINIC | Facility: CLINIC | Age: 80
End: 2025-08-21
Payer: COMMERCIAL

## 2025-08-21 VITALS
DIASTOLIC BLOOD PRESSURE: 82 MMHG | HEART RATE: 73 BPM | WEIGHT: 174 LBS | HEIGHT: 68 IN | SYSTOLIC BLOOD PRESSURE: 134 MMHG | BODY MASS INDEX: 26.37 KG/M2

## 2025-08-21 DIAGNOSIS — I49.1 PREMATURE SUPRAVENTRICULAR BEATS: ICD-10-CM

## 2025-08-21 DIAGNOSIS — I47.10 PSVT (PAROXYSMAL SUPRAVENTRICULAR TACHYCARDIA) (HCC): ICD-10-CM

## 2025-08-21 DIAGNOSIS — E78.00 PURE HYPERCHOLESTEROLEMIA: ICD-10-CM

## 2025-08-21 DIAGNOSIS — I10 PRIMARY HYPERTENSION: Primary | ICD-10-CM

## 2025-08-21 DIAGNOSIS — Z86.79 HISTORY OF PERICARDITIS: ICD-10-CM

## 2025-08-21 PROBLEM — I31.39 PERICARDIAL EFFUSION: Status: RESOLVED | Noted: 2019-11-20 | Resolved: 2025-08-21

## 2025-08-21 PROCEDURE — 99214 OFFICE O/P EST MOD 30 MIN: CPT | Performed by: INTERNAL MEDICINE

## (undated) DEVICE — GLOVE INDICATOR PI UNDERGLOVE SZ 8.5 BLUE

## (undated) DEVICE — ACE WRAP 4 IN UNSTERILE

## (undated) DEVICE — 1.6MM KIRSCHNER WIRE W/TROCAR POINT 150MM
Type: IMPLANTABLE DEVICE | Site: WRIST | Status: NON-FUNCTIONAL
Removed: 2018-03-22

## (undated) DEVICE — 3M™ STERI-STRIP™ REINFORCED ADHESIVE SKIN CLOSURES, R1547, 1/2 IN X 4 IN (12 MM X 100 MM), 6 STRIPS/ENVELOPE: Brand: 3M™ STERI-STRIP™

## (undated) DEVICE — VIAL DECANTER

## (undated) DEVICE — TIBURON SPLIT SHEET: Brand: CONVERTORS

## (undated) DEVICE — SUT SILK 3-0 18 IN A184H

## (undated) DEVICE — 3M™ IOBAN™ 2 ANTIMICROBIAL INCISE DRAPE 6640EZ: Brand: IOBAN™ 2

## (undated) DEVICE — BUTTON SWITCH PENCIL HOLSTER: Brand: VALLEYLAB

## (undated) DEVICE — ULTRACLEAN ACCESSORY ELECTRODE 1" (2.54 CM) COATED BLADE: Brand: ULTRACLEAN

## (undated) DEVICE — SILVER-COATED ANTIMICROBIAL BARRIER DRESSING: Brand: ACTICOAT   4" X 8"

## (undated) DEVICE — SYRINGE 10ML LL

## (undated) DEVICE — 1.8MM DRILL BIT WITH DEPTH MARK/QC/110MM

## (undated) DEVICE — SUT SILK 3-0 SH 30 IN K832H

## (undated) DEVICE — SUT VICRYL 3-0 SH 27 IN J416H

## (undated) DEVICE — CHLORAPREP HI-LITE 26ML ORANGE

## (undated) DEVICE — TUBING SUCTION 5MM X 12 FT

## (undated) DEVICE — GLOVE SRG BIOGEL ORTHOPEDIC 7

## (undated) DEVICE — BIPOLAR CORD DISP

## (undated) DEVICE — INTENDED FOR TISSUE SEPARATION, AND OTHER PROCEDURES THAT REQUIRE A SHARP SURGICAL BLADE TO PUNCTURE OR CUT.: Brand: BARD-PARKER SAFETY BLADES SIZE 15, STERILE

## (undated) DEVICE — VIOLET BRAIDED (POLYGLACTIN 910), SYNTHETIC ABSORBABLE SUTURE: Brand: COATED VICRYL

## (undated) DEVICE — PACK UNIVERSAL NECK

## (undated) DEVICE — ACE WRAP 3 IN STERILE

## (undated) DEVICE — SPONGE PVP SCRUB WING STERILE

## (undated) DEVICE — ABDOMINAL PAD: Brand: DERMACEA

## (undated) DEVICE — 2.4MM CORTEX SCREW SLF-TPNG WITH T8 STARDRIVE RECESS 22MM: Type: IMPLANTABLE DEVICE | Site: WRIST | Status: NON-FUNCTIONAL

## (undated) DEVICE — PACK PLASTIC HAND PBDS

## (undated) DEVICE — GLOVE SRG BIOGEL 8

## (undated) DEVICE — DRAPE C-ARM X-RAY

## (undated) DEVICE — CUFF TOURNIQUET 18 X 4 IN QUICK CONNECT DISP 1 BLADDER

## (undated) DEVICE — PADDING CAST 4 IN  COTTON STRL

## (undated) DEVICE — INTENDED FOR TISSUE SEPARATION, AND OTHER PROCEDURES THAT REQUIRE A SHARP SURGICAL BLADE TO PUNCTURE OR CUT.: Brand: BARD-PARKER SAFETY BLADES SIZE 10, STERILE

## (undated) DEVICE — NEEDLE 25G X 1 1/2

## (undated) DEVICE — SUT ETHILON 3-0 FS-1 18 IN 663G

## (undated) DEVICE — SUT MONOCRYL 4-0 PS-2 18 IN Y496G

## (undated) DEVICE — GAUZE SPONGES,16 PLY: Brand: CURITY